# Patient Record
Sex: FEMALE | Race: WHITE | NOT HISPANIC OR LATINO | Employment: OTHER | URBAN - METROPOLITAN AREA
[De-identification: names, ages, dates, MRNs, and addresses within clinical notes are randomized per-mention and may not be internally consistent; named-entity substitution may affect disease eponyms.]

---

## 2017-01-04 ENCOUNTER — DOCUMENTATION ONLY (OUTPATIENT)
Dept: ENDOCRINOLOGY | Facility: CLINIC | Age: 82
End: 2017-01-04

## 2017-01-04 ASSESSMENT — ENCOUNTER SYMPTOMS
TASTE DISTURBANCE: 1
LOSS OF CONSCIOUSNESS: 0
JOINT SWELLING: 0
BOWEL INCONTINENCE: 1
NAIL CHANGES: 0
DIARRHEA: 0
DIFFICULTY URINATING: 0
NECK MASS: 0
MUSCLE WEAKNESS: 1
TROUBLE SWALLOWING: 0
SKIN CHANGES: 0
HALLUCINATIONS: 0
FEVER: 0
CHILLS: 0
POLYDIPSIA: 0
TREMORS: 0
SMELL DISTURBANCE: 1
NIGHT SWEATS: 0
TACHYCARDIA: 0
CONSTIPATION: 0
SLEEP DISTURBANCES DUE TO BREATHING: 0
ORTHOPNEA: 0
CLAUDICATION: 0
LEG PAIN: 0
NUMBNESS: 1
HYPOTENSION: 0
SPEECH CHANGE: 0
MUSCLE CRAMPS: 0
ALTERED TEMPERATURE REGULATION: 0
SEIZURES: 0
PALPITATIONS: 0
HEARTBURN: 0
HOARSE VOICE: 1
RECTAL PAIN: 0
ARTHRALGIAS: 1
MYALGIAS: 1
FLANK PAIN: 0
NECK PAIN: 1
PARALYSIS: 0
POOR WOUND HEALING: 0
POLYPHAGIA: 0
SYNCOPE: 0
STIFFNESS: 0
WEAKNESS: 1
LIGHT-HEADEDNESS: 0
SORE THROAT: 0
BACK PAIN: 1
DISTURBANCES IN COORDINATION: 1
INCREASED ENERGY: 0
BLOATING: 0
NAUSEA: 0
JAUNDICE: 0
MEMORY LOSS: 0
HEADACHES: 1
WEIGHT LOSS: 0
LEG SWELLING: 1
SINUS PAIN: 0
DECREASED APPETITE: 0
SINUS CONGESTION: 1
DYSURIA: 0
RECTAL BLEEDING: 0
FATIGUE: 1
HYPERTENSION: 0
EXERCISE INTOLERANCE: 0
ABDOMINAL PAIN: 0
DIZZINESS: 1
WEIGHT GAIN: 0
TINGLING: 1
HEMATURIA: 0
BLOOD IN STOOL: 0
VOMITING: 0

## 2017-01-04 NOTE — PROGRESS NOTES
Patient scheduled for osteoporosis in the morning. This is summary of chart review:     86 yo female with past medical history of osteoporosis first diagnosed at the age of @@@.    She is currently being treated with alendronate 70 mg weekly and this treatment was started @@@.  She takes it first thing in the morning with empty stomach; overall compliance per report @@@.  Number of times treatment missed @@@    +History of vertebral fracture   + 5 inch height loss/kyphosis   Previous fracture after the age of 50 -   Advanced age  Weight - 91 pounds (high risk)  Previous fragility fracture, morphometric vertebral fracture: imaging done in 7/2015 has showed multiple compression fractures.   7/2015 spine film reported as <Multiple old healed left rib fractures. Diffuse osteopenia. New moderate compression fracture of T10 since the prior examination. New moderate compression fractures in T4 and T5 are new since the prior exam. Multilevel degenerative changes in the thoracic spine are otherwise stable. Marked scoliosis of the thoracolumbar spine is redemonstrated.>    Current glucocorticoid treatment: >5 mg prednisone for 3 mo or longer; current or previously  Excessive Alcohol intake  Falls/risk factors: Multiple falls. 4 falls ine one year and half. Sustained rib and vertebral fractures secondary to the falls. Currently participating in home physical therapy for osteoporosis.   Walks three times per day in hallways of her apartment. Uses can for walking short distances and 4 barros for long distances.     Secondary causes of osteoporosis: negative for   RA, Prolonged immobility,  Organ transplantation, Type I diabetes, Hyperthyroidism, GI disease, Chronic liver disease or COPD.     Hypogonadism iatrogenic aromatase inhibitor therapy use.   Family history of hip fracture @@@:     DEXA scan: BONE MINERAL DENSITY: 9/2015  <Lumbar Spine   Vertebrae Included: L1, L2, L3, L4  Bone Mineral Density (gm/cm2): 0.653  T-Score:  -3.6  Z-Score: -0.7  Total Hip  Bone Mineral Density (gm/cm2): 0.622  T-Score: -2.6  Z-Score: -0.3  Femoral Neck  Bone Mineral Density (gm/cm2): 0.566  T-Score: -2.6  Z-Score: 0Compared to the prior study of 12- there has been a nearly 9% decline in BMD at the total hip and no change at the lumbar spine.>    Consider the following testing:   DEXA scan repeat.   Routine: CBC done. TSH done WNL (8/2016) and PTH done WNL (7/2015), Protein electrophoresis and urinary bence-childress WNL (7/2015), ESR 14 (7/2015), TgA negative in 2010.    Check:  Calcium, albumin, creatinine, phosphate, alkaline phosphatase, PTH, 25)HD, S-CTX.

## 2017-01-05 ENCOUNTER — OFFICE VISIT (OUTPATIENT)
Dept: ENDOCRINOLOGY | Facility: CLINIC | Age: 82
End: 2017-01-05

## 2017-01-05 VITALS
DIASTOLIC BLOOD PRESSURE: 95 MMHG | HEART RATE: 68 BPM | BODY MASS INDEX: 19.29 KG/M2 | HEIGHT: 58 IN | WEIGHT: 91.9 LBS | SYSTOLIC BLOOD PRESSURE: 165 MMHG

## 2017-01-05 DIAGNOSIS — R29.6 RECURRENT FALLS: ICD-10-CM

## 2017-01-05 DIAGNOSIS — M81.0 OSTEOPOROSIS: ICD-10-CM

## 2017-01-05 DIAGNOSIS — M81.0 OSTEOPOROSIS: Primary | ICD-10-CM

## 2017-01-05 LAB
ALBUMIN SERPL-MCNC: 3.5 G/DL (ref 3.4–5)
ALP SERPL-CCNC: 57 U/L (ref 40–150)
CALCIUM SERPL-MCNC: 9.2 MG/DL (ref 8.5–10.1)
CREAT SERPL-MCNC: 0.64 MG/DL (ref 0.52–1.04)
DEPRECATED CALCIDIOL+CALCIFEROL SERPL-MC: 39 UG/L (ref 20–75)
GFR SERPL CREATININE-BSD FRML MDRD: 88 ML/MIN/1.7M2
PHOSPHATE SERPL-MCNC: 3.3 MG/DL (ref 2.5–4.5)
PTH-INTACT SERPL-MCNC: 38 PG/ML (ref 12–72)

## 2017-01-05 ASSESSMENT — PAIN SCALES - GENERAL: PAINLEVEL: NO PAIN (0)

## 2017-01-05 NOTE — PATIENT INSTRUCTIONS
Continue the exercise program you are doing right now.   Use walker all the time.   Continue calcium and vitamin D supplement as you are doing right now.     We will do blood work today and we will contact you once we have the results.     We have ordered DEXA scan; please schedule this at your convenience.

## 2017-01-05 NOTE — PROGRESS NOTES
Endocrinology Clinic Visit    Chief Complaint: Consult     Information obtained from:Patient and and her two children who is here with her today.     Subjective:         HPI: Belia Sheets is a 86 year old year old female with history of osteoporosis who is seen in consultation at [facility]'s request for Osteoporosis management.     86 yo female with past medical history of osteoporosis first diagnosed in 1999.     She was treated with alendronate 70 mg weekly from 1999 to October 2015.  She has never stopped this medication all these years; she took it first thing in the morning with empty stomach.    In October of 2015 she was prescribed FORTEO; mainly because she wanted to change from bisphosphonate per report. However, she didn't take these medication because cost was 600 for two years and she didn't know wether it will help her or not.     +History of vertebral fracture; she was folding laundry when she fell from standing up position and sustained injury.  Broken hip and noted to have thoracic compression fractures.     Previous fragility fracture, morphometric vertebral fracture: imaging done in 7/2015 has showed multiple compression fractures.   7/2015 spine film reported as <Multiple old healed left rib fractures. Diffuse osteopenia. New moderate compression fracture of T10 since the prior examination. New moderate compression fractures in T4 and T5 are new since the prior exam. Multilevel degenerative changes in the thoracic spine are otherwise stable. Marked scoliosis of the thoracolumbar spine is redemonstrated.>    Lost 6 inch height loss/kyphosis    Previous fracture after the age of 50 -    Advanced age  Weight - 91 pounds (high risk)    Falls/risk factors: Multiple falls. 4 falls ine one year and half. Sustained rib and vertebral fractures secondary to the falls. Currently participating in home physical therapy for osteoporosis.   Walks three times per day in hallways of her apartment. Uses can  for walking short distances and 4 barros for long distances. She is currently doing a home physical exercise about twice a week with a physical therapist. She has been doing this since August 2016. Fall: 7/2015, 12/2014, 4/2016, 6/2016.     She lives in a senior apartment.  She gets some services. Mostly manages by her own.     She takes calcium and vitamin d supplement 600-400; 1 BID. She takes yogurt, jose daily.      Secondary causes of osteoporosis: negative for    RA, Prolonged immobility,  Organ transplantation, Type I diabetes, Hyperthyroidism, GI disease, Chronic liver disease or COPD.     Family history of hip fracture: None.      DEXA scan: BONE MINERAL DENSITY: 9/2015  <Lumbar Spine   Vertebrae Included: L1, L2, L3, L4  Bone Mineral Density (gm/cm2): 0.653  T-Score: -3.6  Z-Score: -0.7  Total Hip  Bone Mineral Density (gm/cm2): 0.622  T-Score: -2.6  Z-Score: -0.3  Femoral Neck  Bone Mineral Density (gm/cm2): 0.566  T-Score: -2.6  Z-Score: 0Compared to the prior study of 12- there has been a nearly 9% decline in BMD at the total hip and no change at the lumbar spine.>     No Known Allergies    Current Outpatient Prescriptions   Medication Sig Dispense Refill     loperamide (IMODIUM A-D) 2 MG tablet Take 2 mg by mouth 4 times daily as needed       UNKNOWN TO PATIENT Vision formula Vit A, C & E / Lutein/Minerals       alendronate (FOSAMAX) 70 MG tablet Take 70 mg by mouth every 7 days       aspirin 81 MG tablet Take by mouth daily       calcium-vitamin D (CALTRATE) 600-400 MG-UNIT per tablet Take 1 tablet by mouth 2 times daily       ciprofloxacin (CIPRO) 250 MG tablet Take 250 mg by mouth 2 times daily       alpha-d-galactosidase (BEANO) tablet Take by mouth as needed for intestinal gas       Omega-3 Fatty Acids (OMEGA-3 FISH OIL PO) Take by mouth daily       sodium fluoride dental gel (PREVIDENT) 1.1 % GEL Apply to affected area At Bedtime       Cyanocobalamin (VITAMIN B 12 PO) Take by mouth  "daily         Review of Systems     11 point review system (Constitutional, HENT, Eyes, Respiratory, Cardiovascular, Gastrointestinal, Genitourinary, Musculoskeletal,Neurological, Psychiatric/Behavioural, Endocrine) is negative or is as per HPI above: fecal incontinence.       Past Medical History   Diagnosis Date     Osteoporosis      Osteoarthritis      hands     Kyphoscoliosis      Carpal tunnel syndrome (aka CTS)      H/O calcium pyrophosphate deposition disease (CPPD)        Past Surgical History   Procedure Laterality Date     Hysterectomy       for uterine prolapse       Family History   Problem Relation Age of Onset     Depression/Anxiety Son      Depression/Anxiety Son      alcohol abuse  age 24     Hypertension Mother      Hypertension Brother      DIABETES No family hx of      Breast Cancer No family hx of      Colon Cancer No family hx of      Prostate Cancer No family hx of      Other Cancer No family hx of        Social History     Social History     Marital Status:      Spouse Name: N/A     Number of Children: N/A     Years of Education: N/A     Social History Main Topics     Smoking status: Never Smoker      Smokeless tobacco: Never Used     Alcohol Use: Not on file     Drug Use: No     Sexual Activity: Not on file     Other Topics Concern     Not on file     Social History Narrative       Objective:   /95 mmHg  Pulse 68  Ht 1.473 m (4' 10\")  Wt 41.686 kg (91 lb 14.4 oz)  BMI 19.21 kg/m2  Constitutional: Pleasant no acute cardiopulmonary distress. Walked to the exam bed with minimal assistance.   EYES: normal extra-ocular movements, no lid lag or retraction.   HEENT: Mouth/Throat: Mucous membrane is moist. No adenopathy.   Cardiovascular: RRR, S1, S2 normal.   Pulmonary/Chest: CTAB. No wheezing or rales.   Abdominal: +BS. Non tender to palpation.   Neurological: Alert and oriented. Muscle strength 5/5.   Extremities: trace pedal edema on the right side.   Back: scoliosis. No " lumbar/thoracic spine tenderness.   Lymphatic: no cervical lymphadenopathy.  Psychological: appropriate mood and affect   Stand and go test more than 10 sec.     In House Labs:   A1C      5.9   8/31/2016    Routine: CBC done. TSH done WNL (8/2016) and PTH done WNL (7/2015), Protein electrophoresis and urinary bence-childress WNL (7/2015), ESR 14 (7/2015), TgA negative in 2010.    DEXA scan: BONE MINERAL DENSITY: 9/2015  <Lumbar Spine   Vertebrae Included: L1, L2, L3, L4  Bone Mineral Density (gm/cm2): 0.653  T-Score: -3.6  Z-Score: -0.7  Total Hip  Bone Mineral Density (gm/cm2): 0.622  T-Score: -2.6  Z-Score: -0.3  Femoral Neck  Bone Mineral Density (gm/cm2): 0.566  T-Score: -2.6  Z-Score: 0Compared to the prior study of 12- there has been a nearly 9% decline in BMD at the total hip and no change at the lumbar spine.>    Assessment/Treatment Plan:      Belia Sheets is a 86 year old year old female with Osteoporosis and fragility fracture (7/2015); diagnosed in 1999.  Treated with alendronate for 16 years and four months.  Off alendronate since October 2015 (over one year).  Now presenting for discussion of osteoporosis treatment.     CBC done. TSH done WNL (8/2016) and PTH done WNL (7/2015), Protein electrophoresis and urinary bence-childress WNL (7/2015), ESR 14 (7/2015), TgA negative in 2010. Her risk factor for osteoporosis are age and low body weight.      Given severity of her osteoporosis and fragility fracture; needs aggressive treatment (Zolendronic acid, FORTEO or Denosumab are possible options). However, given she has had treatment with alendronate for 16 plus years; would like to see more data regarding her current status.  Will check DEXA scan and also serum CTX will be helpful to see if turnover high . However, due to the possibility of insurance not covering CTX; ordered bone sp alkaline phosphatase. Further plan based on the above findings. Check:  Calcium, albumin, creatinine, phosphate, alkaline  phosphatase, PTH, 25)HD, S-CTX.      In the meantime, we advised: BALANCED DIET to produce more mass with a focus on increased protein calories, using walker all the time, home exercise program, encouraged walking, calcium and vitamin d supplement.      Patient Instructions   Continue the exercise program you are doing right now.   Use walker all the time.   Continue calcium and vitamin D supplement as you are doing right now.     We will do blood work today and we will contact you once we have the results.     We have ordered DEXA scan; please schedule this at your convenience.     I will contact the patient with the test results.  Return to clinic in 2 months.    Test and/or medications prescribed today:  Orders Placed This Encounter   Procedures     Dexa hip/pelvis/spine     Dexa vertebral fracture analysis     Vitamin D Deficiency (D3 Only)     Albumin level     Alkaline phosphatase     Calcium     Parathyroid Hormone Intact     Phosphorus     Creatinine     Bone specific alk phosphatase       Patient seen and discussed with endocrinology attending Lauren.     Saurabh Pittman MD  Endocrinology fellow   [phone]  Division of Endocrinology and Diabetes    Attending addendum:  Pt was seen & evaluated with endocrinology fellow & plan is as outlined in this note.  RYAN MOLINA MD, MPH

## 2017-01-05 NOTE — MR AVS SNAPSHOT
After Visit Summary   1/5/2017    Belia Sheets    MRN: [id number]           Patient Information     Date Of Birth          5/16/1930        Visit Information        Provider Department      1/5/2017 8:15 AM Saurabh Pittman MD [facility] Endocrinology        Today's Diagnoses     Osteoporosis    -  1     Recurrent falls         Compression fracture           Care Instructions    Continue the exercise program you are doing right now.   Use walker all the time.   Continue calcium and vitamin D supplement as you are doing right now.     We will do blood work today and we will contact you once we have the results.     We have ordered DEXA scan; please schedule this at your convenience.         Follow-ups after your visit        Follow-up notes from your care team     Return in about 2 months (around 3/5/2017).      Your next 10 appointments already scheduled     Jan 05, 2017 10:00 AM   LAB with  LAB   [facility] ([facility])    [address] [place] [id number] [phone]           Patient must bring picture ID.  Patient should be prepared to give a urine specimen  Please do not eat 10-12 hours before your appointment if you are coming in fasting for labs on lipids, cholesterol, or glucose (sugar).  Pregnant women should follow their Care Team instructions. Water with medications is okay. Do not drink coffee or other fluids.   If you have concerns about taking  your medications, please ask at office or if scheduling via [employer], send a message by clicking on Secure Messaging, Message Your Care Team.            Jan 05, 2017 10:30 AM   DX VERTEBRAL FRACTURE ANALYSIS LAT ONLY with DX1   [facility] ([facility])    [address] [place] [id number] [phone]           Please do not take any of the following 48 hours prior to your exam: vitamins, calcium tablets, antacids.             Jan 05, 2017 11:00 AM   DX HIP/PELVIS/SPINE with UCDX1   [facility] ([facility])    [address]  1st [address]  [facility] [id number] [phone]           Please do not take any of the following 48 hours prior to your exam: vitamins, calcium tablets, antacids.            Jan 16, 2017 11:00 AM   Return Visit with Alejandro Sandhu MD   [facility] ([facility])    [address],  [address]  [facility] [address]   [phone]            Mar 09, 2017 10:30 AM   (Arrive by 10:00 AM)   RETURN ENDOCRINE with Saurabh Pittman MD   [facility] ([facility])    [address]  [address]rd [facility] [address] [phone]              Future tests that were ordered for you today     Open Future Orders        Priority Expected Expires Ordered    Bone specific alk phosphatase Routine  1/5/2018 1/5/2017    Alkaline phosphatase Routine  1/5/2018 1/5/2017    Calcium Routine  1/5/2018 1/5/2017    Parathyroid Hormone Intact Routine  1/5/2018 1/5/2017    Phosphorus Routine  1/5/2018 1/5/2017    Creatinine Routine  1/5/2018 1/5/2017    Dexa hip/pelvis/spine Routine  8/3/2017 1/5/2017    Dexa vertebral fracture analysis Routine  8/3/2017 1/5/2017    Vitamin D Deficiency (D3 Only) Routine  1/5/2018 1/5/2017    Albumin level Routine  1/5/2018 1/5/2017            Who to contact     Please call your clinic at [phone] to:    Ask questions about your health    Make or cancel appointments    Discuss your medicines    Learn about your test results    Speak to your doctor   If you have compliments or concerns about an experience at your clinic, or if you wish to file a complaint, please contact [facility] Patient Relations at [phone] or email us at [email or web address]         Additional Information About Your Visit        MyChart Information      "[employer] gives you secure access to your electronic health record. If you see a primary care provider, you can also send messages to your care team and make appointments. If you have questions, please call your primary care clinic.  If you do not have a primary care provider, please call [phone] and they will assist you.      [employer] is an electronic gateway that provides easy, online access to your medical records. With [employer], you can request a clinic appointment, read your test results, renew a prescription or communicate with your care team.     To access your existing account, please contact your [facility] or call [phone] for assistance.        Care EveryWhere ID     This is your Care EveryWhere ID. This could be used by other organizations to access your [place] medical records  [id number]        Your Vitals Were     Pulse Height BMI (Body Mass Index)             68 1.473 m (4' 10\") 19.21 kg/m2          Blood Pressure from Last 3 Encounters:   01/05/17 165/95   11/21/16 176/103   10/26/16 133/88    Weight from Last 3 Encounters:   01/05/17 41.686 kg (91 lb 14.4 oz)   11/21/16 41.187 kg (90 lb 12.8 oz)   10/26/16 39.463 kg (87 lb)               Primary Care Provider Office Phone # Fax #    Alejandro Sandhu [phone]       [facility] [address] [address]        Thank you!     Thank you for choosing [facility]  for your care. Our goal is always to provide you with excellent care. Hearing back from our patients is one way we can continue to improve our services. Please take a few minutes to complete the written survey that you may receive in the mail after your visit with us. Thank you!             Your Updated Medication List - Protect others around you: Learn how to safely use, store and throw away your medicines at www.disposemymeds.org.          This list is accurate as of: 1/5/17  9:47 AM.  Always use your " most recent med list.                   Brand Name Dispense Instructions for use    alpha-d-galactosidase tablet      Take by mouth as needed for intestinal gas       aspirin 81 MG tablet      Take by mouth daily       calcium-vitamin D 600-400 MG-UNIT per tablet    CALTRATE     Take 1 tablet by mouth 2 times daily       CIPRO 250 MG tablet   Generic drug:  ciprofloxacin      Take 250 mg by mouth 2 times daily       FOSAMAX 70 MG tablet   Generic drug:  alendronate      Take 70 mg by mouth every 7 days       loperamide 2 MG tablet    IMODIUM A-D     Take 2 mg by mouth 4 times daily as needed       OMEGA-3 FISH OIL PO      Take by mouth daily       sodium fluoride dental gel 1.1 % Gel topical gel    PREVIDENT     Apply to affected area At Bedtime       UNKNOWN TO PATIENT      Vision formula Vit A, C & E / Lutein/Minerals       VITAMIN B 12 PO      Take by mouth daily

## 2017-01-05 NOTE — Clinical Note
1/5/2017       RE: Belia Sheets  [address]  [address]  [facility] [address]     Dear Colleague,    Thank you for referring your patient, Belia Sheets, to the [facility] ENDOCRINOLOGY at [facility]. Please see a copy of my visit note below.    Endocrinology Clinic Visit    Chief Complaint: Consult     Information obtained from:Patient and and her two children who is here with her today.     Subjective:         HPI: Belia Sheets is a 86 year old year old female with history of osteoporosis who is seen in consultation at Alejandro Sandhu's request for Osteoporosis management.     84 yo female with past medical history of osteoporosis first diagnosed in 1999.     She was treated with alendronate 70 mg weekly from 1999 to October 2015.  She has never stopped this medication all these years; she took it first thing in the morning with empty stomach.    In October of 2015 she was prescribed FORTEO; mainly because she wanted to change from bisphosphonate per report. However, she didn't take these medication because cost was 600 for two years and she didn't know wether it will help her or not.     +History of vertebral fracture; she was folding laundry when she fell from standing up position and sustained injury.  Broken hip and noted to have thoracic compression fractures.     Previous fragility fracture, morphometric vertebral fracture: imaging done in 7/2015 has showed multiple compression fractures.   7/2015 spine film reported as <Multiple old healed left rib fractures. Diffuse osteopenia. New moderate compression fracture of T10 since the prior examination. New moderate compression fractures in T4 and T5 are new since the prior exam. Multilevel degenerative changes in the thoracic spine are otherwise stable. Marked scoliosis of the thoracolumbar spine is redemonstrated.>    Lost 6 inch height loss/kyphosis    Previous fracture after the age of 50 -    Advanced  age  Weight - 91 pounds (high risk)    Falls/risk factors: Multiple falls. 4 falls ine one year and half. Sustained rib and vertebral fractures secondary to the falls. Currently participating in home physical therapy for osteoporosis.   Walks three times per day in hallways of her apartment. Uses can for walking short distances and 4 barros for long distances. She is currently doing a home physical exercise about twice a week with a physical therapist. She has been doing this since August 2016. Fall: 7/2015, 12/2014, 4/2016, 6/2016.     She lives in a senior apartment.  She gets some services. Mostly manages by her own.     She takes calcium and vitamin d supplement 600-400; 1 BID. She takes yogurt, jose daily.      Secondary causes of osteoporosis: negative for    RA, Prolonged immobility,  Organ transplantation, Type I diabetes, Hyperthyroidism, GI disease, Chronic liver disease or COPD.     Family history of hip fracture: None.      DEXA scan: BONE MINERAL DENSITY: 9/2015  <Lumbar Spine   Vertebrae Included: L1, L2, L3, L4  Bone Mineral Density (gm/cm2): 0.653  T-Score: -3.6  Z-Score: -0.7  Total Hip  Bone Mineral Density (gm/cm2): 0.622  T-Score: -2.6  Z-Score: -0.3  Femoral Neck  Bone Mineral Density (gm/cm2): 0.566  T-Score: -2.6  Z-Score: 0Compared to the prior study of 12- there has been a nearly 9% decline in BMD at the total hip and no change at the lumbar spine.>     No Known Allergies    Current Outpatient Prescriptions   Medication Sig Dispense Refill     loperamide (IMODIUM A-D) 2 MG tablet Take 2 mg by mouth 4 times daily as needed       UNKNOWN TO PATIENT Vision formula Vit A, C & E / Lutein/Minerals       alendronate (FOSAMAX) 70 MG tablet Take 70 mg by mouth every 7 days       aspirin 81 MG tablet Take by mouth daily       calcium-vitamin D (CALTRATE) 600-400 MG-UNIT per tablet Take 1 tablet by mouth 2 times daily       ciprofloxacin (CIPRO) 250 MG tablet Take 250 mg by mouth 2 times  "daily       alpha-d-galactosidase (BEANO) tablet Take by mouth as needed for intestinal gas       Omega-3 Fatty Acids (OMEGA-3 FISH OIL PO) Take by mouth daily       sodium fluoride dental gel (PREVIDENT) 1.1 % GEL Apply to affected area At Bedtime       Cyanocobalamin (VITAMIN B 12 PO) Take by mouth daily         Review of Systems     11 point review system (Constitutional, HENT, Eyes, Respiratory, Cardiovascular, Gastrointestinal, Genitourinary, Musculoskeletal,Neurological, Psychiatric/Behavioural, Endocrine) is negative or is as per HPI above: fecal incontinence.       Past Medical History   Diagnosis Date     Osteoporosis      Osteoarthritis      hands     Kyphoscoliosis      Carpal tunnel syndrome (aka CTS)      H/O calcium pyrophosphate deposition disease (CPPD)        Past Surgical History   Procedure Laterality Date     Hysterectomy       for uterine prolapse       Family History   Problem Relation Age of Onset     Depression/Anxiety Son      Depression/Anxiety Son      alcohol abuse  age 24     Hypertension Mother      Hypertension Brother      DIABETES No family hx of      Breast Cancer No family hx of      Colon Cancer No family hx of      Prostate Cancer No family hx of      Other Cancer No family hx of        Social History     Social History     Marital Status:      Spouse Name: N/A     Number of Children: N/A     Years of Education: N/A     Social History Main Topics     Smoking status: Never Smoker      Smokeless tobacco: Never Used     Alcohol Use: Not on file     Drug Use: No     Sexual Activity: Not on file     Other Topics Concern     Not on file     Social History Narrative       Objective:   /95 mmHg  Pulse 68  Ht 1.473 m (4' 10\")  Wt 41.686 kg (91 lb 14.4 oz)  BMI 19.21 kg/m2  Constitutional: Pleasant no acute cardiopulmonary distress. Walked to the exam bed with minimal assistance.   EYES: normal extra-ocular movements, no lid lag or retraction.   HEENT: Mouth/Throat: " Mucous membrane is moist. No adenopathy.   Cardiovascular: RRR, S1, S2 normal.   Pulmonary/Chest: CTAB. No wheezing or rales.   Abdominal: +BS. Non tender to palpation.   Neurological: Alert and oriented. Muscle strength 5/5.   Extremities: trace pedal edema on the right side.   Back: scoliosis. No lumbar/thoracic spine tenderness.   Lymphatic: no cervical lymphadenopathy.  Psychological: appropriate mood and affect   Stand and go test more than 10 sec.     In House Labs:   A1C      5.9   8/31/2016    Routine: CBC done. TSH done WNL (8/2016) and PTH done WNL (7/2015), Protein electrophoresis and urinary bence-childress WNL (7/2015), ESR 14 (7/2015), TgA negative in 2010.    DEXA scan: BONE MINERAL DENSITY: 9/2015  <Lumbar Spine   Vertebrae Included: L1, L2, L3, L4  Bone Mineral Density (gm/cm2): 0.653  T-Score: -3.6  Z-Score: -0.7  Total Hip  Bone Mineral Density (gm/cm2): 0.622  T-Score: -2.6  Z-Score: -0.3  Femoral Neck  Bone Mineral Density (gm/cm2): 0.566  T-Score: -2.6  Z-Score: 0Compared to the prior study of 12- there has been a nearly 9% decline in BMD at the total hip and no change at the lumbar spine.>    Assessment/Treatment Plan:      Belia Sheets is a 86 year old year old female with Osteoporosis and fragility fracture (7/2015); diagnosed in 1999.  Treated with alendronate for 16 years and four months.  Off alendronate since October 2015 (over one year).  Now presenting for discussion of osteoporosis treatment.     CBC done. TSH done WNL (8/2016) and PTH done WNL (7/2015), Protein electrophoresis and urinary bence-childress WNL (7/2015), ESR 14 (7/2015), TgA negative in 2010. Her risk factor for osteoporosis are age and low body weight.      Given severity of her osteoporosis and fragility fracture; needs aggressive treatment (Zolendronic acid, FORTEO or Denosumab are possible options). However, given she has had treatment with alendronate for 16 plus years; would like to see more data regarding her  current status.  Will check DEXA scan and also serum CTX will be helpful to see if turnover high . However, due to the possibility of insurance not covering CTX; ordered bone sp alkaline phosphatase. Further plan based on the above findings. Check:  Calcium, albumin, creatinine, phosphate, alkaline phosphatase, PTH, 25)HD, S-CTX.      In the meantime, we advised: BALANCED DIET to produce more mass with a focus on increased protein calories, using walker all the time, home exercise program, encouraged walking, calcium and vitamin d supplement.      Patient Instructions   Continue the exercise program you are doing right now.   Use walker all the time.   Continue calcium and vitamin D supplement as you are doing right now.     We will do blood work today and we will contact you once we have the results.     We have ordered DEXA scan; please schedule this at your convenience.     I will contact the patient with the test results.  Return to clinic in 2 months.    Test and/or medications prescribed today:  Orders Placed This Encounter   Procedures     Dexa hip/pelvis/spine     Dexa vertebral fracture analysis     Vitamin D Deficiency (D3 Only)     Albumin level     Alkaline phosphatase     Calcium     Parathyroid Hormone Intact     Phosphorus     Creatinine     Bone specific alk phosphatase       Patient seen and discussed with endocrinology attending Lauren.     Saurabh Pittman MD  Endocrinology fellow   [phone]  Division of Endocrinology and Diabetes    Attending addendum:  Pt was seen & evaluated with endocrinology fellow & plan is as outlined in this note.  RYAN MOLINA MD, MPH        Sincerely,    Saurabh Pittman MD

## 2017-01-07 LAB — ALP BONE SERPL-MCNC: 11.4 UG/L

## 2017-01-09 ENCOUNTER — TELEPHONE (OUTPATIENT)
Dept: ENDOCRINOLOGY | Facility: CLINIC | Age: 82
End: 2017-01-09

## 2017-01-09 ENCOUNTER — TELEPHONE (OUTPATIENT)
Dept: FAMILY MEDICINE | Facility: CLINIC | Age: 82
End: 2017-01-09

## 2017-01-09 DIAGNOSIS — M80.00XA AGE-RELATED OSTEOPOROSIS WITH CURRENT PATHOLOGICAL FRACTURE, INITIAL ENCOUNTER: Primary | ICD-10-CM

## 2017-01-09 NOTE — TELEPHONE ENCOUNTER
[facility] phone call message- general phone call:    Reason for call: Patient states that she received a call from the clinic, but was not able to get to the phone in time to answer. Author was unable to find documentation of an outgoing call. Patient is wondering if the communication could take place via e-mail instead of phone as it is hard for her to move fast enough to answer without risking a fall. Informed patient that communication through [employer] is possible and confirmed that patient knows her log in credentials. Please either call patient, or if she doesn't answer, message through [employer].    Return call needed: Yes    OK to leave a message on voice mail? Yes    Primary language: English      needed? No    Call taken on January 9, 2017 at 12:56 PM by Jm Colby

## 2017-01-09 NOTE — TELEPHONE ENCOUNTER
Message routed PCP to advise if outgoing call was made. Patient would prefer to communicate via mychart instead of telephone.    Sariah Mendes RN

## 2017-01-09 NOTE — TELEPHONE ENCOUNTER
Corinna Oneal,     The DEXA scan and other blood work results indicate that you need treatment for the osteoporosis. At this time the best choice of treatment is teriparatide or FORTEO injection. We recommend treatment with Forteo at this time.  As we have discussed with you last time; the exercise your are doing right now is very important and please keep it up.  We can start this treatment at your next appointment.  We would like to bring your appointment to 2/9/2017 at 10:30 if it works for you instead of in March. In the meantime, we will start the process of working with insurance to get the approval for the Forteo.  Please let me know if any questions,     Sincerely,   Saurabh Pittman MD   Endocrine Fellow             This is a patient with severe osteoporosis previously treated with oral alendronate for 16 years.  Worsening BMD and also fragility fracture; she needs to be treated with Forteo.  Can we please start PA for Forteo?

## 2017-01-10 ENCOUNTER — TELEPHONE (OUTPATIENT)
Dept: ENDOCRINOLOGY | Facility: CLINIC | Age: 82
End: 2017-01-10

## 2017-01-10 NOTE — TELEPHONE ENCOUNTER
Free Drug     Medication - Forteo  Sponsor - [employer]  Phone # - [phone]  Fax # - [phone]  Effective Dates - TBD  Additional Information - TBD    Mailed patient's daughter her portion of the application with as much filled out as I could.  Highlighted all her potions and included instructions on what to send in (proof of income) and where to send it.  Will give Kayla GRANADOS portion of application tomorrow.

## 2017-01-11 ENCOUNTER — TELEPHONE (OUTPATIENT)
Dept: FAMILY MEDICINE | Facility: CLINIC | Age: 82
End: 2017-01-11

## 2017-01-11 NOTE — TELEPHONE ENCOUNTER
[facility] Family Medicine phone call message- general phone call:    Reason for call: Patient is calling to find out if PCP thinks she should keep her appointment with him on the 16th. She states she saw endocrinology recently and is scheduled for a follow up on Feb 9th. She wants to know if she should wait until after the follow up to see PCP.     Return call needed: Yes    OK to leave a message on voice mail? Yes    Primary language: English      needed? No    Call taken on January 11, 2017 at 3:16 PM by Michelle Castro

## 2017-01-11 NOTE — TELEPHONE ENCOUNTER
Received the MD portion back for free drug, signed and filled out.  Faxed it off to the patient foundation at fax: [phone].  Asked the foundation to open a new file and keep the MD portion on file as patient still has to fill out her portion and send it off.  Will await communication from patient's daughter with progress.

## 2017-01-12 NOTE — TELEPHONE ENCOUNTER
Message routed to FD, please call patient to assist with schedule adjustments. Per PCP message below, patient should follow up after seeing endocrinology on 2/9/17.    Sariah Mendes RN

## 2017-01-13 ENCOUNTER — TELEPHONE (OUTPATIENT)
Dept: ENDOCRINOLOGY | Facility: CLINIC | Age: 82
End: 2017-01-13

## 2017-01-13 PROBLEM — M84.48XA: Status: ACTIVE | Noted: 2017-01-13

## 2017-01-13 PROBLEM — M81.0 SENILE OSTEOPOROSIS: Status: ACTIVE | Noted: 2017-01-13

## 2017-01-13 NOTE — TELEPHONE ENCOUNTER
----- Message from Bela Shipley sent at 1/13/2017 12:03 PM CST -----  Regarding: RE: LORETA Lueo  Patient's insurance does cover the drug but at a very high copay.  She is filling out free drug through [employer].  Will update when application has been faxed and determination has been made on approval.  Patient and patient's daughter are aware of said situation.  Thanks!    Bela  ----- Message -----     From: Lauryn Ferguson RN     Sent: 1/13/2017  11:13 AM       To: Bela Shipley  Subject: LORETA Paez                                        Forteo 20 mcg  Subcutaneous  Once daily X 2 years.  Has used alendronate    Given severity of her osteoporosis and fragility fracture; needs aggressive treatment (Zolendronic acid, FORTEO or Denosumab are possible options). However, given she has had treatment with alendronate for 16 plus years; would like to see more data regarding her current status  DX Senile  Osteoporosis  733.01  HX of  Vertebral Fracture  733.13

## 2017-01-16 NOTE — TELEPHONE ENCOUNTER
Forms from [employer] rec'd, pt needs to submit her portion. Will reach out to clinic staff to fax in a prescription for Forteo. Daughter said to be helping her and liaison mailed application to them / spoke with them / gave instructions.

## 2017-01-19 ENCOUNTER — TELEPHONE (OUTPATIENT)
Dept: ENDOCRINOLOGY | Facility: CLINIC | Age: 82
End: 2017-01-19

## 2017-01-19 NOTE — TELEPHONE ENCOUNTER
"Pt's daughter France has \"multiple' follow up questions after pt's last appointment on 1/05/17. She is requesting a call at [phone].  "

## 2017-01-20 ENCOUNTER — TELEPHONE (OUTPATIENT)
Dept: ENDOCRINOLOGY | Facility: CLINIC | Age: 82
End: 2017-01-20

## 2017-01-20 NOTE — TELEPHONE ENCOUNTER
Received a telephone call message from patient's daughter France regarding the decision of FORTEO. Called back on 1/20/17 at 10:45 and discussed the overall progress so far.     Again, this is an 86 year old year old female with Osteoporosis and fragility fracture (7/2015); diagnosed in 1999 initially.  Treated with alendronate for 16 years and four months.  Off alendronate since October 2015 (over one year).  we saw her in the clinic for the question of what next?    CBC done. TSH done WNL (8/2016) and PTH done WNL (7/2015), Protein electrophoresis and urinary bence-childress WNL (7/2015), ESR 14 (7/2015), TgA negative in 2010. Her risk factor for osteoporosis are age and low body weight.    Results for CONSTANCE CONNELLY (MRN [id number]) as of 1/20/2017 10:50   Ref. Range 1/5/2017 10:28   Creatinine Latest Ref Range: 0.52-1.04 mg/dL 0.64   GFR Estimate Latest Ref Range: >60 mL/min/1.7m2 88   GFR Estimate If Black Latest Ref Range: >60 mL/min/1.7m2 >90...   Calcium Latest Ref Range: 8.5-10.1 mg/dL 9.2   Phosphorus Latest Ref Range: 2.5-4.5 mg/dL 3.3   Albumin Latest Ref Range: 3.4-5.0 g/dL 3.5   Alkaline Phosphatase Latest Ref Range:  U/L 57   Bone Spec Alk Phosphatase Unknown 11.4   Vitamin D Deficiency screening Latest Ref Range: 20-75 ug/L 39       DEXA scan:   VFA scan was interpretable from T4 - L4.  Using the semi-quantitative analysis of Genant (see reference #1), there were evidence for a grade 3 (severe) compression, fracture at T10, L3-L4, .  Six point morphometry confirmed the presence and severity of these deformities.      Region  BMD  T - score  Z - score    L1-L2  0.634 g/cm   -4.4  -1.7              Neck Left  0.644 g/cm   -2.8  0.1    Total Left  0.627 g/cm   -3.0  -0.1              Neck Right  0.603 g/cm   -3.1  -0.2    Total Right  0.588 g/cm   -3.3  -0.4    Radius 33%  0.457 g/cm   -3.5  -0.2           Given severity of her osteoporosis and fragility fracture (severe as documented above); needs  aggressive treatment (Zolendronic acid, FORTEO or Denosumab are possible options). Checked  Bone specific alkaline phosphatase to assess ongoing bone loss (resorption) but the result was normal indicating or favoring treatment with FORTEO.  The others are anti-bone resorption treatment options but again the bone specific alkaline phosphatase doesn't indicate ongoing bone resorption. This might be secondary to the treatment with alendronate for 16 years. FORTEO has the added benefit of preventing vertebral fractures.     Medication ordered; FORTEO.  We are in the process of working with the company who provides the medication so that her co-pay is covered. Patient needs to complete a form and submit to the company.     Again, BALANCED DIET, using walker all the time, home exercise program, walking and continuing calcium and vitamin D supplement are import component of the treatment program.         Once medication approved and received she can start the injection or wait until she see us in the clinic on Feb 9.     Please let me know if any questions,

## 2017-01-23 NOTE — TELEPHONE ENCOUNTER
Patient's daughter phoned with questions regarding free drug application process. Instructed patient to mail and fax directly to [employer]. Daughter and mom are working on gathering financials to send in. Instructed daughterLubna to call Justyna about 5 days after faxing to check status.

## 2017-02-06 ENCOUNTER — TELEPHONE (OUTPATIENT)
Dept: FAMILY MEDICINE | Facility: CLINIC | Age: 82
End: 2017-02-06

## 2017-02-06 ENCOUNTER — OFFICE VISIT (OUTPATIENT)
Dept: FAMILY MEDICINE | Facility: CLINIC | Age: 82
End: 2017-02-06

## 2017-02-06 VITALS
WEIGHT: 92.4 LBS | OXYGEN SATURATION: 99 % | HEART RATE: 68 BPM | BODY MASS INDEX: 19.32 KG/M2 | TEMPERATURE: 97.8 F | RESPIRATION RATE: 20 BRPM
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
## 2017-02-06 DIAGNOSIS — R07.81 RIB PAIN ON RIGHT SIDE: ICD-10-CM

## 2017-02-06 RX ORDER — ACETAMINOPHEN 500 MG
1000 TABLET ORAL
COMMUNITY
Start: 2015-08-12 | End: 2017-10-23

## 2017-02-06 NOTE — TELEPHONE ENCOUNTER
[facility] phone call message-patient reporting a symptom:     Symptom: fell and ribs pain    Same Day Visit Offered: Yes, declined    Additional comments: patient states she had fall on  01/31/2017 and fell on her back. Patient states she injured her ribs and is in severe pain. Patient is requesting advice from pcp on whether she should get x- ray done or not.    OK to leave message on voice mail? Yes    Primary language: English      needed? No    Call taken on February 6, 2017 at 1:46 PM by Mignon Trinh

## 2017-02-06 NOTE — MR AVS SNAPSHOT
After Visit Summary   2/6/2017    Belia Sheets    MRN: [id number]           Patient Information     Date Of Birth          5/16/1930        Visit Information        Provider Department      2/6/2017 4:20 PM Mitchel Green MD [facility]        Today's Diagnoses     Fall, initial encounter    -  1    Rib pain on right side           Follow-ups after your visit        Follow-up notes from your care team     Return in about 1 week (around 2/13/2017).      Your next 10 appointments already scheduled     Feb 15, 2017  1:00 PM CST   Return Visit with MD [facility] ([facility])    [address],  [address]  [place] [address]   [phone]            Mar 09, 2017 10:30 AM CST   (Arrive by 10:00 AM)   RETURN ENDOCRINE with Saurabh Pittman MD   [facility] ([facility])    [address]  3rd Floor  [place] [address] [phone]              Who to contact     Please call your clinic at [phone] to:    Ask questions about your health    Make or cancel appointments    Discuss your medicines    Learn about your test results    Speak to your doctor   If you have compliments or concerns about an experience at your clinic, or if you wish to file a complaint, please contact [facility] Patient Relations at [phone] or email us at [email or web address]         Additional Information About Your Visit        MyChart Information     [employer] gives you secure access to your electronic health record. If you see a primary care provider, you can also send messages to your care team and make appointments. If you have questions, please call your primary care clinic.  If you do not have a primary care provider, please call [phone] and they will assist you.      [employer] is an electronic gateway that provides easy, online access to your  medical records. With [employer], you can request a clinic appointment, read your test results, renew a prescription or communicate with your care team.     To access your existing account, please contact your [facility] or call [phone] for assistance.        Care EveryWhere ID     This is your Care EveryWhere ID. This could be used by other organizations to access your [place] medical records  [id number]        Your Vitals Were     Pulse Temperature Respirations Pulse Oximetry BMI (Body Mass Index)       68 97.8  F (36.6  C) (Oral) 20 99% 19.31 kg/m2        Blood Pressure from Last 3 Encounters:   02/08/17 (!) 163/104   01/05/17 (!) 165/95   11/21/16 (!) 176/103    Weight from Last 3 Encounters:   02/08/17 93 lb 6.4 oz (42.4 kg)   02/06/17 92 lb 6.4 oz (41.9 kg)   01/05/17 91 lb 14.4 oz (41.7 kg)               Primary Care Provider Office Phone # Fax #    Alejandro Sandhu [phone]       [facility] [address] [address]        Thank you!     Thank you for choosing [facility]  for your care. Our goal is always to provide you with excellent care. Hearing back from our patients is one way we can continue to improve our services. Please take a few minutes to complete the written survey that you may receive in the mail after your visit with us. Thank you!             Your Updated Medication List - Protect others around you: Learn how to safely use, store and throw away your medicines at www.disposemymeds.org.          This list is accurate as of: 2/6/17 11:59 PM.  Always use your most recent med list.                   Brand Name Dispense Instructions for use    acetaminophen 500 MG tablet    TYLENOL     Take 1,000 mg by mouth       alpha-d-galactosidase tablet      Take by mouth as needed for intestinal gas       aspirin 81 MG tablet      Take by mouth daily       calcium-vitamin D 600-400 MG-UNIT per tablet    CALTRATE      Take 1 tablet by mouth 2 times daily       CIPRO 250 MG tablet   Generic drug:  ciprofloxacin      Take 250 mg by mouth 2 times daily       loperamide 2 MG tablet    IMODIUM A-D     Take 2 mg by mouth 4 times daily as needed       OMEGA-3 FISH OIL PO      Take by mouth daily       sodium fluoride dental gel 1.1 % Gel topical gel    PREVIDENT     Apply to affected area At Bedtime       teriparatide (recombinant) 600 MCG/2.4ML Soln injection    FORTEO    6 mL    Inject 0.08 mLs (20 mcg) Subcutaneous daily       UNKNOWN TO PATIENT      Vision formula Vit A, C & E / Lutein/Minerals       VITAMIN B 12 PO      Take by mouth daily

## 2017-02-06 NOTE — TELEPHONE ENCOUNTER
Patient calling back to discuss symptoms.  Nurse unavailable when attempted at extension.  Please call when available.  Thank you!

## 2017-02-06 NOTE — TELEPHONE ENCOUNTER
Called patient, unable to reach. Left VM advising patient to be evaluated by a doctor if she was still experiencing severe pain 6 days after falling. Advised that patient could schedule next day appointment in clinic or go to urgent care or ER today. Left callback number for questions or to discuss further.    Sariah Mendes RN

## 2017-02-06 NOTE — PROGRESS NOTES
Preceptor Attestation:   Patient seen and discussed with the resident. Assessment and plan reviewed with resident and agreed upon.   Supervising Physician:  Violet Zapata MD  [facility]

## 2017-02-06 NOTE — PROGRESS NOTES
HPI:       Belia Sheets is a 86 year old who presents for the following  Patient presents with:  Fall: 1 week ago,backwards onto back, hit head, no improvement  Pain: ribs, back pain      This is a 86 year old female with a past medical history significant for osteoporosis, vertebral compression fracture, severe scoliosis, and frequent falls in the past who presented to the clinic with right sided rib pain after a repeated fall one week ago. Patient states that she fell backwards after bending down to grab something from a bottom shelf of a book case. She has right sided rib pain after incident. She denies any LOC or head trauma after fall. Pain is described as sharp in nature, comes and goes, alleviated by sitting still and exacerbated by movement and laying on the affected side. She has been controlling pain with ibuprofen, tylenol and heat packs. She did mention that she gets severe occasional sharp pain at the affected area that lasts for a few minutes. She denies pain with breathing and shortness of breath. She has no other concerns.     Problem, Medication and Allergy Lists were   reviewed and are current.     Patient Active Problem List    Diagnosis Date Noted     Senile osteoporosis 01/13/2017     Priority: Medium     Lumbar verterbral fracture, non-traumatic 01/13/2017     Priority: Medium     Recurrent falls 08/31/2016     Priority: Medium     Chronic fatigue 08/31/2016     Priority: Medium     Osteoporosis      Osteoarthritis      hands           Current Outpatient Prescriptions   Medication Sig Dispense Refill     acetaminophen (TYLENOL) 500 MG tablet Take 1,000 mg by mouth       teriparatide, recombinant, (FORTEO) 600 MCG/2.4ML SOLN injection Inject 0.08 mLs (20 mcg) Subcutaneous daily 6 mL 1     loperamide (IMODIUM A-D) 2 MG tablet Take 2 mg by mouth 4 times daily as needed       UNKNOWN TO PATIENT Vision formula Vit A, C & E / Lutein/Minerals       aspirin 81 MG tablet Take by mouth daily        calcium-vitamin D (CALTRATE) 600-400 MG-UNIT per tablet Take 1 tablet by mouth 2 times daily       ciprofloxacin (CIPRO) 250 MG tablet Take 250 mg by mouth 2 times daily       alpha-d-galactosidase (BEANO) tablet Take by mouth as needed for intestinal gas       Omega-3 Fatty Acids (OMEGA-3 FISH OIL PO) Take by mouth daily       sodium fluoride dental gel (PREVIDENT) 1.1 % GEL Apply to affected area At Bedtime       Cyanocobalamin (VITAMIN B 12 PO) Take by mouth daily         No Known Allergies  Patient is an established patient of this clinic.         Review of Systems:   Review of Systems Review of system negative except as mentioned in HPI.           Physical Exam:   Patient Vitals for the past 24 hrs:   Temp Temp src Pulse Resp SpO2 Weight   02/06/17 1642 97.8  F (36.6  C) Oral 68 20 99 % 92 lb 6.4 oz (41.912 kg)     Body mass index is 19.32 kg/(m^2).  Vitals were reviewed and were normal     Physical Exam   Constitutional: She appears well-developed and well-nourished.   Patient ambulates with a walker. Has a waist belt tied around chest.    HENT:   Head: Normocephalic and atraumatic.   Eyes: Conjunctivae are normal.   Cardiovascular: Normal rate and regular rhythm.    Pulmonary/Chest: Effort normal and breath sounds normal. She has no wheezes. She has no rales.   Musculoskeletal:        Arms:  Severe scoliosis and kyphosis noted. She has severe heberden notes of  Distal interphalangeal joints on both fingers.  No midline vertebral tenderness noted.          Results:      Results from the last 24 hoursNo results found for this or any previous visit (from the past 24 hour(s)).  Assessment and Plan     Belia was seen today for fall and pain.    Diagnoses and all orders for this visit:    Rib pain on right side:  Fall, initial encounter:  This is a 86 year old female that presented with right sided rib pain after a recent fall. Physical exam was significant for posterior right sided tenderness around the  seventh rib. Chest x-ray revealed findings suggestive of healing old fractures(Calcifications), however still waiting on official readings. No findings suggestive of atelectasis or blunting of the costal angles suggestive of hemothorax noted on imaging. Discussed pain control options but patient feels more comfortable alternating ibuprofen and tylenol. Recommended close follow up in one week. Patient agreed to medical management.   -     XR Ribs & Chest Lt 3v; Future      Addendum: February 7, 2017   Official radiology X-ray readings revealed significant posterior rib fractures involving the 8th through the 11th ribs. Subacute left sided fractures of the seventh and 10th rib and acute ninth rib left sided fractures noted.   - Patient was called and will schedule an appointment tomorrow for dedicated chest x-ray (Two view) to evaluate for pneumothorax. She agreed to medical plan.       There are no discontinued medications.  Options for treatment and follow-up care were reviewed with the patient. Belia Sheets  engaged in the decision making process and verbalized understanding of the options discussed and agreed with the final plan.    Mitchel Garcia MD  PGY2 [facility] Resident   Pager: [phone]

## 2017-02-06 NOTE — TELEPHONE ENCOUNTER
"Returned call to patient, states this is not \"as hard a fall as some others\" but her pain is not improving and she did not report fall to anyone. States she is able to have xray done at her facility if desired. RN advised that it would be best for patient to see a provider in case any follow up treatment or care was needed. Patient agreed to come to clinic. Call transferred to FD to schedule appointment.    Message routed to Dr. Green as KITTY Mendes RN    "

## 2017-02-07 ENCOUNTER — TELEPHONE (OUTPATIENT)
Dept: FAMILY MEDICINE | Facility: CLINIC | Age: 82
End: 2017-02-07

## 2017-02-07 NOTE — TELEPHONE ENCOUNTER
Per Dr. Green, xray results from 2/6 reveal several rib fractures. Provider states attempted to reach patient, but was unable to . States he would like patient to follow up in clinic on Friday for additional xrays and to discuss treatment plan.     Called patient, unable to reach. Left VM advising that xrays show right sided posterior rib fractures and Dr. Green would like patient to follow up in clinic with any provider on Friday for additional xrays and to discuss recovery plan. Instructed patient to call clinic to schedule appointment for Friday.    Sariah Mendes RN

## 2017-02-07 NOTE — TELEPHONE ENCOUNTER
Spoke with patient and relayed the message below. She chose to make an appointment tomorrow at 1pm

## 2017-02-08 ENCOUNTER — OFFICE VISIT (OUTPATIENT)
Dept: FAMILY MEDICINE | Facility: CLINIC | Age: 82
End: 2017-02-08

## 2017-02-08 VITALS
WEIGHT: 93.4 LBS | SYSTOLIC BLOOD PRESSURE: 163 MMHG | BODY MASS INDEX: 19.53 KG/M2 | TEMPERATURE: 98.4 F | OXYGEN SATURATION: 96 % | DIASTOLIC BLOOD PRESSURE: 104 MMHG | RESPIRATION RATE: 16 BRPM | HEART RATE: 97 BPM

## 2017-02-08 DIAGNOSIS — S22.49XD CLOSED FRACTURE OF MULTIPLE RIBS WITH ROUTINE HEALING, UNSPECIFIED LATERALITY, SUBSEQUENT ENCOUNTER: Primary | ICD-10-CM

## 2017-02-08 ASSESSMENT — ENCOUNTER SYMPTOMS
ABDOMINAL PAIN: 0
VOMITING: 0
SHORTNESS OF BREATH: 0
DYSURIA: 0
COUGH: 1
CHILLS: 0
NAUSEA: 0
DIAPHORESIS: 0
HEADACHES: 0
FEVER: 0

## 2017-02-08 NOTE — PATIENT INSTRUCTIONS
Here is the plan from today's visit    1. Closed fracture of multiple ribs with routine healing, unspecified laterality, subsequent encounter    Please use tylenol for pain as needed    Please use incentive spirometry    If you develop worsening shortness of breath or pain, please seek immediate medical attention.    - XR CHEST 2 VW; Future  - order for DME; Equipment being ordered: Incentive spirometry  Dispense: 1 each; Refill: 0          Please call or return to clinic if your symptoms don't go away.    Follow up plan  Please make a clinic appointment for follow up with your primary physician Alejandro Sandhu in 2 weeks for follow up on fracture.    Thank you for coming to [facility] today.  Lab Testing:  **If you had lab testing today and your results are reassuring or normal they will be mailed to you or sent through [employer] within 7 days.   **If the lab tests need quick action we will call you with the results.  The phone number we will call with results is # [phone] (home) . If this is not the best number please call our clinic and change the number.  Medication Refills:  If you need any refills please call your pharmacy and they will contact us.   If you need to  your refill at a new pharmacy, please contact the new pharmacy directly. The new pharmacy will help you get your medications transferred faster.   Scheduling:  If you have any concerns about today's visit or wish to schedule another appointment please call our office during normal business hours [phone] (8-5:00 M-F)  If a referral was made to a [facility] and you don't get a call from central scheduling please call [phone].  If a Mammogram was ordered for you at [facility] call [phone] to schedule or change your appointment.  If you had an XRay/CT/Ultrasound/MRI ordered the number is [phone] to schedule or change your radiology appointment.   Medical Concerns:  If you have urgent  medical concerns please call [phone] at any time of the day.  If you have a medical emergency please call [phone].

## 2017-02-08 NOTE — PROGRESS NOTES
Preceptor Attestation:   Patient seen and discussed with the resident. Assessment and plan reviewed with resident and agreed upon.   Supervising Physician:  Jay Jay Swann MD  [facility]

## 2017-02-08 NOTE — MR AVS SNAPSHOT
After Visit Summary   2/8/2017    Belia Sheets    MRN: [id number]           Patient Information     Date Of Birth          5/16/1930        Visit Information        Provider Department      2/8/2017 1:00 PM Rossana Romero MD [facility]        Today's Diagnoses     Closed fracture of multiple ribs with routine healing, unspecified laterality, subsequent encounter    -  1       Care Instructions    Here is the plan from today's visit    1. Closed fracture of multiple ribs with routine healing, unspecified laterality, subsequent encounter    Please use tylenol for pain as needed    Please use incentive spirometry    If you develop worsening shortness of breath or pain, please seek immediate medical attention.    - XR CHEST 2 VW; Future  - order for DME; Equipment being ordered: Incentive spirometry  Dispense: 1 each; Refill: 0          Please call or return to clinic if your symptoms don't go away.    Follow up plan  Please make a clinic appointment for follow up with your primary physician Alejandro Sandhu in 2 weeks for follow up on fracture.    Thank you for coming to [facility] today.  Lab Testing:  **If you had lab testing today and your results are reassuring or normal they will be mailed to you or sent through [employer] within 7 days.   **If the lab tests need quick action we will call you with the results.  The phone number we will call with results is # [phone] (home) . If this is not the best number please call our clinic and change the number.  Medication Refills:  If you need any refills please call your pharmacy and they will contact us.   If you need to  your refill at a new pharmacy, please contact the new pharmacy directly. The new pharmacy will help you get your medications transferred faster.   Scheduling:  If you have any concerns about today's visit or wish to schedule another appointment please call our office during normal business hours  [phone] (8-5:00 M-F)  If a referral was made to a [facility] and you don't get a call from central scheduling please call [phone].  If a Mammogram was ordered for you at [facility] call [phone] to schedule or change your appointment.  If you had an XRay/CT/Ultrasound/MRI ordered the number is [phone] to schedule or change your radiology appointment.   Medical Concerns:  If you have urgent medical concerns please call [phone] at any time of the day.  If you have a medical emergency please call [phone].          Follow-ups after your visit        Your next 10 appointments already scheduled     Feb 15, 2017  1:00 PM   Return Visit with Alejandro Sandhu MD   [facility] ([facility])    [address] [place] [address],  [address]  [facility] [address]   [phone]            Mar 09, 2017 10:30 AM   (Arrive by 10:00 AM)   RETURN ENDOCRINE with Saurabh Pittman MD   [facility] ([email or web address])    [address] [address] [phone]              Who to contact     Please call your clinic at [phone] to:    Ask questions about your health    Make or cancel appointments    Discuss your medicines    Learn about your test results    Speak to your doctor   If you have compliments or concerns about an experience at your clinic, or if you wish to file a complaint, please contact [facility] Patient Relations at [phone] or email us at [email or web address]         Additional Information About Your Visit        MyChart Information     [employer] gives you secure access to your electronic health record. If you see a primary care provider, you can also send messages to your care team and make appointments. If you have questions, please call your primary care clinic.  If you do not have a primary care provider, please call [phone] and they will  assist you.      [employer] is an electronic gateway that provides easy, online access to your medical records. With [employer], you can request a clinic appointment, read your test results, renew a prescription or communicate with your care team.     To access your existing account, please contact your [facility] or call [phone] for assistance.        Care EveryWhere ID     This is your Care EveryWhere ID. This could be used by other organizations to access your [place] medical records  [id number]        Your Vitals Were     Pulse Temperature Respirations Pulse Oximetry          97 98.4  F (36.9  C) (Oral) 16 96%         Blood Pressure from Last 3 Encounters:   02/08/17 163/104   01/05/17 165/95   11/21/16 176/103    Weight from Last 3 Encounters:   02/08/17 93 lb 6.4 oz (42.366 kg)   02/06/17 92 lb 6.4 oz (41.912 kg)   01/05/17 91 lb 14.4 oz (41.686 kg)                 Today's Medication Changes          These changes are accurate as of: 2/8/17  2:05 PM.  If you have any questions, ask your nurse or doctor.               Start taking these medicines.        Dose/Directions    order for DME   Used for:  Closed fracture of multiple ribs with routine healing, unspecified laterality, subsequent encounter   Started by:  Rossana Romero MD        Equipment being ordered: Incentive spirometry   Quantity:  1 each   Refills:  0            Where to get your medicines      Some of these will need a paper prescription and others can be bought over the counter.  Ask your nurse if you have questions.     Bring a paper prescription for each of these medications    - order for DME             Primary Care Provider Office Phone # Fax #    Alejandro Sandhu [phone]       [facility] [address]   [facility] [address]        Thank you!     Thank you for choosing [facility]  for your care. Our goal is always to provide you with excellent care.  Hearing back from our patients is one way we can continue to improve our services. Please take a few minutes to complete the written survey that you may receive in the mail after your visit with us. Thank you!             Your Updated Medication List - Protect others around you: Learn how to safely use, store and throw away your medicines at www.disposemymeds.org.          This list is accurate as of: 2/8/17  2:05 PM.  Always use your most recent med list.                   Brand Name Dispense Instructions for use    acetaminophen 500 MG tablet    TYLENOL     Take 1,000 mg by mouth       alpha-d-galactosidase tablet      Take by mouth as needed for intestinal gas       aspirin 81 MG tablet      Take by mouth daily       calcium-vitamin D 600-400 MG-UNIT per tablet    CALTRATE     Take 1 tablet by mouth 2 times daily       CIPRO 250 MG tablet   Generic drug:  ciprofloxacin      Take 250 mg by mouth 2 times daily       loperamide 2 MG tablet    IMODIUM A-D     Take 2 mg by mouth 4 times daily as needed       OMEGA-3 FISH OIL PO      Take by mouth daily       order for DME     1 each    Equipment being ordered: Incentive spirometry       sodium fluoride dental gel 1.1 % Gel topical gel    PREVIDENT     Apply to affected area At Bedtime       teriparatide (recombinant) 600 MCG/2.4ML Soln injection    FORTEO    6 mL    Inject 0.08 mLs (20 mcg) Subcutaneous daily       UNKNOWN TO PATIENT      Vision formula Vit A, C & E / Lutein/Minerals       VITAMIN B 12 PO      Take by mouth daily

## 2017-02-08 NOTE — PROGRESS NOTES
HPI:       Belia Sheets is a 86 year old who presents for the following  Patient presents with:  Fracture: rib fracture    Fell on 31st Jan. Came in for evaluation for the first time on 02/06/2017. Denies any trouble breathing. Pain varies from low to severe. No relation with pain medications either. Tylenol and ibuprofen been taken right now. Not sure if it is helping or not. Living in a senior living facility. Living independently.     Problem, Medication and Allergy Lists were reviewed and are current.  Patient is an established patient of this clinic.         Review of Systems:   Review of Systems   Constitutional: Negative for fever, chills and diaphoresis.   Respiratory: Positive for cough (chronic). Negative for shortness of breath.    Cardiovascular: Positive for chest pain.   Gastrointestinal: Negative for nausea, vomiting and abdominal pain.   Genitourinary: Negative for dysuria.   Neurological: Negative for syncope and headaches.             Physical Exam:     Patient Vitals for the past 24 hrs:   BP Temp Temp src Pulse Resp SpO2 Weight   02/08/17 1313 (!) 163/104 mmHg 98.4  F (36.9  C) Oral 97 16 96 % 93 lb 6.4 oz (42.366 kg)     Body mass index is 19.53 kg/(m^2).  Vitals were reviewed and were normal      Physical Exam   Constitutional: She appears well-developed.   HENT:   Head: Normocephalic and atraumatic.   Eyes: Conjunctivae are normal. No scleral icterus.   Neck: Normal range of motion.   Cardiovascular: Normal rate and normal heart sounds.    Pulmonary/Chest: Effort normal and breath sounds normal. No respiratory distress. She has no wheezes. She has no rales.   Abdominal: Soft.   Skin: Skin is warm.   Psychiatric: She has a normal mood and affect.         Results:   CXR on 02/08/2017    Impression:     1. No acute cardiopulmonary abnormality.  2. 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 Closed fracture of multiple  ribs with routine healing, Right side, subsequent encounter  Patient here for a follow up on her traumatic right sided 8th through 11th rib fracture s/p a fall at home on the 31st Jan 2017. Patient reports overall feeling better as compared to when she fell. We discussed close follow up for development of complications (pneumothorax) and pain control inpatient vs outpatient. She is hesitant towards either. Says that she would rather wait and see how she feels. Assures me that she would be able to get immediate medical help (son is in [place]) if needed. Discussed seeking help if breathing worsens or pain worsens. Reports intermittent episodes of severe pain, discussed using opioid pain medications but patient declined (does not want anything that might not help with fracture healing). Follow up by phone call in a week. Patient hesitant to follow up closely outpatient so will attempt to follow up closely via phone calls. Advised using incentive spirometry -DME order placed.     - XR CHEST 2 VW; Future  - order for DME; Equipment being ordered: Incentive spirometry  Dispense: 1 each; Refill: 0  There are no discontinued medications.  Options for treatment and follow-up care were reviewed with the patient. Belia Sheets  engaged in the decision making process and verbalized understanding of the options discussed and agreed with the final plan.    Rossana Romero MD

## 2017-02-10 NOTE — TELEPHONE ENCOUNTER
Phoned daughter Lubna of approval, she was already aware and states her mother received shipment already.

## 2017-02-15 ENCOUNTER — OFFICE VISIT (OUTPATIENT)
Dept: FAMILY MEDICINE | Facility: CLINIC | Age: 82
End: 2017-02-15

## 2017-02-15 ENCOUNTER — TELEPHONE (OUTPATIENT)
Dept: FAMILY MEDICINE | Facility: CLINIC | Age: 82
End: 2017-02-15

## 2017-02-15 VITALS
SYSTOLIC BLOOD PRESSURE: 135 MMHG | DIASTOLIC BLOOD PRESSURE: 93 MMHG | TEMPERATURE: 97.4 F | BODY MASS INDEX: 19.31 KG/M2 | WEIGHT: 92.4 LBS | HEART RATE: 94 BPM | RESPIRATION RATE: 20 BRPM | OXYGEN SATURATION: 97 %

## 2017-02-15 DIAGNOSIS — S22.49XD CLOSED FRACTURE OF MULTIPLE RIBS WITH ROUTINE HEALING, UNSPECIFIED LATERALITY, SUBSEQUENT ENCOUNTER: Primary | ICD-10-CM

## 2017-02-15 DIAGNOSIS — M81.0 OSTEOPOROSIS: ICD-10-CM

## 2017-02-15 ASSESSMENT — ENCOUNTER SYMPTOMS
FEVER: 0
BACK PAIN: 1
APPETITE CHANGE: 0
RESPIRATORY NEGATIVE: 1
ACTIVITY CHANGE: 1
MYALGIAS: 1
CARDIOVASCULAR NEGATIVE: 1
FATIGUE: 1
AGITATION: 0
UNEXPECTED WEIGHT CHANGE: 0
ARTHRALGIAS: 1
DECREASED CONCENTRATION: 0

## 2017-02-15 NOTE — PROGRESS NOTES
HPI:       Belia Sheets is a 86 year old who presents for the following  Patient presents with:  RECHECK: Follow up, requesting referral PT  Forms    F/U fall and rib injury.  Sustained 2 wks ago.  First radiologist read films as rib fractures; the second radiologist felt the rib abnormalities were not acute.  Regardless the pt continues to have chest wall pain, mostly posterior, with movement, but the pain has improved considerably.  Taking alternating tylenol and ibuprofen.  Not sure if the meds help.  No further falls.    She has learned how to give herself teriparatide injections and has begun them.  Reports no problem with this.           Problem, Medication and Allergy Lists were reviewed and are current.  Patient is an established patient of this clinic.         Review of Systems:   Review of Systems   Constitutional: Positive for activity change and fatigue. Negative for appetite change, fever and unexpected weight change.   Respiratory: Negative.    Cardiovascular: Negative.    Musculoskeletal: Positive for arthralgias, back pain, gait problem and myalgias.        + posterior ribcage pain   Psychiatric/Behavioral: Negative for agitation and decreased concentration.             Physical Exam:   Patient Vitals for the past 24 hrs:   BP Temp Temp src Pulse Resp SpO2 Weight   02/15/17 1315 (!) 135/93 - - - - - -   02/15/17 1312 (!) 149/100 97.4  F (36.3  C) Oral 94 20 97 % 92 lb 6.4 oz (41.9 kg)     Body mass index is 19.31 kg/(m^2).  Vital signs normal except BP     Physical Exam   Constitutional:   Alert, pleasant.  Uses a 4-wheeled walker with seat.   Cardiovascular: Normal rate, regular rhythm and normal heart sounds.    Pulmonary/Chest: No respiratory distress. She has no wheezes. She has no rales.   Musculoskeletal:   + marked scoliosis, kyphosis.  + muscle wasting throughout  Wearing a chest binder  + moderate tenderness to palpation posteroinferior ribs   Neurological: She is alert.    Psychiatric: She has a normal mood and affect. Her behavior is normal. Thought content normal.         Results:     Results from last visit:  [facility] Appointment on 02/06/2017   Component Date Value Ref Range Status     Radiologist flags 02/06/2017 Multiple right-sided and left-sided rib fractures   Final     Assessment and Plan     Belia was seen today for recheck and forms.    Diagnoses and all orders for this visit:    Closed fracture of multiple ribs with routine healing, unspecified laterality, subsequent encounter - reviewed films.  I am not convinced she has acute fractures but she certainly has a significant chest wall injury on exam.  It appears to be healing satisfactorily.  Will taper and hopefully stop ibuprofen.  Continue PT as this has been beneficial for her.  -     PHYSICAL THERAPY REFERRAL - EXTERNAL    Osteoporosis - doing well on self-administered teriparatide.  Continue PT for mobility and muscle strengthening.      There are no discontinued medications.  Options for treatment and follow-up care were reviewed with the patient. Belia Sheets  engaged in the decision making process and verbalized understanding of the options discussed and agreed with the final plan.    Alejandro Sandhu MD

## 2017-02-15 NOTE — TELEPHONE ENCOUNTER
"[facility] phone call message- general phone call:    Reason for call: Home Care nurse is calling to make sure Dr. AILYN Sandhu is aware that she is requesting the following information after today's scheduled visit:  Order for Physical Therapy to read \"Skilled Nursing to open for Physical Therapy Evaluation and Treat\"  Current Medication List  Progress Notes from scheduled visit on 2/15/17    Please fax all requested information to [phone]. Thank you!    Return call needed: No    OK to leave a message on voice mail? Yes    Primary language: English      needed? No    Call taken on February 15, 2017 at 8:19 AM by Jm Colby    "

## 2017-02-15 NOTE — MR AVS SNAPSHOT
After Visit Summary   2/15/2017    Belia Sheets    MRN: [id number]           Patient Information     Date Of Birth          5/16/1930        Visit Information        Provider Department      2/15/2017 1:00 PM Alejandro Sandhu MD [facility]        Today's Diagnoses     Closed fracture of multiple ribs with routine healing, unspecified laterality, subsequent encounter    -  1    Osteoporosis          Care Instructions    1.  Try to taper ibuprofen.    2.  Physical therapy referral written.  Continue.    3.  Continue Forteo injections.    4.  Return at your convenience for a Medicare Annual Wellness Exam.        Follow-ups after your visit        Additional Services     PHYSICAL THERAPY REFERRAL - EXTERNAL       Skilled Nursing to open for Physical Therapy Evaluation and Treat     Please fax all requested information to [phone]    [facility] physical therapy                  Follow-up notes from your care team     Return in about 4 weeks (around 3/15/2017).      Your next 10 appointments already scheduled     Mar 09, 2017 10:30 AM CST   (Arrive by 10:00 AM)   RETURN ENDOCRINE with Saurabh Pittman MD   [facility] ([facility])    [address] [place] [id number] [phone]              Who to contact     Please call your clinic at [phone] to:    Ask questions about your health    Make or cancel appointments    Discuss your medicines    Learn about your test results    Speak to your doctor   If you have compliments or concerns about an experience at your clinic, or if you wish to file a complaint, please contact [facility] Patient Relations at [phone] or email us at [email or web address]         Additional Information About Your Visit        MyChart Information     [facility] gives you secure access to your electronic health record. If you see a primary care provider, you  can also send messages to your care team and make appointments. If you have questions, please call your primary care clinic.  If you do not have a primary care provider, please call [phone] and they will assist you.      [employer] is an electronic gateway that provides easy, online access to your medical records. With [employer], you can request a clinic appointment, read your test results, renew a prescription or communicate with your care team.     To access your existing account, please contact your [facility] or call [phone] for assistance.        Care EveryWhere ID     This is your Care EveryWhere ID. This could be used by other organizations to access your [facility] medical records  [id number]        Your Vitals Were     Pulse Temperature Respirations Pulse Oximetry BMI (Body Mass Index)       94 97.4  F (36.3  C) (Oral) 20 97% 19.31 kg/m2        Blood Pressure from Last 3 Encounters:   02/15/17 (!) 135/93   02/08/17 (!) 163/104   01/05/17 (!) 165/95    Weight from Last 3 Encounters:   02/15/17 92 lb 6.4 oz (41.9 kg)   02/08/17 93 lb 6.4 oz (42.4 kg)   02/06/17 92 lb 6.4 oz (41.9 kg)              We Performed the Following     PHYSICAL THERAPY REFERRAL - EXTERNAL        Primary Care Provider Office Phone # Fax #    Alejandro Sandhu [phone]       [facility] [address] [address]        Thank you!     Thank you for choosing [facility]  for your care. Our goal is always to provide you with excellent care. Hearing back from our patients is one way we can continue to improve our services. Please take a few minutes to complete the written survey that you may receive in the mail after your visit with us. Thank you!             Your Updated Medication List - Protect others around you: Learn how to safely use, store and throw away your medicines at www.disposemymeds.org.          This list is accurate as of: 2/15/17   1:52 PM.  Always use your most recent med list.                   Brand Name Dispense Instructions for use    acetaminophen 500 MG tablet    TYLENOL     Take 1,000 mg by mouth       alpha-d-galactosidase tablet      Take by mouth as needed for intestinal gas       aspirin 81 MG tablet      Take by mouth daily       calcium-vitamin D 600-400 MG-UNIT per tablet    CALTRATE     Take 1 tablet by mouth 2 times daily       CIPRO 250 MG tablet   Generic drug:  ciprofloxacin      Take 250 mg by mouth 2 times daily       loperamide 2 MG tablet    IMODIUM A-D     Take 2 mg by mouth 4 times daily as needed       OMEGA-3 FISH OIL PO      Take by mouth daily       order for DME     1 each    Equipment being ordered: Incentive spirometry       sodium fluoride dental gel 1.1 % Gel topical gel    PREVIDENT     Apply to affected area At Bedtime       teriparatide (recombinant) 600 MCG/2.4ML Soln injection    FORTEO    6 mL    Inject 0.08 mLs (20 mcg) Subcutaneous daily       UNKNOWN TO PATIENT      Vision formula Vit A, C & E / Lutein/Minerals       VITAMIN B 12 PO      Take by mouth daily

## 2017-02-15 NOTE — PATIENT INSTRUCTIONS
1.  Try to taper ibuprofen.    2.  Physical therapy referral written.  Continue.    3.  Continue Forteo injections.    4.  Return at your convenience for a Medicare Annual Wellness Exam.

## 2017-02-16 ENCOUNTER — TELEPHONE (OUTPATIENT)
Dept: FAMILY MEDICINE | Facility: CLINIC | Age: 82
End: 2017-02-16

## 2017-02-16 NOTE — TELEPHONE ENCOUNTER
[facility] phone call message - order or referral request for patient:     Order or referral being requested: Skilled nursing to open for PT eval and treat (orders need to reflect this exact wording, per Steph).    Additional Comments: Please fax orders to [phone]    OK to leave a message on voice mail? Yes    Primary language: English      needed? No    Call taken on February 16, 2017 at 12:33 PM by Fay Gould

## 2017-02-16 NOTE — TELEPHONE ENCOUNTER
"Message routed to PCP to enter note with order for \"Skilled nursing to open for PT eval and treat\" with that wording per Steph. Once note from PCP is entered in documentation, RN will fax note to number provided.    Sariah Mendes RN    "

## 2017-02-16 NOTE — TELEPHONE ENCOUNTER
"Call from [facility], transferred from . States she received faxed paperwork, however states they still need an order faxed to them (could not take verbal order as patient has not been admitted to their services yet) since faxed paperwork indicated referral.    States order needs to say \"Skilled nursing to open for PT eval and treat.\" Stated this note on a piece of paper with PCP signature would suffice. States they would like to start seeing patient as soon as possible.    Message sent to PCP to enter note with requested language. RN will fax note once entered.    Sariah Mendes RN        "

## 2017-02-17 NOTE — TELEPHONE ENCOUNTER
"Steph from [facility]. Checking the status of the request for specific orders to be faxed them so that they can start the therapy for the patient.  As it states below, it needs to say \"skilled nursing to open for [facility]T. eval and treat.\"  This stated on a piece of paper with PCP signature will suffice.  Please call Steph to give the status of the requested order.  It can be faxed to [phone] when complete.  Thank you!   "

## 2017-02-21 ENCOUNTER — MEDICAL CORRESPONDENCE (OUTPATIENT)
Dept: HEALTH INFORMATION MANAGEMENT | Facility: CLINIC | Age: 82
End: 2017-02-21

## 2017-02-23 ASSESSMENT — ENCOUNTER SYMPTOMS
DIFFICULTY URINATING: 0
POLYDIPSIA: 0
MYALGIAS: 1
HEADACHES: 0
NAUSEA: 0
FLANK PAIN: 0
CONSTIPATION: 0
FATIGUE: 1
MUSCLE CRAMPS: 0
NIGHT SWEATS: 0
TINGLING: 1
PARALYSIS: 0
DIZZINESS: 1
BLOATING: 1
MUSCLE WEAKNESS: 1
VOMITING: 0
WEAKNESS: 1
BLOOD IN STOOL: 0
DISTURBANCES IN COORDINATION: 1
ABDOMINAL PAIN: 0
JOINT SWELLING: 0
CHILLS: 0
ARTHRALGIAS: 1
RECTAL BLEEDING: 0
SEIZURES: 0
JAUNDICE: 0
TREMORS: 0
STIFFNESS: 1
DIARRHEA: 0
RECTAL PAIN: 0
WEIGHT GAIN: 0
ALTERED TEMPERATURE REGULATION: 0
NECK PAIN: 1
MEMORY LOSS: 0
DECREASED APPETITE: 0
POLYPHAGIA: 0
HALLUCINATIONS: 0
BOWEL INCONTINENCE: 1
BACK PAIN: 1
SPEECH CHANGE: 0
HEARTBURN: 0
FEVER: 0
DYSURIA: 0
WEIGHT LOSS: 0
INCREASED ENERGY: 1
LOSS OF CONSCIOUSNESS: 0
NUMBNESS: 1
HEMATURIA: 0

## 2017-02-28 ENCOUNTER — DOCUMENTATION ONLY (OUTPATIENT)
Dept: FAMILY MEDICINE | Facility: CLINIC | Age: 82
End: 2017-02-28

## 2017-02-28 NOTE — PROGRESS NOTES
"When opening a documentation only encounter, be sure to enter in \"Chief Complaint\" Forms and in \" Comments\" Title of form, description if needed.    Form received via: Fax  Form now resides in: Provider Ready    Form sent out via: Fax  Patient informed: No  Output date: February 28, 2017      Form received by recipient via fax confirmation  Please close the encounter.    "

## 2017-03-09 ENCOUNTER — DOCUMENTATION ONLY (OUTPATIENT)
Dept: FAMILY MEDICINE | Facility: CLINIC | Age: 82
End: 2017-03-09

## 2017-03-09 ENCOUNTER — OFFICE VISIT (OUTPATIENT)
Dept: ENDOCRINOLOGY | Facility: CLINIC | Age: 82
End: 2017-03-09

## 2017-03-09 VITALS
BODY MASS INDEX: 18.53 KG/M2 | HEIGHT: 59 IN | DIASTOLIC BLOOD PRESSURE: 83 MMHG | SYSTOLIC BLOOD PRESSURE: 129 MMHG | HEART RATE: 99 BPM | WEIGHT: 91.9 LBS

## 2017-03-09 DIAGNOSIS — Z51.81 MEDICATION MONITORING ENCOUNTER: ICD-10-CM

## 2017-03-09 DIAGNOSIS — M81.0 OSTEOPOROSIS: Primary | ICD-10-CM

## 2017-03-09 DIAGNOSIS — R29.6 RECURRENT FALLS: ICD-10-CM

## 2017-03-09 LAB
ANION GAP SERPL CALCULATED.3IONS-SCNC: 9 MMOL/L (ref 3–14)
BUN SERPL-MCNC: 13 MG/DL (ref 7–30)
CALCIUM SERPL-MCNC: 8.9 MG/DL (ref 8.5–10.1)
CHLORIDE SERPL-SCNC: 107 MMOL/L (ref 94–109)
CO2 SERPL-SCNC: 26 MMOL/L (ref 20–32)
CREAT SERPL-MCNC: 0.49 MG/DL (ref 0.52–1.04)
GFR SERPL CREATININE-BSD FRML MDRD: ABNORMAL ML/MIN/1.7M2
GLUCOSE SERPL-MCNC: 97 MG/DL (ref 70–99)
POTASSIUM SERPL-SCNC: 4.2 MMOL/L (ref 3.4–5.3)
SODIUM SERPL-SCNC: 142 MMOL/L (ref 133–144)

## 2017-03-09 ASSESSMENT — PAIN SCALES - GENERAL: PAINLEVEL: NO PAIN (0)

## 2017-03-09 NOTE — PATIENT INSTRUCTIONS
Blood work today to monitor kidney function and calcium levels.     Please resume taking calcium and vitamin D supplement; CALTRATE 600-400 1 TABLET TWICE DAILY.      CONTINUE THE FORTEO INJECTION.

## 2017-03-09 NOTE — LETTER
3/9/2017       RE: Belia Sheets  [address]  [address]  [facility] [id number]     Dear Colleague,    Thank you for referring your patient, Belia Sheets, to the [facility] ENDO[facility]OLOGY at [facility]. Please see a copy of my visit note below.    Endocrinology Clinic Visit    Chief Complaint: RECHECK (Osteoporosis )     Information obtained from:Patient and and her two children who is here with her today.     Subjective:         HPI: Belia Sheets is a 86 year old year old female with history of osteoporosis who is here for a follow up.  She is here with her daughter and her son.     Again, this is 85 yo female with past medical history of osteoporosis first diagnosed in 1999.     She was treated with alendronate 70 mg weekly from 1999 to October 2015.  She has never stopped this medication all these years; she took it first thing in the morning with empty stomach.    In October of 2015 she was prescribed FORTEO; mainly because she wanted to change from bisphosphonate per report. However, she didn't take these medication because of cost which was 600 for two years and she didn't know wether it will help her or not.     +History of vertebral fracture; she was folding laundry when she fell from standing up position and sustained injury.  Broken hip and noted to have thoracic compression fractures.  She tells me today that she also fell in the last one month; since her last follow up here. She was picking up a folder and after standing up; she fell backwards.  She went to the ER where xrays were taken.  They were not sure whether she broke a bone or not. She was hurting around her ribs but not anymore.  Denies any pain during this visit. She didn't have her walker.  Walks normally using her walker.     Since her fall she has been working with physical therapy at home twice a week which she thinks is very helpful for her balance and avoiding further falls.  She would  like to be able to continue to PT at home as long as she can.  Worried about coverage down the line.      Previous fragility fracture, morphometric vertebral fracture: imaging done in 7/2015 has showed multiple compression fractures.   7/2015 spine film reported as <Multiple old healed left rib fractures. Diffuse osteopenia. New moderate compression fracture of T10 since the prior examination. New moderate compression fractures in T4 and T5 are new since the prior exam. Multilevel degenerative changes in the thoracic spine are otherwise stable. Marked scoliosis of the thoracolumbar spine is redemonstrated.>    Lost 6 inch height loss/kyphosis    Previous fracture after the age of 50 -    Advanced age  Weight - 91 pounds (high risk)    Falls/risk factors: Multiple falls. 5 falls ine one year and half. Sustained rib and vertebral fractures secondary to the falls. Currently participating in home physical therapy for osteoporosis.   Walks three times per day in hallways of her apartment. Uses cane for walking short distances and 4 barros for long distances. She is currently doing a home physical exercise about twice a week with a physical therapist. She has been doing this since August 2016; {T was off briefly due to coverage; resumed again after her recent fall (now covered after the fall).  Fall: 7/2015, 12/2014, 4/2016, 6/2016. 01/2017.    She lives in a senior apartment.  She gets some services. Mostly manages by her own.     Secondary causes of osteoporosis: negative for    RA, Prolonged immobility,  Organ transplantation, Type I diabetes, Hyperthyroidism, GI disease, Chronic liver disease or COPD.     Family history of hip fracture: None.      Due to severe osteoporosis, long standing alendronate therapy and alkaline phosphatase which was within the normal limits; we started On FORTEO about a month ago.  She has been doing the injections herself at home and she has been managing well with the injections despite  her hand arthritis. She demonstrated how she does injections. Denies any side effects from the medication.  She was talking calcium and vitamin d supplement 600-400; 1 BID until she stopped taking it about one month ago.  She stated that the person who told her how to inject the FORTEO told her not to take any calcium supplement. . She takes yogurt, jose daily.       No Known Allergies    Current Outpatient Prescriptions   Medication Sig Dispense Refill     order for DME Equipment being ordered: Incentive spirometry 1 each 0     acetaminophen (TYLENOL) 500 MG tablet Take 1,000 mg by mouth       teriparatide, recombinant, (FORTEO) 600 MCG/2.4ML SOLN injection Inject 0.08 mLs (20 mcg) Subcutaneous daily 6 mL 1     loperamide (IMODIUM A-D) 2 MG tablet Take 2 mg by mouth 4 times daily as needed       UNKNOWN TO PATIENT Vision formula Vit A, C & E / Lutein/Minerals       aspirin 81 MG tablet Take by mouth daily       calcium-vitamin D (CALTRATE) 600-400 MG-UNIT per tablet Take 1 tablet by mouth 2 times daily       ciprofloxacin (CIPRO) 250 MG tablet Take 250 mg by mouth 2 times daily       alpha-d-galactosidase (BEANO) tablet Take by mouth as needed for intestinal gas       Omega-3 Fatty Acids (OMEGA-3 FISH OIL PO) Take by mouth daily       sodium fluoride dental gel (PREVIDENT) 1.1 % GEL Apply to affected area At Bedtime       Cyanocobalamin (VITAMIN B 12 PO) Take by mouth daily         Review of Systems     11 point review system (Constitutional, HENT, Eyes, Respiratory, Cardiovascular, Gastrointestinal, Genitourinary, Musculoskeletal,Neurological, Psychiatric/Behavioural, Endocrine) is negative or is as per HPI above: fecal incontinence.       Past Medical History   Diagnosis Date     Carpal tunnel syndrome (aka CTS)      H/O calcium pyrophosphate deposition disease (CPPD)      Kyphoscoliosis      Osteoarthritis      hands     Osteoporosis        Past Surgical History   Procedure Laterality Date     Hysterectomy    "    for uterine prolapse       Family History   Problem Relation Age of Onset     Depression/Anxiety Son      Depression/Anxiety Son      alcohol abuse  age 24     Hypertension Mother      Hypertension Brother      DIABETES No family hx of      Breast Cancer No family hx of      Colon Cancer No family hx of      Prostate Cancer No family hx of      Other Cancer No family hx of        Social History     Social History     Marital Status:      Spouse Name: N/A     Number of Children: N/A     Years of Education: N/A     Social History Main Topics     Smoking status: Never Smoker      Smokeless tobacco: Never Used     Alcohol Use: Not on file     Drug Use: No     Sexual Activity: Not on file     Other Topics Concern     Not on file     Social History Narrative       Objective:   /83  Pulse 99  Ht 1.499 m (4' 11\")  Wt 41.7 kg (91 lb 14.4 oz)  BMI 18.56 kg/m2  Constitutional: Pleasant no acute cardiopulmonary distress.   EYES: normal extra-ocular movements, no lid lag or retraction.   HEENT: Mouth/Throat: Mucous membrane is moist.    Cardiovascular: RRR, S1, S2 normal.   Pulmonary/Chest: CTAB. No wheezing or rales.   Neurological: Alert and oriented. Muscle strength 5/5.   Extremities: trace pedal edema on the right side. No lumbar spine tenderness.   Back: scoliosis. No lumbar/thoracic spine tenderness.   Psychological: appropriate mood and affect   Stand and go test more than 10 sec.     In House Labs:   A1C      5.9   2016    Routine: CBC done. TSH done WNL (2016) and PTH done WNL (2015), Protein electrophoresis and urinary bence-childress WNL (2015), ESR 14 (2015), TgA negative in .  Dual energy x-ray absorptiometry results:      Region BMD T - score Z - score   L1-L2 0.634 g/cm  -4.4 -1.7             Neck Left 0.644 g/cm  -2.8 0.1   Total Left 0.627 g/cm  -3.0 -0.1             Neck Right 0.603 g/cm  -3.1 -0.2   Total Right 0.588 g/cm  -3.3 -0.4   Radius 33% 0.457 g/cm  -3.5 -0.2 "       Conclusions:     VFA scan was interpretable from T4 - L4. Using the semi-quantitative analysis of Genant (see reference #1), there were evidence for a grade 3 (severe) compression, fracture at T10, L3-L4, . Six point morphometry confirmed the presence and severity of these deformities.     If alternative etiologies for the presence of vertebral fractures are excluded, the diagnosis is consistent with osteoporosis.    Assessment/Treatment Plan:      Belia Sheets is a 86 year old year old female with Osteoporosis and fragility fracture (7/2015); diagnosed in 1999.  Treated with alendronate for 16 years and four months.  Off alendronate since October 2015 (over one year).      CBC done. TSH done WNL (8/2016) and PTH done WNL (7/2015), Protein electrophoresis and urinary bence-childress WNL (7/2015), ESR 14 (7/2015), TgA negative in 2010. Her risk factor for osteoporosis are age and low body weight.  Repeat DEXA showed worsening findings with -4.4 T-score at lumbar area. VFA showing: < there were evidence for a grade 3 (severe) compression, fracture at T10, L3-L4, >    Given severity of her osteoporosis and fragility fracture (severe as documented above); needs aggressive treatment (Zolendronic acid, FORTEO or Denosumab are possible options). Checked Bone specific alkaline phosphatase to assess ongoing bone loss (resorption) but the result was normal indicating or favoring treatment with FORTEO. The others are anti-bone resorption treatment options but again the bone specific alkaline phosphatase doesn't indicate ongoing bone resorption. This might be secondary to the treatment with alendronate for 16 years. FORTEO has the added benefit of preventing vertebral fractures.    We started her on FORTEO about a month ago and she is tolerating her treatment well and managing her daily injections well. She has been off the calcium and vitamin D supplement and we will resume that at the same dose of calcium/vitamin d  600-400; 1 tab PO BID. Discussed: BALANCED DIET, using walker all the time, home exercise program with PT (she will call me back if she needs a referral) and encouraged walking and avoid heavy lifting (any lifting in her case). .    We will check calcium and creatinine today. Questions answered.   Last Basic Metabolic Panel:  Lab Results   Component Value Date     03/09/2017      Lab Results   Component Value Date    POTASSIUM 4.2 03/09/2017     Lab Results   Component Value Date    CHLORIDE 107 03/09/2017     Lab Results   Component Value Date    BOY 8.9 03/09/2017     Lab Results   Component Value Date    CO2 26 03/09/2017     Lab Results   Component Value Date    BUN 13 03/09/2017     Lab Results   Component Value Date    CR 0.49 03/09/2017     Lab Results   Component Value Date    GLC 97 03/09/2017     Calcium and creatinine stable.  Will continue same plan and will have her back in three months for a follow up. Called patient and daughter and discussed the above results over the phone.      Patient Instructions   Blood work today to monitor kidney function and calcium levels.     Please resume taking calcium and vitamin D supplement; CALTRATE 600-400 1 TABLET TWICE DAILY.      CONTINUE THE FORTEO INJECTION.         I will contact the patient with the test results.  Return to clinic in 3 months.    Test and/or medications prescribed today:  Orders Placed This Encounter   Procedures     Basic metabolic panel       Patient seen and discussed with endocrinology attending Dr. Taveras.    Saurabh Pittman MD  Endocrinology fellow   [phone]  Division of Endocrinology and Diabetes            Attending tie-in statement: Patient seen and examined by me, discussed with Dr Pittman whose note I have reviewed.  She presents with daughter and son.  Key elements and diagnostic points include the following:  Osteoporosis  multiple fractures,  Including vertebral compression fractures  Loss of height (6  inches)    Bisphosphonate x 10 + years - discontinued due to progression .  On Forteo x 28 days now.      Exam  Petite, mentally sharp, elderly woman in NAD  Severely kyphotic  Hands significantly deformed by arthritis    Diagnosis:  Osteoporosis with vertebral compression fracture  On Forteo  Post menopausal  Falls     Resume calcium/ vitamin D     Use walker all the time  PT 2 times/week     Faiza Taveras MD      Again, thank you for allowing me to participate in the care of your patient.      Sincerely,    Saurabh Pittman MD

## 2017-03-09 NOTE — MR AVS SNAPSHOT
After Visit Summary   3/9/2017    Belia Sheets    MRN: [id number]           Patient Information     Date Of Birth          5/16/1930        Visit Information        Provider Department      3/9/2017 10:30 AM Saurabh Pittman MD M [facility] Endocrinology        Today's Diagnoses     Osteoporosis    -  1    Medication monitoring encounter          Care Instructions    Blood work today to monitor kidney function and calcium levels.     Please resume taking calcium and vitamin D supplement; CALTRATE 600-400 1 TABLET TWICE DAILY.      CONTINUE THE FORTEO INJECTION.             Follow-ups after your visit        Follow-up notes from your care team     Return in about 3 months (around 6/9/2017).      Your next 10 appointments already scheduled     Mar 15, 2017  1:00 PM CDT   PHYSICAL with Alejandro Sandhu MD   [facility] ([facility])    [facility] [address] [place] [address],  [address]  [facility] [address]   [phone]            Brenton 15, 2017 10:30 AM CDT   (Arrive by 10:15 AM)   RETURN ENDOCRINE with MD LORENA Martins [facility] Endocrinology ([facility])    [address] [place] [id number] [phone]              Who to contact     Please call your clinic at [phone] to:    Ask questions about your health    Make or cancel appointments    Discuss your medicines    Learn about your test results    Speak to your doctor   If you have compliments or concerns about an experience at your clinic, or if you wish to file a complaint, please contact [facility] Patient Relations at [phone] or email us at [email or web address]         Additional Information About Your Visit        MyChart Information     P[employer]apt gives you secure access to your electronic health record. If you see a primary care provider, you can also send messages to your care team and make appointments. If you have questions,  "please call your primary care clinic.  If you do not have a primary care provider, please call [phone] and they will assist you.      [employer] is an electronic gateway that provides easy, online access to your medical records. With [employer], you can request a clinic appointment, read your test results, renew a prescription or communicate with your care team.     To access your existing account, please contact your [facility] or call [phone] for assistance.        Care EveryWhere ID     This is your Care EveryWhere ID. This could be used by other organizations to access your [place] medical records  [id number]        Your Vitals Were     Pulse Height BMI (Body Mass Index)             99 1.499 m (4' 11\") 18.56 kg/m2          Blood Pressure from Last 3 Encounters:   03/09/17 129/83   02/15/17 (!) 135/93   02/08/17 (!) 163/104    Weight from Last 3 Encounters:   03/09/17 41.7 kg (91 lb 14.4 oz)   02/15/17 41.9 kg (92 lb 6.4 oz)   02/08/17 42.4 kg (93 lb 6.4 oz)              We Performed the Following     Basic metabolic panel        Primary Care Provider Office Phone # Fax #    Alejandro Sandhu [phone]       [facility] [address] [address]        Thank you!     Thank you for choosing [facility] ENDOCRINOLOGY  for your care. Our goal is always to provide you with excellent care. Hearing back from our patients is one way we can continue to improve our services. Please take a few minutes to complete the written survey that you may receive in the mail after your visit with us. Thank you!             Your Updated Medication List - Protect others around you: Learn how to safely use, store and throw away your medicines at www.disposemymeds.org.          This list is accurate as of: 3/9/17 11:32 AM.  Always use your most recent med list.                   Brand Name Dispense Instructions for use    acetaminophen 500 MG tablet    TYLENOL     " Take 1,000 mg by mouth       alpha-d-galactosidase tablet      Take by mouth as needed for intestinal gas       aspirin 81 MG tablet      Take by mouth daily       calcium-vitamin D 600-400 MG-UNIT per tablet    CALTRATE     Take 1 tablet by mouth 2 times daily       CIPRO 250 MG tablet   Generic drug:  ciprofloxacin      Take 250 mg by mouth 2 times daily       loperamide 2 MG tablet    IMODIUM A-D     Take 2 mg by mouth 4 times daily as needed       OMEGA-3 FISH OIL PO      Take by mouth daily       order for DME     1 each    Equipment being ordered: Incentive spirometry       sodium fluoride dental gel 1.1 % Gel topical gel    PREVIDENT     Apply to affected area At Bedtime       teriparatide (recombinant) 600 MCG/2.4ML Soln injection    FORTEO    6 mL    Inject 0.08 mLs (20 mcg) Subcutaneous daily       UNKNOWN TO PATIENT      Vision formula Vit A, C & E / Lutein/Minerals       VITAMIN B 12 PO      Take by mouth daily

## 2017-03-09 NOTE — PROGRESS NOTES
Endocrinology Clinic Visit    Chief Complaint: RECHECK (Osteoporosis )     Information obtained from:Patient and and her two children who is here with her today.     Subjective:         HPI: Belia Sheets is a 86 year old year old female with history of osteoporosis who is here for a follow up.  She is here with her daughter and her son.     Again, this is 87 yo female with past medical history of osteoporosis first diagnosed in 1999.     She was treated with alendronate 70 mg weekly from 1999 to October 2015.  She has never stopped this medication all these years; she took it first thing in the morning with empty stomach.    In October of 2015 she was prescribed FORTEO; mainly because she wanted to change from bisphosphonate per report. However, she didn't take these medication because of cost which was 600 for two years and she didn't know wether it will help her or not.     +History of vertebral fracture; she was folding laundry when she fell from standing up position and sustained injury.  Broken hip and noted to have thoracic compression fractures.  She tells me today that she also fell in the last one month; since her last follow up here. She was picking up a folder and after standing up; she fell backwards.  She went to the ER where xrays were taken.  They were not sure whether she broke a bone or not. She was hurting around her ribs but not anymore.  Denies any pain during this visit. She didn't have her walker.  Walks normally using her walker.     Since her fall she has been working with physical therapy at home twice a week which she thinks is very helpful for her balance and avoiding further falls.  She would like to be able to continue to PT at home as long as she can.  Worried about coverage down the line.      Previous fragility fracture, morphometric vertebral fracture: imaging done in 7/2015 has showed multiple compression fractures.   7/2015 spine film reported as <Multiple old healed left rib  fractures. Diffuse osteopenia. New moderate compression fracture of T10 since the prior examination. New moderate compression fractures in T4 and T5 are new since the prior exam. Multilevel degenerative changes in the thoracic spine are otherwise stable. Marked scoliosis of the thoracolumbar spine is redemonstrated.>    Lost 6 inch height loss/kyphosis    Previous fracture after the age of 50 -    Advanced age  Weight - 91 pounds (high risk)    Falls/risk factors: Multiple falls. 5 falls ine one year and half. Sustained rib and vertebral fractures secondary to the falls. Currently participating in home physical therapy for osteoporosis.   Walks three times per day in hallways of her apartment. Uses cane for walking short distances and 4 barros for long distances. She is currently doing a home physical exercise about twice a week with a physical therapist. She has been doing this since August 2016; {T was off briefly due to coverage; resumed again after her recent fall (now covered after the fall).  Fall: 7/2015, 12/2014, 4/2016, 6/2016. 01/2017.    She lives in a senior apartment.  She gets some services. Mostly manages by her own.     Secondary causes of osteoporosis: negative for    RA, Prolonged immobility,  Organ transplantation, Type I diabetes, Hyperthyroidism, GI disease, Chronic liver disease or COPD.     Family history of hip fracture: None.      Due to severe osteoporosis, long standing alendronate therapy and alkaline phosphatase which was within the normal limits; we started On FORTEO about a month ago.  She has been doing the injections herself at home and she has been managing well with the injections despite her hand arthritis. She demonstrated how she does injections. Denies any side effects from the medication.  She was talking calcium and vitamin d supplement 600-400; 1 BID until she stopped taking it about one month ago.  She stated that the person who told her how to inject the FORTEO told her  not to take any calcium supplement. . She takes yogurt, jose daily.       No Known Allergies    Current Outpatient Prescriptions   Medication Sig Dispense Refill     order for DME Equipment being ordered: Incentive spirometry 1 each 0     acetaminophen (TYLENOL) 500 MG tablet Take 1,000 mg by mouth       teriparatide, recombinant, (FORTEO) 600 MCG/2.4ML SOLN injection Inject 0.08 mLs (20 mcg) Subcutaneous daily 6 mL 1     loperamide (IMODIUM A-D) 2 MG tablet Take 2 mg by mouth 4 times daily as needed       UNKNOWN TO PATIENT Vision formula Vit A, C & E / Lutein/Minerals       aspirin 81 MG tablet Take by mouth daily       calcium-vitamin D (CALTRATE) 600-400 MG-UNIT per tablet Take 1 tablet by mouth 2 times daily       ciprofloxacin (CIPRO) 250 MG tablet Take 250 mg by mouth 2 times daily       alpha-d-galactosidase (BEANO) tablet Take by mouth as needed for intestinal gas       Omega-3 Fatty Acids (OMEGA-3 FISH OIL PO) Take by mouth daily       sodium fluoride dental gel (PREVIDENT) 1.1 % GEL Apply to affected area At Bedtime       Cyanocobalamin (VITAMIN B 12 PO) Take by mouth daily         Review of Systems     11 point review system (Constitutional, HENT, Eyes, Respiratory, Cardiovascular, Gastrointestinal, Genitourinary, Musculoskeletal,Neurological, Psychiatric/Behavioural, Endocrine) is negative or is as per HPI above: fecal incontinence.       Past Medical History   Diagnosis Date     Carpal tunnel syndrome (aka CTS)      H/O calcium pyrophosphate deposition disease (CPPD)      Kyphoscoliosis      Osteoarthritis      hands     Osteoporosis        Past Surgical History   Procedure Laterality Date     Hysterectomy       for uterine prolapse       Family History   Problem Relation Age of Onset     Depression/Anxiety Son      Depression/Anxiety Son      alcohol abuse  age 24     Hypertension Mother      Hypertension Brother      DIABETES No family hx of      Breast Cancer No family hx of      Colon Cancer  "No family hx of      Prostate Cancer No family hx of      Other Cancer No family hx of        Social History     Social History     Marital Status:      Spouse Name: N/A     Number of Children: N/A     Years of Education: N/A     Social History Main Topics     Smoking status: Never Smoker      Smokeless tobacco: Never Used     Alcohol Use: Not on file     Drug Use: No     Sexual Activity: Not on file     Other Topics Concern     Not on file     Social History Narrative       Objective:   /83  Pulse 99  Ht 1.499 m (4' 11\")  Wt 41.7 kg (91 lb 14.4 oz)  BMI 18.56 kg/m2  Constitutional: Pleasant no acute cardiopulmonary distress.   EYES: normal extra-ocular movements, no lid lag or retraction.   HEENT: Mouth/Throat: Mucous membrane is moist.    Cardiovascular: RRR, S1, S2 normal.   Pulmonary/Chest: CTAB. No wheezing or rales.   Neurological: Alert and oriented. Muscle strength 5/5.   Extremities: trace pedal edema on the right side. No lumbar spine tenderness.   Back: scoliosis. No lumbar/thoracic spine tenderness.   Psychological: appropriate mood and affect   Stand and go test more than 10 sec.     In House Labs:   A1C      5.9   8/31/2016    Routine: CBC done. TSH done WNL (8/2016) and PTH done WNL (7/2015), Protein electrophoresis and urinary bence-childress WNL (7/2015), ESR 14 (7/2015), TgA negative in 2010.  Dual energy x-ray absorptiometry results:      Region BMD T - score Z - score   L1-L2 0.634 g/cm  -4.4 -1.7             Neck Left 0.644 g/cm  -2.8 0.1   Total Left 0.627 g/cm  -3.0 -0.1             Neck Right 0.603 g/cm  -3.1 -0.2   Total Right 0.588 g/cm  -3.3 -0.4   Radius 33% 0.457 g/cm  -3.5 -0.2       Conclusions:     VFA scan was interpretable from T4 - L4. Using the semi-quantitative analysis of Genant (see reference #1), there were evidence for a grade 3 (severe) compression, fracture at T10, L3-L4, . Six point morphometry confirmed the presence and severity of these deformities.     If " alternative etiologies for the presence of vertebral fractures are excluded, the diagnosis is consistent with osteoporosis.    Assessment/Treatment Plan:      Belia Sheets is a 86 year old year old female with Osteoporosis and fragility fracture (7/2015); diagnosed in 1999.  Treated with alendronate for 16 years and four months.  Off alendronate since October 2015 (over one year).      CBC done. TSH done WNL (8/2016) and PTH done WNL (7/2015), Protein electrophoresis and urinary bence-childress WNL (7/2015), ESR 14 (7/2015), TgA negative in 2010. Her risk factor for osteoporosis are age and low body weight.  Repeat DEXA showed worsening findings with -4.4 T-score at lumbar area. VFA showing: < there were evidence for a grade 3 (severe) compression, fracture at T10, L3-L4, >    Given severity of her osteoporosis and fragility fracture (severe as documented above); needs aggressive treatment (Zolendronic acid, FORTEO or Denosumab are possible options). Checked Bone specific alkaline phosphatase to assess ongoing bone loss (resorption) but the result was normal indicating or favoring treatment with FORTEO. The others are anti-bone resorption treatment options but again the bone specific alkaline phosphatase doesn't indicate ongoing bone resorption. This might be secondary to the treatment with alendronate for 16 years. FORTEO has the added benefit of preventing vertebral fractures.    We started her on FORTEO about a month ago and she is tolerating her treatment well and managing her daily injections well. She has been off the calcium and vitamin D supplement and we will resume that at the same dose of calcium/vitamin d 600-400; 1 tab PO BID. Discussed: BALANCED DIET, using walker all the time, home exercise program with PT (she will call me back if she needs a referral) and encouraged walking and avoid heavy lifting (any lifting in her case). .    We will check calcium and creatinine today. Questions answered.   Last  Basic Metabolic Panel:  Lab Results   Component Value Date     03/09/2017      Lab Results   Component Value Date    POTASSIUM 4.2 03/09/2017     Lab Results   Component Value Date    CHLORIDE 107 03/09/2017     Lab Results   Component Value Date    BOY 8.9 03/09/2017     Lab Results   Component Value Date    CO2 26 03/09/2017     Lab Results   Component Value Date    BUN 13 03/09/2017     Lab Results   Component Value Date    CR 0.49 03/09/2017     Lab Results   Component Value Date    GLC 97 03/09/2017     Calcium and creatinine stable.  Will continue same plan and will have her back in three months for a follow up. Called patient and daughter and discussed the above results over the phone.      Patient Instructions   Blood work today to monitor kidney function and calcium levels.     Please resume taking calcium and vitamin D supplement; CALTRATE 600-400 1 TABLET TWICE DAILY.      CONTINUE THE FORTEO INJECTION.         I will contact the patient with the test results.  Return to clinic in 3 months.    Test and/or medications prescribed today:  Orders Placed This Encounter   Procedures     Basic metabolic panel       Patient seen and discussed with endocrinology attending Dr. Taveras.    Saurabh Pittman MD  Endocrinology fellow   [phone]  Division of Endocrinology and Diabetes

## 2017-03-09 NOTE — PROGRESS NOTES
Attending tie-in statement: Patient seen and examined by me, discussed with Dr Pittman whose note I have reviewed.  She presents with daughter and son.  Key elements and diagnostic points include the following:  Osteoporosis  multiple fractures,  Including vertebral compression fractures  Loss of height (6 inches)    Bisphosphonate x 10 + years - discontinued due to progression .  On Forteo x 28 days now.      Exam  Petite, mentally sharp, elderly woman in NAD  Severely kyphotic  Hands significantly deformed by arthritis    Diagnosis:  Osteoporosis with vertebral compression fracture  On Forteo  Post menopausal  Falls     Resume calcium/ vitamin D     Use walker all the time  PT 2 times/week     Faiza Taveras MD

## 2017-03-15 ENCOUNTER — OFFICE VISIT (OUTPATIENT)
Dept: FAMILY MEDICINE | Facility: CLINIC | Age: 82
End: 2017-03-15

## 2017-03-15 VITALS
WEIGHT: 92.4 LBS | HEART RATE: 87 BPM | TEMPERATURE: 97.4 F | BODY MASS INDEX: 18.66 KG/M2 | DIASTOLIC BLOOD PRESSURE: 86 MMHG | OXYGEN SATURATION: 94 % | RESPIRATION RATE: 20 BRPM | SYSTOLIC BLOOD PRESSURE: 146 MMHG

## 2017-03-15 DIAGNOSIS — Z00.00 MEDICARE ANNUAL WELLNESS VISIT, SUBSEQUENT: Primary | ICD-10-CM

## 2017-03-15 LAB — HBA1C MFR BLD: 5.6 % (ref 4.1–5.7)

## 2017-03-15 NOTE — MR AVS SNAPSHOT
After Visit Summary   3/15/2017    Belia Sheets    MRN: [id number]           Patient Information     Date Of Birth          5/16/1930        Visit Information        Provider Department      3/15/2017 1:00 PM Alejandro Sandhu MD [facility]        Today's Diagnoses     Medicare annual wellness visit, subsequent    -  1      Care Instructions    [facility] MEDIC[employer] PERSONAL PREVENTIVE SERVICES PLAN - IMMUNIZATIONS     Here are your recommended immunizations.  Take this home for your reference.                                                    IMMUNIZATIONS Description Recommend today?     Influenza (Flu shot) Prevents flu; should get every year No; is up to date.   PCV 13 Pneumonia vaccination; you get it once No; is up to date.   PPSV 23 Second pneumonia vaccination; usually get it 1 year after PCV 13 No; is up to date.   Zoster (Shingles) Prevents shingles; you get it once No; is up to date.   Tetanus Prevents tetanus; once every 10 years No; is up to date.   [facility] MEDICARE PERSONAL PREVENTIVE SERVICES PLAN - SERVICES     Review these tests with your doctor then decide which ones you want and take this page home for your reference                                                    IMMUNIZATIONS Description Recommend today?     Hepatitis B  If you have any of the following risk factors you should be immunized for hepatitis B: severe kidney disease, people who live in the same house as a carrier of Hepatitis B virus, people who live in  institutions (e.g. nursing homes or group homes), homosexual men, patients with hemophilia who received Factor VIII or IX concentrates, abusers of illicit injectable drugs No: is not indicated today.     SCREENING TESTS     Description   Year of Last Screening   Recommended Today?   Heart disease screening blood tests    Cholesterol level Reducing cholesterol can reduce your risk of heart attacks by 25%.  Screening is recommended yearly if you  are at risk of heart disease otherwise every 4-5 years - No: is not indicated today.   Diabetes screening tests    Hemoglobin A1c blood test   Finding and treating diabetes early can reduce complications.  Screening recommended/covered yearly if you have high blood pressure, high cholesterol, obesity (BMI >30), or a history of high blood glucose tests; or 2 of the following: family history of diabetes, overweight (BMI >25 but <30), age 65 years or older, and a history of diabetes of pregnancy or gave birth to baby weighing more than 9 lbs. - Yes; Recommended and ordered.   Hepatitis B screening Finding hepatitis B early can reduce complications.  Screening is recommended for persons with selected risk factors. - No: is not indicated today.   Hepatitis C screening Finding hepatitis C early can reduce complications.  Screening is recommended for all persons born from 1945 through 1965 and for those with selected other risk factors.  - No: is not indicated today.   HIV screening Finding HIV early can reduce complications.  Screening is recommended for persons with risk factors for HIV infection. - No: is not indicated today.   Glaucoma screening Early detection of glaucoma can prevent blindness.   Please talk to your eye doctor about this.       SCREENING TESTS     Description   Year of Last Screening   Recommended Today?   Colorectal cancer screening    Fecal occult blood test     Screening colonoscopy Screening for colon cancer has been shown to reduce death from colon cancer by 25-30%. Screening recommended to start at 50 years and continuing until age 75 years.   - No: is not indicated today.   Breast Cancer Screening (women)    Mammogram Mammogram screening for breast cancer has been shown to reduce the risk of dying from breast cancer and prolong life. Screening is recommended every 1-2 years for women aged 50 to 74 years.  - No: is not indicated today.   Cervical Cancer screening (women)    Pap Cervical pap  smears can reduce cervical cancer. Screening is recommended annually if high risk (history of abnormal pap smears) otherwise every 2-3 years, stop screening at 65 years of age if history of normal paps. - No: is not indicated today.   Screening for Osteoporosis:  Bone mass measurements (women)    Dexa Scan Screening and treating Osteoporosis can reduce the risk of hip and spine fractures. Screening is recommended in women 65 years or older and in women and men at risk of osteoporosis. 2017 No: is not indicated today.   Screening for Lung Cancer     Low-dose CT scanning Screening can reduce mortality in persons aged 55-80 who have smoked at least 30 pack-years and who are either still smoking or have quit in the past 15 years. - No: is not indicated today.   Abdominal Aortic Aneurysm (AAA) screening    Ultrasound (US)   An aneurysm treated before rupture is very safe -a ruptured aneurysm can be fatal.  Screening  by US for AAA is limited to patients who meet one of the following criteria:    Men who are 65-75 years old and have smoked more than 100 cigarettes in their lifetime    Anyone with a family history of abdominal aortic aneurysm - No: is not indicated today.             MEDICARE WELLNESS EXAM PATIENT INSTRUCTIONS    Yearly exam:     See your health care provider every year in order to review changes in your health, review medicines that you take, and discuss preventive care needs such as immunizations and cancer screening.    Get a flu shot each year.     Advance Directives:    If you have not done so, you are encouraged to complete advance directives and/or a living will.   More information about advance directives can be found at: http://www.mnmed.org/advocacy/Key-Issues/Advance-Directives    Nutrition:     Eat at least 5 servings of fruits and vegetables each day.     Eat whole-grain bread, whole-wheat pasta and brown rice instead of white grains and rice.     Talk to your doctor about Calcium and Vitamin  D.     Lifestyle:    Exercise for at least 150 minutes a week (30 minutes a day, 5 days a week). This will help you control your weight and prevent disease.     Limit alcohol to one drink per day.     If you smoke, try to quit - your doctor will be happy to help.     Wear sunscreen to prevent skin cancer.     See your dentist every six months for an exam and cleaning.     See your eye doctor every 1 to 2 years to screen for conditions such as glaucoma, macular degeneration and cataracts.          Preventive Health Recommendations    Female Ages 65 +    Yearly exam:     See your health care provider every year in order to  o Review health changes.   o Discuss preventive care.    o Review your medicines if your doctor has prescribed any.      You no longer need a yearly Pap test unless you've had an abnormal Pap test in the past 10 years. If you have vaginal symptoms, such as bleeding or discharge, be sure to talk with your provider about a Pap test.      Every 1 to 2 years, have a mammogram.  If you are over 69, talk with your health care provider about whether or not you want to continue having screening mammograms.      Every 10 years, have a colonoscopy. Or, have a yearly FIT test (stool test). These exams will check for colon cancer.       Have a cholesterol test every 5 years, or more often if your doctor advises it.       Have a diabetes test (fasting glucose) every three years. If you are at risk for diabetes, you should have this test more often.       At age 65, have a bone density scan (DEXA) to check for osteoporosis (brittle bone disease).    Shots:    Get a flu shot each year.    Get a tetanus shot every 10 years.    Talk to your doctor about your pneumonia vaccines. There are now two you should receive - Pneumovax (PPSV 23) and Prevnar (PCV 13).    Talk to your doctor about the shingles vaccine.    Talk to your doctor about the hepatitis B vaccine.    Nutrition:     Eat at least 5 servings of fruits and  vegetables each day.      Eat whole-grain bread, whole-wheat pasta and brown rice instead of white grains and rice.      Talk to your provider about Calcium and Vitamin D.     Lifestyle    Exercise at least 150 minutes a week (30 minutes a day, 5 days a week). This will help you control your weight and prevent disease.      Limit alcohol to one drink per day.      No smoking.       Wear sunscreen to prevent skin cancer.       See your dentist twice a year for an exam and cleaning.      See your eye doctor every 1 to 2 years to screen for conditions such as glaucoma, macular degeneration and cataracts.        Follow-ups after your visit        Your next 10 appointments already scheduled     Brenton 15, 2017 10:30 AM CDT   (Arrive by 10:15 AM)   RETURN ENDOCRINE with Saurabh Pittman MD   [facility] ([facility])    [address] [place] [id number] [phone]              Who to contact     Please call your clinic at [phone] to:    Ask questions about your health    Make or cancel appointments    Discuss your medicines    Learn about your test results    Speak to your doctor   If you have compliments or concerns about an experience at your clinic, or if you wish to file a complaint, please contact [facility] Patient Relations at [phone] or email us at [email or web address]         Additional Information About Your Visit        Ek[employer] Information     MIOX gives you secure access to your electronic health record. If you see a primary care provider, you can also send messages to your care team and make appointments. If you have questions, please call your primary care clinic.  If you do not have a primary care provider, please call [phone] and they will assist you.      MIOX is an electronic gateway that provides easy, online access to your medical records. With M[employer], you can request a clinic  appointment, read your test results, renew a prescription or communicate with your care team.     To access your existing account, please contact your [facility] or call [phone] for assistance.        Care EveryWhere ID     This is your Care EveryWhere ID. This could be used by other organizations to access your [place] medical records  [id number]        Your Vitals Were     Pulse Temperature Respirations Pulse Oximetry Breastfeeding? BMI (Body Mass Index)    87 97.4  F (36.3  C) (Oral) 20 94% No 18.66 kg/m2       Blood Pressure from Last 3 Encounters:   03/15/17 146/86   03/09/17 129/83   02/15/17 (!) 135/93    Weight from Last 3 Encounters:   03/15/17 92 lb 6.4 oz (41.9 kg)   03/09/17 91 lb 14.4 oz (41.7 kg)   02/15/17 92 lb 6.4 oz (41.9 kg)              We Performed the Following     DNR/DNI        Primary Care Provider Office Phone # Fax #    Alejandro Sandhu [phone]       [facility] [address] [address]        Thank you!     Thank you for choosing [facility]  for your care. Our goal is always to provide you with excellent care. Hearing back from our patients is one way we can continue to improve our services. Please take a few minutes to complete the written survey that you may receive in the mail after your visit with us. Thank you!             Your Updated Medication List - Protect others around you: Learn how to safely use, store and throw away your medicines at www.disposemymeds.org.          This list is accurate as of: 3/15/17  1:48 PM.  Always use your most recent med list.                   Brand Name Dispense Instructions for use    acetaminophen 500 MG tablet    TYLENOL     Take 1,000 mg by mouth       alpha-d-galactosidase tablet      Take by mouth as needed for intestinal gas       aspirin 81 MG tablet      Take by mouth daily       calcium-vitamin D 600-400 MG-UNIT per tablet    CALTRATE      Take 1 tablet by mouth 2 times daily       CIPRO 250 MG tablet   Generic drug:  ciprofloxacin      Take 250 mg by mouth 2 times daily       loperamide 2 MG tablet    IMODIUM A-D     Take 2 mg by mouth 4 times daily as needed       OMEGA-3 FISH OIL PO      Take by mouth daily       order for DME     1 each    Equipment being ordered: Incentive spirometry       sodium fluoride dental gel 1.1 % Gel topical gel    PREVIDENT     Apply to affected area At Bedtime       teriparatide (recombinant) 600 MCG/2.4ML Soln injection    FORTEO    6 mL    Inject 0.08 mLs (20 mcg) Subcutaneous daily       UNKNOWN TO PATIENT      Vision formula Vit A, C & E / Lutein/Minerals       VITAMIN B 12 PO      Take by mouth daily

## 2017-03-15 NOTE — PATIENT INSTRUCTIONS
[facility] MEDICARE PERSONAL PREVENTIVE SERVICES PLAN - IMMUNIZATIONS     Here are your recommended immunizations.  Take this home for your reference.                                                    IMMUNIZATIONS Description Recommend today?     Influenza (Flu shot) Prevents flu; should get every year No; is up to date.   PCV 13 Pneumonia vaccination; you get it once No; is up to date.   PPSV 23 Second pneumonia vaccination; usually get it 1 year after PCV 13 No; is up to date.   Zoster (Shingles) Prevents shingles; you get it once No; is up to date.   Tetanus Prevents tetanus; once every 10 years No; is up to date.   [facility] MEDICARE PERSONAL PREVENTIVE SERVICES PLAN - SERVICES     Review these tests with your doctor then decide which ones you want and take this page home for your reference                                                    IMMUNIZATIONS Description Recommend today?     Hepatitis B  If you have any of the following risk factors you should be immunized for hepatitis B: severe kidney disease, people who live in the same house as a carrier of Hepatitis B virus, people who live in  institutions (e.g. nursing homes or group homes), homosexual men, patients with hemophilia who received Factor VIII or IX concentrates, abusers of illicit injectable drugs No: is not indicated today.     SCREENING TESTS     Description   Year of Last Screening   Recommended Today?   Heart disease screening blood tests    Cholesterol level Reducing cholesterol can reduce your risk of heart attacks by 25%.  Screening is recommended yearly if you are at risk of heart disease otherwise every 4-5 years - No: is not indicated today.   Diabetes screening tests    Hemoglobin A1c blood test   Finding and treating diabetes early can reduce complications.  Screening recommended/covered yearly if you have high blood pressure, high cholesterol, obesity (BMI >30), or a history of high blood glucose tests; or 2 of the following: family  history of diabetes, overweight (BMI >25 but <30), age 65 years or older, and a history of diabetes of pregnancy or gave birth to baby weighing more than 9 lbs. - Yes; Recommended and ordered.   Hepatitis B screening Finding hepatitis B early can reduce complications.  Screening is recommended for persons with selected risk factors. - No: is not indicated today.   Hepatitis C screening Finding hepatitis C early can reduce complications.  Screening is recommended for all persons born from 1945 through 1965 and for those with selected other risk factors.  - No: is not indicated today.   HIV screening Finding HIV early can reduce complications.  Screening is recommended for persons with risk factors for HIV infection. - No: is not indicated today.   Glaucoma screening Early detection of glaucoma can prevent blindness.   Please talk to your eye doctor about this.       SCREENING TESTS     Description   Year of Last Screening   Recommended Today?   Colorectal cancer screening    Fecal occult blood test     Screening colonoscopy Screening for colon cancer has been shown to reduce death from colon cancer by 25-30%. Screening recommended to start at 50 years and continuing until age 75 years.   - No: is not indicated today.   Breast Cancer Screening (women)    Mammogram Mammogram screening for breast cancer has been shown to reduce the risk of dying from breast cancer and prolong life. Screening is recommended every 1-2 years for women aged 50 to 74 years.  - No: is not indicated today.   Cervical Cancer screening (women)    Pap Cervical pap smears can reduce cervical cancer. Screening is recommended annually if high risk (history of abnormal pap smears) otherwise every 2-3 years, stop screening at 65 years of age if history of normal paps. - No: is not indicated today.   Screening for Osteoporosis:  Bone mass measurements (women)    Dexa Scan Screening and treating Osteoporosis can reduce the risk of hip and spine  fractures. Screening is recommended in women 65 years or older and in women and men at risk of osteoporosis. 2017 No: is not indicated today.   Screening for Lung Cancer     Low-dose CT scanning Screening can reduce mortality in persons aged 55-80 who have smoked at least 30 pack-years and who are either still smoking or have quit in the past 15 years. - No: is not indicated today.   Abdominal Aortic Aneurysm (AAA) screening    Ultrasound (US)   An aneurysm treated before rupture is very safe -a ruptured aneurysm can be fatal.  Screening  by US for AAA is limited to patients who meet one of the following criteria:    Men who are 65-75 years old and have smoked more than 100 cigarettes in their lifetime    Anyone with a family history of abdominal aortic aneurysm - No: is not indicated today.             MEDICARE WELLNESS EXAM PATIENT INSTRUCTIONS    Yearly exam:     See your health care provider every year in order to review changes in your health, review medicines that you take, and discuss preventive care needs such as immunizations and cancer screening.    Get a flu shot each year.     Advance Directives:    If you have not done so, you are encouraged to complete advance directives and/or a living will.   More information about advance directives can be found at: http://www.mnmed.org/advocacy/Key-Issues/Advance-Directives    Nutrition:     Eat at least 5 servings of fruits and vegetables each day.     Eat whole-grain bread, whole-wheat pasta and brown rice instead of white grains and rice.     Talk to your doctor about Calcium and Vitamin D.     Lifestyle:    Exercise for at least 150 minutes a week (30 minutes a day, 5 days a week). This will help you control your weight and prevent disease.     Limit alcohol to one drink per day.     If you smoke, try to quit - your doctor will be happy to help.     Wear sunscreen to prevent skin cancer.     See your dentist every six months for an exam and cleaning.     See  your eye doctor every 1 to 2 years to screen for conditions such as glaucoma, macular degeneration and cataracts.          Preventive Health Recommendations    Female Ages 65 +    Yearly exam:     See your health care provider every year in order to  o Review health changes.   o Discuss preventive care.    o Review your medicines if your doctor has prescribed any.      You no longer need a yearly Pap test unless you've had an abnormal Pap test in the past 10 years. If you have vaginal symptoms, such as bleeding or discharge, be sure to talk with your provider about a Pap test.      Every 1 to 2 years, have a mammogram.  If you are over 69, talk with your health care provider about whether or not you want to continue having screening mammograms.      Every 10 years, have a colonoscopy. Or, have a yearly FIT test (stool test). These exams will check for colon cancer.       Have a cholesterol test every 5 years, or more often if your doctor advises it.       Have a diabetes test (fasting glucose) every three years. If you are at risk for diabetes, you should have this test more often.       At age 65, have a bone density scan (DEXA) to check for osteoporosis (brittle bone disease).    Shots:    Get a flu shot each year.    Get a tetanus shot every 10 years.    Talk to your doctor about your pneumonia vaccines. There are now two you should receive - Pneumovax (PPSV 23) and Prevnar (PCV 13).    Talk to your doctor about the shingles vaccine.    Talk to your doctor about the hepatitis B vaccine.    Nutrition:     Eat at least 5 servings of fruits and vegetables each day.      Eat whole-grain bread, whole-wheat pasta and brown rice instead of white grains and rice.      Talk to your provider about Calcium and Vitamin D.     Lifestyle    Exercise at least 150 minutes a week (30 minutes a day, 5 days a week). This will help you control your weight and prevent disease.      Limit alcohol to one drink per day.      No smoking.        Wear sunscreen to prevent skin cancer.       See your dentist twice a year for an exam and cleaning.      See your eye doctor every 1 to 2 years to screen for conditions such as glaucoma, macular degeneration and cataracts.

## 2017-03-15 NOTE — PROGRESS NOTES
Medicare Annual Wellness Visit         HPI     This 86 year old female presents as an established patient  Alejandro Sandhu who presents for an subsequent Medicare Wellness Exam.  Patient wants to discuss f/u on a fall         Patient Active Problem List   Diagnosis     Osteoporosis     Osteoarthritis     Recurrent falls     Chronic fatigue     Senile osteoporosis     Lumbar verterbral fracture, non-traumatic       Past Medical History   Diagnosis Date     Carpal tunnel syndrome (aka CTS)      H/O calcium pyrophosphate deposition disease (CPPD)      Kyphoscoliosis      Osteoarthritis      hands     Osteoporosis         Family History   Problem Relation Age of Onset     Depression/Anxiety Son      Depression/Anxiety Son      alcohol abuse  age 24     Hypertension Mother      Hypertension Brother      DIABETES No family hx of      Breast Cancer No family hx of      Colon Cancer No family hx of      Prostate Cancer No family hx of      Other Cancer No family hx of          Past Surgical History   Procedure Laterality Date     Hysterectomy       for uterine prolapse       Reviewed no other significant FH    Family History and past Medical History reviewed and it is unchanged/updated.       Review of Systems       Constitutional:   fevers, night sweats or unintentional weight change ?  NO      Eyes:   vision change, diplopia or red eyes?  NO      Ears, Nose, Mouth, Throat:   tinnitus or hearing change,  epistaxis or nasal discharge,  oral lesions, throat pain ?  NO      Neck:   stiffness?  NO           Cardiovascular:   chest pain, palpitations, or pain with walking, orthopnea or PND?  NO   Breasts:  Any bumps or unusual discharge?     NO         Respiratory:   dyspnea, cough, shortness of breath or wheezing?  NO         GI:   nausea, vomiting, diarrhea or constipation,  abdominal pain ?  NO         :   change in urine,  dysuria or hematuria,  sexual dysfunction ?  NO        Musculoskeletal:   joint or muscle pain  or swelling?  NO            Skin:   concerning lesions or moles?  NO           Nervous System:   loss of strength or sensation,  numbness or tingling,  tremor,  dizziness,  headache?  NO   Endocrine/Homone:   polyuria or polydipsia,  temperature intolerance?  NO            Blood and Lymphnodes:   concerning bumps,  bleeding problems?  NO            Allergy:   environmental allergies?  NO            Mental Health:   depression or anxiety,  sleep problems?  NO               Medical Care     Have you been to an ER or a hospital in the last year? No  What other specialists or organizations are involved in your medical care?  PT  Current providers sharing in care for this patient include:  Patient Care Team:  Alejandro Sandhu MD as PCP - General (Family Practice)   Rian Pittman and [place] - [facility] endocrinology         Social History     Social History   Substance Use Topics     Smoking status: Never Smoker     Smokeless tobacco: Never Used     Alcohol use Not on file     Marital Status:  Who lives in your household? Alone at the [facility]  Does your home have any of the following safety concerns? Loose rugs in the hallway, no grab bars in the bathroom, no handrails on the stairs or have poorly lit areas?  No  Do you feel threatened or controlled by a partner, ex-partner or anyone in your life? No  Has anyone hurt you physically, for example by pushing, hitting, slapping or kicking you   or forcing you to have sex? No  Do you need help with the phone, transportation, shopping, preparing meals, housework, laundry, medications or managing money? Yes, dependent in driving, meals, housework, laundry   Have you noticed any hearing difficulties? No      Risk Behaviors and Healthy Habits     How many servings of fruits and vegetables do you eat a day? 5-6  How often do you exercise and what do you do? 30-60 minutes 3 times  a week  Do you frequently ride without a seatbelt? No  Do you use tobacco?  No  Do  you use any other drugs? No         Do you use alcohol?No    Today's PHQ-2 Score:      Sexual Health     Are you sexually active?  No   If yes, with men, women, or both? n/a  If yes, do you more than one current partner?No  If yes, are you using condoms?  No  Have you had any sexually transmitted infections? No   Any sexual concerns? No     FOR WOMEN  What year did you stop having periods?   Any vaginal bleeding in the last year? No  Have you ever had an abnormal Pap smear? No    FUNCTIONAL ABILITY/SAFETY SCREENING     Was the patient's timed Up & Go test (Get up from chair walk, 10 feet turn, return to chair and sit down) unsteady or longer than 30 seconds? No - 28 seconds with walker    Hearing evaluation if done: Yes nl finger rub test.    EVALUATION OF COGNITIVE FUNCTION     Mood/affect:Normal  Appearance:Normal  Family member/caregiver input: Normal    Mini Cog Scoring   3 points   Clock Draw Test result:  Normal      SCREENING FOR PREVENTION and EARLY DETECTION         CV Risk based on Pooled Cohort Risk:  The ASCVD Risk score ([place] DC Jr., et al., 2013) failed to calculate for the following reasons:    The 2013 ASCVD risk score is only valid for ages 40 to 79      Advanced Directives: Discussed and patient desires to be DNR/DNI. Order written.  POLST form completed with patient.     Immunization History   Administered Date(s) Administered     Hepatitis A Vac Ped/Adol-2 Dose 02/03/1999, 11/02/1999     Hepatitis B 04/27/1999, 06/01/1999, 11/02/1999     IPV 01/23/2003     Influenza (H1N1) 01/22/2010     Influenza (High Dose) 3 valent vaccine 10/26/2016     Influenza (IIV3) 10/08/2012, 11/21/2013     Meningococcal (Menomune ) 01/23/2003     Pneumococcal (PCV 13) 08/17/2016     Pneumococcal 23 valent 03/22/1996, 11/03/2000     TD (ADULT, 7+) 07/01/1997, 06/16/2004     TDAP (BOOSTRIX AGES 10-64) 08/17/2016     Typhoid IM 02/03/1999, 06/16/2004, 01/26/2011     Yellow Fever 02/03/1999       Reviewed Immunization  Record Today           Physical Exam     Vitals: /86  Pulse 87  Temp 97.4  F (36.3  C) (Oral)  Resp 20  Wt 92 lb 6.4 oz (41.9 kg)  SpO2 94%  Breastfeeding? No  BMI 18.66 kg/m2  BMI= Body mass index is 18.66 kg/(m^2).  GENERAL APPEARANCE: alert and no distress  EYES: Eyes grossly normal to inspection, PERRL and conjunctivae and sclerae normal  HENT: ear canals and TM's normal, nose and mouth without ulcers or lesions, oropharynx clear and oral mucous membranes moist  RESP: lungs clear to auscultation - no rales, rhonchi or wheezes  CV: regular rate and rhythm, normal S1 S2, no S3 or S4, no murmur, click or rub, no peripheral edema and peripheral pulses strong  MS: + marked kyphoscoliosis and numerous deformities from RA.  Uses walker.  SKIN: no suspicious lesions or rashes  NEURO: Normal strength and tone, sensory exam grossly normal, mentation intact and speech normal  PSYCH: mentation appears normal and affect normal/bright        Assessment and Plan   Belia was seen today for medicare visit.    Diagnoses and all orders for this visit:    Medicare annual wellness visit, subsequent - prevention plan forms completed and given to pt as documented in instructions.  -     Hemoglobin A1c ([place])    Other orders  -     DNR/DNI          There are no diagnoses linked to this encounter.      Options for treatment and follow-up care were reviewed with the Belia Pugh Sudeep and/or guardian engaged in the decision making process and verbalized understanding of the options discussed and agreed with the final plan.    SIDDHARTHA MCCLAIN

## 2017-04-21 ENCOUNTER — MEDICAL CORRESPONDENCE (OUTPATIENT)
Dept: HEALTH INFORMATION MANAGEMENT | Facility: CLINIC | Age: 82
End: 2017-04-21

## 2017-04-22 ENCOUNTER — MEDICAL CORRESPONDENCE (OUTPATIENT)
Dept: HEALTH INFORMATION MANAGEMENT | Facility: CLINIC | Age: 82
End: 2017-04-22

## 2017-05-02 ENCOUNTER — DOCUMENTATION ONLY (OUTPATIENT)
Dept: FAMILY MEDICINE | Facility: CLINIC | Age: 82
End: 2017-05-02

## 2017-05-04 ENCOUNTER — TELEPHONE (OUTPATIENT)
Dept: FAMILY MEDICINE | Facility: CLINIC | Age: 82
End: 2017-05-04

## 2017-05-04 NOTE — TELEPHONE ENCOUNTER
Returned call to home care PT to give verbal orders per protocol as requested. PT verbalized understanding.    Augustina Gan RN

## 2017-05-04 NOTE — TELEPHONE ENCOUNTER
[facility] phone call message - order or referral request for patient:     Order or referral being requested: skilled nurse visit      Additional Comments: home care nurse is requesting verbal ok for skilled nurse visit 1 time a week for 3 weeks.    OK to leave a message on voice mail? Yes    Primary language: English      needed? No    Call taken on May 4, 2017 at 8:09 AM by Mignon Trinh

## 2017-05-12 ENCOUNTER — DOCUMENTATION ONLY (OUTPATIENT)
Dept: FAMILY MEDICINE | Facility: CLINIC | Age: 82
End: 2017-05-12

## 2017-05-17 ENCOUNTER — MEDICAL CORRESPONDENCE (OUTPATIENT)
Dept: HEALTH INFORMATION MANAGEMENT | Facility: CLINIC | Age: 82
End: 2017-05-17

## 2017-05-22 ENCOUNTER — DOCUMENTATION ONLY (OUTPATIENT)
Dept: FAMILY MEDICINE | Facility: CLINIC | Age: 82
End: 2017-05-22

## 2017-05-22 NOTE — PROGRESS NOTES
"When opening a documentation only encounter, be sure to enter in \"Chief Complaint\" Forms and in \" Comments\" Title of form, description if needed.    Form received via: Fax  Form now resides in: Provider Ready    Form sent out via: Fax  Patient informed: No  Output date: 5/18/17    Form received by recipient via fax confirmation  Please close the encounter.    "

## 2017-05-26 ENCOUNTER — MEDICAL CORRESPONDENCE (OUTPATIENT)
Dept: HEALTH INFORMATION MANAGEMENT | Facility: CLINIC | Age: 82
End: 2017-05-26

## 2017-06-01 ENCOUNTER — DOCUMENTATION ONLY (OUTPATIENT)
Dept: FAMILY MEDICINE | Facility: CLINIC | Age: 82
End: 2017-06-01

## 2017-06-01 NOTE — PROGRESS NOTES
"When opening a documentation only encounter, be sure to enter in \"Chief Complaint\" Forms and in \" Comments\" Title of form, description if needed.    Sudeep is a 87 year old  female  Form received via: Fax  Form now resides in: PCS pending    Veronika Glynn MA     Form has been completed by provider.     Form sent out via: Fax to [facility] at Fax Number: [phone]  Patient informed: No, Reason:via fax  Output date: June 1, 2017    Veronika Glynn MA      **Please close the encounter**                      "

## 2017-06-09 ENCOUNTER — DOCUMENTATION ONLY (OUTPATIENT)
Dept: FAMILY MEDICINE | Facility: CLINIC | Age: 82
End: 2017-06-09

## 2017-06-09 NOTE — PROGRESS NOTES
"When opening a documentation only encounter, be sure to enter in \"Chief Complaint\" Forms and in \" Comments\" Title of form, description if needed.    Form received via: Fax  Form now resides in: Provider Ready    Form sent out via: Fax  Patient informed: No  Output date: 6/8/17    Form received by recipient via fax confirmation  Please close the encounter.    "

## 2017-06-27 ENCOUNTER — DOCUMENTATION ONLY (OUTPATIENT)
Dept: OTHER | Facility: CLINIC | Age: 82
End: 2017-06-27

## 2017-06-27 PROBLEM — Z71.89 ADVANCED DIRECTIVES, COUNSELING/DISCUSSION: Chronic | Status: ACTIVE | Noted: 2017-06-27

## 2017-08-02 ENCOUNTER — OFFICE VISIT (OUTPATIENT)
Dept: ENDOCRINOLOGY | Facility: CLINIC | Age: 82
End: 2017-08-02

## 2017-08-02 VITALS
HEART RATE: 83 BPM | BODY MASS INDEX: 18.47 KG/M2 | SYSTOLIC BLOOD PRESSURE: 133 MMHG | HEIGHT: 59 IN | DIASTOLIC BLOOD PRESSURE: 97 MMHG | WEIGHT: 91.6 LBS

## 2017-08-02 DIAGNOSIS — M80.00XD AGE-RELATED OSTEOPOROSIS WITH CURRENT PATHOLOGICAL FRACTURE WITH ROUTINE HEALING, SUBSEQUENT ENCOUNTER: ICD-10-CM

## 2017-08-02 DIAGNOSIS — M80.00XD AGE-RELATED OSTEOPOROSIS WITH CURRENT PATHOLOGICAL FRACTURE WITH ROUTINE HEALING, SUBSEQUENT ENCOUNTER: Primary | ICD-10-CM

## 2017-08-02 LAB
ALBUMIN SERPL-MCNC: 3.3 G/DL (ref 3.4–5)
BUN SERPL-MCNC: 14 MG/DL (ref 7–30)
CALCIUM SERPL-MCNC: 9.1 MG/DL (ref 8.5–10.1)
CREAT SERPL-MCNC: 0.67 MG/DL (ref 0.52–1.04)
GFR SERPL CREATININE-BSD FRML MDRD: 84 ML/MIN/1.7M2
PHOSPHATE SERPL-MCNC: 3.5 MG/DL (ref 2.5–4.5)

## 2017-08-02 ASSESSMENT — ENCOUNTER SYMPTOMS
NAUSEA: 0
CONSTIPATION: 0
MEMORY LOSS: 0
DIARRHEA: 0
RECTAL BLEEDING: 0
BACK PAIN: 1
JOINT SWELLING: 0
ABDOMINAL PAIN: 0
NECK PAIN: 1
DISTURBANCES IN COORDINATION: 1
HEMATURIA: 0
SPEECH CHANGE: 0
VOMITING: 0
BLOATING: 0
ARTHRALGIAS: 1
NUMBNESS: 0
FLANK PAIN: 0
HEARTBURN: 0
HEADACHES: 0
DIFFICULTY URINATING: 0
MUSCLE CRAMPS: 0
DYSURIA: 0
MYALGIAS: 1
BOWEL INCONTINENCE: 1
DIZZINESS: 1
MUSCLE WEAKNESS: 1
JAUNDICE: 0
NAIL CHANGES: 0
BLOOD IN STOOL: 0
RECTAL PAIN: 0
WEAKNESS: 1
PARALYSIS: 0
LOSS OF CONSCIOUSNESS: 0
STIFFNESS: 1
SKIN CHANGES: 0
TINGLING: 0
POOR WOUND HEALING: 0
SEIZURES: 0
TREMORS: 0

## 2017-08-02 ASSESSMENT — PAIN SCALES - GENERAL: PAINLEVEL: NO PAIN (0)

## 2017-08-02 NOTE — LETTER
8/2/2017       RE: Belia Sheets  [address]  [address]  [facility] [address]     Dear Colleague,    Thank you for referring your patient, Belia Sheets, to the [facility] ENDOCRINOLOGY at [facility]. Please see a copy of my visit note below.    Endocrinology Clinic Visit    Chief Complaint: RECHECK (osteoporosis f/u )     Information obtained from:Patient is here with her son today.     Subjective:         HPI: Belia Sheets is a 87 year old year old female with history of osteoporosis who is here for a follow up.  She is here with her son.     Again, this is 86 yo female with past medical history of osteoporosis first diagnosed in 1999.     She was treated with alendronate 70 mg weekly from 1999 to October 2015.  She has never stopped this medication all these years; she took it first thing in the morning with empty stomach.    In October of 2015 she was prescribed FORTEO; mainly because she wanted to change from bisphosphonate per report. However, she didn't take these medication because of cost which was 600 for two years and she didn't know whether it will help her or not.     +History of vertebral fracture; she was folding laundry when she fell from standing up position and sustained injury.  Broken hip and noted to have thoracic compression fractures.  Previous fragility fracture, morphometric vertebral fracture: imaging done in 7/2015 has showed multiple compression fractures.   7/2015 spine film reported as <Multiple old healed left rib fractures. Diffuse osteopenia. New moderate compression fracture of T10 since the prior examination. New moderate compression fractures in T4 and T5 are new since the prior exam. Multilevel degenerative changes in the thoracic spine are otherwise stable. Marked scoliosis of the thoracolumbar spine is redemonstrated.>    Due to severe osteoporosis, long standing previous alendronate therapy and alkaline phosphatase which  was within the normal limits; we started On FORTEO about six months ago.  She has been doing the injections herself at home and she has been managing well with the injections despite her hand arthritis. She demonstrated how she does injections. Denies any side effects from the medication including: dizziness, new back pain (infact her chronic back pain is getting better, kidney stones and previously checked routine calcium labs were within the normal limits.      She is taking calcium and vitamin d supplement 600-400; 1 tab BID; she doesn't take additional vitamin D supplements. No falls since her last visit with me six months ago. Since her fall in January 2017 she has been working with physical therapy at home which she thinks is very helpful for her balance and avoiding further falls.      She uses a walker all the time.     Previous history:   Lost 6 inch height loss/kyphosis    Previous fracture after the age of 50 -    Advanced age  Weight - 91 pounds (high risk)    Falls/risk factors: Multiple falls. 5 falls ine one year and half but again no falls over the last six months.  Sustained rib and vertebral fractures secondary to the previous falls. Currently participating in home physical therapy for osteoporosis.   Walks three times per day in hallways of her apartment. Uses walker/cane for walking short distances and 4 barros for long distances. Fall: 7/2015, 12/2014, 4/2016, 6/2016. 01/2017.    She lives in a senior apartment.  She gets some services. Mostly manages by her own.     Secondary causes of osteoporosis: negative for    RA, Prolonged immobility,  Organ transplantation, Type I diabetes, Hyperthyroidism, GI disease, Chronic liver disease or COPD.     Family history of hip fracture: None.         No Known Allergies    Current Outpatient Prescriptions   Medication Sig Dispense Refill     order for DME Equipment being ordered: Incentive spirometry 1 each 0     acetaminophen (TYLENOL) 500 MG tablet Take  1,000 mg by mouth       teriparatide, recombinant, (FORTEO) 600 MCG/2.4ML SOLN injection Inject 0.08 mLs (20 mcg) Subcutaneous daily 6 mL 1     loperamide (IMODIUM A-D) 2 MG tablet Take 2 mg by mouth 4 times daily as needed       UNKNOWN TO PATIENT Vision formula Vit A, C & E / Lutein/Minerals       aspirin 81 MG tablet Take by mouth daily       calcium-vitamin D (CALTRATE) 600-400 MG-UNIT per tablet Take 1 tablet by mouth 2 times daily       ciprofloxacin (CIPRO) 250 MG tablet Take 250 mg by mouth 2 times daily       alpha-d-galactosidase (BEANO) tablet Take by mouth as needed for intestinal gas       Omega-3 Fatty Acids (OMEGA-3 FISH OIL PO) Take by mouth daily       sodium fluoride dental gel (PREVIDENT) 1.1 % GEL Apply to affected area At Bedtime       Cyanocobalamin (VITAMIN B 12 PO) Take by mouth daily         Review of Systems     11 point review system (Constitutional, HENT, Eyes, Respiratory, Cardiovascular, Gastrointestinal, Genitourinary, Musculoskeletal,Neurological, Psychiatric/Behavioural, Endocrine) is negative or is as per HPI above: fecal incontinence.       Past Medical History:   Diagnosis Date     Carpal tunnel syndrome (aka CTS)      H/O calcium pyrophosphate deposition disease (CPPD)      Kyphoscoliosis      Osteoarthritis     hands     Osteoporosis        Past Surgical History:   Procedure Laterality Date     HYSTERECTOMY      for uterine prolapse       Family History   Problem Relation Age of Onset     Depression/Anxiety Son      Depression/Anxiety Son      alcohol abuse  age 24     Hypertension Mother      Hypertension Brother      DIABETES No family hx of      Breast Cancer No family hx of      Colon Cancer No family hx of      Prostate Cancer No family hx of      Other Cancer No family hx of        Social History     Social History     Marital Status:      Spouse Name: N/A     Number of Children: N/A     Years of Education: N/A     Social History Main Topics     Smoking status:  "Never Smoker      Smokeless tobacco: Never Used     Alcohol Use: Not on file     Drug Use: No     Sexual Activity: Not on file     Other Topics Concern     Not on file     Social History Narrative       Objective:   BP (!) 133/97  Pulse 83  Ht 1.499 m (4' 11\")  Wt 41.5 kg (91 lb 9.6 oz)  BMI 18.5 kg/m2  Constitutional: Pleasant no acute cardiopulmonary distress.   HEENT: Mouth/Throat: Mucous membrane is moist.    Cardiovascular: RRR, S1, S2 normal.   Pulmonary/Chest: CTAB. No wheezing or rales.   Neurological: Alert and oriented.   Extremities: trace pedal edema on the right side. No lumbar spine tenderness.   Back: scoliosis/stoop posture. No lumbar/thoracic spine tenderness.   Psychological: appropriate mood and affect   Stand and go test more than 10 sec.     In House Labs:   A1C      5.9   8/31/2016    Routine: CBC done. TSH done WNL (8/2016) and PTH done WNL (7/2015), Protein electrophoresis and urinary bence-childress WNL (7/2015), ESR 14 (7/2015), TgA negative in 2010.  Dual energy x-ray absorptiometry results:      Region BMD T - score Z - score   L1-L2 0.634 g/cm  -4.4 -1.7             Neck Left 0.644 g/cm  -2.8 0.1   Total Left 0.627 g/cm  -3.0 -0.1             Neck Right 0.603 g/cm  -3.1 -0.2   Total Right 0.588 g/cm  -3.3 -0.4   Radius 33% 0.457 g/cm  -3.5 -0.2       Conclusions:     VFA scan was interpretable from T4 - L4. Using the semi-quantitative analysis of Genant (see reference #1), there were evidence for a grade 3 (severe) compression, fracture at T10, L3-L4, . Six point morphometry confirmed the presence and severity of these deformities.     If alternative etiologies for the presence of vertebral fractures are excluded, the diagnosis is consistent with osteoporosis.    I reviewed the images of the DEXA scan from Jan. As well as the VFA     Assessment/Treatment Plan:      Belia Sheets is a 87 year old year old female with Osteoporosis and fragility fracture (7/2015); diagnosed in 1999.  " Treated with alendronate for 16 years and four months.  Off alendronate since October 2015 (over one year).      CBC done. TSH done WNL (8/2016) and PTH done WNL (7/2015), Protein electrophoresis and urinary bence-childress WNL (7/2015), ESR 14 (7/2015), TgA negative in 2010. Her risk factor for osteoporosis are age and low body weight.  Repeat DEXA showed worsening findings with -4.4 T-score at lumbar area. VFA showing: < there were evidence for a grade 3 (severe) compression, fracture at T10, L3-L4, >    Given severity of her osteoporosis and fragility fracture (severe as documented above); needs aggressive treatment (Zolendronic acid, FORTEO or Denosumab are possible options). After looking at the evidence we started her on FORTEO about six months ago. She is tolerated that well and is managing her injections independently. FORTEO preferred because of an added benefit of preventing vertebral fractures.    She takes calcium/vitamin d 600-400; 1 tab PO BID. Discussed: BALANCED DIET, using walker all the time, home exercise program with PT and avoid heavy lifting.    Calcium and creatinine stable.  Will continue same plan and will have her back in three months for a follow up. Called patient and daughter and discussed the above results over the phone.      ENDO CALCIUM LABS-[facility] Latest Ref Rng & Units 8/2/2017   CALCIUM 8.5 - 10.1 mg/dL 9.1   PHOSPHOROUS 2.5 - 4.5 mg/dL 3.5   ALBUMIN 3.4 - 5.0 g/dL 3.3 (L)   BUN 7 - 30 mg/dL 14   CREATININE 0.52 - 1.04 mg/dL 0.67   Vitamin d was 39 at last visit, results from today pending.     Plan:   Obtain labs today. Continue Vit D and Ca supplementation.   Continue Forteo   continue current management without any change.     Patient Instructions   Blood work today and we will contact you after the results.       I will contact the patient with the test results.  Return to clinic in 6 months.    Test and/or medications prescribed today:  Orders Placed This Encounter   Procedures      Vitamin D Deficiency (D3 Only)     Albumin level     Bone specific alk phosphatase     Calcium     Phosphorus     Creatinine     Urea nitrogen   Vitamin D level came back borderline low at 29.  In addition to the calcium and vitamin d combination she is taking; we will add vitamin D 1000 IU daily. Sent a letter containing the following message(CC below).  Sent my chart message and left a message on her cell phone as well.   <The blood work results overall looks good except the vitamin D level which was borderline low.  In addition to the calcium and vitamin D combination tablet you are taking; please take additional vitamin D supplement. I recommend Vitamin D 1000 IU daily in addition to the calcium and vitamin D tablet you are currently taking.      I have sent a prescription to your pharmacy: Vitamin D 1000 IU daily for your convenience>    Patient seen and discussed with endocrinology attending Dr. Croft.     Saurabh Pittman MD  Endocrinology fellow   [phone]  Division of Endocrinology and Diabetes      Attending Note:     Patient was seen and examined with fellow Dr. Pittman    The note reflects our mutual findings and plan.     Eliazar Croft MD  [id number]  Endocrinology Service

## 2017-08-02 NOTE — MR AVS SNAPSHOT
After Visit Summary   8/2/2017    Belia Sheets    MRN: [id number]           Patient Information     Date Of Birth          5/16/1930        Visit Information        Provider Department      8/2/2017 3:15 PM Saurabh Pittman MD [facility]        Today's Diagnoses     Age-related osteoporosis with current pathological fracture with routine healing, subsequent encounter    -  1      Care Instructions    Blood work today and we will contact you after the results.             Follow-ups after your visit        Follow-up notes from your care team     Return in about 6 months (around 2/2/2018), or if symptoms worsen or fail to improve.      Your next 10 appointments already scheduled     Aug 02, 2017  4:00 PM CDT   LAB with  LAB   [facility] ([facility])    [address] [place] [id number] [phone]           Patient must bring picture ID. Patient should be prepared to give a urine specimen  Please do not eat 10-12 hours before your appointment if you are coming in fasting for labs on lipids, cholesterol, or glucose (sugar). Pregnant women should follow their Care Team instructions. Water with medications is okay. Do not drink coffee or other fluids. If you have concerns about taking  your medications, please ask at office or if scheduling via [place], send a message by clicking on Secure Messaging, Message Your Care Team.            Feb 07, 2018  3:15 PM CST   (Arrive by 3:00 PM)   RETURN ENDOCRINE with Saurabh Pittman MD   [facility] ([facility])    [address] [place] [id number] [phone]              Future tests that were ordered for you today     Open Future Orders        Priority Expected Expires Ordered    Bone specific alk phosphatase Routine  8/2/2018 8/2/2017    Calcium Routine  8/2/2018 8/2/2017    Phosphorus Routine  8/2/2018 8/2/2017     "Creatinine Routine  8/2/2018 8/2/2017    Urea nitrogen Routine  8/2/2018 8/2/2017    Vitamin D Deficiency (D3 Only) Routine  8/2/2018 8/2/2017    Albumin level Routine  8/2/2018 8/2/2017            Who to contact     Please call your clinic at [phone] to:    Ask questions about your health    Make or cancel appointments    Discuss your medicines    Learn about your test results    Speak to your doctor   If you have compliments or concerns about an experience at your clinic, or if you wish to file a complaint, please contact [facility] Patient Relations at [phone] or email us at [email or web address]         Additional Information About Your Visit        [employer] Information     [employer] gives you secure access to your electronic health record. If you see a primary care provider, you can also send messages to your care team and make appointments. If you have questions, please call your primary care clinic.  If you do not have a primary care provider, please call [phone] and they will assist you.      [employer] is an electronic gateway that provides easy, online access to your medical records. With [employer], you can request a clinic appointment, read your test results, renew a prescription or communicate with your care team.     To access your existing account, please contact your [facility] or call [phone] for assistance.        Care EveryWhere ID     This is your Care EveryWhere ID. This could be used by other organizations to access your [place] medical records  [id number]        Your Vitals Were     Pulse Height BMI (Body Mass Index)             83 1.499 m (4' 11\") 18.5 kg/m2          Blood Pressure from Last 3 Encounters:   08/02/17 (!) 133/97   03/15/17 146/86   03/09/17 129/83    Weight from Last 3 Encounters:   08/02/17 41.5 kg (91 lb 9.6 oz)   03/15/17 41.9 kg (92 lb 6.4 oz)   03/09/17 41.7 kg (91 lb 14.4 oz)               " Primary Care Provider Office Phone # Fax #    Alejandro Sandhu [phone]       [facility] [address] [place] [id number]        Equal Access to Services     ANUEL MARSHALL : Hadii aad ku hadbrianfransico Shreyas, wadesda lujacinto, qaybta kamanoloda jenelle, ivana kimble briantonny herron nayeli cha. So [facility] [phone].    ATENCIÓN: Si habla español, tiene a benavides disposición servicios gratuitos de asistencia lingüística. Llame al [phone].    We comply with applicable federal civil rights laws and [place] laws. We do not discriminate on the basis of race, color, national origin, age, disability sex, sexual orientation or gender identity.            Thank you!     Thank you for choosing [facility]  for your care. Our goal is always to provide you with excellent care. Hearing back from our patients is one way we can continue to improve our services. Please take a few minutes to complete the written survey that you may receive in the mail after your visit with us. Thank you!             Your Updated Medication List - Protect others around you: Learn how to safely use, store and throw away your medicines at www.disposemymeds.org.          This list is accurate as of: 8/2/17  3:50 PM.  Always use your most recent med list.                   Brand Name Dispense Instructions for use Diagnosis    acetaminophen 500 MG tablet    TYLENOL     Take 1,000 mg by mouth        alpha-d-galactosidase tablet      Take by mouth as needed for intestinal gas        aspirin 81 MG tablet      Take by mouth daily        calcium-vitamin D 600-400 MG-UNIT per tablet    CALTRATE     Take 1 tablet by mouth 2 times daily        CIPRO 250 MG tablet   Generic drug:  ciprofloxacin      Take 250 mg by mouth 2 times daily        loperamide 2 MG tablet    IMODIUM A-D     Take 2 mg by mouth 4 times daily as needed        OMEGA-3 FISH OIL PO      Take by mouth daily        order for DME     1 each    Equipment  being ordered: Incentive spirometry    Closed fracture of multiple ribs with routine healing, unspecified laterality, subsequent encounter       sodium fluoride dental gel 1.1 % Gel topical gel    PREVIDENT     Apply to affected area At Bedtime        teriparatide (recombinant) 600 MCG/2.4ML Soln injection    FORTEO    6 mL    Inject 0.08 mLs (20 mcg) Subcutaneous daily    Age-related osteoporosis with current pathological fracture, initial encounter       UNKNOWN TO PATIENT      Vision formula Vit A, C & E / Lutein/Minerals        VITAMIN B 12 PO      Take by mouth daily

## 2017-08-03 LAB — DEPRECATED CALCIDIOL+CALCIFEROL SERPL-MC: 29 UG/L (ref 20–75)

## 2017-08-03 NOTE — PROGRESS NOTES
Endocrinology Clinic Visit    Chief Complaint: RECHECK (osteoporosis f/u )     Information obtained from:Patient is here with her son today.     Subjective:         HPI: Belia Sheets is a 87 year old year old female with history of osteoporosis who is here for a follow up.  She is here with her son.     Again, this is 88 yo female with past medical history of osteoporosis first diagnosed in 1999.     She was treated with alendronate 70 mg weekly from 1999 to October 2015.  She has never stopped this medication all these years; she took it first thing in the morning with empty stomach.    In October of 2015 she was prescribed FORTEO; mainly because she wanted to change from bisphosphonate per report. However, she didn't take these medication because of cost which was 600 for two years and she didn't know whether it will help her or not.     +History of vertebral fracture; she was folding laundry when she fell from standing up position and sustained injury.  Broken hip and noted to have thoracic compression fractures.  Previous fragility fracture, morphometric vertebral fracture: imaging done in 7/2015 has showed multiple compression fractures.   7/2015 spine film reported as <Multiple old healed left rib fractures. Diffuse osteopenia. New moderate compression fracture of T10 since the prior examination. New moderate compression fractures in T4 and T5 are new since the prior exam. Multilevel degenerative changes in the thoracic spine are otherwise stable. Marked scoliosis of the thoracolumbar spine is redemonstrated.>    Due to severe osteoporosis, long standing previous alendronate therapy and alkaline phosphatase which was within the normal limits; we started On FORTEO about six months ago.  She has been doing the injections herself at home and she has been managing well with the injections despite her hand arthritis. She demonstrated how she does injections. Denies any side effects from the medication  including: dizziness, new back pain (infact her chronic back pain is getting better, kidney stones and previously checked routine calcium labs were within the normal limits.      She is taking calcium and vitamin d supplement 600-400; 1 tab BID; she doesn't take additional vitamin D supplements. No falls since her last visit with me six months ago. Since her fall in January 2017 she has been working with physical therapy at home which she thinks is very helpful for her balance and avoiding further falls.      She uses a walker all the time.     Previous history:   Lost 6 inch height loss/kyphosis    Previous fracture after the age of 50 -    Advanced age  Weight - 91 pounds (high risk)    Falls/risk factors: Multiple falls. 5 falls ine one year and half but again no falls over the last six months.  Sustained rib and vertebral fractures secondary to the previous falls. Currently participating in home physical therapy for osteoporosis.   Walks three times per day in hallways of her apartment. Uses walker/cane for walking short distances and 4 barros for long distances. Fall: 7/2015, 12/2014, 4/2016, 6/2016. 01/2017.    She lives in a senior apartment.  She gets some services. Mostly manages by her own.     Secondary causes of osteoporosis: negative for    RA, Prolonged immobility,  Organ transplantation, Type I diabetes, Hyperthyroidism, GI disease, Chronic liver disease or COPD.     Family history of hip fracture: None.         No Known Allergies    Current Outpatient Prescriptions   Medication Sig Dispense Refill     order for DME Equipment being ordered: Incentive spirometry 1 each 0     acetaminophen (TYLENOL) 500 MG tablet Take 1,000 mg by mouth       teriparatide, recombinant, (FORTEO) 600 MCG/2.4ML SOLN injection Inject 0.08 mLs (20 mcg) Subcutaneous daily 6 mL 1     loperamide (IMODIUM A-D) 2 MG tablet Take 2 mg by mouth 4 times daily as needed       UNKNOWN TO PATIENT Vision formula Vit A, C & E /  "Lutein/Minerals       aspirin 81 MG tablet Take by mouth daily       calcium-vitamin D (CALTRATE) 600-400 MG-UNIT per tablet Take 1 tablet by mouth 2 times daily       ciprofloxacin (CIPRO) 250 MG tablet Take 250 mg by mouth 2 times daily       alpha-d-galactosidase (BEANO) tablet Take by mouth as needed for intestinal gas       Omega-3 Fatty Acids (OMEGA-3 FISH OIL PO) Take by mouth daily       sodium fluoride dental gel (PREVIDENT) 1.1 % GEL Apply to affected area At Bedtime       Cyanocobalamin (VITAMIN B 12 PO) Take by mouth daily         Review of Systems     11 point review system (Constitutional, HENT, Eyes, Respiratory, Cardiovascular, Gastrointestinal, Genitourinary, Musculoskeletal,Neurological, Psychiatric/Behavioural, Endocrine) is negative or is as per HPI above: fecal incontinence.       Past Medical History:   Diagnosis Date     Carpal tunnel syndrome (aka CTS)      H/O calcium pyrophosphate deposition disease (CPPD)      Kyphoscoliosis      Osteoarthritis     hands     Osteoporosis        Past Surgical History:   Procedure Laterality Date     HYSTERECTOMY      for uterine prolapse       Family History   Problem Relation Age of Onset     Depression/Anxiety Son      Depression/Anxiety Son      alcohol abuse  age 24     Hypertension Mother      Hypertension Brother      DIABETES No family hx of      Breast Cancer No family hx of      Colon Cancer No family hx of      Prostate Cancer No family hx of      Other Cancer No family hx of        Social History     Social History     Marital Status:      Spouse Name: N/A     Number of Children: N/A     Years of Education: N/A     Social History Main Topics     Smoking status: Never Smoker      Smokeless tobacco: Never Used     Alcohol Use: Not on file     Drug Use: No     Sexual Activity: Not on file     Other Topics Concern     Not on file     Social History Narrative       Objective:   BP (!) 133/97  Pulse 83  Ht 1.499 m (4' 11\")  Wt 41.5 kg (91 " lb 9.6 oz)  BMI 18.5 kg/m2  Constitutional: Pleasant no acute cardiopulmonary distress.   HEENT: Mouth/Throat: Mucous membrane is moist.    Cardiovascular: RRR, S1, S2 normal.   Pulmonary/Chest: CTAB. No wheezing or rales.   Neurological: Alert and oriented.   Extremities: trace pedal edema on the right side. No lumbar spine tenderness.   Back: scoliosis/stoop posture. No lumbar/thoracic spine tenderness.   Psychological: appropriate mood and affect   Stand and go test more than 10 sec.     In House Labs:   A1C      5.9   8/31/2016    Routine: CBC done. TSH done WNL (8/2016) and PTH done WNL (7/2015), Protein electrophoresis and urinary bence-childress WNL (7/2015), ESR 14 (7/2015), TgA negative in 2010.  Dual energy x-ray absorptiometry results:      Region BMD T - score Z - score   L1-L2 0.634 g/cm  -4.4 -1.7             Neck Left 0.644 g/cm  -2.8 0.1   Total Left 0.627 g/cm  -3.0 -0.1             Neck Right 0.603 g/cm  -3.1 -0.2   Total Right 0.588 g/cm  -3.3 -0.4   Radius 33% 0.457 g/cm  -3.5 -0.2       Conclusions:     VFA scan was interpretable from T4 - L4. Using the semi-quantitative analysis of Genant (see reference #1), there were evidence for a grade 3 (severe) compression, fracture at T10, L3-L4, . Six point morphometry confirmed the presence and severity of these deformities.     If alternative etiologies for the presence of vertebral fractures are excluded, the diagnosis is consistent with osteoporosis.    I reviewed the images of the DEXA scan from Jan. As well as the VFA     Assessment/Treatment Plan:      Belia Sheets is a 87 year old year old female with Osteoporosis and fragility fracture (7/2015); diagnosed in 1999.  Treated with alendronate for 16 years and four months.  Off alendronate since October 2015 (over one year).      CBC done. TSH done WNL (8/2016) and PTH done WNL (7/2015), Protein electrophoresis and urinary bence-childress WNL (7/2015), ESR 14 (7/2015), TgA negative in 2010. Her risk  factor for osteoporosis are age and low body weight.  Repeat DEXA showed worsening findings with -4.4 T-score at lumbar area. VFA showing: < there were evidence for a grade 3 (severe) compression, fracture at T10, L3-L4, >    Given severity of her osteoporosis and fragility fracture (severe as documented above); needs aggressive treatment (Zolendronic acid, FORTEO or Denosumab are possible options). After looking at the evidence we started her on FORTEO about six months ago. She is tolerated that well and is managing her injections independently. FORTEO preferred because of an added benefit of preventing vertebral fractures.    She takes calcium/vitamin d 600-400; 1 tab PO BID. Discussed: BALANCED DIET, using walker all the time, home exercise program with PT and avoid heavy lifting.    Calcium and creatinine stable.  Will continue same plan and will have her back in three months for a follow up. Called patient and daughter and discussed the above results over the phone.      ENDO CALCIUM LABS-UMP Latest Ref Rng & Units 8/2/2017   CALCIUM 8.5 - 10.1 mg/dL 9.1   PHOSPHOROUS 2.5 - 4.5 mg/dL 3.5   ALBUMIN 3.4 - 5.0 g/dL 3.3 (L)   BUN 7 - 30 mg/dL 14   CREATININE 0.52 - 1.04 mg/dL 0.67   Vitamin d was 39 at last visit, results from today pending.     Plan:   Obtain labs today. Continue Vit D and Ca supplementation.   Continue Forteo   continue current management without any change.     Patient Instructions   Blood work today and we will contact you after the results.       I will contact the patient with the test results.  Return to clinic in 6 months.    Test and/or medications prescribed today:  Orders Placed This Encounter   Procedures     Vitamin D Deficiency (D3 Only)     Albumin level     Bone specific alk phosphatase     Calcium     Phosphorus     Creatinine     Urea nitrogen   Vitamin D level came back borderline low at 29.  In addition to the calcium and vitamin d combination she is taking; we will add vitamin  D 1000 IU daily. Sent a letter containing the following message(CC below).  Sent my chart message and left a message on her cell phone as well.   <The blood work results overall looks good except the vitamin D level which was borderline low.  In addition to the calcium and vitamin D combination tablet you are taking; please take additional vitamin D supplement. I recommend Vitamin D 1000 IU daily in addition to the calcium and vitamin D tablet you are currently taking.      I have sent a prescription to your pharmacy: Vitamin D 1000 IU daily for your convenience>    Patient seen and discussed with endocrinology attending Dr. Croft.     Saurabh Pittman MD  Endocrinology fellow   [phone]  Division of Endocrinology and Diabetes      Attending Note:     Patient was seen and examined with fellow Dr. Pittman    The note reflects our mutual findings and plan.     Eliazar Croft MD  [id number]  Endocrinology Service

## 2017-08-04 LAB — ALP BONE SERPL-MCNC: 14 UG/L

## 2017-08-08 ENCOUNTER — TELEPHONE (OUTPATIENT)
Dept: ENDOCRINOLOGY | Facility: CLINIC | Age: 82
End: 2017-08-08

## 2017-08-08 NOTE — TELEPHONE ENCOUNTER
Daughter left  saying have questions about 8/3/17 letter received discussing 8/2/17 labs, additional Vit D supplements. Asking Dr. Pittman call [facility] at [phone]. Sent to [employer]       Received the above message.  Called [facility] at the number provided and explained the rationale for the additional vitamin D supplement.    [facility] CALCIUM LABS-UMP Latest Ref Rng & Units 8/2/2017 3/9/2017   VITAMIN D DEFICIENCY SCREENING 20 - 75 ug/L 29      ENDO CALCIUM LABS-UMP Latest Ref Rng & Units 1/5/2017   VITAMIN D DEFICIENCY SCREENING 20 - 75 ug/L 39   Verbalized understanding. Will be picking up the vitamin D supplement OTC>   previously sent letter copied below.   <The blood work results overall looks good except the vitamin D level which was borderline low.  In addition to the calcium and vitamin D combination tablet you are taking; please take additional vitamin D supplement. I recommend Vitamin D 1000 IU daily in addition to the calcium and vitamin D tablet you are currently taking. >

## 2017-09-13 ENCOUNTER — OFFICE VISIT (OUTPATIENT)
Dept: FAMILY MEDICINE | Facility: CLINIC | Age: 82
End: 2017-09-13

## 2017-09-13 VITALS
TEMPERATURE: 98 F | OXYGEN SATURATION: 94 % | SYSTOLIC BLOOD PRESSURE: 105 MMHG | HEART RATE: 84 BPM | BODY MASS INDEX: 18.43 KG/M2 | HEIGHT: 59 IN | RESPIRATION RATE: 16 BRPM | DIASTOLIC BLOOD PRESSURE: 70 MMHG | WEIGHT: 91.4 LBS

## 2017-09-13 DIAGNOSIS — R29.6 RECURRENT FALLS: ICD-10-CM

## 2017-09-13 DIAGNOSIS — M80.00XD AGE-RELATED OSTEOPOROSIS WITH CURRENT PATHOLOGICAL FRACTURE WITH ROUTINE HEALING, SUBSEQUENT ENCOUNTER: Primary | ICD-10-CM

## 2017-09-13 DIAGNOSIS — R26.89 DECREASED MOBILITY: ICD-10-CM

## 2017-09-13 ASSESSMENT — ENCOUNTER SYMPTOMS
FATIGUE: 1
RESPIRATORY NEGATIVE: 1
ARTHRALGIAS: 1
BACK PAIN: 1
MYALGIAS: 1
APPETITE CHANGE: 0
FEVER: 0
ACTIVITY CHANGE: 1
WEAKNESS: 1
AGITATION: 0
DECREASED CONCENTRATION: 0
CARDIOVASCULAR NEGATIVE: 1
UNEXPECTED WEIGHT CHANGE: 0

## 2017-09-13 NOTE — PROGRESS NOTES
HPI:       Belia Sheets is a 87 year old who presents for the following  Patient presents with:  RECHECK: Follow up,Lab Results        Concern: Follow Up Osteoporosis, fractures, falls.    Pt continues to live in her apartment with minimal assistance.  No longer doing PT and feels as if she has become more unsteady on her feet.  Also thinks she is getting weaker.  No serious falls.  She is giving herself daily teriparatide injections without incident.    Reports no other new symptoms besides increased weakness and unsteadiness.  Maintaining appetite and weight.  Lab- results        Adherence and Exercise  Medication side effects: no  How often is a medication missed? Never  Exercise:PT 2-3 days/week for an average of 15-30 minutes          Problem, Medication and Allergy Lists were reviewed and are current.  Patient is an established patient of this clinic.         Review of Systems:   Review of Systems   Constitutional: Positive for activity change and fatigue. Negative for appetite change, fever and unexpected weight change.   Respiratory: Negative.    Cardiovascular: Negative.    Musculoskeletal: Positive for arthralgias, back pain, gait problem and myalgias.   Neurological: Positive for weakness.   Psychiatric/Behavioral: Negative for agitation and decreased concentration.             Physical Exam:   No data found.    There is no height or weight on file to calculate BMI.  Vitals were reviewed and were normal     Physical Exam   Constitutional:   Alert, pleasant.  Uses a 4-wheeled walker with seat.   Cardiovascular: Normal rate, regular rhythm and normal heart sounds.    Pulmonary/Chest: No respiratory distress. She has no wheezes. She has no rales.   Musculoskeletal:   + marked scoliosis, kyphosis.  + muscle wasting throughout  Wearing a chest binder  + moderate tenderness to palpation posteroinferior ribs   Neurological: She is alert.   Psychiatric: She has a normal mood and affect. Her behavior is  normal. Thought content normal.         Results:     Results from last visit:  Orders Only on 08/02/2017   Component Date Value Ref Range Status     Vitamin D Deficiency screening 08/02/2017 29  20 - 75 ug/L Final    Comment: Season, race, dietary intake, and treatment affect the concentration of   25-hydroxy-Vitamin D. Values may decrease during winter months and increase   during summer months. Values 20-29 ug/L may indicate Vitamin D insufficiency   and values <20 ug/L may indicate Vitamin D deficiency.   Vitamin D determination is routinely performed by an immunoassay specific for   25 hydroxyvitamin D3.  If an individual is on vitamin D2 (ergocalciferol)   supplementation, please specify 25 OH vitamin D2 and D3 level determination   by   LCMSMS test VITD23.       Albumin 08/02/2017 3.3* 3.4 - 5.0 g/dL Final     Bone Spec Alk Phosphatase 08/02/2017 14.0   Final    Comment: Unit: ug/L  (Note)  INTERPRETIVE INFORMATION: Bone Specific Alkaline Phosphatase   Premenopausal Female:    4.5 - 16.9 ug/L   Postmenopausal Female:   7.0 - 22.4 ug/L  INTERPRETIVE INFORMATION: Bone Specific Alkaline Phosphatase  Liver alkaline phosphatase can affect the measurement of  bone specific alkaline phosphatase in this assay. Each 100  U/L of liver alkaline phosphatase contributes an additional  2.5 to 5.8 ug/L to the bone specific alkaline phosphatase  result.  Performed by [facility],  [address] [place] [id number] [phone]  [email or web address], Satinder Arroyo MD, Lab. Director       Calcium 08/02/2017 9.1  8.5 - 10.1 mg/dL Final     Phosphorus 08/02/2017 3.5  2.5 - 4.5 mg/dL Final     Creatinine 08/02/2017 0.67  0.52 - 1.04 mg/dL Final     GFR Estimate 08/02/2017 84  >60 mL/min/1.7m2 Final    Non  GFR Calc     GFR Estimate If Black 08/02/2017   >60 mL/min/1.7m2 Final                    Value:>90   GFR Calc       Urea Nitrogen 08/02/2017 14  7 - 30 mg/dL Final     Assessment and Plan     Veryl  was seen today for recheck.    Diagnoses and all orders for this visit:    Age-related osteoporosis with current pathological fracture with routine healing, subsequent encounter - she was taking wrong dose of Vitamin D.  Clarified.  Now on Ca++/D 600mg/400 IU BID plus Vit D 1,000 IU daily for total of 1800 IU/d of Vit D.  Continue teriparatide.    Recurrent falls - doing well but I am concerned about reported decrease in strength and balance.    Decreased mobility - she is at increased risk of injurious falls and I will order another round of PT  -     PHYSICAL THERAPY REFERRAL - EXTERNAL      There are no discontinued medications.  Options for treatment and follow-up care were reviewed with the patient. Belia Sheets  engaged in the decision making process and verbalized understanding of the options discussed and agreed with the final plan.    Alejandro Sandhu MD

## 2017-09-13 NOTE — PATIENT INSTRUCTIONS
1.  Continue calcium-Vitamin D tablets 1 twice a day.    2.  In addition take one Vitamin D (1000 IU) tablet per day.    3.  Physical therapy course - repeat.

## 2017-09-13 NOTE — MR AVS SNAPSHOT
After Visit Summary   9/13/2017    Belia Sheets    MRN: [id number]           Patient Information     Date Of Birth          5/16/1930        Visit Information        Provider Department      9/13/2017 1:40 PM Alejandro Sandhu MD [facility]        Today's Diagnoses     Age-related osteoporosis with current pathological fracture with routine healing, subsequent encounter    -  1    Recurrent falls        Decreased mobility          Care Instructions    1.  Continue calcium-Vitamin D tablets 1 twice a day.    2.  In addition take one Vitamin D (1000 IU) tablet per day.    3.  Physical therapy course - repeat.              Follow-ups after your visit        Additional Services     PHYSICAL THERAPY REFERRAL - EXTERNAL       [facility]                  Your next 10 appointments already scheduled     Feb 07, 2018  3:15 PM CST   (Arrive by 3:00 PM)   RETURN ENDOCRINE with Saurabh Pittman MD   [facility] ([facility])    [address] [place] [address] [phone]              Who to contact     Please call your clinic at [phone] to:    Ask questions about your health    Make or cancel appointments    Discuss your medicines    Learn about your test results    Speak to your doctor   If you have compliments or concerns about an experience at your clinic, or if you wish to file a complaint, please contact [facility] Patient Relations at [phone] or email us at [email or web address]         Additional Information About Your Visit        MyChart Information     Hobobehart gives you secure access to your electronic health record. If you see a primary care provider, you can also send messages to your care team and make appointments. If you have questions, please call your primary care clinic.  If you do not have a primary care provider, please call [phone] and they  "will assist you.      Newtopia is an electronic gateway that provides easy, online access to your medical records. With Newtopia, you can request a clinic appointment, read your test results, renew a prescription or communicate with your care team.     To access your existing account, please contact your [facility] or call [phone] for assistance.        Care EveryWhere ID     This is your Care EveryWhere ID. This could be used by other organizations to access your [facility] medical records  [id number]        Your Vitals Were     Pulse Temperature Respirations Height Pulse Oximetry BMI (Body Mass Index)    84 98  F (36.7  C) (Oral) 16 4' 11\" (149.9 cm) 94% 18.46 kg/m2       Blood Pressure from Last 3 Encounters:   09/13/17 105/70   08/02/17 (!) 133/97   03/15/17 146/86    Weight from Last 3 Encounters:   09/13/17 91 lb 6.4 oz (41.5 kg)   08/02/17 91 lb 9.6 oz (41.5 kg)   03/15/17 92 lb 6.4 oz (41.9 kg)              We Performed the Following     PHYSICAL THERAPY REFERRAL - EXTERNAL        Primary Care Provider Office Phone # Fax #    Alejandro Sandhu [phone]       [address] [place] [address]        Equal Access to Services     ANUEL MARSHALL AH: Hadii shivam dominguez hadasho Soomaali, waaxda luqadaha, qaybta kaalmada adeegyada, ivana tyler . So [facility] [phone].    ATENCIÓN: Si habla español, tiene a benavides disposición servicios gratuitos de asistencia lingüística. Llame al [phone].    We comply with applicable federal civil rights laws and [place] laws. We do not discriminate on the basis of race, color, national origin, age, disability sex, sexual orientation or gender identity.            Thank you!     Thank you for choosing [facility]  for your care. Our goal is always to provide you with excellent care. Hearing back from our patients is one way we can continue to improve our services. Please take a few " minutes to complete the written survey that you may receive in the mail after your visit with us. Thank you!             Your Updated Medication List - Protect others around you: Learn how to safely use, store and throw away your medicines at www.disposemymeds.org.          This list is accurate as of: 9/13/17  2:03 PM.  Always use your most recent med list.                   Brand Name Dispense Instructions for use Diagnosis    acetaminophen 500 MG tablet    TYLENOL     Take 1,000 mg by mouth        alpha-d-galactosidase tablet      Take by mouth as needed for intestinal gas        aspirin 81 MG tablet      Take by mouth daily        calcium-vitamin D 600-400 MG-UNIT per tablet    CALTRATE     Take 1 tablet by mouth 2 times daily        cholecalciferol 1000 UNITS capsule    vitamin  -D    100 capsule    Take 1 capsule (1,000 Units) by mouth daily    Age-related osteoporosis with current pathological fracture with routine healing, subsequent encounter       CIPRO 250 MG tablet   Generic drug:  ciprofloxacin      Take 250 mg by mouth 2 times daily        loperamide 2 MG tablet    IMODIUM A-D     Take 2 mg by mouth 4 times daily as needed        OMEGA-3 FISH OIL PO      Take by mouth daily        order for DME     1 each    Equipment being ordered: Incentive spirometry    Closed fracture of multiple ribs with routine healing, unspecified laterality, subsequent encounter       sodium fluoride dental gel 1.1 % Gel topical gel    PREVIDENT     Apply to affected area At Bedtime        teriparatide (recombinant) 600 MCG/2.4ML Soln injection    FORTEO    6 mL    Inject 0.08 mLs (20 mcg) Subcutaneous daily    Age-related osteoporosis with current pathological fracture, initial encounter       UNKNOWN TO PATIENT      Vision formula Vit A, C & E / Lutein/Minerals        VITAMIN B 12 PO      Take by mouth daily

## 2017-09-20 DIAGNOSIS — R29.6 RECURRENT FALLS: Primary | ICD-10-CM

## 2017-09-26 ENCOUNTER — TELEPHONE (OUTPATIENT)
Dept: FAMILY MEDICINE | Facility: CLINIC | Age: 82
End: 2017-09-26

## 2017-09-26 NOTE — TELEPHONE ENCOUNTER
Number provided not valid. Unable to reach PT.    If PT calls back, please obtain callback number or transfer to an RN.    Sariah Vazquez RN

## 2017-09-26 NOTE — TELEPHONE ENCOUNTER
[facility] phone call message - order or referral request for patient:     Order or referral being requested: PT      Additional Comments: Altaf from [facility]  Called to request verbal order for PT and eval 2 times a week for 6 weeks and 1 time a week for 2 weeks     OK to leave a message on voice mail? Yes    Primary language: English      needed? No    Call taken on September 26, 2017 at 11:49 AM by Kadi Campos

## 2017-10-23 ENCOUNTER — APPOINTMENT (OUTPATIENT)
Dept: GENERAL RADIOLOGY | Facility: CLINIC | Age: 82
DRG: 070 | End: 2017-10-23
Attending: EMERGENCY MEDICINE
Payer: COMMERCIAL

## 2017-10-23 ENCOUNTER — ALLIED HEALTH/NURSE VISIT (OUTPATIENT)
Dept: NEUROLOGY | Facility: CLINIC | Age: 82
DRG: 070 | End: 2017-10-23
Attending: PSYCHIATRY & NEUROLOGY
Payer: COMMERCIAL

## 2017-10-23 ENCOUNTER — NURSE TRIAGE (OUTPATIENT)
Dept: NURSING | Facility: CLINIC | Age: 82
End: 2017-10-23

## 2017-10-23 ENCOUNTER — APPOINTMENT (OUTPATIENT)
Dept: CT IMAGING | Facility: CLINIC | Age: 82
DRG: 070 | End: 2017-10-23
Attending: EMERGENCY MEDICINE
Payer: COMMERCIAL

## 2017-10-23 ENCOUNTER — APPOINTMENT (OUTPATIENT)
Dept: MRI IMAGING | Facility: CLINIC | Age: 82
DRG: 070 | End: 2017-10-23
Attending: EMERGENCY MEDICINE
Payer: COMMERCIAL

## 2017-10-23 ENCOUNTER — HOSPITAL ENCOUNTER (INPATIENT)
Facility: CLINIC | Age: 82
LOS: 3 days | Discharge: HOME HEALTH | DRG: 070 | End: 2017-10-26
Attending: EMERGENCY MEDICINE | Admitting: PSYCHIATRY & NEUROLOGY
Payer: COMMERCIAL

## 2017-10-23 DIAGNOSIS — R47.89 WORD FINDING DIFFICULTY: ICD-10-CM

## 2017-10-23 DIAGNOSIS — R41.0 CONFUSION: ICD-10-CM

## 2017-10-23 DIAGNOSIS — I10 BENIGN ESSENTIAL HYPERTENSION: Primary | ICD-10-CM

## 2017-10-23 DIAGNOSIS — G93.40 ENCEPHALOPATHY: Primary | ICD-10-CM

## 2017-10-23 LAB
ALBUMIN SERPL-MCNC: 3.2 G/DL (ref 3.4–5)
ALBUMIN UR-MCNC: NEGATIVE MG/DL
ALP SERPL-CCNC: 54 U/L (ref 40–150)
ALT SERPL W P-5'-P-CCNC: 15 U/L (ref 0–50)
AMMONIA PLAS-SCNC: <10 UMOL/L (ref 10–50)
ANION GAP SERPL CALCULATED.3IONS-SCNC: 5 MMOL/L (ref 3–14)
APPEARANCE UR: CLEAR
AST SERPL W P-5'-P-CCNC: 17 U/L (ref 0–45)
BASOPHILS # BLD AUTO: 0 10E9/L (ref 0–0.2)
BASOPHILS NFR BLD AUTO: 0.7 %
BILIRUB SERPL-MCNC: 0.4 MG/DL (ref 0.2–1.3)
BILIRUB UR QL STRIP: NEGATIVE
BUN SERPL-MCNC: 22 MG/DL (ref 7–30)
CA-I BLD-MCNC: 5.2 MG/DL (ref 4.4–5.2)
CALCIUM SERPL-MCNC: 9.8 MG/DL (ref 8.5–10.1)
CHLORIDE SERPL-SCNC: 102 MMOL/L (ref 94–109)
CO2 SERPL-SCNC: 32 MMOL/L (ref 20–32)
COLOR UR AUTO: ABNORMAL
CREAT SERPL-MCNC: 0.71 MG/DL (ref 0.52–1.04)
CREAT SERPL-MCNC: 0.75 MG/DL (ref 0.52–1.04)
DIFFERENTIAL METHOD BLD: NORMAL
EOSINOPHIL # BLD AUTO: 0.3 10E9/L (ref 0–0.7)
EOSINOPHIL NFR BLD AUTO: 5.4 %
ERYTHROCYTE [DISTWIDTH] IN BLOOD BY AUTOMATED COUNT: 13.2 % (ref 10–15)
FOLATE SERPL-MCNC: 16.7 NG/ML
GFR SERPL CREATININE-BSD FRML MDRD: 73 ML/MIN/1.7M2
GFR SERPL CREATININE-BSD FRML MDRD: 78 ML/MIN/1.7M2
GLUCOSE SERPL-MCNC: 85 MG/DL (ref 70–99)
GLUCOSE UR STRIP-MCNC: NEGATIVE MG/DL
HCT VFR BLD AUTO: 38.2 % (ref 35–47)
HGB BLD-MCNC: 12.5 G/DL (ref 11.7–15.7)
HGB UR QL STRIP: NEGATIVE
IMM GRANULOCYTES # BLD: 0 10E9/L (ref 0–0.4)
IMM GRANULOCYTES NFR BLD: 0.2 %
KETONES UR STRIP-MCNC: NEGATIVE MG/DL
LACTATE BLD-SCNC: 1.1 MMOL/L (ref 0.7–2)
LEUKOCYTE ESTERASE UR QL STRIP: NEGATIVE
LIPASE SERPL-CCNC: 195 U/L (ref 73–393)
LYMPHOCYTES # BLD AUTO: 1.5 10E9/L (ref 0.8–5.3)
LYMPHOCYTES NFR BLD AUTO: 27.4 %
MCH RBC QN AUTO: 29.6 PG (ref 26.5–33)
MCHC RBC AUTO-ENTMCNC: 32.7 G/DL (ref 31.5–36.5)
MCV RBC AUTO: 91 FL (ref 78–100)
MONOCYTES # BLD AUTO: 0.4 10E9/L (ref 0–1.3)
MONOCYTES NFR BLD AUTO: 7.4 %
NEUTROPHILS # BLD AUTO: 3.3 10E9/L (ref 1.6–8.3)
NEUTROPHILS NFR BLD AUTO: 58.9 %
NITRATE UR QL: NEGATIVE
NRBC # BLD AUTO: 0 10*3/UL
NRBC BLD AUTO-RTO: 0 /100
PH UR STRIP: 7.5 PH (ref 5–7)
PLATELET # BLD AUTO: 200 10E9/L (ref 150–450)
PLATELET # BLD AUTO: 215 10E9/L (ref 150–450)
POTASSIUM SERPL-SCNC: 4 MMOL/L (ref 3.4–5.3)
PROT SERPL-MCNC: 6.6 G/DL (ref 6.8–8.8)
RBC # BLD AUTO: 4.22 10E12/L (ref 3.8–5.2)
SODIUM SERPL-SCNC: 139 MMOL/L (ref 133–144)
SOURCE: ABNORMAL
SP GR UR STRIP: 1 (ref 1–1.03)
T3FREE SERPL-MCNC: 3.1 PG/ML (ref 2.3–4.2)
T4 FREE SERPL-MCNC: 1.27 NG/DL (ref 0.76–1.46)
TROPONIN I SERPL-MCNC: <0.015 UG/L (ref 0–0.04)
UROBILINOGEN UR STRIP-MCNC: NORMAL MG/DL (ref 0–2)
VIT B12 SERPL-MCNC: 952 PG/ML (ref 193–986)
WBC # BLD AUTO: 5.5 10E9/L (ref 4–11)

## 2017-10-23 PROCEDURE — 70549 MR ANGIOGRAPH NECK W/O&W/DYE: CPT

## 2017-10-23 PROCEDURE — 83605 ASSAY OF LACTIC ACID: CPT | Performed by: EMERGENCY MEDICINE

## 2017-10-23 PROCEDURE — 82330 ASSAY OF CALCIUM: CPT | Performed by: EMERGENCY MEDICINE

## 2017-10-23 PROCEDURE — 12000008 ZZH R&B INTERMEDIATE UMMC

## 2017-10-23 PROCEDURE — 80053 COMPREHEN METABOLIC PANEL: CPT | Performed by: EMERGENCY MEDICINE

## 2017-10-23 PROCEDURE — 70450 CT HEAD/BRAIN W/O DYE: CPT

## 2017-10-23 PROCEDURE — 84425 ASSAY OF VITAMIN B-1: CPT | Performed by: PSYCHIATRY & NEUROLOGY

## 2017-10-23 PROCEDURE — 85025 COMPLETE CBC W/AUTO DIFF WBC: CPT | Performed by: EMERGENCY MEDICINE

## 2017-10-23 PROCEDURE — 36415 COLL VENOUS BLD VENIPUNCTURE: CPT | Performed by: PSYCHIATRY & NEUROLOGY

## 2017-10-23 PROCEDURE — 84481 FREE ASSAY (FT-3): CPT | Performed by: PSYCHIATRY & NEUROLOGY

## 2017-10-23 PROCEDURE — 70553 MRI BRAIN STEM W/O & W/DYE: CPT

## 2017-10-23 PROCEDURE — A9585 GADOBUTROL INJECTION: HCPCS | Performed by: EMERGENCY MEDICINE

## 2017-10-23 PROCEDURE — 84484 ASSAY OF TROPONIN QUANT: CPT | Performed by: EMERGENCY MEDICINE

## 2017-10-23 PROCEDURE — 81003 URINALYSIS AUTO W/O SCOPE: CPT | Performed by: EMERGENCY MEDICINE

## 2017-10-23 PROCEDURE — 82565 ASSAY OF CREATININE: CPT | Performed by: PSYCHIATRY & NEUROLOGY

## 2017-10-23 PROCEDURE — 83921 ORGANIC ACID SINGLE QUANT: CPT | Performed by: PSYCHIATRY & NEUROLOGY

## 2017-10-23 PROCEDURE — 84439 ASSAY OF FREE THYROXINE: CPT | Performed by: PSYCHIATRY & NEUROLOGY

## 2017-10-23 PROCEDURE — 82140 ASSAY OF AMMONIA: CPT | Performed by: PSYCHIATRY & NEUROLOGY

## 2017-10-23 PROCEDURE — 82746 ASSAY OF FOLIC ACID SERUM: CPT | Performed by: PSYCHIATRY & NEUROLOGY

## 2017-10-23 PROCEDURE — 99285 EMERGENCY DEPT VISIT HI MDM: CPT | Mod: Z6 | Performed by: EMERGENCY MEDICINE

## 2017-10-23 PROCEDURE — 85049 AUTOMATED PLATELET COUNT: CPT | Performed by: PSYCHIATRY & NEUROLOGY

## 2017-10-23 PROCEDURE — 25000128 H RX IP 250 OP 636: Performed by: EMERGENCY MEDICINE

## 2017-10-23 PROCEDURE — 82607 VITAMIN B-12: CPT | Performed by: PSYCHIATRY & NEUROLOGY

## 2017-10-23 PROCEDURE — 93005 ELECTROCARDIOGRAM TRACING: CPT

## 2017-10-23 PROCEDURE — 83690 ASSAY OF LIPASE: CPT | Performed by: EMERGENCY MEDICINE

## 2017-10-23 PROCEDURE — 99285 EMERGENCY DEPT VISIT HI MDM: CPT | Mod: 25

## 2017-10-23 PROCEDURE — 95951 ZZHC EEG VIDEO < 12 HR: CPT | Mod: 52,ZF

## 2017-10-23 PROCEDURE — 71020 XR CHEST 2 VW: CPT

## 2017-10-23 RX ORDER — PROCHLORPERAZINE MALEATE 5 MG
5 TABLET ORAL EVERY 6 HOURS PRN
Status: DISCONTINUED | OUTPATIENT
Start: 2017-10-23 | End: 2017-10-26 | Stop reason: HOSPADM

## 2017-10-23 RX ORDER — ASPIRIN 81 MG/1
81 TABLET, CHEWABLE ORAL DAILY
Status: DISCONTINUED | OUTPATIENT
Start: 2017-10-24 | End: 2017-10-26 | Stop reason: HOSPADM

## 2017-10-23 RX ORDER — BISACODYL 10 MG
10 SUPPOSITORY, RECTAL RECTAL DAILY PRN
Status: DISCONTINUED | OUTPATIENT
Start: 2017-10-23 | End: 2017-10-26 | Stop reason: HOSPADM

## 2017-10-23 RX ORDER — ONDANSETRON 4 MG/1
4 TABLET, ORALLY DISINTEGRATING ORAL EVERY 6 HOURS PRN
Status: DISCONTINUED | OUTPATIENT
Start: 2017-10-23 | End: 2017-10-26 | Stop reason: HOSPADM

## 2017-10-23 RX ORDER — SODIUM FLUORIDE 5 MG/G
GEL, DENTIFRICE DENTAL AT BEDTIME
Status: DISCONTINUED | OUTPATIENT
Start: 2017-10-23 | End: 2017-10-23 | Stop reason: CLARIF

## 2017-10-23 RX ORDER — ONDANSETRON 2 MG/ML
4 INJECTION INTRAMUSCULAR; INTRAVENOUS EVERY 6 HOURS PRN
Status: DISCONTINUED | OUTPATIENT
Start: 2017-10-23 | End: 2017-10-26 | Stop reason: HOSPADM

## 2017-10-23 RX ORDER — AMOXICILLIN 250 MG
1-2 CAPSULE ORAL 2 TIMES DAILY PRN
Status: DISCONTINUED | OUTPATIENT
Start: 2017-10-23 | End: 2017-10-26 | Stop reason: HOSPADM

## 2017-10-23 RX ORDER — THIAMINE HYDROCHLORIDE 100 MG/ML
100 INJECTION, SOLUTION INTRAMUSCULAR; INTRAVENOUS DAILY
Status: DISCONTINUED | OUTPATIENT
Start: 2017-10-24 | End: 2017-10-23

## 2017-10-23 RX ORDER — PROCHLORPERAZINE 25 MG
12.5 SUPPOSITORY, RECTAL RECTAL EVERY 12 HOURS PRN
Status: DISCONTINUED | OUTPATIENT
Start: 2017-10-23 | End: 2017-10-26 | Stop reason: HOSPADM

## 2017-10-23 RX ORDER — AMOXICILLIN 250 MG
1-2 CAPSULE ORAL 2 TIMES DAILY
Status: DISCONTINUED | OUTPATIENT
Start: 2017-10-23 | End: 2017-10-26 | Stop reason: HOSPADM

## 2017-10-23 RX ORDER — CHLORAL HYDRATE 500 MG
1000 CAPSULE ORAL DAILY
Status: DISCONTINUED | OUTPATIENT
Start: 2017-10-24 | End: 2017-10-26 | Stop reason: HOSPADM

## 2017-10-23 RX ORDER — UBIDECARENONE 75 MG
100 CAPSULE ORAL DAILY
Status: DISCONTINUED | OUTPATIENT
Start: 2017-10-24 | End: 2017-10-26 | Stop reason: HOSPADM

## 2017-10-23 RX ORDER — THIAMINE HYDROCHLORIDE 100 MG/ML
100 INJECTION, SOLUTION INTRAMUSCULAR; INTRAVENOUS DAILY
Status: DISCONTINUED | OUTPATIENT
Start: 2017-10-23 | End: 2017-10-26 | Stop reason: HOSPADM

## 2017-10-23 RX ORDER — GADOBUTROL 604.72 MG/ML
7.5 INJECTION INTRAVENOUS ONCE
Status: COMPLETED | OUTPATIENT
Start: 2017-10-23 | End: 2017-10-23

## 2017-10-23 RX ORDER — NALOXONE HYDROCHLORIDE 0.4 MG/ML
.1-.4 INJECTION, SOLUTION INTRAMUSCULAR; INTRAVENOUS; SUBCUTANEOUS
Status: DISCONTINUED | OUTPATIENT
Start: 2017-10-23 | End: 2017-10-26 | Stop reason: HOSPADM

## 2017-10-23 RX ORDER — HYDRALAZINE HYDROCHLORIDE 20 MG/ML
10 INJECTION INTRAMUSCULAR; INTRAVENOUS EVERY 4 HOURS PRN
Status: DISCONTINUED | OUTPATIENT
Start: 2017-10-23 | End: 2017-10-26 | Stop reason: HOSPADM

## 2017-10-23 RX ADMIN — GADOBUTROL 5 ML: 604.72 INJECTION INTRAVENOUS at 17:19

## 2017-10-23 ASSESSMENT — ACTIVITIES OF DAILY LIVING (ADL)
COGNITION: 0 - NO COGNITION ISSUES REPORTED
DRESS: 0-->INDEPENDENT
WHICH_OF_THE_ABOVE_FUNCTIONAL_RISKS_HAD_A_RECENT_ONSET_OR_CHANGE?: COGNITION
AMBULATION: 1-->ASSISTIVE EQUIPMENT
SWALLOWING: 0-->SWALLOWS FOODS/LIQUIDS WITHOUT DIFFICULTY
TOILETING: 1-->ASSISTIVE EQUIPMENT
TRANSFERRING: 1-->ASSISTIVE EQUIPMENT
RETIRED_COMMUNICATION: 0-->UNDERSTANDS/COMMUNICATES WITHOUT DIFFICULTY
RETIRED_EATING: 0-->INDEPENDENT
BATHING: 0-->INDEPENDENT
FALL_HISTORY_WITHIN_LAST_SIX_MONTHS: NO

## 2017-10-23 ASSESSMENT — VISUAL ACUITY: OU: NORMAL ACUITY

## 2017-10-23 ASSESSMENT — ENCOUNTER SYMPTOMS
CONFUSION: 1
SPEECH DIFFICULTY: 1

## 2017-10-23 NOTE — MR AVS SNAPSHOT
After Visit Summary   10/23/2017    Belia Sheets    MRN: [id number]           Patient Information     Date Of Birth          5/16/1930        Visit Information        Provider Department      10/23/2017 10:00 PM [facility] EEG TECH 4 [facility]        Today's Diagnoses     Encephalopathy    -  1       Follow-ups after your visit        Your next 10 appointments already scheduled     Oct 24, 2017  7:00 AM CDT   24 Hour Video Visit with [facility] EEG TECH 4   [facility] ([facility])    [facility]  [address] [facility] [address]   [phone]           Kennard: Your appointment is scheduled at [facility]. [address] [place] [address]            Oct 25, 2017  4:20 PM CDT   Return Visit with Alejandro Sandhu MD   [facility] ([facility])    [address] [place] [address],  [address]  [facility] [address]   [phone]            Feb 07, 2018  3:15 PM CST   (Arrive by 3:00 PM)   RETURN ENDOCRINE with Saurabh Pittman MD   [facility] ([facility])    [address] [facility]  [address] [facility] [id number] [phone]              Future tests that were ordered for you today     Open Standing Orders        Priority Remaining Interval Expires Ordered    Platelet count STAT 12/12 EVERY THREE DAYS  10/23/2017    Creatinine STAT 12/12 EVERY THREE DAYS  10/23/2017    Basic metabolic panel Routine 1/1 AM DRAW  10/23/2017    CBC with platelets Routine 1/1 AM DRAW  10/23/2017    Notify provider STAT [id number] PRN  10/23/2017    Activity: Up with assist STAT [id number] PRN  10/23/2017            Who to contact     Please call your clinic at [phone] to:    Ask questions about your health    Make or cancel appointments    Discuss your medicines    Learn about your test results    Speak to your doctor   If you have compliments or concerns about an experience at your  clinic, or if you wish to file a complaint, please contact [facility] Patient Relations at [phone] or email us at [email or web address]         Additional Information About Your Visit        MyChart Information     UpCloohart gives you secure access to your electronic health record. If you see a primary care provider, you can also send messages to your care team and make appointments. If you have questions, please call your primary care clinic.  If you do not have a primary care provider, please call [phone] and they will assist you.      Viki is an electronic gateway that provides easy, online access to your medical records. With Viki, you can request a clinic appointment, read your test results, renew a prescription or communicate with your care team.     To access your existing account, please contact your [facility] or call [phone] for assistance.        Care EveryWhere ID     This is your Care EveryWhere ID. This could be used by other organizations to access your [place] medical records  [id number]         Blood Pressure from Last 3 Encounters:   10/23/17 (!) 171/106   09/13/17 105/70   08/02/17 (!) 133/97    Weight from Last 3 Encounters:   10/23/17 41.5 kg (91 lb 9.6 oz)   09/13/17 41.5 kg (91 lb 6.4 oz)   08/02/17 41.5 kg (91 lb 9.6 oz)              Today, you had the following     No orders found for display         Today's Medication Changes      Notice     This visit is during an admission. Changes to the med list made in this visit will be reflected in the After Visit Summary of the admission.             Primary Care Provider Office Phone # Fax #    Alejandro Sandhu [phone]       [address] [address]        Equal Access to Services     ANUEL MARSHALL : alejandra Albarado qaybta kaalmada adeegyada, waxay idiin hayaan adeeg kharash la'aan ah. So [facility]  [phone].    ATENCIÓN: Si habla pat, tiene a benavides disposición servicios gratuitos de asistencia lingüística. Seven al [phone].    We comply with applicable federal civil rights laws and [place] laws. We do not discriminate on the basis of race, color, national origin, age, disability, sex, sexual orientation, or gender identity.            Thank you!     Thank you for choosing [facility]  for your care. Our goal is always to provide you with excellent care. Hearing back from our patients is one way we can continue to improve our services. Please take a few minutes to complete the written survey that you may receive in the mail after your visit with us. Thank you!             Your Updated Medication List - Protect others around you: Learn how to safely use, store and throw away your medicines at www.disposemymeds.org.      Notice     This visit is during an admission. Changes to the med list made in this visit will be reflected in the After Visit Summary of the admission.

## 2017-10-23 NOTE — IP AVS SNAPSHOT
[address] [address]    Phone:  [phone]                                       After Visit Summary   10/23/2017    Belia Sheets    MRN: [id number]           After Visit Summary Signature Page     I have received my discharge instructions, and my questions have been answered. I have discussed any challenges I see with this plan with the nurse or doctor.    ..........................................................................................................................................  Patient/Patient Representative Signature      ..........................................................................................................................................  Patient Representative Print Name and Relationship to Patient    ..................................................               ................................................  Date                                            Time    ..........................................................................................................................................  Reviewed by Signature/Title    ...................................................              ..............................................  Date                                                            Time

## 2017-10-23 NOTE — ED PROVIDER NOTES
"    [place] EMERGENCY DEPARTMENT ([facility])  10/23/17 ED 22 11:26 AM   History     Chief Complaint   Patient presents with     Altered Mental Status     The history is provided by the patient and medical records.     Belia Sheets is a 87 year old female who presents with confusion and speech difficulties that has worsened over the past few days. 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 She was brought in by son for acute change in mental status with confusion and difficulty with word finding. She lives by herself in a senior facility with ambulates with walker. She s able to cook and take care of herself at baseline. Son notes that over the past couple of days she has had marked increase in confusion, hallucinating seeing water or dirt on her hands and difficulty with finding words. She seems to have difficulty expressing herself verbally, seems to forget what she is going to say. She states \"I just can't talk. I think I can say something else but I don't know exactly what it is going wrong with me.\" Son states she had mild confusion in general but is now more pronounced. He states she hasn t been sleeping or eating lately.  No fevers or cough. She denies pain, headaches, chest pain, abdominal pain.  Son states a week ago she would mostly be talking normally. She has had prior falls and rib fractures. He has not observed any changes in ambulation. Patient states  I don t know what's wrong with me.      I have reviewed the Medications, Allergies, Past Medical and Surgical History, and Social History in the Epic system.    Review of Systems   Unable to perform ROS: Mental status change   Neurological: Positive for speech difficulty.   Psychiatric/Behavioral: Positive for confusion.       Physical Exam   BP: (!) 185/101  Pulse: 65  Temp: 97.9  F (36.6  C)  Resp: 16  Weight: 41.5 kg (91 lb 9.6 oz)  SpO2: 97 %      Physical " Exam  Exam:  Constitutional: healthy, alert and no distress  Head: Normocephalic. No masses, lesions, tenderness or abnormalities  Neck: Neck supple. No adenopathy. Thyroid symmetric, normal size,, Carotids without bruits.  ENT: ENT exam normal, no neck nodes or sinus tenderness  Cardiovascular: negative, PMI normal. No lifts, heaves, or thrills. RRR. No murmurs, clicks gallops or rub  Respiratory: negative, Percussion normal. Good diaphragmatic excursion. Lungs clear  Gastrointestinal: Abdomen soft, non-tender. BS normal. No masses, organomegaly  : Deferred  Musculoskeletal: extremities normal- no gross deformities noted, and normal muscle tone  Skin: no suspicious lesions or rashes  Neurologic: Some word finding difficulties however able to speak in full sentences and Reflexes normal and symmetric. Sensation grossly WNL.  Psychiatric: mentation appears normal and affect normal/bright  Hematologic/Lymphatic/Immunologic: Normal cervical lymph nodes       ED Course     ED Course     Procedures               Labs Ordered and Resulted from Time of ED Arrival Up to the Time of Departure from the ED   COMPREHENSIVE METABOLIC PANEL - Abnormal; Notable for the following:        Result Value    Albumin 3.2 (*)     Protein Total 6.6 (*)     All other components within normal limits   UA MACROSCOPIC WITH REFLEX TO MICRO AND CULTURE - Abnormal; Notable for the following:     pH Urine 7.5 (*)     All other components within normal limits   CBC WITH PLATELETS DIFFERENTIAL   LIPASE   LACTIC ACID WHOLE BLOOD   TROPONIN I   CALCIUM IONIZED WHOLE BLOOD           CT Head w/o Contrast (Final result) Result time: 10/23/17 12:21:00     Final result by Rika Burrows MD (10/23/17 12:21:00)     Impression:     Impression:   1. No acute intracranial pathology.  2. Moderate generalized parenchymal volume loss and moderate to  advanced chronic small vessel ischemic disease.    RIKA BURROWS MD     Narrative:     CT HEAD W/O  CONTRAST 10/23/2017 12:12 PM    Provided History: change iN MS    Comparison: None available.    Technique: Using multidetector thin collimation helical acquisition  technique, axial, coronal and sagittal CT images from the skull base  to the vertex were obtained without intravenous contrast.     Findings:    No intracranial hemorrhage, mass effect, midline shift or abnormal  extra axial fluid flexion.    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 Chronic lacunar infarct within the right caudate head.  No acute loss of gray-white matter differentiation. Ventricles are not  enlarged out of proportion to the cerebral sulci.    Trace polypoid paranasal sinus mucosal thickening. Mastoid air cells  are clear.                 XR Chest 2 Views (Final result) Result time: 10/23/17 13:34:10     Final result by Willow Sneed MD (10/23/17 13:34:10)     Impression:     Impression: No evidence for focal airway disease/pneumonia.    I have personally reviewed the examination and initial interpretation  and I agree with the findings.    WILLOW SNEED MD     Narrative:     Exam: XR CHEST 2 VW, 10/23/2017 11:56 AM    Indication: change in MS    Comparison: Chest x-ray on February 8, 2017    Findings:   PA and lateral 2 view of the chest. Stable healed ribs fracture of the  right lateral chest. No focal opacity to suggest consolidation. No  pneumothorax or pleural effusion. Pulmonary vasculature is  non-prominent. Cardiac silhouette within normal limits. No evidence  for pneumothorax.       Consults  Neurology: Called (10/23/17 0619)    Assessments & Plan (with Medical Decision Making)       This is a 87-year-old female with history of calcium deposition disease and now she is having this episode of 3-4 days of confusion, hallucinations, and change in mental status and some word finding difficulties per her son and getting worse.  Patient herself is able to talk except she does  have some word finding difficulties.  She is able to speak in full sentences however has some challenges finding the right words.  Patient is alert and oriented ×3.  However, she has some difficulty stating that this [facility].  Patient denies any complaint at this time.  No headache no numbness tingling or weakness.  Patient denies any chest pain or shortness of breath or cough.  Unclear etiology of her symptoms and her physical exam.  Possibilities include occult infection such as urinary tract infection or pneumonia versus N imbalance such as hyponatremia or hypoglycemia liver dysfunction leading to hyperammonemia or stroke versus TIA.  Lab work, urinalysis, head CT are pending at this time.    Will admit for further work up for TIA vs CVA; no acute findings for intervene at this time.    I have reviewed the nursing notes.    I have reviewed the findings, diagnosis, plan and need for follow up with the patient.    Discharge Medication List as of 10/26/2017 12:43 PM      START taking these medications    Details   lisinopril (PRINIVIL/ZESTRIL) 10 MG tablet Take 1 tablet (10 mg) by mouth daily, Disp-30 tablet, R-3, E-Prescribe      Thiamine HCl 50 MG CAPS Take 1 capsule by mouth daily, Disp-60 capsule, R-3, E-Prescribe             Final diagnoses:   Confusion   Word finding difficulty   Mendy TONY, am serving as a trained medical scribe to document services personally performed by Thomas Storey MD, based on the provider's statements to me on October 23, 2017.  This document has been checked and approved by the attending provider.    Pool TONY Chung MD, was physically present and have reviewed and verified the accuracy of this note documented by Mendy Morrissey medical scribe.       10/23/2017   [facility], EMERGENCY DEPARTMENT     Pool Guzman MD  11/08/17 0825

## 2017-10-23 NOTE — PROGRESS NOTES
Social Work: Assessment with Discharge Plan    Patient Name:  Belia Sheets  :  1930  Age:  87 year old  MRN:  [id number]  Risk/Complexity Score:     Completed assessment with:  Pt, ED MD and chart review    Presenting Information   Reason for Referral:  Discharge plan  Date of Intake:  2017  Referral Source:  Nurse  Decision Maker:  Pt  Alternate Decision Maker:  Daughter Lubna [phone] and son Delmar [phone] per NOK. Pt is DNI/DNR.  Health Care Directive:  Copy in Chart- POLST  Living Situation:  Senior Living; able to add a la carte services as needed; has been living independently  Previous Functional Status:  Independent  Patient and family understanding of hospitalization:  Pt states she is seeing things others aren't and that she can't remember words  Cultural/Language/Spiritual Considerations:  English speaking  Adjustment to Illness:  Pt is pleasantly and calmly hallucinating, she looks around that the stimuli she is seeing    Physical Health  Reason for Admission:  No diagnosis found.  Services Needed/Recommended:  Other:  Increasing services at senior living from independent to assisted    Mental Health/Chemical Dependency  Diagnosis:  Currently confused and hallucinating  Support/Services in Place:  None identified.  Services Needed/Recommended:  None identified.    Support System  Significant relationship at present time:  Pt's son Delmar is with her in the ED   Family of origin is available for support:  Daughter main family contact; Delmar able to assist until he leaves the country in two weeks. He will be gone until April.  Other support available: Able to increase services at senior living complex  Gaps in support system:  Not currently receiving assistive services  Patient is caregiver to:  None     Provider Information   Primary Care Physician:  Alejandro Sandhu   [phone]   Clinic:  [facility]  [place] [address] / [facility] [address]      :  None    Financial   Income  Source:  Retired  Financial Concerns:  None. Son reports financially affording the increase in services will not be a problem.  Insurance:    Payor/Plan Subscriber Name Rel Member # Group #   [employer] - [employer]  [id number] [facility]      [address]       Discharge Plan   Patient and family discharge goal:  To return to pt's apt to work on more supportive services outpatient  Provided education on discharge plan:  YES  Patient agreeable to discharge plan:  YES  A list of Medicare Certified Facilities was provided to the patient and/or family to encourage patient choice. Patient's choices for facility are:  N/A  Will NH provide Skilled rehabilitation or complex medical:  YES  General information regarding anticipated insurance coverage and possible out of pocket cost was discussed. Patient and patient's family are aware patient may incur the cost of transportation to the facility, pending insurance payment: YES  Barriers to discharge:  Pending medical clearance    Discharge Recommendations   Anticipated Disposition:  Increasing services at [facility] from independent living to assisted living  Transportation Needs:  Family:  Son able to drive home at discharge  Name of Transportation Company and Phone:  [place] [phone]    Additional comments   Glenn Celaya is a 87 year old woman who presents to the ED endorsing difficulty with word finding and visual hallucinations including dirt on her hands. Constance lives at [facility] and is able to increase assistive living services. Son Delmar feels comfortable setting this up with his sister Lubna and [facility] staff. Delmar will be leaving the country in 2 weeks and wont return until April. During his time abroad, he will have access to the same phone number. Daughter Lubna should be main contact, however. [place] lives in [place]. Delmar reports to need no assistance from  to up the services with [facility].  There are no financial concerns increasing services. In current state, [facility] is open to increased services. Son Delmar can stay with mother for the next day or so until services in place. SW will remain available if further needs arise.    Patrica COFFEY, [facility]  ED   ED Pager: [phone]  On-call/After hours Pager: [phone]

## 2017-10-23 NOTE — ED NOTES
Pt presents to ed with her son. Reports worsening confusion, word finding trouble and hallucinations.

## 2017-10-23 NOTE — IP AVS SNAPSHOT
MRN:[id number]                      After Visit Summary   10/23/2017    Belia Sheets    MRN: [id number]           Thank you!     Thank you for choosing [facility] for your care. Our goal is always to provide you with excellent care. Hearing back from our patients is one way we can continue to improve our services. Please take a few minutes to complete the written survey that you may receive in the mail after you visit with us. Thank you!        Patient Information     Date Of Birth          5/16/1930        Designated Caregiver       Most Recent Value    Caregiver    Will someone help with your care after discharge? yes    Name of designated caregiver staff at independent/assisted living    Phone number of caregiver [phone]    Caregiver address [address]      About your hospital stay     You were admitted on:  October 23, 2017 You last received care in the:  [address] [facility]    You were discharged on:  October 26, 2017        Reason for your hospital stay       You were hospitalized for word finding difficulty and hallucinations.                  Who to Call     For medical emergencies, please call 911.  For non-urgent questions about your medical care, please call your primary care provider or clinic, [phone]          Attending Provider     Provider Specialty    Pool Guzman MD Emergency Medicine    [facility], Homer Mcqueen MD Neurology       Primary Care Provider Office Phone # Fax #    Alejandro Sandhu [phone]      After Care Instructions     Activity       Your activity upon discharge: activity as tolerated            Diet       Follow this diet upon discharge: Orders Placed This Encounter      Snacks/Supplements Adult: Ensure Plus (Adult); Between Meals      Advance Diet as Tolerated: Regular Diet Adult; Vegetarian Diet            Discharge Instructions       Please seek immediate medical attention for more hallucinations or  confusion.  Please talk to your facility about having daily safety checks performed.  Please try to avoid exotic foods, and try to maintain a healthy diet.                  Follow-up Appointments     Adult [facility] Follow-up and recommended labs and tests       Follow up with primary care provider, Alejandro Sandhu, within 7 days to evaluate medication change and for hospital follow- up.  The following labs/tests are recommended: CBC, BMP.      Please schedule your appointment with Dr. Pulido in Neurology    Appointments on [place] and/or [facility] (with [facility] or [facility] provider or service). Call [phone] if you haven't heard regarding these appointments within 7 days of discharge.                  Your next 10 appointments already scheduled     Nov 01, 2017  1:00 PM CDT   Return Visit with Alejandro Sandhu MD   [facility] ([facility])    [address] [place] [address],  [address]  [facility] [address]   [phone]            Feb 07, 2018  3:15 PM CST   (Arrive by 3:00 PM)   RETURN ENDOCRINE with Saurabh Pittman MD   [facility]ology ([facility])    [address] [place] [address]  3rd Floor  [facility] [address] [phone]              Additional Services     Neurology Adult Referral       Your provider has referred you for the following:   Consult at [facility]: Neurology Clinic - [place] [phone]   [email or web address]  General Neurology with Dr. Pulido    Please be aware that coverage of these services is subject to the terms and limitations of your health insurance plan.  Call member services at your health plan with any benefit or coverage questions.      Please bring the following with you to your appointment:    (1) Any X-Rays, CTs or MRIs which have been performed.  Contact the facility where they were done to arrange for  prior to your scheduled appointment.    (2) List of current medications  (3) This  referral request   (4) Any documents/labs given to you for this referral                  Pending Results     Date and Time Order Name Status Description    10/25/2017 1000 Send outs misc test In process     10/25/2017 1000 [place] Miscellaneous Test In process     10/25/2017 0001 Angiotensin converting enzyme In process     10/24/2017 1428 Angiotensin Converting Enzyme CSF: Tube 2 In process     10/24/2017 1422 Oligoclonal banding CSF Tube 3 and Blood In process     10/24/2017 1422 1433 Protein CSF: Tube 3 In process     10/24/2017 1422 JON Virus by PCR In process     10/24/2017 1422 CSF Culture Aerobic Bacterial Tube 2 Preliminary             Statement of Approval     Ordered          10/26/17 1240  I have reviewed and agree with all the recommendations and orders detailed in this document.  EFFECTIVE NOW     Approved and electronically signed by:  Arun Ortiz MD             Admission Information     Date & Time Provider Department Dept. Phone    10/23/2017 Homer Pulido MD [address] [facility] [place] [phone]      Your Vitals Were     Blood Pressure Pulse Temperature Respirations Weight Pulse Oximetry    165/98 56 96  F (35.6  C) (Oral) 16 41.5 kg (91 lb 9.6 oz) 94%    BMI (Body Mass Index)                   18.5 kg/m2           MyChart Information     [employer] gives you secure access to your electronic health record. If you see a primary care provider, you can also send messages to your care team and make appointments. If you have questions, please call your primary care clinic.  If you do not have a primary care provider, please call [phone] and they will assist you.        Care EveryWhere ID     This is your Care EveryWhere ID. This could be used by other organizations to access your [facility] medical records  [id number]        Equal Access to Services     ANUEL MARSHALL : alejandra Albarado, ivana oliva. So [facility]  [phone].    ATENCIÓN: Si sofia stewart, tiene a benavides disposición servicios gratuitos de asistencia lingüística. [place] [phone].    We comply with applicable federal civil rights laws and [place] laws. We do not discriminate on the basis of race, color, national origin, age, disability, sex, sexual orientation, or gender identity.               Review of your medicines      START taking        Dose / Directions    lisinopril 10 MG tablet   Commonly known as:  PRINIVIL/ZESTRIL   Used for:  Benign essential hypertension        Dose:  10 mg   Take 1 tablet (10 mg) by mouth daily   Quantity:  30 tablet   Refills:  3       Thiamine HCl 50 MG Caps   Used for:  Confusion        Dose:  1 capsule   Take 1 capsule by mouth daily   Quantity:  60 capsule   Refills:  3         CONTINUE these medicines which have NOT CHANGED        Dose / Directions    alpha-d-galactosidase tablet        Take by mouth as needed for intestinal gas   Refills:  0       calcium-vitamin D 600-400 MG-UNIT per tablet   Commonly known as:  CALTRATE        Dose:  1 tablet   Take 1 tablet by mouth 2 times daily   Refills:  0       cholecalciferol 1000 UNITS capsule   Commonly known as:  vitamin  -D   Used for:  Age-related osteoporosis with current pathological fracture with routine healing, subsequent encounter        Dose:  1 capsule   Take 1 capsule (1,000 Units) by mouth daily   Quantity:  100 capsule   Refills:  3       loperamide 2 MG tablet   Commonly known as:  IMODIUM A-D        Dose:  2 mg   Take 2 mg by mouth 4 times daily as needed   Refills:  0       OMEGA-3 FISH OIL PO        Dose:  2 capsule   Take 2 capsules by mouth daily   Refills:  0       order for DME   Used for:  Closed fracture of multiple ribs with routine healing, unspecified laterality, subsequent encounter        Equipment being ordered: Incentive spirometry   Quantity:  1 each   Refills:  0       sodium fluoride dental gel 1.1 % Gel topical gel   Commonly known as:   PREVIDENT        Apply to affected area At Bedtime   Refills:  0       teriparatide (recombinant) 600 MCG/2.4ML Soln injection   Commonly known as:  FORTEO   Used for:  Age-related osteoporosis with current pathological fracture, initial encounter        Dose:  20 mcg   Inject 0.08 mLs (20 mcg) Subcutaneous daily   Quantity:  6 mL   Refills:  1       UNKNOWN TO PATIENT        Vision formula Vit A, C & E / Lutein/Minerals   Refills:  0       VITAMIN B 12 PO        Dose:  1 tablet   Take 1 tablet by mouth daily   Refills:  0            Where to get your medicines      These medications were sent to [facility] [place], [place] - [address]  [address] [address]     Phone:  [phone]     lisinopril 10 MG tablet    Thiamine HCl 50 MG Caps                Protect others around you: Learn how to safely use, store and throw away your medicines at www.disposemymeds.org.             Medication List: This is a list of all your medications and when to take them. Check marks below indicate your daily home schedule. Keep this list as a reference.      Medications           Morning Afternoon Evening Bedtime As Needed    alpha-d-galactosidase tablet   Take by mouth as needed for intestinal gas                                calcium-vitamin D 600-400 MG-UNIT per tablet   Commonly known as:  CALTRATE   Take 1 tablet by mouth 2 times daily   Last time this was given:  1 tablet on 10/26/2017  8:24 AM                                cholecalciferol 1000 UNITS capsule   Commonly known as:  vitamin  -D   Take 1 capsule (1,000 Units) by mouth daily                                lisinopril 10 MG tablet   Commonly known as:  PRINIVIL/ZESTRIL   Take 1 tablet (10 mg) by mouth daily   Last time this was given:  10 mg on 10/26/2017  8:24 AM                                loperamide 2 MG tablet   Commonly known as:  IMODIUM A-D   Take 2 mg by mouth 4 times daily as needed                                 OMEGA-3 FISH OIL PO   Take 2 capsules by mouth daily   Last time this was given:  1,000 mg on 10/26/2017  8:24 AM                                order for DME   Equipment being ordered: Incentive spirometry                                sodium fluoride dental gel 1.1 % Gel topical gel   Commonly known as:  PREVIDENT   Apply to affected area At Bedtime                                teriparatide (recombinant) 600 MCG/2.4ML Soln injection   Commonly known as:  FORTEO   Inject 0.08 mLs (20 mcg) Subcutaneous daily                                Thiamine HCl 50 MG Caps   Take 1 capsule by mouth daily                                UNKNOWN TO PATIENT   Vision formula Vit A, C & E / Lutein/Minerals                                VITAMIN B 12 PO   Take 1 tablet by mouth daily

## 2017-10-23 NOTE — TELEPHONE ENCOUNTER
Veryl is having confusion and difficulty speaking her thoughts.  Daughter is calling from out of state and states that Veryl is hallucinating.  No triage as Veryl is not present.  FNA advised to have Veryl go to ED.

## 2017-10-24 ENCOUNTER — ALLIED HEALTH/NURSE VISIT (OUTPATIENT)
Dept: NEUROLOGY | Facility: CLINIC | Age: 82
DRG: 070 | End: 2017-10-24
Attending: PSYCHIATRY & NEUROLOGY
Payer: COMMERCIAL

## 2017-10-24 ENCOUNTER — TELEPHONE (OUTPATIENT)
Dept: ENDOCRINOLOGY | Facility: CLINIC | Age: 82
End: 2017-10-24

## 2017-10-24 DIAGNOSIS — G93.40 ENCEPHALOPATHY: Primary | ICD-10-CM

## 2017-10-24 LAB
ANION GAP SERPL CALCULATED.3IONS-SCNC: 7 MMOL/L (ref 3–14)
BUN SERPL-MCNC: 19 MG/DL (ref 7–30)
CALCIUM SERPL-MCNC: 8.8 MG/DL (ref 8.5–10.1)
CHLORIDE SERPL-SCNC: 104 MMOL/L (ref 94–109)
CO2 SERPL-SCNC: 28 MMOL/L (ref 20–32)
CREAT SERPL-MCNC: 0.66 MG/DL (ref 0.52–1.04)
ERYTHROCYTE [DISTWIDTH] IN BLOOD BY AUTOMATED COUNT: 13.3 % (ref 10–15)
GFR SERPL CREATININE-BSD FRML MDRD: 85 ML/MIN/1.7M2
GLUCOSE SERPL-MCNC: 87 MG/DL (ref 70–99)
HCT VFR BLD AUTO: 36.3 % (ref 35–47)
HGB BLD-MCNC: 12 G/DL (ref 11.7–15.7)
MCH RBC QN AUTO: 29.7 PG (ref 26.5–33)
MCHC RBC AUTO-ENTMCNC: 33.1 G/DL (ref 31.5–36.5)
MCV RBC AUTO: 90 FL (ref 78–100)
PLATELET # BLD AUTO: 194 10E9/L (ref 150–450)
POTASSIUM SERPL-SCNC: 3.7 MMOL/L (ref 3.4–5.3)
RBC # BLD AUTO: 4.04 10E12/L (ref 3.8–5.2)
SODIUM SERPL-SCNC: 139 MMOL/L (ref 133–144)
TSH SERPL DL<=0.005 MIU/L-ACNC: 1.12 MU/L (ref 0.4–4)
WBC # BLD AUTO: 6 10E9/L (ref 4–11)

## 2017-10-24 PROCEDURE — 25000132 ZZH RX MED GY IP 250 OP 250 PS 637: Performed by: STUDENT IN AN ORGANIZED HEALTH CARE EDUCATION/TRAINING PROGRAM

## 2017-10-24 PROCEDURE — 00JU3ZZ INSPECTION OF SPINAL CANAL, PERCUTANEOUS APPROACH: ICD-10-PCS | Performed by: PSYCHIATRY & NEUROLOGY

## 2017-10-24 PROCEDURE — 12000008 ZZH R&B INTERMEDIATE UMMC

## 2017-10-24 PROCEDURE — 80048 BASIC METABOLIC PNL TOTAL CA: CPT | Performed by: PSYCHIATRY & NEUROLOGY

## 2017-10-24 PROCEDURE — 85027 COMPLETE CBC AUTOMATED: CPT | Performed by: PSYCHIATRY & NEUROLOGY

## 2017-10-24 PROCEDURE — 25000128 H RX IP 250 OP 636: Performed by: STUDENT IN AN ORGANIZED HEALTH CARE EDUCATION/TRAINING PROGRAM

## 2017-10-24 PROCEDURE — 95951 ZZHC EEG VIDEO EACH 24 HR: CPT | Mod: ZF

## 2017-10-24 PROCEDURE — 36415 COLL VENOUS BLD VENIPUNCTURE: CPT | Performed by: PSYCHIATRY & NEUROLOGY

## 2017-10-24 PROCEDURE — 25000125 ZZHC RX 250

## 2017-10-24 PROCEDURE — 84443 ASSAY THYROID STIM HORMONE: CPT | Performed by: PSYCHIATRY & NEUROLOGY

## 2017-10-24 RX ORDER — LIDOCAINE HYDROCHLORIDE 10 MG/ML
INJECTION, SOLUTION EPIDURAL; INFILTRATION; INTRACAUDAL; PERINEURAL
Status: COMPLETED
Start: 2017-10-24 | End: 2017-10-24

## 2017-10-24 RX ADMIN — Medication 1 TABLET: at 20:18

## 2017-10-24 RX ADMIN — Medication 1 TABLET: at 08:31

## 2017-10-24 RX ADMIN — ENOXAPARIN SODIUM 40 MG: 40 INJECTION SUBCUTANEOUS at 10:06

## 2017-10-24 RX ADMIN — VITAMIN B12 0.1 MG ORAL TABLET 100 MCG: 0.1 TABLET ORAL at 08:31

## 2017-10-24 RX ADMIN — Medication 1000 MG: at 08:31

## 2017-10-24 RX ADMIN — LIDOCAINE HYDROCHLORIDE: 10 INJECTION, SOLUTION EPIDURAL; INFILTRATION; INTRACAUDAL; PERINEURAL at 16:45

## 2017-10-24 RX ADMIN — THIAMINE HYDROCHLORIDE 100 MG: 100 INJECTION, SOLUTION INTRAMUSCULAR; INTRAVENOUS at 08:32

## 2017-10-24 RX ADMIN — ASPIRIN 81 MG CHEWABLE TABLET 81 MG: 81 TABLET CHEWABLE at 08:31

## 2017-10-24 RX ADMIN — VITAMIN D, TAB 1000IU (100/BT) 1000 UNITS: 25 TAB at 08:31

## 2017-10-24 ASSESSMENT — VISUAL ACUITY
OU: NORMAL ACUITY

## 2017-10-24 NOTE — PROGRESS NOTES
Care Coordinator Progress Note     Admission Date/Time:  10/23/2017  Attending MD:  Dr Homer Pulido     Data  Chart reviewed, discussed with interdisciplinary team. Per 6A Discharge Rounds report pt is from an assisted living; up with [facility].   On continuous video EEG monitoring.   Pt was seen in the ER by BONIFACIO Abdullahi; please see her note.      Patient was admitted for:  Pt presented to the ER with worsening confusion, word finding problems and hallucinations. Admitted to general Neurology service.      Past history includes: osteoporosis; osteoarthritis; kyphoscoliosis; CPPD(calcium pyrophosphate deposition disease; carpal tunnel syndrome.     Concerns with insurance coverage for discharge needs: None. Pt's insurance is [employer].   Current Living Situation: Patient lives alone at [facility] in [place].   Support System: Supportive, son & daughter.   Services Involved: None  Transportation: TBD  Barriers to Discharge: lives alone    Coordination of Care and Referrals  Introduced myself to pt. Pt awake and alert, pleasant; on video EEG monitor. During our conversation pt had difficulty completing a sentence, trying to find words or complete a thought. She was aware this was happening. She was oriented to place. Pt said she lives at the [facility] in [place]. She has her own apartment. Breakfast is provided by the [facility]. Otherwise she manages meals on her own.  Pt said she only goes to the dining room when she has a guest. Pt said she bathes on her own; was not able to describe the shower/tub set-up. Manages her own medications and gives her own injections (medication for her bones). She said the [facility] has a van if needed for rides.   Dr Pulido and the Neurology team arrived. Dr Pulido discussed an MRI and LP with pt. She was agreeable to Dr Pulido speaking with her son about plan.   DaughterLubna lives in [place]. Pt said sonDelmar will be leaving the  country in a couple weeks.    Primary is Dr Alejandro Tyler at [facility].      Assessment  Pt with new changes, hallucinations and speech. Diagnostic testing in progress. May need skilled home care services at discharge. Would benefit from PT/OT evaluations while hospitalized.      Plan  Anticipated Discharge Date:  TBD, after testing is complete.   Anticipated Discharge Plan:  Will request a PT/OT evaluation. (noted in EPIC pt has seen PT at home before).   Care Coordinator will continue to follow for discharge planning and plan of care.         Tamiko Stevens RN Care Coordinator  [address], [facility]

## 2017-10-24 NOTE — H&P
[facility] History and Physical    Verydenita Sheets MRN# [id number]   Age: 87 year old YOB: 1930     Date of Admission:  10/23/2017    Primary care provider: Alejandro Sandhu            Chief Complaint:   Aphasia, hallucinations     History obtained from patient and her son          History of Present Illness:   This patient is a 87 year old  female  With phx of CPPD and osteoporosis who presents for evaluation of word finding difficulty and hallucinations.  She has difficulty providing a history given her word finding difficulty.  Her son reports that she was at her baseline when he saw her a little over a week ago.  Yesterday when he visited her she seemed confused and had difficulty expressing herself.  She continued to have trouble today which led to him bringing her in.  He thinks that her symptoms began in the past three days, though it is unclear why he doesn't think it could have happened a week ago.  She denies fever, headache, or h/o seizures.  She has not started any new meds recently.  She is a vegetarian, but eats a lot of eggs.  She denies difficulty sleeping.  Her son reports that she had a TIA many years ago which also consisted of word finding difficulty.                 Past Medical History:     Past Medical History:   Diagnosis Date     Carpal tunnel syndrome (aka CTS)      H/O calcium pyrophosphate deposition disease (CPPD)      Kyphoscoliosis      Osteoarthritis     hands     Osteoporosis              Past Surgical History:     Past Surgical History:   Procedure Laterality Date     HYSTERECTOMY      for uterine prolapse             Social History:     Social History   Substance Use Topics     Smoking status: Never Smoker     Smokeless tobacco: Never Used     Alcohol use No             Family History:     Family History   Problem Relation Age of Onset     Depression/Anxiety Son      Depression/Anxiety Son      alcohol abuse  age 24     Hypertension Mother      Hypertension  Brother      DIABETES No family hx of      Breast Cancer No family hx of      Colon Cancer No family hx of      Prostate Cancer No family hx of      Other Cancer No family hx of                 Allergies:   No Known Allergies          Medications:     Current Facility-Administered Medications   Medication     [START ON 10/24/2017] teriparatide (recombinant) (FORTEO) injection 20 mcg     sodium fluoride dental gel (PREVIDENT) 1.1 % topical gel GEL     [START ON 10/24/2017] OMEGA-3 FISH OIL PO     [START ON 10/24/2017] VITAMIN B 12 PO     [START ON 10/24/2017] cholecalciferol (vitamin  -D) capsule 1,000 Units     calcium-vitamin D (CALTRATE) 600-400 MG-UNIT per tablet 1 tablet     [START ON 10/24/2017] aspirin tablet 81 mg     naloxone (NARCAN) injection 0.1-0.4 mg     enoxaparin (LOVENOX) injection 40 mg     senna-docusate (SENOKOT-S;PERICOLACE) 8.6-50 MG per tablet 1-2 tablet     senna-docusate (SENOKOT-S;PERICOLACE) 8.6-50 MG per tablet 1-2 tablet     magnesium hydroxide (MILK OF MAGNESIA) suspension 30 mL     bisacodyl (DULCOLAX) Suppository 10 mg     ondansetron (ZOFRAN-ODT) ODT tab 4 mg    Or     ondansetron (ZOFRAN) injection 4 mg     prochlorperazine (COMPAZINE) injection 5 mg    Or     prochlorperazine (COMPAZINE) tablet 5 mg    Or     prochlorperazine (COMPAZINE) Suppository 12.5 mg             Review of Systems:   CONSTITUTIONAL:No fever  INTEGUMENTARY/SKIN: No rash  EYES: No vision change  RESP:No shortness of breath  CV: No chest pain  MUSCULOSKELETAL: Positive for joint pain  NEURO: No headache  ENDOCRINE: No cold intolerance          Physical Exam:   All vitals have been reviewed  Patient Vitals for the past 8 hrs:   BP SpO2   10/23/17 2015 - 93 %   10/23/17 2000 (!) 162/102 94 %   10/23/17 1900 (!) 177/111 -   10/23/17 1814 - 96 %   10/23/17 1813 (!) 177/114 -   10/23/17 1600 (!) 157/95 94 %   10/23/17 1430 118/85 93 %   10/23/17 1400 (!) 164/102 96 %   10/23/17 1345 - 95 %   10/23/17 1330 - 96 %         Constitutional : well-built  Head: atraumatic, anicteric. See neuroexam  Eyes: see neuroexam  CVC: S1/S2 sinusal no murmurs  LUng: Clear. On room air. No respiratory distress.   Adomen: Nondistended  Extremities: Well perfused  Psychiatry: in good mood. Collaborative  Neuroexam  Alert and oriented to place, date, self.  Remembers one word after distraction, two with prompts, and three with choices.  Significant difficulty spelling world backwards and saying months in reverse.  PERRL, EOMI, visual fields intact, facial sensation and movement symmetric, palatal elevation symmetric, shoulder shrug 5/5 bilaterally, tongue protrusion midline  No tremors  No asterixis  No dysmetria  Strength 5/5 upper and lower extremities bilaterally  Reflexes preserved.  Babinski equivocal on left  Sensation: Light touch preserved, pinprick preserve.  Position sense compromised in distal extremities.              Data:     Results for orders placed or performed during the hospital encounter of 10/23/17 (from the past 24 hour(s))   CBC with platelets differential   Result Value Ref Range    WBC 5.5 4.0 - 11.0 10e9/L    RBC Count 4.22 3.8 - 5.2 10e12/L    Hemoglobin 12.5 11.7 - 15.7 g/dL    Hematocrit 38.2 35.0 - 47.0 %    MCV 91 78 - 100 fl    MCH 29.6 26.5 - 33.0 pg    MCHC 32.7 31.5 - 36.5 g/dL    RDW 13.2 10.0 - 15.0 %    Platelet Count 200 150 - 450 10e9/L    Diff Method Automated Method     % Neutrophils 58.9 %    % Lymphocytes 27.4 %    % Monocytes 7.4 %    % Eosinophils 5.4 %    % Basophils 0.7 %    % Immature Granulocytes 0.2 %    Nucleated RBCs 0 0 /100    Absolute Neutrophil 3.3 1.6 - 8.3 10e9/L    Absolute Lymphocytes 1.5 0.8 - 5.3 10e9/L    Absolute Monocytes 0.4 0.0 - 1.3 10e9/L    Absolute Eosinophils 0.3 0.0 - 0.7 10e9/L    Absolute Basophils 0.0 0.0 - 0.2 10e9/L    Abs Immature Granulocytes 0.0 0 - 0.4 10e9/L    Absolute Nucleated RBC 0.0    Comprehensive metabolic panel   Result Value Ref Range    Sodium 139 133 - 144  mmol/L    Potassium 4.0 3.4 - 5.3 mmol/L    Chloride 102 94 - 109 mmol/L    Carbon Dioxide 32 20 - 32 mmol/L    Anion Gap 5 3 - 14 mmol/L    Glucose 85 70 - 99 mg/dL    Urea Nitrogen 22 7 - 30 mg/dL    Creatinine 0.71 0.52 - 1.04 mg/dL    GFR Estimate 78 >60 mL/min/1.7m2    GFR Estimate If Black >90 >60 mL/min/1.7m2    Calcium 9.8 8.5 - 10.1 mg/dL    Bilirubin Total 0.4 0.2 - 1.3 mg/dL    Albumin 3.2 (L) 3.4 - 5.0 g/dL    Protein Total 6.6 (L) 6.8 - 8.8 g/dL    Alkaline Phosphatase 54 40 - 150 U/L    ALT 15 0 - 50 U/L    AST 17 0 - 45 U/L   Lipase   Result Value Ref Range    Lipase 195 73 - 393 U/L   Lactic acid whole blood   Result Value Ref Range    Lactic Acid 1.1 0.7 - 2.0 mmol/L   Troponin I   Result Value Ref Range    Troponin I ES <0.015 0.000 - 0.045 ug/L   Calcium ionized   Result Value Ref Range    Calcium Ionized Whole Blood 5.2 4.4 - 5.2 mg/dL   XR Chest 2 Views    Narrative    Exam: XR CHEST 2 VW, 10/23/2017 11:56 AM    Indication: change in MS    Comparison: Chest x-ray on February 8, 2017    Findings:   PA and lateral 2 view of the chest. Stable healed ribs fracture of the  right lateral chest. No focal opacity to suggest consolidation. No  pneumothorax or pleural effusion. Pulmonary vasculature is  non-prominent. Cardiac silhouette within normal limits. No evidence  for pneumothorax.      Impression    Impression: No evidence for focal airway disease/pneumonia.    I have personally reviewed the examination and initial interpretation  and I agree with the findings.    WILLOW SNEED MD   CT Head w/o Contrast    Narrative    CT HEAD W/O CONTRAST 10/23/2017 12:12 PM    Provided History: change iN MS    Comparison: None available.    Technique: Using multidetector thin collimation helical acquisition  technique, axial, coronal and sagittal CT images from the skull base  to the vertex were obtained without intravenous contrast.     Findings:    No intracranial hemorrhage, mass effect, midline shift or  abnormal  extra axial fluid flexion.    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 Chronic lacunar infarct within the right caudate head.  No acute loss of gray-white matter differentiation. Ventricles are not  enlarged out of proportion to the cerebral sulci.    Trace polypoid paranasal sinus mucosal thickening. Mastoid air cells  are clear.      Impression    Impression:   1. No acute intracranial pathology.  2. Moderate generalized parenchymal volume loss and moderate to  advanced chronic small vessel ischemic disease.    RIKA JETT MD   UA reflex to Microscopic and Culture   Result Value Ref Range    Color Urine Straw     Appearance Urine Clear     Glucose Urine Negative NEG^Negative mg/dL    Bilirubin Urine Negative NEG^Negative    Ketones Urine Negative NEG^Negative mg/dL    Specific Gravity Urine 1.003 1.003 - 1.035    Blood Urine Negative NEG^Negative    pH Urine 7.5 (H) 5.0 - 7.0 pH    Protein Albumin Urine Negative NEG^Negative mg/dL    Urobilinogen mg/dL Normal 0.0 - 2.0 mg/dL    Nitrite Urine Negative NEG^Negative    Leukocyte Esterase Urine Negative NEG^Negative    Source Midstream Urine    MR Brain for Stroke Complete (UU,UA,SH)    Narrative    MRI brain without and with contrast  MRA of the head without contrast  Neck MRA without and with contrast    Provided History:  word finding difficulties and visual  hallucinations.    Comparison:  CT dated 10/23/2017      Technique:   Brain MRI:  Axial diffusion, FLAIR, T2-weighted, susceptibility, and  coronal T1-weighted images were obtained without intravenous contrast.  Following intravenous gadolinium-based contrast administration, axial  and coronal T1-weighted images were obtained.    Head MRA: 3D time-of-flight MRA of the [place] was performed  without intravenous contrast.  Neck MRA:  Limited non contrast 2DTOF images were obtained of the  mid-cervical region. Following intravenous  gadolinium-based contrast  administration, a contrast enhanced MRA of the neck/cervical vessels  was performed.  Three-dimensional reconstructions of the neck and head MRA were  created, which were reviewed by the radiologist.    Dose: 5mL Gadavist    Findings:   Brain MRI: Axial diffusion weighted images demonstrate no definite  acute infarct. On the FLAIR images, there is nonspecific mild  periventricular and deep white matter T2-hyperintensity, which are  most likely related to chronic small vessel ischemic disease. There is  no definite intracranial hemorrhage on susceptibility images.  Ventricles are proportionate to the cerebral sulci. Contrast-enhanced  images of the brain demonstrate no abnormal intra- or extra-axial  enhancement.    Head MRA demonstrates no definite aneurysm or stenosis of the major  intracranial arteries. Fusiform dilation of the right internal carotid  artery terminus, proximal right middle cerebral artery, and fetal  right posterior cerebral artery. The anterior communicating artery is  patent. Regarding the posterior communicating arteries, left not  well-visualized.    Neck MRA demonstrates patent major cervical arteries. The normal  distal right internal carotid artery measures 5 mm. The normal distal  left internal carotid artery measures 5 mm. Antegrade flow in the  major cervical vasculature. The left common carotid artery has a  tortuous course at its origin.      Impression    Impression:  1. No evidence of acute infarction or intracranial hemorrhage.  2. Moderate chronic small vessel ischemic disease.  3. Head MRA demonstrates no definite aneurysm or stenosis of the major  intracranial arteries. However, there is fusiform dilation of the  right internal carotid artery terminus that also involves the proximal  right middle cerebral artery and the fetal right posterior cerebral  artery.  4. Neck MRA demonstrates patent major cervical arteries.    I have personally reviewed the  examination and initial interpretation  and I agree with the findings.    TIERRA WATTS MD            Assessment and Plan:   This patient is a 87 year old  female  With phx of CPPD and osteoporosis who presents for evaluation of word finding difficulty and hallucinations    #Aphasia  #Hallucinations  #Encephalopathy  Her aphasia is most readily apparent but she also has significant difficulties with attention and perseverates on questions.  She has no evidence of acute stroke on MRI, and a lesion would be expected given the duration of her symtoms.  She appears malnourished and is at risk for nutritional deficiencies.  She reports that she ran out of her B-12 recently, though this would be unlikely to cause B-12 deficiency by itself.  Also, we will check her thyroid function.  We will obtain EEG to ensure that she is not having seizures.  She will require inpatient work up as she is likely not safe to be at home right now.  We will consider LP and imaging tomorrow.  -Thiamine 100 mg IV daily  -Thiamine level  -B12/MMA  -Folate  -TSH  -Free T3/T4  -Ammonia level  -EEG  -Continue home B-12 supplements    #Hypertension  Per her son, she is hypertensive outpatient.  Has been in the 180s.  -Hydralazine PRN    #Osteoporosis  #CPPD  -Continue home caltrate  -Continue home vitamin D  -continue home forteo       # Ppx: Lovenox, Senna  # FEN: Advance as tolerated  # Code: Full    Patient seen and discussed  With Dr. Ashok Ortiz  PGY4-Neurology  [id number]    ATTENDING 10/23/17: Patient seen and examined. Son present who provided history. Likely some mild cognitive impairment at baseline with recent development of more significant encephalopathy. No clear etiology at this time. No acute changes on head MRI but chronic small vessel changes. No causative meds. Labs OK. Admitted for further evaluation which will include EEG monitoring. Diet probably poor and starting thiamin. Agree with Dr Ortiz. Homer Pulido MD

## 2017-10-24 NOTE — TELEPHONE ENCOUNTER
"Lubna, pt's daughter, called to concerns about Forteo. Lubna states her \"mother was admitted to [facility] 10/23 and missed her dose of Forteo\". Lubna would like to know if there is any concerns with missing 1 or 2 doses. Please callback Lubna at [phone].  "

## 2017-10-24 NOTE — MR AVS SNAPSHOT
After Visit Summary   10/24/2017    Belia Sheets    MRN: [id number]           Patient Information     Date Of Birth          5/16/1930        Visit Information        Provider Department      10/24/2017 7:00 AM [facility] EEG TECH 4 [facility] EEG        Today's Diagnoses     Encephalopathy    -  1       Follow-ups after your visit        Your next 10 appointments already scheduled     Oct 25, 2017  4:20 PM CDT   Return Visit with Alejandro Sandhu MD   [facility] ([facility])    [address],  [address]  [facility] [address]   [phone]            Feb 07, 2018  3:15 PM CST   (Arrive by 3:00 PM)   RETURN ENDOCRINE with Saurabh Pittman MD   [facility] ([facility])    [address]  3rd Floor  [facility] [address] [phone]              Future tests that were ordered for you today     Open Standing Orders        Priority Remaining Interval Expires Ordered    Platelet count STAT 12/12 EVERY THREE DAYS  10/23/2017    Creatinine STAT 12/12 EVERY THREE DAYS  10/23/2017    Notify provider STAT [id number] PRN  10/23/2017    Activity: Up with assist STAT [id number] PRN  10/23/2017            Who to contact     Please call your clinic at [phone] to:    Ask questions about your health    Make or cancel appointments    Discuss your medicines    Learn about your test results    Speak to your doctor   If you have compliments or concerns about an experience at your clinic, or if you wish to file a complaint, please contact [facility] Patient Relations at [phone] or email us at [email or web address]         Additional Information About Your Visit        MyChart Information     [employer] gives you secure access to your electronic health record. If you see a primary care provider, you can also send messages to your care team and make appointments. If you have questions, please call your  primary care clinic.  If you do not have a primary care provider, please call [phone] and they will assist you.      [employer] is an electronic gateway that provides easy, online access to your medical records. With [employer], you can request a clinic appointment, read your test results, renew a prescription or communicate with your care team.     To access your existing account, please contact your [facility] or call [phone] for assistance.        Care EveryWhere ID     This is your Care EveryWhere ID. This could be used by other organizations to access your [facility] medical records  [id number]         Blood Pressure from Last 3 Encounters:   10/23/17 (!) 171/106   09/13/17 105/70   08/02/17 (!) 133/97    Weight from Last 3 Encounters:   10/23/17 41.5 kg (91 lb 9.6 oz)   09/13/17 41.5 kg (91 lb 6.4 oz)   08/02/17 41.5 kg (91 lb 9.6 oz)              Today, you had the following     No orders found for display         Today's Medication Changes      Notice     This visit is during an admission. Changes to the med list made in this visit will be reflected in the After Visit Summary of the admission.             Primary Care Provider Office Phone # Fax #    Alejandro Sandhu [phone]       [address] [address]        Equal Access to Services     ANUEL MARSHALL : Hadmitzi carcamo Sokeenan, waaxda luqadaha, qaybta kaalmada jenelle, ivana cha. So [facility] [phone].    ATENCIÓN: Si habla español, tiene a benavides disposición servicios gratuitos de asistencia lingüística. Llame al [phone].    We comply with applicable federal civil rights laws and [place] laws. We do not discriminate on the basis of race, color, national origin, age, disability, sex, sexual orientation, or gender identity.            Thank you!     Thank you for choosing [facility]  for your care. Our goal is always to provide you with excellent  care. Hearing back from our patients is one way we can continue to improve our services. Please take a few minutes to complete the written survey that you may receive in the mail after your visit with us. Thank you!             Your Updated Medication List - Protect others around you: Learn how to safely use, store and throw away your medicines at www.disposemymeds.org.      Notice     This visit is during an admission. Changes to the med list made in this visit will be reflected in the After Visit Summary of the admission.

## 2017-10-24 NOTE — PROGRESS NOTES
"CLINICAL NUTRITION SERVICES - ASSESSMENT NOTE     Nutrition Prescription    RECOMMENDATIONS FOR MDs/PROVIDERS TO ORDER:  None at this time.    Malnutrition Status:    Severe malnutrition in the context of acute illness.    Recommendations already ordered by Registered Dietitian (RD):  --Supplements BID (Ensure Plus)    Future/Additional Recommendations:  --If suspect pt with decreased intake, order calorie counts to quantify  --If suspect pt meeting <75% of needs (945 kcals/day and 32 g PRO/day) recommend nutrition support within next 3-5 days. Would recommend the following:  TwoCal HN @ 30 mL/hr (720 mL/day) to provide 1440 kcals (34 kcal/kg/day), 60 g PRO (1.4 g/kg/day), 504 mL H2O, 158 g CHO and 4 g Fiber daily.     REASON FOR ASSESSMENT  Belia Sheets is a/an 87 year old female assessed by the dietitian for Admission Nutrition Risk Screen for reduced oral intake over the last month.    PMH CPPD and osteoporosis.     NUTRITION HISTORY  Pt presents with difficulty word finding and hallucinations. Per H&P, pt ran out of B12 supplements recently. Pt follows vegetarian diet and lives independently in senior living facility and breakfast is provided, but she manages other meals on her own (per care coordinator note). Per RN notes, pt with poor PO since admission.     Spoke with pt who has eggs almost every morning for breakfast and then usually takes leftover food from breakfast back to her place to eat later. Pt follows vegetarian diet and eats beans and grains for complete protein. She mentions she makes her own Kefir (started in Feb) and makes a  size batch with bananas and pineapple and keeps the  in her  because she \"does not use it much\". Pt also states she got the kefir granules from a friend who got them from a woman in [place]. Per pt, she drinks a whole  in a couple days. Pt also snacks the rest of the day but very small amounts (1/4 - 1/2 piece of bread with cheese " "toasted). During visit, pt had eaten 1/2 omelet, an ice cream cup, and 1/3 brownie, then finished the omelet during 2nd quick visit.     CURRENT NUTRITION ORDERS  Diet: Regular (vegetarian)  Intake/Tolerance: No intake recorded per flowsheet.    LABS  Labs reviewed    MEDICATIONS  B12 supplements  Thiamine  Folate  Vit D    ANTHROPOMETRICS  Height: 4' 11\"  Most Recent Weight: 41.5 kg (91 lb 9.6 oz)    IBW: 43 kg  BMI: Normal BMI (BMI = 18.5)  Weight History: Wt on lower end of normal for BMI but stable since 2016  Wt Readings from Last 15 Encounters:   10/23/17 41.5 kg (91 lb 9.6 oz)   09/13/17 41.5 kg (91 lb 6.4 oz)   08/02/17 41.5 kg (91 lb 9.6 oz)   03/15/17 41.9 kg (92 lb 6.4 oz)   03/09/17 41.7 kg (91 lb 14.4 oz)   02/15/17 41.9 kg (92 lb 6.4 oz)   02/08/17 42.4 kg (93 lb 6.4 oz)   02/06/17 41.9 kg (92 lb 6.4 oz)   01/05/17 41.7 kg (91 lb 14.4 oz)   11/21/16 41.2 kg (90 lb 12.8 oz)   10/26/16 39.5 kg (87 lb)   08/31/16 37.9 kg (83 lb 9.6 oz)   08/17/16 39.5 kg (87 lb)   05/07/15 45.3 kg (99 lb 12.8 oz)     Dosing Weight: 42 kg (actual) - based on lowest documented wt so far this adm (41.5 kg on 10/23) and IBW of 43 kg.    ASSESSED NUTRITION NEEDS  Estimated Energy Needs: 9937-0621 kcals/day (30 - 35 kcals/kg)  Justification: Maintenance  Estimated Protein Needs: 42-50 grams protein/day (1 - 1.2+ grams of pro/kg)  Justification: Maintenance and increased needs  Estimated Fluid Needs: (1 mL/kcal)   Justification: Per provider pending fluid status    PHYSICAL FINDINGS  See malnutrition section below.    MALNUTRITION  % Intake: < 75% for >/= 1 month (non-severe)  % Weight Loss: None noted  Subcutaneous Fat Loss: Upper arm:  Severe  Muscle Loss: Thoracic region (clavicle, acromium bone, deltoid, trapezius, pectoral), Lower arm  (forearm), Dorsal hand and Posterior calf:  Severe - muscle losses likely partially related to sarcopenia and hx of osteoporosis, however, do suspect some nutrition related.   Fluid " Accumulation/Edema: None noted  Malnutrition Diagnosis: Severe malnutrition in the context of acute illness.    NUTRITION DIAGNOSIS  Inadequate protein-energy intake related to altered mental status upon admission as evidenced by no intake per flowsheet and pt report of decreased intake due to not much appetite at baseline.    INTERVENTIONS  Implementation  Nutrition Education: Role of RD in nutrition POC.  Collaboration with other nutrition professionals - collaboration with RD regarding nutrition POC    Goals  Patient to consume % of nutritionally adequate meal trays TID, or the equivalent with supplements/snacks.     Monitoring/Evaluation  Progress toward goals will be monitored and evaluated per protocol.    Sarah Gross RD, LD  SICU Dietitian Pgr [phone]

## 2017-10-24 NOTE — PHARMACY-ADMISSION MEDICATION HISTORY
Admission medication history interview status for the 10/23/2017 admission is complete. See Epic admission navigator for allergy information, pharmacy, prior to admission medications and immunization status.     Medication history interview sources:  Patient, Patient's son    Changes made to PTA medication list (reason)  Added: None  Deleted:   -acetaminophen - no longer taking per patient's son.  -aspirin - no longer taking per patient's son (reports primary care discontinued it).  -ciprofloxacin - patient is not on any antibiotics per patient's son.  Changed:   - Vitamin B12 : take by mouth daily --> take one tablet by mouth daily  - Omega 3 : take by mouth daily --> take two capsules by mouth daily    Additional medication history information (including reliability of information, actions taken by pharmacist):  -Patient was having difficulty finding words and completing medication history. Patient reports she isn't able to get out shopping much so she hasn't been able to get the vitamins in a while. Patient could not quantify how long it has been since she last took the vitamins (except Vitamin B12 was > 1 month ago).  -Patient and patient's son were not sure what over the counter products the patient was taking. Patient recognized vitamin B12, omega 3, beano, and loperamide but could not always describe how she was taking them.   -Patient reports she does not  any prescription medications. She buys all her products over the counter.   -Patient reports she does use Prevident gel nightly.   -Patient reports she receives Forteo via mail from the  for free. She stores it in the refrigerator right after her nightly injection. Patient's son reports leaving Foreto at home. Patient and patient's son were informed that the product is not on formulary and if patient would like to receive, they need to bring home supply. Patient's son and patient decided they will consult with physician for how long  patient will be admitted and discuss whether patient needs the Forteo in that time.     Prior to Admission medications    Medication Sig Last Dose Taking? Auth Provider   teriparatide, recombinant, (FORTEO) 600 MCG/2.4ML SOLN injection Inject 0.08 mLs (20 mcg) Subcutaneous daily 10/22/2017 at Unknown time Yes Saurabh Pittman MD   sodium fluoride dental gel (PREVIDENT) 1.1 % GEL Apply to affected area At Bedtime 10/22/2017 at Unknown time Yes Reported, Patient   cholecalciferol (VITAMIN  -D) 1000 UNITS capsule Take 1 capsule (1,000 Units) by mouth daily Unknown at Unknown time  Saurabh Pittman MD   order for DME Equipment being ordered: Jay Jay Reilly MD   loperamide (IMODIUM A-D) 2 MG tablet Take 2 mg by mouth 4 times daily as needed Unknown at Unknown time  Reported, Patient   UNKNOWN TO PATIENT Vision formula Vit A, C & E / Lutein/Minerals Unknown at Unknown time  Reported, Patient   calcium-vitamin D (CALTRATE) 600-400 MG-UNIT per tablet Take 1 tablet by mouth 2 times daily Unknown at Unknown time  Reported, Patient   alpha-d-galactosidase (BEANO) tablet Take by mouth as needed for intestinal gas Unknown at Unknown time  Reported, Patient   Omega-3 Fatty Acids (OMEGA-3 FISH OIL PO) Take 2 capsules by mouth daily  Unknown at Unknown time  Reported, Patient   Cyanocobalamin (VITAMIN B 12 PO) Take 1 tablet by mouth daily  More than a month at Unknown time  Reported, Patient           Medication history completed by: Oumou Rodriguez, PD3 Pharmacy Intern

## 2017-10-24 NOTE — PROCEDURES
EEG#:  [id number]      DATE OF RECORDING:  10/23/2017.       DURATION OF RECORDING:  One hour and 5 minutes.       DAY #1 OF VIDEO EEG MONITORING      CLINICAL HISTORY:  Constance Connelly is an 87-year-old female with a history of multiple medical problems, presented with altered mental status, word-finding difficulties and hallucinations.  EEG was requested for evaluation for seizures.      CURRENT MEDICATIONS:  No anti-seizure medications.      TECHNICAL SUMMARY: This continuous video- EEG monitoring procedure was performed with 23 scalp electrodes in 10-20 electrode system placements, and additional scalp, precordial and other surface electrodes used for electrical referencing and artifact detection.  Video monitoring was utilized and periodically reviewed by EEG technologists and the physician for electroclinical correlations.     BACKGROUND ACTIVITIES:  The background activities of this EEG consisted of mild to moderate generalized slowing of the background activities.  No well defined posterior dominant rhythm was observed during this recording.  There were intermittent bilateral temporal theta delta slowing, more over the left temporal region, both during wakefulness and sleep.     Hyperventilation and photic stimulation were not performed.  Sleep stages were recorded with poorly formed sleep architectures.      No interictal epileptiform activities were observed.      ICTAL ACTIVITIES:  None.      IMPRESSION OF DAY #1 OF VIDEO EEG MONITORING:  This is an abnormal EEG due to the presence of bilateral temporal slowing, more over the left temporal region.  No epileptiform or seizures were observed.         TANGELA CALDWELL MD             D: 10/24/2017 17:22   T: 10/24/2017 17:43   MT: DA      Name:     CONSTANCE CONNELLY   MRN:      [id number]        Account:        [id number]   :      1930           Procedure Date: 10/23/2017      Document: [id number]

## 2017-10-24 NOTE — PLAN OF CARE
Problem: Patient Care Overview  Goal: Plan of Care/Patient Progress Review  Outcome: No Change  Pt new admit last evening from ED. Here with confusion and speech difficulties that has worsened over the past few days. VSS except HTN; PRN meds for SBP >180. Pt is A&O x4; not to exact date, nor did pt know her birth year. Pt has word finding difficulties/expressive aphasia. Difficulties with attention and perseverates on questions. Strengths 4/5. Pt also stated she was having hallucinations (also in ED notes); seeing water, mud/dirt and cords. Video EEG leads intact; no events witnessed overnight. PIV SL. Voiding spontaneously. Vegetarian diet. Up with SBA and walker. Lives independently in a senior living facility. BA on. Continue to monitor and follow POC.

## 2017-10-24 NOTE — PLAN OF CARE
Problem: Patient Care Overview  Goal: Plan of Care/Patient Progress Review  Outcome: Improving  Pt on 6A with word finding difficulties and hallucinations. Monitoring with VEEG; no events noted or reported. VSS on RA. A&Ox4 when given options or yes/no questions. Neuros with word finding difficulties and generalized weakness (uses wheels walker at baseline); denies hallucinations today. No c/o pain. PIV SL. Regular diet with poor PO; continue to encourage pt to eat. Voiding spont. BS+, no BM. Up with SBA+Walker, GB in halls. LP attempted today but unable to complete yet. Continue to monitor and follow POC.

## 2017-10-24 NOTE — PROGRESS NOTES
VEEG monitoring preliminary results:    VEEG has been running for over 40 min.  EEG showed no significant abnormalities.  No clinical or electrographic seizures were observed.    Annelise Mcnamara MD  Neurology

## 2017-10-25 ENCOUNTER — APPOINTMENT (OUTPATIENT)
Dept: GENERAL RADIOLOGY | Facility: CLINIC | Age: 82
DRG: 070 | End: 2017-10-25
Attending: STUDENT IN AN ORGANIZED HEALTH CARE EDUCATION/TRAINING PROGRAM
Payer: COMMERCIAL

## 2017-10-25 ENCOUNTER — ALLIED HEALTH/NURSE VISIT (OUTPATIENT)
Dept: NEUROLOGY | Facility: CLINIC | Age: 82
DRG: 070 | End: 2017-10-25
Attending: PSYCHIATRY & NEUROLOGY
Payer: COMMERCIAL

## 2017-10-25 ENCOUNTER — APPOINTMENT (OUTPATIENT)
Dept: MRI IMAGING | Facility: CLINIC | Age: 82
DRG: 070 | End: 2017-10-25
Attending: STUDENT IN AN ORGANIZED HEALTH CARE EDUCATION/TRAINING PROGRAM
Payer: COMMERCIAL

## 2017-10-25 DIAGNOSIS — R47.81 SLURRED SPEECH: ICD-10-CM

## 2017-10-25 DIAGNOSIS — G93.40 ENCEPHALOPATHY: Primary | ICD-10-CM

## 2017-10-25 LAB
APPEARANCE CSF: CLEAR
APTT PPP: 31 SEC (ref 22–37)
COLOR CSF: COLORLESS
CREAT SERPL-MCNC: 0.72 MG/DL (ref 0.52–1.04)
ERYTHROCYTE [DISTWIDTH] IN BLOOD BY AUTOMATED COUNT: 13.3 % (ref 10–15)
EV RNA SPEC QL NAA+PROBE: NEGATIVE
GFR SERPL CREATININE-BSD FRML MDRD: 77 ML/MIN/1.7M2
GLUCOSE CSF-MCNC: 57 MG/DL (ref 40–70)
GRAM STN SPEC: NORMAL
GRAM STN SPEC: NORMAL
HCT VFR BLD AUTO: 39.2 % (ref 35–47)
HGB BLD-MCNC: 12.6 G/DL (ref 11.7–15.7)
INR PPP: 1.03 (ref 0.86–1.14)
Lab: NORMAL
MCH RBC QN AUTO: 29.5 PG (ref 26.5–33)
MCHC RBC AUTO-ENTMCNC: 32.1 G/DL (ref 31.5–36.5)
MCV RBC AUTO: 92 FL (ref 78–100)
MISCELLANEOUS TEST: NORMAL
PLATELET # BLD AUTO: 187 10E9/L (ref 150–450)
PROT CSF-MCNC: 58 MG/DL (ref 15–60)
RBC # BLD AUTO: 4.27 10E12/L (ref 3.8–5.2)
RBC # CSF MANUAL: 812 /UL (ref 0–2)
RESULT: NORMAL
SEND OUTS MISC TEST CODE: NORMAL
SEND OUTS MISC TEST SPECIMEN: NORMAL
SPECIMEN SOURCE: NORMAL
SPECIMEN SOURCE: NORMAL
TEST NAME: NORMAL
TUBE # CSF: 3 #
WBC # BLD AUTO: 6 10E9/L (ref 4–11)
WBC # CSF MANUAL: 0 /UL (ref 0–5)

## 2017-10-25 PROCEDURE — 82784 ASSAY IGA/IGD/IGG/IGM EACH: CPT | Performed by: STUDENT IN AN ORGANIZED HEALTH CARE EDUCATION/TRAINING PROGRAM

## 2017-10-25 PROCEDURE — 84999 UNLISTED CHEMISTRY PROCEDURE: CPT | Performed by: STUDENT IN AN ORGANIZED HEALTH CARE EDUCATION/TRAINING PROGRAM

## 2017-10-25 PROCEDURE — 95951 ZZHC EEG VIDEO < 12 HR: CPT | Mod: 52,ZF

## 2017-10-25 PROCEDURE — 25000132 ZZH RX MED GY IP 250 OP 250 PS 637: Performed by: STUDENT IN AN ORGANIZED HEALTH CARE EDUCATION/TRAINING PROGRAM

## 2017-10-25 PROCEDURE — 36415 COLL VENOUS BLD VENIPUNCTURE: CPT | Performed by: STUDENT IN AN ORGANIZED HEALTH CARE EDUCATION/TRAINING PROGRAM

## 2017-10-25 PROCEDURE — 86255 FLUORESCENT ANTIBODY SCREEN: CPT | Performed by: STUDENT IN AN ORGANIZED HEALTH CARE EDUCATION/TRAINING PROGRAM

## 2017-10-25 PROCEDURE — 87070 CULTURE OTHR SPECIMN AEROBIC: CPT | Performed by: STUDENT IN AN ORGANIZED HEALTH CARE EDUCATION/TRAINING PROGRAM

## 2017-10-25 PROCEDURE — 85730 THROMBOPLASTIN TIME PARTIAL: CPT | Performed by: STUDENT IN AN ORGANIZED HEALTH CARE EDUCATION/TRAINING PROGRAM

## 2017-10-25 PROCEDURE — 77003 FLUOROGUIDE FOR SPINE INJECT: CPT

## 2017-10-25 PROCEDURE — 82945 GLUCOSE OTHER FLUID: CPT | Performed by: STUDENT IN AN ORGANIZED HEALTH CARE EDUCATION/TRAINING PROGRAM

## 2017-10-25 PROCEDURE — 83916 OLIGOCLONAL BANDS: CPT | Performed by: STUDENT IN AN ORGANIZED HEALTH CARE EDUCATION/TRAINING PROGRAM

## 2017-10-25 PROCEDURE — 36415 COLL VENOUS BLD VENIPUNCTURE: CPT | Performed by: PSYCHIATRY & NEUROLOGY

## 2017-10-25 PROCEDURE — 25000125 ZZHC RX 250: Performed by: RADIOLOGY

## 2017-10-25 PROCEDURE — 84157 ASSAY OF PROTEIN OTHER: CPT | Performed by: STUDENT IN AN ORGANIZED HEALTH CARE EDUCATION/TRAINING PROGRAM

## 2017-10-25 PROCEDURE — 83519 RIA NONANTIBODY: CPT | Performed by: STUDENT IN AN ORGANIZED HEALTH CARE EDUCATION/TRAINING PROGRAM

## 2017-10-25 PROCEDURE — 85027 COMPLETE CBC AUTOMATED: CPT | Performed by: STUDENT IN AN ORGANIZED HEALTH CARE EDUCATION/TRAINING PROGRAM

## 2017-10-25 PROCEDURE — 82042 OTHER SOURCE ALBUMIN QUAN EA: CPT | Performed by: STUDENT IN AN ORGANIZED HEALTH CARE EDUCATION/TRAINING PROGRAM

## 2017-10-25 PROCEDURE — 82565 ASSAY OF CREATININE: CPT | Performed by: PSYCHIATRY & NEUROLOGY

## 2017-10-25 PROCEDURE — 86341 ISLET CELL ANTIBODY: CPT | Performed by: STUDENT IN AN ORGANIZED HEALTH CARE EDUCATION/TRAINING PROGRAM

## 2017-10-25 PROCEDURE — 82164 ANGIOTENSIN I ENZYME TEST: CPT | Performed by: STUDENT IN AN ORGANIZED HEALTH CARE EDUCATION/TRAINING PROGRAM

## 2017-10-25 PROCEDURE — 87498 ENTEROVIRUS PROBE&REVRS TRNS: CPT | Performed by: STUDENT IN AN ORGANIZED HEALTH CARE EDUCATION/TRAINING PROGRAM

## 2017-10-25 PROCEDURE — 85610 PROTHROMBIN TIME: CPT | Performed by: STUDENT IN AN ORGANIZED HEALTH CARE EDUCATION/TRAINING PROGRAM

## 2017-10-25 PROCEDURE — 82040 ASSAY OF SERUM ALBUMIN: CPT | Performed by: STUDENT IN AN ORGANIZED HEALTH CARE EDUCATION/TRAINING PROGRAM

## 2017-10-25 PROCEDURE — 25000128 H RX IP 250 OP 636: Performed by: STUDENT IN AN ORGANIZED HEALTH CARE EDUCATION/TRAINING PROGRAM

## 2017-10-25 PROCEDURE — 87529 HSV DNA AMP PROBE: CPT | Performed by: STUDENT IN AN ORGANIZED HEALTH CARE EDUCATION/TRAINING PROGRAM

## 2017-10-25 PROCEDURE — 87798 DETECT AGENT NOS DNA AMP: CPT | Performed by: STUDENT IN AN ORGANIZED HEALTH CARE EDUCATION/TRAINING PROGRAM

## 2017-10-25 PROCEDURE — 83520 IMMUNOASSAY QUANT NOS NONAB: CPT | Performed by: STUDENT IN AN ORGANIZED HEALTH CARE EDUCATION/TRAINING PROGRAM

## 2017-10-25 PROCEDURE — 009U3ZX DRAINAGE OF SPINAL CANAL, PERCUTANEOUS APPROACH, DIAGNOSTIC: ICD-10-PCS | Performed by: RADIOLOGY

## 2017-10-25 PROCEDURE — 12000008 ZZH R&B INTERMEDIATE UMMC

## 2017-10-25 PROCEDURE — 87205 SMEAR GRAM STAIN: CPT | Performed by: STUDENT IN AN ORGANIZED HEALTH CARE EDUCATION/TRAINING PROGRAM

## 2017-10-25 PROCEDURE — 89050 BODY FLUID CELL COUNT: CPT | Performed by: STUDENT IN AN ORGANIZED HEALTH CARE EDUCATION/TRAINING PROGRAM

## 2017-10-25 PROCEDURE — 70551 MRI BRAIN STEM W/O DYE: CPT

## 2017-10-25 RX ORDER — LISINOPRIL 10 MG/1
10 TABLET ORAL DAILY
Status: DISCONTINUED | OUTPATIENT
Start: 2017-10-25 | End: 2017-10-26 | Stop reason: HOSPADM

## 2017-10-25 RX ADMIN — ASPIRIN 81 MG CHEWABLE TABLET 81 MG: 81 TABLET CHEWABLE at 11:47

## 2017-10-25 RX ADMIN — Medication 1 TABLET: at 21:36

## 2017-10-25 RX ADMIN — Medication 1000 MG: at 11:47

## 2017-10-25 RX ADMIN — LIDOCAINE HYDROCHLORIDE 10 ML: 10 INJECTION, SOLUTION EPIDURAL; INFILTRATION; INTRACAUDAL; PERINEURAL at 09:45

## 2017-10-25 RX ADMIN — VITAMIN D, TAB 1000IU (100/BT) 1000 UNITS: 25 TAB at 11:48

## 2017-10-25 RX ADMIN — LISINOPRIL 10 MG: 10 TABLET ORAL at 12:01

## 2017-10-25 RX ADMIN — VITAMIN B12 0.1 MG ORAL TABLET 100 MCG: 0.1 TABLET ORAL at 11:47

## 2017-10-25 RX ADMIN — THIAMINE HYDROCHLORIDE 100 MG: 100 INJECTION, SOLUTION INTRAMUSCULAR; INTRAVENOUS at 11:46

## 2017-10-25 RX ADMIN — Medication 1 TABLET: at 11:48

## 2017-10-25 ASSESSMENT — VISUAL ACUITY
OU: NORMAL ACUITY

## 2017-10-25 NOTE — PROCEDURES
EEG #: -2      DATE OF RECORDING: 10/24/2017.      DURATION OF RECORDIN hours and 42 minutes      DAY #2 OF VIDEO-EEG MONITORING      CLINICAL HISTORY:  Constance Connelly is an 87-year-old female with a history of multiple medical problems, presented with altered mental status, word-finding difficulties and hallucinations.  EEG was requested for evaluation for seizures.      CURRENT MEDICATIONS:  No anti-seizure medications.      TECHNICAL SUMMARY: This continuous video- EEG monitoring procedure was performed with 23 scalp electrodes in 10-20 electrode system placements, and additional scalp, precordial and other surface electrodes used for electrical referencing and artifact detection.  Video monitoring was utilized and periodically reviewed by EEG technologists and the physician for electroclinical correlations.     BACKGROUND ACTIVITIES:  The background activities of this EEG consisted of mild to moderate generalized slowing of the background activities.  No well defined posterior dominant rhythm was observed during this recording.  There were intermittent bilateral temporal theta delta slowing, more over the left temporal region, both during wakefulness and sleep.     Hyperventilation and photic stimulation were not performed.  Sleep stages were recorded with poorly formed sleep architectures.      No interictal epileptiform activities were observed.      ICTAL ACTIVITIES:  None.      IMPRESSION OF DAY #2 OF VIDEO EEG MONITORING:  This is an abnormal EEG due to the presence of bilateral temporal slowing, more over the left temporal region.  No epileptiform or seizures were observed.         TANGELA CALDWELL MD             D: 10/25/2017 16:14   T: 10/25/2017 17:17   MT: OVIDIO      Name:     CONSTANCE CONNELLY   MRN:      [id number]        Account:        [id number]   :      1930           Procedure Date: 10/24/2017      Document: [id number]

## 2017-10-25 NOTE — PLAN OF CARE
Problem: Patient Care Overview  Goal: Plan of Care/Patient Progress Review  Outcome: Improving  Pt on 6A with confusion, speech difficulties and hallucinations. VSS except HTN; PRN meds for SBP >180. Pt is A&Ox4. Pt has word finding difficulties, difficulties with attention and perseverates on questions (these have improved since yesterday).  Strengths 4/5. Pt stated she was having hallucinations during the evening; per pt the hallucinations happen frequently in the evening. Hallucinations include seeing water, mud/dirt and cords. Video EEG leads intact; no events witnessed overnight. PIV SL. Voiding spontaneously; did have episode overnight of urgency incontinence. Vegetarian diet; poor PO. Up with SBA and walker. BA on. LP attempted yesterday, unsuccessful. Continue to monitor and follow POC.

## 2017-10-25 NOTE — MR AVS SNAPSHOT
After Visit Summary   10/25/2017    Belia Sheets    MRN: [id number]           Patient Information     Date Of Birth          5/16/1930        Visit Information        Provider Department      10/25/2017 7:00 AM [facility] EEG TECH 4 [facility] EEG        Today's Diagnoses     Encephalopathy    -  1    Slurred speech           Follow-ups after your visit        Your next 10 appointments already scheduled     Oct 25, 2017  4:20 PM CDT   Return Visit with Alejandro Sandhu MD   [facility] ([facility])    [address],  [address]  [facility] [address]   [phone]            Feb 07, 2018  3:15 PM CST   (Arrive by 3:00 PM)   RETURN ENDOCRINE with Saurabh Pittman MD   [facility] ([email or web address] and [facility])    [address]  3rd Floor  [facility] [address] [phone]              Who to contact     Please call your clinic at [phone] to:    Ask questions about your health    Make or cancel appointments    Discuss your medicines    Learn about your test results    Speak to your doctor   If you have compliments or concerns about an experience at your clinic, or if you wish to file a complaint, please contact [facility] Patient Relations at [phone] or email us at [email or web address]         Additional Information About Your Visit        MyChart Information     [employer] gives you secure access to your electronic health record. If you see a primary care provider, you can also send messages to your care team and make appointments. If you have questions, please call your primary care clinic.  If you do not have a primary care provider, please call [phone] and they will assist you.      [employer] is an electronic gateway that provides easy, online access to your medical records. With [employer], you can request a clinic appointment, read your test results, renew a prescription or communicate with your  care team.     To access your existing account, please contact your [facility] or call [phone] for assistance.        Care EveryWhere ID     This is your Care EveryWhere ID. This could be used by other organizations to access your [facility] medical records  [id number]         Blood Pressure from Last 3 Encounters:   10/25/17 (!) 178/106   09/13/17 105/70   08/02/17 (!) 133/97    Weight from Last 3 Encounters:   10/23/17 41.5 kg (91 lb 9.6 oz)   09/13/17 41.5 kg (91 lb 6.4 oz)   08/02/17 41.5 kg (91 lb 9.6 oz)              Today, you had the following     No orders found for display         Today's Medication Changes      Notice     This visit is during an admission. Changes to the med list made in this visit will be reflected in the After Visit Summary of the admission.             Primary Care Provider Office Phone # Fax #    Alejandro Sandhu [phone]       [address] [place] [address]        Equal Access to Services     [facility] : Hadii shivam dominguez hadasho Soomaali, waaxda luqadaha, qaybta kaalmada adeegyachris, ivana tyler . So [facility] [phone].    ATENCIÓN: Si habla español, tiene a benavides disposición servicios gratuitos de asistencia lingüística. Llame al [phone].    We comply with applicable federal civil rights laws and [place] laws. We do not discriminate on the basis of race, color, national origin, age, disability, sex, sexual orientation, or gender identity.            Thank you!     Thank you for choosing [facility]  for your care. Our goal is always to provide you with excellent care. Hearing back from our patients is one way we can continue to improve our services. Please take a few minutes to complete the written survey that you may receive in the mail after your visit with us. Thank you!             Your Updated Medication List - Protect others around you: Learn how to safely use, store and throw away your  medicines at www.disposemymeds.org.      Notice     This visit is during an admission. Changes to the med list made in this visit will be reflected in the After Visit Summary of the admission.

## 2017-10-25 NOTE — PROCEDURES
EEG #: -3        DATE OF RECORDING: 10/25/2017      DURATION OF RECORDIN hours and 1 minute      DAY #3 OF VIDEO-EEG MONITORING on Belia Sheets.      CLINICAL HISTORY:  Belia Sheets is an 87-year-old female with a history of multiple medical problems, presented with altered mental status, word-finding difficulties and hallucinations.  EEG was requested for evaluation for seizures.      CURRENT MEDICATIONS:  No anti-seizure medications.      TECHNICAL SUMMARY: This continuous video- EEG monitoring procedure was performed with 23 scalp electrodes in 10-20 electrode system placements, and additional scalp, precordial and other surface electrodes used for electrical referencing and artifact detection.  Video monitoring was utilized and periodically reviewed by EEG technologists and the physician for electroclinical correlations.     BACKGROUND ACTIVITIES:  The background activities of this EEG consisted of mild to moderate generalized slowing of the background activities.  No well defined posterior dominant rhythm was observed during this recording.  There were intermittent bilateral temporal theta delta slowing, more over the left temporal region, both during wakefulness and sleep.     Hyperventilation and photic stimulation were not performed.  Sleep stages were recorded with poorly formed sleep architectures.      No interictal epileptiform activities were observed.      ICTAL ACTIVITIES:  None.      IMPRESSION OF DAY #3 OF VIDEO EEG MONITORING:  This is an abnormal EEG due to the presence of bilateral temporal slowing, more over the left temporal region.  No epileptiform or seizures were observed.     SUMMARY OF 3 DAYS OF VIDEO EEG MONITORING:  This is an abnormal EEG due to the presence of bilateral temporal slowing, more over the left temporal region.  No epileptiform or seizures were observed.           TANGELA CALDWELL MD             D: 10/25/2017 16:16   T: 10/25/2017 17:26   MT: OVIDIO      Name:     MARICEL  CONSTANCE   MRN:      [id number]        Account:        [id number]   :      1930           Procedure Date: 10/25/2017      Document: [id number]

## 2017-10-25 NOTE — TELEPHONE ENCOUNTER
Forteo discontinued during hospitalization.   Reassured daughter Lubna that FORTEO can be resumed after patient is discharged home and stable. She verbalized understanding.

## 2017-10-25 NOTE — PROGRESS NOTES
[facility], [place]   Neurology Daily Note        Summary:   This patient is a 87 year old  female  With phx of CPPD and osteoporosis who presents for evaluation of word finding difficulty and hallucinations.        Overnight:   No acute events overnight. She continues to have hallucinations. No pain. Continues to have trouble articulating her concerns.  We described the need for spinal tap and its risks with Ms. Sheets who was agreeable, but we were concerned about capability to give consent.  We spoke with the daughter this afternoon who consented to the tap.                 Medications:     Current Facility-Administered Medications   Medication     teriparatide (recombinant) (FORTEO) injection 20 mcg     fish oil-omega-3 fatty acids capsule 1,000 mg     cyanocolbalamin (vitamin  B-12) tablet 100 mcg     cholecalciferol (vitamin D3) tablet 1,000 Units     calcium-vitamin D (CALTRATE) 600-400 MG-UNIT per tablet 1 tablet     aspirin chewable tablet 81 mg     naloxone (NARCAN) injection 0.1-0.4 mg     enoxaparin (LOVENOX) injection 40 mg     senna-docusate (SENOKOT-S;PERICOLACE) 8.6-50 MG per tablet 1-2 tablet     senna-docusate (SENOKOT-S;PERICOLACE) 8.6-50 MG per tablet 1-2 tablet     magnesium hydroxide (MILK OF MAGNESIA) suspension 30 mL     bisacodyl (DULCOLAX) Suppository 10 mg     ondansetron (ZOFRAN-ODT) ODT tab 4 mg    Or     ondansetron (ZOFRAN) injection 4 mg     prochlorperazine (COMPAZINE) injection 5 mg    Or     prochlorperazine (COMPAZINE) tablet 5 mg    Or     prochlorperazine (COMPAZINE) Suppository 12.5 mg     thiamine (B-1) injection 100 mg     hydrALAZINE (APRESOLINE) injection 10 mg            Exam     BP (!) 174/102  Pulse 79  Temp 96.5  F (35.8  C) (Axillary)  Resp 16  Wt 41.5 kg (91 lb 9.6 oz)  SpO2 94%  BMI 18.5 kg/m2BMI    Constitutional : well-built  Head: atraumatic, anicteric. See neuroexam  Eyes: see neuroexam  CVC: RRR, no extra heart sounds or  murmurs  LUng: Clear. On room air. No respiratory distress.   Adomen: soft, non tender, bowel movements heard  Extremities: Pulses preserved in four extremities. No edema. Well perfused  Psychiatry: in good mood. Collaborative  Neuroexam  Alert and oriented to place, date, self.  Follows most simple commands.  She still has significant word finding difficulty and perseverates frequently.    Eyes: motility preserved on horizontal and vertical axis, no nystagmus  PERRL.  Face movement and sensation symmetric.  Palatal elevation symmetric.  Shoulder shrug 5/5 bilaterally.  Tongue protrusion midline  No dysmetria (arms and legs tested)  Strength 5/5 throughout.  Reflexes 2+ symmetric throughout.  Sensory exam to light touch: preserved               Data:     Results for orders placed or performed during the hospital encounter of 10/23/17 (from the past 24 hour(s))   Ammonia   Result Value Ref Range    Ammonia <10 (L) 10 - 50 umol/L   Creatinine   Result Value Ref Range    Creatinine 0.75 0.52 - 1.04 mg/dL    GFR Estimate 73 >60 mL/min/1.7m2    GFR Estimate If Black 88 >60 mL/min/1.7m2   Folate   Result Value Ref Range    Folate 16.7 >5.4 ng/mL   Platelet count   Result Value Ref Range    Platelet Count 215 150 - 450 10e9/L   T3 Free   Result Value Ref Range    Free T3 3.1 2.3 - 4.2 pg/mL   T4 free   Result Value Ref Range    T4 Free 1.27 0.76 - 1.46 ng/dL   Vitamin B12   Result Value Ref Range    Vitamin B12 952 193 - 986 pg/mL   Basic metabolic panel   Result Value Ref Range    Sodium 139 133 - 144 mmol/L    Potassium 3.7 3.4 - 5.3 mmol/L    Chloride 104 94 - 109 mmol/L    Carbon Dioxide 28 20 - 32 mmol/L    Anion Gap 7 3 - 14 mmol/L    Glucose 87 70 - 99 mg/dL    Urea Nitrogen 19 7 - 30 mg/dL    Creatinine 0.66 0.52 - 1.04 mg/dL    GFR Estimate 85 >60 mL/min/1.7m2    GFR Estimate If Black >90 >60 mL/min/1.7m2    Calcium 8.8 8.5 - 10.1 mg/dL   CBC with platelets   Result Value Ref Range    WBC 6.0 4.0 - 11.0 10e9/L     RBC Count 4.04 3.8 - 5.2 10e12/L    Hemoglobin 12.0 11.7 - 15.7 g/dL    Hematocrit 36.3 35.0 - 47.0 %    MCV 90 78 - 100 fl    MCH 29.7 26.5 - 33.0 pg    MCHC 33.1 31.5 - 36.5 g/dL    RDW 13.3 10.0 - 15.0 %    Platelet Count 194 150 - 450 10e9/L   TSH   Result Value Ref Range    TSH 1.12 0.40 - 4.00 mU/L              Assessment and Plan:   #Aphasia  #Hallucinations  #Encephalopathy  Her aphasia is most readily apparent but she also has significant difficulties with attention and perseverates on questions.  She has no evidence of acute stroke on MRI, and a lesion would be expected given the duration of her symtoms.  She appears malnourished and is at risk for nutritional deficiencies, which were initially high on differential.  B12, folate, thyroid studies, ammonia normal.  Questionable MRI with possible cortical ribboning, though radiology thinks it is most likely artifact.  We will repeat the MRI with 3 duy tomorrow.  -Thiamine 100 mg IV daily  -Thiamine level pending  -Continue EEG  -Continue home B-12 supplements  -LP today, will add oligoclonal bands, JON virus, RT quic, and 1433  -Serum and CSF ACE  -Repeat MRI tomorrow     #Hypertension  Per her son, she is hypertensive outpatient.  Has been in the 180s.  -Hydralazine PRN     #Osteoporosis  #CPPD  -Continue home caltrate  -Continue home vitamin D  -continue home forteo         # Ppx: Lovenox, Senna  # FEN: Advance as tolerated  # Code: Full                 Arun Ortiz    PGY2 NEurology  [id number]    ATTENDING 10/24/17: Patient seen. Findings unchanged. Agree with plan as outlined by Dr Ortiz. Homer Pulido MD

## 2017-10-25 NOTE — PROCEDURES
[facility], [place]     Neuroradiology Procedure Note     Lumbar Puncture Under Fluoroscopic Guidance:      10/25/2017 10:17 AM    Performed by: Jay Jay Schumacher MD  Authorized by: John Schumacher MD    Indications: confusion    Consent given by: Patient who states understanding of the procedure being performed after discussing the risks, benefits and alternatives.    Prior to the start of the procedure and with procedural staff participation, I verbally confirmed the patient s identity using two indicators, relevant allergies, that the procedure was appropriate and matched the consent or emergent situation, and that the correct equipment/implants were available. Immediately prior to starting the procedure I conducted the Time Out with the procedural staff and re-confirmed the patient s name, procedure, and site/side. (The Joint Commission universal protocol was followed.) Yes    Procedure:    Under sterile conditions the patient was positioned lying prone. Using fluoroscopy, needle entrance side was defined.  Betadine solution and sterile drapes were utilized.  Local anesthetic at the site: 3 ml of lidocaine 1% without epinephrine.    A 22 gauge 3.5 inch spinal needle was inserted at the L3-L4 interspace.  A total of 13 mL of clear and colorless spinal fluid was obtained and sent to the laboratory.   After the needle was removed, a bandaid and pressure were applied and the patient was instructed to stay horizontal until the results were back.    Complications:  None  Patient tolerance: Patient tolerated the procedure well with no immediate complications.    Condition: Stable Patient is transferred to Inpatient unit for post procedure observation.    Jay Jay Schumacher 10/25/2017 10:17 AM

## 2017-10-25 NOTE — PROGRESS NOTES
[facility], [place]   Neurology Daily Note    Belia Sheets  [id number]  10/25/2017    Subjective Data:  NAEO. Unable to obtain CSF during LP yesterday, plan for IR to perform with guidance today. Continues to endorse visual hallucinations of water, rain, dirt, mud around the room which last for a few seconds at a time and seem to occur randomly. Remains with difficulty with finding words. Denies other confusion, numbness, tingling, weakness.      Objective Data:   BP (!) 153/106 (BP Location: Left arm)  Pulse 62  Temp 96.7  F (35.9  C) (Oral)  Resp 16  Wt 41.5 kg (91 lb 9.6 oz)  SpO2 98%  BMI 18.5 kg/m2  SpO2 data collected on RA    Constitutional : well-built  Head: atraumatic, anicteric. See neuroexam  Eyes: see neuroexam  CVC: RRR, no extra heart sounds or murmurs  LUng: Clear. On room air. No respiratory distress.   Adomen: soft, non tender, bowel movements heard  Extremities: Pulses preserved in four extremities. No edema. Well perfused  Psychiatry: in good mood. Collaborative    Neurologic:  Alert and oriented to place, date, self.  Follows most simple commands.  She still has significant word finding difficulty and perseverates frequently.    Eyes: motility preserved on horizontal and vertical axis, no nystagmus  PERRL.  Face movement and sensation symmetric.  Palatal elevation symmetric.  Shoulder shrug 5/5 bilaterally.  Tongue protrusion midline  No dysmetria (arms and legs tested)  Strength 5/5 throughout.  Reflexes 2+ symmetric throughout.  Sensory exam to light touch: preserved     Labs:    Results for orders placed or performed during the hospital encounter of 10/23/17 (from the past 24 hour(s))   INR   Result Value Ref Range    INR 1.03 0.86 - 1.14   Partial thromboplastin time   Result Value Ref Range    PTT 31 22 - 37 sec   CBC with platelets   Result Value Ref Range    WBC 6.0 4.0 - 11.0 10e9/L    RBC Count 4.27 3.8 - 5.2 10e12/L    Hemoglobin 12.6 11.7 - 15.7  g/dL    Hematocrit 39.2 35.0 - 47.0 %    MCV 92 78 - 100 fl    MCH 29.5 26.5 - 33.0 pg    MCHC 32.1 31.5 - 36.5 g/dL    RDW 13.3 10.0 - 15.0 %    Platelet Count 187 150 - 450 10e9/L   Glucose CSF: Tube 1   Result Value Ref Range    Glucose CSF 57 40 - 70 mg/dL   Protein total CSF: Tube 1   Result Value Ref Range    Protein Total CSF 58 15 - 60 mg/dL   Cell count with differential CSF: Tube 4   Result Value Ref Range    WBC CSF 0 0 - 5 /uL    RBC  (H) 0 - 2 /uL    Tube Number 3 #    Color CSF Colorless CLRL^Colorless    Appearance CSF Clear CLER^Clear   Gram stain CSF Tube 2   Result Value Ref Range    Specimen Description Cerebrospinal fluid     Special Requests Received in anaerobic tubes.     Gram Stain PENDING    CSF Culture Aerobic Bacterial Tube 2   Result Value Ref Range    Specimen Description Cerebrospinal fluid     Special Requests Received in anaerobic tubes.     Culture Micro PENDING        Imaging: EEG: Bilateral temporal slowing most pronounced over left temporal region. No epileptiform activity.     Assessment and Plan:  In summary this is an 86yo f with PMHx including CPPD, osteoporosis, HTN who presented with sudden onset word finding difficulties and hallucinations. MRI negative for stroke, no nutritional deficiencies.     #Encephalopathy  #Aphasia  #Visual hallucinations    Thus far workup has been negative with normal MRI and normal labs. EEG findings nonspecific for age. B12, folate, thyroid studies, ammonia all normal. Will investigate CSF to further characterize etiology.   Given her continue encephalopathy, we will have both PT and OT see her to determine if she will need a higher level of care on discharge.  - Repeat MRI with 3 duy today   - Repeat LP with IR guidance: send for oligoclonal bands, JON virus, RT quic, and 1433  - Serum and CSF ACE  - OK to discontinue EEG.  - PT/OT    #Hypertension. SBP's have been trending 170s. Has not been requiring PRN hydralazine.   - Hydralazine  PRN for SBP >180  - Start Lisinopril 10mg/day  - CBC and BMP tomorrow    #Osteoporosis  #CPPD  - Continue PTA caltrate  - Continue PTA vitamin D  - Continue PTA forteo      FEN: Tolerating regular diet.   Prophylaxis: Lovenox  Code Status: Full    Dispo: Anticipate discharge in 1-2 more days pending medical stability.    Patient was seen and discussed with attending neurologist, Dr. Pulido.    Note created with the help of Olu Deshpande, MS4    Arun Ortiz MD  Neurology PGY2    ATTENDING 10/25/17: Patient seen. Met with son Delmar who feels some improvement since admit. Discussed plan and ultimate disposition as some of CSF testing may take a couple weeks to come back. Agree with Dr Ortiz' note. Homer Pulido MD.

## 2017-10-26 VITALS
RESPIRATION RATE: 16 BRPM | SYSTOLIC BLOOD PRESSURE: 165 MMHG | TEMPERATURE: 96 F | OXYGEN SATURATION: 94 % | DIASTOLIC BLOOD PRESSURE: 98 MMHG | WEIGHT: 91.6 LBS | BODY MASS INDEX: 18.5 KG/M2 | HEART RATE: 56 BPM

## 2017-10-26 LAB
ACE SERPL-CCNC: 32 U/L (ref 9–67)
ANION GAP SERPL CALCULATED.3IONS-SCNC: 6 MMOL/L (ref 3–14)
BUN SERPL-MCNC: 24 MG/DL (ref 7–30)
CALCIUM SERPL-MCNC: 9.1 MG/DL (ref 8.5–10.1)
CHLORIDE SERPL-SCNC: 106 MMOL/L (ref 94–109)
CO2 SERPL-SCNC: 29 MMOL/L (ref 20–32)
CREAT SERPL-MCNC: 0.64 MG/DL (ref 0.52–1.04)
ERYTHROCYTE [DISTWIDTH] IN BLOOD BY AUTOMATED COUNT: 13.3 % (ref 10–15)
GFR SERPL CREATININE-BSD FRML MDRD: 88 ML/MIN/1.7M2
GLUCOSE SERPL-MCNC: 93 MG/DL (ref 70–99)
HCT VFR BLD AUTO: 38.5 % (ref 35–47)
HGB BLD-MCNC: 12.5 G/DL (ref 11.7–15.7)
HSV1 DNA CSF QL NAA+PROBE: NOT DETECTED
HSV2 DNA CSF QL NAA+PROBE: NOT DETECTED
MCH RBC QN AUTO: 29.8 PG (ref 26.5–33)
MCHC RBC AUTO-ENTMCNC: 32.5 G/DL (ref 31.5–36.5)
MCV RBC AUTO: 92 FL (ref 78–100)
METHYLMALONATE SERPL-SCNC: 0.16 UMOL/L (ref 0–0.4)
MICROBIOLOGIST REVIEW: NORMAL
PLATELET # BLD AUTO: 182 10E9/L (ref 150–450)
POTASSIUM SERPL-SCNC: 3.6 MMOL/L (ref 3.4–5.3)
RBC # BLD AUTO: 4.19 10E12/L (ref 3.8–5.2)
SODIUM SERPL-SCNC: 141 MMOL/L (ref 133–144)
WBC # BLD AUTO: 6.3 10E9/L (ref 4–11)

## 2017-10-26 PROCEDURE — 25000128 H RX IP 250 OP 636: Performed by: STUDENT IN AN ORGANIZED HEALTH CARE EDUCATION/TRAINING PROGRAM

## 2017-10-26 PROCEDURE — 25000132 ZZH RX MED GY IP 250 OP 250 PS 637: Performed by: STUDENT IN AN ORGANIZED HEALTH CARE EDUCATION/TRAINING PROGRAM

## 2017-10-26 PROCEDURE — 85027 COMPLETE CBC AUTOMATED: CPT | Performed by: STUDENT IN AN ORGANIZED HEALTH CARE EDUCATION/TRAINING PROGRAM

## 2017-10-26 PROCEDURE — 80048 BASIC METABOLIC PNL TOTAL CA: CPT | Performed by: STUDENT IN AN ORGANIZED HEALTH CARE EDUCATION/TRAINING PROGRAM

## 2017-10-26 PROCEDURE — 36415 COLL VENOUS BLD VENIPUNCTURE: CPT | Performed by: STUDENT IN AN ORGANIZED HEALTH CARE EDUCATION/TRAINING PROGRAM

## 2017-10-26 RX ORDER — LISINOPRIL 10 MG/1
10 TABLET ORAL DAILY
Qty: 30 TABLET | Refills: 3 | Status: ON HOLD | OUTPATIENT
Start: 2017-10-26 | End: 2018-01-09

## 2017-10-26 RX ADMIN — ASPIRIN 81 MG CHEWABLE TABLET 81 MG: 81 TABLET CHEWABLE at 08:24

## 2017-10-26 RX ADMIN — Medication 1 TABLET: at 08:24

## 2017-10-26 RX ADMIN — THIAMINE HYDROCHLORIDE 100 MG: 100 INJECTION, SOLUTION INTRAMUSCULAR; INTRAVENOUS at 08:25

## 2017-10-26 RX ADMIN — LISINOPRIL 10 MG: 10 TABLET ORAL at 08:24

## 2017-10-26 RX ADMIN — VITAMIN D, TAB 1000IU (100/BT) 1000 UNITS: 25 TAB at 08:24

## 2017-10-26 RX ADMIN — SENNOSIDES AND DOCUSATE SODIUM 1 TABLET: 8.6; 5 TABLET ORAL at 08:25

## 2017-10-26 RX ADMIN — VITAMIN B12 0.1 MG ORAL TABLET 100 MCG: 0.1 TABLET ORAL at 08:24

## 2017-10-26 RX ADMIN — Medication 1000 MG: at 08:24

## 2017-10-26 ASSESSMENT — VISUAL ACUITY
OU: NORMAL ACUITY
OU: NORMAL ACUITY

## 2017-10-26 NOTE — PLAN OF CARE
Problem: Confusion, Acute (Adult)  Goal: Identify Related Risk Factors and Signs and Symptoms  Related risk factors and signs and symptoms are identified upon initiation of Human Response Clinical Practice Guideline (CPG).   Outcome: Adequate for Discharge Date Met:  10/26/17  VSS except HTN, pt given PO lisinopril w/AM meds. Neuro's intact except forgetfulness. Denies hallucinations and no word finding difficulty noted or reported. Baseline n/t to hands and feet. Tolerating a reg/vegetarian diet w/good intake. No BM today, bowel meds given this morning. Voiding spont.  LP site from 10/25, CDI. No c/o pain. Up with SBA, GB, walker. Pt transported back to assisted living with son. Discharge instructions given and questions answered.

## 2017-10-26 NOTE — PLAN OF CARE
Problem: Patient Care Overview  Goal: Plan of Care/Patient Progress Review  /82 (BP Location: Left arm)  Pulse 62  Temp 96.7  F (35.9  C) (Oral)  Resp 16  Wt 41.5 kg (91 lb 9.6 oz)  SpO2 94%  BMI 18.5 kg/m2     VSS on RA. Neuro - forgetful, subtle difficulty finding words @ times. Numbness in hands and feet @ baseline. Tolerating a regular/vegetarian diet. No BM overnight. Voiding adequately, continent overnight. LP site from yesterday c/d/I. No c/o pain. Up with SBA, GB, walker. Plan to go back to assisted living today pending d/c approval from neurology. Will cont to monitor, follow POC, and update team with any changes.

## 2017-10-26 NOTE — PLAN OF CARE
Problem: Patient Care Overview  Goal: Plan of Care/Patient Progress Review  OT 6A: OT eval initiated however orders discontinued during session. OT eval ceased and resident aware.

## 2017-10-26 NOTE — PROGRESS NOTES
Care Coordinator Progress Note     Admission Date/Time:  10/23/2017  Attending MD:  Dr Homer Pulido     Data  Chart reviewed, discussed with interdisciplinary team. Dr Pulido and Dr Ortiz reported pt can discharge home today. I spoke with son, Delmar yesterday and he was going to speak with the [facility] staff about checking on pt more frequently.   Delmar said he is leaving for [place] in a couple weeks and won't be back for months (unless absolutely necessary). Pt's daughter, Lubna lives in [place], [place].     Coordination of Care and Referrals   Met with pt and son, Delmar this afternoon. Delmar said he hasn't spoken with the [facility] staff yet, but the doctor did.   I suggested skilled home care nurse visit to assess pt in her home and medication management. Pt and Delmar were in agreement. They did not have a preference for a home care agency. Delmar thought a referral to [facility] was appropriate.  Pt's primary is Dr Sandhu at [facility].   Confirmed pt's home address and cell phone number.   Home care orders entered on the AVS.  Referral for skilled home care RN sent to [facility].       Assessment  Ready for discharge today.      Plan  Anticipated Discharge Date:  Today  Anticipated Discharge Plan:   Discharge home.  --Family will speak with [facility] staff about increasing checks on pt.  --[facility] for skilled care RN.       Tamiko Stevens, RN Care Coordinator  [address], [facility]

## 2017-10-27 ENCOUNTER — CARE COORDINATION (OUTPATIENT)
Dept: CARE COORDINATION | Facility: CLINIC | Age: 82
End: 2017-10-27

## 2017-10-27 LAB
1433 PROTEIN CSF: <2 NG/ML
ALB CSF/SERPL: 10.4 RATIO (ref 0–9)
ALBUMIN CSF-MCNC: 35 MG/DL (ref 0–35)
ALBUMIN SERPL-MCNC: 3370 MG/DL (ref 3500–5200)
IGG CSF-MCNC: 6 MG/DL (ref 0–6)
IGG SERPL-MCNC: 936 MG/DL (ref 768–1632)
IGG SYNTH RATE SER+CSF CALC-MRATE: 5.3 MG/D
IGG/ALB CLEAR SER+CSF-RTO: 0.62 RATIO (ref 0.28–0.66)
IGG/ALB CSF: 0.17 RATIO (ref 0.09–0.25)
OLIGOCLONAL BANDS CSF ELPH-IMP: ABNORMAL
OLIGOCLONAL BANDS CSF ELPH-IMP: NEGATIVE
OLIGOCLONAL BANDS CSF IEF: 0 BANDS (ref 0–1)

## 2017-10-27 NOTE — PROCEDURES
[facility] Procedure Note          Lumbar Puncture:      Time: 2:00 PM  Performed by: Arun Ortiz  Authorized by: Arun Ortiz    Indications: confusion    Consent given by: Patient and Family who states understanding of the procedure being performed after discussing the risks, benefits and alternatives.    Under sterile conditions the patient was positioned L Lateral decubitus with knees drawn up. Betadine solution and sterile drapes were utilized.  Local anesthetic at the site: 2 ml of lidocaine 1% without epinephrine from the LP tray  A 21 G  spinal needle was inserted at the L 3-4 interspace.    The spinal tap was unsuccessful, and no CSF was obtained.  After the needle was removed, a bandaid and pressure were applied and the patient was instructed to stay horizontal until the results were back.    Complications:  None    Patient tolerance: Patient tolerated the procedure well with no immediate complications.

## 2017-10-27 NOTE — DISCHARGE SUMMARY
[facility], [place]    Neurology Discharge Summary    Date of Admission: 10/23/2017  Date of Discharge: October 26, 2017    Disposition: Discharged to home  Primary Care Physician: Alejandro Sandhu     Admission Diagnosis:   Aphasia, Encephalopathy, Hallucinations    Discharge Diagnosis:   Aphasia, Encephalopathy, Hallucinations  Severe Malnutrion in context of acute illness    Problem Leading to Hospitalization (from HPI):   Ms. Sheets is an 87 year old female with h/o CPPD and osteoporosis who presented 10/23 with about 3-7 days of word finding difficulty and hallucinations.    Please see H&P dated for further details about presentation.    Brief Hospital Course:   Ms. Sheets was admitted 10/23 with plan for metabolic work up.  Her vitamin and electrolyte levels returned quickly and were normal.  She was placed on EEG.  On day 2 of hospitalization she was not improved.  EEG did not show seizures.  Initial MRI(1.5 T) showed possibly cortical ribboning, though radiology said it was artifact.  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  We attempted to perform LP but were unsuccessful.  She went to IR for the lumbar puncture and also had a 3T MRI done.  MRI was unrevealing as has the CSF thus far.  She was markedly improved on 10/26, and the team felt that it was safe to discharge home.  She was sent back to her facility with the request that she have daily safety checks(we verified that the facility can do this).  Follow up appointment was ordered with Dr. Pulido.    Patient seen by dietary/nutrition on 10/24/17 who concluded severe malnutrition in context of acute illness. This was discussed with patient. Suggested taking more than 1 meal at her care center.      PERTINENT INVESTIGATIONS    Labs  Lab Results   Component Value Date    WBC 6.3 10/26/2017     Lab Results   Component Value Date    RBC 4.19 10/26/2017     Lab Results   Component Value Date    HGB 12.5 10/26/2017     Lab Results   Component  Value Date    HCT 38.5 10/26/2017     No components found for: MCT  Lab Results   Component Value Date    MCV 92 10/26/2017     Lab Results   Component Value Date    MCH 29.8 10/26/2017     Lab Results   Component Value Date    MCHC 32.5 10/26/2017     Lab Results   Component Value Date    RDW 13.3 10/26/2017     Lab Results   Component Value Date     10/26/2017     Last Basic Metabolic Panel:  Lab Results   Component Value Date     10/26/2017      Lab Results   Component Value Date    POTASSIUM 3.6 10/26/2017     Lab Results   Component Value Date    CHLORIDE 106 10/26/2017     Lab Results   Component Value Date    BOY 9.1 10/26/2017     Lab Results   Component Value Date    CO2 29 10/26/2017     Lab Results   Component Value Date    BUN 24 10/26/2017     Lab Results   Component Value Date    CR 0.64 10/26/2017     Lab Results   Component Value Date    GLC 93 10/26/2017         Imaging:   MRI:   No evidence of acute infarction or intracranial hemorrhage.      PHYSICAL EXAMINATION  Constitutional : well-built  Head: atraumatic, anicteric. See neuroexam  Eyes: see neuroexam  CVC: S1/S2 sinusal no murmurs  LUng: Clear. On room air. No respiratory distress.   Adomen: soft, non tender, bowel movements heard  Extremities: Pulses preserved in four extremities. No edema. Well perfused  Psychiatry: in good mood. Collaborative  Neuroexam  Alert and oriented to place, date, self and reasons of hospitalization.  Follow commands  Language markedly improved since night of admission.  PERRL, EOMI, facial sensation and movement intact. Palatal elevation symmetric.   Tongue centered  No dysmetria (arms and legs tested)  Strength: Appropriate for age in upper and lower extremities bilaterally  Reflexes preserved  Sensory exam to light touch: preserved   No aphasia  No dysarthria    Additional recommendations and follow up:       Review of your medicines      START taking       Dose / Directions    lisinopril 10 MG  tablet   Commonly known as:  PRINIVIL/ZESTRIL   Used for:  Benign essential hypertension        Dose:  10 mg   Take 1 tablet (10 mg) by mouth daily   Quantity:  30 tablet   Refills:  3       Thiamine HCl 50 MG Caps   Used for:  Confusion        Dose:  1 capsule   Take 1 capsule by mouth daily   Quantity:  60 capsule   Refills:  3         CONTINUE these medicines which have NOT CHANGED       Dose / Directions    alpha-d-galactosidase tablet        Take by mouth as needed for intestinal gas   Refills:  0       calcium-vitamin D 600-400 MG-UNIT per tablet   Commonly known as:  CALTRATE        Dose:  1 tablet   Take 1 tablet by mouth 2 times daily   Refills:  0       cholecalciferol 1000 UNITS capsule   Commonly known as:  vitamin  -D   Used for:  Age-related osteoporosis with current pathological fracture with routine healing, subsequent encounter        Dose:  1 capsule   Take 1 capsule (1,000 Units) by mouth daily   Quantity:  100 capsule   Refills:  3       loperamide 2 MG tablet   Commonly known as:  IMODIUM A-D        Dose:  2 mg   Take 2 mg by mouth 4 times daily as needed   Refills:  0       OMEGA-3 FISH OIL PO        Dose:  2 capsule   Take 2 capsules by mouth daily   Refills:  0       order for DME   Used for:  Closed fracture of multiple ribs with routine healing, unspecified laterality, subsequent encounter        Equipment being ordered: Incentive spirometry   Quantity:  1 each   Refills:  0       sodium fluoride dental gel 1.1 % Gel topical gel   Commonly known as:  PREVIDENT        Apply to affected area At Bedtime   Refills:  0       teriparatide (recombinant) 600 MCG/2.4ML Soln injection   Commonly known as:  FORTEO   Used for:  Age-related osteoporosis with current pathological fracture, initial encounter        Dose:  20 mcg   Inject 0.08 mLs (20 mcg) Subcutaneous daily   Quantity:  6 mL   Refills:  1       UNKNOWN TO PATIENT        Vision formula Vit A, C & E / Lutein/Minerals   Refills:  0        VITAMIN B 12 PO        Dose:  1 tablet   Take 1 tablet by mouth daily   Refills:  0            Where to get your medicines      These medications were sent to [facility] - [place], [place] - [address]  [address] [place] [address]     Phone:  [phone]      lisinopril 10 MG tablet     Thiamine HCl 50 MG Caps             Neurology Adult Referral     Home care nursing referral     Reason for your hospital stay   You were hospitalized for word finding difficulty and hallucinations.     Adult [facility] Follow-up and recommended labs and tests   Follow up with primary care provider, Alejandro Sandhu, within 7 days to evaluate medication change and for hospital follow- up.  The following labs/tests are recommended: CBC, BMP.      Please schedule your appointment with Dr. Pulido in Neurology    Appointments on [place] and/or [facility] (with [facility] or [facility] provider or service). Call [phone] if you haven't heard regarding these appointments within 7 days of discharge.     Activity   Your activity upon discharge: activity as tolerated     Discharge Instructions   Please seek immediate medical attention for more hallucinations or confusion.  Please talk to your facility about having daily safety checks performed.  Please try to avoid exotic foods, and try to maintain a healthy diet.     MD face to face encounter   Documentation of Face to Face and Certification for Home Health Services    I certify that patient: Belia Sheets is under my care and that I, or a nurse practitioner or physician's assistant working with me, had a face-to-face encounter that meets the physician face-to-face encounter requirements with this patient on: October 26, 2017.    This encounter with the patient was in whole, or in part, for the following medical condition, which is the primary reason for home health care:  Word finding difficulty, hallucinations.     I certify that, based on my findings,  the following services are medically necessary home health services: Nursing.    My clinical findings support the need for the above services because: Nurse is needed: To assess medication compliance, assess for side effects, after changes in medications or other medical regimen. To provide assessment and oversight required in the home to assure adherence to the medical plan due to: recent hallucinations, word finding difficulties.     Further, I certify that my clinical findings support that this patient is homebound (i.e. absences from home require considerable and taxing effort and are for medical reasons or Confucianism services or infrequently or of short duration when for other reasons) because: Requires assistance of another person or specialized equipment to access medical services because patient: is unable to exit home safely on own and requires supervision of another for safe transfer due to forgetfulness, uses a walker.     Based on the above findings. I certify that this patient is confined to the home and needs intermittent skilled nursing care, physical therapy and/or speech therapy.  The patient is under my care, and I have initiated the establishment of the plan of care.  This patient will be followed by a physician who will periodically review the plan of care.  Physician/Provider to provide follow up care: Alejandro Sandhu    Attending [facility] physician (the Medicare certified [place] provider): Homer Pulido MD  Physician Signature: See electronic signature associated with these discharge orders.  Date: 10/26/2017     Diet   Follow this diet upon discharge: Orders Placed This Encounter     Snacks/Supplements Adult: Ensure Plus (Adult); Between Meals     Advance Diet as Tolerated: Regular Diet Adult; Vegetarian Diet         PAtient was seen and discussed with Dr. Ashok Ortiz  PGY2 Neurology  PAger        ATTENDING 10/26/17: Patient seen. She is improving. Discussed discharge  plans. Discussed wiyh son Delmar yesterday. I will be seeing her back to review pending test results and reevaluation. Agree with Dr Ortiz' summary. Homer Pulido MD

## 2017-10-27 NOTE — PROGRESS NOTES
[facility]: Post-Discharge Note  SITUATION                                                      Admission:    Admission Date: 10/23/17   Reason for Admission: Aphasia, Encephalopathy, Hallucinations  Discharge:    Discharge Date: 10/26/17   Discharge Diagnosis: Aphasia, Encephalopathy, Hallucinations   Discharge Service: Neurology           ASSESSMENT          Patient was called three times and no answer so post 24 hr DC follow up calls will be closed out                 PLAN                                                      Future Appointments  Date Time Provider Department Center   11/1/2017 1:00 PM Alejandro Sandhu MD [facility]   2/7/2018 3:15 PM Saurabh Pittman MD [facility]           Malia Shelton RN

## 2017-10-28 ENCOUNTER — DOCUMENTATION ONLY (OUTPATIENT)
Dept: CARE COORDINATION | Facility: CLINIC | Age: 82
End: 2017-10-28

## 2017-10-28 ENCOUNTER — MEDICAL CORRESPONDENCE (OUTPATIENT)
Dept: HEALTH INFORMATION MANAGEMENT | Facility: CLINIC | Age: 82
End: 2017-10-28

## 2017-10-28 LAB
ACE CSF-CCNC: 1.7 U/L (ref 0–2.5)
JCPYV DNA SERPL QL NAA+PROBE: NOT DETECTED
SPECIMEN SOURCE: NORMAL

## 2017-10-29 NOTE — PROGRESS NOTES
[facility] now requests orders and shares plan of care/discharge summaries for some patients through [employer].  Please REPLY TO THIS MESSAGE in order to give authorization for orders when needed.  This is considered a verbal order, you will still receive a faxed copy of orders for signature.  Thank you for your assistance in improving collaboration for our patients.    ORDER PT eval 1xdx1, OT eval 1xdx1, SN 1x week x1 , 3xweek x1, 2xweek x2, 1xweek x5, 3 prn .     MD SUMMARY/PLAN OF CARE  SUMMARY TO MD  SITUATION Elderly  patient recently hospitalized for mental status changes that included hallucinations, Aphasia, and Encephalopathy. Patient states she has difficulty finding words or drifts off topic during a converation .  [facility] her apartment is clean and organized. She self manages her own medications  and declined for nurse to set up. medications were reconciled in home. She has 2 new Rx that are Thiamine and Lisinopril .  She states she has a history of falling. Her last fall was approximately three months ago and she states she sustained multiple rib fractures.   WOUND DESCRIPTION AND MEASUREMENTS No open wounds, skin is clean and intact.  PHYSICAL PSYCHOSOCIAL IMPAIRMENT OR LIMITATIONS  Patient has a slow shuffling gait, her posture is hunched over from arthritis . She is pleasant, cooperative, and a great conversationalist but does drift off topic but with gentle reminders can return to topic. She keeps lists and notes to help herself track daily tasks.   CAREGIVER SUPPORT Her daughter Lubna is involved with her medical cares and requests to be updated with homecare progress.   RECOMMENDATION Recommended action is PT and OT for evaluation for safe mobility , increasing endurance and  strength, cognitive abilities to provide self cares and adaptive equipment needs. Skilled nursing for assessment of medication management, teaching on new medication regime, assess  safety  and neuro status .  Patient declined HHA services as she states she gives herself a spnoge bath. Patient is well kempt .  Thank you.

## 2017-10-30 LAB
BACTERIA SPEC CULT: NO GROWTH
Lab: NORMAL
SPECIMEN SOURCE: NORMAL

## 2017-11-01 ENCOUNTER — OFFICE VISIT (OUTPATIENT)
Dept: FAMILY MEDICINE | Facility: CLINIC | Age: 82
End: 2017-11-01

## 2017-11-01 ENCOUNTER — TELEPHONE (OUTPATIENT)
Dept: FAMILY MEDICINE | Facility: CLINIC | Age: 82
End: 2017-11-01

## 2017-11-01 VITALS
SYSTOLIC BLOOD PRESSURE: 144 MMHG | BODY MASS INDEX: 19.27 KG/M2 | TEMPERATURE: 95.7 F | RESPIRATION RATE: 16 BRPM | OXYGEN SATURATION: 94 % | HEART RATE: 72 BPM | DIASTOLIC BLOOD PRESSURE: 86 MMHG | WEIGHT: 95.4 LBS

## 2017-11-01 DIAGNOSIS — G93.40 ENCEPHALOPATHY: Primary | ICD-10-CM

## 2017-11-01 LAB
ALBUMIN SERPL-MCNC: 3.8 MG/DL (ref 3.5–4.7)
ALP SERPL-CCNC: 49.4 U/L (ref 31.7–110.5)
ALT SERPL-CCNC: 7.9 U/L (ref 0–45)
AST SERPL-CCNC: 11.9 U/L (ref 0–45)
BILIRUB SERPL-MCNC: 0.6 MG/DL (ref 0.2–1.3)
BUN SERPL-MCNC: 18 MG/DL (ref 7–19)
CALCIUM SERPL-MCNC: 10.3 MG/DL (ref 8.5–10.1)
CHLORIDE SERPLBLD-SCNC: 101.6 MMOL/L (ref 98–110)
CO2 SERPL-SCNC: 27.7 MMOL/L (ref 20–32)
CREAT SERPL-MCNC: 0.6 MG/DL (ref 0.5–1)
GFR SERPL CREATININE-BSD FRML MDRD: >90 ML/MIN/1.7 M2
GLUCOSE SERPL-MCNC: 82 MG'DL (ref 70–99)
POTASSIUM SERPL-SCNC: 4 MMOL/DL (ref 3.3–4.5)
PROT SERPL-MCNC: 6.6 G/DL (ref 6.8–8.8)
SODIUM SERPL-SCNC: 137.8 MMOL/L (ref 132.6–141.4)

## 2017-11-01 NOTE — TELEPHONE ENCOUNTER
[facility] phone call message - order or referral request for patient:     Order or referral being requested: PT      Additional Comments: Desiree ARIAS home care nurse is requesting PT 2 times a week for 3 weeks for strengthening, balance, exercise and safety for independence.       OK to leave a message on voice mail? Yes    Primary language: English      needed? No    Call taken on November 1, 2017 at 4:15 PM by Kadi Campos

## 2017-11-01 NOTE — PROGRESS NOTES
PCP requested appt with Dr. Pulido to follow-up from recent inpatient stay.  First available with Dr Pulido is 12/6, pt thinks she was told to see him in about four weeks following hospitalization.   in Neurology said she will check in with Dr. Pulido and contact pt if he wants to see her sooner.  Pt also scheduled for return with PCP on 11/22/17 at [facility].    Teresa Hodge  /Care Coordinator

## 2017-11-01 NOTE — TELEPHONE ENCOUNTER
Returned call to home care PT to give verbal orders per protocol as requested. PT verbalized understanding.    Luisana Bell RN

## 2017-11-01 NOTE — MR AVS SNAPSHOT
After Visit Summary   11/1/2017    Belia Sheets    MRN: [id number]           Patient Information     Date Of Birth          5/16/1930        Visit Information        Provider Department      11/1/2017 1:00 PM Alejandro Sandhu MD [facility]        Today's Diagnoses     Encephalopathy    -  1      Care Instructions    Continue same medications    Continue daily check-ins          Follow-ups after your visit        Additional Services     NEUROLOGY ADULT REFERRAL - INTERNAL       Your provider has referred you for the following:   Consult at [facility]: Neurology Clinic - [place] [phone]   [email or web address]  Dr. Pulido    Please be aware that coverage of these services is subject to the terms and limitations of your health insurance plan.  Call member services at your health plan with any benefit or coverage questions.      Please bring the following with you to your appointment:    (1) Any X-Rays, CTs or MRIs which have been performed.  Contact the facility where they were done to arrange for  prior to your scheduled appointment.    (2) List of current medications  (3) This referral request   (4) Any documents/labs given to you for this referral                  Follow-up notes from your care team     Return in about 3 weeks (around 11/22/2017).      Your next 10 appointments already scheduled     Dec 06, 2017  1:30 PM CST   Return Visit with Homer Pulido MD   [facility]    [address] [place] [id number] [phone]            Feb 07, 2018  3:15 PM CST   (Arrive by 3:00 PM)   RETURN ENDOCRINE with Saurabh Pittman MD   [facility] ([facility])    [address] [place] [address] [phone]              Who to contact     Please call your clinic at [phone] to:    Ask questions about your  health    Make or cancel appointments    Discuss your medicines    Learn about your test results    Speak to your doctor   If you have compliments or concerns about an experience at your clinic, or if you wish to file a complaint, please contact [facility] Patient Relations at [phone] or email us at [email or web address]         Additional Information About Your Visit        MyChart Information     [employer] gives you secure access to your electronic health record. If you see a primary care provider, you can also send messages to your care team and make appointments. If you have questions, please call your primary care clinic.  If you do not have a primary care provider, please call [phone] and they will assist you.      Asuum is an electronic gateway that provides easy, online access to your medical records. With Asuum, you can request a clinic appointment, read your test results, renew a prescription or communicate with your care team.     To access your existing account, please contact your [facility] or call [phone] for assistance.        Care EveryWhere ID     This is your Care EveryWhere ID. This could be used by other organizations to access your [facility] medical records  [id number]        Your Vitals Were     Pulse Temperature Respirations Pulse Oximetry BMI (Body Mass Index)       72 95.7  F (35.4  C) (Oral) 16 94% 19.27 kg/m2        Blood Pressure from Last 3 Encounters:   11/01/17 144/86   10/26/17 (!) 165/98   09/13/17 105/70    Weight from Last 3 Encounters:   11/01/17 95 lb 6.4 oz (43.3 kg)   10/23/17 91 lb 9.6 oz (41.5 kg)   09/13/17 91 lb 6.4 oz (41.5 kg)              We Performed the Following     Comprehensive Metabolic Panel ([facility])     NEUROLOGY ADULT REFERRAL - INTERNAL        Primary Care Provider Office Phone # Fax #    Alejandro Sandhu [phone]       [address]  [place] [address]        Equal Access to Services     [facility] JOANNA : Hadii shivam dominguez mynorfransico Eloyali, wadesda luqdustyha, qaybta kamanolochris almanzar, ivana cha. So [facility] [phone].    ATENCIÓN: Si habla español, tiene a benavides disposición servicios gratuitos de asistencia lingüística. Llame al [phone].    We comply with applicable federal civil rights laws and [place] laws. We do not discriminate on the basis of race, color, national origin, age, disability, sex, sexual orientation, or gender identity.            Thank you!     Thank you for choosing [facility]  for your care. Our goal is always to provide you with excellent care. Hearing back from our patients is one way we can continue to improve our services. Please take a few minutes to complete the written survey that you may receive in the mail after your visit with us. Thank you!             Your Updated Medication List - Protect others around you: Learn how to safely use, store and throw away your medicines at www.disposemymeds.org.          This list is accurate as of: 11/1/17  1:53 PM.  Always use your most recent med list.                   Brand Name Dispense Instructions for use Diagnosis    alpha-d-galactosidase tablet      Take by mouth as needed for intestinal gas        calcium-vitamin D 600-400 MG-UNIT per tablet    CALTRATE     Take 1 tablet by mouth 2 times daily        cholecalciferol 1000 UNITS capsule    vitamin  -D    100 capsule    Take 1 capsule (1,000 Units) by mouth daily    Age-related osteoporosis with current pathological fracture with routine healing, subsequent encounter       lisinopril 10 MG tablet    PRINIVIL/ZESTRIL    30 tablet    Take 1 tablet (10 mg) by mouth daily    Benign essential hypertension       loperamide 2 MG tablet    IMODIUM A-D     Take 2 mg by mouth 4 times daily as needed        OMEGA-3 FISH OIL PO      Take 2 capsules by mouth daily        order for DME     1 each     Equipment being ordered: Incentive spirometry    Closed fracture of multiple ribs with routine healing, unspecified laterality, subsequent encounter       sodium fluoride dental gel 1.1 % Gel topical gel    PREVIDENT     Apply to affected area At Bedtime        teriparatide (recombinant) 600 MCG/2.4ML Soln injection    FORTEO    6 mL    Inject 0.08 mLs (20 mcg) Subcutaneous daily    Age-related osteoporosis with current pathological fracture, initial encounter       Thiamine HCl 50 MG Caps     60 capsule    Take 1 capsule by mouth daily    Confusion       UNKNOWN TO PATIENT      Vision formula Vit A, C & E / Lutein/Minerals        VITAMIN B 12 PO      Take 1 tablet by mouth daily

## 2017-11-01 NOTE — PROGRESS NOTES
"  Hospitalization Follow-up Visit         hospitals       Hospital Follow-up Visit:    Hospital:  [facility]   Date of Admission: 10/23/17  Date of Discharge:10/26/17  Reason(s) for Admission: encephalopathy            Problems taking medications regularly:  None       Post Discharge Medication Reconciliation: discharge medications reconciled, continue medications without change.       Problems adhering to non-medication therapy:  None       Medications reviewed by: by myself. Findings include taking them as prescribed.    Summary of hospitalization:  [facility] discharge summary reviewed  Diagnostic Tests/Treatments reviewed.  Follow up needed: Neurology with Dr. Pulido  Other Healthcare Providers Involved in Patient s Care:         Dr. Pulido Neurology  Update since discharge: stable. Continues to have fixed hallucinations about \"chords\" appearing in front of her.  Images are not disturbing to her.  She continues to have problems with word finding and other cognitive deficits but is managing fine.  Daughter checks in with her daily.  No new symptoms  Plan of care communicated with patient and family                        Review of Systems:   CONSTITUTIONAL: no fatigue, no unexpected change in weight  SKIN: no worrisome rashes, no worrisome moles, no worrisome lesions  EYES: no acute vision problems or changes  RESP: no significant cough, no shortness of breath  CV: no chest pain, no palpitations, no new or worsening peripheral edema  GI: no nausea, no vomiting, no constipation, no diarrhea  NEURO: no weakness, no dizziness, no syncope, no headaches            Physical Exam:     Vitals:    11/01/17 1310 11/01/17 1314   BP: (!) 150/93 144/86   Pulse: 72    Resp: 16    Temp: 95.7  F (35.4  C)    TempSrc: Oral    SpO2: 94%    Weight: 95 lb 6.4 oz (43.3 kg)      Body mass index is 19.27 kg/(m^2).    Physical Exam   Constitutional:   Alert, pleasant.  Uses a 4-wheeled walker with seat. "   Cardiovascular: Normal rate, regular rhythm and normal heart sounds.    Pulmonary/Chest: No respiratory distress. She has no wheezes. She has no rales.   Musculoskeletal:   + marked scoliosis, kyphosis.  + muscle wasting throughout  Wearing a chest binder  + moderate tenderness to palpation posteroinferior ribs   Neurological: She is alert.  Occasional word finding difficulties  Psychiatric: She has a normal mood and affect. Her behavior is normal. Thought content normal.             Results:     Results from last visit   Results for orders placed or performed in visit on 11/01/17   Comprehensive Metabolic Panel ([facility])   Result Value Ref Range    Albumin 3.8 3.5 - 4.7 mg/dL    Alkaline Phosphatase 49.4 31.7 - 110.5 U/L    ALT 7.9 0.0 - 45.0 U/L    AST 11.9 0.0 - 45.0 U/L    Bilirubin Total 0.6 0.2 - 1.3 mg/dL    Urea Nitrogen 18.0 7.0 - 19.0 mg/dL    Calcium 10.3 (H) 8.5 - 10.1 mg/dL    Chloride 101.6 98.0 - 110.0 mmol/L    Carbon Dioxide 27.7 20.0 - 32.0 mmol/L    Creatinine 0.6 0.5 - 1.0 mg/dL    Glucose 82.0 70.0 - 99.0 mg'dL    Potassium 4.0 3.3 - 4.5 mmol/dL    Sodium 137.8 132.6 - 141.4 mmol/L    Protein Total 6.6 (L) 6.8 - 8.8 g/dL    GFR Estimate >90 >60.0 mL/min/1.7 m2    GFR Estimate If Black >90 >60.0 mL/min/1.7 m2       Assessment and Plan      Belia was seen today for recheck.    Diagnoses and all orders for this visit:    Encephalopathy - complex picture with fixed hallucinations and uncertain etiology.  Labwork thoroughly reviewed and does not reveal cause.  She is alert and has no fluctuating levelof consciousness.  CSF results are not complete yet but have not shed any light on her condition.  Her condition itself is stable; certainly there has been no progression.  At this point I think it probably best to make no changes.  We will set up her f/u appt with Dr. Pulido in Neurology in a month.    E&M code to be billed if TCM cannot be: 86689    Type of decision making: High complexity  (85034)      Options for treatment and follow-up care were reviewed with the patient  Belia Keatonguerline Sheets   engaged in the decision making process and verbalized understanding of the options discussed and agreed with the final plan.      Alejandro Sandhu MD

## 2017-11-02 ENCOUNTER — DOCUMENTATION ONLY (OUTPATIENT)
Dept: FAMILY MEDICINE | Facility: CLINIC | Age: 82
End: 2017-11-02

## 2017-11-02 ENCOUNTER — TELEPHONE (OUTPATIENT)
Dept: FAMILY MEDICINE | Facility: CLINIC | Age: 82
End: 2017-11-02

## 2017-11-02 NOTE — PROGRESS NOTES
"When opening a documentation only encounter, be sure to enter in \"Chief Complaint\" Forms and in \" Comments\" Title of form, description if needed.    Form received via: Fax  Form now resides in: Provider Ready    Form sent out via: Fax  Patient informed: No  Output date: November 2, 2017      Form received by recipient via fax confirmation  Please close the encounter.    "

## 2017-11-03 LAB — LAB SCANNED RESULT: NORMAL

## 2017-11-03 NOTE — TELEPHONE ENCOUNTER
Returned call to home care OT [place], unable to reach. Left VM with verbal orders per protocol as requested. Left callback number for questions.    Sariah Vazquez RN

## 2017-11-03 NOTE — TELEPHONE ENCOUNTER
[facility] phone call message - order or referral request for patient:     Order or referral being requested: Continue OT twice a week for 3 weeks for home safety, cognition . Also requesting an order for speech  Therapy to evaluate and treat.     Additional Comments:     OK to leave a message on voice mail? Yes    Primary language: English      needed? No    Call taken on November 3, 2017 at 9:16 AM by Tiffanie Mendes

## 2017-11-06 LAB
RESULT: NORMAL
SEND OUTS MISC TEST CODE: NORMAL
SEND OUTS MISC TEST SPECIMEN: NORMAL
TEST NAME: NORMAL
VIT B1 SERPL-MCNC: 5 NMOL/L (ref 4–15)

## 2017-11-10 ENCOUNTER — DOCUMENTATION ONLY (OUTPATIENT)
Dept: CARE COORDINATION | Facility: CLINIC | Age: 82
End: 2017-11-10

## 2017-11-11 NOTE — PROGRESS NOTES
[facility] now requests orders via. Please REPLY TO THIS MESSAGE with authorization for orders.  This is considered a verbal order, you will still receive a faxed copy of orders for signature.    ORDER REQUESTED: Speech Therapy 2m1 3m1    ST SUMMARY/PLAN OF CARE: In home speech therapy eval completed today per your orders. Pt presents with cognitive linguistic deficits which impact her ability to express her thoughts, ideas, wants, and needs. Initial assessment of word finding suggests pt verbal expression is intact, appears as though issue with conversational speech is lack of concentration. Pt is observed to loose her place in statements and forget what she was going to say, suggesting more of a memory impairment than a language impairment. Pt would benefit from further cognitive linguistic testing to more clearly determine deficits as well as treatment to improve her functional communication. Pt describes /dancing strings/ around the room, suggesting ongoing hallucinations.    Altagracia Bell MS (Kate), CCC-SLP  Speech Language Pathologist, [facility]  Phone: [phone]   Email: [email or web address]  Days Worked: Tuesday, Wednesday, Friday

## 2017-11-16 ENCOUNTER — DOCUMENTATION ONLY (OUTPATIENT)
Dept: FAMILY MEDICINE | Facility: CLINIC | Age: 82
End: 2017-11-16

## 2017-11-16 NOTE — PROGRESS NOTES
"When opening a documentation only encounter, be sure to enter in \"Chief Complaint\" Forms and in \" Comments\" Title of form, description if needed.    Form received via: Fax  Form now resides in: Provider Ready    Form sent out via: Fax  Patient informed: No  Output date: November 16, 2017      Form received by recipient via fax confirmation  Please close the encounter.    "

## 2017-11-21 ENCOUNTER — MEDICAL CORRESPONDENCE (OUTPATIENT)
Dept: HEALTH INFORMATION MANAGEMENT | Facility: CLINIC | Age: 82
End: 2017-11-21

## 2017-11-21 ENCOUNTER — HOSPITAL ENCOUNTER (EMERGENCY)
Facility: CLINIC | Age: 82
Discharge: HOME | End: 2017-11-21
Attending: EMERGENCY MEDICINE | Admitting: EMERGENCY MEDICINE
Payer: COMMERCIAL

## 2017-11-21 VITALS
BODY MASS INDEX: 19.28 KG/M2 | DIASTOLIC BLOOD PRESSURE: 73 MMHG | SYSTOLIC BLOOD PRESSURE: 116 MMHG | RESPIRATION RATE: 18 BRPM | TEMPERATURE: 97.5 F | OXYGEN SATURATION: 97 % | WEIGHT: 95.46 LBS

## 2017-11-21 DIAGNOSIS — I10 ESSENTIAL HYPERTENSION: ICD-10-CM

## 2017-11-21 LAB
ANION GAP SERPL CALCULATED.3IONS-SCNC: 4 MMOL/L (ref 3–14)
BASOPHILS # BLD AUTO: 0 10E9/L (ref 0–0.2)
BASOPHILS NFR BLD AUTO: 0.3 %
BUN SERPL-MCNC: 20 MG/DL (ref 7–30)
CALCIUM SERPL-MCNC: 9.2 MG/DL (ref 8.5–10.1)
CHLORIDE SERPL-SCNC: 104 MMOL/L (ref 94–109)
CO2 SERPL-SCNC: 32 MMOL/L (ref 20–32)
CREAT SERPL-MCNC: 0.6 MG/DL (ref 0.52–1.04)
DIFFERENTIAL METHOD BLD: NORMAL
EOSINOPHIL # BLD AUTO: 0.1 10E9/L (ref 0–0.7)
EOSINOPHIL NFR BLD AUTO: 1.2 %
ERYTHROCYTE [DISTWIDTH] IN BLOOD BY AUTOMATED COUNT: 13.4 % (ref 10–15)
GFR SERPL CREATININE-BSD FRML MDRD: >90 ML/MIN/1.7M2
GLUCOSE SERPL-MCNC: 101 MG/DL (ref 70–99)
HCT VFR BLD AUTO: 40.6 % (ref 35–47)
HGB BLD-MCNC: 13.1 G/DL (ref 11.7–15.7)
IMM GRANULOCYTES # BLD: 0 10E9/L (ref 0–0.4)
IMM GRANULOCYTES NFR BLD: 0.1 %
LYMPHOCYTES # BLD AUTO: 0.9 10E9/L (ref 0.8–5.3)
LYMPHOCYTES NFR BLD AUTO: 11.3 %
MCH RBC QN AUTO: 29.9 PG (ref 26.5–33)
MCHC RBC AUTO-ENTMCNC: 32.3 G/DL (ref 31.5–36.5)
MCV RBC AUTO: 93 FL (ref 78–100)
MONOCYTES # BLD AUTO: 0.3 10E9/L (ref 0–1.3)
MONOCYTES NFR BLD AUTO: 4.1 %
NEUTROPHILS # BLD AUTO: 6.5 10E9/L (ref 1.6–8.3)
NEUTROPHILS NFR BLD AUTO: 83 %
NRBC # BLD AUTO: 0 10*3/UL
NRBC BLD AUTO-RTO: 0 /100
PLATELET # BLD AUTO: 203 10E9/L (ref 150–450)
POTASSIUM SERPL-SCNC: 3.9 MMOL/L (ref 3.4–5.3)
RBC # BLD AUTO: 4.38 10E12/L (ref 3.8–5.2)
SODIUM SERPL-SCNC: 140 MMOL/L (ref 133–144)
WBC # BLD AUTO: 7.8 10E9/L (ref 4–11)

## 2017-11-21 PROCEDURE — 96360 HYDRATION IV INFUSION INIT: CPT | Performed by: EMERGENCY MEDICINE

## 2017-11-21 PROCEDURE — 25000132 ZZH RX MED GY IP 250 OP 250 PS 637: Performed by: EMERGENCY MEDICINE

## 2017-11-21 PROCEDURE — 85025 COMPLETE CBC W/AUTO DIFF WBC: CPT | Performed by: EMERGENCY MEDICINE

## 2017-11-21 PROCEDURE — 80048 BASIC METABOLIC PNL TOTAL CA: CPT | Performed by: EMERGENCY MEDICINE

## 2017-11-21 PROCEDURE — 99283 EMERGENCY DEPT VISIT LOW MDM: CPT | Mod: Z6 | Performed by: EMERGENCY MEDICINE

## 2017-11-21 PROCEDURE — 25000128 H RX IP 250 OP 636: Performed by: EMERGENCY MEDICINE

## 2017-11-21 PROCEDURE — 99283 EMERGENCY DEPT VISIT LOW MDM: CPT | Mod: 25 | Performed by: EMERGENCY MEDICINE

## 2017-11-21 RX ORDER — LISINOPRIL 10 MG/1
10 TABLET ORAL DAILY
Status: DISCONTINUED | OUTPATIENT
Start: 2017-11-21 | End: 2017-11-21 | Stop reason: HOSPADM

## 2017-11-21 RX ORDER — SODIUM CHLORIDE 9 MG/ML
INJECTION, SOLUTION INTRAVENOUS CONTINUOUS
Status: DISCONTINUED | OUTPATIENT
Start: 2017-11-21 | End: 2017-11-21 | Stop reason: HOSPADM

## 2017-11-21 RX ADMIN — SODIUM CHLORIDE 1000 ML: 9 INJECTION, SOLUTION INTRAVENOUS at 11:57

## 2017-11-21 RX ADMIN — LISINOPRIL 10 MG: 10 TABLET ORAL at 11:05

## 2017-11-21 ASSESSMENT — ENCOUNTER SYMPTOMS
NAUSEA: 1
WEAKNESS: 1

## 2017-11-21 NOTE — ED AVS SNAPSHOT
[facility], Emergency Department    [address] [address]    Phone:  [phone]                                       Belia Sheets   MRN: [id number]    Department:  [facility], Emergency Department   Date of Visit:  11/21/2017           Patient Information     Date Of Birth          5/16/1930        Your diagnoses for this visit were:     Essential hypertension        You were seen by Aldair Conner MD.        Discharge Instructions       All of your tests are normal and your blood pressure is now good  Continue your current medications and follow up with your MD/Clinic    Future Appointments        Provider Department Dept Phone Center    11/22/2017 1:20 PM Alejandro Sandhu MD [facility] [phone] [facility]    12/6/2017 1:30 PM Homer Pulido MD [facility] [phone] [place]    2/7/2018 3:15 PM Saurabh Pittman MD [facility] [phone] [facility]      24 Hour Appointment Hotline       To make an appointment at any [facility], call [phone] ([phone]). If you don't have a family doctor or clinic, we will help you find one. [facility] clinics are conveniently located to serve the needs of you and your family.             Review of your medicines      Our records show that you are taking the medicines listed below. If these are incorrect, please call your family doctor or clinic.        Dose / Directions Last dose taken    alpha-d-galactosidase tablet        Take by mouth as needed for intestinal gas   Refills:  0        calcium-vitamin D 600-400 MG-UNIT per tablet   Commonly known as:  CALTRATE   Dose:  1 tablet        Take 1 tablet by mouth 2 times daily   Refills:  0        cholecalciferol 1000 UNITS capsule   Commonly known as:  vitamin  -D   Dose:  1 capsule   Quantity:  100 capsule        Take 1 capsule (1,000 Units) by mouth daily   Refills:  3        lisinopril 10 MG tablet   Commonly known as:   PRINIVIL/ZESTRIL   Dose:  10 mg   Quantity:  30 tablet        Take 1 tablet (10 mg) by mouth daily   Refills:  3        loperamide 2 MG tablet   Commonly known as:  IMODIUM A-D   Dose:  2 mg        Take 2 mg by mouth 4 times daily as needed   Refills:  0        OMEGA-3 FISH OIL PO   Dose:  2 capsule        Take 2 capsules by mouth daily   Refills:  0        order for DME   Quantity:  1 each        Equipment being ordered: Incentive spirometry   Refills:  0        sodium fluoride dental gel 1.1 % Gel topical gel   Commonly known as:  PREVIDENT        Apply to affected area At Bedtime   Refills:  0        teriparatide (recombinant) 600 MCG/2.4ML Soln injection   Commonly known as:  FORTEO   Dose:  20 mcg   Quantity:  6 mL        Inject 0.08 mLs (20 mcg) Subcutaneous daily   Refills:  1        Thiamine HCl 50 MG Caps   Dose:  1 capsule   Quantity:  60 capsule        Take 1 capsule by mouth daily   Refills:  3        UNKNOWN TO PATIENT        Vision formula Vit A, C & E / Lutein/Minerals   Refills:  0        VITAMIN B 12 PO   Dose:  1 tablet        Take 1 tablet by mouth daily   Refills:  0                Procedures and tests performed during your visit     Basic metabolic panel    CBC with platelets differential      Orders Needing Specimen Collection     Ordered          11/21/17 1146  UA with Microscopic - STAT, [facility]: STAT, Needs to be Collected     Scheduled Task Status   11/21/17 1147 Collect UA with Microscopic Open   Order Class:  PCU Collect                  Pending Results     No orders found from 11/19/2017 to 11/22/2017.            Pending Culture Results     No orders found from 11/19/2017 to 11/22/2017.            Pending Results Instructions     If you had any lab results that were not finalized at the time of your Discharge, you can call the ED Lab Result RN at [phone]. You will be contacted by this team for any positive Lab results or changes in treatment. The nurses are available 7 days a week from  10A to 6:30P.  You can leave a message 24 hours per day and they will return your call.        Thank you for choosing [facility]       Thank you for choosing [facility] for your care. Our goal is always to provide you with excellent care. Hearing back from our patients is one way we can continue to improve our services. Please take a few minutes to complete the written survey that you may receive in the mail after you visit with us. Thank you!        [facility] Information     [employer] gives you secure access to your electronic health record. If you see a primary care provider, you can also send messages to your care team and make appointments. If you have questions, please call your primary care clinic.  If you do not have a primary care provider, please call [phone] and they will assist you.        Care EveryWhere ID     This is your Care EveryWhere ID. This could be used by other organizations to access your [facility] medical records  [id number]        Equal Access to Services     ANUEL MARSHALL : Angelica Pritchett, alejandra wilson, cuate almanzar, ivana tyler . So [facility] [phone].    ATENCIÓN: Si habla español, tiene a benavides disposición servicios gratuitos de asistencia lingüística. Llame al [phone].    We comply with applicable federal civil rights laws and [place] laws. We do not discriminate on the basis of race, color, national origin, age, disability, sex, sexual orientation, or gender identity.            After Visit Summary       This is your record. Keep this with you and show to your community pharmacist(s) and doctor(s) at your next visit.

## 2017-11-21 NOTE — ED NOTES
Bed: ED22  Expected date: 11/21/17  Expected time: 10:12 AM  Means of arrival:   Comments:  [facility]

## 2017-11-21 NOTE — ED PROVIDER NOTES
History     Chief Complaint   Patient presents with     Hypertension     HPI  Belia Sheets is a 87 year old female with a history of CTS and osteoarthritis who presents to the ED for hypertension. Patient lives in an independent senior living apartment. She states she was going to do laundry but felt nauseous and went to lie down for a bit. Physical therapy staff came to check on the patient and noted she had systolic blood pressure in 240's and called EMS. Patient states since this all happened so early she didn't have a chance to eat breakfast yet or take her Lisinopril. She als has complaints of generalized weakness. Per EMS, they gave her 4 mg of ODT Zofran.     PAST MEDICAL HISTORY  Past Medical History:   Diagnosis Date     Carpal tunnel syndrome (aka CTS)      H/O calcium pyrophosphate deposition disease (CPPD)      Kyphoscoliosis      Osteoarthritis     hands     Osteoporosis      PAST SURGICAL HISTORY  Past Surgical History:   Procedure Laterality Date     HYSTERECTOMY      for uterine prolapse     FAMILY HISTORY  Family History   Problem Relation Age of Onset     Depression/Anxiety Son      Depression/Anxiety Son      alcohol abuse  age 24     Hypertension Mother      Hypertension Brother      DIABETES No family hx of      Breast Cancer No family hx of      Colon Cancer No family hx of      Prostate Cancer No family hx of      Other Cancer No family hx of      SOCIAL HISTORY  Social History   Substance Use Topics     Smoking status: Never Smoker     Smokeless tobacco: Never Used     Alcohol use No     MEDICATIONS  Current Facility-Administered Medications   Medication     lisinopril (PRINIVIL/ZESTRIL) tablet 10 mg     0.9% sodium chloride infusion     Current Outpatient Prescriptions   Medication     lisinopril (PRINIVIL/ZESTRIL) 10 MG tablet     Thiamine HCl 50 MG CAPS     cholecalciferol (VITAMIN  -D) 1000 UNITS capsule     order for DME     teriparatide, recombinant, (FORTEO) 600 MCG/2.4ML  SOLN injection     loperamide (IMODIUM A-D) 2 MG tablet     UNKNOWN TO PATIENT     calcium-vitamin D (CALTRATE) 600-400 MG-UNIT per tablet     alpha-d-galactosidase (BEANO) tablet     Omega-3 Fatty Acids (OMEGA-3 FISH OIL PO)     sodium fluoride dental gel (PREVIDENT) 1.1 % GEL     Cyanocobalamin (VITAMIN B 12 PO)     ALLERGIES  No Known Allergies    I have reviewed the Medications, Allergies, Past Medical and Surgical History, and Social History in the Epic system.    Review of Systems   Gastrointestinal: Positive for nausea.   Neurological: Positive for weakness (generalized).   All other systems reviewed and are negative.      Physical Exam   BP: 159/87  Heart Rate: 66  Temp: 97.5  F (36.4  C)  Resp: 18  Weight: 43.3 kg (95 lb 7.4 oz)  SpO2: 94 %      Physical Exam   Constitutional: No distress.   HENT:   Head: Atraumatic.   Mouth/Throat: Mucous membranes are dry.   Eyes: Pupils are equal, round, and reactive to light.   Neck: Neck supple.   Cardiovascular: Normal rate, regular rhythm and normal heart sounds.    Pulmonary/Chest: Effort normal and breath sounds normal.   Abdominal: Soft. There is no tenderness.   Musculoskeletal: Normal range of motion.   Neurological: She is alert.   Skin: Skin is warm. She is not diaphoretic.   Psychiatric: She has a normal mood and affect.   Nursing note and vitals reviewed.      ED Course     ED Course     Procedures   10:31 AM  The patient was seen and examined by Dr. Conner in Room 22.           Medications   lisinopril (PRINIVIL/ZESTRIL) tablet 10 mg (10 mg Oral Given 11/21/17 1105)   0.9% sodium chloride infusion (0 mLs Intravenous Stopped 11/21/17 1308)            Labs Ordered and Resulted from Time of ED Arrival Up to the Time of Departure from the ED   BASIC METABOLIC PANEL - Abnormal; Notable for the following:        Result Value    Glucose 101 (*)     All other components within normal limits   CBC WITH PLATELETS DIFFERENTIAL   ROUTINE UA WITH MICROSCOPIC             Assessments & Plan (with Medical Decision Making)   87 year old female in a care facility. Today she was thought to be severely hypertensive with a blood pressure of 240 systolic. On arrival here, her blood pressure was normal. I don t know if that was artifactual reading. Nevertheless, I believe she is at her baseline. We did give her morning lisinopril which she missed this morning. We did check some routine labs on her. Electrolytes and renal function are normal. Blood sugar is normal and CBC is normal.  I believe she has some mild baseline cognitive impairment which at her age is not surprising. There s no additional Emergency Department evaluation indicated, and she will be discharged back to her care facility.    This part of the medical record was transcribed by Vicente Morrissey Medical Scribe, from a dictation done by Aldair Conner MD.       I have reviewed the nursing notes.    I have reviewed the findings, diagnosis, plan and need for follow up with the patient.    New Prescriptions    No medications on file       Final diagnoses:   Essential hypertension     I, Vicente Morrissey, am serving as a trained medical scribe to document services personally performed by Aldair Conner MD, based on the provider's statements to me.      IAldair MD, was physically present and have reviewed and verified the accuracy of this note documented by Vicente Morrissey.     11/21/2017   [facility], [place], EMERGENCY DEPARTMENT     Aldair Conner MD  11/21/17 7173

## 2017-11-21 NOTE — ED NOTES
Patient presents via EMS from independent senior living apartments with complaints of hypertension and nausea. Patient reports she was up doing laundry this morning and started to be nauseous so she went and laid down to rest. Physical therapy came to check on patient and found patient to be hypertensive in the 240's systolic and called EMS. Patient reports was unable to take BP medications this morning due to the nausea. EMS administered 4 mg Zofran ODT.

## 2017-11-21 NOTE — DISCHARGE INSTRUCTIONS
All of your tests are normal and your blood pressure is now good  Continue your current medications and follow up with your MD/Clinic

## 2017-11-21 NOTE — PROGRESS NOTES
[facility]  Patient is currently open to home care services with [facility].  The patient is currently receiving RN/PT/OT/ST   services.  [facility]  and team have been notified of patient admission.  [facility] liaison will continue to follow patient during stay.  If appropriate provide orders to resume home care at time of discharge.    Thank you  Coreen Velazco RN, BSN  [facility] Liaison  [phone]

## 2017-11-21 NOTE — ED AVS SNAPSHOT
[facility], [place], Emergency Department    [address] [address]    Phone:  [phone]                                       Belia Sheets   MRN: [id number]    Department:  [facility], Emergency Department   Date of Visit:  11/21/2017           After Visit Summary Signature Page     I have received my discharge instructions, and my questions have been answered. I have discussed any challenges I see with this plan with the nurse or doctor.    ..........................................................................................................................................  Patient/Patient Representative Signature      ..........................................................................................................................................  Patient Representative Print Name and Relationship to Patient    ..................................................               ................................................  Date                                            Time    ..........................................................................................................................................  Reviewed by Signature/Title    ...................................................              ..............................................  Date                                                            Time

## 2017-11-22 ENCOUNTER — OFFICE VISIT (OUTPATIENT)
Dept: FAMILY MEDICINE | Facility: CLINIC | Age: 82
End: 2017-11-22

## 2017-11-22 VITALS
DIASTOLIC BLOOD PRESSURE: 80 MMHG | TEMPERATURE: 97.5 F | BODY MASS INDEX: 19.39 KG/M2 | OXYGEN SATURATION: 95 % | SYSTOLIC BLOOD PRESSURE: 118 MMHG | RESPIRATION RATE: 20 BRPM | WEIGHT: 96 LBS | HEART RATE: 78 BPM

## 2017-11-22 DIAGNOSIS — M80.00XD AGE-RELATED OSTEOPOROSIS WITH CURRENT PATHOLOGICAL FRACTURE WITH ROUTINE HEALING, SUBSEQUENT ENCOUNTER: ICD-10-CM

## 2017-11-22 DIAGNOSIS — G93.40 ENCEPHALOPATHY: Primary | ICD-10-CM

## 2017-11-22 NOTE — MR AVS SNAPSHOT
After Visit Summary   11/22/2017    Belia Sheets    MRN: [id number]           Patient Information     Date Of Birth          5/16/1930        Visit Information        Provider Department      11/22/2017 1:20 PM Alejandro Sandhu MD [facility]        Today's Diagnoses     Encephalopathy    -  1    Age-related osteoporosis with current pathological fracture with routine healing, subsequent encounter          Care Instructions    Continue same medicines.    A daily check-in is a good idea.    Appointment with Dr. Pulido on 12/6.          Follow-ups after your visit        Follow-up notes from your care team     Return in about 4 weeks (around 12/20/2017).      Your next 10 appointments already scheduled     Dec 06, 2017  1:30 PM CST   [facility] Visit [facility]ido MD   [facility]    [address] [place] [id number]   [phone]            Feb 07, 2018  3:15 PM CST   (Arrive by 3:00 PM)   RETURN ENDOCRINE with Saurabh Pittman MD   [facility] ([facility])    [address] [place] [id number] [phone]              Who to contact     Please call your clinic at [phone] to:    Ask questions about your health    Make or cancel appointments    Discuss your medicines    Learn about your test results    Speak to your doctor   If you have compliments or concerns about an experience at your clinic, or if you wish to file a complaint, please contact [facility] Patient Relations at [phone] or email us at [email or web address]         Additional Information About Your Visit        MyChart Information     [employer] gives you secure access to your electronic health record. If you see a primary care provider, you can also send messages to your care team and make appointments. If you have questions, please call your  primary care clinic.  If you do not have a primary care provider, please call [phone] and they will assist you.      [employer] is an electronic gateway that provides easy, online access to your medical records. With [employer], you can request a clinic appointment, read your test results, renew a prescription or communicate with your care team.     To access your existing account, please contact your [facility] or call [phone] for assistance.        Care EveryWhere ID     This is your Care EveryWhere ID. This could be used by other organizations to access your [facility] medical records  [id number]        Your Vitals Were     Pulse Temperature Respirations Pulse Oximetry BMI (Body Mass Index)       78 97.5  F (36.4  C) (Oral) 20 95% 19.39 kg/m2        Blood Pressure from Last 3 Encounters:   11/22/17 118/80   11/21/17 116/73   11/01/17 144/86    Weight from Last 3 Encounters:   11/22/17 96 lb (43.5 kg)   11/21/17 95 lb 7.4 oz (43.3 kg)   11/01/17 95 lb 6.4 oz (43.3 kg)              Today, you had the following     No orders found for display       Primary Care Provider Office Phone # Fax #    Alejandro Sandhu [phone]       [address] [address]        Equal Access to Services     ANUEL MARSHALL : Hadii shivam dominguez hadasho Soomaali, waaxda luqadaha, qaybta kaalmada adeegdamonda, ivana cha. So [facility] [phone].    ATENCIÓN: Si habla español, tiene a benavides disposición servicios gratuitos de asistencia lingüística. Llame al [phone].    We comply with applicable federal civil rights laws and [place] laws. We do not discriminate on the basis of race, color, national origin, age, disability, sex, sexual orientation, or gender identity.            Thank you!     Thank you for choosing \[facility]\"" [facility]  for your care. Our goal is always to provide you with excellent care. Hearing back from our patients is  one way we can continue to improve our services. Please take a few minutes to complete the written survey that you may receive in the mail after your visit with us. Thank you!             Your Updated Medication List - Protect others around you: Learn how to safely use, store and throw away your medicines at www.disposemymeds.org.          This list is accurate as of: 11/22/17  2:04 PM.  Always use your most recent med list.                   Brand Name Dispense Instructions for use Diagnosis    alpha-d-galactosidase tablet      Take by mouth as needed for intestinal gas        calcium-vitamin D 600-400 MG-UNIT per tablet    CALTRATE     Take 1 tablet by mouth 2 times daily        cholecalciferol 1000 UNITS capsule    vitamin  -D    100 capsule    Take 1 capsule (1,000 Units) by mouth daily    Age-related osteoporosis with current pathological fracture with routine healing, subsequent encounter       lisinopril 10 MG tablet    PRINIVIL/ZESTRIL    30 tablet    Take 1 tablet (10 mg) by mouth daily    Benign essential hypertension       loperamide 2 MG tablet    IMODIUM A-D     Take 2 mg by mouth 4 times daily as needed        OMEGA-3 FISH OIL PO      Take 2 capsules by mouth daily        order for DME     1 each    Equipment being ordered: Incentive spirometry    Closed fracture of multiple ribs with routine healing, unspecified laterality, subsequent encounter       sodium fluoride dental gel 1.1 % Gel topical gel    PREVIDENT     Apply to affected area At Bedtime        teriparatide (recombinant) 600 MCG/2.4ML Soln injection    FORTEO    6 mL    Inject 0.08 mLs (20 mcg) Subcutaneous daily    Age-related osteoporosis with current pathological fracture, initial encounter       Thiamine HCl 50 MG Caps     60 capsule    Take 1 capsule by mouth daily    Confusion       UNKNOWN TO PATIENT      Vision formula Vit A, C & E / Lutein/Minerals        VITAMIN B 12 PO      Take 1 tablet by mouth daily

## 2017-11-22 NOTE — PROGRESS NOTES
"      HPI:       Veryl Keaton Sheets is a 87 year old who presents for the following  Patient presents with:  Follow Up    Episode of HTN over the weekend accompanied by nausea and ER visit.  IN ER she felt better and BP was normal.  Has been fine since then.    Overall she still is not back at baseline.  In fact has had no real change in mental status since last visit.  Still having hallucinations of \"strings\" appearing before her eyes.  These occur almost every day and are somewhat fleeting; they are not disturbing to her in any way.  No other hallucinations.         Problem, Medication and Allergy Lists were reviewed and are current.  Patient is an established patient of this clinic.         Review of Systems:   Review of Systems   Constitutional: Positive for activity change and fatigue. Negative for appetite change, fever and unexpected weight change.   Respiratory: Negative.    Cardiovascular: Negative.    Musculoskeletal: Positive for arthralgias, back pain, gait problem and myalgias.   Neurological: Positive for weakness.   Psychiatric/Behavioral: Positive for confusion, decreased concentration and hallucinations. Negative for agitation and dysphoric mood. The patient is nervous/anxious.              Physical Exam:   Patient Vitals for the past 24 hrs:   BP Temp Temp src Pulse Resp SpO2 Weight   11/22/17 1331 118/80 97.5  F (36.4  C) Oral 78 20 95 % 96 lb (43.5 kg)     Body mass index is 19.39 kg/(m^2).  Vitals were reviewed and were normal     Physical Exam   Constitutional: She is oriented to person, place, and time.   Alert, pleasant.  Uses a 4-wheeled walker with seat.   Cardiovascular: Normal rate, regular rhythm and normal heart sounds.    Pulmonary/Chest: No respiratory distress. She has no wheezes. She has no rales.   Musculoskeletal:   + marked scoliosis, kyphosis.  + muscle wasting throughout   Neurological: She is alert and oriented to person, place, and time.   Psychiatric: She has a normal mood " and affect. Her behavior is normal.         Results:     Results from last visit:  Admission on 11/21/2017, Discharged on 11/21/2017   Component Date Value Ref Range Status     WBC 11/21/2017 7.8  4.0 - 11.0 10e9/L Final     RBC Count 11/21/2017 4.38  3.8 - 5.2 10e12/L Final     Hemoglobin 11/21/2017 13.1  11.7 - 15.7 g/dL Final     Hematocrit 11/21/2017 40.6  35.0 - 47.0 % Final     MCV 11/21/2017 93  78 - 100 fl Final     MCH 11/21/2017 29.9  26.5 - 33.0 pg Final     MCHC 11/21/2017 32.3  31.5 - 36.5 g/dL Final     RDW 11/21/2017 13.4  10.0 - 15.0 % Final     Platelet Count 11/21/2017 203  150 - 450 10e9/L Final     Diff Method 11/21/2017 Automated Method   Final     % Neutrophils 11/21/2017 83.0  % Final     % Lymphocytes 11/21/2017 11.3  % Final     % Monocytes 11/21/2017 4.1  % Final     % Eosinophils 11/21/2017 1.2  % Final     % Basophils 11/21/2017 0.3  % Final     % Immature Granulocytes 11/21/2017 0.1  % Final     Nucleated RBCs 11/21/2017 0  0 /100 Final     Absolute Neutrophil 11/21/2017 6.5  1.6 - 8.3 10e9/L Final     Absolute Lymphocytes 11/21/2017 0.9  0.8 - 5.3 10e9/L Final     Absolute Monocytes 11/21/2017 0.3  0.0 - 1.3 10e9/L Final     Absolute Eosinophils 11/21/2017 0.1  0.0 - 0.7 10e9/L Final     Absolute Basophils 11/21/2017 0.0  0.0 - 0.2 10e9/L Final     Abs Immature Granulocytes 11/21/2017 0.0  0 - 0.4 10e9/L Final     Absolute Nucleated RBC 11/21/2017 0.0   Final     Sodium 11/21/2017 140  133 - 144 mmol/L Final     Potassium 11/21/2017 3.9  3.4 - 5.3 mmol/L Final     Chloride 11/21/2017 104  94 - 109 mmol/L Final     Carbon Dioxide 11/21/2017 32  20 - 32 mmol/L Final     Anion Gap 11/21/2017 4  3 - 14 mmol/L Final     Glucose 11/21/2017 101* 70 - 99 mg/dL Final     Urea Nitrogen 11/21/2017 20  7 - 30 mg/dL Final     Creatinine 11/21/2017 0.60  0.52 - 1.04 mg/dL Final     GFR Estimate 11/21/2017 >90  >60 mL/min/1.7m2 Final    Non  GFR Calc     GFR Estimate If Black  11/21/2017 >90  >60 mL/min/1.7m2 Final    African American GFR Calc     Calcium 11/21/2017 9.2  8.5 - 10.1 mg/dL Final     Assessment and Plan     Belia was seen today for follow up.    Diagnoses and all orders for this visit:    Encephalopathy - she is not delirious but has persistent hallucinations, difficulty concentrating, and problems with language.  Appears stable.  No etiology discovered.  Metabolic parameters appear stable.  Will continue on current course and she will f/u with neurology within the next month.    Age-related osteoporosis with current pathological fracture with routine healing, subsequent encounter - stable, continue teriparatide therapy.      There are no discontinued medications.  Options for treatment and follow-up care were reviewed with the patient. Belia Sheets  engaged in the decision making process and verbalized understanding of the options discussed and agreed with the final plan.    Alejandro Sandhu MD

## 2017-11-22 NOTE — PATIENT INSTRUCTIONS
Continue same medicines.    A daily check-in is a good idea.    Appointment with Dr. Pulido on 12/6.

## 2017-11-24 ENCOUNTER — TELEPHONE (OUTPATIENT)
Dept: FAMILY MEDICINE | Facility: CLINIC | Age: 82
End: 2017-11-24

## 2017-11-24 NOTE — TELEPHONE ENCOUNTER
[facility] phone call message - order or referral request for patient:     Order or referral being requested: PT Assessment       Additional Comments: Would verbal orders to add a PT assessment for the week on  11/26-12/2.    OK to leave a message on voice mail? Yes    Primary language: English      needed? No    Call taken on November 24, 2017 at 10:35 AM by Michelle Castro

## 2017-11-24 NOTE — TELEPHONE ENCOUNTER
Returned call to home care PT, unable to reach. Left VM with verbal orders per protocol as requested. Left callback number for questions.    Sariah Vazquez RN

## 2017-11-27 ENCOUNTER — TELEPHONE (OUTPATIENT)
Dept: FAMILY MEDICINE | Facility: CLINIC | Age: 82
End: 2017-11-27

## 2017-11-27 ASSESSMENT — ENCOUNTER SYMPTOMS
HALLUCINATIONS: 1
CARDIOVASCULAR NEGATIVE: 1
DYSPHORIC MOOD: 0
ACTIVITY CHANGE: 1
MYALGIAS: 1
ARTHRALGIAS: 1
RESPIRATORY NEGATIVE: 1
BACK PAIN: 1
AGITATION: 0
DECREASED CONCENTRATION: 1
CONFUSION: 1
UNEXPECTED WEIGHT CHANGE: 0
APPETITE CHANGE: 0
FEVER: 0
WEAKNESS: 1
NERVOUS/ANXIOUS: 1
FATIGUE: 1

## 2017-11-27 NOTE — TELEPHONE ENCOUNTER
[facility] phone call message - order or referral request for patient:     Order or referral being requested: OT      Additional Comments: Latonia is requesting a verbal order for OT 2 times a week for 2 weeks for daily living and home safety.     OK to leave a message on voice mail? Yes    Primary language: English      needed? No    Call taken on November 27, 2017 at 12:08 PM by Kadi Campos

## 2017-11-27 NOTE — TELEPHONE ENCOUNTER
Returned call to home care OT to give verbal orders per protocol as requested. OT verbalized understanding.    Sariah Vazquez RN

## 2017-11-30 ENCOUNTER — DOCUMENTATION ONLY (OUTPATIENT)
Dept: FAMILY MEDICINE | Facility: CLINIC | Age: 82
End: 2017-11-30

## 2017-11-30 NOTE — PROGRESS NOTES
"When opening a documentation only encounter, be sure to enter in \"Chief Complaint\" Forms and in \" Comments\" Title of form, description if needed.    Form received via: Fax  Form now resides in: Provider Ready    Form sent out via: Fax    Output date: November 30, 2017      Form received by recipient via fax confirmation  Please close the encounter.    "

## 2017-12-01 ENCOUNTER — OFFICE VISIT (OUTPATIENT)
Dept: FAMILY MEDICINE | Facility: CLINIC | Age: 82
End: 2017-12-01

## 2017-12-01 ENCOUNTER — TELEPHONE (OUTPATIENT)
Dept: FAMILY MEDICINE | Facility: CLINIC | Age: 82
End: 2017-12-01

## 2017-12-01 VITALS
HEART RATE: 71 BPM | TEMPERATURE: 97.5 F | DIASTOLIC BLOOD PRESSURE: 112 MMHG | WEIGHT: 96 LBS | OXYGEN SATURATION: 95 % | RESPIRATION RATE: 18 BRPM | HEIGHT: 58 IN | BODY MASS INDEX: 20.15 KG/M2 | SYSTOLIC BLOOD PRESSURE: 176 MMHG

## 2017-12-01 DIAGNOSIS — R44.3 HALLUCINATIONS: ICD-10-CM

## 2017-12-01 DIAGNOSIS — I10 SEVERE HYPERTENSION: Primary | ICD-10-CM

## 2017-12-01 LAB
BUN SERPL-MCNC: 24.4 MG/DL (ref 7–19)
CALCIUM SERPL-MCNC: 10.3 MG/DL (ref 8.5–10.1)
CHLORIDE SERPLBLD-SCNC: 98.4 MMOL/L (ref 98–110)
CO2 SERPL-SCNC: 29 MMOL/L (ref 20–32)
CREAT SERPL-MCNC: 0.6 MG/DL (ref 0.5–1)
GFR SERPL CREATININE-BSD FRML MDRD: >90 ML/MIN/1.7 M2
GLUCOSE SERPL-MCNC: 105.6 MG'DL (ref 70–99)
NT-PROBNP SERPL-MCNC: 780 PG/ML (ref 0–450)
POTASSIUM SERPL-SCNC: 3.7 MMOL/DL (ref 3.3–4.5)
SODIUM SERPL-SCNC: 134.7 MMOL/L (ref 132.6–141.4)
TSH SERPL DL<=0.005 MIU/L-ACNC: 0.82 MU/L (ref 0.4–4)

## 2017-12-01 RX ORDER — AMLODIPINE BESYLATE 5 MG/1
5 TABLET ORAL DAILY
Qty: 30 TABLET | Refills: 1 | Status: SHIPPED | OUTPATIENT
Start: 2017-12-01 | End: 2018-01-04

## 2017-12-01 NOTE — MR AVS SNAPSHOT
After Visit Summary   12/1/2017    Belia Sheets    MRN: [id number]           Patient Information     Date Of Birth          5/16/1930        Visit Information        Provider Department      12/1/2017 1:00 PM Adarsh Georges MD [facility]        Today's Diagnoses     Severe hypertension    -  1      Care Instructions    Take the new medication Amlodipine by mouth every day preferably in the evening.  If you are having chest pain, shortness of breath, dizziness, confusion then go to the ER.   Have nurse check blood pressure each day.  Follow up with neurology next week.          Follow-ups after your visit        Your next 10 appointments already scheduled     Dec 06, 2017  1:30 PM CST   Return Visit with Homer Pulido MD   [facility]    [address] [place] [id number] [phone]            Feb 07, 2018  3:15 PM CST   (Arrive by 3:00 PM)   RETURN ENDOCRINE with Saurabh Pittman MD   [facility] ([facility])    [address] [place] [id number] [phone]              Who to contact     Please call your clinic at [phone] to:    Ask questions about your health    Make or cancel appointments    Discuss your medicines    Learn about your test results    Speak to your doctor   If you have compliments or concerns about an experience at your clinic, or if you wish to file a complaint, please contact [facility] Patient Relations at [phone] or email us at [email or web address]         Additional Information About Your Visit        MyChart Information     [facility] gives you secure access to your electronic health record. If you see a primary care provider, you can also send messages to your care team and make appointments. If you have questions, please call your primary care clinic.  If you do not have a  "primary care provider, please call [phone] and they will assist you.      [employer] is an electronic gateway that provides easy, online access to your medical records. With [employer], you can request a clinic appointment, read your test results, renew a prescription or communicate with your care team.     To access your existing account, please contact your [facility] or call [phone] for assistance.        Care EveryWhere ID     This is your Care EveryWhere ID. This could be used by other organizations to access your [facility] medical records  [id number]        Your Vitals Were     Pulse Temperature Respirations Height Pulse Oximetry BMI (Body Mass Index)    71 97.5  F (36.4  C) (Oral) 18 4' 9.87\" (147 cm) 95% 20.15 kg/m2       Blood Pressure from Last 3 Encounters:   12/01/17 (!) 176/112   11/22/17 118/80   11/21/17 116/73    Weight from Last 3 Encounters:   12/01/17 96 lb (43.5 kg)   11/22/17 96 lb (43.5 kg)   11/21/17 95 lb 7.4 oz (43.3 kg)              We Performed the Following     Basic Metabolic Panel ([facility])     N terminal pro BNP outpatient     TSH with free T4 reflex          Today's Medication Changes          These changes are accurate as of: 12/1/17  2:32 PM.  If you have any questions, ask your nurse or doctor.               Start taking these medicines.        Dose/Directions    amLODIPine 5 MG tablet   Commonly known as:  NORVASC   Used for:  Severe hypertension   Started by:  Adarsh Georges MD        Dose:  5 mg   Take 1 tablet (5 mg) by mouth daily   Quantity:  30 tablet   Refills:  1            Where to get your medicines      These medications were sent to [facility] [place], [place] - [address] [facility] [address]     Phone:  [phone]     amLODIPine 5 MG tablet                Primary Care Provider Office Phone # Fax #    Alejandro Sandhu [phone]       [address] " [address]        Equal Access to Services     [facility] : Hadii aad ku hadbrianfransico Eloyali, wadesda lunatachadustyha, qacyril chanmanoloivana boo. So [facility] [phone].    ATENCIÓN: Si habla español, tiene a benavides disposición servicios gratuitos de asistencia lingüística. Seven al [phone].    We comply with applicable federal civil rights laws and [place] laws. We do not discriminate on the basis of race, color, national origin, age, disability, sex, sexual orientation, or gender identity.            Thank you!     [facility] you for [facility]  [facility] your care. Our goal is always to provide you with excellent care. Hearing back from our patients is one way we can continue to improve our services. Please take a few minutes to complete the written survey that you may receive in the mail after your visit with us. Thank you!             Your Updated Medication List - Protect others around you: Learn how to safely use, store and throw away your medicines at www.disposemymeds.org.          This list is accurate as of: 12/1/17  2:32 PM.  Always use your most recent med list.                   Brand Name Dispense Instructions for use Diagnosis    alpha-d-galactosidase tablet      Take by mouth as needed for intestinal gas        amLODIPine 5 MG tablet    NORVASC    30 tablet    Take 1 tablet (5 mg) by mouth daily    Severe hypertension       calcium-vitamin D 600-400 MG-UNIT per tablet    CALTRATE     Take 1 tablet by mouth 2 times daily        cholecalciferol 1000 UNITS capsule    vitamin  -D    100 capsule    Take 1 capsule (1,000 Units) by mouth daily    Age-related osteoporosis with current pathological fracture with routine healing, subsequent encounter       lisinopril 10 MG tablet    PRINIVIL/ZESTRIL    30 tablet    Take 1 tablet (10 mg) by mouth daily    Benign essential hypertension       loperamide 2 MG tablet    IMODIUM A-D     Take 2 mg by mouth 4 times  daily as needed        OMEGA-3 FISH OIL PO      Take 2 capsules by mouth daily        order for DME     1 each    Equipment being ordered: Incentive spirometry    Closed fracture of multiple ribs with routine healing, unspecified laterality, subsequent encounter       sodium fluoride dental gel 1.1 % Gel topical gel    PREVIDENT     Apply to affected area At Bedtime        teriparatide (recombinant) 600 MCG/2.4ML Soln injection    FORTEO    6 mL    Inject 0.08 mLs (20 mcg) Subcutaneous daily    Age-related osteoporosis with current pathological fracture, initial encounter       Thiamine HCl 50 MG Caps     60 capsule    Take 1 capsule by mouth daily    Confusion       UNKNOWN TO PATIENT      Vision formula Vit A, C & E / Lutein/Minerals        VITAMIN B 12 PO      Take 1 tablet by mouth daily

## 2017-12-01 NOTE — TELEPHONE ENCOUNTER
[facility] phone call message - order or referral request for patient:     Order or referral being requested: verbal order for  appointment for community resources.      Additional Comments: Latonia LESTER also stated that she is out at the patient home and just took patient blood pressure and it is 167/105. Forwarded message to [employer] [facility]. She took call.    OK to leave a message on voice mail? Yes    Primary language: English      needed? No    Call taken on December 1, 2017 at 11:17 AM by Kristine Mueller

## 2017-12-01 NOTE — PATIENT INSTRUCTIONS
Take the new medication Amlodipine by mouth every day preferably in the evening.  If you are having chest pain, shortness of breath, dizziness, confusion then go to the ER.   Have nurse check blood pressure each day.  Follow up with neurology next week.

## 2017-12-01 NOTE — TELEPHONE ENCOUNTER
"Call transferred to RN, spoke to VIC Lincoln with [facility]. States patient's BP at her arrival was 150/101. Patient reported not yet taking her medication as she had fallen asleep prior to OT's arrival. Patient took medications (lisinopril prescribed for BP). BP recheck after 45 minutes was 167/105. Patient denies any headache, recent fall, vision changes, or recent activity. OT states she requested SW visit as patient decided to take 3 separate busses yesterday to go to a  and today is showing increased confusion and \"struggled with session.\" OT also advised patient had to go to ED on  for severe HTN (see encounter in Epic) States patient does not have a monitor at home and there was not a nurse in her facility to check on her later in the afternoon.    Spoke to preceptor, Dr Garcia, who advised patient should be seen in clinic today if possible. Patient stated she would be able to come to clinic. Patient scheduled for 1:00 with Dr. Georges.    RN gave verbal order for SW visit per protocol.    Message routed to Dr. Georges to update for afternoon appointment. If appropriate at time of visit, please place DME order for home BP machine. Patient would like it sent to [facility].    Sariah Vazquez RN    "

## 2017-12-01 NOTE — PROGRESS NOTES
SUBJECTIVE:   Malial Keaton Sheets is a 87 year old female who presents to clinic today for the following health issues:  1. Severe hypertension  2. Hallucinations    1. Pt was not feeling herself this morning.  Seemed to not remember the day and was fatigued.  Home care nurse found BP to be very elevated.   Considered return to ER but decided to come to clinic.  Says she feels fine now.  Does get some neck stiffness and headache but this is unchanged from previous.  No chest pain or SOB.  2. Having ongoing intermittent hallucinations for several weeks.  These only last for a few seconds.  Relate to seeing threads or auditory messages about threads.  Pt states that she has really only had one of these today which is less than usual.    Has noticed a little ankle swelling but no SOB, and lays flat at night.    Problem list and histories reviewed & adjusted, as indicated.  Additional history: as documented    Patient Active Problem List   Diagnosis     Osteoporosis     Osteoarthritis     Recurrent falls     Chronic fatigue     Senile osteoporosis     Lumbar verterbral fracture, non-traumatic     Advance Care Planning     Encephalopathy     Past Surgical History:   Procedure Laterality Date     HYSTERECTOMY      for uterine prolapse       Social History   Substance Use Topics     Smoking status: Never Smoker     Smokeless tobacco: Never Used     Alcohol use No     Family History   Problem Relation Age of Onset     Depression/Anxiety Son      Depression/Anxiety Son      alcohol abuse  age 24     Hypertension Mother      Hypertension Brother      DIABETES No family hx of      Breast Cancer No family hx of      Colon Cancer No family hx of      Prostate Cancer No family hx of      Other Cancer No family hx of          Current Outpatient Prescriptions   Medication Sig Dispense Refill     amLODIPine (NORVASC) 5 MG tablet Take 1 tablet (5 mg) by mouth daily 30 tablet 1     lisinopril (PRINIVIL/ZESTRIL) 10 MG tablet Take 1  "tablet (10 mg) by mouth daily 30 tablet 3     Thiamine HCl 50 MG CAPS Take 1 capsule by mouth daily 60 capsule 3     cholecalciferol (VITAMIN  -D) 1000 UNITS capsule Take 1 capsule (1,000 Units) by mouth daily 100 capsule 3     teriparatide, recombinant, (FORTEO) 600 MCG/2.4ML SOLN injection Inject 0.08 mLs (20 mcg) Subcutaneous daily 6 mL 1     loperamide (IMODIUM A-D) 2 MG tablet Take 2 mg by mouth 4 times daily as needed       calcium-vitamin D (CALTRATE) 600-400 MG-UNIT per tablet Take 1 tablet by mouth 2 times daily       alpha-d-galactosidase (BEANO) tablet Take by mouth as needed for intestinal gas       Omega-3 Fatty Acids (OMEGA-3 FISH OIL PO) Take 2 capsules by mouth daily        sodium fluoride dental gel (PREVIDENT) 1.1 % GEL Apply to affected area At Bedtime       Cyanocobalamin (VITAMIN B 12 PO) Take 1 tablet by mouth daily        order for DME Equipment being ordered: Incentive spirometry 1 each 0     UNKNOWN TO PATIENT Vision formula Vit A, C & E / Lutein/Minerals       No Known Allergies      Reviewed and updated as needed this visit by clinical staffTobacco  Allergies  Meds  Med Hx  Surg Hx  Fam Hx  Soc Hx      Reviewed and updated as needed this visit by Provider         ROS:  Constitutional, HEENT, cardiovascular, pulmonary, gi and gu systems are negative, except as otherwise noted.      OBJECTIVE:   BP (!) 176/112  Pulse 71  Temp 97.5  F (36.4  C) (Oral)  Resp 18  Ht 4' 9.87\" (147 cm)  Wt 96 lb (43.5 kg)  SpO2 95%  BMI 20.15 kg/m2  Body mass index is 20.15 kg/(m^2).  GENERAL: healthy, alert and no distress  NECK: no adenopathy, no asymmetry, masses, or scars and thyroid normal to palpation  RESP: lungs clear to auscultation - no rales, rhonchi or wheezes  CV: regular rate and rhythm, normal S1 S2, no S3 or S4, no murmur, click or rub, slight peripheral edama, 1+to ankles  ABDOMEN: soft, nontender, no hepatosplenomegaly, no masses and bowel sounds normal  MS: no gross musculoskeletal " defects noted, 1+ edema  Neuro: Alert, coherent and interactive, no evidence of confusion.  Diagnostic Test Results:  Results for orders placed or performed in visit on 12/01/17 (from the past 24 hour(s))   Basic Metabolic Panel ([facility])   Result Value Ref Range    Urea Nitrogen 24.4 (H) 7.0 - 19.0 mg/dL    Calcium 10.3 (H) 8.5 - 10.1 mg/dL    Chloride 98.4 98.0 - 110.0 mmol/L    Carbon Dioxide 29.0 20.0 - 32.0 mmol/L    Creatinine 0.6 0.5 - 1.0 mg/dL    Glucose 105.6 (H) 70.0 - 99.0 mg'dL    Potassium 3.7 3.3 - 4.5 mmol/dL    Sodium 134.7 132.6 - 141.4 mmol/L    GFR Estimate >90 >60.0 mL/min/1.7 m2    GFR Estimate If Black >90 >60.0 mL/min/1.7 m2       ASSESSMENT/PLAN:       ICD-10-CM    1. Severe hypertension I10 Basic Metabolic Panel ([facility])     amLODIPine (NORVASC) 5 MG tablet     N terminal pro BNP outpatient     TSH with free T4 reflex     XR CHEST 2 VW   2. Hallucinations R44.3      Doing okay.  Not interested in ER visit.  I think that she does not have malignant hypertension.  Unclear why she is getting such severe BP now.  No clear evidence of CHF.  Hallucinations stable and has neuro f/u in 5 days.  Called daughter and discussed as well.  >40 minutes spent with pt with >50% in counseling and coordination of care.      MD [facility]IG[facility]

## 2017-12-05 ENCOUNTER — TELEPHONE (OUTPATIENT)
Dept: FAMILY MEDICINE | Facility: CLINIC | Age: 82
End: 2017-12-05

## 2017-12-05 NOTE — TELEPHONE ENCOUNTER
Returned call to home care PT to give verbal orders per protocol as requested. PT verbalized understanding.    Sariah Vazquez RN

## 2017-12-05 NOTE — TELEPHONE ENCOUNTER
[facility] phone call message - order or referral request for patient:     Order or referral being requested: order for one additional visit for patient care giver education and home safety      Additional Comments: please call Latonia HAMPTON to leave a message on voice mail? Yes    Primary language: English      needed? No    Call taken on December 5, 2017 at 4:20 PM by Tess Holly

## 2017-12-06 ENCOUNTER — OFFICE VISIT (OUTPATIENT)
Dept: NEUROLOGY | Facility: CLINIC | Age: 82
End: 2017-12-06
Payer: COMMERCIAL

## 2017-12-06 VITALS
SYSTOLIC BLOOD PRESSURE: 147 MMHG | WEIGHT: 94.9 LBS | DIASTOLIC BLOOD PRESSURE: 90 MMHG | OXYGEN SATURATION: 97 % | HEART RATE: 73 BPM | BODY MASS INDEX: 19.92 KG/M2

## 2017-12-06 DIAGNOSIS — G93.40 ENCEPHALOPATHY: Primary | ICD-10-CM

## 2017-12-06 PROCEDURE — 99213 OFFICE O/P EST LOW 20 MIN: CPT | Performed by: PSYCHIATRY & NEUROLOGY

## 2017-12-06 ASSESSMENT — PAIN SCALES - GENERAL: PAINLEVEL: NO PAIN (0)

## 2017-12-06 NOTE — LETTER
"    2017        RE: Belia Sheets  [address]  [address]  [facility] [address]        2017            Alejandro Sandhu MD    Physicians   [address],    [place]  [address]      RE:  Belia Sheets   MRN:  [id number]   :  1930      Dear Dr. Sandhu:      I saw Belia Sheets back.  She is an 87-year-old female I cared for when she was hospitalized on the Neurology Service in 10/2017 for acute encephalopathy.      She was admitted to the Neurology Service on 10/23/2017 with visual hallucinations, word finding difficulties and mild confusion that had apparently developed over a week or so.  She did undergo an extensive and essentially unrevealing evaluation for this.      She had a brain MRI scan that revealed some chronic small vessel changes, but no evidence of an acute infarct.  MRA angiographic studies were negative.  On the initial brain MRI scan, there was some concern about possible \"cortical ribboning,\" but a followup scan done did not reveal any changes in that regard.      She had EEG monitoring done that revealed some bitemporal slowing, but no epileptiform discharges or seizures.      Laboratory work including a comprehensive metabolic panel, CBC, ammonia, thyroid functions, B1 and B12 levels was essentially normal aside from a low albumin of 3.2.      She had a spinal tap done.  The spinal fluid was essentially normal aside from a protein of 58.  It was negative for HSV, JON.  The 14-3-3 protein level was normal.  She had an autoimmune encephalopathy battery sent off that came back essentially negative.  She had a low titer ROMA antibody of 1.22, which at this level has a low specificity for autoimmune encephalopathy.  A RT-QuIC study was also sent off and this came back negative.      She actually improved during her hospitalization.  There was some concern about nutrition, although she tries to eat as much as she can.  She gets 1 meal (breakfast) at her living " "facility currently.      She was seen in the emergency room on 11/21.  She was weak and confused and her physical therapist apparently documented a systolic blood pressure in the 240s.  By the time she got to the emergency room, her blood pressure was normal.      She is accompanied by her daughter, Lubna, today.  Lubna does not feel she is back to her baseline, but she is definitely improved compared to when she was hospitalized.      Current medications are amlodipine, lisinopril, thiamine, vitamin D, Forteo, Imodium, vitamin D, omega-3 fish oil and vitamin B12 orally.      The patient herself indicates that she still is having some issues with word finding, but this is improved.  She still is having visual hallucinations that she describes as \"threads.\"  These look like strands of hair, some of which are colored.  She indicates she will have a tendency to lose her train of thought.      PHYSICAL EXAMINATION:   VITAL SIGNS:  Reveals her heart rate is 73.  Blood pressure 147/90.   NEUROLOGIC:  Her visual acuity with her glasses is 20/25 in both eyes.  Visual fields are intact.  Otherwise, cranial nerves II-XII are intact.  Motor examination reveals no focal weakness.  No tremor, asterixis or other abnormal movements are appreciated.  She can get up and ambulate independently, but does use a rolling walker for added stability.  She has a cautious gait.  She is oriented to place and time.  She knows she is on the second floor of our building and she remembered my last name.  She could spell the word \"world\" forward and backward accurately.  She recalled 3/3 objects.  Her spontaneous speech is fluent.  She will occasionally have some word finding difficulties or lose her train of thought.      IMPRESSION:  Encephalopathy -- improved.      PLAN:  I did review with the patient and her daughter, Lubna, the extensive and essentially unremarkable workup she had in the hospital.  At her age, there probably is an element of brain " degeneration, but I would not characterize what she is experiencing now as a dementia.  This does not appear to be a rapidly progressive dementing illness.      I encouraged her to get a second meal at her apartment facility to make certain she is getting adequate nutrition.      I suggested getting a formal eye examination to make certain that the visual disturbance she has experienced does not relate to the eye itself.      She will be following up with you concerning her hypertension.      Neurologic followup in the future will be as needed.         Sincerely,      ENID PULIDO MD             D: 2017 14:21   T: 2017 09:53   MT: TS      Name:     CONSTANCE CONNELLY   MRN:      [id number]        Account:      [id number]   :      1930      Document: [id number]       cc: Alejandro Sandhu MD         Sincerely,        Enid Pulido MD

## 2017-12-06 NOTE — NURSING NOTE
"Malial Keaton Sudeep's goals for this visit include: return  She requests these members of her care team be copied on today's visit information:     PCP: Alejandro Sandhu    Referring Provider:  Homer Pulido MD  [address] [place] [address]    Chief Complaint   Patient presents with     Consult       Initial /90  Pulse 73  Wt 43 kg (94 lb 14.4 oz)  SpO2 97%  BMI 19.92 kg/m2 Estimated body mass index is 19.92 kg/(m^2) as calculated from the following:    Height as of 12/1/17: 1.47 m (4' 9.87\").    Weight as of this encounter: 43 kg (94 lb 14.4 oz).  Medication Reconciliation: complete    Do you need any medication refills at today's visit? n  "

## 2017-12-06 NOTE — MR AVS SNAPSHOT
After Visit Summary   12/6/2017    Belia Sheets    MRN: [id number]           Patient Information     Date Of Birth          5/16/1930        Visit Information        Provider Department      12/6/2017 1:30 PM Homer Pulido MD [facility]        Today's Diagnoses     Encephalopathy    -  1       Follow-ups after your visit        Follow-up notes from your care team     Discussed this visit Return if symptoms worsen or fail to improve.      Your next 10 appointments already scheduled     Feb 07, 2018  3:15 PM CST   (Arrive by 3:00 PM)   RETURN ENDOCRINE with Saurabh Pittman MD   [facility] ([facility])    [address]  [address] [facility] [address] [phone]              Who to contact     If you have questions or need follow up information about today's clinic visit or your schedule please contact [facility] directly at [phone].  Normal or non-critical lab and imaging results will be communicated to you by imojihart, letter or phone within 4 business days after the clinic has received the results. If you do not hear from us within 7 days, please contact the clinic through Fididelt or phone. If you have a critical or abnormal lab result, we will notify you by phone as soon as possible.  Submit refill requests through ValuNet or call your pharmacy and they will forward the refill request to us. Please allow 3 business days for your refill to be completed.          Additional Information About Your Visit        imojihart Information     ValuNet gives you secure access to your electronic health record. If you see a primary care provider, you can also send messages to your care team and make appointments. If you have questions, please call your primary care clinic.  If you do not have a primary care provider, please call [phone] and they will assist you.      ValuNet is an electronic gateway that  provides easy, online access to your medical records. With Express Fit, you can request a clinic appointment, read your test results, renew a prescription or communicate with your care team.     To access your existing account, please contact your [facility] or call [phone] for assistance.        Care EveryWhere ID     This is your Care EveryWhere ID. This could be used by other organizations to access your [place] medical records  [id number]        Your Vitals Were     Pulse Pulse Oximetry BMI (Body Mass Index)             73 97% 19.92 kg/m2          Blood Pressure from Last 3 Encounters:   12/06/17 147/90   12/01/17 (!) 176/112   11/22/17 118/80    Weight from Last 3 Encounters:   12/06/17 43 kg (94 lb 14.4 oz)   12/01/17 43.5 kg (96 lb)   11/22/17 43.5 kg (96 lb)              Today, you had the following     No orders found for display       Primary Care Provider Office Phone # Fax #    Alejandro Sandhu [phone]       [address] [address]        Equal Access to Services     MAGALY MARSHALL : Hadii shivam carcamo Sokeenan, waaxda luqadaha, qaybta kaalmachris almanzar, ivana ytler . So [facility] [place] [phone].    ATENCIÓN: Si habla español, tiene a benavides disposición servicios gratuitos de asistencia lingüística. Seven al [phone].    We comply with applicable federal civil rights laws and [place] laws. We do not discriminate on the basis of race, color, national origin, age, disability, sex, sexual orientation, or gender identity.            Thank you!     Thank you for choosing [facility]  for your care. Our goal is always to provide you with excellent care. Hearing back from our patients is one way we can continue to improve our services. Please take a few minutes to complete the written survey that you may receive in the mail after your visit with us. Thank you!             Your Updated Medication  List - Protect others around you: Learn how to safely use, store and throw away your medicines at www.disposemymeds.org.          This list is accurate as of: 12/6/17  2:08 PM.  Always use your most recent med list.                   Brand Name Dispense Instructions for use Diagnosis    alpha-d-galactosidase tablet      Take by mouth as needed for intestinal gas        amLODIPine 5 MG tablet    NORVASC    30 tablet    Take 1 tablet (5 mg) by mouth daily    Severe hypertension       calcium-vitamin D 600-400 MG-UNIT per tablet    CALTRATE     Take 1 tablet by mouth daily        cholecalciferol 1000 UNITS capsule    vitamin  -D    100 capsule    Take 1 capsule (1,000 Units) by mouth daily    Age-related osteoporosis with current pathological fracture with routine healing, subsequent encounter       lisinopril 10 MG tablet    PRINIVIL/ZESTRIL    30 tablet    Take 1 tablet (10 mg) by mouth daily    Benign essential hypertension       loperamide 2 MG tablet    IMODIUM A-D     Take 2 mg by mouth 4 times daily as needed        OMEGA-3 FISH OIL PO      Take 2 capsules by mouth daily        order for DME     1 each    Equipment being ordered: Home BP monitor and cuff    Severe hypertension       sodium fluoride dental gel 1.1 % Gel topical gel    PREVIDENT     Apply to affected area At Bedtime        teriparatide (recombinant) 600 MCG/2.4ML Soln injection    FORTEO    6 mL    Inject 0.08 mLs (20 mcg) Subcutaneous daily    Age-related osteoporosis with current pathological fracture, initial encounter       Thiamine HCl 50 MG Caps     60 capsule    Take 1 capsule by mouth daily    Confusion       UNKNOWN TO PATIENT      Vision formula Vit A, C & E / Lutein/Minerals        VITAMIN B 12 PO      Take 1 tablet by mouth daily

## 2017-12-07 NOTE — PROGRESS NOTES
"2017            Alejandro Sandhu MD    Physicians   [address], [facility] [address]   [place]  [address]      RE:  Belia Sheets   MRN:  [id number]   :  1930      Dear Dr. Sandhu:      I saw Belia Sheets back.  She is an 87-year-old female I cared for when she was hospitalized on the Neurology Service in 10/2017 for acute encephalopathy.      She was admitted to the Neurology Service on 10/23/2017 with visual hallucinations, word finding difficulties and mild confusion that had apparently developed over a week or so.  She did undergo an extensive and essentially unrevealing evaluation for this.      She had a brain MRI scan that revealed some chronic small vessel changes, but no evidence of an acute infarct.  MRA angiographic studies were negative.  On the initial brain MRI scan, there was some concern about possible \"cortical ribboning,\" but a followup scan done did not reveal any changes in that regard.      She had EEG monitoring done that revealed some bitemporal slowing, but no epileptiform discharges or seizures.      Laboratory work including a comprehensive metabolic panel, CBC, ammonia, thyroid functions, B1 and B12 levels was essentially normal aside from a low albumin of 3.2.      She had a spinal tap done.  The spinal fluid was essentially normal aside from a protein of 58.  It was negative for HSV, JON.  The 14-3-3 protein level was normal.  She had an autoimmune encephalopathy battery sent off that came back essentially negative.  She had a low titer ROMA antibody of 1.22, which at this level has a low specificity for autoimmune encephalopathy.  A RT-QuIC study was also sent off and this came back negative.      She actually improved during her hospitalization.  There was some concern about nutrition, although she tries to eat as much as she can.  She gets 1 meal (breakfast) at her living facility currently.      She was seen in the emergency room on .  She was weak and confused and her " "physical therapist apparently documented a systolic blood pressure in the 240s.  By the time she got to the emergency room, her blood pressure was normal.      She is accompanied by her daughter, Lubna, today.  Lubna does not feel she is back to her baseline, but she is definitely improved compared to when she was hospitalized.      Current medications are amlodipine, lisinopril, thiamine, vitamin D, Forteo, Imodium, vitamin D, omega-3 fish oil and vitamin B12 orally.      The patient herself indicates that she still is having some issues with word finding, but this is improved.  She still is having visual hallucinations that she describes as \"threads.\"  These look like strands of hair, some of which are colored.  She indicates she will have a tendency to lose her train of thought.      PHYSICAL EXAMINATION:   VITAL SIGNS:  Reveals her heart rate is 73.  Blood pressure 147/90.   NEUROLOGIC:  Her visual acuity with her glasses is 20/25 in both eyes.  Visual fields are intact.  Otherwise, cranial nerves II-XII are intact.  Motor examination reveals no focal weakness.  No tremor, asterixis or other abnormal movements are appreciated.  She can get up and ambulate independently, but does use a rolling walker for added stability.  She has a cautious gait.  She is oriented to place and time.  She knows she is on the second floor of our building and she remembered my last name.  She could spell the word \"world\" forward and backward accurately.  She recalled 3/3 objects.  Her spontaneous speech is fluent.  She will occasionally have some word finding difficulties or lose her train of thought.      IMPRESSION:  Encephalopathy -- improved.      PLAN:  I did review with the patient and her daughter, Lubna, the extensive and essentially unremarkable workup she had in the hospital.  At her age, there probably is an element of brain degeneration, but I would not characterize what she is experiencing now as a dementia.  This does not " appear to be a rapidly progressive dementing illness.      I encouraged her to get a second meal at her apartment facility to make certain she is getting adequate nutrition.      I suggested getting a formal eye examination to make certain that the visual disturbance she has experienced does not relate to the eye itself.      She will be following up with you concerning her hypertension.      Neurologic followup in the future will be as needed.         Sincerely,      ENID LO MD             D: 2017 14:21   T: 2017 09:53   MT:       Name:     CONSTANCE CONNELLY   MRN:      [id number]        Account:      [id number]   :      1930      Document: [id number]       cc: Alejandro Sandhu MD

## 2017-12-08 ENCOUNTER — DOCUMENTATION ONLY (OUTPATIENT)
Dept: FAMILY MEDICINE | Facility: CLINIC | Age: 82
End: 2017-12-08

## 2017-12-08 NOTE — PROGRESS NOTES
"When opening a documentation only encounter, be sure to enter in \"Chief Complaint\" Forms and in \" Comments\" Title of form, description if needed.    Form received via: Fax  Form now resides in: Provider Ready    Form sent out via: Fax  Patient informed: No  Output date: December 8, 2017      Form received by recipient via fax confirmation  Please close the encounter.    "

## 2017-12-20 ENCOUNTER — OFFICE VISIT (OUTPATIENT)
Dept: FAMILY MEDICINE | Facility: CLINIC | Age: 82
End: 2017-12-20
Payer: COMMERCIAL

## 2017-12-20 VITALS
RESPIRATION RATE: 16 BRPM | DIASTOLIC BLOOD PRESSURE: 82 MMHG | SYSTOLIC BLOOD PRESSURE: 128 MMHG | BODY MASS INDEX: 20.66 KG/M2 | HEART RATE: 64 BPM | WEIGHT: 98.4 LBS | OXYGEN SATURATION: 97 % | TEMPERATURE: 97.5 F

## 2017-12-20 DIAGNOSIS — K62.3 RECTAL PROLAPSE: ICD-10-CM

## 2017-12-20 DIAGNOSIS — G93.40 ENCEPHALOPATHY: Primary | ICD-10-CM

## 2017-12-20 DIAGNOSIS — M80.00XD AGE-RELATED OSTEOPOROSIS WITH CURRENT PATHOLOGICAL FRACTURE WITH ROUTINE HEALING, SUBSEQUENT ENCOUNTER: ICD-10-CM

## 2017-12-20 ASSESSMENT — ENCOUNTER SYMPTOMS
CARDIOVASCULAR NEGATIVE: 1
AGITATION: 0
CONFUSION: 1
RESPIRATORY NEGATIVE: 1
ARTHRALGIAS: 1
HALLUCINATIONS: 1
UNEXPECTED WEIGHT CHANGE: 0
WEAKNESS: 1
BACK PAIN: 1
MYALGIAS: 1
DECREASED CONCENTRATION: 1
FATIGUE: 1
FEVER: 0
DYSPHORIC MOOD: 0
ACTIVITY CHANGE: 1
APPETITE CHANGE: 0
NERVOUS/ANXIOUS: 0

## 2017-12-20 NOTE — PATIENT INSTRUCTIONS
Continue on same medicines.  Follow for lightheadedness.   Make sure you are drinking enough fluids.    Keep healthy!  Good nutrition and activity, including social activity.

## 2017-12-20 NOTE — MR AVS SNAPSHOT
After Visit Summary   12/20/2017    Belia Sheets    MRN: [id number]           Patient Information     Date Of Birth          5/16/1930        Visit Information        Provider Department      12/20/2017 4:20 PM Alejandro Sandhu MD [facility]        Today's Diagnoses     Encephalopathy    -  1    Age-related osteoporosis with current pathological fracture with routine healing, subsequent encounter        Rectal prolapse          Care Instructions    Continue on same medicines.  Follow for lightheadedness.   Make sure you are drinking enough fluids.    Keep healthy!  Good nutrition and activity, including social activity.          Follow-ups after your visit        Additional Services     COLORECTAL SURGERY REFERRAL - INTERNAL       Your provider has referred you to: [facility]: Colon and Rectal Surgery Clinic [facility] [phone]   [email or web address]    Referral Reason(s): rectal prolapse  Special Concerns:   This referral is: Elective (week +)  It is OK to leave a message on patient's voicemail.    Please be aware that coverage of these services is subject to the terms and limitations of your health insurance plan.  Call member services at your health plan with any benefit or coverage questions.      Please bring the following with you to your appointment:    (1) Any X-Rays, CTs or MRIs which have been performed.  Contact the facility where they were done to arrange for  prior to your scheduled appointment.    (2) List of current medications  (3) This referral request   (4) Any documents/labs given to you for this referral                  Follow-up notes from your care team     Return in about 4 weeks (around 1/17/2018).      Your next 10 appointments already scheduled     Feb 07, 2018  3:15 PM CST   (Arrive by 3:00 PM)   RETURN ENDOCRINE with Saurabh Pittman MD   [facility] ([facility]  [place])    [address] [address] [phone]              Who to contact     Please call your clinic at [phone] to:    Ask questions about your health    Make or cancel appointments    Discuss your medicines    Learn about your test results    Speak to your doctor   If you have compliments or concerns about an experience at your clinic, or if you wish to file a complaint, please contact [facility] Patient Relations at [phone] or email us at [email or web address]         Additional Information About Your Visit        Local Corporationhart Information     Brazen Careerist gives you secure access to your electronic health record. If you see a primary care provider, you can also send messages to your care team and make appointments. If you have questions, please call your primary care clinic.  If you do not have a primary care provider, please call [phone] and they will assist you.      Brazen Careerist is an electronic gateway that provides easy, online access to your medical records. With Brazen Careerist, you can request a clinic appointment, read your test results, renew a prescription or communicate with your care team.     To access your existing account, please contact your [facility] or call [phone] for assistance.        Care EveryWhere ID     This is your Care EveryWhere ID. This could be used by other organizations to access your [place] medical records  [id number]        Your Vitals Were     Pulse Temperature Respirations Pulse Oximetry BMI (Body Mass Index)       64 97.5  F (36.4  C) (Oral) 16 97% 20.66 kg/m2        Blood Pressure from Last 3 Encounters:   12/20/17 128/82   12/06/17 147/90   12/01/17 (!) 176/112    Weight from Last 3 Encounters:   12/20/17 98 lb 6.4 oz (44.6 kg)   12/06/17 94 lb 14.4 oz (43 kg)   12/01/17 96 lb (43.5 kg)              We Performed the Following     COLORECTAL SURGERY REFERRAL - INTERNAL         Primary Care Provider Office Phone # Fax #    Alejandro Sandhu [phone]       [address] [address]        Equal Access to Services     ANUEL MARSHALL : Hadii aad ku hadbrianfransico Pritchett, elderchris wintersdustyha, tiffanyta kafaina almanzar, ivana kimble briantonny herron nayeli cha. So [facility] [phone].    ATENCIÓN: Si habla español, tiene a benavides disposición servicios gratuitos de asistencia lingüística. Llame al [phone].    We comply with applicable federal civil rights laws and [place] laws. We do not discriminate on the basis of race, color, national origin, age, disability, sex, sexual orientation, or gender identity.            Thank you!     Thank you for choosing [facility]  for your care. Our goal is always to provide you with excellent care. Hearing back from our patients is one way we can continue to improve our services. Please take a few minutes to complete the written survey that you may receive in the mail after your visit with us. Thank you!             Your Updated Medication List - Protect others around you: Learn how to safely use, store and throw away your medicines at www.disposemymeds.org.          This list is accurate as of: 12/20/17  5:14 PM.  Always use your most recent med list.                   Brand Name Dispense Instructions for use Diagnosis    alpha-d-galactosidase tablet      Take by mouth as needed for intestinal gas        amLODIPine 5 MG tablet    NORVASC    30 tablet    Take 1 tablet (5 mg) by mouth daily    Severe hypertension       calcium-vitamin D 600-400 MG-UNIT per tablet    CALTRATE     Take 1 tablet by mouth daily        cholecalciferol 1000 UNITS capsule    vitamin  -D    100 capsule    Take 1 capsule (1,000 Units) by mouth daily    Age-related osteoporosis with current pathological fracture with routine healing, subsequent encounter       lisinopril 10 MG tablet    PRINIVIL/ZESTRIL    30 tablet    Take 1 tablet  (10 mg) by mouth daily    Benign essential hypertension       loperamide 2 MG tablet    IMODIUM A-D     Take 2 mg by mouth 4 times daily as needed        OMEGA-3 FISH OIL PO      Take 2 capsules by mouth daily        order for DME     1 each    Equipment being ordered: Home BP monitor and cuff    Severe hypertension       sodium fluoride dental gel 1.1 % Gel topical gel    PREVIDENT     Apply to affected area At Bedtime        teriparatide (recombinant) 600 MCG/2.4ML Soln injection    FORTEO    6 mL    Inject 0.08 mLs (20 mcg) Subcutaneous daily    Age-related osteoporosis with current pathological fracture, initial encounter       Thiamine HCl 50 MG Caps     60 capsule    Take 1 capsule by mouth daily    Confusion       UNKNOWN TO PATIENT      Vision formula Vit A, C & E / Lutein/Minerals        VITAMIN B 12 PO      Take 1 tablet by mouth daily

## 2017-12-20 NOTE — PROGRESS NOTES
"      HPI:       Belia Sheets is a 87 year old who presents for the following  Patient presents with:  Hemorrhoids: Golf ball size,protruding out, bleeding,intermit, 12/6/17  RECHECK: Follow up, Blood pressure         Concern: Follow up encephalopathy:  Pt has had improvement in language skills, especially word finding and fluency.  Still struggling with executive skills and with having periodic hallucinations of \"threads\" of hair appearing in front of her and drifting off out of her field of view.  The threads are not upsetting to her.      Rectal mass - seems to protrude from anus.  Been happening episodically over past 2 wks.  Painless but there has been some blood on toilet tissue.  Pt has tried to push tissue back into rectum which is sometimes successful.  Stools soft and there is no straining.  She has a h/o hemorrhoids.    HTN - taking amlodipine and lisinopril without difficulty                 Problem, Medication and Allergy Lists were reviewed and are current.  Patient is an established patient of this clinic.         Review of Systems:   Review of Systems   Constitutional: Positive for activity change and fatigue. Negative for appetite change, fever and unexpected weight change.   Respiratory: Negative.    Cardiovascular: Negative.    Musculoskeletal: Positive for arthralgias, back pain, gait problem and myalgias.   Neurological: Positive for weakness.   Psychiatric/Behavioral: Positive for confusion, decreased concentration and hallucinations. Negative for agitation and dysphoric mood. The patient is not nervous/anxious.              Physical Exam:   Patient Vitals for the past 24 hrs:   BP Temp Temp src Pulse Resp SpO2 Weight   12/20/17 1625 128/82 97.5  F (36.4  C) Oral 64 16 97 % 98 lb 6.4 oz (44.6 kg)     Body mass index is 20.66 kg/(m^2).  Vitals were reviewed and were normal     Physical Exam   Constitutional: She is oriented to person, place, and time.   Alert, pleasant.  Uses a 4-wheeled " walker with seat.   Cardiovascular: Normal rate, regular rhythm and normal heart sounds.    Pulmonary/Chest: No respiratory distress. She has no wheezes. She has no rales.   Abdominal: Soft. Bowel sounds are normal. She exhibits no mass. There is no tenderness.   Anus:  + prolapse of rectal tissue with straining.  Nontender, nonbleeding.  No masses palpated on digital rectal exam.   Musculoskeletal:   + marked scoliosis, kyphosis.  + muscle wasting throughout   Neurological: She is alert and oriented to person, place, and time.   Psychiatric: She has a normal mood and affect. Her behavior is normal.         Results:     Results from last visit:  Office Visit on 12/01/2017   Component Date Value Ref Range Status     Urea Nitrogen 12/01/2017 24.4* 7.0 - 19.0 mg/dL Final     Calcium 12/01/2017 10.3* 8.5 - 10.1 mg/dL Final     Chloride 12/01/2017 98.4  98.0 - 110.0 mmol/L Final     Carbon Dioxide 12/01/2017 29.0  20.0 - 32.0 mmol/L Final     Creatinine 12/01/2017 0.6  0.5 - 1.0 mg/dL Final     Glucose 12/01/2017 105.6* 70.0 - 99.0 mg'dL Final     Potassium 12/01/2017 3.7  3.3 - 4.5 mmol/dL Final     Sodium 12/01/2017 134.7  132.6 - 141.4 mmol/L Final     GFR Estimate 12/01/2017 >90  >60.0 mL/min/1.7 m2 Final     GFR Estimate If Black 12/01/2017 >90  >60.0 mL/min/1.7 m2 Final     N-Terminal Pro Bnp 12/01/2017 780* 0 - 450 pg/mL Final    Comment:    Reference range shown and results flagged as abnormal are for the outpatient,   non acute settings. Establishing a baseline value for each individual patient   is useful for follow-up.  Suggested inpatient cut points for confirming diagnosis of CHF in an acute   setting are:   >450 pg/mL (age 18 to less than 50)   >900 pg/mL (age 50 to less than 75)   >1800 pg/mL (75 yrs and older)  An inpatient or emergency department NT-proPBNP <300 pg/mL effectively rules   out acute CHF, with 99% negative predictive value.        TSH 12/01/2017 0.82  0.40 - 4.00 mU/L Final     Assessment  and Plan     Belia was seen today for hemorrhoids and recheck.    Diagnoses and all orders for this visit:    Encephalopathy - definite improvement in language skills; still with deficits in visual function and executive skills.  Will continue to promote healthy nutrition and exercise in hopes of further improvement.  I agree with Dr. Pulido that this does not fit the picture of a dementia.    Age-related osteoporosis with current pathological fracture with routine healing, subsequent encounter - continues on forteo injections.  Mobility continues impaired and she will continue her 4-wheeled walker.    Rectal prolapse - will consult colorectal surgery.  -     COLORECTAL SURGERY REFERRAL - INTERNAL      There are no discontinued medications.  Options for treatment and follow-up care were reviewed with the patient. Maliadenita Keaton Sheets  engaged in the decision making process and verbalized understanding of the options discussed and agreed with the final plan.    Alejandro Sandhu MD

## 2017-12-26 ENCOUNTER — TELEPHONE (OUTPATIENT)
Dept: FAMILY MEDICINE | Facility: CLINIC | Age: 82
End: 2017-12-26

## 2017-12-26 NOTE — TELEPHONE ENCOUNTER
APPT INFO    Date /Time: 1/4/18   Reason for Appt: Rectal Prolapse   Ref Provider/Clinic: Lindsey Burdick   Are there internal records? Yes/No?  IF YES, list clinic names: Yes  See Above  [facility] ED   Are there outside records? Yes/No? Yes     Patient Contact (Y/N) & Call Details: No referred   Action: Reviewed records; Records are in EPIC     OUTSIDE RECORDS CHECKLIST     CLINIC NAME COMMENTS REC (x) IMG (x)

## 2017-12-26 NOTE — TELEPHONE ENCOUNTER
[facility] phone call message - order or referral request for patient:     Order or referral being requested: Delay resert home care visit till tomorrow due to patient being unavailable today.     Additional Comments: Patient's home care nurse states that she will need the new verbal orders by today for her to be able to see the patient tomorrow.     OK to leave a message on voice mail? Yes    Primary language: English      needed? No    Call taken on December 26, 2017 at 9:23 AM by Bel Campbell

## 2017-12-27 ENCOUNTER — TELEPHONE (OUTPATIENT)
Dept: SURGERY | Facility: CLINIC | Age: 82
End: 2017-12-27

## 2017-12-27 ENCOUNTER — MEDICAL CORRESPONDENCE (OUTPATIENT)
Dept: HEALTH INFORMATION MANAGEMENT | Facility: CLINIC | Age: 82
End: 2017-12-27

## 2017-12-27 NOTE — TELEPHONE ENCOUNTER
"Called pt regarding recommendations while waiting for appt. Pt rates pain standing or walking 9 out of 10, sitting 2 out of 10.  Golf ball size protrusion with BM and with standing. Pt states BM is \"reasonably soft\", takes fiber supplement daily. Explained to pt that she should continue fiber supplement and drink plenty of fluids to keep BM soft. She can take Tylenol and Ibuprofen for pain, and try warm tub baths or sugar to help reduce swelling. If pt is having protrusion upon standing and pain is continuing we would recommend she go to ED to have the area evaluated more urgently. Pt states she feels this is best and plans to go to ED today. Contact information provided should she have any additional questions or concerns. Jossie WONG LPN    "

## 2017-12-27 NOTE — TELEPHONE ENCOUNTER
----- Message from Edward Soares sent at 12/26/2017 11:33 AM CST -----  Regarding: New patient for Joslyn - slight pain when standing up  Contact: [phone]  Good morning team,    Patient's daughter Lubna called in today in regards to her mother's symptoms, she stated that her mother can only sit or lay down because when she stands it causes pain. Called Nurse Triage and was informed to let them know patient should go to ER if patient is in pain. Lubna expressed her understanding and will decided depending on her mothers condition. I was able to schedule her 2 days earlier than her original appointment, and she did request if there was anything she can do or anything she should take mean while? Please follow up with patient at [phone] to discuss concerns, if not able to reach patient, please follow up with daughter at [phone]    Thank you,  Edward Soares

## 2017-12-28 ENCOUNTER — TELEPHONE (OUTPATIENT)
Dept: FAMILY MEDICINE | Facility: CLINIC | Age: 82
End: 2017-12-28

## 2017-12-28 NOTE — TELEPHONE ENCOUNTER
[facility] Family Medicine phone call message- general phone call:    Reason for call: Patient's daughter called to speak to a nurse regarding her mom's care on her referral on colorectal. Please give her a call to discuss about how she can get a faster and better care.     Return call needed: Yes    OK to leave a message on voice mail? Yes    Primary language: English      needed? No    Call taken on December 28, 2017 at 1:32 PM by Kadi Campos

## 2017-12-28 NOTE — TELEPHONE ENCOUNTER
RN returned call to daughter. Unable to reach, left VM with name and call back number. Per chart review pt has appt with colorectal on 1/2 and pt's daughter called them yesterday    Luisana Bell RN

## 2017-12-28 NOTE — TELEPHONE ENCOUNTER
[facility] Family Medicine phone call message- general phone call:    Reason for call: returning call to Maira.  (Tried to reach [facility], no answer at 1:50 on 12/27/17)    Return call needed: Yes    OK to leave a message on voice mail? Yes    Primary language: English      needed? No    Call taken on December 28, 2017 at 1:51 PM by Fartun Gu

## 2017-12-28 NOTE — TELEPHONE ENCOUNTER
RN returned call to patient's daughter who states pt has appt Tuesday but wondering if should go to ED Per chart review colo-rectal stated to go to ED if pain is unmanagable. Home recommendations are warm baths, sugar, ibuprofen/tylenol or OTC pain relieving cream.     Pt's daughter states she spoke wit colo-rectal and they asked if pt had tests done at [facility] with Dr. Sandhu. Per chart review do not see any tests done. Pt's daughter wondering if there are tests that pt could at \[facility]\"" tomorrow to speed up process of fixing rectal prolapse as it is uncomfortable to pt.     RN called colo-rectal and spoke wit a nurse who states there normally isn't any testing but will leave message for liam who spoke with daughter.     RN called daughter back to relay that normally testing isn't done and pt would see NP on Tuesday and go from there to see surgeon depending on urgency. RN instructed to go to ED if pain gets worse or unmanageable. pts daughter verbalized understanding    Luisana Bell RN

## 2017-12-29 ENCOUNTER — HOSPITAL ENCOUNTER (EMERGENCY)
Facility: CLINIC | Age: 82
Discharge: HOME | End: 2017-12-29
Attending: EMERGENCY MEDICINE | Admitting: EMERGENCY MEDICINE
Payer: COMMERCIAL

## 2017-12-29 VITALS
DIASTOLIC BLOOD PRESSURE: 84 MMHG | TEMPERATURE: 97.7 F | SYSTOLIC BLOOD PRESSURE: 116 MMHG | RESPIRATION RATE: 16 BRPM | OXYGEN SATURATION: 95 %

## 2017-12-29 DIAGNOSIS — K62.3 RECTAL PROLAPSE: ICD-10-CM

## 2017-12-29 LAB
ANION GAP SERPL CALCULATED.3IONS-SCNC: 6 MMOL/L (ref 3–14)
BASOPHILS # BLD AUTO: 0.1 10E9/L (ref 0–0.2)
BASOPHILS NFR BLD AUTO: 1.1 %
BUN SERPL-MCNC: 22 MG/DL (ref 7–30)
CALCIUM SERPL-MCNC: 9.4 MG/DL (ref 8.5–10.1)
CHLORIDE SERPL-SCNC: 103 MMOL/L (ref 94–109)
CO2 SERPL-SCNC: 29 MMOL/L (ref 20–32)
CREAT SERPL-MCNC: 0.62 MG/DL (ref 0.52–1.04)
DIFFERENTIAL METHOD BLD: NORMAL
EOSINOPHIL # BLD AUTO: 0.3 10E9/L (ref 0–0.7)
EOSINOPHIL NFR BLD AUTO: 5.4 %
ERYTHROCYTE [DISTWIDTH] IN BLOOD BY AUTOMATED COUNT: 13.3 % (ref 10–15)
GFR SERPL CREATININE-BSD FRML MDRD: >90 ML/MIN/1.7M2
GLUCOSE SERPL-MCNC: 79 MG/DL (ref 70–99)
HCT VFR BLD AUTO: 38.4 % (ref 35–47)
HGB BLD-MCNC: 12.6 G/DL (ref 11.7–15.7)
IMM GRANULOCYTES # BLD: 0 10E9/L (ref 0–0.4)
IMM GRANULOCYTES NFR BLD: 0.2 %
LACTATE BLD-SCNC: 1.1 MMOL/L (ref 0.7–2)
LYMPHOCYTES # BLD AUTO: 1.2 10E9/L (ref 0.8–5.3)
LYMPHOCYTES NFR BLD AUTO: 19.7 %
MCH RBC QN AUTO: 29.9 PG (ref 26.5–33)
MCHC RBC AUTO-ENTMCNC: 32.8 G/DL (ref 31.5–36.5)
MCV RBC AUTO: 91 FL (ref 78–100)
MONOCYTES # BLD AUTO: 0.5 10E9/L (ref 0–1.3)
MONOCYTES NFR BLD AUTO: 8 %
NEUTROPHILS # BLD AUTO: 4.1 10E9/L (ref 1.6–8.3)
NEUTROPHILS NFR BLD AUTO: 65.6 %
NRBC # BLD AUTO: 0 10*3/UL
NRBC BLD AUTO-RTO: 0 /100
PLATELET # BLD AUTO: 187 10E9/L (ref 150–450)
POTASSIUM SERPL-SCNC: 3.9 MMOL/L (ref 3.4–5.3)
RBC # BLD AUTO: 4.21 10E12/L (ref 3.8–5.2)
SODIUM SERPL-SCNC: 138 MMOL/L (ref 133–144)
WBC # BLD AUTO: 6.3 10E9/L (ref 4–11)

## 2017-12-29 PROCEDURE — 99283 EMERGENCY DEPT VISIT LOW MDM: CPT | Performed by: EMERGENCY MEDICINE

## 2017-12-29 PROCEDURE — 99283 EMERGENCY DEPT VISIT LOW MDM: CPT | Mod: Z6 | Performed by: EMERGENCY MEDICINE

## 2017-12-29 PROCEDURE — 83605 ASSAY OF LACTIC ACID: CPT | Performed by: EMERGENCY MEDICINE

## 2017-12-29 PROCEDURE — 80048 BASIC METABOLIC PNL TOTAL CA: CPT | Performed by: EMERGENCY MEDICINE

## 2017-12-29 PROCEDURE — 85025 COMPLETE CBC W/AUTO DIFF WBC: CPT | Performed by: EMERGENCY MEDICINE

## 2017-12-29 NOTE — TELEPHONE ENCOUNTER
Noted. Per previous call documentation, daughter has already been advise of this information and clear plan has been relayed.    Sariah Vazquez RN

## 2017-12-29 NOTE — DISCHARGE INSTRUCTIONS
Please follow up with Colorectal Surgery and Dr. Garcia.  Return if any worsening symptoms.       Rectal Prolapse  Rectal prolapse means the rectum has fallen out of position. In many cases, rectal tissue sags out of the anus. This happens when the rectum becomes stretched out, pushes down, and sags out through the anus. A reddish mass of tissue may be seen or felt at the opening of the anus. The tissue may stick out beyond the anus. This may happen following a bowel movement, and then go away for a time. Or it may be present all the time. Stool or mucus may also leak out of the anus.  Rectal prolapse happens when the structures and muscles in the pelvis and anus are weakened. Although it is not common, it is more so in elderly women, but it can happen in both men and women of all ages. Pregnancy and childbirth can make it more likely. So can repeated straining to have a bowel movement.  Often people have a hemorrhoid, which they think is a prolapse, as they see or feel something abnormal, and many of the symptoms are the same.  The following are signs and symptoms of a rectal prolapse:    Diarrhea or constipation    Stool leaking out    Mucus or blood in the stool    Incomplete bowel movements    Abdominal discomfort  To return the rectum back into place, apply gentle pressure. But it will likely prolapse again. For long-term treatment, you may need surgery. You will likely be referred to a specialist who can examine you and tell you about your options.    Home care  Straining during bowel movements is often a cause of prolapse. To help ease and prevent constipation, take these steps:    Laxatives, stool softeners, or bulking agents may be prescribed. Take these as directed. You may need them daily.    Add more fruits and vegetables and whole grains to your diet. Eat less high-fat foods and processed foods (like packaged snack foods).    Do not ignore the urge to have a bowel movement. Once a day, set aside time  after a meal for an undisturbed visit to the toilet.    Do not strain or push hard to pass stool.    Do not spend more than a few minutes on the toilet to have a bowel movement.    If you smoke, quit.  If you have a prolapse:  To return a prolapsed rectum back into place, first wash your hands well. Wet a soft cloth with warm water. Then, lie on your side with your knees to your chest. Hold the cloth to the anus and use gentle pressure to push the rectum back into place. When you re done, call the doctor. If you are unable to push the prolapse back or are uncomfortable doing so, call your healthcare provider or go to the emergency department.  Follow-up care  Follow up with your healthcare provider, or as advised. If you have been referred to a specialist, do not put off making the appointment.  Call 911  Call 911 if any of the following occur:    Heavy bleeding    Severe abdominal pain or swelling    Severe rectal pain    Fainting or loss of consciousness    Rapid heart rate  When to seek medical advice  Call your healthcare provider right away if any of these occur:    Return of the prolapse    Fever over 100.4 F (38 C)    Blood in stool    Abdominal pain or swelling    Repeated vomiting    You cannot have a bowel movement or cannot control bowel movements  Date Last Reviewed: 6/1/2016 2000-2017 [employer]. [address] [place] [address], [place] [address]. All rights reserved. This information is not intended as a substitute for professional medical care. Always follow your healthcare professional's instructions.

## 2017-12-29 NOTE — ED PROVIDER NOTES
History     Chief Complaint   Patient presents with     Rectal Problem     HPI  Belia Sheets is a 87 year old female with a history of hysterectomy (for prolapsed uterus), hemorrhoids, and encephalopathy presents to the ED today with rectal pain.  Per review of her chart, she was seen at [facility] on 17 for follow-up and was noted to have protruding rectal tissue she is with intermittent bleeding.  She does have a colorectal rectal surgery appointment scheduled for 18.  Patient's daughter states that patient has been complaining of rectal pain recently.  She first started having prolapsed rectal tissue a couple of weeks ago and states that if she stands up for 20 minutes that the tissue will bulge out.  She states that the pain is improved while she is lying down and also states that the protruding tissue does not bulge while she is lying down.  She also has complaints of nausea and vomiting for the past day.  Her last bowel movement was today and she noted some light pink blood in her stool.  She has not been taking any medications for her pain.  She otherwise denies any constipation.    PAST MEDICAL HISTORY  Past Medical History:   Diagnosis Date     Carpal tunnel syndrome (aka CTS)      H/O calcium pyrophosphate deposition disease (CPPD)      Kyphoscoliosis      Osteoarthritis     hands     Osteoporosis      PAST SURGICAL HISTORY  Past Surgical History:   Procedure Laterality Date     HYSTERECTOMY      for uterine prolapse     FAMILY HISTORY  Family History   Problem Relation Age of Onset     Depression/Anxiety Son      Depression/Anxiety Son      alcohol abuse  age 24     Hypertension Mother      Hypertension Brother      DIABETES No family hx of      Breast Cancer No family hx of      Colon Cancer No family hx of      Prostate Cancer No family hx of      Other Cancer No family hx of      SOCIAL HISTORY  Social History   Substance Use Topics     Smoking status: Never Smoker     Smokeless  tobacco: Never Used     Alcohol use No     MEDICATIONS  No current facility-administered medications for this encounter.      Current Outpatient Prescriptions   Medication     order for DME     amLODIPine (NORVASC) 5 MG tablet     lisinopril (PRINIVIL/ZESTRIL) 10 MG tablet     Thiamine HCl 50 MG CAPS     cholecalciferol (VITAMIN  -D) 1000 UNITS capsule     teriparatide, recombinant, (FORTEO) 600 MCG/2.4ML SOLN injection     loperamide (IMODIUM A-D) 2 MG tablet     UNKNOWN TO PATIENT     calcium-vitamin D (CALTRATE) 600-400 MG-UNIT per tablet     alpha-d-galactosidase (BEANO) tablet     Omega-3 Fatty Acids (OMEGA-3 FISH OIL PO)     sodium fluoride dental gel (PREVIDENT) 1.1 % GEL     Cyanocobalamin (VITAMIN B 12 PO)     ALLERGIES  No Known Allergies    I have reviewed the Medications, Allergies, Past Medical and Surgical History, and Social History in the Epic system.    Review of Systems   All other systems reviewed and are negative.      Physical Exam   BP: 124/85  Heart Rate: 78  Temp: 97.5  F (36.4  C)  SpO2: 95 %      Physical Exam   Constitutional: She is oriented to person, place, and time. No distress.   HENT:   Head: Normocephalic and atraumatic.   Eyes: Conjunctivae are normal. Pupils are equal, round, and reactive to light.   Neck: Normal range of motion. Neck supple.   Cardiovascular: Normal rate and intact distal pulses.    Pulmonary/Chest: Effort normal. No respiratory distress.   Abdominal: Soft. There is no tenderness. There is no rebound and no guarding.   Genitourinary: Vagina normal.   Genitourinary Comments: Rectal exam normal with no prolapse or mass noted   Musculoskeletal: Normal range of motion.   Neurological: She is alert and oriented to person, place, and time.   Skin: Skin is warm and dry. She is not diaphoretic.   Nursing note and vitals reviewed.      ED Course     ED Course     Procedures   3:24 PM  The patient was seen and examined by Dr. Valladares in Room 22.                Critical Care  time:  none             Labs Ordered and Resulted from Time of ED Arrival Up to the Time of Departure from the ED   CBC WITH PLATELETS DIFFERENTIAL   BASIC METABOLIC PANEL   LACTIC ACID WHOLE BLOOD            Assessments & Plan (with Medical Decision Making)   1.  Rectal prolapse    87-year-old female with a clinical history suggestive of rectal prolapse.  Her current exam is normal as is her lab evaluation.  I discussed the case with colorectal surgery who recommended a high-fiber diet, and if her prolapse returns to gently push the prolapse back in place.  She was told that she is unable to do this or has any significant pain or other concerning symptoms she should return to the ER.  Otherwise she will follow-up with colorectal surgery next week.    I have reviewed the nursing notes.    I have reviewed the findings, diagnosis, plan and need for follow up with the patient.    New Prescriptions    No medications on file       Final diagnoses:   None     Vicente TONY, am serving as a trained medical scribe to document services personally performed by Vini Valladares MD, based on the provider's statements to me.      Vini TONY MD, was physically present and have reviewed and verified the accuracy of this note documented by Vicente Morrissey.     12/29/2017   [facility], EMERGENCY DEPARTMENT     Vini Valladares MD  12/30/17 1133

## 2017-12-29 NOTE — ED AVS SNAPSHOT
[facility], [place], Emergency Department    [address] [address]    Phone:  [phone]                                       Belia Sheets   MRN: [id number]    Department:  [facility], Emergency Department   Date of Visit:  12/29/2017           After Visit Summary Signature Page     I have received my discharge instructions, and my questions have been answered. I have discussed any challenges I see with this plan with the nurse or doctor.    ..........................................................................................................................................  Patient/Patient Representative Signature      ..........................................................................................................................................  Patient Representative Print Name and Relationship to Patient    ..................................................               ................................................  Date                                            Time    ..........................................................................................................................................  Reviewed by Signature/Title    ...................................................              ..............................................  Date                                                            Time

## 2017-12-29 NOTE — ED AVS SNAPSHOT
[facility], Emergency Department    [address] [address]    Phone:  [phone]                                       Belia Sheets   MRN: [id number]    Department:  [facility], Emergency Department   Date of Visit:  12/29/2017           Patient Information     Date Of Birth          5/16/1930        Your diagnoses for this visit were:     Rectal prolapse        You were seen by Vini Valladares MD.        Discharge Instructions       Please follow up with Colorectal Surgery and Dr. Garcia.  Return if any worsening symptoms.       Rectal Prolapse  Rectal prolapse means the rectum has fallen out of position. In many cases, rectal tissue sags out of the anus. This happens when the rectum becomes stretched out, pushes down, and sags out through the anus. A reddish mass of tissue may be seen or felt at the opening of the anus. The tissue may stick out beyond the anus. This may happen following a bowel movement, and then go away for a time. Or it may be present all the time. Stool or mucus may also leak out of the anus.  Rectal prolapse happens when the structures and muscles in the pelvis and anus are weakened. Although it is not common, it is more so in elderly women, but it can happen in both men and women of all ages. Pregnancy and childbirth can make it more likely. So can repeated straining to have a bowel movement.  Often people have a hemorrhoid, which they think is a prolapse, as they see or feel something abnormal, and many of the symptoms are the same.  The following are signs and symptoms of a rectal prolapse:    Diarrhea or constipation    Stool leaking out    Mucus or blood in the stool    Incomplete bowel movements    Abdominal discomfort  To return the rectum back into place, apply gentle pressure. But it will likely prolapse again. For long-term treatment, you may need surgery. You will likely be referred to a specialist who can examine you and tell you about your  options.    Home care  Straining during bowel movements is often a cause of prolapse. To help ease and prevent constipation, take these steps:    Laxatives, stool softeners, or bulking agents may be prescribed. Take these as directed. You may need them daily.    Add more fruits and vegetables and whole grains to your diet. Eat less high-fat foods and processed foods (like packaged snack foods).    Do not ignore the urge to have a bowel movement. Once a day, set aside time after a meal for an undisturbed visit to the toilet.    Do not strain or push hard to pass stool.    Do not spend more than a few minutes on the toilet to have a bowel movement.    If you smoke, quit.  If you have a prolapse:  To return a prolapsed rectum back into place, first wash your hands well. Wet a soft cloth with warm water. Then, lie on your side with your knees to your chest. Hold the cloth to the anus and use gentle pressure to push the rectum back into place. When you re done, call the doctor. If you are unable to push the prolapse back or are uncomfortable doing so, call your healthcare provider or go to the emergency department.  Follow-up care  Follow up with your healthcare provider, or as advised. If you have been referred to a specialist, do not put off making the appointment.  Call 911  Call 911 if any of the following occur:    Heavy bleeding    Severe abdominal pain or swelling    Severe rectal pain    Fainting or loss of consciousness    Rapid heart rate  When to seek medical advice  Call your healthcare provider right away if any of these occur:    Return of the prolapse    Fever over 100.4 F (38 C)    Blood in stool    Abdominal pain or swelling    Repeated vomiting    You cannot have a bowel movement or cannot control bowel movements  Date Last Reviewed: 6/1/2016 2000-2017 [employer]. [address] [place] [address], [place] [address]. All rights reserved. This information is not intended as a substitute for  professional medical care. Always follow your healthcare professional's instructions.          Future Appointments        Provider Department Dept Phone Center    1/2/2018 1:00 PM SUSIE Orta CNP [facility] Colon and Rectal Surgery [phone] [facility]    2/7/2018 3:15 PM MD LORENA Martins [facility] [phone] [facility]      24 Hour Appointment Hotline       To make an appointment at any [facility], call [phone] ([phone]). If you don't have a family doctor or clinic, we will help you find one. [facility] clinics are conveniently located to serve the needs of you and your family.             Review of your medicines      Our records show that you are taking the medicines listed below. If these are incorrect, please call your family doctor or clinic.        Dose / Directions Last dose taken    alpha-d-galactosidase tablet        Take by mouth as needed for intestinal gas   Refills:  0        amLODIPine 5 MG tablet   Commonly known as:  NORVASC   Dose:  5 mg   Quantity:  30 tablet        Take 1 tablet (5 mg) by mouth daily   Refills:  1        calcium-vitamin D 600-400 MG-UNIT per tablet   Commonly known as:  CALTRATE   Dose:  1 tablet        Take 1 tablet by mouth daily   Refills:  0        cholecalciferol 1000 UNITS capsule   Commonly known as:  vitamin  -D   Dose:  1 capsule   Quantity:  100 capsule        Take 1 capsule (1,000 Units) by mouth daily   Refills:  3        lisinopril 10 MG tablet   Commonly known as:  PRINIVIL/ZESTRIL   Dose:  10 mg   Quantity:  30 tablet        Take 1 tablet (10 mg) by mouth daily   Refills:  3        loperamide 2 MG tablet   Commonly known as:  IMODIUM A-D   Dose:  2 mg        Take 2 mg by mouth 4 times daily as needed   Refills:  0        OMEGA-3 FISH OIL PO   Dose:  2 capsule        Take 2 capsules by mouth daily   Refills:  0        order for DME   Quantity:  1 each        Equipment being ordered: Home BP monitor and cuff    Refills:  0        sodium fluoride dental gel 1.1 % Gel topical gel   Commonly known as:  PREVIDENT        Apply to affected area At Bedtime   Refills:  0        teriparatide (recombinant) 600 MCG/2.4ML Soln injection   Commonly known as:  FORTEO   Dose:  20 mcg   Quantity:  6 mL        Inject 0.08 mLs (20 mcg) Subcutaneous daily   Refills:  1        Thiamine HCl 50 MG Caps   Dose:  1 capsule   Quantity:  60 capsule        Take 1 capsule by mouth daily   Refills:  3        UNKNOWN TO PATIENT        Vision formula Vit A, C & E / Lutein/Minerals   Refills:  0        VITAMIN B 12 PO   Dose:  1 tablet        Take 1 tablet by mouth daily   Refills:  0                Procedures and tests performed during your visit     Basic metabolic panel    CBC with platelets differential    Lactic acid whole blood      Orders Needing Specimen Collection     None      Pending Results     No orders found from 12/27/2017 to 12/30/2017.            Pending Culture Results     No orders found from 12/27/2017 to 12/30/2017.            Pending Results Instructions     If you had any lab results that were not finalized at the time of your Discharge, you can call the ED Lab Result RN at [phone]. You will be contacted by this team for any positive Lab results or changes in treatment. The nurses are available 7 days a week from 10A to 6:30P.  You can leave a message 24 hours per day and they will return your call.        Thank you for choosing [facility]       Thank you for choosing [facility] for your care. Our goal is always to provide you with excellent care. Hearing back from our patients is one way we can continue to improve our services. Please take a few minutes to complete the written survey that you may receive in the mail after you visit with us. Thank you!        [facility] Information     [employer] gives you secure access to your electronic health record. If you see a primary care provider, you can also send messages to your care team  and make appointments. If you have questions, please call your primary care clinic.  If you do not have a primary care provider, please call [phone] and they will assist you.        Care EveryWhere ID     This is your Care EveryWhere ID. This could be used by other organizations to access your [facility] medical records  [id number]        Equal Access to Services     ANUEL MARSHALL : Angelica Pritchett, alejandra wilson, ivana oliva. So [facility] [phone].    ATENCIÓN: Si habla español, tiene a benavides disposición servicios gratuitos de asistencia lingüística. Llame al [phone].    We comply with applicable federal civil rights laws and [place] laws. We do not discriminate on the basis of race, color, national origin, age, disability, sex, sexual orientation, or gender identity.            After Visit Summary       This is your record. Keep this with you and show to your community pharmacist(s) and doctor(s) at your next visit.

## 2017-12-29 NOTE — ED NOTES
"Pt presents to Ed with C/O of \"rectum falling out\", along with light red rectal bleeding when standing. Pt states this started 12/6/17, saw primary care provider and was referred to colo-rectal. Pt states her appt is on 1/2/18. Pt also reports felling nauseated and light headed at times. Vitally stable, alert and oriented in triage.   "

## 2017-12-29 NOTE — TELEPHONE ENCOUNTER
Jossie with [facility] called to inform team that patient will not need any additional testing prior to the appointment on Tuesday. Please call her back with questions. Okay to leave message.

## 2018-01-02 ENCOUNTER — OFFICE VISIT (OUTPATIENT)
Dept: SURGERY | Facility: CLINIC | Age: 83
End: 2018-01-02
Payer: COMMERCIAL

## 2018-01-02 VITALS
HEART RATE: 92 BPM | DIASTOLIC BLOOD PRESSURE: 105 MMHG | OXYGEN SATURATION: 96 % | WEIGHT: 91.2 LBS | BODY MASS INDEX: 18.39 KG/M2 | HEIGHT: 59 IN | SYSTOLIC BLOOD PRESSURE: 165 MMHG | TEMPERATURE: 97.7 F

## 2018-01-02 DIAGNOSIS — K62.3 RECTAL PROLAPSE: Primary | ICD-10-CM

## 2018-01-02 ASSESSMENT — ENCOUNTER SYMPTOMS
CONSTIPATION: 0
BLOATING: 0
HEARTBURN: 0
BOWEL INCONTINENCE: 1
ABDOMINAL PAIN: 1
VOMITING: 0
NAUSEA: 1
DIARRHEA: 0
BLOOD IN STOOL: 0
RECTAL PAIN: 1
JAUNDICE: 0

## 2018-01-02 ASSESSMENT — PAIN SCALES - GENERAL: PAINLEVEL: SEVERE PAIN (7)

## 2018-01-02 NOTE — MR AVS SNAPSHOT
After Visit Summary   1/2/2018    Belia Sheets    MRN: [id number]           Patient Information     Date Of Birth          5/16/1930        Visit Information        Provider Department      1/2/2018 1:00 PM Joslyn Daugherty APRN [facility]         Follow-ups after your visit        Your next 10 appointments already scheduled     Jan 17, 2018 11:00 AM CST   (Arrive by 10:45 AM)   New Patient Visit with Della Lambert MD   [facility] Rectal [facility] ([facility])    [address] [place] [id number] [phone]            Feb 07, 2018  3:15 PM CST   (Arrive by 3:00 PM)   RETURN ENDOCRINE with Saurabh Pittman MD   [facility] Endocrinology ([facility])    [address] [place] [address] [phone]              Who to contact     Please call your clinic at [phone] to:    Ask questions about your health    Make or cancel appointments    Discuss your medicines    Learn about your test results    Speak to your doctor   If you have compliments or concerns about an experience at your clinic, or if you wish to file a complaint, please contact [facility] Patient Relations at [phone] or email us at [email or web address]         Additional Information About Your Visit        MyChart Information     [employer] gives you secure access to your electronic health record. If you see a primary care provider, you can also send messages to your care team and make appointments. If you have questions, please call your primary care clinic.  If you do not have a primary care provider, please call [phone] and they will assist you.      [employer] is an electronic gateway that provides easy, online access to your medical records. With [employer], you can request a clinic appointment, read your test results, renew a  "prescription or communicate with your care team.     To access your existing account, please contact your [facility] or call [phone] for assistance.        Care EveryWhere ID     This is your Care EveryWhere ID. This could be used by other organizations to access your [place] medical records  [id number]        Your Vitals Were     Pulse Temperature Height Pulse Oximetry BMI (Body Mass Index)       92 97.7  F (36.5  C) (Oral) 4' 10.5\" 96% 18.74 kg/m2        Blood Pressure from Last 3 Encounters:   01/02/18 (!) 165/105   12/29/17 116/84   12/20/17 128/82    Weight from Last 3 Encounters:   01/02/18 91 lb 3.2 oz   12/20/17 98 lb 6.4 oz   12/06/17 94 lb 14.4 oz              Today, you had the following     No orders found for display       Primary Care Provider Office Phone # Fax #    Alejandro Sandhu [phone]       [address] [place] [address]        Equal Access to Services     ANUEL MARSHALL : Hadii shiavm ku hadasho Soomaali, waaxda luqadaha, qaybta kaalmada adetonnyyachris, ivana tyler . So [facility] [phone].    ATENCIÓN: Si habla español, tiene a benavides disposición servicios gratuitos de asistencia lingüística. Llame al [phone].    We comply with applicable federal civil rights laws and [place] laws. We do not discriminate on the basis of race, color, national origin, age, disability, sex, sexual orientation, or gender identity.            Thank you!     Thank you for choosing [facility] COLON AND RECTAL SURGERY  for your care. Our goal is always to provide you with excellent care. Hearing back from our patients is one way we can continue to improve our services. Please take a few minutes to complete the written survey that you may receive in the mail after your visit with us. Thank you!             Your Updated Medication List - Protect others around you: Learn how to safely use, store and throw away your medicines at " www.disposemymeds.org.          This list is accurate as of: 1/2/18  2:04 PM.  Always use your most recent med list.                   Brand Name Dispense Instructions for use Diagnosis    alpha-d-galactosidase tablet      Take by mouth as needed for intestinal gas        amLODIPine 5 MG tablet    NORVASC    30 tablet    Take 1 tablet (5 mg) by mouth daily    Severe hypertension       calcium-vitamin D 600-400 MG-UNIT per tablet    CALTRATE     Take 1 tablet by mouth daily        cholecalciferol 1000 UNITS capsule    vitamin  -D    100 capsule    Take 1 capsule (1,000 Units) by mouth daily    Age-related osteoporosis with current pathological fracture with routine healing, subsequent encounter       lisinopril 10 MG tablet    PRINIVIL/ZESTRIL    30 tablet    Take 1 tablet (10 mg) by mouth daily    Benign essential hypertension       loperamide 2 MG tablet    IMODIUM A-D     Take 2 mg by mouth 4 times daily as needed        OMEGA-3 FISH OIL PO      Take 2 capsules by mouth daily        order for DME     1 each    Equipment being ordered: Home BP monitor and cuff    Severe hypertension       sodium fluoride dental gel 1.1 % Gel topical gel    PREVIDENT     Apply to affected area At Bedtime        teriparatide (recombinant) 600 MCG/2.4ML Soln injection    FORTEO    6 mL    Inject 0.08 mLs (20 mcg) Subcutaneous daily    Age-related osteoporosis with current pathological fracture, initial encounter       Thiamine HCl 50 MG Caps     60 capsule    Take 1 capsule by mouth daily    Confusion       UNKNOWN TO PATIENT      Vision formula Vit A, C & E / Lutein/Minerals        VITAMIN B 12 PO      Take 1 tablet by mouth daily

## 2018-01-02 NOTE — LETTER
2018      RE: Belia Sheets  [address]  [facility] [address]       Colon and Rectal Surgery Consult Clinic Note    Date: 2018     Referring provider:  Alejandro Sandhu MD  [address]  [place] [address]     RE: Belia Sheets  : 1930  SUHAIL: 2018    Belia Sheets is a very pleasant 87 year old female with a history of encephalopathywith a recent diagnosis of rectal prolapse.  Given these findings they were subsequently sent to the [facility] for an opinion on this and a new patient consultation.     Sudeep presents today with her daughter, CONSUELO.  She developed rectal prolapse in early December.  She has tissue that falls out while she is standing or walking that is larger than golf ball size.  She gets fairly severe pain with this.  It does go back in after she sits for a period of time.  Due to the discomfort with the prolapsing tissue, she has mostly remained in bed over the past several weeks and this is significantly interfering with her life and activity.  If it is prolapsed out, she has difficulty passing stool.  She notices only a very small amount of bleeding.  She has had some fecal incontinence for many years and she thinks this may have gotten a little worse since the prolapse started occurring.  She additionally has some intermittent urinary incontinence.  Her urinary incontinence started after her hysterectomy in , which she had for uterine prolapse.  She has had 4 vaginal births with the episiotomy's.  Her largest baby was 7 lbs. 1 oz.  She has never had any prior anorectal surgeries.  She denies any abdominal pain, nausea, vomiting, or unexplained weight loss.  She denies any family history of any colon cancer.  She is unsure when her last colonoscopy was but thinks it was a long time ago.  She was recently seen in the emergency room due to pain related to the Rectal prolapse and was referred her here for further  management.    Assessment/Plan: 87 year old female with full-thickness rectal prolapse.  Patient was examined on the commode after straining and approximately 2 inches of full-thickness rectal prolapse was noted.  She is quite symptomatic with this and over the past few weeks has spent most of her time in bed as the prolapse occurs every time she stands or walks and not just with bowel movements.  She is able to get it to spontaneously reduce with sitting.  Given the sudden nature of this prolapse, I would recommend a flexible sigmoidoscopy to rule out any malignant sources.  She is unsure when her last colonoscopy was.  She would like to consider surgical intervention for the prolapse so I will have her return to clinic for both a flexible sigmoidoscopy and a consult with 1 of the surgeons to discuss possible surgical intervention.  I did discuss with her that repair of her prolapse will likely not improve her fecal incontinence as this was present prior to her prolapse.  We may need to consider further intervention for her fecal incontinence in the future.  In the meantime, I advised her to continue to reduce the prolapse when it occurs but to return to the clinic if she has any severe pain or difficulty having bowel movements.  Patient's questions were answered to her stated satisfaction and she is in agreement with this plan.    Medical history:  Past Medical History:   Diagnosis Date     Carpal tunnel syndrome (aka CTS)      H/O calcium pyrophosphate deposition disease (CPPD)      Kyphoscoliosis      Osteoarthritis     hands     Osteoporosis        Surgical history:  Past Surgical History:   Procedure Laterality Date     HYSTERECTOMY      for uterine prolapse       Problem list:    Patient Active Problem List    Diagnosis Date Noted     Encephalopathy 10/23/2017     Priority: Medium     Advance Care Planning 06/27/2017     Priority: Medium     Senile osteoporosis 01/13/2017     Priority: Medium     Lumbar  verterbral fracture, non-traumatic 2017     Priority: Medium     Recurrent falls 2016     Priority: Medium     Chronic fatigue 2016     Priority: Medium     Osteoporosis      Priority: Medium     Osteoarthritis      Priority: Medium     hands         Medications:  Current Outpatient Prescriptions   Medication Sig Dispense Refill     order for DME Equipment being ordered: Home BP monitor and cuff 1 each 0     lisinopril (PRINIVIL/ZESTRIL) 10 MG tablet Take 1 tablet (10 mg) by mouth daily 30 tablet 3     Thiamine HCl 50 MG CAPS Take 1 capsule by mouth daily 60 capsule 3     cholecalciferol (VITAMIN  -D) 1000 UNITS capsule Take 1 capsule (1,000 Units) by mouth daily 100 capsule 3     teriparatide, recombinant, (FORTEO) 600 MCG/2.4ML SOLN injection Inject 0.08 mLs (20 mcg) Subcutaneous daily 6 mL 1     loperamide (IMODIUM A-D) 2 MG tablet Take 2 mg by mouth 4 times daily as needed       UNKNOWN TO PATIENT Vision formula Vit A, C & E / Lutein/Minerals       calcium-vitamin D (CALTRATE) 600-400 MG-UNIT per tablet Take 1 tablet by mouth daily        alpha-d-galactosidase (BEANO) tablet Take by mouth as needed for intestinal gas       Omega-3 Fatty Acids (OMEGA-3 FISH OIL PO) Take 2 capsules by mouth daily        sodium fluoride dental gel (PREVIDENT) 1.1 % GEL Apply to affected area At Bedtime       Cyanocobalamin (VITAMIN B 12 PO) Take 1 tablet by mouth daily        amLODIPine (NORVASC) 5 MG tablet Take 1 tablet (5 mg) by mouth daily (Patient not taking: Reported on 2018) 30 tablet 1       Allergies:  No Known Allergies    Family history:  Family History   Problem Relation Age of Onset     Depression/Anxiety Son      Depression/Anxiety Son      alcohol abuse  age 24     Hypertension Mother      Hypertension Brother      DIABETES No family hx of      Breast Cancer No family hx of      Colon Cancer No family hx of      Prostate Cancer No family hx of      Other Cancer No family hx of        Social  "history:  Social History   Substance Use Topics     Smoking status: Never Smoker     Smokeless tobacco: Never Used     Alcohol use No    Marital status: .      Nursing Notes:   Jossie Urban LPN  1/2/2018  1:19 PM  Signed  Chief Complaint   Patient presents with     Clinic Care Coordination - Initial     new       Vitals:    01/02/18 1316   BP: (!) 165/105   Pulse: 92   Temp: 97.7  F (36.5  C)   TempSrc: Oral   SpO2: 96%   Weight: 91 lb 3.2 oz   Height: 4' 10.5\"       Body mass index is 18.74 kg/(m^2).      Jossie MARVINAMELIA                             Physical Examination:  BP (!) 165/105  Pulse 92  Temp 97.7  F (36.5  C) (Oral)  Ht 4' 10.5\"  Wt 91 lb 3.2 oz  SpO2 96%  BMI 18.74 kg/m2  General: Alert, oriented, in no acute distress, sitting comfortably  HEENT: Mucous membranes moist  Perianal external examination:  Perianal skin: Intact with no excoriation or lichenification.    Patient was examined on the commode with evidence of full-thickness rectal prolapse protruding approximately 2 inches outside the anal opening.  No bleeding.  She was able to easily manually reduce this.    Total face to face time was 30 minutes, >50% counseling.    SUSIE Schaefer, NP-C  Colon and Rectal Surgery   [facility]    This note was created using speech recognition software and may contain unintended word substitutions.      SUSIE Schaefer CNP      "

## 2018-01-02 NOTE — PROGRESS NOTES
Colon and Rectal Surgery Consult Clinic Note    Date: 2018     Referring provider:  Alejandro Sandhu MD  [address] [address]     RE: Belia Sheets  : 1930  SUHAIL: 2018    Belia Sheets is a very pleasant 87 year old female with a history of encephalopathywith a recent diagnosis of rectal prolapse.  Given these findings they were subsequently sent to the [facility] for an opinion on this and a new patient consultation.     Sdueep presents today with her daughter, Lubna.  She developed rectal prolapse in early December.  She has tissue that falls out while she is standing or walking that is larger than golf ball size.  She gets fairly severe pain with this.  It does go back in after she sits for a period of time.  Due to the discomfort with the prolapsing tissue, she has mostly remained in bed over the past several weeks and this is significantly interfering with her life and activity.  If it is prolapsed out, she has difficulty passing stool.  She notices only a very small amount of bleeding.  She has had some fecal incontinence for many years and she thinks this may have gotten a little worse since the prolapse started occurring.  She additionally has some intermittent urinary incontinence.  Her urinary incontinence started after her hysterectomy in , which she had for uterine prolapse.  She has had 4 vaginal births with the episiotomy's.  Her largest baby was 7 lbs. 1 oz.  She has never had any prior anorectal surgeries.  She denies any abdominal pain, nausea, vomiting, or unexplained weight loss.  She denies any family history of any colon cancer.  She is unsure when her last colonoscopy was but thinks it was a long time ago.  She was recently seen in the emergency room due to pain related to the Rectal prolapse and was referred her here for further management.    Assessment/Plan: 87 year old female with full-thickness rectal prolapse.  Patient was  examined on the commode after straining and approximately 2 inches of full-thickness rectal prolapse was noted.  She is quite symptomatic with this and over the past few weeks has spent most of her time in bed as the prolapse occurs every time she stands or walks and not just with bowel movements.  She is able to get it to spontaneously reduce with sitting.  Given the sudden nature of this prolapse, I would recommend a flexible sigmoidoscopy to rule out any malignant sources.  She is unsure when her last colonoscopy was.  She would like to consider surgical intervention for the prolapse so I will have her return to clinic for both a flexible sigmoidoscopy and a consult with 1 of the surgeons to discuss possible surgical intervention.  I did discuss with her that repair of her prolapse will likely not improve her fecal incontinence as this was present prior to her prolapse.  We may need to consider further intervention for her fecal incontinence in the future.  In the meantime, I advised her to continue to reduce the prolapse when it occurs but to return to the clinic if she has any severe pain or difficulty having bowel movements.  Patient's questions were answered to her stated satisfaction and she is in agreement with this plan.    Medical history:  Past Medical History:   Diagnosis Date     Carpal tunnel syndrome (aka CTS)      H/O calcium pyrophosphate deposition disease (CPPD)      Kyphoscoliosis      Osteoarthritis     hands     Osteoporosis        Surgical history:  Past Surgical History:   Procedure Laterality Date     HYSTERECTOMY      for uterine prolapse       Problem list:    Patient Active Problem List    Diagnosis Date Noted     Encephalopathy 10/23/2017     Priority: Medium     Advance Care Planning 06/27/2017     Priority: Medium     Senile osteoporosis 01/13/2017     Priority: Medium     Lumbar verterbral fracture, non-traumatic 01/13/2017     Priority: Medium     Recurrent falls 08/31/2016      Priority: Medium     Chronic fatigue 2016     Priority: Medium     Osteoporosis      Priority: Medium     Osteoarthritis      Priority: Medium     hands         Medications:  Current Outpatient Prescriptions   Medication Sig Dispense Refill     order for DME Equipment being ordered: Home BP monitor and cuff 1 each 0     lisinopril (PRINIVIL/ZESTRIL) 10 MG tablet Take 1 tablet (10 mg) by mouth daily 30 tablet 3     Thiamine HCl 50 MG CAPS Take 1 capsule by mouth daily 60 capsule 3     cholecalciferol (VITAMIN  -D) 1000 UNITS capsule Take 1 capsule (1,000 Units) by mouth daily 100 capsule 3     teriparatide, recombinant, (FORTEO) 600 MCG/2.4ML SOLN injection Inject 0.08 mLs (20 mcg) Subcutaneous daily 6 mL 1     loperamide (IMODIUM A-D) 2 MG tablet Take 2 mg by mouth 4 times daily as needed       UNKNOWN TO PATIENT Vision formula Vit A, C & E / Lutein/Minerals       calcium-vitamin D (CALTRATE) 600-400 MG-UNIT per tablet Take 1 tablet by mouth daily        alpha-d-galactosidase (BEANO) tablet Take by mouth as needed for intestinal gas       Omega-3 Fatty Acids (OMEGA-3 FISH OIL PO) Take 2 capsules by mouth daily        sodium fluoride dental gel (PREVIDENT) 1.1 % GEL Apply to affected area At Bedtime       Cyanocobalamin (VITAMIN B 12 PO) Take 1 tablet by mouth daily        amLODIPine (NORVASC) 5 MG tablet Take 1 tablet (5 mg) by mouth daily (Patient not taking: Reported on 2018) 30 tablet 1       Allergies:  No Known Allergies    Family history:  Family History   Problem Relation Age of Onset     Depression/Anxiety Son      Depression/Anxiety Son      alcohol abuse  age 24     Hypertension Mother      Hypertension Brother      DIABETES No family hx of      Breast Cancer No family hx of      Colon Cancer No family hx of      Prostate Cancer No family hx of      Other Cancer No family hx of        Social history:  Social History   Substance Use Topics     Smoking status: Never Smoker     Smokeless  "tobacco: Never Used     Alcohol use No    Marital status: .      Nursing Notes:   Jossie Urban, LPN  1/2/2018  1:19 PM  Signed  Chief Complaint   Patient presents with     Clinic Care Coordination - Initial     new       Vitals:    01/02/18 1316   BP: (!) 165/105   Pulse: 92   Temp: 97.7  F (36.5  C)   TempSrc: Oral   SpO2: 96%   Weight: 91 lb 3.2 oz   Height: 4' 10.5\"       Body mass index is 18.74 kg/(m^2).      Jossie WONGAMELIA                             Physical Examination:  BP (!) 165/105  Pulse 92  Temp 97.7  F (36.5  C) (Oral)  Ht 4' 10.5\"  Wt 91 lb 3.2 oz  SpO2 96%  BMI 18.74 kg/m2  General: Alert, oriented, in no acute distress, sitting comfortably  HEENT: Mucous membranes moist  Perianal external examination:  Perianal skin: Intact with no excoriation or lichenification.    Patient was examined on the commode with evidence of full-thickness rectal prolapse protruding approximately 2 inches outside the anal opening.  No bleeding.  She was able to easily manually reduce this.    Total face to face time was 30 minutes, >50% counseling.    SUSEI Schaefer, NP-C  Colon and Rectal Surgery   [facility]    This note was created using speech recognition software and may contain unintended word substitutions.    "

## 2018-01-02 NOTE — NURSING NOTE
"Chief Complaint   Patient presents with     Clinic Care Coordination - Initial     new       Vitals:    01/02/18 1316   BP: (!) 165/105   Pulse: 92   Temp: 97.7  F (36.5  C)   TempSrc: Oral   SpO2: 96%   Weight: 91 lb 3.2 oz   Height: 4' 10.5\"       Body mass index is 18.74 kg/(m^2).      Jossie WONG LPN                          "

## 2018-01-04 ENCOUNTER — ALLIED HEALTH/NURSE VISIT (OUTPATIENT)
Dept: SURGERY | Facility: CLINIC | Age: 83
End: 2018-01-04
Payer: COMMERCIAL

## 2018-01-04 ENCOUNTER — OFFICE VISIT (OUTPATIENT)
Dept: SURGERY | Facility: CLINIC | Age: 83
End: 2018-01-04
Payer: COMMERCIAL

## 2018-01-04 ENCOUNTER — ANESTHESIA EVENT (OUTPATIENT)
Dept: SURGERY | Facility: CLINIC | Age: 83
DRG: 334 | End: 2018-01-04
Payer: COMMERCIAL

## 2018-01-04 ENCOUNTER — PRE VISIT (OUTPATIENT)
Dept: SURGERY | Facility: CLINIC | Age: 83
End: 2018-01-04

## 2018-01-04 ENCOUNTER — APPOINTMENT (OUTPATIENT)
Dept: SURGERY | Facility: CLINIC | Age: 83
End: 2018-01-04
Payer: COMMERCIAL

## 2018-01-04 VITALS
HEIGHT: 59 IN | WEIGHT: 91 LBS | OXYGEN SATURATION: 98 % | TEMPERATURE: 98.3 F | RESPIRATION RATE: 16 BRPM | DIASTOLIC BLOOD PRESSURE: 84 MMHG | SYSTOLIC BLOOD PRESSURE: 145 MMHG | BODY MASS INDEX: 18.35 KG/M2 | HEART RATE: 88 BPM

## 2018-01-04 VITALS
OXYGEN SATURATION: 98 % | HEIGHT: 59 IN | TEMPERATURE: 98.3 F | DIASTOLIC BLOOD PRESSURE: 108 MMHG | HEART RATE: 102 BPM | BODY MASS INDEX: 18.35 KG/M2 | SYSTOLIC BLOOD PRESSURE: 158 MMHG | WEIGHT: 91 LBS

## 2018-01-04 DIAGNOSIS — K62.3 RECTAL PROLAPSE: Primary | ICD-10-CM

## 2018-01-04 DIAGNOSIS — Z01.818 PREOP EXAMINATION: Primary | ICD-10-CM

## 2018-01-04 DIAGNOSIS — I10 HYPERTENSION, UNSPECIFIED TYPE: ICD-10-CM

## 2018-01-04 DIAGNOSIS — K62.3 RECTAL PROLAPSE: ICD-10-CM

## 2018-01-04 LAB
ALBUMIN SERPL-MCNC: 3.3 G/DL (ref 3.4–5)
ALP SERPL-CCNC: 58 U/L (ref 40–150)
ALT SERPL W P-5'-P-CCNC: 14 U/L (ref 0–50)
ANION GAP SERPL CALCULATED.3IONS-SCNC: 5 MMOL/L (ref 3–14)
AST SERPL W P-5'-P-CCNC: 15 U/L (ref 0–45)
BILIRUB SERPL-MCNC: 0.7 MG/DL (ref 0.2–1.3)
BUN SERPL-MCNC: 13 MG/DL (ref 7–30)
CALCIUM SERPL-MCNC: 9.5 MG/DL (ref 8.5–10.1)
CHLORIDE SERPL-SCNC: 106 MMOL/L (ref 94–109)
CO2 SERPL-SCNC: 30 MMOL/L (ref 20–32)
CREAT SERPL-MCNC: 0.65 MG/DL (ref 0.52–1.04)
ERYTHROCYTE [DISTWIDTH] IN BLOOD BY AUTOMATED COUNT: 12.7 % (ref 10–15)
GFR SERPL CREATININE-BSD FRML MDRD: 86 ML/MIN/1.7M2
GLUCOSE SERPL-MCNC: 90 MG/DL (ref 70–99)
HCT VFR BLD AUTO: 40.4 % (ref 35–47)
HGB BLD-MCNC: 13.6 G/DL (ref 11.7–15.7)
MCH RBC QN AUTO: 30.2 PG (ref 26.5–33)
MCHC RBC AUTO-ENTMCNC: 33.7 G/DL (ref 31.5–36.5)
MCV RBC AUTO: 90 FL (ref 78–100)
PLATELET # BLD AUTO: 234 10E9/L (ref 150–450)
POTASSIUM SERPL-SCNC: 4.2 MMOL/L (ref 3.4–5.3)
PREALB SERPL IA-MCNC: 21 MG/DL (ref 15–45)
PROT SERPL-MCNC: 6.9 G/DL (ref 6.8–8.8)
RBC # BLD AUTO: 4.51 10E12/L (ref 3.8–5.2)
SODIUM SERPL-SCNC: 141 MMOL/L (ref 133–144)
WBC # BLD AUTO: 8.3 10E9/L (ref 4–11)

## 2018-01-04 ASSESSMENT — PAIN SCALES - GENERAL: PAINLEVEL: MODERATE PAIN (4)

## 2018-01-04 NOTE — PATIENT INSTRUCTIONS
Preparing for Your Surgery      Name:  Belia Sheets   MRN:  [id number]   :  1930   Today's Date:  2018     Arriving for surgery:We will call you with the date, time, location of your surgery  Surgery date:    Arrival time:    Please come to:       What can I eat or drink?  -  You may have solid food or milk products until 8 hours prior to your surgery--check with surgeon's office for bowel prep   -  You may have water, apple juice or 7up/Sprite until 2 hours prior to your surgery.    Which medicines can I take?  -  Do NOT take these medications in the morning, the day of surgery:    Lisinopril  Please stop vitamins/minerals/supplements one week prior to surgery       -  Please take these medications the day of surgery:     Check with primary care about the amlodipine           How do I prepare myself?  -  Take two showers: one the night before surgery; and one the morning of surgery.         Use Scrubcare or Hibiclens to wash from neck down.  You may use your own shampoo and conditioner. No other hair products.   -  Do NOT use lotion, powder, deodorant, or antiperspirant the day of your surgery.  -  Do NOT wear any makeup, fingernail polish or jewelry.  -Do not bring your own medications to the hospital, except for inhalers and eye drops.  -  Bring your ID and insurance card.    Questions or Concerns:  If you have questions or concerns, please call the  [facility], Monday-Friday 7AM-7PM:  [phone]            AFTER YOUR SURGERY  Breathing exercises   Breathing exercises help you recover faster. Take deep breaths and let the air out slowly. This will:     Help you wake up after surgery.    Help prevent complications like pneumonia.  Preventing complications will help you go home sooner.   We may give you a breathing device (incentive spirometer) to encourage you to breathe deeply.   Nausea and vomiting   You may feel sick to your stomach after surgery; if so, let your nurse  know.    Pain control:  After surgery, you may have pain. Our goal is to help you manage your pain. Pain medicine will help you feel comfortable enough to do activities that will help you heal.  These activities may include breathing exercises, walking and physical therapy.   To help your health care team treat your pain we will ask: 1) If you have pain  2) where it is located 3) describe your pain in your words  Methods of pain control include medications given by mouth, vein or by nerve block for some surgeries.  We may give you a pain control pump that will:  1) Deliver the medicine through a tube placed in your vein  2) Control the amount of medicine you receive  3) Allow you to push a button to deliver a dose of pain medicine  Sequential Compression Device (SCD) or Pneumo Boots:  You may need to wear SCD S on your legs or feet. These are wraps connected to a machine that pumps in air and releases it. The repeated pumping helps prevent blood clots from forming.   Using an Incentive Spirometer    An incentive spirometer is a device that helps you do deep breathing exercises. These exercises expand your lungs, aid in circulation, and help prevent pneumonia. Deep breathing exercises also help you breathe better and improve the function of your lungs by:    Keeping your lungs clear    Strengthening your breathing muscles    Helping prevent respiratory complications or problems  The incentive spirometer gives you a way to take an active part in recover. A nurse or therapist will teach you breathing exercises. To do these exercises, you will breathe in through your mouth and not your nose. The incentive spirometer only works correctly if you breathe in through your mouth.  Steps to clear lungs  Step 1. Exhale normally. Then, inhale normally.    Relax and breathe out.  Step 2. Place your lips tightly around the mouthpiece.    Make sure the device is upright and not tilted.  Step 3. Inhale as much air as you can through  the mouthpiece (don't breath through your nose).    Inhale slowly and deeply.    Hold your breath long enough to keep the balls or disk raised for at least 3 to 5 seconds, or as instructed by your healthcare provider.    Some spirometers have an indicator to let you know that you are breathing in too fast. If the indicator goes off, breathe in more slowly.  Step 4. Repeat the exercise regularly.    Do this exercise every hour while you're awake, or as instructed by your healthcare provider.    If you were taught deep breathing and coughing exercises, do them regularly as instructed by your healthcare provider.   Follow-up care  Make a follow up appointment, or as directed. Also, follow up with your healthcare provider as advised or if your symptoms do not improve or continue to get worse.  When to call your healthcare provider  Call your healthcare provider right away if you have any of the following:    Fever 100.4  (38 C) or higher, or as directed by your healthcare provider    Brownish, bloody, or smelly sputum  Call 911  Call 911 if any of these occur:     Shortness of breath that doesn't get better after taking your medicine    Cool, moist, pale or blue skin    Trouble breathing or swallowing, wheezing    Fainting or loss of consciousness    Feeling of fizziness or weakness or a sudden drop in blood pressure    Feeling of doom or lightheaded    Chest pain or rapid heart rate  Date Last Reviewed: 1/1/2017 2000-2017 [employer]. [address] [place] [address]. All rights reserved. This information is not intended as a substitute for professional medical care. Always follow your healthcare professional's instructions.

## 2018-01-04 NOTE — LETTER
2018       RE: Belia Sheets  [address]  [address]  [facility] [address]     Dear Colleague,    Thank you for referring your patient, Belia Sheets, to the [facility] COLON AND RECTAL SURGERY at [facility]. Please see a copy of my visit note below.    Colon and Rectal Surgery Clinic Note    RE: Belia Sheets.  : 1930.  SUHAIL: 2018.    Reason for visit: Full-thickness rectal prolapse.    HPI: 88 y/o F who presents with a 1-month h/o anal protrusion associated with perianal pain and irritation. She denies episodes where she cannot reduce her prolapse but this has significantly affected her quality of life as she cannot go anywhere and has to lay in bed all day. Her daughter Lubna, seconds this enthusiastically. She has had some fecal incontinence for many years and she thinks this may have gotten a little worse since the prolapse started occurring. She additionally has some intermittent urinary incontinence. Her urinary incontinence started after her hysterectomy in , which she had for uterine prolapse. She has had 4 vaginal births with episiotomy's. Her largest baby was 7 lbs. She has never had any prior anorectal surgeries. She denies any abdominal pain, nausea, vomiting, or unexplained weight loss. She denies any family history of any colon cancer. She is unsure when her last colonoscopy was but thinks it was a long time ago.      Medical history:  Past Medical History:   Diagnosis Date     Carpal tunnel syndrome (aka CTS)      H/O calcium pyrophosphate deposition disease (CPPD)      Kyphoscoliosis      Osteoarthritis     hands     Osteoporosis        Surgical history:  Past Surgical History:   Procedure Laterality Date     HYSTERECTOMY      for uterine prolapse       Family history:  Family History   Problem Relation Age of Onset     Depression/Anxiety Son      Depression/Anxiety Son      alcohol abuse  age 24     Hypertension Mother       "Hypertension Brother      DIABETES No family hx of      Breast Cancer No family hx of      Colon Cancer No family hx of      Prostate Cancer No family hx of      Other Cancer No family hx of        Medications:  Current Outpatient Prescriptions   Medication Sig Dispense Refill     order for DME Equipment being ordered: Home BP monitor and cuff 1 each 0     amLODIPine (NORVASC) 5 MG tablet Take 1 tablet (5 mg) by mouth daily 30 tablet 1     lisinopril (PRINIVIL/ZESTRIL) 10 MG tablet Take 1 tablet (10 mg) by mouth daily 30 tablet 3     Thiamine HCl 50 MG CAPS Take 1 capsule by mouth daily 60 capsule 3     cholecalciferol (VITAMIN  -D) 1000 UNITS capsule Take 1 capsule (1,000 Units) by mouth daily 100 capsule 3     teriparatide, recombinant, (FORTEO) 600 MCG/2.4ML SOLN injection Inject 0.08 mLs (20 mcg) Subcutaneous daily 6 mL 1     loperamide (IMODIUM A-D) 2 MG tablet Take 2 mg by mouth 4 times daily as needed       UNKNOWN TO PATIENT Vision formula Vit A, C & E / Lutein/Minerals       calcium-vitamin D (CALTRATE) 600-400 MG-UNIT per tablet Take 1 tablet by mouth daily        alpha-d-galactosidase (BEANO) tablet Take by mouth as needed for intestinal gas       Omega-3 Fatty Acids (OMEGA-3 FISH OIL PO) Take 2 capsules by mouth daily        sodium fluoride dental gel (PREVIDENT) 1.1 % GEL Apply to affected area At Bedtime       Cyanocobalamin (VITAMIN B 12 PO) Take 1 tablet by mouth daily          Allergies:  No Known Allergies    Social history:   Social History   Substance Use Topics     Smoking status: Never Smoker     Smokeless tobacco: Never Used     Alcohol use No     Marital status: .    Physical Examination:  BP (!) 158/108  Pulse 102  Temp 98.3  F (36.8  C) (Oral)  Ht 4' 10.5\"  Wt 91 lb  SpO2 98%  BMI 18.7 kg/m2  General: Well hydrated. No acute distress.  Abdomen: Soft, NT. No inguinal adenopathy palpated.  Perianal external examination:  Perianal skin: intact.  Lesions: No.  Eversion of buttocks: " There was not evidence of an anal fissure. Details: N/A.  Skin tags or external hemorrhoids: Yes: small.  Digital rectal examination: Was performed.   Sphincter tone: Poor.  Palpable lesions: No.  Other: A large rectocele was appreciated on bearing down. On the toilet, a circumferential full-thickness rectal prolapse was seen. This measures approx 6-cm.  Bimanual examination: was performed.    Investigations:None.    Procedures:  Flexible sigmoidoscopy: after obtaining informed consent, an adult flexible sigmoidoscope was introduced through the anus and passed up to the mid sigmoid colon. The quality of the prep was good. Findings: distal rectal erythema and sigmoid diverticulosis. There was no active ulceration or bleeding. No additional abnormalities were seen. Total scope time: 10 minutes. The patient tolerated the procedure well.    ASSESSMENT  88 y/o F with full-thickness rectal prolapse. Functional is quite good despite having a recent hospital admission for encephalopathy of unclear etiology. We discussed the pro's and con's of both abdominal and perineal operative approaches for rectal prolapse. Given her PMH and age, I would recommend perineal rectosigmoidectomy. We discussed the higher recurrence rate with this approach as well as the possibility of chronic anorectal pain. I further emphasized the likelihood of her incontinence symptoms not improving. Risks, benefits, and alternatives of operative treatment were thoroughly discussed with the patient, he/she understands these well and agrees to proceed.    PLAN  1. Schedule for perineal rectosigmoidectomy. Will need PAC, Labs, and 1 bottle of Mg citrate preop.    Time spent: 60 minutes.  >50% spent in discussing, counseling and coordinating care.    Amrik Mariano M.D., M.Sc.     Division of Colon and Rectal Surgery  [facility]    Referring Provider:  Joslyn Medina NP.    Primary Care  Provider:  Alejandro Sandhu

## 2018-01-04 NOTE — ANESTHESIA PREPROCEDURE EVALUATION
Anesthesia Evaluation     . Pt has had prior anesthetic. Type: General and MAC    No history of anesthetic complications          ROS/MED HX    ENT/Pulmonary:  - neg pulmonary ROS     Neurologic:     (+)other neuro admission for encephalopathy in 10/2017 with no evaluation findings    Cardiovascular:     (+) hypertension----. : . . . :. . Previous cardiac testing date:results:date: results:ECG reviewed date:1/4/18 results:NSR date: results:         (-) taking anticoagulants/antiplatelets   METS/Exercise Tolerance:  1 - Eating, dressing   Hematologic:  - neg hematologic  ROS       Musculoskeletal:   (+) arthritis, , , other musculoskeletal- kyphosis      GI/Hepatic:  - neg GI/hepatic ROS       Renal/Genitourinary:  - ROS Renal section negative       Endo:  - neg endo ROS       Psychiatric:  - neg psychiatric ROS       Infectious Disease:  - neg infectious disease ROS       Malignancy:      - no malignancy   Other:    (+) No chance of pregnancy C-spine cleared: N/A, no H/O Chronic Pain,no other significant disability                    Physical Exam      Airway   Mallampati: II  TM distance: >3 FB  Neck ROM: limited    Dental   (+) partials and missing    Cardiovascular   Rhythm and rate: regular and normal      Pulmonary    breath sounds clear to auscultation    Other findings: For further details of assessment, testing, and physical exam please see H and P completed on same date.           PAC Discussion and Assessment    ASA Classification: 3  Case is suitable for: [place]  Anesthetic techniques and relevant risks discussed: GA and GA with regional block for post-op pain control  Invasive monitoring and risk discussed: No  Types:   Possibility and Risk of blood transfusion discussed: Yes  NPO instructions given:   Additional anesthetic preparation and risks discussed:   Needs early admission to pre-op area:   Other:     PAC Resident/NP Anesthesia Assessment:  Belia Pugh Sudeep is a 87 year old female scheduled  to undergo perineal rectosigmoidectomy with Dr. Mariano on 1/12/18. She has the following specific operative considerations:   - RCRI : No serious cardiac risks.   - VTE risk: 0.5%  - CEASAR # of risks 2/8 = Low risk  - Risk of PONV score = 3.  If > 2, anti-emetic intervention recommended. If 3 or > anti emetic intervention recommended with two or more meds    Last GA for hysterectomy. No history of problems with anesthesia.       --Full thickness rectal prolapse. Above procedure now planned.   --HTN. Will hold Lisinopril on DOS. EKG above. Activity limited. Walks with walker.   --Nonsmoker. No pulmonary symptoms.   --Recent admission for encephalopathy but no findings on evaluation. Mild word finding difficulty.   --Osteoarthritis. Kyphosis. Will require careful positioning. Fall RISK.    --Pain management. Discussed possibility of nerve block if appropriate for patient's procedure. Final counseling and decisions by regional team on DOS.  Type and screen drawn by service       Patient was discussed with Dr Fournier.      Reviewed and Signed by PAC Mid-Level Provider/Resident  Mid-Level Provider/Resident: SUSIE Hurley, CNS  Date: 1/4/18  Time: 1:18pm    Attending Anesthesiologist Anesthesia Assessment:  87 year old delightful patient for repair of rectosigmoidectomy for rectal prolapse. Chart reviewed, patient seen and evaluated; agree with above assessment. Has no significant cardiac or pulmonary disease; did have an unproved episode of mental confusion recently, not associated with medication change or surgery. Resolved quickly, no cognitive issues since then. She is quite frail and poorly nourished, we discussed adding supplements to her diet prior to surgery, she will work on this.     We did not discuss possibility of SAB, but patient may be amenable to this, given her concern with prior confusion and not wanting a repitition.    Patient is appropriate for the planned procedure without further workup or  medical management change. The final anesthesia plan will be determined by the physician anesthesiologist caring for the patient on the day of surgery.    Reviewed and Signed by PAC Anesthesiologist  Anesthesiologist: curry  Date: 1/4/2018  Time:   Pass/Fail: Pass  Disposition:     PAC Pharmacist Assessment:        Pharmacist:   Date:   Time:      Anesthesia Plan      History & Physical Review  History and physical reviewed and following examination; no interval change.    ASA Status:  3 .    NPO Status:  > 8 hours    Plan for General and ETT with Intravenous induction. Maintenance will be Balanced.    PONV prophylaxis:  Ondansetron (or other 5HT-3) and Dexamethasone or Solumedrol  Additional equipment: 2nd IV All risks and options explained. questions addressed.  Agree to proceed with general anesthesia.      Postoperative Care  Postoperative pain management:  Multi-modal analgesia.      Consents  Anesthetic plan, risks, benefits and alternatives discussed with:  Patient and Daughter/Son..                          .

## 2018-01-04 NOTE — MR AVS SNAPSHOT
After Visit Summary   1/4/2018    Belia Sheets    MRN: [id number]           Patient Information     Date Of Birth          5/16/1930        Visit Information        Provider Department      1/4/2018 10:30 AM Amrik Mariano MD [facility] Colon and Rectal Surgery        Today's Diagnoses     Rectal prolapse    -  1       Follow-ups after your visit        Your next 10 appointments already scheduled     Jan 04, 2018  1:00 PM CST   (Arrive by 12:45 PM)   PAC EVALUATION with Uc Pac Gavin 1   [facility] Preoperative Assessment [place] ([facility])    [address] [place] [id number]   [phone]            Jan 04, 2018  2:00 PM CST   (Arrive by 1:45 PM)   PAC RN ASSESSMENT with Uc Pac Rn   [facility] Assessment [place] ([facility])    [address] [place] [id number]   [phone]            Jan 04, 2018  2:40 PM CST   (Arrive by 2:25 PM)   PAC Anesthesia Consult with Uc Pac Anesthesiologist   [facility] Assessment [place] ([facility])    [address] [place] [id number] [phone]            Feb 07, 2018  3:15 PM CST   (Arrive by 3:00 PM)   RETURN ENDOCRINE with Saurabh Pittman MD   [facility] Endocrinology ([facility])    [address] [place] [id number] [phone]              Future tests that were ordered for you today     Open Future Orders        Priority Expected Expires Ordered    CBC with platelets Routine  4/4/2018 1/4/2018    Comprehensive metabolic panel Routine  4/4/2018 1/4/2018    Prealbumin Routine  4/4/2018 1/4/2018            Who to contact     Please call your clinic at [phone] to:    Ask questions about your health    Make or cancel appointments    Discuss your medicines    Learn about your test results    Speak to your doctor   If you have  "compliments or concerns about an experience at your clinic, or if you wish to file a complaint, please contact [facility] Patient Relations at [phone] or email us at [email or web address]         Additional Information About Your Visit        MyChart Information     [employer] gives you secure access to your electronic health record. If you see a primary care provider, you can also send messages to your care team and make appointments. If you have questions, please call your primary care clinic.  If you do not have a primary care provider, please call [phone] and they will assist you.      [employer] is an electronic gateway that provides easy, online access to your medical records. With [employer], you can request a clinic appointment, read your test results, renew a prescription or communicate with your care team.     To access your existing account, please contact your [facility] or call [phone] for assistance.        Care EveryWhere ID     This is your Care EveryWhere ID. This could be used by other organizations to access your [facility] medical records  [id number]        Your Vitals Were     Pulse Temperature Height Pulse Oximetry BMI (Body Mass Index)       102 98.3  F (36.8  C) (Oral) 4' 10.5\" 98% 18.7 kg/m2        Blood Pressure from Last 3 Encounters:   01/04/18 (!) 158/108   01/02/18 (!) 165/105   12/29/17 116/84    Weight from Last 3 Encounters:   01/04/18 91 lb   01/02/18 91 lb 3.2 oz   12/20/17 98 lb 6.4 oz              We Performed the Following     ABO/Rh type and screen     FLEX SIGMOIDOSCOPY W BIOPSY        Primary Care Provider Office Phone # Fax #    Alejandro Sandhu [phone]       [address] [address]        Equal Access to Services     ANUEL MARSHALL : alejandra Albarado qaybta kaalmada adeegyada, waxay idiin hayaan adeeg kharash la'aan ah. So wa " [phone].    ATENCIÓN: Si sofia stewart, tiene a benavides disposición servicios gratuitos de asistencia lingüística. [place] [phone].    We comply with applicable federal civil rights laws and [place] laws. We do not discriminate on the basis of race, color, national origin, age, disability, sex, sexual orientation, or gender identity.            Thank you!     Thank you for choosing [facility] COLON AND RECTAL SURGERY  for your care. Our goal is always to provide you with excellent care. Hearing back from our patients is one way we can continue to improve our services. Please take a few minutes to complete the written survey that you may receive in the mail after your visit with us. Thank you!             Your Updated Medication List - Protect others around you: Learn how to safely use, store and throw away your medicines at www.disposemymeds.org.          This list is accurate as of: 1/4/18 11:46 AM.  Always use your most recent med list.                   Brand Name Dispense Instructions for use Diagnosis    alpha-d-galactosidase tablet      Take by mouth as needed for intestinal gas        amLODIPine 5 MG tablet    NORVASC    30 tablet    Take 1 tablet (5 mg) by mouth daily    Severe hypertension       calcium-vitamin D 600-400 MG-UNIT per tablet    CALTRATE     Take 1 tablet by mouth daily        cholecalciferol 1000 UNITS capsule    vitamin  -D    100 capsule    Take 1 capsule (1,000 Units) by mouth daily    Age-related osteoporosis with current pathological fracture with routine healing, subsequent encounter       lisinopril 10 MG tablet    PRINIVIL/ZESTRIL    30 tablet    Take 1 tablet (10 mg) by mouth daily    Benign essential hypertension       loperamide 2 MG tablet    IMODIUM A-D     Take 2 mg by mouth 4 times daily as needed        OMEGA-3 FISH OIL PO      Take 2 capsules by mouth daily        order for DME     1 each    Equipment being ordered: Home BP monitor and cuff    Severe hypertension        sodium fluoride dental gel 1.1 % Gel topical gel    PREVIDENT     Apply to affected area At Bedtime        teriparatide (recombinant) 600 MCG/2.4ML Soln injection    FORTEO    6 mL    Inject 0.08 mLs (20 mcg) Subcutaneous daily    Age-related osteoporosis with current pathological fracture, initial encounter       Thiamine HCl 50 MG Caps     60 capsule    Take 1 capsule by mouth daily    Confusion       UNKNOWN TO PATIENT      Vision formula Vit A, C & E / Lutein/Minerals        VITAMIN B 12 PO      Take 1 tablet by mouth daily

## 2018-01-04 NOTE — H&P
Pre-Operative H & P     CC:  Preoperative exam to assess for increased cardiopulmonary risk while undergoing surgery and anesthesia.    Date of Encounter: 1/4/2018  Primary Care Physician:  Alejandro Sandhu  Belia Sheets is a 87 year old female who presents for pre-operative H & P in preparation for perineal rectosigmoidectomy with Dr. Mariano on 1/12/18 at [facility]. History is obtained from the patient.   Patient who consulted today with Dr. Mariano with concerns for full thickness rectal prolapse with symptoms of protrusion, and perianal pain and irritation. This is affecting her ability to do her daily activities. She was counseled for above procedure.   Patient's history is otherwise significant for an episode of encephalopathy in 10/2017, managed by Neurology but with no specific findings. She is well followed by her PCP Dr. Sandhu.    Past Medical History  Past Medical History:   Diagnosis Date     Carpal tunnel syndrome (aka CTS)      H/O calcium pyrophosphate deposition disease (CPPD)      History of encephalopathy 10/2017     Hypertension      Kyphoscoliosis      Osteoarthritis     hands     Osteoporosis      Rectal prolapse        Past Surgical History  Past Surgical History:   Procedure Laterality Date     COLONOSCOPY  2010     HYSTERECTOMY      for uterine prolapse       Hx of Blood transfusions/reactions: Denies.      Hx of abnormal bleeding or anti-platelet use: Denies.     Menstrual history: No LMP recorded. Patient is postmenopausal.    Steroid use in the last year: Denies.     Personal or FH with difficulty with Anesthesia:  Denies.    Prior to Admission Medications  Current Outpatient Prescriptions   Medication Sig Dispense Refill     lisinopril (PRINIVIL/ZESTRIL) 10 MG tablet Take 1 tablet (10 mg) by mouth daily (Patient taking differently: Take 10 mg by mouth every morning ) 30 tablet 3     Thiamine HCl 50 MG CAPS Take 1 capsule by mouth  daily (Patient taking differently: Take 1 capsule by mouth every morning ) 60 capsule 3     cholecalciferol (VITAMIN  -D) 1000 UNITS capsule Take 1 capsule (1,000 Units) by mouth daily (Patient taking differently: Take 1 capsule by mouth every morning ) 100 capsule 3     teriparatide, recombinant, (FORTEO) 600 MCG/2.4ML SOLN injection Inject 0.08 mLs (20 mcg) Subcutaneous daily 6 mL 1     calcium-vitamin D (CALTRATE) 600-400 MG-UNIT per tablet Take 1 tablet by mouth every morning        Omega-3 Fatty Acids (OMEGA-3 FISH OIL PO) Take 2 capsules by mouth 2 times daily        sodium fluoride dental gel (PREVIDENT) 1.1 % GEL Apply to affected area At Bedtime       Cyanocobalamin (VITAMIN B 12 PO) Take 1 tablet by mouth every morning        magnesium citrate solution Take 296 mLs by mouth See Admin Instructions Refer to surgery handout. 296 mL 0     order for DME Equipment being ordered: Home BP monitor and cuff 1 each 0       Allergies  No Known Allergies    Social History  Social History     Social History     Marital status:      Spouse name: N/A     Number of children: N/A     Years of education: N/A     Occupational History     Not on file.     Social History Main Topics     Smoking status: Never Smoker     Smokeless tobacco: Never Used     Alcohol use No     Drug use: No     Sexual activity: Not on file     Other Topics Concern     Not on file     Social History Narrative       Family History  Family History   Problem Relation Age of Onset     Depression/Anxiety Son      Depression/Anxiety Son      alcohol abuse  age 24     Hypertension Mother      Hypertension Brother      DIABETES No family hx of      Breast Cancer No family hx of      Colon Cancer No family hx of      Prostate Cancer No family hx of      Other Cancer No family hx of        Review of Systems    The complete review of systems is negative other than noted in the HPI or here.   Constitutional: Denies fever, chills, weight loss.  Skin:  "Perianal irritation and occasional blood.  HEENT: Wears glasses for vision. Occasional swallowing difficulty.  Respiratory: Denies cough or shortness of breath. No concern for CEASAR.  CV: Denies chest pain or irregular HR. Activity is limited. Walks with walker. BP irregularity with some medication changes. The addition of Amlodipine made her feel nauseated and dizzy and ED MD asked her to stop taking it. Continues on Lisinopril.  GI: Denies abdominal pain or bowel issues.  : Incontinence.   M/S: Multiple joint aches and kyphosis.  Neuro: Has returned to near baseline after hospitalization in 10/2017 for encephalopathy. Occasional word finding difficulty and will occasionally have hallucinations.    Temp: 98.3  F (36.8  C) Temp src: Oral BP: 145/84 Pulse: 88   Resp: 16 SpO2: 98 %         91 lbs 0 oz  4' 10.5\"   Body mass index is 18.7 kg/(m^2).       Physical Exam  Constitutional: Awake, alert, cooperative, no apparent distress, and appears stated age. Accompanied by daughter.   Eyes: Pupils equal, round and reactive to light, extra ocular muscles intact, sclera clear, conjunctiva normal. Glasses on.  HENT: Normocephalic, oral pharynx with moist mucus membranes, several teeth missing on bottom. No goiter appreciated.   Respiratory: Clear to auscultation bilaterally, no crackles or wheezing. No cough or obvious dyspnea.  Cardiovascular: Regular rate and rhythm, normal S1 and S2, and no murmur noted. Carotids, no bruits. No edema. Palpable pulses to radial  DP and PT arteries.   GI: Normal bowel sounds, soft, non-distended, non-tender, no masses palpated, no hepatosplenomegaly.    Lymph/Hematologic: No cervical lymphadenopathy and no supraclavicular lymphadenopathy.  Genitourinary: Deferred.  Skin: Warm and dry.    Musculoskeletal: Very limited neck extension Irregular posture, changes of arthritis in hands. Gross motor strength is weakened.    Neurologic: Awake, alert, oriented to name, place and time. Very " articulate. One episode of word finding difficulty.  Neuropsychiatric: Calm, cooperative. Normal affect.     Labs: (personally reviewed)  Lab Results   Component Value Date    WBC 8.3 2018     Lab Results   Component Value Date    RBC 4.51 2018     Lab Results   Component Value Date    HGB 13.6 2018     Lab Results   Component Value Date    HCT 40.4 2018     Lab Results   Component Value Date    MCV 90 2018     Lab Results   Component Value Date    MCH 30.2 2018     Lab Results   Component Value Date    MCHC 33.7 2018     Lab Results   Component Value Date    RDW 12.7 2018     Lab Results   Component Value Date     2018     Last Basic Metabolic Panel:  Lab Results   Component Value Date     2018      Lab Results   Component Value Date    POTASSIUM 4.2 2018     Lab Results   Component Value Date    CHLORIDE 106 2018     Lab Results   Component Value Date    BOY 9.5 2018     Lab Results   Component Value Date    CO2 30 2018     Lab Results   Component Value Date    BUN 13 2018     Lab Results   Component Value Date    CR 0.65 2018     Lab Results   Component Value Date    GLC 90 2018     Lab Results   Component Value Date    AST 15 2018     Lab Results   Component Value Date    ALT 14 2018     No results found for: BILICONJ   Lab Results   Component Value Date    BILITOTAL 0.7 2018     Lab Results   Component Value Date    ALBUMIN 3.3 2018     Lab Results   Component Value Date    PROTTOTAL 6.9 2018      Lab Results   Component Value Date    ALKPHOS 58 2018   Prealbumin 21  EKG: Personally reviewed but formal cardiology read pendin18 Normal sinus rhythm    CHEST X-RAY 17  IMPRESSION:  1. Streaky bibasilar opacities, likely representing atelectasis.  2. Anterior compression wedge deformities of the thoracic spine, with  loss of up to 90% vertebral body  height.  MRI brain 10/25/17  Diffusion weighted images demonstrate no definite acute infarct. On  the FLAIR images there is nonspecific mild periventricular and deep  white matter T2 hyperintensity which are most likely related to  chronic small vessel ischemic disease.       Outside records reviewed from: Care Everywhere    ASSESSMENT and PLAN  Belia Sheets is a 87 year old female scheduled to undergo perineal rectosigmoidectomy with Dr. Mariano on 1/12/18. She has the following specific operative considerations:   - RCRI : No serious cardiac risks.   - Anesthesia considerations:  Refer to PAC assessment in anesthesia records  - VTE risk: 0.5%  - CEASAR # of risks 2/8 = Low risk  - Risk of PONV score = 3.  If > 2, anti-emetic intervention recommended. If 3 or > anti emetic intervention recommended with two or more meds     --Full thickness rectal prolapse. Above procedure now planned.   --HTN. Will hold Lisinopril on DOS. EKG above. Activity limited. Walks with walker.   --Nonsmoker. No pulmonary symptoms.   --Recent admission for encephalopathy but no findings on evaluation. Mild word finding difficulty.   --Osteoarthritis. Kyphosis. Will require careful positioning. Fall RISK.    --Pain management. Discussed possibility of nerve block if appropriate for patient's procedure. Final counseling and decisions by regional team on DOS.  Type and screen drawn by service   Arrival time, NPO, shower and medication instructions provided by nursing staff today. Preparing For Your Surgery handout given.      Patient was discussed with Dr Fournier.    SUSIE Medina CNS  Preoperative Assessment Center  [facility]  Clinic and Surgery Center  Phone: [phone]  Fax: [phone]

## 2018-01-04 NOTE — MR AVS SNAPSHOT
After Visit Summary   2018    Belia Sheets    MRN: [id number]           Patient Information     Date Of Birth          1930        Visit Information        Provider Department      2018 2:00 PM Rn, [facility] Preoperative Assessment Center        Care Instructions    Preparing for Your Surgery      Name:  Belia Sheets   MRN:  [id number]   :  1930   Today's Date:  2018     Arriving for surgery:We will call you with the date, time, location of your surgery  Surgery date:    Arrival time:    Please come to:       What can I eat or drink?  -  You may have solid food or milk products until 8 hours prior to your surgery--check with surgeon's office for bowel prep   -  You may have water, apple juice or 7up/Sprite until 2 hours prior to your surgery.    Which medicines can I take?  -  Do NOT take these medications in the morning, the day of surgery:    Lisinopril  Please stop vitamins/minerals/supplements one week prior to surgery       -  Please take these medications the day of surgery:     Check with primary care about the amlodipine           How do I prepare myself?  -  Take two showers: one the night before surgery; and one the morning of surgery.         Use Scrubcare or Hibiclens to wash from neck down.  You may use your own shampoo and conditioner. No other hair products.   -  Do NOT use lotion, powder, deodorant, or antiperspirant the day of your surgery.  -  Do NOT wear any makeup, fingernail polish or jewelry.  -Do not bring your own medications to the hospital, except for inhalers and eye drops.  -  Bring your ID and insurance card.    Questions or Concerns:  If you have questions or concerns, please call the  Preoperative Assessment Center, Monday-Friday 7AM-7PM:  [phone]            AFTER YOUR SURGERY  Breathing exercises   Breathing exercises help you recover faster. Take deep breaths and let the air out slowly. This will:     Help you wake up after  surgery.    Help prevent complications like pneumonia.  Preventing complications will help you go home sooner.   We may give you a breathing device (incentive spirometer) to encourage you to breathe deeply.   Nausea and vomiting   You may feel sick to your stomach after surgery; if so, let your nurse know.    Pain control:  After surgery, you may have pain. Our goal is to help you manage your pain. Pain medicine will help you feel comfortable enough to do activities that will help you heal.  These activities may include breathing exercises, walking and physical therapy.   To help your health care team treat your pain we will ask: 1) If you have pain  2) where it is located 3) describe your pain in your words  Methods of pain control include medications given by mouth, vein or by nerve block for some surgeries.  We may give you a pain control pump that will:  1) Deliver the medicine through a tube placed in your vein  2) Control the amount of medicine you receive  3) Allow you to push a button to deliver a dose of pain medicine  Sequential Compression Device (SCD) or Pneumo Boots:  You may need to wear SCD S on your legs or feet. These are wraps connected to a machine that pumps in air and releases it. The repeated pumping helps prevent blood clots from forming.   Using an Incentive Spirometer    An incentive spirometer is a device that helps you do deep breathing exercises. These exercises expand your lungs, aid in circulation, and help prevent pneumonia. Deep breathing exercises also help you breathe better and improve the function of your lungs by:    Keeping your lungs clear    Strengthening your breathing muscles    Helping prevent respiratory complications or problems  The incentive spirometer gives you a way to take an active part in recover. A nurse or therapist will teach you breathing exercises. To do these exercises, you will breathe in through your mouth and not your nose. The incentive spirometer only  works correctly if you breathe in through your mouth.  Steps to clear lungs  Step 1. Exhale normally. Then, inhale normally.    Relax and breathe out.  Step 2. Place your lips tightly around the mouthpiece.    Make sure the device is upright and not tilted.  Step 3. Inhale as much air as you can through the mouthpiece (don't breath through your nose).    Inhale slowly and deeply.    Hold your breath long enough to keep the balls or disk raised for at least 3 to 5 seconds, or as instructed by your healthcare provider.    Some spirometers have an indicator to let you know that you are breathing in too fast. If the indicator goes off, breathe in more slowly.  Step 4. Repeat the exercise regularly.    Do this exercise every hour while you're awake, or as instructed by your healthcare provider.    If you were taught deep breathing and coughing exercises, do them regularly as instructed by your healthcare provider.   Follow-up care  Make a follow up appointment, or as directed. Also, follow up with your healthcare provider as advised or if your symptoms do not improve or continue to get worse.  When to call your healthcare provider  Call your healthcare provider right away if you have any of the following:    Fever 100.4  (38 C) or higher, or as directed by your healthcare provider    Brownish, bloody, or smelly sputum  Call 911  Call 911 if any of these occur:     Shortness of breath that doesn't get better after taking your medicine    Cool, moist, pale or blue skin    Trouble breathing or swallowing, wheezing    Fainting or loss of consciousness    Feeling of fizziness or weakness or a sudden drop in blood pressure    Feeling of doom or lightheaded    Chest pain or rapid heart rate  Date Last Reviewed: 1/1/2017 2000-2017 [employer]. [address] [place] [address], [place] [address]. All rights reserved. This information is not intended as a substitute for professional medical care. Always follow your  healthcare professional's instructions.                Follow-ups after your visit        Your next 10 appointments already scheduled     Jan 04, 2018  2:40 PM CST   (Arrive by 2:25 PM)   PAC Anesthesia Consult with  Pac Anesthesiologist   [facility] Preoperative Assessment Center ([facility])    [address] [place] [address]  4th [facility] [id number] [phone]            Feb 07, 2018  3:15 PM CST   (Arrive by 3:00 PM)   RETURN ENDOCRINE with Saurabh Pittman MD   [facility] Endocrinology ([facility])    [address] [place] [address]  3rd [facility] [id number] [phone]              Who to contact     Please call your clinic at [phone] to:    Ask questions about your health    Make or cancel appointments    Discuss your medicines    Learn about your test results    Speak to your doctor   If you have compliments or concerns about an experience at your clinic, or if you wish to file a complaint, please contact [facility] Patient Relations at [phone] or email us at [email or web address]         Additional Information About Your Visit        [employer] Information     [employer] gives you secure access to your electronic health record. If you see a primary care provider, you can also send messages to your care team and make appointments. If you have questions, please call your primary care clinic.  If you do not have a primary care provider, please call [phone] and they will assist you.      [employer] is an electronic gateway that provides easy, online access to your medical records. With [employer], you can request a clinic appointment, read your test results, renew a prescription or communicate with your care team.     To access your existing account, please contact your [facility] or call [phone] for assistance.        Care EveryWhere ID     This is your Care EveryWhere ID.  This could be used by other organizations to access your [facility] medical records  [id number]         Blood Pressure from Last 3 Encounters:   01/04/18 145/84   01/04/18 (!) 158/108   01/02/18 (!) 165/105    Weight from Last 3 Encounters:   01/04/18 41.3 kg (91 lb)   01/04/18 41.3 kg (91 lb)   01/02/18 41.4 kg (91 lb 3.2 oz)              Today, you had the following     No orders found for display         Today's Medication Changes          These changes are accurate as of: 1/4/18  2:31 PM.  If you have any questions, ask your nurse or doctor.               Start taking these medicines.        Dose/Directions    magnesium citrate solution   Used for:  Rectal prolapse   Started by:  Amrik Mariano MD        Dose:  296 mL   Take 296 mLs by mouth See Admin Instructions Refer to surgery handout.   Quantity:  296 mL   Refills:  0         These medicines have changed or have updated prescriptions.        Dose/Directions    cholecalciferol 1000 UNITS capsule   Commonly known as:  vitamin  -D   This may have changed:  when to take this   Used for:  Age-related osteoporosis with current pathological fracture with routine healing, subsequent encounter        Dose:  1 capsule   Take 1 capsule (1,000 Units) by mouth daily   Quantity:  100 capsule   Refills:  3       lisinopril 10 MG tablet   Commonly known as:  PRINIVIL/ZESTRIL   This may have changed:  when to take this   Used for:  Benign essential hypertension        Dose:  10 mg   Take 1 tablet (10 mg) by mouth daily   Quantity:  30 tablet   Refills:  3       Thiamine HCl 50 MG Caps   This may have changed:  when to take this   Used for:  Confusion        Dose:  1 capsule   Take 1 capsule by mouth daily   Quantity:  60 capsule   Refills:  3            Where to get your medicines      These medications were sent to [facility] [place], [place] - [address]  [address], [facility] [address]    Hours:   TRANSPLANT PHONE NUMBER [phone] Phone:  [phone]     magnesium citrate solution                Primary Care Provider Office Phone # Fax #    Alejandro Sandhu [phone]       [address] [address]        Equal Access to Services     ANUEL MARSHALL : Angelica dominguez mynoro Soomaali, waaxda luqadaha, qaybta kaalmada adeegyada, ivana montague rellmarylou leyvaparmjitsilke cha. So [facility] [phone].    ATENCIÓN: Si habla español, tiene a benavides disposición servicios gratuitos de asistencia lingüística. Llame al [phone].    We comply with applicable federal civil rights laws and [place] laws. We do not discriminate on the basis of race, color, national origin, age, disability, sex, sexual orientation, or gender identity.            Thank you!     Thank you for choosing [facility] PREOPERATIVE ASSESSMENT CENTER  for your care. Our goal is always to provide you with excellent care. Hearing back from our patients is one way we can continue to improve our services. Please take a few minutes to complete the written survey that you may receive in the mail after your visit with us. Thank you!             Your Updated Medication List - Protect others around you: Learn how to safely use, store and throw away your medicines at www.disposemymeds.org.          This list is accurate as of: 1/4/18  2:31 PM.  Always use your most recent med list.                   Brand Name Dispense Instructions for use Diagnosis    calcium-vitamin D 600-400 MG-UNIT per tablet    CALTRATE     Take 1 tablet by mouth every morning        cholecalciferol 1000 UNITS capsule    vitamin  -D    100 capsule    Take 1 capsule (1,000 Units) by mouth daily    Age-related osteoporosis with current pathological fracture with routine healing, subsequent encounter       lisinopril 10 MG tablet    PRINIVIL/ZESTRIL    30 tablet    Take 1 tablet (10 mg) by mouth daily    Benign essential hypertension       magnesium citrate solution      296 mL    Take 296 mLs by mouth See Admin Instructions Refer to surgery handout.    Rectal prolapse       OMEGA-3 FISH OIL PO      Take 2 capsules by mouth 2 times daily        order for DME     1 each    Equipment being ordered: Home BP monitor and cuff    Severe hypertension       sodium fluoride dental gel 1.1 % Gel topical gel    PREVIDENT     Apply to affected area At Bedtime        teriparatide (recombinant) 600 MCG/2.4ML Soln injection    FORTEO    6 mL    Inject 0.08 mLs (20 mcg) Subcutaneous daily    Age-related osteoporosis with current pathological fracture, initial encounter       Thiamine HCl 50 MG Caps     60 capsule    Take 1 capsule by mouth daily    Confusion       VITAMIN B 12 PO      Take 1 tablet by mouth every morning

## 2018-01-04 NOTE — NURSING NOTE
"Chief Complaint   Patient presents with     Flexible Sigmoidoscopy     flex sig       Vitals:    01/04/18 1000   BP: (!) 158/108   Pulse: 102   Temp: 98.3  F (36.8  C)   TempSrc: Oral   SpO2: 98%   Weight: 91 lb   Height: 4' 10.5\"       Body mass index is 18.7 kg/(m^2).      Jossie WONG LPN                       "

## 2018-01-04 NOTE — PROGRESS NOTES
Colon and Rectal Surgery Clinic Note    RE: Belia Sheets.  : 1930.  SUHAIL: 2018.    Reason for visit: Full-thickness rectal prolapse.    HPI: 86 y/o F who presents with a 1-month h/o anal protrusion associated with perianal pain and irritation. She denies episodes where she cannot reduce her prolapse but this has significantly affected her quality of life as she cannot go anywhere and has to lay in bed all day. Her daughter Lubna, seconds this enthusiastically. She has had some fecal incontinence for many years and she thinks this may have gotten a little worse since the prolapse started occurring. She additionally has some intermittent urinary incontinence. Her urinary incontinence started after her hysterectomy in , which she had for uterine prolapse. She has had 4 vaginal births with episiotomy's. Her largest baby was 7 lbs. She has never had any prior anorectal surgeries. She denies any abdominal pain, nausea, vomiting, or unexplained weight loss. She denies any family history of any colon cancer. She is unsure when her last colonoscopy was but thinks it was a long time ago.      Medical history:  Past Medical History:   Diagnosis Date     Carpal tunnel syndrome (aka CTS)      H/O calcium pyrophosphate deposition disease (CPPD)      Kyphoscoliosis      Osteoarthritis     hands     Osteoporosis        Surgical history:  Past Surgical History:   Procedure Laterality Date     HYSTERECTOMY      for uterine prolapse       Family history:  Family History   Problem Relation Age of Onset     Depression/Anxiety Son      Depression/Anxiety Son      alcohol abuse  age 24     Hypertension Mother      Hypertension Brother      DIABETES No family hx of      Breast Cancer No family hx of      Colon Cancer No family hx of      Prostate Cancer No family hx of      Other Cancer No family hx of        Medications:  Current Outpatient Prescriptions   Medication Sig Dispense Refill     order for DME Equipment  "being ordered: Home BP monitor and cuff 1 each 0     amLODIPine (NORVASC) 5 MG tablet Take 1 tablet (5 mg) by mouth daily 30 tablet 1     lisinopril (PRINIVIL/ZESTRIL) 10 MG tablet Take 1 tablet (10 mg) by mouth daily 30 tablet 3     Thiamine HCl 50 MG CAPS Take 1 capsule by mouth daily 60 capsule 3     cholecalciferol (VITAMIN  -D) 1000 UNITS capsule Take 1 capsule (1,000 Units) by mouth daily 100 capsule 3     teriparatide, recombinant, (FORTEO) 600 MCG/2.4ML SOLN injection Inject 0.08 mLs (20 mcg) Subcutaneous daily 6 mL 1     loperamide (IMODIUM A-D) 2 MG tablet Take 2 mg by mouth 4 times daily as needed       UNKNOWN TO PATIENT Vision formula Vit A, C & E / Lutein/Minerals       calcium-vitamin D (CALTRATE) 600-400 MG-UNIT per tablet Take 1 tablet by mouth daily        alpha-d-galactosidase (BEANO) tablet Take by mouth as needed for intestinal gas       Omega-3 Fatty Acids (OMEGA-3 FISH OIL PO) Take 2 capsules by mouth daily        sodium fluoride dental gel (PREVIDENT) 1.1 % GEL Apply to affected area At Bedtime       Cyanocobalamin (VITAMIN B 12 PO) Take 1 tablet by mouth daily          Allergies:  No Known Allergies    Social history:   Social History   Substance Use Topics     Smoking status: Never Smoker     Smokeless tobacco: Never Used     Alcohol use No     Marital status: .    Physical Examination:  BP (!) 158/108  Pulse 102  Temp 98.3  F (36.8  C) (Oral)  Ht 4' 10.5\"  Wt 91 lb  SpO2 98%  BMI 18.7 kg/m2  General: Well hydrated. No acute distress.  Abdomen: Soft, NT. No inguinal adenopathy palpated.  Perianal external examination:  Perianal skin: intact.  Lesions: No.  Eversion of buttocks: There was not evidence of an anal fissure. Details: N/A.  Skin tags or external hemorrhoids: Yes: small.  Digital rectal examination: Was performed.   Sphincter tone: Poor.  Palpable lesions: No.  Other: A large rectocele was appreciated on bearing down. On the toilet, a circumferential full-thickness " rectal prolapse was seen. This measures approx 6-cm.  Bimanual examination: was performed.    Investigations:None.    Procedures:  Flexible sigmoidoscopy: after obtaining informed consent, an adult flexible sigmoidoscope was introduced through the anus and passed up to the mid sigmoid colon. The quality of the prep was good. Findings: distal rectal erythema and sigmoid diverticulosis. There was no active ulceration or bleeding. No additional abnormalities were seen. Total scope time: 10 minutes. The patient tolerated the procedure well.    ASSESSMENT  88 y/o F with full-thickness rectal prolapse. Functional is quite good despite having a recent hospital admission for encephalopathy of unclear etiology. We discussed the pro's and con's of both abdominal and perineal operative approaches for rectal prolapse. Given her PMH and age, I would recommend perineal rectosigmoidectomy. We discussed the higher recurrence rate with this approach as well as the possibility of chronic anorectal pain. I further emphasized the likelihood of her incontinence symptoms not improving. Risks, benefits, and alternatives of operative treatment were thoroughly discussed with the patient, he/she understands these well and agrees to proceed.    PLAN  1. Schedule for perineal rectosigmoidectomy. Will need PAC, Labs, and 1 bottle of Mg citrate preop.    Time spent: 60 minutes.  >50% spent in discussing, counseling and coordinating care.    Amrik Mariano M.D., M.Sc.     Division of Colon and Rectal Surgery  [facility]    Referring Provider:  Joslyn Medina NP.    Primary Care Provider:  Alejandro Sandhu

## 2018-01-05 ENCOUNTER — TELEPHONE (OUTPATIENT)
Dept: SURGERY | Facility: CLINIC | Age: 83
End: 2018-01-05

## 2018-01-05 LAB — INTERPRETATION ECG - MUSE: NORMAL

## 2018-01-05 NOTE — TELEPHONE ENCOUNTER
Patient is finalized for surgery 1/12/18 at 7:30 am.  Patient met with PAC yesterday, and surgery packet was provided at that time.  Confirmed today  with patient's daughter the surgery and arrival time.

## 2018-01-07 ENCOUNTER — APPOINTMENT (OUTPATIENT)
Dept: GENERAL RADIOLOGY | Facility: CLINIC | Age: 83
DRG: 394 | End: 2018-01-07
Attending: EMERGENCY MEDICINE
Payer: COMMERCIAL

## 2018-01-07 ENCOUNTER — HOSPITAL ENCOUNTER (INPATIENT)
Facility: CLINIC | Age: 83
LOS: 1 days | Discharge: HOME HEALTH | DRG: 394 | End: 2018-01-09
Attending: EMERGENCY MEDICINE | Admitting: FAMILY MEDICINE
Payer: COMMERCIAL

## 2018-01-07 DIAGNOSIS — K62.3 RECTAL PROLAPSE: ICD-10-CM

## 2018-01-07 DIAGNOSIS — R53.1 GENERALIZED WEAKNESS: Primary | ICD-10-CM

## 2018-01-07 DIAGNOSIS — E87.6 HYPOKALEMIA: ICD-10-CM

## 2018-01-07 DIAGNOSIS — N39.0 URINARY TRACT INFECTION IN FEMALE: ICD-10-CM

## 2018-01-07 DIAGNOSIS — I10 BENIGN ESSENTIAL HYPERTENSION: ICD-10-CM

## 2018-01-07 LAB
ALBUMIN SERPL-MCNC: 2.4 G/DL (ref 3.4–5)
ALBUMIN UR-MCNC: 10 MG/DL
ALP SERPL-CCNC: 46 U/L (ref 40–150)
ALT SERPL W P-5'-P-CCNC: 13 U/L (ref 0–50)
ANION GAP SERPL CALCULATED.3IONS-SCNC: 9 MMOL/L (ref 3–14)
APPEARANCE UR: CLEAR
AST SERPL W P-5'-P-CCNC: 12 U/L (ref 0–45)
BACTERIA #/AREA URNS HPF: ABNORMAL /HPF
BASOPHILS # BLD AUTO: 0 10E9/L (ref 0–0.2)
BASOPHILS NFR BLD AUTO: 0.5 %
BILIRUB SERPL-MCNC: 0.4 MG/DL (ref 0.2–1.3)
BILIRUB UR QL STRIP: NEGATIVE
BUN SERPL-MCNC: 15 MG/DL (ref 7–30)
CALCIUM SERPL-MCNC: 7.3 MG/DL (ref 8.5–10.1)
CHLORIDE SERPL-SCNC: 110 MMOL/L (ref 94–109)
CO2 SERPL-SCNC: 23 MMOL/L (ref 20–32)
COLOR UR AUTO: YELLOW
CREAT SERPL-MCNC: 0.45 MG/DL (ref 0.52–1.04)
DIFFERENTIAL METHOD BLD: NORMAL
EOSINOPHIL # BLD AUTO: 0.2 10E9/L (ref 0–0.7)
EOSINOPHIL NFR BLD AUTO: 2.8 %
ERYTHROCYTE [DISTWIDTH] IN BLOOD BY AUTOMATED COUNT: 13.2 % (ref 10–15)
GFR SERPL CREATININE-BSD FRML MDRD: >90 ML/MIN/1.7M2
GLUCOSE SERPL-MCNC: 98 MG/DL (ref 70–99)
GLUCOSE UR STRIP-MCNC: NEGATIVE MG/DL
HCT VFR BLD AUTO: 37.8 % (ref 35–47)
HGB BLD-MCNC: 12.3 G/DL (ref 11.7–15.7)
HGB UR QL STRIP: NEGATIVE
IMM GRANULOCYTES # BLD: 0 10E9/L (ref 0–0.4)
IMM GRANULOCYTES NFR BLD: 0.2 %
KETONES UR STRIP-MCNC: NEGATIVE MG/DL
LACTATE BLD-SCNC: 1.5 MMOL/L (ref 0.7–2)
LEUKOCYTE ESTERASE UR QL STRIP: ABNORMAL
LYMPHOCYTES # BLD AUTO: 1.2 10E9/L (ref 0.8–5.3)
LYMPHOCYTES NFR BLD AUTO: 17.9 %
MAGNESIUM SERPL-MCNC: 1.4 MG/DL (ref 1.6–2.3)
MCH RBC QN AUTO: 29.7 PG (ref 26.5–33)
MCHC RBC AUTO-ENTMCNC: 32.5 G/DL (ref 31.5–36.5)
MCV RBC AUTO: 91 FL (ref 78–100)
MONOCYTES # BLD AUTO: 0.5 10E9/L (ref 0–1.3)
MONOCYTES NFR BLD AUTO: 8 %
MUCOUS THREADS #/AREA URNS LPF: PRESENT /LPF
NEUTROPHILS # BLD AUTO: 4.6 10E9/L (ref 1.6–8.3)
NEUTROPHILS NFR BLD AUTO: 70.6 %
NITRATE UR QL: NEGATIVE
NRBC # BLD AUTO: 0 10*3/UL
NRBC BLD AUTO-RTO: 0 /100
PH UR STRIP: 7 PH (ref 5–7)
PLATELET # BLD AUTO: 192 10E9/L (ref 150–450)
POTASSIUM SERPL-SCNC: 2.6 MMOL/L (ref 3.4–5.3)
PROT SERPL-MCNC: 5.3 G/DL (ref 6.8–8.8)
RBC # BLD AUTO: 4.14 10E12/L (ref 3.8–5.2)
RBC #/AREA URNS AUTO: 4 /HPF (ref 0–2)
SODIUM SERPL-SCNC: 143 MMOL/L (ref 133–144)
SOURCE: ABNORMAL
SP GR UR STRIP: 1.01 (ref 1–1.03)
SQUAMOUS #/AREA URNS AUTO: <1 /HPF (ref 0–1)
TRANS CELLS #/AREA URNS HPF: <1 /HPF (ref 0–1)
TROPONIN I SERPL-MCNC: <0.015 UG/L (ref 0–0.04)
UROBILINOGEN UR STRIP-MCNC: NORMAL MG/DL (ref 0–2)
WBC # BLD AUTO: 6.5 10E9/L (ref 4–11)
WBC #/AREA URNS AUTO: 14 /HPF (ref 0–2)

## 2018-01-07 PROCEDURE — 27210995 ZZH RX 272: Performed by: EMERGENCY MEDICINE

## 2018-01-07 PROCEDURE — 96365 THER/PROPH/DIAG IV INF INIT: CPT | Performed by: EMERGENCY MEDICINE

## 2018-01-07 PROCEDURE — 87086 URINE CULTURE/COLONY COUNT: CPT | Performed by: EMERGENCY MEDICINE

## 2018-01-07 PROCEDURE — 93010 ELECTROCARDIOGRAM REPORT: CPT | Mod: Z6 | Performed by: EMERGENCY MEDICINE

## 2018-01-07 PROCEDURE — 96360 HYDRATION IV INFUSION INIT: CPT | Performed by: EMERGENCY MEDICINE

## 2018-01-07 PROCEDURE — 81001 URINALYSIS AUTO W/SCOPE: CPT | Performed by: EMERGENCY MEDICINE

## 2018-01-07 PROCEDURE — 85025 COMPLETE CBC W/AUTO DIFF WBC: CPT | Performed by: EMERGENCY MEDICINE

## 2018-01-07 PROCEDURE — 80053 COMPREHEN METABOLIC PANEL: CPT | Performed by: EMERGENCY MEDICINE

## 2018-01-07 PROCEDURE — 71046 X-RAY EXAM CHEST 2 VIEWS: CPT

## 2018-01-07 PROCEDURE — 99285 EMERGENCY DEPT VISIT HI MDM: CPT | Mod: 25 | Performed by: EMERGENCY MEDICINE

## 2018-01-07 PROCEDURE — 25000132 ZZH RX MED GY IP 250 OP 250 PS 637: Performed by: EMERGENCY MEDICINE

## 2018-01-07 PROCEDURE — 96366 THER/PROPH/DIAG IV INF ADDON: CPT | Performed by: EMERGENCY MEDICINE

## 2018-01-07 PROCEDURE — 96374 THER/PROPH/DIAG INJ IV PUSH: CPT | Performed by: EMERGENCY MEDICINE

## 2018-01-07 PROCEDURE — 93005 ELECTROCARDIOGRAM TRACING: CPT | Performed by: EMERGENCY MEDICINE

## 2018-01-07 PROCEDURE — 83605 ASSAY OF LACTIC ACID: CPT | Performed by: EMERGENCY MEDICINE

## 2018-01-07 PROCEDURE — 25000128 H RX IP 250 OP 636: Performed by: EMERGENCY MEDICINE

## 2018-01-07 PROCEDURE — 84484 ASSAY OF TROPONIN QUANT: CPT | Performed by: EMERGENCY MEDICINE

## 2018-01-07 PROCEDURE — 83735 ASSAY OF MAGNESIUM: CPT | Performed by: EMERGENCY MEDICINE

## 2018-01-07 RX ORDER — POTASSIUM CHLORIDE 1.5 G/1.58G
40 POWDER, FOR SOLUTION ORAL ONCE
Status: COMPLETED | OUTPATIENT
Start: 2018-01-07 | End: 2018-01-07

## 2018-01-07 RX ORDER — HYDRALAZINE HYDROCHLORIDE 10 MG/1
10 TABLET, FILM COATED ORAL ONCE
Status: COMPLETED | OUTPATIENT
Start: 2018-01-07 | End: 2018-01-08

## 2018-01-07 RX ORDER — POTASSIUM CL/LIDO/0.9 % NACL 10MEQ/0.1L
10 INTRAVENOUS SOLUTION, PIGGYBACK (ML) INTRAVENOUS ONCE
Status: COMPLETED | OUTPATIENT
Start: 2018-01-07 | End: 2018-01-08

## 2018-01-07 RX ADMIN — Medication 10 MEQ: at 23:35

## 2018-01-07 RX ADMIN — SODIUM CHLORIDE 1000 ML: 9 INJECTION, SOLUTION INTRAVENOUS at 22:24

## 2018-01-07 RX ADMIN — CEFTRIAXONE SODIUM 1 G: 10 INJECTION, POWDER, FOR SOLUTION INTRAVENOUS at 23:31

## 2018-01-07 RX ADMIN — POTASSIUM CHLORIDE 40 MEQ: 1.5 POWDER, FOR SOLUTION ORAL at 23:30

## 2018-01-07 ASSESSMENT — ENCOUNTER SYMPTOMS
SHORTNESS OF BREATH: 0
DYSURIA: 0
PALPITATIONS: 0
VOMITING: 1
HEMATURIA: 0
CONSTIPATION: 0
ACTIVITY CHANGE: 1
FATIGUE: 1
BLOOD IN STOOL: 0
WEAKNESS: 1
NAUSEA: 1

## 2018-01-07 NOTE — LETTER
Transition Communication Hand-off for Care Transitions to Next Level of Care Provider    Name: Belia Sheets  MRN #: [id number]  Primary Care Provider: Alejandro Sandhu     Primary Clinic: [address]  [facility] [address]     Reason for Hospitalization:    Rectal prolapse [K62.3]  Benign essential hypertension [I10]  Hypokalemia [E87.6]  Generalized weakness [R53.1]  Urinary tract infection in female [N39.0]    Admit Date/Time: 1/7/2018  8:44 PM  Discharge Date: 1/9/2018    Payor Source: Payor: [employer] / Plan: [employer] S[employer]IORS NON FPA / Product Type: HMO /     Discharge Needs Assessment:  Needs       Most Recent Value    Home Care [facility] [phone], Fax: [phone]   [RN, PT, OT]          Follow-up plan:  Future Appointments  Date Time Provider Department Center   2/7/2018 3:15 PM Saurabh Pittman MD [facility]       Any outstanding tests or procedures:        Referrals     Future Labs/Procedures    Home care nursing referral     Comments:    [facility]  Phone  [phone]  Fax  [phone]    Resumption of Home Care, please note new orders    RN skilled nursing visit, weekly (same day and time of the week is requested by patient and POA).   RN to assess weight and vital signs - specifically blood pressure; document all!  RN to assess respiratory and cardiac status, pain level and activity tolerance, hydration, nutrition and bowel status.  RN to assess home safety and make recommendations to POA.  RN to complete weekly medication set-up and management.  RN to call to POA after completion of every visit and provide details; Lubna Ph: [phone]    Your provider has ordered home care nursing services.   If you have not been contacted within 2 days of your discharge please call            Key Recommendations:  Please review IGGY LEVYN RN PHN  Patient Care Management Coordinator  [facility]  Phone: [phone] / Pager:  [phone]      AVS/Discharge Summary is the source of truth; this is a helpful guide for improved communication of patient story

## 2018-01-07 NOTE — IP AVS SNAPSHOT
[address] [address]    Phone:  [phone]                                       After Visit Summary   1/7/2018    Belia Sheets    MRN: [id number]           After Visit Summary Signature Page     I have received my discharge instructions, and my questions have been answered. I have discussed any challenges I see with this plan with the nurse or doctor.    ..........................................................................................................................................  Patient/Patient Representative Signature      ..........................................................................................................................................  Patient Representative Print Name and Relationship to Patient    ..................................................               ................................................  Date                                            Time    ..........................................................................................................................................  Reviewed by Signature/Title    ...................................................              ..............................................  Date                                                            Time

## 2018-01-07 NOTE — IP AVS SNAPSHOT
MRN:[id number]                      After Visit Summary   1/7/2018    Belia Sheets    MRN: [id number]           Thank you!     Thank you for choosing [facility] for your care. Our goal is always to provide you with excellent care. Hearing back from our patients is one way we can continue to improve our services. Please take a few minutes to complete the written survey that you may receive in the mail after you visit with us. Thank you!        Patient Information     Date Of Birth          5/16/1930        Designated Caregiver       Most Recent Value    Caregiver    Will someone help with your care after discharge? no      About your hospital stay     You were admitted on:  January 8, 2018 You last received care in the:  [address] [facility]    You were discharged on:  January 9, 2018        Reason for your hospital stay       Patient was admitted for generalized weakness with concern for urinary tract infection and elevated blood pressure                  Who to Call     For medical emergencies, please call 911.  For non-urgent questions about your medical care, please call your primary care provider or clinic, [phone]          Attending Provider     Provider Specialty    Bela Dennis MD Emergency Medicine    [facility], Violet Reddy MD Family Practice    [facility]       Primary Care Provider Office Phone # Fax #    Alejandro Sandhu [phone]       When to contact your care team       Call your primary doctor if you have any of the following: temperature greater than 100.3 or increased shortness of breath.                  After Care Instructions     Activity       Your activity upon discharge: activity as tolerated            Diet       Follow this diet upon discharge: Regular Diet Adult                  Follow-up Appointments     Follow Up and recommended labs and tests       Follow up with primary care provider, Alejandro Sandhu, within 7 days  for follow up from hospital admission.                  Your next 10 appointments already scheduled     Jan 12, 2018   Procedure with Amrik Mariano MD   [facility], [place], Same Day Surgery (--)    [address]  [place] [id number] [phone]            Feb 07, 2018  3:15 PM CST   (Arrive by 3:00 PM)   RETURN ENDOCRINE with Saurabh Pittman MD   [facility] End[facility]ology ([facility])    [address]  3rd Floor  [facility] [address] [phone]              Additional Services     Home care nursing referral       [facility]  Phone  [phone]  Fax  [phone]    Resumption of Home Care, please note new orders    RN skilled nursing visit, weekly (same day and time of the week is requested by patient and POA).   RN to assess weight and vital signs - specifically blood pressure; document all!  RN to assess respiratory and cardiac status, pain level and activity tolerance, hydration, nutrition and bowel status.  RN to assess home safety and make recommendations to POA.  RN to complete weekly medication set-up and management.  RN to call to POA after completion of every visit and provide details; [place] Ph: [phone]    Your provider has ordered home care nursing services.   If you have not been contacted within 2 days of your discharge please call                  Pending Results     No orders found from 1/5/2018 to 1/8/2018.            Statement of Approval     Ordered          01/09/18 1320  I have reviewed and agree with all the recommendations and orders detailed in this document.  EFFECTIVE NOW     Approved and electronically signed by:  Lindsey Rosas MD             Admission Information     Date & Time Provider Department Dept. Phone    1/7/2018 Adarsh Georges MD [address] [facility] [place] [phone]      Your Vitals Were     Blood Pressure Pulse Temperature Respirations Height Weight    132/79 (BP Location: Left arm) 75 98.2  F (36.8  C) (Oral) 16  "1.473 m (4' 10\") 38.5 kg (84 lb 14 oz)    Pulse Oximetry BMI (Body Mass Index)                91% 17.74 kg/m2          [employer] Information     [employer] gives you secure access to your electronic health record. If you see a primary care provider, you can also send messages to your care team and make appointments. If you have questions, please call your primary care clinic.  If you do not have a primary care provider, please call [phone] and they will assist you.        Care EveryWhere ID     This is your Care EveryWhere ID. This could be used by other organizations to access your [facility] medical records  [id number]        Equal Access to Services     ANUEL MARSHALL : Angelica Pritchett, alejandra wilson, cuate almanzar, ivana cha. So [facility] [phone].    ATENCIÓN: Si habla español, tiene a benavides disposición servicios gratuitos de asistencia lingüística. Llame al [phone].    We comply with applicable federal civil rights laws and [place] laws. We do not discriminate on the basis of race, color, national origin, age, disability, sex, sexual orientation, or gender identity.               Review of your medicines      START taking        Dose / Directions    acetaminophen 325 MG tablet   Commonly known as:  TYLENOL   Used for:  Rectal prolapse        Dose:  650 mg   Take 2 tablets (650 mg) by mouth every 4 hours as needed for mild pain   Quantity:  60 tablet   Refills:  0       amLODIPine 5 MG tablet   Commonly known as:  NORVASC   Used for:  Benign essential hypertension        Dose:  5 mg   Take 1 tablet (5 mg) by mouth daily   Quantity:  30 tablet   Refills:  0       metroNIDAZOLE 500 MG tablet   Commonly known as:  FLAGYL   Used for:  Rectal prolapse        Dose:  500 mg   Take 1 tablet (500 mg) by mouth every 8 hours prior to surgery.  Take in conjunction with Neomycin Sulfate.   Quantity:  3 tablet   Refills:  0       neomycin 500 MG tablet   Commonly " known as:  MYCIFRADIN   Used for:  Rectal prolapse        Dose:  1000 mg   Take 2 tablets (1,000 mg) by mouth every 8 hours prior to surgery.  Take in conjunction with Flagyl.   Quantity:  6 tablet   Refills:  0       ondansetron 4 MG tablet   Commonly known as:  ZOFRAN   Used for:  Rectal prolapse        Dose:  4 mg   Take 1 tablet (4 mg) by mouth every 6 hours as needed for nausea While taking neomycin and flagyl.   Quantity:  3 tablet   Refills:  0       polyethylene glycol Packet   Commonly known as:  MIRALAX   Used for:  Rectal prolapse        Dose:  238 g   Take 238 g by mouth See Admin Instructions One 8.3-ounce bottle (238 g).  Refer to surgical packet for medication instructions.   Quantity:  238 g   Refills:  0         CONTINUE these medicines which may have CHANGED, or have new prescriptions. If we are uncertain of the size of tablets/capsules you have at home, strength may be listed as something that might have changed.        Dose / Directions    cholecalciferol 1000 UNITS capsule   Commonly known as:  vitamin  -D   This may have changed:  when to take this   Used for:  Age-related osteoporosis with current pathological fracture with routine healing, subsequent encounter        Dose:  1 capsule   Take 1 capsule (1,000 Units) by mouth daily   Quantity:  100 capsule   Refills:  3       Thiamine HCl 50 MG Caps   This may have changed:  when to take this   Used for:  Confusion        Dose:  1 capsule   Take 1 capsule by mouth daily   Quantity:  60 capsule   Refills:  3         CONTINUE these medicines which have NOT CHANGED        Dose / Directions    calcium-vitamin D 600-400 MG-UNIT per tablet   Commonly known as:  CALTRATE        Dose:  1 tablet   Take 1 tablet by mouth every morning   Refills:  0       magnesium citrate solution   Used for:  Rectal prolapse        Dose:  296 mL   Take 296 mLs by mouth See Admin Instructions Refer to surgery handout.   Quantity:  296 mL   Refills:  0       order for DME    Used for:  Severe hypertension        Equipment being ordered: Home BP monitor and cuff   Quantity:  1 each   Refills:  0       sodium fluoride dental gel 1.1 % Gel topical gel   Commonly known as:  PREVIDENT        Apply to affected area At Bedtime   Refills:  0       teriparatide (recombinant) 600 MCG/2.4ML Soln injection   Commonly known as:  FORTEO   Used for:  Age-related osteoporosis with current pathological fracture, initial encounter        Dose:  20 mcg   Inject 0.08 mLs (20 mcg) Subcutaneous daily   Quantity:  6 mL   Refills:  1       VITAMIN B 12 PO        Dose:  1 tablet   Take 1 tablet by mouth every morning   Refills:  0         STOP taking     lisinopril 10 MG tablet   Commonly known as:  PRINIVIL/ZESTRIL           OMEGA-3 FISH OIL PO                Where to get your medicines      These medications were sent to [facility] - [place], [place] - [address] [address]     Phone:  [phone]     acetaminophen 325 MG tablet    amLODIPine 5 MG tablet    metroNIDAZOLE 500 MG tablet    neomycin 500 MG tablet    ondansetron 4 MG tablet    polyethylene glycol Packet               ANTIBIOTIC INSTRUCTION     You've Been Prescribed an Antibiotic - Now What?  Your healthcare team thinks that you or your loved one might have an infection. Some infections can be treated with antibiotics, which are powerful, life-saving drugs. Like all medications, antibiotics have side effects and should only be used when necessary. There are some important things you should know about your antibiotic treatment.      Your healthcare team may run tests before you start taking an antibiotic.    Your team may take samples (e.g., from your blood, urine or other areas) to run tests to look for bacteria. These test can be important to determine if you need an antibiotic at all and, if you do, which antibiotic will work best.      Within a few days, your healthcare team might  change or even stop your antibiotic.    Your team may start you on an antibiotic while they are working to find out what is making you sick.    Your team might change your antibiotic because test results show that a different antibiotic would be better to treat your infection.    In some cases, once your team has more information, they learn that you do not need an antibiotic at all. They may find out that you don't have an infection, or that the antibiotic you're taking won't work against your infection. For example, an infection caused by a virus can't be treated with antibiotics. Staying on an antibiotic when you don't need it is more likely to be harmful than helpful.      You may experience side effects from your antibiotic.    Like all medications, antibiotics have side effects. Some of these can be serious.    Let you healthcare team know if you have any known allergies when you are admitted to the hospital.    One significant side effect of nearly all antibiotics is the risk of severe and sometimes deadly diarrhea caused by Clostridium difficile (C. Difficile). This occurs when a person takes antibiotics because some good germs are destroyed. Antibiotic use allows C. diificile to take over, putting patients at high risk for this serious infection.    As a patient or caregiver, it is important to understand your or your loved one's antibiotic treatment. It is especially important for caregivers to speak up when patients can't speak for themselves. Here are some important questions to ask your healthcare team.    What infection is this antibiotic treating and how do you know I have that infection?    What side effects might occur from this antibiotic?    How long will I need to take this antibiotic?    Is it safe to take this antibiotic with other medications or supplements (e.g., vitamins) that I am taking?     Are there any special directions I need to know about taking this antibiotic? For example, should I  take it with food?    How will I be monitored to know whether my infection is responding to the antibiotic?    What tests may help to make sure the right antibiotic is prescribed for me?      Information provided by:  www.cdc.gov/getsmart  U.S. Department of Health and Human Services  Centers for disease Control and Prevention  National Center for Emerging and Zoonotic Infectious Diseases  Division of Healthcare Quality Promotion         Protect others around you: Learn how to safely use, store and throw away your medicines at www.disposemymeds.org.             Medication List: This is a list of all your medications and when to take them. Check marks below indicate your daily home schedule. Keep this list as a reference.      Medications           Morning Afternoon Evening Bedtime As Needed    acetaminophen 325 MG tablet   Commonly known as:  TYLENOL   Take 2 tablets (650 mg) by mouth every 4 hours as needed for mild pain                                amLODIPine 5 MG tablet   Commonly known as:  NORVASC   Take 1 tablet (5 mg) by mouth daily   Last time this was given:  5 mg on 1/9/2018  9:55 AM                                calcium-vitamin D 600-400 MG-UNIT per tablet   Commonly known as:  CALTRATE   Take 1 tablet by mouth every morning   Last time this was given:  1 tablet on 1/9/2018  9:55 AM                                cholecalciferol 1000 UNITS capsule   Commonly known as:  vitamin  -D   Take 1 capsule (1,000 Units) by mouth daily                                magnesium citrate solution   Take 296 mLs by mouth See Admin Instructions Refer to surgery handout.                                metroNIDAZOLE 500 MG tablet   Commonly known as:  FLAGYL   Take 1 tablet (500 mg) by mouth every 8 hours prior to surgery.  Take in conjunction with Neomycin Sulfate.                                neomycin 500 MG tablet   Commonly known as:  MYCIFRADIN   Take 2 tablets (1,000 mg) by mouth every 8 hours prior to surgery.   Take in conjunction with Flagyl.                                ondansetron 4 MG tablet   Commonly known as:  ZOFRAN   Take 1 tablet (4 mg) by mouth every 6 hours as needed for nausea While taking neomycin and flagyl.                                order for DME   Equipment being ordered: Home BP monitor and cuff                                polyethylene glycol Packet   Commonly known as:  MIRALAX   Take 238 g by mouth See Admin Instructions One 8.3-ounce bottle (238 g).  Refer to surgical packet for medication instructions.                                sodium fluoride dental gel 1.1 % Gel topical gel   Commonly known as:  PREVIDENT   Apply to affected area At Bedtime                                teriparatide (recombinant) 600 MCG/2.4ML Soln injection   Commonly known as:  FORTEO   Inject 0.08 mLs (20 mcg) Subcutaneous daily                                Thiamine HCl 50 MG Caps   Take 1 capsule by mouth daily                                VITAMIN B 12 PO   Take 1 tablet by mouth every morning

## 2018-01-08 PROBLEM — R53.1 GENERALIZED WEAKNESS: Status: ACTIVE | Noted: 2018-01-08

## 2018-01-08 LAB
ANION GAP SERPL CALCULATED.3IONS-SCNC: 10 MMOL/L (ref 3–14)
ANION GAP SERPL CALCULATED.3IONS-SCNC: 9 MMOL/L (ref 3–14)
BACTERIA SPEC CULT: NORMAL
BACTERIA SPEC CULT: NORMAL
BASOPHILS # BLD AUTO: 0.1 10E9/L (ref 0–0.2)
BASOPHILS NFR BLD AUTO: 0.8 %
BUN SERPL-MCNC: 12 MG/DL (ref 7–30)
BUN SERPL-MCNC: 13 MG/DL (ref 7–30)
CALCIUM SERPL-MCNC: 8.4 MG/DL (ref 8.5–10.1)
CALCIUM SERPL-MCNC: 8.6 MG/DL (ref 8.5–10.1)
CHLORIDE SERPL-SCNC: 105 MMOL/L (ref 94–109)
CHLORIDE SERPL-SCNC: 106 MMOL/L (ref 94–109)
CO2 SERPL-SCNC: 23 MMOL/L (ref 20–32)
CO2 SERPL-SCNC: 26 MMOL/L (ref 20–32)
CREAT SERPL-MCNC: 0.5 MG/DL (ref 0.52–1.04)
CREAT SERPL-MCNC: 0.54 MG/DL (ref 0.52–1.04)
DIFFERENTIAL METHOD BLD: NORMAL
EOSINOPHIL # BLD AUTO: 0.7 10E9/L (ref 0–0.7)
EOSINOPHIL NFR BLD AUTO: 9.6 %
ERYTHROCYTE [DISTWIDTH] IN BLOOD BY AUTOMATED COUNT: 13.2 % (ref 10–15)
FLUAV+FLUBV RNA SPEC QL NAA+PROBE: NEGATIVE
FLUAV+FLUBV RNA SPEC QL NAA+PROBE: NEGATIVE
GFR SERPL CREATININE-BSD FRML MDRD: >90 ML/MIN/1.7M2
GFR SERPL CREATININE-BSD FRML MDRD: >90 ML/MIN/1.7M2
GLUCOSE SERPL-MCNC: 88 MG/DL (ref 70–99)
GLUCOSE SERPL-MCNC: 92 MG/DL (ref 70–99)
HCT VFR BLD AUTO: 38.3 % (ref 35–47)
HGB BLD-MCNC: 12.3 G/DL (ref 11.7–15.7)
IMM GRANULOCYTES # BLD: 0 10E9/L (ref 0–0.4)
IMM GRANULOCYTES NFR BLD: 0.1 %
INTERPRETATION ECG - MUSE: NORMAL
LYMPHOCYTES # BLD AUTO: 1.6 10E9/L (ref 0.8–5.3)
LYMPHOCYTES NFR BLD AUTO: 22.9 %
Lab: NORMAL
MAGNESIUM SERPL-MCNC: 2.2 MG/DL (ref 1.6–2.3)
MCH RBC QN AUTO: 29.3 PG (ref 26.5–33)
MCHC RBC AUTO-ENTMCNC: 32.1 G/DL (ref 31.5–36.5)
MCV RBC AUTO: 91 FL (ref 78–100)
MONOCYTES # BLD AUTO: 0.6 10E9/L (ref 0–1.3)
MONOCYTES NFR BLD AUTO: 8.1 %
NEUTROPHILS # BLD AUTO: 4.1 10E9/L (ref 1.6–8.3)
NEUTROPHILS NFR BLD AUTO: 58.5 %
NRBC # BLD AUTO: 0 10*3/UL
NRBC BLD AUTO-RTO: 0 /100
PHOSPHATE SERPL-MCNC: 2.6 MG/DL (ref 2.5–4.5)
PLATELET # BLD AUTO: 184 10E9/L (ref 150–450)
POTASSIUM SERPL-SCNC: 3.4 MMOL/L (ref 3.4–5.3)
POTASSIUM SERPL-SCNC: 3.6 MMOL/L (ref 3.4–5.3)
RBC # BLD AUTO: 4.2 10E12/L (ref 3.8–5.2)
RSV RNA SPEC NAA+PROBE: NEGATIVE
SODIUM SERPL-SCNC: 139 MMOL/L (ref 133–144)
SODIUM SERPL-SCNC: 140 MMOL/L (ref 133–144)
SPECIMEN SOURCE: NORMAL
SPECIMEN SOURCE: NORMAL
WBC # BLD AUTO: 7.1 10E9/L (ref 4–11)

## 2018-01-08 PROCEDURE — 36415 COLL VENOUS BLD VENIPUNCTURE: CPT | Performed by: FAMILY MEDICINE

## 2018-01-08 PROCEDURE — 85025 COMPLETE CBC W/AUTO DIFF WBC: CPT | Performed by: FAMILY MEDICINE

## 2018-01-08 PROCEDURE — 80048 BASIC METABOLIC PNL TOTAL CA: CPT | Performed by: FAMILY MEDICINE

## 2018-01-08 PROCEDURE — 84100 ASSAY OF PHOSPHORUS: CPT | Performed by: STUDENT IN AN ORGANIZED HEALTH CARE EDUCATION/TRAINING PROGRAM

## 2018-01-08 PROCEDURE — 12000001 ZZH R&B MED SURG/OB UMMC

## 2018-01-08 PROCEDURE — 25000128 H RX IP 250 OP 636: Performed by: FAMILY MEDICINE

## 2018-01-08 PROCEDURE — 25000132 ZZH RX MED GY IP 250 OP 250 PS 637: Performed by: STUDENT IN AN ORGANIZED HEALTH CARE EDUCATION/TRAINING PROGRAM

## 2018-01-08 PROCEDURE — 25000132 ZZH RX MED GY IP 250 OP 250 PS 637: Performed by: EMERGENCY MEDICINE

## 2018-01-08 PROCEDURE — 25000132 ZZH RX MED GY IP 250 OP 250 PS 637: Performed by: FAMILY MEDICINE

## 2018-01-08 PROCEDURE — 36415 COLL VENOUS BLD VENIPUNCTURE: CPT | Performed by: STUDENT IN AN ORGANIZED HEALTH CARE EDUCATION/TRAINING PROGRAM

## 2018-01-08 PROCEDURE — 80048 BASIC METABOLIC PNL TOTAL CA: CPT | Performed by: STUDENT IN AN ORGANIZED HEALTH CARE EDUCATION/TRAINING PROGRAM

## 2018-01-08 PROCEDURE — 87631 RESP VIRUS 3-5 TARGETS: CPT | Performed by: STUDENT IN AN ORGANIZED HEALTH CARE EDUCATION/TRAINING PROGRAM

## 2018-01-08 PROCEDURE — 25000125 ZZHC RX 250: Performed by: FAMILY MEDICINE

## 2018-01-08 PROCEDURE — 25000125 ZZHC RX 250: Performed by: EMERGENCY MEDICINE

## 2018-01-08 PROCEDURE — 96375 TX/PRO/DX INJ NEW DRUG ADDON: CPT | Performed by: EMERGENCY MEDICINE

## 2018-01-08 PROCEDURE — 27210995 ZZH RX 272: Performed by: FAMILY MEDICINE

## 2018-01-08 PROCEDURE — 83735 ASSAY OF MAGNESIUM: CPT | Performed by: FAMILY MEDICINE

## 2018-01-08 PROCEDURE — 84100 ASSAY OF PHOSPHORUS: CPT | Performed by: FAMILY MEDICINE

## 2018-01-08 RX ORDER — AMLODIPINE BESYLATE 5 MG/1
5 TABLET ORAL DAILY
Status: DISCONTINUED | OUTPATIENT
Start: 2018-01-09 | End: 2018-01-09 | Stop reason: HOSPADM

## 2018-01-08 RX ORDER — HYDRALAZINE HYDROCHLORIDE 20 MG/ML
5 INJECTION INTRAMUSCULAR; INTRAVENOUS EVERY 6 HOURS PRN
Status: DISCONTINUED | OUTPATIENT
Start: 2018-01-08 | End: 2018-01-09 | Stop reason: HOSPADM

## 2018-01-08 RX ORDER — MAGNESIUM SULFATE HEPTAHYDRATE 40 MG/ML
4 INJECTION, SOLUTION INTRAVENOUS EVERY 4 HOURS PRN
Status: DISCONTINUED | OUTPATIENT
Start: 2018-01-08 | End: 2018-01-09 | Stop reason: HOSPADM

## 2018-01-08 RX ORDER — CHLORAL HYDRATE 500 MG
1 CAPSULE ORAL 2 TIMES DAILY
Status: DISCONTINUED | OUTPATIENT
Start: 2018-01-08 | End: 2018-01-08

## 2018-01-08 RX ORDER — ONDANSETRON 4 MG/1
4 TABLET, ORALLY DISINTEGRATING ORAL EVERY 6 HOURS PRN
Status: DISCONTINUED | OUTPATIENT
Start: 2018-01-08 | End: 2018-01-09 | Stop reason: HOSPADM

## 2018-01-08 RX ORDER — POTASSIUM CHLORIDE 29.8 MG/ML
20 INJECTION INTRAVENOUS
Status: DISCONTINUED | OUTPATIENT
Start: 2018-01-08 | End: 2018-01-09 | Stop reason: HOSPADM

## 2018-01-08 RX ORDER — POTASSIUM CHLORIDE 7.45 MG/ML
10 INJECTION INTRAVENOUS
Status: DISCONTINUED | OUTPATIENT
Start: 2018-01-08 | End: 2018-01-08 | Stop reason: RX

## 2018-01-08 RX ORDER — UBIDECARENONE 75 MG
100 CAPSULE ORAL EVERY MORNING
Status: DISCONTINUED | OUTPATIENT
Start: 2018-01-08 | End: 2018-01-09 | Stop reason: HOSPADM

## 2018-01-08 RX ORDER — AMLODIPINE BESYLATE 2.5 MG/1
2.5 TABLET ORAL DAILY
Status: DISCONTINUED | OUTPATIENT
Start: 2018-01-08 | End: 2018-01-08

## 2018-01-08 RX ORDER — ONDANSETRON 2 MG/ML
4 INJECTION INTRAMUSCULAR; INTRAVENOUS EVERY 6 HOURS PRN
Status: DISCONTINUED | OUTPATIENT
Start: 2018-01-08 | End: 2018-01-09 | Stop reason: HOSPADM

## 2018-01-08 RX ORDER — POTASSIUM CHLORIDE 1.5 G/1.58G
20-40 POWDER, FOR SOLUTION ORAL
Status: DISCONTINUED | OUTPATIENT
Start: 2018-01-08 | End: 2018-01-09 | Stop reason: HOSPADM

## 2018-01-08 RX ORDER — POTASSIUM CL/LIDO/0.9 % NACL 10MEQ/0.1L
10 INTRAVENOUS SOLUTION, PIGGYBACK (ML) INTRAVENOUS
Status: DISCONTINUED | OUTPATIENT
Start: 2018-01-08 | End: 2018-01-09 | Stop reason: HOSPADM

## 2018-01-08 RX ORDER — SODIUM CHLORIDE 9 MG/ML
INJECTION, SOLUTION INTRAVENOUS CONTINUOUS
Status: DISCONTINUED | OUTPATIENT
Start: 2018-01-08 | End: 2018-01-09

## 2018-01-08 RX ORDER — ACETAMINOPHEN 325 MG/1
650 TABLET ORAL EVERY 4 HOURS PRN
Status: DISCONTINUED | OUTPATIENT
Start: 2018-01-08 | End: 2018-01-09 | Stop reason: HOSPADM

## 2018-01-08 RX ORDER — AMOXICILLIN 250 MG
2 CAPSULE ORAL 2 TIMES DAILY PRN
Status: DISCONTINUED | OUTPATIENT
Start: 2018-01-08 | End: 2018-01-09 | Stop reason: HOSPADM

## 2018-01-08 RX ORDER — LISINOPRIL 10 MG/1
10 TABLET ORAL EVERY MORNING
Status: DISCONTINUED | OUTPATIENT
Start: 2018-01-08 | End: 2018-01-08

## 2018-01-08 RX ORDER — AMOXICILLIN 250 MG
1 CAPSULE ORAL 2 TIMES DAILY PRN
Status: DISCONTINUED | OUTPATIENT
Start: 2018-01-08 | End: 2018-01-09 | Stop reason: HOSPADM

## 2018-01-08 RX ORDER — POTASSIUM CHLORIDE 750 MG/1
20-40 TABLET, EXTENDED RELEASE ORAL
Status: DISCONTINUED | OUTPATIENT
Start: 2018-01-08 | End: 2018-01-09 | Stop reason: HOSPADM

## 2018-01-08 RX ADMIN — Medication 1 CAPSULE: at 09:55

## 2018-01-08 RX ADMIN — HYDRALAZINE HYDROCHLORIDE 10 MG: 10 TABLET, FILM COATED ORAL at 00:17

## 2018-01-08 RX ADMIN — Medication 1 TABLET: at 09:55

## 2018-01-08 RX ADMIN — LISINOPRIL 10 MG: 10 TABLET ORAL at 09:55

## 2018-01-08 RX ADMIN — THIAMINE HCL (VITAMIN B1) 50 MG TABLET 50 MG: at 09:55

## 2018-01-08 RX ADMIN — ONDANSETRON 4 MG: 4 TABLET, ORALLY DISINTEGRATING ORAL at 15:04

## 2018-01-08 RX ADMIN — SODIUM CHLORIDE: 9 INJECTION, SOLUTION INTRAVENOUS at 03:28

## 2018-01-08 RX ADMIN — AMLODIPINE BESYLATE 2.5 MG: 2.5 TABLET ORAL at 10:44

## 2018-01-08 RX ADMIN — Medication 2 G: at 02:25

## 2018-01-08 RX ADMIN — CEFTRIAXONE SODIUM 1 G: 10 INJECTION, POWDER, FOR SOLUTION INTRAVENOUS at 23:05

## 2018-01-08 RX ADMIN — VITAMIN D, TAB 1000IU (100/BT) 1000 UNITS: 25 TAB at 09:55

## 2018-01-08 RX ADMIN — VITAMIN B12 0.1 MG ORAL TABLET 100 MCG: 0.1 TABLET ORAL at 09:56

## 2018-01-08 ASSESSMENT — ACTIVITIES OF DAILY LIVING (ADL)
TOILETING: 1-->ASSISTIVE EQUIPMENT
AMBULATION: 1-->ASSISTIVE EQUIPMENT
DRESS: 0-->INDEPENDENT
SWALLOWING: 0-->SWALLOWS FOODS/LIQUIDS WITHOUT DIFFICULTY
COGNITION: 0 - NO COGNITION ISSUES REPORTED
BATHING: 0-->INDEPENDENT
RETIRED_EATING: 0-->INDEPENDENT
WHICH_OF_THE_ABOVE_FUNCTIONAL_RISKS_HAD_A_RECENT_ONSET_OR_CHANGE?: AMBULATION;TRANSFERRING;TOILETING
RETIRED_COMMUNICATION: 0-->UNDERSTANDS/COMMUNICATES WITHOUT DIFFICULTY
TRANSFERRING: 1-->ASSISTIVE EQUIPMENT
FALL_HISTORY_WITHIN_LAST_SIX_MONTHS: NO

## 2018-01-08 ASSESSMENT — ENCOUNTER SYMPTOMS
FLANK PAIN: 0
MYALGIAS: 0
CHILLS: 0
COUGH: 0
NAUSEA: 1
APPETITE CHANGE: 1
BLOOD IN STOOL: 1
ABDOMINAL PAIN: 1
SORE THROAT: 0
FREQUENCY: 0
ACTIVITY CHANGE: 1
CONSTIPATION: 0
DIFFICULTY URINATING: 0
SHORTNESS OF BREATH: 0
HEADACHES: 0
FATIGUE: 1
DIZZINESS: 1
VOMITING: 1
CONFUSION: 0
DIAPHORESIS: 0
DIARRHEA: 0
RHINORRHEA: 0
LIGHT-HEADEDNESS: 0
ARTHRALGIAS: 1
AGITATION: 0
FEVER: 0
DYSURIA: 0

## 2018-01-08 NOTE — H&P
"                                                                                                [facility]  Inpatient History and Physical    Belia Sheets MRN# [id number]   Age: 87 year old YOB: 1930 1/8/2018 12:59 AM    UPDATE FOR 1/8/18 AT END OF NOTE    Home clinic: [facility]  Primary care provider: Alejandro Sandhu          Chief Concern:   \"not feeling usual energy levels\"       History is obtained from the patient          History of Present Illness (Resident / Clinician):   Belia Sheets is a 87 year old with history of rectal prolapse, HTN, osteoporosis, vertebral fractures, chronic fatigue who presents to the ED from her care facility due to concerns for elevated BP and generalized weakness.     Patient reports that she has been feeling fatigued since her diagnosis of rectal prolapse in Dec 2017. Since Wednesday (4 days ago) she noticed that she did not feel her usual self as far as energy levels go. She attributes it to the discomfort that she feels with the prolapse and that she had started to feel more nauseous. Today was the first time she vomiting and had about 4 episodes of non bloody vomit. She denies any fever, chills, abdominal pain or diarrhea. She does report that her stools are soft but that is intentional to avoid constipation secondary to her prolapse. In addition to the nausea, her energy levels were low today to the point that she did not feel like getting out of bed. She denies any weakness but endorses a more subjective feeling of lack of energy. She uses a walker at baseline and said that she could \"probably use it right now, I just don't want to\". She denies any cough, SOB or any urinary concerns.     With regards to her BP, she reports not taking her medication in the morning because she was feeling nauseous. During her routine BP check at the care facility they noted that her BP was elevated but it came down with repeated checks. In any " case, she did end up taking both lisinopril and amlodipine at about 7:30 pm. She denies using amlodipine regularly as she stopped using it because it made her feel dizzy.     Old records were reviewed, and any additions to pertinent history are noted above.           Past Medical History:     Past Medical History:   Diagnosis Date     Carpal tunnel syndrome (aka CTS)      H/O calcium pyrophosphate deposition disease (CPPD)      History of encephalopathy 10/2017     Hypertension      Kyphoscoliosis      Osteoarthritis     hands     Osteoporosis      Rectal prolapse              Past Surgical History:     Past Surgical History:   Procedure Laterality Date     COLONOSCOPY       HYSTERECTOMY      for uterine prolapse             Social History:     Social History   Substance Use Topics     Smoking status: Never Smoker     Smokeless tobacco: Never Used     Alcohol use No             Family History:     Family History   Problem Relation Age of Onset     Depression/Anxiety Son      Depression/Anxiety Son      alcohol abuse  age 24     Hypertension Mother      Hypertension Brother      DIABETES No family hx of      Breast Cancer No family hx of      Colon Cancer No family hx of      Prostate Cancer No family hx of      Other Cancer No family hx of      Family history reviewed            Immunizations:     Immunization History   Administered Date(s) Administered     HEPA 1999, 1999     HepB 1999, 1999, 1999     Influenza (H1N1) 2010     Influenza (High Dose) 3 valent vaccine 10/26/2016     Influenza (IIV3) PF 10/08/2012, 2013     Meningococcal (Menomune ) 2003     Pneumo Conj 13-V (2010&after) 2016     Pneumococcal 23 valent 1996, 2000     Poliovirus, inactivated (IPV) 2003     TD (ADULT, 7+) 1997, 2004     TDAP Vaccine (Boostrix) 2016     Typhoid IM 1999, 2004, 2011     Yellow Fever 1999              Allergies:   Review of patient's allergies indicates no known allergies.          Medications:     No current facility-administered medications on file prior to encounter.   Current Outpatient Prescriptions on File Prior to Encounter:  lisinopril (PRINIVIL/ZESTRIL) 10 MG tablet Take 1 tablet (10 mg) by mouth daily (Patient taking differently: Take 10 mg by mouth every morning )   magnesium citrate solution Take 296 mLs by mouth See Admin Instructions Refer to surgery handout.   order for DME Equipment being ordered: Home BP monitor and cuff   Thiamine HCl 50 MG CAPS Take 1 capsule by mouth daily (Patient taking differently: Take 1 capsule by mouth every morning )   cholecalciferol (VITAMIN  -D) 1000 UNITS capsule Take 1 capsule (1,000 Units) by mouth daily (Patient taking differently: Take 1 capsule by mouth every morning )   teriparatide, recombinant, (FORTEO) 600 MCG/2.4ML SOLN injection Inject 0.08 mLs (20 mcg) Subcutaneous daily   calcium-vitamin D (CALTRATE) 600-400 MG-UNIT per tablet Take 1 tablet by mouth every morning    Omega-3 Fatty Acids (OMEGA-3 FISH OIL PO) Take 2 capsules by mouth 2 times daily    sodium fluoride dental gel (PREVIDENT) 1.1 % GEL Apply to affected area At Bedtime   Cyanocobalamin (VITAMIN B 12 PO) Take 1 tablet by mouth every morning             Review of Systems:   Review of Systems   Constitutional: Positive for activity change, appetite change and fatigue. Negative for chills, diaphoresis and fever.   HENT: Negative for congestion, rhinorrhea and sore throat.    Eyes: Negative for visual disturbance.   Respiratory: Negative for cough and shortness of breath.    Cardiovascular: Negative for chest pain.   Gastrointestinal: Positive for abdominal pain (discomfort due to the prolapse), blood in stool (chronic intermittent, secondary to her prolapse, ), nausea and vomiting. Negative for constipation and diarrhea.   Genitourinary: Positive for urgency. Negative for difficulty urinating,  dysuria and frequency.   Musculoskeletal: Positive for arthralgias (chronic). Negative for myalgias.   Neurological: Positive for dizziness. Negative for light-headedness and headaches.   Psychiatric/Behavioral: Negative for agitation, behavioral problems and confusion.               Physical Exam:   Vitals were reviewed  Temp: 97.7  F (36.5  C) Temp src: Oral BP: (!) 176/123 Pulse: 81 Heart Rate: 81 Resp: 22 SpO2: 95 %      Physical Exam   Constitutional: She is oriented to person, place, and time. She appears cachectic. She is active and cooperative. No distress.   HENT:   Head: Normocephalic and atraumatic.   Right Ear: External ear normal.   Left Ear: External ear normal.   Mouth/Throat: Oropharynx is clear and moist. No oropharyngeal exudate.   Eyes: Conjunctivae and EOM are normal. Pupils are equal, round, and reactive to light. No scleral icterus.   Neck: Normal range of motion. Neck supple.   Cardiovascular: Normal rate, regular rhythm and normal heart sounds.    Pulmonary/Chest: Effort normal and breath sounds normal. No respiratory distress. She has no wheezes. She has no rales.   Abdominal: Soft. Bowel sounds are normal. She exhibits no distension. There is no tenderness. There is no rebound and no guarding.   Musculoskeletal: Normal range of motion. She exhibits no edema.   Lymphadenopathy:     She has no cervical adenopathy.   Neurological: She is alert and oriented to person, place, and time. No cranial nerve deficit.   Skin: She is not diaphoretic.   Psychiatric: She has a normal mood and affect. Her behavior is normal.            Data:     Results for orders placed or performed during the hospital encounter of 01/07/18 (from the past 24 hour(s))   CBC with platelets differential   Result Value Ref Range    WBC 6.5 4.0 - 11.0 10e9/L    RBC Count 4.14 3.8 - 5.2 10e12/L    Hemoglobin 12.3 11.7 - 15.7 g/dL    Hematocrit 37.8 35.0 - 47.0 %    MCV 91 78 - 100 fl    MCH 29.7 26.5 - 33.0 pg    MCHC 32.5 31.5  - 36.5 g/dL    RDW 13.2 10.0 - 15.0 %    Platelet Count 192 150 - 450 10e9/L    Diff Method Automated Method     % Neutrophils 70.6 %    % Lymphocytes 17.9 %    % Monocytes 8.0 %    % Eosinophils 2.8 %    % Basophils 0.5 %    % Immature Granulocytes 0.2 %    Nucleated RBCs 0 0 /100    Absolute Neutrophil 4.6 1.6 - 8.3 10e9/L    Absolute Lymphocytes 1.2 0.8 - 5.3 10e9/L    Absolute Monocytes 0.5 0.0 - 1.3 10e9/L    Absolute Eosinophils 0.2 0.0 - 0.7 10e9/L    Absolute Basophils 0.0 0.0 - 0.2 10e9/L    Abs Immature Granulocytes 0.0 0 - 0.4 10e9/L    Absolute Nucleated RBC 0.0    Comprehensive metabolic panel   Result Value Ref Range    Sodium 143 133 - 144 mmol/L    Potassium 2.6 (LL) 3.4 - 5.3 mmol/L    Chloride 110 (H) 94 - 109 mmol/L    Carbon Dioxide 23 20 - 32 mmol/L    Anion Gap 9 3 - 14 mmol/L    Glucose 98 70 - 99 mg/dL    Urea Nitrogen 15 7 - 30 mg/dL    Creatinine 0.45 (L) 0.52 - 1.04 mg/dL    GFR Estimate >90 >60 mL/min/1.7m2    GFR Estimate If Black >90 >60 mL/min/1.7m2    Calcium 7.3 (L) 8.5 - 10.1 mg/dL    Bilirubin Total 0.4 0.2 - 1.3 mg/dL    Albumin 2.4 (L) 3.4 - 5.0 g/dL    Protein Total 5.3 (L) 6.8 - 8.8 g/dL    Alkaline Phosphatase 46 40 - 150 U/L    ALT 13 0 - 50 U/L    AST 12 0 - 45 U/L   Troponin I   Result Value Ref Range    Troponin I ES <0.015 0.000 - 0.045 ug/L   Magnesium   Result Value Ref Range    Magnesium 1.4 (L) 1.6 - 2.3 mg/dL   EKG 12-lead, tracing only   Result Value Ref Range    Interpretation ECG Click View Image link to view waveform and result    Lactic acid whole blood   Result Value Ref Range    Lactic Acid 1.5 0.7 - 2.0 mmol/L   UA with Microscopic   Result Value Ref Range    Color Urine Yellow     Appearance Urine Clear     Glucose Urine Negative NEG^Negative mg/dL    Bilirubin Urine Negative NEG^Negative    Ketones Urine Negative NEG^Negative mg/dL    Specific Gravity Urine 1.010 1.003 - 1.035    Blood Urine Negative NEG^Negative    pH Urine 7.0 5.0 - 7.0 pH    Protein  Albumin Urine 10 (A) NEG^Negative mg/dL    Urobilinogen mg/dL Normal 0.0 - 2.0 mg/dL    Nitrite Urine Negative NEG^Negative    Leukocyte Esterase Urine Large (A) NEG^Negative    Source Midstream Urine     WBC Urine 14 (H) 0 - 2 /HPF    RBC Urine 4 (H) 0 - 2 /HPF    Bacteria Urine Few (A) NEG^Negative /HPF    Squamous Epithelial /HPF Urine <1 0 - 1 /HPF    Transitional Epi <1 0 - 1 /HPF    Mucous Urine Present (A) NEG^Negative /LPF   Urine Culture   Result Value Ref Range    Specimen Description Midstream Urine     Special Requests Specimen received in preservative     Culture Micro PENDING    XR Chest 2 Views    Narrative    Preliminary Results. Full dictation to follow.      1. No acute cardiopulmonary abnormality.  2. Stable anterior compression wedge deformities.      All cardiac studies reviewed by me.   All imaging studies reviewed by me.        Assessment and Plan:   Assessment:   Belia Sheets is a 87 year old with history of rectal prolapse, HTN, osteoporosis, vertebral fractures, chronic fatigue who presents to the ED from her care facility due to concerns for elevated BP and generalized weakness. She was admitted for treatment of UTI, hypokalemia and IV hydration.   Patient Active Problem List   Diagnosis     Osteoporosis     Osteoarthritis     Recurrent falls     Chronic fatigue     Senile osteoporosis     Lumbar verterbral fracture, non-traumatic     Advance Care Planning     Encephalopathy         Plan:   ##Generalized weakness likely secondary to UTI  Patient presenting with acute worsening of her chronic fatigue. Possible differentials including infectious vs neurologic vs metabolic vs cardiac. Infectious etiology seems most likely as her UA is suggestive of a UTI with positive LE and blood. She otherwise has normal kidney, liver function and a normal CBC.Her exam is benign and reassuring without any focal neurologic deficit so a suspicion for an intracranial process is very low. Her troponin is  negative and EKG not suggestive of any acute ST-T wave changes so no suspicion for any acute coronary event. At this point, we will go ahead and treat her for UTI.     -Single dose of ceftriaxone given today in the ED. Consider transitioning to a PO antibiotic tomorrow.  -MIVF NS @75 cc/hr  -Tylenol for pain relief.  -Recheck CBC, BMP in the AM  -Follow up urine culture results    ##Nausea, vomiting  Possible ddx include acute infection (UTI) vs gastroenteritis vs gastritis. Her abdominal exam is benign and no signs of acute intra-abdominal process. Plan to hydrate patient and treat symptomatically and for the UTI.   -Zofran for nausea.  -If symptoms not improving, will consider GI cocktail or ranitidine.     ##Hypokalemia, hypomagnesemia   Likely due to decreased PO intake and minimal GI blood loss (patient endorsing intermittent bleeding since being diagnosed with prolapse) and ongoing vomiting.   -Replace K and Mg per protocol.  -Recheck lytes in the AM    ##Hypertension  -Continue PTA lisinopril  -PRN hydralazine available    ##Osteoporosis  -Continue PTA calcium and vitamin D supplements.   -Will need to confirm with pharmacy whether PTA forteo can be made available during hospital stay or not.     ##Rectal prolapse  Patient scheduled for surgery on 0112/2018. She has requested to contact the colorectal surgery team to see whether they will be able to reschedule the surgery to an earlier date.   -Plan on contacting colorectal surgery team in the AM    ##Code status  Discussed code status with patient. She says that she understands that her current illness is not life threatening but expressed desire to be DNR/DNI. She tells me that she has this written down at home and is in the final stages of completing her advance directives. She says that she is finalizing it especially since she is going into surgery.      Daily cares -   F: NS 75 cc/hr  E:hypokalemia, hypomagnesemia   N: Regular  "diet  Lines:PIV  Activity:-Up as tolerated  CODE:DNR / DNI  Prophylaxis: PCDs  PCP communication:  - Alejandro Sandhu  - Contacted at admission: Yes    Dispo: Expected discharge date 1-2 days pending clinical improvement.      Discussed with faculty but not examined by faculty.  MD Rossana Macias MD MPH  PGY3- [facility], Family Medicine  Pager- [phone]    UPDATE: January 8, 2018 @ 10:09 AM     S: Veryl Keaton Sheets, goes by Sudeep, reports feeling generally well today, describes her recent weakness as not desiring to walk 2/2 rectal prolapse and has also had decreased appetite.   Reports had dizziness with amlodipine which is why she stopped it, and then had high blood pressures and sent to hospital.  She reports some urinary retention but no dysuria or abdominal pain reported today.     O:  BP (!) 162/103  Pulse 75  Temp 97.6  F (36.4  C) (Oral)  Resp 18  Ht 1.473 m (4' 10\")  Wt 41.4 kg (91 lb 4.8 oz)  SpO2 92%  BMI 19.08 kg/m2    General: Awake, alert, in no acute distress, resting comfortably in bed  Resp: Lungs clear to auscultation bilaterally, no crackles, no wheezing, no rales.   Cardio: Regular rate and rhythm. No murmur appreciated   Abdomen: Soft, non-tender. Not distended, no guarding.  Extremities: No peripheral edema  MSK: Compression fractures of spine, significant kyphosis     Lab Results   Component Value Date    WBC 7.1 01/08/2018    WBC 6.5 01/07/2018    WBC 8.3 01/04/2018    HGB 12.3 01/08/2018    HGB 12.3 01/07/2018    HGB 13.6 01/04/2018    HCT 38.3 01/08/2018    HCT 37.8 01/07/2018    HCT 40.4 01/04/2018     01/08/2018     01/07/2018     01/04/2018     01/08/2018     01/07/2018     01/04/2018    POTASSIUM 3.6 01/08/2018    POTASSIUM 2.6 (LL) 01/07/2018    POTASSIUM 4.2 01/04/2018    CHLORIDE 106 01/08/2018    CHLORIDE 110 (H) 01/07/2018    CHLORIDE 106 01/04/2018    CO2 23 01/08/2018    CO2 23 01/07/2018    CO2 30 01/04/2018    BUN " 12 01/08/2018    BUN 15 01/07/2018    BUN 13 01/04/2018    CR 0.50 (L) 01/08/2018    CR 0.45 (L) 01/07/2018    CR 0.65 01/04/2018    GLC 92 01/08/2018    GLC 98 01/07/2018    GLC 90 01/04/2018    NTBNP 780 (H) 12/01/2017    TROPI <0.015 01/07/2018    TROPI <0.015 10/23/2017    AST 12 01/07/2018    AST 15 01/04/2018    AST 11.9 11/01/2017    ALT 13 01/07/2018    ALT 14 01/04/2018    ALT 7.9 11/01/2017    ALKPHOS 46 01/07/2018    ALKPHOS 58 01/04/2018    ALKPHOS 49.4 11/01/2017    BILITOTAL 0.4 01/07/2018    BILITOTAL 0.7 01/04/2018    BILITOTAL 0.6 11/01/2017    BOZENA <10 (L) 10/23/2017    INR 1.03 10/25/2017       A/P:     ##Generalized weakness  Patient presenting with acute worsening of her chronic fatigue. Possible differentials including infectious vs neurologic vs metabolic vs cardiac. Infectious etiology seems most likely as her UA is suggestive of a UTI with positive LE and blood. She otherwise has normal kidney, liver function and a normal CBC.Her exam is benign and reassuring without any focal neurologic deficit so a suspicion for an intracranial process is very low. Her troponin is negative and EKG not suggestive of any acute ST-T wave changes so no suspicion for any acute coronary event.   - At this point, concern over urinary retention, UA with large LE and some WBCs, however asymptomatic and with no leukocytosis or fever, will hold off treatment until urine culture results  - Influenza and RSV PCR given season and generalized fatigue/weakness  -Tylenol for pain relief.  -Recheck CBC, BMP in the AM  -Follow up urine culture results    ## Hypokalemia  ## Hypomagnesemia  ## Hypocalcemia  Unclear etiology, could be due to poor oral intake, nausea, vomiting, and losses from rectal prolapse.  Cause of nausea and poor intake could be infection, discomfort 2/2 rectal prolapse, vs effect of electrolyte disturbance.   - Replete electrolytes  - Repeat BMP, If decreased in interval may need workup for  hyperaldosteronism as not on any diuretic, lisinopril should cause hyperkalemia as well.    ##Hypertension  She stopped PTA amlodipine 5 mg due to dizziness and had not taken her lisinopril either, after taking both her blood pressure normalized.  -Continue PTA lisinopril  -Reduce amlodipine to 2.5 mg to see if tolerates  - If still hypertensive will consider stopping lisinopril (no DM or HF/MI history) and then increasing amlodipine to 5 mg, can be done as outpatient.    ##Osteoporosis  -Continue PTA calcium and vitamin D supplements.   -Will need to confirm with pharmacy whether PTA forteo can be made available during hospital stay or not.     ##Rectal prolapse  Patient scheduled for surgery on 01/12/2018.     ##Code status  Discussed code status with patient. She says that she understands that her current illness is not life threatening but expressed desire to be DNR/DNI. She tells me that she has this written down at home and is in the final stages of completing her advance directives. She says that she is finalizing it especially since she is going into surgery.      Daily cares -   F: po  E:hypokalemia, hypomagnesemia, replacement protocol  N: Regular diet  Lines:PIV  Activity:-Up as tolerated  CODE:DNR / DNI  Prophylaxis: PCDs  PCP communication:  - Alejandro Sandhu  - Contacted at admission: Yes    Dispo: Expected discharge date tonight or tomorrow pending stable electrolytes and blood pressure.     Darnell Woods MD, MPH  PGY-2 [facility]  Pager: [phone]

## 2018-01-08 NOTE — PROGRESS NOTES
BP remains elevated: 168/99. Other vitals stable. K+ recheck this AM 3.6. Denies pain. Eating well. A&O x4. Repositioning self in bed. Electrolytes to be rechecked again this afternoon. Scheduled for procedure Friday with colorectal for rectal prolapse. Anticipate discharge back to home either today or tomorrow.

## 2018-01-08 NOTE — PLAN OF CARE
Problem: Patient Care Overview  Goal: Plan of Care/Patient Progress Review  Patient A/O, VSS but BP hypertensive. Recent diastolic 105. PRN hydralazine available for DBP >110. Up with 1 assist to bedside commode, output appropriate. Pt feels urgency d/t UTI, is able to call for assistance. No c/o pain, nausea. Will continue to monitor and alert MDs to any changes.

## 2018-01-08 NOTE — ED NOTES
[facility], [place]   ED Nurse to Floor Handoff     Belia Sheets is a 87 year old female who speaks English and lives with others,  in an assisted living  They arrived in the ED by ambulance from home    ED Chief Complaint: Generalized Weakness    ED Dx;   Final diagnoses:   Hypokalemia   Urinary tract infection in female   Rectal prolapse   Generalized weakness   Benign essential hypertension         Needed?: No    Allergies: No Known Allergies.  Past Medical Hx:   Past Medical History:   Diagnosis Date     Carpal tunnel syndrome (aka CTS)      H/O calcium pyrophosphate deposition disease (CPPD)      History of encephalopathy 10/2017     Hypertension      Kyphoscoliosis      Osteoarthritis     hands     Osteoporosis      Rectal prolapse       Baseline Mental status: WDL  Current Mental Status changes: at basesline    Infection: No  Sepsis suspected: No  Isolation type: No active isolations     Activity level - Baseline/Home:  Independent  Activity Level - Current:   Stand with Assist    Bariatric equipment needed?: No    In the ED these meds were given:   Medications   0.9% sodium chloride BOLUS (0 mLs Intravenous Stopped 1/8/18 0017)   potassium chloride 10 mEq in 100 mL intermittent infusion with 10 mg lidocaine (10 mEq Intravenous New Bag 1/7/18 2335)   potassium chloride (KLOR-CON) Packet 40 mEq (40 mEq Oral Given 1/7/18 2330)   cefTRIAXone (ROCEPHIN) 1 g in 10 mL SWFI Premix Syringe (1 g Intravenous Given 1/7/18 2331)   hydrALAZINE (APRESOLINE) tablet 10 mg (10 mg Oral Given 1/8/18 0017)       Drips running?  No    Home pump or pre-existing LDA's present? No    Labs results:   Labs Ordered and Resulted from Time of ED Arrival Up to the Time of Departure from the ED   ROUTINE UA WITH MICROSCOPIC - Abnormal; Notable for the following:        Result Value    Protein Albumin Urine 10 (*)     Leukocyte Esterase Urine Large (*)     WBC Urine 14 (*)     RBC Urine 4 (*)      Bacteria Urine Few (*)     Mucous Urine Present (*)     All other components within normal limits   COMPREHENSIVE METABOLIC PANEL - Abnormal; Notable for the following:     Potassium 2.6 (*)     Chloride 110 (*)     Creatinine 0.45 (*)     Calcium 7.3 (*)     Albumin 2.4 (*)     Protein Total 5.3 (*)     All other components within normal limits   MAGNESIUM - Abnormal; Notable for the following:     Magnesium 1.4 (*)     All other components within normal limits   LACTIC ACID WHOLE BLOOD   CBC WITH PLATELETS DIFFERENTIAL   TROPONIN I   URINE CULTURE AEROBIC BACTERIAL       Imaging Studies:   Recent Results (from the past 24 hour(s))   XR Chest 2 Views    Narrative    Preliminary Results. Full dictation to follow.      1. No acute cardiopulmonary abnormality.  2. Stable anterior compression wedge deformities.       Recent vital signs:   BP (!) 176/123  Pulse 81  Temp 97.7  F (36.5  C) (Oral)  Resp 22  Wt 43.1 kg (95 lb)  SpO2 95%  BMI 19.52 kg/m2    Cardiac Rhythm: Normal Sinus  Pt needs tele? No  Skin/wound Issues: Prolapsed rectum    Code Status: Unsure, has been DNR/DNI in the past, but last status entered Full Code    Pain control: pt had none    Nausea control: pt had none    Abnormal labs/tests/findings requiring intervention: Potassium 2.6, replaced with 40 meq PO and 10 meq IV. Have not re-checked. Hypertensive, given 10 mg hydralazine PO    Family present during ED course? No   Family Comments/Social Situation comments: Daughter Lubna lives in [place], would like updates when possible. Her number is on her facesheet    Tasks needing completion: None    Jossie Taylor RN  ascom-- [phone] [place] [facility]  [phone] [facility] ED

## 2018-01-08 NOTE — ED NOTES
Patient in from her assisted living with hypertension of 190's systolic as taken by an aide at the facility. On arrival /99. Patient also reports feeling weaker over the last few days, poor appetite as well. She has rectal prolapse that is due for surgical intervention scheduled Friday.

## 2018-01-08 NOTE — PROGRESS NOTES
[facility]  Patient is currently open to home care services with [facility].  The patient is currently receiving RN services.  [facility]  and team have been notified of patient admission.  [facility] liaison will continue to follow patient during stay.  If appropriate provide orders to resume home care at time of discharge.    Phuong Rivera RN Liaison  [facility]

## 2018-01-08 NOTE — SUMMARY OF CARE
Patient arrived with purse containing reading glasses, toiletry bag, iPhone 6, 4-wheel walker, black winter coat, socks, underwear, blue sweatshirt, black wind pants, white leather new balance shoes.

## 2018-01-08 NOTE — ED PROVIDER NOTES
[place] EMERGENCY DEPARTMENT ([facility])  1/07/18 ED 21    History     Chief Complaint   Patient presents with     Generalized Weakness     The history is provided by the patient and medical records.     Belia Sheets is a 87 year old female with a history of osteoporosis, hypertension, chronic fatigue and rectal prolapse who presents with generalized weakness and an episode of hypertension.  She reports that for the last year she has had hypertension and was started on lisinopril.  In December her primary care provider added amlodipine.  This caused her to feel dizzy and she discontinued use.  This evening one of the PCAs in her care facility came to check daily blood pressure and noted that was quite elevated with a systolic blood pressure of 197.  She had not taken her lisinopril today and does typically take it in the morning. At that point she took her lisinopril as well as her amlodipine. On recheck of her blood pressure, it was 160s and then 140s systolic.  She reports that the nurse at her care facility referred her to the Emergency Department due to the episode of hypertension.  In addition she reports that since her diagnosis of rectal prolapse on December 29 she has been feeling quite fatigued and has had difficulty getting around at home.  She reports that standing for any prolonged period of time causes her severe pain.  She has had significantly decreased movement since her diagnosis due to discomfort.  In the last 2 days she has had increasing nausea and did have 5 episodes of vomiting yesterday.  She denies any associated abdominal pain.  She reports that she is able to pass small bowel movements and denies any blood in her stools.  Today she was the most fatigued she has been and stayed in bed throughout the entire day as she felt she did not have energy to get up and do usual activities. She did not have anything to eat or drink today at home due to fatigue.  She reports that she  does have a pad that she has placed next to her bed as well as a yogurt container that she has been using to urinate in as she has felt too tired to walk to the bathroom.  She denies any dysuria, hematuria or increased urinary frequency.  She has not had any associated chest pain, shortness of breath, palpitations or cough.  She denies any recent fevers.  She has not had any recent falls or head trauma.  Aside from discontinuing the short course of amlodipine that she had taken she denies any medication changes.  She is scheduled for surgery for her rectal prolapse on Friday.     This part of the document was transcribed by Mendy Morrissey, Medical Scribe.      I have reviewed the Medications, Allergies, Past Medical and Surgical History, and Social History in the Epic system.    Review of Systems   Constitutional: Positive for activity change (unable to get out of bed to toilet) and fatigue.   Respiratory: Negative for shortness of breath.    Cardiovascular: Negative for chest pain and palpitations.   Gastrointestinal: Positive for nausea and vomiting. Negative for blood in stool and constipation.   Genitourinary: Negative for dysuria, flank pain and hematuria.   Neurological: Positive for weakness (generalized).   All other systems reviewed and are negative.      Physical Exam   BP: (!) 144/99  Pulse: 81  Temp: 97.7  F (36.5  C)  Resp: 16  Weight: 43.1 kg (95 lb)  SpO2: 98 %      Physical Exam  General: patient is alert and oriented and in no acute distress   Head: atraumatic and normocephalic   EENT: dry mucus membranes without tonsillar erythema or exudates, pupils 2 mm round and reactive, sclera anicteric  Neck: supple   Cardiovascular: regular rate and rhythm, extremities warm and well perfused, no lower extremity edema  Pulmonary: lungs clear to auscultation bilaterally   Abdomen: soft, no tenderness to palpation of abdomen, no CVA tenderness, moderate rectal prolapse present with pink bowel, no areas of  necrotic tissue  Musculoskeletal: normal range of motion   Neurological: alert and oriented, moving all extremities symmetrically, no facial droop, strength 5/5 and symmetric in , knee flexion/extension and ankle plantar/dorsiflexion, sensation to light touch in distal upper and lower extremities intact  Skin: warm, dry     ED Course     ED Course     Procedures             EKG Interpretation:      Interpreted by Bela Dennis  Time reviewed: 2220  Symptoms at time of EKG: weakness   Rhythm: normal sinus   Rate: normal  Axis: normal  Ectopy: none  Conduction: normal  ST Segments/ T Waves: No ST-T wave changes  Q Waves: none  Comparison to prior: Unchanged    Clinical Impression: normal EKG            Critical Care time:  none           Labs Ordered and Resulted from Time of ED Arrival Up to the Time of Departure from the ED - No data to display         Assessments & Plan (with Medical Decision Making)     This is a very pleasant 87-year-old female with history of cirrhosis, hypertension and recent diagnosis of rectal prolapse who presents with an episode of hypertension in addition to worsening generalized fatigue, nausea and vomiting.  In regards to her hypertension I suspect that this is secondary to missing her morning antihypertensives versus pain.  Differential diagnosis includes but is not limited to urinary tract infection, dehydration, electrolyte derangements, gastroenteritis, generalized fatigue secondary to rectal prolapse.  The prolapse itself is still present.  The bowel appears pink and healthy. Given the duration time that this has been out I suspect that even if reduced in the ED it is unlikely to stay reduced.  Labs were obtained including CBC, CMP, UA which are significant for hypokalemia to 2.6 and hypocalcemia to 7.3.  She was given 10 mEq IV potassium and 40 mEq p.o. Potassium.  She is not anemic and has no leukocytosis.  Alternatively considered given her age, fatigue, and  hypertension is cardiac etiology.  ECG does not show any acute ischemic changes and troponin is <0.015.  Her UA is suggestive of a urinary tract infection and was sent for culture.  She was given a dose of ceftriaxone.  Chest x-ray was also obtained to rule out any evidence of aspiration and is negative.  She is no longer significantly hypertensive in the Emergency Department and denies any symptoms of hypertensive emergency including encephalopathy, chest pain or abdominal pain.  She was given IV fluids in the Emergency Department and will plan to admit to medicine for continued IV fluids, electrolyte correction, treatment of UTI and consultation with colorectal surgery.    Reevaluation: She was noted to be increasingly hypertensive again and was given oral hydralazine.    This part of the document was transcribed by Mendy Morrissey Medical Scribe.      I have reviewed the nursing notes.    I have reviewed the findings, diagnosis, plan and need for follow up with the patient.    New Prescriptions    No medications on file       Final diagnoses:   Hypokalemia   Urinary tract infection in female   Rectal prolapse   Generalized weakness   Benign essential hypertension       1/7/2018   [facility] [place], EMERGENCY DEPARTMENT     Bela Dennis MD  01/08/18 0024       Bela Dennis MD  01/08/18 0027

## 2018-01-08 NOTE — PLAN OF CARE
Problem: Patient Care Overview  Goal: Plan of Care/Patient Progress Review  Patient admitted to unit from ED via litter. Admit req doc completed, pt oriented to room and call light, belongings logged. Wallet and meds sent home with her daughter.

## 2018-01-09 VITALS
BODY MASS INDEX: 17.82 KG/M2 | HEART RATE: 75 BPM | RESPIRATION RATE: 16 BRPM | SYSTOLIC BLOOD PRESSURE: 132 MMHG | OXYGEN SATURATION: 91 % | WEIGHT: 84.88 LBS | HEIGHT: 58 IN | DIASTOLIC BLOOD PRESSURE: 79 MMHG | TEMPERATURE: 98.2 F

## 2018-01-09 DIAGNOSIS — K62.3 RECTAL PROLAPSE: Primary | ICD-10-CM

## 2018-01-09 LAB
ANION GAP SERPL CALCULATED.3IONS-SCNC: 9 MMOL/L (ref 3–14)
BASOPHILS # BLD AUTO: 0 10E9/L (ref 0–0.2)
BASOPHILS NFR BLD AUTO: 0.4 %
BUN SERPL-MCNC: 19 MG/DL (ref 7–30)
CALCIUM SERPL-MCNC: 8.5 MG/DL (ref 8.5–10.1)
CHLORIDE SERPL-SCNC: 108 MMOL/L (ref 94–109)
CO2 SERPL-SCNC: 23 MMOL/L (ref 20–32)
CREAT SERPL-MCNC: 0.51 MG/DL (ref 0.52–1.04)
DIFFERENTIAL METHOD BLD: NORMAL
EOSINOPHIL # BLD AUTO: 0.6 10E9/L (ref 0–0.7)
EOSINOPHIL NFR BLD AUTO: 7.7 %
ERYTHROCYTE [DISTWIDTH] IN BLOOD BY AUTOMATED COUNT: 13.3 % (ref 10–15)
GFR SERPL CREATININE-BSD FRML MDRD: >90 ML/MIN/1.7M2
GLUCOSE SERPL-MCNC: 89 MG/DL (ref 70–99)
HCT VFR BLD AUTO: 37 % (ref 35–47)
HGB BLD-MCNC: 11.8 G/DL (ref 11.7–15.7)
IMM GRANULOCYTES # BLD: 0 10E9/L (ref 0–0.4)
IMM GRANULOCYTES NFR BLD: 0.1 %
LYMPHOCYTES # BLD AUTO: 1.5 10E9/L (ref 0.8–5.3)
LYMPHOCYTES NFR BLD AUTO: 21.5 %
MAGNESIUM SERPL-MCNC: 1.8 MG/DL (ref 1.6–2.3)
MCH RBC QN AUTO: 29.2 PG (ref 26.5–33)
MCHC RBC AUTO-ENTMCNC: 31.9 G/DL (ref 31.5–36.5)
MCV RBC AUTO: 92 FL (ref 78–100)
MONOCYTES # BLD AUTO: 0.6 10E9/L (ref 0–1.3)
MONOCYTES NFR BLD AUTO: 7.8 %
NEUTROPHILS # BLD AUTO: 4.5 10E9/L (ref 1.6–8.3)
NEUTROPHILS NFR BLD AUTO: 62.5 %
NRBC # BLD AUTO: 0 10*3/UL
NRBC BLD AUTO-RTO: 0 /100
PHOSPHATE SERPL-MCNC: 3.9 MG/DL (ref 2.5–4.5)
PLATELET # BLD AUTO: 184 10E9/L (ref 150–450)
POTASSIUM SERPL-SCNC: 3.6 MMOL/L (ref 3.4–5.3)
RBC # BLD AUTO: 4.04 10E12/L (ref 3.8–5.2)
SODIUM SERPL-SCNC: 140 MMOL/L (ref 133–144)
WBC # BLD AUTO: 7.2 10E9/L (ref 4–11)

## 2018-01-09 PROCEDURE — 80048 BASIC METABOLIC PNL TOTAL CA: CPT | Performed by: FAMILY MEDICINE

## 2018-01-09 PROCEDURE — 25000132 ZZH RX MED GY IP 250 OP 250 PS 637: Performed by: FAMILY MEDICINE

## 2018-01-09 PROCEDURE — 83735 ASSAY OF MAGNESIUM: CPT | Performed by: FAMILY MEDICINE

## 2018-01-09 PROCEDURE — 36415 COLL VENOUS BLD VENIPUNCTURE: CPT | Performed by: FAMILY MEDICINE

## 2018-01-09 PROCEDURE — 85025 COMPLETE CBC W/AUTO DIFF WBC: CPT | Performed by: FAMILY MEDICINE

## 2018-01-09 PROCEDURE — 84100 ASSAY OF PHOSPHORUS: CPT | Performed by: FAMILY MEDICINE

## 2018-01-09 PROCEDURE — 25000132 ZZH RX MED GY IP 250 OP 250 PS 637: Performed by: STUDENT IN AN ORGANIZED HEALTH CARE EDUCATION/TRAINING PROGRAM

## 2018-01-09 RX ORDER — METRONIDAZOLE 500 MG/1
500 TABLET ORAL EVERY 8 HOURS
Qty: 3 TABLET | Refills: 0 | Status: SHIPPED | OUTPATIENT
Start: 2018-01-09 | End: 2018-01-09

## 2018-01-09 RX ORDER — NEOMYCIN SULFATE 500 MG/1
1000 TABLET ORAL EVERY 8 HOURS
Qty: 6 TABLET | Refills: 0 | Status: ON HOLD | OUTPATIENT
Start: 2018-01-09 | End: 2018-01-15

## 2018-01-09 RX ORDER — NEOMYCIN SULFATE 500 MG/1
1000 TABLET ORAL EVERY 8 HOURS
Qty: 6 TABLET | Refills: 0 | Status: SHIPPED | OUTPATIENT
Start: 2018-01-09 | End: 2018-01-09

## 2018-01-09 RX ORDER — ONDANSETRON 4 MG/1
4 TABLET, FILM COATED ORAL EVERY 6 HOURS PRN
Qty: 3 TABLET | Refills: 0 | Status: SHIPPED | OUTPATIENT
Start: 2018-01-09 | End: 2018-02-07

## 2018-01-09 RX ORDER — ONDANSETRON 4 MG/1
4 TABLET, FILM COATED ORAL EVERY 6 HOURS PRN
Qty: 3 TABLET | Refills: 0 | Status: SHIPPED | OUTPATIENT
Start: 2018-01-09 | End: 2018-01-09

## 2018-01-09 RX ORDER — POLYETHYLENE GLYCOL 3350 17 G/17G
238 POWDER, FOR SOLUTION ORAL SEE ADMIN INSTRUCTIONS
Qty: 238 G | Refills: 0 | Status: ON HOLD | OUTPATIENT
Start: 2018-01-09 | End: 2018-01-15

## 2018-01-09 RX ORDER — AMLODIPINE BESYLATE 5 MG/1
5 TABLET ORAL DAILY
Qty: 30 TABLET | Refills: 0 | Status: SHIPPED | OUTPATIENT
Start: 2018-01-09 | End: 2018-02-07

## 2018-01-09 RX ORDER — METRONIDAZOLE 500 MG/1
500 TABLET ORAL EVERY 8 HOURS
Qty: 3 TABLET | Refills: 0 | Status: ON HOLD | OUTPATIENT
Start: 2018-01-09 | End: 2018-01-15

## 2018-01-09 RX ORDER — POLYETHYLENE GLYCOL 3350 17 G/17G
238 POWDER, FOR SOLUTION ORAL SEE ADMIN INSTRUCTIONS
Qty: 238 G | Refills: 0 | Status: SHIPPED | OUTPATIENT
Start: 2018-01-09 | End: 2018-01-09

## 2018-01-09 RX ORDER — ACETAMINOPHEN 325 MG/1
650 TABLET ORAL EVERY 4 HOURS PRN
Qty: 60 TABLET | Refills: 0 | Status: SHIPPED | OUTPATIENT
Start: 2018-01-09 | End: 2021-09-11

## 2018-01-09 RX ADMIN — VITAMIN B12 0.1 MG ORAL TABLET 100 MCG: 0.1 TABLET ORAL at 09:55

## 2018-01-09 RX ADMIN — Medication 1 TABLET: at 09:55

## 2018-01-09 RX ADMIN — AMLODIPINE BESYLATE 5 MG: 5 TABLET ORAL at 09:55

## 2018-01-09 RX ADMIN — VITAMIN D, TAB 1000IU (100/BT) 1000 UNITS: 25 TAB at 09:55

## 2018-01-09 RX ADMIN — THIAMINE HCL (VITAMIN B1) 50 MG TABLET 50 MG: at 09:54

## 2018-01-09 NOTE — PLAN OF CARE
"Problem: Patient Care Overview  Goal: Plan of Care/Patient Progress Review  Outcome: No Change  VSS on RA. A & O. Denies pain, but reports \"pressure\" from rectal prolapse. Up w/ 1A to BSC. No BM, but voiding adequately. PIV intact and infusing MIVF. Plan for possible d/c today. Will continue to monitor.         "

## 2018-01-09 NOTE — PROGRESS NOTES
No changes in status. Denies pain. Eating well. VSS. States that rectal prolapse is out/in depending on position she is sitting in. Electrolytes and BP are stable. Discharge paperwork is ready and she will leave hospital when daughter is here. Scheduled for surgical correction of rectal prolapse on Friday.

## 2018-01-09 NOTE — PLAN OF CARE
Problem: Patient Care Overview  Goal: Plan of Care/Patient Progress Review  Outcome: No Change  Pt A&O x4, VS stable ex mild HTN at beginning of shift, has since gone down to be WDL. No pain or nausea reported. Pt will possibly be discharged tomorrow. Patient has surgery scheduled as outpatient on Friday. Influenza A/B swab came back negative. Able to eat 100% of meal, encouraged to eat well to promote healing per surgery team. Pt receptive and wanting to do anything she can to have a successful surgery. Repositioning self in bed. Continue to monitor BP and update MD with concerns.

## 2018-01-09 NOTE — DISCHARGE SUMMARY
[facility]  Discharge Summary    Belia Sheets MRN# [id number]   Age: 87 year old YOB: 1930     Date of Admission:  1/7/2018  Date of Discharge:  1/9/2018  Admitting Physician:  Violet Zapata MD  Discharge Physician:  Adarsh Georges MD  Contact: [phone]  Discharging Service:  [facility]     Primary Provider:   Alejandro Sandhu  [address] [address]  [phone]          Discharge Disposition:   Discharged to care facility    Upon discharge patient is stable after the recent hospitalization, however she is still requiring ongoing care for strengthening and/or medication assistance. Patient is to be discharged to care facility with continuation of resumption of services        Condition on Discharge:   Discharge condition: Stable   Code status on discharge: DNR / DNI          Admission Diagnoses:   Rectal prolapse [K62.3]  Benign essential hypertension [I10]  Hypokalemia [E87.6]  Generalized weakness [R53.1]  Urinary tract infection in female [N39.0]          Principle Discharge Problem:   Generalized weakness         Additional Discharge Problems:     Patient Active Problem List    Diagnosis Date Noted     Generalized weakness 01/08/2018     Priority: Medium     Encephalopathy 10/23/2017     Priority: Medium     Advance Care Planning 06/27/2017     Priority: Medium     Senile osteoporosis 01/13/2017     Priority: Medium     Lumbar verterbral fracture, non-traumatic 01/13/2017     Priority: Medium     Recurrent falls 08/31/2016     Priority: Medium     Chronic fatigue 08/31/2016     Priority: Medium     Osteoporosis      Priority: Medium     Osteoarthritis      Priority: Medium     hands              Medications Prior to Admission:       No current facility-administered medications on file prior to encounter.   Current Outpatient Prescriptions on File Prior to Encounter:  cholecalciferol  (VITAMIN  -D) 1000 UNITS capsule Take 1 capsule (1,000 Units) by mouth daily (Patient taking differently: Take 1 capsule by mouth every morning )   Cyanocobalamin (VITAMIN B 12 PO) Take 1 tablet by mouth every morning    magnesium citrate solution Take 296 mLs by mouth See Admin Instructions Refer to surgery handout.   order for DME Equipment being ordered: Home BP monitor and cuff   Thiamine HCl 50 MG CAPS Take 1 capsule by mouth daily (Patient taking differently: Take 1 capsule by mouth every morning )   teriparatide, recombinant, (FORTEO) 600 MCG/2.4ML SOLN injection Inject 0.08 mLs (20 mcg) Subcutaneous daily   calcium-vitamin D (CALTRATE) 600-400 MG-UNIT per tablet Take 1 tablet by mouth every morning    sodium fluoride dental gel (PREVIDENT) 1.1 % GEL Apply to affected area At Bedtime            Discharge Medications:        Review of your medicines      START taking       Dose / Directions    acetaminophen 325 MG tablet   Commonly known as:  TYLENOL   Used for:  Rectal prolapse        Dose:  650 mg   Take 2 tablets (650 mg) by mouth every 4 hours as needed for mild pain   Quantity:  60 tablet   Refills:  0       amLODIPine 5 MG tablet   Commonly known as:  NORVASC   Used for:  Benign essential hypertension        Dose:  5 mg   Take 1 tablet (5 mg) by mouth daily   Quantity:  30 tablet   Refills:  0       metroNIDAZOLE 500 MG tablet   Commonly known as:  FLAGYL   Used for:  Rectal prolapse        Dose:  500 mg   Take 1 tablet (500 mg) by mouth every 8 hours prior to surgery.  Take in conjunction with Neomycin Sulfate.   Quantity:  3 tablet   Refills:  0       neomycin 500 MG tablet   Commonly known as:  MYCIFRADIN   Used for:  Rectal prolapse        Dose:  1000 mg   Take 2 tablets (1,000 mg) by mouth every 8 hours prior to surgery.  Take in conjunction with Flagyl.   Quantity:  6 tablet   Refills:  0       ondansetron 4 MG tablet   Commonly known as:  ZOFRAN   Used for:  Rectal prolapse        Dose:  4 mg    Take 1 tablet (4 mg) by mouth every 6 hours as needed for nausea While taking neomycin and flagyl.   Quantity:  3 tablet   Refills:  0       polyethylene glycol Packet   Commonly known as:  MIRALAX   Used for:  Rectal prolapse        Dose:  238 g   Take 238 g by mouth See Admin Instructions One 8.3-ounce bottle (238 g).  Refer to surgical packet for medication instructions.   Quantity:  238 g   Refills:  0         CONTINUE these medicines which may have CHANGED, or have new prescriptions. If we are uncertain of the size of tablets/capsules you have at home, strength may be listed as something that might have changed.       Dose / Directions    cholecalciferol 1000 UNITS capsule   Commonly known as:  vitamin  -D   This may have changed:  when to take this   Used for:  Age-related osteoporosis with current pathological fracture with routine healing, subsequent encounter        Dose:  1 capsule   Take 1 capsule (1,000 Units) by mouth daily   Quantity:  100 capsule   Refills:  3       Thiamine HCl 50 MG Caps   This may have changed:  when to take this   Used for:  Confusion        Dose:  1 capsule   Take 1 capsule by mouth daily   Quantity:  60 capsule   Refills:  3         CONTINUE these medicines which have NOT CHANGED       Dose / Directions    calcium-vitamin D 600-400 MG-UNIT per tablet   Commonly known as:  CALTRATE        Dose:  1 tablet   Take 1 tablet by mouth every morning   Refills:  0       magnesium citrate solution   Used for:  Rectal prolapse        Dose:  296 mL   Take 296 mLs by mouth See Admin Instructions Refer to surgery handout.   Quantity:  296 mL   Refills:  0       order for DME   Used for:  Severe hypertension        Equipment being ordered: Home BP monitor and cuff   Quantity:  1 each   Refills:  0       sodium fluoride dental gel 1.1 % Gel topical gel   Commonly known as:  PREVIDENT        Apply to affected area At Bedtime   Refills:  0       teriparatide (recombinant) 600 MCG/2.4ML Soln  injection   Commonly known as:  FORTEO   Used for:  Age-related osteoporosis with current pathological fracture, initial encounter        Dose:  20 mcg   Inject 0.08 mLs (20 mcg) Subcutaneous daily   Quantity:  6 mL   Refills:  1       VITAMIN B 12 PO        Dose:  1 tablet   Take 1 tablet by mouth every morning   Refills:  0         STOP taking          lisinopril 10 MG tablet   Commonly known as:  PRINIVIL/ZESTRIL           OMEGA-3 FISH OIL PO                Where to get your medicines      These medications were sent to [facility] - [place], [place] - [address]  [address] [address]     Phone:  [phone]      acetaminophen 325 MG tablet     amLODIPine 5 MG tablet     metroNIDAZOLE 500 MG tablet     neomycin 500 MG tablet     ondansetron 4 MG tablet     polyethylene glycol Packet                  Discharge Instructions and Follow-Up:   Discharge diet: Regular   Discharge activity: Activity as tolerated   Discharge follow-up: Follow up with primary care provider in 7 days   Lines and drains: None    Wound care: None          Brief Admission History and Evaluation:   Belia Sheets is a 87 year old female who was admitted for concerns with elevated blood pressure and generalized weakness. Patient has past medical history of rectal prolapse, HTN, osteoporosis, vertebral fractures and chronic fatigue.      Patient reports that she has been feeling fatigued since her diagnosis of rectal prolapse in December 2017. Since Wednesday (4 days ago) she noticed that she did not feel her usual self as far as energy levels go. She attributes it to the discomfort that she feels with the prolapse and that she had started to feel more nauseous. Today was the first time she vomiting and had about 4 episodes of non bloody vomit. She denies any fever, chills, abdominal pain or diarrhea. She does report that her stools are soft but that is intentional to avoid constipation secondary  "to her prolapse. In addition to the nausea, her energy levels were low today to the point that she did not feel like getting out of bed. She denies any weakness but endorses a more subjective feeling of lack of energy. She uses a walker at baseline and said that she could \"probably use it right now, I just don't want to\". She denies any cough, SOB or any urinary concerns.      With regards to her BP, she reports not taking her medication in the morning because she was feeling nauseous. During her routine BP check at the care facility they noted that her BP was elevated but it came down with repeated checks. In any case, she did end up taking both lisinopril and amlodipine at about 7:30 pm. She denies using amlodipine regularly as she stopped using it because it made her feel dizzy.     Urinary analysis on admission had large leukocyte esterase and 14 WBC with concern for urinary tract infection. Additionally, patient was hypokalemic. Patient had elevated BP on admission up to 168/109. She was given single dose of antibiotics.            Hospital Course/Discharge Plan by Problem:      ##Generalized weakness, improving  Patient presenting with acute worsening of her chronic fatigue. Possible differentials including infectious vs neurologic vs metabolic vs cardiac vs electrolyte imbalance. She otherwise has normal kidney, liver function and a normal CBC.Her exam is benign and reassuring without any focal neurologic deficit so a suspicion for an intracranial process is very low. Her troponin is negative and EKG not suggestive of any acute ST-T wave changes so no suspicion for any acute coronary event. There was a concern for urinary tract infection due to UA and was given antibiotics, but found to have negative cultures with antibiotics discontinued.      -Single dose of ceftriaxone given in the ED and received rocephin IV. Discontinued antibiotics today due to negative urine culture (<10,000 colonies/mL mixed " urogenital elijah)  -MIVF NS @75 cc/hr, discontinued   -Checked CBC, BMP: unremarkable    ##Nausea, vomiting, resolved  Possible ddx include acute infection (UTI) vs gastroenteritis vs gastritis. Her abdominal exam is benign and no signs of acute intra-abdominal process. Plan to hydrate patient and treat symptomatically and for the UTI.   -Zofran for nausea.  -If symptoms not improving, will consider GI cocktail or ranitidine.      ##Hypokalemia, hypomagnesemia, resolved  Likely due to decreased PO intake and minimal GI blood loss (patient endorsing intermittent bleeding since being diagnosed with prolapse) and ongoing vomiting.   -Replace K and Mg per protocol.  -Rechecked lytes in the AM and found to be within normal limits     ##Hypertension, improving  -Discontinued lisinopril and transitioning to only amlodipine 5 mg  -PRN hydralazine available     ##Osteoporosis  -Continue PTA calcium and vitamin D supplements.      ##Rectal prolapse  Patient scheduled for surgery on 01/12/2018. She has requested to contact the colorectal surgery team to see whether they will be able to reschedule the surgery to an earlier date. Surgical team would like to proceed with scheduled surgery and not move the date up.  - Prescribed tylenol for as needed discomfort   - Take neomycin, zofran, flagyl and miralax as prescribed by colorectal     ##Code status  Discussed code status with patient. She says that she understands that her current illness is not life threatening, but expressed desire to be DNR/DNI. She tells me that she has this written down at home and is in the final stages of completing her advance directives. She says that she is finalizing it especially since she is going into surgery.            Final Day of Progress before Discharge:         Interval History:  General:  appears to be improving, more alert and more confortable   Vitals: blood pressure improved   Intake/Output: stable intake and output   Nutrition: regular  diet   Mental status: mental status unchanged   Respiratory: stable   Cardiovascular blood pressure improved   Renal: no change in renal function or urinary output   Neurologic: no focal deficits reported and no changes in the cranial nerves   Medications: D/C lisinopril, starting amlodipine 5 mg   Interventions: No interventions performed since the last visit   Consults: No formal consultations were requested             Physical Exam:  Vitals were reviewed  Temp: 98.2  F (36.8  C) Temp src: Oral BP: 132/79 Pulse: 75 Heart Rate: 90 Resp: 16 SpO2: 91 % O2 Device: None (Room air)      Physical Exam   Constitutional: She is well-developed, well-nourished, and in no distress. No distress.   HENT:   Head: Normocephalic and atraumatic.   Eyes: Conjunctivae are normal. No scleral icterus.   Cardiovascular: Normal rate, regular rhythm and intact distal pulses.  Exam reveals no gallop and no friction rub.    No murmur heard.  Pulmonary/Chest: Effort normal and breath sounds normal. No respiratory distress. She has no wheezes. She has no rales.   Abdominal: Soft. Bowel sounds are normal.   Neurological: She is alert.   Skin: Skin is warm and dry.   Psychiatric: Mood and affect normal.   Vitals reviewed.    Data:  All laboratory data reviewed  All cardiac studies reviewed by me.  All imaging studies reviewed by me.         Pending Results:   None      It was a pleasure to participate in the care of Belia Sheets.  If you have questions about her care, please do not hesitate to contact the [facility] team via the hospital le[facility] on which we are stationed.      [facility]  [facility], [address] - [phone]

## 2018-01-09 NOTE — PROGRESS NOTES
Brief Note    Patient admitted for fatigue possibly secondary to UTI.  She is scheduled for Altemeier procedure this coming Friday.  She had asked if this could be moved up since she was admitted.  I saw the patient and her rectum was prolapsed but mucosa was healthy and it was easily reducible.  I explained the difficulty of moving surgeries and as there is no acute change in her rectal prolapse, there is no reason to move it up.  Patient and daughter were understanding and will proceed with surgery on Friday.  They had some questions about pre-operative preparation which were answered.  If further questions arise, they can call the clinic nurses listed below.     Hardik Waller LPN or Angela Capps RN or Alicia Marshall RN at [phone]     Dr. Mariano was informed of her admission.     John Vanessa  General Surgery PGY-3  Pager [phone]

## 2018-01-09 NOTE — PLAN OF CARE
Patient discharged to home at 1545 today with belongings. Patient wheeled down in wheelchair by staff, patient's daughter went to discharge pharmacy to  prescriptions, and then met staff in lobby for transport home. Previous nurse went through paperwork with patient, all questions answered. Patient stable on discharge.

## 2018-01-10 ENCOUNTER — CARE COORDINATION (OUTPATIENT)
Dept: CARE COORDINATION | Facility: CLINIC | Age: 83
End: 2018-01-10

## 2018-01-10 NOTE — PROGRESS NOTES
Patient was discharged from [facility] on 1/9      [facility]  Discharge Summary     Beila Sheets MRN# [id number]   Age: 87 year old YOB: 1930      Date of Admission:                                      1/7/2018  Date of Discharge:                                      1/9/2018  Admitting Physician:                                   Violet Zapata MD  Discharge Physician:                                  Adarsh Georges MD  Contact: [phone]  Discharging Service:                                   [facility]

## 2018-01-11 ENCOUNTER — TELEPHONE (OUTPATIENT)
Dept: SURGERY | Facility: CLINIC | Age: 83
End: 2018-01-11

## 2018-01-11 RX ORDER — NALOXONE HYDROCHLORIDE 0.4 MG/ML
.1-.4 INJECTION, SOLUTION INTRAMUSCULAR; INTRAVENOUS; SUBCUTANEOUS
Status: DISCONTINUED | OUTPATIENT
Start: 2018-01-11 | End: 2018-01-11

## 2018-01-11 RX ORDER — FLUMAZENIL 0.1 MG/ML
0.2 INJECTION, SOLUTION INTRAVENOUS
Status: DISCONTINUED | OUTPATIENT
Start: 2018-01-11 | End: 2018-01-11

## 2018-01-11 RX ORDER — FENTANYL CITRATE 50 UG/ML
25-50 INJECTION, SOLUTION INTRAMUSCULAR; INTRAVENOUS
Status: DISCONTINUED | OUTPATIENT
Start: 2018-01-11 | End: 2018-01-11

## 2018-01-11 NOTE — TELEPHONE ENCOUNTER
This RN spoke to Lubna patient's daughter. This RN answered Lubna's questions in regard to prep for surgery tomorrow 1/12/18. This RN informed the daughter of the correct way to take the prep as well as the correct way to take her antibiotics. This RN was able to answer all of Lubna's questions to her satisfaction. Lubna stated understanding of the plan of care. Lubna will call back with any further questions or concerns.

## 2018-01-12 ENCOUNTER — ANESTHESIA (OUTPATIENT)
Dept: SURGERY | Facility: CLINIC | Age: 83
DRG: 334 | End: 2018-01-12
Payer: COMMERCIAL

## 2018-01-12 ENCOUNTER — HOSPITAL ENCOUNTER (INPATIENT)
Facility: CLINIC | Age: 83
LOS: 3 days | Discharge: NURSING, REHAB OR LONG-TERM CARE | DRG: 334 | End: 2018-01-15
Attending: COLON & RECTAL SURGERY | Admitting: COLON & RECTAL SURGERY
Payer: COMMERCIAL

## 2018-01-12 ENCOUNTER — SURGERY (OUTPATIENT)
Age: 83
End: 2018-01-12

## 2018-01-12 DIAGNOSIS — Z23 NEED FOR PROPHYLACTIC VACCINATION AND INOCULATION AGAINST INFLUENZA: Primary | ICD-10-CM

## 2018-01-12 DIAGNOSIS — M80.00XA AGE-RELATED OSTEOPOROSIS WITH CURRENT PATHOLOGICAL FRACTURE, INITIAL ENCOUNTER: ICD-10-CM

## 2018-01-12 DIAGNOSIS — K62.3 RECTAL PROLAPSE: ICD-10-CM

## 2018-01-12 LAB
ABO + RH BLD: NORMAL
ABO + RH BLD: NORMAL
BLD GP AB SCN SERPL QL: NORMAL
BLOOD BANK CMNT PATIENT-IMP: NORMAL
GLUCOSE BLDC GLUCOMTR-MCNC: 97 MG/DL (ref 70–99)
SPECIMEN EXP DATE BLD: NORMAL

## 2018-01-12 PROCEDURE — 36000070 ZZH SURGERY LEVEL 5 EA 15 ADDTL MIN - UMMC: Performed by: COLON & RECTAL SURGERY

## 2018-01-12 PROCEDURE — 00000146 ZZHCL STATISTIC GLUCOSE BY METER IP

## 2018-01-12 PROCEDURE — 25000125 ZZHC RX 250: Performed by: COLON & RECTAL SURGERY

## 2018-01-12 PROCEDURE — 0UQF0ZZ REPAIR CUL-DE-SAC, OPEN APPROACH: ICD-10-PCS | Performed by: COLON & RECTAL SURGERY

## 2018-01-12 PROCEDURE — 25000132 ZZH RX MED GY IP 250 OP 250 PS 637: Performed by: COLON & RECTAL SURGERY

## 2018-01-12 PROCEDURE — 0DTP7ZZ RESECTION OF RECTUM, VIA NATURAL OR ARTIFICIAL OPENING: ICD-10-PCS | Performed by: COLON & RECTAL SURGERY

## 2018-01-12 PROCEDURE — P9041 ALBUMIN (HUMAN),5%, 50ML: HCPCS | Performed by: NURSE ANESTHETIST, CERTIFIED REGISTERED

## 2018-01-12 PROCEDURE — 25000128 H RX IP 250 OP 636: Performed by: COLON & RECTAL SURGERY

## 2018-01-12 PROCEDURE — 25000566 ZZH SEVOFLURANE, EA 15 MIN: Performed by: COLON & RECTAL SURGERY

## 2018-01-12 PROCEDURE — 25000128 H RX IP 250 OP 636: Performed by: NURSE ANESTHETIST, CERTIFIED REGISTERED

## 2018-01-12 PROCEDURE — 88307 TISSUE EXAM BY PATHOLOGIST: CPT | Performed by: COLON & RECTAL SURGERY

## 2018-01-12 PROCEDURE — 12000008 ZZH R&B INTERMEDIATE UMMC

## 2018-01-12 PROCEDURE — 37000008 ZZH ANESTHESIA TECHNICAL FEE, 1ST 30 MIN: Performed by: COLON & RECTAL SURGERY

## 2018-01-12 PROCEDURE — 37000009 ZZH ANESTHESIA TECHNICAL FEE, EACH ADDTL 15 MIN: Performed by: COLON & RECTAL SURGERY

## 2018-01-12 PROCEDURE — 36000068 ZZH SURGERY LEVEL 5 1ST 30 MIN - UMMC: Performed by: COLON & RECTAL SURGERY

## 2018-01-12 PROCEDURE — 25000125 ZZHC RX 250: Performed by: NURSE ANESTHETIST, CERTIFIED REGISTERED

## 2018-01-12 PROCEDURE — 25000128 H RX IP 250 OP 636: Performed by: RESIDENTIAL TREATMENT FACILITY, PHYSICAL DISABILITIES

## 2018-01-12 PROCEDURE — 71000014 ZZH RECOVERY PHASE 1 LEVEL 2 FIRST HR: Performed by: COLON & RECTAL SURGERY

## 2018-01-12 PROCEDURE — 40000170 ZZH STATISTIC PRE-PROCEDURE ASSESSMENT II: Performed by: COLON & RECTAL SURGERY

## 2018-01-12 PROCEDURE — C9399 UNCLASSIFIED DRUGS OR BIOLOG: HCPCS | Performed by: NURSE ANESTHETIST, CERTIFIED REGISTERED

## 2018-01-12 PROCEDURE — 27210794 ZZH OR GENERAL SUPPLY STERILE: Performed by: COLON & RECTAL SURGERY

## 2018-01-12 RX ORDER — POLYETHYLENE GLYCOL 3350 17 G/17G
17 POWDER, FOR SOLUTION ORAL DAILY
Status: DISCONTINUED | OUTPATIENT
Start: 2018-01-13 | End: 2018-01-15

## 2018-01-12 RX ORDER — CIPROFLOXACIN 2 MG/ML
400 INJECTION, SOLUTION INTRAVENOUS EVERY 12 HOURS
Status: COMPLETED | OUTPATIENT
Start: 2018-01-12 | End: 2018-01-13

## 2018-01-12 RX ORDER — ACETAMINOPHEN 325 MG/1
975 TABLET ORAL ONCE
Status: COMPLETED | OUTPATIENT
Start: 2018-01-12 | End: 2018-01-12

## 2018-01-12 RX ORDER — ONDANSETRON 4 MG/1
4 TABLET, ORALLY DISINTEGRATING ORAL EVERY 30 MIN PRN
Status: DISCONTINUED | OUTPATIENT
Start: 2018-01-12 | End: 2018-01-12 | Stop reason: HOSPADM

## 2018-01-12 RX ORDER — HYDRALAZINE HYDROCHLORIDE 20 MG/ML
10 INJECTION INTRAMUSCULAR; INTRAVENOUS EVERY 4 HOURS PRN
Status: DISCONTINUED | OUTPATIENT
Start: 2018-01-12 | End: 2018-01-15 | Stop reason: HOSPADM

## 2018-01-12 RX ORDER — LIDOCAINE HYDROCHLORIDE AND EPINEPHRINE 10; 10 MG/ML; UG/ML
INJECTION, SOLUTION INFILTRATION; PERINEURAL PRN
Status: DISCONTINUED | OUTPATIENT
Start: 2018-01-12 | End: 2018-01-12 | Stop reason: HOSPADM

## 2018-01-12 RX ORDER — ONDANSETRON 2 MG/ML
4 INJECTION INTRAMUSCULAR; INTRAVENOUS EVERY 30 MIN PRN
Status: DISCONTINUED | OUTPATIENT
Start: 2018-01-12 | End: 2018-01-12 | Stop reason: HOSPADM

## 2018-01-12 RX ORDER — FENTANYL CITRATE 50 UG/ML
25-50 INJECTION, SOLUTION INTRAMUSCULAR; INTRAVENOUS EVERY 5 MIN PRN
Status: DISCONTINUED | OUTPATIENT
Start: 2018-01-12 | End: 2018-01-12 | Stop reason: HOSPADM

## 2018-01-12 RX ORDER — MAGNESIUM SULFATE HEPTAHYDRATE 40 MG/ML
4 INJECTION, SOLUTION INTRAVENOUS EVERY 4 HOURS PRN
Status: DISCONTINUED | OUTPATIENT
Start: 2018-01-12 | End: 2018-01-15 | Stop reason: HOSPADM

## 2018-01-12 RX ORDER — POTASSIUM CHLORIDE 7.45 MG/ML
10 INJECTION INTRAVENOUS
Status: DISCONTINUED | OUTPATIENT
Start: 2018-01-12 | End: 2018-01-15 | Stop reason: HOSPADM

## 2018-01-12 RX ORDER — CIPROFLOXACIN 2 MG/ML
400 INJECTION, SOLUTION INTRAVENOUS
Status: COMPLETED | OUTPATIENT
Start: 2018-01-12 | End: 2018-01-12

## 2018-01-12 RX ORDER — HYDROMORPHONE HCL/0.9% NACL/PF 0.2MG/0.2
.1-.2 SYRINGE (ML) INTRAVENOUS
Status: DISCONTINUED | OUTPATIENT
Start: 2018-01-12 | End: 2018-01-13

## 2018-01-12 RX ORDER — ONDANSETRON 2 MG/ML
INJECTION INTRAMUSCULAR; INTRAVENOUS PRN
Status: DISCONTINUED | OUTPATIENT
Start: 2018-01-12 | End: 2018-01-12

## 2018-01-12 RX ORDER — LIDOCAINE HYDROCHLORIDE 20 MG/ML
INJECTION, SOLUTION INFILTRATION; PERINEURAL PRN
Status: DISCONTINUED | OUTPATIENT
Start: 2018-01-12 | End: 2018-01-12

## 2018-01-12 RX ORDER — NALOXONE HYDROCHLORIDE 0.4 MG/ML
.1-.4 INJECTION, SOLUTION INTRAMUSCULAR; INTRAVENOUS; SUBCUTANEOUS
Status: DISCONTINUED | OUTPATIENT
Start: 2018-01-12 | End: 2018-01-13

## 2018-01-12 RX ORDER — SODIUM CHLORIDE, SODIUM LACTATE, POTASSIUM CHLORIDE, CALCIUM CHLORIDE 600; 310; 30; 20 MG/100ML; MG/100ML; MG/100ML; MG/100ML
INJECTION, SOLUTION INTRAVENOUS CONTINUOUS PRN
Status: DISCONTINUED | OUTPATIENT
Start: 2018-01-12 | End: 2018-01-12

## 2018-01-12 RX ORDER — NALOXONE HYDROCHLORIDE 0.4 MG/ML
.1-.4 INJECTION, SOLUTION INTRAMUSCULAR; INTRAVENOUS; SUBCUTANEOUS
Status: ACTIVE | OUTPATIENT
Start: 2018-01-12 | End: 2018-01-13

## 2018-01-12 RX ORDER — SODIUM CHLORIDE, SODIUM LACTATE, POTASSIUM CHLORIDE, CALCIUM CHLORIDE 600; 310; 30; 20 MG/100ML; MG/100ML; MG/100ML; MG/100ML
INJECTION, SOLUTION INTRAVENOUS CONTINUOUS
Status: DISCONTINUED | OUTPATIENT
Start: 2018-01-12 | End: 2018-01-12 | Stop reason: HOSPADM

## 2018-01-12 RX ORDER — ACETAMINOPHEN 500 MG
1000 TABLET ORAL 4 TIMES DAILY
Status: DISCONTINUED | OUTPATIENT
Start: 2018-01-12 | End: 2018-01-15 | Stop reason: HOSPADM

## 2018-01-12 RX ORDER — BUPIVACAINE HYDROCHLORIDE 2.5 MG/ML
INJECTION, SOLUTION EPIDURAL; INFILTRATION; INTRACAUDAL PRN
Status: DISCONTINUED | OUTPATIENT
Start: 2018-01-12 | End: 2018-01-12 | Stop reason: HOSPADM

## 2018-01-12 RX ORDER — TRAMADOL HYDROCHLORIDE 50 MG/1
50 TABLET ORAL EVERY 6 HOURS PRN
Status: DISCONTINUED | OUTPATIENT
Start: 2018-01-12 | End: 2018-01-15 | Stop reason: HOSPADM

## 2018-01-12 RX ORDER — EPHEDRINE SULFATE 50 MG/ML
INJECTION, SOLUTION INTRAMUSCULAR; INTRAVENOUS; SUBCUTANEOUS PRN
Status: DISCONTINUED | OUTPATIENT
Start: 2018-01-12 | End: 2018-01-12

## 2018-01-12 RX ORDER — SODIUM CHLORIDE, SODIUM LACTATE, POTASSIUM CHLORIDE, CALCIUM CHLORIDE 600; 310; 30; 20 MG/100ML; MG/100ML; MG/100ML; MG/100ML
INJECTION, SOLUTION INTRAVENOUS CONTINUOUS
Status: DISCONTINUED | OUTPATIENT
Start: 2018-01-12 | End: 2018-01-13

## 2018-01-12 RX ORDER — POTASSIUM CHLORIDE 29.8 MG/ML
20 INJECTION INTRAVENOUS
Status: DISCONTINUED | OUTPATIENT
Start: 2018-01-12 | End: 2018-01-15 | Stop reason: HOSPADM

## 2018-01-12 RX ORDER — GINSENG 100 MG
CAPSULE ORAL PRN
Status: DISCONTINUED | OUTPATIENT
Start: 2018-01-12 | End: 2018-01-12 | Stop reason: HOSPADM

## 2018-01-12 RX ORDER — AMLODIPINE BESYLATE 5 MG/1
5 TABLET ORAL DAILY
Status: DISCONTINUED | OUTPATIENT
Start: 2018-01-13 | End: 2018-01-15 | Stop reason: HOSPADM

## 2018-01-12 RX ORDER — PROPOFOL 10 MG/ML
INJECTION, EMULSION INTRAVENOUS PRN
Status: DISCONTINUED | OUTPATIENT
Start: 2018-01-12 | End: 2018-01-12

## 2018-01-12 RX ORDER — POTASSIUM CL/LIDO/0.9 % NACL 10MEQ/0.1L
10 INTRAVENOUS SOLUTION, PIGGYBACK (ML) INTRAVENOUS
Status: DISCONTINUED | OUTPATIENT
Start: 2018-01-12 | End: 2018-01-15 | Stop reason: HOSPADM

## 2018-01-12 RX ORDER — HYDRALAZINE HYDROCHLORIDE 20 MG/ML
10 INJECTION INTRAMUSCULAR; INTRAVENOUS EVERY 4 HOURS PRN
Status: DISCONTINUED | OUTPATIENT
Start: 2018-01-12 | End: 2018-01-12

## 2018-01-12 RX ORDER — LIDOCAINE 40 MG/G
CREAM TOPICAL
Status: DISCONTINUED | OUTPATIENT
Start: 2018-01-12 | End: 2018-01-15 | Stop reason: HOSPADM

## 2018-01-12 RX ORDER — FENTANYL CITRATE 50 UG/ML
INJECTION, SOLUTION INTRAMUSCULAR; INTRAVENOUS PRN
Status: DISCONTINUED | OUTPATIENT
Start: 2018-01-12 | End: 2018-01-12

## 2018-01-12 RX ORDER — GLYCOPYRROLATE 0.2 MG/ML
INJECTION, SOLUTION INTRAMUSCULAR; INTRAVENOUS PRN
Status: DISCONTINUED | OUTPATIENT
Start: 2018-01-12 | End: 2018-01-12

## 2018-01-12 RX ORDER — ALBUMIN, HUMAN INJ 5% 5 %
SOLUTION INTRAVENOUS CONTINUOUS PRN
Status: DISCONTINUED | OUTPATIENT
Start: 2018-01-12 | End: 2018-01-12

## 2018-01-12 RX ORDER — CIPROFLOXACIN 2 MG/ML
400 INJECTION, SOLUTION INTRAVENOUS SEE ADMIN INSTRUCTIONS
Status: DISCONTINUED | OUTPATIENT
Start: 2018-01-12 | End: 2018-01-12 | Stop reason: HOSPADM

## 2018-01-12 RX ORDER — IBUPROFEN 600 MG/1
600 TABLET, FILM COATED ORAL 3 TIMES DAILY
Status: DISCONTINUED | OUTPATIENT
Start: 2018-01-12 | End: 2018-01-15

## 2018-01-12 RX ADMIN — BACITRACIN 1 TUBE: 500 OINTMENT TOPICAL at 08:17

## 2018-01-12 RX ADMIN — SODIUM CHLORIDE, POTASSIUM CHLORIDE, SODIUM LACTATE AND CALCIUM CHLORIDE: 600; 310; 30; 20 INJECTION, SOLUTION INTRAVENOUS at 07:27

## 2018-01-12 RX ADMIN — METRONIDAZOLE 500 MG: 500 INJECTION, SOLUTION INTRAVENOUS at 15:50

## 2018-01-12 RX ADMIN — FENTANYL CITRATE 25 MCG: 50 INJECTION INTRAMUSCULAR; INTRAVENOUS at 14:11

## 2018-01-12 RX ADMIN — Medication 5 MG: at 07:44

## 2018-01-12 RX ADMIN — PHENYLEPHRINE HYDROCHLORIDE 100 MCG: 10 INJECTION, SOLUTION INTRAMUSCULAR; INTRAVENOUS; SUBCUTANEOUS at 07:51

## 2018-01-12 RX ADMIN — Medication 5 MG: at 07:49

## 2018-01-12 RX ADMIN — PROCHLORPERAZINE EDISYLATE 5 MG: 5 INJECTION INTRAMUSCULAR; INTRAVENOUS at 18:10

## 2018-01-12 RX ADMIN — FENTANYL CITRATE 25 MCG: 50 INJECTION, SOLUTION INTRAMUSCULAR; INTRAVENOUS at 07:57

## 2018-01-12 RX ADMIN — PHENYLEPHRINE HYDROCHLORIDE 50 MCG: 10 INJECTION, SOLUTION INTRAMUSCULAR; INTRAVENOUS; SUBCUTANEOUS at 08:15

## 2018-01-12 RX ADMIN — PROPOFOL 110 MG: 10 INJECTION, EMULSION INTRAVENOUS at 07:39

## 2018-01-12 RX ADMIN — LIDOCAINE HYDROCHLORIDE 60 MG: 20 INJECTION, SOLUTION INFILTRATION; PERINEURAL at 07:39

## 2018-01-12 RX ADMIN — ACETAMINOPHEN 1000 MG: 500 TABLET, FILM COATED ORAL at 14:11

## 2018-01-12 RX ADMIN — PHENYLEPHRINE HYDROCHLORIDE 50 MCG: 10 INJECTION, SOLUTION INTRAMUSCULAR; INTRAVENOUS; SUBCUTANEOUS at 08:30

## 2018-01-12 RX ADMIN — ACETAMINOPHEN 975 MG: 325 TABLET, FILM COATED ORAL at 06:35

## 2018-01-12 RX ADMIN — GLYCOPYRROLATE 0.1 MG: 0.2 INJECTION, SOLUTION INTRAMUSCULAR; INTRAVENOUS at 07:49

## 2018-01-12 RX ADMIN — SODIUM CHLORIDE, POTASSIUM CHLORIDE, SODIUM LACTATE AND CALCIUM CHLORIDE: 600; 310; 30; 20 INJECTION, SOLUTION INTRAVENOUS at 10:50

## 2018-01-12 RX ADMIN — PHENYLEPHRINE HYDROCHLORIDE 50 MCG: 10 INJECTION, SOLUTION INTRAMUSCULAR; INTRAVENOUS; SUBCUTANEOUS at 08:59

## 2018-01-12 RX ADMIN — ONDANSETRON 4 MG: 2 INJECTION INTRAMUSCULAR; INTRAVENOUS at 09:16

## 2018-01-12 RX ADMIN — CIPROFLOXACIN 400 MG: 2 INJECTION, SOLUTION INTRAVENOUS at 07:05

## 2018-01-12 RX ADMIN — IBUPROFEN 600 MG: 600 TABLET ORAL at 20:17

## 2018-01-12 RX ADMIN — BUPIVACAINE HYDROCHLORIDE 30 ML: 2.5 INJECTION, SOLUTION EPIDURAL; INFILTRATION; INTRACAUDAL at 08:14

## 2018-01-12 RX ADMIN — Medication 5 MG: at 07:52

## 2018-01-12 RX ADMIN — Medication 0.2 MG: at 18:10

## 2018-01-12 RX ADMIN — ALBUMIN HUMAN: 0.05 INJECTION, SOLUTION INTRAVENOUS at 07:46

## 2018-01-12 RX ADMIN — PHENYLEPHRINE HYDROCHLORIDE 100 MCG: 10 INJECTION, SOLUTION INTRAMUSCULAR; INTRAVENOUS; SUBCUTANEOUS at 07:48

## 2018-01-12 RX ADMIN — SUGAMMADEX 80 MG: 100 INJECTION, SOLUTION INTRAVENOUS at 09:19

## 2018-01-12 RX ADMIN — Medication 5 MG: at 07:45

## 2018-01-12 RX ADMIN — METRONIDAZOLE 500 MG: 500 INJECTION, SOLUTION INTRAVENOUS at 07:53

## 2018-01-12 RX ADMIN — PHENYLEPHRINE HYDROCHLORIDE 50 MCG: 10 INJECTION, SOLUTION INTRAMUSCULAR; INTRAVENOUS; SUBCUTANEOUS at 09:13

## 2018-01-12 RX ADMIN — ACETAMINOPHEN 1000 MG: 500 TABLET, FILM COATED ORAL at 20:17

## 2018-01-12 RX ADMIN — ROCURONIUM BROMIDE 40 MG: 10 INJECTION INTRAVENOUS at 07:41

## 2018-01-12 RX ADMIN — CIPROFLOXACIN 400 MG: 2 INJECTION, SOLUTION INTRAVENOUS at 14:51

## 2018-01-12 RX ADMIN — FENTANYL CITRATE 50 MCG: 50 INJECTION, SOLUTION INTRAMUSCULAR; INTRAVENOUS at 07:38

## 2018-01-12 RX ADMIN — LIDOCAINE HYDROCHLORIDE AND EPINEPHRINE 10 ML: 10; 10 INJECTION, SOLUTION INFILTRATION; PERINEURAL at 08:30

## 2018-01-12 RX ADMIN — METRONIDAZOLE 500 MG: 500 INJECTION, SOLUTION INTRAVENOUS at 23:17

## 2018-01-12 RX ADMIN — FENTANYL CITRATE 25 MCG: 50 INJECTION, SOLUTION INTRAMUSCULAR; INTRAVENOUS at 08:02

## 2018-01-12 ASSESSMENT — ACTIVITIES OF DAILY LIVING (ADL)
RETIRED_COMMUNICATION: 0-->UNDERSTANDS/COMMUNICATES WITHOUT DIFFICULTY
SWALLOWING: 0-->SWALLOWS FOODS/LIQUIDS WITHOUT DIFFICULTY
TOILETING: 1-->ASSISTIVE EQUIPMENT
RETIRED_EATING: 0-->INDEPENDENT
COGNITION: 0 - NO COGNITION ISSUES REPORTED
BATHING: 0-->INDEPENDENT
TRANSFERRING: 1-->ASSISTIVE EQUIPMENT
WHICH_OF_THE_ABOVE_FUNCTIONAL_RISKS_HAD_A_RECENT_ONSET_OR_CHANGE?: AMBULATION
FALL_HISTORY_WITHIN_LAST_SIX_MONTHS: NO
AMBULATION: 1-->ASSISTIVE EQUIPMENT
DRESS: 0-->INDEPENDENT

## 2018-01-12 ASSESSMENT — PAIN DESCRIPTION - DESCRIPTORS
DESCRIPTORS: SORE
DESCRIPTORS: SORE

## 2018-01-12 NOTE — ANESTHESIA POSTPROCEDURE EVALUATION
Patient: Belia Sheets    Procedure(s):  Perineal Rectosigmoidectomy   - Wound Class: III-Contaminated    Diagnosis:Rectal Prolapse   Diagnosis Additional Information: No value filed.    Anesthesia Type:  General, ETT    Note:  Anesthesia Post Evaluation    Patient location during evaluation: PACU  Patient participation: Able to fully participate in evaluation  Level of consciousness: awake and alert  Pain management: adequate  Airway patency: patent  Cardiovascular status: hemodynamically stable  Respiratory status: acceptable  Hydration status: acceptable  PONV: none             Last vitals:  Vitals:    01/12/18 1015 01/12/18 1030 01/12/18 1045   BP: (!) 122/94 112/75 118/72   Resp: 18 16 16   Temp:      SpO2: 98% 94% 96%         Electronically Signed By: Norberto White MD  January 12, 2018  10:58 AM

## 2018-01-12 NOTE — OP NOTE
DATE OF SERVICE:  01/12/2018      PREOPERATIVE DIAGNOSES:   1.  Full thickness rectal prolapse.   2.  Hypertension.   3.  Encephalopathy.   4.  ECOG score 1.     POSTOPERATIVE DIAGNOSES:   1.  Full thickness rectal prolapse.   2.  Hypertension.   3.  Encephalopathy.   4.  ECOG score 1.     ANESTHESIA:  General endotracheal anesthesia plus local anesthetic.      PROCEDURES PERFORMED:   1.  Perineal rectosigmoidectomy.   2.  Levatorplasty.   3.  Repair of pelvic hernia (enterocele).      SURGEON:  Amrik Mariano MD      ASSISTANTS:  Brooke Rosado MD (Colon and Rectal Surgery fellow) and Viola Chandra (medical student).      BRIEF HISTORY:  Belia Sheets is an 87-year-old pleasant lady who presented with a 1-month history of anal protrusion associated with perianal pain and irritation.  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  She has had some fecal incontinence for many years and she thinks that this may have gotten a little worse since her prolapse started.  She also has some intermittent urinary incontinence.  She has had 4 vaginal births with episiotomies in all of them.  Her largest baby was 7 pounds.  She has never had any prior anorectal surgeries.  She denied any abdominal pain, nausea, vomiting or weight loss.  No family history of colon cancer. Her abdomen was soft and nontender.  There was an evident full thickness rectal prolapse upon Valsalva.  She also had a large rectocele and severe perineal descent.  Flexible sigmoidoscopy was performed and there was no evidence of active ulceration or bleeding in the rectum.  There was mild irritation from the lead point of the prolapse.  There was no evidence of neoplasia.  I recommended operative treatment.  I thoroughly discussed the risks, benefits and alternatives of operative treatment with Ms. Sheets and her daughter and she agreed to proceed.     "  DESCRIPTION OF PROCEDURE:  After obtaining informed consent, the patient was brought to the operating room and placed in the supine position.  General endotracheal anesthesia was gently induced without difficulty.  The patient received appropriate preoperative antibiotic prophylaxis as well as mechanical DVT prophylaxis.  The patient was then turned to the prone jackknife position.  Bilateral lower extremity pneumatic compression devices were applied and all pressure points were cushioned.  The perineal and vaginal areas were prepped and draped in the standard sterile fashion.  A \"timeout\" was performed.  A total of 30 mL of bupivacaine 0.25% without epinephrine was injected as a pudendal block.  A [place] retractor was used to efface the anus.  The rectum was prolapsed out for approximately 6-8 cm.  After this, we marked with monopolar electrocautery approximately 1 cm proximal onto the distal rectum in a circumferential fashion.  The rectum was transected circumferentially using monopolar electrocautery.  After this, the entire mesentery of the divided segment was transected using a LigaSure device.  The anterior hernia was dissected off of the rectum and the peritoneal cavity was entered.  There was no evidence of any sigmoidocele.  The hernia sac was further dissected off of the anterior rectum and then closed with a running locking 2-0 Vicryl suture.  After this, additional mesentery and perirectal attachments were transected using the LigaSure device.  The levator muscles were identified and a levatorplasty was performed with 3 individual 2-0 PDS sutures.  Care was taken to not stricture down the outlet of the distal rectum.  A vaginal exam was performed at this time and there was no evidence of any incorporation of the posterior vagina or suture material in the vagina.  After this, the rectum was completely transected at the level of the internal anal sphincter.  Multiple 2-0 Vicryl cardinal sutures were " used to perform a hand sewn colorectal anastomosis.  These sutures were full thickness.  The specimen was completely divided and sent for permanent pathology.  The 2-0 Vicryls were tied and then multiple 3-0 Vicryl sutures were placed in between the 2-0 Vicryls.  Hemostasis was corroborated.  Digital rectal exam was performed and there was no evidence of stenosis.  Instrument, sponge and needle counts were all correct as reported to me.  Bacitracin was applied as well as a sterile dressing.      COMPLICATIONS:  None immediately.      ESTIMATED BLOOD LOSS:  5 mL.      REPLACEMENT:  750 mL of crystalloid and 250 mL of colloid.      DRAINS AND TUBES:  Perez catheter.      SPECIMENS:  Rectum.      FINDINGS:  Circumferential full thickness rectal prolapse. Large anterior hernia.      DISPOSITION:  PACU.         LEXA CISNEROS MD             D: 2018 10:22   T: 2018 10:40   MT: CG      Name:     CONSTANCE CONNELLY   MRN:      [id number]        Account:        [id number]   :      1930           Procedure Date: 2018      Document: [id number]       cc: Joslyn Fry MSN, NP-C       Alejnadro Sandhu MD       [facility] Billing

## 2018-01-12 NOTE — ANESTHESIA CARE TRANSFER NOTE
Patient: Belia Sheets    Procedure(s):  Perineal Rectosigmoidectomy   - Wound Class: III-Contaminated    Diagnosis: Rectal Prolapse   Diagnosis Additional Information: No value filed.    Anesthesia Type:   General, ETT     Note:  Airway :Face Mask  Patient transferred to:PACU  Comments: Pt transferred to PACU.  Maintaining airway and oxygenation via FM.  VSS.  Report to RN.  All questions answered.Handoff Report: Identifed the Patient, Identified the Reponsible Provider, Reviewed the pertinent medical history, Discussed the surgical course, Reviewed Intra-OP anesthesia mangement and issues during anesthesia, Set expectations for post-procedure period and Allowed opportunity for questions and acknowledgement of understanding      Vitals: (Last set prior to Anesthesia Care Transfer)    CRNA VITALS  1/12/2018 0859 - 1/12/2018 0936      1/12/2018             Pulse: 76    SpO2: 100 %    Resp Rate (observed): (!)  6                Electronically Signed By: SUSIE Hussein CRNA  January 12, 2018  9:36 AM

## 2018-01-12 NOTE — BRIEF OP NOTE
[facility], [place]    Brief Operative Note    Pre-operative diagnosis: Rectal Prolapse   Post-operative diagnosis Same  Procedure: Procedure(s):  Perineal Rectosigmoidectomy   - Wound Class: III-Contaminated  Surgeon: Surgeon(s) and Role:     * Amrik Mariano MD - Primary     * Brooke Rosado MD - Assisting  Anesthesia: Combined General with Block   Estimated blood loss: Minimal  Drains: None  Specimens:   ID Type Source Tests Collected by Time Destination   A : Rectum Tissue Large Intestine, Rectum SURGICAL PATHOLOGY EXAM Amrik Mariano MD 1/12/2018  9:05 AM      Findings:   prolapsed rectum resected trans-perineally .  Complications: None.  Implants: None.

## 2018-01-12 NOTE — IP AVS SNAPSHOT
MRN:[id number]                      After Visit Summary   1/12/2018    Belia Sheets    MRN: [id number]           Thank you!     Thank you for choosing [facility] for your care. Our goal is always to provide you with excellent care. Hearing back from our patients is one way we can continue to improve our services. Please take a few minutes to complete the written survey that you may receive in the mail after you visit with us. Thank you!        Patient Information     Date Of Birth          5/16/1930        About your hospital stay     You were admitted on:  January 12, 2018 You last received care in the:  [facility]    You were discharged on:  January 15, 2018        Reason for your hospital stay       Rectal prolapse                  Who to Call     For medical emergencies, please call 911.  For non-urgent questions about your medical care, please call your primary care provider or clinic, [phone]  For questions related to your surgery, please call your surgery clinic        Attending Provider     Provider Specialty    Amrik Mariano MD Colon and Rectal Surgery       Primary Care Provider Office Phone # Fax #    Alejandro Sandhu [phone]      After Care Instructions     Activity - Up ad terri           Additional Discharge Instructions       DIET  -Low Residue Diet for at least 4-6 weeks unless cleared by Colorectal surgery.  No raw vegetables, fruit skins, fibrous foods that require a lot of chewing, nuts, seeds, corn, popcorn.   -We recommend eating slowly, chewing thoroughly, eating small frequent meals throughout the day  -Stay well hydrated.      ACTIVITY  -No lifting, pushing, pulling greater than 10 lbs and no strenuous exercise for 6 weeks   -No driving while on narcotic analgesics (i.e. Percocet, oxycodone, Vicodin)  -No driving until you are able to fully twist to both sides or slam on brakes quickly and without any pain    WOUND  CLINIC  -Inspect your wounds daily for signs of infection (increased redness, drainage, pain)  -Keep your wound clean and dry  -You may shower, but do not soak in tub or pool    NOTIFY  Please contact Hardik Waller LPN, Angela Capps RN at [phone] for problems after discharge such as:  -Temperature > 101F, chills, rigors, dizziness  -Redness around or purulent drainage from wound  -Inability to tolerate diet, nausea or vomiting  -You stop passing gas, develop significant bloating, abdominal pain  -Have blood in stools/vomit  -Have severe diarrhea/constipation  -Any other questions or concerns.  - At nights (after 4:30pm), on weekends, or if urgent, call [phone] and ask the  to speak with the on-call Colorectal Surgery resident or fellow      Medication Instructions  Some of your medications may have changed. Please take only prescribed and resumed medications     FOLLOW-UP  1.  You will need to follow-up with Joslyn Medina NP in the Colon and Rectal Surgery clinic in 1 week(s) and then with CRS Staff: Dr. Amrik Mariano in 2-3 weeks after.  Please contact our clinic scheduler Arlyn Shea (phone # [phone]) if you have not heard from our clinic in 3 business days afer discharge to schedule a follow-up appointment.     2.  Follow up with your primary care provider in 1-2 weeks after discharge from the hospital to review this hospitalization.            Advance Diet as Tolerated       Follow this diet upon discharge: low residue diet            General info for SNF       Length of Stay Estimate: Short Term Care: Estimated # of Days <30  Condition at Discharge: Improving  Level of care:skilled   Rehabilitation Potential: Excellent  Admission H&P remains valid and up-to-date: Yes  Recent Chemotherapy: N/A  Use Nursing Home Standing Orders: Yes                  Your next 10 appointments already scheduled     Feb 07, 2018  3:15 PM CST   (Arrive by 3:00 PM)   RETURN ENDOCRINE with  "Saurabh Pittman MD   [facility] ([facility])    [address]  3rd Floor  [facility] [address] [phone]              Additional Services     Occupational Therapy Adult Consult       Evaluate and treat as clinically indicated.    Reason:  weakness            Physical Therapy Adult Consult       Evaluate and treat as clinically indicated.    Reason:  weakness                  Pending Results     Date and Time Order Name Status Description    1/12/2018 0905 Surgical pathology exam In process             Statement of Approval     Ordered          01/15/18 5858  I have reviewed and agree with all the recommendations and orders detailed in this document.  EFFECTIVE NOW     Approved and electronically signed by:  Gerald Galarza MD             Admission Information     Date & Time Provider Department Dept. Phone    1/12/2018 Amrik Mariano MD [facility] [phone]      Your Vitals Were     Blood Pressure Pulse Temperature Respirations Height Weight    147/92 90 96.7  F (35.9  C) (Axillary) 16 1.47 m (4' 9.87\") 41.8 kg (92 lb 3.2 oz)    Pulse Oximetry BMI (Body Mass Index)                96% 19.35 kg/m2          [facility] Information     [facility] gives you secure access to your electronic health record. If you see a primary care provider, you can also send messages to your care team and make appointments. If you have questions, please call your primary care clinic.  If you do not have a primary care provider, please call [phone] and they will assist you.        Care EveryWhere ID     This is your Care EveryWhere ID. This could be used by other organizations to access your [facility] medical records  [id number]        Equal Access to Services     [facility] JOANNA : Angelica Pritchett, waaubrie wilson, qaybta kaalivana blanco. So [facility] [phone].    ATENCIÓN: Si carila español, tiene a benavides " disposición servicios gratuitos de asistencia lingüística. [facility] [place] [phone].    We comply with applicable federal civil rights laws and [place] laws. We do not discriminate on the basis of race, color, national origin, age, disability, sex, sexual orientation, or gender identity.               Review of your medicines      START taking        Dose / Directions    ibuprofen 600 MG tablet   Commonly known as:  ADVIL/MOTRIN        Dose:  600 mg   Take 1 tablet (600 mg) by mouth 3 times daily as needed for moderate pain   Quantity:  120 tablet   Refills:  0       oseltamivir 30 MG capsule   Commonly known as:  TAMIFLU   Used for:  Need for prophylactic vaccination and inoculation against influenza        Dose:  30 mg   Take 1 capsule (30 mg) by mouth daily for 7 days   Quantity:  7 capsule   Refills:  0       psyllium Packet   Commonly known as:  METAMUCIL/KONSYL        Dose:  1 packet   Take 1 packet by mouth 2 times daily   Refills:  0       saccharomyces boulardii 250 MG capsule   Commonly known as:  FLORASTOR        Dose:  250 mg   Take 1 capsule (250 mg) by mouth 2 times daily   Quantity:  14 capsule   Refills:  0         CONTINUE these medicines which may have CHANGED, or have new prescriptions. If we are uncertain of the size of tablets/capsules you have at home, strength may be listed as something that might have changed.        Dose / Directions    cholecalciferol 1000 UNITS capsule   Commonly known as:  vitamin  -D   This may have changed:  when to take this   Used for:  Age-related osteoporosis with current pathological fracture with routine healing, subsequent encounter        Dose:  1 capsule   Take 1 capsule (1,000 Units) by mouth daily   Quantity:  100 capsule   Refills:  3       polyethylene glycol Packet   Commonly known as:  MIRALAX/GLYCOLAX   This may have changed:    - how much to take  - when to take this  - reasons to take this  - additional instructions        Dose:  17 g   Take 17 g by  mouth daily as needed for constipation   Quantity:  7 packet   Refills:  0       Thiamine HCl 50 MG Caps   This may have changed:  when to take this   Used for:  Confusion        Dose:  1 capsule   Take 1 capsule by mouth daily   Quantity:  60 capsule   Refills:  3         CONTINUE these medicines which have NOT CHANGED        Dose / Directions    acetaminophen 325 MG tablet   Commonly known as:  TYLENOL        Dose:  650 mg   Take 2 tablets (650 mg) by mouth every 4 hours as needed for mild pain   Quantity:  60 tablet   Refills:  0       amLODIPine 5 MG tablet   Commonly known as:  NORVASC   Used for:  Benign essential hypertension        Dose:  5 mg   Take 1 tablet (5 mg) by mouth daily   Quantity:  30 tablet   Refills:  0       calcium-vitamin D 600-400 MG-UNIT per tablet   Commonly known as:  CALTRATE        Dose:  1 tablet   Take 1 tablet by mouth every morning   Refills:  0       ondansetron 4 MG tablet   Commonly known as:  ZOFRAN        Dose:  4 mg   Take 1 tablet (4 mg) by mouth every 6 hours as needed for nausea While taking neomycin and flagyl.   Quantity:  3 tablet   Refills:  0       order for DME   Used for:  Severe hypertension        Equipment being ordered: Home BP monitor and cuff   Quantity:  1 each   Refills:  0       sodium fluoride dental gel 1.1 % Gel topical gel   Commonly known as:  PREVIDENT        Apply to affected area At Bedtime   Refills:  0       teriparatide (recombinant) 600 MCG/2.4ML Soln injection   Commonly known as:  FORTEO   Used for:  Age-related osteoporosis with current pathological fracture, initial encounter        Dose:  20 mcg   Inject 0.08 mLs (20 mcg) Subcutaneous daily   Quantity:  6 mL   Refills:  1       VITAMIN B 12 PO        Dose:  1 tablet   Take 1 tablet by mouth every morning   Refills:  0         STOP taking     magnesium citrate solution           metroNIDAZOLE 500 MG tablet   Commonly known as:  FLAGYL           neomycin 500 MG tablet   Commonly known as:   JUAN CARLOS                Where to get your medicines      Some of these will need a paper prescription and others can be bought over the counter. Ask your nurse if you have questions.     You don't need a prescription for these medications     ibuprofen 600 MG tablet    oseltamivir 30 MG capsule    polyethylene glycol Packet    psyllium Packet    saccharomyces boulardii 250 MG capsule    teriparatide (recombinant) 600 MCG/2.4ML Soln injection               ANTIBIOTIC INSTRUCTION     You've Been Prescribed an Antibiotic - Now What?  Your healthcare team thinks that you or your loved one might have an infection. Some infections can be treated with antibiotics, which are powerful, life-saving drugs. Like all medications, antibiotics have side effects and should only be used when necessary. There are some important things you should know about your antibiotic treatment.      Your healthcare team may run tests before you start taking an antibiotic.    Your team may take samples (e.g., from your blood, urine or other areas) to run tests to look for bacteria. These test can be important to determine if you need an antibiotic at all and, if you do, which antibiotic will work best.      Within a few days, your healthcare team might change or even stop your antibiotic.    Your team may start you on an antibiotic while they are working to find out what is making you sick.    Your team might change your antibiotic because test results show that a different antibiotic would be better to treat your infection.    In some cases, once your team has more information, they learn that you do not need an antibiotic at all. They may find out that you don't have an infection, or that the antibiotic you're taking won't work against your infection. For example, an infection caused by a virus can't be treated with antibiotics. Staying on an antibiotic when you don't need it is more likely to be harmful than helpful.      You may experience  side effects from your antibiotic.    Like all medications, antibiotics have side effects. Some of these can be serious.    Let you healthcare team know if you have any known allergies when you are admitted to the hospital.    One significant side effect of nearly all antibiotics is the risk of severe and sometimes deadly diarrhea caused by Clostridium difficile (C. Difficile). This occurs when a person takes antibiotics because some good germs are destroyed. Antibiotic use allows C. diificile to take over, putting patients at high risk for this serious infection.    As a patient or caregiver, it is important to understand your or your loved one's antibiotic treatment. It is especially important for caregivers to speak up when patients can't speak for themselves. Here are some important questions to ask your healthcare team.    What infection is this antibiotic treating and how do you know I have that infection?    What side effects might occur from this antibiotic?    How long will I need to take this antibiotic?    Is it safe to take this antibiotic with other medications or supplements (e.g., vitamins) that I am taking?     Are there any special directions I need to know about taking this antibiotic? For example, should I take it with food?    How will I be monitored to know whether my infection is responding to the antibiotic?    What tests may help to make sure the right antibiotic is prescribed for me?      Information provided by:  www.cdc.gov/getsmart  U.S. Department of Health and Human Services  Centers for disease Control and Prevention  National Center for Emerging and Zoonotic Infectious Diseases  Division of Healthcare Quality Promotion         Protect others around you: Learn how to safely use, store and throw away your medicines at www.disposemymeds.org.             Medication List: This is a list of all your medications and when to take them. Check marks below indicate your daily home schedule. Keep  this list as a reference.      Medications           Morning Afternoon Evening Bedtime As Needed    acetaminophen 325 MG tablet   Commonly known as:  TYLENOL   Take 2 tablets (650 mg) by mouth every 4 hours as needed for mild pain   Last time this was given:  1,000 mg on 1/15/2018  1:05 PM                                amLODIPine 5 MG tablet   Commonly known as:  NORVASC   Take 1 tablet (5 mg) by mouth daily   Last time this was given:  5 mg on 1/15/2018  9:58 AM                                calcium-vitamin D 600-400 MG-UNIT per tablet   Commonly known as:  CALTRATE   Take 1 tablet by mouth every morning                                cholecalciferol 1000 UNITS capsule   Commonly known as:  vitamin  -D   Take 1 capsule (1,000 Units) by mouth daily                                ibuprofen 600 MG tablet   Commonly known as:  ADVIL/MOTRIN   Take 1 tablet (600 mg) by mouth 3 times daily as needed for moderate pain   Last time this was given:  600 mg on 1/15/2018  9:58 AM                                ondansetron 4 MG tablet   Commonly known as:  ZOFRAN   Take 1 tablet (4 mg) by mouth every 6 hours as needed for nausea While taking neomycin and flagyl.                                order for DME   Equipment being ordered: Home BP monitor and cuff                                oseltamivir 30 MG capsule   Commonly known as:  TAMIFLU   Take 1 capsule (30 mg) by mouth daily for 7 days                                polyethylene glycol Packet   Commonly known as:  MIRALAX/GLYCOLAX   Take 17 g by mouth daily as needed for constipation   Last time this was given:  17 g on 1/13/2018  7:40 AM                                psyllium Packet   Commonly known as:  METAMUCIL/KONSYL   Take 1 packet by mouth 2 times daily   Last time this was given:  1 packet on 1/15/2018  1:05 PM                                saccharomyces boulardii 250 MG capsule   Commonly known as:  FLORASTOR   Take 1 capsule (250 mg) by mouth 2 times daily    Last time this was given:  250 mg on 1/15/2018  1:05 PM                                sodium fluoride dental gel 1.1 % Gel topical gel   Commonly known as:  PREVIDENT   Apply to affected area At Bedtime                                teriparatide (recombinant) 600 MCG/2.4ML Soln injection   Commonly known as:  FORTEO   Inject 0.08 mLs (20 mcg) Subcutaneous daily                                Thiamine HCl 50 MG Caps   Take 1 capsule by mouth daily                                VITAMIN B 12 PO   Take 1 tablet by mouth every morning

## 2018-01-12 NOTE — LETTER
Transition Communication Hand-off for Care Transitions to Next Level of Care Provider    Name: Belia Sheets  MRN #: [id number]  Primary Care Provider: Alejandro Sandhu     Primary Clinic: [address]   [facility] [place] [address]     Reason for Hospitalization:  Rectal Prolapse   Rectal prolapse  Admit Date/Time: 1/12/2018  5:45 AM  Discharge Date: 1/15/18  Payor Source: Payor: UCARE / Plan: UC[employer] SENIORS NON FPA / Product Type: HMO /     Readmission Assessment Measure (MYESHA) Risk Score/category: Elevated         Reason for Communication Hand-off Referral: Other Admitting to TCU    Discharge Plan:    Social Work Services Discharge Note      Patient Name:  Belia Sheets     Anticipated Discharge Date:  1/15/18    Discharge Disposition:   TCU:  [address] [address]  Main: [phone], Fax: [phone]  Nurse to Nurse Call: [phone]    Following MD:  Per facilities designation     Pre-Admission Screening (PAS) online form has been completed.  The Level of Care (LOC) is:  Determined  Confirmation Code is:  [id number]  Patient/caregiver informed of referral to [facility] Line for Pre-Admission Screening for skilled nursing facility (SNF) placement and to expect a phone call post discharge from SNF.     Additional Services/Equipment Arranged:  None. Pt's dtr [place] will provide d/c transportation this afternoon when d/c orders are complete.     Patient / Family response to discharge plan:  In agreement     Concern for non-adherence with plan of care:   Y/N N  Discharge Needs Assessment:  Needs       Most Recent Value    Equipment Currently Used at Home grab bar, shower chair [4 wheeled walker]    Transportation Available family or friend will provide          Already enrolled in Tele-monitoring program and name of program:  N/A  Follow-up specialty is recommended: No    Follow-up plan:  Future Appointments  Date Time Provider Department Center   2/7/2018  3:15 PM Saurabh Pittman MD [facility]       Any outstanding tests or procedures:              Key Recommendations:      ALEXX Sequeira, LGSW   Surgical Oncology Unit   [phone]  Pager: [phone]

## 2018-01-12 NOTE — IP AVS SNAPSHOT
` `     [address] [facility]: [phone]                 INTERAGENCY TRANSFER FORM - NOTES (H&P, Discharge Summary, Consults, Procedures, Therapies)   2018                    Hospital Admission Date: 2018  CONSTANCE CONNELLY   : 1930  Sex: Female        Patient PCP Information     Provider PCP Type    Alejandro Sandhu MD General         History & Physicals      H&P signed by Jamaal Jenkins at 1/15/2018  1:05 PM      Author:  Jamaal Jenkins Service:  (none) Author Type:  Physician    Filed:  1/15/2018  1:05 PM Date of Service:  1/15/2018 11:47 AM Creation Time:  1/15/2018  1:05 PM    Status:  Signed :  Jamaal Jenkins (Physician)     Scan on 1/15/2018  1:05 PM by Gregory, Provider : [facility] PRE-OP H/P/2018 1          Revision History        User Key Date/Time User Provider Type Action    > [N/A] 1/15/2018  1:05 PM Gregory, Provider Physician Sign            H&P by Nathalie Philip APRN CNS at 2018  1:00 PM     Author:  Nathalie Philip APRN CNS Service:  (none) Author Type:  Clinical Nurse Specialist    Filed:  2018  3:15 PM Encounter Date:  2018 Status:  Signed    :  Nathalie Philip APRN CNS (Clinical Nurse Specialist)             Pre-Operative H & P     CC:  Preoperative exam to assess for increased cardiopulmonary risk while undergoing surgery and anesthesia.    Date of Encounter: 2018  Primary Care Physician:  Alejandro Sandhu  Constance Connelly is a 87 year old female who presents for pre-operative H & P in preparation for perineal rectosigmoidectomy with Dr. Mariano on[KS1.1] [KS1.2]2[KS1.1][KS1.2] at [facility]. History is obtained from the patient.   Patient who consulted today with Dr. Mariano with concerns for full thickness rectal prolapse with symptoms of protrusion, and perianal pain and irritation. This is affecting her ability to do her daily activities. She was counseled for above procedure.    Patient's history is otherwise significant for an episode of encephalopathy in 10/2017, managed by Neurology but with no specific findings.[KS1.1] She is well followed by her PCP Dr. Sandhu.[KS1.3]    Past Medical History[KS1.1]  Past Medical History:   Diagnosis Date     Carpal tunnel syndrome (aka CTS)      H/O calcium pyrophosphate deposition disease (CPPD)      History of encephalopathy 10/2017     Hypertension      Kyphoscoliosis      Osteoarthritis     hands     Osteoporosis      Rectal prolapse[KS1.4]        Past Surgical History[KS1.1]  Past Surgical History:   Procedure Laterality Date     COLONOSCOPY  2010     HYSTERECTOMY      for uterine prolapse[KS1.4]       Hx of Blood transfusions/reactions:[KS1.1] Denies.[KS1.5]      Hx of abnormal bleeding or anti-platelet use:[KS1.1] Denies.[KS1.5]     Menstrual history:[KS1.1] No LMP recorded. Patient is postmenopausal.[KS1.4]    Steroid use in the last year:[KS1.1] Denies.[KS1.5]     Personal or FH with difficulty with Anesthesia:[KS1.1]  Denies.[KS1.5]    Prior to Admission Medications[KS1.1]  Current Outpatient Prescriptions   Medication Sig Dispense Refill     lisinopril (PRINIVIL/ZESTRIL) 10 MG tablet Take 1 tablet (10 mg) by mouth daily (Patient taking differently: Take 10 mg by mouth every morning ) 30 tablet 3     Thiamine HCl 50 MG CAPS Take 1 capsule by mouth daily (Patient taking differently: Take 1 capsule by mouth every morning ) 60 capsule 3     cholecalciferol (VITAMIN  -D) 1000 UNITS capsule Take 1 capsule (1,000 Units) by mouth daily (Patient taking differently: Take 1 capsule by mouth every morning ) 100 capsule 3     teriparatide, recombinant, (FORTEO) 600 MCG/2.4ML SOLN injection Inject 0.08 mLs (20 mcg) Subcutaneous daily 6 mL 1     calcium-vitamin D (CALTRATE) 600-400 MG-UNIT per tablet Take 1 tablet by mouth every morning        Omega-3 Fatty Acids (OMEGA-3 FISH OIL PO) Take 2 capsules by mouth 2 times daily        sodium fluoride dental  gel (PREVIDENT) 1.1 % GEL Apply to affected area At Bedtime       Cyanocobalamin (VITAMIN B 12 PO) Take 1 tablet by mouth every morning        magnesium citrate solution Take 296 mLs by mouth See Admin Instructions Refer to surgery handout. 296 mL 0     order for DME Equipment being ordered: Home BP monitor and cuff 1 each 0[KS1.4]       Allergies[KS1.1]  No Known Allergies[KS1.4]    Social History[KS1.1]  Social History     Social History     Marital status:      Spouse name: N/A     Number of children: N/A     Years of education: N/A     Occupational History     Not on file.     Social History Main Topics     Smoking status: Never Smoker     Smokeless tobacco: Never Used     Alcohol use No     Drug use: No     Sexual activity: Not on file     Other Topics Concern     Not on file     Social History Narrative[KS1.4]       Family History[KS1.1]  Family History   Problem Relation Age of Onset     Depression/Anxiety Son      Depression/Anxiety Son      alcohol abuse  age 24     Hypertension Mother      Hypertension Brother      DIABETES No family hx of      Breast Cancer No family hx of      Colon Cancer No family hx of      Prostate Cancer No family hx of      Other Cancer No family hx of[KS1.4]        Review of Systems    The complete review of systems is negative other than noted in the HPI or here.[KS1.1]   Constitutional: Denies fever, chills, weight loss.  Skin: Perianal irritation and occasional blood.  HEENT: Wears glasses for vision. Occasional swallowing difficulty.  Respiratory: Denies cough or shortness of breath. No concern for CEASAR.  CV: Denies chest pain or irregular HR. Activity is limited. Walks with walker. BP irregularity with some medication changes. The addition of Amlodipine made her feel nauseated and dizzy and ED MD asked her to stop taking it. Continues on Lisinopril.  GI: Denies abdominal pain or bowel issues.  : Incontinence.   M/S: Multiple joint aches and kyphosis.  Neuro: Has  "returned to near baseline after hospitalization in 10/2017 for encephalopathy. Occasional word finding difficulty and will occasionally have hallucinations.[KS1.6]    Temp: 98.3  F (36.8  C) Temp src: Oral BP: 145/84 Pulse: 88   Resp: 16 SpO2: 98 %         91 lbs 0 oz  4' 10.5\"   Body mass index is 18.7 kg/(m^2).[KS1.4]       Physical Exam  Constitutional: Awake, alert, cooperative, no apparent distress, and appears stated age.[KS1.1] Accompanied by daughter.[KS1.6]   Eyes: Pupils equal, round and reactive to light, extra ocular muscles intact, sclera clear, conjunctiva normal.[KS1.1] Glasses on.[KS1.6]  HENT: Normocephalic, oral pharynx with moist mucus membranes,[KS1.1] several teeth missing on bottom[KS1.6]. No goiter appreciated.   Respiratory: Clear to auscultation bilaterally, no crackles or wheezing.[KS1.1] No cough or obvious dyspnea.[KS1.6]  Cardiovascular: Regular rate and rhythm, normal S1 and S2, and no murmur noted. Carotids, no bruits. No edema. Palpable pulses to radial  DP and PT arteries.   GI: Normal bowel sounds, soft, non-distended, non-tender, no masses palpated, no hepatosplenomegaly.    Lymph/Hematologic: No cervical lymphadenopathy and no supraclavicular lymphadenopathy.  Genitourinary:[KS1.1] Deferred.[KS1.6]  Skin: Warm and dry.    Musculoskeletal:[KS1.1] Very limited[KS1.6] neck[KS1.1] extension Irregular posture, changes of arthritis in hands.[KS1.6] Gross motor strength is[KS1.1] weakened[KS1.6].    Neurologic: Awake, alert, oriented to name, place and time.[KS1.1] Very articulate. One episode of word finding difficulty.[KS1.6]  Neuropsychiatric: Calm, cooperative. Normal affect.     Labs: (personally reviewed)[KS1.1]  Lab Results   Component Value Date    WBC 8.3 01/04/2018     Lab Results   Component Value Date    RBC 4.51 01/04/2018     Lab Results   Component Value Date    HGB 13.6 01/04/2018     Lab Results   Component Value Date    HCT 40.4 01/04/2018     Lab Results   Component " Value Date    MCV 90 2018     Lab Results   Component Value Date    MCH 30.2 2018     Lab Results   Component Value Date    MCHC 33.7 2018     Lab Results   Component Value Date    RDW 12.7 2018     Lab Results   Component Value Date     2018     Last Basic Metabolic Panel:  Lab Results   Component Value Date     2018      Lab Results   Component Value Date    POTASSIUM 4.2 2018     Lab Results   Component Value Date    CHLORIDE 106 2018     Lab Results   Component Value Date    BOY 9.5 2018     Lab Results   Component Value Date    CO2 30 2018     Lab Results   Component Value Date    BUN 13 2018     Lab Results   Component Value Date    CR 0.65 2018     Lab Results   Component Value Date    GLC 90 2018     Lab Results   Component Value Date    AST 15 2018     Lab Results   Component Value Date    ALT 14 2018     No results found for: BILICONJ   Lab Results   Component Value Date    BILITOTAL 0.7 2018     Lab Results   Component Value Date    ALBUMIN 3.3 2018     Lab Results   Component Value Date    PROTTOTAL 6.9 2018      Lab Results   Component Value Date    ALKPHOS 58 2018   Prealbumin 21[KS1.7]  EKG: Personally reviewed but formal cardiology read pendin18[KS1.1] Normal sinus rhythm[KS1.3]    CHEST X-RAY 17  IMPRESSION:  1. Streaky bibasilar opacities, likely representing atelectasis.  2. Anterior compression wedge deformities of the thoracic spine, with  loss of up to 90% vertebral body height.  MRI brain 10/25/17  Diffusion weighted images demonstrate no definite acute infarct. On  the FLAIR images there is nonspecific mild periventricular and deep  white matter T2 hyperintensity which are most likely related to  chronic small vessel ischemic disease.       Outside records reviewed from: Care Everywhere    ASSESSMENT and PLAN  Belia Keaton Sheets is a 87 year old female  scheduled to undergo perineal rectosigmoidectomy with Dr. Mariano on[KS1.1] 1/1[KS1.2]2[KS1.1]/18[KS1.2]. She has the following specific operative considerations:   - RCRI : No serious cardiac risks.   - Anesthesia considerations:  Refer to PAC assessment in anesthesia records  - VTE risk: 0.5%  - CEASAR # of risks 2/8 = Low risk  - Risk of PONV score = 3.  If > 2, anti-emetic intervention recommended. If 3 or > anti emetic intervention recommended with two or more meds     --Full thickness rectal prolapse. Above procedure now planned.   --HTN. Will hold Lisinopril on DOS. EKG above. Activity limited.[KS1.1] Walks with walker.[KS1.5]   --Nonsmoker. No pulmonary symptoms.   --Recent admission for encephalopathy but no findings on evaluation.[KS1.1] Mild word finding difficulty[KS1.5].[KS1.3]   --Osteoarthritis.[KS1.1] Kyphosis. Will require careful positioning. Fall RISK.[KS1.3]    --Pain management. Discussed possibility of nerve block if appropriate for patient's procedure. Final counseling and decisions by regional team on DOS.  Type and screen drawn by service   Arrival time, NPO, shower and medication instructions provided by nursing staff today. Preparing For Your Surgery handout given.      Patient was discussed with Dr Fournier.    SUSIE Medina  Preoperative Assessment Center  [facility]  Clinic and [facility] Center  Phone: [phone]  Fax: [phone][KS1.1]     Revision History        User Key Date/Time User Provider Type Action    > KS1.4 1/4/2018  3:15 PM Nathalie Philip APRN CNS Clinical Nurse Specialist Sign     KS1.3 1/4/2018  3:11 PM Nathalie Philip APRN CNS Clinical Nurse Specialist      KS1.5 1/4/2018  2:42 PM Nathalie Philip APRN CNS Clinical Nurse Specialist      KS1.6 1/4/2018  2:02 PM Nathalie Philip APRN CNS Clinical Nurse Specialist      KS1.7 1/4/2018  1:25 PM Philip, Nathalie Katharine, APRN CNS Clinical Nurse Specialist      KS1.2 1/4/2018  1:10 PM Cedrick,  SUSIE Brown CNS Clinical Nurse Specialist      KS1.1 1/4/2018  1:09 PM Nathalie Philip APRN CNS Clinical Nurse Specialist                      Discharge Summaries      Discharge Summaries by Gerald Galarza MD at 1/15/2018  1:39 PM     Author:  Gerald Galarza MD Service:  Colorectal Surgery Author Type:  Resident    Filed:  1/15/2018  2:12 PM Date of Service:  1/15/2018  1:39 PM Creation Time:  1/15/2018  1:39 PM    Status:  Attested :  Gerald Galarza MD (Resident)    Cosigner:  Amrik Mariano MD at 1/15/2018  2:30 PM        Attestation signed by Amrik Mariano MD at 1/15/2018  2:30 PM        Attestation:  Physician Attestation   IAmrik, saw and evaluated this patient prior to discharge.  I discussed the patient with the resident and agree with plan of care as documented in the resident note.      I personally reviewed vital signs, medications, labs and imaging.    I personally spent 20 minutes on discharge activities.    Amrik Mariano  Date of Service (when I saw the patient): 01/15/18                               [facility]  Discharge Summary  Colon and Rectal Surgery     Belia Sheets MRN# [id number]   YOB: 1930 Age: 87 year old     Date of Admission:  1/12/2018  Date of Discharge::[JJ1.1]  1/15/2018[JJ1.2]  Admitting Physician:  Amrik Mariano MD  Discharge Physician:  Gerald Galarza MD  Primary Care Physician:        Alejandro Sandhu          Admission Diagnoses:   Rectal Prolapse           Discharge Diagnosis:   Same         Procedures:   Perineal Rectosigmoidectomy              Consultations:[JJ1.1]   NUTRITION SERVICES ADULT IP CONSULT  OCCUPATIONAL THERAPY ADULT IP CONSULT  PHYSICAL THERAPY ADULT IP CONSULT  SOCIAL WORK IP CONSULT  VASCULAR ACCESS CARE ADULT IP CONSULT  PHYSICAL THERAPY ADULT IP CONSULT[JJ1.3]         Imaging Studies:     Results for orders placed or performed during the  hospital encounter of 01/07/18   XR Chest 2 Views    Narrative    Exam:  XR CHEST 2 VW, 1/7/2018 10:41 PM    History: cough, vomiting, rule out aspiration;     Comparison:  12/1/2017    Findings: PA and lateral view of the chest. The cardiomediastinal  silhouette is stable. No pleural effusion or pneumothorax. No acute  airspace opacity. Stable multiple anterior compression wedge  deformities of the thoracic spine with loss of about 90% of the  vertebral body height, this results in exaggerated kyphosis. Bilateral  chronic rib deformities.      Impression    Impression:    1. No acute cardiopulmonary abnormality.  2. Stable thoracic compression wedge deformities.    I have personally reviewed the examination and initial interpretation  and I agree with the findings.    MONA MCNAMARA MD              Medications Prior to Admission:[JJ1.1]     Prescriptions Prior to Admission   Medication Sig Dispense Refill Last Dose     amLODIPine (NORVASC) 5 MG tablet Take 1 tablet (5 mg) by mouth daily 30 tablet 0 1/11/2018 at 1400     ondansetron (ZOFRAN) 4 MG tablet Take 1 tablet (4 mg) by mouth every 6 hours as needed for nausea While taking neomycin and flagyl. 3 tablet 0 1/11/2018 at 2100     Thiamine HCl 50 MG CAPS Take 1 capsule by mouth daily (Patient taking differently: Take 1 capsule by mouth every morning ) 60 capsule 3 Past Week at Unknown time     cholecalciferol (VITAMIN  -D) 1000 UNITS capsule Take 1 capsule (1,000 Units) by mouth daily (Patient taking differently: Take 1 capsule by mouth every morning ) 100 capsule 3 Past Week at Unknown time     calcium-vitamin D (CALTRATE) 600-400 MG-UNIT per tablet Take 1 tablet by mouth every morning    Past Week at Unknown time     sodium fluoride dental gel (PREVIDENT) 1.1 % GEL Apply to affected area At Bedtime   1/12/2018 at 0400     Cyanocobalamin (VITAMIN B 12 PO) Take 1 tablet by mouth every morning    Past Week at Unknown time     acetaminophen (TYLENOL) 325 MG tablet  Take 2 tablets (650 mg) by mouth every 4 hours as needed for mild pain 60 tablet 0 Unknown at Unknown time     [DISCONTINUED] neomycin (MYCIFRADIN) 500 MG tablet Take 2 tablets (1,000 mg) by mouth every 8 hours prior to surgery.  Take in conjunction with Flagyl. 6 tablet 0 1/11/2018 at 2100     [DISCONTINUED] polyethylene glycol (MIRALAX) Packet Take 238 g by mouth See Admin Instructions One 8.3-ounce bottle (238 g).  Refer to surgical packet for medication instructions. 238 g 0 1/11/2018 at 1600     [DISCONTINUED] metroNIDAZOLE (FLAGYL) 500 MG tablet Take 1 tablet (500 mg) by mouth every 8 hours prior to surgery.  Take in conjunction with Neomycin Sulfate. 3 tablet 0 1/11/2018 at 2100     [DISCONTINUED] magnesium citrate solution Take 296 mLs by mouth See Admin Instructions Refer to surgery handout. 296 mL 0 1/11/2018 at 1800     order for DME Equipment being ordered: Home BP monitor and cuff 1 each 0 Unknown at Unknown time[JJ1.4]              Discharge Medications:[JJ1.1]     Current Discharge Medication List      START taking these medications    Details   oseltamivir (TAMIFLU) 30 MG capsule Take 1 capsule (30 mg) by mouth daily for 7 days  Qty: 7 capsule, Refills: 0    Comments: Prescribing at request of receiving TCU. They had a patient with influenza and want this for prophylaxis.  Associated Diagnoses: Need for prophylactic vaccination and inoculation against influenza      psyllium (METAMUCIL/KONSYL) Packet Take 1 packet by mouth 2 times daily  Refills: 0    Associated Diagnoses: Rectal prolapse      saccharomyces boulardii (FLORASTOR) 250 MG capsule Take 1 capsule (250 mg) by mouth 2 times daily  Qty: 14 capsule    Associated Diagnoses: Rectal prolapse      ibuprofen (ADVIL/MOTRIN) 600 MG tablet Take 1 tablet (600 mg) by mouth 3 times daily as needed for moderate pain  Qty: 120 tablet    Associated Diagnoses: Rectal prolapse         CONTINUE these medications which have CHANGED    Details   teriparatide,  recombinant, (FORTEO) 600 MCG/2.4ML SOLN injection Inject 0.08 mLs (20 mcg) Subcutaneous daily  Qty: 6 mL, Refills: 1    Comments: Please hold this medication while the patient is at TCU  Associated Diagnoses: Age-related osteoporosis with current pathological fracture, initial encounter      polyethylene glycol (MIRALAX/GLYCOLAX) Packet Take 17 g by mouth daily as needed for constipation  Qty: 7 packet    Comments: Please hold for diarrhea  Associated Diagnoses: Rectal prolapse         CONTINUE these medications which have NOT CHANGED    Details   amLODIPine (NORVASC) 5 MG tablet Take 1 tablet (5 mg) by mouth daily  Qty: 30 tablet, Refills: 0    Associated Diagnoses: Benign essential hypertension      ondansetron (ZOFRAN) 4 MG tablet Take 1 tablet (4 mg) by mouth every 6 hours as needed for nausea While taking neomycin and flagyl.  Qty: 3 tablet, Refills: 0    Associated Diagnoses: Rectal prolapse      Thiamine HCl 50 MG CAPS Take 1 capsule by mouth daily  Qty: 60 capsule, Refills: 3    Associated Diagnoses: Confusion      cholecalciferol (VITAMIN  -D) 1000 UNITS capsule Take 1 capsule (1,000 Units) by mouth daily  Qty: 100 capsule, Refills: 3    Associated Diagnoses: Age-related osteoporosis with current pathological fracture with routine healing, subsequent encounter      calcium-vitamin D (CALTRATE) 600-400 MG-UNIT per tablet Take 1 tablet by mouth every morning       sodium fluoride dental gel (PREVIDENT) 1.1 % GEL Apply to affected area At Bedtime      Cyanocobalamin (VITAMIN B 12 PO) Take 1 tablet by mouth every morning       acetaminophen (TYLENOL) 325 MG tablet Take 2 tablets (650 mg) by mouth every 4 hours as needed for mild pain  Qty: 60 tablet, Refills: 0    Associated Diagnoses: Rectal prolapse      order for DME Equipment being ordered: Home BP monitor and cuff  Qty: 1 each, Refills: 0    Associated Diagnoses: Severe hypertension         STOP taking these medications       neomycin (MYCIFRADIN) 500 MG  "tablet Comments:   Reason for Stopping:         metroNIDAZOLE (FLAGYL) 500 MG tablet Comments:   Reason for Stopping:         magnesium citrate solution Comments:   Reason for Stopping:[JJ1.5]                        Brief History of Illness:   Per note by Dr. Amrik Mariano on 1-4-18 \"88 y/o F who presents with a 1-month h/o anal protrusion associated with perianal pain and irritation. She denies episodes where she cannot reduce her prolapse but this has significantly affected her quality of life as she cannot go anywhere and has to lay in bed all day. Her daughter Lubna, seconds this enthusiastically. She has had some fecal incontinence for many years and she thinks this may have gotten a little worse since the prolapse started occurring. She additionally has some intermittent urinary incontinence. Her urinary incontinence started after her hysterectomy in 2012, which she had for uterine prolapse. She has had 4 vaginal births with episiotomy's. Her largest baby was 7 lbs. She has never had any prior anorectal surgeries. She denies any abdominal pain, nausea, vomiting, or unexplained weight loss. She denies any family history of any colon cancer.\"           Hospital Course:   Belia Sheets was admitted to the hospital and underwent the procedure described above without complication. She was given a low fiber diet and her khan was removed on POD 1. Her IV fluids were weaned and she was transitioned to PO pain medicine. Occupational therapy recommended home with assist or TCU, but because the patient lives alone a TCU was deemed appropriate. This was also in concordance with the patient's daughter's wishes. On the day of discharge the patient was voiding spontaneously, ambulating, pain was controlled with PO pain medicine, and she was having bowel movements. Her bowel movements were loose so she was started on metamucil bid and a probiotic. She was also discharged with a prescription for tamiflu at the request of the " "TCU as prophylaxis for influenza at the TCU. Patient is to follow up in the Colon and Rectal Surgery Clinic in 1 week with Joslyn Medina NP and then with Dr. Mariano in 2-3 weeks after.          Day of Discharge Physical Exam:[JJ1.1]   Blood pressure (!) 147/92, pulse 90, temperature 96.7  F (35.9  C), temperature source Axillary, resp. rate 16, height 1.47 m (4' 9.87\"), weight 41.8 kg (92 lb 3.2 oz), SpO2 96 %, not currently breastfeeding.[JJ1.6]    Gen: No apparent distress  Eyes: No scleral icterus  Pulm: Nlb  Abd: Soft, non-tender, non-distended. No r/g  Psych: Cooperative  Neuro: Ambulating w/ walker  Skin: No rashes appreciated  Ext: Wwp         Final Pathology Result:   Pending at time of discharge           Discharge Instructions and Follow-Up:[JJ1.1]       Discharge Procedure Orders  General info for SNF   Order Comments: Length of Stay Estimate: Short Term Care: Estimated # of Days <30  Condition at Discharge: Improving  Level of care:skilled   Rehabilitation Potential: Excellent  Admission H&P remains valid and up-to-date: Yes  Recent Chemotherapy: N/A  Use Nursing Home Standing Orders: Yes     Reason for your hospital stay   Order Comments: Rectal prolapse     Additional Discharge Instructions   Order Comments: DIET  -Low Residue Diet for at least 4-6 weeks unless cleared by Colorectal surgery.  No raw vegetables, fruit skins, fibrous foods that require a lot of chewing, nuts, seeds, corn, popcorn.   -We recommend eating slowly, chewing thoroughly, eating small frequent meals throughout the day  -Stay well hydrated.      ACTIVITY  -No lifting, pushing, pulling greater than 10 lbs and no strenuous exercise for 6 weeks   -No driving while on narcotic analgesics (i.e. Percocet, oxycodone, Vicodin)  -No driving until you are able to fully twist to both sides or slam on brakes quickly and without any pain    WOUND CLINIC  -Inspect your wounds daily for signs of infection (increased redness, " drainage, pain)  -Keep your wound clean and dry  -You may shower, but do not soak in tub or pool    NOTIFY  Please contact Hardik Waller LPN, Angela Capps RN at [phone] for problems after discharge such as:  -Temperature > 101F, chills, rigors, dizziness  -Redness around or purulent drainage from wound  -Inability to tolerate diet, nausea or vomiting  -You stop passing gas, develop significant bloating, abdominal pain  -Have blood in stools/vomit  -Have severe diarrhea/constipation  -Any other questions or concerns.  - At nights (after 4:30pm), on weekends, or if urgent, call [phone] and ask the  to speak with the on-call Colorectal Surgery resident or fellow      Medication Instructions  Some of your medications may have changed. Please take only prescribed and resumed medications     FOLLOW-UP  1.  You will need to follow-up with Joslyn Medina NP in the Colon and Rectal Surgery clinic in 1 week(s) and then with CRS Staff: Dr. Amrik Mariano in 2-3 weeks after.  Please contact our clinic scheduler Arlyn Shea (phone # [phone]) if you have not heard from our clinic in 3 business days afer discharge to schedule a follow-up appointment.     2.  Follow up with your primary care provider in 1-2 weeks after discharge from the hospital to review this hospitalization.     Activity - Up ad terri   Order Specific Question Answer Comments   Is discharge order? Yes      Full Code     Advance Diet as Tolerated   Order Comments: Follow this diet upon discharge: low residue diet   Order Specific Question Answer Comments   Is discharge order? Yes[JJ1.7]               Home Health Care:   N/A- discharged to rehab facility           Discharge Disposition:   Discharged to TCU      Condition at discharge: Stable    Pt was discussed with Dr. Mariano on 1-15-18    --  Bubba Galarza MD  Gen Surg PGY1[JJ1.1]       Revision History        User Key Date/Time User Provider Type Action    > JJ1.5 1/15/2018   2:12 PM Gerald Galarza MD Resident Sign     JJ1.6 1/15/2018  2:01 PM Gerald Galarza MD Resident      JJ1.2 1/15/2018  1:52 PM Gerald Galarza MD Resident      JJ1.4 1/15/2018  1:50 PM Gerald Galarza MD Resident      JJ1.3 1/15/2018  1:48 PM Gerald Galarza MD Resident      JJ1.7 1/15/2018  1:41 PM Gerald Galarza MD Resident      JJ1.1 1/15/2018  1:39 PM Gerald Galarza MD Resident                      Consult Notes      Consults by Karen Gordillo LICSW at 1/13/2018  4:46 PM     Author:  Karen Gordillo LICSW Service:  Social Work Author Type:      Filed:  1/13/2018  4:46 PM Date of Service:  1/13/2018  4:46 PM Creation Time:  1/13/2018  4:44 PM    Status:  Signed :  Karen Gordillo LICSW ()     Consult Orders:    1. Social Work IP Consult [[id number]] ordered by Amrik Mariano MD at 01/12/18 0930                Social Work Services Progress Note    Hospital Day: 2  Date of Initial Social Work Evaluation:  Not completed yet  Consulted with:  Chart and attempted to see patient but she was sleeping    Data:  Patient admitted to the hospital for rectal prolapse repair - per MD charting mention is made that patient lives in a nursing home.     Intervention:  Attempted to see patient for assessment but she was sleeping.     Assessment:  Patient requiring assessment    Plan:    Anticipated Disposition:  Will depend on assessment - PT indicated no IP PT needs and OT recomending home with assist    Barriers to d/c plan:  N/a    Follow Up:  Will see patient on Sunday 1.14.18.    Karen Gordillo MSANNA MATTHEWS  1/13/2018    ON CALL PAGER   0800 - 1600   [phone]    ON CALL COVERAGE AFTER 1600  [phone]][EL1.1]       Revision History        User Key Date/Time User Provider Type Action    > EL1.1 1/13/2018  4:46 PM Karen Gordillo LICSW  Sign                     Progress Notes - Physician (Notes from 01/12/18 through 01/15/18)      Progress Notes by Willian  ALEXX Martin at 1/15/2018  9:45 AM     Author:  Veronica Dorsey MSW Service:  Social Work Author Type:      Filed:  1/15/2018 12:51 PM Date of Service:  1/15/2018  9:45 AM Creation Time:  1/15/2018  9:45 AM    Status:  Signed :  Veronica Dorsey MSW ()         Social Work Services Progress Note    Hospital Day: 4  Date of Initial Social Work Evaluation:  1/14/18  Collaborated with:[SS1.1]  Pt, Pt's dtr (),[SS1.2] TCU's (see below)    Data:  Pt is 88 y/o female admitted to [facility] on 1/12/18 s/p Altemeier for rectal prolapse.   SW involved for TCU placement.  Per Colorectal Team, Pt is medically stable for d/c today pending placement.    Intervention:  BONIFACIO coord. With OT today and confirmed that TCU would be recommended at d/c d/t little support available at home. BONIFACIO faxed referrals to the following per preference:    1) [facility] (Main: [phone],[SS1.1] Admissions: [phone],[SS1.2] Fax: [phone]): BONIFACIO spoke with Emily in Admissions today; she said that they do have beds available and would review Pt's referral for potential admission today. BONIFACIO faxed referral via Epic today.[SS1.1]   BONIFACIO received return call from Emily at 11AM requested H&P be faxed over; BONIFACIO faxed right away. Emily said that she is still reviewing medications, but likely will be able to accept Pt for admission today and said that she would let BONIFACIO know by 11:45AM.   BONIFACIO received return call from Emily at 11:45AM indicating that d/t Pt is on medication Forteo, which costs >$3000, they cannot accept while on this medication. BONIFACIO paged Colorectal Team to determine if this is a medication that could be put on hold and/or changed while in TCU; awaiting return call.   BONIFACIO spoke with Colorectal Resident (Bubba Galarza) and confirmed that Pt will not need to be on Forteo at d/c.   BONIFACIO updated Emily in Admissions that per team Pt will not need to be on Forteo at d/c. Emily said that then they would be able to  accept Pt for admission to TCU today.  SW updated Pt & dtr (via phone) today of Pt's acceptance and they are both in agreement with this d/c. Pt's dtr will provide d/c transportation after orders have been completed.[SS1.2]  2) [facility] (Main: [phone], Fax: [phone]): SW spoke with Admissions today; they do not anticipate any openings until Wed at the earliest but agreed to review referral in case there were unexpected openings; SW faxed referral via Epic today.  3) [facility] (Main: [phone], Admissions: [phone], Fax: [phone]): SW spoke with Lauryn in Admissions; she said that she likely has all her available beds spoken for today but would review Pt's referral in case there was a cancellation. BONIFACIO faxed referral today via [employer].  4) [facility] (Main/Admissions: [phone], Fax: [phone]): SW left VM for Admissions today re: bed availability and requested call back; BONIFACIO faxed referral via Epic today.    Assessment:[SS1.1]  Pt will d/c to [facility] TCU today[SS1.2]    Plan:    Anticipated Disposition:[SS1.1]  Facility:  [facility][SS1.2]    Barriers to d/c plan:[SS1.1]  n/a[SS1.2]    Follow Up:[SS1.1]  SW to continue to follow and assist with d/c plan.[SS1.2]    ALEXX Sequeira, LGSW   Surgical Oncology Unit   [phone]  Pager: [phone][SS1.1]       Revision History        User Key Date/Time User Provider Type Action    > SS1.2 1/15/2018 12:51 PM Veronica Dorsey MSW  Sign     SS1.1 1/15/2018  9:45 AM Veronica Dorsey MSW              Progress Notes by Coreen Velazco RN at 1/15/2018 11:34 AM     Author:  Coreen Velazco RN Service:  (none) Author Type:  Registered Nurse    Filed:  1/15/2018 11:35 AM Date of Service:  1/15/2018 11:34 AM Creation Time:  1/15/2018 11:34 AM    Status:  Signed :  Coreen Velazco RN (Registered Nurse)         [facility]  Patient is  currently open to home care services with [facility].  The patient is currently receiving RN services.  Critical access hospital  and team have been notified of patient admission.  Critical access hospital liaison will continue to follow patient during stay.  If appropriate provide orders to resume home care at time of discharge.    Thank you  Coreen Velazco RN, BSN  [facility] Liaison  [phone][SH1.1]         Revision History        User Key Date/Time User Provider Type Action    > SH1.1 1/15/2018 11:35 AM Coreen Velazco RN Registered Nurse Sign            Progress Notes by Gerald Galarza MD at 1/15/2018  7:03 AM     Author:  Gerald Galarza MD Service:  Colorectal Surgery Author Type:  Resident    Filed:  1/15/2018  8:54 AM Date of Service:  1/15/2018  7:03 AM Creation Time:  1/15/2018  7:03 AM    Status:  Attested :  Gerald Galarza MD (Resident)    Cosigner:  Amrik Mariano MD at 1/15/2018  8:58 AM        Attestation signed by Amrik Mariano MD at 1/15/2018  8:58 AM        Attestation:  Physician Attestation   I, Amrik Mariano, saw this patient with the resident and agree with the resident s findings and plan of care as documented in the resident s note.      I personally reviewed vital signs and medications.    Key findings: Appetite improved. Pain tolerable. +BMs (diarrhea with minimal awareness and control). Good UOP. Abd exam benign. Perianal skin with excoriation. No infection.  - LR diet + Supplements, PO pain control, start Metamucil BID + Probiotic, Miralax QD PRN (hold for diarrhea), check C diff, up ad terri, nothing per rectum, likely TCU today or tomorrow depending on bed availability.     Amrik Mariano  Date of Service (when I saw the patient): 01/15/18                               Colorectal Surgery Progress Note    Subjective:  No acute overnight events. Pain well controlled. Tolerating low fiber diet but low appetite. Some nausea yesterday but none currently.  Passing flatus and small bowel movements. Voiding independently without concern. Ambulating frequently. Denies chest pain, SOB, nausea, vomiting, and fever. She states that she lives in a home with nursing care but that she has maintained lots of independence. Her daughter lives in [place] and has been around the last couple of months to assist with her care. She is worried about taking care of herself when she is discharged.    Objective:[KT1.1]  Temp:  [95.7  F (35.4  C)-97.4  F (36.3  C)] 97.1  F (36.2  C)  Pulse:  [102] 102  Heart Rate:  [71-90] 80  Resp:  [16] 16  BP: (117-160)/() 141/88  SpO2:  [92 %-96 %] 96 %    I/O last 3 completed shifts:  In: 460 [P.O.:460]  Out: 650 [Urine:650][KT1.2]    General: NAD, alert, resting comfortably in bed  Resp: non-labored breathing  Abd: soft, nontender    Labs: Reviewed.  Heme:[KT1.1]    Recent Labs  Lab 01/09/18  0616 01/08/18  0721   WBC 7.2 7.1   HGB 11.8 12.3    184[KT1.2]     Chem:[KT1.1]    Recent Labs  Lab 01/09/18  0616 01/08/18  1548 01/08/18  0721   POTASSIUM 3.6 3.4 3.6   CR 0.51* 0.54 0.50*[KT1.2]       Assessment/Plan:  Belia Sheets is an 87 year old woman for is POD#3 from Altemeier procedure for rectal prolapse.    Neuro: continue PO tylenol and oxycodone for pain control  CV: required 10mg IV hydralazine yesterday for elevated BP, responded appropriately and currently normotensive  Resp: encourage IS use  FEN/GI: low fiber diet, MIVF discontinued and fluid boluses not needed over last day to maintain UOP, encourage increased PO intake  : adequate UOP at this time  ID: afebrile  Endo: no intervention  PPx: encourage ambulation, SCDs, lovenox today  Dispo: pending ambulation, pain control, and toleration of diet, needs TCU and formal PT eval, likely today    Discussed with CRS fellow Dr. Rosado and will be discussed with staff.    Viola Chandra, MS4  Colorectal Surgery[KT1.1]    Resident addendum. In brief, POD 3 altemeier for rectal prolapse  now with ROBF and voiding spontaneously. Pain adequately controlled on PO pain meds. Tolerating low fiber diet. Ready for d/c today to TCU (home w/ assist not available). PT today as well.    --  Bubba Galarza MD  Gen Surg PGY1[JJ1.1]         Revision History        User Key Date/Time User Provider Type Action    > JJ1.1 1/15/2018  8:54 AM Gerald Galarza MD Resident Sign     KT1.2 1/15/2018  7:06 AM Viola Chandra Medical Student Share     KT1.1 1/15/2018  7:03 AM Viola Chandra Medical Student             Progress Notes by Karen Gordillo LICSW at 2018  3:43 PM     Author:  Karen Gordillo LICSW Service:  Social Work Author Type:      Filed:  2018  3:55 PM Date of Service:  2018  3:43 PM Creation Time:  2018  3:43 PM    Status:  Signed :  Karen Gordillo LICSW ()         Social Work: Assessment with Discharge Plan    Patient Name:  Belia Sheets  :  1930  Age:  87 year old  MRN:  [id number]  Risk/Complexity Score:  Filed Complexity Screen Score: 7  Completed assessment with:  Daughter Lubna - patient slept through entire meeting    Presenting Information   Reason for Referral:  Discharge plan  Date of Intake:  2018  Referral Source:  Physician  Decision Maker:  patient  Alternate Decision Maker:  Daughter and son  Health Care Directive:  Copy in Chart  Living Situation:  Apartment - Independent apartment at [employer]. Patient has 1 meal (breakfast) per day and minimal housekeeping 1x/week. She can purchase additional services.   Previous Functional Status: Daughter believes that patient would state she is more independent that she is. Daughter lives in [place], [place] and has spent the last 7 out of 8 weeks in [place] with her mother. She helps with groceries, taking her to appointments, and other tasks.   Patient and family understanding of hospitalization:  Surgery and will need TCU - per MD conversation with  daughter  Cultural/Language/Spiritual Considerations:  N/a  Adjustment to Illness:  Daughter noted that needing additional assistance has been difficult for her mother    Physical Health  Reason for Admission:  Rectal prolapse repair  Services Needed/Recommended: Daughter feels TCU is needed but OT is recommending home with assist vs TCU pending cog eval on 1.15.18    Mental Health/Chemical Dependency  Diagnosis:  none  Support/Services in Place:  none  Services Needed/Recommended:  none    Support System  Significant relationship at present time:  Her partner  in late summer 2016 and she needed to move in a more supervised living situation after that. Per daughter the partner was the healthier of the two.   Family of origin is available for support:  Yes - daughter in [facility] and her son Delmar who divides his time between [facility] and [facility].  Other support available:  Purchased support from [employer] in support system:  Medication reminders and help with more meals  Patient is caregiver to:  None     Provider Information   Primary Care Physician:  Alejandro Sandhu   [phone]   Clinic:  [address] [place] [id number]      :  None listed in EPIC    Financial   Income Source:  [facility] and  [facility]  Financial Concerns:  Daughter stated that her mother is very aware of her limited funds and this makes her less willing to purchase additional services.   Insurance:    Payor/Plan Subscriber Name Rel Member # Group #   UCARE - UC[employer] SENIORS* MARICELCONSTANCE  [id number] [facility] [id number]       Discharge Plan   Patient and family discharge goal:  Daughter would like her mother to go to a TCU  Provided education on discharge plan:  YES  Patient agreeable to discharge plan: She has never been awake when writer has visited so unknown  A list of Medicare Certified Facilities was provided to the patient and/or family to encourage patient choice. Patient's  choices for facility are:  Daughter listed [facility], [facility], [facility] and [facility] - provided Care Options descriptions and the Medicare.gov survey results to daughter.   Will NH provide Skilled rehabilitation or complex medical:  YES  General information regarding anticipated insurance coverage and possible out of pocket cost was discussed. Patient and patient's family are aware patient may incur the cost of transportation to the facility, pending insurance payment: YES - she has [employer] and may require a prior auth  Barriers to discharge:  Skilled need for TCU and bed availability    Discharge Recommendations   Anticipated Disposition: Home with assist vs. TCU  Transportation Needs: unknown  Name of Transportation Company and Phone:  N/a    Additional comments   Patient has had [facility] in the past (RN, OT, SLP, and PT)- a recent OT cog eval at home showed concerns for deficits in executive decision-making for patient. Daughter has had a BSC delivered to her home in [place], [place] and will bring for patient - she is concerned that her mother will not use the BSC and instead continue using yogurt containers she keeps by her bedside. Daughter had lengthy conversation with OT today about her concerns. They will complete a cog eval tomorrow 1.15.18 and discharge planning will be based on this. Unable to send referrals today as we have no PT note showing skilled need.     Karen MATTHEWS  1/14/2018    ON CALL PAGER   0800 - 1600   [phone]    ON CALL COVERAGE AFTER 1600  [phone][EL1.1]         Revision History        User Key Date/Time User Provider Type Action    > EL1.1 1/14/2018  3:55 PM Karen Gordillo LICSW  Sign            Progress Notes by Aldair Barrow MD at 1/14/2018  8:24 AM     Author:  Aldair Barrow MD Service:  Colorectal Surgery Author Type:  Physician    Filed:  1/14/2018  1:28 PM Date of Service:  1/14/2018  8:24 AM Creation Time:  1/14/2018   8:24 AM    Status:  Addendum :  Aldair Barrow MD (Physician)         Colorectal Surgery Progress Note    Subjective:  No acute overnight events. Pain well controlled. Tolerating low fiber diet but low appetite. Passing flatus and small bowel movements. Voiding independently without concern. Ambulating frequently. Denies chest pain, SOB, nausea, vomiting, and fever. She states that she lives in a home with nursing care but that she has maintained lots of independence. She is worried about taking care of herself when she is discharged.    Objective:[KT1.1]  Temp:  [95.7  F (35.4  C)-97  F (36.1  C)] 96.2  F (35.7  C)  Heart Rate:  [61-87] 71  Resp:  [16] 16  BP: (124-160)/() 160/101  SpO2:  [92 %-98 %] 92 %    I/O last 3 completed shifts:  In: 2000 [P.O.:1000; IV Piggyback:1000]  Out: 850 [Urine:850][KT1.2]    General: NAD, alert, resting comfortably in bed  Resp: non-labored breathing  Abd: soft, nontender    Labs: Reviewed.  Heme:[KT1.1]    Recent Labs  Lab 01/09/18  0616 01/08/18  0721 01/07/18 2057   WBC 7.2 7.1 6.5   HGB 11.8 12.3 12.3    184 192[KT1.2]     Chem:[KT1.1]    Recent Labs  Lab 01/09/18  0616 01/08/18  1548 01/08/18  0721 01/07/18 2057   POTASSIUM 3.6 3.4 3.6 2.6*   CR 0.51* 0.54 0.50* 0.45*[KT1.2]       Assessment/Plan:  Belia Sheets is an 87 year old woman for is POD#2 from Altemeier procedure for rectal prolapse.    Neuro: continue PO tylenol and oxycodone for pain control  Resp: encourage IS use  FEN/GI: low fiber diet, MIVF discontinued, 2 500mL LR boluses yesterday for low UOP, encourage increased PO intake  : adequate UOP at this time  ID: afebrile  Endo: no intervention  PPx: encourage ambulation, SCDs, lovenox today  Dispo: pending ambulation, pain control, and toleration of diet, needs TCU, likely tomorrow    Discussed with CRS fellow Dr. Worrell and will be discussed with staff.    Viola Chandra, MS4  Colorectal Surgery[KT1.1]    RESIDENT ADDENDUM  Agree with note  above    Doing okay this morning.  No complaints  Fluid bolus x2 yesterday for poor PO intake and poor UOP.  Doing better now. OT and PT say home with assist but patient lives alone and daughter who has been travelling from [place] frequently to help, is not always available. Some nausea when  went in to see patient.    Comfortable in bed  Abdomen soft    88 y/o s/p Altemeier.     - Social work to assist with TCU placement  - PT consult to evaluate her mobility as she and her daughter feel she is below baseline since the rectal prolapse began  - low fiber diet  - IV fluids as needed if poor PO intake    John Vanessa  General Surgery PGY-3  Pager [phone][MR1.1]    Attestation:  This patient has been seen and evaluated by me.  Discussed with the house staff team or resident(s) and agree with the findings and plan in this note.  She vomited x1 and has no appetite now.  Abdomen is soft and distended.  She is still stooling. She will back off her po's now.  If unable to take adequate liquids she may need to have her IV restarted.      Aldair Barrow MD  Professor of Surgery  Chief, Division of Colon and Rectal Surgery  [phone][RM1.1]             Revision History        User Key Date/Time User Provider Type Action    > RM1.1 1/14/2018  1:28 PM Aldair Barrow MD Physician Addend     MR1.1 1/14/2018 12:47 PM Merrick Vanessa MD Resident Sign     KT1.2 1/14/2018  8:31 AM Viola Chandra Medical Student Share     KT1.1 1/14/2018  8:24 AM Viola Chandra Medical Student             Progress Notes by Nina Larsen, RN at 1/14/2018 10:18 AM     Author:  Nina Larsen, RN Service:  (none) Author Type:  Care Coordinator    Filed:  1/14/2018 10:47 AM Date of Service:  1/14/2018 10:18 AM Creation Time:  1/14/2018 10:18 AM    Status:  Addendum :  Nina Larsen RN (Care Coordinator)         Transition Planning Update:  TCU verses Home with Skilled Home Care Services    D:  Was  "asked by the MD team this morning after rounds to visit with patient at bedside about transition planning.  Per MD.  MD stated that the documentation about patients activity tolerance does not seem to be capturing the full picture about activity tolerance from the rehabilitation therapy notes.  This writer went to the bedside and found patient laying in bed with her daughter Lubna sitting at bedside.  Patient's daughter was pleasantly upset by the fact that her mother did \" not get a thorough evaluation.  She is not event at her baseline and she lives alone.  For a therapist to say she can go home with assistance...assistance from who, She lives alone.  Patient agreed with her daughters dismay and stated she is feeling nauseated and has vomited this morning.  This writer updated the MD about GI upset.  Patient upon assessment has not practice getting out of bed without the assistance of her bed side rails and be flat per patient when asked.  Daughter also mentioned,  \"My mother has had home therapy in the recent past. Can the therapy staff check with the home care agency since you are one system to see that my mom was not doing all that great when she became ill.\"  This writer updated Lubna that I would update MD team about the above and would ask for a formal Physical Therapy Evaluation to determine if patient meets criteria to transition to a TCU setting prior to discharging home where she lives alone[BB1.1] in an independent [facility] apparent[BB1.2].    A/P: In light of the above, MD placed orders for a PT evaluation and this writer has paged weekend on chris  to re evaluate for possible rehab placement in patient's home area of [facility].  MD team will continue to follow for post op interventions including GE upset.  Inpatient Care Management Team will continue to follow and if patient is eligible for TCU, this writer recommends that upon her return home, patient is evaluated in " her own home setting this post operative.      YULIET Palumbo., P.H.N.,R.N.         Pager [BB1.1]         Revision History        User Key Date/Time User Provider Type Action    > BB1.2 1/14/2018 10:47 AM Nina Larsen, RN Care Coordinator Addend     BB1.1 1/14/2018 10:43 AM Nina Larsen, RN Care Coordinator Sign            Progress Notes by Joslyn Neumann OT at 1/13/2018  3:11 PM     Author:  Joslyn Neumann OT Service:  (none) Author Type:  Occupational Therapist    Filed:  1/13/2018  3:11 PM Date of Service:  1/13/2018  3:11 PM Creation Time:  1/13/2018  3:11 PM    Status:  Signed :  Joslyn Neumann OT (Occupational Therapist)            01/13/18 1459   Quick Adds   Type of Visit Initial Occupational Therapy Evaluation   Living Environment   Lives With alone   Living Arrangements apartment   Home Accessibility tub/shower is not walk in;grab bars present (bathtub)   Number of Stairs to Enter Home 0  (elevator)   Number of Stairs Within Home 0   Transportation Available family or friend will provide   Living Environment Comment Pt lives in an apartment building which provides breakfast and additional services/assist can be purchased as needed.   Self-Care   Dominant Hand right   Usual Activity Tolerance moderate   Current Activity Tolerance fair   Regular Exercise no   Equipment Currently Used at Home grab bar;shower chair  (4 wheeled walker)   Activity/Exercise/Self-Care Comment Pt states that a few months ago she participated in weekend exercise classes in her apartment building.   Functional Level Prior   Ambulation 1-->assistive equipment   Transferring 1-->assistive equipment   Toileting 0-->independent   Bathing 1-->assistive equipment   Dressing 0-->independent   Eating 0-->independent   Communication 0-->understands/communicates without difficulty   Swallowing 0-->swallows foods/liquids without difficulty   Cognition 0 - no cognition issues reported  "  Prior Functional Level Comment Pt has been spending most of her waking time supine in bed x1 month due to the rectal prolapse.   General Information   Onset of Illness/Injury or Date of Surgery - Date 01/12/18   Referring Physician Amrik Mariano MD   Patient/Family Goals Statement \"I am open to it\"   Additional Occupational Profile Info/Pertinent History of Current Problem 87 year old woman POD 1 from [facility] for rectal prolapse   Precautions/Limitations no known precautions/limitations   Cognitive Status Examination   Orientation orientation to person, place and time   Level of Consciousness alert   Able to Follow Commands WNL/WFL   Personal Safety (Cognitive) WNL/WFL   Pain Assessment   Patient Currently in Pain No   Range of Motion (ROM)   ROM Comment WFL for patient   Bed Mobility Skill: Sit to Supine   Level of [place]: Sit/Supine stand-by assist   Physical Assist/Nonphysical Assist: Sit/Supine supervision   Instrumental Activities of Daily Living (IADL)   Previous Responsibilities meal prep;housekeeping;laundry;medication management;finances   Clinical Impression   Criteria for Skilled Therapeutic Interventions Met yes, treatment indicated   OT Diagnosis weakness affecting safe ADL independence   Influenced by the following impairments decreased activity tolerance, decreased strength   Assessment of Occupational Performance 1-3 Performance Deficits   Identified Performance Deficits bathing, dressing   Clinical Decision Making (Complexity) Low complexity   Therapy Frequency 5 times/wk   Predicted Duration of Therapy Intervention (days/wks) 1 week   Anticipated Discharge Disposition Home with Assist   Risks and Benefits of Treatment have been explained. Yes   Patient, Family & other staff in agreement with plan of care Yes   [facility] AM-PAC  \"6 Clicks\" Daily Activity Inpatient Short Form   1. Putting on and taking off regular lower body clothing? 4 - None   2. Bathing (including " washing, rinsing, drying)? 3 - A Little   3. Toileting, which includes using toilet, bedpan or urinal? 4 - None   4. Putting on and taking off regular upper body clothing? 4 - None   5. Taking care of personal grooming such as brushing teeth? 4 - None   6. Eating meals? 4 - None   Daily Activity Raw Score (Score out of 24.Lower scores equate to lower levels of function) 23   Total Evaluation Time   Total Evaluation Time (Minutes) 8[EP1.1]        Revision History        User Key Date/Time User Provider Type Action    > EP1.1 1/13/2018  3:11 PM Joslyn Neumann OT Occupational Therapist Sign            Progress Notes by Merrick Vanessa MD at 1/13/2018  1:14 PM     Author:  Merrick Vanessa MD Service:  Colorectal Surgery Author Type:  Resident    Filed:  1/13/2018  1:20 PM Date of Service:  1/13/2018  1:14 PM Creation Time:  1/13/2018  1:14 PM    Status:  Signed :  Merrick Vanessa MD (Resident)         Colorectal Surgery Progress Note    S: No acute events overnight.  No specific complaints this morning.  Has not really eaten anything since surgery.  Denies nausea.  No BM.      O:[MR1.1]  Temp:  [96.1  F (35.6  C)-96.7  F (35.9  C)] 96.4  F (35.8  C)  Pulse:  [61-68] 68  Heart Rate:  [56-77] 61  Resp:  [10-17] 16  BP: (110-131)/(67-82) 124/72  SpO2:  [93 %-98 %] 98 %    I/O last 3 completed shifts:  In: 1720 [P.O.:120; I.V.:1350]  Out: 1470 [Urine:1465; Blood:5][MR1.2]    Laying in bed in NAD  RRR  Non-labored breathing on RA  Abdomen soft and non-tender  No bleeding from anus    A/P:  87 year old woman POD 1 from [facility] for rectal prolapse.  Doing well overall but not eating or moving much.    - Tylenol, ibuprofen, tramadol for pain  - Low fiber diet.  Encourage her to eat  - PT/OT for likely TCU placement  - Will give last dose of flagyl as oral dose because she has very tenuous IV  - Remove khan  - Discharge likely not until Monday as she will probably need TCU.     John  Rasheeda  General Surgery PGY-3  Pager [phone][MR1.1]       Revision History        User Key Date/Time User Provider Type Action    > MR1.2 1/13/2018  1:20 PM Merrick Vanessa MD Resident Sign     MR1.1 1/13/2018  1:14 PM Merrcik Vanessa MD Resident             Progress Notes by Arlyn Razo RD at 1/13/2018 10:09 AM     Author:  Arlyn Razo RD Service:  Nutrition Author Type:  Registered Dietitian    Filed:  1/13/2018 10:09 AM Date of Service:  1/13/2018 10:09 AM Creation Time:  1/13/2018 10:09 AM    Status:  Signed :  Arlyn Razo RD (Registered Dietitian)         CLINICAL NUTRITION SERVICES  Reason for Assessment:  Nutrition education regarding low fiber diet education  Diet History:  Patient has no history of low fiber diet education.  Nutrition Diagnosis:  Food- and nutrition-related knowledge deficit r/t no previous knowledge of low fiber diet as evidenced by patient report  Interventions:  Provided instruction on low fiber diet. Discussed each food group and foods to eat and avoid. Answered patient's questions regarding diet.   Provided handouts : Low Fiber Nutrition Therapy and Low Fiber Diet Hospital Menu  Goals:   Patient will verbalize at least 5 low fiber foods acceptable on diet and 5 high fiber foods to avoid.  Follow-up:    Patient to ask any further nutrition-related questions before discharge.  In addition, pt may request outpatient RD appointment.      Tracy Razo RD, LD   7C Pager: [phone][RS1.1]       Revision History        User Key Date/Time User Provider Type Action    > RS1.1 1/13/2018 10:09 AM Arlyn Razo RD Registered Dietitian Sign                  Procedure Notes     No notes of this type exist for this encounter.         Progress Notes - Therapies (Notes from 01/12/18 through 01/15/18)      Progress Notes by Joslyn Neumann OT at 1/13/2018  3:11 PM     Author:  Joslyn Neumann OT Service:  (none) Author Type:  Occupational Therapist     "Filed:  1/13/2018  3:11 PM Date of Service:  1/13/2018  3:11 PM Creation Time:  1/13/2018  3:11 PM    Status:  Signed :  Joslyn Neumann OT (Occupational Therapist)            01/13/18 4627   Quick Adds   Type of Visit Initial Occupational Therapy Evaluation   Living Environment   Lives With alone   Living Arrangements apartment   Home Accessibility tub/shower is not walk in;grab bars present (bathtub)   Number of Stairs to Enter Home 0  (elevator)   Number of Stairs Within Home 0   Transportation Available family or friend will provide   Living Environment Comment Pt lives in an apartment building which provides breakfast and additional services/assist can be purchased as needed.   Self-Care   Dominant Hand right   Usual Activity Tolerance moderate   Current Activity Tolerance fair   Regular Exercise no   Equipment Currently Used at Home grab bar;shower chair  (4 wheeled walker)   Activity/Exercise/Self-Care Comment Pt states that a few months ago she participated in weekend exercise classes in her apartment building.   Functional Level Prior   Ambulation 1-->assistive equipment   Transferring 1-->assistive equipment   Toileting 0-->independent   Bathing 1-->assistive equipment   Dressing 0-->independent   Eating 0-->independent   Communication 0-->understands/communicates without difficulty   Swallowing 0-->swallows foods/liquids without difficulty   Cognition 0 - no cognition issues reported   Prior Functional Level Comment Pt has been spending most of her waking time supine in bed x1 month due to the rectal prolapse.   General Information   Onset of Illness/Injury or Date of Surgery - Date 01/12/18   Referring Physician Amrik Mariano MD   Patient/Family Goals Statement \"I am open to it\"   Additional Occupational Profile Info/Pertinent History of Current Problem 87 year old woman POD 1 from [facility] for rectal prolapse   Precautions/Limitations no known precautions/limitations   Cognitive " "Status Examination   Orientation orientation to person, place and time   Level of Consciousness alert   Able to Follow Commands WNL/WFL   Personal Safety (Cognitive) WNL/WFL   Pain Assessment   Patient Currently in Pain No   Range of Motion (ROM)   ROM Comment WFL for patient   Bed Mobility Skill: Sit to Supine   Level of [place]: Sit/Supine stand-by assist   Physical Assist/Nonphysical Assist: Sit/Supine supervision   Instrumental Activities of Daily Living (IADL)   Previous Responsibilities meal prep;housekeeping;laundry;medication management;finances   Clinical Impression   Criteria for Skilled Therapeutic Interventions Met yes, treatment indicated   OT Diagnosis weakness affecting safe ADL independence   Influenced by the following impairments decreased activity tolerance, decreased strength   Assessment of Occupational Performance 1-3 Performance Deficits   Identified Performance Deficits bathing, dressing   Clinical Decision Making (Complexity) Low complexity   Therapy Frequency 5 times/wk   Predicted Duration of Therapy Intervention (days/wks) 1 week   Anticipated Discharge Disposition Home with Assist   Risks and Benefits of Treatment have been explained. Yes   Patient, Family & other staff in agreement with plan of care Yes   [facility] AM-PAC  \"6 Clicks\" Daily Activity Inpatient Short Form   1. Putting on and taking off regular lower body clothing? 4 - None   2. Bathing (including washing, rinsing, drying)? 3 - A Little   3. Toileting, which includes using toilet, bedpan or urinal? 4 - None   4. Putting on and taking off regular upper body clothing? 4 - None   5. Taking care of personal grooming such as brushing teeth? 4 - None   6. Eating meals? 4 - None   Daily Activity Raw Score (Score out of 24.Lower scores equate to lower levels of function) 23   Total Evaluation Time   Total Evaluation Time (Minutes) 8[EP1.1]        Revision History        User Key Date/Time User Provider Type Action    > " EP1.1 1/13/2018  3:11 PM Joslyn Neumann, OT Occupational Therapist Sign

## 2018-01-12 NOTE — IP AVS SNAPSHOT
MRN:[id number]                      After Visit Summary   1/12/2018    Belia Sheets    MRN: [id number]           Thank you!     Thank you for choosing [facility] for your care. Our goal is always to provide you with excellent care. Hearing back from our patients is one way we can continue to improve our services. Please take a few minutes to complete the written survey that you may receive in the mail after you visit with us. Thank you!        Patient Information     Date Of Birth          5/16/1930        About your hospital stay     You were admitted on:  January 12, 2018 You last received care in the:  [address] [facility]    You were discharged on:  January 15, 2018        Reason for your hospital stay       Rectal prolapse                  Who to Call     For medical emergencies, please call 911.  For non-urgent questions about your medical care, please call your primary care provider or clinic, [phone]  For questions related to your surgery, please call your surgery clinic        Attending Provider     Provider Specialty    Amrik Mariano MD Colon and Rectal Surgery       Primary Care Provider Office Phone # Fax #    Alejandro Sandhu [phone]      After Care Instructions     Activity - Up ad terri           Additional Discharge Instructions       DIET  -Low Residue Diet for at least 4-6 weeks unless cleared by Colorectal surgery.  No raw vegetables, fruit skins, fibrous foods that require a lot of chewing, nuts, seeds, corn, popcorn.   -We recommend eating slowly, chewing thoroughly, eating small frequent meals throughout the day  -Stay well hydrated.      ACTIVITY  -No lifting, pushing, pulling greater than 10 lbs and no strenuous exercise for 6 weeks   -No driving while on narcotic analgesics (i.e. Percocet, oxycodone, Vicodin)  -No driving until you are able to fully twist to both sides or slam on brakes quickly and without any pain    WOUND  CLINIC  -Inspect your wounds daily for signs of infection (increased redness, drainage, pain)  -Keep your wound clean and dry  -You may shower, but do not soak in tub or pool    NOTIFY  Please contact Hardik Waller LPN, Angela Capps RN at [phone] for problems after discharge such as:  -Temperature > 101F, chills, rigors, dizziness  -Redness around or purulent drainage from wound  -Inability to tolerate diet, nausea or vomiting  -You stop passing gas, develop significant bloating, abdominal pain  -Have blood in stools/vomit  -Have severe diarrhea/constipation  -Any other questions or concerns.  - At nights (after 4:30pm), on weekends, or if urgent, call [phone] and ask the  to speak with the on-call Colorectal Surgery resident or fellow      Medication Instructions  Some of your medications may have changed. Please take only prescribed and resumed medications     FOLLOW-UP  1.  You will need to follow-up with Joslyn Medina NP in the Colon and Rectal Surgery clinic in 1 week(s) and then with CRS Staff: Dr. Amrik Mariano in 2-3 weeks after.  Please contact our clinic scheduler Arlyn Shea (phone # [phone]) if you have not heard from our clinic in 3 business days afer discharge to schedule a follow-up appointment.     2.  Follow up with your primary care provider in 1-2 weeks after discharge from the hospital to review this hospitalization.            Advance Diet as Tolerated       Follow this diet upon discharge: low residue diet            General info for SNF       Length of Stay Estimate: Short Term Care: Estimated # of Days <30  Condition at Discharge: Improving  Level of care:skilled   Rehabilitation Potential: Excellent  Admission H&P remains valid and up-to-date: Yes  Recent Chemotherapy: N/A  Use Nursing Home Standing Orders: Yes                  Your next 10 appointments already scheduled     Feb 07, 2018  3:15 PM CST   (Arrive by 3:00 PM)   RETURN ENDOCRINE with  "Saurabh Pittman MD   [facility] End[facility]ology ([facility])    [address]  3rd Floor  [facility] [address] [phone]              Pending Results     Date and Time Order Name Status Description    1/12/2018 0905 Surgical pathology exam In process             Statement of Approval     Ordered          01/15/18 8638  I have reviewed and agree with all the recommendations and orders detailed in this document.  EFFECTIVE NOW     Approved and electronically signed by:  Gerald Galarza MD             Admission Information     Date & Time Provider Department Dept. Phone    1/12/2018 Amrik Mariano MD [address] [facility] [place] [phone]      Your Vitals Were     Blood Pressure Pulse Temperature Respirations Height Weight    147/92 90 96.7  F (35.9  C) (Axillary) 16 1.47 m (4' 9.87\") 41.8 kg (92 lb 3.2 oz)    Pulse Oximetry BMI (Body Mass Index)                96% 19.35 kg/m2          [employer] Information     [employer] gives you secure access to your electronic health record. If you see a primary care provider, you can also send messages to your care team and make appointments. If you have questions, please call your primary care clinic.  If you do not have a primary care provider, please call [phone] and they will assist you.        Care EveryWhere ID     This is your Care EveryWhere ID. This could be used by other organizations to access your [place] medical records  [id number]        Equal Access to Services     MAGALY MARSHALL : Hadii shivam Pritchett, waaxda lunatachaadaha, qaybta chanalmaivana boo. So [facility] [phone].    ATENCIÓN: Si habla español, tiene a benavides disposición servicios gratuitos de asistencia lingüística. Llame al [phone].    We comply with applicable federal civil rights and [place] laws. We do not discriminate on the basis of race, color, national origin, age, disability, sex, sexual " orientation or gender identity.                             Review of your medicines      START taking        Dose / Directions    ibuprofen 600 MG tablet   Commonly known as:  ADVIL/MOTRIN        Dose:  600 mg   Take 1 tablet (600 mg) by mouth 3 times daily as needed for moderate pain   Quantity:  120 tablet   Refills:  0       oseltamivir 30 MG capsule   Commonly known as:  TAMIFLU   Used for:  Need for prophylactic vaccination and inoculation against influenza        Dose:  30 mg   Take 1 capsule (30 mg) by mouth daily for 7 days   Quantity:  7 capsule   Refills:  0       psyllium Packet   Commonly known as:  METAMUCIL/KONSYL        Dose:  1 packet   Take 1 packet by mouth 2 times daily   Refills:  0       saccharomyces boulardii 250 MG capsule   Commonly known as:  FLORASTOR        Dose:  250 mg   Take 1 capsule (250 mg) by mouth 2 times daily   Quantity:  14 capsule   Refills:  0         CONTINUE these medicines which may have CHANGED, or have new prescriptions. If we are uncertain of the size of tablets/capsules you have at home, strength may be listed as something that might have changed.        Dose / Directions    cholecalciferol 1000 UNITS capsule   Commonly known as:  vitamin  -D   This may have changed:  when to take this   Used for:  Age-related osteoporosis with current pathological fracture with routine healing, subsequent encounter        Dose:  1 capsule   Take 1 capsule (1,000 Units) by mouth daily   Quantity:  100 capsule   Refills:  3       polyethylene glycol Packet   Commonly known as:  MIRALAX/GLYCOLAX   This may have changed:    - how much to take  - when to take this  - reasons to take this  - additional instructions        Dose:  17 g   Take 17 g by mouth daily as needed for constipation   Quantity:  7 packet   Refills:  0       Thiamine HCl 50 MG Caps   This may have changed:  when to take this   Used for:  Confusion        Dose:  1 capsule   Take 1 capsule by mouth daily   Quantity:  60  capsule   Refills:  3         CONTINUE these medicines which have NOT CHANGED        Dose / Directions    acetaminophen 325 MG tablet   Commonly known as:  TYLENOL        Dose:  650 mg   Take 2 tablets (650 mg) by mouth every 4 hours as needed for mild pain   Quantity:  60 tablet   Refills:  0       amLODIPine 5 MG tablet   Commonly known as:  NORVASC   Used for:  Benign essential hypertension        Dose:  5 mg   Take 1 tablet (5 mg) by mouth daily   Quantity:  30 tablet   Refills:  0       calcium-vitamin D 600-400 MG-UNIT per tablet   Commonly known as:  CALTRATE        Dose:  1 tablet   Take 1 tablet by mouth every morning   Refills:  0       ondansetron 4 MG tablet   Commonly known as:  ZOFRAN        Dose:  4 mg   Take 1 tablet (4 mg) by mouth every 6 hours as needed for nausea While taking neomycin and flagyl.   Quantity:  3 tablet   Refills:  0       order for DME   Used for:  Severe hypertension        Equipment being ordered: Home BP monitor and cuff   Quantity:  1 each   Refills:  0       sodium fluoride dental gel 1.1 % Gel topical gel   Commonly known as:  PREVIDENT        Apply to affected area At Bedtime   Refills:  0       teriparatide (recombinant) 600 MCG/2.4ML Soln injection   Commonly known as:  FORTEO   Used for:  Age-related osteoporosis with current pathological fracture, initial encounter        Dose:  20 mcg   Inject 0.08 mLs (20 mcg) Subcutaneous daily   Quantity:  6 mL   Refills:  1       VITAMIN B 12 PO        Dose:  1 tablet   Take 1 tablet by mouth every morning   Refills:  0         STOP taking     magnesium citrate solution           metroNIDAZOLE 500 MG tablet   Commonly known as:  FLAGYL           neomycin 500 MG tablet   Commonly known as:  MYCIFRADIN                Where to get your medicines      Some of these will need a paper prescription and others can be bought over the counter. Ask your nurse if you have questions.     You don't need a prescription for these medications      ibuprofen 600 MG tablet    oseltamivir 30 MG capsule    polyethylene glycol Packet    psyllium Packet    saccharomyces boulardii 250 MG capsule    teriparatide (recombinant) 600 MCG/2.4ML Soln injection               ANTIBIOTIC INSTRUCTION     You've Been Prescribed an Antibiotic - Now What?  Your healthcare team thinks that you or your loved one might have an infection. Some infections can be treated with antibiotics, which are powerful, life-saving drugs. Like all medications, antibiotics have side effects and should only be used when necessary. There are some important things you should know about your antibiotic treatment.      Your healthcare team may run tests before you start taking an antibiotic.    Your team may take samples (e.g., from your blood, urine or other areas) to run tests to look for bacteria. These test can be important to determine if you need an antibiotic at all and, if you do, which antibiotic will work best.      Within a few days, your healthcare team might change or even stop your antibiotic.    Your team may start you on an antibiotic while they are working to find out what is making you sick.    Your team might change your antibiotic because test results show that a different antibiotic would be better to treat your infection.    In some cases, once your team has more information, they learn that you do not need an antibiotic at all. They may find out that you don't have an infection, or that the antibiotic you're taking won't work against your infection. For example, an infection caused by a virus can't be treated with antibiotics. Staying on an antibiotic when you don't need it is more likely to be harmful than helpful.      You may experience side effects from your antibiotic.    Like all medications, antibiotics have side effects. Some of these can be serious.    Let you healthcare team know if you have any known allergies when you are admitted to the hospital.    One significant  side effect of nearly all antibiotics is the risk of severe and sometimes deadly diarrhea caused by Clostridium difficile (C. Difficile). This occurs when a person takes antibiotics because some good germs are destroyed. Antibiotic use allows C. diificile to take over, putting patients at high risk for this serious infection.    As a patient or caregiver, it is important to understand your or your loved one's antibiotic treatment. It is especially important for caregivers to speak up when patients can't speak for themselves. Here are some important questions to ask your healthcare team.    What infection is this antibiotic treating and how do you know I have that infection?    What side effects might occur from this antibiotic?    How long will I need to take this antibiotic?    Is it safe to take this antibiotic with other medications or supplements (e.g., vitamins) that I am taking?     Are there any special directions I need to know about taking this antibiotic? For example, should I take it with food?    How will I be monitored to know whether my infection is responding to the antibiotic?    What tests may help to make sure the right antibiotic is prescribed for me?      Information provided by:  www.cdc.gov/getsmart  U.S. Department of Health and Human Services  Centers for disease Control and Prevention  National Center for Emerging and Zoonotic Infectious Diseases  Division of Healthcare Quality Promotion         Protect others around you: Learn how to safely use, store and throw away your medicines at www.disposemymeds.org.             Medication List: This is a list of all your medications and when to take them. Check marks below indicate your daily home schedule. Keep this list as a reference.      Medications           Morning Afternoon Evening Bedtime As Needed    acetaminophen 325 MG tablet   Commonly known as:  TYLENOL   Take 2 tablets (650 mg) by mouth every 4 hours as needed for mild pain   Last time  this was given:  1,000 mg on 1/15/2018  1:05 PM                                amLODIPine 5 MG tablet   Commonly known as:  NORVASC   Take 1 tablet (5 mg) by mouth daily   Last time this was given:  5 mg on 1/15/2018  9:58 AM                                calcium-vitamin D 600-400 MG-UNIT per tablet   Commonly known as:  CALTRATE   Take 1 tablet by mouth every morning                                cholecalciferol 1000 UNITS capsule   Commonly known as:  vitamin  -D   Take 1 capsule (1,000 Units) by mouth daily                                ibuprofen 600 MG tablet   Commonly known as:  ADVIL/MOTRIN   Take 1 tablet (600 mg) by mouth 3 times daily as needed for moderate pain   Last time this was given:  600 mg on 1/15/2018  9:58 AM                                ondansetron 4 MG tablet   Commonly known as:  ZOFRAN   Take 1 tablet (4 mg) by mouth every 6 hours as needed for nausea While taking neomycin and flagyl.                                order for DME   Equipment being ordered: Home BP monitor and cuff                                oseltamivir 30 MG capsule   Commonly known as:  TAMIFLU   Take 1 capsule (30 mg) by mouth daily for 7 days                                polyethylene glycol Packet   Commonly known as:  MIRALAX/GLYCOLAX   Take 17 g by mouth daily as needed for constipation   Last time this was given:  17 g on 1/13/2018  7:40 AM                                psyllium Packet   Commonly known as:  METAMUCIL/KONSYL   Take 1 packet by mouth 2 times daily   Last time this was given:  1 packet on 1/15/2018  1:05 PM                                saccharomyces boulardii 250 MG capsule   Commonly known as:  FLORASTOR   Take 1 capsule (250 mg) by mouth 2 times daily   Last time this was given:  250 mg on 1/15/2018  1:05 PM                                sodium fluoride dental gel 1.1 % Gel topical gel   Commonly known as:  PREVIDENT   Apply to affected area At Bedtime                                 teriparatide (recombinant) 600 MCG/2.4ML Soln injection   Commonly known as:  FORTEO   Inject 0.08 mLs (20 mcg) Subcutaneous daily                                Thiamine HCl 50 MG Caps   Take 1 capsule by mouth daily                                VITAMIN B 12 PO   Take 1 tablet by mouth every morning

## 2018-01-12 NOTE — IP AVS SNAPSHOT
` `     [address] [facility] BANK: [phone]            Medication Administration Report for Belia Sheets as of 01/15/18 1440   Legend:    Given Hold Not Given Due Canceled Entry Other Actions    Time Time (Time) Time  Time-Action       Inactive    Active    Linked        Medications 01/09/18 01/10/18 01/11/18 01/12/18 01/13/18 01/14/18 01/15/18    acetaminophen (TYLENOL) tablet 1,000 mg  Dose: 1,000 mg Freq: 4 TIMES DAILY Route: PO  Start: 01/12/18 1600   Admin Instructions: Maximum acetaminophen dose from all sources = 75 mg/kg/day not to exceed 4 gram        1411 (1,000 mg)-Given              2017 (1,000 mg)-Given        0740 (1,000 mg)-Given       1135 (1,000 mg)-Given       1612 (1,000 mg)-Given       2124 (1,000 mg)-Given        0802 (1,000 mg)-Given       1157 (1,000 mg)-Given       1618 (1,000 mg)-Given       2034 (1,000 mg)-Given        0958 (1,000 mg)-Given       1305 (1,000 mg)-Given       [ ] 1600       [ ] 2000           amLODIPine (NORVASC) tablet 5 mg  Dose: 5 mg Freq: DAILY Route: PO  Start: 01/13/18 0800   Admin Instructions: Hold for SBP <110.         0740 (5 mg)-Given        0802 (5 mg)-Given        0958 (5 mg)-Given           benzocaine-menthol (CEPACOL) 15-3.6 MG lozenge 1 lozenge  Dose: 1 lozenge Freq: EVERY 1 HOUR PRN Route: BU  PRN Reason: sore throat  Start: 01/12/18 1757   Admin Instructions: For sore throat without fever.               hydrALAZINE (APRESOLINE) injection 10 mg  Dose: 10 mg Freq: EVERY 4 HOURS PRN Route: IV  PRN Reason: high blood pressure  PRN Comment: SBP>150 or DBP>90.  Start: 01/12/18 1757   Admin Instructions: For ordered doses up to 40 mg, give IV Push undiluted over 1 minute.          0802 (10 mg)-Given            ibuprofen (ADVIL/MOTRIN) tablet 600 mg  Dose: 600 mg Freq: 3 TIMES DAILY PRN Route: PO  PRN Reason: moderate pain  Start: 01/15/18 0900          0958 (600 mg)-Given           lidocaine (LMX4) kit  Freq: EVERY 1 HOUR PRN Route: Top  PRN Reason:  "pain  PRN Comment: with VAD insertion or accessing implanted port.  Start: 01/12/18 1757   Admin Instructions: Do NOT give if patient has a history of allergy to any local anesthetic or any \"travis\" product.   Apply 30 minutes prior to VAD insertion or port access.  MAX Dose:  2.5 g (  of 5 g tube)               magnesium sulfate 4 g in 100 mL sterile water (premade)  Dose: 4 g Freq: EVERY 4 HOURS PRN Route: IV  PRN Reason: magnesium supplementation  Start: 01/12/18 1757   Admin Instructions: For serum Mg++ less than 1.6 mg/dL  Give 4 g and recheck magnesium level 2 hours after dose, and next AM.               menthol-zinc oxide (CALMOSEPTINE) 0.44-20.625 % ointment  Freq: 4 TIMES DAILY Route: Top  Start: 01/15/18 0930   Admin Instructions: Apply thick layer on perianal skin. Replace after BMs as well. Thx!           (0930)-Not Given [C]       1308 ( )-Given       [ ] 1600       [ ] 2000           polyethylene glycol (MIRALAX/GLYCOLAX) Packet 17 g  Dose: 17 g Freq: DAILY PRN Route: PO  PRN Reason: constipation  Start: 01/15/18 0900   Admin Instructions: Hold for diarrhea.  1 Packet = 17 grams. Mixed prescribed dose in 8 ounces of water. Follow with 8 oz. of water.               potassium chloride 10 mEq in 100 mL intermittent infusion with 10 mg lidocaine  Dose: 10 mEq Freq: EVERY 1 HOUR PRN Route: IV  PRN Reason: potassium supplementation  Start: 01/12/18 1757   Admin Instructions: Infuse via PERIPHERAL LINE. Use potassium with lidocaine for pain with peripheral administration.  If Serum K+ 3.0-3.3, dose = 10 mEq/hr x4 doses (40 mEq IV total dose). Recheck K+ level 2 hours after dose and the next AM.  If Serum K+ less than 3.0, dose = 10 mEq/hr x6 doses (60 mEq IV total dose). Recheck K+ level 2 hours after dose and the next AM.               potassium chloride 10 mEq in 100 mL sterile water intermittent infusion (premix)  Dose: 10 mEq Freq: EVERY 1 HOUR PRN Route: IV  PRN Reason: potassium supplementation  Start: " 01/12/18 1757   Admin Instructions: Infuse via PERIPHERAL LINE or CENTRAL LINE. Use for central line replacement if patient weight less than 65 kg, if patient is on TPN with high potassium content or if unit does not stock 20 mEq bags.   If Serum K+ 3.0-3.3, dose = 10 mEq/hr x4 doses (40 mEq IV total dose). Recheck K+ level 2 hours after dose and the next AM.   If Serum K+ less than 3.0, dose = 10 mEq/hr x6 doses (60 mEq IV total dose). Recheck K+ level 2 hours after dose and the next AM.               potassium chloride 20 mEq in 50 mL intermittent infusion  Dose: 20 mEq Freq: EVERY 1 HOUR PRN Route: IV  PRN Reason: potassium supplementation  Start: 01/12/18 1757   Admin Instructions: Infuse via CENTRAL LINE Only. May need EKG if less than 65 kg or on TPN - Max rate is 0.3 mEq/kg/hr for patients not on EKG monitoring.   If Serum K+ 3.0-3.3, dose = 20 mEq/hr x2 doses (40 mEq IV total dose). Recheck K+ level 2 hours after dose and the next AM.  If Serum K+ less than 3.0, dose = 20 mEq/hr x3 doses (60 mEq IV total dose). Recheck K+ level 2 hours after dose and the next AM.               potassium phosphate 15 mmol in D5W 250 mL intermittent infusion  Dose: 15 mmol Freq: DAILY PRN Route: IV  PRN Reason: phosphorous supplementation  Start: 01/12/18 1757   Admin Instructions: For serum phosphorus level 2-2.4  Do not infuse Phosphorus in the same line as TPN.   Give 15 mmol and recheck phosphorus level next AM.               potassium phosphate 20 mmol in D5W 250 mL intermittent infusion  Dose: 20 mmol Freq: EVERY 6 HOURS PRN Route: IV  PRN Reason: phosphorous supplementation  Start: 01/12/18 1757   Admin Instructions: For serum phosphorus level 1.1-1.9  For CENTRAL Line ONLY  Do not infuse Phosphorus in the same line as TPN.   Give 20 mmol and recheck phosphorus level 2 hours after last dose and next AM.               potassium phosphate 20 mmol in D5W 500 mL intermittent infusion  Dose: 20 mmol Freq: EVERY 6 HOURS PRN  Route: IV  PRN Reason: phosphorous supplementation  Start: 01/12/18 1757   Admin Instructions: For serum phosphorus level 1.1-1.9  For Peripheral Line  Do not infuse Phosphorus in the same line as TPN.   Give 20 mmol and recheck phosphorus level 2 hours after last dose and next AM.               potassium phosphate 25 mmol in D5W 500 mL intermittent infusion  Dose: 25 mmol Freq: EVERY 8 HOURS PRN Route: IV  PRN Reason: phosphorous supplementation  Start: 01/12/18 1757   Admin Instructions: For serum phosphorus level less than 1.1  Do not infuse Phosphorus in the same line as TPN.   Give 25 mmol and recheck phosphorus level 2 hours after last dose and next AM.               prochlorperazine (COMPAZINE) injection 5 mg  Dose: 5 mg Freq: EVERY 6 HOURS PRN Route: IV  PRN Reasons: nausea,vomiting  Start: 01/12/18 1757   Admin Instructions: This is Step 2 of nausea and vomiting management. If nausea not resolved in 15 minutes, notify provider.  For ordered doses up to 10 mg, give IV Push undiluted. Each 5mg over 1 minute.        1810 (5 mg)-Given         0855 (5 mg)-Given            psyllium (METAMUCIL/KONSYL) Packet 1 packet  Dose: 1 packet Freq: 2 TIMES DAILY Route: PO  Start: 01/15/18 0900          1305 (1 packet)-Given       [ ] 2000           saccharomyces boulardii (FLORASTOR) capsule 250 mg  Dose: 250 mg Freq: 2 TIMES DAILY Route: PO  Start: 01/15/18 0900   Admin Instructions: Capsules may be opened and sprinkled on semisolid food or added to beverage.           1305 (250 mg)-Given       [ ] 2000           sodium chloride (PF) 0.9% PF flush 3 mL  Dose: 3 mL Freq: EVERY 8 HOURS Route: IK  Start: 01/12/18 1800   Admin Instructions: And Q1H PRN, to lock peripheral IV dormant line.        (2016)-Not Given        (0227)-Not Given       0923 (3 mL)-Given       2124 (3 mL)-Given        0315 (3 mL)-Given       1157 (3 mL)-Given       1702 (3 mL)-Given        0000 (3 mL)-Given       0959 (3 mL)-Given       [ ] 1600            "sodium chloride (PF) 0.9% PF flush 3 mL  Dose: 3 mL Freq: EVERY 1 HOUR PRN Route: IK  PRN Reason: line flush  PRN Comment: for peripheral IV flush post IV meds  Start: 01/12/18 1757              traMADol (ULTRAM) tablet 50 mg  Dose: 50 mg Freq: EVERY 6 HOURS PRN Route: PO  PRN Reason: moderate to severe pain  Start: 01/12/18 1757   Admin Instructions: Use second for moderate pain after ibuprofen              Future Medications  Medications 01/09/18 01/10/18 01/11/18 01/12/18 01/13/18 01/14/18 01/15/18       lidocaine 1 % 1 mL  Dose: 1 mL Freq: EVERY 1 HOUR PRN Route: OTHER  PRN Comment: mild pain with VAD insertion or accessing implanted port  Start: 01/16/18 0000   Admin Instructions: Do NOT give if patient has a history of allergy to any local anesthetic or any \"travis\" product. MAX dose 1 mL subcutaneous OR intradermal in divided doses.              Completed Medications  Medications 01/09/18 01/10/18 01/11/18 01/12/18 01/13/18 01/14/18 01/15/18         Dose: 400 mg Freq: EVERY 12 HOURS Route: IV  Indications Comment: Post Op Prophylaxis  Last Dose: 400 mg (01/13/18 0226)  Start: 01/12/18 1445   End: 01/13/18 0326   Admin Instructions: Irritant.        1451 (400 mg)-New Bag        0226 (400 mg)-New Bag               Dose: 500 mL Freq: EVERY 4 HOURS PRN Route: IV  PRN Reason: other  PRN Comment: Give for urine output less than 80 mL/4 hours.  Last Dose: 500 mL (01/13/18 1545)  Start: 01/12/18 1757   End: 01/13/18 1545        1156 (500 mL)-New Bag [C]       1545 (500 mL)-New Bag               Dose: 500 mg Freq: ONCE Route: PO  Indications of Use: PERIOPERATIVE PHARMACOPROPHYLAXIS  Start: 01/13/18 0845   End: 01/13/18 0923        0923 (500 mg)-Given            Discontinued Medications  Medications 01/09/18 01/10/18 01/11/18 01/12/18 01/13/18 01/14/18 01/15/18         Freq: PRN  Start: 01/12/18 0817   End: 01/12/18 1748       0817 (1 Tube)-Given       1748-Med Discontinued            Freq: PRN  Start: 01/12/18 0814   " End: 01/12/18 1748       0814 (30 mL)-Given       1748-Med Discontinued            Dose: 25-50 mcg Freq: EVERY 5 MIN PRN Route: IV  PRN Reason: other  PRN Comment: acute pain  Start: 01/12/18 0955   End: 01/12/18 1748   Admin Instructions: MAX cumulative dose = 250 mcg.  Use fentaNYL (SUBLIMAZE) initially, as a short acting agent for acute pain control. If insufficient, or a longer acting agent is needed, begin morphine or HYDROmorphone (DILAUDID) if order.  For ordered doses up to 100 mcg give IV Push undiluted over a minimum of 3-5 minutes.        1411 (25 mcg)-Given       1748-Med Discontinued            Dose: 10 mg Freq: EVERY 4 HOURS PRN Route: IV  PRN Reason: high blood pressure  PRN Comment: Systolic Blood Pressure greater than 140 mmHg or Diastolic Blood Pressure greater than 90 mmHg.  Start: 01/12/18 1757   End: 01/12/18 1803   Admin Instructions: For ordered doses up to 40 mg, give IV Push undiluted over 1 minute.        1803-Med Discontinued            Dose: 0.1-0.2 mg Freq: EVERY 4 HOURS PRN Route: IV  PRN Reason: severe pain  Start: 01/13/18 0545   End: 01/15/18 0855   Admin Instructions: Use Tramadol first.           0855-Med Discontinued         Dose: 0.1-0.2 mg Freq: EVERY 2 HOURS PRN Route: IV  PRN Reason: severe pain  Start: 01/12/18 1757   End: 01/13/18 0539       1810 (0.2 mg)-Given        0539-Med Discontinued           Dose: 0.3-0.5 mg Freq: EVERY 5 MIN PRN Route: IV  PRN Reason: other  PRN Comment: acute pain.  May administer if Respiratory Rate is greater than 10  Start: 01/12/18 0955   End: 01/12/18 1748   Admin Instructions: Max cumulative dose = 2 mg  If fentaNYL (SUBLIMAZE) is also ordered, use HYDROmorphone (DILAUDID) if pain control insufficient with fentaNYL (SUBLIMAZE) or a longer acting agent is needed.        1748-Med Discontinued            Dose: 600 mg Freq: 3 TIMES DAILY Route: PO  Start: 01/12/18 2000   End: 01/15/18 0855       2017 (600 mg)-Given        0740 (600 mg)-Given        1333 (600 mg)-Given       2124 (600 mg)-Given        0802 (600 mg)-Given       (1617)-Not Given       2034 (600 mg)-Given        0855-Med Discontinued               Rate: 50 mL/hr Freq: CONTINUOUS Route: IV  Last Dose: Stopped (01/13/18 0643)  Start: 01/12/18 1100   End: 01/13/18 0539   Admin Instructions: Change to saline lock when well tolerated.        1050 ( )-New Bag       2259 ( )-Rate/Dose Verify        0539-Med Discontinued  0643-Stopped               Rate: 100 mL/hr Freq: CONTINUOUS Route: IV  Start: 01/12/18 1000   End: 01/12/18 1748   Admin Instructions: Continue until IV catheter is weaned               1748-Med Discontinued            Freq: PRN  Start: 01/12/18 0830   End: 01/12/18 1748       0830 (10 mL)-Given       1748-Med Discontinued            Dose: 500 mg Freq: EVERY 8 HOURS Route: IV  Indications Comment: Post Op Prophylaxis  Last Dose: 500 mg (01/12/18 2317)  Start: 01/12/18 1445   End: 01/13/18 0839       1550 (500 mg)-New Bag       2317 (500 mg)-New Bag        (0643)-Not Given [C]       0839-Med Discontinued           Dose: 0.1-0.4 mg Freq: EVERY 2 MIN PRN Route: IV  PRN Reason: opioid reversal  Start: 01/12/18 1757   End: 01/13/18 0539   Admin Instructions: For respiratory rate LESS than or EQUAL to 8.  Partial reversal dose:  0.1 mg titrated q 2 minutes for Analgesia Side Effects Monitoring Sedation Level of 3 (frequently drowsy, arousable, drifts to sleep during conversation).Full reversal dose:  0.4 mg bolus for Analgesia Side Effects Monitoring Sedation Level of 4 (somnolent, minimal or no response to stimulation).  For ordered doses up to 2mg give IVP. Give each 0.4mg over 15 seconds in emergency situations. For non-emergent situations further dilute in 9mL of NS to facilitate titration of response.         0539-Med Discontinued           Dose: 0.1-0.4 mg Freq: EVERY 2 MIN PRN Route: IV  PRN Reason: opioid reversal  Start: 01/12/18 1757   End: 01/13/18 1756   Admin Instructions: For  apnea or imminent respiratory arrest: give 0.4 mg IV undiluted Q 2 minutes PRN until desired degree of reversal is obtained, stop opioid and notify provider. Continue monitoring until discharge criteria are met for a minimum of 2 hours.  For severe sedation, decrease in respiratory depth, quality or respiratory rate less than 8: give 0.1 mg IV Q 2 minutes x 3 doses, stop opioid and notify provider.  Try to minimize reversal of analgesia especially in end-of-life patients  For ordered doses up to 2mg give IVP. Give each 0.4mg over 15 seconds in emergency situations. For non-emergent situations further dilute in 9mL of NS to facilitate titration of response.         1756-Med Discontinued           Dose: 4 mg Freq: EVERY 30 MIN PRN Route: PO  PRN Reason: nausea  Start: 01/12/18 0955   End: 01/12/18 1748   Admin Instructions: MAX total dose = 8 mg, including OR dosing. If not resolved in 15 minutes, then go to step 2 [prochlorperazine (COMPAZINE), if ordered].  With dry hands, peel back foil backing and gently remove tablet; do not push oral disintegrating tablet through foil backing; administer immediately on tongue and oral disintegrating tablet dissolves in seconds; then swallow with saliva; liquid not required.        1748-Med Discontinued         Or    Dose: 4 mg Freq: EVERY 30 MIN PRN Route: IV  PRN Reason: nausea  Start: 01/12/18 0955   End: 01/12/18 1748   Admin Instructions: MAX total dose = 8 mg, including OR dosing. If not resolved in 15 minutes, then go to step 2 [prochlorperazine (COMPAZINE), if ordered].  Irritant. For ordered doses up to 4 mg, give IV Push undiluted over 2-5 minutes.        1748-Med Discontinued            Dose: 17 g Freq: DAILY Route: PO  Start: 01/13/18 0800   End: 01/15/18 0855   Admin Instructions: Hold for diarrhea.  1 Packet = 17 grams. Mixed prescribed dose in 8 ounces of water. Follow with 8 oz. of water.         0740 (17 g)-Given        (0756)-Not Given [C]        0823-Hold  [C]       0855-Med Discontinued         Dose: 5 mg Freq: EVERY 6 HOURS PRN Route: IV  PRN Reasons: nausea,vomiting  Start: 01/12/18 0955   End: 01/12/18 1748   Admin Instructions: This is Step 2 of the nausea and vomiting protocol.   If nausea not resolved in 15 minutes, give metoclopramide (REGLAN) if ordered (step 3 of nausea and vomiting protocol)  For ordered doses up to 10 mg, give IV Push undiluted. Each 5mg over 1 minute.        1748-Med Discontinued       Medications 01/09/18 01/10/18 01/11/18 01/12/18 01/13/18 01/14/18 01/15/18

## 2018-01-12 NOTE — IP AVS SNAPSHOT
"    [address] [facility]: [phone]                                              INTERAGENCY TRANSFER FORM - PHYSICIAN ORDERS   2018                    Hospital Admission Date: 2018  CONSTANCE CONNELLY   : 1930  Sex: Female        Attending Provider: Amrik Mariano MD     Allergies:  No Known Allergies    Infection:  None   Service:  SURGERY    Ht:  1.47 m (4' 9.87\")   Wt:  41.8 kg (92 lb 3.2 oz)   Admission Wt:  39.9 kg (87 lb 15.4 oz)    BMI:  19.35 kg/m 2   BSA:  1.31 m 2            Patient PCP Information     Provider PCP Type    Alejandro Sandhu MD General      ED Clinical Impression     Diagnosis Description Comment Added By Time Added    Age-related osteoporosis with current pathological fracture, initial encounter [M80.00XA] Age-related osteoporosis with current pathological fracture, initial encounter [M80.00XA]  Gerald Galarza MD 1/15/2018  1:25 PM    Need for prophylactic vaccination and inoculation against influenza [Z23] Need for prophylactic vaccination and inoculation against influenza [Z23]  Gerald Galarza MD 1/15/2018  1:36 PM    Rectal prolapse [K62.3] Rectal prolapse [K62.3]  Gerald Galarza MD 1/15/2018  2:04 PM      Hospital Problems as of 1/15/2018              Priority Class Noted POA    Rectal prolapse Medium  2018 Yes      Non-Hospital Problems as of 1/15/2018              Priority Class Noted    Osteoporosis Medium  Unknown    Osteoarthritis Medium  Unknown    Recurrent falls Medium  2016    Chronic fatigue Medium  2016    Senile osteoporosis Medium  2017    Lumbar verterbral fracture, non-traumatic Medium  2017    Advance Care Planning Medium  2017    Encephalopathy Medium  10/23/2017    Generalized weakness Medium  2018      Code Status History     Date Active Date Inactive Code Status Order ID Comments User Context    1/15/2018  1:38 PM  Full Code [id number]  Gerald Galarza MD Outpatient    2018  5:57 PM 1/15/2018 "  1:38 PM Full Code [id number]  Amrik Mariano MD Inpatient    1/9/2018  8:46 AM 1/12/2018  5:57 PM DNR/DNI [id number]  Lindsey Rosas MD Outpatient    1/8/2018  2:45 AM 1/9/2018  8:46 AM DNR/DNI [id number]  Rossana Romero MD Inpatient    10/23/2017  8:38 PM 10/26/2017  4:11 PM Full Code [id number]  Arun Ortiz MD ED    3/15/2017  1:25 PM 10/23/2017  8:38 PM DNR/DNI [id number]  Alejandro Sandhu MD Outpatient         Medication Review      START taking        Dose / Directions Comments    ibuprofen 600 MG tablet   Commonly known as:  ADVIL/MOTRIN        Dose:  600 mg   Take 1 tablet (600 mg) by mouth 3 times daily as needed for moderate pain   Quantity:  120 tablet   Refills:  0        oseltamivir 30 MG capsule   Commonly known as:  TAMIFLU   Used for:  Need for prophylactic vaccination and inoculation against influenza        Dose:  30 mg   Take 1 capsule (30 mg) by mouth daily for 7 days   Quantity:  7 capsule   Refills:  0    Prescribing at request of receiving TCU. They had a patient with influenza and want this for prophylaxis.       psyllium Packet   Commonly known as:  METAMUCIL/KONSYL        Dose:  1 packet   Take 1 packet by mouth 2 times daily   Refills:  0        saccharomyces boulardii 250 MG capsule   Commonly known as:  FLORASTOR        Dose:  250 mg   Take 1 capsule (250 mg) by mouth 2 times daily   Quantity:  14 capsule   Refills:  0          CONTINUE these medications which may have CHANGED, or have new prescriptions. If we are uncertain of the size of tablets/capsules you have at home, strength may be listed as something that might have changed.        Dose / Directions Comments    cholecalciferol 1000 UNITS capsule   Commonly known as:  vitamin  -D   This may have changed:  when to take this   Used for:  Age-related osteoporosis with current pathological fracture with routine healing, subsequent encounter        Dose:  1 capsule   Take 1 capsule (1,000 Units) by mouth daily    Quantity:  100 capsule   Refills:  3        polyethylene glycol Packet   Commonly known as:  MIRALAX/GLYCOLAX   This may have changed:    - how much to take  - when to take this  - reasons to take this  - additional instructions        Dose:  17 g   Take 17 g by mouth daily as needed for constipation   Quantity:  7 packet   Refills:  0    Please hold for diarrhea       Thiamine HCl 50 MG Caps   This may have changed:  when to take this   Used for:  Confusion        Dose:  1 capsule   Take 1 capsule by mouth daily   Quantity:  60 capsule   Refills:  3          CONTINUE these medications which have NOT CHANGED        Dose / Directions Comments    acetaminophen 325 MG tablet   Commonly known as:  TYLENOL        Dose:  650 mg   Take 2 tablets (650 mg) by mouth every 4 hours as needed for mild pain   Quantity:  60 tablet   Refills:  0        amLODIPine 5 MG tablet   Commonly known as:  NORVASC   Used for:  Benign essential hypertension        Dose:  5 mg   Take 1 tablet (5 mg) by mouth daily   Quantity:  30 tablet   Refills:  0        calcium-vitamin D 600-400 MG-UNIT per tablet   Commonly known as:  CALTRATE        Dose:  1 tablet   Take 1 tablet by mouth every morning   Refills:  0        ondansetron 4 MG tablet   Commonly known as:  ZOFRAN        Dose:  4 mg   Take 1 tablet (4 mg) by mouth every 6 hours as needed for nausea While taking neomycin and flagyl.   Quantity:  3 tablet   Refills:  0        order for DME   Used for:  Severe hypertension        Equipment being ordered: Home BP monitor and cuff   Quantity:  1 each   Refills:  0        sodium fluoride dental gel 1.1 % Gel topical gel   Commonly known as:  PREVIDENT        Apply to affected area At Bedtime   Refills:  0        teriparatide (recombinant) 600 MCG/2.4ML Soln injection   Commonly known as:  FORTEO   Used for:  Age-related osteoporosis with current pathological fracture, initial encounter        Dose:  20 mcg   Inject 0.08 mLs (20 mcg) Subcutaneous  daily   Quantity:  6 mL   Refills:  1    Please hold this medication while the patient is at [facility]       VITAMIN B 12 PO        Dose:  1 tablet   Take 1 tablet by mouth every morning   Refills:  0          STOP taking     magnesium citrate solution           metroNIDAZOLE 500 MG tablet   Commonly known as:  FLAGYL           neomycin 500 MG tablet   Commonly known as:  MYCIFRADIN                   Summary of Visit     Reason for your hospital stay       Rectal prolapse             After Care     Activity - Up ad terri           Additional Discharge Instructions       DIET  -Low Residue Diet for at least 4-6 weeks unless cleared by Colorectal surgery.  No raw vegetables, fruit skins, fibrous foods that require a lot of chewing, nuts, seeds, corn, popcorn.   -We recommend eating slowly, chewing thoroughly, eating small frequent meals throughout the day  -Stay well hydrated.      ACTIVITY  -No lifting, pushing, pulling greater than 10 lbs and no strenuous exercise for 6 weeks   -No driving while on narcotic analgesics (i.e. Percocet, oxycodone, Vicodin)  -No driving until you are able to fully twist to both sides or slam on brakes quickly and without any pain    WOUND CLINIC  -Inspect your wounds daily for signs of infection (increased redness, drainage, pain)  -Keep your wound clean and dry  -You may shower, but do not soak in tub or pool    NOTIFY  Please contact Hardik Waller LPN, Angela Capps RN at [phone] for problems after discharge such as:  -Temperature > 101F, chills, rigors, dizziness  -Redness around or purulent drainage from wound  -Inability to tolerate diet, nausea or vomiting  -You stop passing gas, develop significant bloating, abdominal pain  -Have blood in stools/vomit  -Have severe diarrhea/constipation  -Any other questions or concerns.  - At nights (after 4:30pm), on weekends, or if urgent, call [phone] and ask the  to speak with the on-call Colorectal Surgery resident or  fellow      Medication Instructions  Some of your medications may have changed. Please take only prescribed and resumed medications     FOLLOW-UP  1.  You will need to follow-up with Joslyn Medina NP in the Colon and Rectal Surgery clinic in 1 week(s) and then with CRS Staff: Dr. Amrik Mariano in 2-3 weeks after.  Please contact our clinic scheduler Arlyn Shea (phone # [phone]) if you have not heard from our clinic in 3 business days afer discharge to schedule a follow-up appointment.     2.  Follow up with your primary care provider in 1-2 weeks after discharge from the hospital to review this hospitalization.       Advance Diet as Tolerated       Follow this diet upon discharge: low residue diet       General info for SNF       Length of Stay Estimate: Short Term Care: Estimated # of Days <30  Condition at Discharge: Improving  Level of care:skilled   Rehabilitation Potential: Excellent  Admission H&P remains valid and up-to-date: Yes  Recent Chemotherapy: N/A  Use Nursing Home Standing Orders: Yes             Your next 10 appointments already scheduled     Feb 07, 2018  3:15 PM CST   (Arrive by 3:00 PM)   RETURN ENDOCRINE with Saurabh Pittman MD   [facility] ([facility])    [address] [place] [id number] [phone]              Statement of Approval     Ordered          01/15/18 5748  I have reviewed and agree with all the recommendations and orders detailed in this document.  EFFECTIVE NOW     Approved and electronically signed by:  Gerald Galarza MD

## 2018-01-12 NOTE — IP AVS SNAPSHOT
[address] [address]    Phone:  [phone]                                       After Visit Summary   1/12/2018    Belia Sheets    MRN: [id number]           After Visit Summary Signature Page     I have received my discharge instructions, and my questions have been answered. I have discussed any challenges I see with this plan with the nurse or doctor.    ..........................................................................................................................................  Patient/Patient Representative Signature      ..........................................................................................................................................  Patient Representative Print Name and Relationship to Patient    ..................................................               ................................................  Date                                            Time    ..........................................................................................................................................  Reviewed by Signature/Title    ...................................................              ..............................................  Date                                                            Time

## 2018-01-12 NOTE — IP AVS SNAPSHOT
"          [address] [facility]: [phone]                                              INTERAGENCY TRANSFER FORM - LAB / IMAGING / EKG / EMG RESULTS   2018                    Hospital Admission Date: 2018  CONSTANCE CONNELLY   : 1930  Sex: Female        Attending Provider: Amrik Mariano MD     Allergies:  No Known Allergies    Infection:  None   Service:  SURGERY    Ht:  1.47 m (4' 9.87\")   Wt:  41.8 kg (92 lb 3.2 oz)   Admission Wt:  39.9 kg (87 lb 15.4 oz)    BMI:  19.35 kg/m 2   BSA:  1.31 m 2            Patient PCP Information     Provider PCP Type    Alejandro Sandhu MD General         Lab Results - 3 Days      Surgical pathology exam [[id number]]  Resulted: 01/15/18 0806, Result status: In process    Ordering provider: Amrik Mariano MD  18 09 Resulting lab: COPATH    Specimen Information    Type Source Collected On   Tissue Large Intestine, Rectum 18 0905            Glucose by meter [[id number]]  Resulted: 18 06, Result status: Final result    Ordering provider: Amrik Mraiano MD  18 Resulting lab: POINT OF CARE TEST, GLUCOSE    Specimen Information    Type Source Collected On     18 06          Components       Value Reference Range Flag Lab   Glucose 97 70 - 99 mg/dL  170            Testing Performed By     Lab - Abbreviation Name Director Address Valid Date Range    88 - Unknown COPATH Unknown Unknown 10/30/02 0000 - Present    170 - Unknown POINT OF CARE TEST, GLUCOSE Unknown Unknown 10/31/11 1114 - Present            Unresulted Labs (24h ago through future)    Start       Ordered    18 0700  Creatinine  AM DRAW,   Routine     Comments:  Last Lab Result: Creatinine (mg/dL)       Date                     Value                 2018               0.51 (L)         ----------    01/15/18 0937    01/15/18 0900  Clostridium difficile toxin B PCR  (LAB Clostridium difficile toxin B PCR PANEL)  ROUTINE,   " "Routine     Comments:  IF patient goes 24 hours WITHOUT diarrhea and NO sample has been obtained AFTER a C. difficile stool sample order is placed, nurse should CANCEL the C. difficile stool sample test.    01/15/18 0821    Unscheduled  Potassium  (Potassium Replacement - \"Standard\" - For K levels less than 3.4 mmol/L - UU,UR,UA,RH,SH,PH,WY )  CONDITIONAL (SPECIFY),   Routine     Comments:  Obtain Potassium Level for these conditions:  *IF no potassium result within 24 hours before initiation of order set, draw potassium level with next lab collect.    *2 HOURS AFTER last IV potassium replacement dose and 4 hours after an oral replacement dose.  *Next morning after potassium dose.     Repeat Potassium Replacement if necessary.    01/12/18 1757    Unscheduled  Magnesium  (Magnesium Replacement -  Adult - \"Standard\" - Replacement for all levels less than 1.6 mg/dL )  CONDITIONAL (SPECIFY),   Routine     Comments:  Obtain Magnesium Level for these conditions:  *IF no magnesium result within 24 hrs before initiation of order set, draw magnesium level with next lab collect.    *2 HOURS AFTER last magnesium replacement dose when magnesium replacement given for level less than 1.6   *Next morning after magnesium dose.     Repeat Magnesium Replacement if necessary.    01/12/18 1757    Unscheduled  Phosphorus  (POTASSIUM Phosphate - \"Standard\" - Replacement for levels less than or equal to 2.4 mg/dL )  CONDITIONAL (SPECIFY),   Routine     Comments:  Obtain Phosphorus Level for these conditions:  *IF no phosphorus result within 24 hrs before initiation of order set, draw phosphorus level with next lab collect.    *2 HOURS AFTER last phosphorus replacement dose for levels less than 2.0.  *Next morning after phosphorus dose.     Repeat Phosphorus Replacement if necessary.    01/12/18 1757      Encounter-Level Documents:     There are no encounter-level documents.      Order-Level Documents:     There are no order-level " documents.

## 2018-01-12 NOTE — IP AVS SNAPSHOT
"` `     [address] [facility]: [phone]                                              INTERAGENCY TRANSFER FORM - NURSING   2018                    Hospital Admission Date: 2018  CONSTANCE CONNELLY   : 1930  Sex: Female        Attending Provider: Amrik Mariano MD     Allergies:  No Known Allergies    Infection:  None   Service:  SURGERY    Ht:  1.47 m (4' 9.87\")   Wt:  41.8 kg (92 lb 3.2 oz)   Admission Wt:  39.9 kg (87 lb 15.4 oz)    BMI:  19.35 kg/m 2   BSA:  1.31 m 2            Patient PCP Information     Provider PCP Type    Alejandro Sandhu MD General      Current Code Status     Date Active Code Status Order ID Comments User Context       Prior      Code Status History     Date Active Date Inactive Code Status Order ID Comments User Context    1/15/2018  1:38 PM  Full Code [id number]  Gerald Galarza MD Outpatient    2018  5:57 PM 1/15/2018  1:38 PM Full Code [id number]  Amrik Mariano MD Inpatient    2018  8:46 AM 2018  5:57 PM DNR/DNI [id number]  Lindsey Rosas MD Outpatient    2018  2:45 AM 2018  8:46 AM DNR/DNI [id number]  Rossana Romero MD Inpatient    10/23/2017  8:38 PM 10/26/2017  4:11 PM Full Code [id number]  Arun Ortiz MD ED    3/15/2017  1:25 PM 10/23/2017  8:38 PM DNR/DNI [id number]  Alejandro Sandhu MD Outpatient      Advance Directives        Does patient have a scanned Advance Directive/ACP document in EPIC?           No        Hospital Problems as of 1/15/2018              Priority Class Noted POA    Rectal prolapse Medium  2018 Yes      Non-Hospital Problems as of 1/15/2018              Priority Class Noted    Osteoporosis Medium  Unknown    Osteoarthritis Medium  Unknown    Recurrent falls Medium  2016    Chronic fatigue Medium  2016    Senile osteoporosis Medium  2017    Lumbar verterbral fracture, non-traumatic Medium  2017    Advance Care Planning Medium  2017    Encephalopathy Medium  " 10/23/2017    Generalized weakness Medium  1/8/2018      Immunizations     Name Date      HEPA 11/02/99     HEPA 02/03/99     HepB 11/02/99     HepB 06/01/99     HepB 04/27/99     Influenza (H1N1) 01/22/10     Influenza (High Dose) 3 valent vaccine 10/26/16     Influenza (IIV3) PF 11/21/13     Influenza (IIV3) PF 10/08/12     Meningococcal (Menomune ) 01/23/03     Pneumo Conj 13-V (2010&after) 08/17/16     Pneumococcal 23 valent 11/03/00     Pneumococcal 23 valent 03/22/96     Poliovirus, inactivated (IPV) 01/23/03     TD (ADULT, 7+) 06/16/04     TD (ADULT, 7+) 07/01/97     TDAP Vaccine (Boostrix) 08/17/16     Typhoid IM 01/26/11     Typhoid IM 06/16/04     Typhoid IM 02/03/99     Yellow Fever 02/03/99          END      ASSESSMENT     Discharge Profile Flowsheet     DISCHARGE NEEDS ASSESSMENT     Resources List  Home Care 01/09/18 1410    Equipment Currently Used at Home  grab bar;shower chair (4 wheeled walker) 01/13/18 1503   SKIN      Transportation Available  family or friend will provide 01/13/18 1503   Inspection of bony prominences  Full 01/14/18 2230    GASTROINTESTINAL (ADULT,PEDIATRIC,OB)     Inspection under devices  Full 01/14/18 2230    GI WDL  ex 01/14/18 2230   Skin WDL  ex 01/14/18 2230    Abdominal Appearance  contour irregular 01/14/18 2230   Skin Color/Characteristics  without discoloration 01/12/18 1110    All Quadrants Bowel Sounds  audible and normoactive 01/14/18 2230   Skin Temperature  warm 01/12/18 1110    Last Bowel Movement  01/14/18 01/14/18 2230   Skin Moisture  dry 01/14/18 2230    GI Signs/Symptoms  fecal incontinence 01/14/18 2230   Skin Integrity  incision(s);bruise(s) 01/14/18 2230    Passing flatus  yes 01/14/18 2230   SAFETY      COMMUNICATION ASSESSMENT     Safety WDL  WDL 01/14/18 2230    Patient's communication style  spoken language (English or Bilingual) 01/04/18 1426   Safety Factors  bed in low position;wheels locked;call light in reach;upper side rails raised x 2;ID band  "on 01/14/18 1239    FINAL RESOURCES     All Alarms  none present 01/14/18 2230                 Assessment WDL (Within Defined Limits) Definitions           Safety WDL     Effective: 09/28/15    Row Information: <b>WDL Definition:</b> Bed in low position, wheels locked; call light in reach; upper side rails up x 2; ID band on<br> <font color=\"gray\"><i>Item=AS safety wdl>>List=AS safety wdl>>Version=F14</i></font>      Skin WDL     Effective: 09/28/15    Row Information: <b>WDL Definition:</b> Warm; dry; intact; elastic; without discoloration; pressure points without redness<br> <font color=\"gray\"><i>Item=AS skin wdl>>List=AS skin wdl>>Version=F14</i></font>      Vitals     Vital Signs Flowsheet     VITAL SIGNS     Change in Pain  getting better 01/14/18 2221    Temp  96.7  F (35.9  C) 01/15/18 0745   Pain Control  fully effective 01/14/18 2221    Temp src  Axillary 01/15/18 0745   Functioning  can do everything I need to 01/14/18 2221    Resp  16 01/15/18 0745   Sleep  normal sleep 01/14/18 2221    Pulse  90 01/15/18 0932   ANALGESIA SIDE EFFECTS MONITORING      Heart Rate  80 01/14/18 2302   Side Effects Monitoring: Respiratory Quality  R 01/15/18 0416    Pulse/Heart Rate Source  Monitor 01/15/18 0932   Side Effects Monitoring: Respiratory Depth  N 01/15/18 0416    BP  (!)  147/92 01/15/18 0932   Side Effects Monitoring: Sedation Level  S 01/15/18 0416    BP Location  Left arm 01/15/18 0745   HEIGHT AND WEIGHT      Patient Position  Lying 01/12/18 0948   Height  1.47 m (4' 9.87\") 01/12/18 0621    OXYGEN THERAPY     Weight  41.8 kg (92 lb 3.2 oz) 01/14/18 1239    SpO2  96 % 01/15/18 0745   Weight Method  Standing scale 01/14/18 1239    O2 Device  None (Room air) 01/15/18 0745   BSA (Calculated - sq m)  1.28 01/12/18 0621    Oxygen Delivery  2 LPM 01/12/18 1013   BMI (Calculated)  18.5 01/12/18 0621    RESPIRATORY MONITORING     POSITIONING      Respiratory Monitoring (EtCO2)  41 mmHg 01/13/18 0334   Body Position  " independently positioning 01/14/18 2230    Integrated Pulmonary Index (IPI)  10 01/13/18 0334   Head of Bed (HOB)  HOB at 20-30 degrees 01/14/18 1239    PAIN/COMFORT     Chair  Upright in chair 01/13/18 0857    Patient Currently in Pain  sleeping: patient not able to self report 01/15/18 0416   Positioning/Transfer Devices  pillows;in use 01/13/18 0707    Preferred Pain Scale  CAPA (Clinically Aligned Pain Assessment) ([facility] Adults Only) 01/14/18 2221   DAILY CARE      Pain Location  Rectum 01/14/18 1820   Activity Management  ambulated in room;ambulated to bathroom 01/14/18 2310    Pain Descriptors  Discomfort 01/14/18 1820   Activity Assistance Provided  assistance, stand-by 01/14/18 2310    Pain Management Interventions  around-the-clock dosing utilized 01/14/18 1820   Assistive Device Utilized  walker 01/14/18 2310    Pain Intervention(s)  Medication (See eMAR) (sched ) 01/14/18 2221   ECG      Response to Interventions  Absence of nonverbal indicators of pain 01/15/18 0416   ECG Rhythm  Sinus rhythm 01/12/18 1218    CLINICALLY ALIGNED PAIN ASSESSMENT (CAPA) ([facility] ADULTS ONLY)     Ectopy  None 01/12/18 0948    Comfort  negligible pain 01/14/18 2221   Lead Monitored  Lead II;V 5 01/12/18 0948            Patient Lines/Drains/Airways Status    Active LINES/DRAINS/AIRWAYS     Name: Placement date: Placement time: Site: Days: Last dressing change:    Peripheral IV 01/13/18 Right;Anterior Upper forearm 01/13/18   1212   Upper forearm   2     Incision/Surgical Site 01/12/18 Rectum 01/12/18   0915    3             Patient Lines/Drains/Airways Status    Active PICC/CVC     None            Intake/Output Detail Report     Date Intake       Output     Net    Shift P.O. I.V. IV Piggyback Colloid Total Urine Other Blood Total       Day 01/14/18 0000 - 01/14/18 0659 100 -- -- -- 100 350 -- -- 350 -250    Dennise 01/14/18 0700 - 01/14/18 1459 460 -- -- -- 460 200 -- -- 200 260    Noc  01/14/18 1500 - 01/14/18 2359 -- -- -- -- -- 450 -- -- 450 -450    Day 01/15/18 0000 - 01/15/18 0659 -- -- -- -- -- -- -- -- -- 0    Dennise 01/15/18 0700 - 01/15/18 1459 -- -- -- -- -- -- 100 -- 100 -100      Last Void/BM       Most Recent Value    Urine Occurrence     Stool Occurrence 1 at 01/14/2018 2338      Case Management/Discharge Planning     Case Management/Discharge Planning Flowsheet     LIVING ENVIRONMENT     Resources List  Home Care 01/09/18 1410    Lives With  alone 01/13/18 1503   / CAREGIVER      Living Arrangements  apartment 01/13/18 1503   Filed Complexity Screen Score  7 01/14/18 1203    COPING/STRESS     ABUSE RISK SCREEN      Major Change/Loss/Stressor  none;denies 01/12/18 1835   QUESTION TO PATIENT:  Has a member of your family or a partner(now or in the past) intimidated, hurt, manipulated, or controlled you in any way?  no 01/12/18 0642    DISCHARGE PLANNING     QUESTION TO PATIENT: Do you feel safe going back to the place where you are living?  yes 01/12/18 0642    Transportation Available  family or friend will provide 01/13/18 1503   OBSERVATION: Is there reason to believe there has been maltreatment of a vulnerable adult (ie. Physical/Sexual/Emotional abuse, self neglect, lack of adequate food, shelter, medical care, or financial exploitation)?  no 01/12/18 0642    FINAL RESOURCES     (R) MENTAL HEALTH SUICIDE RISK      Equipment Currently Used at Home  grab bar;shower chair (4 wheeled walker) 01/13/18 1503   Are you depressed or being treated for depression?  No 01/12/18 1836

## 2018-01-12 NOTE — IP AVS SNAPSHOT
Belia Sheets #[id number] (CSN: [id number])  (87 year old F)  (Adm: 18)     [id number]               [address] [facility]: [phone]            Patient Demographics     Patient Name Sex          Age SSN Address Phone    Belia Sheets Female 1930 (87 year old) [id number]  [address]  [facility] [address] [phone] (Home)  [phone] (Mobile) *Preferred*      Emergency Contact(s)     Name Relation Home Work Mobile    Lubna Proctor Daughter [phone]      Admission Information     Attending Provider Admitting Provider Admission Type Admission Date/Time    Amrik Mariano MD Gaertner, Wolfgang Bernd, MD Elective 18  0545    Discharge Date Hospital Service Auth/Cert Status Service Area     Surgery [facility]    Unit Room/Bed Admission Status       [facility] [id number]/[id number] Admission (Confirmed)       Admission     Complaint    Rectal Prolapse , Rectal prolapse      Hospital Account     Name Acct ID Class Status Primary Coverage    Belia Sheets [id number] Inpatient Open UCARE - UCARE SENIORS NON FPA            Guarantor Account (for Hospital Account #[id number])     Name Relation to Pt Service Area Active? Acct Type    Belia Sheets Self FCS Yes Personal/Family    Address Phone          [address]  [address]  [place] [address] [phone](H)              Coverage Information (for Hospital Account #[id number])     F/O Payor/Plan Precert #    UCARE/UCARE SENIORS NON FPA     Subscriber Subscriber #    Belia Sheets [id number]    Address Phone    [address]  [place] [address] [phone]                                                INTERAGENCY TRANSFER FORM - PHYSICIAN ORDERS   2018                       [address] [place] [address]: [phone]            Attending Provider: Amrik Mariano MD     Allergies:  No Known Allergies    Infection:  None   Service:   "SURGERY    Ht:  1.47 m (4' 9.87\")   Wt:  41.8 kg (92 lb 3.2 oz)   Admission Wt:  39.9 kg (87 lb 15.4 oz)    BMI:  19.35 kg/m 2   BSA:  1.31 m 2            ED Clinical Impression     Diagnosis Description Comment Added By Time Added    Age-related osteoporosis with current pathological fracture, initial encounter [M80.00XA] Age-related osteoporosis with current pathological fracture, initial encounter [M80.00XA]  Gerald Galarza MD 1/15/2018  1:25 PM    Need for prophylactic vaccination and inoculation against influenza [Z23] Need for prophylactic vaccination and inoculation against influenza [Z23]  Gerald Galarza MD 1/15/2018  1:36 PM    Rectal prolapse [K62.3] Rectal prolapse [K62.3]  Gerald Galarza MD 1/15/2018  2:04 PM      Hospital Problems as of 1/15/2018              Priority Class Noted POA    Rectal prolapse Medium  1/12/2018 Yes      Non-Hospital Problems as of 1/15/2018              Priority Class Noted    Osteoporosis Medium  Unknown    Osteoarthritis Medium  Unknown    Recurrent falls Medium  8/31/2016    Chronic fatigue Medium  8/31/2016    Senile osteoporosis Medium  1/13/2017    Lumbar verterbral fracture, non-traumatic Medium  1/13/2017    Advance Care Planning Medium  6/27/2017    Encephalopathy Medium  10/23/2017    Generalized weakness Medium  1/8/2018      Code Status History     Date Active Date Inactive Code Status Order ID Comments User Context    1/15/2018  1:38 PM  Full Code [id number]  Gerald Galarza MD Outpatient    1/12/2018  5:57 PM 1/15/2018  1:38 PM Full Code [id number]  Amrik Mariano MD Inpatient    1/9/2018  8:46 AM 1/12/2018  5:57 PM DNR/DNI [id number]  Lindsey Rosas MD Outpatient    1/8/2018  2:45 AM 1/9/2018  8:46 AM DNR/DNI [id number]  Rossana Romero MD Inpatient    10/23/2017  8:38 PM 10/26/2017  4:11 PM Full Code [id number]  Arun Ortiz MD ED    3/15/2017  1:25 PM 10/23/2017  8:38 PM DNR/DNI [id number]  Alejandro Sandhu MD Outpatient      Current " Code Status     Date Active Code Status Order ID Comments User Context       Prior      Summary of Visit     Reason for your hospital stay       Rectal prolapse                Medication Review      START taking        Dose / Directions Comments    ibuprofen 600 MG tablet   Commonly known as:  ADVIL/MOTRIN        Dose:  600 mg   Take 1 tablet (600 mg) by mouth 3 times daily as needed for moderate pain   Quantity:  120 tablet   Refills:  0        oseltamivir 30 MG capsule   Commonly known as:  TAMIFLU   Used for:  Need for prophylactic vaccination and inoculation against influenza        Dose:  30 mg   Take 1 capsule (30 mg) by mouth daily for 7 days   Quantity:  7 capsule   Refills:  0    Prescribing at request of receiving TCU. They had a patient with influenza and want this for prophylaxis.       psyllium Packet   Commonly known as:  METAMUCIL/KONSYL        Dose:  1 packet   Take 1 packet by mouth 2 times daily   Refills:  0        saccharomyces boulardii 250 MG capsule   Commonly known as:  FLORASTOR        Dose:  250 mg   Take 1 capsule (250 mg) by mouth 2 times daily   Quantity:  14 capsule   Refills:  0          CONTINUE these medications which may have CHANGED, or have new prescriptions. If we are uncertain of the size of tablets/capsules you have at home, strength may be listed as something that might have changed.        Dose / Directions Comments    cholecalciferol 1000 UNITS capsule   Commonly known as:  vitamin  -D   This may have changed:  when to take this   Used for:  Age-related osteoporosis with current pathological fracture with routine healing, subsequent encounter        Dose:  1 capsule   Take 1 capsule (1,000 Units) by mouth daily   Quantity:  100 capsule   Refills:  3        polyethylene glycol Packet   Commonly known as:  MIRALAX/GLYCOLAX   This may have changed:    - how much to take  - when to take this  - reasons to take this  - additional instructions        Dose:  17 g   Take 17 g by  mouth daily as needed for constipation   Quantity:  7 packet   Refills:  0    Please hold for diarrhea       Thiamine HCl 50 MG Caps   This may have changed:  when to take this   Used for:  Confusion        Dose:  1 capsule   Take 1 capsule by mouth daily   Quantity:  60 capsule   Refills:  3          CONTINUE these medications which have NOT CHANGED        Dose / Directions Comments    acetaminophen 325 MG tablet   Commonly known as:  TYLENOL        Dose:  650 mg   Take 2 tablets (650 mg) by mouth every 4 hours as needed for mild pain   Quantity:  60 tablet   Refills:  0        amLODIPine 5 MG tablet   Commonly known as:  NORVASC   Used for:  Benign essential hypertension        Dose:  5 mg   Take 1 tablet (5 mg) by mouth daily   Quantity:  30 tablet   Refills:  0        calcium-vitamin D 600-400 MG-UNIT per tablet   Commonly known as:  CALTRATE        Dose:  1 tablet   Take 1 tablet by mouth every morning   Refills:  0        ondansetron 4 MG tablet   Commonly known as:  ZOFRAN        Dose:  4 mg   Take 1 tablet (4 mg) by mouth every 6 hours as needed for nausea While taking neomycin and flagyl.   Quantity:  3 tablet   Refills:  0        order for DME   Used for:  Severe hypertension        Equipment being ordered: Home BP monitor and cuff   Quantity:  1 each   Refills:  0        sodium fluoride dental gel 1.1 % Gel topical gel   Commonly known as:  PREVIDENT        Apply to affected area At Bedtime   Refills:  0        teriparatide (recombinant) 600 MCG/2.4ML Soln injection   Commonly known as:  FORTEO   Used for:  Age-related osteoporosis with current pathological fracture, initial encounter        Dose:  20 mcg   Inject 0.08 mLs (20 mcg) Subcutaneous daily   Quantity:  6 mL   Refills:  1    Please hold this medication while the patient is at [facility]       VITAMIN B 12 PO        Dose:  1 tablet   Take 1 tablet by mouth every morning   Refills:  0          STOP taking     magnesium citrate solution            metroNIDAZOLE 500 MG tablet   Commonly known as:  FLAGYL           neomycin 500 MG tablet   Commonly known as:  MYCIFRADIN                   After Care     Activity - Up ad terri           Additional Discharge Instructions       DIET  -Low Residue Diet for at least 4-6 weeks unless cleared by Colorectal surgery.  No raw vegetables, fruit skins, fibrous foods that require a lot of chewing, nuts, seeds, corn, popcorn.   -We recommend eating slowly, chewing thoroughly, eating small frequent meals throughout the day  -Stay well hydrated.      ACTIVITY  -No lifting, pushing, pulling greater than 10 lbs and no strenuous exercise for 6 weeks   -No driving while on narcotic analgesics (i.e. Percocet, oxycodone, Vicodin)  -No driving until you are able to fully twist to both sides or slam on brakes quickly and without any pain    WOUND CLINIC  -Inspect your wounds daily for signs of infection (increased redness, drainage, pain)  -Keep your wound clean and dry  -You may shower, but do not soak in tub or pool    NOTIFY  Please contact Hardik Waller LPN, Angela Capps RN at [phone] for problems after discharge such as:  -Temperature > 101F, chills, rigors, dizziness  -Redness around or purulent drainage from wound  -Inability to tolerate diet, nausea or vomiting  -You stop passing gas, develop significant bloating, abdominal pain  -Have blood in stools/vomit  -Have severe diarrhea/constipation  -Any other questions or concerns.  - At nights (after 4:30pm), on weekends, or if urgent, call [phone] and ask the  to speak with the on-call Colorectal Surgery resident or fellow      Medication Instructions  Some of your medications may have changed. Please take only prescribed and resumed medications     FOLLOW-UP  1.  You will need to follow-up with Joslyn Medina NP in the Colon and Rectal Surgery clinic in 1 week(s) and then with CRS Staff: Dr. Amrik Mariano in 2-3 weeks after.  Please contact our  "clinic scheduler Arlyn Shea (phone # [phone]) if you have not heard from our clinic in 3 business days afer discharge to schedule a follow-up appointment.     2.  Follow up with your primary care provider in 1-2 weeks after discharge from the hospital to review this hospitalization.       Advance Diet as Tolerated       Follow this diet upon discharge: low residue diet       General info for SNF       Length of Stay Estimate: Short Term Care: Estimated # of Days <30  Condition at Discharge: Improving  Level of care:skilled   Rehabilitation Potential: Excellent  Admission H&P remains valid and up-to-date: Yes  Recent Chemotherapy: N/A  Use Nursing Home Standing Orders: Yes             Your next 10 appointments already scheduled     Feb 07, 2018  3:15 PM CST   (Arrive by 3:00 PM)   RETURN ENDOCRINE with Saurabh Pittman MD   [facility] ([facility])    [address] [place] [id number]   [phone]              Statement of Approval     Ordered          01/15/18 5618  I have reviewed and agree with all the recommendations and orders detailed in this document.  EFFECTIVE NOW     Approved and electronically signed by:  Gerald Galarza MD                                                 INTERAGENCY TRANSFER FORM - NURSING   1/12/2018                       [address] [facility]: [phone]            Attending Provider: Amrik Mariano MD     Allergies:  No Known Allergies    Infection:  None   Service:  SURGERY    Ht:  1.47 m (4' 9.87\")   Wt:  41.8 kg (92 lb 3.2 oz)   Admission Wt:  39.9 kg (87 lb 15.4 oz)    BMI:  19.35 kg/m 2   BSA:  1.31 m 2            Advance Directives        Does patient have a scanned Advance Directive/ACP document in EPIC?           No        Immunizations     Name Date      HEPA 11/02/99     HEPA 02/03/99     HepB 11/02/99     HepB 06/01/99     HepB 04/27/99     Influenza (H1N1) 01/22/10     Influenza (High " Dose) 3 valent vaccine 10/26/16     Influenza (IIV3) PF 11/21/13     Influenza (IIV3) PF 10/08/12     Meningococcal (Menomune ) 01/23/03     Pneumo Conj 13-V (2010&after) 08/17/16     Pneumococcal 23 valent 11/03/00     Pneumococcal 23 valent 03/22/96     Poliovirus, inactivated (IPV) 01/23/03     TD (ADULT, 7+) 06/16/04     TD (ADULT, 7+) 07/01/97     TDAP Vaccine (Boostrix) 08/17/16     Typhoid IM 01/26/11     Typhoid IM 06/16/04     Typhoid IM 02/03/99     Yellow Fever 02/03/99       ASSESSMENT     Discharge Profile Flowsheet     DISCHARGE NEEDS ASSESSMENT     Resources List  Home Care 01/09/18 1410    Equipment Currently Used at Home  grab bar;shower chair (4 wheeled walker) 01/13/18 1503   SKIN      Transportation Available  family or friend will provide 01/13/18 1503   Inspection of bony prominences  Full 01/14/18 2230    GASTROINTESTINAL (ADULT,PEDIATRIC,OB)     Inspection under devices  Full 01/14/18 2230    GI WDL  ex 01/14/18 2230   Skin WDL  ex 01/14/18 2230    Abdominal Appearance  contour irregular 01/14/18 2230   Skin Color/Characteristics  without discoloration 01/12/18 1110    All Quadrants Bowel Sounds  audible and normoactive 01/14/18 2230   Skin Temperature  warm 01/12/18 1110    Last Bowel Movement  01/14/18 01/14/18 2230   Skin Moisture  dry 01/14/18 2230    GI Signs/Symptoms  fecal incontinence 01/14/18 2230   Skin Integrity  incision(s);bruise(s) 01/14/18 2230    Passing flatus  yes 01/14/18 2230   SAFETY      COMMUNICATION ASSESSMENT     Safety WDL  WDL 01/14/18 2230    Patient's communication style  spoken language (English or Bilingual) 01/04/18 1426   Safety Factors  bed in low position;wheels locked;call light in reach;upper side rails raised x 2;ID band on 01/14/18 1239    FINAL RESOURCES     All Alarms  none present 01/14/18 2230                 Assessment WDL (Within Defined Limits) Definitions           Safety WDL     Effective: 09/28/15    Row Information: <b>WDL Definition:</b> Bed  "in low position, wheels locked; call light in reach; upper side rails up x 2; ID band on<br> <font color=\"gray\"><i>Item=AS safety wdl>>List=AS safety wdl>>Version=F14</i></font>      Skin WDL     Effective: 09/28/15    Row Information: <b>WDL Definition:</b> Warm; dry; intact; elastic; without discoloration; pressure points without redness<br> <font color=\"gray\"><i>Item=AS skin wdl>>List=AS skin wdl>>Version=F14</i></font>      Vitals     Vital Signs Flowsheet     VITAL SIGNS     Change in Pain  getting better 01/14/18 2221    Temp  96.7  F (35.9  C) 01/15/18 0745   Pain Control  fully effective 01/14/18 2221    Temp src  Axillary 01/15/18 0745   Functioning  can do everything I need to 01/14/18 2221    Resp  16 01/15/18 0745   Sleep  normal sleep 01/14/18 2221    Pulse  90 01/15/18 0932   ANALGESIA SIDE EFFECTS MONITORING      Heart Rate  80 01/14/18 2302   Side Effects Monitoring: Respiratory Quality  R 01/15/18 0416    Pulse/Heart Rate Source  Monitor 01/15/18 0932   Side Effects Monitoring: Respiratory Depth  N 01/15/18 0416    BP  (!)  147/92 01/15/18 0932   Side Effects Monitoring: Sedation Level  S 01/15/18 0416    BP Location  Left arm 01/15/18 0745   HEIGHT AND WEIGHT      Patient Position  Lying 01/12/18 0948   Height  1.47 m (4' 9.87\") 01/12/18 0621    OXYGEN THERAPY     Weight  41.8 kg (92 lb 3.2 oz) 01/14/18 1239    SpO2  96 % 01/15/18 0745   Weight Method  Standing scale 01/14/18 1239    O2 Device  None (Room air) 01/15/18 0745   BSA (Calculated - sq m)  1.28 01/12/18 0621    Oxygen Delivery  2 LPM 01/12/18 1013   BMI (Calculated)  18.5 01/12/18 0621    RESPIRATORY MONITORING     POSITIONING      Respiratory Monitoring (EtCO2)  41 mmHg 01/13/18 0334   Body Position  independently positioning 01/14/18 2230    Integrated Pulmonary Index (IPI)  10 01/13/18 0334   Head of Bed (HOB)  HOB at 20-30 degrees 01/14/18 1239    PAIN/COMFORT     Chair  Upright in chair 01/13/18 0857    Patient Currently in Pain  " sleeping: patient not able to self report 01/15/18 0416   Positioning/Transfer Devices  pillows;in use 01/13/18 0707    Preferred Pain Scale  CAPA (Clinically Aligned Pain Assessment) ([facility] Adults Only) 01/14/18 2221   DAILY CARE      Pain Location  Rectum 01/14/18 1820   Activity Management  ambulated in room;ambulated to bathroom 01/14/18 2310    Pain Descriptors  Discomfort 01/14/18 1820   Activity Assistance Provided  assistance, stand-by 01/14/18 2310    Pain Management Interventions  around-the-clock dosing utilized 01/14/18 1820   Assistive Device Utilized  walker 01/14/18 2310    Pain Intervention(s)  Medication (See eMAR) (sched ) 01/14/18 2221   ECG      Response to Interventions  Absence of nonverbal indicators of pain 01/15/18 0416   ECG Rhythm  Sinus rhythm 01/12/18 1218    CLINICALLY ALIGNED PAIN ASSESSMENT (CAPA) ([facility] ADULTS ONLY)     Ectopy  None 01/12/18 0948    Comfort  negligible pain 01/14/18 2221   Lead Monitored  Lead II;V 5 01/12/18 0948            Patient Lines/Drains/Airways Status    Active LINES/DRAINS/AIRWAYS     Name: Placement date: Placement time: Site: Days: Last dressing change:    Peripheral IV 01/13/18 Right;Anterior Upper forearm 01/13/18   1212   Upper forearm   2     Incision/Surgical Site 01/12/18 Rectum 01/12/18   0915    3             Patient Lines/Drains/Airways Status    Active PICC/CVC     None            Intake/Output Detail Report     Date Intake       Output     Net    Shift P.O. I.V. IV Piggyback Colloid Total Urine Other Blood Total       Day 01/14/18 0000 - 01/14/18 0659 100 -- -- -- 100 350 -- -- 350 -250    Dennise 01/14/18 0700 - 01/14/18 1459 460 -- -- -- 460 200 -- -- 200 260    Noc 01/14/18 1500 - 01/14/18 2359 -- -- -- -- -- 450 -- -- 450 -450    Day 01/15/18 0000 - 01/15/18 0659 -- -- -- -- -- -- -- -- -- 0    Dennise 01/15/18 0700 - 01/15/18 1459 -- -- -- -- -- -- 100 -- 100 -100      Last Void/BM       Most Recent Value     Urine Occurrence     Stool Occurrence 1 at 01/14/2018 2338      Case Management/Discharge Planning     Case Management/Discharge Planning Flowsheet     LIVING ENVIRONMENT     Resources List  Home Care 01/09/18 1410    Lives With  alone 01/13/18 1503   MH/BH CAREGIVER      Living Arrangements  apartment 01/13/18 1503   Filed Complexity Screen Score  7 01/14/18 1203    COPING/STRESS     ABUSE RISK SCREEN      Major Change/Loss/Stressor  none;denies 01/12/18 1835   QUESTION TO PATIENT:  Has a member of your family or a partner(now or in the past) intimidated, hurt, manipulated, or controlled you in any way?  no 01/12/18 0642    DISCHARGE PLANNING     QUESTION TO PATIENT: Do you feel safe going back to the place where you are living?  yes 01/12/18 0642    Transportation Available  family or friend will provide 01/13/18 1503   OBSERVATION: Is there reason to believe there has been maltreatment of a vulnerable adult (ie. Physical/Sexual/Emotional abuse, self neglect, lack of adequate food, shelter, medical care, or financial exploitation)?  no 01/12/18 0642    FINAL RESOURCES     (R) MENTAL HEALTH SUICIDE RISK      Equipment Currently Used at Home  grab bar;shower chair (4 wheeled walker) 01/13/18 1503   Are you depressed or being treated for depression?  No 01/12/18 1835                  [address] [facility]: [phone]            Medication Administration Report for Bleia Sheets as of 01/15/18 1440   Legend:    Given Hold Not Given Due Canceled Entry Other Actions    Time Time (Time) Time  Time-Action       Inactive    Active    Linked        Medications 01/09/18 01/10/18 01/11/18 01/12/18 01/13/18 01/14/18 01/15/18    acetaminophen (TYLENOL) tablet 1,000 mg  Dose: 1,000 mg Freq: 4 TIMES DAILY Route: PO  Start: 01/12/18 1600   Admin Instructions: Maximum acetaminophen dose from all sources = 75 mg/kg/day not to exceed 4 gram        1411 (1,000 mg)-Given              2017 (1,000 mg)-Given        0740 (1,000  "mg)-Given       1135 (1,000 mg)-Given       1612 (1,000 mg)-Given       2124 (1,000 mg)-Given        0802 (1,000 mg)-Given       1157 (1,000 mg)-Given       1618 (1,000 mg)-Given       2034 (1,000 mg)-Given        0958 (1,000 mg)-Given       1305 (1,000 mg)-Given       [ ] 1600       [ ] 2000           amLODIPine (NORVASC) tablet 5 mg  Dose: 5 mg Freq: DAILY Route: PO  Start: 01/13/18 0800   Admin Instructions: Hold for SBP <110.         0740 (5 mg)-Given        0802 (5 mg)-Given        0958 (5 mg)-Given           benzocaine-menthol (CEPACOL) 15-3.6 MG lozenge 1 lozenge  Dose: 1 lozenge Freq: EVERY 1 HOUR PRN Route: BU  PRN Reason: sore throat  Start: 01/12/18 1757   Admin Instructions: For sore throat without fever.               hydrALAZINE (APRESOLINE) injection 10 mg  Dose: 10 mg Freq: EVERY 4 HOURS PRN Route: IV  PRN Reason: high blood pressure  PRN Comment: SBP>150 or DBP>90.  Start: 01/12/18 1757   Admin Instructions: For ordered doses up to 40 mg, give IV Push undiluted over 1 minute.          0802 (10 mg)-Given            ibuprofen (ADVIL/MOTRIN) tablet 600 mg  Dose: 600 mg Freq: 3 TIMES DAILY PRN Route: PO  PRN Reason: moderate pain  Start: 01/15/18 0900          0958 (600 mg)-Given           lidocaine (LMX4) kit  Freq: EVERY 1 HOUR PRN Route: Top  PRN Reason: pain  PRN Comment: with VAD insertion or accessing implanted port.  Start: 01/12/18 1757   Admin Instructions: Do NOT give if patient has a history of allergy to any local anesthetic or any \"travis\" product.   Apply 30 minutes prior to VAD insertion or port access.  MAX Dose:  2.5 g (  of 5 g tube)               magnesium sulfate 4 g in 100 mL sterile water (premade)  Dose: 4 g Freq: EVERY 4 HOURS PRN Route: IV  PRN Reason: magnesium supplementation  Start: 01/12/18 1757   Admin Instructions: For serum Mg++ less than 1.6 mg/dL  Give 4 g and recheck magnesium level 2 hours after dose, and next AM.               menthol-zinc oxide (CALMOSEPTINE) " 0.44-20.625 % ointment  Freq: 4 TIMES DAILY Route: Top  Start: 01/15/18 0930   Admin Instructions: Apply thick layer on perianal skin. Replace after BMs as well. Thx!           (0930)-Not Given [C]       1308 ( )-Given       [ ] 1600       [ ] 2000           polyethylene glycol (MIRALAX/GLYCOLAX) Packet 17 g  Dose: 17 g Freq: DAILY PRN Route: PO  PRN Reason: constipation  Start: 01/15/18 0900   Admin Instructions: Hold for diarrhea.  1 Packet = 17 grams. Mixed prescribed dose in 8 ounces of water. Follow with 8 oz. of water.               potassium chloride 10 mEq in 100 mL intermittent infusion with 10 mg lidocaine  Dose: 10 mEq Freq: EVERY 1 HOUR PRN Route: IV  PRN Reason: potassium supplementation  Start: 01/12/18 1757   Admin Instructions: Infuse via PERIPHERAL LINE. Use potassium with lidocaine for pain with peripheral administration.  If Serum K+ 3.0-3.3, dose = 10 mEq/hr x4 doses (40 mEq IV total dose). Recheck K+ level 2 hours after dose and the next AM.  If Serum K+ less than 3.0, dose = 10 mEq/hr x6 doses (60 mEq IV total dose). Recheck K+ level 2 hours after dose and the next AM.               potassium chloride 10 mEq in 100 mL sterile water intermittent infusion (premix)  Dose: 10 mEq Freq: EVERY 1 HOUR PRN Route: IV  PRN Reason: potassium supplementation  Start: 01/12/18 1757   Admin Instructions: Infuse via PERIPHERAL LINE or CENTRAL LINE. Use for central line replacement if patient weight less than 65 kg, if patient is on TPN with high potassium content or if unit does not stock 20 mEq bags.   If Serum K+ 3.0-3.3, dose = 10 mEq/hr x4 doses (40 mEq IV total dose). Recheck K+ level 2 hours after dose and the next AM.   If Serum K+ less than 3.0, dose = 10 mEq/hr x6 doses (60 mEq IV total dose). Recheck K+ level 2 hours after dose and the next AM.               potassium chloride 20 mEq in 50 mL intermittent infusion  Dose: 20 mEq Freq: EVERY 1 HOUR PRN Route: IV  PRN Reason: potassium  supplementation  Start: 01/12/18 1757   Admin Instructions: Infuse via CENTRAL LINE Only. May need EKG if less than 65 kg or on TPN - Max rate is 0.3 mEq/kg/hr for patients not on EKG monitoring.   If Serum K+ 3.0-3.3, dose = 20 mEq/hr x2 doses (40 mEq IV total dose). Recheck K+ level 2 hours after dose and the next AM.  If Serum K+ less than 3.0, dose = 20 mEq/hr x3 doses (60 mEq IV total dose). Recheck K+ level 2 hours after dose and the next AM.               potassium phosphate 15 mmol in D5W 250 mL intermittent infusion  Dose: 15 mmol Freq: DAILY PRN Route: IV  PRN Reason: phosphorous supplementation  Start: 01/12/18 1757   Admin Instructions: For serum phosphorus level 2-2.4  Do not infuse Phosphorus in the same line as TPN.   Give 15 mmol and recheck phosphorus level next AM.               potassium phosphate 20 mmol in D5W 250 mL intermittent infusion  Dose: 20 mmol Freq: EVERY 6 HOURS PRN Route: IV  PRN Reason: phosphorous supplementation  Start: 01/12/18 1757   Admin Instructions: For serum phosphorus level 1.1-1.9  For CENTRAL Line ONLY  Do not infuse Phosphorus in the same line as TPN.   Give 20 mmol and recheck phosphorus level 2 hours after last dose and next AM.               potassium phosphate 20 mmol in D5W 500 mL intermittent infusion  Dose: 20 mmol Freq: EVERY 6 HOURS PRN Route: IV  PRN Reason: phosphorous supplementation  Start: 01/12/18 1757   Admin Instructions: For serum phosphorus level 1.1-1.9  For Peripheral Line  Do not infuse Phosphorus in the same line as TPN.   Give 20 mmol and recheck phosphorus level 2 hours after last dose and next AM.               potassium phosphate 25 mmol in D5W 500 mL intermittent infusion  Dose: 25 mmol Freq: EVERY 8 HOURS PRN Route: IV  PRN Reason: phosphorous supplementation  Start: 01/12/18 1757   Admin Instructions: For serum phosphorus level less than 1.1  Do not infuse Phosphorus in the same line as TPN.   Give 25 mmol and recheck phosphorus level 2  hours after last dose and next AM.               prochlorperazine (COMPAZINE) injection 5 mg  Dose: 5 mg Freq: EVERY 6 HOURS PRN Route: IV  PRN Reasons: nausea,vomiting  Start: 01/12/18 1757   Admin Instructions: This is Step 2 of nausea and vomiting management. If nausea not resolved in 15 minutes, notify provider.  For ordered doses up to 10 mg, give IV Push undiluted. Each 5mg over 1 minute.        1810 (5 mg)-Given         0855 (5 mg)-Given            psyllium (METAMUCIL/KONSYL) Packet 1 packet  Dose: 1 packet Freq: 2 TIMES DAILY Route: PO  Start: 01/15/18 0900          1305 (1 packet)-Given       [ ] 2000           saccharomyces boulardii (FLORASTOR) capsule 250 mg  Dose: 250 mg Freq: 2 TIMES DAILY Route: PO  Start: 01/15/18 0900   Admin Instructions: Capsules may be opened and sprinkled on semisolid food or added to beverage.           1305 (250 mg)-Given       [ ] 2000           sodium chloride (PF) 0.9% PF flush 3 mL  Dose: 3 mL Freq: EVERY 8 HOURS Route: IK  Start: 01/12/18 1800   Admin Instructions: And Q1H PRN, to lock peripheral IV dormant line.        (2016)-Not Given        (0227)-Not Given       0923 (3 mL)-Given       2124 (3 mL)-Given        0315 (3 mL)-Given       1157 (3 mL)-Given       1702 (3 mL)-Given        0000 (3 mL)-Given       0959 (3 mL)-Given       [ ] 1600           sodium chloride (PF) 0.9% PF flush 3 mL  Dose: 3 mL Freq: EVERY 1 HOUR PRN Route: IK  PRN Reason: line flush  PRN Comment: for peripheral IV flush post IV meds  Start: 01/12/18 1757              traMADol (ULTRAM) tablet 50 mg  Dose: 50 mg Freq: EVERY 6 HOURS PRN Route: PO  PRN Reason: moderate to severe pain  Start: 01/12/18 1757   Admin Instructions: Use second for moderate pain after ibuprofen              Future Medications  Medications 01/09/18 01/10/18 01/11/18 01/12/18 01/13/18 01/14/18 01/15/18       lidocaine 1 % 1 mL  Dose: 1 mL Freq: EVERY 1 HOUR PRN Route: OTHER  PRN Comment: mild pain with VAD insertion or  "accessing implanted port  Start: 01/16/18 0000   Admin Instructions: Do NOT give if patient has a history of allergy to any local anesthetic or any \"travis\" product. MAX dose 1 mL subcutaneous OR intradermal in divided doses.              Completed Medications  Medications 01/09/18 01/10/18 01/11/18 01/12/18 01/13/18 01/14/18 01/15/18         Dose: 400 mg Freq: EVERY 12 HOURS Route: IV  Indications Comment: Post Op Prophylaxis  Last Dose: 400 mg (01/13/18 0226)  Start: 01/12/18 1445   End: 01/13/18 0326   Admin Instructions: Irritant.        1451 (400 mg)-New Bag        0226 (400 mg)-New Bag               Dose: 500 mL Freq: EVERY 4 HOURS PRN Route: IV  PRN Reason: other  PRN Comment: Give for urine output less than 80 mL/4 hours.  Last Dose: 500 mL (01/13/18 1545)  Start: 01/12/18 1757   End: 01/13/18 1545        1156 (500 mL)-New Bag [C]       1545 (500 mL)-New Bag               Dose: 500 mg Freq: ONCE Route: PO  Indications of Use: PERIOPERATIVE PHARMACOPROPHYLAXIS  Start: 01/13/18 0845   End: 01/13/18 0923        0923 (500 mg)-Given            Discontinued Medications  Medications 01/09/18 01/10/18 01/11/18 01/12/18 01/13/18 01/14/18 01/15/18         Freq: PRN  Start: 01/12/18 0817   End: 01/12/18 1748       0817 (1 Tube)-Given       1748-Med Discontinued            Freq: PRN  Start: 01/12/18 0814   End: 01/12/18 1748       0814 (30 mL)-Given       1748-Med Discontinued            Dose: 25-50 mcg Freq: EVERY 5 MIN PRN Route: IV  PRN Reason: other  PRN Comment: acute pain  Start: 01/12/18 0955   End: 01/12/18 1748   Admin Instructions: MAX cumulative dose = 250 mcg.  Use fentaNYL (SUBLIMAZE) initially, as a short acting agent for acute pain control. If insufficient, or a longer acting agent is needed, begin morphine or HYDROmorphone (DILAUDID) if order.  For ordered doses up to 100 mcg give IV Push undiluted over a minimum of 3-5 minutes.        1411 (25 mcg)-Given       1748-Med Discontinued            Dose: 10 mg " Freq: EVERY 4 HOURS PRN Route: IV  PRN Reason: high blood pressure  PRN Comment: Systolic Blood Pressure greater than 140 mmHg or Diastolic Blood Pressure greater than 90 mmHg.  Start: 01/12/18 1757   End: 01/12/18 1803   Admin Instructions: For ordered doses up to 40 mg, give IV Push undiluted over 1 minute.        1803-Med Discontinued            Dose: 0.1-0.2 mg Freq: EVERY 4 HOURS PRN Route: IV  PRN Reason: severe pain  Start: 01/13/18 0545   End: 01/15/18 0855   Admin Instructions: Use Tramadol first.           0855-Med Discontinued         Dose: 0.1-0.2 mg Freq: EVERY 2 HOURS PRN Route: IV  PRN Reason: severe pain  Start: 01/12/18 1757   End: 01/13/18 0539       1810 (0.2 mg)-Given        0539-Med Discontinued           Dose: 0.3-0.5 mg Freq: EVERY 5 MIN PRN Route: IV  PRN Reason: other  PRN Comment: acute pain.  May administer if Respiratory Rate is greater than 10  Start: 01/12/18 0955   End: 01/12/18 1748   Admin Instructions: Max cumulative dose = 2 mg  If fentaNYL (SUBLIMAZE) is also ordered, use HYDROmorphone (DILAUDID) if pain control insufficient with fentaNYL (SUBLIMAZE) or a longer acting agent is needed.        1748-Med Discontinued            Dose: 600 mg Freq: 3 TIMES DAILY Route: PO  Start: 01/12/18 2000   End: 01/15/18 0855       2017 (600 mg)-Given        0740 (600 mg)-Given       1333 (600 mg)-Given       2124 (600 mg)-Given        0802 (600 mg)-Given       (1617)-Not Given       2034 (600 mg)-Given        0855-Med Discontinued               Rate: 50 mL/hr Freq: CONTINUOUS Route: IV  Last Dose: Stopped (01/13/18 0643)  Start: 01/12/18 1100   End: 01/13/18 0539   Admin Instructions: Change to saline lock when well tolerated.        1050 ( )-New Bag       2259 ( )-Rate/Dose Verify        0539-Med Discontinued  0643-Stopped               Rate: 100 mL/hr Freq: CONTINUOUS Route: IV  Start: 01/12/18 1000   End: 01/12/18 1748   Admin Instructions: Continue until IV catheter is weaned                1748-Med Discontinued            Freq: PRN  Start: 01/12/18 0830   End: 01/12/18 1748       0830 (10 mL)-Given       1748-Med Discontinued            Dose: 500 mg Freq: EVERY 8 HOURS Route: IV  Indications Comment: Post Op Prophylaxis  Last Dose: 500 mg (01/12/18 2317)  Start: 01/12/18 1445   End: 01/13/18 0839       1550 (500 mg)-New Bag       2317 (500 mg)-New Bag        (0643)-Not Given [C]       0839-Med Discontinued           Dose: 0.1-0.4 mg Freq: EVERY 2 MIN PRN Route: IV  PRN Reason: opioid reversal  Start: 01/12/18 1757   End: 01/13/18 0539   Admin Instructions: For respiratory rate LESS than or EQUAL to 8.  Partial reversal dose:  0.1 mg titrated q 2 minutes for Analgesia Side Effects Monitoring Sedation Level of 3 (frequently drowsy, arousable, drifts to sleep during conversation).Full reversal dose:  0.4 mg bolus for Analgesia Side Effects Monitoring Sedation Level of 4 (somnolent, minimal or no response to stimulation).  For ordered doses up to 2mg give IVP. Give each 0.4mg over 15 seconds in emergency situations. For non-emergent situations further dilute in 9mL of NS to facilitate titration of response.         0539-Med Discontinued           Dose: 0.1-0.4 mg Freq: EVERY 2 MIN PRN Route: IV  PRN Reason: opioid reversal  Start: 01/12/18 1757   End: 01/13/18 1756   Admin Instructions: For apnea or imminent respiratory arrest: give 0.4 mg IV undiluted Q 2 minutes PRN until desired degree of reversal is obtained, stop opioid and notify provider. Continue monitoring until discharge criteria are met for a minimum of 2 hours.  For severe sedation, decrease in respiratory depth, quality or respiratory rate less than 8: give 0.1 mg IV Q 2 minutes x 3 doses, stop opioid and notify provider.  Try to minimize reversal of analgesia especially in end-of-life patients  For ordered doses up to 2mg give IVP. Give each 0.4mg over 15 seconds in emergency situations. For non-emergent situations further dilute in 9mL of  NS to facilitate titration of response.         1756-Med Discontinued           Dose: 4 mg Freq: EVERY 30 MIN PRN Route: PO  PRN Reason: nausea  Start: 01/12/18 0955   End: 01/12/18 1748   Admin Instructions: MAX total dose = 8 mg, including OR dosing. If not resolved in 15 minutes, then go to step 2 [prochlorperazine (COMPAZINE), if ordered].  With dry hands, peel back foil backing and gently remove tablet; do not push oral disintegrating tablet through foil backing; administer immediately on tongue and oral disintegrating tablet dissolves in seconds; then swallow with saliva; liquid not required.        1748-Med Discontinued         Or    Dose: 4 mg Freq: EVERY 30 MIN PRN Route: IV  PRN Reason: nausea  Start: 01/12/18 0955   End: 01/12/18 1748   Admin Instructions: MAX total dose = 8 mg, including OR dosing. If not resolved in 15 minutes, then go to step 2 [prochlorperazine (COMPAZINE), if ordered].  Irritant. For ordered doses up to 4 mg, give IV Push undiluted over 2-5 minutes.        1748-Med Discontinued            Dose: 17 g Freq: DAILY Route: PO  Start: 01/13/18 0800   End: 01/15/18 0855   Admin Instructions: Hold for diarrhea.  1 Packet = 17 grams. Mixed prescribed dose in 8 ounces of water. Follow with 8 oz. of water.         0740 (17 g)-Given        (0756)-Not Given [C]        0823-Hold [C]       0855-Med Discontinued         Dose: 5 mg Freq: EVERY 6 HOURS PRN Route: IV  PRN Reasons: nausea,vomiting  Start: 01/12/18 0955   End: 01/12/18 1748   Admin Instructions: This is Step 2 of the nausea and vomiting protocol.   If nausea not resolved in 15 minutes, give metoclopramide (REGLAN) if ordered (step 3 of nausea and vomiting protocol)  For ordered doses up to 10 mg, give IV Push undiluted. Each 5mg over 1 minute.        1748-Med Discontinued       Medications 01/09/18 01/10/18 01/11/18 01/12/18 01/13/18 01/14/18 01/15/18               INTERAGENCY TRANSFER FORM - NOTES (H&P, Discharge Summary, Consults,  Procedures, Therapies)   1/12/2018                       [address] [facility] BANK: [phone]               History & Physicals      H&P signed by Jamaal Jenkins at 1/15/2018  1:05 PM      Author:  Jamaal Jenkins Service:  (none) Author Type:  Physician    Filed:  1/15/2018  1:05 PM Date of Service:  1/15/2018 11:47 AM Creation Time:  1/15/2018  1:05 PM    Status:  Signed :  Jamaal Jenkins (Physician)     Scan on 1/15/2018  1:05 PM by Gregory, Provider : [facility] PRE-OP H/P/01/04/2018 1          Revision History        User Key Date/Time User Provider Type Action    > [N/A] 1/15/2018  1:05 PM Gregory Provider Physician Sign            H&P by Nathalie Philip APRN CNS at 1/4/2018  1:00 PM     Author:  Nathalie Philip APRN CNS Service:  (none) Author Type:  Clinical Nurse Specialist    Filed:  1/4/2018  3:15 PM Encounter Date:  1/4/2018 Status:  Signed    :  Nathalie Philip APRN CNS (Clinical Nurse Specialist)             Pre-Operative H & P     CC:  Preoperative exam to assess for increased cardiopulmonary risk while undergoing surgery and anesthesia.    Date of Encounter: 1/4/2018  Primary Care Physician:  Alejandro Sandhu  Maliadenita Keaton Sheets is a 87 year old female who presents for pre-operative H & P in preparation for perineal rectosigmoidectomy with Dr. Mariano on[KS1.1] 1/1[KS1.2]2[KS1.1]/18[KS1.2] at [facility]. History is obtained from the patient.   Patient who consulted today with Dr. Mariano with concerns for full thickness rectal prolapse with symptoms of protrusion, and perianal pain and irritation. This is affecting her ability to do her daily activities. She was counseled for above procedure.   Patient's history is otherwise significant for an episode of encephalopathy in 10/2017, managed by Neurology but with no specific findings.[KS1.1] She is well followed by her PCP Dr. Sandhu.[KS1.3]    Past Medical History[KS1.1]  Past Medical  History:   Diagnosis Date     Carpal tunnel syndrome (aka CTS)      H/O calcium pyrophosphate deposition disease (CPPD)      History of encephalopathy 10/2017     Hypertension      Kyphoscoliosis      Osteoarthritis     hands     Osteoporosis      Rectal prolapse[KS1.4]        Past Surgical History[KS1.1]  Past Surgical History:   Procedure Laterality Date     COLONOSCOPY  2010     HYSTERECTOMY      for uterine prolapse[KS1.4]       Hx of Blood transfusions/reactions:[KS1.1] Denies.[KS1.5]      Hx of abnormal bleeding or anti-platelet use:[KS1.1] Denies.[KS1.5]     Menstrual history:[KS1.1] No LMP recorded. Patient is postmenopausal.[KS1.4]    Steroid use in the last year:[KS1.1] Denies.[KS1.5]     Personal or FH with difficulty with Anesthesia:[KS1.1]  Denies.[KS1.5]    Prior to Admission Medications[KS1.1]  Current Outpatient Prescriptions   Medication Sig Dispense Refill     lisinopril (PRINIVIL/ZESTRIL) 10 MG tablet Take 1 tablet (10 mg) by mouth daily (Patient taking differently: Take 10 mg by mouth every morning ) 30 tablet 3     Thiamine HCl 50 MG CAPS Take 1 capsule by mouth daily (Patient taking differently: Take 1 capsule by mouth every morning ) 60 capsule 3     cholecalciferol (VITAMIN  -D) 1000 UNITS capsule Take 1 capsule (1,000 Units) by mouth daily (Patient taking differently: Take 1 capsule by mouth every morning ) 100 capsule 3     teriparatide, recombinant, (FORTEO) 600 MCG/2.4ML SOLN injection Inject 0.08 mLs (20 mcg) Subcutaneous daily 6 mL 1     calcium-vitamin D (CALTRATE) 600-400 MG-UNIT per tablet Take 1 tablet by mouth every morning        Omega-3 Fatty Acids (OMEGA-3 FISH OIL PO) Take 2 capsules by mouth 2 times daily        sodium fluoride dental gel (PREVIDENT) 1.1 % GEL Apply to affected area At Bedtime       Cyanocobalamin (VITAMIN B 12 PO) Take 1 tablet by mouth every morning        magnesium citrate solution Take 296 mLs by mouth See Admin Instructions Refer to surgery handout. 296  mL 0     order for DME Equipment being ordered: Home BP monitor and cuff 1 each 0[KS1.4]       Allergies[KS1.1]  No Known Allergies[KS1.4]    Social History[KS1.1]  Social History     Social History     Marital status:      Spouse name: N/A     Number of children: N/A     Years of education: N/A     Occupational History     Not on file.     Social History Main Topics     Smoking status: Never Smoker     Smokeless tobacco: Never Used     Alcohol use No     Drug use: No     Sexual activity: Not on file     Other Topics Concern     Not on file     Social History Narrative[KS1.4]       Family History[KS1.1]  Family History   Problem Relation Age of Onset     Depression/Anxiety Son      Depression/Anxiety Son      alcohol abuse  age 24     Hypertension Mother      Hypertension Brother      DIABETES No family hx of      Breast Cancer No family hx of      Colon Cancer No family hx of      Prostate Cancer No family hx of      Other Cancer No family hx of[KS1.4]        Review of Systems    The complete review of systems is negative other than noted in the HPI or here.[KS1.1]   Constitutional: Denies fever, chills, weight loss.  Skin: Perianal irritation and occasional blood.  HEENT: Wears glasses for vision. Occasional swallowing difficulty.  Respiratory: Denies cough or shortness of breath. No concern for CEASAR.  CV: Denies chest pain or irregular HR. Activity is limited. Walks with walker. BP irregularity with some medication changes. The addition of Amlodipine made her feel nauseated and dizzy and ED MD asked her to stop taking it. Continues on Lisinopril.  GI: Denies abdominal pain or bowel issues.  : Incontinence.   M/S: Multiple joint aches and kyphosis.  Neuro: Has returned to near baseline after hospitalization in 10/2017 for encephalopathy. Occasional word finding difficulty and will occasionally have hallucinations.[KS1.6]    Temp: 98.3  F (36.8  C) Temp src: Oral BP: 145/84 Pulse: 88   Resp: 16 SpO2: 98  "%         91 lbs 0 oz  4' 10.5\"   Body mass index is 18.7 kg/(m^2).[KS1.4]       Physical Exam  Constitutional: Awake, alert, cooperative, no apparent distress, and appears stated age.[KS1.1] Accompanied by daughter.[KS1.6]   Eyes: Pupils equal, round and reactive to light, extra ocular muscles intact, sclera clear, conjunctiva normal.[KS1.1] Glasses on.[KS1.6]  HENT: Normocephalic, oral pharynx with moist mucus membranes,[KS1.1] several teeth missing on bottom[KS1.6]. No goiter appreciated.   Respiratory: Clear to auscultation bilaterally, no crackles or wheezing.[KS1.1] No cough or obvious dyspnea.[KS1.6]  Cardiovascular: Regular rate and rhythm, normal S1 and S2, and no murmur noted. Carotids, no bruits. No edema. Palpable pulses to radial  DP and PT arteries.   GI: Normal bowel sounds, soft, non-distended, non-tender, no masses palpated, no hepatosplenomegaly.    Lymph/Hematologic: No cervical lymphadenopathy and no supraclavicular lymphadenopathy.  Genitourinary:[KS1.1] Deferred.[KS1.6]  Skin: Warm and dry.    Musculoskeletal:[KS1.1] Very limited[KS1.6] neck[KS1.1] extension Irregular posture, changes of arthritis in hands.[KS1.6] Gross motor strength is[KS1.1] weakened[KS1.6].    Neurologic: Awake, alert, oriented to name, place and time.[KS1.1] Very articulate. One episode of word finding difficulty.[KS1.6]  Neuropsychiatric: Calm, cooperative. Normal affect.     Labs: (personally reviewed)[KS1.1]  Lab Results   Component Value Date    WBC 8.3 01/04/2018     Lab Results   Component Value Date    RBC 4.51 01/04/2018     Lab Results   Component Value Date    HGB 13.6 01/04/2018     Lab Results   Component Value Date    HCT 40.4 01/04/2018     Lab Results   Component Value Date    MCV 90 01/04/2018     Lab Results   Component Value Date    MCH 30.2 01/04/2018     Lab Results   Component Value Date    MCHC 33.7 01/04/2018     Lab Results   Component Value Date    RDW 12.7 01/04/2018     Lab Results   Component " Value Date     2018     Last Basic Metabolic Panel:  Lab Results   Component Value Date     2018      Lab Results   Component Value Date    POTASSIUM 4.2 2018     Lab Results   Component Value Date    CHLORIDE 106 2018     Lab Results   Component Value Date    BOY 9.5 2018     Lab Results   Component Value Date    CO2 30 2018     Lab Results   Component Value Date    BUN 13 2018     Lab Results   Component Value Date    CR 0.65 2018     Lab Results   Component Value Date    GLC 90 2018     Lab Results   Component Value Date    AST 15 2018     Lab Results   Component Value Date    ALT 14 2018     No results found for: BILICONJ   Lab Results   Component Value Date    BILITOTAL 0.7 2018     Lab Results   Component Value Date    ALBUMIN 3.3 2018     Lab Results   Component Value Date    PROTTOTAL 6.9 2018      Lab Results   Component Value Date    ALKPHOS 58 2018   Prealbumin 21[KS1.7]  EKG: Personally reviewed but formal cardiology read pendin18[KS1.1] Normal sinus rhythm[KS1.3]    CHEST X-RAY 17  IMPRESSION:  1. Streaky bibasilar opacities, likely representing atelectasis.  2. Anterior compression wedge deformities of the thoracic spine, with  loss of up to 90% vertebral body height.  MRI brain 10/25/17  Diffusion weighted images demonstrate no definite acute infarct. On  the FLAIR images there is nonspecific mild periventricular and deep  white matter T2 hyperintensity which are most likely related to  chronic small vessel ischemic disease.       Outside records reviewed from: Care Everywhere    ASSESSMENT and PLAN  Belia Sheets is a 87 year old female scheduled to undergo perineal rectosigmoidectomy with Dr. Mariano on[KS1.1] [KS1.2]2[KS1.1][KS1.2]. She has the following specific operative considerations:   - RCRI : No serious cardiac risks.   - Anesthesia considerations:  Refer to PAC  assessment in anesthesia records  - VTE risk: 0.5%  - CEASAR # of risks 2/8 = Low risk  - Risk of PONV score = 3.  If > 2, anti-emetic intervention recommended. If 3 or > anti emetic intervention recommended with two or more meds     --Full thickness rectal prolapse. Above procedure now planned.   --HTN. Will hold Lisinopril on DOS. EKG above. Activity limited.[KS1.1] Walks with walker.[KS1.5]   --Nonsmoker. No pulmonary symptoms.   --Recent admission for encephalopathy but no findings on evaluation.[KS1.1] Mild word finding difficulty[KS1.5].[KS1.3]   --Osteoarthritis.[KS1.1] Kyphosis. Will require careful positioning. Fall RISK.[KS1.3]    --Pain management. Discussed possibility of nerve block if appropriate for patient's procedure. Final counseling and decisions by regional team on DOS.  Type and screen drawn by service   Arrival time, NPO, shower and medication instructions provided by nursing staff today. Preparing For Your Surgery handout given.      Patient was discussed with Dr Fournier.    SUSIE Medina  Preoperative Assessment Center  [facility]  Clinic and Surgery Center  Phone: [phone]  Fax: [phone][KS1.1]     Revision History        User Key Date/Time User Provider Type Action    > KS1.4 1/4/2018  3:15 PM Nathalie Philip APRN CNS Clinical Nurse Specialist Sign     KS1.3 1/4/2018  3:11 PM Nathalie Philip APRN CNS Clinical Nurse Specialist      KS1.5 1/4/2018  2:42 PM Nathalie Philip APRN CNS Clinical Nurse Specialist      KS1.6 1/4/2018  2:02 PM Nathalie Philip APRN CNS Clinical Nurse Specialist      KS1.7 1/4/2018  1:25 PM Nathalie Philip APRN CNS Clinical Nurse Specialist      KS1.2 1/4/2018  1:10 PM Nathalie Philip APRN CNS Clinical Nurse Specialist      KS1.1 1/4/2018  1:09 PM Nathalie Philip APRN CNS Clinical Nurse Specialist                      Discharge Summaries      Discharge Summaries by Gerald Galarza MD at 1/15/2018  1:39 PM      Author:  Gerald Galarza MD Service:  Colorectal Surgery Author Type:  Resident    Filed:  1/15/2018  2:12 PM Date of Service:  1/15/2018  1:39 PM Creation Time:  1/15/2018  1:39 PM    Status:  Attested :  Gerald Galarza MD (Resident)    Cosigner:  Amrik Mariano MD at 1/15/2018  2:30 PM        Attestation signed by Amrik Mariano MD at 1/15/2018  2:30 PM        Attestation:  Physician Attestation   I, Amrik Mariano, saw and evaluated this patient prior to discharge.  I discussed the patient with the resident and agree with plan of care as documented in the resident note.      I personally reviewed vital signs, medications, labs and imaging.    I personally spent 20 minutes on discharge activities.    Amrik Mariano  Date of Service (when I saw the patient): 01/15/18                               [facility]  Discharge Summary  Colon and Rectal Surgery     Belia Sheets MRN# [id number]   YOB: 1930 Age: 87 year old     Date of Admission:  1/12/2018  Date of Discharge::[JJ1.1]  1/15/2018[JJ1.2]  Admitting Physician:  Amrik Mariano MD  Discharge Physician:  Gerald Galarza MD  Primary Care Physician:        Alejandro Sandhu          Admission Diagnoses:   Rectal Prolapse           Discharge Diagnosis:   Same         Procedures:   Perineal Rectosigmoidectomy              Consultations:[JJ1.1]   NUTRITION SERVICES ADULT IP CONSULT  OCCUPATIONAL THERAPY ADULT IP CONSULT  PHYSICAL THERAPY ADULT IP CONSULT  SOCIAL WORK IP CONSULT  VASCULAR ACCESS CARE ADULT IP CONSULT  PHYSICAL THERAPY ADULT IP CONSULT[JJ1.3]         Imaging Studies:     Results for orders placed or performed during the hospital encounter of 01/07/18   XR Chest 2 Views    Narrative    Exam:  XR CHEST 2 VW, 1/7/2018 10:41 PM    History: cough, vomiting, rule out aspiration;     Comparison:  12/1/2017    Findings: PA and lateral view of the chest. The  cardiomediastinal  silhouette is stable. No pleural effusion or pneumothorax. No acute  airspace opacity. Stable multiple anterior compression wedge  deformities of the thoracic spine with loss of about 90% of the  vertebral body height, this results in exaggerated kyphosis. Bilateral  chronic rib deformities.      Impression    Impression:    1. No acute cardiopulmonary abnormality.  2. Stable thoracic compression wedge deformities.    I have personally reviewed the examination and initial interpretation  and I agree with the findings.    MONA MCNAMARA MD              Medications Prior to Admission:[JJ1.1]     Prescriptions Prior to Admission   Medication Sig Dispense Refill Last Dose     amLODIPine (NORVASC) 5 MG tablet Take 1 tablet (5 mg) by mouth daily 30 tablet 0 1/11/2018 at 1400     ondansetron (ZOFRAN) 4 MG tablet Take 1 tablet (4 mg) by mouth every 6 hours as needed for nausea While taking neomycin and flagyl. 3 tablet 0 1/11/2018 at 2100     Thiamine HCl 50 MG CAPS Take 1 capsule by mouth daily (Patient taking differently: Take 1 capsule by mouth every morning ) 60 capsule 3 Past Week at Unknown time     cholecalciferol (VITAMIN  -D) 1000 UNITS capsule Take 1 capsule (1,000 Units) by mouth daily (Patient taking differently: Take 1 capsule by mouth every morning ) 100 capsule 3 Past Week at Unknown time     calcium-vitamin D (CALTRATE) 600-400 MG-UNIT per tablet Take 1 tablet by mouth every morning    Past Week at Unknown time     sodium fluoride dental gel (PREVIDENT) 1.1 % GEL Apply to affected area At Bedtime   1/12/2018 at 0400     Cyanocobalamin (VITAMIN B 12 PO) Take 1 tablet by mouth every morning    Past Week at Unknown time     acetaminophen (TYLENOL) 325 MG tablet Take 2 tablets (650 mg) by mouth every 4 hours as needed for mild pain 60 tablet 0 Unknown at Unknown time     [DISCONTINUED] neomycin (MYCIFRADIN) 500 MG tablet Take 2 tablets (1,000 mg) by mouth every 8 hours prior to surgery.  Take  in conjunction with Flagyl. 6 tablet 0 1/11/2018 at 2100     [DISCONTINUED] polyethylene glycol (MIRALAX) Packet Take 238 g by mouth See Admin Instructions One 8.3-ounce bottle (238 g).  Refer to surgical packet for medication instructions. 238 g 0 1/11/2018 at 1600     [DISCONTINUED] metroNIDAZOLE (FLAGYL) 500 MG tablet Take 1 tablet (500 mg) by mouth every 8 hours prior to surgery.  Take in conjunction with Neomycin Sulfate. 3 tablet 0 1/11/2018 at 2100     [DISCONTINUED] magnesium citrate solution Take 296 mLs by mouth See Admin Instructions Refer to surgery handout. 296 mL 0 1/11/2018 at 1800     order for DME Equipment being ordered: Home BP monitor and cuff 1 each 0 Unknown at Unknown time[JJ1.4]              Discharge Medications:[JJ1.1]     Current Discharge Medication List      START taking these medications    Details   oseltamivir (TAMIFLU) 30 MG capsule Take 1 capsule (30 mg) by mouth daily for 7 days  Qty: 7 capsule, Refills: 0    Comments: Prescribing at request of receiving TCU. They had a patient with influenza and want this for prophylaxis.  Associated Diagnoses: Need for prophylactic vaccination and inoculation against influenza      psyllium (METAMUCIL/KONSYL) Packet Take 1 packet by mouth 2 times daily  Refills: 0    Associated Diagnoses: Rectal prolapse      saccharomyces boulardii (FLORASTOR) 250 MG capsule Take 1 capsule (250 mg) by mouth 2 times daily  Qty: 14 capsule    Associated Diagnoses: Rectal prolapse      ibuprofen (ADVIL/MOTRIN) 600 MG tablet Take 1 tablet (600 mg) by mouth 3 times daily as needed for moderate pain  Qty: 120 tablet    Associated Diagnoses: Rectal prolapse         CONTINUE these medications which have CHANGED    Details   teriparatide, recombinant, (FORTEO) 600 MCG/2.4ML SOLN injection Inject 0.08 mLs (20 mcg) Subcutaneous daily  Qty: 6 mL, Refills: 1    Comments: Please hold this medication while the patient is at TCU  Associated Diagnoses: Age-related osteoporosis  with current pathological fracture, initial encounter      polyethylene glycol (MIRALAX/GLYCOLAX) Packet Take 17 g by mouth daily as needed for constipation  Qty: 7 packet    Comments: Please hold for diarrhea  Associated Diagnoses: Rectal prolapse         CONTINUE these medications which have NOT CHANGED    Details   amLODIPine (NORVASC) 5 MG tablet Take 1 tablet (5 mg) by mouth daily  Qty: 30 tablet, Refills: 0    Associated Diagnoses: Benign essential hypertension      ondansetron (ZOFRAN) 4 MG tablet Take 1 tablet (4 mg) by mouth every 6 hours as needed for nausea While taking neomycin and flagyl.  Qty: 3 tablet, Refills: 0    Associated Diagnoses: Rectal prolapse      Thiamine HCl 50 MG CAPS Take 1 capsule by mouth daily  Qty: 60 capsule, Refills: 3    Associated Diagnoses: Confusion      cholecalciferol (VITAMIN  -D) 1000 UNITS capsule Take 1 capsule (1,000 Units) by mouth daily  Qty: 100 capsule, Refills: 3    Associated Diagnoses: Age-related osteoporosis with current pathological fracture with routine healing, subsequent encounter      calcium-vitamin D (CALTRATE) 600-400 MG-UNIT per tablet Take 1 tablet by mouth every morning       sodium fluoride dental gel (PREVIDENT) 1.1 % GEL Apply to affected area At Bedtime      Cyanocobalamin (VITAMIN B 12 PO) Take 1 tablet by mouth every morning       acetaminophen (TYLENOL) 325 MG tablet Take 2 tablets (650 mg) by mouth every 4 hours as needed for mild pain  Qty: 60 tablet, Refills: 0    Associated Diagnoses: Rectal prolapse      order for DME Equipment being ordered: Home BP monitor and cuff  Qty: 1 each, Refills: 0    Associated Diagnoses: Severe hypertension         STOP taking these medications       neomycin (MYCIFRADIN) 500 MG tablet Comments:   Reason for Stopping:         metroNIDAZOLE (FLAGYL) 500 MG tablet Comments:   Reason for Stopping:         magnesium citrate solution Comments:   Reason for Stopping:[JJ1.5]                        Brief History of  "Illness:   Per note by Dr. Amrik Mariano on 1-4-18 \"88 y/o F who presents with a 1-month h/o anal protrusion associated with perianal pain and irritation. She denies episodes where she cannot reduce her prolapse but this has significantly affected her quality of life as she cannot go anywhere and has to lay in bed all day. Her daughter Lubna, seconds this enthusiastically. She has had some fecal incontinence for many years and she thinks this may have gotten a little worse since the prolapse started occurring. She additionally has some intermittent urinary incontinence. Her urinary incontinence started after her hysterectomy in 2012, which she had for uterine prolapse. She has had 4 vaginal births with episiotomy's. Her largest baby was 7 lbs. She has never had any prior anorectal surgeries. She denies any abdominal pain, nausea, vomiting, or unexplained weight loss. She denies any family history of any colon cancer.\"           Hospital Course:   Belia Sheets was admitted to the hospital and underwent the procedure described above without complication. She was given a low fiber diet and her khan was removed on POD 1. Her IV fluids were weaned and she was transitioned to PO pain medicine. Occupational therapy recommended home with assist or TCU, but because the patient lives alone a TCU was deemed appropriate. This was also in concordance with the patient's daughter's wishes. On the day of discharge the patient was voiding spontaneously, ambulating, pain was controlled with PO pain medicine, and she was having bowel movements. Her bowel movements were loose so she was started on metamucil bid and a probiotic. She was also discharged with a prescription for tamiflu at the request of the TCU as prophylaxis for influenza at the TCU. Patient is to follow up in the Colon and Rectal Surgery Clinic in 1 week with Joslyn Medina NP and then with Dr. Mariano in 2-3 weeks after.          Day of Discharge " "Physical Exam:[JJ1.1]   Blood pressure (!) 147/92, pulse 90, temperature 96.7  F (35.9  C), temperature source Axillary, resp. rate 16, height 1.47 m (4' 9.87\"), weight 41.8 kg (92 lb 3.2 oz), SpO2 96 %, not currently breastfeeding.[JJ1.6]    Gen: No apparent distress  Eyes: No scleral icterus  Pulm: Nlb  Abd: Soft, non-tender, non-distended. No r/g  Psych: Cooperative  Neuro: Ambulating w/ walker  Skin: No rashes appreciated  Ext: Wwp         Final Pathology Result:   Pending at time of discharge           Discharge Instructions and Follow-Up:[JJ1.1]       Discharge Procedure Orders  General info for SNF   Order Comments: Length of Stay Estimate: Short Term Care: Estimated # of Days <30  Condition at Discharge: Improving  Level of care:skilled   Rehabilitation Potential: Excellent  Admission H&P remains valid and up-to-date: Yes  Recent Chemotherapy: N/A  Use Nursing Home Standing Orders: Yes     Reason for your hospital stay   Order Comments: Rectal prolapse     Additional Discharge Instructions   Order Comments: DIET  -Low Residue Diet for at least 4-6 weeks unless cleared by Colorectal surgery.  No raw vegetables, fruit skins, fibrous foods that require a lot of chewing, nuts, seeds, corn, popcorn.   -We recommend eating slowly, chewing thoroughly, eating small frequent meals throughout the day  -Stay well hydrated.      ACTIVITY  -No lifting, pushing, pulling greater than 10 lbs and no strenuous exercise for 6 weeks   -No driving while on narcotic analgesics (i.e. Percocet, oxycodone, Vicodin)  -No driving until you are able to fully twist to both sides or slam on brakes quickly and without any pain    WOUND CLINIC  -Inspect your wounds daily for signs of infection (increased redness, drainage, pain)  -Keep your wound clean and dry  -You may shower, but do not soak in tub or pool    NOTIFY  Please contact Hardik Waller LPN, Angela Capps RN at [phone] for problems after discharge such as:  -Temperature > " 101F, chills, rigors, dizziness  -Redness around or purulent drainage from wound  -Inability to tolerate diet, nausea or vomiting  -You stop passing gas, develop significant bloating, abdominal pain  -Have blood in stools/vomit  -Have severe diarrhea/constipation  -Any other questions or concerns.  - At nights (after 4:30pm), on weekends, or if urgent, call [phone] and ask the  to speak with the on-call Colorectal Surgery resident or fellow      Medication Instructions  Some of your medications may have changed. Please take only prescribed and resumed medications     FOLLOW-UP  1.  You will need to follow-up with Joslyn Medina NP in the Colon and Rectal Surgery clinic in 1 week(s) and then with CRS Staff: Dr. Amrik Mariano in 2-3 weeks after.  Please contact our clinic scheduler Arlyn Shea (phone # [phone]) if you have not heard from our clinic in 3 business days afer discharge to schedule a follow-up appointment.     2.  Follow up with your primary care provider in 1-2 weeks after discharge from the hospital to review this hospitalization.     Activity - Up ad terri   Order Specific Question Answer Comments   Is discharge order? Yes      Full Code     Advance Diet as Tolerated   Order Comments: Follow this diet upon discharge: low residue diet   Order Specific Question Answer Comments   Is discharge order? Yes[JJ1.7]               Home Health Care:   N/A- discharged to rehab facility           Discharge Disposition:   Discharged to TCU      Condition at discharge: Stable    Pt was discussed with Dr. Mariano on 1-15-18    --  Bubba Galarza MD  Gen Surg PGY1[JJ1.1]       Revision History        User Key Date/Time User Provider Type Action    > JJ1.5 1/15/2018  2:12 PM Gerald Galarza MD Resident Sign     JJ1.6 1/15/2018  2:01 PM Gerald Galarza MD Resident      JJ1.2 1/15/2018  1:52 PM Gerald Galarza MD Resident      JJ1.4 1/15/2018  1:50 PM Gerald Galarza MD Resident       JJ1.3 1/15/2018  1:48 PM Gerald Galarza MD Resident      JJ1.7 1/15/2018  1:41 PM Gerald Galarza MD Resident      JJ1.1 1/15/2018  1:39 PM Gerald Galarza MD Resident                      Consult Notes      Consults by Karen Gordillo LICSW at 1/13/2018  4:46 PM     Author:  Karen Gordillo LICSW Service:  Social Work Author Type:      Filed:  1/13/2018  4:46 PM Date of Service:  1/13/2018  4:46 PM Creation Time:  1/13/2018  4:44 PM    Status:  Signed :  Karen Gordillo LICSW ()     Consult Orders:    1. Social Work IP Consult [[id number]] ordered by Amrik Mariano MD at 01/12/18 0930                Social Work Services Progress Note    Hospital Day: 2  Date of Initial Social Work Evaluation:  Not completed yet  Consulted with:  Chart and attempted to see patient but she was sleeping    Data:  Patient admitted to the hospital for rectal prolapse repair - per MD charting mention is made that patient lives in a nursing home.     Intervention:  Attempted to see patient for assessment but she was sleeping.     Assessment:  Patient requiring assessment    Plan:    Anticipated Disposition:  Will depend on assessment - PT indicated no IP PT needs and OT recomending home with assist    Barriers to d/c plan:  N/a    Follow Up:  Will see patient on Sunday 1.14.18.    Karen Gordillo MSW LICSW  1/13/2018    ON CALL PAGER   0800 - 1600   [phone]    ON CALL COVERAGE AFTER 1600  [phone]][EL1.1]       Revision History        User Key Date/Time User Provider Type Action    > EL1.1 1/13/2018  4:46 PM Karen Gordillo LICSW  Sign                     Progress Notes - Physician (Notes for yesterday and today)      Progress Notes by Gerald Galarza MD at 1/15/2018  7:03 AM     Author:  Gerald Galarza MD Service:  Colorectal Surgery Author Type:  Resident    Filed:  1/15/2018  8:54 AM Date of Service:  1/15/2018  7:03 AM Creation Time:  1/15/2018  7:03 AM    Status:   Attested :  Gerald Galarza MD (Resident)    Cosigner:  Amrik Mariano MD at 1/15/2018  8:58 AM        Attestation signed by Amrik Mariano MD at 1/15/2018  8:58 AM        Attestation:  Physician Attestation   I, Amrik Mariano, saw this patient with the resident and agree with the resident s findings and plan of care as documented in the resident s note.      I personally reviewed vital signs and medications.    Key findings: Appetite improved. Pain tolerable. +BMs (diarrhea with minimal awareness and control). Good UOP. Abd exam benign. Perianal skin with excoriation. No infection.  - LR diet + Supplements, PO pain control, start Metamucil BID + Probiotic, Miralax QD PRN (hold for diarrhea), check C diff, up ad terri, nothing per rectum, likely TCU today or tomorrow depending on bed availability.     Amrik Mariano  Date of Service (when I saw the patient): 01/15/18                               Colorectal Surgery Progress Note    Subjective:  No acute overnight events. Pain well controlled. Tolerating low fiber diet but low appetite. Some nausea yesterday but none currently. Passing flatus and small bowel movements. Voiding independently without concern. Ambulating frequently. Denies chest pain, SOB, nausea, vomiting, and fever. She states that she lives in a home with nursing care but that she has maintained lots of independence. Her daughter lives in [place] and has been around the last couple of months to assist with her care. She is worried about taking care of herself when she is discharged.    Objective:[KT1.1]  Temp:  [95.7  F (35.4  C)-97.4  F (36.3  C)] 97.1  F (36.2  C)  Pulse:  [102] 102  Heart Rate:  [71-90] 80  Resp:  [16] 16  BP: (117-160)/() 141/88  SpO2:  [92 %-96 %] 96 %    I/O last 3 completed shifts:  In: 460 [P.O.:460]  Out: 650 [Urine:650][KT1.2]    General: NAD, alert, resting comfortably in bed  Resp: non-labored breathing  Abd: soft,  nontender    Labs: Reviewed.  Heme:[KT1.1]    Recent Labs  Lab 01/09/18  0616 01/08/18  0721   WBC 7.2 7.1   HGB 11.8 12.3    184[KT1.2]     Chem:[KT1.1]    Recent Labs  Lab 01/09/18  0616 01/08/18  1548 01/08/18  0721   POTASSIUM 3.6 3.4 3.6   CR 0.51* 0.54 0.50*[KT1.2]       Assessment/Plan:  Belia Sheets is an 87 year old woman for is POD#3 from Altemeier procedure for rectal prolapse.    Neuro: continue PO tylenol and oxycodone for pain control  CV: required 10mg IV hydralazine yesterday for elevated BP, responded appropriately and currently normotensive  Resp: encourage IS use  FEN/GI: low fiber diet, MIVF discontinued and fluid boluses not needed over last day to maintain UOP, encourage increased PO intake  : adequate UOP at this time  ID: afebrile  Endo: no intervention  PPx: encourage ambulation, SCDs, lovenox today  Dispo: pending ambulation, pain control, and toleration of diet, needs TCU and formal PT eval, likely today    Discussed with CRS fellow Dr. Rosado and will be discussed with staff.    Viola Chandra, MS4  Colorectal Surgery[KT1.1]    Resident addendum. In brief, POD 3 altemeier for rectal prolapse now with ROBF and voiding spontaneously. Pain adequately controlled on PO pain meds. Tolerating low fiber diet. Ready for d/c today to TCU (home w/ assist not available). PT today as well.    --  Bubba Galarza MD  Gen Surg PGY1[JJ1.1]         Revision History        User Key Date/Time User Provider Type Action    > JJ1.1 1/15/2018  8:54 AM Gerald Galarza MD Resident Sign     KT1.2 1/15/2018  7:06 AM Viola Chandra Medical Student Share     KT1.1 1/15/2018  7:03 AM Viola Chandra Medical Student             Progress Notes by Aldair Barrow MD at 1/14/2018  8:24 AM     Author:  Aldair Barrow MD Service:  Colorectal Surgery Author Type:  Physician    Filed:  1/14/2018  1:28 PM Date of Service:  1/14/2018  8:24 AM Creation Time:  1/14/2018  8:24 AM    Status:  Addendum :  Pushpa,  MD Aldair (Physician)         Colorectal Surgery Progress Note    Subjective:  No acute overnight events. Pain well controlled. Tolerating low fiber diet but low appetite. Passing flatus and small bowel movements. Voiding independently without concern. Ambulating frequently. Denies chest pain, SOB, nausea, vomiting, and fever. She states that she lives in a home with nursing care but that she has maintained lots of independence. She is worried about taking care of herself when she is discharged.    Objective:[KT1.1]  Temp:  [95.7  F (35.4  C)-97  F (36.1  C)] 96.2  F (35.7  C)  Heart Rate:  [61-87] 71  Resp:  [16] 16  BP: (124-160)/() 160/101  SpO2:  [92 %-98 %] 92 %    I/O last 3 completed shifts:  In: 2000 [P.O.:1000; IV Piggyback:1000]  Out: 850 [Urine:850][KT1.2]    General: NAD, alert, resting comfortably in bed  Resp: non-labored breathing  Abd: soft, nontender    Labs: Reviewed.  Heme:[KT1.1]    Recent Labs  Lab 01/09/18  0616 01/08/18  0721 01/07/18 2057   WBC 7.2 7.1 6.5   HGB 11.8 12.3 12.3    184 192[KT1.2]     Chem:[KT1.1]    Recent Labs  Lab 01/09/18  0616 01/08/18  1548 01/08/18  0721 01/07/18 2057   POTASSIUM 3.6 3.4 3.6 2.6*   CR 0.51* 0.54 0.50* 0.45*[KT1.2]       Assessment/Plan:  Belia Sheets is an 87 year old woman for is POD#2 from Altemeier procedure for rectal prolapse.    Neuro: continue PO tylenol and oxycodone for pain control  Resp: encourage IS use  FEN/GI: low fiber diet, MIVF discontinued, 2 500mL LR boluses yesterday for low UOP, encourage increased PO intake  : adequate UOP at this time  ID: afebrile  Endo: no intervention  PPx: encourage ambulation, SCDs, lovenox today  Dispo: pending ambulation, pain control, and toleration of diet, needs TCU, likely tomorrow    Discussed with CRS fellow Dr. Worrell and will be discussed with staff.    Viola Chandra, MS4  Colorectal Surgery[KT1.1]    RESIDENT ADDENDUM  Agree with note above    Doing okay this morning.  No  complaints  Fluid bolus x2 yesterday for poor PO intake and poor UOP.  Doing better now. OT and PT say home with assist but patient lives alone and daughter who has been travelling from [place] frequently to help, is not always available. Some nausea when  went in to see patient.    Comfortable in bed  Abdomen soft    86 y/o s/p Altemeier.     - Social work to assist with TCU placement  - PT consult to evaluate her mobility as she and her daughter feel she is below baseline since the rectal prolapse began  - low fiber diet  - IV fluids as needed if poor PO intake    John Vanessa  General Surgery PGY-3  Pager [phone][MR1.1]    Attestation:  This patient has been seen and evaluated by me.  Discussed with the house staff team or resident(s) and agree with the findings and plan in this note.  She vomited x1 and has no appetite now.  Abdomen is soft and distended.  She is still stooling. She will back off her po's now.  If unable to take adequate liquids she may need to have her IV restarted.      Aldair Barrow MD  Professor of Surgery  Chief, Division of Colon and Rectal Surgery  [phone][RM1.1]             Revision History        User Key Date/Time User Provider Type Action    > RM1.1 1/14/2018  1:28 PM Aldair Barrow MD Physician Addend     MR1.1 1/14/2018 12:47 PM Merrick Vanessa MD Resident Sign     KT1.2 1/14/2018  8:31 AM Viola Chandra Medical Student Share     KT1.1 1/14/2018  8:24 AM Viola Chandra Medical Student                   Procedure Notes     No notes of this type exist for this encounter.         Progress Notes - Therapies (Notes from 01/12/18 through 01/15/18)      Progress Notes by Joslyn Neumann OT at 1/13/2018  3:11 PM     Author:  Joslyn Neumann OT Service:  (none) Author Type:  Occupational Therapist    Filed:  1/13/2018  3:11 PM Date of Service:  1/13/2018  3:11 PM Creation Time:  1/13/2018  3:11 PM    Status:  Signed :  Joslyn Neumann  "OT (Occupational Therapist)            01/13/18 1459   Quick Adds   Type of Visit Initial Occupational Therapy Evaluation   Living Environment   Lives With alone   Living Arrangements apartment   Home Accessibility tub/shower is not walk in;grab bars present (bathtub)   Number of Stairs to Enter Home 0  (elevator)   Number of Stairs Within Home 0   Transportation Available family or friend will provide   Living Environment Comment Pt lives in an apartment building which provides breakfast and additional services/assist can be purchased as needed.   Self-Care   Dominant Hand right   Usual Activity Tolerance moderate   Current Activity Tolerance fair   Regular Exercise no   Equipment Currently Used at Home grab bar;shower chair  (4 wheeled walker)   Activity/Exercise/Self-Care Comment Pt states that a few months ago she participated in weekend exercise classes in her apartment building.   Functional Level Prior   Ambulation 1-->assistive equipment   Transferring 1-->assistive equipment   Toileting 0-->independent   Bathing 1-->assistive equipment   Dressing 0-->independent   Eating 0-->independent   Communication 0-->understands/communicates without difficulty   Swallowing 0-->swallows foods/liquids without difficulty   Cognition 0 - no cognition issues reported   Prior Functional Level Comment Pt has been spending most of her waking time supine in bed x1 month due to the rectal prolapse.   General Information   Onset of Illness/Injury or Date of Surgery - Date 01/12/18   Referring Physician Amrik Mariano MD   Patient/Family Goals Statement \"I am open to it\"   Additional Occupational Profile Info/Pertinent History of Current Problem 87 year old woman POD 1 from [facility] for rectal prolapse   Precautions/Limitations no known precautions/limitations   Cognitive Status Examination   Orientation orientation to person, place and time   Level of Consciousness alert   Able to Follow Commands WNL/WFL   Personal " "Safety (Cognitive) WNL/WFL   Pain Assessment   Patient Currently in Pain No   Range of Motion (ROM)   ROM Comment WFL for patient   Bed Mobility Skill: Sit to Supine   Level of [place]: Sit/Supine stand-by assist   Physical Assist/Nonphysical Assist: Sit/Supine supervision   Instrumental Activities of Daily Living (IADL)   Previous Responsibilities meal prep;housekeeping;laundry;medication management;finances   Clinical Impression   Criteria for Skilled Therapeutic Interventions Met yes, treatment indicated   OT Diagnosis weakness affecting safe ADL independence   Influenced by the following impairments decreased activity tolerance, decreased strength   Assessment of Occupational Performance 1-3 Performance Deficits   Identified Performance Deficits bathing, dressing   Clinical Decision Making (Complexity) Low complexity   Therapy Frequency 5 times/wk   Predicted Duration of Therapy Intervention (days/wks) 1 week   Anticipated Discharge Disposition Home with Assist   Risks and Benefits of Treatment have been explained. Yes   Patient, Family & other staff in agreement with plan of care Yes   [facility] AM-PAC  \"6 Clicks\" Daily Activity Inpatient Short Form   1. Putting on and taking off regular lower body clothing? 4 - None   2. Bathing (including washing, rinsing, drying)? 3 - A Little   3. Toileting, which includes using toilet, bedpan or urinal? 4 - None   4. Putting on and taking off regular upper body clothing? 4 - None   5. Taking care of personal grooming such as brushing teeth? 4 - None   6. Eating meals? 4 - None   Daily Activity Raw Score (Score out of 24.Lower scores equate to lower levels of function) 23   Total Evaluation Time   Total Evaluation Time (Minutes) 8[EP1.1]        Revision History        User Key Date/Time User Provider Type Action    > EP1.1 1/13/2018  3:11 PM Joslyn Neumann OT Occupational Therapist Sign                                                      INTERAGENCY " "TRANSFER FORM - LAB / IMAGING / EKG / EMG RESULTS   1/12/2018                       UNIT [address] [facility] BANK: [phone]            Unresulted Labs (24h ago through future)    Start       Ordered    01/16/18 0700  Creatinine  AM DRAW,   Routine     Comments:  Last Lab Result: Creatinine (mg/dL)       Date                     Value                 01/09/2018               0.51 (L)         ----------    01/15/18 0937    01/15/18 0900  Clostridium difficile toxin B PCR  (LAB Clostridium difficile toxin B PCR PANEL)  ROUTINE,   Routine     Comments:  IF patient goes 24 hours WITHOUT diarrhea and NO sample has been obtained AFTER a C. difficile stool sample order is placed, nurse should CANCEL the C. difficile stool sample test.    01/15/18 0855    Unscheduled  Potassium  (Potassium Replacement - \"Standard\" - For K levels less than 3.4 mmol/L - UU,UR,UA,RH,SH,PH,WY )  CONDITIONAL (SPECIFY),   Routine     Comments:  Obtain Potassium Level for these conditions:  *IF no potassium result within 24 hours before initiation of order set, draw potassium level with next lab collect.    *2 HOURS AFTER last IV potassium replacement dose and 4 hours after an oral replacement dose.  *Next morning after potassium dose.     Repeat Potassium Replacement if necessary.    01/12/18 1757    Unscheduled  Magnesium  (Magnesium Replacement -  Adult - \"Standard\" - Replacement for all levels less than 1.6 mg/dL )  CONDITIONAL (SPECIFY),   Routine     Comments:  Obtain Magnesium Level for these conditions:  *IF no magnesium result within 24 hrs before initiation of order set, draw magnesium level with next lab collect.    *2 HOURS AFTER last magnesium replacement dose when magnesium replacement given for level less than 1.6   *Next morning after magnesium dose.     Repeat Magnesium Replacement if necessary.    01/12/18 1757    Unscheduled  Phosphorus  (POTASSIUM Phosphate - \"Standard\" - Replacement for levels less than or equal to 2.4 mg/dL )  " CONDITIONAL (SPECIFY),   Routine     Comments:  Obtain Phosphorus Level for these conditions:  *IF no phosphorus result within 24 hrs before initiation of order set, draw phosphorus level with next lab collect.    *2 HOURS AFTER last phosphorus replacement dose for levels less than 2.0.  *Next morning after phosphorus dose.     Repeat Phosphorus Replacement if necessary.    01/12/18 1757         Lab Results - 3 Days      Surgical pathology exam [[id number]]  Resulted: 01/15/18 0806, Result status: In process    Ordering provider: Amrik Mariano MD  01/12/18 0905 Resulting lab: COPATH    Specimen Information    Type Source Collected On   Tissue Large Intestine, Rectum 01/12/18 0905            Glucose by meter [[id number]]  Resulted: 01/12/18 0625, Result status: Final result    Ordering provider: Amrik Mariano MD  01/12/18 0618 Resulting lab: POINT OF CARE TEST, GLUCOSE    Specimen Information    Type Source Collected On     01/12/18 0618          Components       Value Reference Range Flag Lab   Glucose 97 70 - 99 mg/dL  170            Testing Performed By     Lab - Abbreviation Name Director Address Valid Date Range    88 - Unknown COPATH Unknown Unknown 10/30/02 0000 - Present    170 - Unknown POINT OF CARE TEST, GLUCOSE Unknown Unknown 10/31/11 1114 - Present            Encounter-Level Documents:     There are no encounter-level documents.      Order-Level Documents:     There are no order-level documents.

## 2018-01-12 NOTE — OR NURSING
Dr. Mariano at PACU bedside and observed small amount of blood on gauze. No further concerns at this time.

## 2018-01-13 ENCOUNTER — APPOINTMENT (OUTPATIENT)
Dept: OCCUPATIONAL THERAPY | Facility: CLINIC | Age: 83
DRG: 334 | End: 2018-01-13
Attending: COLON & RECTAL SURGERY
Payer: COMMERCIAL

## 2018-01-13 PROCEDURE — 97165 OT EVAL LOW COMPLEX 30 MIN: CPT | Mod: GO | Performed by: OCCUPATIONAL THERAPIST

## 2018-01-13 PROCEDURE — 25000128 H RX IP 250 OP 636: Performed by: COLON & RECTAL SURGERY

## 2018-01-13 PROCEDURE — 25000132 ZZH RX MED GY IP 250 OP 250 PS 637: Performed by: SURGERY

## 2018-01-13 PROCEDURE — 25000128 H RX IP 250 OP 636: Performed by: STUDENT IN AN ORGANIZED HEALTH CARE EDUCATION/TRAINING PROGRAM

## 2018-01-13 PROCEDURE — 12000001 ZZH R&B MED SURG/OB UMMC

## 2018-01-13 PROCEDURE — 40000141 ZZH STATISTIC PERIPHERAL IV START W/O US GUIDANCE

## 2018-01-13 PROCEDURE — 25000132 ZZH RX MED GY IP 250 OP 250 PS 637: Performed by: COLON & RECTAL SURGERY

## 2018-01-13 PROCEDURE — 97535 SELF CARE MNGMENT TRAINING: CPT | Mod: GO | Performed by: OCCUPATIONAL THERAPIST

## 2018-01-13 PROCEDURE — 40000133 ZZH STATISTIC OT WARD VISIT: Performed by: OCCUPATIONAL THERAPIST

## 2018-01-13 PROCEDURE — 25000125 ZZHC RX 250: Performed by: COLON & RECTAL SURGERY

## 2018-01-13 PROCEDURE — 97530 THERAPEUTIC ACTIVITIES: CPT | Mod: GO | Performed by: OCCUPATIONAL THERAPIST

## 2018-01-13 RX ORDER — METRONIDAZOLE 500 MG/1
500 TABLET ORAL ONCE
Status: COMPLETED | OUTPATIENT
Start: 2018-01-13 | End: 2018-01-13

## 2018-01-13 RX ORDER — HYDROMORPHONE HCL/0.9% NACL/PF 0.2MG/0.2
.1-.2 SYRINGE (ML) INTRAVENOUS EVERY 4 HOURS PRN
Status: DISCONTINUED | OUTPATIENT
Start: 2018-01-13 | End: 2018-01-15

## 2018-01-13 RX ADMIN — SODIUM CHLORIDE, POTASSIUM CHLORIDE, SODIUM LACTATE AND CALCIUM CHLORIDE 500 ML: 600; 310; 30; 20 INJECTION, SOLUTION INTRAVENOUS at 11:56

## 2018-01-13 RX ADMIN — SODIUM CHLORIDE, POTASSIUM CHLORIDE, SODIUM LACTATE AND CALCIUM CHLORIDE 500 ML: 600; 310; 30; 20 INJECTION, SOLUTION INTRAVENOUS at 15:45

## 2018-01-13 RX ADMIN — POLYETHYLENE GLYCOL 3350 17 G: 17 POWDER, FOR SOLUTION ORAL at 07:40

## 2018-01-13 RX ADMIN — AMLODIPINE BESYLATE 5 MG: 5 TABLET ORAL at 07:40

## 2018-01-13 RX ADMIN — ACETAMINOPHEN 1000 MG: 500 TABLET, FILM COATED ORAL at 07:40

## 2018-01-13 RX ADMIN — ACETAMINOPHEN 1000 MG: 500 TABLET, FILM COATED ORAL at 21:24

## 2018-01-13 RX ADMIN — ACETAMINOPHEN 1000 MG: 500 TABLET, FILM COATED ORAL at 16:12

## 2018-01-13 RX ADMIN — IBUPROFEN 600 MG: 600 TABLET ORAL at 07:40

## 2018-01-13 RX ADMIN — ACETAMINOPHEN 1000 MG: 500 TABLET, FILM COATED ORAL at 11:35

## 2018-01-13 RX ADMIN — METRONIDAZOLE 500 MG: 500 TABLET ORAL at 09:23

## 2018-01-13 RX ADMIN — CIPROFLOXACIN 400 MG: 2 INJECTION, SOLUTION INTRAVENOUS at 02:26

## 2018-01-13 RX ADMIN — IBUPROFEN 600 MG: 600 TABLET ORAL at 13:33

## 2018-01-13 RX ADMIN — IBUPROFEN 600 MG: 600 TABLET ORAL at 21:24

## 2018-01-13 ASSESSMENT — PAIN DESCRIPTION - DESCRIPTORS
DESCRIPTORS: SORE
DESCRIPTORS: SORE

## 2018-01-13 ASSESSMENT — ACTIVITIES OF DAILY LIVING (ADL): PREVIOUS_RESPONSIBILITIES: MEAL PREP;HOUSEKEEPING;LAUNDRY;MEDICATION MANAGEMENT;FINANCES

## 2018-01-13 NOTE — PROGRESS NOTES
CLINICAL NUTRITION SERVICES  Reason for Assessment:  Nutrition education regarding low fiber diet education  Diet History:  Patient has no history of low fiber diet education.  Nutrition Diagnosis:  Food- and nutrition-related knowledge deficit r/t no previous knowledge of low fiber diet as evidenced by patient report  Interventions:  Provided instruction on low fiber diet. Discussed each food group and foods to eat and avoid. Answered patient's questions regarding diet.   Provided handouts : Low Fiber Nutrition Therapy and Low Fiber Diet Hospital Menu  Goals:   Patient will verbalize at least 5 low fiber foods acceptable on diet and 5 high fiber foods to avoid.  Follow-up:    Patient to ask any further nutrition-related questions before discharge.  In addition, pt may request outpatient RD appointment.      Tracy Razo RD, LD   7C Pager: [phone]

## 2018-01-13 NOTE — CONSULTS
Social Work Services Progress Note    Hospital Day: 2  Date of Initial Social Work Evaluation:  Not completed yet  Consulted with:  Chart and attempted to see patient but she was sleeping    Data:  Patient admitted to the hospital for rectal prolapse repair - per MD charting mention is made that patient lives in a nursing home.     Intervention:  Attempted to see patient for assessment but she was sleeping.     Assessment:  Patient requiring assessment    Plan:    Anticipated Disposition:  Will depend on assessment - PT indicated no IP PT needs and OT recomending home with assist    Barriers to d/c plan:  N/a    Follow Up:  Will see patient on Sunday 1.14.18.    Karen COFFEY LICSW  1/13/2018    ON CALL PAGER   0800 - 1600   [phone]    ON CALL COVERAGE AFTER 1600  [phone]]

## 2018-01-13 NOTE — PLAN OF CARE
Problem: Patient Care Overview  Goal: Plan of Care/Patient Progress Review  Discharge Planner OT   Patient plan for discharge: home  Current status: OT evaluation completed and POC initiated. Pt demonstrated supine>sit>stand transfer w/SBA, pt completed toilet transfer and hygiene and clothing management with supervision. Ambulated ~275 ft w/4wheeled walker and SBA/CGA.  Barriers to return to prior living situation: Pt is below baseline functioning in activity tolerance and strength.  Recommendations for discharge: Home with assist as needed and potential home OT depending on pt's rate of progress.  Rationale for recommendations: Pt is expected to return to baseline functioning with increased strengthening/functional activity.       Entered by: Joslyn Neumann 01/13/2018 3:22 PM     7C

## 2018-01-13 NOTE — PLAN OF CARE
Problem: Patient Care Overview  Goal: Plan of Care/Patient Progress Review  Outcome: No Change  Pt A/O x4, VSS. Sating 96% on rm air.  Pt came up from PACU at 1800. Capno on until 1800 tomorrow. 0.2mg IV Dilaudid given for rectal pain and 5mg compazine for nausea with adequate relief. Rectal incision has gauze with small amt seroussang drainage. No change since arrival. PIV infusing LR at 50/hr. Other PIV SL. Perez patent, adequate UO. Cath cares complete. Pt has not dangled yet. Low fiber diet. Just sips of water. Last BM 1/11, no gas yet. Cont with post op cares. Cont POC.     Pt would like bedside report.

## 2018-01-13 NOTE — PLAN OF CARE
Problem: Patient Care Overview  Goal: Plan of Care/Patient Progress Review  Outcome: Therapy, progress toward functional goals as expected    AVSS, capno d/c at 0645 per MD.   Discomfort at incision area managed with tylenol, ibuprofen. Small amt serosang drainage on gauze in rectal area, changed at 0645.  Alert/oriented. Slept much of night. Dangled, walked a few steps, tolerated fairly, uses her own walker with SBA.   Perez d/c at 0645, good UOP overnight.  Taking water PO, able to swallow pills.   Both PIVs red/painful, asked MD this am to order PO flagyl, IVF d/c also. Needs 0645 Flagyl dose.  Daughter here in evening.    Pt ok with bedside report.

## 2018-01-13 NOTE — PROGRESS NOTES
"   01/13/18 1452   Quick Adds   Type of Visit Initial Occupational Therapy Evaluation   Living Environment   Lives With alone   Living Arrangements apartment   Home Accessibility tub/shower is not walk in;grab bars present (bathtub)   Number of Stairs to Enter Home 0  (elevator)   Number of Stairs Within Home 0   Transportation Available family or friend will provide   Living Environment Comment Pt lives in an apartment building which provides breakfast and additional services/assist can be purchased as needed.   Self-Care   Dominant Hand right   Usual Activity Tolerance moderate   Current Activity Tolerance fair   Regular Exercise no   Equipment Currently Used at Home grab bar;shower chair  (4 wheeled walker)   Activity/Exercise/Self-Care Comment Pt states that a few months ago she participated in weekend exercise classes in her apartment building.   Functional Level Prior   Ambulation 1-->assistive equipment   Transferring 1-->assistive equipment   Toileting 0-->independent   Bathing 1-->assistive equipment   Dressing 0-->independent   Eating 0-->independent   Communication 0-->understands/communicates without difficulty   Swallowing 0-->swallows foods/liquids without difficulty   Cognition 0 - no cognition issues reported   Prior Functional Level Comment Pt has been spending most of her waking time supine in bed x1 month due to the rectal prolapse.   General Information   Onset of Illness/Injury or Date of Surgery - Date 01/12/18   Referring Physician Amrik Mariano MD   Patient/Family Goals Statement \"I am open to it\"   Additional Occupational Profile Info/Pertinent History of Current Problem 87 year old woman POD 1 from [facility] for rectal prolapse   Precautions/Limitations no known precautions/limitations   Cognitive Status Examination   Orientation orientation to person, place and time   Level of Consciousness alert   Able to Follow Commands WNL/WFL   Personal Safety (Cognitive) WNL/WFL   Pain " "Assessment   Patient Currently in Pain No   Range of Motion (ROM)   ROM Comment WFL for patient   Bed Mobility Skill: Sit to Supine   Level of [place]: Sit/Supine stand-by assist   Physical Assist/Nonphysical Assist: Sit/Supine supervision   Instrumental Activities of Daily Living (IADL)   Previous Responsibilities meal prep;housekeeping;laundry;medication management;finances   Clinical Impression   Criteria for Skilled Therapeutic Interventions Met yes, treatment indicated   OT Diagnosis weakness affecting safe ADL independence   Influenced by the following impairments decreased activity tolerance, decreased strength   Assessment of Occupational Performance 1-3 Performance Deficits   Identified Performance Deficits bathing, dressing   Clinical Decision Making (Complexity) Low complexity   Therapy Frequency 5 times/wk   Predicted Duration of Therapy Intervention (days/wks) 1 week   Anticipated Discharge Disposition Home with Assist   Risks and Benefits of Treatment have been explained. Yes   Patient, Family & other staff in agreement with plan of care Yes   [facility] AM-PAC  \"6 Clicks\" Daily Activity Inpatient Short Form   1. Putting on and taking off regular lower body clothing? 4 - None   2. Bathing (including washing, rinsing, drying)? 3 - A Little   3. Toileting, which includes using toilet, bedpan or urinal? 4 - None   4. Putting on and taking off regular upper body clothing? 4 - None   5. Taking care of personal grooming such as brushing teeth? 4 - None   6. Eating meals? 4 - None   Daily Activity Raw Score (Score out of 24.Lower scores equate to lower levels of function) 23   Total Evaluation Time   Total Evaluation Time (Minutes) 8     "

## 2018-01-13 NOTE — PLAN OF CARE
Problem: Patient Care Overview  Goal: Plan of Care/Patient Progress Review  PT / 7C - Defer/Cancel. PT orders received and acknowledged. Per chart review and discussion with OT, pt has no IP PT needs.  Pt reporting near baseline functioning. OT to continue addressing IP needs.  PT orders will be completed, thank you for the consult.

## 2018-01-13 NOTE — PROGRESS NOTES
Colorectal Surgery Progress Note    S: No acute events overnight.  No specific complaints this morning.  Has not really eaten anything since surgery.  Denies nausea.  No BM.      O:  Temp:  [96.1  F (35.6  C)-96.7  F (35.9  C)] 96.4  F (35.8  C)  Pulse:  [61-68] 68  Heart Rate:  [56-77] 61  Resp:  [10-17] 16  BP: (110-131)/(67-82) 124/72  SpO2:  [93 %-98 %] 98 %    I/O last 3 completed shifts:  In: 1720 [P.O.:120; I.V.:1350]  Out: 1470 [Urine:1465; Blood:5]    Laying in bed in NAD  RRR  Non-labored breathing on RA  Abdomen soft and non-tender  No bleeding from anus    A/P:  87 year old woman POD 1 from [facility] for rectal prolapse.  Doing well overall but not eating or moving much.    - Tylenol, ibuprofen, tramadol for pain  - Low fiber diet.  Encourage her to eat  - PT/OT for likely TCU placement  - Will give last dose of flagyl as oral dose because she has very tenuous IV  - Remove khan  - Discharge likely not until Monday as she will probably need TCU.     John Vanessa  General Surgery PGY-3  Pager [phone]

## 2018-01-14 ENCOUNTER — APPOINTMENT (OUTPATIENT)
Dept: OCCUPATIONAL THERAPY | Facility: CLINIC | Age: 83
DRG: 334 | End: 2018-01-14
Attending: COLON & RECTAL SURGERY
Payer: COMMERCIAL

## 2018-01-14 PROCEDURE — 12000001 ZZH R&B MED SURG/OB UMMC

## 2018-01-14 PROCEDURE — 97530 THERAPEUTIC ACTIVITIES: CPT | Mod: GO | Performed by: OCCUPATIONAL THERAPIST

## 2018-01-14 PROCEDURE — 40000133 ZZH STATISTIC OT WARD VISIT: Performed by: OCCUPATIONAL THERAPIST

## 2018-01-14 PROCEDURE — 25000128 H RX IP 250 OP 636: Performed by: COLON & RECTAL SURGERY

## 2018-01-14 PROCEDURE — 25000132 ZZH RX MED GY IP 250 OP 250 PS 637: Performed by: COLON & RECTAL SURGERY

## 2018-01-14 RX ADMIN — ACETAMINOPHEN 1000 MG: 500 TABLET, FILM COATED ORAL at 11:57

## 2018-01-14 RX ADMIN — PROCHLORPERAZINE EDISYLATE 5 MG: 5 INJECTION INTRAMUSCULAR; INTRAVENOUS at 08:55

## 2018-01-14 RX ADMIN — ACETAMINOPHEN 1000 MG: 500 TABLET, FILM COATED ORAL at 20:34

## 2018-01-14 RX ADMIN — HYDRALAZINE HYDROCHLORIDE 10 MG: 20 INJECTION INTRAMUSCULAR; INTRAVENOUS at 08:02

## 2018-01-14 RX ADMIN — ACETAMINOPHEN 1000 MG: 500 TABLET, FILM COATED ORAL at 16:18

## 2018-01-14 RX ADMIN — IBUPROFEN 600 MG: 600 TABLET ORAL at 20:34

## 2018-01-14 RX ADMIN — ACETAMINOPHEN 1000 MG: 500 TABLET, FILM COATED ORAL at 08:02

## 2018-01-14 RX ADMIN — AMLODIPINE BESYLATE 5 MG: 5 TABLET ORAL at 08:02

## 2018-01-14 RX ADMIN — IBUPROFEN 600 MG: 600 TABLET ORAL at 08:02

## 2018-01-14 ASSESSMENT — PAIN DESCRIPTION - DESCRIPTORS
DESCRIPTORS: DISCOMFORT
DESCRIPTORS: SORE
DESCRIPTORS: SORE

## 2018-01-14 NOTE — PROGRESS NOTES
Social Work: Assessment with Discharge Plan    Patient Name:  Belia Sheets  :  1930  Age:  87 year old  MRN:  [id number]  Risk/Complexity Score:  Filed Complexity Screen Score: 7  Completed assessment with:  Daughter Lubna - patient slept through entire meeting    Presenting Information   Reason for Referral:  Discharge plan  Date of Intake:  2018  Referral Source:  Physician  Decision Maker:  patient  Alternate Decision Maker:  Daughter and son  Health Care Directive:  Copy in Chart  Living Situation:  Apartment - Independent apartment at [facility] [place]. Patient has 1 meal (breakfast) per day and minimal housekeeping 1x/week. She can purchase additional services.   Previous Functional Status: Daughter believes that patient would state she is more independent that she is. Daughter lives in [place], [place] and has spent the last 7 out of 8 weeks in [place] with her mother. She helps with groceries, taking her to appointments, and other tasks.   Patient and family understanding of hospitalization:  Surgery and will need TCU - per MD conversation with daughter  Cultural/Language/Spiritual Considerations:  N/a  Adjustment to Illness:  Daughter noted that needing additional assistance has been difficult for her mother    Physical Health  Reason for Admission:  Rectal prolapse repair  Services Needed/Recommended: Daughter feels TCU is needed but OT is recommending home with assist vs TCU pending cog eval on 1.15.18    Mental Health/Chemical Dependency  Diagnosis:  none  Support/Services in Place:  none  Services Needed/Recommended:  none    Support System  Significant relationship at present time:  Her partner  in late summer 2016 and she needed to move in a more supervised living situation after that. Per daughter the partner was the healthier of the two.   Family of origin is available for support:  Yes - daughter in [facility] and her son Delmar who divides his time between [employer] [place] and   [place].  Other support available:  Purchased support from [employer] [place]  [employer] in support system:  Medication reminders and help with more meals  Patient is caregiver to:  None     Provider Information   Primary Care Physician:  Alejandro Sandhu   [phone]   Clinic:  2020 [address] [address]      :  None listed in EPIC    Financial   Income Source:  SS MCFP and  's MCFP  Financial Concerns:  Daughter stated that her mother is very aware of her limited funds and this makes her less willing to purchase additional services.   Insurance:    Payor/Plan Subscriber Name Rel Member # Group #   UCARE - UCARE SENIORS* CONSTANCE [employer]LLY  [id number] [facility]      [address]       Discharge Plan   Patient and family discharge goal:  Daughter would like her mother to go to a TCU  Provided education on discharge plan:  YES  Patient agreeable to discharge plan: She has never been awake when writer has visited so unknown  A list of Medicare Certified Facilities was provided to the patient and/or family to encourage patient choice. Patient's choices for facility are:  Daughter listed [facility], [facility], [facility] and [facility] - provided Care Options descriptions and the Medicare.gov survey results to daughter.   Will NH provide Skilled rehabilitation or complex medical:  YES  General information regarding anticipated insurance coverage and possible out of pocket cost was discussed. Patient and patient's family are aware patient may incur the cost of transportation to the facility, pending insurance payment: YES - she has are for Seniors and may require a prior auth  Barriers to discharge:  Skilled need for TCU and bed availability    Discharge Recommendations   Anticipated Disposition: Home with assist vs. TCU  Transportation Needs: unknown  Name of Transportation Company and Phone:  N/a    Additional comments   Patient has had [facility] in the past (RN, OT,  SLP, and PT)- a recent OT cog eval at home showed concerns for deficits in executive decision-making for patient. Daughter has had a BSC delivered to her home in [place], [place] and will bring for patient - she is concerned that her mother will not use the BSC and instead continue using yogurt containers she keeps by her bedside. Daughter had lengthy conversation with OT today about her concerns. They will complete a cog eval tomorrow 1.15.18 and discharge planning will be based on this. Unable to send referrals today as we have no PT note showing skilled need.     Karen COFFEY LICSW  1/14/2018    ON CALL PAGER   0800 - 1600   [phone]    ON CALL COVERAGE AFTER 1600  [phone]

## 2018-01-14 NOTE — PROGRESS NOTES
"Transition Planning Update:  TCU verses Home with Skilled Home Care Services    D:  Was asked by the MD team this morning after rounds to visit with patient at bedside about transition planning.  Per MD.  MD stated that the documentation about patients activity tolerance does not seem to be capturing the full picture about activity tolerance from the rehabilitation therapy notes.  This writer went to the bedside and found patient laying in bed with her daughter Lubna sitting at bedside.  Patient's daughter was pleasantly upset by the fact that her mother did \" not get a thorough evaluation.  She is not event at her baseline and she lives alone.  For a therapist to say she can go home with assistance...assistance from who, She lives alone.  Patient agreed with her daughters dismay and stated she is feeling nauseated and has vomited this morning.  This writer updated the MD about GI upset.  Patient upon assessment has not practice getting out of bed without the assistance of her bed side rails and be flat per patient when asked.  Daughter also mentioned,  \"My mother has had home therapy in the recent past. Can the therapy staff check with the home care agency since you are one system to see that my mom was not doing all that great when she became ill.\"  This writer updated Lubna that I would update MD team about the above and would ask for a formal Physical Therapy Evaluation to determine if patient meets criteria to transition to a TCU setting prior to discharging [facility]    A/P: In light of the above, MD placed orders for a PT evaluation and this writer has paged weekend on chris  to re evaluate for possible rehab placement in patient's home area of [facility].  MD team will continue to follow for post op interventions including GE upset.  Inpatient Care Management Team will continue to follow and if patient is eligible for TCU, " this writer recommends that upon her return home, patient is evaluated in her own home setting this post operative.      PAUL Palumbo.S.PAYTON., P.H.N.,R.N.         Pager

## 2018-01-14 NOTE — PLAN OF CARE
Problem: Patient Care Overview  Goal: Plan of Care/Patient Progress Review  Outcome: Improving  Pt A/O x4, VSS. Pain well managed with scheduled Tylenol and ibuprofen. Rectum incision c/d/i, mild erythema below rectum. No drainage, gauze removed with void. UO marginal. MDs notified, 500ml LR bolus twice. PVR of 75 and 135. Residents ok with UO at 33ml/hr. Do not want more fluids given. Will continue to monitor. Good oral fluid intake. Tolerating low fib diet. Good appetite. Gas + 3 small BMs. Ambulated in halls 3x w/ walker. Assist x1. R PIV SL. Cont to monitor UO. Pt hopeful to DC tomorrow. Cont POC.     Pt would like bedside report.

## 2018-01-14 NOTE — DOWNTIME EVENT NOTE
The EMR was down for 2 hours on 1/14/2018.    Deb Thomas was responsible for completing the paper charting during this time period.     The following information was re-entered into the system by Deb Thomas: Flowsheet data, Intake and output and MAR    The following information will remain in the paper chart: TY Thomas  1/14/2018

## 2018-01-14 NOTE — PLAN OF CARE
Problem: Patient Care Overview  Goal: Plan of Care/Patient Progress Review  Discharge Planner OT   Patient plan for discharge: pt's daughter's plan is TCU  Current status: see note from 1/13/18  Barriers to return to prior living situation: deconditioning  Recommendations for discharge: home w/HH OT vs. TCU pending cognitive evaluation on 1/15/18  Rationale for recommendations: pt demonstrated SBA/CGA for functional transfers and ambulation, however pt's daughter states that she has noticed some cognitive deficits in pt       Entered by: Joslyn Neumann 01/14/2018 12:55 PM     7C

## 2018-01-14 NOTE — PROGRESS NOTES
Colorectal Surgery Progress Note    Subjective:  No acute overnight events. Pain well controlled. Tolerating low fiber diet but low appetite. Passing flatus and small bowel movements. Voiding independently without concern. Ambulating frequently. Denies chest pain, SOB, nausea, vomiting, and fever. She states that she lives in a home with nursing care but that she has maintained lots of independence. She is worried about taking care of herself when she is discharged.    Objective:  Temp:  [95.7  F (35.4  C)-97  F (36.1  C)] 96.2  F (35.7  C)  Heart Rate:  [61-87] 71  Resp:  [16] 16  BP: (124-160)/() 160/101  SpO2:  [92 %-98 %] 92 %    I/O last 3 completed shifts:  In: 2000 [P.O.:1000; IV Piggyback:1000]  Out: 850 [Urine:850]    General: NAD, alert, resting comfortably in bed  Resp: non-labored breathing  Abd: soft, nontender    Labs: Reviewed.  Heme:    Recent Labs  Lab 01/09/18  0616 01/08/18  0721 01/07/18 2057   WBC 7.2 7.1 6.5   HGB 11.8 12.3 12.3    184 192     Chem:    Recent Labs  Lab 01/09/18  0616 01/08/18  1548 01/08/18  0721 01/07/18 2057   POTASSIUM 3.6 3.4 3.6 2.6*   CR 0.51* 0.54 0.50* 0.45*       Assessment/Plan:  Belia Sheets is an 87 year old woman for is POD#2 from Altemeier procedure for rectal prolapse.    Neuro: continue PO tylenol and oxycodone for pain control  Resp: encourage IS use  FEN/GI: low fiber diet, MIVF discontinued, 2 500mL LR boluses yesterday for low UOP, encourage increased PO intake  : adequate UOP at this time  ID: afebrile  Endo: no intervention  PPx: encourage ambulation, SCDs, lovenox today  Dispo: pending ambulation, pain control, and toleration of diet, needs TCU, likely tomorrow    Discussed with CRS fellow Dr. Worrell and will be discussed with staff.    Viola Chandra, MS4  Colorectal Surgery    RESIDENT ADDENDUM  Agree with note above    Doing okay this morning.  No complaints  Fluid bolus x2 yesterday for poor PO intake and poor UOP.  Doing better now.  OT and PT say home with assist but patient lives alone and daughter who has been travelling from [place] frequently to help, is not always available. Some nausea when  went in to see patient.    Comfortable in bed  Abdomen soft    88 y/o s/p Altemeier.     - Social work to assist with TCU placement  - PT consult to evaluate her mobility as she and her daughter feel she is below baseline since the rectal prolapse began  - low fiber diet  - IV fluids as needed if poor PO intake    John Vanessa  General Surgery PGY-3  Pager [phone]    Attestation:  This patient has been seen and evaluated by me.  Discussed with the house staff team or resident(s) and agree with the findings and plan in this note.  She vomited x1 and has no appetite now.  Abdomen is soft and distended.  She is still stooling. She will back off her po's now.  If unable to take adequate liquids she may need to have her IV restarted.      Aldair Barrow MD  Professor of Surgery  Chief, Division of Colon and Rectal Surgery  [phone]

## 2018-01-14 NOTE — PLAN OF CARE
Problem: Patient Care Overview  Goal: Plan of Care/Patient Progress Review  Outcome: Improving  Assumed care of patient 1930. AVSS on RA. A+OX4. Denies pain, scheduled ibuprofen and tylenol. Denies nausea, low fiber diet with good appetite. Rectal incision with sutures, SARTHAK but wearing brief, scant SS drainage noted. Up with assist of 1 and walker. Voiding good amounts. Having both continent and incontinent soft BMs, passing gas.     Plan: possible DC. Daughter concerned about patient being able to take care of herself at home. Patient OK with bedside report.

## 2018-01-15 ENCOUNTER — APPOINTMENT (OUTPATIENT)
Dept: OCCUPATIONAL THERAPY | Facility: CLINIC | Age: 83
DRG: 334 | End: 2018-01-15
Attending: COLON & RECTAL SURGERY
Payer: COMMERCIAL

## 2018-01-15 VITALS
HEIGHT: 58 IN | RESPIRATION RATE: 16 BRPM | DIASTOLIC BLOOD PRESSURE: 92 MMHG | WEIGHT: 92.2 LBS | HEART RATE: 90 BPM | BODY MASS INDEX: 19.35 KG/M2 | SYSTOLIC BLOOD PRESSURE: 147 MMHG | TEMPERATURE: 96.7 F | OXYGEN SATURATION: 96 %

## 2018-01-15 PROCEDURE — 40000893 ZZH STATISTIC PT IP EVAL DEFER

## 2018-01-15 PROCEDURE — 97535 SELF CARE MNGMENT TRAINING: CPT | Mod: GO

## 2018-01-15 PROCEDURE — 40000133 ZZH STATISTIC OT WARD VISIT

## 2018-01-15 PROCEDURE — 25000132 ZZH RX MED GY IP 250 OP 250 PS 637: Performed by: COLON & RECTAL SURGERY

## 2018-01-15 PROCEDURE — 97110 THERAPEUTIC EXERCISES: CPT | Mod: GO

## 2018-01-15 RX ORDER — POLYETHYLENE GLYCOL 3350 17 G/17G
17 POWDER, FOR SOLUTION ORAL DAILY PRN
Status: DISCONTINUED | OUTPATIENT
Start: 2018-01-15 | End: 2018-01-15 | Stop reason: HOSPADM

## 2018-01-15 RX ORDER — SACCHAROMYCES BOULARDII 250 MG
250 CAPSULE ORAL 2 TIMES DAILY
Status: DISCONTINUED | OUTPATIENT
Start: 2018-01-15 | End: 2018-01-15 | Stop reason: HOSPADM

## 2018-01-15 RX ORDER — SACCHAROMYCES BOULARDII 250 MG
250 CAPSULE ORAL 2 TIMES DAILY
Qty: 14 CAPSULE
Start: 2018-01-15 | End: 2020-12-23

## 2018-01-15 RX ORDER — IBUPROFEN 600 MG/1
600 TABLET, FILM COATED ORAL 3 TIMES DAILY PRN
Status: DISCONTINUED | OUTPATIENT
Start: 2018-01-15 | End: 2018-01-15 | Stop reason: HOSPADM

## 2018-01-15 RX ORDER — MENTHOL AND ZINC OXIDE .44; 20.625 G/100G; G/100G
OINTMENT TOPICAL 4 TIMES DAILY
Status: DISCONTINUED | OUTPATIENT
Start: 2018-01-15 | End: 2018-01-15 | Stop reason: HOSPADM

## 2018-01-15 RX ORDER — IBUPROFEN 600 MG/1
600 TABLET, FILM COATED ORAL 3 TIMES DAILY PRN
Qty: 120 TABLET
Start: 2018-01-15 | End: 2018-02-07

## 2018-01-15 RX ORDER — POLYETHYLENE GLYCOL 3350 17 G/17G
17 POWDER, FOR SOLUTION ORAL DAILY PRN
Qty: 7 PACKET
Start: 2018-01-15 | End: 2018-02-07

## 2018-01-15 RX ORDER — OSELTAMIVIR PHOSPHATE 30 MG/1
30 CAPSULE ORAL DAILY
Qty: 7 CAPSULE | Refills: 0
Start: 2018-01-15 | End: 2018-01-22

## 2018-01-15 RX ADMIN — IBUPROFEN 600 MG: 600 TABLET ORAL at 09:58

## 2018-01-15 RX ADMIN — AMLODIPINE BESYLATE 5 MG: 5 TABLET ORAL at 09:58

## 2018-01-15 RX ADMIN — ACETAMINOPHEN 1000 MG: 500 TABLET, FILM COATED ORAL at 09:58

## 2018-01-15 RX ADMIN — Medication 250 MG: at 13:05

## 2018-01-15 RX ADMIN — ACETAMINOPHEN 1000 MG: 500 TABLET, FILM COATED ORAL at 13:05

## 2018-01-15 RX ADMIN — Medication: at 13:08

## 2018-01-15 RX ADMIN — PSYLLIUM HUSK 1 PACKET: 3.4 POWDER ORAL at 13:05

## 2018-01-15 ASSESSMENT — PAIN DESCRIPTION - DESCRIPTORS
DESCRIPTORS: DISCOMFORT

## 2018-01-15 NOTE — DISCHARGE SUMMARY
[facility]  Discharge Summary  Colon and Rectal Surgery     Belia Sheets MRN# [id number]   YOB: 1930 Age: 87 year old     Date of Admission:  1/12/2018  Date of Discharge::  1/15/2018  Admitting Physician:  Amrik Mariano MD  Discharge Physician:  Gerald Galarza MD  Primary Care Physician:        Alejandro Sandhu          Admission Diagnoses:   Rectal Prolapse           Discharge Diagnosis:   Same         Procedures:   Perineal Rectosigmoidectomy              Consultations:   NUTRITION SERVICES ADULT IP CONSULT  OCCUPATIONAL THERAPY ADULT IP CONSULT  PHYSICAL THERAPY ADULT IP CONSULT  SOCIAL WORK IP CONSULT  VASCULAR ACCESS CARE ADULT IP CONSULT  PHYSICAL THERAPY ADULT IP CONSULT         Imaging Studies:     Results for orders placed or performed during the hospital encounter of 01/07/18   XR Chest 2 Views    Narrative    Exam:  XR CHEST 2 VW, 1/7/2018 10:41 PM    History: cough, vomiting, rule out aspiration;     Comparison:  12/1/2017    Findings: PA and lateral view of the chest. The cardiomediastinal  silhouette is stable. No pleural effusion or pneumothorax. No acute  airspace opacity. Stable multiple anterior compression wedge  deformities of the thoracic spine with loss of about 90% of the  vertebral body height, this results in exaggerated kyphosis. Bilateral  chronic rib deformities.      Impression    Impression:    1. No acute cardiopulmonary abnormality.  2. Stable thoracic compression wedge deformities.    I have personally reviewed the examination and initial interpretation  and I agree with the findings.    MONA MCNAMARA MD              Medications Prior to Admission:     Prescriptions Prior to Admission   Medication Sig Dispense Refill Last Dose     amLODIPine (NORVASC) 5 MG tablet Take 1 tablet (5 mg) by mouth daily 30 tablet 0 1/11/2018 at 1400     ondansetron (ZOFRAN) 4 MG tablet Take 1 tablet (4 mg) by mouth every 6 hours as needed for nausea  While taking neomycin and flagyl. 3 tablet 0 1/11/2018 at 2100     Thiamine HCl 50 MG CAPS Take 1 capsule by mouth daily (Patient taking differently: Take 1 capsule by mouth every morning ) 60 capsule 3 Past Week at Unknown time     cholecalciferol (VITAMIN  -D) 1000 UNITS capsule Take 1 capsule (1,000 Units) by mouth daily (Patient taking differently: Take 1 capsule by mouth every morning ) 100 capsule 3 Past Week at Unknown time     calcium-vitamin D (CALTRATE) 600-400 MG-UNIT per tablet Take 1 tablet by mouth every morning    Past Week at Unknown time     sodium fluoride dental gel (PREVIDENT) 1.1 % GEL Apply to affected area At Bedtime   1/12/2018 at 0400     Cyanocobalamin (VITAMIN B 12 PO) Take 1 tablet by mouth every morning    Past Week at Unknown time     acetaminophen (TYLENOL) 325 MG tablet Take 2 tablets (650 mg) by mouth every 4 hours as needed for mild pain 60 tablet 0 Unknown at Unknown time     [DISCONTINUED] neomycin (MYCIFRADIN) 500 MG tablet Take 2 tablets (1,000 mg) by mouth every 8 hours prior to surgery.  Take in conjunction with Flagyl. 6 tablet 0 1/11/2018 at 2100     [DISCONTINUED] polyethylene glycol (MIRALAX) Packet Take 238 g by mouth See Admin Instructions One 8.3-ounce bottle (238 g).  Refer to surgical packet for medication instructions. 238 g 0 1/11/2018 at 1600     [DISCONTINUED] metroNIDAZOLE (FLAGYL) 500 MG tablet Take 1 tablet (500 mg) by mouth every 8 hours prior to surgery.  Take in conjunction with Neomycin Sulfate. 3 tablet 0 1/11/2018 at 2100     [DISCONTINUED] magnesium citrate solution Take 296 mLs by mouth See Admin Instructions Refer to surgery handout. 296 mL 0 1/11/2018 at 1800     order for DME Equipment being ordered: Home BP monitor and cuff 1 each 0 Unknown at Unknown time              Discharge Medications:     Current Discharge Medication List      START taking these medications    Details   oseltamivir (TAMIFLU) 30 MG capsule Take 1 capsule (30 mg) by mouth daily  for 7 days  Qty: 7 capsule, Refills: 0    Comments: Prescribing at request of receiving TCU. They had a patient with influenza and want this for prophylaxis.  Associated Diagnoses: Need for prophylactic vaccination and inoculation against influenza      psyllium (METAMUCIL/KONSYL) Packet Take 1 packet by mouth 2 times daily  Refills: 0    Associated Diagnoses: Rectal prolapse      saccharomyces boulardii (FLORASTOR) 250 MG capsule Take 1 capsule (250 mg) by mouth 2 times daily  Qty: 14 capsule    Associated Diagnoses: Rectal prolapse      ibuprofen (ADVIL/MOTRIN) 600 MG tablet Take 1 tablet (600 mg) by mouth 3 times daily as needed for moderate pain  Qty: 120 tablet    Associated Diagnoses: Rectal prolapse         CONTINUE these medications which have CHANGED    Details   teriparatide, recombinant, (FORTEO) 600 MCG/2.4ML SOLN injection Inject 0.08 mLs (20 mcg) Subcutaneous daily  Qty: 6 mL, Refills: 1    Comments: Please hold this medication while the patient is at TCU  Associated Diagnoses: Age-related osteoporosis with current pathological fracture, initial encounter      polyethylene glycol (MIRALAX/GLYCOLAX) Packet Take 17 g by mouth daily as needed for constipation  Qty: 7 packet    Comments: Please hold for diarrhea  Associated Diagnoses: Rectal prolapse         CONTINUE these medications which have NOT CHANGED    Details   amLODIPine (NORVASC) 5 MG tablet Take 1 tablet (5 mg) by mouth daily  Qty: 30 tablet, Refills: 0    Associated Diagnoses: Benign essential hypertension      ondansetron (ZOFRAN) 4 MG tablet Take 1 tablet (4 mg) by mouth every 6 hours as needed for nausea While taking neomycin and flagyl.  Qty: 3 tablet, Refills: 0    Associated Diagnoses: Rectal prolapse      Thiamine HCl 50 MG CAPS Take 1 capsule by mouth daily  Qty: 60 capsule, Refills: 3    Associated Diagnoses: Confusion      cholecalciferol (VITAMIN  -D) 1000 UNITS capsule Take 1 capsule (1,000 Units) by mouth daily  Qty: 100 capsule,  "Refills: 3    Associated Diagnoses: Age-related osteoporosis with current pathological fracture with routine healing, subsequent encounter      calcium-vitamin D (CALTRATE) 600-400 MG-UNIT per tablet Take 1 tablet by mouth every morning       sodium fluoride dental gel (PREVIDENT) 1.1 % GEL Apply to affected area At Bedtime      Cyanocobalamin (VITAMIN B 12 PO) Take 1 tablet by mouth every morning       acetaminophen (TYLENOL) 325 MG tablet Take 2 tablets (650 mg) by mouth every 4 hours as needed for mild pain  Qty: 60 tablet, Refills: 0    Associated Diagnoses: Rectal prolapse      order for DME Equipment being ordered: Home BP monitor and cuff  Qty: 1 each, Refills: 0    Associated Diagnoses: Severe hypertension         STOP taking these medications       neomycin (MYCIFRADIN) 500 MG tablet Comments:   Reason for Stopping:         metroNIDAZOLE (FLAGYL) 500 MG tablet Comments:   Reason for Stopping:         magnesium citrate solution Comments:   Reason for Stopping:                        Brief History of Illness:   Per note by Dr. Amrik Mariano on 1-4-18 \"88 y/o F who presents with a 1-month h/o anal protrusion associated with perianal pain and irritation. She denies episodes where she cannot reduce her prolapse but this has significantly affected her quality of life as she cannot go anywhere and has to lay in bed all day. Her daughter Lubna, seconds this enthusiastically. She has had some fecal incontinence for many years and she thinks this may have gotten a little worse since the prolapse started occurring. She additionally has some intermittent urinary incontinence. Her urinary incontinence started after her hysterectomy in 2012, which she had for uterine prolapse. She has had 4 vaginal births with episiotomy's. Her largest baby was 7 lbs. She has never had any prior anorectal surgeries. She denies any abdominal pain, nausea, vomiting, or unexplained weight loss. She denies any family history of any colon " "cancer.\"           Hospital Course:   Belia Sheets was admitted to the hospital and underwent the procedure described above without complication. She was given a low fiber diet and her khan was removed on POD 1. Her IV fluids were weaned and she was transitioned to PO pain medicine. Occupational therapy recommended home with assist or TCU, but because the patient lives alone a TCU was deemed appropriate. This was also in concordance with the patient's daughter's wishes. On the day of discharge the patient was voiding spontaneously, ambulating, pain was controlled with PO pain medicine, and she was having bowel movements. Her bowel movements were loose so she was started on metamucil bid and a probiotic. She was also discharged with a prescription for tamiflu at the request of the TCU as prophylaxis for influenza at the TCU. Patient is to follow up in the Colon and Rectal Surgery Clinic in 1 week with Joslyn Medina NP and then with Dr. Mariano in 2-3 weeks after.          Day of Discharge Physical Exam:   Blood pressure (!) 147/92, pulse 90, temperature 96.7  F (35.9  C), temperature source Axillary, resp. rate 16, height 1.47 m (4' 9.87\"), weight 41.8 kg (92 lb 3.2 oz), SpO2 96 %, not currently breastfeeding.    Gen: No apparent distress  Eyes: No scleral icterus  Pulm: Nlb  Abd: Soft, non-tender, non-distended. No r/g  Psych: Cooperative  Neuro: Ambulating w/ walker  Skin: No rashes appreciated  Ext: Wwp         Final Pathology Result:   Pending at time of discharge           Discharge Instructions and Follow-Up:       Discharge Procedure Orders  General info for SNF   Order Comments: Length of Stay Estimate: Short Term Care: Estimated # of Days <30  Condition at Discharge: Improving  Level of care:skilled   Rehabilitation Potential: Excellent  Admission H&P remains valid and up-to-date: Yes  Recent Chemotherapy: N/A  Use Nursing Home Standing Orders: Yes     Reason for your hospital stay   Order " Comments: Rectal prolapse     Additional Discharge Instructions   Order Comments: DIET  -Low Residue Diet for at least 4-6 weeks unless cleared by Colorectal surgery.  No raw vegetables, fruit skins, fibrous foods that require a lot of chewing, nuts, seeds, corn, popcorn.   -We recommend eating slowly, chewing thoroughly, eating small frequent meals throughout the day  -Stay well hydrated.      ACTIVITY  -No lifting, pushing, pulling greater than 10 lbs and no strenuous exercise for 6 weeks   -No driving while on narcotic analgesics (i.e. Percocet, oxycodone, Vicodin)  -No driving until you are able to fully twist to both sides or slam on brakes quickly and without any pain    WOUND CLINIC  -Inspect your wounds daily for signs of infection (increased redness, drainage, pain)  -Keep your wound clean and dry  -You may shower, but do not soak in tub or pool    NOTIFY  Please contact Hardik Waller LPN, Angela Capps RN at [phone] for problems after discharge such as:  -Temperature > 101F, chills, rigors, dizziness  -Redness around or purulent drainage from wound  -Inability to tolerate diet, nausea or vomiting  -You stop passing gas, develop significant bloating, abdominal pain  -Have blood in stools/vomit  -Have severe diarrhea/constipation  -Any other questions or concerns.  - At nights (after 4:30pm), on weekends, or if urgent, call [phone] and ask the  to speak with the on-call Colorectal Surgery resident or fellow      Medication Instructions  Some of your medications may have changed. Please take only prescribed and resumed medications     FOLLOW-UP  1.  You will need to follow-up with Joslyn Medina NP in the Colon and Rectal Surgery clinic in 1 week(s) and then with CRS Staff: Dr. Amrik Mariano in 2-3 weeks after.  Please contact our clinic scheduler Arlyn Shea (phone # [phone]) if you have not heard from our clinic in 3 business days afer discharge to schedule a follow-up  appointment.     2.  Follow up with your primary care provider in 1-2 weeks after discharge from the hospital to review this hospitalization.     Activity - Up ad terri   Order Specific Question Answer Comments   Is discharge order? Yes      Full Code     Advance Diet as Tolerated   Order Comments: Follow this diet upon discharge: low residue diet   Order Specific Question Answer Comments   Is discharge order? Yes               Home Health Care:   N/A- discharged to rehab facility           Discharge Disposition:   Discharged to TCU      Condition at discharge: Stable    Pt was discussed with Dr. Mariano on 1-15-18    --  Bubba Galarza MD  Gen Surg PGY1

## 2018-01-15 NOTE — PROGRESS NOTES
Social Work Services Progress Note    Hospital Day: 4  Date of Initial Social Work Evaluation:  1/14/18  Collaborated with:  Pt, Pt's dtr (), TCU's (see below)    Data:  Pt is 88 y/o female admitted to [facility] on 1/12/18 s/p Altemeier for rectal prolapse.   SW involved for TCU placement.  Per Colorectal Team, Pt is medically stable for d/c today pending placement.    Intervention:  BONIFACIO coord. With OT today and confirmed that TCU would be recommended at d/c d/t little support available at home. BONIFACIO faxed referrals to the following per preference:    1) [facility] (Main: [phone], Admissions: [phone], Fax: [phone]): BONIFACIO spoke with Emily in Admissions today; she said that they do have beds available and would review Pt's referral for potential admission today. BONIFACIO faxed referral via Epic today.   BONIFACIO received return call from Emily at 11AM requested H&P be faxed over; BONIFACIO faxed right away. Emily said that she is still reviewing medications, but likely will be able to accept Pt for admission today and said that she would let BONIFACIO know by 11:45AM.   BONIFACIO received return call from Emily at 11:45AM indicating that d/t Pt is on medication Forteo, which costs >$3000, they cannot accept while on this medication. BONIFACIO paged Colorectal Team to determine if this is a medication that could be put on hold and/or changed while in TCU; awaiting return call.   BONIFACIO spoke with Colorectal Resident (Bubba Galarza) and confirmed that Pt will not need to be on Forteo at d/c.   BONIFACIO updated Emily in Admissions that per team Pt will not need to be on Forteo at d/c. Emily said that then they would be able to accept Pt for admission to TCU today.  BONIFACIO updated Pt & dtr (via phone) today of Pt's acceptance and they are both in agreement with this d/c. Pt's dtr will provide d/c transportation after orders have been completed.  2) [facility] (Main: [phone], Fax: [phone]): BONIFACIO spoke with Admissions today; they do not anticipate any  openings until Wed at the earliest but agreed to review referral in case there were unexpected openings; SW faxed referral via Epic today.  3) [facility] (Main: [phone], Admissions: [phone], Fax: [phone]): SW spoke with Lauryn in Admissions; she said that she likely has all her available beds spoken for today but would review Pt's referral in case there was a cancellation. SW faxed referral today via [employer].  4) [facility] (Main/Admissions: [phone], Fax: [phone]): SW left VM for Admissions today re: bed availability and requested call back; SW faxed referral via Epic today.    Assessment:  Pt will d/c to [facility] TCU today    Plan:    Anticipated Disposition:  Facility:  [facility]    Barriers to d/c plan:  n/a    Follow Up:  SW to continue to follow and assist with d/c plan.    ALEXX Sequeira, LGSW   Surgical Oncology Unit   [phone]  Pager: [phone]

## 2018-01-15 NOTE — PLAN OF CARE
Problem: Patient Care Overview  Goal: Plan of Care/Patient Progress Review  PT: PT orders received.  Per discussion with pt she feels that her legs have gotten weaker and that she does not feel safe returning to her longterm yet.  Per OT she needs increased assist for dressing, toileting and showering compared to baseline.  At this time pt would benefit from TCU.  PT to defer at this time due to anticipated short LOS, OT will continue to follow for all in-patient therapy needs.

## 2018-01-15 NOTE — PLAN OF CARE
Problem: Patient Care Overview  Goal: Plan of Care/Patient Progress Review  Outcome: Adequate for Discharge Date Met: 01/15/18  VSS. Rectal pain is being managed with scheduled Tylenol and Ibuprofen. MD ordered cream to be applied to rectum and perineum d/t to skin irritation from frequent diarrhea BMs. C diff ordered. No clean specimen has been able to be collected yet. MD aware. Pt expressing difficulty swallowing. MD aware. Pt is able to swallow pills and food safely at this time. Pt aware she would follow up with primary. Low fiber diet eating well. Urine output is adequate. Up with one assist. From assisted living (minimal assistance purchased). Needs TCU per PT/OT. Daughter Lubna is looking at TCUs today. Pt will discharge to TCU this evening. She must be to the facility before 5pm.     Pt would LIKE bedside shift report.

## 2018-01-15 NOTE — PLAN OF CARE
Problem: Patient Care Overview  Goal: Plan of Care/Patient Progress Review  Discharge Planner OT   Patient plan for discharge: Would like to go to TCU  Current status: Pt scored 29/30 on MoCA, where > or = 26 is considered normal. Pt requiring cues for safety with 4WW as demonstrating decreased use of brakes and poor positioning with transfers. Pt incontinent of stool x 3, requiring max A for thorough cleaning x 1 and able to clean self on another occasion when sitting on the toilet required min A and set up. Pt able to don brief with set up and close SBA/CGA from EOB and toilet. SBA bed mobility. Ambulated with FWW and CGA x 125' x 2 with seated rest, 1 LOB, pt able to self correct.   Barriers to return to prior living situation: Frequent incontinence and difficulty with clean up. Deconditioning. Below functional baseline.   Recommendations for discharge: TCU  Rationale for recommendations: To address above deficits and promote strengthening and functional endurance/I.       Entered by: Gypsy Boyer 01/15/2018 11:21 AM     OT

## 2018-01-15 NOTE — PLAN OF CARE
Problem: Patient Care Overview  Goal: Plan of Care/Patient Progress Review  Outcome: No Change  Assumed care of patient 1930. AVSS on RA. A+OX4. Denies pain, scheduled ibuprofen and tylenol. Denies nausea, low fiber diet with good appetite. Rectal incision with sutures, SARTHAK but wearing brief, scant SS drainage noted. Up with assist of 1 and walker. Voiding good amounts. Having both continent and incontinent soft BMs, passing gas.      Plan: DC to TCU once placement determined. Patient OK with bedside report.

## 2018-01-15 NOTE — PROGRESS NOTES
Social Work Services Discharge Note      Patient Name:  Belia Sheets     Anticipated Discharge Date:  1/15/18    Discharge Disposition:   U:  [address] [place] [address]  Main: [phone], Fax: [phone]  Nurse to Nurse Call: [phone]    Following MD:  Per facilities designation     Pre-Admission Screening (PAS) online form has been completed.  The Level of Care (LOC) is:  Determined  Confirmation Code is:  [id number]  Patient/caregiver informed of referral to [facility] Line for Pre-Admission Screening for skilled nursing facility (SNF) placement and to expect a phone call post discharge from SNF.     Additional Services/Equipment Arranged:  None. Pt's dtr Lubna will provide d/c transportation this afternoon when d/c orders are complete.     Patient / Family response to discharge plan:  In agreement     Persons notified of above discharge plan:  Pt, Pt's dtr (Lubna), Bedside RN (Elsa), Charge RN (Tatiana), NST, Colorectal Team (Bubba), [facility] (Emily)    Staff Discharge Instructions:   faxed discharge orders.  Please print a packet and send with patient.     CTS Handoff completed:  YES    Medicare Notice of Rights provided to the patient/family:  NO    ALEXX Sequeira, MEGAN   Surgical Oncology Unit   [phone]  Pager: [phone]

## 2018-01-15 NOTE — PROGRESS NOTES
Colorectal Surgery Progress Note    Subjective:  No acute overnight events. Pain well controlled. Tolerating low fiber diet but low appetite. Some nausea yesterday but none currently. Passing flatus and small bowel movements. Voiding independently without concern. Ambulating frequently. Denies chest pain, SOB, nausea, vomiting, and fever. She states that she lives in a home with nursing care but that she has maintained lots of independence. Her daughter lives in [place] and has been around the last couple of months to assist with her care. She is worried about taking care of herself when she is discharged.    Objective:  Temp:  [95.7  F (35.4  C)-97.4  F (36.3  C)] 97.1  F (36.2  C)  Pulse:  [102] 102  Heart Rate:  [71-90] 80  Resp:  [16] 16  BP: (117-160)/() 141/88  SpO2:  [92 %-96 %] 96 %    I/O last 3 completed shifts:  In: 460 [P.O.:460]  Out: 650 [Urine:650]    General: NAD, alert, resting comfortably in bed  Resp: non-labored breathing  Abd: soft, nontender    Labs: Reviewed.  Heme:    Recent Labs  Lab 01/09/18  0616 01/08/18  0721   WBC 7.2 7.1   HGB 11.8 12.3    184     Chem:    Recent Labs  Lab 01/09/18  0616 01/08/18  1548 01/08/18  0721   POTASSIUM 3.6 3.4 3.6   CR 0.51* 0.54 0.50*       Assessment/Plan:  Belia Sheets is an 87 year old woman for is POD#3 from Altemeier procedure for rectal prolapse.    Neuro: continue PO tylenol and oxycodone for pain control  CV: required 10mg IV hydralazine yesterday for elevated BP, responded appropriately and currently normotensive  Resp: encourage IS use  FEN/GI: low fiber diet, MIVF discontinued and fluid boluses not needed over last day to maintain UOP, encourage increased PO intake  : adequate UOP at this time  ID: afebrile  Endo: no intervention  PPx: encourage ambulation, SCDs, lovenox today  Dispo: pending ambulation, pain control, and toleration of diet, needs TCU and formal PT eval, likely today    Discussed with CRS fellow Dr. Rosado and will  be discussed with staff.    Viola Chandra, MS4  Colorectal Surgery    Resident addendum. In brief, POD 3 altemeier for rectal prolapse now with ROBF and voiding spontaneously. Pain adequately controlled on PO pain meds. Tolerating low fiber diet. Ready for d/c today to TCU (home w/ assist not available). PT today as well.    --  Bubba Galarza MD  Gen Surg PGY1

## 2018-01-15 NOTE — PROGRESS NOTES
[facility]  Patient is currently open to home care services with [facility].  The patient is currently receiving RN services.  [facility]  and team have been notified of patient admission.  [facility] liaison will continue to follow patient during stay.  If appropriate provide orders to resume home care at time of discharge.    Thank you  Coreen Velazco RN, BSN  [facility] Liaison  [phone]

## 2018-01-15 NOTE — PLAN OF CARE
Problem: Patient Care Overview  Goal: Individualization & Mutuality  Pt d/c to [facility]. PIV removed. Discharge instructions discussed with pt and daughter. Report given to staff at [facility]. Discharge packet given to daughter. All pt belongings with pt.

## 2018-01-15 NOTE — PLAN OF CARE
Problem: Patient Care Overview  Goal: Plan of Care/Patient Progress Review  Outcome: Improving  Pt A/O x4, VSS. BP this AM was 160/101, hydralazine given x1. BP now 117/73. OVSS. Negligible rectal pain. No drainage from rectum. Nausea this AM. Compazine given w/ adequate relief. Poor appetite d/t nausea. 2 soft BMs. Voiding in adequate amounts. PIV SL. Up w/ assist x1 and walker. Ambulated in halls 3x. Daughter at bedside this AM. Plan is for TCU. Cont POC.     Pt would like bedside report.

## 2018-01-16 ENCOUNTER — CARE COORDINATION (OUTPATIENT)
Dept: SURGERY | Facility: CLINIC | Age: 83
End: 2018-01-16

## 2018-01-16 NOTE — PROGRESS NOTES
RN Post Op Care Coordination Note     POST-OP CALL      Patient is s/p Perineal Rectosigmoidectomy.     Reports doing well.       Fevers/chills: Patient denies fever/chills.  Eating/drinking: Patient is able to eat and drink without any complaints. Drinking 8-10 glasses of fluids per day. Tolerating low fiber diet.   Bowel habits: Patient reports having no issue with bowel movements and diarrhea has stopped and BM are soft but slightly formed.  Urine output: Voiding without difficulty, light yellow urine.  Pain: Patient reports pain is controlled with tylenol and ibuprofen.   Follow up appointment is not yet scheduled.     Patient will call with any questions or concerns, all current questions and concerns were addressed to patient's satisfaction, patient aware and in agreement with current plan of care.

## 2018-01-16 NOTE — PLAN OF CARE
Problem: Patient Care Overview  Goal: Plan of Care/Patient Progress Review  Occupational Therapy Discharge Summary    Reason for therapy discharge:    Discharged to transitional care facility.    Progress towards therapy goal(s). See goals on Care Plan in [facility] electronic health record for goal details.  Goals not met.  Barriers to achieving goals:   discharge from facility.    Therapy recommendation(s):    Continued therapy is recommended.  Rationale/Recommendations:  facilitate safety and independence in ADLs given decreased endurance and frequent bowel incontinence..

## 2018-01-17 ENCOUNTER — RECORDS - HEALTHEAST (OUTPATIENT)
Dept: LAB | Facility: CLINIC | Age: 83
End: 2018-01-17

## 2018-01-17 ENCOUNTER — DOCUMENTATION ONLY (OUTPATIENT)
Dept: FAMILY MEDICINE | Facility: CLINIC | Age: 83
End: 2018-01-17

## 2018-01-17 LAB — COPATH REPORT: NORMAL

## 2018-01-17 NOTE — PROGRESS NOTES
"When opening a documentation only encounter, be sure to enter in \"Chief Complaint\" Forms and in \" Comments\" Title of form, description if needed.    Sudeep is a 87 year old  female  Form received via: Fax  Form now resides in: Provider Ready    Francisco J Vela CMA                  "

## 2018-01-17 NOTE — PROGRESS NOTES
Form has been completed by provider.     Form sent out via: Fax to [facility] at Fax Number: [phone]  Patient informed: No, Reason: Confirmed by fax confirmation  Output date: January 17, 2018    Francisco J Vela CMA      **Please close the encounter**

## 2018-01-17 NOTE — PROGRESS NOTES
Form has been completed by provider.     Form sent out via: Fax to [facility] at Fax Number: [phone]  Patient informed: No, Reason: Confirmed by fax confirmation  Output date: January 17, 2018    Francisco J Vela CMA      **Please close the encounter**

## 2018-01-18 LAB
ANION GAP SERPL CALCULATED.3IONS-SCNC: 6 MMOL/L (ref 5–18)
BUN SERPL-MCNC: 14 MG/DL (ref 8–28)
CALCIUM SERPL-MCNC: 8.9 MG/DL (ref 8.5–10.5)
CHLORIDE BLD-SCNC: 104 MMOL/L (ref 98–107)
CO2 SERPL-SCNC: 28 MMOL/L (ref 22–31)
CREAT SERPL-MCNC: 0.54 MG/DL (ref 0.6–1.1)
GFR SERPL CREATININE-BSD FRML MDRD: >60 ML/MIN/1.73M2
GLUCOSE BLD-MCNC: 90 MG/DL (ref 70–125)
HGB BLD-MCNC: 12 G/DL (ref 12–16)
POTASSIUM BLD-SCNC: 2.9 MMOL/L (ref 3.5–5)
SODIUM SERPL-SCNC: 138 MMOL/L (ref 136–145)

## 2018-01-22 ENCOUNTER — RECORDS - HEALTHEAST (OUTPATIENT)
Dept: LAB | Facility: CLINIC | Age: 83
End: 2018-01-22

## 2018-01-22 LAB
ANION GAP SERPL CALCULATED.3IONS-SCNC: 6 MMOL/L (ref 5–18)
BUN SERPL-MCNC: 18 MG/DL (ref 8–28)
CALCIUM SERPL-MCNC: 9.6 MG/DL (ref 8.5–10.5)
CHLORIDE BLD-SCNC: 99 MMOL/L (ref 98–107)
CO2 SERPL-SCNC: 35 MMOL/L (ref 22–31)
CREAT SERPL-MCNC: 0.63 MG/DL (ref 0.6–1.1)
GFR SERPL CREATININE-BSD FRML MDRD: >60 ML/MIN/1.73M2
GLUCOSE BLD-MCNC: 79 MG/DL (ref 70–125)
POTASSIUM BLD-SCNC: 2.9 MMOL/L (ref 3.5–5)
SODIUM SERPL-SCNC: 140 MMOL/L (ref 136–145)

## 2018-01-23 ENCOUNTER — RECORDS - HEALTHEAST (OUTPATIENT)
Dept: LAB | Facility: CLINIC | Age: 83
End: 2018-01-23

## 2018-01-23 LAB
ANION GAP SERPL CALCULATED.3IONS-SCNC: 7 MMOL/L (ref 5–18)
BUN SERPL-MCNC: 16 MG/DL (ref 8–28)
CALCIUM SERPL-MCNC: 9.6 MG/DL (ref 8.5–10.5)
CHLORIDE BLD-SCNC: 98 MMOL/L (ref 98–107)
CO2 SERPL-SCNC: 33 MMOL/L (ref 22–31)
CREAT SERPL-MCNC: 0.6 MG/DL (ref 0.6–1.1)
GFR SERPL CREATININE-BSD FRML MDRD: >60 ML/MIN/1.73M2
GLUCOSE BLD-MCNC: 100 MG/DL (ref 70–125)
MAGNESIUM SERPL-MCNC: 1.4 MG/DL (ref 1.8–2.6)
POTASSIUM BLD-SCNC: 3 MMOL/L (ref 3.5–5)
SODIUM SERPL-SCNC: 138 MMOL/L (ref 136–145)

## 2018-01-24 ENCOUNTER — OFFICE VISIT (OUTPATIENT)
Dept: SURGERY | Facility: CLINIC | Age: 83
End: 2018-01-24
Payer: COMMERCIAL

## 2018-01-24 ENCOUNTER — RECORDS - HEALTHEAST (OUTPATIENT)
Dept: LAB | Facility: CLINIC | Age: 83
End: 2018-01-24

## 2018-01-24 VITALS
SYSTOLIC BLOOD PRESSURE: 137 MMHG | OXYGEN SATURATION: 92 % | WEIGHT: 96.3 LBS | HEIGHT: 58 IN | TEMPERATURE: 97.4 F | HEART RATE: 89 BPM | DIASTOLIC BLOOD PRESSURE: 90 MMHG | BODY MASS INDEX: 20.21 KG/M2

## 2018-01-24 DIAGNOSIS — K62.3 RECTAL PROLAPSE: Primary | ICD-10-CM

## 2018-01-24 DIAGNOSIS — N39.42 URINARY INCONTINENCE WITHOUT SENSORY AWARENESS: ICD-10-CM

## 2018-01-24 DIAGNOSIS — Z09 FOLLOW-UP EXAMINATION FOLLOWING SURGERY: ICD-10-CM

## 2018-01-24 LAB
ANION GAP SERPL CALCULATED.3IONS-SCNC: 9 MMOL/L (ref 5–18)
BUN SERPL-MCNC: 13 MG/DL (ref 8–28)
CALCIUM SERPL-MCNC: 9.7 MG/DL (ref 8.5–10.5)
CHLORIDE BLD-SCNC: 100 MMOL/L (ref 98–107)
CO2 SERPL-SCNC: 34 MMOL/L (ref 22–31)
CREAT SERPL-MCNC: 0.58 MG/DL (ref 0.6–1.1)
GFR SERPL CREATININE-BSD FRML MDRD: >60 ML/MIN/1.73M2
GLUCOSE BLD-MCNC: 81 MG/DL (ref 70–125)
MAGNESIUM SERPL-MCNC: 1.6 MG/DL (ref 1.8–2.6)
POTASSIUM BLD-SCNC: 3.2 MMOL/L (ref 3.5–5)
SODIUM SERPL-SCNC: 143 MMOL/L (ref 136–145)

## 2018-01-24 ASSESSMENT — PAIN SCALES - GENERAL: PAINLEVEL: MILD PAIN (2)

## 2018-01-24 NOTE — NURSING NOTE
"Chief Complaint   Patient presents with     Surgical Followup     post op       Vitals:    01/24/18 1308   BP: 137/90   Pulse: 89   Temp: 97.4  F (36.3  C)   TempSrc: Oral   SpO2: 92%   Weight: 96 lb 4.8 oz   Height: 4' 10\"       Body mass index is 20.13 kg/(m^2).      Jossie WONG LPN                          "

## 2018-01-24 NOTE — PROGRESS NOTES
Colon and Rectal Surgery Postoperative Clinic Note    RE: Belia Sheets  : 1930  SUHAIL: 2018    Belia Sheets is a very pleasant 87 year old female with full-thickness rectal prolapse now status post perineal rectosigmoidectomy, levatoroplasty, repair of pelvic hernia on 2018 with Dr. Mariano.  Her postoperative course was uneventful and she was discharged to a TCU on 1/15/2018.  She presents today with her daughter for follow-up.    Interval history: Sudeep has been doing well since she left the hospital.  She is hoping to return home next Monday.  She has a decreased urge to have bowel movements but passes a small amount of stool every time she voids.  She has only had minimal leakage of stool and this is greatly improved from prior to surgery where she had quite a bit of fecal incontinence.  She does continue to have urinary incontinence and is soaking pads daily from this.  She does not believe she has seen a urologist for this.  Her stools have been very soft and she has avoided any straining.  She is on twice daily Metamucil.  She is taking Tylenol in the morning but is not having any further pain.    Assessment/Plan:  87 year old female status post perineal rectosigmoidectomy, levatoroplasty, repair of pelvic hernia on 2018 with Dr. Mariano.  She is recovering quite well from surgery.  Encouraged her to continue on a daily fiber supplement and her incontinence has significantly improved since before surgery.  She does continue to leak a small amount of stool.  Will monitor this over the next few weeks as she recovers.  She was previously on Imodium prior to surgery.  I would like her to remain off of this for now but we could consider adding it back in if she develops loose stools or has further difficulty with fecal incontinence.  She continues to have some urinary incontinence and I have referred her to a urologist for this.  Encouraged her to continue to avoid any straining  with bowel movements.  Would like to see her back in clinic in the next 3-4 weeks for a recheck.  Encouraged her to contact the clinic in the meantime with any questions or concerns.  Patient's questions were answered to her stated satisfaction and she is in agreement with this plan.    Medical history:  Past Medical History:   Diagnosis Date     Carpal tunnel syndrome (aka CTS)      H/O calcium pyrophosphate deposition disease (CPPD)      History of encephalopathy 10/2017     Hypertension      Kyphoscoliosis      Osteoarthritis     hands     Osteoporosis      Rectal prolapse        Surgical history:  Past Surgical History:   Procedure Laterality Date     COLONOSCOPY  2010     HYSTERECTOMY      for uterine prolapse     RECTOSIGMOIDECTOMY PERINEAL N/A 1/12/2018    Procedure: RECTOSIGMOIDECTOMY PERINEAL;  Perineal Rectosigmoidectomy  ;  Surgeon: Amrik Mariano MD;  Location: [facility] OR       Problem list:  Patient Active Problem List    Diagnosis Date Noted     Rectal prolapse 01/12/2018     Priority: Medium     Generalized weakness 01/08/2018     Priority: Medium     Encephalopathy 10/23/2017     Priority: Medium     Advance Care Planning 06/27/2017     Priority: Medium     Senile osteoporosis 01/13/2017     Priority: Medium     Lumbar verterbral fracture, non-traumatic 01/13/2017     Priority: Medium     Recurrent falls 08/31/2016     Priority: Medium     Chronic fatigue 08/31/2016     Priority: Medium     Osteoporosis      Priority: Medium     Osteoarthritis      Priority: Medium     hands         Medications:  Current Outpatient Prescriptions   Medication Sig Dispense Refill     teriparatide, recombinant, (FORTEO) 600 MCG/2.4ML SOLN injection Inject 0.08 mLs (20 mcg) Subcutaneous daily 6 mL 1     psyllium (METAMUCIL/KONSYL) Packet Take 1 packet by mouth 2 times daily  0     saccharomyces boulardii (FLORASTOR) 250 MG capsule Take 1 capsule (250 mg) by mouth 2 times daily 14 capsule      ibuprofen  (ADVIL/MOTRIN) 600 MG tablet Take 1 tablet (600 mg) by mouth 3 times daily as needed for moderate pain 120 tablet      polyethylene glycol (MIRALAX/GLYCOLAX) Packet Take 17 g by mouth daily as needed for constipation 7 packet      acetaminophen (TYLENOL) 325 MG tablet Take 2 tablets (650 mg) by mouth every 4 hours as needed for mild pain 60 tablet 0     amLODIPine (NORVASC) 5 MG tablet Take 1 tablet (5 mg) by mouth daily 30 tablet 0     ondansetron (ZOFRAN) 4 MG tablet Take 1 tablet (4 mg) by mouth every 6 hours as needed for nausea While taking neomycin and flagyl. 3 tablet 0     order for DME Equipment being ordered: Home BP monitor and cuff 1 each 0     Thiamine HCl 50 MG CAPS Take 1 capsule by mouth daily (Patient taking differently: Take 1 capsule by mouth every morning ) 60 capsule 3     cholecalciferol (VITAMIN  -D) 1000 UNITS capsule Take 1 capsule (1,000 Units) by mouth daily (Patient taking differently: Take 1 capsule by mouth every morning ) 100 capsule 3     calcium-vitamin D (CALTRATE) 600-400 MG-UNIT per tablet Take 1 tablet by mouth every morning        sodium fluoride dental gel (PREVIDENT) 1.1 % GEL Apply to affected area At Bedtime       Cyanocobalamin (VITAMIN B 12 PO) Take 1 tablet by mouth every morning          Allergies:  No Known Allergies    Family history:  Family History   Problem Relation Age of Onset     Depression/Anxiety Son      Depression/Anxiety Son      alcohol abuse  age 24     Hypertension Mother      Hypertension Brother      DIABETES No family hx of      Breast Cancer No family hx of      Colon Cancer No family hx of      Prostate Cancer No family hx of      Other Cancer No family hx of        Social history:  Social History   Substance Use Topics     Smoking status: Never Smoker     Smokeless tobacco: Never Used     Alcohol use No     Marital status: .    Nursing Notes:   Jossie Urban LPN  2018  1:11 PM  Signed  Chief Complaint   Patient presents with      "Surgical Followup     post op       Vitals:    01/24/18 1308   BP: 137/90   Pulse: 89   Temp: 97.4  F (36.3  C)   TempSrc: Oral   SpO2: 92%   Weight: 96 lb 4.8 oz   Height: 4' 10\"       Body mass index is 20.13 kg/(m^2).      Jossie WONG LPN                             Physical Examination:   /90  Pulse 89  Temp 97.4  F (36.3  C) (Oral)  Ht 4' 10\"  Wt 96 lb 4.8 oz  SpO2 92%  BMI 20.13 kg/m2  General: Alert, oriented, in no acute distress, sitting comfortably  HEENT: Mucous membranes moist    Total face to face time was 20 minutes, >50% counseling.  This is a postop visit.    SUSIE Schaefer, NP-C  Colon and Rectal Surgery  [facility]    This note was created using speech recognition software and may contain unintended word substitutions.    "

## 2018-01-24 NOTE — LETTER
2018      RE: Belia Sheets  [address]  [address]  [facility] [address]       Colon and Rectal Surgery Postoperative Clinic Note    RE: Belia Sheets  : 1930  SUHAIL: 2018    Belia Sheets is a very pleasant 87 year old female with full-thickness rectal prolapse now status post perineal rectosigmoidectomy, levatoroplasty, repair of pelvic hernia on 2018 with Dr. Mariano.  Her postoperative course was uneventful and she was discharged to a TCU on 1/15/2018.  She presents today with her daughter for follow-up.    Interval history: Sudeep has been doing well since she left the hospital.  She is hoping to return home next Monday.  She has a decreased urge to have bowel movements but passes a small amount of stool every time she voids.  She has only had minimal leakage of stool and this is greatly improved from prior to surgery where she had quite a bit of fecal incontinence.  She does continue to have urinary incontinence and is soaking pads daily from this.  She does not believe she has seen a urologist for this.  Her stools have been very soft and she has avoided any straining.  She is on twice daily Metamucil.  She is taking Tylenol in the morning but is not having any further pain.    Assessment/Plan:  87 year old female status post perineal rectosigmoidectomy, levatoroplasty, repair of pelvic hernia on 2018 with Dr. Mariano.  She is recovering quite well from surgery.  Encouraged her to continue on a daily fiber supplement and her incontinence has significantly improved since before surgery.  She does continue to leak a small amount of stool.  Will monitor this over the next few weeks as she recovers.  She was previously on Imodium prior to surgery.  I would like her to remain off of this for now but we could consider adding it back in if she develops loose stools or has further difficulty with fecal incontinence.  She continues to have some urinary incontinence and I  have referred her to a urologist for this.  Encouraged her to continue to avoid any straining with bowel movements.  Would like to see her back in clinic in the next 3-4 weeks for a recheck.  Encouraged her to contact the clinic in the meantime with any questions or concerns.  Patient's questions were answered to her stated satisfaction and she is in agreement with this plan.    Medical history:  Past Medical History:   Diagnosis Date     Carpal tunnel syndrome (aka CTS)      H/O calcium pyrophosphate deposition disease (CPPD)      History of encephalopathy 10/2017     Hypertension      Kyphoscoliosis      Osteoarthritis     hands     Osteoporosis      Rectal prolapse        Surgical history:  Past Surgical History:   Procedure Laterality Date     COLONOSCOPY  2010     HYSTERECTOMY      for uterine prolapse     RECTOSIGMOIDECTOMY PERINEAL N/A 1/12/2018    Procedure: RECTOSIGMOIDECTOMY PERINEAL;  Perineal Rectosigmoidectomy  ;  Surgeon: Amrik Mariano MD;  Location: [facility] OR       Problem list:  Patient Active Problem List    Diagnosis Date Noted     Rectal prolapse 01/12/2018     Priority: Medium     Generalized weakness 01/08/2018     Priority: Medium     Encephalopathy 10/23/2017     Priority: Medium     Advance Care Planning 06/27/2017     Priority: Medium     Senile osteoporosis 01/13/2017     Priority: Medium     Lumbar verterbral fracture, non-traumatic 01/13/2017     Priority: Medium     Recurrent falls 08/31/2016     Priority: Medium     Chronic fatigue 08/31/2016     Priority: Medium     Osteoporosis      Priority: Medium     Osteoarthritis      Priority: Medium     hands         Medications:  Current Outpatient Prescriptions   Medication Sig Dispense Refill     teriparatide, recombinant, (FORTEO) 600 MCG/2.4ML SOLN injection Inject 0.08 mLs (20 mcg) Subcutaneous daily 6 mL 1     psyllium (METAMUCIL/KONSYL) Packet Take 1 packet by mouth 2 times daily  0     saccharomyces boulardii (FLORASTOR) 250 MG  capsule Take 1 capsule (250 mg) by mouth 2 times daily 14 capsule      ibuprofen (ADVIL/MOTRIN) 600 MG tablet Take 1 tablet (600 mg) by mouth 3 times daily as needed for moderate pain 120 tablet      polyethylene glycol (MIRALAX/GLYCOLAX) Packet Take 17 g by mouth daily as needed for constipation 7 packet      acetaminophen (TYLENOL) 325 MG tablet Take 2 tablets (650 mg) by mouth every 4 hours as needed for mild pain 60 tablet 0     amLODIPine (NORVASC) 5 MG tablet Take 1 tablet (5 mg) by mouth daily 30 tablet 0     ondansetron (ZOFRAN) 4 MG tablet Take 1 tablet (4 mg) by mouth every 6 hours as needed for nausea While taking neomycin and flagyl. 3 tablet 0     order for DME Equipment being ordered: Home BP monitor and cuff 1 each 0     Thiamine HCl 50 MG CAPS Take 1 capsule by mouth daily (Patient taking differently: Take 1 capsule by mouth every morning ) 60 capsule 3     cholecalciferol (VITAMIN  -D) 1000 UNITS capsule Take 1 capsule (1,000 Units) by mouth daily (Patient taking differently: Take 1 capsule by mouth every morning ) 100 capsule 3     calcium-vitamin D (CALTRATE) 600-400 MG-UNIT per tablet Take 1 tablet by mouth every morning        sodium fluoride dental gel (PREVIDENT) 1.1 % GEL Apply to affected area At Bedtime       Cyanocobalamin (VITAMIN B 12 PO) Take 1 tablet by mouth every morning          Allergies:  No Known Allergies    Family history:  Family History   Problem Relation Age of Onset     Depression/Anxiety Son      Depression/Anxiety Son      alcohol abuse  age 24     Hypertension Mother      Hypertension Brother      DIABETES No family hx of      Breast Cancer No family hx of      Colon Cancer No family hx of      Prostate Cancer No family hx of      Other Cancer No family hx of        Social history:  Social History   Substance Use Topics     Smoking status: Never Smoker     Smokeless tobacco: Never Used     Alcohol use No     Marital status: .    Nursing Notes:   Jossie Urban,  "LPN  1/24/2018  1:11 PM  Signed  Chief Complaint   Patient presents with     Surgical Followup     post op       Vitals:    01/24/18 1308   BP: 137/90   Pulse: 89   Temp: 97.4  F (36.3  C)   TempSrc: Oral   SpO2: 92%   Weight: 96 lb 4.8 oz   Height: 4' 10\"       Body mass index is 20.13 kg/(m^2).      Jossie WONGAMELIA                             Physical Examination:   /90  Pulse 89  Temp 97.4  F (36.3  C) (Oral)  Ht 4' 10\"  Wt 96 lb 4.8 oz  SpO2 92%  BMI 20.13 kg/m2  General: Alert, oriented, in no acute distress, sitting comfortably  HEENT: Mucous membranes moist    Total face to face time was 20 minutes, >50% counseling.  This is a postop visit.    SUSIE Schaefer, NP-C  Colon and Rectal Surgery  [facility]    This note was created using speech recognition software and may contain unintended word substitutions.      SUSIE Schaefer CNP      "

## 2018-01-24 NOTE — MR AVS SNAPSHOT
After Visit Summary   1/24/2018    Belia Sheets    MRN: [id number]           Patient Information     Date Of Birth          5/16/1930        Visit Information        Provider Department      1/24/2018 1:00 PM Joslyn Daugherty APRN CNP [facility] Colon and Rectal Surgery        Today's Diagnoses     Rectal prolapse    -  1    Urinary incontinence without sensory awareness        Follow-up examination following surgery           Follow-ups after your visit        Additional Services     UROLOGY ADULT REFERRAL       Your provider has referred you to: [facility] Pro[facility]rologic C[facility] - [place] [phone]   [email or web address]    Please be aware that coverage of these services is subject to the terms and limitations of your health insurance plan.  Call member services at your health plan with any benefit or coverage questions.      Please bring the following with you to your appointment:    (1) Any X-Rays, CTs or MRIs which have been performed.  Contact the facility where they were done to arrange for  prior to your scheduled appointment.    (2) List of current medications  (3) This referral request   (4) Any documents/labs given to you for this referral                  Your next 10 appointments already scheduled     Feb 07, 2018  3:15 PM CST   (Arrive by 3:00 PM)   RETURN ENDOCRINE with Saurabh Pittman MD   [facility] Endocrinology ([facility])    [address] [place] [id number] [phone]            Feb 21, 2018  4:00 PM CST   (Arrive by 3:45 PM)   Post-Op with SUSIE Hooper CNP   [facility] Colon and Rectal Surgery ([facility] [place])    [address] [place] [id number]   [phone]            Feb 22, 2018  2:15 PM CST   (Arrive by 2:00 PM)   New Patient Visit with Nida Hui MD   [facility] Urology and [facility] for  "Prostate and Urologic Cancers ([facility] [place])    [address] [facility]  [address] [facility] [address] [phone]              Who to contact     Please call your clinic at [phone] to:    Ask questions about your health    Make or cancel appointments    Discuss your medicines    Learn about your test results    Speak to your doctor   If you have compliments or concerns about an experience at your clinic, or if you wish to file a complaint, please contact [facility] Patient Relations at [phone] or email us at [email or web address]         Additional Information About Your Visit        BusapharWetpaint Information     Spreadshirt gives you secure access to your electronic health record. If you see a primary care provider, you can also send messages to your care team and make appointments. If you have questions, please call your primary care clinic.  If you do not have a primary care provider, please call [phone] and they will assist you.      Spreadshirt is an electronic gateway that provides easy, online access to your medical records. With Spreadshirt, you can request a clinic appointment, read your test results, renew a prescription or communicate with your care team.     To access your existing account, please contact your [facility] or call [phone] for assistance.        Care EveryWhere ID     This is your Care EveryWhere ID. This could be used by other organizations to access your [place] medical records  [id number]        Your Vitals Were     Pulse Temperature Height Pulse Oximetry BMI (Body Mass Index)       89 97.4  F (36.3  C) (Oral) 4' 10\" 92% 20.13 kg/m2        Blood Pressure from Last 3 Encounters:   01/24/18 137/90   01/15/18 (!) 147/92   01/09/18 132/79    Weight from Last 3 Encounters:   01/24/18 96 lb 4.8 oz   01/14/18 92 lb 3.2 oz   01/08/18 84 lb 14 oz              We Performed the Following     " UROLOGY ADULT REFERRAL          Today's Medication Changes          These changes are accurate as of 1/24/18  2:25 PM.  If you have any questions, ask your nurse or doctor.               These medicines have changed or have updated prescriptions.        Dose/Directions    cholecalciferol 1000 UNITS capsule   Commonly known as:  vitamin  -D   This may have changed:  when to take this   Used for:  Age-related osteoporosis with current pathological fracture with routine healing, subsequent encounter        Dose:  1 capsule   Take 1 capsule (1,000 Units) by mouth daily   Quantity:  100 capsule   Refills:  3       Thiamine HCl 50 MG Caps   This may have changed:  when to take this   Used for:  Confusion        Dose:  1 capsule   Take 1 capsule by mouth daily   Quantity:  60 capsule   Refills:  3                Primary Care Provider Office Phone # Fax #    Alejandro Sandhu [phone]       [address] [address]        Equal Access to Services     ANUEL MARSHALL : Angelica Pritchett, waaubrie wilson, cuate almanzar, ivana tyler . So [facility] [phone].    ATENCIÓN: Si habla español, tiene a benavides disposición servicios gratuitos de asistencia lingüística. Seven al [phone].    We comply with applicable federal civil rights laws and [place] laws. We do not discriminate on the basis of race, color, national origin, age, disability, sex, sexual orientation, or gender identity.            Thank you!     Thank you for choosing [facility] COLON AND RECTAL SURGERY  for your care. Our goal is always to provide you with excellent care. Hearing back from our patients is one way we can continue to improve our services. Please take a few minutes to complete the written survey that you may receive in the mail after your visit with us. Thank you!             Your Updated Medication List - Protect others around you: Learn how to safely use, store and  throw away your medicines at www.disposemymeds.org.          This list is accurate as of 1/24/18  2:25 PM.  Always use your most recent med list.                   Brand Name Dispense Instructions for use Diagnosis    acetaminophen 325 MG tablet    TYLENOL    60 tablet    Take 2 tablets (650 mg) by mouth every 4 hours as needed for mild pain    Rectal prolapse       amLODIPine 5 MG tablet    NORVASC    30 tablet    Take 1 tablet (5 mg) by mouth daily    Benign essential hypertension       calcium-vitamin D 600-400 MG-UNIT per tablet    CALTRATE     Take 1 tablet by mouth every morning        cholecalciferol 1000 UNITS capsule    vitamin  -D    100 capsule    Take 1 capsule (1,000 Units) by mouth daily    Age-related osteoporosis with current pathological fracture with routine healing, subsequent encounter       ibuprofen 600 MG tablet    ADVIL/MOTRIN    120 tablet    Take 1 tablet (600 mg) by mouth 3 times daily as needed for moderate pain    Rectal prolapse       ondansetron 4 MG tablet    ZOFRAN    3 tablet    Take 1 tablet (4 mg) by mouth every 6 hours as needed for nausea While taking neomycin and flagyl.    Rectal prolapse       order for DME     1 each    Equipment being ordered: Home BP monitor and cuff    Severe hypertension       polyethylene glycol Packet    MIRALAX/GLYCOLAX    7 packet    Take 17 g by mouth daily as needed for constipation    Rectal prolapse       psyllium Packet    METAMUCIL/KONSYL     Take 1 packet by mouth 2 times daily    Rectal prolapse       saccharomyces boulardii 250 MG capsule    FLORASTOR    14 capsule    Take 1 capsule (250 mg) by mouth 2 times daily    Rectal prolapse       sodium fluoride dental gel 1.1 % Gel topical gel    PREVIDENT     Apply to affected area At Bedtime        teriparatide (recombinant) 600 MCG/2.4ML Soln injection    FORTEO    6 mL    Inject 0.08 mLs (20 mcg) Subcutaneous daily    Age-related osteoporosis with current pathological fracture, initial  encounter       Thiamine HCl 50 MG Caps     60 capsule    Take 1 capsule by mouth daily    Confusion       VITAMIN B 12 PO      Take 1 tablet by mouth every morning

## 2018-01-25 ENCOUNTER — PRE VISIT (OUTPATIENT)
Dept: UROLOGY | Facility: CLINIC | Age: 83
End: 2018-01-25

## 2018-01-25 ENCOUNTER — RECORDS - HEALTHEAST (OUTPATIENT)
Dept: LAB | Facility: CLINIC | Age: 83
End: 2018-01-25

## 2018-01-26 LAB — POTASSIUM BLD-SCNC: 4.3 MMOL/L (ref 3.5–5)

## 2018-01-29 ENCOUNTER — DOCUMENTATION ONLY (OUTPATIENT)
Dept: FAMILY MEDICINE | Facility: CLINIC | Age: 83
End: 2018-01-29

## 2018-01-29 ENCOUNTER — RECORDS - HEALTHEAST (OUTPATIENT)
Dept: LAB | Facility: CLINIC | Age: 83
End: 2018-01-29

## 2018-01-29 LAB
ANION GAP SERPL CALCULATED.3IONS-SCNC: 6 MMOL/L (ref 5–18)
BUN SERPL-MCNC: 16 MG/DL (ref 8–28)
CALCIUM SERPL-MCNC: 9 MG/DL (ref 8.5–10.5)
CHLORIDE BLD-SCNC: 103 MMOL/L (ref 98–107)
CO2 SERPL-SCNC: 30 MMOL/L (ref 22–31)
CREAT SERPL-MCNC: 0.59 MG/DL (ref 0.6–1.1)
GFR SERPL CREATININE-BSD FRML MDRD: >60 ML/MIN/1.73M2
GLUCOSE BLD-MCNC: 73 MG/DL (ref 70–125)
MAGNESIUM SERPL-MCNC: 1.4 MG/DL (ref 1.8–2.6)
POTASSIUM BLD-SCNC: 3.7 MMOL/L (ref 3.5–5)
SODIUM SERPL-SCNC: 139 MMOL/L (ref 136–145)

## 2018-01-29 NOTE — PROGRESS NOTES
"When opening a documentation only encounter, be sure to enter in \"Chief Complaint\" Forms and in \" Comments\" Title of form, description if needed.    Form received via: Fax  Form now resides in: Provider Ready    Form sent out via: Fax  Output date: 1/12/18    Form received by recipient via fax confirmation  Please close the encounter.    "

## 2018-02-01 ENCOUNTER — TELEPHONE (OUTPATIENT)
Dept: FAMILY MEDICINE | Facility: CLINIC | Age: 83
End: 2018-02-01

## 2018-02-01 ENCOUNTER — DOCUMENTATION ONLY (OUTPATIENT)
Dept: FAMILY MEDICINE | Facility: CLINIC | Age: 83
End: 2018-02-01

## 2018-02-01 NOTE — TELEPHONE ENCOUNTER
[facility] phone call message - order or referral request for patient:     Order or referral being requested: Skilled nursing 2 times a week for 2 weeks   1 time a week for 2 weeks   3 PRN  Home health aid 2 times a week for 3 weeks (starting next week)  PT/OT/ to evaluate and treat    Additional Comments: Richar states that she would need the verbal orders by today so she can see patient's tomorrow.    OK to leave a message on voice mail? Yes    Primary language: English      needed? No    Call taken on February 1, 2018 at 10:13 AM by Bel Campbell

## 2018-02-01 NOTE — PROGRESS NOTES
Form has been completed by provider.     Form sent out via: Fax to [facility] at Fax Number: [phone]  Patient informed: No, Reason: N/A  Output date: February 1, 2018    SMA Dorian      **Please close the encounter**

## 2018-02-01 NOTE — TELEPHONE ENCOUNTER
Orders approved per protocol. Message left with this information on voice for Richar.  Karrie Boone RN

## 2018-02-07 ENCOUNTER — OFFICE VISIT (OUTPATIENT)
Dept: PHARMACY | Facility: CLINIC | Age: 83
End: 2018-02-07
Payer: COMMERCIAL

## 2018-02-07 ENCOUNTER — OFFICE VISIT (OUTPATIENT)
Dept: FAMILY MEDICINE | Facility: CLINIC | Age: 83
End: 2018-02-07
Payer: COMMERCIAL

## 2018-02-07 VITALS
SYSTOLIC BLOOD PRESSURE: 137 MMHG | OXYGEN SATURATION: 96 % | DIASTOLIC BLOOD PRESSURE: 89 MMHG | RESPIRATION RATE: 16 BRPM | WEIGHT: 95.8 LBS | HEART RATE: 86 BPM | TEMPERATURE: 97.5 F | HEIGHT: 58 IN | BODY MASS INDEX: 20.11 KG/M2

## 2018-02-07 DIAGNOSIS — G93.40 ENCEPHALOPATHY: Primary | ICD-10-CM

## 2018-02-07 DIAGNOSIS — I10 HYPERTENSION, UNSPECIFIED TYPE: Primary | ICD-10-CM

## 2018-02-07 DIAGNOSIS — M80.00XD AGE-RELATED OSTEOPOROSIS WITH CURRENT PATHOLOGICAL FRACTURE WITH ROUTINE HEALING, SUBSEQUENT ENCOUNTER: ICD-10-CM

## 2018-02-07 DIAGNOSIS — I10 BENIGN ESSENTIAL HYPERTENSION: ICD-10-CM

## 2018-02-07 DIAGNOSIS — K62.3 RECTAL PROLAPSE: ICD-10-CM

## 2018-02-07 RX ORDER — MAGNESIUM OXIDE 400 MG/1
600 TABLET ORAL
COMMUNITY
End: 2020-10-07

## 2018-02-07 RX ORDER — MENTHOL AND ZINC OXIDE .44; 20.625 G/100G; G/100G
OINTMENT TOPICAL 4 TIMES DAILY PRN
Qty: 113 G | Refills: 1 | Status: SHIPPED | OUTPATIENT
Start: 2018-02-07 | End: 2020-12-23

## 2018-02-07 RX ORDER — AMLODIPINE BESYLATE 5 MG/1
5 TABLET ORAL DAILY
Qty: 90 TABLET | Refills: 1 | Status: SHIPPED | OUTPATIENT
Start: 2018-02-07 | End: 2018-06-20

## 2018-02-07 ASSESSMENT — ENCOUNTER SYMPTOMS
APPETITE CHANGE: 0
FEVER: 0
CARDIOVASCULAR NEGATIVE: 1
FATIGUE: 1
NERVOUS/ANXIOUS: 0
AGITATION: 0
CONFUSION: 0
ARTHRALGIAS: 1
DECREASED CONCENTRATION: 0
RESPIRATORY NEGATIVE: 1
HALLUCINATIONS: 1
ACTIVITY CHANGE: 0
BACK PAIN: 1
DYSPHORIC MOOD: 0
WEAKNESS: 1
MYALGIAS: 1
UNEXPECTED WEIGHT CHANGE: 0

## 2018-02-07 NOTE — PROGRESS NOTES
"      HPI:       87 year old patient who presents with:    Return visit for encephalopathy, rectal prolapse, hypertension, and osteoporosis.  She had surgery to correct her rectal prolapse and came through this without much complication.  Notably her mentation has continued to gradually improve and she very rarely sees any hallucinatory strings in front of her face anymore.  She feels she is essentially back to her cognitive baseline, as does her daughter CONSUELO.  The patient has no new complaints except for minor anal irritation.  She is due to follow-up with colorectal surgery in approximately 2 weeks.  She continues on Forteo injections.           Problem, Medication and Allergy Lists were reviewed and are current.  Patient is an established patient of this clinic.         Review of Systems:     Review of Systems   Constitutional: Positive for fatigue. Negative for activity change, appetite change, fever and unexpected weight change.   Respiratory: Negative.    Cardiovascular: Negative.    Musculoskeletal: Positive for arthralgias, back pain, gait problem and myalgias.   Neurological: Positive for weakness.   Psychiatric/Behavioral: Positive for hallucinations (minmal). Negative for agitation, confusion, decreased concentration and dysphoric mood. The patient is not nervous/anxious.           Physical Exam:     /89  Pulse 86  Temp 97.5  F (36.4  C) (Oral)  Resp 16  Ht 1.473 m (4' 10\")  Wt 43.5 kg (95 lb 12.8 oz)  SpO2 96%  Breastfeeding? No  BMI 20.02 kg/m2    Body mass index is 20.02 kg/(m^2).  Vitals reviewed.    Physical Exam   Constitutional: She is oriented to person, place, and time.   Alert, pleasant.  Uses a 4-wheeled walker with seat.   Cardiovascular: Normal rate, regular rhythm and normal heart sounds.    Pulmonary/Chest: No respiratory distress. She has no wheezes. She has no rales.   Abdominal: Soft. Bowel sounds are normal. She exhibits no mass. There is no tenderness.   Musculoskeletal: "   + marked scoliosis, kyphosis.  + muscle wasting throughout   Neurological: She is alert and oriented to person, place, and time.   Psychiatric: She has a normal mood and affect. Her behavior is normal.           Results:       Assessment and Plan     Belia was seen today for recheck.    Diagnoses and all orders for this visit:    Encephalopathy.  This has improved gradually over the past month and a half and she appears back to her cognitive baseline.  Observe.    Rectal prolapse.  The surgery was successful in adequately reducing her symptoms.  She will follow up with colorectal surgery.  I will refill her topical zinc oxide for symptomatic relief.  -     menthol-zinc oxide (CALMOSEPTINE) 0.44-20.625 % OINT ointment; Apply topically 4 times daily as needed for skin protection    Benign essential hypertension.  Blood pressure is at target and we will continue amlodipine therapy.  -     amLODIPine (NORVASC) 5 MG tablet; Take 1 tablet (5 mg) by mouth daily    Age-related osteoporosis with current pathological fracture with routine healing, subsequent encounter.  She will continue with Forteo injections.        Options for treatment and follow-up care were reviewed with the patient. Belia Sheets engaged in the decision making process and verbalized understanding of the options discussed and agreed with the final plan.    Alejandro Sandhu MD

## 2018-02-07 NOTE — MR AVS SNAPSHOT
After Visit Summary   2/7/2018    Belia Sheets    MRN: [id number]           Patient Information     Date Of Birth          5/16/1930        Visit Information        Provider Department      2/7/2018 2:00 PM [facility]        Today's Diagnoses     Hypertension, unspecified type    -  1       Follow-ups after your visit        Your next 10 appointments already scheduled     Aug 02, 2018 10:30 AM CDT   (Arrive by 10:15 AM)   RETURN ENDOCRINE with Saurabh Pittman MD   [facility] Endocrinology ([facility] [place])    [address] [place] [address] [phone]              Who to contact     Please call your clinic at [phone] to:    Ask questions about your health    Make or cancel appointments    Discuss your medicines    Learn about your test results    Speak to your doctor            Additional Information About Your Visit        MyChart Information     AzureBookert gives you secure access to your electronic health record. If you see a primary care provider, you can also send messages to your care team and make appointments. If you have questions, please call your primary care clinic.  If you do not have a primary care provider, please call [phone] and they will assist you.      Online Agility is an electronic gateway that provides easy, online access to your medical records. With Online Agility, you can request a clinic appointment, read your test results, renew a prescription or communicate with your care team.     To access your existing account, please contact your [facility] or call [phone] for assistance.        Care EveryWhere ID     This is your Care EveryWhere ID. This could be used by other organizations to access your [place] medical records  [id number]         Blood Pressure from Last 3 Encounters:   02/22/18 152/85   02/21/18 136/88   02/07/18 137/89    Weight from  Last 3 Encounters:   02/22/18 97 lb 6.4 oz (44.2 kg)   02/21/18 96 lb 14.4 oz (44 kg)   02/07/18 95 lb 12.8 oz (43.5 kg)              Today, you had the following     No orders found for display         Today's Medication Changes          These changes are accurate as of 2/7/18 11:59 PM.  If you have any questions, ask your nurse or doctor.               Start taking these medicines.        Dose/Directions    menthol-zinc oxide 0.44-20.625 % Oint ointment   Commonly known as:  CALMOSEPTINE   Used for:  Rectal prolapse   Started by:  Alejandro Sandhu MD        Apply topically 4 times daily as needed for skin protection   Quantity:  113 g   Refills:  1         Stop taking these medicines if you haven't already. Please contact your care team if you have questions.     ibuprofen 600 MG tablet   Commonly known as:  ADVIL/MOTRIN   Stopped by:  Alejandro Sandhu MD           ondansetron 4 MG tablet   Commonly known as:  ZOFRAN   Stopped by:  Alejandro Sandhu MD           polyethylene glycol Packet   Commonly known as:  MIRALAX/GLYCOLAX   Stopped by:  Alejandro Sandhu MD           psyllium Packet   Commonly known as:  METAMUCIL/KONSYL   Stopped by:  Alejandro Sandhu MD                Where to get your medicines      These medications were sent to [facility] [address] [facility] [address]     Phone:  [phone]     menthol-zinc oxide 0.44-20.625 % Oint ointment         These medications were sent to [place] [id number] [facility] [address] [place] [address] [address] [place] [id number]    Hours:  TRANSPLANT PHONE NUMBER [phone] Phone:  [phone]     amLODIPine 5 MG tablet                Primary Care Provider Office Phone # Fax #    Alejandro Sandhu [phone]       [address]  [address] [place] [id number]        Equal Access to Services     ANUEL MARSHALL AH: alejandra Albarado,  ivana oliva ah. So [facility] [phone].    ATENCIÓN: Si sofia stewart, tiene a benavides disposición servicios gratuitos de asistencia lingüística. Seven al [phone].    We comply with applicable federal civil rights laws and [place] laws. We do not discriminate on the basis of race, color, national origin, age, disability, sex, sexual orientation, or gender identity.            Thank you!     Thank you for choosing [facility]  for your care. Our goal is always to provide you with excellent care. Hearing back from our patients is one way we can continue to improve our services. Please take a few minutes to complete the written survey that you may receive in the mail after your visit with us. Thank you!             Your Updated Medication List - Protect others around you: Learn how to safely use, store and throw away your medicines at www.disposemymeds.org.          This list is accurate as of 2/7/18 11:59 PM.  Always use your most recent med list.                   Brand Name Dispense Instructions for use Diagnosis    acetaminophen 325 MG tablet    TYLENOL    60 tablet    Take 2 tablets (650 mg) by mouth every 4 hours as needed for mild pain    Rectal prolapse       amLODIPine 5 MG tablet    NORVASC    90 tablet    Take 1 tablet (5 mg) by mouth daily    Benign essential hypertension       magnesium oxide 400 MG tablet    MAG-OX     Take 600 mg by mouth        menthol-zinc oxide 0.44-20.625 % Oint ointment    CALMOSEPTINE    113 g    Apply topically 4 times daily as needed for skin protection    Rectal prolapse       order for DME     1 each    Equipment being ordered: Home BP monitor and cuff    Severe hypertension       saccharomyces boulardii 250 MG capsule    FLORASTOR    14 capsule    Take 1 capsule (250 mg) by mouth 2 times daily    Rectal prolapse       sodium fluoride dental gel 1.1 % Gel topical gel    PREVIDENT     Apply to affected  area At Bedtime        teriparatide (recombinant) 600 MCG/2.4ML Soln injection    FORTEO    6 mL    Inject 0.08 mLs (20 mcg) Subcutaneous daily    Age-related osteoporosis with current pathological fracture, initial encounter       VITAMIN B 12 PO      Take 1 tablet by mouth every morning

## 2018-02-07 NOTE — MR AVS SNAPSHOT
After Visit Summary   2/7/2018    Belia Sheets    MRN: [id number]           Patient Information     Date Of Birth          5/16/1930        Visit Information        Provider Department      2/7/2018 2:20 PM Alejandro Sandhu MD [facility]        Today's Diagnoses     Encephalopathy    -  1    Rectal prolapse        Benign essential hypertension        Age-related osteoporosis with current pathological fracture with routine healing, subsequent encounter          Care Instructions    1.  Continue ointment for irritation in anus.    2.  Ask colorectal surgeons about (1) continuing magnesium (last blood level normal) -- is it needed? And (2) do you need to use fiber if your stool is soft?    3.  Continue amlodipine.  Contact us if if dizziness is worse.    4.  Stop thiamine (Vitamin B1).              Follow-ups after your visit        Follow-up notes from your care team     Return in about 6 weeks (around 3/21/2018).      Your next 10 appointments already scheduled     Feb 21, 2018  4:00 PM CST   (Arrive by 3:45 PM)   Post-Op with SUSIE Hooper CNP   [facility] Colon and Rectal Surgery ([facility])    [address] [place] [address] [id number] [phone]            Feb 22, 2018 10:30 AM CST   (Arrive by 10:15 AM)   RETURN ENDOCRINE with Saurabh Pittman MD   [facility] Endocrinology ([facility])    [address] [place] [address] [id number] [phone]            Feb 22, 2018  2:15 PM CST   (Arrive by 2:00 PM)   New Patient Visit with Nida Hui MD   [facility] Urology and Inst for Prostate and Urologic Cancers ([facility])    [address] [place] [address] [id number] [phone]              Who to contact     Please call your clinic at [phone] to:    Ask questions about your health    Make or cancel  "appointments    Discuss your medicines    Learn about your test results    Speak to your doctor   If you have compliments or concerns about an experience at your clinic, or if you wish to file a complaint, please contact [facility] Patient Relations at [phone] or email us at [email or web address]         Additional Information About Your Visit        [employer] Information     [employer] gives you secure access to your electronic health record. If you see a primary care provider, you can also send messages to your care team and make appointments. If you have questions, please call your primary care clinic.  If you do not have a primary care provider, please call [phone] and they will assist you.      [employer] is an electronic gateway that provides easy, online access to your medical records. With [employer], you can request a clinic appointment, read your test results, renew a prescription or communicate with your care team.     To access your existing account, please contact your [facility] or call [phone] for assistance.        Care EveryWhere ID     This is your Care EveryWhere ID. This could be used by other organizations to access your [place] medical records  [id number]        Your Vitals Were     Pulse Temperature Respirations Height Pulse Oximetry Breastfeeding?    86 97.5  F (36.4  C) (Oral) 16 4' 10\" (147.3 cm) 96% No    BMI (Body Mass Index)                   20.02 kg/m2            Blood Pressure from Last 3 Encounters:   02/07/18 137/89   01/24/18 137/90   01/15/18 (!) 147/92    Weight from Last 3 Encounters:   02/07/18 95 lb 12.8 oz (43.5 kg)   01/24/18 96 lb 4.8 oz (43.7 kg)   01/14/18 92 lb 3.2 oz (41.8 kg)              Today, you had the following     No orders found for display         Today's Medication Changes          These changes are accurate as of 2/7/18  3:07 PM.  If you have any questions, ask your nurse or doctor. "               Start taking these medicines.        Dose/Directions    menthol-zinc oxide 0.44-20.625 % Oint ointment   Commonly known as:  CALMOSEPTINE   Used for:  Rectal prolapse   Started by:  Alejandro Sandhu MD        Apply topically 4 times daily as needed for skin protection   Quantity:  113 g   Refills:  1         These medicines have changed or have updated prescriptions.        Dose/Directions    cholecalciferol 1000 UNITS capsule   Commonly known as:  vitamin  -D   This may have changed:  when to take this   Used for:  Age-related osteoporosis with current pathological fracture with routine healing, subsequent encounter        Dose:  1 capsule   Take 1 capsule (1,000 Units) by mouth daily   Quantity:  100 capsule   Refills:  3         Stop taking these medicines if you haven't already. Please contact your care team if you have questions.     ibuprofen 600 MG tablet   Commonly known as:  ADVIL/MOTRIN   Stopped by:  Alejandro Sandhu MD           ondansetron 4 MG tablet   Commonly known as:  ZOFRAN   Stopped by:  Alejandro Sandhu MD           polyethylene glycol Packet   Commonly known as:  MIRALAX/GLYCOLAX   Stopped by:  Alejandro Sandhu MD           psyllium Packet   Commonly known as:  METAMUCIL/KONSYL   Stopped by:  Alejandro Sandhu MD                Where to get your medicines      These medications were sent to [facility] [place], [place] - [address] [facility] [address]     Phone:  [phone]     menthol-zinc oxide 0.44-20.625 % Oint ointment         These medications were sent to [facility] [address] [facility]  [address] [facility] [address] [place] [address]    Hours:  TRANSPLANT PHONE NUMBER [phone] Phone:  [phone]     amLODIPine 5 MG tablet                Primary Care Provider Office Phone # Fax #    Alejandro Sandhu [phone]       [address] [place] [id number]        Equal  Access to Services     [facility] Hospital[facility]TE[facility] : Hadii aad ku hadbrianfransico Eloyali, wadesda luqsurinder, qacyril kamanoloivana boo. So [facility] [phone].    ATENCIÓN: Si habla pat, tiene a benavides disposición servicios gratuitos de asistencia lingüística. Llame al [phone].    We comply with applicable federal civil rights laws and [place] laws. We do not discriminate on the basis of race, color, national origin, age, disability, sex, sexual orientation, or gender identity.            Thank you!     Thank you for choosing [facility]  for your care. Our goal is always to provide you with excellent care. Hearing back from our patients is one way we can continue to improve our services. Please take a few minutes to complete the written survey that you may receive in the mail after your visit with us. Thank you!             Your Updated Medication List - Protect others around you: Learn how to safely use, store and throw away your medicines at www.disposemymeds.org.          This list is accurate as of 2/7/18  3:07 PM.  Always use your most recent med list.                   Brand Name Dispense Instructions for use Diagnosis    acetaminophen 325 MG tablet    TYLENOL    60 tablet    Take 2 tablets (650 mg) by mouth every 4 hours as needed for mild pain    Rectal prolapse       amLODIPine 5 MG tablet    NORVASC    90 tablet    Take 1 tablet (5 mg) by mouth daily    Benign essential hypertension       calcium-vitamin D 600-400 MG-UNIT per tablet    CALTRATE     Take 1 tablet by mouth every morning        cholecalciferol 1000 UNITS capsule    vitamin  -D    100 capsule    Take 1 capsule (1,000 Units) by mouth daily    Age-related osteoporosis with current pathological fracture with routine healing, subsequent encounter       magnesium oxide 400 MG tablet    MAG-OX     Take 600 mg by mouth        menthol-zinc oxide 0.44-20.625 % Oint ointment    CALMOSEPTINE    113 g    Apply  topically 4 times daily as needed for skin protection    Rectal prolapse       order for DME     1 each    Equipment being ordered: Home BP monitor and cuff    Severe hypertension       saccharomyces boulardii 250 MG capsule    FLORASTOR    14 capsule    Take 1 capsule (250 mg) by mouth 2 times daily    Rectal prolapse       sodium fluoride dental gel 1.1 % Gel topical gel    PREVIDENT     Apply to affected area At Bedtime        teriparatide (recombinant) 600 MCG/2.4ML Soln injection    FORTEO    6 mL    Inject 0.08 mLs (20 mcg) Subcutaneous daily    Age-related osteoporosis with current pathological fracture, initial encounter       VITAMIN B 12 PO      Take 1 tablet by mouth every morning

## 2018-02-07 NOTE — PATIENT INSTRUCTIONS
1.  Continue ointment for irritation in anus.    2.  Ask colorectal surgeons about (1) continuing magnesium (last blood level normal) -- is it needed? And (2) do you need to use fiber if your stool is soft?    3.  Continue amlodipine.  Contact us if if dizziness is worse.    4.  Stop thiamine (Vitamin B1).

## 2018-02-08 ENCOUNTER — TELEPHONE (OUTPATIENT)
Dept: FAMILY MEDICINE | Facility: CLINIC | Age: 83
End: 2018-02-08

## 2018-02-08 NOTE — TELEPHONE ENCOUNTER
Returned call to home care PT., unable to reach. Left VM with verbal orders per protocol as requested. Left callback number for questions.    Luisana Bell RN

## 2018-02-08 NOTE — TELEPHONE ENCOUNTER
"[facility] phone call message - order or referral request for patient:     Order or referral being requested: extension of OT eval orders      Additional Comments: \" has written orders for OT eval, however they  before we could get appointment\". Bela is \"going out to patient home today and would like RN to call back with verbal order giving ok to extend OT eval\".    OK to leave a message on voice mail? Yes    Primary language: English      needed? No    Call taken on 2018 at 8:46 AM by Kristine Mueller    "

## 2018-02-08 NOTE — TELEPHONE ENCOUNTER
Returned call to home care OT to give verbal orders per protocol as requested. OT verbalized understanding.    Luisana Bell RN

## 2018-02-08 NOTE — TELEPHONE ENCOUNTER
[facility] phone call message - order or referral request for patient:     Order or referral being requested: Physical Therapy      Additional Comments: Verbal orders OK. For Gait training and therapeutic exercise.     OK to leave a message on voice mail? Yes    Primary language: English      needed? No    Call taken on February 8, 2018 at 4:27 PM by Michelle Castro

## 2018-02-09 ENCOUNTER — DOCUMENTATION ONLY (OUTPATIENT)
Dept: FAMILY MEDICINE | Facility: CLINIC | Age: 83
End: 2018-02-09

## 2018-02-09 NOTE — PROGRESS NOTES
"When opening a documentation only encounter, be sure to enter in \"Chief Complaint\" Forms and in \" Comments\" Title of form, description if needed.    Sudeep is a 87 year old  female  Form received via: Fax  Form now resides in: Provider Ready    Francisco J Vela CMA                Form has been completed by provider.     Form sent out via: Fax to [phone]   Patient informed: No, Reason:Confirmed fax confirmation  Output date: February 9, 2018    Francisco J Vela CMA      **Please close the encounter**      "

## 2018-02-14 ENCOUNTER — DOCUMENTATION ONLY (OUTPATIENT)
Dept: FAMILY MEDICINE | Facility: CLINIC | Age: 83
End: 2018-02-14

## 2018-02-14 NOTE — PROGRESS NOTES
"When opening a documentation only encounter, be sure to enter in \"Chief Complaint\" Forms and in \" Comments\" Title of form, description if needed.    Sudeep is a 87 year old  female  Form received via: Faxed  Form now resides in: Provider Ready    Francisco J Vela CMA                  "

## 2018-02-21 ENCOUNTER — OFFICE VISIT (OUTPATIENT)
Dept: SURGERY | Facility: CLINIC | Age: 83
End: 2018-02-21
Payer: COMMERCIAL

## 2018-02-21 VITALS
HEART RATE: 84 BPM | OXYGEN SATURATION: 95 % | HEIGHT: 58 IN | WEIGHT: 96.9 LBS | DIASTOLIC BLOOD PRESSURE: 88 MMHG | BODY MASS INDEX: 20.34 KG/M2 | SYSTOLIC BLOOD PRESSURE: 136 MMHG | TEMPERATURE: 98.4 F

## 2018-02-21 DIAGNOSIS — Z09 FOLLOW-UP EXAMINATION FOLLOWING SURGERY: Primary | ICD-10-CM

## 2018-02-21 DIAGNOSIS — R15.1 FECAL SMEARING: ICD-10-CM

## 2018-02-21 DIAGNOSIS — K62.3 RECTAL PROLAPSE: ICD-10-CM

## 2018-02-21 ASSESSMENT — ENCOUNTER SYMPTOMS
STIFFNESS: 1
SKIN CHANGES: 0
WEAKNESS: 1
DIZZINESS: 1
TINGLING: 1
WEIGHT LOSS: 0
NECK PAIN: 1
POOR WOUND HEALING: 0
DYSURIA: 0
BLOATING: 0
INCREASED ENERGY: 1
WEIGHT GAIN: 0
NUMBNESS: 1
BACK PAIN: 0
FLANK PAIN: 0
FATIGUE: 1
MUSCLE CRAMPS: 1
MEMORY LOSS: 0
PARALYSIS: 0
NAIL CHANGES: 0
NIGHT SWEATS: 0
MYALGIAS: 0
HEMATURIA: 0
BLOOD IN STOOL: 1
LOSS OF CONSCIOUSNESS: 0
POLYPHAGIA: 0
HEADACHES: 0
DISTURBANCES IN COORDINATION: 0
HEARTBURN: 0
FEVER: 0
MUSCLE WEAKNESS: 1
ABDOMINAL PAIN: 0
CHILLS: 0
TREMORS: 0
HALLUCINATIONS: 0
VOMITING: 0
RECTAL PAIN: 1
POLYDIPSIA: 0
ALTERED TEMPERATURE REGULATION: 0
DECREASED APPETITE: 0
SPEECH CHANGE: 1
DIARRHEA: 0
BOWEL INCONTINENCE: 1
JOINT SWELLING: 1
DIFFICULTY URINATING: 1
JAUNDICE: 0
CONSTIPATION: 0
ARTHRALGIAS: 1
NAUSEA: 1
SEIZURES: 0

## 2018-02-21 ASSESSMENT — PAIN SCALES - GENERAL: PAINLEVEL: NO PAIN (0)

## 2018-02-21 NOTE — LETTER
2018      RE: Belia Sheets  [address]  [facility] [id number]       Colon and Rectal Surgery Postoperative Clinic Note    RE: Belia Sheets  : 1930  SUHAIL: 2018    Belia Sheets is a very pleasant 87 year old female with full-thickness rectal prolapse now status post perineal rectosigmoidectomy, levatoroplasty, repair of pelvic hernia on 2018 with Dr. Mariano.  She was seen in clinic on 18 and presents today for follow up.    Interval history: Sudeep reports that she has been doing much better.  She continues to have some smearing of stool and lost 1 bowel movement with urgency.  She feels as though the symptoms are improving, however.  She feels more of an awareness that she has to have a bowel movement.  She stopped taking the Metamucil and she was worried that this was making her stools more loose.  She is additionally on a magnesium supplement and her primary care had recommended that she be off of this since her last magnesium level was normal, but wanted to check with colorectal surgery before making that decision.  She has some perianal skin irritation but finds that calmoseptine very helpful for this.  She reports that she notices a small amount of blood occasionally when she has wipes multiple times with toilet paper but otherwise denies any rectal bleeding.    Assessment/Plan:  87 year old female status post perineal rectosigmoidectomy, levatoroplasty, repair of pelvic hernia on 2018 with Dr. Mariano.  She continues to have some seepage of stool.  I recommended that she restart the Metamucil and explained to her that this should help bulk up her stools some.  Would like her to do this twice a day again.  We will have her stop the magnesium supplement and have her magnesium rechecked with her primary care provider as this may also be contributing to her loose stools.  Advised tugging a piece of gauze between her buttocks to wick away moisture  and using baby wipes instead of toilet paper.  Continue to use calmoseptine cream as needed.  Would like her to try this for 1 month and return to clinic if she continues to have leakage of stool.  She can otherwise follow-up as needed.  Patient's questions were answered to her stated satisfaction and she is in agreement with this plan.    Medical history:  Past Medical History:   Diagnosis Date     Carpal tunnel syndrome (aka CTS)      H/O calcium pyrophosphate deposition disease (CPPD)      History of encephalopathy 10/2017     Hypertension      Kyphoscoliosis      Osteoarthritis     hands     Osteoporosis      Rectal prolapse        Surgical history:  Past Surgical History:   Procedure Laterality Date     COLONOSCOPY  2010     HYSTERECTOMY      for uterine prolapse     RECTOSIGMOIDECTOMY PERINEAL N/A 1/12/2018    Procedure: RECTOSIGMOIDECTOMY PERINEAL;  Perineal Rectosigmoidectomy  ;  Surgeon: Amrik Mariano MD;  Location: [facility] OR       Problem list:    Patient Active Problem List    Diagnosis Date Noted     Rectal prolapse 01/12/2018     Priority: Medium     Generalized weakness 01/08/2018     Priority: Medium     Encephalopathy 10/23/2017     Priority: Medium     Advance Care Planning 06/27/2017     Priority: Medium     Senile osteoporosis 01/13/2017     Priority: Medium     Lumbar verterbral fracture, non-traumatic 01/13/2017     Priority: Medium     Recurrent falls 08/31/2016     Priority: Medium     Chronic fatigue 08/31/2016     Priority: Medium     Osteoporosis      Priority: Medium     Osteoarthritis      Priority: Medium     hands         Medications:  Current Outpatient Prescriptions   Medication Sig Dispense Refill     magnesium oxide (MAG-OX) 400 MG tablet Take 600 mg by mouth        amLODIPine (NORVASC) 5 MG tablet Take 1 tablet (5 mg) by mouth daily 90 tablet 1     menthol-zinc oxide (CALMOSEPTINE) 0.44-20.625 % OINT ointment Apply topically 4 times daily as needed for skin protection 113 g  1     teriparatide, recombinant, (FORTEO) 600 MCG/2.4ML SOLN injection Inject 0.08 mLs (20 mcg) Subcutaneous daily 6 mL 1     saccharomyces boulardii (FLORASTOR) 250 MG capsule Take 1 capsule (250 mg) by mouth 2 times daily 14 capsule      acetaminophen (TYLENOL) 325 MG tablet Take 2 tablets (650 mg) by mouth every 4 hours as needed for mild pain 60 tablet 0     order for DME Equipment being ordered: Home BP monitor and cuff 1 each 0     cholecalciferol (VITAMIN  -D) 1000 UNITS capsule Take 1 capsule (1,000 Units) by mouth daily (Patient taking differently: Take 1 capsule by mouth every morning ) 100 capsule 3     calcium-vitamin D (CALTRATE) 600-400 MG-UNIT per tablet Take 1 tablet by mouth every morning        sodium fluoride dental gel (PREVIDENT) 1.1 % GEL Apply to affected area At Bedtime       Cyanocobalamin (VITAMIN B 12 PO) Take 1 tablet by mouth every morning          Allergies:  No Known Allergies    Family history:  Family History   Problem Relation Age of Onset     Depression/Anxiety Son      Depression/Anxiety Son      alcohol abuse  age 24     Hypertension Mother      Hypertension Brother      DIABETES No family hx of      Breast Cancer No family hx of      Colon Cancer No family hx of      Prostate Cancer No family hx of      Other Cancer No family hx of        Social history:  Social History   Substance Use Topics     Smoking status: Never Smoker     Smokeless tobacco: Never Used     Alcohol use No     Marital status: .    There are no exam notes on file for this visit.     Physical Examination:   There were no vitals taken for this visit.  General: Alert, oriented, in no acute distress, sitting comfortably  HEENT: Mucous membranes moist    Total face to face time was 20 minutes, >50% counseling.  This is a postop visit.    SUSIE Schaefer, NP-C  Colon and Rectal Surgery  [facility]    This note was created using speech recognition software and  may contain unintended word substitutions.      SUSIE Orta CNP

## 2018-02-21 NOTE — PROGRESS NOTES
Colon and Rectal Surgery Postoperative Clinic Note    RE: Belia Sheets  : 1930  SUHAIL: 2018    Belia Sheets is a very pleasant 87 year old female with full-thickness rectal prolapse now status post perineal rectosigmoidectomy, levatoroplasty, repair of pelvic hernia on 2018 with Dr. Mariano.  She was seen in clinic on 18 and presents today for follow up.    Interval history: Sudeep reports that she has been doing much better.  She continues to have some smearing of stool and lost 1 bowel movement with urgency.  She feels as though the symptoms are improving, however.  She feels more of an awareness that she has to have a bowel movement.  She stopped taking the Metamucil and she was worried that this was making her stools more loose.  She is additionally on a magnesium supplement and her primary care had recommended that she be off of this since her last magnesium level was normal, but wanted to check with colorectal surgery before making that decision.  She has some perianal skin irritation but finds that calmoseptine very helpful for this.  She reports that she notices a small amount of blood occasionally when she has wipes multiple times with toilet paper but otherwise denies any rectal bleeding.    Assessment/Plan:  87 year old female status post perineal rectosigmoidectomy, levatoroplasty, repair of pelvic hernia on 2018 with Dr. Mariano.  She continues to have some seepage of stool.  I recommended that she restart the Metamucil and explained to her that this should help bulk up her stools some.  Would like her to do this twice a day again.  We will have her stop the magnesium supplement and have her magnesium rechecked with her primary care provider as this may also be contributing to her loose stools.  Advised tugging a piece of gauze between her buttocks to wick away moisture and using baby wipes instead of toilet paper.  Continue to use calmoseptine cream as needed.   Would like her to try this for 1 month and return to clinic if she continues to have leakage of stool.  She can otherwise follow-up as needed.  Patient's questions were answered to her stated satisfaction and she is in agreement with this plan.    Medical history:  Past Medical History:   Diagnosis Date     Carpal tunnel syndrome (aka CTS)      H/O calcium pyrophosphate deposition disease (CPPD)      History of encephalopathy 10/2017     Hypertension      Kyphoscoliosis      Osteoarthritis     hands     Osteoporosis      Rectal prolapse        Surgical history:  Past Surgical History:   Procedure Laterality Date     COLONOSCOPY  2010     HYSTERECTOMY      for uterine prolapse     RECTOSIGMOIDECTOMY PERINEAL N/A 1/12/2018    Procedure: RECTOSIGMOIDECTOMY PERINEAL;  Perineal Rectosigmoidectomy  ;  Surgeon: Amrik Mariano MD;  Location: [facility] OR       Problem list:    Patient Active Problem List    Diagnosis Date Noted     Rectal prolapse 01/12/2018     Priority: Medium     Generalized weakness 01/08/2018     Priority: Medium     Encephalopathy 10/23/2017     Priority: Medium     Advance Care Planning 06/27/2017     Priority: Medium     Senile osteoporosis 01/13/2017     Priority: Medium     Lumbar verterbral fracture, non-traumatic 01/13/2017     Priority: Medium     Recurrent falls 08/31/2016     Priority: Medium     Chronic fatigue 08/31/2016     Priority: Medium     Osteoporosis      Priority: Medium     Osteoarthritis      Priority: Medium     hands         Medications:  Current Outpatient Prescriptions   Medication Sig Dispense Refill     magnesium oxide (MAG-OX) 400 MG tablet Take 600 mg by mouth        amLODIPine (NORVASC) 5 MG tablet Take 1 tablet (5 mg) by mouth daily 90 tablet 1     menthol-zinc oxide (CALMOSEPTINE) 0.44-20.625 % OINT ointment Apply topically 4 times daily as needed for skin protection 113 g 1     teriparatide, recombinant, (FORTEO) 600 MCG/2.4ML SOLN injection Inject 0.08 mLs (20  mcg) Subcutaneous daily 6 mL 1     saccharomyces boulardii (FLORASTOR) 250 MG capsule Take 1 capsule (250 mg) by mouth 2 times daily 14 capsule      acetaminophen (TYLENOL) 325 MG tablet Take 2 tablets (650 mg) by mouth every 4 hours as needed for mild pain 60 tablet 0     order for DME Equipment being ordered: Home BP monitor and cuff 1 each 0     cholecalciferol (VITAMIN  -D) 1000 UNITS capsule Take 1 capsule (1,000 Units) by mouth daily (Patient taking differently: Take 1 capsule by mouth every morning ) 100 capsule 3     calcium-vitamin D (CALTRATE) 600-400 MG-UNIT per tablet Take 1 tablet by mouth every morning        sodium fluoride dental gel (PREVIDENT) 1.1 % GEL Apply to affected area At Bedtime       Cyanocobalamin (VITAMIN B 12 PO) Take 1 tablet by mouth every morning          Allergies:  No Known Allergies    Family history:  Family History   Problem Relation Age of Onset     Depression/Anxiety Son      Depression/Anxiety Son      alcohol abuse  age 24     Hypertension Mother      Hypertension Brother      DIABETES No family hx of      Breast Cancer No family hx of      Colon Cancer No family hx of      Prostate Cancer No family hx of      Other Cancer No family hx of        Social history:  Social History   Substance Use Topics     Smoking status: Never Smoker     Smokeless tobacco: Never Used     Alcohol use No     Marital status: .    There are no exam notes on file for this visit.     Physical Examination:   There were no vitals taken for this visit.  General: Alert, oriented, in no acute distress, sitting comfortably  HEENT: Mucous membranes moist    Total face to face time was 20 minutes, >50% counseling.  This is a postop visit.    SUSIE Schaefer, NP-C  Colon and Rectal Surgery  [facility]    This note was created using speech recognition software and may contain unintended word substitutions.

## 2018-02-21 NOTE — MR AVS SNAPSHOT
After Visit Summary   2/21/2018    Belia Sheets    MRN: [id number]           Patient Information     Date Of Birth          5/16/1930        Visit Information        Provider Department      2/21/2018 4:00 PM Joslyn Daugherty APRN CNP [facility]        Today's Diagnoses     Follow-up examination following surgery    -  1    Rectal prolapse        Fecal smearing           Follow-ups after your visit        Your next 10 appointments already scheduled     Feb 22, 2018 10:30 AM CST   (Arrive by 10:15 AM)   RETURN ENDOCRINE with MD LORENA Martins [facility] ([facility])    [address] [address] [phone]              Who to contact     Please call your clinic at [phone] to:    Ask questions about your health    Make or cancel appointments    Discuss your medicines    Learn about your test results    Speak to your doctor            Additional Information About Your Visit        MyChart Information     Kismet gives you secure access to your electronic health record. If you see a primary care provider, you can also send messages to your care team and make appointments. If you have questions, please call your primary care clinic.  If you do not have a primary care provider, please call [phone] and they will assist you.      Kismet is an electronic gateway that provides easy, online access to your medical records. With Kismet, you can request a clinic appointment, read your test results, renew a prescription or communicate with your care team.     To access your existing account, please contact your [facility] or call [phone] for assistance.        Care EveryWhere ID     This is your Care EveryWhere ID. This could be used by other organizations to access your [place] medical records  [id number]        Your Vitals Were     Pulse  "Temperature Height Pulse Oximetry BMI (Body Mass Index)       84 98.4  F (36.9  C) (Oral) 4' 10\" 95% 20.25 kg/m2        Blood Pressure from Last 3 Encounters:   02/21/18 136/88   02/07/18 137/89   01/24/18 137/90    Weight from Last 3 Encounters:   02/21/18 96 lb 14.4 oz   02/07/18 95 lb 12.8 oz   01/24/18 96 lb 4.8 oz              Today, you had the following     No orders found for display         Today's Medication Changes          These changes are accurate as of 2/21/18  5:34 PM.  If you have any questions, ask your nurse or doctor.               These medicines have changed or have updated prescriptions.        Dose/Directions    cholecalciferol 1000 UNITS capsule   Commonly known as:  vitamin  -D   This may have changed:  when to take this   Used for:  Age-related osteoporosis with current pathological fracture with routine healing, subsequent encounter        Dose:  1 capsule   Take 1 capsule (1,000 Units) by mouth daily   Quantity:  100 capsule   Refills:  3                Primary Care Provider Office Phone # Fax #    Alejandro Sandhu [phone]       [address] [address]        Equal Access to Services     MAGALY MARSHALL AH: Hadmitzi Pritchett, waaubrie wilson, cuate gilesalsuzanne almanzar, ivana cha. So [facility] [phone].    ATENCIÓN: Si habla español, tiene a benavides disposición servicios gratuitos de asistencia lingüística. Llame al [phone].    We comply with applicable federal civil rights laws and [place] laws. We do not discriminate on the basis of race, color, national origin, age, disability, sex, sexual orientation, or gender identity.            Thank you!     Thank you for choosing [facility] RE[facility]  for your care. Our goal is always to provide you with excellent care. Hearing back from our patients is one way we can continue to improve our services. Please take a few minutes to complete the written " survey that you may receive in the mail after your visit with us. Thank you!             Your Updated Medication List - Protect others around you: Learn how to safely use, store and throw away your medicines at www.disposemymeds.org.          This list is accurate as of 2/21/18  5:34 PM.  Always use your most recent med list.                   Brand Name Dispense Instructions for use Diagnosis    acetaminophen 325 MG tablet    TYLENOL    60 tablet    Take 2 tablets (650 mg) by mouth every 4 hours as needed for mild pain    Rectal prolapse       amLODIPine 5 MG tablet    NORVASC    90 tablet    Take 1 tablet (5 mg) by mouth daily    Benign essential hypertension       calcium-vitamin D 600-400 MG-UNIT per tablet    CALTRATE     Take 1 tablet by mouth every morning        cholecalciferol 1000 UNITS capsule    vitamin  -D    100 capsule    Take 1 capsule (1,000 Units) by mouth daily    Age-related osteoporosis with current pathological fracture with routine healing, subsequent encounter       magnesium oxide 400 MG tablet    MAG-OX     Take 600 mg by mouth        menthol-zinc oxide 0.44-20.625 % Oint ointment    CALMOSEPTINE    113 g    Apply topically 4 times daily as needed for skin protection    Rectal prolapse       order for DME     1 each    Equipment being ordered: Home BP monitor and cuff    Severe hypertension       saccharomyces boulardii 250 MG capsule    FLORASTOR    14 capsule    Take 1 capsule (250 mg) by mouth 2 times daily    Rectal prolapse       sodium fluoride dental gel 1.1 % Gel topical gel    PREVIDENT     Apply to affected area At Bedtime        teriparatide (recombinant) 600 MCG/2.4ML Soln injection    FORTEO    6 mL    Inject 0.08 mLs (20 mcg) Subcutaneous daily    Age-related osteoporosis with current pathological fracture, initial encounter       VITAMIN B 12 PO      Take 1 tablet by mouth every morning

## 2018-02-22 ENCOUNTER — OFFICE VISIT (OUTPATIENT)
Dept: ENDOCRINOLOGY | Facility: CLINIC | Age: 83
End: 2018-02-22
Payer: COMMERCIAL

## 2018-02-22 VITALS
DIASTOLIC BLOOD PRESSURE: 85 MMHG | SYSTOLIC BLOOD PRESSURE: 152 MMHG | BODY MASS INDEX: 20.45 KG/M2 | HEART RATE: 82 BPM | WEIGHT: 97.4 LBS | HEIGHT: 58 IN

## 2018-02-22 DIAGNOSIS — M80.00XD AGE-RELATED OSTEOPOROSIS WITH CURRENT PATHOLOGICAL FRACTURE WITH ROUTINE HEALING, SUBSEQUENT ENCOUNTER: Primary | ICD-10-CM

## 2018-02-22 DIAGNOSIS — M80.00XD AGE-RELATED OSTEOPOROSIS WITH CURRENT PATHOLOGICAL FRACTURE WITH ROUTINE HEALING, SUBSEQUENT ENCOUNTER: ICD-10-CM

## 2018-02-22 LAB
ALBUMIN SERPL-MCNC: 3.3 G/DL (ref 3.4–5)
ALP SERPL-CCNC: 63 U/L (ref 40–150)
BUN SERPL-MCNC: 19 MG/DL (ref 7–30)
CALCIUM SERPL-MCNC: 9.2 MG/DL (ref 8.5–10.1)
CREAT SERPL-MCNC: 0.66 MG/DL (ref 0.52–1.04)
GFR SERPL CREATININE-BSD FRML MDRD: 85 ML/MIN/1.7M2
PHOSPHATE SERPL-MCNC: 3.3 MG/DL (ref 2.5–4.5)
PTH-INTACT SERPL-MCNC: 23 PG/ML (ref 18–80)

## 2018-02-22 ASSESSMENT — PAIN SCALES - GENERAL: PAINLEVEL: NO PAIN (0)

## 2018-02-22 NOTE — MR AVS SNAPSHOT
After Visit Summary   2/22/2018    Belia Sheets    MRN: [id number]           Patient Information     Date Of Birth          5/16/1930        Visit Information        Provider Department      2/22/2018 10:30 AM Saurabh Pittman MD [facility]        Today's Diagnoses     Age-related osteoporosis with current pathological fracture with routine healing, subsequent encounter    -  1      Care Instructions    We will do blood work today and we will contact you with the results.     Continue calcium supplement: calcium-vitamin D (CALTRATE) 600-400 MG-UNIT per tablet - once daily.     Vitamin D 1000 IU daily           Follow-ups after your visit        Follow-up notes from your care team     Return in about 6 months (around 8/22/2018).      Your next 10 appointments already scheduled     Feb 22, 2018 11:30 AM CST   LAB with  LAB   [facility] [place]    [address] [place] [id number] [phone]           Please do not eat 10-12 hours before your appointment if you are coming in fasting for labs on lipids, cholesterol, or glucose (sugar). This does not apply to pregnant women. Water, hot tea and black coffee (with nothing added) are okay. Do not drink other fluids, diet soda or chew gum.            Aug 02, 2018 10:30 AM CDT   (Arrive by 10:15 AM)   RETURN ENDOCRINE with Saurabh Pittman MD   [facility] [place]    [address] [place] [address] [id number] [phone]              Future tests that were ordered for you today     Open Future Orders        Priority Expected Expires Ordered    25 Hydroxyvitamin D2 and D3 Routine  2/22/2019 2/22/2018    Albumin level Routine  2/22/2019 2/22/2018    Alkaline phosphatase Routine  2/22/2019 2/22/2018    Calcium Routine  2/22/2019 2/22/2018    Parathyroid Hormone Intact Routine  2/22/2019 2/22/2018    Phosphorus Routine   "2/22/2019 2/22/2018    Urea nitrogen Routine  2/22/2019 2/22/2018    Creatinine Routine  2/22/2019 2/22/2018            Who to contact     Please call your clinic at [phone] to:    Ask questions about your health    Make or cancel appointments    Discuss your medicines    Learn about your test results    Speak to your doctor            Additional Information About Your Visit        InfluxharLapSpace Information     Last.fm gives you secure access to your electronic health record. If you see a primary care provider, you can also send messages to your care team and make appointments. If you have questions, please call your primary care clinic.  If you do not have a primary care provider, please call [phone] and they will assist you.      Last.fm is an electronic gateway that provides easy, online access to your medical records. With Last.fm, you can request a clinic appointment, read your test results, renew a prescription or communicate with your care team.     To access your existing account, please contact your [facility] or call [phone] for assistance.        Care EveryWhere ID     This is your Care EveryWhere ID. This could be used by other organizations to access your [place] medical records  [id number]        Your Vitals Were     Pulse Height BMI (Body Mass Index)             82 1.473 m (4' 10\") 20.36 kg/m2          Blood Pressure from Last 3 Encounters:   02/22/18 152/85   02/21/18 136/88   02/07/18 137/89    Weight from Last 3 Encounters:   02/22/18 44.2 kg (97 lb 6.4 oz)   02/21/18 44 kg (96 lb 14.4 oz)   02/07/18 43.5 kg (95 lb 12.8 oz)                 Today's Medication Changes          These changes are accurate as of 2/22/18 11:23 AM.  If you have any questions, ask your nurse or doctor.               Start taking these medicines.        Dose/Directions    calcium-vitamin D 600-400 MG-UNIT per tablet   Commonly known as:  CALTRATE   Used for:  Age-related " osteoporosis with current pathological fracture with routine healing, subsequent encounter   Started by:  Saurabh Pittman MD        Dose:  1 tablet   Take 1 tablet by mouth daily   Quantity:  90 tablet   Refills:  3         These medicines have changed or have updated prescriptions.        Dose/Directions    cholecalciferol 1000 UNITS capsule   Commonly known as:  vitamin  -D   This may have changed:  when to take this   Used for:  Age-related osteoporosis with current pathological fracture with routine healing, subsequent encounter        Dose:  1 capsule   Take 1 capsule (1,000 Units) by mouth daily   Quantity:  100 capsule   Refills:  3            Where to get your medicines      These medications were sent to [place], [place] - [address]  [address], [facility] [address]    Hours:  TRANSPLANT PHONE NUMBER [phone] Phone:  [phone]     calcium-vitamin D 600-400 MG-UNIT per tablet    cholecalciferol 1000 UNITS capsule                Primary Care Provider Office Phone # Fax #    Alejandro Sandhu [phone]       [address] [address]        Equal Access to Services     [facility] : Hadii shivam carcamo Sokeenan, waaxda luqadaha, qaybta kaalmachris almanzar, ivana tyler . So [facility] [phone].    ATENCIÓN: Si habla español, tiene a benavides disposición servicios gratuitos de asistencia lingüística. Llame al [phone].    We comply with applicable federal civil rights laws and [place] laws. We do not discriminate on the basis of race, color, national origin, age, disability, sex, sexual orientation, or gender identity.            Thank you!     Thank you for choosing [facility] ENDOCRINOLOGY  for your care. Our goal is always to provide you with excellent care. Hearing back from our patients is one way we can continue to improve our services. Please take a few minutes to  complete the written survey that you may receive in the mail after your visit with us. Thank you!             Your Updated Medication List - Protect others around you: Learn how to safely use, store and throw away your medicines at www.disposemymeds.org.          This list is accurate as of 2/22/18 11:23 AM.  Always use your most recent med list.                   Brand Name Dispense Instructions for use Diagnosis    acetaminophen 325 MG tablet    TYLENOL    60 tablet    Take 2 tablets (650 mg) by mouth every 4 hours as needed for mild pain    Rectal prolapse       amLODIPine 5 MG tablet    NORVASC    90 tablet    Take 1 tablet (5 mg) by mouth daily    Benign essential hypertension       calcium-vitamin D 600-400 MG-UNIT per tablet    CALTRATE    90 tablet    Take 1 tablet by mouth daily    Age-related osteoporosis with current pathological fracture with routine healing, subsequent encounter       cholecalciferol 1000 UNITS capsule    vitamin  -D    100 capsule    Take 1 capsule (1,000 Units) by mouth daily    Age-related osteoporosis with current pathological fracture with routine healing, subsequent encounter       magnesium oxide 400 MG tablet    MAG-OX     Take 600 mg by mouth        menthol-zinc oxide 0.44-20.625 % Oint ointment    CALMOSEPTINE    113 g    Apply topically 4 times daily as needed for skin protection    Rectal prolapse       order for DME     1 each    Equipment being ordered: Home BP monitor and cuff    Severe hypertension       saccharomyces boulardii 250 MG capsule    FLORASTOR    14 capsule    Take 1 capsule (250 mg) by mouth 2 times daily    Rectal prolapse       sodium fluoride dental gel 1.1 % Gel topical gel    PREVIDENT     Apply to affected area At Bedtime        teriparatide (recombinant) 600 MCG/2.4ML Soln injection    FORTEO    6 mL    Inject 0.08 mLs (20 mcg) Subcutaneous daily    Age-related osteoporosis with current pathological fracture, initial encounter       VITAMIN B 12 PO       Take 1 tablet by mouth every morning

## 2018-02-22 NOTE — PATIENT INSTRUCTIONS
We will do blood work today and we will contact you with the results.     Continue calcium supplement: calcium-vitamin D (CALTRATE) 600-400 MG-UNIT per tablet - once daily.     Vitamin D 1000 IU daily

## 2018-02-22 NOTE — LETTER
2/22/2018       RE: Belia Sheets  [address]  [address]  [facility] [id number]     Dear Colleague,    Thank you for referring your patient, Belia Sheets, to the [facility] ENDOCRINOLOGY at [facility]. Please see a copy of my visit note below.    Endocrinology Clinic Visit    Chief Complaint: RECHECK (OSTEOPOROSIS )     Information obtained from: Patient is here with her daughter today.     Subjective:         HPI: Belia Sheets is a 87 year old year old female with history of osteoporosis who is here for a follow up.      Again, this is 88 yo female with past medical history of osteoporosis first diagnosed in 1999.     She was treated with alendronate 70 mg weekly from 1999 to October 2015.  She has never stopped this medication all these years; she took it first thing in the morning with empty stomach.    In October of 2015 she was prescribed FORTEO; mainly because she wanted to change from bisphosphonate per report. However, she didn't take these medication because of cost which was 600 for two years and she didn't know whether it will help her or not.     +History of vertebral fracture; she was folding laundry when she fell from standing up position and sustained injury.  Broken hip and noted to have thoracic compression fractures.  Previous fragility fracture, morphometric vertebral fracture: imaging done in 7/2015 has showed multiple compression fractures.   7/2015 spine film reported as <Multiple old healed left rib fractures. Diffuse osteopenia. New moderate compression fracture of T10 since the prior examination. New moderate compression fractures in T4 and T5 are new since the prior exam. Multilevel degenerative changes in the thoracic spine are otherwise stable. Marked scoliosis of the thoracolumbar spine is redemonstrated.>    Due to severe osteoporosis, long standing previous alendronate therapy and alkaline phosphatase which was within the normal  limits; we started On FORTEO about six months ago.  She has been doing the injections herself at home and she has been managing well with the injections despite her hand arthritis. She demonstrated how she does injections. Denies any side effects from the medication including: dizziness, new back pain (infact her chronic back pain is getting better, kidney stones and previously checked routine calcium labs were within the normal limits.      She is taking calcium and vitamin d supplement 600-400; 1 tab BID (now once daily); she takes vitamin D 1000 supplements. No falls since her last visit with me six months ago.   She has been in and out of the hospital twice since she has seen me last. One was secondary to AMS which has resolved since; October 2017 and more recently for surgery of rectal prolapse. She was in Rehab until few weeks ago following her surgery.      She uses a walker all the time. Walks in a hallways.     Previous history:   Lost 6 inch height loss/kyphosis    Previous fracture after the age of 50 -    Advanced age  Weight - 91 pounds (high risk); gained 5 pounds since her last visit.     Falls/risk factors: Multiple falls. 5 falls ine one year and half but again no falls over the last one year.  Sustained rib and vertebral fractures secondary to the previous falls. Currently participating in home physical therapy for osteoporosis.   Walks three times per day in hallways of her apartment. Uses walker/cane for walking short distances and 4 barros for long distances. Fall: 7/2015, 12/2014, 4/2016, 6/2016. 01/2017.    She lives in a senior apartment.  She gets some services. Mostly manages by her own.     Secondary causes of osteoporosis: negative for    RA, Prolonged immobility,  Organ transplantation, Type I diabetes, Hyperthyroidism, GI disease, Chronic liver disease or COPD.     Family history of hip fracture: None.      She doesn't have any additional concerns today.      No Known  Allergies    Current Outpatient Prescriptions   Medication Sig Dispense Refill     cholecalciferol (VITAMIN  -D) 1000 UNITS capsule Take 1 capsule (1,000 Units) by mouth daily 100 capsule 3     calcium-vitamin D (CALTRATE) 600-400 MG-UNIT per tablet Take 1 tablet by mouth daily 90 tablet 3     amLODIPine (NORVASC) 5 MG tablet Take 1 tablet (5 mg) by mouth daily 90 tablet 1     menthol-zinc oxide (CALMOSEPTINE) 0.44-20.625 % OINT ointment Apply topically 4 times daily as needed for skin protection 113 g 1     teriparatide, recombinant, (FORTEO) 600 MCG/2.4ML SOLN injection Inject 0.08 mLs (20 mcg) Subcutaneous daily 6 mL 1     saccharomyces boulardii (FLORASTOR) 250 MG capsule Take 1 capsule (250 mg) by mouth 2 times daily 14 capsule      acetaminophen (TYLENOL) 325 MG tablet Take 2 tablets (650 mg) by mouth every 4 hours as needed for mild pain 60 tablet 0     order for DME Equipment being ordered: Home BP monitor and cuff 1 each 0     sodium fluoride dental gel (PREVIDENT) 1.1 % GEL Apply to affected area At Bedtime       Cyanocobalamin (VITAMIN B 12 PO) Take 1 tablet by mouth every morning        magnesium oxide (MAG-OX) 400 MG tablet Take 600 mg by mouth          Review of Systems     11 point review system (Constitutional, HENT, Eyes, Respiratory, Cardiovascular, Gastrointestinal, Genitourinary, Musculoskeletal,Neurological, Psychiatric/Behavioural, Endocrine) is negative or is as per HPI above: fecal incontinence.       Past Medical History:   Diagnosis Date     Carpal tunnel syndrome (aka CTS)      H/O calcium pyrophosphate deposition disease (CPPD)      History of encephalopathy 10/2017     Hypertension      Kyphoscoliosis      Osteoarthritis     hands     Osteoporosis      Rectal prolapse        Past Surgical History:   Procedure Laterality Date     COLONOSCOPY  2010     HYSTERECTOMY      for uterine prolapse     RECTOSIGMOIDECTOMY PERINEAL N/A 1/12/2018    Procedure: RECTOSIGMOIDECTOMY PERINEAL;  Perineal  "Rectosigmoidectomy  ;  Surgeon: Amrik Mariano MD;  Location: [facility] OR       Family History   Problem Relation Age of Onset     Depression/Anxiety Son      Depression/Anxiety Son      alcohol abuse  age 24     Hypertension Mother      Hypertension Brother      DIABETES No family hx of      Breast Cancer No family hx of      Colon Cancer No family hx of      Prostate Cancer No family hx of      Other Cancer No family hx of        Social History     Social History     Marital Status:      Spouse Name: N/A     Number of Children: N/A     Years of Education: N/A     Social History Main Topics     Smoking status: Never Smoker      Smokeless tobacco: Never Used     Alcohol Use: Not on file     Drug Use: No     Sexual Activity: Not on file     Other Topics Concern     Not on file     Social History Narrative       Objective:   /85  Pulse 82  Ht 1.473 m (4' 10\")  Wt 44.2 kg (97 lb 6.4 oz)  BMI 20.36 kg/m2  Constitutional: Pleasant no acute cardiopulmonary distress.   HEENT: Mouth/Throat: Mucous membrane is moist.    Cardiovascular: RRR, S1, S2 normal.   Pulmonary/Chest: CTAB. No wheezing or rales.   Neurological: Alert and oriented.   Extremities: trace pedal edema. No lumbar spine tenderness.   Back: scoliosis/stoop posture. No lumbar/thoracic spine tenderness.   Psychological: appropriate mood and affect   Stand and go test more than 10 sec.     In House Labs:   A1C      5.9   2016    Routine: CBC done. TSH done WNL (2016) and PTH done WNL (2015), Protein electrophoresis and urinary bence-childress WNL (2015), ESR 14 (2015), TgA negative in .  Dual energy x-ray absorptiometry results:      Region BMD T - score Z - score   L1-L2 0.634 g/cm  -4.4 -1.7             Neck Left 0.644 g/cm  -2.8 0.1   Total Left 0.627 g/cm  -3.0 -0.1             Neck Right 0.603 g/cm  -3.1 -0.2   Total Right 0.588 g/cm  -3.3 -0.4   Radius 33% 0.457 g/cm  -3.5 -0.2       Conclusions:     VFA scan was " interpretable from T4 - L4. Using the semi-quantitative analysis of Genant (see reference #1), there were evidence for a grade 3 (severe) compression, fracture at T10, L3-L4, . Six point morphometry confirmed the presence and severity of these deformities.     If alternative etiologies for the presence of vertebral fractures are excluded, the diagnosis is consistent with osteoporosis.    I reviewed the images of the DEXA scan from Jan. As well as the VFA     Assessment/Treatment Plan:      # Osteoporosis with fragility fracture:  Belia Sheets is a 87 year old year old female with Osteoporosis and fragility fracture (7/2015); diagnosed in 1999.  Treated with alendronate for 16 years and four months.  Off alendronate since October 2015 (over one year).      CBC done. TSH done WNL (8/2016) and PTH done WNL (7/2015), Protein electrophoresis and urinary bence-childress WNL (7/2015), ESR 14 (7/2015), TgA negative in 2010. Her risk factor for osteoporosis are age and low body weight.  Repeat DEXA showed worsening findings with -4.4 T-score at lumbar area. VFA showing: < there were evidence for a grade 3 (severe) compression, fracture at T10, L3-L4, >    Given severity of her osteoporosis and fragility fracture (severe as documented above); needed aggressive treatment (Zolendronic acid, FORTEO or Denosumab are possible options). After looking at the evidence we started her on FORTEO about about a year ago. She seems to be doing well on this medication. She is tolerated it well and is managing her injections independently. FORTEO preferred because of an added benefit of preventing vertebral fractures.    Continue calcium/vitamin d 600-400; 1 tab PO daily (has at least 600 mg daily of calcium through her diet) and Vitamin D 1000 IU daily. Discussed: BALANCED DIET, using walker all the time, home exercise program with PT and avoid heavy lifting. Will plan on obtaining DEXA scan in 18 months - 24 months time from last DEXA.      Labs noted below. Stable. No chance in plan at this time. Discussed lab results and plan with patient's daughter.      Ref. Range 2/22/2018 11:42   Urea Nitrogen Latest Ref Range: 7 - 30 mg/dL 19   Creatinine Latest Ref Range: 0.52 - 1.04 mg/dL 0.66   GFR Estimate Latest Ref Range: >60 mL/min/1.7m2 85   GFR Estimate If Black Latest Ref Range: >60 mL/min/1.7m2 >90   Calcium Latest Ref Range: 8.5 - 10.1 mg/dL 9.2   Phosphorus Latest Ref Range: 2.5 - 4.5 mg/dL 3.3   Albumin Latest Ref Range: 3.4 - 5.0 g/dL 3.3 (L)   Alkaline Phosphatase Latest Ref Range: 40 - 150 U/L 63   Parathyroid Hormone Intact Latest Ref Range: 18 - 80 pg/mL 23     Patient Instructions   We will do blood work today and we will contact you with the results.     Continue calcium supplement: calcium-vitamin D (CALTRATE) 600-400 MG-UNIT per tablet - once daily.     Vitamin D 1000 IU daily     I will contact the patient with the test results.  Return to clinic in 6 months.    Test and/or medications prescribed today:  Orders Placed This Encounter   Procedures     25 Hydroxyvitamin D2 and D3     Albumin level     Alkaline phosphatase     Calcium     Parathyroid Hormone Intact     Phosphorus     Urea nitrogen     Creatinine     Patient was seen and discussed with endocrinology attending Dr. Gonzalez.     Saurabh Pittman MD  Endocrinology fellow   [phone]  Division of Endocrinology and Diabetes    ATTENDING NOTE    I have seen and examined the patient, reviewed and edited the fellow's note, and agree with the plan of care.    Carolyne Gonzalez MD PhD    Division of Endocrinology and Diabetes

## 2018-02-23 NOTE — PROGRESS NOTES
Endocrinology Clinic Visit    Chief Complaint: RECHECK (OSTEOPOROSIS )     Information obtained from: Patient is here with her daughter today.     Subjective:         HPI: Belia Sheets is a 87 year old year old female with history of osteoporosis who is here for a follow up.      Again, this is 88 yo female with past medical history of osteoporosis first diagnosed in 1999.     She was treated with alendronate 70 mg weekly from 1999 to October 2015.  She has never stopped this medication all these years; she took it first thing in the morning with empty stomach.    In October of 2015 she was prescribed FORTEO; mainly because she wanted to change from bisphosphonate per report. However, she didn't take these medication because of cost which was 600 for two years and she didn't know whether it will help her or not.     +History of vertebral fracture; she was folding laundry when she fell from standing up position and sustained injury.  Broken hip and noted to have thoracic compression fractures.  Previous fragility fracture, morphometric vertebral fracture: imaging done in 7/2015 has showed multiple compression fractures.   7/2015 spine film reported as <Multiple old healed left rib fractures. Diffuse osteopenia. New moderate compression fracture of T10 since the prior examination. New moderate compression fractures in T4 and T5 are new since the prior exam. Multilevel degenerative changes in the thoracic spine are otherwise stable. Marked scoliosis of the thoracolumbar spine is redemonstrated.>    Due to severe osteoporosis, long standing previous alendronate therapy and alkaline phosphatase which was within the normal limits; we started On FORTEO about six months ago.  She has been doing the injections herself at home and she has been managing well with the injections despite her hand arthritis. She demonstrated how she does injections. Denies any side effects from the medication including: dizziness, new back  pain (infact her chronic back pain is getting better, kidney stones and previously checked routine calcium labs were within the normal limits.      She is taking calcium and vitamin d supplement 600-400; 1 tab BID (now once daily); she takes vitamin D 1000 supplements. No falls since her last visit with me six months ago.   She has been in and out of the hospital twice since she has seen me last. One was secondary to AMS which has resolved since; October 2017 and more recently for surgery of rectal prolapse. She was in Rehab until few weeks ago following her surgery.      She uses a walker all the time. Walks in a hallways.     Previous history:   Lost 6 inch height loss/kyphosis    Previous fracture after the age of 50 -    Advanced age  Weight - 91 pounds (high risk); gained 5 pounds since her last visit.     Falls/risk factors: Multiple falls. 5 falls ine one year and half but again no falls over the last one year.  Sustained rib and vertebral fractures secondary to the previous falls. Currently participating in home physical therapy for osteoporosis.   Walks three times per day in hallways of her apartment. Uses walker/cane for walking short distances and 4 barros for long distances. Fall: 7/2015, 12/2014, 4/2016, 6/2016. 01/2017.    She lives in a senior apartment.  She gets some services. Mostly manages by her own.     Secondary causes of osteoporosis: negative for    RA, Prolonged immobility,  Organ transplantation, Type I diabetes, Hyperthyroidism, GI disease, Chronic liver disease or COPD.     Family history of hip fracture: None.      She doesn't have any additional concerns today.      No Known Allergies    Current Outpatient Prescriptions   Medication Sig Dispense Refill     cholecalciferol (VITAMIN  -D) 1000 UNITS capsule Take 1 capsule (1,000 Units) by mouth daily 100 capsule 3     calcium-vitamin D (CALTRATE) 600-400 MG-UNIT per tablet Take 1 tablet by mouth daily 90 tablet 3     amLODIPine (NORVASC)  5 MG tablet Take 1 tablet (5 mg) by mouth daily 90 tablet 1     menthol-zinc oxide (CALMOSEPTINE) 0.44-20.625 % OINT ointment Apply topically 4 times daily as needed for skin protection 113 g 1     teriparatide, recombinant, (FORTEO) 600 MCG/2.4ML SOLN injection Inject 0.08 mLs (20 mcg) Subcutaneous daily 6 mL 1     saccharomyces boulardii (FLORASTOR) 250 MG capsule Take 1 capsule (250 mg) by mouth 2 times daily 14 capsule      acetaminophen (TYLENOL) 325 MG tablet Take 2 tablets (650 mg) by mouth every 4 hours as needed for mild pain 60 tablet 0     order for DME Equipment being ordered: Home BP monitor and cuff 1 each 0     sodium fluoride dental gel (PREVIDENT) 1.1 % GEL Apply to affected area At Bedtime       Cyanocobalamin (VITAMIN B 12 PO) Take 1 tablet by mouth every morning        magnesium oxide (MAG-OX) 400 MG tablet Take 600 mg by mouth          Review of Systems     11 point review system (Constitutional, HENT, Eyes, Respiratory, Cardiovascular, Gastrointestinal, Genitourinary, Musculoskeletal,Neurological, Psychiatric/Behavioural, Endocrine) is negative or is as per HPI above: fecal incontinence.       Past Medical History:   Diagnosis Date     Carpal tunnel syndrome (aka CTS)      H/O calcium pyrophosphate deposition disease (CPPD)      History of encephalopathy 10/2017     Hypertension      Kyphoscoliosis      Osteoarthritis     hands     Osteoporosis      Rectal prolapse        Past Surgical History:   Procedure Laterality Date     COLONOSCOPY       HYSTERECTOMY      for uterine prolapse     RECTOSIGMOIDECTOMY PERINEAL N/A 2018    Procedure: RECTOSIGMOIDECTOMY PERINEAL;  Perineal Rectosigmoidectomy  ;  Surgeon: Amrik Mariano MD;  Location:  OR       Family History   Problem Relation Age of Onset     Depression/Anxiety Son      Depression/Anxiety Son      alcohol abuse  age 24     Hypertension Mother      Hypertension Brother      DIABETES No family hx of      Breast Cancer  "No family hx of      Colon Cancer No family hx of      Prostate Cancer No family hx of      Other Cancer No family hx of        Social History     Social History     Marital Status:      Spouse Name: N/A     Number of Children: N/A     Years of Education: N/A     Social History Main Topics     Smoking status: Never Smoker      Smokeless tobacco: Never Used     Alcohol Use: Not on file     Drug Use: No     Sexual Activity: Not on file     Other Topics Concern     Not on file     Social History Narrative       Objective:   /85  Pulse 82  Ht 1.473 m (4' 10\")  Wt 44.2 kg (97 lb 6.4 oz)  BMI 20.36 kg/m2  Constitutional: Pleasant no acute cardiopulmonary distress.   HEENT: Mouth/Throat: Mucous membrane is moist.    Cardiovascular: RRR, S1, S2 normal.   Pulmonary/Chest: CTAB. No wheezing or rales.   Neurological: Alert and oriented.   Extremities: trace pedal edema. No lumbar spine tenderness.   Back: scoliosis/stoop posture. No lumbar/thoracic spine tenderness.   Psychological: appropriate mood and affect   Stand and go test more than 10 sec.     In House Labs:   A1C      5.9   8/31/2016    Routine: CBC done. TSH done WNL (8/2016) and PTH done WNL (7/2015), Protein electrophoresis and urinary bence-childress WNL (7/2015), ESR 14 (7/2015), TgA negative in 2010.  Dual energy x-ray absorptiometry results:      Region BMD T - score Z - score   L1-L2 0.634 g/cm  -4.4 -1.7             Neck Left 0.644 g/cm  -2.8 0.1   Total Left 0.627 g/cm  -3.0 -0.1             Neck Right 0.603 g/cm  -3.1 -0.2   Total Right 0.588 g/cm  -3.3 -0.4   Radius 33% 0.457 g/cm  -3.5 -0.2       Conclusions:     VFA scan was interpretable from T4 - L4. Using the semi-quantitative analysis of Genant (see reference #1), there were evidence for a grade 3 (severe) compression, fracture at T10, L3-L4, . Six point morphometry confirmed the presence and severity of these deformities.     If alternative etiologies for the presence of vertebral " fractures are excluded, the diagnosis is consistent with osteoporosis.    I reviewed the images of the DEXA scan from Jan. As well as the VFA     Assessment/Treatment Plan:      # Osteoporosis with fragility fracture:  Belia Sheets is a 87 year old year old female with Osteoporosis and fragility fracture (7/2015); diagnosed in 1999.  Treated with alendronate for 16 years and four months.  Off alendronate since October 2015 (over one year).      CBC done. TSH done WNL (8/2016) and PTH done WNL (7/2015), Protein electrophoresis and urinary bence-childress WNL (7/2015), ESR 14 (7/2015), TgA negative in 2010. Her risk factor for osteoporosis are age and low body weight.  Repeat DEXA showed worsening findings with -4.4 T-score at lumbar area. VFA showing: < there were evidence for a grade 3 (severe) compression, fracture at T10, L3-L4, >    Given severity of her osteoporosis and fragility fracture (severe as documented above); needed aggressive treatment (Zolendronic acid, FORTEO or Denosumab are possible options). After looking at the evidence we started her on FORTEO about about a year ago. She seems to be doing well on this medication. She is tolerated it well and is managing her injections independently. FORTEO preferred because of an added benefit of preventing vertebral fractures.    Continue calcium/vitamin d 600-400; 1 tab PO daily (has at least 600 mg daily of calcium through her diet) and Vitamin D 1000 IU daily. Discussed: BALANCED DIET, using walker all the time, home exercise program with PT and avoid heavy lifting. Will plan on obtaining DEXA scan in 18 months - 24 months time from last DEXA.     Labs noted below. Stable. No chance in plan at this time. Discussed lab results and plan with patient's daughter.      Ref. Range 2/22/2018 11:42   Urea Nitrogen Latest Ref Range: 7 - 30 mg/dL 19   Creatinine Latest Ref Range: 0.52 - 1.04 mg/dL 0.66   GFR Estimate Latest Ref Range: >60 mL/min/1.7m2 85   GFR  Estimate If Black Latest Ref Range: >60 mL/min/1.7m2 >90   Calcium Latest Ref Range: 8.5 - 10.1 mg/dL 9.2   Phosphorus Latest Ref Range: 2.5 - 4.5 mg/dL 3.3   Albumin Latest Ref Range: 3.4 - 5.0 g/dL 3.3 (L)   Alkaline Phosphatase Latest Ref Range: 40 - 150 U/L 63   Parathyroid Hormone Intact Latest Ref Range: 18 - 80 pg/mL 23     Patient Instructions   We will do blood work today and we will contact you with the results.     Continue calcium supplement: calcium-vitamin D (CALTRATE) 600-400 MG-UNIT per tablet - once daily.     Vitamin D 1000 IU daily     I will contact the patient with the test results.  Return to clinic in 6 months.    Test and/or medications prescribed today:  Orders Placed This Encounter   Procedures     25 Hydroxyvitamin D2 and D3     Albumin level     Alkaline phosphatase     Calcium     Parathyroid Hormone Intact     Phosphorus     Urea nitrogen     Creatinine     Patient was seen and discussed with endocrinology attending Dr. Gonzalez.     Saurabh Pittman MD  Endocrinology fellow   [phone]  Division of Endocrinology and Diabetes    ATTENDING NOTE    I have seen and examined the patient, reviewed and edited the fellow's note, and agree with the plan of care.    Carolyne Gonzalez MD PhD    Division of Endocrinology and Diabetes

## 2018-02-25 ENCOUNTER — MEDICAL CORRESPONDENCE (OUTPATIENT)
Dept: HEALTH INFORMATION MANAGEMENT | Facility: CLINIC | Age: 83
End: 2018-02-25

## 2018-02-26 ENCOUNTER — TELEPHONE (OUTPATIENT)
Dept: FAMILY MEDICINE | Facility: CLINIC | Age: 83
End: 2018-02-26

## 2018-02-26 LAB
DEPRECATED CALCIDIOL+CALCIFEROL SERPL-MC: <40 UG/L (ref 20–75)
VITAMIN D2 SERPL-MCNC: <5 UG/L
VITAMIN D3 SERPL-MCNC: 35 UG/L

## 2018-02-26 NOTE — TELEPHONE ENCOUNTER
Returned call to home care nurse, unable to reach. Left VM with verbal orders per protocol as requested. Left callback number for questions.    Luisana Bell RN

## 2018-02-26 NOTE — PROGRESS NOTES
Pharmacy Progress Note: Transitions of Care     History of Hospitalization     Belia Sheets was referred by   for transitional care and medication management following their recent hospitalization.       HISTORY OF Hospitalization    Reason for hospitalization:    Date of ADMIT: 1/12/18   Date of DISCHARGE 1/15/18   Other hospitalizations in past year:      Hospital problem list/ specific issues to address:  1.     Colon surgery        Medication CHANGES made during hospitalization (from Discharge Summary)     Discontinued: Added (initiated): Changed (dose/frequency):   Medication(s)                      SUBJECTIVE     Patient is in clinic today for hospital follow up with Dr. Sandhu. .      No changes with  BP meds   Was taken of the lisionpril and then on amlodipine 5 mg.  Continues off the lisinopril.  There was a concern of dizziness from the [facility] (dec 17).  Now does not complain of any serious dizziness.      Was in [facility] transitional care for 3 weeks.     Pain:  No longer taking IBU:    GI:  Has stopped taking metemucil after the procedure.  No problems with constipation.    Has been on a low fiber diet and she is unclear if this is something she can continue or should stop.         OBJECTIVE  Creatinine   Date Value Ref Range Status   01/09/2018 0.51 (L) 0.52 - 1.04 mg/dL Final   ]    Liver Function Studies -   Recent Labs   Lab Test  01/07/18 2057   PROTTOTAL  5.3*   ALBUMIN  2.4*   BILITOTAL  0.4   ALKPHOS  46   AST  12   ALT  13     Immunization History   Administered Date(s) Administered     HEPA 02/03/1999, 11/02/1999     HepB 04/27/1999, 06/01/1999, 11/02/1999     Influenza (H1N1) 01/22/2010     Influenza (High Dose) 3 valent vaccine 10/26/2016     Influenza (IIV3) PF 10/08/2012, 11/21/2013     Meningococcal (Menomune ) 01/23/2003     Pneumo Conj 13-V (2010&after) 08/17/2016     Pneumococcal 23 valent 03/22/1996, 11/03/2000     Poliovirus, inactivated (IPV) 01/23/2003     TD (ADULT, 7+)  07/01/1997, 06/16/2004     TDAP Vaccine (Boostrix) 08/17/2016     Typhoid IM 02/03/1999, 06/16/2004, 01/26/2011     Yellow Fever 02/03/1999   There were no vitals taken for this visit.    ASSESSMENT     Drug Therapy Problems    Belia is a 88 yo woman that has been dischagred from a transitional care facility and is living at home.   Medication list has been updated today.    Blood pressure is elevated today.   Agree to start low dose of amlodipine 5 mg daily.      PLAN     Outpatient medication list has been updated to reflects patient s current medication use.       Follow-up:    With PCP       MTM and medication reconciliation have been completed by pharmacy. Medication assessment and plan address patient s specific concerns and goals.     Dr. Sandhu was provided our recommendations in clinic today  before/after they saw patient and was available for supervision during the visit and is the authorizing prescriber for this visit through the pharmacist collaborative practice agreement.     Thank you for the opportunity to participate in the care of this patient.  Junito Weeks, Pharm.D.  Appointment length: 20 minutes

## 2018-02-26 NOTE — TELEPHONE ENCOUNTER
[facility] Family Medicine phone call message - order or referral request for patient:     Order or referral being requested: Skilled Nursing visits 1 time a week for 9 weeks with 3 visits as needed. Home Health Aid 2 times a week for 9 weeks to assist with personal care and bathing.    Additional Comments: Verbal is okay.    OK to leave a message on voice mail? Yes    Primary language: English      needed? No    Call taken on February 26, 2018 at 8:32 AM by Jm Colyb

## 2018-02-27 ENCOUNTER — DOCUMENTATION ONLY (OUTPATIENT)
Dept: FAMILY MEDICINE | Facility: CLINIC | Age: 83
End: 2018-02-27

## 2018-02-27 NOTE — PROGRESS NOTES
"When opening a documentation only encounter, be sure to enter in \"Chief Complaint\" Forms and in \" Comments\" Title of form, description if needed.    Sudeep is a 87 year old  female  Form received via: Fax  Form now resides in: Provider Ready    Francisco J Vela CMA                Form has been completed by provider.     Form sent out via: Fax to [phone]  Patient informed: No, Reason:confirmed by fax confirmation  Output date: February 27, 2018    Francisco J Vela CMA      **Please close the encounter**      "

## 2018-02-27 NOTE — PROGRESS NOTES
"When opening a documentation only encounter, be sure to enter in \"Chief Complaint\" Forms and in \" Comments\" Title of form, description if needed.    Sudeep is a 87 year old  female  Form received via: Fax  Form now resides in: Provider Ready    Francisco J Vela CMA                Form has been completed by provider.     Form sent out via: Fax to [phone]  Patient informed: No, Reason:confirmed by fax confirmation  Output date: February 27, 2018    Francisco J Vela CMA      **Please close the encounter**      "

## 2018-02-27 NOTE — PROGRESS NOTES
"When opening a documentation only encounter, be sure to enter in \"Chief Complaint\" Forms and in \" Comments\" Title of form, description if needed.    Sudeep is a 87 year old  female  Form received via: Fax  Form now resides in: Provider Ready    Francisco J Vela CMA                Form has been completed by provider.     Form sent out via: Fax to [phone]  Patient informed: No, Reason:confirmed by fax confirmation  Output date: February 27, 2018    Francisco J Vela CMA      **Please close the encounter**      "

## 2018-02-27 NOTE — PROGRESS NOTES
"When opening a documentation only encounter, be sure to enter in \"Chief Complaint\" Forms and in \" Comments\" Title of form, description if needed.    Sudeep is a 87 year old  female  Form received via: Fax  Form now resides in: Provider Ready    Francisco J Vela CMA                Form has been completed by provider.     Form sent out via: Fax to [phone]  Patient informed: No, Reason: Confirmed by fax confirmation  Output date: February 27, 2018    Francisco J Vela CMA      **Please close the encounter**      "

## 2018-02-27 NOTE — PROGRESS NOTES
"When opening a documentation only encounter, be sure to enter in \"Chief Complaint\" Forms and in \" Comments\" Title of form, description if needed.    Sudeep is a 87 year old  female  Form received via: Fax  Form now resides in: Provider Ready    Francisco J Vela CMA                Form has been completed by provider.     Form sent out via: Fax to [phone]  Patient informed: No, Reason:confirmed by fax confirmation  Output date: February 27, 2018    Francisco J Vela CMA      **Please close the encounter**      "

## 2018-02-27 NOTE — PROGRESS NOTES
"When opening a documentation only encounter, be sure to enter in \"Chief Complaint\" Forms and in \" Comments\" Title of form, description if needed.    Sudeep is a 87 year old  female  Form received via: Fax  Form now resides in: Provider Ready    Francisco J Vela CMA                Form has been completed by provider.     Form sent out via: Fax to [phone]  Patient informed: No, Reason: confirmed by fax confirmation  Output date: February 27, 2018    Francisco J Vela CMA      **Please close the encounter**      "

## 2018-03-06 ENCOUNTER — TELEPHONE (OUTPATIENT)
Dept: FAMILY MEDICINE | Facility: CLINIC | Age: 83
End: 2018-03-06

## 2018-03-06 NOTE — TELEPHONE ENCOUNTER
RN called pt's daughter to check in. Lubna stated she called [facility] 2-3 weeks ago. Lubna stated pt isn't acutely ill and doing well post surgery. She is going to check on pt tomorrow as she lives out of town. Lubna states she goes every 1-2 weeks to fill meds and stock fridge. Pt is eating and still has HHA post surgery coming in. Pt and daughter will be following up with PCP later this month    Luisana Bell RN

## 2018-03-06 NOTE — TELEPHONE ENCOUNTER
"[facility] Family Medicine phone call message- patient requesting to speak with PCP or RN    PCP: Alejandro Sandhu    Additional Comments: Janette from [facility] called to inform PCP that \"patient's daughter called last week to let Janette know that she is concerned about her mother\". \"Patient is not taking medications like she should, eating three meals a day, or drinking enough\". Janette stated to contact patient daughter Lubna Proctor at [phone]. Message routed to triage.    Is a call back needed? Yes, to daughter    Patient informed that it may take up to 2 business days to hear back from PCP:No    OK to leave a message on voice mail? Yes    Primary language: English      needed? No    Call taken on March 6, 2018 at 10:10 AM by Kristine Mueller    "

## 2018-03-14 ENCOUNTER — DOCUMENTATION ONLY (OUTPATIENT)
Dept: FAMILY MEDICINE | Facility: CLINIC | Age: 83
End: 2018-03-14

## 2018-03-14 NOTE — PROGRESS NOTES
"When opening a documentation only encounter, be sure to enter in \"Chief Complaint\" Forms and in \" Comments\" Title of form, description if needed.    Sudeep is a 87 year old  female  Form received via: Fax  Form now resides in: Provider Ready    Francisco J Vela CMA                Form has been completed by provider.     Form sent out via: Fax to [phone]  Patient informed: No, Reason:confirmed by fax confirmation  Output date: March 14, 2018    Francisco J Vela CMA      **Please close the encounter**      "

## 2018-03-21 ENCOUNTER — OFFICE VISIT (OUTPATIENT)
Dept: FAMILY MEDICINE | Facility: CLINIC | Age: 83
End: 2018-03-21
Payer: COMMERCIAL

## 2018-03-21 VITALS
RESPIRATION RATE: 16 BRPM | SYSTOLIC BLOOD PRESSURE: 125 MMHG | BODY MASS INDEX: 21.03 KG/M2 | TEMPERATURE: 97.6 F | HEART RATE: 78 BPM | OXYGEN SATURATION: 97 % | DIASTOLIC BLOOD PRESSURE: 81 MMHG | WEIGHT: 100.6 LBS

## 2018-03-21 DIAGNOSIS — M81.0 SENILE OSTEOPOROSIS: ICD-10-CM

## 2018-03-21 DIAGNOSIS — K62.3 RECTAL PROLAPSE: ICD-10-CM

## 2018-03-21 DIAGNOSIS — G93.40 ENCEPHALOPATHY: Primary | ICD-10-CM

## 2018-03-21 ASSESSMENT — ENCOUNTER SYMPTOMS
MYALGIAS: 0
DECREASED CONCENTRATION: 0
FEVER: 0
ARTHRALGIAS: 1
CONFUSION: 0
HALLUCINATIONS: 0
BACK PAIN: 1
WEAKNESS: 1
CARDIOVASCULAR NEGATIVE: 1
DYSPHORIC MOOD: 0
UNEXPECTED WEIGHT CHANGE: 0
AGITATION: 0
RESPIRATORY NEGATIVE: 1
FATIGUE: 0
APPETITE CHANGE: 0
ACTIVITY CHANGE: 0
NERVOUS/ANXIOUS: 0

## 2018-03-21 NOTE — MR AVS SNAPSHOT
After Visit Summary   3/21/2018    Belia Sheets    MRN: [id number]           Patient Information     Date Of Birth          5/16/1930        Visit Information        Provider Department      3/21/2018 2:20 PM Alejandro Sandhu MD [facility]        Today's Diagnoses     Encephalopathy    -  1    Senile osteoporosis        Rectal prolapse          Care Instructions    Continue same medications.          Follow-ups after your visit        Follow-up notes from your care team     Return in about 3 months (around 6/21/2018).      Your next 10 appointments already scheduled     Aug 02, 2018 10:30 AM CDT   (Arrive by 10:15 AM)   RETURN ENDOCRINE with Saurabh Pittman MD   [facility] ([facility] [place])    [address] [address] [phone]              Who to contact     Please call your clinic at [phone] to:    Ask questions about your health    Make or cancel appointments    Discuss your medicines    Learn about your test results    Speak to your doctor            Additional Information About Your Visit        [employer] Information     [employer] gives you secure access to your electronic health record. If you see a primary care provider, you can also send messages to your care team and make appointments. If you have questions, please call your primary care clinic.  If you do not have a primary care provider, please call [phone] and they will assist you.      [employer]ient is an electronic gateway that provides easy, online access to your medical records. With [employer], you can request a clinic appointment, read your test results, renew a prescription or communicate with your care team.     To access your existing account, please contact your [facility] or call [phone] for assistance.        Care EveryWhere ID     This is your Care EveryWhere ID. This could be used by other  organizations to access your [place] medical records  [id number]        Your Vitals Were     Pulse Temperature Respirations Pulse Oximetry BMI (Body Mass Index)       78 97.6  F (36.4  C) (Oral) 16 97% 21.03 kg/m2        Blood Pressure from Last 3 Encounters:   03/21/18 125/81   02/22/18 152/85   02/21/18 136/88    Weight from Last 3 Encounters:   03/21/18 100 lb 9.6 oz (45.6 kg)   02/22/18 97 lb 6.4 oz (44.2 kg)   02/21/18 96 lb 14.4 oz (44 kg)              Today, you had the following     No orders found for display       Primary Care Provider Office Phone # Fax #    Alejandro Sandhu [phone]       [address] [address]        Equal Access to Services     ANUEL MARSHALL : Angelica Pritchett, waaxchris wilson, qaybta kaalmada jenelle, ivana tyler . So [facility] [phone].    ATENCIÓN: Si habla español, tiene a benavides disposición servicios gratuitos de asistencia lingüística. Seven al [phone].    We comply with applicable federal civil rights laws and [place] laws. We do not discriminate on the basis of race, color, national origin, age, disability, sex, sexual orientation, or gender identity.            Thank you!     Thank you for choosing [facility]  for your care. Our goal is always to provide you with excellent care. Hearing back from our patients is one way we can continue to improve our services. Please take a few minutes to complete the written survey that you may receive in the mail after your visit with us. Thank you!             Your Updated Medication List - Protect others around you: Learn how to safely use, store and throw away your medicines at www.disposemymeds.org.          This list is accurate as of 3/21/18  3:57 PM.  Always use your most recent med list.                   Brand Name Dispense Instructions for use Diagnosis    acetaminophen 325 MG tablet    TYLENOL    60 tablet    Take 2 tablets  (650 mg) by mouth every 4 hours as needed for mild pain    Rectal prolapse       amLODIPine 5 MG tablet    NORVASC    90 tablet    Take 1 tablet (5 mg) by mouth daily    Benign essential hypertension       calcium-vitamin D 600-400 MG-UNIT per tablet    CALTRATE    90 tablet    Take 1 tablet by mouth daily    Age-related osteoporosis with current pathological fracture with routine healing, subsequent encounter       cholecalciferol 1000 UNITS capsule    vitamin  -D    100 capsule    Take 1 capsule (1,000 Units) by mouth daily    Age-related osteoporosis with current pathological fracture with routine healing, subsequent encounter       magnesium oxide 400 MG tablet    MAG-OX     Take 600 mg by mouth        menthol-zinc oxide 0.44-20.625 % Oint ointment    CALMOSEPTINE    113 g    Apply topically 4 times daily as needed for skin protection    Rectal prolapse       order for DME     1 each    Equipment being ordered: Home BP monitor and cuff    Severe hypertension       saccharomyces boulardii 250 MG capsule    FLORASTOR    14 capsule    Take 1 capsule (250 mg) by mouth 2 times daily    Rectal prolapse       sodium fluoride dental gel 1.1 % Gel topical gel    PREVIDENT     Apply to affected area At Bedtime        teriparatide (recombinant) 600 MCG/2.4ML Soln injection    FORTEO    6 mL    Inject 0.08 mLs (20 mcg) Subcutaneous daily    Age-related osteoporosis with current pathological fracture, initial encounter       VITAMIN B 12 PO      Take 1 tablet by mouth every morning

## 2018-03-21 NOTE — PROGRESS NOTES
HPI:       Belia Sheets is a 87 year old who presents for the following  Patient presents with:  RECHECK: Follow up, Blood Pressure, No change     F/U encephalopathy, osteoporosis, HTN, rectal prolapse.  She is feeling better.  Back to baseline.  No falls.  No problems with forteo injections.  Daughter Lubna is here without new concerns.    No more visual sensations of strings suspended in front of her.  She moved into and new apt today.      Adherence and Exercise  Medication side effects: no  How often is a medication missed? Never  Exercise:walking  4-5 days/week for an average of 15-30 minutes        Problem, Medication and Allergy Lists were reviewed and are current.  Patient is an established patient of this clinic.         Review of Systems:   Review of Systems   Constitutional: Negative for activity change, appetite change, fatigue, fever and unexpected weight change.   Respiratory: Negative.    Cardiovascular: Negative.    Musculoskeletal: Positive for arthralgias, back pain and gait problem. Negative for myalgias.   Neurological: Positive for weakness.   Psychiatric/Behavioral: Negative for agitation, confusion, decreased concentration, dysphoric mood and hallucinations. The patient is not nervous/anxious.              Physical Exam:   Patient Vitals for the past 24 hrs:   BP Temp Temp src Pulse Resp SpO2 Weight   03/21/18 1438 125/81 97.6  F (36.4  C) Oral 78 16 97 % 100 lb 9.6 oz (45.6 kg)     Body mass index is 21.03 kg/(m^2).  Vitals were reviewed and were normal     Physical Exam   Constitutional: She is oriented to person, place, and time.   Alert, pleasant.  Uses a 4-wheeled walker with seat.   Cardiovascular: Normal rate, regular rhythm and normal heart sounds.    Pulmonary/Chest: No respiratory distress. She has no wheezes. She has no rales.   Abdominal: Soft. Bowel sounds are normal. She exhibits no mass. There is no tenderness.   Musculoskeletal:   + marked scoliosis, kyphosis.  +  muscle wasting throughout   Neurological: She is alert and oriented to person, place, and time.   Psychiatric: She has a normal mood and affect. Her behavior is normal.         Results:     Results from last visit:  Orders Only on 02/22/2018   Component Date Value Ref Range Status     25 OH Vit D2 02/22/2018 <5  ug/L Final     25 OH Vit D3 02/22/2018 35  ug/L Final     25 OH Vit D total 02/22/2018 <40  20 - 75 ug/L Final    Comment: Season, race, dietary intake, and treatment affect the concentration of   25-hydroxy-Vitamin D. Values may decrease during winter months and increase   during summer months. Values 20-29 ug/L may indicate Vitamin D insufficiency   and values <20 ug/L may indicate Vitamin D deficiency.  This test was developed and its performance characteristics determined by the   [facility],  Special Chemistry Laboratory. It has   not been cleared or approved by the FDA. The laboratory is regulated under   CLIA as qualified to perform high-complexity testing. This test is used for   clinical purposes. It should not be regarded as investigational or for   research.       Albumin 02/22/2018 3.3* 3.4 - 5.0 g/dL Final     Alkaline Phosphatase 02/22/2018 63  40 - 150 U/L Final     Calcium 02/22/2018 9.2  8.5 - 10.1 mg/dL Final     Parathyroid Hormone Intact 02/22/2018 23  18 - 80 pg/mL Final     Phosphorus 02/22/2018 3.3  2.5 - 4.5 mg/dL Final     Urea Nitrogen 02/22/2018 19  7 - 30 mg/dL Final     Creatinine 02/22/2018 0.66  0.52 - 1.04 mg/dL Final     GFR Estimate 02/22/2018 85  >60 mL/min/1.7m2 Final    Non  GFR Calc     GFR Estimate If Black 02/22/2018 >90  >60 mL/min/1.7m2 Final    African American GFR Calc     Assessment and Plan     Belia was seen today for recheck.    Diagnoses and all orders for this visit:    Encephalopathy - resolved.  Etiology still a mystery.  Observe.    Senile osteoporosis - continue teriparatide injections.      Rectal prolapse -  acceptable result from surgery.      HTN - at goal, continue amlodipine.      There are no discontinued medications.  Options for treatment and follow-up care were reviewed with the patient. Belia Sheets  engaged in the decision making process and verbalized understanding of the options discussed and agreed with the final plan.    Alejandro Sandhu MD

## 2018-03-27 ENCOUNTER — DOCUMENTATION ONLY (OUTPATIENT)
Dept: FAMILY MEDICINE | Facility: CLINIC | Age: 83
End: 2018-03-27

## 2018-03-27 NOTE — PROGRESS NOTES
"When opening a documentation only encounter, be sure to enter in \"Chief Complaint\" Forms and in \" Comments\" Title of form, description if needed.    Sudeep is a 87 year old  female  Form received via: Fax  Form now resides in: Provider Ready    Francisco J Vela CMA              Form has been completed by provider.     Form sent out via: Fax to [phone]  Patient informed: No, Reason:confirmed by fax confirmation  Output date: March 27, 2018    Francisco J Vela CMA      **Please close the encounter**      "

## 2018-03-28 ENCOUNTER — TELEPHONE (OUTPATIENT)
Dept: PSYCHOLOGY | Facility: CLINIC | Age: 83
End: 2018-03-28

## 2018-03-28 ENCOUNTER — TELEPHONE (OUTPATIENT)
Dept: FAMILY MEDICINE | Facility: CLINIC | Age: 83
End: 2018-03-28

## 2018-03-28 DIAGNOSIS — L30.4 INTERTRIGO: Primary | ICD-10-CM

## 2018-03-28 RX ORDER — NYSTATIN 100000 [USP'U]/G
POWDER TOPICAL 3 TIMES DAILY PRN
Qty: 30 G | Refills: 1 | Status: SHIPPED | OUTPATIENT
Start: 2018-03-28 | End: 2020-12-23

## 2018-03-28 NOTE — TELEPHONE ENCOUNTER
[facility] Family Medicine phone call message- patient requesting to speak with PCP or provider:    PCP: Alejandro Sandhu    Additional Comments: Home Care nurse took patient's BP. It was 169/100. Nurse says all other vitals were good. Heart Rate was 75, Respirations were 16, Oxygen Level was 98, Temp is 95.4 (confrimed 95.4)    Is a call back needed? No    Patient informed that it may take up to 2 business days to hear back from PCP:No    OK to leave a message on voice mail? Yes    Primary language: English      needed? No    Call taken on March 28, 2018 at 11:41 AM by Michelle Castro

## 2018-03-28 NOTE — TELEPHONE ENCOUNTER
[facility] Family Medicine phone call message - order or referral request for patient:     Order or referral being requested:  Richar from [facility] calling to see if provider can write a prescription for some Nystatin for patient. Patient  Has a rash on mid chest in between a fold. Please advise.       Additional Comments:     OK to leave a message on voice mail? Yes    Primary language: English      needed? No    Call taken on March 28, 2018 at 11:16 AM by Tiffanie Mendes

## 2018-04-25 ENCOUNTER — TELEPHONE (OUTPATIENT)
Dept: PHARMACY | Facility: CLINIC | Age: 83
End: 2018-04-25

## 2018-04-25 NOTE — TELEPHONE ENCOUNTER
[facility] Family Medicine phone call message - order or referral request for patient:     Order or referral being requested: Skilled nursing 1 time a week for 9 weeks  3 PRN  Home Health Aid 1 time a week for 1 week  2 times a week for 8 weeks    Additional Comments: Richar stated that she would like to get these orders today if possible so the patient can be seen by Friday. Otherwise verbal orders tomorrow will work.    OK to leave a message on voice mail? Yes    Primary language: English      needed? No    Call taken on April 25, 2018 at 12:03 PM by Bel Campbell

## 2018-04-26 ENCOUNTER — MEDICAL CORRESPONDENCE (OUTPATIENT)
Dept: HEALTH INFORMATION MANAGEMENT | Facility: CLINIC | Age: 83
End: 2018-04-26

## 2018-05-07 ENCOUNTER — TELEPHONE (OUTPATIENT)
Dept: FAMILY MEDICINE | Facility: CLINIC | Age: 83
End: 2018-05-07

## 2018-05-07 NOTE — TELEPHONE ENCOUNTER
"[facility] Family Medicine phone call message - order or referral request for patient:     Order or referral being requested: Verbal Orders      Additional Comments: \"PT to eval and treat\"    OK to leave a message on voice mail? Yes    Primary language: English      needed? No    Call taken on May 7, 2018 at 11:49 AM by Fartun Gu      "

## 2018-05-11 ENCOUNTER — DOCUMENTATION ONLY (OUTPATIENT)
Dept: FAMILY MEDICINE | Facility: CLINIC | Age: 83
End: 2018-05-11

## 2018-05-11 NOTE — PROGRESS NOTES
"When opening a documentation only encounter, be sure to enter in \"Chief Complaint\" Forms and in \" Comments\" Title of form, description if needed.    Sudeep is a 87 year old  female  Form received via: Fax  Form now resides in: Provider Ready    Diana Ortiz, [facility]                  "

## 2018-05-15 ENCOUNTER — DOCUMENTATION ONLY (OUTPATIENT)
Dept: FAMILY MEDICINE | Facility: CLINIC | Age: 83
End: 2018-05-15

## 2018-05-15 NOTE — PROGRESS NOTES
"When opening a documentation only encounter, be sure to enter in \"Chief Complaint\" Forms and in \" Comments\" Title of form, description if needed.    Sudeep is a 87 year old  female  Form received via: Fax  Form now resides in: Provider Ready    Diana Ortiz CMA                Form has been completed by provider.     Form sent out via: Fax to [phone]  Patient informed: No, Reason:Fax confirmation  Output date: May 25, 2018    Diana Ortiz CMA      **Please close the encounter**      "

## 2018-05-17 ENCOUNTER — TELEPHONE (OUTPATIENT)
Dept: FAMILY MEDICINE | Facility: CLINIC | Age: 83
End: 2018-05-17

## 2018-05-17 ENCOUNTER — DOCUMENTATION ONLY (OUTPATIENT)
Dept: FAMILY MEDICINE | Facility: CLINIC | Age: 83
End: 2018-05-17

## 2018-05-17 NOTE — TELEPHONE ENCOUNTER
Returned call to home care nurse, unable to reach. Left VM with verbal orders for PT eval  per protocol as requested. Left callback number for questions.    Gissell Castillo RN

## 2018-05-17 NOTE — PROGRESS NOTES
"When opening a documentation only encounter, be sure to enter in \"Chief Complaint\" Forms and in \" Comments\" Title of form, description if needed.    Sudeep is a 88 year old  female  Form received via: Fax  Form now resides in: Provider Ready    Francisco J Vela CMA                Form has been completed by provider.     Form sent out via: Fax to [facility] at Fax Number: [phone]  Patient informed: No, Reason:Confirmed by fax confirmation  Output date: May 17, 2018    Francisco J Vela CMA      **Please close the encounter**      "

## 2018-05-17 NOTE — TELEPHONE ENCOUNTER
[facility] Family Medicine phone call message - order or referral request for patient:     Order or referral being requested: PT       Additional Comments: Richar from  home care is requesting a PT order for evaluation and treat for pt 1 time a day within 10 days.     OK to leave a message on voice mail? Yes    Primary language: English      needed? No    Call taken on May 17, 2018 at 8:43 AM by Kadi Campos

## 2018-05-25 ENCOUNTER — DOCUMENTATION ONLY (OUTPATIENT)
Dept: FAMILY MEDICINE | Facility: CLINIC | Age: 83
End: 2018-05-25

## 2018-05-25 NOTE — PROGRESS NOTES
"When opening a documentation only encounter, be sure to enter in \"Chief Complaint\" Forms and in \" Comments\" Title of form, description if needed.    Sudeep is a 88 year old  female  Form received via: Fax  Form now resides in: Provider Ready    Diana Ortiz CMA                Form has been completed by provider.     Form sent out via: Fax to [phone]  Patient informed: No, Reason:fax confirmation confirmed  Output date: June 7, 2018    Diana Ortiz CMA      **Please close the encounter**      "

## 2018-06-01 ENCOUNTER — TELEPHONE (OUTPATIENT)
Dept: FAMILY MEDICINE | Facility: CLINIC | Age: 83
End: 2018-06-01

## 2018-06-01 NOTE — TELEPHONE ENCOUNTER
Returned call to home care OT, unable to reach. Left VM with verbal orders per protocol as requested. Left callback number for questions.    Gissell Castillo RN

## 2018-06-01 NOTE — TELEPHONE ENCOUNTER
[facility] phone call message - order or referral request for patient:     Order or referral being requested: orders for OT eval and treat for 1 visit for 10 days for fall prevention      Additional Comments: verbal orders needed for home care    OK to leave a message on voice mail? Yes    Primary language: English      needed? No    Call taken on June 1, 2018 at 8:04 AM by Tess Holly

## 2018-06-11 ENCOUNTER — DOCUMENTATION ONLY (OUTPATIENT)
Dept: FAMILY MEDICINE | Facility: CLINIC | Age: 83
End: 2018-06-11

## 2018-06-11 NOTE — PROGRESS NOTES
"When opening a documentation only encounter, be sure to enter in \"Chief Complaint\" Forms and in \" Comments\" Title of form, description if needed.    Sudeep is a 88 year old  female  Form received via: Fax  Form now resides in: Provider Ready    Diana Ortiz CMA                Form has been completed by provider.     Form sent out via: Fax to [phone]  Patient informed: No, Reason:fax confirmation confirmed  Output date: June 20, 2018    Diana Ortiz CMA      **Please close the encounter**      "

## 2018-06-19 ENCOUNTER — TELEPHONE (OUTPATIENT)
Dept: FAMILY MEDICINE | Facility: CLINIC | Age: 83
End: 2018-06-19

## 2018-06-19 NOTE — TELEPHONE ENCOUNTER
[facility] phone call message- patient requesting a refill:    Full Medication Name: amLODIPine (NORVASC) 5 MG tablet    Dose: Take 1 tablet (5 mg) by mouth daily - Oral    Pharmacy confirmed as   [facility] [place], [place] - [address]  [address]  [place] [address]  Phone: [phone] Fax: [phone] Alternate Fax: [phone], [phone]  : Yes    Additional Comments: patient has appointment tomorrow (6/20/18) at 4:20p.m. Daughter Lubna would like a refill on above medication, is calling early as there has been issues getting it before after visit.     OK to leave a message on voice mail? Yes    Primary language: English      needed? No    Call taken on June 19, 2018 at 3:48 PM by Kristine Mueller

## 2018-06-20 ENCOUNTER — OFFICE VISIT (OUTPATIENT)
Dept: FAMILY MEDICINE | Facility: CLINIC | Age: 83
End: 2018-06-20
Payer: COMMERCIAL

## 2018-06-20 VITALS
BODY MASS INDEX: 21.07 KG/M2 | TEMPERATURE: 98.2 F | DIASTOLIC BLOOD PRESSURE: 84 MMHG | OXYGEN SATURATION: 95 % | HEART RATE: 82 BPM | SYSTOLIC BLOOD PRESSURE: 124 MMHG | WEIGHT: 100.8 LBS

## 2018-06-20 DIAGNOSIS — M80.00XD AGE-RELATED OSTEOPOROSIS WITH CURRENT PATHOLOGICAL FRACTURE WITH ROUTINE HEALING, SUBSEQUENT ENCOUNTER: ICD-10-CM

## 2018-06-20 DIAGNOSIS — K62.3 RECTAL PROLAPSE: ICD-10-CM

## 2018-06-20 DIAGNOSIS — R29.6 RECURRENT FALLS: ICD-10-CM

## 2018-06-20 DIAGNOSIS — G93.40 ENCEPHALOPATHY: ICD-10-CM

## 2018-06-20 DIAGNOSIS — I10 BENIGN ESSENTIAL HYPERTENSION: ICD-10-CM

## 2018-06-20 DIAGNOSIS — R53.83 FATIGUE, UNSPECIFIED TYPE: Primary | ICD-10-CM

## 2018-06-20 LAB
% GRANULOCYTES: 73.4 %G (ref 40–75)
GRANULOCYTES #: 5.7 K/UL (ref 1.6–8.3)
HCT VFR BLD AUTO: 43.1 % (ref 35–47)
HEMOGLOBIN: 13 G/DL (ref 11.7–15.7)
LYMPHOCYTES # BLD AUTO: 1.5 K/UL (ref 0.8–5.3)
LYMPHOCYTES NFR BLD AUTO: 19.7 %L (ref 20–48)
MCH RBC QN AUTO: 29 PG (ref 26.5–35)
MCHC RBC AUTO-ENTMCNC: 30.2 G/DL (ref 32–36)
MCV RBC AUTO: 96 FL (ref 78–100)
MID #: 0.5 K/UL (ref 0–2.2)
MID %: 6.9 %M (ref 0–20)
PLATELET # BLD AUTO: 204 K/UL (ref 150–450)
RBC # BLD AUTO: 4.49 M/UL (ref 3.8–5.2)
WBC # BLD AUTO: 7.8 K/UL (ref 4–11)

## 2018-06-20 RX ORDER — AMLODIPINE BESYLATE 5 MG/1
5 TABLET ORAL DAILY
Qty: 90 TABLET | Refills: 1 | Status: SHIPPED | OUTPATIENT
Start: 2018-06-20 | End: 2018-06-20

## 2018-06-20 RX ORDER — AMLODIPINE BESYLATE 5 MG/1
5 TABLET ORAL DAILY
Qty: 90 TABLET | Refills: 1 | Status: SHIPPED | OUTPATIENT
Start: 2018-06-20 | End: 2019-03-15

## 2018-06-20 NOTE — PATIENT INSTRUCTIONS
Continue amlodipine -- try to take it at the same time every day.    Stay as active as you can.

## 2018-06-20 NOTE — MR AVS SNAPSHOT
After Visit Summary   6/20/2018    Belia Sheets    MRN: [id number]           Patient Information     Date Of Birth          5/16/1930        Visit Information        Provider Department      6/20/2018 4:20 PM Alejandro Sandhu MD [facility]        Today's Diagnoses     Fatigue, unspecified type    -  1    Recurrent falls        Age-related osteoporosis with current pathological fracture with routine healing, subsequent encounter        Encephalopathy        Rectal prolapse        Benign essential hypertension          Care Instructions    Continue amlodipine -- try to take it at the same time every day.    Stay as active as you can.              Follow-ups after your visit        Your next 10 appointments already scheduled     Aug 02, 2018 10:30 AM CDT   (Arrive by 10:15 AM)   RETURN ENDOCRINE with Saurabh Pittman MD   [facility] End[facility]ology ([facility])    [address] [place] [id number] [phone]              Who to contact     Please call your clinic at [phone] to:    Ask questions about your health    Make or cancel appointments    Discuss your medicines    Learn about your test results    Speak to your doctor            Additional Information About Your Visit        [employer] Information     [employer] gives you secure access to your electronic health record. If you see a primary care provider, you can also send messages to your care team and make appointments. If you have questions, please call your primary care clinic.  If you do not have a primary care provider, please call [phone] and they will assist you.      [employer] is an electronic gateway that provides easy, online access to your medical records. With [employer], you can request a clinic appointment, read your test results, renew a prescription or communicate with your care team.     To access your existing account, please contact your [facility] [facility] or call [phone] for assistance.        Care EveryWhere ID     This is your Care EveryWhere ID. This could be used by other organizations to access your [facility] medical records  [id number]        Your Vitals Were     Pulse Temperature Pulse Oximetry Breastfeeding? BMI (Body Mass Index)       82 98.2  F (36.8  C) (Oral) 95% No 21.07 kg/m2        Blood Pressure from Last 3 Encounters:   06/20/18 124/84   03/21/18 125/81   02/22/18 152/85    Weight from Last 3 Encounters:   06/20/18 100 lb 12.8 oz (45.7 kg)   03/21/18 100 lb 9.6 oz (45.6 kg)   02/22/18 97 lb 6.4 oz (44.2 kg)              We Performed the Following     CBC with Diff Plt ([facility])     Comprehensive Metabolic Panel ([facility])     TSH with free T4 reflex          Today's Medication Changes          These changes are accurate as of 6/20/18  4:53 PM.  If you have any questions, ask your nurse or doctor.               Start taking these medicines.        Dose/Directions    amLODIPine 5 MG tablet   Commonly known as:  NORVASC   Used for:  Benign essential hypertension   Started by:  Alejandro Sandhu MD        Dose:  5 mg   Take 1 tablet (5 mg) by mouth daily   Quantity:  90 tablet   Refills:  1            Where to get your medicines      These medications were sent to [facility] [place], [place] - [address] [place] [address]     Phone:  [phone]     amLODIPine 5 MG tablet                Primary Care Provider Office Phone # Fax #    Alejandro Sandhu [phone]       [address] [facility] [place] [address] [place] [id number]        Equal Access to Services     ANUEL MARSHALL AH: Angelica Pritchett, waaubrie lujacinto, qaybta kaalivana blanco. So [facility] [phone].    ATENCIÓN: Si habla español, tiene a benavides disposición servicios gratuitos de asistencia lingüística. Llame al [phone].    We comply with applicable federal civil rights  laws and [place] laws. We do not discriminate on the basis of race, color, national origin, age, disability, sex, sexual orientation, or gender identity.            Thank you!     Thank you for choosing [facility]  for your care. Our goal is always to provide you with excellent care. Hearing back from our patients is one way we can continue to improve our services. Please take a few minutes to complete the written survey that you may receive in the mail after your visit with us. Thank you!             Your Updated Medication List - Protect others around you: Learn how to safely use, store and throw away your medicines at www.disposemymeds.org.          This list is accurate as of 6/20/18  4:53 PM.  Always use your most recent med list.                   Brand Name Dispense Instructions for use Diagnosis    acetaminophen 325 MG tablet    TYLENOL    60 tablet    Take 2 tablets (650 mg) by mouth every 4 hours as needed for mild pain    Rectal prolapse       amLODIPine 5 MG tablet    NORVASC    90 tablet    Take 1 tablet (5 mg) by mouth daily    Benign essential hypertension       calcium-vitamin D 600-400 MG-UNIT per tablet    CALTRATE    90 tablet    Take 1 tablet by mouth daily    Age-related osteoporosis with current pathological fracture with routine healing, subsequent encounter       cholecalciferol 1000 units capsule    vitamin  -D    100 capsule    Take 1 capsule (1,000 Units) by mouth daily    Age-related osteoporosis with current pathological fracture with routine healing, subsequent encounter       magnesium oxide 400 MG tablet    MAG-OX     Take 600 mg by mouth        menthol-zinc oxide 0.44-20.625 % Oint ointment    CALMOSEPTINE    113 g    Apply topically 4 times daily as needed for skin protection    Rectal prolapse       nystatin 831509 UNIT/GM Powd    MYCOSTATIN    30 g    Apply topically 3 times daily as needed    Intertrigo       order for DME     1 each    Equipment being  ordered: Home BP monitor and cuff    Severe hypertension       saccharomyces boulardii 250 MG capsule    FLORASTOR    14 capsule    Take 1 capsule (250 mg) by mouth 2 times daily    Rectal prolapse       sodium fluoride dental gel 1.1 % Gel topical gel    PREVIDENT     Apply to affected area At Bedtime        teriparatide (recombinant) 600 MCG/2.4ML Soln injection    FORTEO    6 mL    Inject 0.08 mLs (20 mcg) Subcutaneous daily    Age-related osteoporosis with current pathological fracture, initial encounter       VITAMIN B 12 PO      Take 1 tablet by mouth every morning

## 2018-06-20 NOTE — TELEPHONE ENCOUNTER

## 2018-06-21 ENCOUNTER — TELEPHONE (OUTPATIENT)
Dept: FAMILY MEDICINE | Facility: CLINIC | Age: 83
End: 2018-06-21

## 2018-06-21 LAB
ALBUMIN SERPL-MCNC: 4.3 MG/DL (ref 3.5–4.7)
ALP SERPL-CCNC: 56.9 U/L (ref 31.7–110.5)
ALT SERPL-CCNC: 9.8 U/L (ref 0–45)
AST SERPL-CCNC: 15.3 U/L (ref 0–45)
BILIRUB SERPL-MCNC: 0.6 MG/DL (ref 0.2–1.3)
BUN SERPL-MCNC: 16.4 MG/DL (ref 7–19)
CALCIUM SERPL-MCNC: 10.3 MG/DL (ref 8.5–10.1)
CHLORIDE SERPLBLD-SCNC: 99.3 MMOL/L (ref 98–110)
CO2 SERPL-SCNC: 27.3 MMOL/L (ref 20–32)
CREAT SERPL-MCNC: 0.7 MG/DL (ref 0.5–1)
GFR SERPL CREATININE-BSD FRML MDRD: 83.9 ML/MIN/1.7 M2
GLUCOSE SERPL-MCNC: 102.2 MG'DL (ref 70–99)
POTASSIUM SERPL-SCNC: 3.4 MMOL/DL (ref 3.3–4.5)
PROT SERPL-MCNC: 6.9 G/DL (ref 6.8–8.8)
SODIUM SERPL-SCNC: 130.8 MMOL/L (ref 132.6–141.4)
TSH SERPL DL<=0.005 MIU/L-ACNC: 0.83 MU/L (ref 0.4–4)

## 2018-06-21 ASSESSMENT — ENCOUNTER SYMPTOMS
RESPIRATORY NEGATIVE: 1
BACK PAIN: 1
FATIGUE: 0
HALLUCINATIONS: 0
ARTHRALGIAS: 1
APPETITE CHANGE: 0
MYALGIAS: 0
FEVER: 0
WEAKNESS: 1
AGITATION: 0
CARDIOVASCULAR NEGATIVE: 1
UNEXPECTED WEIGHT CHANGE: 0
ACTIVITY CHANGE: 0
DYSPHORIC MOOD: 0

## 2018-06-21 NOTE — TELEPHONE ENCOUNTER
Returned call to home care nurse, unable to reach. Left VM with verbal orders per protocol as requested. Left callback number for questions.    Gissell Castillo RN

## 2018-06-21 NOTE — PROGRESS NOTES
HPI:       88 year old patient who presents with:    F/u multiple chronic conditions.  She has been in new apartment for 3 months.  Adjusting well.  Overall stable and doing fine.  No falls.  Feels she is getting stronger and doing well in PT.  Occasionally has moments when she feels her brain doesn't process things normally but this has been the case for the past 6 months or so.  She has had none of the hallucinations (seeing strings in front of her) she was experiencing during the episode of encephalopathy last year.    Despite overall improvement, she still has a cc of fatigue.  Just doesn't have the energy she used to have.    Anorectal function is acceptable to her.  Occasionally has some hemorrhoidal pain.    She has occasional episodes of dizziness.  These occur lying, sitting, and standing.  Feels as if her balance is off and resolves spontaneously after a few minutes.  No lightheadedness.  No vertigo.    Still on Forteo injections.    Daughter Lubna and son Delmar have nothing additional to add.        Problem, Medication and Allergy Lists were reviewed and are current.  Patient is an established patient of this clinic.         Review of Systems:     Review of Systems   Constitutional: Negative for activity change, appetite change, fatigue, fever and unexpected weight change.   Respiratory: Negative.    Cardiovascular: Negative.    Musculoskeletal: Positive for arthralgias, back pain and gait problem. Negative for myalgias.   Neurological: Positive for weakness.   Psychiatric/Behavioral: Negative for agitation, dysphoric mood and hallucinations.          Physical Exam:     /84  Pulse 82  Temp 98.2  F (36.8  C) (Oral)  Wt 100 lb 12.8 oz (45.7 kg)  SpO2 95%  Breastfeeding? No  BMI 21.07 kg/m2    Body mass index is 21.07 kg/(m^2).  Vitals reviewed.    Physical Exam   Constitutional: She is oriented to person, place, and time.   Alert, pleasant.  Uses a 4-wheeled walker with seat.   Cardiovascular:  Normal rate, regular rhythm and normal heart sounds.    Pulmonary/Chest: No respiratory distress. She has no wheezes. She has no rales.   Abdominal: Soft. Bowel sounds are normal. She exhibits no mass. There is no tenderness.   Musculoskeletal:   + marked scoliosis, kyphosis.  + muscle wasting throughout   Neurological: She is alert and oriented to person, place, and time.   Psychiatric: She has a normal mood and affect. Her behavior is normal.           Results:     Results from last visit:  Orders Only on 02/22/2018   Component Date Value Ref Range Status     25 OH Vit D2 02/22/2018 <5  ug/L Final     25 OH Vit D3 02/22/2018 35  ug/L Final     25 OH Vit D total 02/22/2018 <40  20 - 75 ug/L Final    Comment: Season, race, dietary intake, and treatment affect the concentration of   25-hydroxy-Vitamin D. Values may decrease during winter months and increase   during summer months. Values 20-29 ug/L may indicate Vitamin D insufficiency   and values <20 ug/L may indicate Vitamin D deficiency.  This test was developed and its performance characteristics determined by the   [facility],  Special Chemistry Laboratory. It has   not been cleared or approved by the FDA. The laboratory is regulated under   CLIA as qualified to perform high-complexity testing. This test is used for   clinical purposes. It should not be regarded as investigational or for   research.       Albumin 02/22/2018 3.3* 3.4 - 5.0 g/dL Final     Alkaline Phosphatase 02/22/2018 63  40 - 150 U/L Final     Calcium 02/22/2018 9.2  8.5 - 10.1 mg/dL Final     Parathyroid Hormone Intact 02/22/2018 23  18 - 80 pg/mL Final     Phosphorus 02/22/2018 3.3  2.5 - 4.5 mg/dL Final     Urea Nitrogen 02/22/2018 19  7 - 30 mg/dL Final     Creatinine 02/22/2018 0.66  0.52 - 1.04 mg/dL Final     GFR Estimate 02/22/2018 85  >60 mL/min/1.7m2 Final    Non  GFR Calc     GFR Estimate If Black 02/22/2018 >90  >60 mL/min/1.7m2 Final      GFR Calc     Assessment and Plan     Belia was seen today for recheck, musculoskeletal problem and refill request.    Diagnoses and all orders for this visit:    Fatigue, unspecified type.  Difficult to discern if her low energy level is a new baseline of functioning or something more acute.  There is nothing on PE that suggests acute cause but will obtain a lab survey to r/o common causes.  -     Comprehensive Metabolic Panel ([facility])  -     TSH with free T4 reflex  -     CBC with Diff Plt ([facility])    Recurrent falls.  Doing well, continue PT.    Age-related osteoporosis with current pathological fracture with routine healing, subsequent encounter - continue teriparatide injections.    Encephalopathy - resolved.    Rectal prolapse - stable.    Benign essential hypertension - excellent control and I doubt dizzy symptoms are related to hypotension as they are nonpositional.  -     amLODIPine (NORVASC) 5 MG tablet; Take 1 tablet (5 mg) by mouth daily        Options for treatment and follow-up care were reviewed with the patient. Belia Sheets engaged in the decision making process and verbalized understanding of the options discussed and agreed with the final plan.    Alejandro Sandhu MD

## 2018-06-25 ENCOUNTER — MEDICAL CORRESPONDENCE (OUTPATIENT)
Dept: HEALTH INFORMATION MANAGEMENT | Facility: CLINIC | Age: 83
End: 2018-06-25

## 2018-07-03 ENCOUNTER — TELEPHONE (OUTPATIENT)
Dept: FAMILY MEDICINE | Facility: CLINIC | Age: 83
End: 2018-07-03

## 2018-07-03 NOTE — TELEPHONE ENCOUNTER
"[facility] phone call message- patient requesting to speak with PCP or provider:    PCP: Alejandro Sandhu    Additional Comments: Home care nurse listened to patients lungs and he is hearing a \"Pleural Rub\".  He is concerned and thinks she should be seen in clinic.  He is going to have patient call to schedule for Thursday with another provider.    Is a call back needed? No    Patient informed that it may take up to 2 business days to hear back from PCP:No    OK to leave a message on voice mail? Yes    Primary language: English      needed? No    Call taken on July 3, 2018 at 3:40 PM by Fartun Gu      "

## 2018-07-05 ENCOUNTER — OFFICE VISIT (OUTPATIENT)
Dept: FAMILY MEDICINE | Facility: CLINIC | Age: 83
End: 2018-07-05
Payer: COMMERCIAL

## 2018-07-05 ENCOUNTER — RADIANT APPOINTMENT (OUTPATIENT)
Dept: GENERAL RADIOLOGY | Facility: CLINIC | Age: 83
End: 2018-07-05
Attending: FAMILY MEDICINE
Payer: COMMERCIAL

## 2018-07-05 VITALS
BODY MASS INDEX: 20.65 KG/M2 | HEART RATE: 90 BPM | DIASTOLIC BLOOD PRESSURE: 85 MMHG | TEMPERATURE: 97.4 F | OXYGEN SATURATION: 97 % | SYSTOLIC BLOOD PRESSURE: 119 MMHG | WEIGHT: 98.8 LBS

## 2018-07-05 DIAGNOSIS — M40.00 KYPHOSIS (ACQUIRED) (POSTURAL): ICD-10-CM

## 2018-07-05 DIAGNOSIS — R09.89 ABNORMAL LUNG SOUNDS: Primary | ICD-10-CM

## 2018-07-05 DIAGNOSIS — R06.02 SHORTNESS OF BREATH: ICD-10-CM

## 2018-07-05 DIAGNOSIS — R05.3 CHRONIC COUGH: ICD-10-CM

## 2018-07-05 ASSESSMENT — ENCOUNTER SYMPTOMS
DYSURIA: 0
ABDOMINAL PAIN: 0
RHINORRHEA: 1
APPETITE CHANGE: 0
SLEEP DISTURBANCE: 0
JOINT SWELLING: 1
EYES NEGATIVE: 1
LIGHT-HEADEDNESS: 0
FATIGUE: 1
ACTIVITY CHANGE: 0
PALPITATIONS: 0
DECREASED CONCENTRATION: 0
BACK PAIN: 1
WEAKNESS: 0
UNEXPECTED WEIGHT CHANGE: 0
CONFUSION: 0
CHILLS: 0
CHEST TIGHTNESS: 0
DIZZINESS: 1
ROS GI COMMENTS: POSITIVE FOR HEMORRHOIDS
ABDOMINAL DISTENTION: 0
WHEEZING: 0
SHORTNESS OF BREATH: 0
COUGH: 1
HEADACHES: 0
FEVER: 0
HALLUCINATIONS: 0
NUMBNESS: 0
DIARRHEA: 0
CONSTIPATION: 0
DIFFICULTY URINATING: 0

## 2018-07-05 NOTE — PROGRESS NOTES
HPI       Belia Sheets is a 88 year old with a past medical history of osteoporosis, Hypertension and h/o encephalopathy who presents for abnormal lung sounds.    Acute Illness   Concerns: coughing and abnormal breath sounds, Belia feels well and at her baseline. She is here today at the recommendation of her home nurse due to abnormal lung sounds. Belia notes that this nurse is new to her. Belia states that for many years she has produced a lot of mucus and has to cough up clear sputum, she has noticed no changes in these symptoms. She has never before been told her lungs sound bad. Denies any shortness of breath, fever, recent sick contacts, orthopnea, chest pain, decrease exercise tolerance or lower extremity swelling.     When did it start? She has been coughing for as long as she can remember  Is it getting better, worse or staying the same? unchanged    Fatigue/Achiness?: YES has felt fatigued since surgery on 1/12/2018    Fever?: No     Chills/Sweats?: No     Headache (location?)No     Sinus Pressure?:No     Eye redness/Discharge?: No     Ear Pain?: No     Runny nose?:  YES clear and chronic    Congestion?: No     Sore Throat?: No   Respiratory    Cough?:  YES-productive of clear sputum    Wheeze?: No   GI/    Decreased Appetite?: No     Nausea?:No     Vomiting?: No     Diarrhea?:  No     Dysuria/Frequency?.:No       Any Illness Exposure?: No     Any foreign travel or contact with anyone ill who travelled abroad? No     Therapies Tried and outcome: Nothing        Problem, Medication and Allergy Lists were reviewed and updated if needed..    Patient is an established patient of this clinic..         Review of Systems:   Review of Systems   Constitutional: Positive for fatigue. Negative for activity change, appetite change, chills, fever and unexpected weight change.   HENT: Positive for rhinorrhea (clear). Negative for congestion.    Eyes: Negative.    Respiratory: Positive for cough (clear  sputum). Negative for chest tightness, shortness of breath and wheezing.    Cardiovascular: Negative for chest pain, palpitations and leg swelling.   Gastrointestinal: Negative for abdominal distention, abdominal pain, constipation and diarrhea.        Positive for hemorrhoids   Genitourinary: Negative for difficulty urinating and dysuria.   Musculoskeletal: Positive for back pain and joint swelling.   Skin: Negative for rash.   Neurological: Positive for dizziness (intermittent). Negative for weakness, light-headedness, numbness and headaches.   Psychiatric/Behavioral: Negative for confusion, decreased concentration, hallucinations and sleep disturbance.            Physical Exam:   There were no vitals filed for this visit.  There is no height or weight on file to calculate BMI.  Vitals were reviewed and were normal     Physical Exam   Constitutional: She is oriented to person, place, and time. She appears well-developed and well-nourished. No distress.   HENT:   Head: Normocephalic and atraumatic.   Eyes: EOM are normal. Pupils are equal, round, and reactive to light.   Cardiovascular: Normal rate and regular rhythm.    No murmur heard.  Pulmonary/Chest: Effort normal. No respiratory distress.   rubbing in the bilateral lower lung fields and in the right upper field.   Abdominal: Soft. Bowel sounds are normal.   Musculoskeletal: She exhibits deformity (mcp deformities bilaterally).   Marked scoliosis and kyphosis     Neurological: She is alert and oriented to person, place, and time.   Nursing note and vitals reviewed.      Results:     Chest XRay 7/5/2018  Preliminary read by resident:   No acute cardiopulmonary changes.    Assessment and Plan      1. Abnormal lung sounds  2. Chronic cough  Sounds like pleural rub that may well be chronic,  due to old lung scarring, she has not had any change in her symptoms and is saturating well at 97% on room air. Unlikely related to heart failure or COPD.   - follow up if  symptoms worsen or new symptoms develop       There are no discontinued medications.    Options for treatment and follow-up care were reviewed with the patient. Belia Sheets  engaged in the decision making process and verbalized understanding of the options discussed and agreed with the final plan.    Aubree Delgado MD  [facility] PGY1  Pager: [phone]

## 2018-07-05 NOTE — MR AVS SNAPSHOT
After Visit Summary   7/5/2018    Belia Sheets    MRN: [id number]           Patient Information     Date Of Birth          5/16/1930        Visit Information        Provider Department      7/5/2018 1:00 PM Viola Delgado MD [facility]        Today's Diagnoses     Abnormal lung sounds    -  1    Chronic cough          Care Instructions    Here is the plan from today's visit    1. Abnormal lung sounds  Rubbing lung sounds in bilateral lower lobes, likely representing olf lung scarring.     2. Chronic cough  No change in symptoms. Feeling well and saturating at 97% on room air. Xray without any concerning features.      Please call or return to clinic if your symptoms don't go away.    Follow up plan  Follow-up with your PCP if you develop any worsening symptoms.    Thank you for coming to [facility].  Lab Testing:  **If you had lab testing today and your results are reassuring or normal they will be mailed to you or sent through [email or web address] within 7 days.   **If the lab tests need quick action we will call you with the results.  The phone number we will call with results is # [phone] (home) . If this is not the best number please call our clinic and change the number.  Medication Refills:  If you need any refills please call your pharmacy and they will contact us.   If you need to  your refill at a new pharmacy, please contact the new pharmacy directly. The new pharmacy will help you get your medications transferred faster.   Scheduling:  If you have any concerns about today's visit or wish to schedule another appointment please call our office during normal business hours [phone] (8-5:00 M-F)  If a referral was made to a [facility] and you don't get a call from central scheduling please call [phone].  If a Mammogram was ordered for you at [facility] call [phone] to schedule or change your appointment.  If you  had an XRay/CT/Ultrasound/MRI ordered the number is [phone] to schedule or change your radiology appointment.   Medical Concerns:  If you have urgent medical concerns please call [phone] at any time of the day.    Viola Delgado MD           Follow-ups after your visit        Your next 10 appointments already scheduled     Aug 02, 2018 10:30 AM CDT   (Arrive by 10:15 AM)   RETURN ENDOCRINE with Saurabh Pittman MD   [facility] ([facility])    [address] [place] [id number] [phone]              Who to contact     Please call your clinic at [phone] to:    Ask questions about your health    Make or cancel appointments    Discuss your medicines    Learn about your test results    Speak to your doctor            Additional Information About Your Visit        [employer] Information     [employer] gives you secure access to your electronic health record. If you see a primary care provider, you can also send messages to your care team and make appointments. If you have questions, please call your primary care clinic.  If you do not have a primary care provider, please call [phone] and they will assist you.      [employer] is an electronic gateway that provides easy, online access to your medical records. With [employer], you can request a clinic appointment, read your test results, renew a prescription or communicate with your care team.     To access your existing account, please contact your [facility] or call [phone] for assistance.        Care EveryWhere ID     This is your Care EveryWhere ID. This could be used by other organizations to access your [place] medical records  [id number]        Your Vitals Were     Pulse Temperature Pulse Oximetry BMI (Body Mass Index)          90 97.4  F (36.3  C) (Oral) 97% 20.65 kg/m2         Blood Pressure from Last 3 Encounters:   07/05/18 119/85   06/20/18 124/84    03/21/18 125/81    Weight from Last 3 Encounters:   07/05/18 98 lb 12.8 oz (44.8 kg)   06/20/18 100 lb 12.8 oz (45.7 kg)   03/21/18 100 lb 9.6 oz (45.6 kg)               Primary Care Provider Office Phone # Fax #    Alejandro Sandhu [phone]       [address] [address]        Equal Access to Services     ANUEL MARSHALL : Hadii aad ku hadasho Soomaali, waaxda luqadaha, qaybta kaalmada adeegyada, waxay idiin hayaan adeeg gopal tyler . So [facility] [phone].    ATENCIÓN: Si habla español, tiene a benavides disposición servicios gratuitos de asistencia lingüística. [facility] [phone].    We comply with applicable federal civil rights laws and [place] laws. We do not discriminate on the basis of race, color, national origin, age, disability, sex, sexual orientation, or gender identity.            Thank you!     Thank you for choosing [facility]  for your care. Our goal is always to provide you with excellent care. Hearing back from our patients is one way we can continue to improve our services. Please take a few minutes to complete the written survey that you may receive in the mail after your visit with us. Thank you!             Your Updated Medication List - Protect others around you: Learn how to safely use, store and throw away your medicines at www.disposemymeds.org.          This list is accurate as of 7/5/18  2:21 PM.  Always use your most recent med list.                   Brand Name Dispense Instructions for use Diagnosis    acetaminophen 325 MG tablet    TYLENOL    60 tablet    Take 2 tablets (650 mg) by mouth every 4 hours as needed for mild pain    Rectal prolapse       amLODIPine 5 MG tablet    NORVASC    90 tablet    Take 1 tablet (5 mg) by mouth daily    Benign essential hypertension       calcium-vitamin D 600-400 MG-UNIT per tablet    CALTRATE    90 tablet    Take 1 tablet by mouth daily    Age-related osteoporosis with current pathological  fracture with routine healing, subsequent encounter       cholecalciferol 1000 units capsule    vitamin  -D    100 capsule    Take 1 capsule (1,000 Units) by mouth daily    Age-related osteoporosis with current pathological fracture with routine healing, subsequent encounter       magnesium oxide 400 MG tablet    MAG-OX     Take 600 mg by mouth        menthol-zinc oxide 0.44-20.625 % Oint ointment    CALMOSEPTINE    113 g    Apply topically 4 times daily as needed for skin protection    Rectal prolapse       nystatin 143432 UNIT/GM Powd    MYCOSTATIN    30 g    Apply topically 3 times daily as needed    Intertrigo       order for DME     1 each    Equipment being ordered: Home BP monitor and cuff    Severe hypertension       saccharomyces boulardii 250 MG capsule    FLORASTOR    14 capsule    Take 1 capsule (250 mg) by mouth 2 times daily    Rectal prolapse       sodium fluoride dental gel 1.1 % Gel topical gel    PREVIDENT     Apply to affected area At Bedtime        teriparatide (recombinant) 600 MCG/2.4ML Soln injection    FORTEO    6 mL    Inject 0.08 mLs (20 mcg) Subcutaneous daily    Age-related osteoporosis with current pathological fracture, initial encounter       VITAMIN B 12 PO      Take 1 tablet by mouth every morning

## 2018-07-05 NOTE — PROGRESS NOTES
Preceptor Attestation:   Patient seen, evaluated and discussed with the resident. I have verified the content of the note, which accurately reflects my assessment of the patient and the plan of care.   Supervising Physician:  Clary Kim MD

## 2018-07-23 ENCOUNTER — DOCUMENTATION ONLY (OUTPATIENT)
Dept: FAMILY MEDICINE | Facility: CLINIC | Age: 83
End: 2018-07-23

## 2018-07-23 NOTE — PROGRESS NOTES
Form has been completed by provider.     Form sent out via: Fax to [phone]  Patient informed: No, Reason:fax confirmed  Output date: July 23, 2018    Diana Ortiz CMA      **Please close the encounter**

## 2018-07-27 ENCOUNTER — TELEPHONE (OUTPATIENT)
Dept: FAMILY MEDICINE | Facility: CLINIC | Age: 83
End: 2018-07-27

## 2018-07-27 NOTE — TELEPHONE ENCOUNTER
RN returned call to patients daughter to discuss. Daughter patricia instructed RN to call patient to get more detailed information. RN called patient to discuss    Pt states the past 3 days she has had 3 dizzy spells that last about 15 minutes. Pt thinks they could be vision related. On Wednesday she was walking in the facility dining room getting breakfast when she moved her head rapidly and then had the spell. Staff checked her BP which was 125/70. Pt couldn't remember what she was doing on Thursday before the spell or time of day. Today patient states she was putting suff away in the kitchen when she felt spell come on    Pt states it feels like the room is spinning opposed to feeling like she is spinning. Denies headache, vision change, pain, SOB, fainting or feeling faint, diarrhea, rapid heartbeat, facial droop or difficulty speaking.     Pt feels hot and sweaty during spells and after feels tired and wants to lay down. Pt states she holds on to her 4 wheeled walker during the spells or sits down, does not try to walk    Pt wanted to see what PCP thought before making an appointment    RN recommended to drink lots of water, slow movements w/ head turning, getting up out of chair/bed, getting dressed etc and keep using walker while walking/transferring. Pt verbalized understanding. Pt has an aide coming to help bath    Luisana Bell RN

## 2018-07-27 NOTE — TELEPHONE ENCOUNTER
[facility] phone call message-patient reporting a symptom:     Symptom: Dizziness     Same Day Visit Offered: Patient would like to notify the nurse about her dizziness     Additional comments: Lubna patient's daughter called to notify PCP and nurse that her mom has dizziness which has gotten worse the past 3 days, feeling overwhelmingly hot . She states she doesn't have high BP, pain or SOB. Lubna thinks her dizziness has been there but getting worse. Please call patient at [phone] to discuss.     OK to leave message on voice mail? Yes    Primary language: English      needed? No    Call taken on July 27, 2018 at 1:33 PM by Kadi Campos

## 2018-07-27 NOTE — TELEPHONE ENCOUNTER
Called and spoke to Veryl.     Is worried that she is having her usual dizzy spells much more frequently - usually has them every 3 - 6 months and now had 1 daily.  One was pretty difficult - had to hold on to the furniture so she did not fall. Has not fallen.      These episodes last about 15 minutes each. Usually last 5 minutes.   Are vertiginous   During the times they happen she gets very hot and sweaty.     EKG 1/2018 showed sinus    Is taking Forteo (will be 2 years 2/2019) and Amlodipine.     Plan:   Recommended that she come in on Monday if these continue as is. If she gets worse, to the ED and if she gets better, then does not need to come in.

## 2018-08-22 ENCOUNTER — MEDICAL CORRESPONDENCE (OUTPATIENT)
Dept: HEALTH INFORMATION MANAGEMENT | Facility: CLINIC | Age: 83
End: 2018-08-22

## 2018-08-22 ENCOUNTER — TELEPHONE (OUTPATIENT)
Dept: FAMILY MEDICINE | Facility: CLINIC | Age: 83
End: 2018-08-22

## 2018-08-22 NOTE — TELEPHONE ENCOUNTER
[facility] phone call message - order or referral request from patient:     Order or referral being requested: Requested order: Private pay nursing and home health aide for 60 days.    Additional Details: none    OK to leave a message on voice mail? Yes    Primary language: English      needed? No    Call taken on August 22, 2018 at 2:47 PM by Fay Gould    Order request route to [facility] TRIAGE   Referrals Route to P [facility] (Green/[place]/Purple) CARE COORDINATOR

## 2018-09-27 ENCOUNTER — DOCUMENTATION ONLY (OUTPATIENT)
Dept: FAMILY MEDICINE | Facility: CLINIC | Age: 83
End: 2018-09-27

## 2018-09-27 NOTE — PROGRESS NOTES
"When opening a documentation only encounter, be sure to enter in \"Chief Complaint\" Forms and in \" Comments\" Title of form, description if needed.    Sudeep is a 88 year old  female  Form received via: Fax  Form now resides in: Provider Ready    Francisco J Vela CMA                Form has been completed by provider.     Form sent out via: Fax to [facility] at Fax Number: [phone]  Patient informed: No, Reason:fax confirmation  Output date: September 27, 2018    Francisco J Vela CMA      **Please close the encounter**      "

## 2018-10-03 ENCOUNTER — OFFICE VISIT (OUTPATIENT)
Dept: FAMILY MEDICINE | Facility: CLINIC | Age: 83
End: 2018-10-03
Payer: COMMERCIAL

## 2018-10-03 VITALS
RESPIRATION RATE: 16 BRPM | TEMPERATURE: 97.2 F | SYSTOLIC BLOOD PRESSURE: 106 MMHG | OXYGEN SATURATION: 95 % | WEIGHT: 97.2 LBS | BODY MASS INDEX: 20.31 KG/M2 | HEART RATE: 88 BPM | DIASTOLIC BLOOD PRESSURE: 74 MMHG

## 2018-10-03 DIAGNOSIS — M80.00XD AGE-RELATED OSTEOPOROSIS WITH CURRENT PATHOLOGICAL FRACTURE WITH ROUTINE HEALING, SUBSEQUENT ENCOUNTER: ICD-10-CM

## 2018-10-03 DIAGNOSIS — R29.6 RECURRENT FALLS: Primary | ICD-10-CM

## 2018-10-03 DIAGNOSIS — M19.91 PRIMARY OSTEOARTHRITIS, UNSPECIFIED SITE: ICD-10-CM

## 2018-10-03 DIAGNOSIS — H81.10 BENIGN PAROXYSMAL POSITIONAL VERTIGO, UNSPECIFIED LATERALITY: ICD-10-CM

## 2018-10-03 ASSESSMENT — ENCOUNTER SYMPTOMS
CARDIOVASCULAR NEGATIVE: 1
AGITATION: 0
ACTIVITY CHANGE: 0
RESPIRATORY NEGATIVE: 1
ARTHRALGIAS: 1
FATIGUE: 0
BACK PAIN: 1
HALLUCINATIONS: 0
MYALGIAS: 0
DIZZINESS: 1
APPETITE CHANGE: 0
DYSPHORIC MOOD: 0
FEVER: 0
WEAKNESS: 1
UNEXPECTED WEIGHT CHANGE: 0

## 2018-10-03 NOTE — MR AVS SNAPSHOT
After Visit Summary   10/3/2018    Belia Sheets    MRN: [id number]           Patient Information     Date Of Birth          5/16/1930        Visit Information        Provider Department      10/3/2018 1:00 PM Alejandro Sandhu MD [facility]        Today's Diagnoses     Recurrent falls    -  1    Benign paroxysmal positional vertigo, unspecified laterality        Age-related osteoporosis with current pathological fracture with routine healing, subsequent encounter        Primary osteoarthritis, unspecified site          Care Instructions    1.  Try turning slowly.    2.  When you get dizzy, wait for symptoms to resolve before walking.    3.  Continue same medicines.    4.  Get a flu shot this fall.          Follow-ups after your visit        Follow-up notes from your care team     Return in about 3 months (around 1/3/2019).      Who to contact     Please call your clinic at [phone] to:    Ask questions about your health    Make or cancel appointments    Discuss your medicines    Learn about your test results    Speak to your doctor            Additional Information About Your Visit        [employer] Information     [employer] gives you secure access to your electronic health record. If you see a primary care provider, you can also send messages to your care team and make appointments. If you have questions, please call your primary care clinic.  If you do not have a primary care provider, please call [phone] and they will assist you.      [employer] is an electronic gateway that provides easy, online access to your medical records. With [employer], you can request a clinic appointment, read your test results, renew a prescription or communicate with your care team.     To access your existing account, please contact your [facility] or call [phone] for assistance.        Care EveryWhere ID     This is your Care EveryWhere ID. This could be used by  other organizations to access your [place] medical records  [id number]        Your Vitals Were     Pulse Temperature Respirations Pulse Oximetry Breastfeeding? BMI (Body Mass Index)    88 97.2  F (36.2  C) (Oral) 16 95% No 20.31 kg/m2       Blood Pressure from Last 3 Encounters:   10/03/18 106/74   07/05/18 119/85   06/20/18 124/84    Weight from Last 3 Encounters:   10/03/18 97 lb 3.2 oz (44.1 kg)   07/05/18 98 lb 12.8 oz (44.8 kg)   06/20/18 100 lb 12.8 oz (45.7 kg)              Today, you had the following     No orders found for display       Primary Care Provider Office Phone # Fax #    Alejandro Sandhu [phone]       [address] [address]        Equal Access to Services     ANUEL MARSHALL : Hadmitzi Pritchett, waaxchris wilson, qaybyani gilesalmachris almanzar, ivana tyler . So [facility] [phone].    ATENCIÓN: Si habla español, tiene a benavides disposición servicios gratuitos de asistencia lingüística. [employer] [phone].    We comply with applicable federal civil rights laws and [place] laws. We do not discriminate on the basis of race, color, national origin, age, disability, sex, sexual orientation, or gender identity.            Thank you!     Thank you for choosing [facility]  for your care. Our goal is always to provide you with excellent care. Hearing back from our patients is one way we can continue to improve our services. Please take a few minutes to complete the written survey that you may receive in the mail after your visit with us. Thank you!             Your Updated Medication List - Protect others around you: Learn how to safely use, store and throw away your medicines at www.disposemymeds.org.          This list is accurate as of 10/3/18  1:49 PM.  Always use your most recent med list.                   Brand Name Dispense Instructions for use Diagnosis    acetaminophen 325 MG tablet    TYLENOL    60  tablet    Take 2 tablets (650 mg) by mouth every 4 hours as needed for mild pain    Rectal prolapse       amLODIPine 5 MG tablet    NORVASC    90 tablet    Take 1 tablet (5 mg) by mouth daily    Benign essential hypertension       calcium carbonate 600 mg-vitamin D 400 units 600-400 MG-UNIT per tablet    CALTRATE    90 tablet    Take 1 tablet by mouth daily    Age-related osteoporosis with current pathological fracture with routine healing, subsequent encounter       cholecalciferol 1000 units capsule    vitamin  -D    100 capsule    Take 1 capsule (1,000 Units) by mouth daily    Age-related osteoporosis with current pathological fracture with routine healing, subsequent encounter       magnesium oxide 400 MG tablet    MAG-OX     Take 600 mg by mouth        menthol-zinc oxide 0.44-20.625 % Oint ointment    CALMOSEPTINE    113 g    Apply topically 4 times daily as needed for skin protection    Rectal prolapse       nystatin 251310 UNIT/GM Powd    MYCOSTATIN    30 g    Apply topically 3 times daily as needed    Intertrigo       order for DME     1 each    Equipment being ordered: Home BP monitor and cuff    Severe hypertension       saccharomyces boulardii 250 MG capsule    FLORASTOR    14 capsule    Take 1 capsule (250 mg) by mouth 2 times daily    Rectal prolapse       sodium fluoride dental gel 1.1 % Gel topical gel    PREVIDENT     Apply to affected area At Bedtime        teriparatide (recombinant) 600 MCG/2.4ML Soln injection    FORTEO    6 mL    Inject 0.08 mLs (20 mcg) Subcutaneous daily    Age-related osteoporosis with current pathological fracture, initial encounter       VITAMIN B 12 PO      Take 1 tablet by mouth every morning

## 2018-10-03 NOTE — PATIENT INSTRUCTIONS
1.  Try turning slowly.    2.  When you get dizzy, wait for symptoms to resolve before walking.    3.  Continue same medicines.    4.  Get a flu shot this fall.

## 2018-10-03 NOTE — PROGRESS NOTES
"      HPI:       88 year old patient who presents with:    F/u multiple chronic conditions.  She has been stable over past 3 mos.  Participating in PT and while still bothered by chronic neck pain, her ROM has improved.  Mentation is still fuzzy at times but stable.  No falls.    Still having some dizziness.  Describes it as a \"swirling\" and it is when she shifts her gaze quickly from one side to another.  When demonstrating to me, with limited ROM in neck it is apparent that this gaze shift involves a significant turning of the entire head.  The swirling lasts seconds to minutes and then resolves spontaneously.  No lightheaded ness or tinnitus.  No hearing loss.         Problem, Medication and Allergy Lists were reviewed and are current.  Patient is an established patient of this clinic.         Review of Systems:     Review of Systems   Constitutional: Negative for activity change, appetite change, fatigue, fever and unexpected weight change.   Respiratory: Negative.    Cardiovascular: Negative.    Musculoskeletal: Positive for arthralgias, back pain and gait problem. Negative for myalgias.   Neurological: Positive for dizziness and weakness.   Psychiatric/Behavioral: Negative for agitation, dysphoric mood and hallucinations.          Physical Exam:     /74  Pulse 88  Temp 97.2  F (36.2  C) (Oral)  Resp 16  Wt 97 lb 3.2 oz (44.1 kg)  SpO2 95%  Breastfeeding? No  BMI 20.31 kg/m2    Body mass index is 20.31 kg/(m^2).  Vitals reviewed.    Physical Exam   Constitutional: She is oriented to person, place, and time.   Alert, pleasant.  Uses a 4-wheeled walker with seat.   Cardiovascular: Normal rate, regular rhythm and normal heart sounds.    Pulmonary/Chest: No respiratory distress. She has no wheezes. She has no rales.   Abdominal: Soft. Bowel sounds are normal. She exhibits no mass. There is no tenderness.   Musculoskeletal:   + marked scoliosis, kyphosis.  + muscle wasting throughout   Neurological: She " is alert and oriented to person, place, and time.   Sridhar-Hallpike test is limited by her limited neck ROM.  When turning her head to the R, there is a couple of beats of nystagmus but no significant dizziness reported by pt.  EOMs normal.   Psychiatric: She has a normal mood and affect. Her behavior is normal.           Results:     Results from last visit:  Office Visit on 06/20/2018   Component Date Value Ref Range Status     Albumin 06/20/2018 4.3  3.5 - 4.7 mg/dL Final     Alkaline Phosphatase 06/20/2018 56.9  31.7 - 110.5 U/L Final     ALT 06/20/2018 9.8  0.0 - 45.0 U/L Final     AST 06/20/2018 15.3  0.0 - 45.0 U/L Final     Bilirubin Total 06/20/2018 0.6  0.2 - 1.3 mg/dL Final     Urea Nitrogen 06/20/2018 16.4  7.0 - 19.0 mg/dL Final     Calcium 06/20/2018 10.3* 8.5 - 10.1 mg/dL Final     Chloride 06/20/2018 99.3  98.0 - 110.0 mmol/L Final     Carbon Dioxide 06/20/2018 27.3  20.0 - 32.0 mmol/L Final     Creatinine 06/20/2018 0.7  0.5 - 1.0 mg/dL Final     Glucose 06/20/2018 102.2* 70.0 - 99.0 mg'dL Final     Potassium 06/20/2018 3.4  3.3 - 4.5 mmol/dL Final     Sodium 06/20/2018 130.8* 132.6 - 141.4 mmol/L Final     Protein Total 06/20/2018 6.9  6.8 - 8.8 g/dL Final     GFR Estimate 06/20/2018 83.9  >60.0 mL/min/1.7 m2 Final     GFR Estimate If Black 06/20/2018 >90  >60.0 mL/min/1.7 m2 Final     TSH 06/20/2018 0.83  0.40 - 4.00 mU/L Final     WBC 06/20/2018 7.8  4.0 - 11.0 K/uL Final     Lymphocytes # 06/20/2018 1.5  0.8 - 5.3 K/uL Final     % Lymphocytes 06/20/2018 19.7* 20.0 - 48.0 %L Final     Mid # 06/20/2018 0.5  0.0 - 2.2 K/uL Final     Mid % 06/20/2018 6.9  0.0 - 20.0 %M Final     GRANULOCYTES # 06/20/2018 5.7  1.6 - 8.3 K/uL Final     % Granulocytes 06/20/2018 73.4  40.0 - 75.0 %G Final     RBC 06/20/2018 4.49  3.80 - 5.20 M/uL Final     Hemoglobin 06/20/2018 13.0  11.7 - 15.7 g/dL Final     Hematocrit 06/20/2018 43.1  35.0 - 47.0 % Final     MCV 06/20/2018 96.0  78.0 - 100.0 fL Final     MCH 06/20/2018  29.0  26.5 - 35.0 pg Final     MCHC 06/20/2018 30.2* 32.0 - 36.0 g/dL Final     Platelets 06/20/2018 204.0  150.0 - 450.0 K/uL Final     Assessment and Plan     Belia was seen today for recheck and dizziness.    Diagnoses and all orders for this visit:    Recurrent falls.  Doing well.  No further falls.  Continue walking aids and PT.    Benign paroxysmal positional vertigo, unspecified laterality.  Symptoms most c/w with dx.  No sign of intracranial mass.  Unfortunately she will be unable to accomplish Epley's maneuvers given the lack of neck ROM.  She was instructed to be mindful of turning slowly to minimize occurrences.    Age-related osteoporosis with current pathological fracture with routine healing, subsequent encounter.  Stable, continue Forteo injections.    Primary osteoarthritis, unspecified site.  Stable.        Options for treatment and follow-up care were reviewed with the patient. Belia Sheets engaged in the decision making process and verbalized understanding of the options discussed and agreed with the final plan.    Alejandro Sandhu MD

## 2018-10-19 ENCOUNTER — TELEPHONE (OUTPATIENT)
Dept: FAMILY MEDICINE | Facility: CLINIC | Age: 83
End: 2018-10-19

## 2018-10-19 NOTE — TELEPHONE ENCOUNTER
Returned call to home care nurse to give verbal orders per protocol as requested. Nurse verbalized understanding.    Luisana Bell RN

## 2018-10-19 NOTE — TELEPHONE ENCOUNTER
[facility] phone call message - order or referral request from patient:     Order or referral being requested: Requested order Skilled Nursing, Home Health Aid  Additional Details: 0-24 hours as requested, private pay, 60 days        OK to leave a message on voice mail? Yes    Primary language: English      needed? No    Call taken on October 19, 2018 at 4:16 PM by Michelle Castro    Order request route to P [facility] TRIAGE   Referrals Route to P [facility] (Green/[place]/Purple) CARE COORDINATOR

## 2018-10-21 ENCOUNTER — MEDICAL CORRESPONDENCE (OUTPATIENT)
Dept: HEALTH INFORMATION MANAGEMENT | Facility: CLINIC | Age: 83
End: 2018-10-21

## 2018-11-15 ENCOUNTER — DOCUMENTATION ONLY (OUTPATIENT)
Dept: FAMILY MEDICINE | Facility: CLINIC | Age: 83
End: 2018-11-15

## 2018-11-15 NOTE — PROGRESS NOTES
Form has been completed by provider.     Form sent out via: Fax to [phone]  Patient informed: No, Reason:fax confirmed  Output date: November 15, 2018    Diana Serrato CMA      **Please close the encounter**       24 169.9 173.2

## 2018-11-20 ENCOUNTER — TELEPHONE (OUTPATIENT)
Dept: ENDOCRINOLOGY | Facility: CLINIC | Age: 83
End: 2018-11-20

## 2018-11-20 DIAGNOSIS — M80.00XD AGE-RELATED OSTEOPOROSIS WITH CURRENT PATHOLOGICAL FRACTURE WITH ROUTINE HEALING, SUBSEQUENT ENCOUNTER: Primary | ICD-10-CM

## 2018-11-20 NOTE — TELEPHONE ENCOUNTER
----- Message from Bela Shipley sent at 11/20/2018 10:58 AM CST -----  Regarding: RE: Forteo renewal  Great, thank you!  Lauryn, would you be connecting with the patient regarding this or would you like me too?  I do anticipate more questions to arise but I can always send you a message.  Please let me know.  Thanks!    Bela   ----- Message -----     From: Saurabh Pittman MD     Sent: 11/20/2018   8:22 AM       To: Lauryn Zavala, RN  Subject: RE: Forteo renewal                               That is great.  Thank you. I have ordered labs and DEXA scan to be done prior to her upcoming appointment. Treatment until Feb 2019 should suffice and would plan a RECLAST infusion shortly after she is done with forteo.  Thanks, Kidmi       ----- Message -----     From: Lauryn Ferguson RN     Sent: 11/19/2018   3:33 PM       To: Saurabh Pittman MD  Subject: FW: Forteo renewal                               I don't see any  Orders  For labs needed . I asked the scedulers to contact her to  Come in for a f/u in January to discuss  The  February Forteo plan.   ----- Message -----     From: Bela Shipley     Sent: 11/19/2018  10:33 AM       To: Lauryn Ferguson RN, #  Subject: Forteo renewal                                   Hi -    I spoke with patient's daughter Lubna (ph # [phone]) to discuss the renewal for her Forteo.  Lubna informed me that Belia is coming up on the 2 year (calendar year, not 2 full years of medicine as she was off the Forteo for about 1 month total due to surgery) in 02/2019.  The patient may have enough pens to get her until 02/2019 but her daughter would like confirmation from Dr. Pittman on the 2 year calendar joe or the 2 year medicinal joe (hope that makes sense!).  If Belia needs to be on the medication longer than 02/2019, we mostly likely would have to go thru the foundation assistance again.      One other request Lubna had was to have someone call her about  rescheduling a calcium blood draw appointment that was canceled in August due to clinic issues and never rescheduled?  If someone could reach out to her in regards to that, that would be awesome!    Please let me know if you need anything clarified or any questions.  THANK YOU!!!      Bela Shipley  Pharmacy Liaison   [address] [address]   [email or web address]  [email or web address]   Connect with [facility] on [employer] media   [phone]

## 2018-12-13 ENCOUNTER — TELEPHONE (OUTPATIENT)
Dept: FAMILY MEDICINE | Facility: CLINIC | Age: 83
End: 2018-12-13

## 2018-12-13 NOTE — TELEPHONE ENCOUNTER
[facility] phone call message- medication clarification/question:    Full Medication Name: teriparatide, recombinant, (FORTEO) 600 MCG/2.4ML SOLN injection   Dose: Inject 0.08 mLs (20 mcg) Subcutaneous daily - Subcutaneous    Question/Clarification needed: Patient had questions regarding this medication and how to go about getting a refill. Patient stated that they were short on the medication by 21 days, would like to discuss with care team to make sure they don't have to order more medication.     Pharmacy confirmed as   [facility] [place], [place] - [address] [facility] [address] [id number] [facility] [address] [address]  Phone: [phone] Fax: [phone] Alternate Fax: [phone], [phone]  : Yes    Please leave ONLY preferred pharmacy    OK to leave a message on voice mail? Yes    Advised patient that RN would call back within 3 hours, unless emergent.    Primary language: English      needed? No    Call taken on December 13, 2018 at 12:42 PM by Bel Heredia to [facility] [place] TRIAGE

## 2018-12-13 NOTE — TELEPHONE ENCOUNTER
Patient is calling because she was told to take Forteo for 2 years, which would be up on 2/19/19. She currently has enough of a supply to last her through 1/29/19.     Patient inquiring if you want to prescribe more for that last month, or will the end of this prescription be close enough to the 2 year joe.    Please advise.  Veronica Peguero RN

## 2018-12-14 NOTE — TELEPHONE ENCOUNTER
"Per PCP, \"She can finish up what she has.\"    RN notified patient that she does not need to be concerned about refilling it and she can just use up her current supply. Patient verbalized understanding.  Veronica Peguero RN   "

## 2018-12-17 ENCOUNTER — TELEPHONE (OUTPATIENT)
Dept: FAMILY MEDICINE | Facility: CLINIC | Age: 83
End: 2018-12-17

## 2018-12-17 NOTE — TELEPHONE ENCOUNTER
[facility] phone call message - order or referral request from patient:     Order or referral being requested: Requested order Verbal order to private pay home care for 60 days 0-24 hrs per day as requested.   Additional Details: Please give her a call for the verbal order     Referral only -Specialty Home care Location Home    OK to leave a message on voice mail? Yes    Primary language: English      needed? No    Call taken on December 17, 2018 at 1:57 PM by Kadi Campos    Order request route to P [facility] TRIAGE   Referrals Route to P [facility] (Green/[place]/Purple) CARE COORDINATOR

## 2018-12-17 NOTE — TELEPHONE ENCOUNTER
Returned call to home care nurse, unable to reach. Left VM with verbal orders per protocol as requested. Left callback number for questions.    Luisana Blel RN

## 2018-12-20 ENCOUNTER — MEDICAL CORRESPONDENCE (OUTPATIENT)
Dept: HEALTH INFORMATION MANAGEMENT | Facility: CLINIC | Age: 83
End: 2018-12-20

## 2018-12-26 ENCOUNTER — DOCUMENTATION ONLY (OUTPATIENT)
Dept: FAMILY MEDICINE | Facility: CLINIC | Age: 83
End: 2018-12-26

## 2018-12-26 NOTE — PROGRESS NOTES
"When opening a documentation only encounter, be sure to enter in \"Chief Complaint\" Forms and in \" Comments\" Title of form, description if needed.    Sudeep is a 88 year old  female  Form received via: Fax  Form now resides in: Provider Ready    Diana Serrato CMA              Form has been completed by provider.     Form sent out via: Fax to [phone]  Patient informed: No, Reason:fax confirmed  Output date: December 26, 2018    Diana Serrato CMA      **Please close the encounter**        "

## 2019-01-02 ENCOUNTER — MEDICAL CORRESPONDENCE (OUTPATIENT)
Dept: HEALTH INFORMATION MANAGEMENT | Facility: CLINIC | Age: 84
End: 2019-01-02

## 2019-01-03 ENCOUNTER — DOCUMENTATION ONLY (OUTPATIENT)
Dept: FAMILY MEDICINE | Facility: CLINIC | Age: 84
End: 2019-01-03

## 2019-01-03 NOTE — PROGRESS NOTES
"When opening a documentation only encounter, be sure to enter in \"Chief Complaint\" Forms and in \" Comments\" Title of form, description if needed.    Sudeep is a 88 year old  female  Form received via: Fax  Form now resides in: Provider Ready    Diana Serrato CMA                Form has been completed by provider.     Form sent out via: Fax to [phone]  Patient informed: No, Reason:fax confirmed  Output date: January 3, 2019    Diana Serrato CMA      **Please close the encounter**      "

## 2019-01-03 NOTE — PROGRESS NOTES
"When opening a documentation only encounter, be sure to enter in \"Chief Complaint\" Forms and in \" Comments\" Title of form, description if needed.    Sudeep is a 88 year old  female  Form received via: Fax  Form now resides in: Provider Ready    Diana Serrato CMA                Form has been completed by provider.     Form sent out via: Fax to [phone]  Patient informed: No, Reason:fax confirmed  Output date: January 3, 2019    Diana Serrato CMA      **Please close the encounter**      "

## 2019-01-08 ENCOUNTER — MYC MEDICAL ADVICE (OUTPATIENT)
Dept: ENDOCRINOLOGY | Facility: CLINIC | Age: 84
End: 2019-01-08

## 2019-01-08 DIAGNOSIS — M81.0 SENILE OSTEOPOROSIS: Primary | ICD-10-CM

## 2019-01-10 NOTE — TELEPHONE ENCOUNTER
Almost two years on FORTEO.   SIX MONTH LAB AND DEXA SCAN ORDERED.     In response to pt's daughter email; called and spoke with patricia to have the above tests done on 1/30/19.     Based on the results and dexa scan; will coordinate her next visit with RECLAST INFUSION.

## 2019-01-14 ENCOUNTER — TELEPHONE (OUTPATIENT)
Dept: ENDOCRINOLOGY | Facility: CLINIC | Age: 84
End: 2019-01-14

## 2019-01-14 NOTE — TELEPHONE ENCOUNTER
Free Drug Application Initiated  Medication - Forteo  Sponsor - [employer]  Phone # - [phone]  Fax # - [phone]  Additional Information  -  Faxed all necessary paperwork, will await determination

## 2019-01-14 NOTE — TELEPHONE ENCOUNTER
[facility] Call Center    Phone Message    May a detailed message be left on voicemail: yes    Reason for Call: Other: the pt's daughter Lubna is calling about the [employer] paperwork that needs to be done asap in order for the pt to get the full doses of Forteo. Lubna is out of town on a business trip and has no cell phone service, can [employer] texts. She is in a spot right now where she could talk to  you so if you could call right away, that would be great. Otherwise please respond via My Chart. Vahid has her part of the paperwork filled out and wants to know if she should fax it you right now. Please call of My Chart Lubna. Thanks.    Action Taken: Message routed to:  [facility] angela med diab

## 2019-01-18 NOTE — TELEPHONE ENCOUNTER
Free Drug Application Approved  Effective Dates - until 12/2019  Patient notified? - yes via company   Additional Information - approval letter below

## 2019-01-30 ENCOUNTER — ANCILLARY PROCEDURE (OUTPATIENT)
Dept: BONE DENSITY | Facility: CLINIC | Age: 84
End: 2019-01-30
Payer: COMMERCIAL

## 2019-01-30 ENCOUNTER — OFFICE VISIT (OUTPATIENT)
Dept: FAMILY MEDICINE | Facility: CLINIC | Age: 84
End: 2019-01-30
Payer: COMMERCIAL

## 2019-01-30 VITALS
SYSTOLIC BLOOD PRESSURE: 171 MMHG | RESPIRATION RATE: 16 BRPM | WEIGHT: 99.2 LBS | TEMPERATURE: 97.6 F | HEART RATE: 70 BPM | OXYGEN SATURATION: 97 % | DIASTOLIC BLOOD PRESSURE: 93 MMHG | BODY MASS INDEX: 20.73 KG/M2

## 2019-01-30 DIAGNOSIS — M80.00XD AGE-RELATED OSTEOPOROSIS WITH CURRENT PATHOLOGICAL FRACTURE WITH ROUTINE HEALING, SUBSEQUENT ENCOUNTER: ICD-10-CM

## 2019-01-30 DIAGNOSIS — M80.00XD AGE-RELATED OSTEOPOROSIS WITH CURRENT PATHOLOGICAL FRACTURE WITH ROUTINE HEALING, SUBSEQUENT ENCOUNTER: Primary | ICD-10-CM

## 2019-01-30 DIAGNOSIS — R29.6 RECURRENT FALLS: ICD-10-CM

## 2019-01-30 DIAGNOSIS — M19.91 PRIMARY OSTEOARTHRITIS, UNSPECIFIED SITE: ICD-10-CM

## 2019-01-30 DIAGNOSIS — R53.82 CHRONIC FATIGUE: ICD-10-CM

## 2019-01-30 DIAGNOSIS — R42 VERTIGO: ICD-10-CM

## 2019-01-30 DIAGNOSIS — M81.0 SENILE OSTEOPOROSIS: ICD-10-CM

## 2019-01-30 LAB
ALBUMIN SERPL-MCNC: 3.5 G/DL (ref 3.4–5)
ALP SERPL-CCNC: 61 U/L (ref 40–150)
ANION GAP SERPL CALCULATED.3IONS-SCNC: 6 MMOL/L (ref 3–14)
BUN SERPL-MCNC: 17 MG/DL (ref 7–30)
CALCIUM SERPL-MCNC: 9.3 MG/DL (ref 8.5–10.1)
CHLORIDE SERPL-SCNC: 104 MMOL/L (ref 94–109)
CO2 SERPL-SCNC: 30 MMOL/L (ref 20–32)
CREAT SERPL-MCNC: 0.73 MG/DL (ref 0.52–1.04)
DEPRECATED CALCIDIOL+CALCIFEROL SERPL-MC: 31 UG/L (ref 20–75)
GFR SERPL CREATININE-BSD FRML MDRD: 73 ML/MIN/{1.73_M2}
GLUCOSE SERPL-MCNC: 129 MG/DL (ref 70–99)
PHOSPHATE SERPL-MCNC: 3.2 MG/DL (ref 2.5–4.5)
POTASSIUM SERPL-SCNC: 4 MMOL/L (ref 3.4–5.3)
PTH-INTACT SERPL-MCNC: 23 PG/ML (ref 18–80)
SODIUM SERPL-SCNC: 140 MMOL/L (ref 133–144)

## 2019-01-30 ASSESSMENT — ENCOUNTER SYMPTOMS
HALLUCINATIONS: 0
FEVER: 0
UNEXPECTED WEIGHT CHANGE: 0
DYSPHORIC MOOD: 0
MYALGIAS: 0
BACK PAIN: 1
SLEEP DISTURBANCE: 1
ACTIVITY CHANGE: 0
WEAKNESS: 0
AGITATION: 0
CARDIOVASCULAR NEGATIVE: 1
DIZZINESS: 1
FATIGUE: 1
APPETITE CHANGE: 0
ARTHRALGIAS: 1
RESPIRATORY NEGATIVE: 1

## 2019-01-30 NOTE — PROGRESS NOTES
HPI:       88 year old patient who presents with:    F/U multiple chronic conditions.  She is doing well.  No major events in the past 3 months other than attending her granddaughter's wedding in [place], which the patient enjoyed immensely.  She has minimal episodes of vertigo, brought on by abrupt position change, that last a few seconds and do not disrupt her routine.  No falls.      Continues to complain of chronic fatigue -- lack of energy.  She sleeps a lot.  Naps every day for as much as 5 hours.  Has no difficulty falling asleep at night and sleeps through to morning.  When she is awake, she has no difficulty with nodding off or being drowsy when interacting with others.  Neither she nor her daughter knows if she snores.       Problem, Medication and Allergy Lists were reviewed and are current.  Patient is an established patient of this clinic.         Review of Systems:     Review of Systems   Constitutional: Positive for fatigue. Negative for activity change, appetite change, fever and unexpected weight change.   Respiratory: Negative.    Cardiovascular: Negative.    Musculoskeletal: Positive for arthralgias, back pain and gait problem. Negative for myalgias.   Neurological: Positive for dizziness (mild and fleeting). Negative for weakness.   Psychiatric/Behavioral: Positive for sleep disturbance (excessive). Negative for agitation, dysphoric mood and hallucinations.          Physical Exam:     BP (!) 171/93   Pulse 70   Temp 97.6  F (36.4  C)   Resp 16   Wt 45 kg (99 lb 3.2 oz)   SpO2 97%   BMI 20.73 kg/m      Body mass index is 20.73 kg/m .  Vitals reviewed.    Physical Exam   Constitutional: She is oriented to person, place, and time.   Alert, pleasant.  Uses a 4-wheeled walker with seat.   Cardiovascular: Normal rate, regular rhythm and normal heart sounds.   Pulmonary/Chest: No respiratory distress. She has no wheezes. She has no rales.   Abdominal: Soft. Bowel sounds are normal.  She exhibits no mass. There is no tenderness.   Musculoskeletal:   + marked scoliosis, kyphosis.  + muscle wasting throughout   Neurological: She is alert and oriented to person, place, and time.   Psychiatric: She has a normal mood and affect. Her behavior is normal.           Results:     Results from last visit:  Orders Only on 01/30/2019   Component Date Value Ref Range Status     Phosphorus 01/30/2019 3.2  2.5 - 4.5 mg/dL Final     Alkaline Phosphatase 01/30/2019 61  40 - 150 U/L Final     Albumin 01/30/2019 3.5  3.4 - 5.0 g/dL Final     Sodium 01/30/2019 140  133 - 144 mmol/L Final     Potassium 01/30/2019 4.0  3.4 - 5.3 mmol/L Final     Chloride 01/30/2019 104  94 - 109 mmol/L Final     Carbon Dioxide 01/30/2019 30  20 - 32 mmol/L Final     Anion Gap 01/30/2019 6  3 - 14 mmol/L Final     Glucose 01/30/2019 129* 70 - 99 mg/dL Final     Urea Nitrogen 01/30/2019 17  7 - 30 mg/dL Final     Creatinine 01/30/2019 0.73  0.52 - 1.04 mg/dL Final     GFR Estimate 01/30/2019 73  >60 mL/min/[1.73_m2] Final    Comment: Non  GFR Calc  Starting 12/18/2018, serum creatinine based estimated GFR (eGFR) will be   calculated using the Chronic Kidney Disease Epidemiology Collaboration   (CKD-EPI) equation.       GFR Estimate If Black 01/30/2019 84  >60 mL/min/[1.73_m2] Final    Comment:  GFR Calc  Starting 12/18/2018, serum creatinine based estimated GFR (eGFR) will be   calculated using the Chronic Kidney Disease Epidemiology Collaboration   (CKD-EPI) equation.       Calcium 01/30/2019 9.3  8.5 - 10.1 mg/dL Final     Parathyroid Hormone Intact 01/30/2019 23  18 - 80 pg/mL Final     Assessment and Plan     Belia was seen today for recheck and dizziness.    Diagnoses and all orders for this visit:    Age-related osteoporosis with current pathological fracture with routine healing, subsequent encounter.  Stabe, continue teriparatide.      Primary osteoarthritis, unspecified site.  Doing well,  continue home exercises.    Vertigo.  Mild BPPV, no therapy needed at this point.    Recurrent falls.  Fall free for the past 1+ year.    Chronic fatigue.  She sleeps a lot more than most people and complains of excessive chronic lack of energy.  Naps daily.  However, she is awake and alert for social events and encounters, and snoring status is unknown.  After discussion with her and her daughter, referral to sleep specialist was elected.  -     SLEEP EVALUATION & MANAGEMENT REFERRAL - [facility] / [facility]  [phone] (Age 2 and up); Future        Options for treatment and follow-up care were reviewed with the patient. Belia Sheets engaged in the decision making process and verbalized understanding of the options discussed and agreed with the final plan.    Alejandro Sandhu MD

## 2019-01-31 LAB — ALP BONE SERPL-MCNC: 12.7 UG/L

## 2019-02-04 ENCOUNTER — TELEPHONE (OUTPATIENT)
Dept: ENDOCRINOLOGY | Facility: CLINIC | Age: 84
End: 2019-02-04

## 2019-02-04 RX ORDER — ZOLEDRONIC ACID 5 MG/100ML
5 INJECTION, SOLUTION INTRAVENOUS ONCE
Status: CANCELLED
Start: 2019-03-04 | End: 2019-03-04

## 2019-02-04 NOTE — TELEPHONE ENCOUNTER
Admit/therapy plan ordered for Reclast infusion.    Please get the process of the Reclast injection approval.  Please cancel appointment scheduled for this patient with me in February 2019.   once Reclast is approved by insurance; please to schedule Reclast infusion and follow-up with me on the same day during the month of March or April.  Follow-up with me first and then Reclast infusion on the same day.      Please notify patient once we have the dates for the Reclast infusion and appointment with me.

## 2019-02-04 NOTE — PROGRESS NOTES
Patient is currently completing Forteo.  Per my chart message last day of Forteo injection will be the week of February 24.  Forteo injection needs to be followed up by other treatment plans.  In this patient Reclast will be the best option to proceed with as a follow-up to the Forteo treatment.  Plan has been discussed via my chart with the patient and patient's daughter    The following was a summary of the discussion.    1) The blood work results overall looks good.  The vitamin D level is within the normal limits; however on the low side.  Please increase your vitamin D supplement from your current dose of 1000 international units daily to 2000 international units daily.  Continue same dose of calcium with vitamin D; 1 tablet/day.     2) Please refer to the full DEXA scan report shared on my chart.  Overall, when you look at the report it states that there is a loss of bone density at the back, at the wrist and left hip.  However, when we look at the details of the lumbar spine #1 and #2; there is an overall gain of 3.9% at these sites.  Due to discrepancy in the bone density; all lumbar spines #1 -  #4 are not interpretable(this is due to degenerative changes involving lumbar spine #3 and #4) and the reported loss of -7% in these areas is not valid.  There is a loss in bone density at the left hip.  Gain in bone density at the right hip.  We can further discuss about these findings at your future follow-up visit.     Plan:   1) please complete the Forteo injections as planned.   2) We will move your February appointment to March; so that we can proceed with the Reclast injection and office visits on the same day.  Office visit first and then the Reclast infusion to be arranged on the same day.   3) the Reclast infusion needs prior approval by your insurance company and we will start the process of approval once you are done with the Forteo injection.     Please get the process of the Reclast injection approval.   Please cancel appointment scheduled for this patient with me in February 2019.   once Reclast is approved by insurance; please to schedule Reclast infusion and follow-up with me on the same day during the month of March or April.  Follow-up with me first and then Reclast infusion on the same day.      Please notify patient once we have the dates for the Reclast infusion and appointment with me.

## 2019-02-04 NOTE — RESULT ENCOUNTER NOTE
Good Morning Sudeep,     Here are the lab results of the blood work.     1) The blood work results overall looks good.  The vitamin D level is within the normal limits; however on the low side.  Please increase your vitamin D supplement from your current dose of 1000 international units daily to 2000 international units daily.  Continue same dose of calcium with vitamin D; 1 tablet/day.     2) Please refer to the full DEXA scan report shared on my chart.  Overall, when you look at the report it states that there is a loss of bone density at the back, at the wrist and left hip.  However, when we look at the details of the lumbar spine #1 and #2; there is an overall gain of 3.9% at these sites.  Due to discrepancy in the bone density; all lumbar spines #1 -  #4 are not interpretable(this is due to degenerative changes involving lumbar spine #3 and #4) and the reported loss of -7% in these areas is not valid.  There is a loss in bone density at the left hip.  Gain in bone density at the right hip.  We can further discuss about these findings at your future follow-up visit.     Plan:   1) please complete the Forteo injections as planned.   2) We will move your February appointment to March; so that we can proceed with the Reclast injection and office visits on the same day.  Office visit first and then the Reclast infusion to be arranged on the same day.   3) the Reclast infusion needs prior approval by your insurance company and we will start the process of approval once you are done with the Forteo injection.     I will work with our team to get started on the process of the Reclast injection and will inform you once we have the dates set up.     Thank you and please let me know if you have any additional questions.      Saurabh Pittman

## 2019-02-15 ENCOUNTER — TELEPHONE (OUTPATIENT)
Dept: FAMILY MEDICINE | Facility: CLINIC | Age: 84
End: 2019-02-15

## 2019-02-15 NOTE — TELEPHONE ENCOUNTER
[facility] phone call message - order or referral request for patient:     Order or referral being requested: Requested verbal order to private pay home care for 60 days, 0-24 hrs per day ap requested.    Additional Comments: Please call her for the verbal order.    OK to leave a message on voice mail? Yes    Primary language: English      needed? No    Call taken on February 15, 2019 at 12:01 PM by Lauryn Lopez

## 2019-02-15 NOTE — TELEPHONE ENCOUNTER
Returned call to home care nurse, unable to reach. Left VM with verbal orders per protocol as requested. Left callback number for questions.    Veronica Peguero RN

## 2019-02-18 ENCOUNTER — MEDICAL CORRESPONDENCE (OUTPATIENT)
Dept: HEALTH INFORMATION MANAGEMENT | Facility: CLINIC | Age: 84
End: 2019-02-18

## 2019-02-20 ENCOUNTER — PRE VISIT (OUTPATIENT)
Dept: SLEEP MEDICINE | Facility: CLINIC | Age: 84
End: 2019-02-20

## 2019-02-20 ENCOUNTER — DOCUMENTATION ONLY (OUTPATIENT)
Dept: FAMILY MEDICINE | Facility: CLINIC | Age: 84
End: 2019-02-20

## 2019-02-20 NOTE — PROGRESS NOTES
Form has been completed by provider.     Form sent out via: Fax to [facility] at Fax Number: [phone]  Patient informed: No, Reason:fax confirmed  Output date: February 20, 2019    Augustine Perez CMA      **Please close the encounter**

## 2019-02-20 NOTE — PROGRESS NOTES
"When opening a documentation only encounter, be sure to enter in \"Chief Complaint\" Forms and in \" Comments\" Title of form, description if needed.    Sudeep is a 88 year old  female  Form received via: Fax  Form now resides in: Provider Ready    Diana Serrato CMA                Form has been completed by provider.     Form sent out via: Fax to [phone]  Patient informed: No, Reason:fax confirmed  Output date: February 21, 2019    Diana Serrato CMA      **Please close the encounter**      "

## 2019-02-20 NOTE — TELEPHONE ENCOUNTER
"  1.  Reason for the visit:  Continues to complain of chronic fatigue -- lack of energy.  She sleeps a lot.  Naps every day for as much as 5 hours.  Has no difficulty falling asleep at night and sleeps through to morning.  When she is awake, she has no difficulty with nodding off or being drowsy when interacting with others.  Neither she nor her daughter knows if she snores.  2.  Referring provider and clinic name:  [facility]  3.  Previous Sleep Doctor or Pulmonlogist (clinic name)?  None  4.  Records, Procedures, Imaging, and Labs (see below)  No records to obtain        All NOTES from previous office visits that pertain to why they are being seen in the Sleep Center    Previous Sleep Studies, Chest CT, Echos and reports that pertain to why they are seeing Sleep Center    All Sleep records that have been done in the last 2 years that pertain to why they are seeing Sleep Center            Are they being seen for continuation of care for Cpap/Bipap/Avap/Trilogy/Dental Device? none    If yes to above Who and Where was Device issued/currently getting supplies from? na    Are you currently on \"Supplemental Oxygen\" during the day or night?   na                                                                                                                                                      Please remind pt to bring Cpap machine and ask to arrive 15 minutes early to appointment due traffic and congestion                                                 5. Pt Sleep Center Packet received Pt has completed and will bring to appt    Yes: \"please make sure that you bring this to your appointment completed, either the doctor will not see you until this completed or you may be asked to reschedule your appointment.\"     No: mail or email to the pt and explain, \"please make sure that you bring this to your appointment completed, either the doctor will not see you until this completed or you may be " "asked to reschedule your appointment.\"     ~If pt coming early to fill packet out, ask that they come 30 minutes prior to their appointment~     6. Has the pt's medication list been updated and preferred pharmacy added?     7. Has the allergy list been reviewed?    \"Thank you for choosing [facility] and we look forward to seeing you at your upcoming appointment\"     "

## 2019-02-21 ENCOUNTER — OFFICE VISIT (OUTPATIENT)
Dept: SLEEP MEDICINE | Facility: CLINIC | Age: 84
End: 2019-02-21
Payer: COMMERCIAL

## 2019-02-21 VITALS
HEIGHT: 58 IN | BODY MASS INDEX: 20.57 KG/M2 | DIASTOLIC BLOOD PRESSURE: 93 MMHG | SYSTOLIC BLOOD PRESSURE: 140 MMHG | RESPIRATION RATE: 16 BRPM | WEIGHT: 98 LBS | HEART RATE: 76 BPM

## 2019-02-21 DIAGNOSIS — M25.50 ARTHRALGIA, UNSPECIFIED JOINT: ICD-10-CM

## 2019-02-21 DIAGNOSIS — G47.23 CIRCADIAN RHYTHM SLEEP DISORDER, IRREGULAR SLEEP WAKE TYPE: ICD-10-CM

## 2019-02-21 DIAGNOSIS — R06.02 SHORTNESS OF BREATH: Primary | ICD-10-CM

## 2019-02-21 PROCEDURE — 99215 OFFICE O/P EST HI 40 MIN: CPT | Performed by: NURSE PRACTITIONER

## 2019-02-21 ASSESSMENT — MIFFLIN-ST. JEOR: SCORE: 764.28

## 2019-02-21 NOTE — PATIENT INSTRUCTIONS
"MY TREATMENT INFORMATION FOR SLEEP APNEA-  Belia Sheets    NP : Kenya Millan [place]  SLEEP CENTER :  [place]    MY CONTACT NUMBER: [phone]  Overnight oximetry  Follow up appt with me  Am I having a sleep study at a sleep center?  Make sure you have an appointment for the study before you leave!    Am I having a home sleep study?  Watch this video:  [email or web address]  Please verify your insurance coverage with your insurance carrier    Frequently asked questions:  1. What is Obstructive Sleep Apnea (CEASAR)? CEASAR is the most common type of sleep apnea. Apnea means, \"without breath.\"  Apnea is most often caused by narrowing or collapse of the upper airway as muscles relax during sleep.   Almost everyone has occasional apneas. Most people with sleep apnea have had brief interruptions at night frequently for many years.  The severity of sleep apnea is related to how frequent and severe the events are.   2. What are the consequences of CEASAR? Symptoms include: feeling sleepy during the day, snoring loudly, gasping or stopping of breathing, trouble sleeping, and occasionally morning headaches or heartburn at night.  Sleepiness can be serious and even increase the risk of falling asleep while driving. Other health consequences may include development of high blood pressure and other cardiovascular disease in persons who are susceptible. Untreated CEASAR  can contribute to heart disease, stroke and diabetes.   3. What are the treatment options? In most situations, sleep apnea is a lifelong disease that must be managed with daily therapy. Medications are not effective for sleep apnea and surgery is generally not considered until other therapies have been tried. Your treatment is your choice . Continuous Positive Airway (CPAP) works right away and is the therapy that is effective in nearly everyone. An oral device to hold your jaw forward is usually the " next most reliable option. Other options include postioning devices (to keep you off your back), weight loss, and surgery including a tongue pacing device. There is more detail about some of these options below.    Important tips for using CPAP and similar devices   Know your equipment:  CPAP is continuous positive airway pressure that prevents obstructive sleep apnea by keeping the throat from collapsing while you are sleeping. In most cases, the device is  smart  and can slowly self-adjusts if your throat collapses and keeps a record every day of how well you are treated-this information is available to you and your care team.  BPAP is bilevel positive airway pressure that keeps your throat open and also assists each breath with a pressure boost to maintain adequate breathing.  Special kinds of BPAP are used in patients who have inadequate breathing from lung or heart disease. In most cases, the device is  smart  and can slowly self-adjusts to assist breathing. Like CPAP, the device keeps a record of how well you are treated.  Your mask is your connection to the device. You get to choose what feels most comfortable and the staff will help to make sure if fits. Here: are some examples of the different masks that are available:       Key points to remember on your journey with sleep apnea:  1. Sleep study.  PAP devices often need to be adjusted during a sleep study to show that they are effective and adjusted right.  2. Good tips to remember: Try wearing just the mask during a quiet time during the day so your body adapts to wearing it. A humidifier is recommended for comfort in most cases to prevent drying of your nose and throat. Allergy medication from your provider may help you if you are having nasal congestion.  3. Getting settled-in. It takes more than one night for most of us to get used to wearing a mask. Try wearing just the mask during a quiet time during the day so your body adapts to wearing it. A  humidifier is recommended for comfort in most cases. Our team will work with you carefully on the first day and will be in contact within 4 days and again at 2 and 4 weeks for advice and remote device adjustments. Your therapy is evaluated by the device each day.   4. Use it every night. The more you are able to sleep naturally for 7-8 hours, the more likely you will have good sleep and to prevent health risks or symptoms from sleep apnea. Even if you use it 4 hours it helps. Occasionally all of us are unable to use a medical therapy, in sleep apnea, it is not dangerous to miss one night.   5. Communicate. Call our skilled team on the number provided on the first day if your visit for problems that make it difficult to wear the device. Over 2 out of 3 patients can learn to wear the device long-term with help from our team. Remember to call our team or your sleep providers if you are unable to wear the device as we may have other solutions for those who cannot adapt to mask CPAP therapy. It is recommended that you sleep your sleep provider within the first 3 months and yearly after that if you are not having problems.   Take care of your equipment. Make sure you clean your mask and tubing using directions every day and that your filter and mask are replaced as recommended or if they are not working.     BESIDES CPAP, WHAT OTHER THERAPIES ARE THERE?    Positioning Device  Positioning devices are generally used when sleep apnea is mild and only occurs on your back.This example shows a pillow that straps around the waist. It may be appropriate for those whose sleep study shows milder sleep apnea that occurs primarily when lying flat on one's back. Preliminary studies have shown benefit but effectiveness at home may need to be verified by a home sleep test. These devices are generally not covered by medical insurance.  Examples of devices that maintain sleeping on the back to prevent snoring and mild sleep apnea.    Belt  type body positioner  Http://[employer].Saberr/    Electronic reminder  Http://nightshifttherapy.com/  Http://www.[employer].com.au/    Oral Appliance  What is oral appliance therapy?  An oral appliance device fits on your teeth at night like a retainer used after having braces. The device is made by a specialized dentist and requires several visits over 1-2 months before a manufactured device is made to fit your teeth and is adjusted to prevent your sleep apnea. Once an oral device is working properly, snoring should be improved. A home sleep test may be recommended at that time if to determine whether the sleep apnea is adequately treated.       Some things to remember:  -Oral devices are often, but not always, covered by your medical insurance. Be sure to check with your insurance provider.   -If you are referred for oral therapy, you will be given a list of specialized dentists to consider or you may choose to visit the Web site of the American Academy of Dental Sleep Medicine  -Oral devices are less likely to work if you have severe sleep apnea or are extremely overweight.     More detailed information  An oral appliance is a small acrylic device that fits over the upper and lower teeth  (similar to a retainer or a mouth guard). This device slightly moves jaw forward, which moves the base of the tongue forward, opens the airway, improves breathing for effective treat snoring and obstructive sleep apnea in perhaps 7 out of 10 people .  The best working devices are custom-made by a dental device  after a mold is made of the teeth 1, 2, 3.  When is an oral appliance indicated?  Oral appliance therapy is recommended as a first-line treatment for patients with primary snoring, mild sleep apnea, and for patients with moderate sleep apnea who prefer appliance therapy to use of CPAP4, 5. Severity of sleep apnea is determined by sleep testing and is based on the number of respiratory events per hour of sleep.   How  successful is oral appliance therapy?  The success rate of oral appliance therapy in patients with mild sleep apnea is 75-80% while in patients with moderate sleep apnea it is 50-70%. The chance of success in patients with severe sleep apnea is 40-50%. The research also shows that oral appliances have a beneficial effect on the cardiovascular health of CEASAR patients at the same magnitude as CPAP therapy7.  Oral appliances should be a second-line treatment in cases of severe sleep apnea, but if not completely successful then a combination therapy utilizing CPAP plus oral appliance therapy may be effective. Oral appliances tend to be effective in a broad range of patients although studies show that the patients who have the highest success are females, younger patients, those with milder disease, and less severe obesity. 3, 6.   Finding a dentist that practices dental sleep medicine  Specific training is available through the American Academy of Dental Sleep Medicine for dentists interested in working in the field of sleep. To find a dentist who is educated in the field of sleep and the use of oral appliances, near you, visit the Web site of the American Academy of Dental Sleep Medicine.    References  1. Bella et al. Objectively measured vs self-reported compliance during oral appliance therapy for sleep-disordered breathing. Chest 2013; 144(5): 2127-5221.  2. Will et al. Objective measurement of compliance during oral appliance therapy for sleep-disordered breathing. Thorax 2013; 68(1): 91-96.  3. Sydnee, et al. Mandibular advancement devices in 620 men and women with CEASAR and snoring: tolerability and predictors of treatment success. Chest 2004; 125: 7326-0021.  4. Angela, et al. Oral appliances for snoring and CEASAR: a review. Sleep 2006; 29: 244-262.  5. Sakshi, et al. Oral appliance treatment for CEASAR: an update. J Clin Sleep Med 2014; 10(2): 215-227.  6. Jignesh et al. Predictors of OSAH  treatment outcome. J Dent Res 2007; 86: 4368-2090.      Weight Loss:    Weight loss is a long-term strategy that may improve sleep apnea in some patients.    Weight management is a personal decision and the decision should be based on your interest and the potential benefits.  If you are interested in exploring weight loss strategies, the following discussion covers the impact on weight loss on sleep apnea and the approaches that may be successful.    Being overweight does not necessarily mean you will have health consequences.  Those who have BMI over 35 or over 27 with existing medical conditions carries greater risk.   Weight loss decreases severity of sleep apnea in most people with obesity. For those with mild obesity who have developed snoring with weight gain, even 15-30 pound weight loss can improve and occasionally eliminate sleep apnea.  Structured and life-long dietary and health habits are necessary to lose weight and keep healthier weight levels.     Though there may be significant health benefits from weight loss, long-term weight loss is very difficult to achieve- studies show success with dietary management in less than 10% of people. In addition, substantial weight loss may require years of dietary control and may be difficult if patients have severe obesity. In these cases, surgical management may be considered.  Finally, older individuals who have tolerated obesity without health complications may be less likely to benefit from weight loss strategies.      [unfilled]    Surgery:    Surgery for obstructive sleep apnea is considered generally only when other therapies fail to work. Surgery may be discussed with you if you are having a difficult time tolerating CPAP and or when there is an abnormal structure that requires surgical correction.  Nose and throat surgeries often enlarge the airway to prevent collapse.  Most of these surgeries create pain for 1-2 weeks and up to half of the most common  surgeries are not effective throughout life.  You should carefully discuss the benefits and drawbacks to surgery with your sleep provider and surgeon to determine if it is the best solution for you.   More information  Surgery for CEASAR is directed at areas that are responsible for narrowing or complete obstruction of the airway during sleep.  There are a wide range of procedures available to enlarge and/or stabilize the airway to prevent blockage of breathing in the three major areas where it can occur: the palate, tongue, and nasal regions.  Successful surgical treatment depends on the accurate identification of the factors responsible for obstructive sleep apnea in each person.  A personalized approach is required because there is no single treatment that works well for everyone.  Because of anatomic variation, consultation with an examination by a sleep surgeon is a critical first step in determining what surgical options are best for each patient.  In some cases, examination during sedation may be recommended in order to guide the selection of procedures.  Patients will be counseled about risks and benefits as well as the typical recovery course after surgery. Surgery is typically not a cure for a person s CEASAR.  However, surgery will often significantly improve one s CEASAR severity (termed  success rate ).  Even in the absence of a cure, surgery will decrease the cardiovascular risk associated with OSA7; improve overall quality of life8 (sleepiness, functionality, sleep quality, etc).      Palate Procedures:  Patients with CEASAR often have narrowing of their airway in the region of their tonsils and uvula.  The goals of palate procedures are to widen the airway in this region as well as to help the tissues resist collapse.  Modern palate procedure techniques focus on tissue conservation and soft tissue rearrangement, rather than tissue removal.  Often the uvula is preserved in this procedure. Residual sleep apnea is  common in patient after pharyngoplasty with an average reduction in sleep apnea events of 33%2.      Tongue Procedures:  ExamWhile patients are awake, the muscles that surround the throat are active and keep this region open for breathing. These muscles relax during sleep, allowing the tongue and other structures to collapse and block breathing.  There are several different tongue procedures available.  Selection of a tongue base procedure depends on characteristics seen on physical exam.  Generally, procedures are aimed at removing bulky tissues in this area or preventing the back of the tongue from falling back during sleep.  Success rates for tongue surgery range from 50-62%3.    Hypoglossal Nerve Stimulation:  Hypoglossal nerve stimulation has recently received approval from the [place] Food and Drug Administration for the treatment of obstructive sleep apnea.  This is based on research showing that the system was safe and effective in treating sleep apnea6.  Results showed that the median AHI score decreased 68%, from 29.3 to 9.0. This therapy uses an implant system that senses breathing patterns and delivers mild stimulation to airway muscles, which keeps the airway open during sleep.  The system consists of three fully implanted components: a small generator (similar in size to a pacemaker), a breathing sensor, and a stimulation lead.  Using a small handheld remote, a patient turns the therapy on before bed and off upon awakening.    Candidates for this device must be greater than 22 years of age, have moderate to severe CEASAR (AHI between 20-65), BMI less than 32, have tried CPAP/oral appliance without success, and have appropriate upper airway anatomy (determined by a sleep endoscopy performed by Dr. Pacheco).    Hypoglossal Nerve Stimulation Pathway:    The sleep surgeon s office will work with the patient through the insurance prior-authorization process (including communications and appeals).    Nasal  Procedures:  Nasal obstruction can interfere with nasal breathing during the day and night.  Studies have shown that relief of nasal obstruction can improve the ability of some patients to tolerate positive airway pressure therapy for obstructive sleep apnea1.  Treatment options include medications such as nasal saline, topical corticosteroid and antihistamine sprays, and oral medications such as antihistamines or decongestants. Non-surgical treatments can include external nasal dilators for selected patients. If these are not successful by themselves, surgery can improve the nasal airway either alone or in combination with these other options.      Combination Procedures:  Combination of surgical procedures and other treatments may be recommended, particularly if patients have more than one area of narrowing or persistent positional disease.  The success rate of combination surgery ranges from 66-80%2,3.    References  1. Ramila KING. The Role of the Nose in Snoring and Obstructive Sleep Apnoea: An Update.  Eur Arch Otorhinolaryngol. 2011; 268: 1365-73.  2.  Selvin SM; Alesia JA; Reid JR; Pallanch JF; Jacinto MB; Cynthia SG; Vandana GOODE. Surgical modifications of the upper airway for obstructive sleep apnea in adults: a systematic review and meta-analysis. SLEEP 2010;33(10):2819-5253. Bulmarorihenry BENAVIDEZ. Hypopharyngeal surgery in obstructive sleep apnea: an evidence-based medicine review.  Arch Otolaryngol Head Neck Surg. 2006 Feb;132(2):206-13.  3. Raghav YH1, Kade Y, Richar JAMAL. The efficacy of anatomically based multilevel surgery for obstructive sleep apnea. Otolaryngol Head Neck Surg. 2003 Oct;129(4):327-35.  4. Leigh BENAVIDEZ, Goldberg A. Hypopharyngeal Surgery in Obstructive Sleep Apnea: An Evidence-Based Medicine Review. Arch Otolaryngol Head Neck Surg. 2006 Feb;132(2):206-13.  5. Lawanda CHOW et al. Upper-Airway Stimulation for Obstructive Sleep Apnea.  N Engl J Med. 2014 Jan 9;370(2):139-49.  6. Dory MEDINA et al. Increased  Incidence of Cardiovascular Disease in Middle-aged Men with Obstructive Sleep Apnea. Am J Respir Crit Care Med; 2002 166: 159-165  7. Ana Paula TREVINO et al. Studying Life Effects and Effectiveness of Palatopharyngoplasty (SLEEP) study: Subjective Outcomes of Isolated Uvulopalatopharyngoplasty. Otolaryngol Head Neck Surg. 2011; 144: 623-631.

## 2019-02-21 NOTE — PROGRESS NOTES
CC: patient is referred for sleep consultation by PCP Dr. Sandhu for excessive sleeping and chronic fatigue.  Had chronic fatigue before an episode of encephalopathy and also before and after rectal prolapse surgery.    HPI:Symptoms: Pt attends this visit with her daughter Lubna.  Friends and family report that there is snoring, pt and daughter are without knowledge of volume, Sudeep is aware of occasionally having a snort arousal or awareness of snoring.  Sleeps primarily on sides no signs of orexin deficiency, or abnormal behaviors or leg movements that disrupted her sleep.    Sleep Schedule is veried as day takes on a different flavor if she has visitors that may join her for a breakfast or a lunch.  Somewhere between 1130 and 1 AM she does get ready for bed and when ready for bed will enter it with sleep latency of about 10 minutes. She is up 3 times a night with nocturia it may take her about 10 minutes to re-fall back asleep. She may sleep quite soundly until around 9:20A AM when her alarm goes off at that point in time she has a decision to make as to whether she is going to go down and take some of the food that has been made for breakfast and bring her back to her apartment or if she has guests, she may plan to get up and plan on having breakfast with them.  If she does remove food from the dining facility she may refrigerate that sleep a little bit longer could be 30 mins to 5 hrs.   She may be up then until 1130 or 1 in the morning.  When she enters bed she will watch TV and in particular before the end of the night she will be watching pride and prejudice she uses this to distract herself from mental stress  It is usual for the TV to go black when she is done with the episode but on occasion it may stay somewhat back lit she states that she has two night lights on and her bedroom is not dark because she needs it to be safe and she does not want to fall she states that when she is up and has guests her  apartment is brightly lit and she usually does not have problems with dozing off when she is entertaining     Social personal family history and sleep scales: She lives in an apartment she is a retired educator her sleep is disrupted by frequent nocturia she just recently had rectal prolapse surgery and that has not made a dent on number of times that she urinates at night. her son has obstructive sleep apnea she does not drink alcohol or use other recreational drugs She does have green tea with breakfast and she does do a fair amount of exercise in her facility-loves to dance.  Her [place] Sleepiness Scale today is 16/24 chance of dozing is highest with reading and being a passenger in a car for an hour or lying down in the afternoon or sitting after lunch (without alcohol) for a period of time     Studies: No previous sleep studies    Phosphorus 01/30/2019 3.2  2.5 - 4.5 mg/dL Final      Alkaline Phosphatase 01/30/2019 61  40 - 150 U/L Final     Albumin 01/30/2019 3.5  3.4 - 5.0 g/dL Final     Sodium 01/30/2019 140  133 - 144 mmol/L Final     Potassium 01/30/2019 4.0  3.4 - 5.3 mmol/L Final     Chloride 01/30/2019 104  94 - 109 mmol/L Final     Carbon Dioxide 01/30/2019 30  20 - 32 mmol/L Final     Anion Gap 01/30/2019 6  3 - 14 mmol/L Final     Glucose 01/30/2019 129* 70 - 99 mg/dL Final     Urea Nitrogen 01/30/2019 17  7 - 30 mg/dL Final     Creatinine 01/30/2019 0.73  0.52 - 1.04 mg/dL Final     GFR Estimate 01/30/2019 73  >60 mL        Shortness of breath, feels she's somewhat SOB due to constriction with neuromuscular/skeletal limitations.     COMPARISON: 1/7/2018     FINDINGS: PA and lateral views of the chest. No acute airspace  opacities. No pneumothorax or pleural effusion. Heart size is normal.  Mitral annular calcifications. Numerous bilateral rib fractures as  well as vertebral compression fractures are not substantially changed  from prior examination.                                                                       IMPRESSION:   1. No acute cardiopulmonary findings.  2. 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    Hospitalized 12/18 surgery for rectal prolapse    ROS 14 point comprehensive review of systems complete and negative with exception of the following ear nose and throat: postnasal drip and runny nose skin: some dry skin pulmonary:: shortness of breath with activity dry cough coughing up mucus;  muscles and bones: some muscle pain medical history is in chart surgical history tonsil and adenoids when she was 6 colorectal surgery just recently 7 inches removed from a rectal prolapse that was in January 2018        Allergies:    No Known Allergies    Medications:    Current Outpatient Medications   Medication Sig Dispense Refill     acetaminophen (TYLENOL) 325 MG tablet Take 2 tablets (650 mg) by mouth every 4 hours as needed for mild pain (Patient not taking: Reported on 10/3/2018) 60 tablet 0     amLODIPine (NORVASC) 5 MG tablet Take 1 tablet (5 mg) by mouth daily 90 tablet 1     calcium-vitamin D (CALTRATE) 600-400 MG-UNIT per tablet Take 1 tablet by mouth daily 90 tablet 3     cholecalciferol (VITAMIN  -D) 1000 UNITS capsule Take 1 capsule (1,000 Units) by mouth daily 100 capsule 3     Cyanocobalamin (VITAMIN B 12 PO) Take 1 tablet by mouth every morning        magnesium oxide (MAG-OX) 400 MG tablet Take 600 mg by mouth        menthol-zinc oxide (CALMOSEPTINE) 0.44-20.625 % OINT ointment Apply topically 4 times daily as needed for skin protection 113 g 1     nystatin (MYCOSTATIN) 912854 UNIT/GM POWD Apply topically 3 times daily as needed (Patient not taking: Reported on 10/3/2018) 30 g 1     order for DME Equipment being ordered: Home BP monitor and cuff 1 each 0     saccharomyces boulardii (FLORASTOR) 250 MG capsule Take 1 capsule (250 mg) by mouth 2 times daily (Patient not taking: Reported on 10/3/2018) 14 capsule      sodium fluoride  dental gel (PREVIDENT) 1.1 % GEL Apply to affected area At Bedtime       teriparatide, recombinant, (FORTEO) 600 MCG/2.4ML SOLN injection Inject 0.08 mLs (20 mcg) Subcutaneous daily 6 mL 1       Problem List:  Patient Active Problem List    Diagnosis Date Noted     Kyphosis 2018     Priority: Medium     Rectal prolapse 2018     Priority: Medium     Generalized weakness 2018     Priority: Medium     Encephalopathy 10/23/2017     Priority: Medium     Advance Care Planning 2017     Priority: Medium     Senile osteoporosis 2017     Priority: Medium     Lumbar verterbral fracture, non-traumatic 2017     Priority: Medium     Recurrent falls 2016     Priority: Medium     Chronic fatigue 2016     Priority: Medium     Osteoporosis      Priority: Medium     Osteoarthritis      Priority: Medium     hands          Past Medical/Surgical History:  Past Medical History:   Diagnosis Date     Carpal tunnel syndrome (aka CTS)      H/O calcium pyrophosphate deposition disease (CPPD)      History of encephalopathy 10/2017     Hypertension      Kyphoscoliosis      Osteoarthritis     hands     Osteoporosis      Rectal prolapse      Past Surgical History:   Procedure Laterality Date     COLONOSCOPY       HYSTERECTOMY      for uterine prolapse     RECTOSIGMOIDECTOMY PERINEAL N/A 2018    Procedure: RECTOSIGMOIDECTOMY PERINEAL;  Perineal Rectosigmoidectomy  ;  Surgeon: Amrik Mariano MD;  Location: [facility] OR       Family History   Problem Relation Age of Onset     Depression/Anxiety Son      Depression/Anxiety Son         alcohol abuse  age 24     Hypertension Mother      Hypertension Brother      Diabetes No family hx of      Breast Cancer No family hx of      Colon Cancer No family hx of      Prostate Cancer No family hx of      Other Cancer No family hx of      Social History     Socioeconomic History     Marital status:      Spouse name: Not on file     Number of  children: Not on file     Years of education: Not on file     Highest education level: Not on file   Occupational History     Not on file   Social Needs     Financial resource strain: Not on file     Food insecurity:     Worry: Not on file     Inability: Not on file     Transportation needs:     Medical: Not on file     Non-medical: Not on file   Tobacco Use     Smoking status: Never Smoker     Smokeless tobacco: Never Used   Substance and Sexual Activity     Alcohol use: No     Drug use: No     Sexual activity: No   Lifestyle     Physical activity:     Days per week: Not on file     Minutes per session: Not on file     Stress: Not on file   Relationships     Social connections:     Talks on phone: Not on file     Gets together: Not on file     Attends Sabianism service: Not on file     Active member of club or organization: Not on file     Attends meetings of clubs or organizations: Not on file     Relationship status: Not on file     Intimate partner violence:     Fear of current or ex partner: Not on file     Emotionally abused: Not on file     Physically abused: Not on file     Forced sexual activity: Not on file   Other Topics Concern     Not on file   Social History Narrative     Not on file       Physical Examination:  Vitals: There were no vitals taken for this visit.  BMI= There is no height or weight on file to calculate BMI.    140/93, 76, 16 weight 98 pounds    No flowsheet data found.    GENERAL APPEARANCE: alert, active and no distress    EDUCATION:  Significant time was spent discussing sleep schedule regularity, environmental control so that sleep environment is cool quiet and dark (difficulty with safety) and daytime is light, pathophysiology of obstructive sleep apnea various gradations of severity of sleep apnea and treatment options.    Assessment/Plan: It is my impression that Sudeep has multiple factors that are impacting her feelings of fatigue and on occasion sleepiness.  She has quite an  irregular schedule with a delayed sleep phase pattern and actual time devoted to sleep is fragmented by nocturia.  She has some restriction of her shoulder girdle and likely her of thorax possibly resulting in some dyspnea which she does admit to.  Her pretest probability for obstructive sleep apnea, despite being exceptionally thin and not having a bed partner is estimated with a stop bangscore at 4 out of 8-mild/moderate pretest probability..  She is interested in knowing whether her sleep could be improved and the following plan of care has been developed    1.  Dyspnea: I have ordered a overnight oximetry and have recommended a follow-up appointment the next day with her and her daughter.  Will discuss pros and cons of taking the next step as well as do a quick physical exam should we be doing a sleep study.  2.  Irregular sleep-wake schedule: With a delayed sleep phase pattern by her verbal report at times when she is much more active she does not feel fatigued, and napping is minimal.  It is possible that the fatigue may be related to some mild depression, or on going chronic illness such as her arthritis or CPPD.  and perhaps with some scheduling of periods of activity and brief nap periods  her quality of sleep and life maybe improved.  Will consider doing a Actigraph watch once I do see her back in clinic with her overnight oximetry.  3. Pain: arthritic and also fragment sleep.    Time spent with patient is 60 minutes, of which >50% was spent in counseling, education and coordination of care.    CC: Alejandro Sandhu

## 2019-03-11 ENCOUNTER — DOCUMENTATION ONLY (OUTPATIENT)
Dept: FAMILY MEDICINE | Facility: CLINIC | Age: 84
End: 2019-03-11

## 2019-03-11 NOTE — PROGRESS NOTES
Form has been completed by provider.     Form sent out via: Fax to [phone]  Patient informed: No, Reason:fax confirmed  Output date: March 11, 2019    Diana Serrato CMA      **Please close the encounter**

## 2019-03-12 ENCOUNTER — OFFICE VISIT (OUTPATIENT)
Dept: SLEEP MEDICINE | Facility: CLINIC | Age: 84
End: 2019-03-12
Payer: COMMERCIAL

## 2019-03-12 DIAGNOSIS — R06.02 SHORTNESS OF BREATH: ICD-10-CM

## 2019-03-12 RX ORDER — ZOLEDRONIC ACID 5 MG/100ML
5 INJECTION, SOLUTION INTRAVENOUS ONCE
Status: CANCELLED
Start: 2019-03-12 | End: 2019-03-12

## 2019-03-12 NOTE — PROGRESS NOTES
Patient's daughter Lubna instructed on CATHY use. Patient's daughter demonstrated and verbalized knowledge of use. Insurance was verified by financial securing. Device programmed. Device will be returned tomorrow before noon.    Jm Lopez  Sleep Clinic Specialist - [place]

## 2019-03-13 ENCOUNTER — OFFICE VISIT (OUTPATIENT)
Dept: ENDOCRINOLOGY | Facility: CLINIC | Age: 84
End: 2019-03-13
Payer: COMMERCIAL

## 2019-03-13 ENCOUNTER — APPOINTMENT (OUTPATIENT)
Dept: LAB | Facility: CLINIC | Age: 84
End: 2019-03-13
Attending: INTERNAL MEDICINE
Payer: COMMERCIAL

## 2019-03-13 ENCOUNTER — DOCUMENTATION ONLY (OUTPATIENT)
Dept: SLEEP MEDICINE | Facility: CLINIC | Age: 84
End: 2019-03-13
Payer: COMMERCIAL

## 2019-03-13 ENCOUNTER — INFUSION THERAPY VISIT (OUTPATIENT)
Dept: INFUSION THERAPY | Facility: CLINIC | Age: 84
End: 2019-03-13
Attending: INTERNAL MEDICINE
Payer: COMMERCIAL

## 2019-03-13 VITALS
DIASTOLIC BLOOD PRESSURE: 86 MMHG | HEIGHT: 58 IN | BODY MASS INDEX: 20.87 KG/M2 | HEART RATE: 67 BPM | OXYGEN SATURATION: 95 % | WEIGHT: 99.4 LBS | SYSTOLIC BLOOD PRESSURE: 147 MMHG | TEMPERATURE: 97.7 F

## 2019-03-13 VITALS
HEART RATE: 69 BPM | DIASTOLIC BLOOD PRESSURE: 91 MMHG | RESPIRATION RATE: 16 BRPM | WEIGHT: 100 LBS | OXYGEN SATURATION: 96 % | TEMPERATURE: 96.2 F | BODY MASS INDEX: 20.99 KG/M2 | SYSTOLIC BLOOD PRESSURE: 137 MMHG | HEIGHT: 58 IN

## 2019-03-13 DIAGNOSIS — M80.00XD AGE-RELATED OSTEOPOROSIS WITH CURRENT PATHOLOGICAL FRACTURE WITH ROUTINE HEALING, SUBSEQUENT ENCOUNTER: Primary | ICD-10-CM

## 2019-03-13 DIAGNOSIS — R09.82 POSTNASAL DRIP: ICD-10-CM

## 2019-03-13 LAB
ALBUMIN SERPL-MCNC: 3.2 G/DL (ref 3.4–5)
CALCIUM SERPL-MCNC: 8.6 MG/DL (ref 8.5–10.1)
CREAT SERPL-MCNC: 0.58 MG/DL (ref 0.52–1.04)
GFR SERPL CREATININE-BSD FRML MDRD: 82 ML/MIN/{1.73_M2}
TSH SERPL DL<=0.005 MIU/L-ACNC: 0.88 MU/L (ref 0.4–4)

## 2019-03-13 PROCEDURE — 82565 ASSAY OF CREATININE: CPT | Performed by: INTERNAL MEDICINE

## 2019-03-13 PROCEDURE — 82040 ASSAY OF SERUM ALBUMIN: CPT | Performed by: INTERNAL MEDICINE

## 2019-03-13 PROCEDURE — 96374 THER/PROPH/DIAG INJ IV PUSH: CPT

## 2019-03-13 PROCEDURE — 84443 ASSAY THYROID STIM HORMONE: CPT | Performed by: INTERNAL MEDICINE

## 2019-03-13 PROCEDURE — 25000128 H RX IP 250 OP 636: Mod: ZF | Performed by: INTERNAL MEDICINE

## 2019-03-13 PROCEDURE — 82310 ASSAY OF CALCIUM: CPT | Performed by: INTERNAL MEDICINE

## 2019-03-13 RX ORDER — ZOLEDRONIC ACID 5 MG/100ML
5 INJECTION, SOLUTION INTRAVENOUS ONCE
Status: COMPLETED | OUTPATIENT
Start: 2019-03-13 | End: 2019-03-13

## 2019-03-13 RX ORDER — FLUTICASONE PROPIONATE 50 MCG
1 SPRAY, SUSPENSION (ML) NASAL DAILY
Qty: 15.8 ML | Refills: 1 | Status: SHIPPED | OUTPATIENT
Start: 2019-03-13 | End: 2020-12-23

## 2019-03-13 RX ADMIN — ZOLEDRONIC ACID 5 MG: 0.05 INJECTION, SOLUTION INTRAVENOUS at 14:33

## 2019-03-13 ASSESSMENT — MIFFLIN-ST. JEOR
SCORE: 773.35
SCORE: 770.63

## 2019-03-13 ASSESSMENT — PAIN SCALES - GENERAL
PAINLEVEL: NO PAIN (0)
PAINLEVEL: NO PAIN (0)

## 2019-03-13 NOTE — LETTER
RE: Belia Sheets  [address]  [address]  [facility] [address]     Dear Colleague,    Thank you for referring your patient, Belia Sheets, to the [facility] ENDOCRINOLOGY at [facility]. Please see a copy of my visit note below.    Endocrinology Clinic Visit    Chief Complaint: RECHECK (osteoporosis)     Information obtained from: Patient is here with her daughter today.     Subjective:         HPI: Belia Sheets is a 88 year old year old female with history of osteoporosis who is here for a follow up.      Again, this is 87 yo female with past medical history of osteoporosis first diagnosed in 1999.     She was treated with alendronate 70 mg weekly from 1999 to October 2015.  She has never stopped this medication all these years; she took it first thing in the morning with empty stomach.    In October of 2015 she was prescribed FORTEO; mainly because she wanted to change from bisphosphonate per report. However, she didn't take these medication because of cost which was 600 for two years and she didn't know whether it will help her or not.     +History of vertebral fracture; she was folding laundry when she fell from standing up position and sustained injury.  Broken hip and noted to have thoracic compression fractures.  Previous fragility fracture, morphometric vertebral fracture: imaging done in 7/2015 has showed multiple compression fractures.   7/2015 spine film reported as <Multiple old healed left rib fractures. Diffuse osteopenia. New moderate compression fracture of T10 since the prior examination. New moderate compression fractures in T4 and T5 are new since the prior exam. Multilevel degenerative changes in the thoracic spine are otherwise stable. Marked scoliosis of the thoracolumbar spine is redemonstrated.>    Due to severe osteoporosis, long standing previous alendronate therapy and alkaline phosphatase which was within the normal limits; we  started On FORTEO about two years ago and she completed therapy on 2/24/2019.  Didn't have any side effects from these medication.  No fracture over the last 2 years while she was on Forteo.  She uses a walker for walking.  Follow-up DEXA which was done towards the end of therapy with Forteo is copied below.      She is taking calcium and vitamin d supplement 600-400; 1 tab daily; she takes vitamin D 2000 supplements.  She has at least 2 servings of dairy products a day.  Calcium labs which were done at the end of January 2019 are within the normal limits.  Vitamin D was slightly on the low side at 31 and at that time we increased her vitamin D supplement from 1000 international units daily to 2000 international units daily.    Risk factors for osteoporosis and complications:  Lost 6 inch height loss/kyphosis    Weight - 91 pounds (high risk); gained 5 pounds since her last visit.   Falls/risk factors: Multiple falls. 5 falls ine one year and half but again no falls over the last one year.  Sustained rib and vertebral fractures secondary to the previous falls. Currently participating in home physical therapy for osteoporosis.   Walks three times per day in hallways of her apartment. Uses walker/cane for walking short distances and 4 barros for long distances. Fall: 7/2015, 12/2014, 4/2016, 6/2016. 01/2017.    Again and no fractures over the last 2 years since she was started on Forteo.    She lives in a senior apartment.  She gets some services. Mostly manages by her own.     Secondary causes of osteoporosis: negative for    RA, Prolonged immobility,  Organ transplantation, Type I diabetes, Hyperthyroidism, GI disease, Chronic liver disease or COPD.     Review of system negative except for postnasal drip and coughing episode following postnasal drip.     No Known Allergies    Current Outpatient Medications   Medication Sig Dispense Refill     cholecalciferol (VITAMIN D3) 1000 units (25 mcg) capsule Take 2 capsules  (2,000 Units) by mouth daily 100 capsule 3     fluticasone (FLONASE) 50 MCG/ACT nasal spray Spray 1 spray into both nostrils daily 15.8 mL 1     acetaminophen (TYLENOL) 325 MG tablet Take 2 tablets (650 mg) by mouth every 4 hours as needed for mild pain (Patient not taking: Reported on 10/3/2018) 60 tablet 0     amLODIPine (NORVASC) 5 MG tablet Take 1 tablet (5 mg) by mouth daily 90 tablet 1     calcium-vitamin D (CALTRATE) 600-400 MG-UNIT per tablet Take 1 tablet by mouth daily 90 tablet 3     Cyanocobalamin (VITAMIN B 12 PO) Take 1 tablet by mouth every morning        magnesium oxide (MAG-OX) 400 MG tablet Take 600 mg by mouth        menthol-zinc oxide (CALMOSEPTINE) 0.44-20.625 % OINT ointment Apply topically 4 times daily as needed for skin protection 113 g 1     nystatin (MYCOSTATIN) 027618 UNIT/GM POWD Apply topically 3 times daily as needed (Patient not taking: Reported on 10/3/2018) 30 g 1     order for DME Equipment being ordered: Home BP monitor and cuff 1 each 0     saccharomyces boulardii (FLORASTOR) 250 MG capsule Take 1 capsule (250 mg) by mouth 2 times daily (Patient not taking: Reported on 10/3/2018) 14 capsule      sodium fluoride dental gel (PREVIDENT) 1.1 % GEL Apply to affected area At Bedtime       teriparatide, recombinant, (FORTEO) 600 MCG/2.4ML SOLN injection Inject 0.08 mLs (20 mcg) Subcutaneous daily 6 mL 1       Review of Systems     11 point review system (Constitutional, HENT, Eyes, Respiratory, Cardiovascular, Gastrointestinal, Genitourinary, Musculoskeletal,Neurological, Psychiatric/Behavioural, Endocrine) is negative or is as per HPI above: fecal incontinence.       Past Medical History:   Diagnosis Date     Carpal tunnel syndrome (aka CTS)      H/O calcium pyrophosphate deposition disease (CPPD)      History of encephalopathy 10/2017     Hypertension      Kyphoscoliosis      Osteoarthritis     hands     Osteoporosis      Rectal prolapse        Past Surgical History:   Procedure  "Laterality Date     COLONOSCOPY       HYSTERECTOMY      for uterine prolapse     RECTOSIGMOIDECTOMY PERINEAL N/A 2018    Procedure: RECTOSIGMOIDECTOMY PERINEAL;  Perineal Rectosigmoidectomy  ;  Surgeon: Amrik Mariano MD;  Location: [facility] OR       Family History   Problem Relation Age of Onset     Depression/Anxiety Son      Depression/Anxiety Son         alcohol abuse  age 24     Hypertension Mother      Hypertension Brother      Diabetes No family hx of      Breast Cancer No family hx of      Colon Cancer No family hx of      Prostate Cancer No family hx of      Other Cancer No family hx of        Social History     Social History     Marital Status:      Spouse Name: N/A     Number of Children: N/A     Years of Education: N/A     Social History Main Topics     Smoking status: Never Smoker      Smokeless tobacco: Never Used     Alcohol Use: Not on file     Drug Use: No     Sexual Activity: Not on file     Other Topics Concern     Not on file     Social History Narrative       Objective:   /86   Pulse 67   Temp 97.7  F (36.5  C) (Oral)   Ht 1.473 m (4' 10\")   Wt 45.1 kg (99 lb 6.4 oz)   SpO2 95%   BMI 20.77 kg/m     Constitutional: Pleasant no acute cardiopulmonary distress.   HEENT: Mouth/Throat: Mucous membrane is moist.    Cardiovascular: RRR, S1, S2 normal.   Pulmonary/Chest: CTAB. No wheezing or rales.   Neurological: Alert and oriented.   Extremities: trace pedal edema.  Back: scoliosis/stoop posture. No lumbar/thoracic spine tenderness.   Psychological: appropriate mood and affect   In House Labs:   ENDO CALCIUM LABS-[facility] Latest Ref Rng & Units 2019   CALCIUM 8.5 - 10.1 mg/dL 9.3   CALCIUM IONIZED WHOLE BLOOD 4.4 - 5.2 mg/dL    PHOSPHOROUS 2.5 - 4.5 mg/dL 3.2   MAGNESIUM 1.6 - 2.3 mg/dL    ALBUMIN 3.4 - 5.0 g/dL 3.5   BUN 7 - 30 mg/dL 17   CREATININE 0.52 - 1.04 mg/dL 0.73   PARATHYROID HORMONE INTACT 18 - 80 pg/mL 23   ALKPHOS 40 - 150 U/L 61   25 OH VIT D2 ug/L  "   25 OH VIT D3 ug/L    25 OH VIT D TOTAL 20 - 75 ug/L    VITAMIN D DEFICIENCY SCREENING 20 - 75 ug/L 31     Region Date BMD T - score Z - score BMD change from baseline BMD % change from baseline   L1-L4 01/30/2019 0.758 g/cm  -3.5 -0.8 -0.057 g/cm  -7.0%   L1-L4 01/05/2017 0.815 g/cm                            L1-L2 01/30/2019 0.659 g/cm  -4.2 -1.5 0.025 g/cm  3.9%   L1-L2 01/05/2017 0.634 g/cm               Neck Left 01/30/2019 0.662 g/cm  -2.7 0.3       Neck Left 01/05/2017 0.644 g/cm            Total Left 01/30/2019 0.580 g/cm  -3.4 -0.4 -0.047 g/cm  -7.5%   Total Left 01/05/2017 0.627 g/cm            Neck Right 01/30/2019 0.600 g/cm  -3.2 -0.2       Neck Right 01/05/2017 0.603 g/cm            Total Right 01/30/2019 0.608 g/cm  -3.2 -0.2 0.020 g/cm  3.4%   Total Right 01/05/2017 0.588 g/cm            Radius 33% 01/30/2019 0.394 g/cm  -4.4 -0.9 -0.064 g/cm  -13.9%   Radius 33% 01/05/2017 0.457 g/cm                Assessment/Treatment Plan:      # Osteoporosis with fragility fracture:  Belia Sheets is a 88 year old year old female with Osteoporosis and fragility fracture (7/2015); diagnosed in 1999.  Treated with alendronate for 16 years and four months.  Off alendronate since October 2015 (over one year).      Given severity of her osteoporosis and fragility fracture (severe as documented above); needed aggressive treatment (Zolendronic acid, FORTEO or Denosumab are possible options). After looking at the evidence we started her on FORTEO 2 years ago and she completed therapy; on 2/24/2019.     I have personally reviewed the DEXA scan which was done on 1/30/2019.  DEXA scan obtained towards the end of therapy has shown significant loss at the level of the wrist and at the level of the left hip.  At the pocket was reported as overall loss of 7% from L1-L4 however, when we look at the details of the lumbar spine #1 and #2; there is an overall gain of 3.9% at these sites.  Due to discrepancy in the bone  density; all lumbar spines #1 -  #4 are not interpretable(this is due to degenerative changes involving lumbar spine #3 and #4).    Most importantly patient did not have any fractures over the last 2 years.    Plan at this time would be to proceed with Reclast infusion to stabilize the gain we had with the Forteo.  Discussed risk- benefit of Reclast with the patient and her daughter in detail.  Her calcium labs were checked today which is a stable.  Creatinine was also checked today which is stable.  Her vitamin D was borderline in January 2019 and we have increased her daily vitamin D intake to 2000 international units daily.  Questions answered.    Reclast was ordered and this was administered at our infusion center.    Postnasal drip: Flonase nasal prescribed to be used for 2 months.    Patient Instructions   Tylenol 500 mg (or 2 tablets of 325 mg) every eight hours for three days after RECLAST infusion.     I will contact the patient with the test results.  Return to clinic in 12 months.    Test and/or medications prescribed today:  Orders Placed This Encounter   Procedures     TSH with free T4 reflex       Saurabh Pittman MD  Staff Endocrinologist   [phone]  Division of Endocrinology and Diabetes

## 2019-03-13 NOTE — PATIENT INSTRUCTIONS
Dear Belia Sheets    Thank you for choosing [facility] ([facility]) for your infusion.  The following information is a summary of our appointment as well as important reminders.      Dr. Pittman recommends taking Tylenol 500 mg every eight hours for three days after Reclast infusion.     What is this medicine?  ZOLEDRONIC ACID (THEA le dron ik AS id) lowers the amount of calcium loss from bone. It is used to treat Paget's disease and osteoporosis in women.  This medicine may be used for other purposes; ask your health care provider or pharmacist if you have questions.  What should I tell my health care provider before I take this medicine?  They need to know if you have any of these conditions:    aspirin-sensitive asthma    cancer, especially if you are receiving medicines used to treat cancer    dental disease or wear dentures    infection    kidney disease    low levels of calcium in the blood    past surgery on the parathyroid gland or intestines    receiving corticosteroids like dexamethasone or prednisone    an unusual or allergic reaction to zoledronic acid, other medicines, foods, dyes, or preservatives    pregnant or trying to get pregnant    breast-feeding  How should I use this medicine?  This medicine is for infusion into a vein. It is given by a health care professional in a hospital or clinic setting.  Talk to your pediatrician regarding the use of this medicine in children. This medicine is not approved for use in children.  Overdosage: If you think you have taken too much of this medicine contact a poison control center or emergency room at once.  NOTE: This medicine is only for you. Do not share this medicine with others.  What if I miss a dose?  It is important not to miss your dose. Call your doctor or health care professional if you are unable to keep an appointment.  What may interact with this medicine?    certain antibiotics given  by injection    NSAIDs, medicines for pain and inflammation, like ibuprofen or naproxen    some diuretics like bumetanide, furosemide    teriparatide  This list may not describe all possible interactions. Give your health care provider a list of all the medicines, herbs, non-prescription drugs, or dietary supplements you use. Also tell them if you smoke, drink alcohol, or use illegal drugs. Some items may interact with your medicine.  What should I watch for while using this medicine?  Visit your doctor or health care professional for regular checkups. It may be some time before you see the benefit from this medicine. Do not stop taking your medicine unless your doctor tells you to. Your doctor may order blood tests or other tests to see how you are doing.  Women should inform their doctor if they wish to become pregnant or think they might be pregnant. There is a potential for serious side effects to an unborn child. Talk to your health care professional or pharmacist for more information.  You should make sure that you get enough calcium and vitamin D while you are taking this medicine. Discuss the foods you eat and the vitamins you take with your health care professional.  Some people who take this medicine have severe bone, joint, and/or muscle pain. This medicine may also increase your risk for jaw problems or a broken thigh bone. Tell your doctor right away if you have severe pain in your jaw, bones, joints, or muscles. Tell your doctor if you have any pain that does not go away or that gets worse.  Tell your dentist and dental surgeon that you are taking this medicine. You should not have major dental surgery while on this medicine. See your dentist to have a dental exam and fix any dental problems before starting this medicine. Take good care of your teeth while on this medicine. Make sure you see your dentist for regular follow-up appointments.  What side effects may I notice from receiving this  medicine?  Side effects that you should report to your doctor or health care professional as soon as possible:    allergic reactions like skin rash, itching or hives, swelling of the face, lips, or tongue    anxiety, confusion, or depression    breathing problems    changes in vision    eye pain    feeling faint or lightheaded, falls    jaw pain, especially after dental work    mouth sores    muscle cramps, stiffness, or weakness    redness, blistering, peeling or loosening of the skin, including inside the mouth    trouble passing urine or change in the amount of urine  Side effects that usually do not require medical attention (report to your doctor or health care professional if they continue or are bothersome):    bone, joint, or muscle pain    constipation    diarrhea    fever    hair loss    irritation at site where injected    loss of appetite    nausea, vomiting    stomach upset    trouble sleeping    trouble swallowing    weak or tired  This list may not describe all possible side effects. Call your doctor for medical advice about side effects. You may report side effects to FDA at 7-619-PRH-5533.  Where should I keep my medicine?  This drug is given in a hospital or clinic and will not be stored at home.  NOTE:This sheet is a summary. It may not cover all possible information. If you have questions about this medicine, talk to your doctor, pharmacist, or health care provider. Copyright  2016 Gold Standard      We look forward in seeing you on your next appointment here at [facility].  Please don t hesitate to call us at [phone] to reschedule any of your appointments or to speak with one of the [facility] registered nurses.  It was a pleasure taking care of you today.    Sincerely,    [facility]  [address] [place] [address]  [address]  Phone:  [phone]

## 2019-03-13 NOTE — Clinical Note
CATHY has been returned, downloaded and ready for review. Report has been put in providers folder.Jm Lopez[facility] - [place]

## 2019-03-13 NOTE — PROGRESS NOTES
Chief Complaint   Patient presents with     Blood Draw     vitals done by FRANSISCO, PIV/labs done by TREVON Mazariegos CMA on 3/13/2019 at 1:47 PM

## 2019-03-13 NOTE — PROGRESS NOTES
Nursing Note  Belia Sheets [facility] for:   Chief Complaint   Patient presents with     Blood Draw     vitals done by CMA, PIV/labs done by RN     During today's [facility] appointment, orders from Dr. Pittman were completed.  Frequency: once, annual First dose    Progress note:  Patient identification verified by name and date of birth.  Assessment completed.  Vitals recorded in Doc Flowsheets.  Patient was provided with education regarding infusion and possible side effects.  Patient verbalized understanding.      needed: No  Premedications: were not ordered. and administered per order.  Infusion Rates: 400 ml/hr.  Approximate Infusion length: 15 minutes + 15 minute observation.  Labs: were drawn prior to infusion in lab. Copy of results given to patient.   Vascular access: peripheral IV placed today in lab.   Treatment Conditions: patient denies fever, chills, signs of infection, recent illness, on antibiotics, productive cough or elevated temperature.  Patient tolerated infusion: well.    Discharge Plan:   AVS given to patient.   Follow up plan of care with: Dr. Pittman  Discharge instructions were reviewed with patient.  Patient/representative verbalized understanding of discharge instructions and all questions answered.  Patient discharged from [facility] in stable condition.    Swapna Shea RN     Administrations This Visit     zoledronic Acid (RECLAST) infusion 5 mg     Admin Date  03/13/2019 Action  New Bag Dose  5 mg Rate  400 mL/hr Route  Intravenous Administered By  Swapna Shea RN              /81 (BP Location: Right arm, Patient Position: Sitting, Cuff Size: Adult Regular)   Pulse 74   Temp 96.2  F (35.7  C) (Oral)   Resp 16   Wt 45.4 kg (100 lb)   SpO2 94%   BMI 20.90 kg/m

## 2019-03-13 NOTE — PROGRESS NOTES
Overnight oximetry returned to clinic today 3/13/2019 . The encounter was routed to the provider for review. A copy of these results have also been scanned.    Recording date: March 12 2019    Duration: 11:18:52    Jm Lopez  [facility] Specialist - [place]

## 2019-03-14 NOTE — PROGRESS NOTES
Endocrinology Clinic Visit    Chief Complaint: RECHECK (osteoporosis)     Information obtained from: Patient is here with her daughter today.     Subjective:         HPI: Belia Sheets is a 88 year old year old female with history of osteoporosis who is here for a follow up.      Again, this is 89 yo female with past medical history of osteoporosis first diagnosed in 1999.     She was treated with alendronate 70 mg weekly from 1999 to October 2015.  She has never stopped this medication all these years; she took it first thing in the morning with empty stomach.    In October of 2015 she was prescribed FORTEO; mainly because she wanted to change from bisphosphonate per report. However, she didn't take these medication because of cost which was 600 for two years and she didn't know whether it will help her or not.     +History of vertebral fracture; she was folding laundry when she fell from standing up position and sustained injury.  Broken hip and noted to have thoracic compression fractures.  Previous fragility fracture, morphometric vertebral fracture: imaging done in 7/2015 has showed multiple compression fractures.   7/2015 spine film reported as <Multiple old healed left rib fractures. Diffuse osteopenia. New moderate compression fracture of T10 since the prior examination. New moderate compression fractures in T4 and T5 are new since the prior exam. Multilevel degenerative changes in the thoracic spine are otherwise stable. Marked scoliosis of the thoracolumbar spine is redemonstrated.>    Due to severe osteoporosis, long standing previous alendronate therapy and alkaline phosphatase which was within the normal limits; we started On FORTEO about two years ago and she completed therapy on 2/24/2019.  Didn't have any side effects from these medication.  No fracture over the last 2 years while she was on Forteo.  She uses a walker for walking.  Follow-up DEXA which was done towards the end of therapy with  Forteo is copied below.      She is taking calcium and vitamin d supplement 600-400; 1 tab daily; she takes vitamin D 2000 supplements.  She has at least 2 servings of dairy products a day.  Calcium labs which were done at the end of January 2019 are within the normal limits.  Vitamin D was slightly on the low side at 31 and at that time we increased her vitamin D supplement from 1000 international units daily to 2000 international units daily.    Risk factors for osteoporosis and complications:  Lost 6 inch height loss/kyphosis    Weight - 91 pounds (high risk); gained 5 pounds since her last visit.   Falls/risk factors: Multiple falls. 5 falls ine one year and half but again no falls over the last one year.  Sustained rib and vertebral fractures secondary to the previous falls. Currently participating in home physical therapy for osteoporosis.   Walks three times per day in hallways of her apartment. Uses walker/cane for walking short distances and 4 barros for long distances. Fall: 7/2015, 12/2014, 4/2016, 6/2016. 01/2017.    Again and no fractures over the last 2 years since she was started on Forteo.    She lives in a senior apartment.  She gets some services. Mostly manages by her own.     Secondary causes of osteoporosis: negative for    RA, Prolonged immobility,  Organ transplantation, Type I diabetes, Hyperthyroidism, GI disease, Chronic liver disease or COPD.     Review of system negative except for postnasal drip and coughing episode following postnasal drip.     No Known Allergies    Current Outpatient Medications   Medication Sig Dispense Refill     cholecalciferol (VITAMIN D3) 1000 units (25 mcg) capsule Take 2 capsules (2,000 Units) by mouth daily 100 capsule 3     fluticasone (FLONASE) 50 MCG/ACT nasal spray Spray 1 spray into both nostrils daily 15.8 mL 1     acetaminophen (TYLENOL) 325 MG tablet Take 2 tablets (650 mg) by mouth every 4 hours as needed for mild pain (Patient not taking: Reported on  10/3/2018) 60 tablet 0     amLODIPine (NORVASC) 5 MG tablet Take 1 tablet (5 mg) by mouth daily 90 tablet 1     calcium-vitamin D (CALTRATE) 600-400 MG-UNIT per tablet Take 1 tablet by mouth daily 90 tablet 3     Cyanocobalamin (VITAMIN B 12 PO) Take 1 tablet by mouth every morning        magnesium oxide (MAG-OX) 400 MG tablet Take 600 mg by mouth        menthol-zinc oxide (CALMOSEPTINE) 0.44-20.625 % OINT ointment Apply topically 4 times daily as needed for skin protection 113 g 1     nystatin (MYCOSTATIN) 774868 UNIT/GM POWD Apply topically 3 times daily as needed (Patient not taking: Reported on 10/3/2018) 30 g 1     order for DME Equipment being ordered: Home BP monitor and cuff 1 each 0     saccharomyces boulardii (FLORASTOR) 250 MG capsule Take 1 capsule (250 mg) by mouth 2 times daily (Patient not taking: Reported on 10/3/2018) 14 capsule      sodium fluoride dental gel (PREVIDENT) 1.1 % GEL Apply to affected area At Bedtime       teriparatide, recombinant, (FORTEO) 600 MCG/2.4ML SOLN injection Inject 0.08 mLs (20 mcg) Subcutaneous daily 6 mL 1       Review of Systems     11 point review system (Constitutional, HENT, Eyes, Respiratory, Cardiovascular, Gastrointestinal, Genitourinary, Musculoskeletal,Neurological, Psychiatric/Behavioural, Endocrine) is negative or is as per HPI above: fecal incontinence.       Past Medical History:   Diagnosis Date     Carpal tunnel syndrome (aka CTS)      H/O calcium pyrophosphate deposition disease (CPPD)      History of encephalopathy 10/2017     Hypertension      Kyphoscoliosis      Osteoarthritis     hands     Osteoporosis      Rectal prolapse        Past Surgical History:   Procedure Laterality Date     COLONOSCOPY  2010     HYSTERECTOMY      for uterine prolapse     RECTOSIGMOIDECTOMY PERINEAL N/A 1/12/2018    Procedure: RECTOSIGMOIDECTOMY PERINEAL;  Perineal Rectosigmoidectomy  ;  Surgeon: Amrik Mariano MD;  Location: [facility] OR       Family History   Problem  "Relation Age of Onset     Depression/Anxiety Son      Depression/Anxiety Son         alcohol abuse  age 24     Hypertension Mother      Hypertension Brother      Diabetes No family hx of      Breast Cancer No family hx of      Colon Cancer No family hx of      Prostate Cancer No family hx of      Other Cancer No family hx of        Social History     Social History     Marital Status:      Spouse Name: N/A     Number of Children: N/A     Years of Education: N/A     Social History Main Topics     Smoking status: Never Smoker      Smokeless tobacco: Never Used     Alcohol Use: Not on file     Drug Use: No     Sexual Activity: Not on file     Other Topics Concern     Not on file     Social History Narrative       Objective:   /86   Pulse 67   Temp 97.7  F (36.5  C) (Oral)   Ht 1.473 m (4' 10\")   Wt 45.1 kg (99 lb 6.4 oz)   SpO2 95%   BMI 20.77 kg/m    Constitutional: Pleasant no acute cardiopulmonary distress.   HEENT: Mouth/Throat: Mucous membrane is moist.    Cardiovascular: RRR, S1, S2 normal.   Pulmonary/Chest: CTAB. No wheezing or rales.   Neurological: Alert and oriented.   Extremities: trace pedal edema.  Back: scoliosis/stoop posture. No lumbar/thoracic spine tenderness.   Psychological: appropriate mood and affect   In House Labs:   ENDO CALCIUM LABS-[facility] Latest Ref Rng & Units 2019   CALCIUM 8.5 - 10.1 mg/dL 9.3   CALCIUM IONIZED WHOLE BLOOD 4.4 - 5.2 mg/dL    PHOSPHOROUS 2.5 - 4.5 mg/dL 3.2   MAGNESIUM 1.6 - 2.3 mg/dL    ALBUMIN 3.4 - 5.0 g/dL 3.5   BUN 7 - 30 mg/dL 17   CREATININE 0.52 - 1.04 mg/dL 0.73   PARATHYROID HORMONE INTACT 18 - 80 pg/mL 23   ALKPHOS 40 - 150 U/L 61   25 OH VIT D2 ug/L    25 OH VIT D3 ug/L    25 OH VIT D TOTAL 20 - 75 ug/L    VITAMIN D DEFICIENCY SCREENING 20 - 75 ug/L 31     Region Date BMD T - score Z - score BMD change from baseline BMD % change from baseline   L1-L4 2019 0.758 g/cm  -3.5 -0.8 -0.057 g/cm  -7.0%   L1-L4 2017 0.815 g/cm      "                       L1-L2 01/30/2019 0.659 g/cm  -4.2 -1.5 0.025 g/cm  3.9%   L1-L2 01/05/2017 0.634 g/cm               Neck Left 01/30/2019 0.662 g/cm  -2.7 0.3       Neck Left 01/05/2017 0.644 g/cm            Total Left 01/30/2019 0.580 g/cm  -3.4 -0.4 -0.047 g/cm  -7.5%   Total Left 01/05/2017 0.627 g/cm            Neck Right 01/30/2019 0.600 g/cm  -3.2 -0.2       Neck Right 01/05/2017 0.603 g/cm            Total Right 01/30/2019 0.608 g/cm  -3.2 -0.2 0.020 g/cm  3.4%   Total Right 01/05/2017 0.588 g/cm            Radius 33% 01/30/2019 0.394 g/cm  -4.4 -0.9 -0.064 g/cm  -13.9%   Radius 33% 01/05/2017 0.457 g/cm                Assessment/Treatment Plan:      # Osteoporosis with fragility fracture:  Belia Sheets is a 88 year old year old female with Osteoporosis and fragility fracture (7/2015); diagnosed in 1999.  Treated with alendronate for 16 years and four months.  Off alendronate since October 2015 (over one year).      Given severity of her osteoporosis and fragility fracture (severe as documented above); needed aggressive treatment (Zolendronic acid, FORTEO or Denosumab are possible options). After looking at the evidence we started her on FORTEO 2 years ago and she completed therapy; on 2/24/2019.     I have personally reviewed the DEXA scan which was done on 1/30/2019.  DEXA scan obtained towards the end of therapy has shown significant loss at the level of the wrist and at the level of the left hip.  At the pocket was reported as overall loss of 7% from L1-L4 however, when we look at the details of the lumbar spine #1 and #2; there is an overall gain of 3.9% at these sites.  Due to discrepancy in the bone density; all lumbar spines #1 -  #4 are not interpretable(this is due to degenerative changes involving lumbar spine #3 and #4).    Most importantly patient did not have any fractures over the last 2 years.    Plan at this time would be to proceed with Reclast infusion to stabilize the gain we had  with the Forteo.  Discussed risk- benefit of Reclast with the patient and her daughter in detail.  Her calcium labs were checked today which is a stable.  Creatinine was also checked today which is stable.  Her vitamin D was borderline in January 2019 and we have increased her daily vitamin D intake to 2000 international units daily.  Questions answered.    Reclast was ordered and this was administered at our infusion center.    Postnasal drip: Flonase nasal prescribed to be used for 2 months.    Patient Instructions   Tylenol 500 mg (or 2 tablets of 325 mg) every eight hours for three days after RECLAST infusion.     I will contact the patient with the test results.  Return to clinic in 12 months.    Test and/or medications prescribed today:  Orders Placed This Encounter   Procedures     TSH with free T4 reflex       Saurabh Pittman MD  Staff Endocrinologist   [phone]  Division of Endocrinology and Diabetes

## 2019-03-14 NOTE — RESULT ENCOUNTER NOTE
Sudeep,    It was a pleasure seeing you in clinic yesterday.    Your blood work results which were done to assess your kidney function, thyroid and calcium levels are within the normal limits.    Please let me know if you have any questions.    Saurabh Pittman

## 2019-03-15 DIAGNOSIS — I10 BENIGN ESSENTIAL HYPERTENSION: ICD-10-CM

## 2019-03-15 RX ORDER — AMLODIPINE BESYLATE 5 MG/1
5 TABLET ORAL DAILY
Qty: 90 TABLET | Refills: 1 | Status: SHIPPED | OUTPATIENT
Start: 2019-03-15 | End: 2019-12-03

## 2019-03-15 NOTE — TELEPHONE ENCOUNTER

## 2019-03-25 ENCOUNTER — VIRTUAL VISIT (OUTPATIENT)
Dept: SLEEP MEDICINE | Facility: CLINIC | Age: 84
End: 2019-03-25
Payer: COMMERCIAL

## 2019-03-25 DIAGNOSIS — G47.34 NOCTURNAL HYPOXEMIA: Primary | ICD-10-CM

## 2019-03-25 PROCEDURE — 98967 PH1 ASSMT&MGMT NQHP 11-20: CPT | Performed by: NURSE PRACTITIONER

## 2019-03-25 NOTE — PROGRESS NOTES
.    CC: Sudeep, who was referred by Dr. Alejandro Sandhu for feelings of fatigue and occasional sleepiness is being seen with a virtual visit today: Attended with her daughter CONSULEO.    History of present illness: Sudeep has multiple factors that are impacting her feelings of fatigue and on occasion sleepiness.  She has quite an irregular schedule with a delayed sleep phase pattern and actual time devoted to sleep is fragmented by nocturia.  She has some restriction of her shoulder girdle and likely her of thorax possibly resulting in some dyspnea which she does admit to.  Her pretest probability for obstructive sleep apnea, despite being exceptionally thin and not having a bed partner is estimated with a stop bangscore at 4 out of 8-mild/moderate pretest probability..  She is interested in knowing whether her sleep could be improved and the following plan of care has been developed    Results of overnight oximetry: Time with O2 sats band less than or equal to 88% is 30.8 minutes.  Adjusted index for the study is 12.2, percent artifact is 2.2.  I did discuss the fact that there are some obvious evidence of artifact on this study.  I promised to make a copy of this and send it back to Sudeep and her daughter CONSUELO so that they can review this and determine whether we should move forward with perhaps a another overnight oximetry.  Due to the thin nature of Sudeep it is very difficult to keep the probe in a stable position.    Sudeep does state that she has been more cognizant of her sleep and improving its quality.  She may consider doing a Actigraph and/or a sleep study after CONSUELO and she have reviewed the overnight oximetry.    Assessment plan: After further review of the overnight oximetry I do feel that there is a suspicion for possible obstructive sleep apnea which did seem to be more prevalent in the later part of the night.  She does not meet the threshold for at least moderate disease however she may benefit from a repeat of the  overnight oximetry and giving me her honest opinion.  It is important that she does stabilize this device adequately prior to its use so that we can get a good estimate of possible severity of obstructive sleep apnea and possible hypoxemia.    1.  Nocturnal hypoxemia: Fraught with some problems with poorly fitting overnight oximetry resulting in some artifact that may still be over estimating the degree of hypoxemia.  Repeat study would not be unreasonable.    Time spent with patient 15 minutes of which greater than 50% was spent in counseling education and coordination of care this was a scheduled telephone visit.

## 2019-03-26 NOTE — PATIENT INSTRUCTIONS
Sudeep please review my (marked up) copy of your overnight oximetry highlighted in green R areas that may have overestimated the amount of low oxygen levels.  Highlighted and yellow are the areas that seem reliable and are likely depicting low O2 sats at night.    My chart me and let me know if you wish to repeat this.    Thank you,    Kenya

## 2019-03-28 ENCOUNTER — TELEPHONE (OUTPATIENT)
Dept: SLEEP MEDICINE | Facility: CLINIC | Age: 84
End: 2019-03-28

## 2019-06-01 NOTE — PROCEDURES
PHYSICIAN INTERPRETATION   HOME OXIMETRY   Patient: Belia Sheets  MRN: [id number]  YOB: 1930  Study Date: 3/12/19   Referring Physician: Alejandro Sandhu  Ordering Physician: Kenya Smith MD     Conditions:  Room air,Room air  Quality: artifact 2.2%     Measure [threshold]                 Time less than or equal to SpO2 88% [5 min]:  30.9 min  Duration monitoring [> 2 hours artifact free]:  11:18 hours                    4% O2 desat index [ > 15/ hour]:    12.2/ hour                    Pattern:       oscillatory                                  Assessment:     Hypoxemia present: artifact is included in the overall result of 30.9 mins <=88%  Episodic pattern suggestive of sleep apnea      Recommendations:  The following changes in O2/PAP settings were ordered:  Letter to pt suggesting study    Diagnosis Code(s):  Hypoxemia G47.36, Sleep apnea G47.33   SUSIE Wiley CNP, June 1, 2019

## 2019-06-11 ENCOUNTER — ANCILLARY PROCEDURE (OUTPATIENT)
Dept: GENERAL RADIOLOGY | Facility: CLINIC | Age: 84
End: 2019-06-11
Attending: FAMILY MEDICINE
Payer: COMMERCIAL

## 2019-06-11 ENCOUNTER — OFFICE VISIT (OUTPATIENT)
Dept: FAMILY MEDICINE | Facility: CLINIC | Age: 84
End: 2019-06-11
Payer: COMMERCIAL

## 2019-06-11 DIAGNOSIS — J98.11 ATELECTASIS: Primary | ICD-10-CM

## 2019-06-11 DIAGNOSIS — R09.89 RALES: ICD-10-CM

## 2019-06-11 NOTE — PROGRESS NOTES
HPI  89 year old female here for evaluation of abnormal lung exam  Here with daughter.    Normal rooming not done.  Roomed at 450. Xray closing at 5. Did quick lung exam. Sent for CXR.    Nurse at [facility] [place] listened sto her lungs today. [place] possible pneumonia  Sent to the clinic for evaluation.  Asymptomatic   No cough, shortness of breath, CP.  Only symptom is ongoing fatigue.             ROS  6 point ROS neg other than the symptoms noted above in the HPI.    MEDS  Current Outpatient Medications   Medication     amLODIPine (NORVASC) 5 MG tablet     calcium-vitamin D (CALTRATE) 600-400 MG-UNIT per tablet     cholecalciferol (VITAMIN D3) 1000 units (25 mcg) capsule     menthol-zinc oxide (CALMOSEPTINE) 0.44-20.625 % OINT ointment     sodium fluoride dental gel (PREVIDENT) 1.1 % GEL     acetaminophen (TYLENOL) 325 MG tablet     Cyanocobalamin (VITAMIN B 12 PO)     fluticasone (FLONASE) 50 MCG/ACT nasal spray     magnesium oxide (MAG-OX) 400 MG tablet     nystatin (MYCOSTATIN) 178771 UNIT/GM POWD     order for DME     saccharomyces boulardii (FLORASTOR) 250 MG capsule     teriparatide, recombinant, (FORTEO) 600 MCG/2.4ML SOLN injection     No current facility-administered medications for this visit.          SOC      FHx  Family History   Problem Relation Age of Onset     Depression/Anxiety Son      Depression/Anxiety Son         alcohol abuse  age 24     Hypertension Mother      Hypertension Brother      Diabetes No family hx of      Breast Cancer No family hx of      Colon Cancer No family hx of      Prostate Cancer No family hx of      Other Cancer No family hx of        PHYSICAL EXAMINATION:  Vital signs: /79   Pulse 75   Temp 97.5  F (36.4  C) (Oral)   Resp 14   Wt 45.2 kg (99 lb 9.6 oz)   SpO2 (!) 64%   BMI 20.82 kg/m      Gen: no distress, frail, thin  HEENT: ears - normal, mouth - normal, nose- normal, eyes - normal, neck - normal  Lungs:   Time 1 (rooming) - loud, wide-spread course bilateral  rhonchi  Time 2 (after CXR) - few quiet inspir sounds  Time 3 (after deep breathing) - clear  Cor: RRR, no S3 S4 murmur or rub  Abd: soft, nontender, no HSM  Ext: no edema        LABS  Exam: XR CHEST 2 VW, 6/11/2019 5:16 PM     Indication: Rales     Comparison: 7/5/2018     Findings:   PA and lateral views of the chest. The cardiac silhouette is within  normal limits. No pneumothorax or pleural effusion. No focal airspace  opacity. The visualized upper abdomen is unremarkable. No significant  change in numerous bilateral rib fracture and vertebral compression  deformities.                                                                      Impression:   1. No acute airspace disease.  2. Stable appearance of chronic bilateral rib fractures in vertebral  compression deformities.     I have personally reviewed the examination and initial interpretation  and I agree with the findings.     WILLOW SNEED MD        ASSESSMENT/PLAN    1. Atelectasis  Clearing of rhonchi with deep breaths in patient with restricted chest wall motion.  No evidence of pneumonia  Will begin incentive spirometry.  Will recheck with patient in AM with low O2 sat. Not repeated before leaving. Clinically well.    - order for DME; Equipment being ordered: Incentive spirometer  Dispense: 1 Units; Refill: 0      DANIEL GRANADOS    6/12/19 - 8am  Call to patient   Message left  Want hi rise nurses to listen to her lungs and O2 sat  PHarper    6/12/19 - 1255pm  Called patient  Called daughter ([place])  Message left  Will have RN call later today  PHarper    6/12/19  CMA states O2 sat was 94% (not 64%)  PHarper

## 2019-06-11 NOTE — PATIENT INSTRUCTIONS
"1. Atelectasis  Atelectasis is when the alveoli collapse down  It needs a big breath to open them up  Atelectasis is a risk for getting pneumonia (infection in the lungs)    When you came in, you had inspiratory and expiratory \"rhonchi\" (course sounds in your lungs)  After the xray your lungs sounded completely normal    Your chest xray did not show any pneumonia (good news)    Your treatment - 10 deep breaths twice a day. The incentive spirometer helps to quantify this.      - order for DME; Equipment being ordered: Incentive spirometer  Dispense: 1 Units; Refill: 0    PHarper      "

## 2019-06-12 ENCOUNTER — TELEPHONE (OUTPATIENT)
Dept: FAMILY MEDICINE | Facility: CLINIC | Age: 84
End: 2019-06-12

## 2019-06-12 VITALS
TEMPERATURE: 97.5 F | SYSTOLIC BLOOD PRESSURE: 134 MMHG | BODY MASS INDEX: 20.82 KG/M2 | DIASTOLIC BLOOD PRESSURE: 79 MMHG | HEART RATE: 75 BPM | RESPIRATION RATE: 14 BRPM | WEIGHT: 99.6 LBS | OXYGEN SATURATION: 64 %

## 2019-06-12 NOTE — TELEPHONE ENCOUNTER
"Called and spoke with the patient who sounded alert/oreinted, denied any shortness or labored breathing, patient reports to have \"imporved health condition\", \"taking deep breaths\" as instructed and went to nursing office to get 'checked out\" today, RN called the facility nursie office, unable to get hold of staff (closess 3:00 pm), will continue to follow up.    Derrick Trinh RN     Message routed to provider as Diane.    Derrick Trinh RN    "

## 2019-06-12 NOTE — TELEPHONE ENCOUNTER
Called patient to follow up, per provider's request, unable to get hold of the patinet left message to call back.    When patient returns phone call, please transfer to RN.    Derrick Trinh RN

## 2019-06-13 ENCOUNTER — TELEPHONE (OUTPATIENT)
Dept: FAMILY MEDICINE | Facility: CLINIC | Age: 84
End: 2019-06-13

## 2019-06-13 DIAGNOSIS — J98.11 ATELECTASIS: ICD-10-CM

## 2019-06-13 NOTE — TELEPHONE ENCOUNTER
Called patient to notify that DME prescription order is faxed to [facility], unable to get hold of the patient, left message to call back the clinic.    [facility] Phone: [phone]. Fax number: [phone].    When patient returns phone call please relay message.      Derrick Trinh RN

## 2019-06-13 NOTE — TELEPHONE ENCOUNTER
Called and spoke with [facility] nursing office staff, per nurse, pt v/s stable: reported: B/P: 125/70, Pulse: 66, O2 95% room air.    Notified RN that prescription for Spirometry is ready for , unable to get hold of family member/patient  to determine which medical supplying pharmacy to send to.  called patient and left message to call back.    Prescription placed in clinic prescription log box, message routed to provider as Fybk.    Derrick Trinh RN

## 2019-06-13 NOTE — TELEPHONE ENCOUNTER
Patient returning clinic call, unable to reach RN.     Per patient, she is unable to get to a pharmacy to  prescription and would like to know if it can be delivered to her home care residence at [place].

## 2019-06-13 NOTE — TELEPHONE ENCOUNTER
DME order for Incentive spirometry, re-ordered, [facility] pharmacy unable to dispense. Prescription printed, attending preceptor approved/signed the script.    Derrick Trinh RN

## 2019-06-13 NOTE — TELEPHONE ENCOUNTER
[facility] was unavailable to take transfer. Son called and was wondering if he can get it today. RN advised that there are several medical equipment companies near the area that I would be happy to fax it to for him, however I cannot promise it would be ready today. RN did advise that some of the companies deliver to patient's homes. Son states he will call his Mom and then [facility] which is closest to him and find out if it would be ready today, and once he knows for sure, he will call us back so that we can fax it right away.    Patient called immediately after this and she too states she wants it at [facility], they do not want [facility]. [facility] will cancel that order. RN will work with [facility] to find out how quickly they can get it. RN advised patient to let her son know that I will contact them both when I have an update.     [facility] has supplies in stock, faxed it over to them. Son is aware that they did not promise me that it would be ready today, and is okay with it if he needed to come back tomorrow.  Veronica Peguero, TREVON

## 2019-06-17 ENCOUNTER — DOCUMENTATION ONLY (OUTPATIENT)
Dept: FAMILY MEDICINE | Facility: CLINIC | Age: 84
End: 2019-06-17

## 2019-06-17 NOTE — PROGRESS NOTES
"When opening a documentation only encounter, be sure to enter in \"Chief Complaint\" Forms and in \" Comments\" Title of form, description if needed.    Sudeep is a 89 year old  female  Form received via: Fax  Form now resides in: Provider Ready    Diana Serrato CMA                Form has been completed by provider.     Form sent out via: Fax to [phone]  Patient informed: No, Reason: fax confirmed  Output date: June 20, 2019    Diana Serrato CMA      **Please close the encounter**      "

## 2019-06-18 ENCOUNTER — TELEPHONE (OUTPATIENT)
Dept: FAMILY MEDICINE | Facility: CLINIC | Age: 84
End: 2019-06-18

## 2019-06-18 NOTE — TELEPHONE ENCOUNTER
[facility] phone call message- general phone call:    Reason for call: Patients mother called and asked if she needs the nurses at her mothers nursing home to recheck her oxygen stats? They also wanted to know if they need to follow up regarding office visit on 06/11/2019 Atelectasis    Return call needed: Yes    OK to leave a message on voice mail? Yes    Primary language: English      needed? No    Call taken on June 18, 2019 at 11:26 AM by Kat Barone

## 2019-06-18 NOTE — TELEPHONE ENCOUNTER
RN will consult with provider. Per note, it appeared that next day O2 monitoring was desired, but there is not a long term plan designated for monitoring oxygen. There is not a specific follow up date listed either. RN is happy to call with follow up information.  Veronica Peguero RN

## 2019-06-19 NOTE — TELEPHONE ENCOUNTER
RN spoke with provider and he does not have a designated follow up date, she should come back if she develops symptoms, but otherwise does not need to come back any specific time. If able, we would like an oxygen reading, but if not we can try and have her swing by the clinic for a nurse only visit for that.     RN attempted to reach daughter, could not reach and left voicemail with that information.  Veronica Peguero RN

## 2019-07-24 ENCOUNTER — OFFICE VISIT (OUTPATIENT)
Dept: FAMILY MEDICINE | Facility: CLINIC | Age: 84
End: 2019-07-24
Payer: COMMERCIAL

## 2019-07-24 VITALS
OXYGEN SATURATION: 98 % | WEIGHT: 100.4 LBS | HEART RATE: 69 BPM | HEIGHT: 58 IN | DIASTOLIC BLOOD PRESSURE: 86 MMHG | SYSTOLIC BLOOD PRESSURE: 132 MMHG | BODY MASS INDEX: 21.07 KG/M2

## 2019-07-24 DIAGNOSIS — Z00.00 MEDICARE ANNUAL WELLNESS VISIT, SUBSEQUENT: Primary | ICD-10-CM

## 2019-07-24 ASSESSMENT — MIFFLIN-ST. JEOR: SCORE: 762.22

## 2019-07-24 NOTE — PROGRESS NOTES
>65 year old Wellness Visit ( Medicare etc)         HPI       This 89 year old female presents as an established patient  Alejandro Sandhu who presents for a  Annual Wellness Exam.    Other issues patient wants to address today:    none        Visual Acuity: :  Testing not done; patient has seen eye doctor in the past 12 months.    Patient Active Problem List   Diagnosis     Osteoporosis     Osteoarthritis     Recurrent falls     Chronic fatigue     Senile osteoporosis     Lumbar verterbral fracture, non-traumatic     Advance Care Planning     Encephalopathy     Generalized weakness     Rectal prolapse     Kyphosis     Postnasal drip       Past Medical History:   Diagnosis Date     Carpal tunnel syndrome (aka CTS)      H/O calcium pyrophosphate deposition disease (CPPD)      History of encephalopathy 10/2017     Hypertension      Kyphoscoliosis      Osteoarthritis     hands     Osteoporosis      Rectal prolapse         Family History   Problem Relation Age of Onset     Depression/Anxiety Son      Depression/Anxiety Son         alcohol abuse  age 24     Hypertension Mother      Hypertension Brother      Diabetes No family hx of      Breast Cancer No family hx of      Colon Cancer No family hx of      Prostate Cancer No family hx of      Other Cancer No family hx of          Problem List, Family History and past Medical History reviewed and unchanged/updated.       Health risk Assessment/ Review of Systems:         Constitutional:   Fevers or night sweats?  NO      Eyes:   Vision problems?  NO            Hearing:  Do you feel you have hearing loss?  NO  Cardiovascular:  Chest pain, palpitations, or pain with walking?  NO        Respiratory:   Breathing problems or cough?  NO    :   Difficulty controlling urination?  NO        Musculoskeletal:   Stiffness or sore joints?  NO            Skin:   concerning lesions or moles?  NO           Nervous System:   loss of strength or sensation,  numbness or tingling,  tremor,   dizziness,  headache?  NO         Mental Health:   depression or anxiety,  sleep problems?  NO   Cognition:  Memory problems?  NO       Weight Loss: Have you lost 10 or more pounds unintentionally in the previous year?  NO  Energy: How much of the time during the past 3 weeks did you feel tired?   (check one of the following):   ?  All of the time  ? Most of the time  ? Some of the time  ? A little of the time  ? None of the Time    PHQ-2 Score:   PHQ-2 ( 1999 Pf[employer]) 6/11/2019 1/30/2019   Q1: Little interest or pleasure in doing things 0 0   Q2: Feeling down, depressed or hopeless 0 0   PHQ-2 Score 0 0       PHQ-9 Score:   No flowsheet data found.  Medical Care:     What other specialists or organizations are involved in your medical care?     Patient Care Team       Relationship Specialty Notifications Start End    Alejandro Sandhu MD PCP - General Family Practice  8/17/16     Phone: [phone] Fax: [phone]         [address] [facility] [address] [address]    Joslyn Daugherty APRN CNP Nurse Practitioner Nurse Practitioner  12/22/17     Phone: [phone] Fax: [phone]         [address] [facility] [address] [address]    Nida Hui MD MD Urology  1/25/18     Phone: [phone] Fax: [phone]         [address] [facility] [place] [address] [address]    Nighat Philip, RN Registered Nurse Urology  1/25/18     Phone: [phone] Pager: [phone]              Do you have an advanced directive? yes      Social History / Home Safety     Social History     Tobacco Use     Smoking status: Never Smoker     Smokeless tobacco: Never Used   Substance Use Topics     Alcohol use: No     Marital Status:Single  Who lives in your household? self  Does your home have any of the following safety concerns? Loose rugs in the hallway, no grab bars in the bathroom, no handrails on the stairs or have poorly lit areas?  No   Do you feel threatened or controlled by a partner,  ex-partner or anyone in your life? {Yes No   Has anyone hurt you physically, for example by pushing, hitting, slapping or kicking you   or forcing you to have sex? No       Functional Status     Do you need help with dressing yourself, bathing, or walking?No   Do you need help with the phone, transportation, shopping, preparing meals, housework, laundry, medications or managing money? YES family  By yourself and not using aids, do you have any difficulty walking up 10 steps  without resting? No   By yourself and not using aids, do you have any difficulty walking several hundred yards? No              Risk Behaviors and Healthy Habits     History   Smoking Status     Never Smoker   Smokeless Tobacco     Never Used     How many servings of fruits and vegetables do you eat a day? 10  Exercise:No exercise None  Do you frequently drive without a seatbelt? No   History   Smoking Status     Never Smoker   Smokeless Tobacco     Never Used     Do you use any other drugs? No       Do you use alcohol?No      Functional Ability   Was the patient's timed Up & Go test (Get up from chair walk, 10 feet turn, return to chair and sit down) unsteady or longer than 10 seconds? No     Fall risk:     1. Have you fallen two or more times in the past year? No   2. Have you fallen and had an injury in the past year?  No         Evaulation of Cognitive Function     Family member/caregiver input: Normal    1) Repeat 3 items (Leader, Season, Table)    2) Clock draw: NORMAL  3) 3 item recall: Recalls 3 objects  Results: 3 items recalled: COGNITIVE IMPAIRMENT LESS LIKELY    Mini-CogTM Copyright REGAN Aguilera. Licensed by the author for use in [facility]; reprinted with permission (radha@.[facility]). All rights reserved.          Other Assessments:     CV Risk based on Pooled Cohort Risk (consider assessing every 4-6 years; consider statin in patients with 10-yr risk > 7.5%):   The ASCVD Risk score (Anisha DC Jr., et al., 2013) failed to  calculate for the following reasons:    The 2013 ASCVD risk score is only valid for ages 40 to 79    Advance Directives: Discussed with patient and family as appropriate.  Has patient completed advance directives? Yes: Advance Directive has been received and scanned.              Immunization History   Administered Date(s) Administered     HEPA 02/03/1999, 11/02/1999     HepB 04/27/1999, 06/01/1999, 11/02/1999     Influenza (H1N1) 01/22/2010     Influenza (High Dose) 3 valent vaccine 10/26/2016     Influenza (IIV3) PF 10/08/2012, 11/21/2013     Meningococcal (Menomune ) 01/23/2003     Pneumo Conj 13-V (2010&after) 08/17/2016     Pneumococcal 23 valent 03/22/1996, 11/03/2000     Poliovirus, inactivated (IPV) 01/23/2003     TD (ADULT, 7+) 07/01/1997, 06/16/2004     TDAP Vaccine (Boostrix) 08/17/2016     Typhoid IM 02/03/1999, 06/16/2004, 01/26/2011     Yellow Fever 02/03/1999     Reviewed Immunization Record Today    Physical Exam     Vitals: There were no vitals taken for this visit.  BMI= There is no height or weight on file to calculate BMI.  GENERAL APPEARANCE: alert and no distress  HENT: ear canals patent and TM's normal; mouth without ulcers or lesions; and oral mucous membranes moist  Hearing loss screen:   Normal   Constitutional: She is oriented to person, place, and time.   Alert, pleasant.  Uses a 4-wheeled walker with seat.   Cardiovascular: Normal rate, regular rhythm and normal heart sounds.    Pulmonary/Chest: No respiratory distress. She has no wheezes. She has no rales.   Musculoskeletal:   + marked scoliosis, kyphosis.  + muscle wasting throughout   Neurological: She is alert and oriented to person, place, and time.   Psychiatric: She has a normal mood and affect. Her behavior is normal.     DENTITION:  Good repair?  yes  RESP: lungs clear to auscultation - no rales, rhonchi or wheezes  CV: regular rates and rhythm, no murmur, click or rub, no peripheral edema and peripheral pulses strong  SKIN: no  suspicious lesions or rashes  NEURO: Normal strength and tone  MENTAL STATUS EXAM  Appearance: appropriate  Attitude: cooperative  Behavior: normal  Eye Contact: normal  Speech: normal  Orientation: oreinted to person , place, time and situation  Mood:  bright  Affect: Mood Congruent  Thought Process: clear        Assessment and Plan         Welcome to Medicare Preventive Visit / Initial Medicare Annual Wellness Exam - reviewed Preventive Services and Plan form with patient as specified in Patient Instructions.    Belia was seen today for physical.    Diagnoses and all orders for this visit:    Medicare annual wellness visit, subsequent          Options for treatment and follow-up care were reviewed with the Belia Pugh Sudeep and/or guardian engaged in the decision making process and verbalized understanding of the options discussed and agreed with the final plan.    Alejandro Sandhu MD

## 2019-07-24 NOTE — PATIENT INSTRUCTIONS
[facility] PERSONAL PREVENTIVE SERVICES PLAN - IMMUNIZATIONS       Here are your recommended immunizations.  Take this home for your reference.                                                                                                 IMMUNIZATIONS Description Recommend today?      Influenza (Flu shot) Prevents flu; should get every year No; is up to date.   PCV 13 Pneumonia vaccination; you get it once No; is up to date.   PPSV 23 Second pneumonia vaccination; usually get it 1 year after PCV 13 No; is up to date.   Zoster (Shingles) Prevents shingles; you get it once Consider Shingrix vaccination; can get at a pharmacy.   Tetanus Prevents tetanus; once every 10 years No; is up to date.   [facility]ERSON[employer] [employer] PLAN - SERVICES          Review these tests with your doctor then decide which ones you want and take this page home for your reference                                                                                               IMMUNIZATIONS Description Recommend today?      Hepatitis B  If you have any of the following risk factors you should be immunized for hepatitis B: severe kidney disease, people who live in the same house as a carrier of Hepatitis B virus, people who live in  institutions (e.g. nursing homes or group homes), homosexual men, patients with hemophilia who received Factor VIII or IX concentrates, abusers of illicit injectable drugs No: is up to date.      SCREENING TESTS       Description    Year of Last Screening    Recommended Today?   Heart disease screening blood tests    Cholesterol level Reducing cholesterol can reduce your risk of heart attacks by 25%.  Screening is recommended yearly if you are at risk of heart disease otherwise every 4-5 years - No: is not indicated today.   Diabetes screening tests    Hemoglobin A1c blood test    Finding and treating diabetes early can reduce complications.  Screening recommended/covered yearly if you have  high blood pressure, high cholesterol, obesity (BMI >30), or a history of high blood glucose tests; or 2 of the following: family history of diabetes, overweight (BMI >25 but <30), age 65 years or older, and a history of diabetes of pregnancy or gave birth to baby weighing more than 9 lbs. - No; not indicated.   Hepatitis B screening Finding hepatitis B early can reduce complications.  Screening is recommended for persons with selected risk factors. - No: is not indicated today.   Hepatitis C screening Finding hepatitis C early can reduce complications.  Screening is recommended for all persons born from 1945 through 1965 and for those with selected other risk factors.  - No: is not indicated today.   HIV screening Finding HIV early can reduce complications.  Screening is recommended for persons with risk factors for HIV infection. - No: is not indicated today.   Glaucoma screening Early detection of glaucoma can prevent blindness.   Please talk to your eye doctor about this.         SCREENING TESTS       Description    Year of Last Screening    Recommended Today?   Colorectal cancer screening    Fecal occult blood test     Screening colonoscopy Screening for colon cancer has been shown to reduce death from colon cancer by 25-30%. Screening recommended to start at 50 years and continuing until age 75 years.   - No: is not indicated today.   Breast Cancer Screening (women)    Mammogram Mammogram screening for breast cancer has been shown to reduce the risk of dying from breast cancer and prolong life. Screening is recommended every 1-2 years for women aged 50 to 74 years.  - No: is not indicated today.   Cervical Cancer screening (women)    Pap Cervical pap smears can reduce cervical cancer. Screening is recommended annually if high risk (history of abnormal pap smears) otherwise every 2-3 years, stop screening at 65 years of age if history of normal paps. - No: is not indicated today.   Screening for  Osteoporosis:  Bone mass measurements (women)    Dexa Scan Screening and treating Osteoporosis can reduce the risk of hip and spine fractures. Screening is recommended in women 65 years or older and in women and men at risk of osteoporosis. 2019 No: is not indicated today.   Screening for Lung Cancer     Low-dose CT scanning Screening can reduce mortality in persons aged 55-80 who have smoked at least 30 pack-years and who are either still smoking or have quit in the past 15 years. - No: is not indicated today.   Abdominal Aortic Aneurysm (AAA) screening    Ultrasound (US)    An aneurysm treated before rupture is very safe -a ruptured aneurysm can be fatal.  Screening  by US for AAA is limited to patients who meet one of the following criteria:    Men who are 65-75 years old and have smoked more than 100 cigarettes in their lifetime    Anyone with a family history of abdominal aortic aneurysm - No: is not indicated today.                  MEDICARE WELLNESS EXAM PATIENT INSTRUCTIONS     Yearly exam:     See your health care provider every year in order to review changes in your health, review medicines that you take, and discuss preventive care needs such as immunizations and cancer screening.    Get a flu shot each year.      Advance Directives:    If you have not done so, you are encouraged to complete advance directives and/or a living will.   More information about advance directives can be found at: http://www.[place]med.org/advocacy/Key-Issues/Advance-Directives     Nutrition:     Eat at least 5 servings of fruits and vegetables each day.     Eat whole-grain bread, whole-wheat pasta and brown rice instead of white grains and rice.     Talk to your doctor about Calcium and Vitamin D.      Lifestyle:    Exercise for at least 150 minutes a week (30 minutes a day, 5 days a week). This will help you control your weight and prevent disease.     Limit alcohol to one drink per day.     If you smoke, try to quit - your  doctor will be happy to help.     Wear sunscreen to prevent skin cancer.     See your dentist every six months for an exam and cleaning.     See your eye doctor every 1 to 2 years to screen for conditions such as glaucoma, macular degeneration and cataracts.

## 2019-08-21 NOTE — TELEPHONE ENCOUNTER
I spoke with her daughter Lubna and scheduled  Veryl for a f/u with Dr Pittman 1/16/19  To discuss the plan going forward once the Forteo  2 years has been completed.   negative...

## 2019-10-04 ENCOUNTER — HEALTH MAINTENANCE LETTER (OUTPATIENT)
Age: 84
End: 2019-10-04

## 2019-12-03 DIAGNOSIS — I10 BENIGN ESSENTIAL HYPERTENSION: ICD-10-CM

## 2019-12-03 RX ORDER — AMLODIPINE BESYLATE 5 MG/1
5 TABLET ORAL DAILY
Qty: 90 TABLET | Refills: 1 | Status: SHIPPED | OUTPATIENT
Start: 2019-12-03 | End: 2020-05-27

## 2019-12-03 NOTE — TELEPHONE ENCOUNTER

## 2020-01-29 ENCOUNTER — DOCUMENTATION ONLY (OUTPATIENT)
Dept: FAMILY MEDICINE | Facility: CLINIC | Age: 85
End: 2020-01-29

## 2020-01-29 NOTE — PROGRESS NOTES
"When opening a documentation only encounter, be sure to enter in \"Chief Complaint\" Forms and in \" Comments\" Title of form, description if needed.    Sudeep is a 89 year old  female  Form received via: Fax  Form now resides in: Provider Ready    Angelina Hill CMA        Form has been completed by provider.     Form sent out via: Fax to [facility] 1/23/2020 at Fax Number: [phone]  Patient informed: NA   Output date: January 29, 2020    Angelina Hill CMA      **Please close the encounter**          "

## 2020-02-04 ENCOUNTER — DOCUMENTATION ONLY (OUTPATIENT)
Dept: FAMILY MEDICINE | Facility: CLINIC | Age: 85
End: 2020-02-04

## 2020-02-04 NOTE — PROGRESS NOTES
"When opening a documentation only encounter, be sure to enter in \"Chief Complaint\" Forms and in \" Comments\" Title of form, description if needed.    Sudeep is a 89 year old  female  Form received via: Fax  Form now resides in: Provider Ready    Angelina Hill CMA        Form has been completed by provider.     Form sent out via: Fax to [facility] 1/28/2020 at Fax Number: [phone]  Patient informed: NA   Output date: February 4, 2020    Angelina Hill CMA      **Please close the encounter**            "

## 2020-02-26 ENCOUNTER — OFFICE VISIT (OUTPATIENT)
Dept: FAMILY MEDICINE | Facility: CLINIC | Age: 85
End: 2020-02-26
Payer: COMMERCIAL

## 2020-02-26 VITALS
BODY MASS INDEX: 20.2 KG/M2 | DIASTOLIC BLOOD PRESSURE: 85 MMHG | OXYGEN SATURATION: 94 % | SYSTOLIC BLOOD PRESSURE: 124 MMHG | HEART RATE: 80 BPM | WEIGHT: 95 LBS

## 2020-02-26 DIAGNOSIS — M19.91 PRIMARY OSTEOARTHRITIS, UNSPECIFIED SITE: ICD-10-CM

## 2020-02-26 DIAGNOSIS — M80.00XD AGE-RELATED OSTEOPOROSIS WITH CURRENT PATHOLOGICAL FRACTURE WITH ROUTINE HEALING, SUBSEQUENT ENCOUNTER: Primary | ICD-10-CM

## 2020-02-26 DIAGNOSIS — R63.4 WEIGHT LOSS: ICD-10-CM

## 2020-02-26 DIAGNOSIS — G93.40 ENCEPHALOPATHY: ICD-10-CM

## 2020-02-26 NOTE — PATIENT INSTRUCTIONS
Call Endocrinology to see if you need another infusion of zoledronic aced.    Every calorie is a good calorie.

## 2020-02-27 ENCOUNTER — TELEPHONE (OUTPATIENT)
Dept: ENDOCRINOLOGY | Facility: CLINIC | Age: 85
End: 2020-02-27

## 2020-02-27 DIAGNOSIS — M80.00XD AGE-RELATED OSTEOPOROSIS WITH CURRENT PATHOLOGICAL FRACTURE WITH ROUTINE HEALING, SUBSEQUENT ENCOUNTER: Primary | ICD-10-CM

## 2020-02-27 ASSESSMENT — ENCOUNTER SYMPTOMS
MYALGIAS: 0
AGITATION: 0
ACTIVITY CHANGE: 0
SLEEP DISTURBANCE: 1
UNEXPECTED WEIGHT CHANGE: 0
DYSPHORIC MOOD: 0
CARDIOVASCULAR NEGATIVE: 1
DIZZINESS: 0
HALLUCINATIONS: 0
BACK PAIN: 0
FEVER: 0
FATIGUE: 0
ARTHRALGIAS: 1
APPETITE CHANGE: 0
WEAKNESS: 0
RESPIRATORY NEGATIVE: 1

## 2020-02-27 NOTE — PROGRESS NOTES
HPI:       89 year old patient who presents with:      Follow-up of multiple chronic conditions.  She reports that she is doing doing quite well.  Mood is good.  She is getting around with a walker and is having no difficulty doing that in her assisted living facility.  Social life is good.  There have been no falls.  Denies pain.  Has had no episodes of hallucinations that have bothered her in the past.         Problem, Medication and Allergy Lists were reviewed and are current.  Patient is an established patient of this clinic.         Review of Systems:     Review of Systems   Constitutional: Negative for activity change, appetite change, fatigue, fever and unexpected weight change.   Respiratory: Negative.    Cardiovascular: Negative.    Musculoskeletal: Positive for arthralgias and gait problem. Negative for back pain and myalgias.   Neurological: Negative for dizziness and weakness.   Psychiatric/Behavioral: Positive for sleep disturbance (excessive). Negative for agitation, dysphoric mood and hallucinations.          Physical Exam:     /85 (BP Location: Left arm, Patient Position: Sitting, Cuff Size: Adult Small)   Pulse 80   Wt 43.1 kg (95 lb)   SpO2 94%   Breastfeeding No   BMI 20.20 kg/m      Body mass index is 20.2 kg/m .  Vitals reviewed.    Physical Exam  Constitutional:       Comments: Alert, pleasant.  Uses a 4-wheeled walker with seat.   Cardiovascular:      Rate and Rhythm: Normal rate and regular rhythm.      Heart sounds: Normal heart sounds.   Pulmonary:      Effort: No respiratory distress.      Breath sounds: No wheezing or rales.   Abdominal:      General: Bowel sounds are normal.      Palpations: Abdomen is soft. There is no mass.      Tenderness: There is no abdominal tenderness.   Musculoskeletal:      Comments: + marked scoliosis, kyphosis.  + muscle wasting throughout   Neurological:      Mental Status: She is alert and oriented to person, place, and time.   Psychiatric:          Behavior: Behavior normal.             Results:     Results from last visit:  Infusion Therapy Visit on 03/13/2019   Component Date Value Ref Range Status     Creatinine 03/13/2019 0.58  0.52 - 1.04 mg/dL Final     GFR Estimate 03/13/2019 82  >60 mL/min/[1.73_m2] Final    Comment: Non  GFR Calc  Starting 12/18/2018, serum creatinine based estimated GFR (eGFR) will be   calculated using the Chronic Kidney Disease Epidemiology Collaboration   (CKD-EPI) equation.       GFR Estimate If Black 03/13/2019 >90  >60 mL/min/[1.73_m2] Final    Comment:  GFR Calc  Starting 12/18/2018, serum creatinine based estimated GFR (eGFR) will be   calculated using the Chronic Kidney Disease Epidemiology Collaboration   (CKD-EPI) equation.       Calcium 03/13/2019 8.6  8.5 - 10.1 mg/dL Final     Albumin 03/13/2019 3.2* 3.4 - 5.0 g/dL Final     TSH 03/13/2019 0.88  0.40 - 4.00 mU/L Final     Assessment and Plan     Belia was seen today for follow up.    Diagnoses and all orders for this visit:    Age-related osteoporosis with current pathological fracture with routine healing, subsequent encounter.  She has done remarkably well.  Still at very high risk of pathologic fracture.  We will continue with annual zoledronic acid infusions.    Primary osteoarthritis, unspecified site.  She continues to do quite well and currently her symptoms are under good control.    Encephalopathy.  This is completely resolved.    Weight loss.  There is a five-pound weight loss which is concerning given a baseline weight of 100 pounds.  She feels fine.  I encouraged her to be attentive to caloric intake and we will recheck her weight in 3 to 6 months.        Options for treatment and follow-up care were reviewed with the patient. Belia Sheets engaged in the decision making process and verbalized understanding of the options discussed and agreed with the final plan.    Alejandro Sandhu MD

## 2020-02-27 NOTE — TELEPHONE ENCOUNTER
"Patient saw primary care and was asked to call and clarify:  \"Call Endocrinology to see if you need another infusion of zoledronic aced\"  "

## 2020-02-27 NOTE — TELEPHONE ENCOUNTER
[facility] Call Center    Phone Message    May a detailed message be left on voicemail: yes     Reason for Call: Please call pt's daughter back Lubna at  regarding follow up care regarding the Osteoporosis. Thank you    Action Taken: Message routed to:  [facility] ENDO    Travel Screening: Not Applicable

## 2020-02-27 NOTE — TELEPHONE ENCOUNTER
Yes, follow up RECLAST INFUSION NEEDED AFTER 3/16/20 (LAST INJECTION IN 2019). Please advise pt to schedule an appointment for an infusion.   Please schedule an appointment with me for annual clinic follow up visit and following her appointment with me; she can have the reclast infusion. Labs needed for the day of her visit ordered.

## 2020-02-28 NOTE — TELEPHONE ENCOUNTER
PLEASE HELP SCHEDULE THE FOLLOWING APPT(S): Return Appointment and RECLAST infusion    TIME FRAME NEEDED BY: First available.       SCHEDULING COMMENTS (optional): per Dr Pittman, see notes.   Keysha Skinner RN on 2/28/2020 at 10:17 AM

## 2020-04-22 ENCOUNTER — TELEPHONE (OUTPATIENT)
Dept: ENDOCRINOLOGY | Facility: CLINIC | Age: 85
End: 2020-04-22

## 2020-04-22 NOTE — TELEPHONE ENCOUNTER
[facility] Call Center    Phone Message    May a detailed message be left on voicemail: no     Reason for Call: Other: Pt wants to know how essential her appts are next week for her annual f/u. Notes in chart state her annual is somewhat tied to her getting reclast infusions but Pt isn't sure how needed those are either. Please call Pt back to discuss.     Action Taken: Message routed to:  [facility]    Travel Screening: Not Applicable

## 2020-04-28 NOTE — TELEPHONE ENCOUNTER
CLINIC COORDINATOR SCHEDULING NOTES    CALL RESULT: LVM    APPT TYPE: JAME + Reclast infusion (see additional notes)    PROVIDER: Toya    DATE APPT NEEDED: July/August per pt preference    ADDITIONAL NOTES: schedule JAME appointment and transfer pt to [facility] to schedule Reclast - [phone], option 3, followed by option 2

## 2020-05-14 NOTE — TELEPHONE ENCOUNTER
CLINIC COORDINATOR SCHEDULING NOTES     CALL RESULT: LVMx2     APPT TYPE: JAME + Reclast infusion (see additional notes)     PROVIDER: Toya     DATE APPT NEEDED: July/August per pt preference     ADDITIONAL NOTES: schedule JAME appointment and transfer pt to [facility] to schedule Reclast - [phone], option 3, followed by option 2

## 2020-05-26 DIAGNOSIS — I10 BENIGN ESSENTIAL HYPERTENSION: ICD-10-CM

## 2020-05-26 NOTE — TELEPHONE ENCOUNTER
"Request for medication refill: amLODIPine (NORVASC) 5 MG tablet     Providers if patient needs an appointment and you are willing to give a one month supply please refill for one month and  send a letter/MyChart using \".SMILLIMITEDREFILL\" .smillimited and route chart to \"[facility] \" (Giving one month refill in non controlled medications is strongly recommended before denial)    If refill has been denied, meaning absolutely no refills without visit, please complete the smart phrase \".smirxrefuse\" and route it to the \"P [facility] MED REFILLS\"  pool to inform the patient and the pharmacy.    Kiara Duggan, CMA        "

## 2020-05-27 RX ORDER — AMLODIPINE BESYLATE 5 MG/1
5 TABLET ORAL DAILY
Qty: 90 TABLET | Refills: 1 | Status: SHIPPED | OUTPATIENT
Start: 2020-05-27 | End: 2020-11-09

## 2020-06-24 ENCOUNTER — MYC MEDICAL ADVICE (OUTPATIENT)
Dept: ENDOCRINOLOGY | Facility: CLINIC | Age: 85
End: 2020-06-24

## 2020-06-30 ENCOUNTER — DOCUMENTATION ONLY (OUTPATIENT)
Dept: FAMILY MEDICINE | Facility: CLINIC | Age: 85
End: 2020-06-30

## 2020-06-30 NOTE — PROGRESS NOTES
"When opening a documentation only encounter, be sure to enter in \"Chief Complaint\" Forms and in \" Comments\" Title of form, description if needed.    Sudeep is a 89 year old  female  Form received via: Fax  Form now resides in: Provider Ready    BRENDAN Guajardo 3:37 PM June 30, 2020                  "

## 2020-07-01 NOTE — PROGRESS NOTES
Form has been completed by provider.     Form sent out via: Fax to Rebekah Donovan LPN at Fax Number: [phone]  Patient informed: No  Output date: July 1, 2020    BRENDAN Guajardo 5:13 PM July 1, 2020      **Please close the encounter**

## 2020-07-13 DIAGNOSIS — M80.00XD AGE-RELATED OSTEOPOROSIS WITH CURRENT PATHOLOGICAL FRACTURE WITH ROUTINE HEALING, SUBSEQUENT ENCOUNTER: ICD-10-CM

## 2020-07-13 LAB
ALBUMIN SERPL-MCNC: 3.4 G/DL (ref 3.4–5)
ALP SERPL-CCNC: 68 U/L (ref 40–150)
BUN SERPL-MCNC: 16 MG/DL (ref 7–30)
CALCIUM SERPL-MCNC: 8.8 MG/DL (ref 8.5–10.1)
CREAT SERPL-MCNC: 0.67 MG/DL (ref 0.52–1.04)
GFR SERPL CREATININE-BSD FRML MDRD: 77 ML/MIN/{1.73_M2}

## 2020-07-14 LAB — DEPRECATED CALCIDIOL+CALCIFEROL SERPL-MC: 66 UG/L (ref 20–75)

## 2020-07-14 NOTE — RESULT ENCOUNTER NOTE
Sudeep,    Attached please find blood work results.  1.  Your vitamin D level is within the normal limits.  It slightly closer to the higher range and I will discuss at the time of your visit next steps regarding this.  2.  Kidney function, calcium and other related labs are within the expected range.    Please let me know if you have any questions regarding this results.    Saurabh Pittman MD

## 2020-07-28 NOTE — PROGRESS NOTES
"Belia Sheets is a 89 year old female who is being evaluated via a billable telephone visit.      The patient has been notified of following:     \"This telephone visit will be conducted via a call between you and your physician/provider. We have found that certain health care needs can be provided without the need for a physical exam.  This service lets us provide the care you need with a short phone conversation.  If a prescription is necessary we can send it directly to your pharmacy.  If lab work is needed we can place an order for that and you can then stop by our lab to have the test done at a later time.    Telephone visits are billed at different rates depending on your insurance coverage. During this emergency period, for some insurers they may be billed the same as an in-person visit.  Please reach out to your insurance provider with any questions.    If during the course of the call the physician/provider feels a telephone visit is not appropriate, you will not be charged for this service.\"    Patient has given verbal consent for Telephone visit?  Yes    What phone number would you like to be contacted at? [phone]    How would you like to obtain your AVS? [facility]  Endocrinology telephone Visit    Chief Complaint: Telephone (endocrine)     Information obtained from: Patient + daughter.    Subjective:         HPI: Belia Sheets is a 89 year old year old female with history of osteoporosis who is here for a follow up.      Again, this is 89 yo female with past medical history of osteoporosis first diagnosed in 1999.     She was treated with alendronate 70 mg weekly from 1999 to October 2015.  She has never stopped this medication all these years; she took it first thing in the morning with empty stomach.    +History of vertebral fracture; she was folding laundry when she fell from standing up position and sustained injury.  Broken hip and noted to have thoracic compression fractures.  Previous " fragility fracture, morphometric vertebral fracture: imaging done in 7/2015 has showed multiple compression fractures.   7/2015 spine film reported as <Multiple old healed left rib fractures. Diffuse osteopenia. New moderate compression fracture of T10 since the prior examination. New moderate compression fractures in T4 and T5 are new since the prior exam. Multilevel degenerative changes in the thoracic spine are otherwise stable. Marked scoliosis of the thoracolumbar spine is redemonstrated.>    Due to severe osteoporosis, long standing previous alendronate therapy and alkaline phosphatase which was within the normal limits; we started On FORTEO about two years ago and she completed therapy on 2/24/2019.  Didn't have any side effects from these medication.  No fracture over the last 2 years while she was on Forteo.  She uses a walker for walking.  Follow-up DEXA which was done towards the end of therapy with Forteo is copied below.      After completion of treatment with Forteo; she received Reclast in March 2019.  Follow-up Reclast which was planned for March 2020 could not be completed due to coverage.    She is taking calcium and vitamin d supplement 600-800; 1 tab daily; she takes vitamin D 2000 supplements.  She has at least 2 servings of dairy products a day.      Risk factors for osteoporosis and complications:  Lost 6 inch height loss/kyphosis    Weight - 91 pounds (high risk); gained 5 pounds since her last visit.   Falls/risk factors: Multiple falls. 5 falls ine one year and half but again no falls over the last one year.  Sustained rib and vertebral fractures secondary to the previous falls. Currently participating in home physical therapy for osteoporosis.   Walks three times per day in hallways of her apartment. Uses walker/cane for walking short distances and 4 barros for long distances. Fall: 7/2015, 12/2014, 4/2016, 6/2016. 01/2017.    Again and no fractures over the last 2 years since she was  started on Forteo.    She lives in a senior apartment.  She gets some services. Mostly manages by her own.     Secondary causes of osteoporosis: negative for    RA, Prolonged immobility,  Organ transplantation, Type I diabetes, Hyperthyroidism, GI disease, Chronic liver disease or COPD.     Review of system negative except for postnasal drip and coughing episode following postnasal drip.     No Known Allergies    Current Outpatient Medications   Medication Sig Dispense Refill     amLODIPine (NORVASC) 5 MG tablet Take 1 tablet (5 mg) by mouth daily 90 tablet 1     calcium carbonate 600 mg-vitamin D 400 units (CALTRATE) 600-400 MG-UNIT per tablet Take 1 tablet by mouth 2 times daily 180 tablet 3     Cyanocobalamin (VITAMIN B 12 PO) Take 1 tablet by mouth every morning        order for DME Equipment being ordered: Incentive spirometer 1 Units 0     order for DME Equipment being ordered: Home BP monitor and cuff 1 each 0     sodium fluoride dental gel (PREVIDENT) 1.1 % GEL Apply to affected area At Bedtime       acetaminophen (TYLENOL) 325 MG tablet Take 2 tablets (650 mg) by mouth every 4 hours as needed for mild pain (Patient not taking: Reported on 10/3/2018) 60 tablet 0     fluticasone (FLONASE) 50 MCG/ACT nasal spray Spray 1 spray into both nostrils daily (Patient not taking: Reported on 6/11/2019) 15.8 mL 1     magnesium oxide (MAG-OX) 400 MG tablet Take 600 mg by mouth        menthol-zinc oxide (CALMOSEPTINE) 0.44-20.625 % OINT ointment Apply topically 4 times daily as needed for skin protection (Patient not taking: Reported on 7/24/2019) 113 g 1     nystatin (MYCOSTATIN) 061644 UNIT/GM POWD Apply topically 3 times daily as needed (Patient not taking: Reported on 10/3/2018) 30 g 1     saccharomyces boulardii (FLORASTOR) 250 MG capsule Take 1 capsule (250 mg) by mouth 2 times daily (Patient not taking: Reported on 10/3/2018) 14 capsule        Review of Systems     8 point review system (Constitutional, HENT, Eyes,  Respiratory, Cardiovascular, Gastrointestinal, Genitourinary, Musculoskeletal,Neurological, Psychiatric/Behavioural, Endocrine) is negative or is as per HPI above: fecal incontinence.       Past Medical History:   Diagnosis Date     Carpal tunnel syndrome (aka CTS)      H/O calcium pyrophosphate deposition disease (CPPD)      History of encephalopathy 10/2017     Hypertension      Kyphoscoliosis      Osteoarthritis     hands     Osteoporosis      Rectal prolapse        Past Surgical History:   Procedure Laterality Date     COLONOSCOPY       HYSTERECTOMY      for uterine prolapse     RECTOSIGMOIDECTOMY PERINEAL N/A 2018    Procedure: RECTOSIGMOIDECTOMY PERINEAL;  Perineal Rectosigmoidectomy  ;  Surgeon: Amrik Mariano MD;  Location: [facility] OR       Family History   Problem Relation Age of Onset     Depression/Anxiety Son      Depression/Anxiety Son         alcohol abuse  age 24     Hypertension Mother      Hypertension Brother      Diabetes No family hx of      Breast Cancer No family hx of      Colon Cancer No family hx of      Prostate Cancer No family hx of      Other Cancer No family hx of        Social History     Social History     Marital Status:      Spouse Name: N/A     Number of Children: N/A     Years of Education: N/A     Social History Main Topics     Smoking status: Never Smoker      Smokeless tobacco: Never Used     Alcohol Use: Not on file     Drug Use: No     Sexual Activity: Not on file     Other Topics Concern     Not on file     Social History Narrative       Objective:   There were no vitals taken for this visit.  Constitutional: Pleasant   Psychological: appropriate mood and affect   In House Labs:   ENDO CALCIUM LABS-[facility] Latest Ref Rng & Units 2020   CALCIUM 8.5 - 10.1 mg/dL 8.8   CALCIUM IONIZED WHOLE BLOOD 4.4 - 5.2 mg/dL    PHOSPHOROUS 2.5 - 4.5 mg/dL    MAGNESIUM 1.6 - 2.3 mg/dL    ALBUMIN 3.4 - 5.0 g/dL 3.4   BUN 7 - 30 mg/dL 16   CREATININE 0.52 - 1.04  mg/dL 0.67   PARATHYROID HORMONE INTACT 18 - 80 pg/mL    ALKPHOS 40 - 150 U/L 68   25 OH VIT D2 ug/L    25 OH VIT D3 ug/L    25 OH VIT D TOTAL 20 - 75 ug/L    VITAMIN D DEFICIENCY SCREENING 20 - 75 ug/L 66       Region Date BMD T - score Z - score BMD change from baseline BMD % change from baseline   L1-L4 01/30/2019 0.758 g/cm  -3.5 -0.8 -0.057 g/cm  -7.0%   L1-L4 01/05/2017 0.815 g/cm                            L1-L2 01/30/2019 0.659 g/cm  -4.2 -1.5 0.025 g/cm  3.9%   L1-L2 01/05/2017 0.634 g/cm               Neck Left 01/30/2019 0.662 g/cm  -2.7 0.3       Neck Left 01/05/2017 0.644 g/cm            Total Left 01/30/2019 0.580 g/cm  -3.4 -0.4 -0.047 g/cm  -7.5%   Total Left 01/05/2017 0.627 g/cm            Neck Right 01/30/2019 0.600 g/cm  -3.2 -0.2       Neck Right 01/05/2017 0.603 g/cm            Total Right 01/30/2019 0.608 g/cm  -3.2 -0.2 0.020 g/cm  3.4%   Total Right 01/05/2017 0.588 g/cm            Radius 33% 01/30/2019 0.394 g/cm  -4.4 -0.9 -0.064 g/cm  -13.9%   Radius 33% 01/05/2017 0.457 g/cm                Assessment/Treatment Plan:      # Osteoporosis with fragility fracture:  Belia Sheets is a 88 year old year old female with Osteoporosis and fragility fracture (7/2015); diagnosed in 1999.  Treated with alendronate for 16 years and four months.  Off alendronate since October 2015 (over one year).      Given severity of her osteoporosis and fragility fracture (severe as documented above); needed aggressive treatment completed/treated with FORTEO for 2 years ago and following that received Reclast therapy in March 2019.  Follow-up Reclast was planned for March 2020 however due to call with this was delayed until now.  She reports today to me that she is doing well overall.  I have advised her to proceed Reclast therapy as the effect of therapy will be over at 18 months joe therefore I have advised him to scheduled follow-up Reclast to be administered in August 2020.    I   Patient Instructions    Sudeep,        It was a pleasure talking to you.     We will schedule the RECLAST infusion     Tylenol 500 mg (extra strength) every eight hours for three days after RECLAST infusion starting from the day of infusion.      I will contact the patient with the test results.  Return to clinic in 12 months.        Saurabh Pittman MD  Staff Endocrinologist   [phone]  Division of Endocrinology and Diabetes      Phone call duration: 28 minutes

## 2020-07-29 ENCOUNTER — VIRTUAL VISIT (OUTPATIENT)
Dept: ENDOCRINOLOGY | Facility: CLINIC | Age: 85
End: 2020-07-29
Payer: COMMERCIAL

## 2020-07-29 DIAGNOSIS — M80.00XD AGE-RELATED OSTEOPOROSIS WITH CURRENT PATHOLOGICAL FRACTURE WITH ROUTINE HEALING, SUBSEQUENT ENCOUNTER: ICD-10-CM

## 2020-07-29 RX ORDER — HEPARIN SODIUM (PORCINE) LOCK FLUSH IV SOLN 100 UNIT/ML 100 UNIT/ML
5 SOLUTION INTRAVENOUS
Status: CANCELLED | OUTPATIENT
Start: 2020-07-29

## 2020-07-29 RX ORDER — HEPARIN SODIUM,PORCINE 10 UNIT/ML
5 VIAL (ML) INTRAVENOUS
Status: CANCELLED | OUTPATIENT
Start: 2020-07-29

## 2020-07-29 RX ORDER — ZOLEDRONIC ACID 5 MG/100ML
5 INJECTION, SOLUTION INTRAVENOUS ONCE
Status: CANCELLED
Start: 2020-07-29

## 2020-07-29 NOTE — LETTER
"7/29/2020       RE: Belia Sheets  [address]  [address]  [facility] [address]     Dear Colleague,    Thank you for referring your patient, Belia Sheets, to the [facility] ENDOCRINOLOGY at [facility]. Please see a copy of my visit note below.    Belia Sheets is a 89 year old female who is being evaluated via a billable telephone visit.      The patient has been notified of following:     \"This telephone visit will be conducted via a call between you and your physician/provider. We have found that certain health care needs can be provided without the need for a physical exam.  This service lets us provide the care you need with a short phone conversation.  If a prescription is necessary we can send it directly to your pharmacy.  If lab work is needed we can place an order for that and you can then stop by our lab to have the test done at a later time.    Telephone visits are billed at different rates depending on your insurance coverage. During this emergency period, for some insurers they may be billed the same as an in-person visit.  Please reach out to your insurance provider with any questions.    If during the course of the call the physician/provider feels a telephone visit is not appropriate, you will not be charged for this service.\"    Patient has given verbal consent for Telephone visit?  Yes    What phone number would you like to be contacted at? [phone]    How would you like to obtain your AVS? [facility]  Endocrinology telephone Visit    Chief Complaint: Telephone (endocrine)     Information obtained from: Patient + daughter.    Subjective:         HPI: Belia Sheets is a 89 year old year old female with history of osteoporosis who is here for a follow up.      Again, this is 89 yo female with past medical history of osteoporosis first diagnosed in 1999.     She was treated with alendronate 70 mg weekly from 1999 to October 2015.  She has " never stopped this medication all these years; she took it first thing in the morning with empty stomach.    +History of vertebral fracture; she was folding laundry when she fell from standing up position and sustained injury.  Broken hip and noted to have thoracic compression fractures.  Previous fragility fracture, morphometric vertebral fracture: imaging done in 7/2015 has showed multiple compression fractures.   7/2015 spine film reported as <Multiple old healed left rib fractures. Diffuse osteopenia. New moderate compression fracture of T10 since the prior examination. New moderate compression fractures in T4 and T5 are new since the prior exam. Multilevel degenerative changes in the thoracic spine are otherwise stable. Marked scoliosis of the thoracolumbar spine is redemonstrated.>    Due to severe osteoporosis, long standing previous alendronate therapy and alkaline phosphatase which was within the normal limits; we started On FORTEO about two years ago and she completed therapy on 2/24/2019.  Didn't have any side effects from these medication.  No fracture over the last 2 years while she was on Forteo.  She uses a walker for walking.  Follow-up DEXA which was done towards the end of therapy with Forteo is copied below.      After completion of treatment with Forteo; she received Reclast in March 2019.  Follow-up Reclast which was planned for March 2020 could not be completed due to coverage.    She is taking calcium and vitamin d supplement 600-800; 1 tab daily; she takes vitamin D 2000 supplements.  She has at least 2 servings of dairy products a day.      Risk factors for osteoporosis and complications:  Lost 6 inch height loss/kyphosis    Weight - 91 pounds (high risk); gained 5 pounds since her last visit.   Falls/risk factors: Multiple falls. 5 falls ine one year and half but again no falls over the last one year.  Sustained rib and vertebral fractures secondary to the previous falls. Currently  participating in home physical therapy for osteoporosis.   Walks three times per day in hallways of her apartment. Uses walker/cane for walking short distances and 4 barros for long distances. Fall: 7/2015, 12/2014, 4/2016, 6/2016. 01/2017.    Again and no fractures over the last 2 years since she was started on Forteo.    She lives in a senior apartment.  She gets some services. Mostly manages by her own.     Secondary causes of osteoporosis: negative for    RA, Prolonged immobility,  Organ transplantation, Type I diabetes, Hyperthyroidism, GI disease, Chronic liver disease or COPD.     Review of system negative except for postnasal drip and coughing episode following postnasal drip.     No Known Allergies    Current Outpatient Medications   Medication Sig Dispense Refill     amLODIPine (NORVASC) 5 MG tablet Take 1 tablet (5 mg) by mouth daily 90 tablet 1     calcium carbonate 600 mg-vitamin D 400 units (CALTRATE) 600-400 MG-UNIT per tablet Take 1 tablet by mouth 2 times daily 180 tablet 3     Cyanocobalamin (VITAMIN B 12 PO) Take 1 tablet by mouth every morning        order for DME Equipment being ordered: Incentive spirometer 1 Units 0     order for DME Equipment being ordered: Home BP monitor and cuff 1 each 0     sodium fluoride dental gel (PREVIDENT) 1.1 % GEL Apply to affected area At Bedtime       acetaminophen (TYLENOL) 325 MG tablet Take 2 tablets (650 mg) by mouth every 4 hours as needed for mild pain (Patient not taking: Reported on 10/3/2018) 60 tablet 0     fluticasone (FLONASE) 50 MCG/ACT nasal spray Spray 1 spray into both nostrils daily (Patient not taking: Reported on 6/11/2019) 15.8 mL 1     magnesium oxide (MAG-OX) 400 MG tablet Take 600 mg by mouth        menthol-zinc oxide (CALMOSEPTINE) 0.44-20.625 % OINT ointment Apply topically 4 times daily as needed for skin protection (Patient not taking: Reported on 7/24/2019) 113 g 1     nystatin (MYCOSTATIN) 606715 UNIT/GM POWD Apply topically 3 times  daily as needed (Patient not taking: Reported on 10/3/2018) 30 g 1     saccharomyces boulardii (FLORASTOR) 250 MG capsule Take 1 capsule (250 mg) by mouth 2 times daily (Patient not taking: Reported on 10/3/2018) 14 capsule        Review of Systems     8 point review system (Constitutional, HENT, Eyes, Respiratory, Cardiovascular, Gastrointestinal, Genitourinary, Musculoskeletal,Neurological, Psychiatric/Behavioural, Endocrine) is negative or is as per HPI above: fecal incontinence.       Past Medical History:   Diagnosis Date     Carpal tunnel syndrome (aka CTS)      H/O calcium pyrophosphate deposition disease (CPPD)      History of encephalopathy 10/2017     Hypertension      Kyphoscoliosis      Osteoarthritis     hands     Osteoporosis      Rectal prolapse        Past Surgical History:   Procedure Laterality Date     COLONOSCOPY       HYSTERECTOMY      for uterine prolapse     RECTOSIGMOIDECTOMY PERINEAL N/A 2018    Procedure: RECTOSIGMOIDECTOMY PERINEAL;  Perineal Rectosigmoidectomy  ;  Surgeon: Amrik Mariano MD;  Location: [facility] OR       Family History   Problem Relation Age of Onset     Depression/Anxiety Son      Depression/Anxiety Son         alcohol abuse  age 24     Hypertension Mother      Hypertension Brother      Diabetes No family hx of      Breast Cancer No family hx of      Colon Cancer No family hx of      Prostate Cancer No family hx of      Other Cancer No family hx of        Social History     Social History     Marital Status:      Spouse Name: N/A     Number of Children: N/A     Years of Education: N/A     Social History Main Topics     Smoking status: Never Smoker      Smokeless tobacco: Never Used     Alcohol Use: Not on file     Drug Use: No     Sexual Activity: Not on file     Other Topics Concern     Not on file     Social History Narrative       Objective:   There were no vitals taken for this visit.  Constitutional: Pleasant   Psychological: appropriate mood  and affect   In House Labs:   ENDO CALCIUM LABS-[facility] Latest Ref Rng & Units 7/13/2020   CALCIUM 8.5 - 10.1 mg/dL 8.8   CALCIUM IONIZED WHOLE BLOOD 4.4 - 5.2 mg/dL    PHOSPHOROUS 2.5 - 4.5 mg/dL    MAGNESIUM 1.6 - 2.3 mg/dL    ALBUMIN 3.4 - 5.0 g/dL 3.4   BUN 7 - 30 mg/dL 16   CREATININE 0.52 - 1.04 mg/dL 0.67   PARATHYROID HORMONE INTACT 18 - 80 pg/mL    ALKPHOS 40 - 150 U/L 68   25 OH VIT D2 ug/L    25 OH VIT D3 ug/L    25 OH VIT D TOTAL 20 - 75 ug/L    VITAMIN D DEFICIENCY SCREENING 20 - 75 ug/L 66       Region Date BMD T - score Z - score BMD change from baseline BMD % change from baseline   L1-L4 01/30/2019 0.758 g/cm  -3.5 -0.8 -0.057 g/cm  -7.0%   L1-L4 01/05/2017 0.815 g/cm                            L1-L2 01/30/2019 0.659 g/cm  -4.2 -1.5 0.025 g/cm  3.9%   L1-L2 01/05/2017 0.634 g/cm               Neck Left 01/30/2019 0.662 g/cm  -2.7 0.3       Neck Left 01/05/2017 0.644 g/cm            Total Left 01/30/2019 0.580 g/cm  -3.4 -0.4 -0.047 g/cm  -7.5%   Total Left 01/05/2017 0.627 g/cm            Neck Right 01/30/2019 0.600 g/cm  -3.2 -0.2       Neck Right 01/05/2017 0.603 g/cm            Total Right 01/30/2019 0.608 g/cm  -3.2 -0.2 0.020 g/cm  3.4%   Total Right 01/05/2017 0.588 g/cm            Radius 33% 01/30/2019 0.394 g/cm  -4.4 -0.9 -0.064 g/cm  -13.9%   Radius 33% 01/05/2017 0.457 g/cm                Assessment/Treatment Plan:      # Osteoporosis with fragility fracture:  Veryl Keaton Sudeep is a 88 year old year old female with Osteoporosis and fragility fracture (7/2015); diagnosed in 1999.  Treated with alendronate for 16 years and four months.  Off alendronate since October 2015 (over one year).      Given severity of her osteoporosis and fragility fracture (severe as documented above); needed aggressive treatment completed/treated with FORTEO for 2 years ago and following that received Reclast therapy in March 2019.  Follow-up Reclast was planned for March 2020 however due to call with this was delayed  until now.  She reports today to me that she is doing well overall.  I have advised her to proceed Reclast therapy as the effect of therapy will be over at 18 months joe therefore I have advised him to scheduled follow-up Reclast to be administered in August 2020.    I   Patient Instructions   Sudeep,        It was a pleasure talking to you.     We will schedule the RECLAST infusion     Tylenol 500 mg (extra strength) every eight hours for three days after RECLAST infusion starting from the day of infusion.      I will contact the patient with the test results.  Return to clinic in 12 months.    Saurabh Pittman MD  Staff Endocrinologist   [phone]  Division of Endocrinology and Diabetes    Phone call duration: 28 minutes   Repair Anesthesia Method: local infiltration

## 2020-07-29 NOTE — PATIENT INSTRUCTIONS
Sudeep,        It was a pleasure talking to you.     We will schedule the RECLAST infusion     Tylenol 500 mg (extra strength) every eight hours for three days after RECLAST infusion starting from the day of infusion.

## 2020-08-03 ENCOUNTER — TELEPHONE (OUTPATIENT)
Dept: ENDOCRINOLOGY | Facility: CLINIC | Age: 85
End: 2020-08-03

## 2020-08-03 NOTE — TELEPHONE ENCOUNTER
[facility] Call Center    Phone Message    May a detailed message be left on voicemail: yes     Reason for Call: Other: Pt's daughter Lubna is requesting a call back to discuss plan for a reclast infusion. No orders placed in chart. She would like to know if the clinic can place the orders so she can call and schedule. Please notify Lubna once orders are in. Thanks!      Action Taken: Message routed to:  [facility] ([facility] Endocrine    Travel Screening: Not Applicable

## 2020-08-18 ENCOUNTER — APPOINTMENT (OUTPATIENT)
Dept: LAB | Facility: CLINIC | Age: 85
End: 2020-08-18
Attending: INTERNAL MEDICINE
Payer: COMMERCIAL

## 2020-08-18 ENCOUNTER — INFUSION THERAPY VISIT (OUTPATIENT)
Dept: INFUSION THERAPY | Facility: CLINIC | Age: 85
End: 2020-08-18
Attending: INTERNAL MEDICINE
Payer: COMMERCIAL

## 2020-08-18 VITALS
TEMPERATURE: 97.9 F | SYSTOLIC BLOOD PRESSURE: 122 MMHG | HEART RATE: 77 BPM | WEIGHT: 100.7 LBS | HEIGHT: 59 IN | DIASTOLIC BLOOD PRESSURE: 78 MMHG | BODY MASS INDEX: 20.3 KG/M2 | OXYGEN SATURATION: 97 %

## 2020-08-18 DIAGNOSIS — M80.00XD AGE-RELATED OSTEOPOROSIS WITH CURRENT PATHOLOGICAL FRACTURE WITH ROUTINE HEALING, SUBSEQUENT ENCOUNTER: Primary | ICD-10-CM

## 2020-08-18 PROCEDURE — 25800030 ZZH RX IP 258 OP 636: Mod: ZF | Performed by: INTERNAL MEDICINE

## 2020-08-18 PROCEDURE — 25000128 H RX IP 250 OP 636: Mod: ZF | Performed by: INTERNAL MEDICINE

## 2020-08-18 PROCEDURE — 96365 THER/PROPH/DIAG IV INF INIT: CPT

## 2020-08-18 RX ORDER — ZOLEDRONIC ACID 5 MG/100ML
5 INJECTION, SOLUTION INTRAVENOUS ONCE
Status: CANCELLED
Start: 2020-08-18

## 2020-08-18 RX ORDER — ZOLEDRONIC ACID 5 MG/100ML
5 INJECTION, SOLUTION INTRAVENOUS ONCE
Status: COMPLETED | OUTPATIENT
Start: 2020-08-18 | End: 2020-08-18

## 2020-08-18 RX ORDER — HEPARIN SODIUM (PORCINE) LOCK FLUSH IV SOLN 100 UNIT/ML 100 UNIT/ML
5 SOLUTION INTRAVENOUS
Status: CANCELLED | OUTPATIENT
Start: 2020-08-18

## 2020-08-18 RX ORDER — HEPARIN SODIUM,PORCINE 10 UNIT/ML
5 VIAL (ML) INTRAVENOUS
Status: CANCELLED | OUTPATIENT
Start: 2020-08-18

## 2020-08-18 RX ADMIN — SODIUM CHLORIDE 250 ML: 9 INJECTION, SOLUTION INTRAVENOUS at 14:18

## 2020-08-18 RX ADMIN — ZOLEDRONIC ACID 5 MG: 0.05 INJECTION, SOLUTION INTRAVENOUS at 14:18

## 2020-08-18 ASSESSMENT — PAIN SCALES - GENERAL: PAINLEVEL: NO PAIN (1)

## 2020-08-18 ASSESSMENT — MIFFLIN-ST. JEOR: SCORE: 782.4

## 2020-08-18 NOTE — PATIENT INSTRUCTIONS
Patient Education     Patient Education    Zoledronic Acid Solution for injection    Zoledronic Acid Solution for injection [Hypercalcemia of Malignancy]    Zoledronic Acid Solution for injection [Pagets Disease]  Zoledronic Acid Solution for injection  What is this medicine?  ZOLEDRONIC ACID (THEA le dron ik AS id) lowers the amount of calcium loss from bone. It is used to treat Paget's disease and osteoporosis in women.  This medicine may be used for other purposes; ask your health care provider or pharmacist if you have questions.  What should I tell my health care provider before I take this medicine?  They need to know if you have any of these conditions:    aspirin-sensitive asthma    cancer, especially if you are receiving medicines used to treat cancer    dental disease or wear dentures    infection    kidney disease    low levels of calcium in the blood    past surgery on the parathyroid gland or intestines    receiving corticosteroids like dexamethasone or prednisone    an unusual or allergic reaction to zoledronic acid, other medicines, foods, dyes, or preservatives    pregnant or trying to get pregnant    breast-feeding  How should I use this medicine?  This medicine is for infusion into a vein. It is given by a health care professional in a hospital or clinic setting.  Talk to your pediatrician regarding the use of this medicine in children. This medicine is not approved for use in children.  Overdosage: If you think you have taken too much of this medicine contact a poison control center or emergency room at once.  NOTE: This medicine is only for you. Do not share this medicine with others.  What if I miss a dose?  It is important not to miss your dose. Call your doctor or health care professional if you are unable to keep an appointment.  What may interact with this medicine?    certain antibiotics given by injection    NSAIDs, medicines for pain and inflammation, like ibuprofen or naproxen    some  diuretics like bumetanide, furosemide    teriparatide  This list may not describe all possible interactions. Give your health care provider a list of all the medicines, herbs, non-prescription drugs, or dietary supplements you use. Also tell them if you smoke, drink alcohol, or use illegal drugs. Some items may interact with your medicine.  What should I watch for while using this medicine?  Visit your doctor or health care professional for regular checkups. It may be some time before you see the benefit from this medicine. Do not stop taking your medicine unless your doctor tells you to. Your doctor may order blood tests or other tests to see how you are doing.  Women should inform their doctor if they wish to become pregnant or think they might be pregnant. There is a potential for serious side effects to an unborn child. Talk to your health care professional or pharmacist for more information.  You should make sure that you get enough calcium and vitamin D while you are taking this medicine. Discuss the foods you eat and the vitamins you take with your health care professional.  Some people who take this medicine have severe bone, joint, and/or muscle pain. This medicine may also increase your risk for jaw problems or a broken thigh bone. Tell your doctor right away if you have severe pain in your jaw, bones, joints, or muscles. Tell your doctor if you have any pain that does not go away or that gets worse.  Tell your dentist and dental surgeon that you are taking this medicine. You should not have major dental surgery while on this medicine. See your dentist to have a dental exam and fix any dental problems before starting this medicine. Take good care of your teeth while on this medicine. Make sure you see your dentist for regular follow-up appointments.  What side effects may I notice from receiving this medicine?  Side effects that you should report to your doctor or health care professional as soon as  possible:    allergic reactions like skin rash, itching or hives, swelling of the face, lips, or tongue    anxiety, confusion, or depression    breathing problems    changes in vision    eye pain    feeling faint or lightheaded, falls    jaw pain, especially after dental work    mouth sores    muscle cramps, stiffness, or weakness    redness, blistering, peeling or loosening of the skin, including inside the mouth    trouble passing urine or change in the amount of urine  Side effects that usually do not require medical attention (report to your doctor or health care professional if they continue or are bothersome):    bone, joint, or muscle pain    constipation    diarrhea    fever    hair loss    irritation at site where injected    loss of appetite    nausea, vomiting    stomach upset    trouble sleeping    trouble swallowing    weak or tired  This list may not describe all possible side effects. Call your doctor for medical advice about side effects. You may report side effects to FDA at 2-309-FDA-7464.  Where should I keep my medicine?  This drug is given in a hospital or clinic and will not be stored at home.  NOTE:This sheet is a summary. It may not cover all possible information. If you have questions about this medicine, talk to your doctor, pharmacist, or health care provider. Copyright  2016 Gold Standard

## 2020-08-18 NOTE — PROGRESS NOTES
Nursing Note  Belia Sheets [facility] for:   Chief Complaint   Patient presents with     Infusion     reclast     During today's [facility] appointment, orders from Dr. Pittman were completed.  Frequency: once    Progress note:  Patient identification verified by name and date of birth.  Assessment completed.  Vitals recorded in Doc Flowsheets.  Patient was provided with education regarding medication/procedure and possible side effects.  Patient verbalized understanding.     present during visit today: Not Applicable.    Treatment Conditions: Patient's Creatinine Clearance is within paramaters to administer medication per orders.  Verified that patient is taking oral Calcium Supplement and Vitamin D per order.  Labs completed 7/13/2020    Premedications: were not ordered.    Drug Waste Record: No    Infusion length and rate:  infusion given over approximately 15 minutes    Labs: were drawn prior to appointment on 7/13/2020.    Vascular access: peripheral IV placed today.    Post Infusion Assessment:  Patient tolerated infusion without incident.  Patient observed for 15 minutes post infusion.  Blood return noted pre and post infusion.  Site patent and intact, free from redness, edema or discomfort.  No evidence of extravasations.  Access discontinued per protocol.     Discharge Plan:   Follow up plan of care with: ordering provider as scheduled., after visit summary declined by patient  Printed info on zoledronic acid reviewed with and given to patient to take home.  No further infusions scheduled at this time.  Discharge instructions were reviewed with patient.  Patient/representative verbalized understanding of discharge instructions and all questions answered.  [facility] discharged [facility] in stable condition.    Unique Livingston    Administrations This Visit     0.9% sodium chloride BOLUS  "    Admin Date  08/18/2020 Action  New Bag Dose  250 mL Route  Intravenous Administered By  Unique Livingston          zoledronic Acid (RECLAST) infusion 5 mg     Admin Date  08/18/2020 Action  New Bag Dose  5 mg Rate  400 mL/hr Route  Intravenous Administered By  Unique Livingston                /65   Pulse 90   Temp 97.9  F (36.6  C)   Ht 1.499 m (4' 11\")   Wt 45.7 kg (100 lb 11.2 oz)   SpO2 97%   BMI 20.34 kg/m        "

## 2020-08-18 NOTE — LETTER
8/18/2020         RE: Belia Sheets  [address]  [address]  [place] [address]        Dear Colleague,    Thank you for referring your patient, Belia Sheets, to the [facility]. Please see a copy of my visit note below.    Nursing Note  Belia Sheets [facility] for:   Chief Complaint   Patient presents with     Infusion     reclast     During [facility] appointment, orders from Dr. Pittman were completed.  Frequency: once    Progress note:  Patient identification verified by name and date of birth.  Assessment completed.  Vitals recorded in Doc Flowsheets.  Patient was provided with education regarding medication/procedure and possible side effects.  Patient verbalized understanding.     present during visit today: Not Applicable.    Treatment Conditions: Patient's Creatinine Clearance is within paramaters to administer medication per orders.  Verified that patient is taking oral Calcium Supplement and Vitamin D per order.  Labs completed 7/13/2020    Premedications: were not ordered.    Drug Waste Record: No    Infusion length and rate:  infusion given over approximately 15 minutes    Labs: were drawn prior to appointment on 7/13/2020.    Vascular access: peripheral IV placed today.    Post Infusion Assessment:  Patient tolerated infusion without incident.  Patient observed for 15 minutes post infusion.  Blood return noted pre and post infusion.  Site patent and intact, free from redness, edema or discomfort.  No evidence of extravasations.  Access discontinued per protocol.     Discharge Plan:   Follow up plan of care with: ordering provider as scheduled., after visit summary declined by patient  Printed info on zoledronic acid reviewed with and given to patient to take home.  No further infusions scheduled at this time.  Discharge instructions  "were reviewed with patient.  Patient/representative verbalized understanding of discharge instructions and all questions answered.  [facility] in stable condition.    Unique Livingston    Administrations This Visit     0.9% sodium chloride BOLUS     Admin Date  08/18/2020 Action  New Bag Dose  250 mL Route  Intravenous Administered By  Unique Livingston          zoledronic Acid (RECLAST) infusion 5 mg     Admin Date  08/18/2020 Action  New Bag Dose  5 mg Rate  400 mL/hr Route  Intravenous Administered By  Unique Livingston                /65   Pulse 90   Temp 97.9  F (36.6  C)   Ht 1.499 m (4' 11\")   Wt 45.7 kg (100 lb 11.2 oz)   SpO2 97%   BMI 20.34 kg/m          Again, thank you for allowing me to participate in the care of your patient.        Sincerely,        [facility]    "

## 2020-09-25 ENCOUNTER — DOCUMENTATION ONLY (OUTPATIENT)
Dept: OTHER | Facility: CLINIC | Age: 85
End: 2020-09-25

## 2020-09-25 ENCOUNTER — AMBULATORY - HEALTHEAST (OUTPATIENT)
Dept: OTHER | Facility: CLINIC | Age: 85
End: 2020-09-25

## 2020-10-07 ENCOUNTER — OFFICE VISIT (OUTPATIENT)
Dept: FAMILY MEDICINE | Facility: CLINIC | Age: 85
End: 2020-10-07
Payer: COMMERCIAL

## 2020-10-07 VITALS
OXYGEN SATURATION: 95 % | BODY MASS INDEX: 20.28 KG/M2 | WEIGHT: 100.6 LBS | DIASTOLIC BLOOD PRESSURE: 84 MMHG | HEART RATE: 100 BPM | SYSTOLIC BLOOD PRESSURE: 127 MMHG | HEIGHT: 59 IN

## 2020-10-07 DIAGNOSIS — R53.1 GENERALIZED WEAKNESS: ICD-10-CM

## 2020-10-07 DIAGNOSIS — R53.81 PHYSICAL DECONDITIONING: ICD-10-CM

## 2020-10-07 DIAGNOSIS — R29.6 RECURRENT FALLS: ICD-10-CM

## 2020-10-07 DIAGNOSIS — I10 BENIGN ESSENTIAL HYPERTENSION: ICD-10-CM

## 2020-10-07 DIAGNOSIS — M80.00XD AGE-RELATED OSTEOPOROSIS WITH CURRENT PATHOLOGICAL FRACTURE WITH ROUTINE HEALING, SUBSEQUENT ENCOUNTER: Primary | ICD-10-CM

## 2020-10-07 PROCEDURE — 99214 OFFICE O/P EST MOD 30 MIN: CPT | Performed by: FAMILY MEDICINE

## 2020-10-07 ASSESSMENT — ENCOUNTER SYMPTOMS
HALLUCINATIONS: 0
FATIGUE: 0
ARTHRALGIAS: 1
RESPIRATORY NEGATIVE: 1
BACK PAIN: 0
DYSPHORIC MOOD: 0
ACTIVITY CHANGE: 0
FEVER: 0
AGITATION: 0
MYALGIAS: 0
APPETITE CHANGE: 0
WEAKNESS: 1
DIZZINESS: 0
UNEXPECTED WEIGHT CHANGE: 0
SLEEP DISTURBANCE: 1
CARDIOVASCULAR NEGATIVE: 1

## 2020-10-07 ASSESSMENT — MIFFLIN-ST. JEOR: SCORE: 781.95

## 2020-10-07 NOTE — PROGRESS NOTES
"      HPI:       89 year old patient who presents with:    Follow-up of multiple chronic conditions including severe osteoporosis, recurrent falls, and likely restrictive lung disease.  She states that she is isolating during the pandemic actually her spirits are quite good.  Admits that she is getting much less physical activity than before the pandemic.  There are exercise programs in her building but she is afraid to partake in them for fear of bertin COVID.    She has had no further falls.  Taking medications without difficulty.  No lightheadedness.  Still sleeping quite a bit           Problem, Medication and Allergy Lists were reviewed and are current.  Patient is an established patient of this clinic.         Review of Systems:     Review of Systems   Constitutional: Negative for activity change, appetite change, fatigue, fever and unexpected weight change.   Respiratory: Negative.    Cardiovascular: Negative.    Musculoskeletal: Positive for arthralgias and gait problem. Negative for back pain and myalgias.   Neurological: Positive for weakness. Negative for dizziness.   Psychiatric/Behavioral: Positive for sleep disturbance (excessive). Negative for agitation, dysphoric mood and hallucinations.          Physical Exam:     /84 (BP Location: Left arm, Patient Position: Sitting, Cuff Size: Adult Small)   Pulse 100   Ht 1.499 m (4' 11\")   Wt 45.6 kg (100 lb 9.6 oz)   SpO2 95%   Breastfeeding No   BMI 20.32 kg/m      Body mass index is 20.32 kg/m .  Vitals reviewed.    Physical Exam  Constitutional:       Comments: Alert, pleasant.  Uses a 4-wheeled walker with seat.   Cardiovascular:      Rate and Rhythm: Normal rate and regular rhythm.      Heart sounds: Normal heart sounds.   Pulmonary:      Effort: No respiratory distress.      Breath sounds: No wheezing or rales.   Abdominal:      General: Bowel sounds are normal.      Palpations: Abdomen is soft. There is no mass.      Tenderness: There is " no abdominal tenderness.   Musculoskeletal:      Comments: + marked scoliosis, kyphosis.  + muscle wasting throughout  She becomes mildly dyspneic after walking 15 feet with her walker.   Neurological:      Mental Status: She is alert and oriented to person, place, and time.   Psychiatric:         Behavior: Behavior normal.             Results:     Results from last visit:  Orders Only on 07/13/2020   Component Date Value Ref Range Status     Urea Nitrogen 07/13/2020 16  7 - 30 mg/dL Final     Creatinine 07/13/2020 0.67  0.52 - 1.04 mg/dL Final     GFR Estimate 07/13/2020 77  >60 mL/min/[1.73_m2] Final    Comment: Non  GFR Calc  Starting 12/18/2018, serum creatinine based estimated GFR (eGFR) will be   calculated using the Chronic Kidney Disease Epidemiology Collaboration   (CKD-EPI) equation.       GFR Estimate If Black 07/13/2020 90  >60 mL/min/[1.73_m2] Final    Comment:  GFR Calc  Starting 12/18/2018, serum creatinine based estimated GFR (eGFR) will be   calculated using the Chronic Kidney Disease Epidemiology Collaboration   (CKD-EPI) equation.       Calcium 07/13/2020 8.8  8.5 - 10.1 mg/dL Final     Alkaline Phosphatase 07/13/2020 68  40 - 150 U/L Final     Albumin 07/13/2020 3.4  3.4 - 5.0 g/dL Final     Vitamin D Deficiency screening 07/13/2020 66  20 - 75 ug/L Final    Comment: Season, race, dietary intake, and treatment affect the concentration of   25-hydroxy-Vitamin D. Values may decrease during winter months and increase   during summer months. Values 20-29 ug/L may indicate Vitamin D insufficiency   and values <20 ug/L may indicate Vitamin D deficiency.  Vitamin D determination is routinely performed by an immunoassay specific for   25 hydroxyvitamin D3.  If an individual is on vitamin D2 (ergocalciferol)   supplementation, please specify 25 OH vitamin D2 and D3 level determination by   LCMSMS test VITD23.         Assessment and Plan     Belia was seen today for follow  up.    Diagnoses and all orders for this visit:    Age-related osteoporosis with current pathological fracture with routine healing, subsequent encounter.  Remarkably it is appears as if she has had no further fractures.  She will continue to be managed by endocrinology.    Physical deconditioning.  I am concerned that her conditioning has worsened during the pandemic.  She was quite short of breath when walking 10 feet with her walker in the office today.  I encouraged her to try to decrease her sedentary time and increase walking time during the day.  Also recommended that she see physical therapy for an assessment of her conditioning and possible help with an exercise program.  She agreed to this.  -     [place], [facility], [facility]OPRACTIC REFERRAL - [place]; Future    Recurrent falls.  No further falls and she is doing well in this regard.    Generalized weakness.  See above will try PT.    Benign essential hypertension.  She is at goal and we will continue low-dose amlodipine.        Options for treatment and follow-up care were reviewed with the patient. Belia Sheets engaged in the decision making process and verbalized understanding of the options discussed and agreed with the final plan.    Alejandro Sandhu MD

## 2020-10-07 NOTE — PATIENT INSTRUCTIONS
1.  Physical therapy    2.  Try to incorporate more walking into your daily routine.    3.  Consider group exercise or group activities.

## 2020-10-13 ENCOUNTER — TELEPHONE (OUTPATIENT)
Dept: FAMILY MEDICINE | Facility: CLINIC | Age: 85
End: 2020-10-13

## 2020-10-13 DIAGNOSIS — M80.00XD AGE-RELATED OSTEOPOROSIS WITH CURRENT PATHOLOGICAL FRACTURE WITH ROUTINE HEALING, SUBSEQUENT ENCOUNTER: Primary | ICD-10-CM

## 2020-10-13 NOTE — TELEPHONE ENCOUNTER
Spoke with Kareen Umaña DPT who stated that this patient was seen on 10/07 and an order for PT was placed. She stated that the patient wants to get PT with her at [facility]. She stated that they only do home visits and so the order would have to be for home care PT eval and treat. She also stated that before PT can be started 1 skilled nurse visit will be needed because that is how they open cases for PT. She also stated that they will need patient's demographics, face-to-face visit from 10/07, medical history, and updated med list for this patient faxed to them at # [phone].    Please place home care order for PT eval and treat and 1 skilled nurse visit, thank you!    Jazz Mccurdy RN

## 2020-10-15 NOTE — TELEPHONE ENCOUNTER
Orders have been placed. Faxed order (including demographics, medical history, and med list) and visit from 10/07, receipt confirmed. No further action at this time.     Jazz Mccurdy RN

## 2020-10-16 ENCOUNTER — MEDICAL CORRESPONDENCE (OUTPATIENT)
Dept: HEALTH INFORMATION MANAGEMENT | Facility: CLINIC | Age: 85
End: 2020-10-16

## 2020-11-03 ENCOUNTER — DOCUMENTATION ONLY (OUTPATIENT)
Dept: FAMILY MEDICINE | Facility: CLINIC | Age: 85
End: 2020-11-03

## 2020-11-03 NOTE — PROGRESS NOTES
"When opening a documentation only encounter, be sure to enter in \"Chief Complaint\" Forms and in \" Comments\" Title of form, description if needed.    Sudeep is a 89 year old  female  Form received via: Fax  Form now resides in: Provider Ready    Catie Mason MA                  "

## 2020-11-08 ENCOUNTER — HEALTH MAINTENANCE LETTER (OUTPATIENT)
Age: 85
End: 2020-11-08

## 2020-11-09 DIAGNOSIS — I10 BENIGN ESSENTIAL HYPERTENSION: ICD-10-CM

## 2020-11-09 RX ORDER — AMLODIPINE BESYLATE 5 MG/1
5 TABLET ORAL DAILY
Qty: 90 TABLET | Refills: 1 | Status: SHIPPED | OUTPATIENT
Start: 2020-11-09 | End: 2021-06-14

## 2020-11-09 NOTE — TELEPHONE ENCOUNTER

## 2020-11-11 ENCOUNTER — TELEPHONE (OUTPATIENT)
Dept: FAMILY MEDICINE | Facility: CLINIC | Age: 85
End: 2020-11-11

## 2020-11-11 PROCEDURE — G0180 MD CERTIFICATION HHA PATIENT: HCPCS | Performed by: FAMILY MEDICINE

## 2020-11-11 NOTE — PROGRESS NOTES
Form has been completed by provider.     Form sent out via: Fax to [facility] at Fax Number: [phone]  Patient informed: No, Reason:  Output date: November 11, 2020    Catie Mason MA      **Please close the encounter**

## 2020-11-11 NOTE — TELEPHONE ENCOUNTER
Alejandra LESTER calling to let the doctor know that the patient has been discharged from [facility] on 11/12/20

## 2020-11-18 ENCOUNTER — DOCUMENTATION ONLY (OUTPATIENT)
Dept: FAMILY MEDICINE | Facility: CLINIC | Age: 85
End: 2020-11-18

## 2020-11-18 NOTE — PROGRESS NOTES
"When opening a documentation only encounter, be sure to enter in \"Chief Complaint\" Forms and in \" Comments\" Title of form, description if needed.    Sudeep is a 89 year old  female  Form received via: Fax  Form now resides in: Provider Ready    Veronica Gaffney MA    Form has been completed by provider.     Form sent out via: Fax to [facility] at Fax Number: [phone]  Patient informed: No, Reason:n/a  Output date: November 18, 2020    Veronica Gaffney MA      **Please close the encounter**                    "

## 2020-12-21 ENCOUNTER — DOCUMENTATION ONLY (OUTPATIENT)
Dept: FAMILY MEDICINE | Facility: CLINIC | Age: 85
End: 2020-12-21

## 2020-12-21 NOTE — PROGRESS NOTES
"When opening a documentation only encounter, be sure to enter in \"Chief Complaint\" Forms and in \" Comments\" Title of form, description if needed.    Suedep is a 89 year old  female  Form received via: Fax  Form now resides in: Provider Ready    Veronica Gaffney MA                  "

## 2020-12-31 NOTE — PROGRESS NOTES
Form has been completed by provider.     Form sent out via: Fax to [facility] at Fax Number: [phone]  Patient informed: No  Output date: December 31, 2020    Catie Mason MA      **Please close the encounter**

## 2021-01-13 ENCOUNTER — OFFICE VISIT (OUTPATIENT)
Dept: FAMILY MEDICINE | Facility: CLINIC | Age: 86
End: 2021-01-13
Payer: COMMERCIAL

## 2021-01-13 VITALS
RESPIRATION RATE: 15 BRPM | TEMPERATURE: 97.8 F | BODY MASS INDEX: 19.92 KG/M2 | HEIGHT: 59 IN | OXYGEN SATURATION: 96 % | SYSTOLIC BLOOD PRESSURE: 130 MMHG | WEIGHT: 98.8 LBS | HEART RATE: 91 BPM | DIASTOLIC BLOOD PRESSURE: 89 MMHG

## 2021-01-13 DIAGNOSIS — B02.8 HERPES ZOSTER WITH OTHER COMPLICATION: Primary | ICD-10-CM

## 2021-01-13 PROCEDURE — 99214 OFFICE O/P EST MOD 30 MIN: CPT | Mod: GE | Performed by: STUDENT IN AN ORGANIZED HEALTH CARE EDUCATION/TRAINING PROGRAM

## 2021-01-13 RX ORDER — VALACYCLOVIR HYDROCHLORIDE 1 G/1
1000 TABLET, FILM COATED ORAL 3 TIMES DAILY
Qty: 21 TABLET | Refills: 0 | Status: ON HOLD | OUTPATIENT
Start: 2021-01-13 | End: 2021-07-14

## 2021-01-13 RX ORDER — POTASSIUM CHLORIDE 1500 MG/1
20 TABLET, EXTENDED RELEASE ORAL DAILY
Status: ON HOLD | COMMUNITY
End: 2021-07-15

## 2021-01-13 RX ORDER — VALACYCLOVIR HYDROCHLORIDE 1 G/1
1000 TABLET, FILM COATED ORAL 3 TIMES DAILY
Qty: 21 TABLET | Refills: 0 | Status: SHIPPED | OUTPATIENT
Start: 2021-01-13 | End: 2021-01-13

## 2021-01-13 ASSESSMENT — MIFFLIN-ST. JEOR: SCORE: 765.84

## 2021-01-13 NOTE — PROGRESS NOTES
"Sudeep is a 89 year old who presents for   Patient presents with:  Swelling: facial swelling, diagnosed by dentist as shingles on tongue, no treatment given bc they said it was on its way out    Assessment and Plan      1. Herpes zoster with other complication  89 year old generally well female presenting with facial and oral vesicular rash consistent with shingles. No eye involvement. No evidence of Paddy Hunt syndrome without any vesicles/rash involving the auditory canal or on the auricle, no facial paralysis. No temporal artery tenderness to percussion on exam to suggest arteritis. Given her age and ongoing vesicular lesions in the mouth (facial rash improving, no active lesions), did recommend treatment with course of valacyclovir. Pain is quite minimal so gabapentin, steroids not recommended. Patient expressed understanding and agreement and return precautions were discussed.  - valACYclovir (VALTREX) 1000 mg tablet; Take 1 tablet (1,000 mg) by mouth 3 times daily  Dispense: 21 tablet; Refill: 0       Return if symptoms worsen or fail to improve.    Medications Discontinued During This Encounter   Medication Reason     valACYclovir (VALTREX) 1000 mg tablet      Jewels Lopez MD         HPI       Sudeep is a 89 year old who presents for   Patient presents with:  Swelling: facial swelling, diagnosed by dentist as shingles on tongue, no treatment given bc they said it was on its way out    Visit [facility]  1. Tongue concern  Patient first noticed sores inside her mouth \"a couple weeks ago\". Thursday 01/07 patient developed facial rash and started experiencing headache described as short, sharp \"jabbing\" pain localized to the left side of her head above her left ear. The pains were frequent all day Saturday and not relieved by 1 200 mg Ibuprofen pill she took. She was able to sleep without pain Thursday night, but the pain returned Friday. She said it diminished Saturday but was still present and " "diminished further Johann 1/10 (today), with pains not being repeated and being hours instead of moments apart.     Dentist this morning, no dental cause for pain was found, concern for Shingles and recommended medical evaluation (patient brought note from dentist, which was reviewed).     Has never had shingles before. Otherwise feels well, no fevers, chills, body aches. She did receive her first COVID19 shot 2 days ago.     Problem, Medication and Allergy Lists were reviewed and updated if needed.  Patient is an established patient of this clinic.  Reviewed and updated as needed this visit by clinical staff  Tobacco  Allergies  Meds  Problems  Med Hx  Surg Hx  Fam Hx  Soc Hx        Reviewed and updated as needed this visit by Provider  Tobacco  Allergies  Meds  Problems  Med Hx  Surg Hx  Fam Hx        Health Maintenance Due   Topic Date Due     LIPID  05/16/1930     MEDICARE ANNUAL WELLNESS VISIT  07/24/2020     FALL RISK ASSESSMENT  07/24/2020     ZOSTER IMMUNIZATION (2 of 2) 09/07/2020          Review of Systems:   10 point review of symptoms was otherwise negative except as noted in HPI         Physical Exam:     Vitals:    01/13/21 1626   BP: 130/89   BP Location: Left arm   Patient Position: Sitting   Cuff Size: Adult Regular   Pulse: 91   Resp: 15   Temp: 97.8  F (36.6  C)   TempSrc: Oral   SpO2: 96%   Weight: 44.8 kg (98 lb 12.8 oz)   Height: 1.486 m (4' 10.5\")     Body mass index is 20.3 kg/m .  Vitals were reviewed and were normal     Physical Exam  Constitutional:       General: She is not in acute distress.     Appearance: She is well-developed. She is not diaphoretic.   HENT:      Mouth/Throat:      Comments: ~10 vesicular lesions involving the palate, appear localized to left side  Cardiovascular:      Rate and Rhythm: Normal rate.   Pulmonary:      Effort: Pulmonary effort is normal. No respiratory distress.   Skin:     Findings: Rash (in the V2 distribution, no crusting or active " drainage) present.   Neurological:      General: No focal deficit present.      Mental Status: She is alert.      Cranial Nerves: No cranial nerve deficit.      Sensory: No sensory deficit.   Psychiatric:         Mood and Affect: Mood normal.                Results:   No testing ordered today    Options for treatment and follow-up care were reviewed with the patient. Belia Sheets engaged in the decision making process and verbalized understanding of the options discussed and agreed with the final plan.  Jewels Lopez MD  [facility]

## 2021-01-13 NOTE — PROGRESS NOTES
Preceptor Attestation:   I discussed the patient with the resident. I have verified the content of the note, which accurately reflects my assessment of the patient and the plan of care.   Supervising Physician:  Clary Kim MD.

## 2021-01-13 NOTE — PATIENT INSTRUCTIONS
Here is the plan from today's visit    1. Herpes zoster with other complication  - valACYclovir (VALTREX) 1000 mg tablet; Take 1 tablet (1,000 mg) by mouth 3 times daily  Dispense: 21 tablet; Refill: 0    Medical Concerns:  If you have urgent medical concerns please call [phone] at any time of the day.    Jewels Lopez MD

## 2021-05-16 NOTE — TELEPHONE ENCOUNTER
Patient scheduled to be seen in clinic today at 1:00pm. Please see note below.    Once note is completed and medications updated, please give to your PCS to fax to home care number below.    Augustina Gan RN           show

## 2021-06-14 DIAGNOSIS — I10 BENIGN ESSENTIAL HYPERTENSION: ICD-10-CM

## 2021-06-14 RX ORDER — AMLODIPINE BESYLATE 5 MG/1
5 TABLET ORAL DAILY
Qty: 90 TABLET | Refills: 1 | Status: SHIPPED | OUTPATIENT
Start: 2021-06-14 | End: 2021-10-20 | Stop reason: DRUGHIGH

## 2021-06-14 NOTE — TELEPHONE ENCOUNTER
"Request for medication refill:  Amlodipine besylate 5MG tablets       Providers if patient needs an appointment and you are willing to give a one month supply please refill for one month and  send a letter/MyChart using \".SMILLIMITEDREFILL\" .smillimited and route chart to \"P SMI \" (Giving one month refill in non controlled medications is strongly recommended before denial)    If refill has been denied, meaning absolutely no refills without visit, please complete the smart phrase \".smirxrefuse\" and route it to the \"P SMI MED REFILLS\"  pool to inform the patient and the pharmacy.    Katy Fung        "

## 2021-06-23 ENCOUNTER — TELEPHONE (OUTPATIENT)
Dept: ENDOCRINOLOGY | Facility: CLINIC | Age: 86
End: 2021-06-23

## 2021-06-23 DIAGNOSIS — M81.0 AGE-RELATED OSTEOPOROSIS WITHOUT CURRENT PATHOLOGICAL FRACTURE: ICD-10-CM

## 2021-06-23 DIAGNOSIS — M84.48XA: ICD-10-CM

## 2021-06-23 DIAGNOSIS — Z79.83 LONG TERM (CURRENT) USE OF BISPHOSPHONATES: ICD-10-CM

## 2021-06-23 DIAGNOSIS — M80.00XD AGE-RELATED OSTEOPOROSIS WITH CURRENT PATHOLOGICAL FRACTURE WITH ROUTINE HEALING, SUBSEQUENT ENCOUNTER: Primary | ICD-10-CM

## 2021-06-23 NOTE — TELEPHONE ENCOUNTER
[facility] Call Center    Phone Message    May a detailed message be left on voicemail: yes     Reason for Call: Other: Patient's daughter would like to speak to a nurse regarding patient's upcoming appointment on 07/28.      1. Does patient need to come in for any test?  2. Patient's daughter won't be there with her to help her with the video appointment and in person appointment is too far out. They were wondering why they can't do a telephone appointment so they can do a 3 way like how they did in the past.     Please call patient's daughter to advise. Thank you.   Action Taken: Message routed to:  [facility] ([facility] Endo    Travel Screening: Not Applicable

## 2021-06-24 NOTE — TELEPHONE ENCOUNTER
Option #1 Recommend to schedule clinic in-person follow up 9/1/2021 (as an add-on),   Schedule RECLAST infusion the same day 9/1/2021 after in-person visit with me.  Blood work on the day of visit; preferably prior to visit with me.    Follow up DEXA scan ordered and this can be also scheduled on the same day prior to visit with me.       Option #2 we can do telephone visit as scheduled. Blood work and DEXA scan orders placed.

## 2021-07-09 NOTE — TELEPHONE ENCOUNTER
Patient scheduled for labs/dexa/appt with Toya on 9/1. Sent message to [facility] to schedule Reclast for same day.

## 2021-07-13 ENCOUNTER — APPOINTMENT (OUTPATIENT)
Dept: CT IMAGING | Facility: CLINIC | Age: 86
DRG: 543 | End: 2021-07-13
Attending: EMERGENCY MEDICINE
Payer: COMMERCIAL

## 2021-07-13 ENCOUNTER — APPOINTMENT (OUTPATIENT)
Dept: GENERAL RADIOLOGY | Facility: CLINIC | Age: 86
DRG: 543 | End: 2021-07-13
Attending: NURSE PRACTITIONER
Payer: COMMERCIAL

## 2021-07-13 ENCOUNTER — APPOINTMENT (OUTPATIENT)
Dept: GENERAL RADIOLOGY | Facility: CLINIC | Age: 86
DRG: 543 | End: 2021-07-13
Attending: EMERGENCY MEDICINE
Payer: COMMERCIAL

## 2021-07-13 ENCOUNTER — HOSPITAL ENCOUNTER (INPATIENT)
Facility: CLINIC | Age: 86
LOS: 3 days | Discharge: NURSING, REHAB OR LONG-TERM CARE | DRG: 543 | End: 2021-07-16
Attending: EMERGENCY MEDICINE | Admitting: SURGERY
Payer: COMMERCIAL

## 2021-07-13 DIAGNOSIS — Z11.52 ENCOUNTER FOR SCREENING LABORATORY TESTING FOR COVID-19 VIRUS: ICD-10-CM

## 2021-07-13 DIAGNOSIS — R41.82 ALTERED MENTAL STATUS, UNSPECIFIED ALTERED MENTAL STATUS TYPE: ICD-10-CM
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
## 2021-07-13 DIAGNOSIS — S22.31XA CLOSED FRACTURE OF ONE RIB OF RIGHT SIDE, INITIAL ENCOUNTER: ICD-10-CM

## 2021-07-13 DIAGNOSIS — M81.0 AGE-RELATED OSTEOPOROSIS WITHOUT CURRENT PATHOLOGICAL FRACTURE: Primary | ICD-10-CM

## 2021-07-13 LAB
ALBUMIN SERPL-MCNC: 3.5 G/DL (ref 3.4–5)
ALBUMIN UR-MCNC: 100 MG/DL
ALP SERPL-CCNC: 67 U/L (ref 40–150)
ALT SERPL W P-5'-P-CCNC: 72 U/L (ref 0–50)
ANION GAP SERPL CALCULATED.3IONS-SCNC: 10 MMOL/L (ref 3–14)
APPEARANCE UR: CLEAR
AST SERPL W P-5'-P-CCNC: 131 U/L (ref 0–45)
AST SERPL W P-5'-P-CCNC: ABNORMAL U/L
ATRIAL RATE - MUSE: 100 BPM
BACTERIA #/AREA URNS HPF: ABNORMAL /HPF
BASOPHILS # BLD MANUAL: 0 10E3/UL (ref 0–0.2)
BASOPHILS NFR BLD MANUAL: 0 %
BILIRUB SERPL-MCNC: 0.8 MG/DL (ref 0.2–1.3)
BILIRUB UR QL STRIP: NEGATIVE
BUN SERPL-MCNC: 58 MG/DL (ref 7–30)
BURR CELLS BLD QL SMEAR: ABNORMAL
CALCIUM SERPL-MCNC: 9.9 MG/DL (ref 8.5–10.1)
CHLORIDE BLD-SCNC: 108 MMOL/L (ref 94–109)
CK SERPL-CCNC: 1422 U/L (ref 30–225)
CK SERPL-CCNC: NORMAL U/L
CO2 SERPL-SCNC: 26 MMOL/L (ref 20–32)
COLOR UR AUTO: ABNORMAL
CREAT SERPL-MCNC: 0.61 MG/DL (ref 0.52–1.04)
DIASTOLIC BLOOD PRESSURE - MUSE: NORMAL MMHG
EOSINOPHIL # BLD MANUAL: 0 10E3/UL (ref 0–0.7)
EOSINOPHIL NFR BLD MANUAL: 0 %
ERYTHROCYTE [DISTWIDTH] IN BLOOD BY AUTOMATED COUNT: 12.4 % (ref 10–15)
GFR SERPL CREATININE-BSD FRML MDRD: 80 ML/MIN/1.73M2
GLUCOSE BLD-MCNC: 83 MG/DL (ref 70–99)
GLUCOSE UR STRIP-MCNC: NEGATIVE MG/DL
HCT VFR BLD AUTO: 49.5 % (ref 35–47)
HGB BLD-MCNC: 16.2 G/DL (ref 11.7–15.7)
HGB UR QL STRIP: ABNORMAL
HOLD SPECIMEN: NORMAL
HYALINE CASTS: 3 /LPF
INTERPRETATION ECG - MUSE: NORMAL
KETONES UR STRIP-MCNC: 60 MG/DL
LACTATE SERPL-SCNC: 0.6 MMOL/L (ref 0.7–2)
LEUKOCYTE ESTERASE UR QL STRIP: ABNORMAL
LYMPHOCYTES # BLD MANUAL: 0.5 10E3/UL (ref 0.8–5.3)
LYMPHOCYTES NFR BLD MANUAL: 3 %
MCH RBC QN AUTO: 30.1 PG (ref 26.5–33)
MCHC RBC AUTO-ENTMCNC: 32.7 G/DL (ref 31.5–36.5)
MCV RBC AUTO: 92 FL (ref 78–100)
MONOCYTES # BLD MANUAL: 1.4 10E3/UL (ref 0–1.3)
MONOCYTES NFR BLD MANUAL: 8 %
MUCOUS THREADS #/AREA URNS LPF: PRESENT /LPF
NEUTROPHILS # BLD MANUAL: 15.7 10E3/UL (ref 1.6–8.3)
NEUTROPHILS NFR BLD MANUAL: 89 %
NITRATE UR QL: NEGATIVE
P AXIS - MUSE: 73 DEGREES
PH UR STRIP: 6 [PH] (ref 5–7)
PLAT MORPH BLD: ABNORMAL
PLATELET # BLD AUTO: 262 10E3/UL (ref 150–450)
POTASSIUM BLD-SCNC: 3.9 MMOL/L (ref 3.4–5.3)
PR INTERVAL - MUSE: 146 MS
PROT SERPL-MCNC: 7.4 G/DL (ref 6.8–8.8)
QRS DURATION - MUSE: 80 MS
QT - MUSE: 378 MS
QTC - MUSE: 487 MS
R AXIS - MUSE: -23 DEGREES
RBC # BLD AUTO: 5.39 10E6/UL (ref 3.8–5.2)
RBC MORPH BLD: ABNORMAL
RBC URINE: 2 /HPF
SARS-COV-2 RNA RESP QL NAA+PROBE: NEGATIVE
SODIUM SERPL-SCNC: 144 MMOL/L (ref 133–144)
SP GR UR STRIP: 1.02 (ref 1–1.03)
SQUAMOUS EPITHELIAL: 1 /HPF
SYSTOLIC BLOOD PRESSURE - MUSE: NORMAL MMHG
T AXIS - MUSE: 86 DEGREES
TRANSITIONAL EPI: 1 /HPF
TROPONIN I SERPL-MCNC: 0.12 UG/L (ref 0–0.04)
TROPONIN I SERPL-MCNC: 0.18 UG/L (ref 0–0.04)
UROBILINOGEN UR STRIP-MCNC: NORMAL MG/DL
VENTRICULAR RATE- MUSE: 100 BPM
WBC # BLD AUTO: 17.6 10E3/UL (ref 4–11)
WBC URINE: 12 /HPF

## 2021-07-13 PROCEDURE — U0003 INFECTIOUS AGENT DETECTION BY NUCLEIC ACID (DNA OR RNA); SEVERE ACUTE RESPIRATORY SYNDROME CORONAVIRUS 2 (SARS-COV-2) (CORONAVIRUS DISEASE [COVID-19]), AMPLIFIED PROBE TECHNIQUE, MAKING USE OF HIGH THROUGHPUT TECHNOLOGIES AS DESCRIBED BY CMS-2020-01-R: HCPCS | Performed by: NURSE PRACTITIONER

## 2021-07-13 PROCEDURE — 99285 EMERGENCY DEPT VISIT HI MDM: CPT | Mod: 25 | Performed by: EMERGENCY MEDICINE

## 2021-07-13 PROCEDURE — 82550 ASSAY OF CK (CPK): CPT | Performed by: EMERGENCY MEDICINE

## 2021-07-13 PROCEDURE — 73030 X-RAY EXAM OF SHOULDER: CPT | Mod: 26 | Performed by: RADIOLOGY

## 2021-07-13 PROCEDURE — 70450 CT HEAD/BRAIN W/O DYE: CPT

## 2021-07-13 PROCEDURE — 93005 ELECTROCARDIOGRAM TRACING: CPT | Performed by: EMERGENCY MEDICINE

## 2021-07-13 PROCEDURE — 682N000003 HC TRAUMA EVALUATION W/O CC LEVEL II: Performed by: EMERGENCY MEDICINE

## 2021-07-13 PROCEDURE — 81001 URINALYSIS AUTO W/SCOPE: CPT | Performed by: EMERGENCY MEDICINE

## 2021-07-13 PROCEDURE — 73030 X-RAY EXAM OF SHOULDER: CPT | Mod: 50

## 2021-07-13 PROCEDURE — 250N000013 HC RX MED GY IP 250 OP 250 PS 637: Performed by: NURSE PRACTITIONER

## 2021-07-13 PROCEDURE — 84484 ASSAY OF TROPONIN QUANT: CPT | Performed by: NURSE PRACTITIONER

## 2021-07-13 PROCEDURE — 84484 ASSAY OF TROPONIN QUANT: CPT | Performed by: EMERGENCY MEDICINE

## 2021-07-13 PROCEDURE — 120N000002 HC R&B MED SURG/OB UMMC

## 2021-07-13 PROCEDURE — 87086 URINE CULTURE/COLONY COUNT: CPT | Performed by: EMERGENCY MEDICINE

## 2021-07-13 PROCEDURE — 96361 HYDRATE IV INFUSION ADD-ON: CPT | Performed by: EMERGENCY MEDICINE

## 2021-07-13 PROCEDURE — 36592 COLLECT BLOOD FROM PICC: CPT | Performed by: NURSE PRACTITIONER

## 2021-07-13 PROCEDURE — 83605 ASSAY OF LACTIC ACID: CPT | Performed by: SURGERY

## 2021-07-13 PROCEDURE — 71046 X-RAY EXAM CHEST 2 VIEWS: CPT

## 2021-07-13 PROCEDURE — 99222 1ST HOSP IP/OBS MODERATE 55: CPT | Performed by: NURSE PRACTITIONER

## 2021-07-13 PROCEDURE — 70450 CT HEAD/BRAIN W/O DYE: CPT | Mod: 26 | Performed by: RADIOLOGY

## 2021-07-13 PROCEDURE — 93010 ELECTROCARDIOGRAM REPORT: CPT | Performed by: EMERGENCY MEDICINE

## 2021-07-13 PROCEDURE — 72170 X-RAY EXAM OF PELVIS: CPT | Mod: 26 | Performed by: RADIOLOGY

## 2021-07-13 PROCEDURE — 85027 COMPLETE CBC AUTOMATED: CPT | Performed by: EMERGENCY MEDICINE

## 2021-07-13 PROCEDURE — 258N000003 HC RX IP 258 OP 636: Performed by: EMERGENCY MEDICINE

## 2021-07-13 PROCEDURE — 36592 COLLECT BLOOD FROM PICC: CPT | Performed by: EMERGENCY MEDICINE

## 2021-07-13 PROCEDURE — 72170 X-RAY EXAM OF PELVIS: CPT

## 2021-07-13 PROCEDURE — C9803 HOPD COVID-19 SPEC COLLECT: HCPCS | Performed by: EMERGENCY MEDICINE

## 2021-07-13 PROCEDURE — 71046 X-RAY EXAM CHEST 2 VIEWS: CPT | Mod: 26 | Performed by: RADIOLOGY

## 2021-07-13 PROCEDURE — 96360 HYDRATION IV INFUSION INIT: CPT | Performed by: EMERGENCY MEDICINE

## 2021-07-13 PROCEDURE — 84155 ASSAY OF PROTEIN SERUM: CPT | Performed by: EMERGENCY MEDICINE

## 2021-07-13 RX ORDER — POLYETHYLENE GLYCOL 3350 17 G/17G
17 POWDER, FOR SOLUTION ORAL DAILY PRN
Status: DISCONTINUED | OUTPATIENT
Start: 2021-07-13 | End: 2021-07-16 | Stop reason: HOSPADM

## 2021-07-13 RX ORDER — DIPHENHYDRAMINE HYDROCHLORIDE 50 MG/ML
50 INJECTION INTRAMUSCULAR; INTRAVENOUS
Status: CANCELLED
Start: 2021-09-01

## 2021-07-13 RX ORDER — LIDOCAINE 4 G/G
1-2 PATCH TOPICAL
Status: DISCONTINUED | OUTPATIENT
Start: 2021-07-14 | End: 2021-07-16 | Stop reason: HOSPADM

## 2021-07-13 RX ORDER — METHYLPREDNISOLONE SODIUM SUCCINATE 125 MG/2ML
125 INJECTION, POWDER, LYOPHILIZED, FOR SOLUTION INTRAMUSCULAR; INTRAVENOUS
Status: CANCELLED
Start: 2021-09-01

## 2021-07-13 RX ORDER — SODIUM CHLORIDE 9 MG/ML
INJECTION, SOLUTION INTRAVENOUS CONTINUOUS
Status: DISCONTINUED | OUTPATIENT
Start: 2021-07-13 | End: 2021-07-14

## 2021-07-13 RX ORDER — ONDANSETRON 2 MG/ML
4 INJECTION INTRAMUSCULAR; INTRAVENOUS EVERY 6 HOURS PRN
Status: DISCONTINUED | OUTPATIENT
Start: 2021-07-13 | End: 2021-07-16 | Stop reason: HOSPADM

## 2021-07-13 RX ORDER — CALCIUM CARBONATE 500(1250)
500 TABLET ORAL 2 TIMES DAILY WITH MEALS
Status: DISCONTINUED | OUTPATIENT
Start: 2021-07-13 | End: 2021-07-16 | Stop reason: HOSPADM

## 2021-07-13 RX ORDER — ZOLEDRONIC ACID 5 MG/100ML
5 INJECTION, SOLUTION INTRAVENOUS ONCE
Status: CANCELLED
Start: 2021-09-01 | End: 2021-09-01

## 2021-07-13 RX ORDER — ONDANSETRON 4 MG/1
4 TABLET, ORALLY DISINTEGRATING ORAL EVERY 6 HOURS PRN
Status: DISCONTINUED | OUTPATIENT
Start: 2021-07-13 | End: 2021-07-16 | Stop reason: HOSPADM

## 2021-07-13 RX ORDER — ALBUTEROL SULFATE 0.83 MG/ML
2.5 SOLUTION RESPIRATORY (INHALATION)
Status: CANCELLED | OUTPATIENT
Start: 2021-09-01

## 2021-07-13 RX ORDER — ACETAMINOPHEN 325 MG/1
650 TABLET ORAL EVERY 6 HOURS
Status: DISCONTINUED | OUTPATIENT
Start: 2021-07-13 | End: 2021-07-16 | Stop reason: HOSPADM

## 2021-07-13 RX ORDER — HEPARIN SODIUM,PORCINE 10 UNIT/ML
5 VIAL (ML) INTRAVENOUS
Status: CANCELLED | OUTPATIENT
Start: 2021-09-01

## 2021-07-13 RX ORDER — HEPARIN SODIUM (PORCINE) LOCK FLUSH IV SOLN 100 UNIT/ML 100 UNIT/ML
5 SOLUTION INTRAVENOUS
Status: CANCELLED | OUTPATIENT
Start: 2021-09-01

## 2021-07-13 RX ORDER — EPINEPHRINE 1 MG/ML
0.3 INJECTION, SOLUTION, CONCENTRATE INTRAVENOUS EVERY 5 MIN PRN
Status: CANCELLED | OUTPATIENT
Start: 2021-09-01

## 2021-07-13 RX ORDER — NALOXONE HYDROCHLORIDE 0.4 MG/ML
0.2 INJECTION, SOLUTION INTRAMUSCULAR; INTRAVENOUS; SUBCUTANEOUS
Status: CANCELLED | OUTPATIENT
Start: 2021-09-01

## 2021-07-13 RX ORDER — VITAMIN B COMPLEX
25 TABLET ORAL DAILY
Status: DISCONTINUED | OUTPATIENT
Start: 2021-07-14 | End: 2021-07-16 | Stop reason: HOSPADM

## 2021-07-13 RX ORDER — MEPERIDINE HYDROCHLORIDE 25 MG/ML
25 INJECTION INTRAMUSCULAR; INTRAVENOUS; SUBCUTANEOUS EVERY 30 MIN PRN
Status: CANCELLED | OUTPATIENT
Start: 2021-09-01

## 2021-07-13 RX ORDER — METHOCARBAMOL 500 MG/1
500 TABLET, FILM COATED ORAL EVERY 6 HOURS PRN
Status: DISCONTINUED | OUTPATIENT
Start: 2021-07-13 | End: 2021-07-16 | Stop reason: HOSPADM

## 2021-07-13 RX ORDER — ALBUTEROL SULFATE 90 UG/1
1-2 AEROSOL, METERED RESPIRATORY (INHALATION)
Status: CANCELLED
Start: 2021-09-01

## 2021-07-13 RX ADMIN — CALCIUM 500 MG: 500 TABLET ORAL at 19:21

## 2021-07-13 RX ADMIN — SODIUM CHLORIDE 1000 ML: 900 INJECTION, SOLUTION INTRAVENOUS at 13:24

## 2021-07-13 RX ADMIN — SODIUM CHLORIDE: 9 INJECTION, SOLUTION INTRAVENOUS at 16:57

## 2021-07-13 ASSESSMENT — MIFFLIN-ST. JEOR: SCORE: 754.75

## 2021-07-13 NOTE — LETTER
Transition Communication Hand-off for Care Transitions to Next Level of Care Provider    Name: Belia Sheets  : 1930  MRN #: [id number]  Primary Care Provider: Alejandro Sandhu     Primary Clinic: [address] [address]     Reason for Hospitalization:  Fall, initial encounter [W19.XXXA]  Closed fracture of one rib of right side, initial encounter [S22.31XA]  Altered mental status, unspecified altered mental status type [R41.82]  Admit Date/Time: 2021 12:00 PM  Discharge Date: 21  Payor Source: Payor: ARE / Plan: UCARE MEDICARE NON [employer] PARTNERS / Product Type: HMO /       Plan of Care: Pt admitted for fall and rib fracture.          Reason for Communication Hand-off Referral: Other Care coordination    Discharge Plan: [facility]        Concern for non-adherence with plan of care: No  Discharge Needs Assessment:  Needs      Most Recent Value   Equipment Currently Used at Home  walker, rolling, shower chair, grab bar, tub/shower, grab bar, toilet   # of Referrals Placed by CTS  Post Acute Facilities   PAS Number  [id number]          Already enrolled in Tele-monitoring program and name of program:  Unknown   Follow-up specialty is recommended: Yes    Follow-up plan:    Future Appointments   Date Time Provider Department Center   2021 10:00 AM [facility]   2021 10:30 AM [facility]   2021 11:00 AM  [facility] DRAW ON[facility]   2021 12:00 PM Saurabh Pittmna MD UCMDE [facility]   2021  1:00 PM  [facility] INFUSION NURSE INPR [facility]   2021  1:30 [facility]       Any outstanding tests or procedures:        Referrals     Future Labs/Procedures    Occupational Therapy Adult Consult     Comments:    Evaluate and treat as clinically indicated.    Reason:  fall, rib fracture    Physical Therapy Adult Consult     Comments:    Evaluate and treat as clinically indicated.    Reason:  Fall, rib  fracture            ALEXX Quintana, [facility]  6D Adult Acute Care   Ph:[phone]

## 2021-07-13 NOTE — ED TRIAGE NOTES
"BIBA from independent senior living facility, per EMS pt has fallen at home and was down for around 24 hours, denies hitting her head. A&Ox4, pt states she has been having new onset of visual hallucinations for the past \"while\" but is aware that these things are unreal. Hypertensive on arrival, otherwise all other VSS on RA. Denies pain, no trauma noted.   "

## 2021-07-13 NOTE — H&P
"[facility]    History and Physical / Consult note: Trauma Service  Date of Admission:  7/13/2021    Time of Admission/Consult Request (page/call): 07/13 @0166    Time of my evaluation: 1700  Consulting services:      Assessment & Plan    Trauma mechanism:GLF  Time/date of injury:07/12 unknown time late am to early afternoon  Known Injuries:  1. Right lateral 9th rib fracture  Plan  1. Admit to trauma rib fracture  2. Obtain pelvic, and bilateral shoulder xrays  3. Pain control: PRN tylenol, Lidoderm patches  4. Pulmonary toilet, incentive spirometer  5. Regular diet  6. Trend troponin, 1L bolus given, MIVF elevated CK  7. PT/OT  8. Obtain UA/UC    Lines/ tubes/ drains:  - PIV  Code status: DNR/DNI per POLST   General Cares:  GI Prophylaxis: n/a  DVT Prophylaxis: mechanical  Date of last stool/Bowel Regimen:PTA  Pulmonary toilet:IS  ETOH: This patient was asked if in the last 3-6 months there has been a time when he had  5 or more drinks in a single day/outing.. Patient answer to the screening question was in the negative. No intervention needed.  Primary Care Physician   Alejandro Sandhu    Chief Complaint   fall    History is obtained from the patient, electronic health record, emergency department physician and patient's son (Delmar)    History of Present Illness   Belia Sheets is a 89 year old female who with a history of Osteoporosis, remote history of visual hallucinations who was brought to the ED for evaluation after being found down in her senior living apartment. The patient reports that she was having \"interesting visual hallucinations\" and all she remembered was being on the floor and could not get up. She is unsure how long she was down.  She was found down by the staff during a routine safety check today. She does not recall eating breakfast or lunch yesterday.  On exam today she reports right posterior rib pain, shoulder pain, hip, and sits bone pain. She " denies chest or abdominal pain. Denies shortness of breath.    Her son was also in the room at the time and reports a remote history of visual hallucinations that resolved on their on. She denies any recent illness.    Past Medical History    I have reviewed this patient's medical history and updated it with pertinent information if needed.   Past Medical History:   Diagnosis Date     Carpal tunnel syndrome (aka CTS)      H/O calcium pyrophosphate deposition disease (CPPD)      History of encephalopathy 10/2017     Hypertension      Kyphoscoliosis      Osteoarthritis     hands     Osteoporosis      Rectal prolapse        Past Surgical History   I have reviewed this patient's surgical history and updated it with pertinent information if needed.  Past Surgical History:   Procedure Laterality Date     COLONOSCOPY  2010     HYSTERECTOMY      for uterine prolapse     RECTOSIGMOIDECTOMY PERINEAL N/A 1/12/2018    Procedure: RECTOSIGMOIDECTOMY PERINEAL;  Perineal Rectosigmoidectomy  ;  Surgeon: Amrik Mariano MD;  Location: [facility] OR     Prior to Admission Medications   Prior to Admission Medications   Prescriptions Last Dose Informant Patient Reported? Taking?   Cyanocobalamin (VITAMIN B 12 PO) More than a month at Unknown time Self Yes No   Sig: Take 1 tablet by mouth every morning    acetaminophen (TYLENOL) 325 MG tablet  at prn Self No Yes   Sig: Take 2 tablets (650 mg) by mouth every 4 hours as needed for mild pain   amLODIPine (NORVASC) 5 MG tablet 7/12/2021 at am Self No Yes   Sig: Take 1 tablet (5 mg) by mouth daily   calcium carbonate 600 mg-vitamin D 400 units (CALTRATE) 600-400 MG-UNIT per tablet 7/12/2021 at pm Self No Yes   Sig: Take 1 tablet by mouth 2 times daily   magnesium oxide (MAG-OX) 400 (241.3 Mg) MG tablet More than a month at Unknown time Self Yes No   Sig: Take 400 mg by mouth daily    order for DME  at Unknown time Self No No   Sig: Equipment being ordered: Home BP monitor and cuff   order  for DME  at Unknown time Self No No   Sig: Equipment being ordered: Incentive spirometer   potassium chloride ER (KLOR-CON M) 20 MEQ CR tablet More than a month at Unknown time Self Yes No   Sig: Take 20 mEq by mouth daily    sodium fluoride dental gel (PREVIDENT) 1.1 % GEL More than a month at Unknown time Self Yes No   Sig: Apply to affected area At Bedtime   valACYclovir (VALTREX) 1000 mg tablet Not Taking at Unknown time Self No No   Sig: Take 1 tablet (1,000 mg) by mouth 3 times daily   Patient not taking: Reported on 7/13/2021      Facility-Administered Medications: None     Allergies   No Known Allergies    Social History   Social History     Socioeconomic History     Marital status:      Spouse name: Not on file     Number of children: Not on file     Years of education: Not on file     Highest education level: Not on file   Occupational History     Not on file   Tobacco Use     Smoking status: Never Smoker     Smokeless tobacco: Never Used   Substance and Sexual Activity     Alcohol use: No     Drug use: No     Sexual activity: Never   Other Topics Concern     Not on file   Social History Narrative     Not on file     Social Determinants of Health     Financial Resource Strain:      Difficulty of Paying Living Expenses:    Food Insecurity:      Worried About Running Out of Food in the Last Year:      Ran Out of Food in the Last Year:    Transportation Needs:      Lack of Transportation (Medical):      Lack of Transportation (Non-Medical):    Physical Activity:      Days of Exercise per Week:      Minutes of Exercise per Session:    Stress:      Feeling of Stress :    Social Connections:      Frequency of Communication with Friends and Family:      Frequency of Social Gatherings with Friends and Family:      Attends Alevism Services:      Active Member of Clubs or Organizations:      Attends Club or Organization Meetings:      Marital Status:    Intimate Partner Violence:      Fear of Current or  Ex-Partner:      Emotionally Abused:      Physically Abused:      Sexually Abused:        Family History   I have reviewed this patient's family history and updated it with pertinent information if needed.   Family History   Problem Relation Age of Onset     Depression/Anxiety Son      Depression/Anxiety Son         alcohol abuse  age 24     Hypertension Mother      Hypertension Brother      Diabetes No family hx of      Breast Cancer No family hx of      Colon Cancer No family hx of      Prostate Cancer No family hx of      Other Cancer No family hx of        Review of Systems   CONSTITUTIONAL: No fever, chills, sweats, fatigue   EYES: no visual blurring, no double vision or visual loss  ENT: no decrease in hearing, no tinnitus, no vertigo, no hoarseness  RESPIRATORY: no shortness of breath, no cough, no sputum   CARDIOVASCULAR: no palpitations, no chest  pain, no exertional chest pain or pressure  GASTROINTESTINAL: no nausea or vomiting, or abd pain  GENITOURINARY: no dysuria, no frequency or hesitancy, no hematuria  MUSCULOSKELETAL: no weakness, no redness, no swelling, no joint pain,   SKIN: no rashes, ecchymoses, abrasions or lacerations  NEUROLOGIC: no numbness or tingling of hands, no numbness or tingling  of feet, no syncope, no tremors or weakness  PSYCHIATRIC: no sleep disturbances, no anxiety or depression    Physical Exam   Temp: 97.6  F (36.4  C) Temp src: Oral BP: (!) 138/113 Pulse: 100   Resp: 16 SpO2: 96 % O2 Device: None (Room air)    Vital Signs with Ranges  Temp:  [97.6  F (36.4  C)] 97.6  F (36.4  C)  Pulse:  [] 100  Resp:  [14-27] 16  BP: (127-143)/() 138/113  SpO2:  [95 %-99 %] 96 % 0 lbs 0 oz    Primary Survey:  Airway: patient talking  Breathing: symmetric respiratory effort bilaterally  Circulation: central pulses present and peripheral pulses present  Disability: Pupils - left 4 mm and brisk, right 4 mm and brisk   Lares Coma Scale - Total 15/15  Eye Response (E): 4  4=  "spontaneous,  3= to verbal/voice, 2=  to pain, 1= No response   Verbal Response (V): 5   5= Orientated, converses,  4= Confused, converses, 3= Inappropriate words,  2= Incomprehensible sounds,  1=No response   Motor Response (M): 6   6= Obeys commands, 5= Localizes to pain, 4= Withdrawal to pain, 3=Fexion to pain, 2= Extension to pain, 1= No response    Secondary Survey:  General: alert, oriented to person, place, time, reports visual hallucinations \" people sitting by her son and making strange faces\"  Neuro: PERRLA. EOMI. CN II-XII grossly intact. No focal deficits. Strength 5/5 x 4 extremities.  Sensation intact.  Head: atraumatic, normocephalic, trachea midline  Eyes:  Pupils 3mm, EOMI, corneas and conjunctivae clear  Nose: nares patent, no drainage, nasal septum non-tender  Mouth/Throat: no exudates or erythema,  no dental tenderness or malocclusions, no tongue lacerations  Neck:  no cervical collar present. No midline posterior tenderness, full AROM without pain.   Chest/Pulmonary: normal respiratory rate and rhythm,  bilateral clear breath sounds, no wheezes, rales or rhonchi, no chest wall tenderness or deformities,   Cardiovascular: S1, S2,  normal and regular rate and rhythm, no murmurs  Abdomen: soft, non-tender, no guarding, no rebound tenderness and no tenderness to palpation  : normal external genitalia, pelvis stable to lateral compression, reports tenderness with palpation. Noted bruising to the coccyx area  Musculoskel/Extremities: normal extremities, full AROM of major joints without tenderness, edema, erythema, ecchymosis, or abrasions. +2 PP. no edema.   Back/Spine: khyphosed posture, ecchymotic areas to the coccyx and right post rib  Hands: no gross deformities of hands or fingers. Full AROM of hand and fingers in flexion and extension.  strength equal and symmetric.   Psychiatric: affect/mood normal, cooperative, perservates at times about her hallucinations  Skin: no rashes, laceration, " ecchymosis, skin warm and dry.     Results for orders placed or performed during the hospital encounter of 07/13/21 (from the past 24 hour(s))   CBC with platelets differential    Narrative    The following orders were created for panel order CBC with platelets differential.  Procedure                               Abnormality         Status                     ---------                               -----------         ------                     CBC with platelets and d...[[id number]]  Abnormal            Final result               Manual Differential[[id number]]          Abnormal            Final result                 Please view results for these tests on the individual orders.   Comprehensive metabolic panel   Result Value Ref Range    Sodium 144 133 - 144 mmol/L    Potassium 3.9 3.4 - 5.3 mmol/L    Chloride 108 94 - 109 mmol/L    Carbon Dioxide (CO2) 26 20 - 32 mmol/L    Anion Gap 10 3 - 14 mmol/L    Urea Nitrogen 58 (H) 7 - 30 mg/dL    Creatinine 0.61 0.52 - 1.04 mg/dL    Calcium 9.9 8.5 - 10.1 mg/dL    Glucose 83 70 - 99 mg/dL    Alkaline Phosphatase 67 40 - 150 U/L    AST      ALT 72 (H) 0 - 50 U/L    Protein Total 7.4 6.8 - 8.8 g/dL    Albumin 3.5 3.4 - 5.0 g/dL    Bilirubin Total 0.8 0.2 - 1.3 mg/dL    GFR Estimate 80 >60 mL/min/1.73m2   Troponin I   Result Value Ref Range    Troponin I 0.184 (HH) 0.000 - 0.045 ug/L   CK total   Result Value Ref Range    CK     CBC with platelets and differential   Result Value Ref Range    WBC Count 17.6 (H) 4.0 - 11.0 10e3/uL    RBC Count 5.39 (H) 3.80 - 5.20 10e6/uL    Hemoglobin 16.2 (H) 11.7 - 15.7 g/dL    Hematocrit 49.5 (H) 35.0 - 47.0 %    MCV 92 78 - 100 fL    MCH 30.1 26.5 - 33.0 pg    MCHC 32.7 31.5 - 36.5 g/dL    RDW 12.4 10.0 - 15.0 %    Platelet Count 262 150 - 450 10e3/uL   Manual Differential   Result Value Ref Range    % Neutrophils 89 %    % Lymphocytes 3 %    % Monocytes 8 %    % Eosinophils 0 %    % Basophils 0 %    Absolute Neutrophils 15.7 (H) 1.6 -  8.3 10e3/uL    Absolute Lymphocytes 0.5 (L) 0.8 - 5.3 10e3/uL    Absolute Monocytes 1.4 (H) 0.0 - 1.3 10e3/uL    Absolute Eosinophils 0.0 0.0 - 0.7 10e3/uL    Absolute Basophils 0.0 0.0 - 0.2 10e3/uL    RBC Morphology Confirmed RBC Indices     Platelet Assessment  Automated Count Confirmed. Platelet morphology is normal.     Automated Count Confirmed. Platelet morphology is normal.    Sina Cells Moderate (A) None Seen   [place] Draw    Narrative    The following orders were created for panel order [place] Draw.  Procedure                               Abnormality         Status                     ---------                               -----------         ------                     Extra Blue Top Tube[[id number]]                              Final result               Extra Red Top Tube[[id number]]                               Final result               Extra Green Top (Lithium...[[id number]]                      Final result                 Please view results for these tests on the individual orders.   Extra Green Top (Lithium Heparin) ON ICE   Result Value Ref Range    Hold Specimen JIC    Extra Tube    Narrative    The following orders were created for panel order Extra Tube.  Procedure                               Abnormality         Status                     ---------                               -----------         ------                     Extra Green Top (Lithium...[[id number]]                      Final result                 Please view results for these tests on the individual orders.   Extra Green Top (Lithium Heparin) Tube   Result Value Ref Range    Hold Specimen JIC    EKG 12-lead, tracing only   Result Value Ref Range    Systolic Blood Pressure  mmHg    Diastolic Blood Pressure  mmHg    Ventricular Rate 100 BPM    Atrial Rate 100 BPM    CO Interval 146 ms    QRS Duration 80 ms     ms    QTc 487 ms    P Axis 73 degrees    R AXIS -23 degrees    T Axis 86 degrees    Interpretation ECG Click  View Image link to view waveform and result    Extra Blue Top Tube   Result Value Ref Range    Hold Specimen JIC    Extra Red Top Tube   Result Value Ref Range    Hold Specimen JIC    CT Head w/o Contrast    Narrative    CT HEAD W/O CONTRAST 7/13/2021 1:51 PM    History: Mental status change, unknown cause; Fall, altered mental  status.   ICD-10:    Comparison: MR brain 10/25/2017, CT head 10/23/2017.    Technique: Using multidetector thin collimation helical acquisition  technique, axial, coronal and sagittal CT images from the skull base  to the vertex were obtained without intravenous contrast.   (topogram) image(s) also obtained and reviewed.    Findings: There is no intracranial hemorrhage, mass effect, or midline  shift. Gray/white matter differentiation in both cerebral hemispheres  is preserved. Ventricles are proportionate to the cerebral sulci.  There is mild generalized cerebral atrophy. Moderate confluent  periventricular and subcortical white matter hypoattenuation is  nonspecific, but likely represents chronic small vessel ischemic  disease in a patient this age. The basal cisterns are clear.    The bony calvaria and the bones of the skull base are normal. The  visualized portions of the paranasal sinuses and mastoid air cells are  clear.      Impression    Impression:    1. No acute intracranial pathology.   2. 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    EDDIE HUERTA MD         SYSTEM ID:  [id number]   CK total   Result Value Ref Range    CK 1,422 (HH) 30 - 225 U/L   AST   Result Value Ref Range     (H) 0 - 45 U/L   XR Chest 2 Views    Narrative    XR CHEST 2 VW  7/13/2021 3:29 PM      HISTORY: Fall, R rib pain    COMPARISON: 6/11/2019    FINDINGS:   AP and lateral views of the chest. Trachea is midline. Cardiac  silhouette is within normal limits. No pneumothorax or pleural  effusion. Mild right perihilar interstitial and airspace opacities.  Streaky bibasilar  atelectasis. Diffuse low bone mineral density.  Redemonstrated chronic bilateral rib fracture deformities with acute  mildly displaced fracture of the lateral right 9th rib. No definite  additional acute osseous abnormalities. Diffuse chronic compression  wedge deformities of the thoracic spine. Similar scoliotic curvature  of the spine.      Impression    IMPRESSION:   1. Redemonstrated chronic bilateral rib fracture deformities with  acute mildly displaced fracture of the lateral right 9th rib. No  definite additional acute osseous abnormalities.   2. Mild right perihilar interstitial and airspace opacities, which may  represent aspiration/infection, pulmonary contusion, or mild pulmonary  edema.  3. Diffuse chronic compression wedge deformities of the thoracic  spine.     I have personally reviewed the examination and initial interpretation  and I agree with the findings.    WILLOW SNEED MD         SYSTEM ID:  [id number]   Asymptomatic COVID-19 Virus (Coronavirus) by PCR Nasopharyngeal    Specimen: Nasopharyngeal; Swab    Narrative    The following orders were created for panel order Asymptomatic COVID-19 Virus (Coronavirus) by PCR Nasopharyngeal.  Procedure                               Abnormality         Status                     ---------                               -----------         ------                     SARS-COV2 (COVID-19) Vir...[[id number]]                                                   Please view results for these tests on the individual orders.       Studies:  XR Chest 2 Views   Final Result   IMPRESSION:    1. Redemonstrated chronic bilateral rib fracture deformities with   acute mildly displaced fracture of the lateral right 9th rib. No   definite additional acute osseous abnormalities.    2. Mild right perihilar interstitial and airspace opacities, which may   represent aspiration/infection, pulmonary contusion, or mild pulmonary   edema.   3. Diffuse chronic compression wedge  deformities of the thoracic   spine.       I have personally reviewed the examination and initial interpretation   and I agree with the findings.      WILLOW SNEED MD            SYSTEM ID:  [id number]      CT Head w/o Contrast   Final Result   Impression:      1. No acute intracranial pathology.    2. 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      EDDIE HUERTA MD            SYSTEM ID:  [id number]          Ricky Stark  Trauma Service   Job code [id number]

## 2021-07-13 NOTE — ED PROVIDER NOTES
rebeka    [place] EMERGENCY DEPARTMENT ([facility])  7/13/21 ED 9 12:15 PM   History     Chief Complaint   Patient presents with     Fall     The history is provided by the patient and medical records. The history is limited by the condition of the patient (possible confusion).     Belia Sheets is a 89 year old female who presents via EMS for evaluation of fall. She is not sure what may have caused the fall. She felt urge to get down to the floor and lowered herself down. She states she was on the floor for hours. She hasn't felt hungry or sleepy and states she is forgetful if she doesn't eat or sleep. She denies any pain or injury. She notes having had a period of confusion. She states that for the past 3 weeks she has had visual hallucinations of seeing a person in the room, and is concerned she may need workup for brain tumor. Patient lives alone in an apartment. She states that she was leaving her apartment to get to a social function and fell. She is unable to give details about the fall. She is somewhat disorganized in her response to questions. No other symptoms noted.      PAST MEDICAL HISTORY:   Past Medical History:   Diagnosis Date     Carpal tunnel syndrome (aka CTS)      H/O calcium pyrophosphate deposition disease (CPPD)      History of encephalopathy 10/2017     Hypertension      Kyphoscoliosis      Osteoarthritis     hands     Osteoporosis      Rectal prolapse        PAST SURGICAL HISTORY:   Past Surgical History:   Procedure Laterality Date     COLONOSCOPY  2010     HYSTERECTOMY      for uterine prolapse     RECTOSIGMOIDECTOMY PERINEAL N/A 1/12/2018    Procedure: RECTOSIGMOIDECTOMY PERINEAL;  Perineal Rectosigmoidectomy  ;  Surgeon: Amrik Mariano MD;  Location: [facility] OR       Past medical history, past surgical history, medications, and allergies were reviewed with the patient. Additional pertinent items: None    FAMILY HISTORY:   Family History   Problem Relation Age of Onset      Depression/Anxiety Son      Depression/Anxiety Son         alcohol abuse  age 24     Hypertension Mother      Hypertension Brother      Diabetes No family hx of      Breast Cancer No family hx of      Colon Cancer No family hx of      Prostate Cancer No family hx of      Other Cancer No family hx of        SOCIAL HISTORY:   Social History     Tobacco Use     Smoking status: Never Smoker     Smokeless tobacco: Never Used   Substance Use Topics     Alcohol use: No     Social history was reviewed with the patient. Additional pertinent items: None    Patient's Medications   New Prescriptions    No medications on file   Previous Medications    ACETAMINOPHEN (TYLENOL) 325 MG TABLET    Take 2 tablets (650 mg) by mouth every 4 hours as needed for mild pain    AMLODIPINE (NORVASC) 5 MG TABLET    Take 1 tablet (5 mg) by mouth daily    CALCIUM CARBONATE 600 MG-VITAMIN D 400 UNITS (CALTRATE) 600-400 MG-UNIT PER TABLET    Take 1 tablet by mouth 2 times daily    CYANOCOBALAMIN (VITAMIN B 12 PO)    Take 1 tablet by mouth every morning     MAGNESIUM OXIDE (MAG-OX) 400 (241.3 MG) MG TABLET    Take 400 mg by mouth    ORDER FOR DME    Equipment being ordered: Home BP monitor and cuff    ORDER FOR DME    Equipment being ordered: Incentive spirometer    POTASSIUM CHLORIDE ER (KLOR-CON M) 20 MEQ CR TABLET    Take 20 mEq by mouth    SODIUM FLUORIDE DENTAL GEL (PREVIDENT) 1.1 % GEL    Apply to affected area At Bedtime    VALACYCLOVIR (VALTREX) 1000 MG TABLET    Take 1 tablet (1,000 mg) by mouth 3 times daily   Modified Medications    No medications on file   Discontinued Medications    No medications on file        No Known Allergies    Review of Systems   Unable to perform ROS: Mental status change       Physical Exam          Physical Exam  Vitals and nursing note reviewed.   Constitutional:       General: She is not in acute distress.     Appearance: She is well-developed. She is not diaphoretic.   HENT:      Head: Normocephalic and  atraumatic.      Mouth/Throat:      Pharynx: No oropharyngeal exudate.   Eyes:      General: No scleral icterus.        Right eye: No discharge.         Left eye: No discharge.      Pupils: Pupils are equal, round, and reactive to light.   Cardiovascular:      Rate and Rhythm: Normal rate and regular rhythm.      Heart sounds: Normal heart sounds. No murmur heard.   No friction rub. No gallop.    Pulmonary:      Effort: Pulmonary effort is normal. No respiratory distress.      Breath sounds: Normal breath sounds. No wheezing.   Chest:      Chest wall: No tenderness.       Abdominal:      General: Bowel sounds are normal. There is no distension.      Palpations: Abdomen is soft.      Tenderness: There is no abdominal tenderness.   Musculoskeletal:         General: No tenderness or deformity. Normal range of motion.        Arms:       Cervical back: Normal range of motion and neck supple.   Skin:     General: Skin is warm and dry.      Coloration: Skin is not pale.      Findings: No erythema or rash.   Neurological:      Mental Status: She is alert and oriented to person, place, and time. She is confused.      Cranial Nerves: No cranial nerve deficit.   Psychiatric:         Attention and Perception: She perceives visual hallucinations.         Cognition and Memory: Cognition is impaired. Memory is impaired.         ED Course        Procedures            EKG Interpretation:      Interpreted by Juan M Blanton DO  Time reviewed:1251   Symptoms at time of EKG: None   Rhythm: Normal sinus   Rate: 100  Axis: Normal  Ectopy: PAC's  Conduction: Normal  ST Segments/ T Waves: No ST-T wave changes and No acute ischemic changes  Q Waves: None  Comparison to prior: Unchanged from 1/7/2018    Clinical Impression: no acute changes         Trauma:  Level of trauma activation: Trauma evaluation (consult)  C-collar and immobilization: not indicated, cleared.  CSpine Clearance: C spine cleared clinically  GCS at arrival: 15  GCS at  disposition: unchanged  Full Primary and Secondary survey with appropriate immobilization of spine completed in exam section.  Consults prior to admission or transfer: None  Procedures done in the ED: none          Labs Ordered and Resulted from Time of ED Arrival Up to the Time of Departure from the ED - No data to display         Assessments & Plan (with Medical Decision Making)   This is a 89-year-old female who presents after a fall.  Patient does not know the cause or details regarding the fall.  It is reported that she was on the ground for possibly 24 hours, patient states several hours.  She initially denied any current pain or injury.  However, later she noted right-sided chest tenderness.  Patient is alert and oriented however is otherwise confused.  History is limited secondary to confusion.  She admits to feeling of confusion recently as well as visual hallucinations.  She states she has had episodes of confusion in the past.  Patient lives alone in his apartment.  She has tenderness to her right chest wall.  Exam demonstrates no other acute abnormalities other than difficulty answering questions.  Head CT shows no acute abnormalities.  ECG shows no acute abnormalities.  Lab work shows CK of 1422.  Troponin is 0.184.  BC count of 17.6, hemoglobin 16.2 and hematocrit 49.5.  It appears the patient is likely dehydrated and hemoconcentrated.  Chest x-ray shows a right ninth rib fracture.  Patient was given IV fluids.  Trauma was consulted and will admit to their service. We will admit for further monitoring work-up and treatment.    I have reviewed the nursing notes.    I have reviewed the findings, diagnosis, plan and need for follow up with the patient.    New Prescriptions    No medications on file       Final diagnoses:   None       7/13/2021   [facility] EMERGENCY DEPARTMENT    I, Mendy Morrissey, am serving as a trained medical scribe to document services personally performed by Juan M  Harvinder BROOKS based on the provider's statements to me on July 13, 2021.  This document has been checked and approved by the attending provider.    I, Juan M Blanton DO, was physically present and have reviewed and verified the accuracy of this note documented by Mendy Morrissey, medical scribe.        Juan M Blanton,   07/13/21 9742

## 2021-07-13 NOTE — ED NOTES
Bed: ED09  Expected date: 7/13/21  Expected time: 11:40 AM  Means of arrival: Ambulance  Comments:  [facility] with a 89 yo F found down for 24 hrs, no trauma. Triaged yellow

## 2021-07-14 ENCOUNTER — APPOINTMENT (OUTPATIENT)
Dept: PHYSICAL THERAPY | Facility: CLINIC | Age: 86
DRG: 543 | End: 2021-07-14
Attending: NURSE PRACTITIONER
Payer: COMMERCIAL

## 2021-07-14 ENCOUNTER — APPOINTMENT (OUTPATIENT)
Dept: OCCUPATIONAL THERAPY | Facility: CLINIC | Age: 86
DRG: 543 | End: 2021-07-14
Attending: NURSE PRACTITIONER
Payer: COMMERCIAL

## 2021-07-14 LAB
ANION GAP SERPL CALCULATED.3IONS-SCNC: 5 MMOL/L (ref 3–14)
BUN SERPL-MCNC: 41 MG/DL (ref 7–30)
CALCIUM SERPL-MCNC: 7.9 MG/DL (ref 8.5–10.1)
CHLORIDE BLD-SCNC: 116 MMOL/L (ref 94–109)
CK SERPL-CCNC: 564 U/L (ref 30–225)
CO2 SERPL-SCNC: 23 MMOL/L (ref 20–32)
CREAT SERPL-MCNC: 0.52 MG/DL (ref 0.52–1.04)
GFR SERPL CREATININE-BSD FRML MDRD: 84 ML/MIN/1.73M2
GLUCOSE BLD-MCNC: 92 MG/DL (ref 70–99)
HGB BLD-MCNC: 14.2 G/DL (ref 11.7–15.7)
LACTATE SERPL-SCNC: 1.8 MMOL/L (ref 0.7–2)
MAGNESIUM SERPL-MCNC: 2 MG/DL (ref 1.6–2.3)
PHOSPHATE SERPL-MCNC: 1.6 MG/DL (ref 2.5–4.5)
POTASSIUM BLD-SCNC: 3.4 MMOL/L (ref 3.4–5.3)
SODIUM SERPL-SCNC: 144 MMOL/L (ref 133–144)
TROPONIN I SERPL-MCNC: 0.1 UG/L (ref 0–0.04)
VIT B12 SERPL-MCNC: 3330 PG/ML (ref 193–986)

## 2021-07-14 PROCEDURE — 97161 PT EVAL LOW COMPLEX 20 MIN: CPT | Mod: GP

## 2021-07-14 PROCEDURE — 250N000013 HC RX MED GY IP 250 OP 250 PS 637: Performed by: NURSE PRACTITIONER

## 2021-07-14 PROCEDURE — 36415 COLL VENOUS BLD VENIPUNCTURE: CPT | Performed by: SURGERY

## 2021-07-14 PROCEDURE — 84484 ASSAY OF TROPONIN QUANT: CPT | Performed by: NURSE PRACTITIONER

## 2021-07-14 PROCEDURE — 82550 ASSAY OF CK (CPK): CPT | Performed by: NURSE PRACTITIONER

## 2021-07-14 PROCEDURE — 999N000157 HC STATISTIC RCP TIME EA 10 MIN

## 2021-07-14 PROCEDURE — 83605 ASSAY OF LACTIC ACID: CPT | Performed by: SURGERY

## 2021-07-14 PROCEDURE — 120N000002 HC R&B MED SURG/OB UMMC

## 2021-07-14 PROCEDURE — 94150 VITAL CAPACITY TEST: CPT

## 2021-07-14 PROCEDURE — 99232 SBSQ HOSP IP/OBS MODERATE 35: CPT | Performed by: NURSE PRACTITIONER

## 2021-07-14 PROCEDURE — 97165 OT EVAL LOW COMPLEX 30 MIN: CPT | Mod: GO

## 2021-07-14 PROCEDURE — 36415 COLL VENOUS BLD VENIPUNCTURE: CPT | Performed by: NURSE PRACTITIONER

## 2021-07-14 PROCEDURE — 84100 ASSAY OF PHOSPHORUS: CPT | Performed by: NURSE PRACTITIONER

## 2021-07-14 PROCEDURE — 97530 THERAPEUTIC ACTIVITIES: CPT | Mod: GP

## 2021-07-14 PROCEDURE — 82607 VITAMIN B-12: CPT | Performed by: NURSE PRACTITIONER

## 2021-07-14 PROCEDURE — 97535 SELF CARE MNGMENT TRAINING: CPT | Mod: GO

## 2021-07-14 PROCEDURE — 85018 HEMOGLOBIN: CPT | Performed by: SURGERY

## 2021-07-14 PROCEDURE — 83735 ASSAY OF MAGNESIUM: CPT | Performed by: NURSE PRACTITIONER

## 2021-07-14 PROCEDURE — 250N000011 HC RX IP 250 OP 636: Performed by: NURSE PRACTITIONER

## 2021-07-14 PROCEDURE — 80048 BASIC METABOLIC PNL TOTAL CA: CPT | Performed by: NURSE PRACTITIONER

## 2021-07-14 RX ORDER — CEFTRIAXONE 1 G/1
1 INJECTION, POWDER, FOR SOLUTION INTRAMUSCULAR; INTRAVENOUS EVERY 24 HOURS
Status: DISCONTINUED | OUTPATIENT
Start: 2021-07-14 | End: 2021-07-16 | Stop reason: HOSPADM

## 2021-07-14 RX ADMIN — POTASSIUM & SODIUM PHOSPHATES POWDER PACK 280-160-250 MG 2 PACKET: 280-160-250 PACK at 21:00

## 2021-07-14 RX ADMIN — POTASSIUM & SODIUM PHOSPHATES POWDER PACK 280-160-250 MG 2 PACKET: 280-160-250 PACK at 11:50

## 2021-07-14 RX ADMIN — CALCIUM 500 MG: 500 TABLET ORAL at 17:37

## 2021-07-14 RX ADMIN — Medication 25 MCG: at 09:45

## 2021-07-14 RX ADMIN — POTASSIUM & SODIUM PHOSPHATES POWDER PACK 280-160-250 MG 2 PACKET: 280-160-250 PACK at 15:22

## 2021-07-14 RX ADMIN — CALCIUM 500 MG: 500 TABLET ORAL at 09:45

## 2021-07-14 RX ADMIN — CEFTRIAXONE 1 G: 1 INJECTION, POWDER, FOR SOLUTION INTRAMUSCULAR; INTRAVENOUS at 09:40

## 2021-07-14 ASSESSMENT — ACTIVITIES OF DAILY LIVING (ADL)
ADLS_ACUITY_SCORE: 18
TOILETING_ISSUES: NO
ADLS_ACUITY_SCORE: 17
NUMBER_OF_TIMES_PATIENT_HAS_FALLEN_WITHIN_LAST_SIX_MONTHS: 1
ADLS_ACUITY_SCORE: 17
DIFFICULTY_EATING/SWALLOWING: NO
DRESSING/BATHING_DIFFICULTY: NO
DIFFICULTY_COMMUNICATING: NO
PREVIOUS_RESPONSIBILITIES: MEAL PREP;HOUSEKEEPING;LAUNDRY;MEDICATION MANAGEMENT
WHICH_OF_THE_ABOVE_FUNCTIONAL_RISKS_HAD_A_RECENT_ONSET_OR_CHANGE?: FALL HISTORY
DEPENDENT_IADLS:: TRANSPORTATION
EQUIPMENT_CURRENTLY_USED_AT_HOME: WALKER, ROLLING

## 2021-07-14 NOTE — CONSULTS
Care Management Initial Consult    General Information  Assessment completed with: Patient and son Luis Carlos at bedside, daughter Lubna via phone.  Type of CM/SW Visit: Initial Assessment  Primary Care Provider verified and updated as needed: Yes   Readmission within the last 30 days: no previous admission in last 30 days   Reason for Consult: discharge planning  Advance Care Planning: Advance Care Planning Reviewed: present on chart, verified with patient          Communication Assessment  Patient's communication style: spoken language (English or Bilingual)    Hearing Difficulty or Deaf: yes   Wear Glasses or Blind: no    Cognitive  Cognitive/Neuro/Behavioral: WDL  Level of Consciousness: alert  Arousal Level: opens eyes spontaneously  Orientation: oriented x 4  Mood/Behavior: calm  Best Language: 0 - No aphasia  Speech: clear, logical, spontaneous    Living Environment:   People in home: alone     Current living Arrangements: apartment      Able to return to prior arrangements:  Post TCU stay.       Family/Social Support:  Care provided by: self  Provides care for: no one  Marital Status:   Children - Lubna ([place], [place]) Luis Carlos ([place]) Ciarra ([place])         Description of Support System: Supportive, Involved         Current Resources:   Patient receiving home care services: No  Community Resources: None  Equipment currently used at home: walker, rolling, grab bar, toilet, grab bar, tub/shower  Supplies currently used at home:  NA    Employment/Financial:  Employment Status: retired        Financial Concerns: No concerns identified      Functional Status:  Prior to admission patient needed assistance:   Dependent ADLs:: Independent  Dependent IADLs:: Transportation       Mental Health Status:  Mental Health Status: No Current Concerns       Chemical Dependency Status:  Chemical Dependency Status: No Current Concerns             Values/Beliefs:  Spiritual, Cultural Beliefs, Temple Practices, Values  that affect care: no               Additional Information:  BONIFACIO met with pt and son Luis Carlos at bedside to introduce self and role on treatment team. Pt was agreeable to discussing with SW.   SW discussed current TCU recommendations. Pt was agreeable. SW provided medicare.gov list of rated facilities to pt. Pt requested SW call her daughter, Lubna to discuss.,  SW called Lubna [phone]. Lubna in agreement with TCU stay. Lubna stated pt has been to [facility] in the past and had a very positive experience. Lubna would like a referral there. BONIFACIO emailed Lubna ([email or web address]) a list of TCU's to review if additional options are needed.    BONIFACIO sent referral to [facility] [phone] f: [phone].    Addend 1500: BONIFACIO received email from Lubna. Lubna would also like referral to [facility] and [facility]. Lubna re-iterated that the number one choice is [facility].   BONIFACIO sent referral to [facility] [phone] f: [phone] and [facility] [phone] f: [phone].    ALEXX Quintana, LGSW  6D Adult Acute Care BONIFACIO  Ph:[phone]  Pager [phone]

## 2021-07-14 NOTE — ED NOTES
"Trauma service paged for BPA/multiple sepsis fires. \"ED09, BUCKY Sheets. BPA fired for BP 93/63. Sepsis keeps firing as well but lactic at 2215 was 1.5. Thanks, Yeimy RN [id number]\"  Return call received, no orders at this time. Will continue to monitor.  "

## 2021-07-14 NOTE — PLAN OF CARE
Pt here from fall @ assisted living sustaining R 9th rib fx, vs ex HTN within parameters, neuros include: a/o x4, soft spoken, forgetful, 4/5 throughout. PIV SL between ABX, regular diet w/assistance ordering and meal set up, voids spont, no bm. Pt declined pain med throughout the entire day, worked with therapies, ARU for discharge. Continue to monitor per orders.

## 2021-07-14 NOTE — PROGRESS NOTES
Arrived from:  ED  Belongings/meds:  Clothing, remain with patient  2 RN Skin Assessment Completed by:  [place]  Non-intact findings documented (yes/no/NA): R arm skin tear, bruise and scratch on bilateral back, some irritation to anal site, barrier cream applied

## 2021-07-14 NOTE — PROGRESS NOTES
07/14/21 1432       Present no   Language english   Living Environment   People in home alone   Current Living Arrangements independent living facility;apartment   Home Accessibility no concerns   Transportation Anticipated health plan transportation;agency   Living Environment Comments Pt lives in an independent living facility apartment. Pt has a tub/shower combo in the bathroom with grab bars present   Self-Care   Usual Activity Tolerance moderate   Current Activity Tolerance poor   Regular Exercise No   Equipment Currently Used at Home walker, rolling;shower chair;grab bar, tub/shower;grab bar, toilet   Activity/Exercise/Self-Care Comment Pt was previously mod I with ADL completion, however son and daughter concerned about how well pt was caring for herself   Instrumental Activities of Daily Living (IADL)   Previous Responsibilities meal prep;housekeeping;laundry;medication management   IADL Comments Pt typically walks to the dinning ware for breakfast only, otherwise is on her own. Pt receives light house keeping, but completes all other IADLs mod I at baseline   Disability/Function   Hearing Difficulty or Deaf no   Wear Glasses or Blind no   Concentrating, Remembering or Making Decisions Difficulty no   Difficulty Communicating no   Difficulty Eating/Swallowing no   Walking or Climbing Stairs Difficulty yes   Walking or Climbing Stairs ambulation difficulty, requires equipment   Dressing/Bathing Difficulty no   Toileting issues no   Doing Errands Independently Difficulty (such as shopping) yes   Errands Management Pt receives assist with breakfast and light housekeeping only.   Fall history within last six months yes   Number of times patient has fallen within last six months 1   Change in Functional Status Since Onset of Current Illness/Injury yes   General Information   Onset of Illness/Injury or Date of Surgery 07/13/21   Referring Physician Hui Lawrence APRN CNP  "  Patient/Family Therapy Goal Statement (OT) To return home   Additional Occupational Profile Info/Pertinent History of Current Problem Belia Sheets is a 89 year old female who with a history of Osteoporosis, remote history of visual hallucinations who was brought to the ED for evaluation after being found down in her senior living apartment. The patient reports that she was having \"interesting visual hallucinations\" and all she remembered was being on the floor and could not get up. She is unsure how long she was down.   Existing Precautions/Restrictions fall   Limitations/Impairments safety/cognitive   Left Upper Extremity (Weight-bearing Status) full weight-bearing (FWB)   Right Upper Extremity (Weight-bearing Status) full weight-bearing (FWB)   Left Lower Extremity (Weight-bearing Status) full weight-bearing (FWB)   Right Lower Extremity (Weight-bearing Status) full weight-bearing (FWB)   Heart Disease Risk Factors Medical history;Age   General Observations and Info Activity: ambulate 3x per day   Cognitive Status Examination   Orientation Status person   Affect/Mental Status (Cognitive) confused;low arousal/lethargic   Follows Commands follows one-step commands   Cognitive Status Comments Pt is lethargic throughout session, oriented to person only. Pt is able to follow simple one step commands. Per conversation with son, pt has been more lethargic and disoriented today than at baseline   Visual Perception   Visual Impairment/Limitations WFL   Sensory   Sensory Quick Adds No deficits were identified   Integumentary/Edema   Integumentary/Edema no deficits were identifed   Range of Motion Comprehensive   General Range of Motion no range of motion deficits identified   Strength Comprehensive (MMT)   Comment, General Manual Muscle Testing (MMT) Assessment Not formally assessed, per observation pt presents with generalized weakness   Bed Mobility   Bed Mobility supine-sit;sit-supine   Supine-Sit [place] " (Bed Mobility) supervision   Sit-Supine [place] (Bed Mobility) moderate assist (50% patient effort)   Transfers   Transfers sit-stand transfer   Sit-Stand Transfer   Sit-Stand [place] (Transfers) contact guard   Assistive Device (Sit-Stand Transfers) walker, 4-wheeled   Activities of Daily Living   BADL Assessment lower body dressing;grooming   Lower Body Dressing Assessment   [place] Level (Lower Body Dressing) moderate assist (50% patient effort)   Position (Lower Body Dressing) edge of bed sitting   Grooming Assessment   [place] Level (Grooming) supervision;set up   Position (Grooming) edge of bed sitting   Clinical Impression   Criteria for Skilled Therapeutic Interventions Met (OT) yes;meets criteria;skilled treatment is necessary   OT Diagnosis decreased activity tolerance and independence with ADL completion   OT Problem List-Impairments impacting ADL problems related to;activity tolerance impaired;cognition;strength;balance   Assessment of Occupational Performance 5 or more Performance Deficits   Identified Performance Deficits g/h, toileting, bathing, dressing, functional mobility and cognition   Planned Therapy Interventions (OT) ADL retraining;IADL retraining;cognition;strengthening;progressive activity/exercise   Clinical Decision Making Complexity (OT) low complexity   Therapy Frequency (OT) 5x/week   Predicted Duration of Therapy 1 week   Anticipated Equipment Needs Upon Discharge (OT) other (see comments)  (TBD)   Risk & Benefits of therapy have been explained evaluation/treatment results reviewed;care plan/treatment goals reviewed;risks/benefits reviewed;current/potential barriers reviewed;participants voiced agreement with care plan;participants included;patient   OT Discharge Planning    OT Discharge Recommendation (DC Rec) Transitional Care Facility   OT Rationale for DC Rec Pt is below baseline and would benefit from continued skilled therapy to increase activity tolerance and  independence with ADL completion   OT Brief overview of current status  Ax1 + 4WW   Total Evaluation Time (Minutes)   Total Evaluation Time (Minutes) 7

## 2021-07-14 NOTE — PROGRESS NOTES
Plan is for patient to be admitted to .  Spoke with ED charge RN, Jim, who confirmed with Trauma MD that telemetry is not needed for this patient and that there is no concern for elevated Trops at this time since Trops are trending down.  Pt will be admitted to [facility].

## 2021-07-14 NOTE — PROGRESS NOTES
"   07/14/21 1100   Quick Adds   Type of Visit Initial PT Evaluation   Living Environment   People in home alone   Current Living Arrangements apartment   Home Accessibility no concerns   Transportation Anticipated family or friend will provide   Living Environment Comments Pt living alone in independent living apartment, accessible, no stairs. Son local, daughter in [facility].   Self-Care   Usual Activity Tolerance moderate   Current Activity Tolerance poor   Regular Exercise No   Equipment Currently Used at Home walker, rolling;grab bar, toilet;grab bar, tub/shower   Activity/Exercise/Self-Care Comment Pt reports being IND at baseline, prepares own food, does cleaning, showers and dresses IND. Son reports some concern with pt managing self cares and food prep/eating without assist. Ambulates Stephy with rollator walker to dining room for breakfast.   Disability/Function   Hearing Difficulty or Deaf yes   Wear Glasses or Blind no   Concentrating, Remembering or Making Decisions Difficulty yes   Difficulty Communicating no   Difficulty Eating/Swallowing no   Walking or Climbing Stairs Difficulty yes   Walking or Climbing Stairs ambulation difficulty, requires equipment   Dressing/Bathing Difficulty no   Toileting issues no   Doing Errands Independently Difficulty (such as shopping) yes   Fall history within last six months yes   Number of times patient has fallen within last six months 1   Change in Functional Status Since Onset of Current Illness/Injury yes   General Information   Onset of Illness/Injury or Date of Surgery 07/13/21   Referring Physician Ricky Lawrence APRN CNP   Patient/Family Therapy Goals Statement (PT) return home   Pertinent History of Current Problem (include personal factors and/or comorbidities that impact the POC) Per chart \"Belia Sheets is a 89 year old female who with a history of Osteoporosis, remote history of visual hallucinations who was brought to the ED for evaluation " "after being found down in her senior living apartment. The patient reports that she was having \"interesting visual hallucinations\" and all she remembered was being on the floor and could not get up. She is unsure how long she was down.\"   Existing Precautions/Restrictions fall  (rib fracture)   General Observations Activity: ambulate 3x daily   Cognition   Orientation Status (Cognition) verbal cues/prompts needed for orientation;disoriented to;time   Pain Assessment   Patient Currently in Pain No   Integumentary/Edema   Integumentary/Edema Comments abrasion on upper back   Posture    Posture Kyphosis  (significant kyphosis)   Range of Motion (ROM)   ROM Comment limited 2/2 arthritis   Strength   Strength Comments generally deconditioned, grossly >3/5 based on mobility   Bed Mobility   Comment (Bed Mobility) decreased speed, used UE assist on rail, min A for sidelying to sit   Transfers   Transfer Safety Comments STS min A-CGA   Gait/Stairs (Locomotion)   Comment (Gait/Stairs) amb ~5 ft with FWW and CGA-Will, demo's significantly decreased gait speed and stride length, kyphotic posture   Balance   Balance Comments demo's no LOB or unsteadiness during eval, although pt reports frequently catching near falls   Sensory Examination   Sensory Perception patient reports no sensory changes   Clinical Impression   Criteria for Skilled Therapeutic Intervention yes, treatment indicated   PT Diagnosis (PT) deconditioning   Influenced by the following impairments weakness, decreased endurance, impaired balance   Functional limitations due to impairments gait tolerance, transfers   Clinical Presentation Stable/Uncomplicated   Clinical Presentation Rationale pt presentation, clinical judgment   Clinical Decision Making (Complexity) low complexity   Therapy Frequency (PT) 5x/week   Predicted Duration of Therapy Intervention (days/wks) 1 week   Planned Therapy Interventions (PT) balance training;bed mobility training;gait " training;home exercise program;neuromuscular re-education;strengthening;transfer training   Risk & Benefits of therapy have been explained evaluation/treatment results reviewed;care plan/treatment goals reviewed;current/potential barriers reviewed;participants voiced agreement with care plan;participants included;patient   Clinical Impression Comments Pt demo's decreased functional mobility and would benefit from physical therapy.   PT Discharge Planning    PT Discharge Recommendation (DC Rec) Transitional Care Facility;home with assist   PT Rationale for DC Rec Pt lives in independent living, is not mobilizing at IND level. Would benefit from TCU to improve strength and reduce fall risk. Would also benefit from transitioning to assisted living at current facility for additional support with ADLs and iADLs. Pt and family open to TCU and assisted living.   PT Brief overview of current status  Ax1 with GB and walker   Total Evaluation Time   Total Evaluation Time (Minutes) 8

## 2021-07-14 NOTE — PLAN OF CARE
Status: Pt admitted for ground level fall at [facility] care facility, R 9th rib fx noted on work-up  Vitals: Hypertensive within parameters  Neuros: A+O x4. 4/5 throughout, generalized weakness. N/T to bilateral feet. Pt said for the last 15 days has been having visual hallucinations, varies from seeing people or animals coming and trying to talk to her. She knows these are not real and has had them previously in the past as well   IV: PIV infusing NS @ 125 ml/hr  Labs/Electrolytes: Last troponin 0.123, trending down, redraw this AM, Mds verified no tele is needed. Last CK 1,422  Resp/trach: WNL  Diet: Regular   Bowel status: Had incontinent BM in ED, says she can be incontinent when sleeping but usually does not have an issue  :   Skin: Skin tear to R arm, cleansed and covered with primapore. Bruise/scrape to back from fall, redness and irritaiton to anal area, barrier cream applied, mepelix applied to sacrum for prevention  Pain: Denied  Activity: A1 GB and walker per report, not OOB overnight  Social: Pt notified family   Plan: PT and OT to see today, continue with POC   Updates this shift: Arrived to unit @ 4565

## 2021-07-14 NOTE — PROGRESS NOTES
[facility]  Trauma Service Progress Note    Date of Service (when I saw the patient): 07/14/2021     Assessment & Plan   Trauma mechanism:GLF  Time/date of injury:07/12 unknown time late am to early afternoon  Known Injuries:  1. Right lateral 9th rib fracture    Neuro/Pain:  # Acute traumatic pain   # Visual and auditory hallucinations with prior hx  Lidoderm patches and Tylenol for pain  Avoid opioids as able  UTI possibly contributing to hallucinations, treating as below  Vit B12 elevated  - Monitor neurological status. Delirium prevention and precautions.     Pulmonary:  # Traumatic rib fracture  Stable on room air  Minimal pain  Mobilize as much as able  Incentive spirometer while awake   NIF/VC -25/1000    Cardiovascular:    Elevated troponin post trauma.  Downtrending, denies chest pain or shortness of breath  EKG without acute changes or ischemia    GI/Nutrition:    Regular diet    Fluids/Electrolytes:  Rhabdomyolosis  Admitted with an elevated CK of 1400, most likely due to trauma and prolonged immobility. Volume resuscited. UOP has been adequate. Encourage oral intake  OK to SL MIVF    Endocrine:  - PTA medications: none     Infectious disease:   # Possible UTI  UA with small leuko est and WBC  Started Ceftriaxone, plan to transition to oral at discharge    - Antibiotics:  Ceftriaxone for UTI, cultures pending    Hematology:    - Hemoglobin 16.2, concentrated . Monitor and trend. Threshold for transfusion if hgb <7.0 or signs/symptoms of hypoperfusion.       Musculoskeletal:  # Fall  # Rib fracture  - Physical and occupational therapy consults.    Lines/ tubes/ drains:  - PIV    General Cares:    PPI/H2 blocker:  n/a   DVT prophylaxis: heparin subcutaneous, mobilize   Bowel Regimen/Date of last stool: pta   Pulmonary toilet: mobilize, cough and deep breath   ETOH screen completed: denies ETOH use              Frailty Score: 2   Lines / drains: PIV    Code  status:  DNR/DNI per POLST     Discharge goals:     Adequate pain management: yes    VSS x24 hours: yes    Hemoglobin stable x 48 hours:n/a    Ambulating safely and/or therapy evals complete: yes    Drains/lines removed or plan in place to manage: yes    Teaching done: ongoing    Other:  Expected D/C date: therapy recommending TCU  Delmar (son) updated at the bedside    Interval History   Was eating breakfast at the time of my visit  ROS x 8 negative with exception of those things listed in interval hx    Physical Exam   Temp: 97.8  F (36.6  C) Temp src: Oral BP: (!) 135/92 (pt moving) Pulse: 79   Resp: 15 SpO2: 94 % O2 Device: None (Room air)    Vitals:    07/13/21 2100   Weight: 45 kg (99 lb 3.3 oz)     Vital Signs with Ranges  Temp:  [97.6  F (36.4  C)-98  F (36.7  C)] 97.8  F (36.6  C)  Pulse:  [] 79  Resp:  [10-28] 15  BP: ()/() 135/92  SpO2:  [93 %-99 %] 94 %  I/O last 3 completed shifts:  In: 810 [P.O.:360; I.V.:450]  Out: -     Jessica Coma Scale - Total 15/15  Eye Response (E): 4   4= spontaneous, 3= to verbal/voice, 2= to pain, 1= No response   Verbal Response (V): 5   5= Orientated, converses, 4= Confused, converses, 3= Inappropriate words, 2= Incomprehensible sounds, 1=No response   Motor Response (M): 6   6= Obeys commands, 5= Localizes to pain, 4= Withdrawal to pain, 3=Fexion to pain, 2= Extension to pain, 1= No response   Constitutional: Awake, alert, cooperative, no apparent distress.  Eyes: Lids and lashes normal, pupils equal, round and reactive to light  ENT: Normocephalic, atraumatic  Respiratory: No increased work of breathing, good air exchange, clear to auscultation bilaterally, no crackles or wheezing.  Cardiovascular:  regular rate and rhythm, normal S1 and S2 murmur.   GI: Normal bowel sounds, abdomen soft, non-distended, non-tender, no guarding  Genitourinary:  Not seen  Skin:  Warm and dry  Musculoskeletal: There is no redness, warmth, or swelling of the joints.  Pedal  pulse palpated. Khyphosis  Neurologic: Awake, alert, oriented. Forgetful at times  Strength and sensory is intact. No focal deficits.  Neuropsychiatric: Calm, normal eye contact, alert    SUSIE Bansal CNP  To contact the trauma service use job code pager [phone],   Numeric texts or alpha text through [facility]

## 2021-07-14 NOTE — UTILIZATION REVIEW
Admission Status; Secondary Review Determination         Under the authority of the Utilization Management Committee, the utilization review process indicated a secondary review on the above patient.  The review outcome is based on review of the medical records, discussions with staff, and applying clinical experience noted on the date of the review.        (x)      Inpatient Status Appropriate - This patient's medical care is consistent with medical management for inpatient care and reasonable inpatient medical practice.     RATIONALE FOR DETERMINATION   The patient is a 89-year-old female admitted on 7/13/2021.  Patient was brought to to the emergency room by EMS due to a fall.  She has not aware of the cause of the fall and was having visual hallucinations when she found herself on the floor.  She describes visual hallucinations over the prior 3 weeks.  CT scan of her head done in the emergency room did not show any acute findings.  There was a rib fracture noted.  She was admitted for evaluation of syncope and recent worsening confusion.  Troponin was elevated at 0.184 and troponin increased to 0.123.  CK was 1422.  Patient was also diagnosed with dehydration on admission.  IV fluids were given for rehydration.  Is not clear in the utilization review notes on reason for review however, at this time it is appropriate that she continue as inpatient status.  A more detailed description of the reason for review was requested from the assigned utilization review nurse via voicemail.  If she is discharged today, recommendation would be to switch her to observation status.  Further evaluation of cardiac status is likely also in progress.      The severity of illness, intensity of service provided, expected LOS and risk for adverse outcome make the care complex, high risk and appropriate for hospital admission.        The information on this document is developed by the utilization review team in order for the  business office to ensure compliance.  This only denotes the appropriateness of proper admission status and does not reflect the quality of care rendered.         The definitions of Inpatient Status and Observation Status used in making the determination above are those provided in the CMS Coverage Manual, Chapter 1 and Chapter 6, section 70.4.      Sincerely,     Oli Danielle MD  Physician Advisor  Utilization Review/ Case Management  [facility].

## 2021-07-15 ENCOUNTER — APPOINTMENT (OUTPATIENT)
Dept: OCCUPATIONAL THERAPY | Facility: CLINIC | Age: 86
DRG: 543 | End: 2021-07-15
Payer: COMMERCIAL

## 2021-07-15 ENCOUNTER — APPOINTMENT (OUTPATIENT)
Dept: PHYSICAL THERAPY | Facility: CLINIC | Age: 86
DRG: 543 | End: 2021-07-15
Payer: COMMERCIAL

## 2021-07-15 LAB
MAGNESIUM SERPL-MCNC: 1.8 MG/DL (ref 1.6–2.3)
PHOSPHATE SERPL-MCNC: 2.2 MG/DL (ref 2.5–4.5)
POTASSIUM BLD-SCNC: 3.5 MMOL/L (ref 3.4–5.3)

## 2021-07-15 PROCEDURE — 250N000013 HC RX MED GY IP 250 OP 250 PS 637: Performed by: NURSE PRACTITIONER

## 2021-07-15 PROCEDURE — 84132 ASSAY OF SERUM POTASSIUM: CPT | Performed by: SURGERY

## 2021-07-15 PROCEDURE — 97530 THERAPEUTIC ACTIVITIES: CPT | Mod: GP

## 2021-07-15 PROCEDURE — 120N000002 HC R&B MED SURG/OB UMMC

## 2021-07-15 PROCEDURE — 999N000128 HC STATISTIC PERIPHERAL IV START W/O US GUIDANCE

## 2021-07-15 PROCEDURE — 97535 SELF CARE MNGMENT TRAINING: CPT | Mod: GO | Performed by: OCCUPATIONAL THERAPIST

## 2021-07-15 PROCEDURE — 84100 ASSAY OF PHOSPHORUS: CPT | Performed by: NURSE PRACTITIONER

## 2021-07-15 PROCEDURE — 999N000157 HC STATISTIC RCP TIME EA 10 MIN

## 2021-07-15 PROCEDURE — 99232 SBSQ HOSP IP/OBS MODERATE 35: CPT | Performed by: NURSE PRACTITIONER

## 2021-07-15 PROCEDURE — 83735 ASSAY OF MAGNESIUM: CPT | Performed by: NURSE PRACTITIONER

## 2021-07-15 PROCEDURE — 97110 THERAPEUTIC EXERCISES: CPT | Mod: GO | Performed by: OCCUPATIONAL THERAPIST

## 2021-07-15 PROCEDURE — 36415 COLL VENOUS BLD VENIPUNCTURE: CPT | Performed by: NURSE PRACTITIONER

## 2021-07-15 PROCEDURE — 250N000011 HC RX IP 250 OP 636: Performed by: NURSE PRACTITIONER

## 2021-07-15 PROCEDURE — 94150 VITAL CAPACITY TEST: CPT

## 2021-07-15 RX ORDER — IBUPROFEN 200 MG
200 TABLET ORAL EVERY 6 HOURS PRN
Status: DISCONTINUED | OUTPATIENT
Start: 2021-07-15 | End: 2021-07-16 | Stop reason: HOSPADM

## 2021-07-15 RX ORDER — AMLODIPINE BESYLATE 5 MG/1
5 TABLET ORAL DAILY
Status: DISCONTINUED | OUTPATIENT
Start: 2021-07-15 | End: 2021-07-16 | Stop reason: HOSPADM

## 2021-07-15 RX ORDER — LIDOCAINE 4 G/G
1 PATCH TOPICAL EVERY 24 HOURS
DISCHARGE
Start: 2021-07-15 | End: 2021-08-11

## 2021-07-15 RX ORDER — IBUPROFEN 200 MG
200 TABLET ORAL EVERY 6 HOURS PRN
DISCHARGE
Start: 2021-07-15 | End: 2021-08-25

## 2021-07-15 RX ORDER — POLYETHYLENE GLYCOL 3350 17 G/17G
17 POWDER, FOR SOLUTION ORAL DAILY
Qty: 510 G | DISCHARGE
Start: 2021-07-15 | End: 2021-09-11

## 2021-07-15 RX ADMIN — AMLODIPINE BESYLATE 5 MG: 5 TABLET ORAL at 12:12

## 2021-07-15 RX ADMIN — Medication 25 MCG: at 09:17

## 2021-07-15 RX ADMIN — CALCIUM 500 MG: 500 TABLET ORAL at 18:05

## 2021-07-15 RX ADMIN — CEFTRIAXONE 1 G: 1 INJECTION, POWDER, FOR SOLUTION INTRAMUSCULAR; INTRAVENOUS at 09:17

## 2021-07-15 RX ADMIN — CALCIUM 500 MG: 500 TABLET ORAL at 09:17

## 2021-07-15 ASSESSMENT — ACTIVITIES OF DAILY LIVING (ADL)
ADLS_ACUITY_SCORE: 21
ADLS_ACUITY_SCORE: 21
ADLS_ACUITY_SCORE: 18
ADLS_ACUITY_SCORE: 21
ADLS_ACUITY_SCORE: 17
ADLS_ACUITY_SCORE: 18

## 2021-07-15 NOTE — PLAN OF CARE
Pt here for fall with R rib fx, vss, neuros include: a/o x4, soft spoken, 4/5 throughout. PIV SL between ABX, regular diet with assitance ordering and meal set up, fair PO intake, voiding spont and x1 BM this shift, up AO1-2 w/GB and walker. Pt continues to refuse pain and need for pain meds. Plan for TCU once bed available, continue to monitor per orders.

## 2021-07-15 NOTE — PROGRESS NOTES
[facility]  Trauma Service Progress Note    Date of Service (when I saw the patient): 07/15/2021     Assessment & Plan   Trauma mechanism:GLF  Time/date of injury:07/12 unknown time late am to early afternoon  Known Injuries:  1. Right lateral 9th rib fracture     Neuro/Pain:  # Acute traumatic pain   # Visual and auditory hallucinations with prior hx  Lidoderm patches and Tylenol for pain  Avoid opioids as able  UTI possibly contributing to hallucinations, treating as below  Vit B12 elevated  - Monitor neurological status. Delirium prevention and precautions.      Pulmonary:  # Traumatic rib fracture  Stable on room air  Minimal pain  Mobilize as much as able  Incentive spirometer while awake      Cardiovascular:    Elevated troponin post trauma.  Downtrending, denies chest pain or shortness of breath  EKG without acute changes or ischemia     GI/Nutrition:    Regular diet     Fluids/Electrolytes:  Rhabdomyolosis  Admitted with an elevated CK of 1400, most likely due to trauma and prolonged immobility. Volume resuscited. UOP has been adequate. Encourage oral intake     Endocrine:  - PTA medications: none      Infectious disease:   # Possible UTI  UA with small leuko est and WBC  Started Ceftriaxone, plan to transition to oral at discharge     - Antibiotics:  Ceftriaxone for UTI, cultures pending     Hematology:    - Hemoglobin 16.2, concentrated . Monitor and trend. Threshold for transfusion if hgb <7.0 or signs/symptoms of hypoperfusion.        Musculoskeletal:  # Fall  # Rib fracture  - Physical and occupational therapy consults.     Lines/ tubes/ drains:  - PIV     General Cares:               PPI/H2 blocker:  n/a              DVT prophylaxis: heparin subcutaneous, mobilize              Bowel Regimen/Date of last stool: pta              Pulmonary toilet: mobilize, cough and deep breath              ETOH screen completed: denies ETOH use              Frailty Score:  2              Lines / drains: PIV     Code status:  DNR/DNI per POLST       Discharge goals:     Adequate pain management: yes    VSS x24 hours: yes    Hemoglobin stable x 48 hours:n/a    Ambulating safely and/or therapy evals complete: yes    Drains/lines removed or plan in place to manage: yes    Teaching done: ongoing    Other:  Expected D/C date: pending TCU availability    Interval History   Denies pain or shortness of breath.  ROS x 8 negative with exception of those things listed in interval hx    Physical Exam   Temp: 98  F (36.7  C) Temp src: Oral BP: 114/77 Pulse: 89   Resp: 16 SpO2: 95 % O2 Device: None (Room air)    Vitals:    07/13/21 2100   Weight: 45 kg (99 lb 3.3 oz)     Vital Signs with Ranges  Temp:  [97.4  F (36.3  C)-98.1  F (36.7  C)] 98  F (36.7  C)  Pulse:  [69-89] 89  Resp:  [16-18] 16  BP: (101-145)/() 114/77  SpO2:  [94 %-97 %] 95 %  I/O last 3 completed shifts:  In: 50 [P.O.:50]  Out: -     Steinauer Coma Scale - Total 15/15  Constitutional: Arouses to voice  Eyes: Lids and lashes normal, pupils equal, round and reactive to light, extra ocular muscles intact, sclera clear, conjunctiva normal.  ENT: Normocephalic, atraumatic  Respiratory: No increased work of breathing, good air exchange, clear to auscultation bilaterally, no crackles or wheezing.   Cardiovascular:  regular rate and rhythm, normal S1 and S2  GI: Normal bowel sounds, abdomen soft, non-distended, non-tender, no guarding  Genitourinary:  Not seen  Skin:  Normal skin color, no redness, warmth, or swelling, no ecchymosis, no abrasions, and no jaundice.  Musculoskeletal: Kyphosis, No warmth or swelling of the joints  Neurologic: Awake, alert, oriented.  Strength and sensory is intact. No focal deficits.  Neuropsychiatric: Calm, normal eye contact, alert, affect appropriate to situation, oriented, thought process normal.    SUSIE Bansal CNP  To contact the trauma service use job code pager [phone],   Numeric texts or  alpha text through [facility]

## 2021-07-15 NOTE — PROGRESS NOTES
Care Management Follow Up    Length of Stay (days): 2    Expected Discharge Date: 07/16/2021     Concerns to be Addressed: discharge planning     Patient plan of care discussed at interdisciplinary rounds: Yes    Anticipated Discharge Disposition: Transitional Care     Anticipated Discharge Services: None  Anticipated Discharge DME: None    Patient/family educated on Medicare website which has current facility and service quality ratings: yes  Education Provided on the Discharge Plan:  yes  Patient/Family in Agreement with the Plan:  yes    Referrals Placed by CM/SW:    - [facility]  [phone] f: [phone]     - [facility]  [phone] f: [phone]    - [facility]  [phone] f: [phone]    Private pay costs discussed: Not applicable    Additional Information:  BONIFACIO spoke with Tg in admissions at [facility]. Per Tg, she has referral and beds available, however there is a current COVID outbreak and they are not allowing any visitors at this time. BONIFACIO emailed pt's daughter, Lubna with the above information to determine if she still wanted to move forward with placement.     Addend 930: Per Lubna, family would like to explore [facility] to determine availability. BONIFACIO called and spoke with Faiza - [facility] referral is still in review and will get back to BONIFACIO.    ALEXX Quintana, LGSW  6D Adult Acute Care   Ph:[phone]  Pager [phone]

## 2021-07-15 NOTE — PLAN OF CARE
VSS on RA.  Denied pain.  A&O x 4.  Denied any hallucinations overnight.  4/5 strength t/o.  L PIV SL.  On a regular diet; needs assistance with ordering and tray setup.  Voiding with intermittent incontinence.  Last BM 7/14.  Up with 1, KANWAL, walker to [facility] per report.  Not OOB this shift but pt repo'ing self in bed.  Using IS when encouraged.  Waiting TCU placement.  Continue with POC.

## 2021-07-15 NOTE — PROGRESS NOTES
Care Management Follow Up     Length of Stay (days): 2     Expected Discharge Date: 07/16/2021 at 12pm via w/c van     Concerns to be Addressed: discharge planning     Patient plan of care discussed at interdisciplinary rounds: Yes     Anticipated Discharge Disposition: Transitional Care     Anticipated Discharge Services: None  Anticipated Discharge DME: None     Patient/family educated on Medicare website which has current facility and service quality ratings: yes  Education Provided on the Discharge Plan:  yes  Patient/Family in Agreement with the Plan:  yes     Referrals Placed by CM/SW:    - [facility]  [phone] f: [phone]- in COVID outbreak     - [facility]  [phone] f: [phone]     - [facility]  [phone] f: [phone]- accepted for 7/16     Private pay costs discussed: transportation costs     Additional Information:  Spoke with Faiza in admissions at [facility] (ph [phone]). They can accept pt tomorrow and request transportation be arranged for noon. Spoke with pt's dtr Lubna (ph [phone]). Lubna agreeable with d/c to [facility]. Discussed transportation at discharge- family transport vs private pay w/c van. Met with pt and pt's son at bedside to discuss further. Pt/family choose w/c van transport- pt's son does not have a vehicle and dtr lives in [place]. [facility] (ph [phone]) arranged for 12pm on Friday, 7/16.     Updated pt, pt's son Luis Carlos, pt's dtr Faiza Calvo at [facility], and primary team on plan for discharge and ride time tomorrow.    PAS completed in anticipation of discharge: [id number].     ALEXX Blankenship, Calais Regional HospitalSW  6C   [facility]  Pager [phone]  Phone [phone]

## 2021-07-15 NOTE — PLAN OF CARE
Status: Pt fell @ home (independent living facility), sustained right 9th rib fracture  Vitals: VSS on RA  Neuros: A&Ox4, denies numbness/tingling, strengths 4/5 throughout  IV: PIV SL  Labs/Electrolytes: Phos replaced this shift, redraw @ 0200  Resp/trach: Encourage pt to use IS, clear lung sounds  Diet: Regular, ate 100% of dinner. Needs help with ordering and set up  Bowel status: Small BM this shift  : Incontinent at times, voided on commode x1  Skin: Abrasion on upper back, generalized bruising  Pain: Denies pain, denied scheduled tylenol   Activity: Up with assist of 1-2, GB and walker  Social: Pt's son here for most of afternoon  Plan: Awaiting TCU placement. PT recommending assisted living as pt requiring more help with ADLs

## 2021-07-15 NOTE — DISCHARGE SUMMARY
"[facility]    Discharge Summary  Trauma Surgery Service    Date of Admission:  7/13/2021  Date of Discharge:  7/16/2021  Attending Physician:   Discharging Provider: Moncho Chin NP   Date of Service (when I saw the patient): 07/16/21    Primary Provider: Alejandro Sandhu  Primary Care clinic: [address] [address]  Phone: [phone]  Fax number: [phone]     Discharge Diagnoses      Fall, initial encounter  Closed fracture of one rib of right side, initial encounter    Hospital Course   Belia Sheets is a 89 year old female who with a history of Osteoporosis, HTN, remote history of visual hallucinations who was brought to the ED for evaluation after being found down at her senior living apartment. The patient reports that she was having \"interesting visual hallucinations\" and all she remembered was being on the floor and could not get up. She was unsure how long she was down.  She was found down by the staff during a routine safety. She does not recall eating breakfast or lunch yesterday.  She reported right posterior rib pain, shoulder pain, hip, and sits bone pain. She denied chest or abdominal pain. Denies shortness of breath.    Imaging in the ED included a CT head and xrays to the chest, bilateral shoulders and pelvic bones notable for an acute right 9th rib fracture. She also had an elevated creatinine kinase and troponin. She was admitted to the trauma service given the injury identified.    Acute right lateral rib fracture  In rib fracture management, pulmonary toilet and good pain control allowing for good pulmonary toilet is paramount.  Prior to discharge, her pain was controlled for Belia Sheets with scheduled acetaminophen and topical pain medications.     In order to prevent common pulmonary complications found with rib fracture patients, Belia Sheets will need to continue with aggressive pulmonary " toileting that includes, incentive spirometry, coughing and deep breathing exercises.  We recommend these continue at a minimum of QID for one month after discharge.  Patient has been provided for handout with instructions regarding this.    Visual hallucinations  Patient and son Delmar report a remote history of visual hallucinations, mostly pleasant like flowers and nice object or people making faces. This was also reported in the ED.  She was worked up for this as well and possibly treated for a UTI with improvement. A urinalysis was sent (see below), Vitamin B12 level which was actually elevated.     Urinary Tract Infection  Belia Sheets had a presumptive UTI on routine UA. Cultures returned without concern for active infection. He did receive 2 doses of IV Rocephin. He will not need ABX on discharge.     Troponinemia  Elevated @ 0.184 on presentation.  Post trauma, fall with prolonged immobility, demand ischemia. A 12 lead EKG was obtained without ischemic changes. The patient denied chest pain, palpitations or shortness of breath. This improved with subsequent trends prior to discharge.  EKG without acute changes or ischemia.  No changes were made to her antihypertensive regimen.   Therapy Recommendations:   Current status of physical therapies on discharge:   TCU  Current status of occupational therapies on discharge:  TCU  Pending Results   Code Status   DNR / DNI    SUBJECTIVE: No acute complaints unless listed above.     Physical Exam   Temp: 97.1  F (36.2  C) Temp src: Oral BP: (!) 142/91 Pulse: 76   Resp: 18 SpO2: 96 % O2 Device: None (Room air)    Vitals:    07/13/21 2100   Weight: 45 kg (99 lb 3.3 oz)     Vital Signs with Ranges  Temp:  [97.1  F (36.2  C)-98.3  F (36.8  C)] 97.1  F (36.2  C)  Pulse:  [69-96] 76  Resp:  [16-18] 18  BP: (109-157)/() 142/91  SpO2:  [94 %-96 %] 96 %  I/O last 3 completed shifts:  In: -   Out: 500 [Urine:500]    ---  Jessica Coma Scale - Total 15/15  Constitutional:  "Arouses to voice  Eyes: Lids and lashes normal, pupils equal, round and reactive to light, extra ocular muscles intact, sclera clear, conjunctiva normal.  ENT: Normocephalic, atraumatic  Respiratory: No increased work of breathing, good air exchange, clear to auscultation bilaterally, no crackles or wheezing.   Cardiovascular:  regular rate and rhythm, normal S1 and S2  GI: Normal bowel sounds, abdomen soft, non-distended, non-tender, no guarding  Genitourinary:  Not seen  Skin:  Normal skin color, no redness, warmth, or swelling, no ecchymosis, no abrasions, and no jaundice.  Musculoskeletal: Kyphosis, No warmth or swelling of the joints  Neurologic: Awake, alert, oriented.  Strength and sensory is intact. No focal deficits.  Neuropsychiatric: Calm, normal eye contact, alert, affect appropriate to situation, oriented, thought process normal.    Discharge Disposition   Discharged to home  Condition at discharge: Good  Discharge VS: Blood pressure (!) 142/91, pulse 76, temperature 97.1  F (36.2  C), temperature source Oral, resp. rate 18, height 1.473 m (4' 10\"), weight 45 kg (99 lb 3.3 oz), SpO2 96 %, not currently breastfeeding.    Consultations This Hospital Stay   PHYSICAL THERAPY ADULT IP CONSULT  OCCUPATIONAL THERAPY ADULT IP CONSULT  OCCUPATIONAL THERAPY ADULT IP CONSULT  PHYSICAL THERAPY ADULT IP CONSULT  CARE MANAGEMENT / SOCIAL WORK IP CONSULT  VASCULAR ACCESS CARE ADULT IP CONSULT  PHYSICAL THERAPY ADULT IP CONSULT  OCCUPATIONAL THERAPY ADULT IP CONSULT    Discharge Orders      General info for SNF    Length of Stay Estimate: Short Term Care: Estimated # of Days <30  Condition at Discharge: Stable  Level of care:skilled   Rehabilitation Potential: Good  Admission H&P remains valid and up-to-date: Yes  Recent Chemotherapy: N/A  Use Nursing Home Standing Orders: Yes     Mantoux instructions    Give two-step Mantoux (PPD) Per Facility Policy Yes     Follow Up and recommended labs and tests    Follow up with " "your primary care provider for continued medical care and hospital follow up in 5-10 days.    You have been involved in a recent trauma incident resulting in an injury.  Studies show us that people affected by trauma have higher levels of post-traumatic stress disorder (PTSD) and/or depressive symptoms during the year following an injury.     Please consider the following.  Have you:  Had migraines about the event(s) or thought about the event(s) when you didn't want to?  Tried hard not to think about the event(s) or went out of your way to avoid situations that reminded you of the event(s)?  Been constantly on guard, watchful, or easily startled?  Felt numb or detached from people, activities, or your surroundings?   Felt guilty or unable to stop blaming yourself or others for the event(s) or any problems the event (s) may have caused?    If you answered \"yes\" to 3 or more of these questions, or if you simply want to discuss any of your feelings further, we recommend that you talk with your Primary Care Provider or a mental health professional.     Reason for your hospital stay    Fall  Right lateral 9th rib fracture     Activity - Up with nursing assistance     No CPR- Do NOT Intubate     Physical Therapy Adult Consult    Evaluate and treat as clinically indicated.    Reason:  Fall, rib fracture     Occupational Therapy Adult Consult    Evaluate and treat as clinically indicated.    Reason:  fall, rib fracture     Fall precautions     Advance Diet as Tolerated    Follow this diet upon discharge: Regular     Discharge Medications   Current Discharge Medication List      START taking these medications    Details   ibuprofen (ADVIL/MOTRIN) 200 MG tablet Take 1 tablet (200 mg) by mouth every 6 hours as needed for moderate pain    Associated Diagnoses: Closed fracture of one rib of right side, initial encounter      Lidocaine (LIDOCARE) 4 % Patch Place 1 patch onto the skin every 24 hours Apply to the right " lateral/post chest wall for pain  To prevent lidocaine toxicity, patient should be patch free for 12 hrs daily.    Associated Diagnoses: Closed fracture of one rib of right side, initial encounter      polyethylene glycol (MIRALAX) 17 GM/Dose powder Take 17 g by mouth daily  Qty: 510 g    Associated Diagnoses: Closed fracture of one rib of right side, initial encounter         CONTINUE these medications which have CHANGED    Details   magnesium oxide (MAG-OX) 400 (241.3 Mg) MG tablet Take 1 tablet (400 mg) by mouth daily    Associated Diagnoses: Age-related osteoporosis without current pathological fracture         CONTINUE these medications which have NOT CHANGED    Details   acetaminophen (TYLENOL) 325 MG tablet Take 2 tablets (650 mg) by mouth every 4 hours as needed for mild pain  Qty: 60 tablet, Refills: 0    Associated Diagnoses: Rectal prolapse      amLODIPine (NORVASC) 5 MG tablet Take 1 tablet (5 mg) by mouth daily  Qty: 90 tablet, Refills: 1    Associated Diagnoses: Benign essential hypertension      calcium carbonate 600 mg-vitamin D 400 units (CALTRATE) 600-400 MG-UNIT per tablet Take 1 tablet by mouth 2 times daily  Qty: 180 tablet, Refills: 3    Associated Diagnoses: Age-related osteoporosis with current pathological fracture with routine healing, subsequent encounter      !! order for DME Equipment being ordered: Incentive spirometer  Qty: 1 Units, Refills: 0    Associated Diagnoses: Atelectasis      !! order for DME Equipment being ordered: Home BP monitor and cuff  Qty: 1 each, Refills: 0    Associated Diagnoses: Severe hypertension       !! - Potential duplicate medications found. Please discuss with provider.      STOP taking these medications       Cyanocobalamin (VITAMIN B 12 PO) Comments:   Reason for Stopping:         potassium chloride ER (KLOR-CON M) 20 MEQ CR tablet Comments:   Reason for Stopping:         sodium fluoride dental gel (PREVIDENT) 1.1 % GEL Comments:   Reason for Stopping:              Allergies   No Known Allergies  Data   Most Recent 3 CBC's:  Recent Labs   Lab Test 07/14/21  1013 07/13/21  1229 06/20/18  1652 01/09/18  0616 01/08/18  0721 01/08/18  0721   WBC  --  17.6*  --  7.2  --  7.1   HGB 14.2 16.2* 13.0 11.8   < > 12.3   MCV  --  92 96.0 92  --  91   PLT  --  262  --  184  --  184    < > = values in this interval not displayed.      Most Recent 3 BMP's:  Recent Labs   Lab Test 07/15/21  0700 07/14/21  0742 07/13/21  1229 07/13/20  1219 01/30/19  1154   NA  --  144 144  --  140   POTASSIUM 3.5 3.4 3.9  --  4.0   CHLORIDE  --  116* 108  --  104   CO2  --  23 26  --  30   BUN  --  41* 58* 16 17   CR  --  0.52 0.61 0.67 0.73   ANIONGAP  --  5 10  --  6   BOY  --  7.9* 9.9 8.8 9.3   GLC  --  92 83  --  129*     Most Recent 2 LFT's:  Recent Labs   Lab Test 07/13/21  1436 07/13/21  1229 07/13/20  1219 06/20/18  1652   *  --   --  15.3   ALT  --  72*  --  9.8   ALKPHOS  --  67 68 56.9   BILITOTAL  --  0.8  --  0.6     Most Recent INR's and Anticoagulation Dosing History:  Anticoagulation Dose History     Recent Dosing and Labs Latest Ref Rng & Units 10/25/2017    INR 0.86 - 1.14 1.03        Most Recent 3 Troponin's:  Recent Labs   Lab Test 07/14/21  0742 07/13/21  2156 07/13/21  1229 01/07/18  2057 10/23/17  1140   TROPI  --   --   --  <0.015 <0.015   TROPONIN 0.104* 0.123* 0.184*  --   --      Most Recent 6 Bacteria Isolates From Any Culture (See EPIC Reports for Culture Details):  Recent Labs   Lab Test 01/07/18  2222 10/25/17  1000   CULT <10,000 colonies/mL  mixed urogenital elijah    Susceptibility testing not routinely done No growth     Most Recent TSH, T4 and A1c Labs:  Recent Labs   Lab Test 03/13/19  1338 10/23/17  2208 03/15/17  1402   TSH 0.88  --   --    T4  --  1.27  --    A1C  --   --  5.6     Results for orders placed or performed during the hospital encounter of 07/13/21   CT Head w/o Contrast    Narrative    CT HEAD W/O CONTRAST 7/13/2021 1:51 PM    History:  Mental status change, unknown cause; Fall, altered mental  status.   ICD-10:    Comparison: MR brain 10/25/2017, CT head 10/23/2017.    Technique: Using multidetector thin collimation helical acquisition  technique, axial, coronal and sagittal CT images from the skull base  to the vertex were obtained without intravenous contrast.   (topogram) image(s) also obtained and reviewed.    Findings: There is no intracranial hemorrhage, mass effect, or midline  shift. Gray/white matter differentiation in both cerebral hemispheres  is preserved. Ventricles are proportionate to the cerebral sulci.  There is mild generalized cerebral atrophy. Moderate confluent  periventricular and subcortical white matter hypoattenuation is  nonspecific, but likely represents chronic small vessel ischemic  disease in a patient this age. The basal cisterns are clear.    The bony calvaria and the bones of the skull base are normal. The  visualized portions of the paranasal sinuses and mastoid air cells are  clear.      Impression    Impression:    1. No acute intracranial pathology.   2. 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    EDDIE HUERTA MD         SYSTEM ID:  [id number]   XR Chest 2 Views    Narrative    XR CHEST 2 VW  7/13/2021 3:29 PM      HISTORY: Fall, R rib pain    COMPARISON: 6/11/2019    FINDINGS:   AP and lateral views of the chest. Trachea is midline. Cardiac  silhouette is within normal limits. No pneumothorax or pleural  effusion. Mild right perihilar interstitial and airspace opacities.  Streaky bibasilar atelectasis. Diffuse low bone mineral density.  Redemonstrated chronic bilateral rib fracture deformities with acute  mildly displaced fracture of the lateral right 9th rib. No definite  additional acute osseous abnormalities. Diffuse chronic compression  wedge deformities of the thoracic spine. Similar scoliotic curvature  of the spine.      Impression    IMPRESSION:   1. Redemonstrated chronic  bilateral rib fracture deformities with  acute mildly displaced fracture of the lateral right 9th rib. No  definite additional acute osseous abnormalities.   2. Mild right perihilar interstitial and airspace opacities, which may  represent aspiration/infection, pulmonary contusion, or mild pulmonary  edema.  3. Diffuse chronic compression wedge deformities of the thoracic  spine.     I have personally reviewed the examination and initial interpretation  and I agree with the findings.    WILLOW SNEED MD         SYSTEM ID:  [id number]   XR Pelvis 1/2 Views    Narrative    EXAM: XR PELVIS 1/2 VW  LOCATION: [facility]  DATE/TIME: 7/13/2021 5:46 PM    INDICATION: fall, pain in the coccyx area and hips  COMPARISON: None.      Impression    IMPRESSION: Normal joint spaces and alignment. No fracture. Osteopenia.   XR Shoulder Bilateral G/E 2 Views    Narrative    EXAM: XR SHOULDER BILATERAL G/E 2 VIEWS  LOCATION: [facility]  DATE/TIME: 7/13/2021 5:46 PM    INDICATION: Bilateral shoulder pain after a fall.  COMPARISON: None.      Impression    IMPRESSION:     RIGHT SHOULDER: No acute fracture or joint malalignment. Mild glenohumeral and acromioclavicular degenerative arthrosis. Multiple old healed right rib fractures. Bone demineralization.    LEFT SHOULDER: No acute fracture or joint malalignment. Mild glenohumeral and acromioclavicular degenerative arthrosis. Old healed left rib fracture. Bone demineralization.     OTHER: Old thoracic spine compression fractures.       Time Spent on this Encounter   Moncho TONY NP, personally saw the patient today and spent greater than 30 minutes discharging this patient.    We appreciate the opportunity to care for your patient while in the hospital.  Should you have any questions about their injuries or this discharge summary our contact information is below.    Trauma Services  [facility]   Department of Critical Care and Acute Care  Surgery  [address]  [place] [address]  Office: [phone]

## 2021-07-15 NOTE — PROVIDER NOTIFICATION
Dr. Liu, Trauma night cross cover, updated on rib fracture BPA alert due to vital capacity of 810 last evening.  Spoke with RT, near patients baseline.  Will continue to encourage incentive spirometer when awake.

## 2021-07-16 ENCOUNTER — APPOINTMENT (OUTPATIENT)
Dept: OCCUPATIONAL THERAPY | Facility: CLINIC | Age: 86
DRG: 543 | End: 2021-07-16
Payer: COMMERCIAL

## 2021-07-16 VITALS
SYSTOLIC BLOOD PRESSURE: 130 MMHG | OXYGEN SATURATION: 93 % | TEMPERATURE: 98 F | HEART RATE: 80 BPM | DIASTOLIC BLOOD PRESSURE: 86 MMHG | WEIGHT: 99.21 LBS | HEIGHT: 58 IN | BODY MASS INDEX: 20.82 KG/M2 | RESPIRATION RATE: 16 BRPM

## 2021-07-16 LAB
BACTERIA UR CULT: NORMAL
MAGNESIUM SERPL-MCNC: 1.7 MG/DL (ref 1.6–2.3)
PHOSPHATE SERPL-MCNC: 2 MG/DL (ref 2.5–4.5)

## 2021-07-16 PROCEDURE — 84100 ASSAY OF PHOSPHORUS: CPT | Performed by: NURSE PRACTITIONER

## 2021-07-16 PROCEDURE — 94150 VITAL CAPACITY TEST: CPT

## 2021-07-16 PROCEDURE — 99239 HOSP IP/OBS DSCHRG MGMT >30: CPT | Performed by: NURSE PRACTITIONER

## 2021-07-16 PROCEDURE — 83735 ASSAY OF MAGNESIUM: CPT | Performed by: NURSE PRACTITIONER

## 2021-07-16 PROCEDURE — 97530 THERAPEUTIC ACTIVITIES: CPT | Mod: GO | Performed by: OCCUPATIONAL THERAPIST

## 2021-07-16 PROCEDURE — 97110 THERAPEUTIC EXERCISES: CPT | Mod: GO | Performed by: OCCUPATIONAL THERAPIST

## 2021-07-16 PROCEDURE — 250N000013 HC RX MED GY IP 250 OP 250 PS 637: Performed by: NURSE PRACTITIONER

## 2021-07-16 PROCEDURE — 97535 SELF CARE MNGMENT TRAINING: CPT | Mod: GO | Performed by: OCCUPATIONAL THERAPIST

## 2021-07-16 PROCEDURE — 36415 COLL VENOUS BLD VENIPUNCTURE: CPT | Performed by: NURSE PRACTITIONER

## 2021-07-16 PROCEDURE — 250N000011 HC RX IP 250 OP 636: Performed by: NURSE PRACTITIONER

## 2021-07-16 PROCEDURE — 999N000157 HC STATISTIC RCP TIME EA 10 MIN

## 2021-07-16 RX ADMIN — Medication 25 MCG: at 08:29

## 2021-07-16 RX ADMIN — CEFTRIAXONE 1 G: 1 INJECTION, POWDER, FOR SOLUTION INTRAMUSCULAR; INTRAVENOUS at 08:30

## 2021-07-16 RX ADMIN — AMLODIPINE BESYLATE 5 MG: 5 TABLET ORAL at 08:29

## 2021-07-16 RX ADMIN — CALCIUM 500 MG: 500 TABLET ORAL at 08:30

## 2021-07-16 ASSESSMENT — ACTIVITIES OF DAILY LIVING (ADL)
ADLS_ACUITY_SCORE: 21
ADLS_ACUITY_SCORE: 21
ADLS_ACUITY_SCORE: 22
ADLS_ACUITY_SCORE: 18

## 2021-07-16 NOTE — PLAN OF CARE
Pt here for fall and sustained R rib fx, vss, neuros include: a/o x4, 4/5 throughout with poor poster when getting up, soft speech. PIV removed per orders, regular diet with assistance ordering and meal setup, ate 100% for breakfast, voids spont and BM was continent, LMB 7/16, but pt has intermittent incontinence @ HS. Pt left via w/c to [facility], RN provided verbal report to receiving TCU RN.

## 2021-07-16 NOTE — PLAN OF CARE
Occupational Therapy Discharge Summary    Reason for therapy discharge:    Discharged to transitional care facility.    Progress towards therapy goal(s). See goals on Care Plan in [facility] electronic health record for goal details.  Goals not met.  Barriers to achieving goals:   discharge from facility.    Therapy recommendation(s):    Continued therapy is recommended.  Rationale/Recommendations:  Pt is below baseline and would benefit from continued skilled therapy to increase activity tolerance and independence with ADL completion. Has support familly member to assist and is motivated to return to PLOF..

## 2021-07-16 NOTE — PLAN OF CARE
Physical Therapy Discharge Summary    Reason for therapy discharge:    Discharged to transitional care facility.    Progress towards therapy goal(s). See goals on Care Plan in [facility] electronic health record for goal details.  Goals partially met.  Barriers to achieving goals:   discharge from facility.    Therapy recommendation(s):    Continued therapy is recommended.  Rationale/Recommendations:  TCU.

## 2021-07-16 NOTE — PROVIDER NOTIFICATION
Trauma night cross cover paged at 0017 regarding pt confusion when woken for 0030 assessment. Pt d/o  and situation.  Also having a hard time completing a sentence; will start a sentence and stop half way through.  MD also notified of high diastolic BP and asked for parameters on when to notify trauma team.  Dr. Lui called back, plan is to continue to monitor and notify MD if any other changes noted.

## 2021-07-16 NOTE — PLAN OF CARE
Status: Pt fell @ home (independent living facility), sustained right 9th rib fracture  Vitals: VSS on RA  Neuros: A&Ox4, denies numbness/tingling, strengths 4/5 throughout  IV: PIV SL  Labs/Electrolytes: WNL  Resp/trach: Encourage pt to use IS, clear lung sounds  Diet: Regular, ate 100% of dinner. Needs help with ordering and set up  Bowel status: 2 incontinent bowel movements this shift  : Voiding with incontinence  Skin: Abrasion on upper back, generalized bruising  Pain: Denies pain, denied scheduled tylenol   Activity: Up with assist of 1-2, GB and walker  Social: Pt's son here for most of afternoon  Plan: Discharge to [facility] tomorrow, Friday 7/16 at noon

## 2021-07-16 NOTE — PROGRESS NOTES
Care Management Discharge Note    Discharge Date: 7/16/21    Discharge Disposition: Transitional Care - [facility]   [address] [place] [address]  Ph: [phone] f: [phone]    Discharge Services: TCU    Discharge DME: NA    Discharge Transportation: [facility] [phone] w/c ride arranged for 1200 today 7/16/21.    Private pay costs discussed: transportation costs    PAS Confirmation Code: [id number]  Patient/family educated on Medicare website which has current facility and service quality ratings: yes    Education Provided on the Discharge Plan:  Yes  Persons Notified of Discharge Plans: Pt, pt's children Lubna and Luis Carlos, primary medical team, charge RN, bedside RN, and TCU admissions.   Patient/Family in Agreement with the Plan: yes    Handoff Referral Completed: Yes    Additional Information:  [facility] faxed orders to the above fax number.    ALEXX Quintana, [facility]  [facility] Adult Acute [facility]  Ph:[phone]  Pager [phone]

## 2021-07-16 NOTE — PLAN OF CARE
VSS exception HTN; within MD parameters.  Denied pain.  Confused at 0030 assessment (see previous note).  A&O x 4 and back to baseline at 0400.  Denied any hallucinations overnight.  4/5 strength t/o.  R PIV SL.  On a regular diet; needs assistance with ordering and tray setup.  Voiding with incontinence.  Last BM 7/15.  Up with 1-2, GB, walker.  Using IS when encouraged.  Plan is to discharge today at 1200 to [facility].  Continue with POC.

## 2021-07-19 ENCOUNTER — TRANSITIONAL CARE UNIT VISIT (OUTPATIENT)
Dept: GERIATRICS | Facility: CLINIC | Age: 86
End: 2021-07-19
Payer: COMMERCIAL

## 2021-07-19 DIAGNOSIS — M81.0 AGE-RELATED OSTEOPOROSIS WITHOUT CURRENT PATHOLOGICAL FRACTURE: ICD-10-CM

## 2021-07-19 DIAGNOSIS — G31.84 MILD COGNITIVE IMPAIRMENT: ICD-10-CM

## 2021-07-19 DIAGNOSIS — I10 ESSENTIAL HYPERTENSION: ICD-10-CM
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
## 2021-07-19 DIAGNOSIS — S22.31XD CLOSED FRACTURE OF ONE RIB OF RIGHT SIDE WITH ROUTINE HEALING, SUBSEQUENT ENCOUNTER: ICD-10-CM

## 2021-07-19 DIAGNOSIS — M15.0 PRIMARY OSTEOARTHRITIS INVOLVING MULTIPLE JOINTS: ICD-10-CM

## 2021-07-19 PROCEDURE — 99305 1ST NF CARE MODERATE MDM 35: CPT | Performed by: NURSE PRACTITIONER

## 2021-07-19 NOTE — LETTER
"    2021        RE: Belia Sheets  [address]  [address]  [facility] [address]        [facility]    Name:   Belia Sheets  :   1930  MRN:    [id number]     Facility:   [facility] (SNF) [[id number]]   Room: [facility] 311  Code Status: FULL CODE and POLST AVAILABLE -     DOS: 2021  Previous visit: N/A    PCP:  Alejandro Sandhu    CHIEF COMPLAINT / REASON FOR VISIT:  Chief Complaint   Patient presents with     Hospital F/U      [facility] from 2021 until 2021      HPI: Belia is a 89 year old female with a history of osteoporosis, essential hypertension, and remote history of visual hallucinations, who was brought to the ED for evaluation after she was found in her senior apartment down on the floor.  Patient, herself, reported having \"interesting visual hallucinations,\" and she also remembered being on the floor and unable to get up.  She was unsure how long she was down.  Found by staff during routine rounds.  She reported right posterior rib pain, shoulder, and hip pain.  She denied any shortness of breath.  Imaging in the ED included a CT head and CXR, bilateral shoulders and pelvic bones notable for an acute right ninth rib fracture.  She also had an elevated creatinine kinase and troponin.  She was admitted to the trauma service given the injury identified.    Acute right lateral rib fracture  -- Pulmonary toilet and good pain control allowing for this is paramount.  Prior to discharge, her pain was well controlled with scheduled acetaminophen and topical pain medications.  In order to prevent common pulmonary complications found with rib fracture patients, she would need to continue with aggressive pulmonary toileting that includes incentive spirometry, coughing and deep breathing exercises.  He was recommended that she continue these exercises a minimum of 4 times daily for 1 month after " "discharge.    Visual hallucinations  -- The patient and her son, Delmar, reported a remote history of visual hallucinations, mostly pleasant in nature.  She was worked up for a possible UTI that was found to be negative.  After receiving 2 doses of IV Rocephin, antibiotics were discontinued.    Troponinemia  -- Elevated at 0.184 on presentation.  Post trauma, fall with prolonged immobility, and demand ischemia.  Twelve-lead EKG was without ischemic changes.  She denied chest pains, palpitations or shortness of breath.  This also improved with subsequent trends prior to discharge.  No medication changes were made.    Recommendations: TCU for physical and occupational therapies upon discharge.      CURRENT/RECENT TCU ISSUES    Disposition: It is noted that in the hospital, she was listed as DNR/DNI; however, at [facility] she is listed as full code.  This was not noted prior to my visit with the patient and her son, Delmar.  It will be addressed at a future visit.    There may be some mild cognitive deficits, and that this was also noted by the patient's son who endorses his mother's memory loss, although she appears to be doing well today.  When asked why she is here, she stated \"because of dizziness and danger of falling.\"  She indicated that she becomes \"unsteady.\"  When asked the year, she hesitates a bit, stating, \"that's such a simple question \".  Eventually, see tells me it is 2021.  She has no problem with the month.    ROS: She denies any significant pain, even at the fracture site.  She can, in fact, using incentive spirometer without producing pain.    Past Medical History:   Diagnosis Date     Carpal tunnel syndrome (aka CTS)      H/O calcium pyrophosphate deposition disease (CPPD)      History of encephalopathy 10/2017     Hypertension      Kyphoscoliosis      Osteoarthritis     hands     Osteoporosis      Rectal prolapse               Family History   Problem Relation Age of Onset     Depression/Anxiety Son  "     Depression/Anxiety Son         alcohol abuse  age 24     Hypertension Mother      Hypertension Brother      Diabetes No family hx of      Breast Cancer No family hx of      Colon Cancer No family hx of      Prostate Cancer No family hx of      Other Cancer No family hx of      Social History     Socioeconomic History     Marital status:      Spouse name: Not on file     Number of children: Not on file     Years of education: Not on file     Highest education level: Not on file   Occupational History     Not on file   Tobacco Use     Smoking status: Never Smoker     Smokeless tobacco: Never Used   Substance and Sexual Activity     Alcohol use: No     Drug use: No     Sexual activity: Never   Other Topics Concern     Not on file   Social History Narrative     Not on file     Social Determinants of Health     Financial Resource Strain:      Difficulty of Paying Living Expenses:    Food Insecurity:      Worried About Running Out of Food in the Last Year:      Ran Out of Food in the Last Year:    Transportation Needs:      Lack of Transportation (Medical):      Lack of Transportation (Non-Medical):    Physical Activity:      Days of Exercise per Week:      Minutes of Exercise per Session:    Stress:      Feeling of Stress :    Social Connections:      Frequency of Communication with Friends and Family:      Frequency of Social Gatherings with Friends and Family:      Attends Christian Services:      Active Member of Clubs or Organizations:      Attends Club or Organization Meetings:      Marital Status:    Intimate Partner Violence:      Fear of Current or Ex-Partner:      Emotionally Abused:      Physically Abused:      Sexually Abused:        MEDICATIONS: Reviewed from the MAR, physician orders, and/or earlier progress notes.  Post Discharge Medication Reconciliation Status: discharge medications reconciled, continue medications without change.    Current Outpatient Medications   Medication Sig      "acetaminophen (TYLENOL) 325 MG tablet Take 2 tablets (650 mg) by mouth every 4 hours as needed for mild pain     amLODIPine (NORVASC) 5 MG tablet Take 1 tablet (5 mg) by mouth daily     calcium carbonate 600 mg-vitamin D 400 units (CALTRATE) 600-400 MG-UNIT per tablet Take 1 tablet by mouth 2 times daily     ibuprofen (ADVIL/MOTRIN) 200 MG tablet Take 1 tablet (200 mg) by mouth every 6 hours as needed for moderate pain     Lidocaine (LIDOCARE) 4 % Patch Place 1 patch onto the skin every 24 hours Apply to the right lateral/post chest wall for pain  To prevent lidocaine toxicity, patient should be patch free for 12 hrs daily.     magnesium oxide (MAG-OX) 400 (241.3 Mg) MG tablet Take 1 tablet (400 mg) by mouth daily     order for DME Equipment being ordered: Incentive spirometer     order for DME Equipment being ordered: Home BP monitor and cuff     polyethylene glycol (MIRALAX) 17 GM/Dose powder Take 17 g by mouth daily     No current facility-administered medications for this visit.     ALLERGIES: No Known Allergies    DIET: Regular, regular texture, thin liquids.    Vitals:    07/21/21 1018   BP: 124/84   Pulse: 82   Resp: 18   Temp: 98.1  F (36.7  C)   SpO2: 95%   Height: 1.473 m (4' 10\")     Body mass index is 20.73 kg/m .    EXAMINATION:   General: Pleasant, alert, and conversant elderly female, sitting in a recliner, in no apparent distress.  Her son, Delmar, is in attendance and sitting on the bed.  Head: Normocephalic and atraumatic.   Eyes: PERRLA, sclerae clear.   ENT: Moist oral mucosa.  She has several of her own teeth.  No rhinorrhea or nasal discharge.  Hearing seems unimpaired.  Cardiovascular: Regular rate and rhythm.   Respiratory: Lungs clear to auscultation bilaterally.   Abdomen: Nondistended.   Musculoskeletal/Extremities: Age-related degenerative joint disease and moderate to severe kyphosis.  No peripheral edema.  Integument: No rashes, clinically significant lesions, or skin breakdown. "   Cognitive/Psychiatric: Generally alert and oriented, although there do appear to be some issues as noted above.  Affect is euthymic.    DIAGNOSTICS:   Recent Results (from the past 240 hour(s))   Comprehensive metabolic panel    Collection Time: 07/13/21 12:29 PM   Result Value Ref Range    Sodium 144 133 - 144 mmol/L    Potassium 3.9 3.4 - 5.3 mmol/L    Chloride 108 94 - 109 mmol/L    Carbon Dioxide (CO2) 26 20 - 32 mmol/L    Anion Gap 10 3 - 14 mmol/L    Urea Nitrogen 58 (H) 7 - 30 mg/dL    Creatinine 0.61 0.52 - 1.04 mg/dL    Calcium 9.9 8.5 - 10.1 mg/dL    Glucose 83 70 - 99 mg/dL    Alkaline Phosphatase 67 40 - 150 U/L    AST      ALT 72 (H) 0 - 50 U/L    Protein Total 7.4 6.8 - 8.8 g/dL    Albumin 3.5 3.4 - 5.0 g/dL    Bilirubin Total 0.8 0.2 - 1.3 mg/dL    GFR Estimate 80 >60 mL/min/1.73m2   Troponin I    Collection Time: 07/13/21 12:29 PM   Result Value Ref Range    Troponin I 0.184 (HH) 0.000 - 0.045 ug/L   CK total    Collection Time: 07/13/21 12:29 PM   Result Value Ref Range    CK     CBC with platelets and differential    Collection Time: 07/13/21 12:29 PM   Result Value Ref Range    WBC Count 17.6 (H) 4.0 - 11.0 10e3/uL    RBC Count 5.39 (H) 3.80 - 5.20 10e6/uL    Hemoglobin 16.2 (H) 11.7 - 15.7 g/dL    Hematocrit 49.5 (H) 35.0 - 47.0 %    MCV 92 78 - 100 fL    MCH 30.1 26.5 - 33.0 pg    MCHC 32.7 31.5 - 36.5 g/dL    RDW 12.4 10.0 - 15.0 %    Platelet Count 262 150 - 450 10e3/uL   Manual Differential    Collection Time: 07/13/21 12:29 PM   Result Value Ref Range    % Neutrophils 89 %    % Lymphocytes 3 %    % Monocytes 8 %    % Eosinophils 0 %    % Basophils 0 %    Absolute Neutrophils 15.7 (H) 1.6 - 8.3 10e3/uL    Absolute Lymphocytes 0.5 (L) 0.8 - 5.3 10e3/uL    Absolute Monocytes 1.4 (H) 0.0 - 1.3 10e3/uL    Absolute Eosinophils 0.0 0.0 - 0.7 10e3/uL    Absolute Basophils 0.0 0.0 - 0.2 10e3/uL    RBC Morphology Confirmed RBC Indices     Platelet Assessment  Automated Count Confirmed. Platelet  morphology is normal.     Automated Count Confirmed. Platelet morphology is normal.    Sina Cells Moderate (A) None Seen   Extra Green Top (Lithium Heparin) ON ICE    Collection Time: 07/13/21 12:30 PM   Result Value Ref Range    Hold Specimen JIC    Extra Green Top (Lithium Heparin) Tube    Collection Time: 07/13/21 12:30 PM   Result Value Ref Range    Hold Specimen JIC    EKG 12-lead, tracing only    Collection Time: 07/13/21 12:30 PM   Result Value Ref Range    Systolic Blood Pressure  mmHg    Diastolic Blood Pressure  mmHg    Ventricular Rate 100 BPM    Atrial Rate 100 BPM    MI Interval 146 ms    QRS Duration 80 ms     ms    QTc 487 ms    P Axis 73 degrees    R AXIS -23 degrees    T Axis 86 degrees    Interpretation ECG Click View Image link to view waveform and result    Extra Blue Top Tube    Collection Time: 07/13/21 12:31 PM   Result Value Ref Range    Hold Specimen JIC    Extra Red Top Tube    Collection Time: 07/13/21 12:31 PM   Result Value Ref Range    Hold Specimen JIC    CK total    Collection Time: 07/13/21  2:36 PM   Result Value Ref Range    CK 1,422 (HH) 30 - 225 U/L   AST    Collection Time: 07/13/21  2:36 PM   Result Value Ref Range     (H) 0 - 45 U/L   SARS-COV2 (COVID-19) Virus RT-PCR    Collection Time: 07/13/21  5:22 PM    Specimen: Nasopharyngeal; Swab   Result Value Ref Range    SARS CoV2 PCR Negative Negative   UA with Microscopic reflex to Culture    Collection Time: 07/13/21  6:20 PM    Specimen: Urine, Clean Catch   Result Value Ref Range    Color Urine Light Yellow Colorless, Straw, Light Yellow, Yellow    Appearance Urine Clear Clear    Glucose Urine Negative Negative mg/dL    Bilirubin Urine Negative Negative    Ketones Urine 60  (A) Negative mg/dL    Specific Gravity Urine 1.025 1.003 - 1.035    Blood Urine Moderate (A) Negative    pH Urine 6.0 5.0 - 7.0    Protein Albumin Urine 100  (A) Negative mg/dL    Urobilinogen Urine Normal Normal, 2.0 mg/dL    Nitrite Urine  Negative Negative    Leukocyte Esterase Urine Small (A) Negative    Bacteria Urine Few (A) None Seen /HPF    Mucus Urine Present (A) None Seen /LPF    RBC Urine 2 <=2 /HPF    WBC Urine 12 (H) <=5 /HPF    Squamous Epithelials Urine 1 <=1 /HPF    Transitional Epithelials Urine 1 <=1 /HPF    Hyaline Casts Urine 3 (H) <=2 /LPF   Urine Culture    Collection Time: 07/13/21  6:20 PM    Specimen: Urine, Clean Catch   Result Value Ref Range    Culture 50,000-100,000 CFU/mL Mixture of urogenital elijah    Troponin I    Collection Time: 07/13/21  9:56 PM   Result Value Ref Range    Troponin I 0.123 (HH) 0.000 - 0.045 ug/L   Lactic acid whole blood    Collection Time: 07/13/21  9:56 PM   Result Value Ref Range    Lactic Acid 0.6 (L) 0.7 - 2.0 mmol/L   Basic metabolic panel    Collection Time: 07/14/21  7:42 AM   Result Value Ref Range    Sodium 144 133 - 144 mmol/L    Potassium 3.4 3.4 - 5.3 mmol/L    Chloride 116 (H) 94 - 109 mmol/L    Carbon Dioxide (CO2) 23 20 - 32 mmol/L    Anion Gap 5 3 - 14 mmol/L    Urea Nitrogen 41 (H) 7 - 30 mg/dL    Creatinine 0.52 0.52 - 1.04 mg/dL    Calcium 7.9 (L) 8.5 - 10.1 mg/dL    Glucose 92 70 - 99 mg/dL    GFR Estimate 84 >60 mL/min/1.73m2   Vitamin B12    Collection Time: 07/14/21  7:42 AM   Result Value Ref Range    Vitamin B12 3,330 (H) 193 - 986 pg/mL   Magnesium    Collection Time: 07/14/21  7:42 AM   Result Value Ref Range    Magnesium 2.0 1.6 - 2.3 mg/dL   Phosphorus    Collection Time: 07/14/21  7:42 AM   Result Value Ref Range    Phosphorus 1.6 (L) 2.5 - 4.5 mg/dL   Troponin I    Collection Time: 07/14/21  7:42 AM   Result Value Ref Range    Troponin I 0.104 (H) 0.000 - 0.045 ug/L   CK total    Collection Time: 07/14/21  7:42 AM   Result Value Ref Range     (H) 30 - 225 U/L   Hemoglobin    Collection Time: 07/14/21 10:13 AM   Result Value Ref Range    Hemoglobin 14.2 11.7 - 15.7 g/dL   Lactic acid whole blood    Collection Time: 07/14/21 10:13 AM   Result Value Ref Range     Lactic Acid 1.8 0.7 - 2.0 mmol/L   Magnesium    Collection Time: 07/15/21  7:00 AM   Result Value Ref Range    Magnesium 1.8 1.6 - 2.3 mg/dL   Phosphorus    Collection Time: 07/15/21  7:00 AM   Result Value Ref Range    Phosphorus 2.2 (L) 2.5 - 4.5 mg/dL   Potassium    Collection Time: 07/15/21  7:00 AM   Result Value Ref Range    Potassium 3.5 3.4 - 5.3 mmol/L   Magnesium    Collection Time: 07/16/21  7:27 AM   Result Value Ref Range    Magnesium 1.7 1.6 - 2.3 mg/dL   Phosphorus    Collection Time: 07/16/21  7:27 AM   Result Value Ref Range    Phosphorus 2.0 (L) 2.5 - 4.5 mg/dL       ASSESSMENT/Plan:    1. Fall, subsequent encounter  Receiving both physical and occupational therapies.    2. Closed fracture of one rib of right side with routine healing, subsequent encounter  Minimal pain.  Encouraged use of incentive spirometry.    3. Age-related osteoporosis without current pathological fracture  Taking calcium with vitamin D    4. Essential hypertension  On amlodipine.  Blood pressures are acceptable.    5. Primary osteoarthritis involving multiple joints  Has orders for as needed acetaminophen.    6. Mild cognitive impairment  Some memory issues.      CHANGES:    None.    CARE PLAN:    The care plan, medications, vital signs, orders, and nursing notes have been reviewed, and all orders signed. Changes to care plan, if any, as noted. Otherwise, continue current plan of care.    The above has been created using voice recognition software. Please be aware that this may unintentionally  produce inaccuracies and/or nonsensical sentences.      Electronically signed by: SUSIE Nieves CNP            Sincerely,        SUSIE Nieves CNP

## 2021-07-20 ENCOUNTER — DOCUMENTATION ONLY (OUTPATIENT)
Dept: OTHER | Facility: CLINIC | Age: 86
End: 2021-07-20

## 2021-07-21 VITALS
HEART RATE: 82 BPM | TEMPERATURE: 98.1 F | SYSTOLIC BLOOD PRESSURE: 124 MMHG | HEIGHT: 58 IN | DIASTOLIC BLOOD PRESSURE: 84 MMHG | OXYGEN SATURATION: 95 % | RESPIRATION RATE: 18 BRPM | BODY MASS INDEX: 20.73 KG/M2

## 2021-07-21 PROBLEM — G31.84 MILD COGNITIVE IMPAIRMENT: Status: ACTIVE | Noted: 2021-07-21

## 2021-07-21 PROBLEM — I10 BENIGN ESSENTIAL HYPERTENSION: Status: ACTIVE | Noted: 2020-10-07

## 2021-07-21 NOTE — PROGRESS NOTES
"[facility]    Name:   Belia Sheets  :   1930  MRN:    [id number]     Facility:   [facility] (SNF) [[id number]]   Room:   Code Status: FULL CODE and POLST AVAILABLE -     DOS: 2021  Previous visit: N/A    PCP:  Alejandro Sandhu    CHIEF COMPLAINT / REASON FOR VISIT:  Chief Complaint   Patient presents with     Hospital F/U      [facility] from 2021 until 2021      HPI: Belia is a 89 year old female with a history of osteoporosis, essential hypertension, and remote history of visual hallucinations, who was brought to the ED for evaluation after she was found in her senior apartment down on the floor.  Patient, herself, reported having \"interesting visual hallucinations,\" and she also remembered being on the floor and unable to get up.  She was unsure how long she was down.  Found by staff during routine rounds.  She reported right posterior rib pain, shoulder, and hip pain.  She denied any shortness of breath.  Imaging in the ED included a CT head and CXR, bilateral shoulders and pelvic bones notable for an acute right ninth rib fracture.  She also had an elevated creatinine kinase and troponin.  She was admitted to the trauma service given the injury identified.    Acute right lateral rib fracture  -- Pulmonary toilet and good pain control allowing for this is paramount.  Prior to discharge, her pain was well controlled with scheduled acetaminophen and topical pain medications.  In order to prevent common pulmonary complications found with rib fracture patients, she would need to continue with aggressive pulmonary toileting that includes incentive spirometry, coughing and deep breathing exercises.  He was recommended that she continue these exercises a minimum of 4 times daily for 1 month after discharge.    Visual hallucinations  -- The patient and her son, Delmar, reported a remote history of visual " "hallucinations, mostly pleasant in nature.  She was worked up for a possible UTI that was found to be negative.  After receiving 2 doses of IV Rocephin, antibiotics were discontinued.    Troponinemia  -- Elevated at 0.184 on presentation.  Post trauma, fall with prolonged immobility, and demand ischemia.  Twelve-lead EKG was without ischemic changes.  She denied chest pains, palpitations or shortness of breath.  This also improved with subsequent trends prior to discharge.  No medication changes were made.    Recommendations: TCU for physical and occupational therapies upon discharge.      CURRENT/RECENT TCU ISSUES    Disposition: It is noted that in the hospital, she was listed as DNR/DNI; however, at [facility] she is listed as full code.  This was not noted prior to my visit with the patient and her son, Delmar.  It will be addressed at a future visit.    There may be some mild cognitive deficits, and that this was also noted by the patient's son who endorses his mother's memory loss, although she appears to be doing well today.  When asked why she is here, she stated \"because of dizziness and danger of falling.\"  She indicated that she becomes \"unsteady.\"  When asked the year, she hesitates a bit, stating, \"that's such a simple question \".  Eventually, see tells me it is .  She has no problem with the month.    ROS: She denies any significant pain, even at the fracture site.  She can, in fact, using incentive spirometer without producing pain.    Past Medical History:   Diagnosis Date     Carpal tunnel syndrome (aka CTS)      H/O calcium pyrophosphate deposition disease (CPPD)      History of encephalopathy 10/2017     Hypertension      Kyphoscoliosis      Osteoarthritis     hands     Osteoporosis      Rectal prolapse               Family History   Problem Relation Age of Onset     Depression/Anxiety Son      Depression/Anxiety Son         alcohol abuse  age 24     Hypertension Mother      Hypertension " Brother      Diabetes No family hx of      Breast Cancer No family hx of      Colon Cancer No family hx of      Prostate Cancer No family hx of      Other Cancer No family hx of      Social History     Socioeconomic History     Marital status:      Spouse name: Not on file     Number of children: Not on file     Years of education: Not on file     Highest education level: Not on file   Occupational History     Not on file   Tobacco Use     Smoking status: Never Smoker     Smokeless tobacco: Never Used   Substance and Sexual Activity     Alcohol use: No     Drug use: No     Sexual activity: Never   Other Topics Concern     Not on file   Social History Narrative     Not on file     Social Determinants of Health     Financial Resource Strain:      Difficulty of Paying Living Expenses:    Food Insecurity:      Worried About Running Out of Food in the Last Year:      Ran Out of Food in the Last Year:    Transportation Needs:      Lack of Transportation (Medical):      Lack of Transportation (Non-Medical):    Physical Activity:      Days of Exercise per Week:      Minutes of Exercise per Session:    Stress:      Feeling of Stress :    Social Connections:      Frequency of Communication with Friends and Family:      Frequency of Social Gatherings with Friends and Family:      Attends [employer] Services:      Active Member of Clubs or Organizations:      Attends Club or Organization Meetings:      Marital Status:    Intimate Partner Violence:      Fear of Current or Ex-Partner:      Emotionally Abused:      Physically Abused:      Sexually Abused:        MEDICATIONS: Reviewed from the MAR, physician orders, and/or earlier progress notes.  Post Discharge Medication Reconciliation Status: discharge medications reconciled, continue medications without change.    Current Outpatient Medications   Medication Sig     acetaminophen (TYLENOL) 325 MG tablet Take 2 tablets (650 mg) by mouth every 4 hours as needed for mild pain  "    amLODIPine (NORVASC) 5 MG tablet Take 1 tablet (5 mg) by mouth daily     calcium carbonate 600 mg-vitamin D 400 units (CALTRATE) 600-400 MG-UNIT per tablet Take 1 tablet by mouth 2 times daily     ibuprofen (ADVIL/MOTRIN) 200 MG tablet Take 1 tablet (200 mg) by mouth every 6 hours as needed for moderate pain     Lidocaine (LIDOCARE) 4 % Patch Place 1 patch onto the skin every 24 hours Apply to the right lateral/post chest wall for pain  To prevent lidocaine toxicity, patient should be patch free for 12 hrs daily.     magnesium oxide (MAG-OX) 400 (241.3 Mg) MG tablet Take 1 tablet (400 mg) by mouth daily     order for DME Equipment being ordered: Incentive spirometer     order for DME Equipment being ordered: Home BP monitor and cuff     polyethylene glycol (MIRALAX) 17 GM/Dose powder Take 17 g by mouth daily     No current facility-administered medications for this visit.     ALLERGIES: No Known Allergies    DIET: Regular, regular texture, thin liquids.    Vitals:    07/21/21 1018   BP: 124/84   Pulse: 82   Resp: 18   Temp: 98.1  F (36.7  C)   SpO2: 95%   Height: 1.473 m (4' 10\")     Body mass index is 20.73 kg/m .    EXAMINATION:   General: Pleasant, alert, and conversant elderly female, sitting in a recliner, in no apparent distress.  Her son, Delmar, is in attendance and sitting on the bed.  Head: Normocephalic and atraumatic.   Eyes: PERRLA, sclerae clear.   ENT: Moist oral mucosa.  She has several of her own teeth.  No rhinorrhea or nasal discharge.  Hearing seems unimpaired.  Cardiovascular: Regular rate and rhythm.   Respiratory: Lungs clear to auscultation bilaterally.   Abdomen: Nondistended.   Musculoskeletal/Extremities: Age-related degenerative joint disease and moderate to severe kyphosis.  No peripheral edema.  Integument: No rashes, clinically significant lesions, or skin breakdown.   Cognitive/Psychiatric: Generally alert and oriented, although there do appear to be some issues as noted above.  " Affect is euthymic.    DIAGNOSTICS:   Recent Results (from the past 240 hour(s))   Comprehensive metabolic panel    Collection Time: 07/13/21 12:29 PM   Result Value Ref Range    Sodium 144 133 - 144 mmol/L    Potassium 3.9 3.4 - 5.3 mmol/L    Chloride 108 94 - 109 mmol/L    Carbon Dioxide (CO2) 26 20 - 32 mmol/L    Anion Gap 10 3 - 14 mmol/L    Urea Nitrogen 58 (H) 7 - 30 mg/dL    Creatinine 0.61 0.52 - 1.04 mg/dL    Calcium 9.9 8.5 - 10.1 mg/dL    Glucose 83 70 - 99 mg/dL    Alkaline Phosphatase 67 40 - 150 U/L    AST      ALT 72 (H) 0 - 50 U/L    Protein Total 7.4 6.8 - 8.8 g/dL    Albumin 3.5 3.4 - 5.0 g/dL    Bilirubin Total 0.8 0.2 - 1.3 mg/dL    GFR Estimate 80 >60 mL/min/1.73m2   Troponin I    Collection Time: 07/13/21 12:29 PM   Result Value Ref Range    Troponin I 0.184 (HH) 0.000 - 0.045 ug/L   CK total    Collection Time: 07/13/21 12:29 PM   Result Value Ref Range    CK     CBC with platelets and differential    Collection Time: 07/13/21 12:29 PM   Result Value Ref Range    WBC Count 17.6 (H) 4.0 - 11.0 10e3/uL    RBC Count 5.39 (H) 3.80 - 5.20 10e6/uL    Hemoglobin 16.2 (H) 11.7 - 15.7 g/dL    Hematocrit 49.5 (H) 35.0 - 47.0 %    MCV 92 78 - 100 fL    MCH 30.1 26.5 - 33.0 pg    MCHC 32.7 31.5 - 36.5 g/dL    RDW 12.4 10.0 - 15.0 %    Platelet Count 262 150 - 450 10e3/uL   Manual Differential    Collection Time: 07/13/21 12:29 PM   Result Value Ref Range    % Neutrophils 89 %    % Lymphocytes 3 %    % Monocytes 8 %    % Eosinophils 0 %    % Basophils 0 %    Absolute Neutrophils 15.7 (H) 1.6 - 8.3 10e3/uL    Absolute Lymphocytes 0.5 (L) 0.8 - 5.3 10e3/uL    Absolute Monocytes 1.4 (H) 0.0 - 1.3 10e3/uL    Absolute Eosinophils 0.0 0.0 - 0.7 10e3/uL    Absolute Basophils 0.0 0.0 - 0.2 10e3/uL    RBC Morphology Confirmed RBC Indices     Platelet Assessment  Automated Count Confirmed. Platelet morphology is normal.     Automated Count Confirmed. Platelet morphology is normal.    Sina Cells Moderate (A) None  Seen   Extra Green Top (Lithium Heparin) ON ICE    Collection Time: 07/13/21 12:30 PM   Result Value Ref Range    Hold Specimen JIC    Extra Green Top (Lithium Heparin) Tube    Collection Time: 07/13/21 12:30 PM   Result Value Ref Range    Hold Specimen JIC    EKG 12-lead, tracing only    Collection Time: 07/13/21 12:30 PM   Result Value Ref Range    Systolic Blood Pressure  mmHg    Diastolic Blood Pressure  mmHg    Ventricular Rate 100 BPM    Atrial Rate 100 BPM    RI Interval 146 ms    QRS Duration 80 ms     ms    QTc 487 ms    P Axis 73 degrees    R AXIS -23 degrees    T Axis 86 degrees    Interpretation ECG Click View Image link to view waveform and result    Extra Blue Top Tube    Collection Time: 07/13/21 12:31 PM   Result Value Ref Range    Hold Specimen JIC    Extra Red Top Tube    Collection Time: 07/13/21 12:31 PM   Result Value Ref Range    Hold Specimen JIC    CK total    Collection Time: 07/13/21  2:36 PM   Result Value Ref Range    CK 1,422 (HH) 30 - 225 U/L   AST    Collection Time: 07/13/21  2:36 PM   Result Value Ref Range     (H) 0 - 45 U/L   SARS-COV2 (COVID-19) Virus RT-PCR    Collection Time: 07/13/21  5:22 PM    Specimen: Nasopharyngeal; Swab   Result Value Ref Range    SARS CoV2 PCR Negative Negative   UA with Microscopic reflex to Culture    Collection Time: 07/13/21  6:20 PM    Specimen: Urine, Clean Catch   Result Value Ref Range    Color Urine Light Yellow Colorless, Straw, Light Yellow, Yellow    Appearance Urine Clear Clear    Glucose Urine Negative Negative mg/dL    Bilirubin Urine Negative Negative    Ketones Urine 60  (A) Negative mg/dL    Specific Gravity Urine 1.025 1.003 - 1.035    Blood Urine Moderate (A) Negative    pH Urine 6.0 5.0 - 7.0    Protein Albumin Urine 100  (A) Negative mg/dL    Urobilinogen Urine Normal Normal, 2.0 mg/dL    Nitrite Urine Negative Negative    Leukocyte Esterase Urine Small (A) Negative    Bacteria Urine Few (A) None Seen /HPF    Mucus  Urine Present (A) None Seen /LPF    RBC Urine 2 <=2 /HPF    WBC Urine 12 (H) <=5 /HPF    Squamous Epithelials Urine 1 <=1 /HPF    Transitional Epithelials Urine 1 <=1 /HPF    Hyaline Casts Urine 3 (H) <=2 /LPF   Urine Culture    Collection Time: 07/13/21  6:20 PM    Specimen: Urine, Clean Catch   Result Value Ref Range    Culture 50,000-100,000 CFU/mL Mixture of urogenital elijah    Troponin I    Collection Time: 07/13/21  9:56 PM   Result Value Ref Range    Troponin I 0.123 (HH) 0.000 - 0.045 ug/L   Lactic acid whole blood    Collection Time: 07/13/21  9:56 PM   Result Value Ref Range    Lactic Acid 0.6 (L) 0.7 - 2.0 mmol/L   Basic metabolic panel    Collection Time: 07/14/21  7:42 AM   Result Value Ref Range    Sodium 144 133 - 144 mmol/L    Potassium 3.4 3.4 - 5.3 mmol/L    Chloride 116 (H) 94 - 109 mmol/L    Carbon Dioxide (CO2) 23 20 - 32 mmol/L    Anion Gap 5 3 - 14 mmol/L    Urea Nitrogen 41 (H) 7 - 30 mg/dL    Creatinine 0.52 0.52 - 1.04 mg/dL    Calcium 7.9 (L) 8.5 - 10.1 mg/dL    Glucose 92 70 - 99 mg/dL    GFR Estimate 84 >60 mL/min/1.73m2   Vitamin B12    Collection Time: 07/14/21  7:42 AM   Result Value Ref Range    Vitamin B12 3,330 (H) 193 - 986 pg/mL   Magnesium    Collection Time: 07/14/21  7:42 AM   Result Value Ref Range    Magnesium 2.0 1.6 - 2.3 mg/dL   Phosphorus    Collection Time: 07/14/21  7:42 AM   Result Value Ref Range    Phosphorus 1.6 (L) 2.5 - 4.5 mg/dL   Troponin I    Collection Time: 07/14/21  7:42 AM   Result Value Ref Range    Troponin I 0.104 (H) 0.000 - 0.045 ug/L   CK total    Collection Time: 07/14/21  7:42 AM   Result Value Ref Range     (H) 30 - 225 U/L   Hemoglobin    Collection Time: 07/14/21 10:13 AM   Result Value Ref Range    Hemoglobin 14.2 11.7 - 15.7 g/dL   Lactic acid whole blood    Collection Time: 07/14/21 10:13 AM   Result Value Ref Range    Lactic Acid 1.8 0.7 - 2.0 mmol/L   Magnesium    Collection Time: 07/15/21  7:00 AM   Result Value Ref Range     Magnesium 1.8 1.6 - 2.3 mg/dL   Phosphorus    Collection Time: 07/15/21  7:00 AM   Result Value Ref Range    Phosphorus 2.2 (L) 2.5 - 4.5 mg/dL   Potassium    Collection Time: 07/15/21  7:00 AM   Result Value Ref Range    Potassium 3.5 3.4 - 5.3 mmol/L   Magnesium    Collection Time: 07/16/21  7:27 AM   Result Value Ref Range    Magnesium 1.7 1.6 - 2.3 mg/dL   Phosphorus    Collection Time: 07/16/21  7:27 AM   Result Value Ref Range    Phosphorus 2.0 (L) 2.5 - 4.5 mg/dL       ASSESSMENT/Plan:    1. Fall, subsequent encounter  Receiving both physical and occupational therapies.    2. Closed fracture of one rib of right side with routine healing, subsequent encounter  Minimal pain.  Encouraged use of incentive spirometry.    3. Age-related osteoporosis without current pathological fracture  Taking calcium with vitamin D    4. Essential hypertension  On amlodipine.  Blood pressures are acceptable.    5. Primary osteoarthritis involving multiple joints  Has orders for as needed acetaminophen.    6. Mild cognitive impairment  Some memory issues.      CHANGES:    None.    CARE PLAN:    The care plan, medications, vital signs, orders, and nursing notes have been reviewed, and all orders signed. Changes to care plan, if any, as noted. Otherwise, continue current plan of care.    The above has been created using voice recognition software. Please be aware that this may unintentionally  produce inaccuracies and/or nonsensical sentences.      Electronically signed by: SUSIE Nieves CNP

## 2021-07-22 ENCOUNTER — TRANSITIONAL CARE UNIT VISIT (OUTPATIENT)
Dept: GERIATRICS | Facility: CLINIC | Age: 86
End: 2021-07-22
Payer: COMMERCIAL

## 2021-07-22 VITALS
HEART RATE: 80 BPM | OXYGEN SATURATION: 95 % | TEMPERATURE: 98.4 F | SYSTOLIC BLOOD PRESSURE: 135 MMHG | BODY MASS INDEX: 20.73 KG/M2 | HEIGHT: 58 IN | RESPIRATION RATE: 18 BRPM | DIASTOLIC BLOOD PRESSURE: 74 MMHG

## 2021-07-22 DIAGNOSIS — G31.84 MILD COGNITIVE IMPAIRMENT: ICD-10-CM

## 2021-07-22 DIAGNOSIS — I10 ESSENTIAL HYPERTENSION: ICD-10-CM

## 2021-07-22 DIAGNOSIS — M81.0 AGE-RELATED OSTEOPOROSIS WITHOUT CURRENT PATHOLOGICAL FRACTURE: ICD-10-CM

## 2021-07-22 DIAGNOSIS — M15.0 PRIMARY OSTEOARTHRITIS INVOLVING MULTIPLE JOINTS: ICD-10-CM
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
## 2021-07-22 DIAGNOSIS — S22.31XD CLOSED FRACTURE OF ONE RIB OF RIGHT SIDE WITH ROUTINE HEALING, SUBSEQUENT ENCOUNTER: ICD-10-CM

## 2021-07-22 PROCEDURE — 99309 SBSQ NF CARE MODERATE MDM 30: CPT | Performed by: NURSE PRACTITIONER

## 2021-07-22 NOTE — LETTER
"    2021        RE: Belia Sheets  [address]  [address]  [facility] [id number]        [facility]    Name:   Belia Sheets (Sudeep)  :   1930  MRN:    [id number]     Facility:   [facility] (SNF) [[id number]]   Room: [place] [address]  Code Status: FULL CODE and POLST AVAILABLE -     DOS: 2021  Previous visit: 2021    PCP:  Alejandro Sandhu    CHIEF COMPLAINT / REASON FOR VISIT:  Chief Complaint   Patient presents with     Clinic Care Coordination - Follow-up      [facility] from 2021 until 2021      HPI: Belia is a 89 year old female with a history of osteoporosis, essential hypertension, and remote history of visual hallucinations, who was brought to the ED for evaluation after she was found in her senior apartment down on the floor.  Patient, herself, reported having \"interesting visual hallucinations,\" and she also remembered being on the floor and unable to get up.  She was unsure how long she was down.  Found by staff during routine rounds.  She reported right posterior rib pain, shoulder, and hip pain.  She denied any shortness of breath.  Imaging in the ED included a CT head and CXR, bilateral shoulders and pelvic bones notable for an acute right ninth rib fracture.  She also had an elevated creatinine kinase and troponin.  She was admitted to the trauma service given the injury identified.    Acute right lateral rib fracture  -- Pulmonary toilet and good pain control allowing for this is paramount.  Prior to discharge, her pain was well controlled with scheduled acetaminophen and topical pain medications.  In order to prevent common pulmonary complications found with rib fracture patients, she would need to continue with aggressive pulmonary toileting that includes incentive spirometry, coughing and deep breathing exercises.  He was recommended that she continue these exercises a minimum of 4 " "times daily for 1 month after discharge.    Visual hallucinations  -- The patient and her son, Delmar, reported a remote history of visual hallucinations, mostly pleasant in nature.  She was worked up for a possible UTI that was found to be negative.  After receiving 2 doses of IV Rocephin, antibiotics were discontinued.    Troponinemia  -- Elevated at 0.184 on presentation.  Post trauma, fall with prolonged immobility, and demand ischemia.  Twelve-lead EKG was without ischemic changes.  She denied chest pains, palpitations or shortness of breath.  This also improved with subsequent trends prior to discharge.  No medication changes were made.    Recommendations: TCU for physical and occupational therapies upon discharge.      CURRENT/RECENT TCU ISSUES    Disposition: It is noted that in the hospital, she was listed as DNR/DNI; however, at [facility] she is listed as full code.  This was not noted prior to my visit with the patient and her son, Delmar.  It may be addressed at a future visit.    There may be some mild cognitive deficits, and that this was also noted by the patient's son who endorses his mother's memory loss, although she appears to be doing well today.  During the initial visit, when asked why she is here, she stated \"because of dizziness and danger of falling.\"  She indicated that she becomes \"unsteady.\"  When asked the year, she hesitates a bit, stating, \"that's such a simple question \".  Eventually, she indicates it is 2021.  She has no problem with the month.    ROS: She denies any significant pain, even at the fracture site.  She can, in fact, using incentive spirometer without producing pain.  No headaches or chest pains, coughing or congestion, nausea or vomiting, dyspnea, dysuria, diplopia, constipation or diarrhea, difficulty chewing or swallowing, integumentary issues, or problems with appetite or sleep    Past Medical History:   Diagnosis Date     Carpal tunnel syndrome (aka CTS)      H/O calcium " pyrophosphate deposition disease (CPPD)      History of encephalopathy 10/2017     Hypertension      Kyphoscoliosis      Osteoarthritis     hands     Osteoporosis      Rectal prolapse               Family History   Problem Relation Age of Onset     Depression/Anxiety Son      Depression/Anxiety Son         alcohol abuse  age 24     Hypertension Mother      Hypertension Brother      Diabetes No family hx of      Breast Cancer No family hx of      Colon Cancer No family hx of      Prostate Cancer No family hx of      Other Cancer No family hx of      Social History     Socioeconomic History     Marital status:      Spouse name: Not on file     Number of children: Not on file     Years of education: Not on file     Highest education level: Not on file   Occupational History     Not on file   Tobacco Use     Smoking status: Never Smoker     Smokeless tobacco: Never Used   Substance and Sexual Activity     Alcohol use: No     Drug use: No     Sexual activity: Never   Other Topics Concern     Not on file   Social History Narrative     Not on file     Social Determinants of Health     Financial Resource Strain:      Difficulty of Paying Living Expenses:    Food Insecurity:      Worried About Running Out of Food in the Last Year:      Ran Out of Food in the Last Year:    Transportation Needs:      Lack of Transportation (Medical):      Lack of Transportation (Non-Medical):    Physical Activity:      Days of Exercise per Week:      Minutes of Exercise per Session:    Stress:      Feeling of Stress :    Social Connections:      Frequency of Communication with Friends and Family:      Frequency of Social Gatherings with Friends and Family:      Attends [employer] Services:      Active Member of Clubs or Organizations:      Attends Club or Organization Meetings:      Marital Status:    Intimate Partner Violence:      Fear of Current or Ex-Partner:      Emotionally Abused:      Physically Abused:      Sexually Abused:   "      MEDICATIONS: Reviewed from the MAR, physician orders, and/or earlier progress notes.  Post Discharge Medication Reconciliation Status: discharge medications reconciled, continue medications without change.    Current Outpatient Medications   Medication Sig     acetaminophen (TYLENOL) 325 MG tablet Take 2 tablets (650 mg) by mouth every 4 hours as needed for mild pain     amLODIPine (NORVASC) 5 MG tablet Take 1 tablet (5 mg) by mouth daily     calcium carbonate 600 mg-vitamin D 400 units (CALTRATE) 600-400 MG-UNIT per tablet Take 1 tablet by mouth 2 times daily     ibuprofen (ADVIL/MOTRIN) 200 MG tablet Take 1 tablet (200 mg) by mouth every 6 hours as needed for moderate pain     Lidocaine (LIDOCARE) 4 % Patch Place 1 patch onto the skin every 24 hours Apply to the right lateral/post chest wall for pain  To prevent lidocaine toxicity, patient should be patch free for 12 hrs daily.     magnesium oxide (MAG-OX) 400 (241.3 Mg) MG tablet Take 1 tablet (400 mg) by mouth daily     order for DME Equipment being ordered: Incentive spirometer     order for DME Equipment being ordered: Home BP monitor and cuff     polyethylene glycol (MIRALAX) 17 GM/Dose powder Take 17 g by mouth daily     No current facility-administered medications for this visit.     ALLERGIES: No Known Allergies    DIET: Regular, regular texture, thin liquids.    Vitals:    07/22/21 1151   BP: 135/74   Pulse: 80   Resp: 18   Temp: 98.4  F (36.9  C)   SpO2: 95%   Height: 1.473 m (4' 10\")     Body mass index is 20.73 kg/m .    EXAMINATION:   General: Pleasant, alert, and conversant elderly female, sitting in a recliner, in no apparent distress.  Her son, Delmar, is in attendance and sitting on the bed.  Head: Normocephalic and atraumatic.   Eyes: PERRLA, sclerae clear.   ENT: Moist oral mucosa.  She has several of her own teeth but missing all lower teeth on the right as well as several uppercase.  No rhinorrhea or nasal discharge.  Hearing seems " unimpaired.  Cardiovascular: Regular rate and rhythm.  No appreciable murmur.  Respiratory: Lungs clear to auscultation bilaterally.   Abdomen: Nondistended.   Musculoskeletal/Extremities: Age-related degenerative joint disease and moderate to severe kyphosis.  Hands and fingers shows significant deformities without a diagnosis of rheumatoid arthritis.  No peripheral edema.  Integument: No rashes, clinically significant lesions, or skin breakdown.   Cognitive/Psychiatric: Generally alert and oriented, although there do appear to be some cognitive issues as noted above.  Affect is euthymic.    DIAGNOSTICS:   Recent Results (from the past 240 hour(s))   Basic metabolic panel    Collection Time: 07/14/21  7:42 AM   Result Value Ref Range    Sodium 144 133 - 144 mmol/L    Potassium 3.4 3.4 - 5.3 mmol/L    Chloride 116 (H) 94 - 109 mmol/L    Carbon Dioxide (CO2) 23 20 - 32 mmol/L    Anion Gap 5 3 - 14 mmol/L    Urea Nitrogen 41 (H) 7 - 30 mg/dL    Creatinine 0.52 0.52 - 1.04 mg/dL    Calcium 7.9 (L) 8.5 - 10.1 mg/dL    Glucose 92 70 - 99 mg/dL    GFR Estimate 84 >60 mL/min/1.73m2   Vitamin B12    Collection Time: 07/14/21  7:42 AM   Result Value Ref Range    Vitamin B12 3,330 (H) 193 - 986 pg/mL   Magnesium    Collection Time: 07/14/21  7:42 AM   Result Value Ref Range    Magnesium 2.0 1.6 - 2.3 mg/dL   Phosphorus    Collection Time: 07/14/21  7:42 AM   Result Value Ref Range    Phosphorus 1.6 (L) 2.5 - 4.5 mg/dL   Troponin I    Collection Time: 07/14/21  7:42 AM   Result Value Ref Range    Troponin I 0.104 (H) 0.000 - 0.045 ug/L   CK total    Collection Time: 07/14/21  7:42 AM   Result Value Ref Range     (H) 30 - 225 U/L   Hemoglobin    Collection Time: 07/14/21 10:13 AM   Result Value Ref Range    Hemoglobin 14.2 11.7 - 15.7 g/dL   Lactic acid whole blood    Collection Time: 07/14/21 10:13 AM   Result Value Ref Range    Lactic Acid 1.8 0.7 - 2.0 mmol/L   Magnesium    Collection Time: 07/15/21  7:00 AM   Result  Value Ref Range    Magnesium 1.8 1.6 - 2.3 mg/dL   Phosphorus    Collection Time: 07/15/21  7:00 AM   Result Value Ref Range    Phosphorus 2.2 (L) 2.5 - 4.5 mg/dL   Potassium    Collection Time: 07/15/21  7:00 AM   Result Value Ref Range    Potassium 3.5 3.4 - 5.3 mmol/L   Magnesium    Collection Time: 07/16/21  7:27 AM   Result Value Ref Range    Magnesium 1.7 1.6 - 2.3 mg/dL   Phosphorus    Collection Time: 07/16/21  7:27 AM   Result Value Ref Range    Phosphorus 2.0 (L) 2.5 - 4.5 mg/dL   UA with Microscopic    Collection Time: 07/23/21  9:30 PM   Result Value Ref Range    Color Urine Yellow Colorless, Straw, Light Yellow, Yellow    Appearance Urine Cloudy (A) Clear    Glucose Urine Negative Negative mg/dL    Bilirubin Urine Negative Negative    Ketones Urine Negative Negative mg/dL    Specific Gravity Urine 1.015 1.001 - 1.030    Blood Urine Negative Negative    pH Urine 8.0 (H) 5.0 - 7.0    Protein Albumin Urine Negative Negative mg/dL    Urobilinogen Urine <2.0 <2.0 mg/dL    Nitrite Urine Negative Negative    Leukocyte Esterase Urine Negative Negative    Bacteria Urine Moderate (A) None Seen /HPF    WBC Clumps Urine Present (A) None Seen /HPF    Mucus Urine Present (A) None Seen /LPF    Amorphous Crystals Urine Moderate (A) None Seen /HPF    RBC Urine 5 (H) <=2 /HPF    WBC Urine 22 (H) <=5 /HPF    Granular Casts Urine 1 (H) None Seen /LPF       ASSESSMENT/Plan:     Diagnosis Comments   1. Fall, subsequent encounter  Receiving both physical and occupational therapies.   2. Closed fracture of one rib of right side with routine healing, subsequent encounter  Minimal pain.  Encouraged use of incentive spirometry.   3. Age-related osteoporosis without current pathological fracture  Taking calcium with vitamin D   4. Essential hypertension  On amlodipine.  Blood pressures are acceptable.   5. Primary osteoarthritis involving multiple joints  Has orders for as needed acetaminophen.   6. Mild cognitive impairment  Some  memory issues.       CHANGES:    None.    CARE PLAN:    The care plan, medications, vital signs, orders, and nursing notes have been reviewed, and all orders signed. Changes to care plan, if any, as noted. Otherwise, continue current plan of care.    The above has been created using voice recognition software. Please be aware that this may unintentionally  produce inaccuracies and/or nonsensical sentences.      Electronically signed by: SUSIE Nieves CNP          Sincerely,        SUSIE Nieves CNP

## 2021-07-23 PROCEDURE — 81001 URINALYSIS AUTO W/SCOPE: CPT | Performed by: INTERNAL MEDICINE

## 2021-07-23 PROCEDURE — 87086 URINE CULTURE/COLONY COUNT: CPT | Mod: ORL | Performed by: INTERNAL MEDICINE

## 2021-07-24 ENCOUNTER — LAB REQUISITION (OUTPATIENT)
Dept: LAB | Facility: CLINIC | Age: 86
End: 2021-07-24
Payer: COMMERCIAL

## 2021-07-24 DIAGNOSIS — N39.0 URINARY TRACT INFECTION, SITE NOT SPECIFIED: ICD-10-CM

## 2021-07-24 LAB
ALBUMIN UR-MCNC: NEGATIVE MG/DL
AMORPH CRY #/AREA URNS HPF: ABNORMAL /HPF
APPEARANCE UR: ABNORMAL
BACTERIA #/AREA URNS HPF: ABNORMAL /HPF
BILIRUB UR QL STRIP: NEGATIVE
COLOR UR AUTO: YELLOW
GLUCOSE UR STRIP-MCNC: NEGATIVE MG/DL
GRANULAR CAST: 1 /LPF
HGB UR QL STRIP: NEGATIVE
KETONES UR STRIP-MCNC: NEGATIVE MG/DL
LEUKOCYTE ESTERASE UR QL STRIP: NEGATIVE
MUCOUS THREADS #/AREA URNS LPF: PRESENT /LPF
NITRATE UR QL: NEGATIVE
PH UR STRIP: 8 [PH] (ref 5–7)
RBC URINE: 5 /HPF
SP GR UR STRIP: 1.01 (ref 1–1.03)
UROBILINOGEN UR STRIP-MCNC: <2 MG/DL
WBC CLUMPS #/AREA URNS HPF: PRESENT /HPF
WBC URINE: 22 /HPF

## 2021-07-24 NOTE — PROGRESS NOTES
"[facility]    Name:   Belia Sheets (Sudeep)  :   1930  MRN:    [id number]     Facility:   [facility] ([facility]) [[id number]]   Room:   Code Status: FULL CODE and POLST AVAILABLE -     DOS: 2021  Previous visit: 2021    PCP:  Alejandro Sandhu    CHIEF COMPLAINT / REASON FOR VISIT:  Chief Complaint   Patient presents with     Clinic Care Coordination - Follow-up      [facility] from 2021 until 2021      HPI: Belia is a 89 year old female with a history of osteoporosis, essential hypertension, and remote history of visual hallucinations, who was brought to the ED for evaluation after she was found in her senior apartment down on the floor.  Patient, herself, reported having \"interesting visual hallucinations,\" and she also remembered being on the floor and unable to get up.  She was unsure how long she was down.  Found by staff during routine rounds.  She reported right posterior rib pain, shoulder, and hip pain.  She denied any shortness of breath.  Imaging in the ED included a CT head and CXR, bilateral shoulders and pelvic bones notable for an acute right ninth rib fracture.  She also had an elevated creatinine kinase and troponin.  She was admitted to the trauma service given the injury identified.    Acute right lateral rib fracture  -- Pulmonary toilet and good pain control allowing for this is paramount.  Prior to discharge, her pain was well controlled with scheduled acetaminophen and topical pain medications.  In order to prevent common pulmonary complications found with rib fracture patients, she would need to continue with aggressive pulmonary toileting that includes incentive spirometry, coughing and deep breathing exercises.  He was recommended that she continue these exercises a minimum of 4 times daily for 1 month after discharge.    Visual hallucinations  -- The patient and her son, Delmar, " "reported a remote history of visual hallucinations, mostly pleasant in nature.  She was worked up for a possible UTI that was found to be negative.  After receiving 2 doses of IV Rocephin, antibiotics were discontinued.    Troponinemia  -- Elevated at 0.184 on presentation.  Post trauma, fall with prolonged immobility, and demand ischemia.  Twelve-lead EKG was without ischemic changes.  She denied chest pains, palpitations or shortness of breath.  This also improved with subsequent trends prior to discharge.  No medication changes were made.    Recommendations: TCU for physical and occupational therapies upon discharge.      CURRENT/RECENT TCU ISSUES    Disposition: It is noted that in the hospital, she was listed as DNR/DNI; however, at [facility] she is listed as full code.  This was not noted prior to my visit with the patient and her son, Delmar.  It may be addressed at a future visit.    There may be some mild cognitive deficits, and that this was also noted by the patient's son who endorses his mother's memory loss, although she appears to be doing well today.  During the initial visit, when asked why she is here, she stated \"because of dizziness and danger of falling.\"  She indicated that she becomes \"unsteady.\"  When asked the year, she hesitates a bit, stating, \"that's such a simple question \".  Eventually, she indicates it is 2021.  She has no problem with the month.    ROS: She denies any significant pain, even at the fracture site.  She can, in fact, using incentive spirometer without producing pain.  No headaches or chest pains, coughing or congestion, nausea or vomiting, dyspnea, dysuria, diplopia, constipation or diarrhea, difficulty chewing or swallowing, integumentary issues, or problems with appetite or sleep    Past Medical History:   Diagnosis Date     Carpal tunnel syndrome (aka CTS)      H/O calcium pyrophosphate deposition disease (CPPD)      History of encephalopathy 10/2017     Hypertension      " Kyphoscoliosis      Osteoarthritis     hands     Osteoporosis      Rectal prolapse               Family History   Problem Relation Age of Onset     Depression/Anxiety Son      Depression/Anxiety Son         alcohol abuse  age 24     Hypertension Mother      Hypertension Brother      Diabetes No family hx of      Breast Cancer No family hx of      Colon Cancer No family hx of      Prostate Cancer No family hx of      Other Cancer No family hx of      Social History     Socioeconomic History     Marital status:      Spouse name: Not on file     Number of children: Not on file     Years of education: Not on file     Highest education level: Not on file   Occupational History     Not on file   Tobacco Use     Smoking status: Never Smoker     Smokeless tobacco: Never Used   Substance and Sexual Activity     Alcohol use: No     Drug use: No     Sexual activity: Never   Other Topics Concern     Not on file   Social History Narrative     Not on file     Social Determinants of Health     Financial Resource Strain:      Difficulty of Paying Living Expenses:    Food Insecurity:      Worried About Running Out of Food in the Last Year:      Ran Out of Food in the Last Year:    Transportation Needs:      Lack of Transportation (Medical):      Lack of Transportation (Non-Medical):    Physical Activity:      Days of Exercise per Week:      Minutes of Exercise per Session:    Stress:      Feeling of Stress :    Social Connections:      Frequency of Communication with Friends and Family:      Frequency of Social Gatherings with Friends and Family:      Attends [employer] Services:      Active Member of Clubs or Organizations:      Attends Club or Organization Meetings:      Marital Status:    Intimate Partner Violence:      Fear of Current or Ex-Partner:      Emotionally Abused:      Physically Abused:      Sexually Abused:        MEDICATIONS: Reviewed from the MAR, physician orders, and/or earlier progress notes.  Post  "Discharge Medication Reconciliation Status: discharge medications reconciled, continue medications without change.    Current Outpatient Medications   Medication Sig     acetaminophen (TYLENOL) 325 MG tablet Take 2 tablets (650 mg) by mouth every 4 hours as needed for mild pain     amLODIPine (NORVASC) 5 MG tablet Take 1 tablet (5 mg) by mouth daily     calcium carbonate 600 mg-vitamin D 400 units (CALTRATE) 600-400 MG-UNIT per tablet Take 1 tablet by mouth 2 times daily     ibuprofen (ADVIL/MOTRIN) 200 MG tablet Take 1 tablet (200 mg) by mouth every 6 hours as needed for moderate pain     Lidocaine (LIDOCARE) 4 % Patch Place 1 patch onto the skin every 24 hours Apply to the right lateral/post chest wall for pain  To prevent lidocaine toxicity, patient should be patch free for 12 hrs daily.     magnesium oxide (MAG-OX) 400 (241.3 Mg) MG tablet Take 1 tablet (400 mg) by mouth daily     order for DME Equipment being ordered: Incentive spirometer     order for DME Equipment being ordered: Home BP monitor and cuff     polyethylene glycol (MIRALAX) 17 GM/Dose powder Take 17 g by mouth daily     No current facility-administered medications for this visit.     ALLERGIES: No Known Allergies    DIET: Regular, regular texture, thin liquids.    Vitals:    07/22/21 1151   BP: 135/74   Pulse: 80   Resp: 18   Temp: 98.4  F (36.9  C)   SpO2: 95%   Height: 1.473 m (4' 10\")     Body mass index is 20.73 kg/m .    EXAMINATION:   General: Pleasant, alert, and conversant elderly female, sitting in a recliner, in no apparent distress.  Her son, Delmar, is in attendance and sitting on the bed.  Head: Normocephalic and atraumatic.   Eyes: PERRLA, sclerae clear.   ENT: Moist oral mucosa.  She has several of her own teeth but missing all lower teeth on the right as well as several uppercase.  No rhinorrhea or nasal discharge.  Hearing seems unimpaired.  Cardiovascular: Regular rate and rhythm.  No appreciable murmur.  Respiratory: Lungs clear " to auscultation bilaterally.   Abdomen: Nondistended.   Musculoskeletal/Extremities: Age-related degenerative joint disease and moderate to severe kyphosis.  Hands and fingers shows significant deformities without a diagnosis of rheumatoid arthritis.  No peripheral edema.  Integument: No rashes, clinically significant lesions, or skin breakdown.   Cognitive/Psychiatric: Generally alert and oriented, although there do appear to be some cognitive issues as noted above.  Affect is euthymic.    DIAGNOSTICS:   Recent Results (from the past 240 hour(s))   Basic metabolic panel    Collection Time: 07/14/21  7:42 AM   Result Value Ref Range    Sodium 144 133 - 144 mmol/L    Potassium 3.4 3.4 - 5.3 mmol/L    Chloride 116 (H) 94 - 109 mmol/L    Carbon Dioxide (CO2) 23 20 - 32 mmol/L    Anion Gap 5 3 - 14 mmol/L    Urea Nitrogen 41 (H) 7 - 30 mg/dL    Creatinine 0.52 0.52 - 1.04 mg/dL    Calcium 7.9 (L) 8.5 - 10.1 mg/dL    Glucose 92 70 - 99 mg/dL    GFR Estimate 84 >60 mL/min/1.73m2   Vitamin B12    Collection Time: 07/14/21  7:42 AM   Result Value Ref Range    Vitamin B12 3,330 (H) 193 - 986 pg/mL   Magnesium    Collection Time: 07/14/21  7:42 AM   Result Value Ref Range    Magnesium 2.0 1.6 - 2.3 mg/dL   Phosphorus    Collection Time: 07/14/21  7:42 AM   Result Value Ref Range    Phosphorus 1.6 (L) 2.5 - 4.5 mg/dL   Troponin I    Collection Time: 07/14/21  7:42 AM   Result Value Ref Range    Troponin I 0.104 (H) 0.000 - 0.045 ug/L   CK total    Collection Time: 07/14/21  7:42 AM   Result Value Ref Range     (H) 30 - 225 U/L   Hemoglobin    Collection Time: 07/14/21 10:13 AM   Result Value Ref Range    Hemoglobin 14.2 11.7 - 15.7 g/dL   Lactic acid whole blood    Collection Time: 07/14/21 10:13 AM   Result Value Ref Range    Lactic Acid 1.8 0.7 - 2.0 mmol/L   Magnesium    Collection Time: 07/15/21  7:00 AM   Result Value Ref Range    Magnesium 1.8 1.6 - 2.3 mg/dL   Phosphorus    Collection Time: 07/15/21  7:00 AM    Result Value Ref Range    Phosphorus 2.2 (L) 2.5 - 4.5 mg/dL   Potassium    Collection Time: 07/15/21  7:00 AM   Result Value Ref Range    Potassium 3.5 3.4 - 5.3 mmol/L   Magnesium    Collection Time: 07/16/21  7:27 AM   Result Value Ref Range    Magnesium 1.7 1.6 - 2.3 mg/dL   Phosphorus    Collection Time: 07/16/21  7:27 AM   Result Value Ref Range    Phosphorus 2.0 (L) 2.5 - 4.5 mg/dL   UA with Microscopic    Collection Time: 07/23/21  9:30 PM   Result Value Ref Range    Color Urine Yellow Colorless, Straw, Light Yellow, Yellow    Appearance Urine Cloudy (A) Clear    Glucose Urine Negative Negative mg/dL    Bilirubin Urine Negative Negative    Ketones Urine Negative Negative mg/dL    Specific Gravity Urine 1.015 1.001 - 1.030    Blood Urine Negative Negative    pH Urine 8.0 (H) 5.0 - 7.0    Protein Albumin Urine Negative Negative mg/dL    Urobilinogen Urine <2.0 <2.0 mg/dL    Nitrite Urine Negative Negative    Leukocyte Esterase Urine Negative Negative    Bacteria Urine Moderate (A) None Seen /HPF    WBC Clumps Urine Present (A) None Seen /HPF    Mucus Urine Present (A) None Seen /LPF    Amorphous Crystals Urine Moderate (A) None Seen /HPF    RBC Urine 5 (H) <=2 /HPF    WBC Urine 22 (H) <=5 /HPF    Granular Casts Urine 1 (H) None Seen /LPF       ASSESSMENT/Plan:     Diagnosis Comments   1. Fall, subsequent encounter  Receiving both physical and occupational therapies.   2. Closed fracture of one rib of right side with routine healing, subsequent encounter  Minimal pain.  Encouraged use of incentive spirometry.   3. Age-related osteoporosis without current pathological fracture  Taking calcium with vitamin D   4. Essential hypertension  On amlodipine.  Blood pressures are acceptable.   5. Primary osteoarthritis involving multiple joints  Has orders for as needed acetaminophen.   6. Mild cognitive impairment  Some memory issues.       CHANGES:    None.    CARE PLAN:    The care plan, medications, vital signs,  orders, and nursing notes have been reviewed, and all orders signed. Changes to care plan, if any, as noted. Otherwise, continue current plan of care.    The above has been created using voice recognition software. Please be aware that this may unintentionally  produce inaccuracies and/or nonsensical sentences.      Electronically signed by: USSIE Nieves CNP

## 2021-07-25 LAB — BACTERIA UR CULT: ABNORMAL

## 2021-07-26 ENCOUNTER — TRANSITIONAL CARE UNIT VISIT (OUTPATIENT)
Dept: GERIATRICS | Facility: CLINIC | Age: 86
End: 2021-07-26
Payer: COMMERCIAL

## 2021-07-26 VITALS
DIASTOLIC BLOOD PRESSURE: 86 MMHG | HEIGHT: 58 IN | BODY MASS INDEX: 19.39 KG/M2 | RESPIRATION RATE: 18 BRPM | SYSTOLIC BLOOD PRESSURE: 155 MMHG | WEIGHT: 92.4 LBS | TEMPERATURE: 97.8 F | HEART RATE: 80 BPM | OXYGEN SATURATION: 96 %

## 2021-07-26 DIAGNOSIS — S22.31XD CLOSED FRACTURE OF ONE RIB OF RIGHT SIDE WITH ROUTINE HEALING, SUBSEQUENT ENCOUNTER: ICD-10-CM

## 2021-07-26 DIAGNOSIS — M15.0 PRIMARY OSTEOARTHRITIS INVOLVING MULTIPLE JOINTS: ICD-10-CM

## 2021-07-26 DIAGNOSIS — G31.84 MILD COGNITIVE IMPAIRMENT: ICD-10-CM
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
## 2021-07-26 DIAGNOSIS — M81.0 AGE-RELATED OSTEOPOROSIS WITHOUT CURRENT PATHOLOGICAL FRACTURE: ICD-10-CM

## 2021-07-26 DIAGNOSIS — I10 ESSENTIAL HYPERTENSION: ICD-10-CM

## 2021-07-26 PROCEDURE — 99309 SBSQ NF CARE MODERATE MDM 30: CPT | Performed by: NURSE PRACTITIONER

## 2021-07-26 ASSESSMENT — MIFFLIN-ST. JEOR: SCORE: 723.87

## 2021-07-26 NOTE — PROGRESS NOTES
"[facility] Services    Name:   Belia Sheets (Sudeep)  :   1930  MRN:    [id number]     Facility:   [facility] () [[id number]]   Room:   Code Status: FULL CODE and POLST AVAILABLE -     DOS: 2021  Previous visit: 2021    PCP:  Alejandro Sandhu    CHIEF COMPLAINT / REASON FOR VISIT:  Chief Complaint   Patient presents with     Clinic Care Coordination - Follow-up     Fall and right 9th rib fracture.      [facility] from 2021 until 2021      HPI: Belia is a 89 year old female with a history of osteoporosis, essential hypertension, and remote history of visual hallucinations, who was brought to the ED for evaluation after she was found in her senior apartment down on the floor.  Patient, herself, reported having \"interesting visual hallucinations,\" and she also remembered being on the floor and unable to get up.  She was unsure how long she was down.  Found by staff during routine rounds.  She reported right posterior rib pain, shoulder, and hip pain.  She denied any shortness of breath.  Imaging in the ED included a CT head and CXR, bilateral shoulders and pelvic bones notable for an acute right ninth rib fracture.  She also had an elevated creatinine kinase and troponin.  She was admitted to the trauma service given the injury identified.    Acute right lateral rib fracture  -- Pulmonary toilet and good pain control allowing for this is paramount. Prior to discharge, her pain was well controlled with scheduled acetaminophen and topical pain medications.  In order to prevent common pulmonary complications found with rib fracture patients, she would need to continue with aggressive pulmonary toileting that includes incentive spirometry, coughing and deep breathing exercises.  Recommended that she continue these exercises a minimum of 4 times daily for 1 month after discharge.     Visual hallucinations  -- The " "patient and her son, Delmar, reported a remote history of visual hallucinations, mostly pleasant in nature.  She was worked up for a possible UTI that was found to be negative.  After receiving 2 doses of IV Rocephin, antibiotics were discontinued.    Troponinemia  -- Elevated at 0.184 on presentation.  Post trauma, fall with prolonged immobility, and demand ischemia.  Twelve-lead EKG was without ischemic changes.  She denied chest pains, palpitations or shortness of breath.  This also improved with subsequent trends prior to discharge.  No medication changes were made.    Recommendations: TCU for physical and occupational therapies upon discharge.      CURRENT/RECENT TCU ISSUES    Disposition: It is noted that in the hospital, she was listed as DNR/DNI; however, at [facility] she is listed as full code.  This was not noted prior to my visit with the patient and her son, Delmar. During today's visit, patient stated that her pain is somewhat under controlled and that it does not bother her. Patient reported today that she has been using the incentive spirometry and that makes her to feel good but sometime lazy to do it.    There may be some mild cognitive deficits, and that this was also noted by the patient's son who endorses his mother's memory loss, although she appears to be doing well today.  During the initial visit, when asked why she is here, she stated \"because of dizziness and danger of falling.\"  She indicated that she becomes \"unsteady.\"  When asked the year, she hesitated a bit, stating, \"that's such a simple question \".  Eventually, she indicated it is 2021.  However, during today's visit, patient was able to state the month and year without any hesitation and answer most questions correctly.    ROS: She denies any significant pain, even at the fracture site.  She can, in fact, using incentive spirometer without producing pain.  No headaches or chest pains, coughing or congestion, nausea or vomiting, dyspnea, " dysuria, diplopia, constipation or diarrhea, difficulty chewing or swallowing, integumentary issues, or problems with appetite or sleep    Past Medical History:   Diagnosis Date     Carpal tunnel syndrome (aka CTS)      H/O calcium pyrophosphate deposition disease (CPPD)      History of encephalopathy 10/2017     Hypertension      Kyphoscoliosis      Osteoarthritis     hands     Osteoporosis      Rectal prolapse               Family History   Problem Relation Age of Onset     Depression/Anxiety Son      Depression/Anxiety Son         alcohol abuse  age 24     Hypertension Mother      Hypertension Brother      Diabetes No family hx of      Breast Cancer No family hx of      Colon Cancer No family hx of      Prostate Cancer No family hx of      Other Cancer No family hx of      Social History     Socioeconomic History     Marital status:      Spouse name: Not on file     Number of children: Not on file     Years of education: Not on file     Highest education level: Not on file   Occupational History     Not on file   Tobacco Use     Smoking status: Never Smoker     Smokeless tobacco: Never Used   Substance and Sexual Activity     Alcohol use: No     Drug use: No     Sexual activity: Never   Other Topics Concern     Not on file   Social History Narrative     Not on file     Social Determinants of Health     Financial Resource Strain:      Difficulty of Paying Living Expenses:    Food Insecurity:      Worried About Running Out of Food in the Last Year:      Ran Out of Food in the Last Year:    Transportation Needs:      Lack of Transportation (Medical):      Lack of Transportation (Non-Medical):    Physical Activity:      Days of Exercise per Week:      Minutes of Exercise per Session:    Stress:      Feeling of Stress :    Social Connections:      Frequency of Communication with Friends and Family:      Frequency of Social Gatherings with Friends and Family:      Attends [employer] Services:      Active  "Member of Clubs or Organizations:      Attends Club or Organization Meetings:      Marital Status:    Intimate Partner Violence:      Fear of Current or Ex-Partner:      Emotionally Abused:      Physically Abused:      Sexually Abused:        MEDICATIONS: Reviewed from the MAR, physician orders, and/or earlier progress notes.  Post Discharge Medication Reconciliation Status: medication reconcilation previously completed during another office visit.    Current Outpatient Medications   Medication Sig     acetaminophen (TYLENOL) 325 MG tablet Take 2 tablets (650 mg) by mouth every 4 hours as needed for mild pain     amLODIPine (NORVASC) 5 MG tablet Take 1 tablet (5 mg) by mouth daily     calcium carbonate 600 mg-vitamin D 400 units (CALTRATE) 600-400 MG-UNIT per tablet Take 1 tablet by mouth 2 times daily     ibuprofen (ADVIL/MOTRIN) 200 MG tablet Take 1 tablet (200 mg) by mouth every 6 hours as needed for moderate pain     Lidocaine (LIDOCARE) 4 % Patch Place 1 patch onto the skin every 24 hours Apply to the right lateral/post chest wall for pain  To prevent lidocaine toxicity, patient should be patch free for 12 hrs daily.     magnesium oxide (MAG-OX) 400 (241.3 Mg) MG tablet Take 1 tablet (400 mg) by mouth daily     order for DME Equipment being ordered: Incentive spirometer     order for DME Equipment being ordered: Home BP monitor and cuff     polyethylene glycol (MIRALAX) 17 GM/Dose powder Take 17 g by mouth daily     No current facility-administered medications for this visit.     ALLERGIES: No Known Allergies    DIET: Regular, regular texture, thin liquids.    Vitals:    07/26/21 1239   BP: (!) 155/86   Pulse: 80   Resp: 18   Temp: 97.8  F (36.6  C)   SpO2: 96%   Weight: 41.9 kg (92 lb 6.4 oz)   Height: 1.473 m (4' 10\")     Body mass index is 19.31 kg/m .    EXAMINATION:   General: Pleasant, alert, and conversant elderly female, sitting in a recliner, in no apparent distress.    Head: Normocephalic and " atraumatic.   Eyes: PERRLA, sclerae clear.   ENT: Moist oral mucosa.  She has several of her own teeth but missing all lower teeth on the right as well as several uppercase.  No rhinorrhea or nasal discharge.  Hearing seems unimpaired.  Cardiovascular: Regular rate and rhythm.  No appreciable murmur.  Respiratory: Lungs clear to auscultation bilaterally.   Abdomen: Nondistended.   Musculoskeletal/Extremities: Age-related degenerative joint disease and moderate to severe kyphosis.  Hands and fingers shows significant deformities without a diagnosis of rheumatoid arthritis.  No peripheral edema.  Integument: No rashes, clinically significant lesions, or skin breakdown.   Cognitive/Psychiatric: Generally alert and oriented today, although there do appear to be some cognitive issues as noted in previous visit.  Affect is euthymic.    DIAGNOSTICS:   Recent Results (from the past 240 hour(s))   UA with Microscopic    Collection Time: 07/23/21  9:30 PM   Result Value Ref Range    Color Urine Yellow Colorless, Straw, Light Yellow, Yellow    Appearance Urine Cloudy (A) Clear    Glucose Urine Negative Negative mg/dL    Bilirubin Urine Negative Negative    Ketones Urine Negative Negative mg/dL    Specific Gravity Urine 1.015 1.001 - 1.030    Blood Urine Negative Negative    pH Urine 8.0 (H) 5.0 - 7.0    Protein Albumin Urine Negative Negative mg/dL    Urobilinogen Urine <2.0 <2.0 mg/dL    Nitrite Urine Negative Negative    Leukocyte Esterase Urine Negative Negative    Bacteria Urine Moderate (A) None Seen /HPF    WBC Clumps Urine Present (A) None Seen /HPF    Mucus Urine Present (A) None Seen /LPF    Amorphous Crystals Urine Moderate (A) None Seen /HPF    RBC Urine 5 (H) <=2 /HPF    WBC Urine 22 (H) <=5 /HPF    Granular Casts Urine 1 (H) None Seen /LPF   Urine Culture    Collection Time: 07/23/21  9:30 PM    Specimen: Urine, NOS   Result Value Ref Range    Culture 10,000-50,000 CFU/mL Enterococcus species (A)         ASSESSMENT/Plan:     Diagnosis Comments   1. Fall, subsequent encounter  Receiving both physical and occupational therapies.   2. Closed fracture of one rib of right side with routine healing, subsequent encounter  Minimal pain.  Encouraged use of incentive spirometry.   3. Age-related osteoporosis without current pathological fracture  Taking calcium with vitamin D   4. Essential hypertension  On amlodipine.  Blood pressures are acceptable.   5. Primary osteoarthritis involving multiple joints  Has orders for as needed acetaminophen.   6. Mild cognitive impairment  Some memory issues.       CHANGES:    None.    CARE PLAN:    The care plan, medications, vital signs, orders, and nursing notes have been reviewed, and all orders signed. Changes to care plan, if any, as noted. Otherwise, continue current plan of care.    The above has been created using voice recognition software. Please be aware that this may unintentionally  produce inaccuracies and/or nonsensical sentences.      Electronically signed by: SUSIE Nieves CNP

## 2021-07-26 NOTE — LETTER
"    2021        RE: Belia Sheets  [address]  [address]  [facility] [address]        [facility]    Name:   Belia Sheets (Sudeep)  :   1930  MRN:    [id number]     Facility:   [facility] () [[id number]]   Room: [place] [address]  Code Status: FULL CODE and POLST AVAILABLE -     DOS: 2021  Previous visit: 2021    PCP:  Alejandro Sandhu    CHIEF COMPLAINT / REASON FOR VISIT:  Chief Complaint   Patient presents with     Clinic Care Coordination - Follow-up     Fall and right 9th rib fracture.      [facility] from 2021 until 2021      HPI: Belia is a 89 year old female with a history of osteoporosis, essential hypertension, and remote history of visual hallucinations, who was brought to the ED for evaluation after she was found in her senior apartment down on the floor.  Patient, herself, reported having \"interesting visual hallucinations,\" and she also remembered being on the floor and unable to get up.  She was unsure how long she was down.  Found by staff during routine rounds.  She reported right posterior rib pain, shoulder, and hip pain.  She denied any shortness of breath.  Imaging in the ED included a CT head and CXR, bilateral shoulders and pelvic bones notable for an acute right ninth rib fracture.  She also had an elevated creatinine kinase and troponin.  She was admitted to the trauma service given the injury identified.    Acute right lateral rib fracture  -- Pulmonary toilet and good pain control allowing for this is paramount. Prior to discharge, her pain was well controlled with scheduled acetaminophen and topical pain medications.  In order to prevent common pulmonary complications found with rib fracture patients, she would need to continue with aggressive pulmonary toileting that includes incentive spirometry, coughing and deep breathing exercises.  Recommended that she continue " "these exercises a minimum of 4 times daily for 1 month after discharge.     Visual hallucinations  -- The patient and her son, Delmar, reported a remote history of visual hallucinations, mostly pleasant in nature.  She was worked up for a possible UTI that was found to be negative.  After receiving 2 doses of IV Rocephin, antibiotics were discontinued.    Troponinemia  -- Elevated at 0.184 on presentation.  Post trauma, fall with prolonged immobility, and demand ischemia.  Twelve-lead EKG was without ischemic changes.  She denied chest pains, palpitations or shortness of breath.  This also improved with subsequent trends prior to discharge.  No medication changes were made.    Recommendations: TCU for physical and occupational therapies upon discharge.      CURRENT/RECENT TCU ISSUES    Disposition: It is noted that in the hospital, she was listed as DNR/DNI; however, at [facility] she is listed as full code.  This was not noted prior to my visit with the patient and her son, Delmar. During today's visit, patient stated that her pain is somewhat under controlled and that it does not bother her. Patient reported today that she has been using the incentive spirometry and that makes her to feel good but sometime lazy to do it.    There may be some mild cognitive deficits, and that this was also noted by the patient's son who endorses his mother's memory loss, although she appears to be doing well today.  During the initial visit, when asked why she is here, she stated \"because of dizziness and danger of falling.\"  She indicated that she becomes \"unsteady.\"  When asked the year, she hesitated a bit, stating, \"that's such a simple question \".  Eventually, she indicated it is 2021.  However, during today's visit, patient was able to state the month and year without any hesitation and answer most questions correctly.    ROS: She denies any significant pain, even at the fracture site.  She can, in fact, using incentive spirometer " without producing pain.  No headaches or chest pains, coughing or congestion, nausea or vomiting, dyspnea, dysuria, diplopia, constipation or diarrhea, difficulty chewing or swallowing, integumentary issues, or problems with appetite or sleep    Past Medical History:   Diagnosis Date     Carpal tunnel syndrome (aka CTS)      H/O calcium pyrophosphate deposition disease (CPPD)      History of encephalopathy 10/2017     Hypertension      Kyphoscoliosis      Osteoarthritis     hands     Osteoporosis      Rectal prolapse               Family History   Problem Relation Age of Onset     Depression/Anxiety Son      Depression/Anxiety Son         alcohol abuse  age 24     Hypertension Mother      Hypertension Brother      Diabetes No family hx of      Breast Cancer No family hx of      Colon Cancer No family hx of      Prostate Cancer No family hx of      Other Cancer No family hx of      Social History     Socioeconomic History     Marital status:      Spouse name: Not on file     Number of children: Not on file     Years of education: Not on file     Highest education level: Not on file   Occupational History     Not on file   Tobacco Use     Smoking status: Never Smoker     Smokeless tobacco: Never Used   Substance and Sexual Activity     Alcohol use: No     Drug use: No     Sexual activity: Never   Other Topics Concern     Not on file   Social History Narrative     Not on file     Social Determinants of Health     Financial Resource Strain:      Difficulty of Paying Living Expenses:    Food Insecurity:      Worried About Running Out of Food in the Last Year:      Ran Out of Food in the Last Year:    Transportation Needs:      Lack of Transportation (Medical):      Lack of Transportation (Non-Medical):    Physical Activity:      Days of Exercise per Week:      Minutes of Exercise per Session:    Stress:      Feeling of Stress :    Social Connections:      Frequency of Communication with Friends and Family:   "    Frequency of Social Gatherings with Friends and Family:      Attends [employer] Services:      Active Member of Clubs or Organizations:      Attends Club or Organization Meetings:      Marital Status:    Intimate Partner Violence:      Fear of Current or Ex-Partner:      Emotionally Abused:      Physically Abused:      Sexually Abused:        MEDICATIONS: Reviewed from the MAR, physician orders, and/or earlier progress notes.  Post Discharge Medication Reconciliation Status: medication reconcilation previously completed during another office visit.    Current Outpatient Medications   Medication Sig     acetaminophen (TYLENOL) 325 MG tablet Take 2 tablets (650 mg) by mouth every 4 hours as needed for mild pain     amLODIPine (NORVASC) 5 MG tablet Take 1 tablet (5 mg) by mouth daily     calcium carbonate 600 mg-vitamin D 400 units (CALTRATE) 600-400 MG-UNIT per tablet Take 1 tablet by mouth 2 times daily     ibuprofen (ADVIL/MOTRIN) 200 MG tablet Take 1 tablet (200 mg) by mouth every 6 hours as needed for moderate pain     Lidocaine (LIDOCARE) 4 % Patch Place 1 patch onto the skin every 24 hours Apply to the right lateral/post chest wall for pain  To prevent lidocaine toxicity, patient should be patch free for 12 hrs daily.     magnesium oxide (MAG-OX) 400 (241.3 Mg) MG tablet Take 1 tablet (400 mg) by mouth daily     order for DME Equipment being ordered: Incentive spirometer     order for DME Equipment being ordered: Home BP monitor and cuff     polyethylene glycol (MIRALAX) 17 GM/Dose powder Take 17 g by mouth daily     No current facility-administered medications for this visit.     ALLERGIES: No Known Allergies    DIET: Regular, regular texture, thin liquids.    Vitals:    07/26/21 1239   BP: (!) 155/86   Pulse: 80   Resp: 18   Temp: 97.8  F (36.6  C)   SpO2: 96%   Weight: 41.9 kg (92 lb 6.4 oz)   Height: 1.473 m (4' 10\")     Body mass index is 19.31 kg/m .    EXAMINATION:   General: Pleasant, alert, and " conversant elderly female, sitting in a recliner, in no apparent distress.    Head: Normocephalic and atraumatic.   Eyes: PERRLA, sclerae clear.   ENT: Moist oral mucosa.  She has several of her own teeth but missing all lower teeth on the right as well as several uppercase.  No rhinorrhea or nasal discharge.  Hearing seems unimpaired.  Cardiovascular: Regular rate and rhythm.  No appreciable murmur.  Respiratory: Lungs clear to auscultation bilaterally.   Abdomen: Nondistended.   Musculoskeletal/Extremities: Age-related degenerative joint disease and moderate to severe kyphosis.  Hands and fingers shows significant deformities without a diagnosis of rheumatoid arthritis.  No peripheral edema.  Integument: No rashes, clinically significant lesions, or skin breakdown.   Cognitive/Psychiatric: Generally alert and oriented today, although there do appear to be some cognitive issues as noted in previous visit.  Affect is euthymic.    DIAGNOSTICS:   Recent Results (from the past 240 hour(s))   UA with Microscopic    Collection Time: 07/23/21  9:30 PM   Result Value Ref Range    Color Urine Yellow Colorless, Straw, Light Yellow, Yellow    Appearance Urine Cloudy (A) Clear    Glucose Urine Negative Negative mg/dL    Bilirubin Urine Negative Negative    Ketones Urine Negative Negative mg/dL    Specific Gravity Urine 1.015 1.001 - 1.030    Blood Urine Negative Negative    pH Urine 8.0 (H) 5.0 - 7.0    Protein Albumin Urine Negative Negative mg/dL    Urobilinogen Urine <2.0 <2.0 mg/dL    Nitrite Urine Negative Negative    Leukocyte Esterase Urine Negative Negative    Bacteria Urine Moderate (A) None Seen /HPF    WBC Clumps Urine Present (A) None Seen /HPF    Mucus Urine Present (A) None Seen /LPF    Amorphous Crystals Urine Moderate (A) None Seen /HPF    RBC Urine 5 (H) <=2 /HPF    WBC Urine 22 (H) <=5 /HPF    Granular Casts Urine 1 (H) None Seen /LPF   Urine Culture    Collection Time: 07/23/21  9:30 PM    Specimen: Urine,  NOS   Result Value Ref Range    Culture 10,000-50,000 CFU/mL Enterococcus species (A)        ASSESSMENT/Plan:     Diagnosis Comments   1. Fall, subsequent encounter  Receiving both physical and occupational therapies.   2. Closed fracture of one rib of right side with routine healing, subsequent encounter  Minimal pain.  Encouraged use of incentive spirometry.   3. Age-related osteoporosis without current pathological fracture  Taking calcium with vitamin D   4. Essential hypertension  On amlodipine.  Blood pressures are acceptable.   5. Primary osteoarthritis involving multiple joints  Has orders for as needed acetaminophen.   6. Mild cognitive impairment  Some memory issues.       CHANGES:    None.    CARE PLAN:    The care plan, medications, vital signs, orders, and nursing notes have been reviewed, and all orders signed. Changes to care plan, if any, as noted. Otherwise, continue current plan of care.    The above has been created using voice recognition software. Please be aware that this may unintentionally  produce inaccuracies and/or nonsensical sentences.      Electronically signed by: SUSIE Nieves CNP        Sincerely,        SUSIE Nieves CNP

## 2021-07-29 ENCOUNTER — TRANSITIONAL CARE UNIT VISIT (OUTPATIENT)
Dept: GERIATRICS | Facility: CLINIC | Age: 86
End: 2021-07-29
Payer: COMMERCIAL

## 2021-07-29 VITALS
OXYGEN SATURATION: 95 % | WEIGHT: 92.4 LBS | SYSTOLIC BLOOD PRESSURE: 120 MMHG | HEART RATE: 62 BPM | RESPIRATION RATE: 16 BRPM | DIASTOLIC BLOOD PRESSURE: 65 MMHG | TEMPERATURE: 97.3 F | HEIGHT: 58 IN | BODY MASS INDEX: 19.39 KG/M2

## 2021-07-29 DIAGNOSIS — G31.84 MILD COGNITIVE IMPAIRMENT: ICD-10-CM

## 2021-07-29 DIAGNOSIS — S22.31XD CLOSED FRACTURE OF ONE RIB OF RIGHT SIDE WITH ROUTINE HEALING, SUBSEQUENT ENCOUNTER: ICD-10-CM

## 2021-07-29 DIAGNOSIS — I10 ESSENTIAL HYPERTENSION: ICD-10-CM

## 2021-07-29 DIAGNOSIS — M81.0 AGE-RELATED OSTEOPOROSIS WITHOUT CURRENT PATHOLOGICAL FRACTURE: ICD-10-CM
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
## 2021-07-29 DIAGNOSIS — M15.0 PRIMARY OSTEOARTHRITIS INVOLVING MULTIPLE JOINTS: ICD-10-CM

## 2021-07-29 PROBLEM — Z48.815 ENCOUNTER FOR SURGICAL AFTERCARE FOLLOWING SURGERY OF DIGESTIVE SYSTEM: Status: ACTIVE | Noted: 2018-01-16

## 2021-07-29 PROBLEM — D64.9 NORMOCYTIC ANEMIA: Status: ACTIVE | Noted: 2018-01-16

## 2021-07-29 PROBLEM — E87.6 HYPOKALEMIA: Status: ACTIVE | Noted: 2018-01-25

## 2021-07-29 PROBLEM — E83.42 HYPOMAGNESEMIA: Status: ACTIVE | Noted: 2018-01-25

## 2021-07-29 PROCEDURE — 99309 SBSQ NF CARE MODERATE MDM 30: CPT | Performed by: NURSE PRACTITIONER

## 2021-07-29 ASSESSMENT — MIFFLIN-ST. JEOR: SCORE: 723.87

## 2021-07-29 NOTE — LETTER
"    2021        RE: Belia Sheets  [address]  [address]  [facility] [address]        [facility]    Name:   Belia Sheets (Sudeep)  :   1930  MRN:    [id number]     Facility:   [facility] ([facility]) [[id number]]   Room: [place] [address]  Code Status: FULL CODE and POLST AVAILABLE -     DOS: 2021  Previous visit: 2021    PCP:  Alejandro Sandhu    CHIEF COMPLAINT / REASON FOR VISIT:  Chief Complaint   Patient presents with     Clinic Care Coordination - Follow-up       Fall and right 9th rib fracture      [facility] from 2021 until 2021      HPI: Belia is a 89 year old female with a history of osteoporosis, essential hypertension, and remote history of visual hallucinations, who was brought to the ED for evaluation after she was found in her senior apartment down on the floor.  Patient, herself, reported having \"interesting visual hallucinations,\" and she also remembered being on the floor and unable to get up.  She was unsure how long she was down.  Found by staff during routine rounds.  She reported right posterior rib pain, shoulder, and hip pain.  She denied any shortness of breath.  Imaging in the ED included a CT head and CXR, bilateral shoulders and pelvic bones notable for an acute right ninth rib fracture.  She also had an elevated creatinine kinase and troponin.  She was admitted to the trauma service given the injury identified.    Acute right lateral rib fracture  -- Pulmonary toilet and good pain control allowing for this is paramount. Prior to discharge, her pain was well controlled with scheduled acetaminophen and topical pain medications.  In order to prevent common pulmonary complications found with rib fracture patients, she would need to continue with aggressive pulmonary toileting that includes incentive spirometry, coughing and deep breathing exercises.  Recommended that she continue " "these exercises a minimum of 4 times daily for 1 month after discharge.     Visual hallucinations  -- The patient and her son, Delmar, reported a remote history of visual hallucinations, mostly pleasant in nature.  She was worked up for a possible UTI that was found to be negative.  After receiving 2 doses of IV Rocephin, antibiotics were discontinued.    Troponinemia  -- Elevated at 0.184 on presentation.  Post trauma, fall with prolonged immobility, and demand ischemia.  Twelve-lead EKG was without ischemic changes.  She denied chest pains, palpitations or shortness of breath.  This also improved with subsequent trends prior to discharge.  No medication changes were made.    Recommendations: TCU for physical and occupational therapies upon discharge.      CURRENT/RECENT TCU ISSUES    Disposition: It is noted that in the hospital, she was listed as DNR/DNI; however, at [facility] she is full code.  This was not noted prior to my visit with the patient and her son, Delmar.     During my last visit, patient stated that her pain is somewhat under controlled and that it does not bother her. Patient reported today that she has been using the incentive spirometry and that makes her to feel good but sometime lazy to do it.    Cognitive impairment: There or at least mild cognitive deficits, and that this was also noted by the patient's son who also endorses his mother's memory loss, although she appears, for the most part, to be doing fairly well.  During the initial visit, when asked why she is here, she stated \"because of dizziness and danger of falling.\"  She indicated that she becomes \"unsteady.\"  When asked the year, she hesitated a bit, stating, \"that's such a simple question \".  Eventually, she indicated it is 2021.  The following visit, however, she could not do that.  I did speak with the SLP and was told she needs quite a bit of support.  She did rather poorly on the clock test, making a pie and then adding numbers but " no hands.  She scored 2/17 on the storytelling, 10/17 on orientation, and 23/30 on the MMSE.  Her daughter apparently thinks it is an acute issue, but I would have to disagree.    A CT head was performed in the hospital.  Findings are below:  There is no intracranial hemorrhage, mass effect, or midline  shift. Gray/white matter differentiation in both cerebral hemispheres  is preserved. Ventricles are proportionate to the cerebral sulci.  There is mild generalized cerebral atrophy. Moderate confluent  periventricular and subcortical white matter hypoattenuation is  nonspecific, but likely represents chronic small vessel ischemic  disease in a patient this age. The basal cisterns are clear.     The bony calvaria and the bones of the skull base are normal. The  visualized portions of the paranasal sinuses and mastoid air cells are  clear.    ROS: She denies any significant pain, even at the fracture site.  She can, in fact, using incentive spirometer without producing pain.  No headaches or chest pains, coughing or congestion, nausea or vomiting, dyspnea, dysuria, diplopia, constipation or diarrhea, difficulty chewing or swallowing, integumentary issues, or problems with appetite or sleep    Past Medical History:   Diagnosis Date     Carpal tunnel syndrome (aka CTS)      H/O calcium pyrophosphate deposition disease (CPPD)      History of encephalopathy 10/2017     Hypertension      Kyphoscoliosis      Osteoarthritis     hands     Osteoporosis      Rectal prolapse               Family History   Problem Relation Age of Onset     Depression/Anxiety Son      Depression/Anxiety Son         alcohol abuse  age 24     Hypertension Mother      Hypertension Brother      Diabetes No family hx of      Breast Cancer No family hx of      Colon Cancer No family hx of      Prostate Cancer No family hx of      Other Cancer No family hx of      Social History     Socioeconomic History     Marital status:      Spouse name:  Not on file     Number of children: Not on file     Years of education: Not on file     Highest education level: Not on file   Occupational History     Not on file   Tobacco Use     Smoking status: Never Smoker     Smokeless tobacco: Never Used   Substance and Sexual Activity     Alcohol use: No     Drug use: No     Sexual activity: Never   Other Topics Concern     Not on file   Social History Narrative     Not on file     Social Determinants of Health     Financial Resource Strain:      Difficulty of Paying Living Expenses:    Food Insecurity:      Worried About Running Out of Food in the Last Year:      Ran Out of Food in the Last Year:    Transportation Needs:      Lack of Transportation (Medical):      Lack of Transportation (Non-Medical):    Physical Activity:      Days of Exercise per Week:      Minutes of Exercise per Session:    Stress:      Feeling of Stress :    Social Connections:      Frequency of Communication with Friends and Family:      Frequency of Social Gatherings with Friends and Family:      Attends [employer] Services:      Active Member of Clubs or Organizations:      Attends Club or Organization Meetings:      Marital Status:    Intimate Partner Violence:      Fear of Current or Ex-Partner:      Emotionally Abused:      Physically Abused:      Sexually Abused:        MEDICATIONS: Reviewed from the MAR, physician orders, and/or earlier progress notes.  Post Discharge Medication Reconciliation Status: medication reconcilation previously completed during another office visit.    Current Outpatient Medications   Medication Sig     acetaminophen (TYLENOL) 325 MG tablet Take 2 tablets (650 mg) by mouth every 4 hours as needed for mild pain     amLODIPine (NORVASC) 5 MG tablet Take 1 tablet (5 mg) by mouth daily     calcium carbonate 600 mg-vitamin D 400 units (CALTRATE) 600-400 MG-UNIT per tablet Take 1 tablet by mouth 2 times daily     ibuprofen (ADVIL/MOTRIN) 200 MG tablet Take 1 tablet (200 mg)  "by mouth every 6 hours as needed for moderate pain     Lidocaine (LIDOCARE) 4 % Patch Place 1 patch onto the skin every 24 hours Apply to the right lateral/post chest wall for pain  To prevent lidocaine toxicity, patient should be patch free for 12 hrs daily.     magnesium oxide (MAG-OX) 400 (241.3 Mg) MG tablet Take 1 tablet (400 mg) by mouth daily     order for DME Equipment being ordered: Incentive spirometer     order for DME Equipment being ordered: Home BP monitor and cuff     polyethylene glycol (MIRALAX) 17 GM/Dose powder Take 17 g by mouth daily     No current facility-administered medications for this visit.     ALLERGIES: No Known Allergies    DIET: Regular, regular texture, thin liquids.    Vitals:    07/29/21 1141   BP: 120/65   Pulse: 62   Resp: 16   Temp: 97.3  F (36.3  C)   SpO2: 95%   Weight: 41.9 kg (92 lb 6.4 oz)   Height: 1.473 m (4' 10\")     Body mass index is 19.31 kg/m .    EXAMINATION:   General: Pleasant, alert, and conversant elderly female, sitting in a recliner, in no apparent distress.    Head: Normocephalic and atraumatic.   Eyes: PERRLA, sclerae clear.   ENT: Moist oral mucosa.  She has several of her own teeth but missing all lower teeth on the right as well as several uppercase.  No rhinorrhea or nasal discharge.  Hearing seems unimpaired.  Cardiovascular: Regular rate and rhythm.  No appreciable murmur.  Respiratory: Lungs clear to auscultation bilaterally.   Abdomen: Nondistended.   Musculoskeletal/Extremities: Age-related degenerative joint disease and moderate to severe kyphosis.  Hands and fingers shows significant deformities without a diagnosis of rheumatoid arthritis.  Bilateral hallux valgus deformities with partial great toe overlap.  No peripheral edema.  Integument: No rashes, clinically significant lesions, or skin breakdown.   Cognitive/Psychiatric: Generally alert and oriented today, although there do appear to be some cognitive issues as noted in previous visit.  " Affect is euthymic.    DIAGNOSTICS:   Recent Results (from the past 240 hour(s))   UA with Microscopic    Collection Time: 07/23/21  9:30 PM   Result Value Ref Range    Color Urine Yellow Colorless, Straw, Light Yellow, Yellow    Appearance Urine Cloudy (A) Clear    Glucose Urine Negative Negative mg/dL    Bilirubin Urine Negative Negative    Ketones Urine Negative Negative mg/dL    Specific Gravity Urine 1.015 1.001 - 1.030    Blood Urine Negative Negative    pH Urine 8.0 (H) 5.0 - 7.0    Protein Albumin Urine Negative Negative mg/dL    Urobilinogen Urine <2.0 <2.0 mg/dL    Nitrite Urine Negative Negative    Leukocyte Esterase Urine Negative Negative    Bacteria Urine Moderate (A) None Seen /HPF    WBC Clumps Urine Present (A) None Seen /HPF    Mucus Urine Present (A) None Seen /LPF    Amorphous Crystals Urine Moderate (A) None Seen /HPF    RBC Urine 5 (H) <=2 /HPF    WBC Urine 22 (H) <=5 /HPF    Granular Casts Urine 1 (H) None Seen /LPF   Urine Culture    Collection Time: 07/23/21  9:30 PM    Specimen: Urine, NOS   Result Value Ref Range    Culture 10,000-50,000 CFU/mL Enterococcus species (A)        ASSESSMENT/Plan:     Diagnosis Comments   1. Fall, subsequent encounter  Receiving both physical and occupational therapies.   2. Closed fracture of one rib of right side with routine healing, subsequent encounter  Minimal pain.  Encouraged use of incentive spirometry.   3. Age-related osteoporosis without current pathological fracture  Taking calcium with vitamin D   4. Essential hypertension  On amlodipine.  Blood pressures are acceptable.   5. Primary osteoarthritis involving multiple joints  Has orders for as needed acetaminophen.   6. Mild cognitive impairment  Some memory issues.       CHANGES:    None.    CARE PLAN:    The care plan, medications, vital signs, orders, and nursing notes have been reviewed, and all orders signed. Changes to care plan, if any, as noted. Otherwise, continue current plan of  care.    The above has been created using voice recognition software. Please be aware that this may unintentionally  produce inaccuracies and/or nonsensical sentences.      Electronically signed by: SUSIE Nieves CNP        Sincerely,        SUSIE Nieves CNP

## 2021-07-30 NOTE — PROGRESS NOTES
"[facility]    Name:   eBlia Sheets (Sudeep)  :   1930  MRN:    [id number]     Facility:   [facility] ([facility]) [[id number]]   Room:   Code Status: FULL CODE and POLST AVAILABLE -     DOS: 2021  Previous visit: 2021    PCP:  Alejandro Sandhu    CHIEF COMPLAINT / REASON FOR VISIT:  Chief Complaint   Patient presents with     Clinic Care Coordination - Follow-up       Fall and right 9th rib fracture      [facility] from 2021 until 2021      HPI: Belia is a 89 year old female with a history of osteoporosis, essential hypertension, and remote history of visual hallucinations, who was brought to the ED for evaluation after she was found in her senior apartment down on the floor.  Patient, herself, reported having \"interesting visual hallucinations,\" and she also remembered being on the floor and unable to get up.  She was unsure how long she was down.  Found by staff during routine rounds.  She reported right posterior rib pain, shoulder, and hip pain.  She denied any shortness of breath.  Imaging in the ED included a CT head and CXR, bilateral shoulders and pelvic bones notable for an acute right ninth rib fracture.  She also had an elevated creatinine kinase and troponin.  She was admitted to the trauma service given the injury identified.    Acute right lateral rib fracture  -- Pulmonary toilet and good pain control allowing for this is paramount. Prior to discharge, her pain was well controlled with scheduled acetaminophen and topical pain medications.  In order to prevent common pulmonary complications found with rib fracture patients, she would need to continue with aggressive pulmonary toileting that includes incentive spirometry, coughing and deep breathing exercises.  Recommended that she continue these exercises a minimum of 4 times daily for 1 month after discharge.     Visual hallucinations  -- The " "patient and her son, Delmar, reported a remote history of visual hallucinations, mostly pleasant in nature.  She was worked up for a possible UTI that was found to be negative.  After receiving 2 doses of IV Rocephin, antibiotics were discontinued.    Troponinemia  -- Elevated at 0.184 on presentation.  Post trauma, fall with prolonged immobility, and demand ischemia.  Twelve-lead EKG was without ischemic changes.  She denied chest pains, palpitations or shortness of breath.  This also improved with subsequent trends prior to discharge.  No medication changes were made.    Recommendations: TCU for physical and occupational therapies upon discharge.      CURRENT/RECENT TCU ISSUES    Disposition: It is noted that in the hospital, she was listed as DNR/DNI; however, at [facility] she is full code.  This was not noted prior to my visit with the patient and her son, Delmar.     During my last visit, patient stated that her pain is somewhat under controlled and that it does not bother her. Patient reported today that she has been using the incentive spirometry and that makes her to feel good but sometime lazy to do it.    Cognitive impairment: There or at least mild cognitive deficits, and that this was also noted by the patient's son who also endorses his mother's memory loss, although she appears, for the most part, to be doing fairly well.  During the initial visit, when asked why she is here, she stated \"because of dizziness and danger of falling.\"  She indicated that she becomes \"unsteady.\"  When asked the year, she hesitated a bit, stating, \"that's such a simple question \".  Eventually, she indicated it is 2021.  The following visit, however, she could not do that.  I did speak with the SLP and was told she needs quite a bit of support.  She did rather poorly on the clock test, making a pie and then adding numbers but no hands.  She scored 2/17 on the storytelling, 10/17 on orientation, and 23/30 on the MMSE.  Her daughter " apparently thinks it is an acute issue, but I would have to disagree.    A CT head was performed in the hospital.  Findings are below:  There is no intracranial hemorrhage, mass effect, or midline  shift. Gray/white matter differentiation in both cerebral hemispheres  is preserved. Ventricles are proportionate to the cerebral sulci.  There is mild generalized cerebral atrophy. Moderate confluent  periventricular and subcortical white matter hypoattenuation is  nonspecific, but likely represents chronic small vessel ischemic  disease in a patient this age. The basal cisterns are clear.     The bony calvaria and the bones of the skull base are normal. The  visualized portions of the paranasal sinuses and mastoid air cells are  clear.    ROS: She denies any significant pain, even at the fracture site.  She can, in fact, using incentive spirometer without producing pain.  No headaches or chest pains, coughing or congestion, nausea or vomiting, dyspnea, dysuria, diplopia, constipation or diarrhea, difficulty chewing or swallowing, integumentary issues, or problems with appetite or sleep    Past Medical History:   Diagnosis Date     Carpal tunnel syndrome (aka CTS)      H/O calcium pyrophosphate deposition disease (CPPD)      History of encephalopathy 10/2017     Hypertension      Kyphoscoliosis      Osteoarthritis     hands     Osteoporosis      Rectal prolapse               Family History   Problem Relation Age of Onset     Depression/Anxiety Son      Depression/Anxiety Son         alcohol abuse  age 24     Hypertension Mother      Hypertension Brother      Diabetes No family hx of      Breast Cancer No family hx of      Colon Cancer No family hx of      Prostate Cancer No family hx of      Other Cancer No family hx of      Social History     Socioeconomic History     Marital status:      Spouse name: Not on file     Number of children: Not on file     Years of education: Not on file     Highest education  level: Not on file   Occupational History     Not on file   Tobacco Use     Smoking status: Never Smoker     Smokeless tobacco: Never Used   Substance and Sexual Activity     Alcohol use: No     Drug use: No     Sexual activity: Never   Other Topics Concern     Not on file   Social History Narrative     Not on file     Social Determinants of Health     Financial Resource Strain:      Difficulty of Paying Living Expenses:    Food Insecurity:      Worried About Running Out of Food in the Last Year:      Ran Out of Food in the Last Year:    Transportation Needs:      Lack of Transportation (Medical):      Lack of Transportation (Non-Medical):    Physical Activity:      Days of Exercise per Week:      Minutes of Exercise per Session:    Stress:      Feeling of Stress :    Social Connections:      Frequency of Communication with Friends and Family:      Frequency of Social Gatherings with Friends and Family:      Attends Spiritism Services:      Active Member of Clubs or Organizations:      Attends Club or Organization Meetings:      Marital Status:    Intimate Partner Violence:      Fear of Current or Ex-Partner:      Emotionally Abused:      Physically Abused:      Sexually Abused:        MEDICATIONS: Reviewed from the MAR, physician orders, and/or earlier progress notes.  Post Discharge Medication Reconciliation Status: medication reconcilation previously completed during another office visit.    Current Outpatient Medications   Medication Sig     acetaminophen (TYLENOL) 325 MG tablet Take 2 tablets (650 mg) by mouth every 4 hours as needed for mild pain     amLODIPine (NORVASC) 5 MG tablet Take 1 tablet (5 mg) by mouth daily     calcium carbonate 600 mg-vitamin D 400 units (CALTRATE) 600-400 MG-UNIT per tablet Take 1 tablet by mouth 2 times daily     ibuprofen (ADVIL/MOTRIN) 200 MG tablet Take 1 tablet (200 mg) by mouth every 6 hours as needed for moderate pain     Lidocaine (LIDOCARE) 4 % Patch Place 1 patch onto  "the skin every 24 hours Apply to the right lateral/post chest wall for pain  To prevent lidocaine toxicity, patient should be patch free for 12 hrs daily.     magnesium oxide (MAG-OX) 400 (241.3 Mg) MG tablet Take 1 tablet (400 mg) by mouth daily     order for DME Equipment being ordered: Incentive spirometer     order for DME Equipment being ordered: Home BP monitor and cuff     polyethylene glycol (MIRALAX) 17 GM/Dose powder Take 17 g by mouth daily     No current facility-administered medications for this visit.     ALLERGIES: No Known Allergies    DIET: Regular, regular texture, thin liquids.    Vitals:    07/29/21 1141   BP: 120/65   Pulse: 62   Resp: 16   Temp: 97.3  F (36.3  C)   SpO2: 95%   Weight: 41.9 kg (92 lb 6.4 oz)   Height: 1.473 m (4' 10\")     Body mass index is 19.31 kg/m .    EXAMINATION:   General: Pleasant, alert, and conversant elderly female, sitting in a recliner, in no apparent distress.    Head: Normocephalic and atraumatic.   Eyes: PERRLA, sclerae clear.   ENT: Moist oral mucosa.  She has several of her own teeth but missing all lower teeth on the right as well as several uppercase.  No rhinorrhea or nasal discharge.  Hearing seems unimpaired.  Cardiovascular: Regular rate and rhythm.  No appreciable murmur.  Respiratory: Lungs clear to auscultation bilaterally.   Abdomen: Nondistended.   Musculoskeletal/Extremities: Age-related degenerative joint disease and moderate to severe kyphosis.  Hands and fingers shows significant deformities without a diagnosis of rheumatoid arthritis.  Bilateral hallux valgus deformities with partial great toe overlap.  No peripheral edema.  Integument: No rashes, clinically significant lesions, or skin breakdown.   Cognitive/Psychiatric: Generally alert and oriented today, although there do appear to be some cognitive issues as noted in previous visit.  Affect is euthymic.    DIAGNOSTICS:   Recent Results (from the past 240 hour(s))   UA with Microscopic    " Collection Time: 07/23/21  9:30 PM   Result Value Ref Range    Color Urine Yellow Colorless, Straw, Light Yellow, Yellow    Appearance Urine Cloudy (A) Clear    Glucose Urine Negative Negative mg/dL    Bilirubin Urine Negative Negative    Ketones Urine Negative Negative mg/dL    Specific Gravity Urine 1.015 1.001 - 1.030    Blood Urine Negative Negative    pH Urine 8.0 (H) 5.0 - 7.0    Protein Albumin Urine Negative Negative mg/dL    Urobilinogen Urine <2.0 <2.0 mg/dL    Nitrite Urine Negative Negative    Leukocyte Esterase Urine Negative Negative    Bacteria Urine Moderate (A) None Seen /HPF    WBC Clumps Urine Present (A) None Seen /HPF    Mucus Urine Present (A) None Seen /LPF    Amorphous Crystals Urine Moderate (A) None Seen /HPF    RBC Urine 5 (H) <=2 /HPF    WBC Urine 22 (H) <=5 /HPF    Granular Casts Urine 1 (H) None Seen /LPF   Urine Culture    Collection Time: 07/23/21  9:30 PM    Specimen: Urine, NOS   Result Value Ref Range    Culture 10,000-50,000 CFU/mL Enterococcus species (A)        ASSESSMENT/Plan:     Diagnosis Comments   1. Fall, subsequent encounter  Receiving both physical and occupational therapies.   2. Closed fracture of one rib of right side with routine healing, subsequent encounter  Minimal pain.  Encouraged use of incentive spirometry.   3. Age-related osteoporosis without current pathological fracture  Taking calcium with vitamin D   4. Essential hypertension  On amlodipine.  Blood pressures are acceptable.   5. Primary osteoarthritis involving multiple joints  Has orders for as needed acetaminophen.   6. Mild cognitive impairment  Some memory issues.       CHANGES:    None.    CARE PLAN:    The care plan, medications, vital signs, orders, and nursing notes have been reviewed, and all orders signed. Changes to care plan, if any, as noted. Otherwise, continue current plan of care.    The above has been created using voice recognition software. Please be aware that this may unintentionally   produce inaccuracies and/or nonsensical sentences.      Electronically signed by: SUSIE Nieves CNP

## 2021-08-02 ENCOUNTER — MYC MEDICAL ADVICE (OUTPATIENT)
Dept: FAMILY MEDICINE | Facility: CLINIC | Age: 86
End: 2021-08-02

## 2021-08-02 ENCOUNTER — TRANSITIONAL CARE UNIT VISIT (OUTPATIENT)
Dept: GERIATRICS | Facility: CLINIC | Age: 86
End: 2021-08-02
Payer: COMMERCIAL

## 2021-08-02 DIAGNOSIS — M15.0 PRIMARY OSTEOARTHRITIS INVOLVING MULTIPLE JOINTS: ICD-10-CM

## 2021-08-02 DIAGNOSIS — S22.31XD CLOSED FRACTURE OF ONE RIB OF RIGHT SIDE WITH ROUTINE HEALING, SUBSEQUENT ENCOUNTER: ICD-10-CM
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
## 2021-08-02 DIAGNOSIS — I10 ESSENTIAL HYPERTENSION: ICD-10-CM

## 2021-08-02 DIAGNOSIS — M81.0 AGE-RELATED OSTEOPOROSIS WITHOUT CURRENT PATHOLOGICAL FRACTURE: ICD-10-CM

## 2021-08-02 DIAGNOSIS — G31.84 MILD COGNITIVE IMPAIRMENT: ICD-10-CM

## 2021-08-02 PROCEDURE — 99309 SBSQ NF CARE MODERATE MDM 30: CPT | Performed by: NURSE PRACTITIONER

## 2021-08-02 ASSESSMENT — MIFFLIN-ST. JEOR: SCORE: 722.06

## 2021-08-02 NOTE — LETTER
"    2021        RE: Belia Sheets  [address]  [address]  [facility] [address]        [facility]    Name:   Belia Sheets (Sudeep)  :   1930  MRN:    [id number]     Facility:   [facility] ([facility]) [[id number]]   Room: [place] [address]  Code Status: FULL CODE and POLST AVAILABLE -     DOS: 2021  Previous visit: 2021    PCP:  Alejandro Sandhu    CHIEF COMPLAINT / REASON FOR VISIT:  Chief Complaint   Patient presents with     Clinic Care Coordination - Follow-up     Fall and right ninth rib fracture      [facility] from 2021 until 2021      HPI: Belia is a 89 year old female with a history of osteoporosis, essential hypertension, and remote history of visual hallucinations, who was brought to the ED for evaluation after she was found in her senior apartment down on the floor.  Patient, herself, reported having \"interesting visual hallucinations,\" and she also remembered being on the floor and unable to get up.  She was unsure how long she was down.  Found by staff during routine rounds.  She reported right posterior rib pain, shoulder, and hip pain.  She denied any shortness of breath.  Imaging in the ED included a CT head and CXR, bilateral shoulders and pelvic bones notable for an acute right ninth rib fracture.  She also had an elevated creatinine kinase and troponin.  She was admitted to the trauma service given the injury identified.    Acute right lateral rib fracture  -- Pulmonary toilet and good pain control allowing for this is paramount. Prior to discharge, her pain was well controlled with scheduled acetaminophen and topical pain medications.  In order to prevent common pulmonary complications found with rib fracture patients, she would need to continue with aggressive pulmonary toileting that includes incentive spirometry, coughing and deep breathing exercises.  Recommended that she continue " "these exercises a minimum of 4 times daily for 1 month after discharge.     Visual hallucinations  -- The patient and her son, Delmar, reported a remote history of visual hallucinations, mostly pleasant in nature.  She was worked up for a possible UTI that was found to be negative.  After receiving 2 doses of IV Rocephin, antibiotics were discontinued.  -- CT head:     Troponinemia  -- Elevated at 0.184 on presentation.  Post trauma, fall with prolonged immobility, and demand ischemia.  Twelve-lead EKG was without ischemic changes.  She denied chest pains, palpitations or shortness of breath.  This also improved with subsequent trends prior to discharge.  No medication changes were made.    Recommendations: TCU for physical and occupational therapies upon discharge.      CURRENT/RECENT TCU ISSUES    Disposition: Doing well and making progress.    Code status: It is noted that in the hospital, she was listed as DNR/DNI; however, at [facility] she is full code.  This was not noted prior to my visit with the patient and her son, Delmar.    Cognitive impairment: There or at least mild cognitive deficits, and that this was also noted by the patient's son who also endorses his mother's memory loss, although she appears, for the most part, to be doing fairly well.  During the initial visit, when asked why she is here, she stated \"because of dizziness and danger of falling.\"  She indicated that she becomes \"unsteady.\"  When asked the year, she hesitated a bit, stating, \"that's such a simple question \".  Eventually, she indicated it is 2021.  The following visit, however, she could not do that.  I did speak with the SLP and was told she needs quite a bit of support.  She did rather poorly on the clock test, making a pie and then adding numbers but no hands.  She scored 2/17 on the storytelling, 10/17 on orientation, and 23/30 on the MMSE.  Her daughter apparently believes it to be acute issue.  At the last care conference, it was " noted that she has made significant progress, both physically and cognitively.  Cognition is expected to be reassessed shortly.  Today, she knew my name without seeing my badge and was able to tell me both of the correct year and month.      ROS: She denies any significant pain, even at the fracture site.  She can, in fact, using incentive spirometer without producing pain.  No headaches or chest pains, coughing or congestion, nausea or vomiting, dyspnea, dysuria, diplopia, constipation or diarrhea (stools are soft but formed), difficulty chewing or swallowing, integumentary issues, or problems with appetite or sleep    Past Medical History:   Diagnosis Date     Carpal tunnel syndrome (aka CTS)      H/O calcium pyrophosphate deposition disease (CPPD)      History of encephalopathy 10/2017     Hypertension      Kyphoscoliosis      Osteoarthritis     hands     Osteoporosis      Rectal prolapse               Family History   Problem Relation Age of Onset     Depression/Anxiety Son      Depression/Anxiety Son         alcohol abuse  age 24     Hypertension Mother      Hypertension Brother      Diabetes No family hx of      Breast Cancer No family hx of      Colon Cancer No family hx of      Prostate Cancer No family hx of      Other Cancer No family hx of      Social History     Socioeconomic History     Marital status:      Spouse name: Not on file     Number of children: Not on file     Years of education: Not on file     Highest education level: Not on file   Occupational History     Not on file   Tobacco Use     Smoking status: Never Smoker     Smokeless tobacco: Never Used   Substance and Sexual Activity     Alcohol use: No     Drug use: No     Sexual activity: Never   Other Topics Concern     Not on file   Social History Narrative     Not on file     Social Determinants of Health     Financial Resource Strain:      Difficulty of Paying Living Expenses:    Food Insecurity:      Worried About Running Out  of Food in the Last Year:      Ran Out of Food in the Last Year:    Transportation Needs:      Lack of Transportation (Medical):      Lack of Transportation (Non-Medical):    Physical Activity:      Days of Exercise per Week:      Minutes of Exercise per Session:    Stress:      Feeling of Stress :    Social Connections:      Frequency of Communication with Friends and Family:      Frequency of Social Gatherings with Friends and Family:      Attends [employer] Services:      Active Member of Clubs or Organizations:      Attends Club or Organization Meetings:      Marital Status:    Intimate Partner Violence:      Fear of Current or Ex-Partner:      Emotionally Abused:      Physically Abused:      Sexually Abused:        MEDICATIONS: Reviewed from the MAR, physician orders, and/or earlier progress notes.  Post Discharge Medication Reconciliation Status: medication reconcilation previously completed during another office visit.    Current Outpatient Medications   Medication Sig     acetaminophen (TYLENOL) 325 MG tablet Take 2 tablets (650 mg) by mouth every 4 hours as needed for mild pain     amLODIPine (NORVASC) 5 MG tablet Take 1 tablet (5 mg) by mouth daily     calcium carbonate 600 mg-vitamin D 400 units (CALTRATE) 600-400 MG-UNIT per tablet Take 1 tablet by mouth 2 times daily     ibuprofen (ADVIL/MOTRIN) 200 MG tablet Take 1 tablet (200 mg) by mouth every 6 hours as needed for moderate pain     Lidocaine (LIDOCARE) 4 % Patch Place 1 patch onto the skin every 24 hours Apply to the right lateral/post chest wall for pain  To prevent lidocaine toxicity, patient should be patch free for 12 hrs daily.     magnesium oxide (MAG-OX) 400 (241.3 Mg) MG tablet Take 1 tablet (400 mg) by mouth daily     order for DME Equipment being ordered: Incentive spirometer     order for DME Equipment being ordered: Home BP monitor and cuff     polyethylene glycol (MIRALAX) 17 GM/Dose powder Take 17 g by mouth daily     No current  "facility-administered medications for this visit.     ALLERGIES: No Known Allergies    DIET: Regular, regular texture, thin liquids.    Vitals:    08/02/21 1536   BP: 125/88   Pulse: 78   Resp: 20   Temp: 98  F (36.7  C)   SpO2: 98%   Weight: 41.7 kg (92 lb)   Height: 1.473 m (4' 10\")     Body mass index is 19.23 kg/m .    EXAMINATION:   General: Pleasant, alert, and conversant elderly female, sitting in a recliner, in no apparent distress.    Head: Normocephalic and atraumatic.   Eyes: PERRLA, sclerae clear.   ENT: Moist oral mucosa.  She has several of her own teeth but missing all lower teeth on the right as well as several uppercase.  No rhinorrhea or nasal discharge.  Hearing seems unimpaired.  Cardiovascular: Regular rate and rhythm.  No appreciable murmur.  Respiratory: Lungs clear to auscultation bilaterally.   Abdomen: Nondistended.   Musculoskeletal/Extremities: Age-related degenerative joint disease and moderate to severe kyphosis.  Hands and fingers shows significant deformities without a diagnosis of rheumatoid arthritis.  Bilateral hallux valgus deformities with partial great toe overlap.  No peripheral edema.  Integument: No rashes, clinically significant lesions, or skin breakdown.   Cognitive/Psychiatric: Generally alert and oriented today, although there do appear to be some cognitive issues as noted in previous visit.  Affect is euthymic.    DIAGNOSTICS:   No results found for this or any previous visit (from the past 240 hour(s)).   Last Comprehensive Metabolic Panel:  Sodium   Date Value Ref Range Status   07/14/2021 144 133 - 144 mmol/L Final   01/30/2019 140 133 - 144 mmol/L Final     Potassium   Date Value Ref Range Status   07/15/2021 3.5 3.4 - 5.3 mmol/L Final   01/30/2019 4.0 3.4 - 5.3 mmol/L Final     Chloride   Date Value Ref Range Status   07/14/2021 116 (H) 94 - 109 mmol/L Final   01/30/2019 104 94 - 109 mmol/L Final     Carbon Dioxide   Date Value Ref Range Status   01/30/2019 30 20 - " 32 mmol/L Final     Carbon Dioxide (CO2)   Date Value Ref Range Status   07/14/2021 23 20 - 32 mmol/L Final     Anion Gap   Date Value Ref Range Status   07/14/2021 5 3 - 14 mmol/L Final   01/30/2019 6 3 - 14 mmol/L Final     Glucose   Date Value Ref Range Status   07/14/2021 92 70 - 99 mg/dL Final   01/30/2019 129 (H) 70 - 99 mg/dL Final     Urea Nitrogen   Date Value Ref Range Status   07/14/2021 41 (H) 7 - 30 mg/dL Final   07/13/2020 16 7 - 30 mg/dL Final     Creatinine   Date Value Ref Range Status   07/14/2021 0.52 0.52 - 1.04 mg/dL Final   07/13/2020 0.67 0.52 - 1.04 mg/dL Final     GFR Estimate   Date Value Ref Range Status   07/14/2021 84 >60 mL/min/1.73m2 Final     Comment:     As of July 11, 2021, eGFR is calculated by the CKD-EPI creatinine equation, without race adjustment. eGFR can be influenced by muscle mass, exercise, and diet. The reported eGFR is an estimation only and is only applicable if the renal function is stable.   07/13/2020 77 >60 mL/min/[1.73_m2] Final     Comment:     Non  GFR Calc  Starting 12/18/2018, serum creatinine based estimated GFR (eGFR) will be   calculated using the Chronic Kidney Disease Epidemiology Collaboration   (CKD-EPI) equation.       Calcium   Date Value Ref Range Status   07/14/2021 7.9 (L) 8.5 - 10.1 mg/dL Final   07/13/2020 8.8 8.5 - 10.1 mg/dL Final     Lab Results   Component Value Date    WBC 17.6 07/13/2021    WBC 7.2 01/09/2018     Lab Results   Component Value Date    RBC 5.39 07/13/2021    RBC 4.04 01/09/2018     Lab Results   Component Value Date    HGB 14.2 07/14/2021    HGB 13.0 06/20/2018     Lab Results   Component Value Date    HCT 49.5 07/13/2021    HCT 43.1 06/20/2018     Lab Results   Component Value Date    MCV 92 07/13/2021    MCV 96.0 06/20/2018     Lab Results   Component Value Date    MCH 30.1 07/13/2021    MCH 29.0 06/20/2018     Lab Results   Component [facility] Date    [facility]    [facility] 30.2 06/20/2018     Lab Results    Component Value Date    RDW 12.4 07/13/2021    RDW 13.3 01/09/2018     Lab Results   Component Value Date     07/13/2021     01/09/2018         ASSESSMENT/Plan:     Diagnosis Comments   1. Fall, subsequent encounter  Receiving both physical and occupational therapies.   2. Closed fracture of one rib of right side with routine healing, subsequent encounter  Minimal pain.  Encouraged use of incentive spirometry.   3. Age-related osteoporosis without current pathological fracture  Taking calcium with vitamin D   4. Essential hypertension  On amlodipine.  Blood pressures are acceptable.   5. Primary osteoarthritis involving multiple joints  Has orders for as needed acetaminophen.   6. Mild cognitive impairment  Some memory issues.       CHANGES:    None.    CARE PLAN:    The care plan, medications, vital signs, orders, and nursing notes have been reviewed, and all orders signed. Changes to care plan, if any, as noted. Otherwise, continue current plan of care.    The above has been created using voice recognition software. Please be aware that this may unintentionally  produce inaccuracies and/or nonsensical sentences.      Electronically signed by: SUSIE Nieves CNP        Sincerely,        SUSIE Nieves CNP

## 2021-08-05 ENCOUNTER — TRANSITIONAL CARE UNIT VISIT (OUTPATIENT)
Dept: GERIATRICS | Facility: CLINIC | Age: 86
End: 2021-08-05
Payer: COMMERCIAL

## 2021-08-05 VITALS
WEIGHT: 92 LBS | SYSTOLIC BLOOD PRESSURE: 133 MMHG | BODY MASS INDEX: 19.23 KG/M2 | RESPIRATION RATE: 16 BRPM | OXYGEN SATURATION: 92 % | DIASTOLIC BLOOD PRESSURE: 80 MMHG | HEART RATE: 73 BPM | TEMPERATURE: 97.5 F

## 2021-08-05 DIAGNOSIS — M15.0 PRIMARY OSTEOARTHRITIS INVOLVING MULTIPLE JOINTS: ICD-10-CM

## 2021-08-05 DIAGNOSIS — M41.9 KYPHOSCOLIOSIS: ICD-10-CM

## 2021-08-05 DIAGNOSIS — G31.84 MILD COGNITIVE IMPAIRMENT: ICD-10-CM

## 2021-08-05 DIAGNOSIS — M81.0 AGE-RELATED OSTEOPOROSIS WITHOUT CURRENT PATHOLOGICAL FRACTURE: ICD-10-CM
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
## 2021-08-05 DIAGNOSIS — M80.00XD AGE-RELATED OSTEOPOROSIS WITH CURRENT PATHOLOGICAL FRACTURE WITH ROUTINE HEALING, SUBSEQUENT ENCOUNTER: ICD-10-CM

## 2021-08-05 DIAGNOSIS — S22.31XD CLOSED FRACTURE OF ONE RIB OF RIGHT SIDE WITH ROUTINE HEALING, SUBSEQUENT ENCOUNTER: ICD-10-CM

## 2021-08-05 DIAGNOSIS — I10 ESSENTIAL HYPERTENSION: ICD-10-CM

## 2021-08-05 PROCEDURE — 99309 SBSQ NF CARE MODERATE MDM 30: CPT | Performed by: NURSE PRACTITIONER

## 2021-08-05 NOTE — LETTER
"    2021        RE: Belia Sheets  [address]  [address]  [facility] [address]        [facility]    Name:   Belia Sheets (Sudeep)  :   1930  MRN:    [id number]     Facility:   [facility] [place] ([facility]) [[id number]]   Room:   Code Status: FULL CODE and POLST AVAILABLE -     DOS: 2021  Previous visit: 2021    PCP:  Alejandro Sandhu    CHIEF COMPLAINT / REASON FOR VISIT:  Chief Complaint   Patient presents with     Clinic Care Coordination - Follow-up     TCU Follow up      [facility] from 2021 until 2021      HPI: Belia is a 89 year old female with a history of osteoporosis, essential hypertension, and remote history of visual hallucinations, who was brought to the ED for evaluation after she was found in her senior apartment down on the floor.  Patient, herself, reported having \"interesting visual hallucinations,\" and she also remembered being on the floor and unable to get up.  She was unsure how long she was down.  Found by staff during routine rounds.  She reported right posterior rib pain, shoulder, and hip pain.  She denied any shortness of breath.  Imaging in the ED included a CT head and CXR, bilateral shoulders and pelvic bones notable for an acute right ninth rib fracture.  She also had an elevated creatinine kinase and troponin.  She was admitted to the trauma service given the injury identified.    Acute right lateral rib fracture  -- Pulmonary toilet and good pain control allowing for this is paramount. Prior to discharge, her pain was well controlled with scheduled acetaminophen and topical pain medications.  In order to prevent common pulmonary complications found with rib fracture patients, she would need to continue with aggressive pulmonary toileting that includes incentive spirometry, coughing and deep breathing exercises.  Recommended that she continue these exercises a minimum of 4 " "times daily for 1 month after discharge.     Visual hallucinations  -- The patient and her son, Delmar, reported a remote history of visual hallucinations, mostly pleasant in nature.  She was worked up for a possible UTI that was found to be negative.  After receiving 2 doses of IV Rocephin, antibiotics were discontinued.  -- CT head:     Troponinemia  -- Elevated at 0.184 on presentation.  Post trauma, fall with prolonged immobility, and demand ischemia.  Twelve-lead EKG was without ischemic changes.  She denied chest pains, palpitations or shortness of breath.  This also improved with subsequent trends prior to discharge.  No medication changes were made.    Recommendations: TCU for physical and occupational therapies upon discharge.      CURRENT/RECENT TCU ISSUES    Disposition: Doing well and making progress.  Asked how much pain that she is having, her reply is, \"none.\"  Per patient request, we are going to place her lidocaine patch on hold.  She does have orders for both acetaminophen (650 mg every 4 hours as needed) and ibuprofen (200 mg every 6 hours as needed) complaint.  Patient rated    Cognitive impairment: There or at least mild cognitive deficits, and that this was also noted by the patient's son who also endorses his mother's memory loss, although she appears, for the most part, to be doing fairly well.  During the initial visit, when asked why she is here, she stated \"because of dizziness and danger of falling.\"  She indicated that she becomes \"unsteady.\"  When asked the year, she hesitated a bit, stating, \"that's such a simple question \".  Eventually, she indicated it is 2021.  The following visit, however, she could not do that.  I did speak with the SLP and was told she needs quite a bit of support.  She did rather poorly on the clock test, making a pie and then adding numbers but no hands.  She scored 2/17 on the storytelling, 10/17 on orientation, and 23/30 on the MMSE.  Her daughter apparently " "believes cognitive impairment to be an acute issue.  At the last care conference, it was noted that she has made significant progress, both physically and cognitively.  Cognition is expected to be reassessed shortly.  Today, she knew my name without seeing my badge and was able to tell me both of the correct year and month.    Code status: It is noted that in the hospital, she was listed as DNR/DNI; however, at [facility] she is full code.  This was not noted prior to my visit with the patient and her son, Delmar.    Discharge planning: She lives in a \"very pleasant small apartment.\"      ROS: She denies any significant pain, even at the fracture site.  She can, in fact, using incentive spirometer without producing pain.  No headaches or chest pains, coughing or congestion, nausea or vomiting, dyspnea, dysuria, diplopia, constipation or diarrhea (stools are soft but formed), difficulty chewing or swallowing, integumentary issues, or problems with appetite or sleep    Past Medical History:   Diagnosis Date     Carpal tunnel syndrome (aka CTS)      H/O calcium pyrophosphate deposition disease (CPPD)      History of encephalopathy 10/2017     Hypertension      Kyphoscoliosis      Osteoarthritis     hands     Osteoporosis      Rectal prolapse               Family History   Problem Relation Age of Onset     Depression/Anxiety Son      Depression/Anxiety Son         alcohol abuse  age 24     Hypertension Mother      Hypertension Brother      Diabetes No family hx of      Breast Cancer No family hx of      Colon Cancer No family hx of      Prostate Cancer No family hx of      Other Cancer No family hx of      Social History     Socioeconomic History     Marital status:      Spouse name: Not on file     Number of children: Not on file     Years of education: Not on file     Highest education level: Not on file   Occupational History     Not on file   Tobacco Use     Smoking status: Never Smoker     Smokeless tobacco: " Never Used   Substance and Sexual Activity     Alcohol use: No     Drug use: No     Sexual activity: Never   Other Topics Concern     Not on file   Social History Narrative     Not on file     Social Determinants of Health     Financial Resource Strain:      Difficulty of Paying Living Expenses:    Food Insecurity:      Worried About Running Out of Food in the Last Year:      Ran Out of Food in the Last Year:    Transportation Needs:      Lack of Transportation (Medical):      Lack of Transportation (Non-Medical):    Physical Activity:      Days of Exercise per Week:      Minutes of Exercise per Session:    Stress:      Feeling of Stress :    Social Connections:      Frequency of Communication with Friends and Family:      Frequency of Social Gatherings with Friends and Family:      Attends Orthodoxy Services:      Active Member of Clubs or Organizations:      Attends Club or Organization Meetings:      Marital Status:    Intimate Partner Violence:      Fear of Current or Ex-Partner:      Emotionally Abused:      Physically Abused:      Sexually Abused:        MEDICATIONS: Reviewed from the MAR, physician orders, and/or earlier progress notes.  Post Discharge Medication Reconciliation Status: medication reconcilation previously completed during another office visit.  Lidocaine patches being held as of 08/05/2021.  Current Outpatient Medications   Medication Sig     acetaminophen (TYLENOL) 325 MG tablet Take 2 tablets (650 mg) by mouth every 4 hours as needed for mild pain     amLODIPine (NORVASC) 5 MG tablet Take 1 tablet (5 mg) by mouth daily     calcium carbonate 600 mg-vitamin D 400 units (CALTRATE) 600-400 MG-UNIT per tablet Take 1 tablet by mouth 2 times daily     ibuprofen (ADVIL/MOTRIN) 200 MG tablet Take 1 tablet (200 mg) by mouth every 6 hours as needed for moderate pain     Lidocaine (LIDOCARE) 4 % Patch Place 1 patch onto the skin every 24 hours Apply to the right lateral/post chest wall for pain  To  prevent lidocaine toxicity, patient should be patch free for 12 hrs daily.     magnesium oxide (MAG-OX) 400 (241.3 Mg) MG tablet Take 1 tablet (400 mg) by mouth daily     order for DME Equipment being ordered: Incentive spirometer     order for DME Equipment being ordered: Home BP monitor and cuff     polyethylene glycol (MIRALAX) 17 GM/Dose powder Take 17 g by mouth daily     No current facility-administered medications for this visit.     ALLERGIES: No Known Allergies    DIET: Regular, regular texture, thin liquids.    Vitals:    08/05/21 1240   BP: 133/80   Pulse: 73   Resp: 16   Temp: 97.5  F (36.4  C)   SpO2: 92%   Weight: 41.7 kg (92 lb)     Body mass index is 19.23 kg/m .    EXAMINATION:   General: Pleasant, alert, and conversant elderly female, sitting in a recliner, in no apparent distress.    Head: Normocephalic and atraumatic.   Eyes: PERRLA, sclerae clear.   ENT: Moist oral mucosa.  She has several of her own teeth but missing all lower teeth on the right as well as several uppercase.  No rhinorrhea or nasal discharge.  Hearing seems unimpaired.  Cardiovascular: Regular rate and rhythm.  No appreciable murmur.  Respiratory: Lungs clear to auscultation bilaterally.   Abdomen: Nondistended.   Musculoskeletal/Extremities: Age-related degenerative joint disease and moderate to severe kyphosis.  Hands and fingers shows significant deformities without a diagnosis of rheumatoid arthritis.  Bilateral hallux valgus deformities with partial great toe overlap.  No peripheral edema.  Integument: No rashes, clinically significant lesions, or skin breakdown.   Cognitive/Psychiatric: Generally alert and oriented today, although there do appear to be some cognitive issues as noted in previous visit.  Affect is euthymic.    DIAGNOSTICS:   No results found for this or any previous visit (from the past 240 hour(s)).   Last Comprehensive Metabolic Panel:  Sodium   Date Value Ref Range Status   07/14/2021 144 133 - 144  mmol/L Final   01/30/2019 140 133 - 144 mmol/L Final     Potassium   Date Value Ref Range Status   07/15/2021 3.5 3.4 - 5.3 mmol/L Final   01/30/2019 4.0 3.4 - 5.3 mmol/L Final     Chloride   Date Value Ref Range Status   07/14/2021 116 (H) 94 - 109 mmol/L Final   01/30/2019 104 94 - 109 mmol/L Final     Carbon Dioxide   Date Value Ref Range Status   01/30/2019 30 20 - 32 mmol/L Final     Carbon Dioxide (CO2)   Date Value Ref Range Status   07/14/2021 23 20 - 32 mmol/L Final     Anion Gap   Date Value Ref Range Status   07/14/2021 5 3 - 14 mmol/L Final   01/30/2019 6 3 - 14 mmol/L Final     Glucose   Date Value Ref Range Status   07/14/2021 92 70 - 99 mg/dL Final   01/30/2019 129 (H) 70 - 99 mg/dL Final     Urea Nitrogen   Date Value Ref Range Status   07/14/2021 41 (H) 7 - 30 mg/dL Final   07/13/2020 16 7 - 30 mg/dL Final     Creatinine   Date Value Ref Range Status   07/14/2021 0.52 0.52 - 1.04 mg/dL Final   07/13/2020 0.67 0.52 - 1.04 mg/dL Final     GFR Estimate   Date Value Ref Range Status   07/14/2021 84 >60 mL/min/1.73m2 Final     Comment:     As of July 11, 2021, eGFR is calculated by the CKD-EPI creatinine equation, without race adjustment. eGFR can be influenced by muscle mass, exercise, and diet. The reported eGFR is an estimation only and is only applicable if the renal function is stable.   07/13/2020 77 >60 mL/min/[1.73_m2] Final     Comment:     Non  GFR Calc  Starting 12/18/2018, serum creatinine based estimated GFR (eGFR) will be   calculated using the Chronic Kidney Disease Epidemiology Collaboration   (CKD-EPI) equation.       Calcium   Date Value Ref Range Status   07/14/2021 7.9 (L) 8.5 - 10.1 mg/dL Final   07/13/2020 8.8 8.5 - 10.1 mg/dL Final     Lab Results   Component Value Date    WBC 17.6 07/13/2021    WBC 7.2 01/09/2018     Lab Results   Component Value Date    RBC 5.39 07/13/2021    RBC 4.04 01/09/2018     Lab Results   Component Value Date    HGB 14.2 07/14/2021    HGB  13.0 06/20/2018     Lab Results   Component Value Date    HCT 49.5 07/13/2021    HCT 43.1 06/20/2018     Lab Results   Component Value Date    MCV 92 07/13/2021    MCV 96.0 06/20/2018     Lab Results   Component Value Date    MCH 30.1 07/13/2021    MCH 29.0 06/20/2018     Lab Results   Component Value Date    MCHC 32.7 07/13/2021    MCHC 30.2 06/20/2018     Lab Results   Component Value Date    RDW 12.4 07/13/2021    RDW 13.3 01/09/2018     Lab Results   Component Value Date     07/13/2021     01/09/2018       ASSESSMENT/Plan:     Diagnosis Comments   1. Fall, subsequent encounter  Receiving both physical and occupational therapies.   2. Closed fracture of one rib of right side with routine healing, subsequent encounter  Minimal pain.  Encouraged use of incentive spirometry.   3. Age-related osteoporosis without current pathological fracture  Taking calcium with vitamin D   4. Essential hypertension  On amlodipine.  Blood pressures are acceptable.   5. Primary osteoarthritis involving multiple joints  Has orders for as needed acetaminophen.   6. Mild cognitive impairment  Some memory issues.       CHANGES:    None.    CARE PLAN:    The care plan, medications, vital signs, orders, and nursing notes have been reviewed, and all orders signed. Changes to care plan, if any, as noted. Otherwise, continue current plan of care.    The above has been created using voice recognition software. Please be aware that this may unintentionally  produce inaccuracies and/or nonsensical sentences.      Electronically signed by: SUSIE Nieves CNP        Sincerely,        SUSIE Nieves CNP

## 2021-08-08 VITALS
SYSTOLIC BLOOD PRESSURE: 125 MMHG | HEART RATE: 78 BPM | WEIGHT: 92 LBS | BODY MASS INDEX: 19.31 KG/M2 | TEMPERATURE: 98 F | OXYGEN SATURATION: 98 % | HEIGHT: 58 IN | RESPIRATION RATE: 20 BRPM | DIASTOLIC BLOOD PRESSURE: 88 MMHG

## 2021-08-08 NOTE — PROGRESS NOTES
"[facility]    Name:   Belia Sheets (Sudeep)  :   1930  MRN:    [id number]     Facility:   [facility] ([facility]) [[id number]]   Room:   Code Status: FULL CODE and POLST AVAILABLE -     DOS: 2021  Previous visit: 2021    PCP:  Alejandro Sandhu    CHIEF COMPLAINT / REASON FOR VISIT:  Chief Complaint   Patient presents with     Clinic Care Coordination - Follow-up     TCU Follow up      [facility] from 2021 until 2021      HPI: Belia is a 89 year old female with a history of osteoporosis, essential hypertension, and remote history of visual hallucinations, who was brought to the ED for evaluation after she was found in her senior apartment down on the floor.  Patient, herself, reported having \"interesting visual hallucinations,\" and she also remembered being on the floor and unable to get up.  She was unsure how long she was down.  Found by staff during routine rounds.  She reported right posterior rib pain, shoulder, and hip pain.  She denied any shortness of breath.  Imaging in the ED included a CT head and CXR, bilateral shoulders and pelvic bones notable for an acute right ninth rib fracture.  She also had an elevated creatinine kinase and troponin.  She was admitted to the trauma service given the injury identified.    Acute right lateral rib fracture  -- Pulmonary toilet and good pain control allowing for this is paramount. Prior to discharge, her pain was well controlled with scheduled acetaminophen and topical pain medications.  In order to prevent common pulmonary complications found with rib fracture patients, she would need to continue with aggressive pulmonary toileting that includes incentive spirometry, coughing and deep breathing exercises.  Recommended that she continue these exercises a minimum of 4 times daily for 1 month after discharge.     Visual hallucinations  -- The patient and her son, Delmar, " "reported a remote history of visual hallucinations, mostly pleasant in nature.  She was worked up for a possible UTI that was found to be negative.  After receiving 2 doses of IV Rocephin, antibiotics were discontinued.  -- CT head:     Troponinemia  -- Elevated at 0.184 on presentation.  Post trauma, fall with prolonged immobility, and demand ischemia.  Twelve-lead EKG was without ischemic changes.  She denied chest pains, palpitations or shortness of breath.  This also improved with subsequent trends prior to discharge.  No medication changes were made.    Recommendations: TCU for physical and occupational therapies upon discharge.      CURRENT/RECENT TCU ISSUES    Disposition: Doing well and making progress.  Asked how much pain that she is having, her reply is, \"none.\"  Per patient request, we are going to place her lidocaine patch on hold.  She does have orders for both acetaminophen (650 mg every 4 hours as needed) and ibuprofen (200 mg every 6 hours as needed) complaint.  Patient rated    Cognitive impairment: There or at least mild cognitive deficits, and that this was also noted by the patient's son who also endorses his mother's memory loss, although she appears, for the most part, to be doing fairly well.  During the initial visit, when asked why she is here, she stated \"because of dizziness and danger of falling.\"  She indicated that she becomes \"unsteady.\"  When asked the year, she hesitated a bit, stating, \"that's such a simple question \".  Eventually, she indicated it is 2021.  The following visit, however, she could not do that.  I did speak with the SLP and was told she needs quite a bit of support.  She did rather poorly on the clock test, making a pie and then adding numbers but no hands.  She scored 2/17 on the storytelling, 10/17 on orientation, and 23/30 on the MMSE.  Her daughter apparently believes cognitive impairment to be an acute issue.  At the last care conference, it was noted that she " "has made significant progress, both physically and cognitively.  Cognition is expected to be reassessed shortly.  Today, she knew my name without seeing my badge and was able to tell me both of the correct year and month.    Code status: It is noted that in the hospital, she was listed as DNR/DNI; however, at [facility] she is full code.  This was not noted prior to my visit with the patient and her son, Delmar.    Discharge planning: She lives in a \"very pleasant small apartment.\"      ROS: She denies any significant pain, even at the fracture site.  She can, in fact, using incentive spirometer without producing pain.  No headaches or chest pains, coughing or congestion, nausea or vomiting, dyspnea, dysuria, diplopia, constipation or diarrhea (stools are soft but formed), difficulty chewing or swallowing, integumentary issues, or problems with appetite or sleep    Past Medical History:   Diagnosis Date     Carpal tunnel syndrome (aka CTS)      H/O calcium pyrophosphate deposition disease (CPPD)      History of encephalopathy 10/2017     Hypertension      Kyphoscoliosis      Osteoarthritis     hands     Osteoporosis      Rectal prolapse               Family History   Problem Relation Age of Onset     Depression/Anxiety Son      Depression/Anxiety Son         alcohol abuse  age 24     Hypertension Mother      Hypertension Brother      Diabetes No family hx of      Breast Cancer No family hx of      Colon Cancer No family hx of      Prostate Cancer No family hx of      Other Cancer No family hx of      Social History     Socioeconomic History     Marital status:      Spouse name: Not on file     Number of children: Not on file     Years of education: Not on file     Highest education level: Not on file   Occupational History     Not on file   Tobacco Use     Smoking status: Never Smoker     Smokeless tobacco: Never Used   Substance and Sexual Activity     Alcohol use: No     Drug use: No     Sexual activity: " Never   Other Topics Concern     Not on file   Social History Narrative     Not on file     Social Determinants of Health     Financial Resource Strain:      Difficulty of Paying Living Expenses:    Food Insecurity:      Worried About Running Out of Food in the Last Year:      Ran Out of Food in the Last Year:    Transportation Needs:      Lack of Transportation (Medical):      Lack of Transportation (Non-Medical):    Physical Activity:      Days of Exercise per Week:      Minutes of Exercise per Session:    Stress:      Feeling of Stress :    Social Connections:      Frequency of Communication with Friends and Family:      Frequency of Social Gatherings with Friends and Family:      Attends Spiritism Services:      Active Member of Clubs or Organizations:      Attends Club or Organization Meetings:      Marital Status:    Intimate Partner Violence:      Fear of Current or Ex-Partner:      Emotionally Abused:      Physically Abused:      Sexually Abused:        MEDICATIONS: Reviewed from the MAR, physician orders, and/or earlier progress notes.  Post Discharge Medication Reconciliation Status: medication reconcilation previously completed during another office visit.  Lidocaine patches being held as of 08/05/2021.  Current Outpatient Medications   Medication Sig     acetaminophen (TYLENOL) 325 MG tablet Take 2 tablets (650 mg) by mouth every 4 hours as needed for mild pain     amLODIPine (NORVASC) 5 MG tablet Take 1 tablet (5 mg) by mouth daily     calcium carbonate 600 mg-vitamin D 400 units (CALTRATE) 600-400 MG-UNIT per tablet Take 1 tablet by mouth 2 times daily     ibuprofen (ADVIL/MOTRIN) 200 MG tablet Take 1 tablet (200 mg) by mouth every 6 hours as needed for moderate pain     Lidocaine (LIDOCARE) 4 % Patch Place 1 patch onto the skin every 24 hours Apply to the right lateral/post chest wall for pain  To prevent lidocaine toxicity, patient should be patch free for 12 hrs daily.     magnesium oxide (MAG-OX)  400 (241.3 Mg) MG tablet Take 1 tablet (400 mg) by mouth daily     order for DME Equipment being ordered: Incentive spirometer     order for DME Equipment being ordered: Home BP monitor and cuff     polyethylene glycol (MIRALAX) 17 GM/Dose powder Take 17 g by mouth daily     No current facility-administered medications for this visit.     ALLERGIES: No Known Allergies    DIET: Regular, regular texture, thin liquids.    Vitals:    08/05/21 1240   BP: 133/80   Pulse: 73   Resp: 16   Temp: 97.5  F (36.4  C)   SpO2: 92%   Weight: 41.7 kg (92 lb)     Body mass index is 19.23 kg/m .    EXAMINATION:   General: Pleasant, alert, and conversant elderly female, sitting in a recliner, in no apparent distress.    Head: Normocephalic and atraumatic.   Eyes: PERRLA, sclerae clear.   ENT: Moist oral mucosa.  She has several of her own teeth but missing all lower teeth on the right as well as several uppercase.  No rhinorrhea or nasal discharge.  Hearing seems unimpaired.  Cardiovascular: Regular rate and rhythm.  No appreciable murmur.  Respiratory: Lungs clear to auscultation bilaterally.   Abdomen: Nondistended.   Musculoskeletal/Extremities: Age-related degenerative joint disease and moderate to severe kyphosis.  Hands and fingers shows significant deformities without a diagnosis of rheumatoid arthritis.  Bilateral hallux valgus deformities with partial great toe overlap.  No peripheral edema.  Integument: No rashes, clinically significant lesions, or skin breakdown.   Cognitive/Psychiatric: Generally alert and oriented today, although there do appear to be some cognitive issues as noted in previous visit.  Affect is euthymic.    DIAGNOSTICS:   No results found for this or any previous visit (from the past 240 hour(s)).   Last Comprehensive Metabolic Panel:  Sodium   Date Value Ref Range Status   07/14/2021 144 133 - 144 mmol/L Final   01/30/2019 140 133 - 144 mmol/L Final     Potassium   Date Value Ref Range Status   07/15/2021  3.5 3.4 - 5.3 mmol/L Final   01/30/2019 4.0 3.4 - 5.3 mmol/L Final     Chloride   Date Value Ref Range Status   07/14/2021 116 (H) 94 - 109 mmol/L Final   01/30/2019 104 94 - 109 mmol/L Final     Carbon Dioxide   Date Value Ref Range Status   01/30/2019 30 20 - 32 mmol/L Final     Carbon Dioxide (CO2)   Date Value Ref Range Status   07/14/2021 23 20 - 32 mmol/L Final     Anion Gap   Date Value Ref Range Status   07/14/2021 5 3 - 14 mmol/L Final   01/30/2019 6 3 - 14 mmol/L Final     Glucose   Date Value Ref Range Status   07/14/2021 92 70 - 99 mg/dL Final   01/30/2019 129 (H) 70 - 99 mg/dL Final     Urea Nitrogen   Date Value Ref Range Status   07/14/2021 41 (H) 7 - 30 mg/dL Final   07/13/2020 16 7 - 30 mg/dL Final     Creatinine   Date Value Ref Range Status   07/14/2021 0.52 0.52 - 1.04 mg/dL Final   07/13/2020 0.67 0.52 - 1.04 mg/dL Final     GFR Estimate   Date Value Ref Range Status   07/14/2021 84 >60 mL/min/1.73m2 Final     Comment:     As of July 11, 2021, eGFR is calculated by the CKD-EPI creatinine equation, without race adjustment. eGFR can be influenced by muscle mass, exercise, and diet. The reported eGFR is an estimation only and is only applicable if the renal function is stable.   07/13/2020 77 >60 mL/min/[1.73_m2] Final     Comment:     Non  GFR Calc  Starting 12/18/2018, serum creatinine based estimated GFR (eGFR) will be   calculated using the Chronic Kidney Disease Epidemiology Collaboration   (CKD-EPI) equation.       Calcium   Date Value Ref Range Status   07/14/2021 7.9 (L) 8.5 - 10.1 mg/dL Final   07/13/2020 8.8 8.5 - 10.1 mg/dL Final     Lab Results   Component Value Date    WBC 17.6 07/13/2021    WBC 7.2 01/09/2018     Lab Results   Component Value Date    RBC 5.39 07/13/2021    RBC 4.04 01/09/2018     Lab Results   Component Value Date    HGB 14.2 07/14/2021    HGB 13.0 06/20/2018     Lab Results   Component Value Date    HCT 49.5 07/13/2021    HCT 43.1 06/20/2018     Lab  Results   Component Value Date    MCV 92 07/13/2021    MCV 96.0 06/20/2018     Lab Results   Component Value Date    MCH 30.1 07/13/2021    MCH 29.0 06/20/2018     Lab Results   Component Value Date    MCHC 32.7 07/13/2021    MCHC 30.2 06/20/2018     Lab Results   Component Value Date    RDW 12.4 07/13/2021    RDW 13.3 01/09/2018     Lab Results   Component Value Date     07/13/2021     01/09/2018       ASSESSMENT/Plan:     Diagnosis Comments   1. Fall, subsequent encounter  Receiving both physical and occupational therapies.   2. Closed fracture of one rib of right side with routine healing, subsequent encounter  Minimal pain.  Encouraged use of incentive spirometry.   3. Age-related osteoporosis without current pathological fracture  Taking calcium with vitamin D   4. Essential hypertension  On amlodipine.  Blood pressures are acceptable.   5. Primary osteoarthritis involving multiple joints  Has orders for as needed acetaminophen.   6. Mild cognitive impairment  Some memory issues.       CHANGES:    None.    CARE PLAN:    The care plan, medications, vital signs, orders, and nursing notes have been reviewed, and all orders signed. Changes to care plan, if any, as noted. Otherwise, continue current plan of care.    The above has been created using voice recognition software. Please be aware that this may unintentionally  produce inaccuracies and/or nonsensical sentences.      Electronically signed by: SUSIE Nieves CNP

## 2021-08-09 ENCOUNTER — TRANSITIONAL CARE UNIT VISIT (OUTPATIENT)
Dept: GERIATRICS | Facility: CLINIC | Age: 86
End: 2021-08-09
Payer: COMMERCIAL

## 2021-08-09 VITALS
OXYGEN SATURATION: 94 % | WEIGHT: 92 LBS | DIASTOLIC BLOOD PRESSURE: 76 MMHG | SYSTOLIC BLOOD PRESSURE: 130 MMHG | HEART RATE: 72 BPM | RESPIRATION RATE: 18 BRPM | TEMPERATURE: 97.8 F | BODY MASS INDEX: 19.23 KG/M2

## 2021-08-09 DIAGNOSIS — G31.84 MILD COGNITIVE IMPAIRMENT: ICD-10-CM

## 2021-08-09 DIAGNOSIS — S22.31XD CLOSED FRACTURE OF ONE RIB OF RIGHT SIDE WITH ROUTINE HEALING, SUBSEQUENT ENCOUNTER: ICD-10-CM

## 2021-08-09 DIAGNOSIS — M80.00XD AGE-RELATED OSTEOPOROSIS WITH CURRENT PATHOLOGICAL FRACTURE WITH ROUTINE HEALING, SUBSEQUENT ENCOUNTER: ICD-10-CM
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
## 2021-08-09 DIAGNOSIS — M81.0 AGE-RELATED OSTEOPOROSIS WITHOUT CURRENT PATHOLOGICAL FRACTURE: ICD-10-CM

## 2021-08-09 DIAGNOSIS — I10 ESSENTIAL HYPERTENSION: ICD-10-CM

## 2021-08-09 PROCEDURE — 99309 SBSQ NF CARE MODERATE MDM 30: CPT | Performed by: NURSE PRACTITIONER

## 2021-08-09 NOTE — LETTER
"    2021        RE: Belia Sheets  [address]  [address]  [facility] [address]        [facility]    Name:   Belia Sheets (Sudeep)  :   1930  MRN:    [id number]     Facility:   [facility] ([facility]) [[id number]]   Room:   Code Status: FULL CODE and POLST AVAILABLE -     DOS: 2021  Previous visit: 2021    PCP:  Alejandro Sandhu    CHIEF COMPLAINT / REASON FOR VISIT:  Chief Complaint   Patient presents with     Clinic Care Coordination - Follow-up     TCU Follow up      [facility] from 2021 until 2021      HPI: Belia is a 89 year old female with a history of osteoporosis, essential hypertension, and remote history of visual hallucinations, who was brought to the ED for evaluation after she was found in her senior apartment down on the floor.  Patient, herself, reported having \"interesting visual hallucinations,\" and she also remembered being on the floor and unable to get up.  She was unsure how long she was down.  Found by staff during routine rounds.  She reported right posterior rib pain, shoulder, and hip pain.  She denied any shortness of breath.  Imaging in the ED included a CT head and CXR, bilateral shoulders and pelvic bones notable for an acute right ninth rib fracture.  She also had an elevated creatinine kinase and troponin.  She was admitted to the trauma service given the injury identified.    Acute right lateral rib fracture  -- Pulmonary toilet and good pain control allowing for this is paramount. Prior to discharge, her pain was well controlled with scheduled acetaminophen and topical pain medications.  In order to prevent common pulmonary complications found with rib fracture patients, she would need to continue with aggressive pulmonary toileting that includes incentive spirometry, coughing and deep breathing exercises.  Recommended that she continue these exercises a minimum of " "4 times daily for 1 month after discharge.     Visual hallucinations  -- The patient and her son, Delmar, reported a remote history of visual hallucinations, mostly pleasant in nature.  She was worked up for a possible UTI that was found to be negative.  After receiving 2 doses of IV Rocephin, antibiotics were discontinued.  -- CT head:     Troponinemia  -- Elevated at 0.184 on presentation.  Post trauma, fall with prolonged immobility, and demand ischemia.  Twelve-lead EKG was without ischemic changes.  She denied chest pains, palpitations or shortness of breath.  This also improved with subsequent trends prior to discharge.  No medication changes were made.    Recommendations: TCU for physical and occupational therapies upon discharge.      CURRENT/RECENT TCU ISSUES    Disposition: Doing well and making progress.  Asked how much pain that she is having, her reply is, \"none.\"  Per patient request, we placed her lidocaine patch on hold and now will discontinue it.  She does have orders for both acetaminophen (650 mg every 4 hours as needed) and ibuprofen (200 mg every 6 hours as needed) complaint.  Patient rated    Cognitive impairment: There or at least mild cognitive deficits, and that this was also noted by the patient's son who also endorses his mother's memory loss, although she appears, for the most part, to be doing fairly well.  During the initial visit, when asked why she is here, she stated \"because of dizziness and danger of falling.\"  She indicated that she becomes \"unsteady.\"  When asked the year, she hesitated a bit, stating, \"that's such a simple question \".  Eventually, she indicated it is 2021.  The following visit, however, she could not do that.  I did speak with the SLP and was told she needs quite a bit of support.  She did rather poorly on the clock test, making a pie and then adding numbers but no hands.  She scored 2/17 on the storytelling, 10/17 on orientation, and 23/30 on the MMSE.  Her " "daughter apparently believes cognitive impairment to be an acute issue.  At the last care conference, it was noted that she has made significant progress, both physically and cognitively.  Cognition is expected to be reassessed shortly.  TODAY, she knew my name (as she did previously) without seeing my badge and could tell me both the correct year and month.    Code status: It is noted that in the hospital, she was listed as DNR/DNI; however, at [facility] she is full code.  This was not noted prior to my visit with the patient and her son, Delmar.    Discharge planning: She lives in a \"very pleasant small apartment.\"      ROS: She denies any significant pain, even at the fracture site.  She can, in fact, using incentive spirometer without producing pain.  No headaches or chest pains, coughing or congestion, nausea or vomiting, dyspnea, dysuria, diplopia, constipation or diarrhea (stools are soft but formed), difficulty chewing or swallowing, integumentary issues, or problems with appetite or sleep    Past Medical History:   Diagnosis Date     Carpal tunnel syndrome (aka CTS)      H/O calcium pyrophosphate deposition disease (CPPD)      History of encephalopathy 10/2017     Hypertension      Kyphoscoliosis      Osteoarthritis     hands     Osteoporosis      Rectal prolapse               Family History   Problem Relation Age of Onset     Depression/Anxiety Son      Depression/Anxiety Son         alcohol abuse  age 24     Hypertension Mother      Hypertension Brother      Diabetes No family hx of      Breast Cancer No family hx of      Colon Cancer No family hx of      Prostate Cancer No family hx of      Other Cancer No family hx of      Social History     Socioeconomic History     Marital status:      Spouse name: Not on file     Number of children: Not on file     Years of education: Not on file     Highest education level: Not on file   Occupational History     Not on file   Tobacco Use     Smoking status: " Never Smoker     Smokeless tobacco: Never Used   Substance and Sexual Activity     Alcohol use: No     Drug use: No     Sexual activity: Never   Other Topics Concern     Not on file   Social History Narrative     Not on file     Social Determinants of Health     Financial Resource Strain:      Difficulty of Paying Living Expenses:    Food Insecurity:      Worried About Running Out of Food in the Last Year:      Ran Out of Food in the Last Year:    Transportation Needs:      Lack of Transportation (Medical):      Lack of Transportation (Non-Medical):    Physical Activity:      Days of Exercise per Week:      Minutes of Exercise per Session:    Stress:      Feeling of Stress :    Social Connections:      Frequency of Communication with Friends and Family:      Frequency of Social Gatherings with Friends and Family:      Attends [employer] Services:      Active Member of Clubs or Organizations:      Attends Club or Organization Meetings:      Marital Status:    Intimate Partner Violence:      Fear of Current or Ex-Partner:      Emotionally Abused:      Physically Abused:      Sexually Abused:        MEDICATIONS: Reviewed from the MAR, physician orders, and/or earlier progress notes.  Post Discharge Medication Reconciliation Status: medication reconcilation previously completed during another office visit.  Updated by me today (08/09/2021) with discontinuation of lidocaine patch reflected below.  Current Outpatient Medications   Medication Sig     acetaminophen (TYLENOL) 325 MG tablet Take 2 tablets (650 mg) by mouth every 4 hours as needed for mild pain     amLODIPine (NORVASC) 5 MG tablet Take 1 tablet (5 mg) by mouth daily     calcium carbonate 600 mg-vitamin D 400 units (CALTRATE) 600-400 MG-UNIT per tablet Take 1 tablet by mouth 2 times daily     ibuprofen (ADVIL/MOTRIN) 200 MG tablet Take 1 tablet (200 mg) by mouth every 6 hours as needed for moderate pain     magnesium oxide (MAG-OX) 400 (241.3 Mg) MG tablet Take  1 tablet (400 mg) by mouth daily     order for DME Equipment being ordered: Incentive spirometer     order for DME Equipment being ordered: Home BP monitor and cuff     polyethylene glycol (MIRALAX) 17 GM/Dose powder Take 17 g by mouth daily     No current facility-administered medications for this visit.     ALLERGIES: No Known Allergies    DIET: Regular, regular texture, thin liquids.    Vitals:    08/09/21 1518   BP: 130/76   Pulse: 72   Resp: 18   Temp: 97.8  F (36.6  C)   SpO2: 94%   Weight: 41.7 kg (92 lb)     Body mass index is 19.23 kg/m .    EXAMINATION:   General: Pleasant, alert, and conversant elderly female, lying in bed, in no apparent distress.    Head: Normocephalic and atraumatic.   Eyes: PERRLA, sclerae clear.   ENT: Moist oral mucosa.  She has several of her own teeth but missing all lower teeth on the right as well as several uppercase.  No rhinorrhea or nasal discharge.  Hearing seems unimpaired.  Cardiovascular: Regular rate and rhythm.  No appreciable murmur.  Respiratory: Lungs clear to auscultation bilaterally.   Abdomen: Nondistended.   Musculoskeletal/Extremities: Age-related degenerative joint disease and moderate to severe kyphosis.  Hands and fingers shows significant deformities without a diagnosis of rheumatoid arthritis.  Bilateral hallux valgus deformities with partial great toe overlap.  No peripheral edema.  Integument: No rashes, clinically significant lesions, or skin breakdown.   Cognitive/Psychiatric: Mostly alert and oriented, although there may be some minor cognitive issues.  Affect is euthymic.    DIAGNOSTICS:   No results found for this or any previous visit (from the past 240 hour(s)).   Last Comprehensive Metabolic Panel:  Sodium   Date Value Ref Range Status   07/14/2021 144 133 - 144 mmol/L Final   01/30/2019 140 133 - 144 mmol/L Final     Potassium   Date Value Ref Range Status   07/15/2021 3.5 3.4 - 5.3 mmol/L Final   01/30/2019 4.0 3.4 - 5.3 mmol/L Final      Chloride   Date Value Ref Range Status   07/14/2021 116 (H) 94 - 109 mmol/L Final   01/30/2019 104 94 - 109 mmol/L Final     Carbon Dioxide   Date Value Ref Range Status   01/30/2019 30 20 - 32 mmol/L Final     Carbon Dioxide (CO2)   Date Value Ref Range Status   07/14/2021 23 20 - 32 mmol/L Final     Anion Gap   Date Value Ref Range Status   07/14/2021 5 3 - 14 mmol/L Final   01/30/2019 6 3 - 14 mmol/L Final     Glucose   Date Value Ref Range Status   07/14/2021 92 70 - 99 mg/dL Final   01/30/2019 129 (H) 70 - 99 mg/dL Final     Urea Nitrogen   Date Value Ref Range Status   07/14/2021 41 (H) 7 - 30 mg/dL Final   07/13/2020 16 7 - 30 mg/dL Final     Creatinine   Date Value Ref Range Status   07/14/2021 0.52 0.52 - 1.04 mg/dL Final   07/13/2020 0.67 0.52 - 1.04 mg/dL Final     GFR Estimate   Date Value Ref Range Status   07/14/2021 84 >60 mL/min/1.73m2 Final     Comment:     As of July 11, 2021, eGFR is calculated by the CKD-EPI creatinine equation, without race adjustment. eGFR can be influenced by muscle mass, exercise, and diet. The reported eGFR is an estimation only and is only applicable if the renal function is stable.   07/13/2020 77 >60 mL/min/[1.73_m2] Final     Comment:     Non  GFR Calc  Starting 12/18/2018, serum creatinine based estimated GFR (eGFR) will be   calculated using the Chronic Kidney Disease Epidemiology Collaboration   (CKD-EPI) equation.       Calcium   Date Value Ref Range Status   07/14/2021 7.9 (L) 8.5 - 10.1 mg/dL Final   07/13/2020 8.8 8.5 - 10.1 mg/dL Final     Lab Results   Component Value Date    WBC 17.6 07/13/2021    WBC 7.2 01/09/2018     Lab Results   Component Value Date    RBC 5.39 07/13/2021    RBC 4.04 01/09/2018     Lab Results   Component Value Date    HGB 14.2 07/14/2021    HGB 13.0 06/20/2018     Lab Results   Component Value Date    HCT 49.5 07/13/2021    HCT 43.1 06/20/2018     Lab Results   Component Value Date    MCV 92 07/13/2021    MCV 96.0  06/20/2018     Lab Results   Component Value Date    MCH 30.1 07/13/2021    MCH 29.0 06/20/2018     Lab Results   Component Value Date    MCHC 32.7 07/13/2021    MCHC 30.2 06/20/2018     Lab Results   Component Value Date    RDW 12.4 07/13/2021    RDW 13.3 01/09/2018     Lab Results   Component Value Date     07/13/2021     01/09/2018       ASSESSMENT/Plan:      ICD-10-CM    1. Fall, subsequent encounter  W19.XXXD    2. Closed fracture of the 9th rib of right side with routine healing, subsequent encounter  S22.31XD    3. Age-related osteoporosis without current pathological fracture  M81.0    4. Essential hypertension  I10    5. Mild cognitive impairment  G31.84    6. Age-related osteoporosis with current pathological fracture with routine healing, subsequent encounter  M80.00XD        CHANGES:    Discontinue lidocaine patch.    CARE PLAN:    The care plan, medications, vital signs, orders, and nursing notes have been reviewed, and all orders signed. Changes to care plan, if any, as noted. Otherwise, continue current plan of care.    The above has been created using voice recognition software. Please be aware that this may unintentionally  produce inaccuracies and/or nonsensical sentences.      Electronically signed by: SUSIE Nieves CNP        Sincerely,        SUSIE Nieves CNP

## 2021-08-11 NOTE — PROGRESS NOTES
"[facility]    Name:   Belia Sheets (Sudeep)  :   1930  MRN:    [id number]     Facility:   [facility] ([facility]) [[id number]]   Room:   Code Status: FULL CODE and POLST AVAILABLE -     DOS: 2021  Previous visit: 2021    PCP:  Alejandro Sandhu    CHIEF COMPLAINT / REASON FOR VISIT:  Chief Complaint   Patient presents with     Clinic Care Coordination - Follow-up     TCU Follow up      [facility] from 2021 until 2021      HPI: Belia is a 89 year old female with a history of osteoporosis, essential hypertension, and remote history of visual hallucinations, who was brought to the ED for evaluation after she was found in her senior apartment down on the floor.  Patient, herself, reported having \"interesting visual hallucinations,\" and she also remembered being on the floor and unable to get up.  She was unsure how long she was down.  Found by staff during routine rounds.  She reported right posterior rib pain, shoulder, and hip pain.  She denied any shortness of breath.  Imaging in the ED included a CT head and CXR, bilateral shoulders and pelvic bones notable for an acute right ninth rib fracture.  She also had an elevated creatinine kinase and troponin.  She was admitted to the trauma service given the injury identified.    Acute right lateral rib fracture  -- Pulmonary toilet and good pain control allowing for this is paramount. Prior to discharge, her pain was well controlled with scheduled acetaminophen and topical pain medications.  In order to prevent common pulmonary complications found with rib fracture patients, she would need to continue with aggressive pulmonary toileting that includes incentive spirometry, coughing and deep breathing exercises.  Recommended that she continue these exercises a minimum of 4 times daily for 1 month after discharge.     Visual hallucinations  -- The patient and her son, " "Delmar, reported a remote history of visual hallucinations, mostly pleasant in nature.  She was worked up for a possible UTI that was found to be negative.  After receiving 2 doses of IV Rocephin, antibiotics were discontinued.  -- CT head:     Troponinemia  -- Elevated at 0.184 on presentation.  Post trauma, fall with prolonged immobility, and demand ischemia.  Twelve-lead EKG was without ischemic changes.  She denied chest pains, palpitations or shortness of breath.  This also improved with subsequent trends prior to discharge.  No medication changes were made.    Recommendations: TCU for physical and occupational therapies upon discharge.      CURRENT/RECENT TCU ISSUES    Disposition: Doing well and making progress.  Asked how much pain that she is having, her reply is, \"none.\"  Per patient request, we placed her lidocaine patch on hold and now will discontinue it.  She does have orders for both acetaminophen (650 mg every 4 hours as needed) and ibuprofen (200 mg every 6 hours as needed) complaint.  Patient rated    Cognitive impairment: There or at least mild cognitive deficits, and that this was also noted by the patient's son who also endorses his mother's memory loss, although she appears, for the most part, to be doing fairly well.  During the initial visit, when asked why she is here, she stated \"because of dizziness and danger of falling.\"  She indicated that she becomes \"unsteady.\"  When asked the year, she hesitated a bit, stating, \"that's such a simple question \".  Eventually, she indicated it is 2021.  The following visit, however, she could not do that.  I did speak with the SLP and was told she needs quite a bit of support.  She did rather poorly on the clock test, making a pie and then adding numbers but no hands.  She scored 2/17 on the storytelling, 10/17 on orientation, and 23/30 on the MMSE.  Her daughter apparently believes cognitive impairment to be an acute issue.  At the last care conference, " "it was noted that she has made significant progress, both physically and cognitively.  Cognition is expected to be reassessed shortly.  TODAY, she knew my name (as she did previously) without seeing my badge and could tell me both the correct year and month.    Code status: It is noted that in the hospital, she was listed as DNR/DNI; however, at [facility] she is full code.  This was not noted prior to my visit with the patient and her son, Delmar.    Discharge planning: She lives in a \"very pleasant small apartment.\"      ROS: She denies any significant pain, even at the fracture site.  She can, in fact, using incentive spirometer without producing pain.  No headaches or chest pains, coughing or congestion, nausea or vomiting, dyspnea, dysuria, diplopia, constipation or diarrhea (stools are soft but formed), difficulty chewing or swallowing, integumentary issues, or problems with appetite or sleep    Past Medical History:   Diagnosis Date     Carpal tunnel syndrome (aka CTS)      H/O calcium pyrophosphate deposition disease (CPPD)      History of encephalopathy 10/2017     Hypertension      Kyphoscoliosis      Osteoarthritis     hands     Osteoporosis      Rectal prolapse               Family History   Problem Relation Age of Onset     Depression/Anxiety Son      Depression/Anxiety Son         alcohol abuse  age 24     Hypertension Mother      Hypertension Brother      Diabetes No family hx of      Breast Cancer No family hx of      Colon Cancer No family hx of      Prostate Cancer No family hx of      Other Cancer No family hx of      Social History     Socioeconomic History     Marital status:      Spouse name: Not on file     Number of children: Not on file     Years of education: Not on file     Highest education level: Not on file   Occupational History     Not on file   Tobacco Use     Smoking status: Never Smoker     Smokeless tobacco: Never Used   Substance and Sexual Activity     Alcohol use: No     " Drug use: No     Sexual activity: Never   Other Topics Concern     Not on file   Social History Narrative     Not on file     Social Determinants of Health     Financial Resource Strain:      Difficulty of Paying Living Expenses:    Food Insecurity:      Worried About Running Out of Food in the Last Year:      Ran Out of Food in the Last Year:    Transportation Needs:      Lack of Transportation (Medical):      Lack of Transportation (Non-Medical):    Physical Activity:      Days of Exercise per Week:      Minutes of Exercise per Session:    Stress:      Feeling of Stress :    Social Connections:      Frequency of Communication with Friends and Family:      Frequency of Social Gatherings with Friends and Family:      Attends [employer] Services:      Active Member of Clubs or Organizations:      Attends Club or Organization Meetings:      Marital Status:    Intimate Partner Violence:      Fear of Current or Ex-Partner:      Emotionally Abused:      Physically Abused:      Sexually Abused:        MEDICATIONS: Reviewed from the MAR, physician orders, and/or earlier progress notes.  Post Discharge Medication Reconciliation Status: medication reconcilation previously completed during another office visit.  Updated by me today (08/09/2021) with discontinuation of lidocaine patch reflected below.  Current Outpatient Medications   Medication Sig     acetaminophen (TYLENOL) 325 MG tablet Take 2 tablets (650 mg) by mouth every 4 hours as needed for mild pain     amLODIPine (NORVASC) 5 MG tablet Take 1 tablet (5 mg) by mouth daily     calcium carbonate 600 mg-vitamin D 400 units (CALTRATE) 600-400 MG-UNIT per tablet Take 1 tablet by mouth 2 times daily     ibuprofen (ADVIL/MOTRIN) 200 MG tablet Take 1 tablet (200 mg) by mouth every 6 hours as needed for moderate pain     magnesium oxide (MAG-OX) 400 (241.3 Mg) MG tablet Take 1 tablet (400 mg) by mouth daily     order for DME Equipment being ordered: Incentive spirometer      order for DME Equipment being ordered: Home BP monitor and cuff     polyethylene glycol (MIRALAX) 17 GM/Dose powder Take 17 g by mouth daily     No current facility-administered medications for this visit.     ALLERGIES: No Known Allergies    DIET: Regular, regular texture, thin liquids.    Vitals:    08/09/21 1518   BP: 130/76   Pulse: 72   Resp: 18   Temp: 97.8  F (36.6  C)   SpO2: 94%   Weight: 41.7 kg (92 lb)     Body mass index is 19.23 kg/m .    EXAMINATION:   General: Pleasant, alert, and conversant elderly female, lying in bed, in no apparent distress.    Head: Normocephalic and atraumatic.   Eyes: PERRLA, sclerae clear.   ENT: Moist oral mucosa.  She has several of her own teeth but missing all lower teeth on the right as well as several uppercase.  No rhinorrhea or nasal discharge.  Hearing seems unimpaired.  Cardiovascular: Regular rate and rhythm.  No appreciable murmur.  Respiratory: Lungs clear to auscultation bilaterally.   Abdomen: Nondistended.   Musculoskeletal/Extremities: Age-related degenerative joint disease and moderate to severe kyphosis.  Hands and fingers shows significant deformities without a diagnosis of rheumatoid arthritis.  Bilateral hallux valgus deformities with partial great toe overlap.  No peripheral edema.  Integument: No rashes, clinically significant lesions, or skin breakdown.   Cognitive/Psychiatric: Mostly alert and oriented, although there may be some minor cognitive issues.  Affect is euthymic.    DIAGNOSTICS:   No results found for this or any previous visit (from the past 240 hour(s)).   Last Comprehensive Metabolic Panel:  Sodium   Date Value Ref Range Status   07/14/2021 144 133 - 144 mmol/L Final   01/30/2019 140 133 - 144 mmol/L Final     Potassium   Date Value Ref Range Status   07/15/2021 3.5 3.4 - 5.3 mmol/L Final   01/30/2019 4.0 3.4 - 5.3 mmol/L Final     Chloride   Date Value Ref Range Status   07/14/2021 116 (H) 94 - 109 mmol/L Final   01/30/2019 104 94 - 109  mmol/L Final     Carbon Dioxide   Date Value Ref Range Status   01/30/2019 30 20 - 32 mmol/L Final     Carbon Dioxide (CO2)   Date Value Ref Range Status   07/14/2021 23 20 - 32 mmol/L Final     Anion Gap   Date Value Ref Range Status   07/14/2021 5 3 - 14 mmol/L Final   01/30/2019 6 3 - 14 mmol/L Final     Glucose   Date Value Ref Range Status   07/14/2021 92 70 - 99 mg/dL Final   01/30/2019 129 (H) 70 - 99 mg/dL Final     Urea Nitrogen   Date Value Ref Range Status   07/14/2021 41 (H) 7 - 30 mg/dL Final   07/13/2020 16 7 - 30 mg/dL Final     Creatinine   Date Value Ref Range Status   07/14/2021 0.52 0.52 - 1.04 mg/dL Final   07/13/2020 0.67 0.52 - 1.04 mg/dL Final     GFR Estimate   Date Value Ref Range Status   07/14/2021 84 >60 mL/min/1.73m2 Final     Comment:     As of July 11, 2021, eGFR is calculated by the CKD-EPI creatinine equation, without race adjustment. eGFR can be influenced by muscle mass, exercise, and diet. The reported eGFR is an estimation only and is only applicable if the renal function is stable.   07/13/2020 77 >60 mL/min/[1.73_m2] Final     Comment:     Non  GFR Calc  Starting 12/18/2018, serum creatinine based estimated GFR (eGFR) will be   calculated using the Chronic Kidney Disease Epidemiology Collaboration   (CKD-EPI) equation.       Calcium   Date Value Ref Range Status   07/14/2021 7.9 (L) 8.5 - 10.1 mg/dL Final   07/13/2020 8.8 8.5 - 10.1 mg/dL Final     Lab Results   Component Value Date    WBC 17.6 07/13/2021    WBC 7.2 01/09/2018     Lab Results   Component Value Date    RBC 5.39 07/13/2021    RBC 4.04 01/09/2018     Lab Results   Component Value Date    HGB 14.2 07/14/2021    HGB 13.0 06/20/2018     Lab Results   Component Value Date    HCT 49.5 07/13/2021    HCT 43.1 06/20/2018     Lab Results   Component Value Date    MCV 92 07/13/2021    MCV 96.0 06/20/2018     Lab Results   Component Value Date    MCH 30.1 07/13/2021    MCH 29.0 06/20/2018     Lab Results    [facility] Value Date    [facility]    [facility] 30.2 06/20/2018     Lab Results   Component Value Date    RDW 12.4 07/13/2021    RDW 13.3 01/09/2018     Lab Results   Component Value Date     07/13/2021     01/09/2018       ASSESSMENT/Plan:      ICD-10-CM    1. Fall, subsequent encounter  W19.XXXD    2. Closed fracture of the 9th rib of right side with routine healing, subsequent encounter  S22.31XD    3. Age-related osteoporosis without current pathological fracture  M81.0    4. Essential hypertension  I10    5. Mild cognitive impairment  G31.84    6. Age-related osteoporosis with current pathological fracture with routine healing, subsequent encounter  M80.00XD        CHANGES:    Discontinue lidocaine patch.    CARE PLAN:    The care plan, medications, vital signs, orders, and nursing notes have been reviewed, and all orders signed. Changes to care plan, if any, as noted. Otherwise, continue current plan of care.    The above has been created using voice recognition software. Please be aware that this may unintentionally  produce inaccuracies and/or nonsensical sentences.      Electronically signed by: SUSIE Nieves CNP

## 2021-08-16 ENCOUNTER — DISCHARGE SUMMARY NURSING HOME (OUTPATIENT)
Dept: GERIATRICS | Facility: CLINIC | Age: 86
End: 2021-08-16
Payer: COMMERCIAL

## 2021-08-16 VITALS
RESPIRATION RATE: 18 BRPM | TEMPERATURE: 97.8 F | OXYGEN SATURATION: 96 % | HEART RATE: 72 BPM | BODY MASS INDEX: 19.23 KG/M2 | SYSTOLIC BLOOD PRESSURE: 120 MMHG | DIASTOLIC BLOOD PRESSURE: 69 MMHG | WEIGHT: 92 LBS

## 2021-08-16 DIAGNOSIS — I10 ESSENTIAL HYPERTENSION: ICD-10-CM

## 2021-08-16 DIAGNOSIS — M41.9 KYPHOSCOLIOSIS: ICD-10-CM
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
## 2021-08-16 DIAGNOSIS — M15.0 PRIMARY OSTEOARTHRITIS INVOLVING MULTIPLE JOINTS: ICD-10-CM

## 2021-08-16 DIAGNOSIS — G31.84 MILD COGNITIVE IMPAIRMENT: ICD-10-CM

## 2021-08-16 DIAGNOSIS — S22.31XD CLOSED FRACTURE OF ONE RIB OF RIGHT SIDE WITH ROUTINE HEALING, SUBSEQUENT ENCOUNTER: ICD-10-CM

## 2021-08-16 DIAGNOSIS — M81.0 AGE-RELATED OSTEOPOROSIS WITHOUT CURRENT PATHOLOGICAL FRACTURE: ICD-10-CM

## 2021-08-16 DIAGNOSIS — M80.00XD AGE-RELATED OSTEOPOROSIS WITH CURRENT PATHOLOGICAL FRACTURE WITH ROUTINE HEALING, SUBSEQUENT ENCOUNTER: ICD-10-CM

## 2021-08-16 PROCEDURE — 99316 NF DSCHRG MGMT 30 MIN+: CPT | Performed by: NURSE PRACTITIONER

## 2021-08-16 NOTE — LETTER
"    2021        RE: Belia Sheets  [address]  [address]  [facility] [address]        [facility]    Name:   Belia Sheets (Sudeep)  :   1930  MRN:    [id number]     Facility:   [facility] (SNF) [[id number]]   Room: [facility] 311  Code Status: FULL CODE and POLST AVAILABLE -     DOS: 2021  Previous visit: 2021    PCP:  Alejandro Sandhu    CHIEF COMPLAINT / REASON FOR VISIT:  Chief Complaint   Patient presents with     Discharge Summary Nursing Home     DISCHARGE SUMMARY: Fall and right rib fracture      [facility] from 2021 until 2021      HPI: Belia is a 89 year old female with a history of osteoporosis, essential hypertension, and remote history of visual hallucinations, who was brought to the ED for evaluation after she was found in her senior apartment down on the floor.  Patient, herself, reported having \"interesting visual hallucinations,\" and she also remembered being on the floor and unable to get up.  She was unsure how long she was down.  Found by staff during routine rounds.  She reported right posterior rib pain, shoulder, and hip pain.  She denied any shortness of breath.  Imaging in the ED included a CT head and CXR, bilateral shoulders and pelvic bones notable for an acute right ninth rib fracture.  She also had an elevated creatinine kinase and troponin.  She was admitted to the trauma service given the injury identified.    Acute right lateral rib fracture  -- Pulmonary toilet and good pain control allowing for this is paramount. Prior to discharge, her pain was well controlled with scheduled acetaminophen and topical pain medications.  In order to prevent common pulmonary complications found with rib fracture patients, she would need to continue with aggressive pulmonary toileting that includes incentive spirometry, coughing and deep breathing exercises.  Recommended that she continue " "these exercises a minimum of 4 times daily for 1 month after discharge.     Visual hallucinations  -- The patient and her son, Delmar, reported a remote history of visual hallucinations, mostly pleasant in nature.  She was worked up for a possible UTI that was found to be negative.  After receiving 2 doses of IV Rocephin, antibiotics were discontinued.  -- CT head:     Troponinemia  -- Elevated at 0.184 on presentation.  Post trauma, fall with prolonged immobility, and demand ischemia.  Twelve-lead EKG was without ischemic changes.  She denied chest pains, palpitations or shortness of breath.  This also improved with subsequent trends prior to discharge.  No medication changes were made.    Recommendations: TCU for physical and occupational therapies upon discharge.      CURRENT/RECENT TCU ISSUES    Disposition: Doing well and making progress.  Asked how much pain that she was having, her reply was, \"none.\"  Per patient request, we placed her lidocaine patch on hold and ultimately discontinued it.  She has had orders for both acetaminophen (650 mg every 4 hours as needed) and ibuprofen (200 mg every 6 hours as needed) complaint.     Cognitive impairment: There or at least mild cognitive deficits, and that this was also noted by the patient's son who also endorses his mother's memory loss, although she appears, for the most part, to be doing fairly well.  During the initial visit, when asked why she is here, she stated \"because of dizziness and danger of falling.\"  She indicated that she becomes \"unsteady.\"  When asked the year, she hesitated a bit, stating, \"that's such a simple question \".  Eventually, she indicated it is 2021.  The following visit, however, she could not do that.  I did speak with the SLP and was told she needs quite a bit of support.  She did rather poorly on the clock test, making a pie and then adding numbers but no hands.  She scored 2/17 on the storytelling, 10/17 on orientation, and 23/30 on " "the MMSE.  Her daughter apparently believes cognitive impairment to be an acute issue.  At the last care conference, it was noted that she has made significant progress, both physically and cognitively.  Cognition is expected to be reassessed shortly.  At each visit, she knew my name without seeing my badge and could tell me both the correct year and month.  She is no longer experiencing any hallucinations.    Code status: It is noted that in the hospital, she was listed as DNR/DNI; however, at [facility] she is full code.  This was not noted prior to my visit with the patient and her son, Delmar.    Discharge planning: She lives in a \"very pleasant small apartment.\"  She feels ready to go.  The plan is for discharge on 2021, receiving a home health aide, home PT, and home OT.      ROS: She denies any significant pain, even at the fracture site.  She can, in fact, using incentive spirometer without producing pain.  No headaches or chest pains, coughing or congestion, nausea or vomiting, dyspnea, dysuria, diplopia, constipation or diarrhea (stools are soft but formed), difficulty chewing or swallowing, integumentary issues, or problems with appetite or sleep    Past Medical History:   Diagnosis Date     Carpal tunnel syndrome (aka CTS)      H/O calcium pyrophosphate deposition disease (CPPD)      History of encephalopathy 10/2017     Hypertension      Kyphoscoliosis      Osteoarthritis     hands     Osteoporosis      Rectal prolapse               Family History   Problem Relation Age of Onset     Depression/Anxiety Son      Depression/Anxiety Son         alcohol abuse  age 24     Hypertension Mother      Hypertension Brother      Diabetes No family hx of      Breast Cancer No family hx of      Colon Cancer No family hx of      Prostate Cancer No family hx of      Other Cancer No family hx of      Social History     Socioeconomic History     Marital status:      Spouse name: Not on file     Number of " children: Not on file     Years of education: Not on file     Highest education level: Not on file   Occupational History     Not on file   Tobacco Use     Smoking status: Never Smoker     Smokeless tobacco: Never Used   Substance and Sexual Activity     Alcohol use: No     Drug use: No     Sexual activity: Never   Other Topics Concern     Not on file   Social History Narrative     Not on file     Social Determinants of Health     Financial Resource Strain:      Difficulty of Paying Living Expenses:    Food Insecurity:      Worried About Running Out of Food in the Last Year:      Ran Out of Food in the Last Year:    Transportation Needs:      Lack of Transportation (Medical):      Lack of Transportation (Non-Medical):    Physical Activity:      Days of Exercise per Week:      Minutes of Exercise per Session:    Stress:      Feeling of Stress :    Social Connections:      Frequency of Communication with Friends and Family:      Frequency of Social Gatherings with Friends and Family:      Attends [employer] Services:      Active Member of Clubs or Organizations:      Attends Club or Organization Meetings:      Marital Status:    Intimate Partner Violence:      Fear of Current or Ex-Partner:      Emotionally Abused:      Physically Abused:      Sexually Abused:        MEDICATIONS: Reviewed from the MAR, physician orders, and/or earlier progress notes.  Post Discharge Medication Reconciliation Status: medication reconcilation previously completed during another office visit.  Updated by me today (08/09/2021) with discontinuation of lidocaine patch reflected below.  Current Outpatient Medications   Medication Sig     acetaminophen (TYLENOL) 325 MG tablet Take 2 tablets (650 mg) by mouth every 4 hours as needed for mild pain     amLODIPine (NORVASC) 5 MG tablet Take 1 tablet (5 mg) by mouth daily     calcium carbonate 600 mg-vitamin D 400 units (CALTRATE) 600-400 MG-UNIT per tablet Take 1 tablet by mouth 2 times daily      ibuprofen (ADVIL/MOTRIN) 200 MG tablet Take 1 tablet (200 mg) by mouth every 6 hours as needed for moderate pain     magnesium oxide (MAG-OX) 400 (241.3 Mg) MG tablet Take 1 tablet (400 mg) by mouth daily     order for DME Equipment being ordered: Incentive spirometer     order for DME Equipment being ordered: Home BP monitor and cuff     polyethylene glycol (MIRALAX) 17 GM/Dose powder Take 17 g by mouth daily     No current facility-administered medications for this visit.     ALLERGIES: No Known Allergies    DIET: Regular, regular texture, thin liquids.    Vitals:    08/16/21 1214   BP: 120/69   Pulse: 72   Resp: 18   Temp: 97.8  F (36.6  C)   SpO2: 96%   Weight: 41.7 kg (92 lb)     Body mass index is 19.23 kg/m .    EXAMINATION:   General: Pleasant, alert, and conversant elderly female in no apparent distress.    Head: Normocephalic and atraumatic.   Eyes: PERRLA, sclerae clear.   ENT: Moist oral mucosa.  She has several of her own teeth but missing all lower teeth on the right as well as several uppercase.  No rhinorrhea or nasal discharge.  Hearing seems unimpaired.  Cardiovascular: Regular rate and rhythm.  No appreciable murmur.  Respiratory: Lungs clear to auscultation bilaterally.   Abdomen: Nondistended.   Musculoskeletal/Extremities: Age-related degenerative joint disease and moderate to severe kyphosis.  Hands and fingers shows significant deformities without a diagnosis of rheumatoid arthritis.  Bilateral hallux valgus deformities with partial great toe overlap.  No peripheral edema.  Integument: No rashes, clinically significant lesions, or skin breakdown.   Cognitive/Psychiatric: Mostly alert and oriented, although there may be some minor cognitive issues.  Affect is euthymic.    DIAGNOSTICS:   No results found for this or any previous visit (from the past 240 hour(s)).   Last Comprehensive Metabolic Panel:  Sodium   Date Value Ref Range Status   07/14/2021 144 133 - 144 mmol/L Final   01/30/2019 140  133 - 144 mmol/L Final     Potassium   Date Value Ref Range Status   07/15/2021 3.5 3.4 - 5.3 mmol/L Final   01/30/2019 4.0 3.4 - 5.3 mmol/L Final     Chloride   Date Value Ref Range Status   07/14/2021 116 (H) 94 - 109 mmol/L Final   01/30/2019 104 94 - 109 mmol/L Final     Carbon Dioxide   Date Value Ref Range Status   01/30/2019 30 20 - 32 mmol/L Final     Carbon Dioxide (CO2)   Date Value Ref Range Status   07/14/2021 23 20 - 32 mmol/L Final     Anion Gap   Date Value Ref Range Status   07/14/2021 5 3 - 14 mmol/L Final   01/30/2019 6 3 - 14 mmol/L Final     Glucose   Date Value Ref Range Status   07/14/2021 92 70 - 99 mg/dL Final   01/30/2019 129 (H) 70 - 99 mg/dL Final     Urea Nitrogen   Date Value Ref Range Status   07/14/2021 41 (H) 7 - 30 mg/dL Final   07/13/2020 16 7 - 30 mg/dL Final     Creatinine   Date Value Ref Range Status   07/14/2021 0.52 0.52 - 1.04 mg/dL Final   07/13/2020 0.67 0.52 - 1.04 mg/dL Final     GFR Estimate   Date Value Ref Range Status   07/14/2021 84 >60 mL/min/1.73m2 Final     Comment:     As of July 11, 2021, eGFR is calculated by the CKD-EPI creatinine equation, without race adjustment. eGFR can be influenced by muscle mass, exercise, and diet. The reported eGFR is an estimation only and is only applicable if the renal function is stable.   07/13/2020 77 >60 mL/min/[1.73_m2] Final     Comment:     Non  GFR Calc  Starting 12/18/2018, serum creatinine based estimated GFR (eGFR) will be   calculated using the Chronic Kidney Disease Epidemiology Collaboration   (CKD-EPI) equation.       Calcium   Date Value Ref Range Status   07/14/2021 7.9 (L) 8.5 - 10.1 mg/dL Final   07/13/2020 8.8 8.5 - 10.1 mg/dL Final     Lab Results   Component Value Date    WBC 17.6 07/13/2021    WBC 7.2 01/09/2018     Lab Results   Component Value Date    RBC 5.39 07/13/2021    RBC 4.04 01/09/2018     Lab Results   Component Value Date    HGB 14.2 07/14/2021    HGB 13.0 06/20/2018     Lab  Results   Component Value Date    HCT 49.5 07/13/2021    HCT 43.1 06/20/2018     Lab Results   Component Value Date    MCV 92 07/13/2021    MCV 96.0 06/20/2018     Lab Results   Component Value Date    MCH 30.1 07/13/2021    MCH 29.0 06/20/2018     Lab Results   Component Value Date    MCHC 32.7 07/13/2021    MCHC 30.2 06/20/2018     Lab Results   Component Value Date    RDW 12.4 07/13/2021    RDW 13.3 01/09/2018     Lab Results   Component Value Date     07/13/2021     01/09/2018       ASSESSMENT/Plan:      ICD-10-CM    1. Fall, subsequent encounter  W19.XXXD    2. Closed fracture of the 9th rib of right side with routine healing, subsequent encounter  S22.31XD    3. Age-related osteoporosis without current pathological fracture  M81.0    4. Essential hypertension  I10    5. Mild cognitive impairment  G31.84    6. Age-related osteoporosis with current pathological fracture with routine healing, subsequent encounter  M80.00XD        DISCHARGE PLAN/FACE TO FACE:  I certify that this patient is under my care and that I had a face-to-face encounter that meets the physician face-to-face encounter requirements with this patient.     Date of Face-to-Face Encounter: 08/16/2021     I certify that, based on my findings, the following services are medically necessary home health services:  Home health aide, home PT, and home OT    My clinical findings support the need for the above skilled services because: (Please write a brief narrative summary that describes what the RN, PT, SLP, or other services will be doing in the home. A list of diagnoses in this section does not meet the CMS requirements):  Home health aide for bathing and ADL needs; home PT for strengthening, balance, endurance, and safety with mobility/ambulation; home OT for strengthening, ADL needs, adaptive equipment, and safety.    This patient is homebound because: (Please write a brief narrative summmary describing the functional limitations as  to why this patient is homebound and specifically what makes this patient homebound.):   Above services necessarily need to be performed in the home to be of benefit.    The patient is, or has been, under my care and I have initiated the establishment of the plan of care. This patient will be followed by a physician who will periodically review the plan of care.  Initial follow-up should be within 7-10 days.    Approximate time spent with this patient was greater than 30 minutes with greater than 50% spent in discussions regarding services required upon discharge.      The above has been created using voice recognition software. Please be aware that this may unintentionally  produce inaccuracies and/or nonsensical sentences.      Electronically signed by: SUSIE Nieves CNP        Sincerely,        SUSIE Nieves CNP

## 2021-08-17 NOTE — PROGRESS NOTES
"[facility]    Name:   Belia Sheets (Sudeep)  :   1930  MRN:    [id number]     Facility:   [facility] (SNF) [[id number]]   Room:   Code Status: FULL CODE and POLST AVAILABLE -     DOS: 2021  Previous visit: 2021    PCP:  Alejandro Sandhu    CHIEF COMPLAINT / REASON FOR VISIT:  Chief Complaint   Patient presents with     Discharge Summary Nursing Home     DISCHARGE SUMMARY: Fall and right rib fracture      [facility] from 2021 until 2021      HPI: Belia is a 89 year old female with a history of osteoporosis, essential hypertension, and remote history of visual hallucinations, who was brought to the ED for evaluation after she was found in her senior apartment down on the floor.  Patient, herself, reported having \"interesting visual hallucinations,\" and she also remembered being on the floor and unable to get up.  She was unsure how long she was down.  Found by staff during routine rounds.  She reported right posterior rib pain, shoulder, and hip pain.  She denied any shortness of breath.  Imaging in the ED included a CT head and CXR, bilateral shoulders and pelvic bones notable for an acute right ninth rib fracture.  She also had an elevated creatinine kinase and troponin.  She was admitted to the trauma service given the injury identified.    Acute right lateral rib fracture  -- Pulmonary toilet and good pain control allowing for this is paramount. Prior to discharge, her pain was well controlled with scheduled acetaminophen and topical pain medications.  In order to prevent common pulmonary complications found with rib fracture patients, she would need to continue with aggressive pulmonary toileting that includes incentive spirometry, coughing and deep breathing exercises.  Recommended that she continue these exercises a minimum of 4 times daily for 1 month after discharge.     Visual hallucinations  -- The " "patient and her son, Delmar, reported a remote history of visual hallucinations, mostly pleasant in nature.  She was worked up for a possible UTI that was found to be negative.  After receiving 2 doses of IV Rocephin, antibiotics were discontinued.  -- CT head:     Troponinemia  -- Elevated at 0.184 on presentation.  Post trauma, fall with prolonged immobility, and demand ischemia.  Twelve-lead EKG was without ischemic changes.  She denied chest pains, palpitations or shortness of breath.  This also improved with subsequent trends prior to discharge.  No medication changes were made.    Recommendations: TCU for physical and occupational therapies upon discharge.      CURRENT/RECENT TCU ISSUES    Disposition: Doing well and making progress.  Asked how much pain that she was having, her reply was, \"none.\"  Per patient request, we placed her lidocaine patch on hold and ultimately discontinued it.  She has had orders for both acetaminophen (650 mg every 4 hours as needed) and ibuprofen (200 mg every 6 hours as needed) complaint.     Cognitive impairment: There or at least mild cognitive deficits, and that this was also noted by the patient's son who also endorses his mother's memory loss, although she appears, for the most part, to be doing fairly well.  During the initial visit, when asked why she is here, she stated \"because of dizziness and danger of falling.\"  She indicated that she becomes \"unsteady.\"  When asked the year, she hesitated a bit, stating, \"that's such a simple question \".  Eventually, she indicated it is 2021.  The following visit, however, she could not do that.  I did speak with the SLP and was told she needs quite a bit of support.  She did rather poorly on the clock test, making a pie and then adding numbers but no hands.  She scored 2/17 on the storytelling, 10/17 on orientation, and 23/30 on the MMSE.  Her daughter apparently believes cognitive impairment to be an acute issue.  At the last care " "conference, it was noted that she has made significant progress, both physically and cognitively.  Cognition is expected to be reassessed shortly.  At each visit, she knew my name without seeing my badge and could tell me both the correct year and month.  She is no longer experiencing any hallucinations.    Code status: It is noted that in the hospital, she was listed as DNR/DNI; however, at [employer] she is full code.  This was not noted prior to my visit with the patient and her son, Delmar.    Discharge planning: She lives in a \"very pleasant small apartment.\"  She feels ready to go.  The plan is for discharge on 2021, receiving a home health aide, home PT, and home OT.      ROS: She denies any significant pain, even at the fracture site.  She can, in fact, using incentive spirometer without producing pain.  No headaches or chest pains, coughing or congestion, nausea or vomiting, dyspnea, dysuria, diplopia, constipation or diarrhea (stools are soft but formed), difficulty chewing or swallowing, integumentary issues, or problems with appetite or sleep    Past Medical History:   Diagnosis Date     Carpal tunnel syndrome (aka CTS)      H/O calcium pyrophosphate deposition disease (CPPD)      History of encephalopathy 10/2017     Hypertension      Kyphoscoliosis      Osteoarthritis     hands     Osteoporosis      Rectal prolapse               Family History   Problem Relation Age of Onset     Depression/Anxiety Son      Depression/Anxiety Son         alcohol abuse  age 24     Hypertension Mother      Hypertension Brother      Diabetes No family hx of      Breast Cancer No family hx of      Colon Cancer No family hx of      Prostate Cancer No family hx of      Other Cancer No family hx of      Social History     Socioeconomic History     Marital status:      Spouse name: Not on file     Number of children: Not on file     Years of education: Not on file     Highest education level: Not on file "   Occupational History     Not on file   Tobacco Use     Smoking status: Never Smoker     Smokeless tobacco: Never Used   Substance and Sexual Activity     Alcohol use: No     Drug use: No     Sexual activity: Never   Other Topics Concern     Not on file   Social History Narrative     Not on file     Social Determinants of Health     Financial Resource Strain:      Difficulty of Paying Living Expenses:    Food Insecurity:      Worried About Running Out of Food in the Last Year:      Ran Out of Food in the Last Year:    Transportation Needs:      Lack of Transportation (Medical):      Lack of Transportation (Non-Medical):    Physical Activity:      Days of Exercise per Week:      Minutes of Exercise per Session:    Stress:      Feeling of Stress :    Social Connections:      Frequency of Communication with Friends and Family:      Frequency of Social Gatherings with Friends and Family:      Attends [employer] Services:      Active Member of Clubs or Organizations:      Attends Club or Organization Meetings:      Marital Status:    Intimate Partner Violence:      Fear of Current or Ex-Partner:      Emotionally Abused:      Physically Abused:      Sexually Abused:        MEDICATIONS: Reviewed from the MAR, physician orders, and/or earlier progress notes.  Post Discharge Medication Reconciliation Status: medication reconcilation previously completed during another office visit.  Updated by me today (08/09/2021) with discontinuation of lidocaine patch reflected below.  Current Outpatient Medications   Medication Sig     acetaminophen (TYLENOL) 325 MG tablet Take 2 tablets (650 mg) by mouth every 4 hours as needed for mild pain     amLODIPine (NORVASC) 5 MG tablet Take 1 tablet (5 mg) by mouth daily     calcium carbonate 600 mg-vitamin D 400 units (CALTRATE) 600-400 MG-UNIT per tablet Take 1 tablet by mouth 2 times daily     ibuprofen (ADVIL/MOTRIN) 200 MG tablet Take 1 tablet (200 mg) by mouth every 6 hours as needed for  moderate pain     magnesium oxide (MAG-OX) 400 (241.3 Mg) MG tablet Take 1 tablet (400 mg) by mouth daily     order for DME Equipment being ordered: Incentive spirometer     order for DME Equipment being ordered: Home BP monitor and cuff     polyethylene glycol (MIRALAX) 17 GM/Dose powder Take 17 g by mouth daily     No current facility-administered medications for this visit.     ALLERGIES: No Known Allergies    DIET: Regular, regular texture, thin liquids.    Vitals:    08/16/21 1214   BP: 120/69   Pulse: 72   Resp: 18   Temp: 97.8  F (36.6  C)   SpO2: 96%   Weight: 41.7 kg (92 lb)     Body mass index is 19.23 kg/m .    EXAMINATION:   General: Pleasant, alert, and conversant elderly female in no apparent distress.    Head: Normocephalic and atraumatic.   Eyes: PERRLA, sclerae clear.   ENT: Moist oral mucosa.  She has several of her own teeth but missing all lower teeth on the right as well as several uppercase.  No rhinorrhea or nasal discharge.  Hearing seems unimpaired.  Cardiovascular: Regular rate and rhythm.  No appreciable murmur.  Respiratory: Lungs clear to auscultation bilaterally.   Abdomen: Nondistended.   Musculoskeletal/Extremities: Age-related degenerative joint disease and moderate to severe kyphosis.  Hands and fingers shows significant deformities without a diagnosis of rheumatoid arthritis.  Bilateral hallux valgus deformities with partial great toe overlap.  No peripheral edema.  Integument: No rashes, clinically significant lesions, or skin breakdown.   Cognitive/Psychiatric: Mostly alert and oriented, although there may be some minor cognitive issues.  Affect is euthymic.    DIAGNOSTICS:   No results found for this or any previous visit (from the past 240 hour(s)).   Last Comprehensive Metabolic Panel:  Sodium   Date Value Ref Range Status   07/14/2021 144 133 - 144 mmol/L Final   01/30/2019 140 133 - 144 mmol/L Final     Potassium   Date Value Ref Range Status   07/15/2021 3.5 3.4 - 5.3  mmol/L Final   01/30/2019 4.0 3.4 - 5.3 mmol/L Final     Chloride   Date Value Ref Range Status   07/14/2021 116 (H) 94 - 109 mmol/L Final   01/30/2019 104 94 - 109 mmol/L Final     Carbon Dioxide   Date Value Ref Range Status   01/30/2019 30 20 - 32 mmol/L Final     Carbon Dioxide (CO2)   Date Value Ref Range Status   07/14/2021 23 20 - 32 mmol/L Final     Anion Gap   Date Value Ref Range Status   07/14/2021 5 3 - 14 mmol/L Final   01/30/2019 6 3 - 14 mmol/L Final     Glucose   Date Value Ref Range Status   07/14/2021 92 70 - 99 mg/dL Final   01/30/2019 129 (H) 70 - 99 mg/dL Final     Urea Nitrogen   Date Value Ref Range Status   07/14/2021 41 (H) 7 - 30 mg/dL Final   07/13/2020 16 7 - 30 mg/dL Final     Creatinine   Date Value Ref Range Status   07/14/2021 0.52 0.52 - 1.04 mg/dL Final   07/13/2020 0.67 0.52 - 1.04 mg/dL Final     GFR Estimate   Date Value Ref Range Status   07/14/2021 84 >60 mL/min/1.73m2 Final     Comment:     As of July 11, 2021, eGFR is calculated by the CKD-EPI creatinine equation, without race adjustment. eGFR can be influenced by muscle mass, exercise, and diet. The reported eGFR is an estimation only and is only applicable if the renal function is stable.   07/13/2020 77 >60 mL/min/[1.73_m2] Final     Comment:     Non  GFR Calc  Starting 12/18/2018, serum creatinine based estimated GFR (eGFR) will be   calculated using the Chronic Kidney Disease Epidemiology Collaboration   (CKD-EPI) equation.       Calcium   Date Value Ref Range Status   07/14/2021 7.9 (L) 8.5 - 10.1 mg/dL Final   07/13/2020 8.8 8.5 - 10.1 mg/dL Final     Lab Results   Component Value Date    WBC 17.6 07/13/2021    WBC 7.2 01/09/2018     Lab Results   Component Value Date    RBC 5.39 07/13/2021    RBC 4.04 01/09/2018     Lab Results   Component Value Date    HGB 14.2 07/14/2021    HGB 13.0 06/20/2018     Lab Results   Component Value Date    HCT 49.5 07/13/2021    HCT 43.1 06/20/2018     Lab Results    Component Value Date    MCV 92 07/13/2021    MCV 96.0 06/20/2018     Lab Results   Component Value Date    MCH 30.1 07/13/2021    MCH 29.0 06/20/2018     Lab Results   Component Value Date    MCHC 32.7 07/13/2021    MCHC 30.2 06/20/2018     Lab Results   Component Value Date    RDW 12.4 07/13/2021    RDW 13.3 01/09/2018     Lab Results   Component Value Date     07/13/2021     01/09/2018       ASSESSMENT/Plan:      ICD-10-CM    1. Fall, subsequent encounter  W19.XXXD    2. Closed fracture of the 9th rib of right side with routine healing, subsequent encounter  S22.31XD    3. Age-related osteoporosis without current pathological fracture  M81.0    4. Essential hypertension  I10    5. Mild cognitive impairment  G31.84    6. Age-related osteoporosis with current pathological fracture with routine healing, subsequent encounter  M80.00XD        DISCHARGE PLAN/FACE TO FACE:  I certify that this patient is under my care and that I had a face-to-face encounter that meets the physician face-to-face encounter requirements with this patient.     Date of Face-to-Face Encounter: 08/16/2021     I certify that, based on my findings, the following services are medically necessary home health services:  Home health aide, home PT, and home OT    My clinical findings support the need for the above skilled services because: (Please write a brief narrative summary that describes what the RN, PT, SLP, or other services will be doing in the home. A list of diagnoses in this section does not meet the CMS requirements):  Home health aide for bathing and ADL needs; home PT for strengthening, balance, endurance, and safety with mobility/ambulation; home OT for strengthening, ADL needs, adaptive equipment, and safety.    This patient is homebound because: (Please write a brief narrative summmary describing the functional limitations as to why this patient is homebound and specifically what makes this patient homebound.):    Above services necessarily need to be performed in the home to be of benefit.    The patient is, or has been, under my care and I have initiated the establishment of the plan of care. This patient will be followed by a physician who will periodically review the plan of care.  Initial follow-up should be within 7-10 days.    Approximate time spent with this patient was greater than 30 minutes with greater than 50% spent in discussions regarding services required upon discharge.      The above has been created using voice recognition software. Please be aware that this may unintentionally  produce inaccuracies and/or nonsensical sentences.      Electronically signed by: SUSIE Nieves CNP

## 2021-08-19 NOTE — TELEPHONE ENCOUNTER
[facility] phone call message - order or referral request for patient:     Order or referral being requested: Rut from [facility] called requesting patient be followed by Dr. Alejandro Sandhu for home health care orders.       Additional Comments: Any additional questions contact Rut @ [phone]    OK to leave a message on voice mail? Yes    Primary language: English      needed? No    Call taken on August 19, 2021 at 11:42 AM by Carrie Guzman

## 2021-08-20 NOTE — TELEPHONE ENCOUNTER
RN contacted [facility] and relayed message below from Dr. Sandhu. She verbalized understanding and had no further questions at this time.   Dorothea Davis RN

## 2021-08-25 ENCOUNTER — OFFICE VISIT (OUTPATIENT)
Dept: FAMILY MEDICINE | Facility: CLINIC | Age: 86
End: 2021-08-25
Payer: COMMERCIAL

## 2021-08-25 VITALS
OXYGEN SATURATION: 97 % | BODY MASS INDEX: 20.73 KG/M2 | HEART RATE: 87 BPM | TEMPERATURE: 97.9 F | WEIGHT: 99.2 LBS | DIASTOLIC BLOOD PRESSURE: 79 MMHG | SYSTOLIC BLOOD PRESSURE: 115 MMHG

## 2021-08-25 DIAGNOSIS — R29.6 FALLS FREQUENTLY: ICD-10-CM

## 2021-08-25 DIAGNOSIS — R63.4 WEIGHT LOSS: ICD-10-CM

## 2021-08-25 DIAGNOSIS — S22.39XD CLOSED FRACTURE OF ONE RIB WITH ROUTINE HEALING, UNSPECIFIED LATERALITY, SUBSEQUENT ENCOUNTER: Primary | ICD-10-CM

## 2021-08-25 DIAGNOSIS — R41.0 DELIRIUM: ICD-10-CM

## 2021-08-25 DIAGNOSIS — R41.3 MEMORY DEFICIT: ICD-10-CM

## 2021-08-25 LAB
ALBUMIN SERPL-MCNC: 3.6 G/DL (ref 3.4–5)
ALP SERPL-CCNC: 66 U/L (ref 40–150)
ALT SERPL W P-5'-P-CCNC: 18 U/L (ref 0–50)
ANION GAP SERPL CALCULATED.3IONS-SCNC: 5 MMOL/L (ref 3–14)
AST SERPL W P-5'-P-CCNC: 21 U/L (ref 0–45)
BILIRUB SERPL-MCNC: 0.4 MG/DL (ref 0.2–1.3)
BUN SERPL-MCNC: 12 MG/DL (ref 7–30)
CALCIUM SERPL-MCNC: 9.1 MG/DL (ref 8.5–10.1)
CHLORIDE BLD-SCNC: 104 MMOL/L (ref 94–109)
CO2 SERPL-SCNC: 27 MMOL/L (ref 20–32)
CREAT SERPL-MCNC: 0.62 MG/DL (ref 0.52–1.04)
ERYTHROCYTE [DISTWIDTH] IN BLOOD BY AUTOMATED COUNT: 13.5 % (ref 10–15)
FOLATE SERPL-MCNC: 9.5 NG/ML
GFR SERPL CREATININE-BSD FRML MDRD: 79 ML/MIN/1.73M2
GLUCOSE BLD-MCNC: 71 MG/DL (ref 70–99)
HCT VFR BLD AUTO: 44.2 %
HGB BLD-MCNC: 14.2 G/DL
MCH RBC QN AUTO: 29.6 PG (ref 26.5–33)
MCHC RBC AUTO-ENTMCNC: 32.1 G/DL (ref 31.5–36.5)
MCV RBC AUTO: 92 FL (ref 78–100)
PLATELET # BLD AUTO: 187 10E3/UL (ref 150–450)
POTASSIUM BLD-SCNC: 3.6 MMOL/L (ref 3.4–5.3)
PROT SERPL-MCNC: 7.1 G/DL (ref 6.8–8.8)
RBC # BLD AUTO: 4.79 10E6/UL
SODIUM SERPL-SCNC: 136 MMOL/L (ref 133–144)
VIT B12 SERPL-MCNC: 819 PG/ML (ref 193–986)
WBC # BLD AUTO: 9.6 10E3/UL (ref 4–11)

## 2021-08-25 PROCEDURE — 82607 VITAMIN B-12: CPT | Performed by: FAMILY MEDICINE

## 2021-08-25 PROCEDURE — 82746 ASSAY OF FOLIC ACID SERUM: CPT | Performed by: FAMILY MEDICINE

## 2021-08-25 PROCEDURE — 80053 COMPREHEN METABOLIC PANEL: CPT | Performed by: FAMILY MEDICINE

## 2021-08-25 PROCEDURE — 99215 OFFICE O/P EST HI 40 MIN: CPT | Performed by: FAMILY MEDICINE

## 2021-08-25 PROCEDURE — 85027 COMPLETE CBC AUTOMATED: CPT | Performed by: FAMILY MEDICINE

## 2021-08-25 PROCEDURE — 36415 COLL VENOUS BLD VENIPUNCTURE: CPT | Performed by: FAMILY MEDICINE

## 2021-08-25 NOTE — PROGRESS NOTES
Belia was seen today for recheck.    Diagnoses and all orders for this visit:    Closed fracture of one rib with routine healing, unspecified laterality, subsequent encounter.  She is healing well.  Still not back to functional baseline.    Falls frequently.  Continue PT /OT.  Fortunately no falls since discharge.    Weight loss.  Interestingly her weight was the same today as it was prior to her hospitalization on our scales.  She does look quite thin to me and I will check some lab work to investigate possible metabolic causes.  -     Comprehensive Metabolic Panel; Future  -     CBC with platelets; Future  -     Comprehensive Metabolic Panel  -     CBC with platelets    Memory deficit.  Mentation is not back to baseline but she was alert and oriented x3 today.  Mentation has not fluctuated and I do not believe that she is delirious.  -     Folate; Future  -     Vitamin B12; Future  -     Folate  -     Vitamin B12    Delirium.  She has a history of prolonged delirium along with some acute delirious episodes when she was in the hospital, but these have resolved.      Total time of visit: 42 minutes for review of hospital and nursing home records, evaluation of patient and discussion of plan, and documentation.          David Sheets is a 89 year old who presents for the following health issues: Discharged from nursing home, falls, memory complaints.    HPI   She is home from a prolonged stay in the TCU following hospitalization for a fall and rib fracture.  Was complicated by episodes of delirium.  She is gradually returning to her cognitive baseline and both she and her son feel that she is doing quite well.  Son feels that her mental status is not quite back to baseline.    Participating in home PT/OT.  No falls.  There is concerned about possible weight loss.          Review of Systems         Objective    /79 (BP Location: Left arm, Patient Position: Sitting, Cuff Size: Adult Small)   Pulse 87    Temp 97.9  F (36.6  C) (Oral)   Wt 45 kg (99 lb 3.2 oz)   SpO2 97%   Breastfeeding No   BMI 20.73 kg/m    Body mass index is 20.73 kg/m .  Physical Exam  Constitutional:       Comments: Alert, pleasant.  Uses a 4-wheeled walker with seat.   Cardiovascular:      Rate and Rhythm: Normal rate and regular rhythm.      Heart sounds: Normal heart sounds.   Pulmonary:      Effort: No respiratory distress.      Breath sounds: No wheezing or rales.   Abdominal:      General: Bowel sounds are normal.      Palpations: Abdomen is soft. There is no mass.      Tenderness: There is no abdominal tenderness.   Musculoskeletal:      Comments: + marked scoliosis, kyphosis.  + muscle wasting throughout   Neurological:      Mental Status: She is alert and oriented to person, place, and time.   Psychiatric:         Behavior: Behavior normal.      Comments: Alert and oriented to date, month, year, place.            Lab Requisition on 07/23/2021   Component Date Value Ref Range Status     Color Urine 07/23/2021 Yellow  Colorless, Straw, Light Yellow, Yellow Final     Appearance Urine 07/23/2021 Cloudy* Clear Final     Glucose Urine 07/23/2021 Negative  Negative mg/dL Final     Bilirubin Urine 07/23/2021 Negative  Negative Final     Ketones Urine 07/23/2021 Negative  Negative mg/dL Final     Specific Gravity Urine 07/23/2021 1.015  1.001 - 1.030 Final     Blood Urine 07/23/2021 Negative  Negative Final     pH Urine 07/23/2021 8.0* 5.0 - 7.0 Final     Protein Albumin Urine 07/23/2021 Negative  Negative mg/dL Final     Urobilinogen Urine 07/23/2021 <2.0  <2.0 mg/dL Final     Nitrite Urine 07/23/2021 Negative  Negative Final     Leukocyte Esterase Urine 07/23/2021 Negative  Negative Final     Bacteria Urine 07/23/2021 Moderate* None Seen /HPF Final     WBC Clumps Urine 07/23/2021 Present* None Seen /HPF Final     Mucus Urine 07/23/2021 Present* None Seen /LPF Final     Amorphous Crystals Urine 07/23/2021 Moderate* None Seen /HPF Final      RBC Urine 07/23/2021 5* <=2 /HPF Final     WBC Urine 07/23/2021 22* <=5 /HPF Final     Granular Casts Urine 07/23/2021 1* None Seen /LPF Final     Culture 07/23/2021 10,000-50,000 CFU/mL Enterococcus species*  Final

## 2021-09-11 ENCOUNTER — HEALTH MAINTENANCE LETTER (OUTPATIENT)
Age: 86
End: 2021-09-11

## 2021-09-11 ENCOUNTER — APPOINTMENT (OUTPATIENT)
Dept: GENERAL RADIOLOGY | Facility: CLINIC | Age: 86
End: 2021-09-11
Attending: INTERNAL MEDICINE
Payer: COMMERCIAL

## 2021-09-11 ENCOUNTER — HOSPITAL ENCOUNTER (OUTPATIENT)
Facility: CLINIC | Age: 86
Setting detail: OBSERVATION
Discharge: HOME | End: 2021-09-13
Attending: INTERNAL MEDICINE | Admitting: SURGERY
Payer: COMMERCIAL

## 2021-09-11 DIAGNOSIS — S32.501A CLOSED FRACTURE OF RIGHT PUBIS, UNSPECIFIED PORTION OF PUBIS, INITIAL ENCOUNTER (H): ICD-10-CM
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
## 2021-09-11 DIAGNOSIS — Z11.52 ENCOUNTER FOR SCREENING LABORATORY TESTING FOR SEVERE ACUTE RESPIRATORY SYNDROME CORONAVIRUS 2 (SARS-COV-2): ICD-10-CM

## 2021-09-11 DIAGNOSIS — S32.591A CLOSED BILATERAL FRACTURE OF PUBIC RAMI, INITIAL ENCOUNTER (H): ICD-10-CM

## 2021-09-11 DIAGNOSIS — S32.592A CLOSED BILATERAL FRACTURE OF PUBIC RAMI, INITIAL ENCOUNTER (H): ICD-10-CM

## 2021-09-11 LAB
ABO/RH(D): NORMAL
ALBUMIN SERPL-MCNC: 3.4 G/DL (ref 3.4–5)
ALP SERPL-CCNC: 74 U/L (ref 40–150)
ALT SERPL W P-5'-P-CCNC: 15 U/L (ref 0–50)
ANION GAP SERPL CALCULATED.3IONS-SCNC: 8 MMOL/L (ref 3–14)
ANTIBODY SCREEN: NEGATIVE
AST SERPL W P-5'-P-CCNC: 22 U/L (ref 0–45)
ATRIAL RATE - MUSE: 86 BPM
BASOPHILS # BLD AUTO: 0.1 10E3/UL (ref 0–0.2)
BASOPHILS NFR BLD AUTO: 1 %
BILIRUB SERPL-MCNC: 1.2 MG/DL (ref 0.2–1.3)
BUN SERPL-MCNC: 12 MG/DL (ref 7–30)
CALCIUM SERPL-MCNC: 9 MG/DL (ref 8.5–10.1)
CHLORIDE BLD-SCNC: 104 MMOL/L (ref 94–109)
CO2 SERPL-SCNC: 27 MMOL/L (ref 20–32)
CREAT SERPL-MCNC: 0.59 MG/DL (ref 0.52–1.04)
DIASTOLIC BLOOD PRESSURE - MUSE: NORMAL MMHG
EOSINOPHIL # BLD AUTO: 0.2 10E3/UL (ref 0–0.7)
EOSINOPHIL NFR BLD AUTO: 1 %
ERYTHROCYTE [DISTWIDTH] IN BLOOD BY AUTOMATED COUNT: 13.2 % (ref 10–15)
GFR SERPL CREATININE-BSD FRML MDRD: 80 ML/MIN/1.73M2
GLUCOSE BLD-MCNC: 109 MG/DL (ref 70–99)
HCT VFR BLD AUTO: 47.4 % (ref 35–47)
HGB BLD-MCNC: 14.8 G/DL (ref 11.7–15.7)
HOLD SPECIMEN: NORMAL
IMM GRANULOCYTES # BLD: 0.1 10E3/UL
IMM GRANULOCYTES NFR BLD: 0 %
INR PPP: 1.1 (ref 0.85–1.15)
INTERPRETATION ECG - MUSE: NORMAL
LYMPHOCYTES # BLD AUTO: 1.3 10E3/UL (ref 0.8–5.3)
LYMPHOCYTES NFR BLD AUTO: 12 %
MAGNESIUM SERPL-MCNC: 1.7 MG/DL (ref 1.6–2.3)
MAGNESIUM SERPL-MCNC: 1.9 MG/DL (ref 1.6–2.3)
MCH RBC QN AUTO: 30.3 PG (ref 26.5–33)
MCHC RBC AUTO-ENTMCNC: 31.2 G/DL (ref 31.5–36.5)
MCV RBC AUTO: 97 FL (ref 78–100)
MONOCYTES # BLD AUTO: 0.7 10E3/UL (ref 0–1.3)
MONOCYTES NFR BLD AUTO: 6 %
NEUTROPHILS # BLD AUTO: 8.9 10E3/UL (ref 1.6–8.3)
NEUTROPHILS NFR BLD AUTO: 80 %
NRBC # BLD AUTO: 0 10E3/UL
NRBC BLD AUTO-RTO: 0 /100
P AXIS - MUSE: 84 DEGREES
PHOSPHATE SERPL-MCNC: 2.8 MG/DL (ref 2.5–4.5)
PHOSPHATE SERPL-MCNC: 3.1 MG/DL (ref 2.5–4.5)
PLATELET # BLD AUTO: 216 10E3/UL (ref 150–450)
POTASSIUM BLD-SCNC: 3.3 MMOL/L (ref 3.4–5.3)
POTASSIUM BLD-SCNC: 3.5 MMOL/L (ref 3.4–5.3)
PR INTERVAL - MUSE: 168 MS
PROT SERPL-MCNC: 7.5 G/DL (ref 6.8–8.8)
QRS DURATION - MUSE: 80 MS
QT - MUSE: 384 MS
QTC - MUSE: 459 MS
R AXIS - MUSE: -5 DEGREES
RBC # BLD AUTO: 4.88 10E6/UL (ref 3.8–5.2)
SARS-COV-2 RNA RESP QL NAA+PROBE: NEGATIVE
SODIUM SERPL-SCNC: 139 MMOL/L (ref 133–144)
SPECIMEN EXPIRATION DATE: NORMAL
SYSTOLIC BLOOD PRESSURE - MUSE: NORMAL MMHG
T AXIS - MUSE: 80 DEGREES
TROPONIN I SERPL-MCNC: <0.015 UG/L (ref 0–0.04)
VENTRICULAR RATE- MUSE: 86 BPM
WBC # BLD AUTO: 11.2 10E3/UL (ref 4–11)

## 2021-09-11 PROCEDURE — 99222 1ST HOSP IP/OBS MODERATE 55: CPT | Performed by: PHYSICIAN ASSISTANT

## 2021-09-11 PROCEDURE — 99285 EMERGENCY DEPT VISIT HI MDM: CPT | Performed by: INTERNAL MEDICINE

## 2021-09-11 PROCEDURE — 36415 COLL VENOUS BLD VENIPUNCTURE: CPT | Performed by: INTERNAL MEDICINE

## 2021-09-11 PROCEDURE — 80053 COMPREHEN METABOLIC PANEL: CPT | Performed by: INTERNAL MEDICINE

## 2021-09-11 PROCEDURE — 120N000002 HC R&B MED SURG/OB UMMC

## 2021-09-11 PROCEDURE — 250N000013 HC RX MED GY IP 250 OP 250 PS 637: Performed by: PHYSICIAN ASSISTANT

## 2021-09-11 PROCEDURE — 682N000003 HC TRAUMA EVALUATION W/O CC LEVEL II

## 2021-09-11 PROCEDURE — 84132 ASSAY OF SERUM POTASSIUM: CPT | Mod: 91 | Performed by: SURGERY

## 2021-09-11 PROCEDURE — 72170 X-RAY EXAM OF PELVIS: CPT

## 2021-09-11 PROCEDURE — 71045 X-RAY EXAM CHEST 1 VIEW: CPT

## 2021-09-11 PROCEDURE — 93005 ELECTROCARDIOGRAM TRACING: CPT | Performed by: INTERNAL MEDICINE

## 2021-09-11 PROCEDURE — 84100 ASSAY OF PHOSPHORUS: CPT | Performed by: PHYSICIAN ASSISTANT

## 2021-09-11 PROCEDURE — 71045 X-RAY EXAM CHEST 1 VIEW: CPT | Mod: 26 | Performed by: RADIOLOGY

## 2021-09-11 PROCEDURE — U0003 INFECTIOUS AGENT DETECTION BY NUCLEIC ACID (DNA OR RNA); SEVERE ACUTE RESPIRATORY SYNDROME CORONAVIRUS 2 (SARS-COV-2) (CORONAVIRUS DISEASE [COVID-19]), AMPLIFIED PROBE TECHNIQUE, MAKING USE OF HIGH THROUGHPUT TECHNOLOGIES AS DESCRIBED BY CMS-2020-01-R: HCPCS | Performed by: INTERNAL MEDICINE

## 2021-09-11 PROCEDURE — 83735 ASSAY OF MAGNESIUM: CPT | Performed by: SURGERY

## 2021-09-11 PROCEDURE — C9803 HOPD COVID-19 SPEC COLLECT: HCPCS | Performed by: INTERNAL MEDICINE

## 2021-09-11 PROCEDURE — 250N000011 HC RX IP 250 OP 636: Performed by: PHYSICIAN ASSISTANT

## 2021-09-11 PROCEDURE — 84100 ASSAY OF PHOSPHORUS: CPT | Performed by: SURGERY

## 2021-09-11 PROCEDURE — 96372 THER/PROPH/DIAG INJ SC/IM: CPT | Performed by: PHYSICIAN ASSISTANT

## 2021-09-11 PROCEDURE — 85610 PROTHROMBIN TIME: CPT | Performed by: INTERNAL MEDICINE

## 2021-09-11 PROCEDURE — 99285 EMERGENCY DEPT VISIT HI MDM: CPT | Mod: 25 | Performed by: INTERNAL MEDICINE

## 2021-09-11 PROCEDURE — 250N000013 HC RX MED GY IP 250 OP 250 PS 637: Performed by: SURGERY

## 2021-09-11 PROCEDURE — 36415 COLL VENOUS BLD VENIPUNCTURE: CPT | Performed by: SURGERY

## 2021-09-11 PROCEDURE — 86900 BLOOD TYPING SEROLOGIC ABO: CPT | Performed by: INTERNAL MEDICINE

## 2021-09-11 PROCEDURE — 72170 X-RAY EXAM OF PELVIS: CPT | Mod: 26 | Performed by: RADIOLOGY

## 2021-09-11 PROCEDURE — 85025 COMPLETE CBC W/AUTO DIFF WBC: CPT | Performed by: INTERNAL MEDICINE

## 2021-09-11 PROCEDURE — 84484 ASSAY OF TROPONIN QUANT: CPT | Performed by: INTERNAL MEDICINE

## 2021-09-11 PROCEDURE — 83735 ASSAY OF MAGNESIUM: CPT | Performed by: PHYSICIAN ASSISTANT

## 2021-09-11 RX ORDER — POLYETHYLENE GLYCOL 3350 17 G/17G
17 POWDER, FOR SOLUTION ORAL DAILY PRN
Status: DISCONTINUED | OUTPATIENT
Start: 2021-09-11 | End: 2021-09-13 | Stop reason: HOSPADM

## 2021-09-11 RX ORDER — TRAMADOL HYDROCHLORIDE 50 MG/1
50 TABLET ORAL EVERY 6 HOURS PRN
Status: DISCONTINUED | OUTPATIENT
Start: 2021-09-11 | End: 2021-09-13 | Stop reason: HOSPADM

## 2021-09-11 RX ORDER — POTASSIUM CHLORIDE 750 MG/1
10 TABLET, EXTENDED RELEASE ORAL ONCE
Status: DISCONTINUED | OUTPATIENT
Start: 2021-09-11 | End: 2021-09-13 | Stop reason: HOSPADM

## 2021-09-11 RX ORDER — POTASSIUM CHLORIDE 1.5 G/1.58G
20 POWDER, FOR SOLUTION ORAL ONCE
Status: COMPLETED | OUTPATIENT
Start: 2021-09-11 | End: 2021-09-11

## 2021-09-11 RX ORDER — METHOCARBAMOL 750 MG/1
750 TABLET, FILM COATED ORAL 3 TIMES DAILY
Status: DISCONTINUED | OUTPATIENT
Start: 2021-09-11 | End: 2021-09-13 | Stop reason: HOSPADM

## 2021-09-11 RX ORDER — AMOXICILLIN 250 MG
1-2 CAPSULE ORAL 2 TIMES DAILY
Status: DISCONTINUED | OUTPATIENT
Start: 2021-09-11 | End: 2021-09-13 | Stop reason: HOSPADM

## 2021-09-11 RX ORDER — PROCHLORPERAZINE 25 MG
12.5 SUPPOSITORY, RECTAL RECTAL EVERY 12 HOURS PRN
Status: DISCONTINUED | OUTPATIENT
Start: 2021-09-11 | End: 2021-09-13 | Stop reason: HOSPADM

## 2021-09-11 RX ORDER — ONDANSETRON 4 MG/1
4 TABLET, ORALLY DISINTEGRATING ORAL EVERY 6 HOURS PRN
Status: DISCONTINUED | OUTPATIENT
Start: 2021-09-11 | End: 2021-09-13 | Stop reason: HOSPADM

## 2021-09-11 RX ORDER — AMLODIPINE BESYLATE 5 MG/1
1 TABLET ORAL DAILY
COMMUNITY
Start: 2021-07-17 | End: 2021-09-11

## 2021-09-11 RX ORDER — PROCHLORPERAZINE MALEATE 5 MG
5 TABLET ORAL EVERY 6 HOURS PRN
Status: DISCONTINUED | OUTPATIENT
Start: 2021-09-11 | End: 2021-09-13 | Stop reason: HOSPADM

## 2021-09-11 RX ORDER — ACETAMINOPHEN 325 MG/1
975 TABLET ORAL 3 TIMES DAILY
Status: DISCONTINUED | OUTPATIENT
Start: 2021-09-11 | End: 2021-09-13 | Stop reason: HOSPADM

## 2021-09-11 RX ORDER — AMLODIPINE BESYLATE 5 MG/1
5 TABLET ORAL DAILY
Status: DISCONTINUED | OUTPATIENT
Start: 2021-09-11 | End: 2021-09-13 | Stop reason: HOSPADM

## 2021-09-11 RX ORDER — ONDANSETRON 2 MG/ML
4 INJECTION INTRAMUSCULAR; INTRAVENOUS EVERY 6 HOURS PRN
Status: DISCONTINUED | OUTPATIENT
Start: 2021-09-11 | End: 2021-09-13 | Stop reason: HOSPADM

## 2021-09-11 RX ORDER — HYDROMORPHONE HCL IN WATER/PF 6 MG/30 ML
0.2 PATIENT CONTROLLED ANALGESIA SYRINGE INTRAVENOUS
Status: DISCONTINUED | OUTPATIENT
Start: 2021-09-11 | End: 2021-09-13 | Stop reason: HOSPADM

## 2021-09-11 RX ORDER — LIDOCAINE 4 G/G
1-3 PATCH TOPICAL
Status: DISCONTINUED | OUTPATIENT
Start: 2021-09-11 | End: 2021-09-13 | Stop reason: HOSPADM

## 2021-09-11 RX ORDER — MAGNESIUM OXIDE 400 MG/1
400 TABLET ORAL 2 TIMES DAILY
Status: COMPLETED | OUTPATIENT
Start: 2021-09-11 | End: 2021-09-13

## 2021-09-11 RX ADMIN — ENOXAPARIN SODIUM 30 MG: 30 INJECTION SUBCUTANEOUS at 21:12

## 2021-09-11 RX ADMIN — POTASSIUM CHLORIDE 20 MEQ: 1.5 POWDER, FOR SOLUTION ORAL at 18:15

## 2021-09-11 RX ADMIN — MAGNESIUM OXIDE TAB 400 MG (241.3 MG ELEMENTAL MG) 400 MG: 400 (241.3 MG) TAB at 21:01

## 2021-09-11 RX ADMIN — METHOCARBAMOL 750 MG: 750 TABLET ORAL at 21:01

## 2021-09-11 RX ADMIN — ACETAMINOPHEN 975 MG: 325 TABLET, FILM COATED ORAL at 21:01

## 2021-09-11 RX ADMIN — AMLODIPINE BESYLATE 5 MG: 5 TABLET ORAL at 18:15

## 2021-09-11 RX ADMIN — CALCIUM CARBONATE 600 MG (1,500 MG)-VITAMIN D3 400 UNIT TABLET 1 TABLET: at 21:01

## 2021-09-11 ASSESSMENT — ACTIVITIES OF DAILY LIVING (ADL)
DIFFICULTY_COMMUNICATING: NO
FALL_HISTORY_WITHIN_LAST_SIX_MONTHS: YES
ADLS_ACUITY_SCORE: 18
CONCENTRATING,_REMEMBERING_OR_MAKING_DECISIONS_DIFFICULTY: NO
DIFFICULTY_EATING/SWALLOWING: NO
WALKING_OR_CLIMBING_STAIRS: STAIR CLIMBING DIFFICULTY, ASSISTANCE 1 PERSON
PATIENT_/_FAMILY_COMMUNICATION_STYLE: SPOKEN LANGUAGE (ENGLISH OR BILINGUAL)
WHICH_OF_THE_ABOVE_FUNCTIONAL_RISKS_HAD_A_RECENT_ONSET_OR_CHANGE?: AMBULATION
DOING_ERRANDS_INDEPENDENTLY_DIFFICULTY: NO
WERE_AUXILIARY_AIDS_OFFERED?: NO
HEARING_DIFFICULTY_OR_DEAF: NO
WALKING_OR_CLIMBING_STAIRS_DIFFICULTY: YES
DEPENDENT_IADLS:: INDEPENDENT
DRESSING/BATHING: BATHING DIFFICULTY, ASSISTANCE 1 PERSON
EQUIPMENT_CURRENTLY_USED_AT_HOME: OTHER (SEE COMMENTS)
WEAR_GLASSES_OR_BLIND: YES
VISION_MANAGEMENT: GLASSES
NUMBER_OF_TIMES_PATIENT_HAS_FALLEN_WITHIN_LAST_SIX_MONTHS: 2
DRESSING/BATHING_DIFFICULTY: YES
TOILETING_ISSUES: NO

## 2021-09-11 ASSESSMENT — ENCOUNTER SYMPTOMS
WEAKNESS: 1
EYE REDNESS: 0
HEADACHES: 0
SHORTNESS OF BREATH: 0
BACK PAIN: 0
COLOR CHANGE: 0
ABDOMINAL PAIN: 0
DIFFICULTY URINATING: 0
FEVER: 0
NECK STIFFNESS: 0
CONFUSION: 0
NECK PAIN: 0
ARTHRALGIAS: 0

## 2021-09-11 ASSESSMENT — MIFFLIN-ST. JEOR
SCORE: 773.69
SCORE: 739.69

## 2021-09-11 NOTE — PLAN OF CARE
Pt.  Arrived from er  into room. She is on room air, vss. Positive pedal pulses. Has some pain but refuses pain meds. Purewick  set up. Ordered juice from dietary. Accompanied by son. Bed alarm on.

## 2021-09-11 NOTE — CONSULTS
[facility]  ORTHOPAEDIC SURGERY CONSULT    DATE OF CONSULT: 9/11/2021 2:02 PM    REQUESTING PROVIDER: Porfirio Asencio*, MD - [facility] General Surgery Staff.    CC: Pelvis pain with WB    DATE OF INJURY: 9/10/21    HISTORY OF PRESENT ILLNESS:   The orthopaedic surgery service was consulted by Porfirio Paula,* for evaluation and treatment recommendations of nondisplaced right superior and inferior pubic rami fractures.      Belia Sheets is a 89 year old  female with pmhx significant for HTN, osteoporosis, rectal prolapse, CPPD, carpal tunnel, OA of hands who presented to the ED due to a GLF on 9/10/2021 at 8:30 PM. She endorses falling out of bed after moving too quickly. She states that she hit her head but denies LOC. She was unable to get off the ground and needed assistance from EMS.  She endorses pain in her groin and from her R knee down to her ankle  w/ weight bearing and ambulation. No pain at rest. Denies lower back pain. Endorses weakness in her RLE. Denies numbness, and tingling to the affected extremities. Ambulates with walker at baseline. Patient is [facility] resident and is in independent living. She has been able to complete her ADLs prior to her fall.     PAST MEDICAL HISTORY:   Past Medical History:   Diagnosis Date     Carpal tunnel syndrome (aka CTS)      H/O calcium pyrophosphate deposition disease (CPPD)      History of encephalopathy 10/2017     Hypertension      Kyphoscoliosis      Osteoarthritis     hands     Osteoporosis      Rectal prolapse      [Patient denies any personal history of bleeding disorders, clotting disorders, or adverse reactions to anesthesia].    PAST SURGICAL HISTORY:    Past Surgical History:   Procedure Laterality Date     COLONOSCOPY  2010     HYSTERECTOMY      for uterine prolapse     RECTOSIGMOIDECTOMY PERINEAL N/A 1/12/2018    Procedure: RECTOSIGMOIDECTOMY PERINEAL;  Perineal Rectosigmoidectomy  ;  Surgeon:  Amrik Mariano MD;  Location: [facility] OR       MEDICATIONS:   Prior to Admission medications    Medication Sig Last Dose Taking? Auth Provider   amLODIPine (NORVASC) 5 MG tablet Take 1 tablet by mouth daily  Yes Reported, Patient   acetaminophen (TYLENOL) 325 MG tablet Take 2 tablets (650 mg) by mouth every 4 hours as needed for mild pain  Patient not taking: Reported on 8/25/2021   Darnell Woods MD   amLODIPine (NORVASC) 5 MG tablet Take 1 tablet (5 mg) by mouth daily   Alejandro Sandhu MD   calcium carbonate 600 mg-vitamin D 400 units (CALTRATE) 600-400 MG-UNIT per tablet Take 1 tablet by mouth 2 times daily   Saurabh Pittman MD   order for DME Equipment being ordered: Incentive spirometer   Maday Berger MD   order for DME Equipment being ordered: Home BP monitor and cuff   Alejandro Sandhu MD   polyethylene glycol (MIRALAX) 17 GM/Dose powder Take 17 g by mouth daily  Patient not taking: Reported on 8/25/2021   Ricky Lawrence APRN CNP       ALLERGIES:   Patient has no known allergies.    SOCIAL HISTORY:   Social History     Socioeconomic History     Marital status:      Spouse name: Not on file     Number of children: Not on file     Years of education: Not on file     Highest education level: Not on file   Occupational History     Not on file   Tobacco Use     Smoking status: Never Smoker     Smokeless tobacco: Never Used   Substance and Sexual Activity     Alcohol use: No     Drug use: No     Sexual activity: Never   Other Topics Concern     Not on file   Social History Narrative     Not on file     Social Determinants of Health     Financial Resource Strain:      Difficulty of Paying Living Expenses:    Food Insecurity:      Worried About Running Out of Food in the Last Year:      Ran Out of Food in the Last Year:    Transportation Needs:      Lack of Transportation (Medical):      Lack of Transportation (Non-Medical):    Physical Activity:      Days of Exercise per Week:       Minutes of Exercise per Session:    Stress:      Feeling of Stress :    Social Connections:      Frequency of Communication with Friends and Family:      Frequency of Social Gatherings with Friends and Family:      Attends Jewish Services:      Active Member of Clubs or Organizations:      Attends Club or Organization Meetings:      Marital Status:    Intimate Partner Violence:      Fear of Current or Ex-Partner:      Emotionally Abused:      Physically Abused:      Sexually Abused:      Living situation: Patient lives at [facility]   Tobacco: denies  Alcohol: denies  Illicit Drugs: denies    FAMILY HISTORY:  Family History   Problem Relation Age of Onset     Depression/Anxiety Son      Depression/Anxiety Son         alcohol abuse  age 24     Hypertension Mother      Hypertension Brother      Diabetes No family hx of      Breast Cancer No family hx of      Colon Cancer No family hx of      Prostate Cancer No family hx of      Other Cancer No family hx of        REVIEW OF SYSTEMS:    Otherwise a 10-point reviews of systems was negative except as noted above in the HPI.     PHYSICAL EXAM:   Vitals:    21 1200 21 1230 21 1300 21 1330   BP: (!) 136/101 (!) 141/94 136/87 (!) 145/88   Pulse: 76 80 80 88   Resp: 18 18 17 16   Temp:       TempSrc:       SpO2: 93% 94% 92% 94%   Weight:       Height:         General: Awake, alert, appropriate, following commands, NAD.  Lungs: Breathing comfortably and nonlabored, no wheezes or stridor noted.  Heart/Cardiovascular: Regular pulse, no peripheral cyanosis.  Pelvis: Lateral and AP compression of pelvis deferred due to known fractures. Bilateral SI joints non-tender.   Right Upper Extremity:skin intact. No TTP to shoulder, clavicle, arm, elbow, forearm, wrists, hands. +Arthritic changes in all digits.  Unable complete OK sign due to deformity at baseline. Wiggles fingers. SILT ax/m/r/u nerve distributions. Radial pulse palpable,  2+.   Left Upper Extremity: skin intact. No TTP to shoulder, clavicle, arm, elbow, forearm, wrists, hands. +Arthritic changes in all digits.  Unable complete OK sign due to deformity at baseline. Wiggles fingers. SILT ax/m/r/u nerve distributions. Radial pulse palpable, 2+  Right Lower Extremity: No deformity, skin intact. No significant tenderness to palpation over thigh, knee, leg, ankle/foot. No pain with ROM hip/knee/ankle. Motor intact distally TA/GSC/EHL/FHL with 5/5 strength. SILT sp/dp/tibial/saph/sural nerves. DP pulses palpable, 2+, toes warm and well perfused.   Left Lower Extremity: No deformity, skin intact. No significant tenderness to palpation over thigh, knee, leg, ankle/foot. No pain with ROM hip/knee/ankle. Motor intact distally TA/GSC/EHL/FHL with 5/5 strength. SILT sp/dp/tibial/saph/sural nerves. DP pulses palpable, 2+, toes warm and well perfused.     LABS:  Hemoglobin   Date Value Ref Range Status   09/11/2021 14.8 11.7 - 15.7 g/dL Final   08/25/2021 14.2 g/dL Final   06/20/2018 13.0 11.7 - 15.7 g/dL Final   01/09/2018 11.8 11.7 - 15.7 g/dL Final     WBC   Date Value Ref Range Status   01/09/2018 7.2 4.0 - 11.0 10e9/L Final     WBC Count   Date Value Ref Range Status   09/11/2021 11.2 (H) 4.0 - 11.0 10e3/uL Final     Platelet Count   Date Value Ref Range Status   09/11/2021 216 150 - 450 10e3/uL Final   01/09/2018 184 150 - 450 10e9/L Final     INR   Date Value Ref Range Status   09/11/2021 1.10 0.85 - 1.15 Final     Comment:     Effective 7/11/2021, the reference range for this assay has changed.   10/25/2017 1.03 0.86 - 1.14 Final     Creatinine   Date Value Ref Range Status   09/11/2021 0.59 0.52 - 1.04 mg/dL Final   07/13/2020 0.67 0.52 - 1.04 mg/dL Final     Glucose   Date Value Ref Range Status   09/11/2021 109 (H) 70 - 99 mg/dL Final   01/30/2019 129 (H) 70 - 99 mg/dL Final     No results found for: CRP  No results found for: SED      IMAGING:  AP pelvis 9/11/21 demonstrates  nondisplaced right superior and inferior pubic rami fractures. No hip fracture or dislocation seen.     IMPRESSION:   Belia Sheets is a 89 year old  female with pmhx significant for HTN, osteoporosis, rectal prolapse, CPPD, carpal tunnel, OA of hands s/p GLF on 9/10/2021 with the following Orthopaedic Injuries:    1. Nondisplaced right superior and inferior pubic rami fractures.     Diagnosis and nature of injury was discussed. Recommend non-operative management. Patient is being admitted to observation for therapies.      RECOMMENDATIONS:   Trauma Primary  - X-rays/Imaging: no additional imaging indicated, no posterior pain to suggest sacral ala fracture   - Activity: no restrictions   - Weight bearing: WBAT with walker  - Pain control: defer to primary   - Diet: defer to primary team   - Follow-up: 3-4 weeks with non-operative orthopaedics provider with AP pelvis XR vs with PCP if PCP comfortable  - Disposition: floor.     Assessment and Plan to be discussed with Dr. Downey,    Nieves Eaton MD  PGY-4  Orthopaedic Surgery    Orthopaedics will follow this patient peripherally at this time. Please call or page me or the on-call resident with any concerns or questions.

## 2021-09-11 NOTE — PROGRESS NOTES
Admitted/transferred from: ED  2 RN full   skin assessment completed by Isatu Sogbeh, RN and Marnie GRAJEDA  Skin assessment finding: skin intact, no problems   Interventions/actions: skin interventions pneumoboots placed on legs.     Will continue to monitor.

## 2021-09-11 NOTE — ED TRIAGE NOTES
"Pt brought in by ambulance ([facility])  from [facility], found on floor next to bed this morning, fell sometime early last evening after \"turning too quickly while walking\". Denies loss of consciousness. Unable to walk when EMS arrived, usually uses walker. Complains bilateral groin pain  "

## 2021-09-11 NOTE — H&P
[facility]    History and Physical: Trauma Service       Date of Admission:  9/11/2021    Time of Admission/Consult Request (page/call): 11:36    Time of my evaluation: 12:00  Consulting services:  Orthopedics - Called by me    Assessment   Trauma mechanism: Ground Level Fall  Time/date of injury: 9/10/21 (1 day prior to admission)  Known Injuries:  1.Nondisplaced right superior and inferior pubic rami fracture      Neuro/Pain:  # Hx of TIA   # Ground level fall, no LOC, no dizziness or signs of syncope   # Acute on chronic pain   - Scheduled: Tylenol, Lidoderm, methocarbamol,    - Prn: dilaudid, ultram     Pulmonary:  - Supplemental oxygen to keep saturation above 92 %.  - Incentive spirometer while awake   - CXR: Clear lungs. No pneumothorax. No pleural effusions.     Cardiovascular:    # Hypertension   # Hx of PACs on EKGs  - Monitor hemodynamic status.  - Trop: neg   - EKG in ED: Sinus rhythm with PACs, Nonspecific T wave abnormality. Abnormal ECG  - Patient has mild hypokalemia, electrolyte protocols in place for high replacement   - Continue PTA: amlodipine     GI/Nutrition:    - Regular Diet    Renal/ Fluids/Electrolytes:  # Hypokalemia, mild  - K: 3.3  - Creatinine: 059  - electrolyte replacement protocol in place.     Endocrine:  - No management indication.     Infectious disease:   # Stress Leukocytosis  - WBC: 11.2  - No indications for antibiotics.     Hematology:    - Hgb 14.8. Monitor and trend.   - Threshold for transfusion if hgb <7.0 or signs/symptoms of hypoperfusion.       # DVT Prophylaxis: Lovenox 30mg bid     Musculoskeletal:  # Osteoarthritis   # Calcium pyrophosphate deposition disease  # Kyphosis   # Old right-sided rib fractures, seen on CXR, from previous falls   # Weakness and deconditioning of chronic illness and age   - Physical and occupational therapy consults.    # Right superior/inferior pubic rami fracture   - Ortho consulted.      Skin:  - dilgent cares to prevent skin breakdown and wound formation.      Code status: Full Code confirmed with Patient .     General Cares:  GI Prophylaxis: NA  DVT Prophylaxis: Lovenox   Date of last stool/Bowel Regimen: in place  Pulmonary toilet: IS    ETOH: This patient was asked if in the last 3-6 months there has been a time when she had 4 or more drinks in a single day/outing.. Patient answer to the screening question was in the negative. No intervention needed.  Primary Care Physician   Alejandro Sandhu      Plan   1. Admit to trauma  2. Follow-up on Ortho recs      Tanya Mcfarlane PA-C  Primary team: Trauma Services   Job code pager [phone] (24 hours a day)  Use [employer] to text page (not text page compatible with [email or web address]).    Dial * * * [phone] then  [phone], wait for prompt and then enter call back number.   Do NOT call numbers listed to right in Treatment Team section.       Chief Complaint   Weakness and pain with standing post fall    History is obtained from the patient    History of Present Illness   Belia Sheets is a 89 year old female who presented via EMS. Patient fell yesterday, 9/10, after getting out of bed. She stated that she started to move too fast and lost her balance, then fell backwards. She may have hit her head slightly, no LOC, no nausea, no dizziness.   Per patient it took her time to get up off the ground. Since then she has not been able to move or bear weight without pain.     Past Medical History    I have reviewed this patient's medical history and updated it with pertinent information if needed.   Past Medical History:   Diagnosis Date     Carpal tunnel syndrome (aka CTS)      H/O calcium pyrophosphate deposition disease (CPPD)      History of encephalopathy 10/2017     Hypertension      Kyphoscoliosis      Osteoarthritis     hands     Osteoporosis      Rectal prolapse        Past Surgical History   I have reviewed this patient's surgical history and updated it with pertinent  information if needed.  Past Surgical History:   Procedure Laterality Date     COLONOSCOPY  2010     HYSTERECTOMY      for uterine prolapse     RECTOSIGMOIDECTOMY PERINEAL N/A 1/12/2018    Procedure: RECTOSIGMOIDECTOMY PERINEAL;  Perineal Rectosigmoidectomy  ;  Surgeon: Amrik Mariano MD;  Location: [facility] OR     Prior to Admission Medications   Prior to Admission Medications   Prescriptions Last Dose Informant Patient Reported? Taking?   acetaminophen (TYLENOL) 325 MG tablet  Self No No   Sig: Take 2 tablets (650 mg) by mouth every 4 hours as needed for mild pain   Patient not taking: Reported on 8/25/2021   amLODIPine (NORVASC) 5 MG tablet  Self No No   Sig: Take 1 tablet (5 mg) by mouth daily   amLODIPine (NORVASC) 5 MG tablet   Yes Yes   Sig: Take 1 tablet by mouth daily   calcium carbonate 600 mg-vitamin D 400 units (CALTRATE) 600-400 MG-UNIT per tablet  Self No No   Sig: Take 1 tablet by mouth 2 times daily   order for DME  Self No No   Sig: Equipment being ordered: Home BP monitor and cuff   order for DME  Self No No   Sig: Equipment being ordered: Incentive spirometer   polyethylene glycol (MIRALAX) 17 GM/Dose powder   No No   Sig: Take 17 g by mouth daily   Patient not taking: Reported on 8/25/2021      Facility-Administered Medications: None     Allergies   No Known Allergies    Social History   Social History     Socioeconomic History     Marital status:      Spouse name: Not on file     Number of children: Not on file     Years of education: Not on file     Highest education level: Not on file   Occupational History     Not on file   Tobacco Use     Smoking status: Never Smoker     Smokeless tobacco: Never Used   Substance and Sexual Activity     Alcohol use: No     Drug use: No     Sexual activity: Never   Other Topics Concern     Not on file   Social History Narrative     Not on file     Social Determinants of Health     Financial Resource Strain:      Difficulty of Paying Living  Expenses:    Food Insecurity:      Worried About Running Out of Food in the Last Year:      Ran Out of Food in the Last Year:    Transportation Needs:      Lack of Transportation (Medical):      Lack of Transportation (Non-Medical):    Physical Activity:      Days of Exercise per Week:      Minutes of Exercise per Session:    Stress:      Feeling of Stress :    Social Connections:      Frequency of Communication with Friends and Family:      Frequency of Social Gatherings with Friends and Family:      Attends [employer] Services:      Active Member of Clubs or Organizations:      Attends Club or Organization Meetings:      Marital Status:    Intimate Partner Violence:      Fear of Current or Ex-Partner:      Emotionally Abused:      Physically Abused:      Sexually Abused:        Family History   Family history reviewed with patient and is noncontributory.    Review of Systems   CONSTITUTIONAL: No fever, chills, sweats, fatigue  EYES: no visual blurring, no double vision or visual loss  ENT: decrease in hearing baseline, no tinnitus, no vertigo, no hoarseness  RESPIRATORY: no shortness of breath, no cough, no sputum   CARDIOVASCULAR: no palpitations, no chest  pain, no exertional chest pain or pressure  GASTROINTESTINAL: no nausea or vomiting, or abd pain  GENITOURINARY: no dysuria, no frequency or hesitancy, no hematuria  MUSCULOSKELETAL: positive for weakness and joint pain. no redness, no swelling,  SKIN: no rashes, ecchymoses, abrasions or lacerations  NEUROLOGIC: no numbness or tingling of hands, no numbness or tingling  of feet, no syncope, no tremors or weakness  PSYCHIATRIC: no sleep disturbances, no anxiety or depression    Physical Exam   Temp: 97.7  F (36.5  C) Temp src: Oral BP: (!) 167/107 Pulse: 89   Resp: 18 SpO2: 97 % O2 Device: None (Room air)    Vital Signs with Ranges  Temp:  [97.7  F (36.5  C)] 97.7  F (36.5  C)  Pulse:  [89] 89  Resp:  [18] 18  BP: (167)/(107) 167/107  SpO2:  [97 %] 97 % 101 lbs  10.11 oz    Primary Survey:   Airway: patient talking  Breathing: symmetric respiratory effort bilaterally  Circulation: central pulses present and peripheral pulses present  Disability: Pupils - left 4 mm and brisk, right 4 mm and brisk     Jessica Coma Scale - Total 15/15  Eye Response (E): 4  4= spontaneous,  3= to verbal/voice, 2=  to pain, 1= No response   Verbal Response (V): 5   5= Orientated, converses,  4= Confused, converses, 3= Inappropriate words,  2= Incomprehensible sounds,  1=No response   Motor Response (M): 6   6= Obeys commands, 5= Localizes to pain, 4= Withdrawal to pain, 3=Fexion to pain, 2= Extension to pain, 1= No response  3  Secondary Survey:  General: alert, oriented to person, place, time  Head: atraumatic, normocephalic,  Eyes: PERRLA, pupils 4mm, EOMI, corneas and conjunctivae clear  Nose: nares patent, no drainage, nasal septum non-tender  Mouth/Throat: no exudates or erythema,  no dental tenderness or malocclusions, no tongue lacerations  Neck: Trachea midline. No midline posterior tenderness, full AROM without pain or tenderness   Chest/Pulmonary: normal respiratory rate and rhythm,  bilateral clear breath sounds, no wheezes, rales or rhonchi, no chest wall tenderness or deformities,   Cardiovascular: S1, S2,  normal and regular rate and rhythm, no murmurs  Abdomen: soft, non-tender, no guarding, no rebound tenderness and no tenderness to palpation  : Pelvis stable to lateral compression, no khan, no urine assess  Back/Spine: no deformity, no midline tenderness, no sacral tenderness,  no step-offs  Musculoskel/Extremities: ROM restricted in right hip 2/2 pain, normal extremities, No edema, erythema, ecchymosis, or abrasions. + PP. - edema.   Hand: no acute gross deformities of hands or fingers, arthritic changes at baseline. strength equal and symmetric.   Skin: no rashes, laceration, ecchymosis, skin warm and dry.   Neuro: PERRLA, alert, oriented x 4. CN II-XII grossly intact.  No focal deficits. Strength 3/5 x 4 extremities.  Sensation intact.  Psychiatric: affect/mood normal, cooperative, normal judgement/insight and memory intact  # Pain Assessment:  Current Pain Score 9/11/2021   Patient currently in pain? denies   Pain location -   Pain descriptors -   - Belia is experiencing pain due to fall and fracture. Pain management was discussed with Belia and her family and the plan was created in a collaborative fashion.  Belia's response to the current recommendations: engaged  - Please see the plan for pain management as documented above      Data   Results for orders placed or performed during the hospital encounter of 09/11/21 (from the past 24 hour(s))   [place] Draw    Narrative    The following orders were created for panel order [place] Draw.  Procedure                               Abnormality         Status                     ---------                               -----------         ------                     Extra Red Top Tube[[id number]]                               Final result               Extra Green Top (Lithium...[[id number]]                      Final result               Extra Purple Top Tube[[id number]]                            Final result                 Please view results for these tests on the individual orders.   Extra Red Top Tube   Result Value Ref Range    Hold Specimen [facility]    CBC with platelets differential    Narrative    The following orders were created for panel order CBC with platelets differential.  Procedure                               Abnormality         Status                     ---------                               -----------         ------                     CBC with platelets and d...[[id number]]  Abnormal            Final result                 Please view results for these tests on the individual orders.   Comprehensive metabolic panel   Result Value Ref Range    Sodium 139 133 - 144 mmol/L    Potassium 3.3 (L) 3.4 - 5.3 mmol/L    Chloride  104 94 - 109 mmol/L    Carbon Dioxide (CO2) 27 20 - 32 mmol/L    Anion Gap 8 3 - 14 mmol/L    Urea Nitrogen 12 7 - 30 mg/dL    Creatinine 0.59 0.52 - 1.04 mg/dL    Calcium 9.0 8.5 - 10.1 mg/dL    Glucose 109 (H) 70 - 99 mg/dL    Alkaline Phosphatase 74 40 - 150 U/L    AST 22 0 - 45 U/L    ALT 15 0 - 50 U/L    Protein Total 7.5 6.8 - 8.8 g/dL    Albumin 3.4 3.4 - 5.0 g/dL    Bilirubin Total 1.2 0.2 - 1.3 mg/dL    GFR Estimate 80 >60 mL/min/1.73m2   Extra Green Top (Lithium Heparin) Tube   Result Value Ref Range    Hold Specimen [facility]    Extra Purple Top Tube   Result Value Ref Range    Hold Specimen [facility]    CBC with platelets and differential   Result Value Ref Range    WBC Count 11.2 (H) 4.0 - 11.0 10e3/uL    RBC Count 4.88 3.80 - 5.20 10e6/uL    Hemoglobin 14.8 11.7 - 15.7 g/dL    Hematocrit 47.4 (H) 35.0 - 47.0 %    MCV 97 78 - 100 fL    MCH 30.3 26.5 - 33.0 pg    MCHC 31.2 (L) 31.5 - 36.5 g/dL    RDW 13.2 10.0 - 15.0 %    Platelet Count 216 150 - 450 10e3/uL    % Neutrophils 80 %    % Lymphocytes 12 %    % Monocytes 6 %    % Eosinophils 1 %    % Basophils 1 %    % Immature Granulocytes 0 %    NRBCs per 100 WBC 0 <1 /100    Absolute Neutrophils 8.9 (H) 1.6 - 8.3 10e3/uL    Absolute Lymphocytes 1.3 0.8 - 5.3 10e3/uL    Absolute Monocytes 0.7 0.0 - 1.3 10e3/uL    Absolute Eosinophils 0.2 0.0 - 0.7 10e3/uL    Absolute Basophils 0.1 0.0 - 0.2 10e3/uL    Absolute Immature Granulocytes 0.1 (H) <=0.0 10e3/uL    Absolute NRBCs 0.0 10e3/uL   ABO/Rh type and screen    Narrative    The following orders were created for panel order ABO/Rh type and screen.  Procedure                               Abnormality         Status                     ---------                               -----------         ------                     Adult Type and Screen[[id number]]                            Final result                 Please view results for these tests on the individual orders.   Troponin I   Result Value Ref Range    Troponin I  <0.015 0.000 - 0.045 ug/L   Adult Type and Screen   Result Value Ref Range    ABO/RH(D) O POS     Antibody Screen Negative Negative    SPECIMEN EXPIRATION DATE [id number]    EKG 12-lead, tracing only   Result Value Ref Range    Systolic Blood Pressure  mmHg    Diastolic Blood Pressure  mmHg    Ventricular Rate 86 BPM    Atrial Rate 86 BPM    AR Interval 168 ms    QRS Duration 80 ms     ms    QTc 459 ms    P Axis 84 degrees    R AXIS -5 degrees    T Axis 80 degrees    Interpretation ECG       Sinus rhythm with Premature atrial complexes  Nonspecific T wave abnormality  Abnormal ECG     XR Chest Port 1 View    Narrative     Examination:  XR CHEST PORT 1 VIEW     Date:  9/11/2021 10:53 AM      Clinical Information: fall     Additional Information: none    Comparison: 7/13/2021    Findings:     Pulmonary vasculature is distinct. Cardiac silhouette is upper limits  of normal. Lungs and pleural spaces are clear. A severe scoliosis of  the thoracolumbar spine apex right. Again demonstrated are old right  lower thoracic rib fractures.      Impression    Impression:  1. Clear lungs. No pneumothorax. No pleural effusions.  2. Old right-sided rib fractures.    CLEMENT DELANEY MD         SYSTEM ID:  [id number]   XR Pelvis Port 1/2 Views    Narrative    1 views pelvis radiograph(s) 9/11/2021 10:58 AM    History: fall pain    Comparison: 7/13/2021    Findings:    Single AP view(s) of the pelvis were obtained.     Bones appear severely osteopenic.    Nondisplaced right superior and inferior pubic rami fractures on the  single projection study.    Sclerosis of left superior pubic ramus, presumably from remote trauma.    Lumbosacral transitional anatomy with pseudoarthrosis on the left.  Severe degenerative changes of visualized lower lumbar spine.    Vascular calcifications. Mild joint space loss of the right hip.      Impression    Impression: Nondisplaced right superior and inferior pubic rami  fractures.    ADALBERTO  [employer]         SYSTEM ID:  [id number]   INR   Result Value Ref Range    INR 1.10 0.85 - 1.15       Studies:  XR Pelvis Port 1/2 Views   Final Result   Impression: Nondisplaced right superior and inferior pubic rami   fractures.      ADALBERTO [employer]            SYSTEM ID:  [id number]      XR Chest Port 1 View   Final Result   Impression:   1. Clear lungs. No pneumothorax. No pleural effusions.   2. Old right-sided rib fractures.      CLEMENT DELANEY MD            SYSTEM ID:  [id number]

## 2021-09-11 NOTE — ED PROVIDER NOTES
"    [facility] DE[facility]MENT ([facility])  9/11/21    History     Chief Complaint   Patient presents with     Fall     The history is provided by the patient and medical records.     Belia Sheets is a 89 year old female with a past medical history significant for hypertension, osteoporosis, osteoarthritis, calcium pyrophosphate deposition disease, kyphosis, history of encephalopathy who presents to the Emergency Department via EMS for evaluation following a fall.  Patient reports that she had a fall yesterday after getting out of bed.  She reports that she started moving too fast and lost her balance and fell.  Patient reports that she feels very weak and that it hurts to stand up since her fall.  She states that she thinks she \"bumped\" her head slightly.  Denies loss of consciousness.  Patient endorses bilateral groin pain.  She denies back pain, neck pain, or head pain.  Patient reports that she only feels pain when she tries to stand up, but when she is laying down she is not in any pain.  Patient states she is not on any anticoagulants.    Past Medical History  Past Medical History:   Diagnosis Date     Carpal tunnel syndrome (aka CTS)      H/O calcium pyrophosphate deposition disease (CPPD)      History of encephalopathy 10/2017     Hypertension      Kyphoscoliosis      Osteoarthritis     hands     Osteoporosis      Rectal prolapse      Past Surgical History:   Procedure Laterality Date     COLONOSCOPY  2010     HYSTERECTOMY      for uterine prolapse     RECTOSIGMOIDECTOMY PERINEAL N/A 1/12/2018    Procedure: RECTOSIGMOIDECTOMY PERINEAL;  Perineal Rectosigmoidectomy  ;  Surgeon: Amrik Mariano MD;  Location: [facility] OR     amLODIPine (NORVASC) 5 MG tablet  calcium carbonate 600 mg-vitamin D 400 units (CALTRATE) 600-400 MG-UNIT per tablet  order for DME  order for DME      No Known Allergies  Family History  Family History   Problem Relation Age of Onset     Depression/Anxiety Son      " "Depression/Anxiety Son         alcohol abuse  age 24     Hypertension Mother      Hypertension Brother      Diabetes No family hx of      Breast Cancer No family hx of      Colon Cancer No family hx of      Prostate Cancer No family hx of      Other Cancer No family hx of      Social History   Social History     Tobacco Use     Smoking status: Never Smoker     Smokeless tobacco: Never Used   Substance Use Topics     Alcohol use: No     Drug use: No      Past medical history, past surgical history, medications, allergies, family history, and social history were reviewed with the patient. No additional pertinent items.       Review of Systems   Constitutional: Negative for fever.   HENT: Negative for congestion.    Eyes: Negative for redness.   Respiratory: Negative for shortness of breath.    Cardiovascular: Negative for chest pain.   Gastrointestinal: Negative for abdominal pain.   Genitourinary: Negative for difficulty urinating.   Musculoskeletal: Negative for arthralgias, back pain, neck pain and neck stiffness.        Positive for bilateral groin pain   Skin: Negative for color change.   Neurological: Positive for weakness. Negative for headaches.   Psychiatric/Behavioral: Negative for confusion.   All other systems reviewed and are negative.    A complete review of systems was performed with pertinent positives and negatives noted in the HPI, and all other systems negative.    Physical Exam   BP: (!) 167/107  Pulse: 89  Temp: 97.7  F (36.5  C)  Resp: 18  Height: 148.6 cm (4' 10.5\")  Weight: 46.1 kg (101 lb 10.1 oz)  SpO2: 97 %  Physical Exam  Constitutional:       General: She is not in acute distress.     Appearance: She is not diaphoretic.   HENT:      Head: Atraumatic.      Mouth/Throat:      Pharynx: No oropharyngeal exudate.   Eyes:      General: No scleral icterus.     Pupils: Pupils are equal, round, and reactive to light.   Cardiovascular:      Rate and Rhythm: Normal rate and regular rhythm.      " Heart sounds: Normal heart sounds. No murmur heard.   No friction rub. No gallop.    Pulmonary:      Effort: Pulmonary effort is normal. No respiratory distress.      Breath sounds: Normal breath sounds. No stridor. No wheezing, rhonchi or rales.   Chest:      Chest wall: No tenderness.   Abdominal:      General: Abdomen is flat. Bowel sounds are normal. There is no distension.      Palpations: Abdomen is soft. There is no mass.      Tenderness: There is abdominal tenderness in the suprapubic area. There is no right CVA tenderness, left CVA tenderness, guarding or rebound. Negative signs include Jackson's sign, Rovsing's sign, McBurney's sign, psoas sign and obturator sign.      Hernia: No hernia is present.       Musculoskeletal:         General: No tenderness.      Cervical back: Neck supple.   Skin:     General: Skin is warm.      Findings: No rash.   Neurological:      General: No focal deficit present.      Cranial Nerves: No cranial nerve deficit.         ED Course     9:57 AM  The patient was seen and examined by Andrzej Webb MD in Room [facility].  Procedures              Results for orders placed or performed during the hospital encounter of 09/11/21   XR Chest Port 1 View     Status: None    Narrative     Examination:  XR CHEST PORT 1 VIEW     Date:  9/11/2021 10:53 AM      Clinical Information: fall     Additional Information: none    Comparison: 7/13/2021    Findings:     Pulmonary vasculature is distinct. Cardiac silhouette is upper limits  of normal. Lungs and pleural spaces are clear. A severe scoliosis of  the thoracolumbar spine apex right. Again demonstrated are old right  lower thoracic rib fractures.      Impression    Impression:  1. Clear lungs. No pneumothorax. No pleural effusions.  2. Old right-sided rib fractures.    CLEMENT DELANEY MD         SYSTEM ID:  [id number]   XR Pelvis Port 1/2 Views     Status: None    Narrative    1 views pelvis radiograph(s) 9/11/2021 10:58 AM    History: fall  pain    Comparison: 7/13/2021    Findings:    Single AP view(s) of the pelvis were obtained.     Bones appear severely osteopenic.    Nondisplaced right superior and inferior pubic rami fractures on the  single projection study.    Sclerosis of left superior pubic ramus, presumably from remote trauma.    Lumbosacral transitional anatomy with pseudoarthrosis on the left.  Severe degenerative changes of visualized lower lumbar spine.    Vascular calcifications. Mild joint space loss of the right hip.      Impression    Impression: Nondisplaced right superior and inferior pubic rami  fractures.    [employer]         SYSTEM ID:  [id number]   CBC with platelets differential     Status: Abnormal    Narrative    The following orders were created for panel order CBC with platelets differential.  Procedure                               Abnormality         Status                     ---------                               -----------         ------                     CBC with platelets and d...[[id number]]  Abnormal            Final result                 Please view results for these tests on the individual orders.   Comprehensive metabolic panel     Status: Abnormal   Result Value Ref Range    Sodium 139 133 - 144 mmol/L    Potassium 3.3 (L) 3.4 - 5.3 mmol/L    Chloride 104 94 - 109 mmol/L    Carbon Dioxide (CO2) 27 20 - 32 mmol/L    Anion Gap 8 3 - 14 mmol/L    Urea Nitrogen 12 7 - 30 mg/dL    Creatinine 0.59 0.52 - 1.04 mg/dL    Calcium 9.0 8.5 - 10.1 mg/dL    Glucose 109 (H) 70 - 99 mg/dL    Alkaline Phosphatase 74 40 - 150 U/L    AST 22 0 - 45 U/L    ALT 15 0 - 50 U/L    Protein Total 7.5 6.8 - 8.8 g/dL    Albumin 3.4 3.4 - 5.0 g/dL    Bilirubin Total 1.2 0.2 - 1.3 mg/dL    GFR Estimate 80 >60 mL/min/1.73m2   Extra Red Top Tube     Status: None   Result Value Ref Range    Hold Specimen JIC    Extra Green Top (Lithium Heparin) Tube     Status: None   Result Value Ref Range    Hold Specimen JIC    Extra Purple Top  Tube     Status: None   Result Value Ref Range    Hold Specimen JIC    CBC with platelets and differential     Status: Abnormal   Result Value Ref Range    WBC Count 11.2 (H) 4.0 - 11.0 10e3/uL    RBC Count 4.88 3.80 - 5.20 10e6/uL    Hemoglobin 14.8 11.7 - 15.7 g/dL    Hematocrit 47.4 (H) 35.0 - 47.0 %    MCV 97 78 - 100 fL    MCH 30.3 26.5 - 33.0 pg    MCHC 31.2 (L) 31.5 - 36.5 g/dL    RDW 13.2 10.0 - 15.0 %    Platelet Count 216 150 - 450 10e3/uL    % Neutrophils 80 %    % Lymphocytes 12 %    % Monocytes 6 %    % Eosinophils 1 %    % Basophils 1 %    % Immature Granulocytes 0 %    NRBCs per 100 WBC 0 <1 /100    Absolute Neutrophils 8.9 (H) 1.6 - 8.3 10e3/uL    Absolute Lymphocytes 1.3 0.8 - 5.3 10e3/uL    Absolute Monocytes 0.7 0.0 - 1.3 10e3/uL    Absolute Eosinophils 0.2 0.0 - 0.7 10e3/uL    Absolute Basophils 0.1 0.0 - 0.2 10e3/uL    Absolute Immature Granulocytes 0.1 (H) <=0.0 10e3/uL    Absolute NRBCs 0.0 10e3/uL   INR     Status: Normal   Result Value Ref Range    INR 1.10 0.85 - 1.15   Troponin I     Status: Normal   Result Value Ref Range    Troponin I <0.015 0.000 - 0.045 ug/L   Asymptomatic COVID-19 Virus (Coronavirus) by PCR Nasopharyngeal     Status: Normal    Specimen: Nasopharyngeal; Swab   Result Value Ref Range    SARS CoV2 PCR Negative Negative    Narrative    Testing was performed using the Xpert Xpress SARS-CoV-2 Assay on the  Cepheid Gene-Xpert Instrument Systems. Additional information about  this Emergency Use Authorization (EUA) assay can be found via the Lab  Guide. This test should be ordered for the detection of SARS-CoV-2 in  individuals who meet SARS-CoV-2 clinical and/or epidemiological  criteria. Test performance is unknown in asymptomatic patients. This  test is for in vitro diagnostic use under the FDA EUA for  laboratories certified under CLIA to perform high complexity testing.  This test has not been FDA cleared or approved. A negative result  does not rule out the presence of  PCR inhibitors in the specimen or  target RNA in concentration below the limit of detection for the  assay. The possibility of a false negative should be considered if  the patient's recent exposure or clinical presentation suggests  COVID-19. This test was validated by the [facility]. This laboratory is certified under  the Clinical Laboratory Improvement Amendments of 1988 (CLIA-88) as  qualified to perform high complexity laboratory testing.     EKG 12-lead, tracing only     Status: None   Result Value Ref Range    Systolic Blood Pressure  mmHg    Diastolic Blood Pressure  mmHg    Ventricular Rate 86 BPM    Atrial Rate 86 BPM    CA Interval 168 ms    QRS Duration 80 ms     ms    QTc 459 ms    P Axis 84 degrees    R AXIS -5 degrees    T Axis 80 degrees    Interpretation ECG       Sinus rhythm with Premature atrial complexes  Nonspecific T wave abnormality  Abnormal ECG     Care Management / Social Work IP Consult     Status: None ()    Ute Norton MSW     9/11/2021  3:21 PM  Care Management Initial Consult    General Information  Assessment completed with: Patient, Children     Type of CM/SW Visit: Initial Assessment    Primary Care Provider verified and updated as needed: No     Readmission within the last 30 days: no previous admission in   last 30 days        Reason for Consult: discharge planning    Advance Care Planning: no concerns identified        Communication Assessment  Patient's communication style: spoken language (English or   Bilingual)      Hearing Difficulty or Deaf: yes     Wear Glasses or Blind: yes    Cognitive  Cognitive/Neuro/Behavioral: WDL                      Living Environment:   People in home: alone       Current living Arrangements: assisted living      Name of Facility: [facility]     Able to return to prior arrangements: yes     Family/Social Support:  Care provided by: self    Provides care for: no  one    Marital Status:     Support System: Children, Neighbor             Description of Support System: Supportive, Involved       Current Resources:   Patient receiving home care services: Yes    Skilled Home Care Services: Physicial Therapy, Occupational   Therapy    Community Resources: None    Equipment currently used at home: walker, rolling, raised toilet   seat, shower chair    Supplies currently used at home: None    Employment/Financial:  Employment Status: retired        Financial Concerns: No concerns identified     Referral to Financial Counselor: No       Lifestyle & Psychosocial Needs:  Social Determinants of Health     Tobacco Use: Low Risk      Smoking Tobacco Use: Never Smoker     Smokeless Tobacco Use: Never Used   Alcohol Use:      Frequency of Alcohol Consumption:      Average Number of Drinks:      Frequency of Binge Drinking:    Financial Resource Strain:      Difficulty of Paying Living Expenses:    Food Insecurity:      Worried About Running Out of Food in the Last Year:      Ran Out of Food in the Last Year:    Transportation Needs:      Lack of Transportation (Medical):      Lack of Transportation (Non-Medical):    Physical Activity:      Days of Exercise per Week:      Minutes of Exercise per Session:    Stress:      Feeling of Stress :    Social Connections:      Frequency of Communication with Friends and Family:      Frequency of Social Gatherings with Friends and Family:      Attends Mu-ism Services:      Active Member of Clubs or Organizations:      Attends Club or Organization Meetings:      Marital Status:    Intimate Partner Violence:      Fear of Current or Ex-Partner:      Emotionally Abused:      Physically Abused:      Sexually Abused:    Depression: Not at risk     PHQ-2 Score: 0   Housing Stability:      Unable to Pay for Housing in the Last Year:      Number of Places Lived in the Last Year:      Unstable Housing in the Last Year:        Functional Status:  Prior  to admission patient needed assistance:   Dependent ADLs:: Independent  Dependent IADLs:: Independent  Assesssment of Functional Status: Not at baseline with mobility    Additional Information:  Belia Sheets is a 89 year old female who presented via EMS.   Patient fell yesterday, 9/10, after getting out of bed. She   stated that she started to move too fast and lost her balance,   then fell backwards. She may have hit her head slightly, no LOC,   no nausea, no dizziness.     Writer met with Pt in Pt's room.  Pt was awake, alert, and   willing to meet with Pt. Pt's son, Delmar, was present in the room   with her.  Writer introduced self and explained SW role and    reason for visit to discuss discharge planning.    Pt reports that she lives in an assisted living apartment   complex, [facility].  Pt states that she is fully independent and   only requires occasional assistance with taking a bath.  Pt   states that a nurse checks on her every morning to see if she   needs assistance with bathing, dressing, or meals.  Pt is very   happy in her apartment.  Pt states that she is on the 13th floor   sand has many windows, providing her with a beautiful view of   [facility].      Pt states that she uses a 4-wheeled, seated walker for her   mobility.  Pt states that the only medical equipment she has at   home is a blood pressure cuff.  Pt has been receiving home PT and   OT services since a TCU stay she had earlier this summer related   to a fall in July.  Pt no longer drives.  Pt stated that her son,   Delmar, drives her to appointments.      Pt shared that she has not been going out much recently due to   COVID.  Pt stated that, prior to COVID, she went to exercise   classes in her building 6 times per week.  She enjoyed yoga and   chair exercises/  Pt looks forward to being able to take these   classes again since she really enjoyed them.  Pt states that she   has friends at [facility] who provide her with social support  and   will also help out by watering her plants when she was at TCU.    Pt's adult children Lubna (lives in [place], [place]), Ciarra (lives in the   [facility]), and Time (lives in the [facility]) are all close with and   supportive of Pt.    Pt stated that she had a very positive experience at [facility] when she needed TCU after her previous fall in July.    Pt would like a referral to [facility] again if PT   recommends TCU.  Pt recognizes she is currently very weak and is   open to another TCU stay.  Delmar confirmed this.  Writer explained   that PT should come to see Pt and make recommendations.  Writer   explained that we will discuss additional TCU facilities for   referral if PT does indeed recommend a TCU stay.  Writer shared   that Writer will return to meet with Pt after PT recommendations   and encouraged Pt to ask nurse to page Writer if she has   questions or needs.  ________________________    ALEXX Ortega, LGSW  ED/Obs   [facility]  Phone: [phone]  Pager: [phone]     Adult Type and Screen     Status: None   Result Value Ref Range    ABO/RH(D) O POS     Antibody Screen Negative Negative    SPECIMEN EXPIRATION DATE [id number]    Happy Draw     Status: None    Narrative    The following orders were created for panel order Happy Draw.  Procedure                               Abnormality         Status                     ---------                               -----------         ------                     Extra Red Top Tube[[id number]]                               Final result               Extra Green Top (Lithium...[[id number]]                      Final result               Extra Purple Top Tube[[id number]]                            Final result                 Please view results for these tests on the individual orders.   ABO/Rh type and screen     Status: None    Narrative    The following orders were created for panel order ABO/Rh type and  screen.  Procedure                               Abnormality         Status                     ---------                               -----------         ------                     Adult Type and Screen[[id number]]                            Final result                 Please view results for these tests on the individual orders.     Medications   Lidocaine (LIDOCARE) 4 % Patch 1-3 patch (has no administration in time range)     And   lidocaine patch in PLACE (has no administration in time range)   HYDROmorphone (DILAUDID) injection 0.2 mg (0.2 mg Intravenous Not Given 9/11/21 1432)   senna-docusate (SENOKOT-S/PERICOLACE) 8.6-50 MG per tablet 1-2 tablet (has no administration in time range)   polyethylene glycol (MIRALAX) Packet 17 g (has no administration in time range)   ondansetron (ZOFRAN-ODT) ODT tab 4 mg ( Oral See Alternative 9/11/21 1433)     Or   ondansetron (ZOFRAN) injection 4 mg (4 mg Intravenous Not Given 9/11/21 1433)   prochlorperazine (COMPAZINE) injection 5 mg (has no administration in time range)     Or   prochlorperazine (COMPAZINE) tablet 5 mg (has no administration in time range)     Or   prochlorperazine (COMPAZINE) suppository 12.5 mg (has no administration in time range)   methocarbamol (ROBAXIN) tablet 750 mg (has no administration in time range)   acetaminophen (TYLENOL) tablet 975 mg (has no administration in time range)   traMADol (ULTRAM) tablet 50 mg (has no administration in time range)   amLODIPine (NORVASC) tablet 5 mg (has no administration in time range)   calcium carbonate 600 mg-vitamin D 400 units (CALTRATE) per tablet 1 tablet (has no administration in time range)   enoxaparin ANTICOAGULANT (LOVENOX) injection 30 mg (has no administration in time range)   potassium chloride (KLOR-CON) Packet 20 mEq (has no administration in time range)        Assessments & Plan (with Medical Decision Making)  Right superior and inferior pubic rami fracture after fall, no other significant  injuries, now cannot ambulate even with a walker(that is baseline), D/W Trauma-admit for pain control, likely Ortho consult and TCU placement.       I have reviewed the nursing notes. I have reviewed the findings, diagnosis, plan and need for follow up with the patient.    New Prescriptions    No medications on file       Final diagnoses:   Closed fracture of right pubis, unspecified portion of pubis, initial encounter (H)   Fall, initial encounter     I, Veronica Oreilly, am serving as a trained medical scribe to document services personally performed by Andrzej Webb MD, based on the provider's statements to me.     I, Andrzej Webb MD, was physically present and have reviewed and verified the accuracy of this note documented by Veronica Oreilly.  --  Andrzej Webb MD  [facility] EMERGENCY DEPARTMENT  9/11/2021     Andrzej Webb MD  09/11/21 1640

## 2021-09-11 NOTE — LETTER
Transition Communication Hand-off for Care Transitions to Next Level of Care Provider    Name: Belia Sheets  : 1930  MRN #: [id number]  Primary Care Provider: Alejandro Sandhu     Primary Clinic: [address] [address]     Reason for Hospitalization:  Fall, initial encounter [W19.XXXA]  Closed fracture of right pubis, unspecified portion of pubis, initial encounter (H) [S32.501A]  Admit Date/Time: 2021  9:52 AM  Discharge Date: 21  Payor Source: Payor: [facility] / Plan: [employer] MEDICARE / Product Type: HMO /     Reason for Communication Hand-off Referral: Other Continuity of care    Discharge Plan:  TCU placement at [facility]           BYRON Duarte    AVS/Discharge Summary is the source of truth; this is a helpful guide for improved communication of patient story

## 2021-09-11 NOTE — CONSULTS
Care Management Initial Consult    General Information  Assessment completed with: Patient, Children     Type of CM/SW Visit: Initial Assessment    Primary Care Provider verified and updated as needed: No     Readmission within the last 30 days: no previous admission in last 30 days        Reason for Consult: discharge planning    Advance Care Planning: no concerns identified        Communication Assessment  Patient's communication style: spoken language (English or Bilingual)      Hearing Difficulty or Deaf: yes     Wear Glasses or Blind: yes    Cognitive  Cognitive/Neuro/Behavioral: WDL                      Living Environment:   People in home: alone       Current living Arrangements: assisted living      [facility] Facility: [facility]     Able to return to prior arrangements: yes     Family/Social Support:  Care provided by: self    Provides care for: no one    Marital Status:     Support System: Children, Neighbor             Description of Support System: Supportive, Involved       Current Resources:   Patient receiving home care services: Yes    Skilled Home Care Services: Physicial Therapy, Occupational Therapy    Community Resources: None    Equipment currently used at home: walker, rolling, raised toilet seat, shower chair    Supplies currently used at home: None    Employment/Financial:  Employment Status: retired        Financial Concerns: No concerns identified     Referral to Financial Counselor: No       Lifestyle & Psychosocial Needs:  Social Determinants of Health     Tobacco Use: Low Risk      Smoking Tobacco Use: Never Smoker     Smokeless Tobacco Use: Never Used   Alcohol Use:      Frequency of Alcohol Consumption:      Average Number of Drinks:      Frequency of Binge Drinking:    Financial Resource Strain:      Difficulty of Paying Living Expenses:    Food Insecurity:      Worried About Running Out of Food in the Last Year:      Ran Out of Food in the Last Year:    Transportation Needs:       Lack of Transportation (Medical):      Lack of Transportation (Non-Medical):    Physical Activity:      Days of Exercise per Week:      Minutes of Exercise per Session:    Stress:      Feeling of Stress :    Social Connections:      Frequency of Communication with Friends and Family:      Frequency of Social Gatherings with Friends and Family:      Attends Orthodoxy Services:      Active Member of Clubs or Organizations:      Attends Club or Organization Meetings:      Marital Status:    Intimate Partner Violence:      Fear of Current or Ex-Partner:      Emotionally Abused:      Physically Abused:      Sexually Abused:    Depression: Not at risk     PHQ-2 Score: 0   Housing Stability:      Unable to Pay for Housing in the Last Year:      Number of Places Lived in the Last Year:      Unstable Housing in the Last Year:        Functional Status:  Prior to admission patient needed assistance:   Dependent ADLs:: Independent  Dependent IADLs:: Independent  Assesssment of Functional Status: Not at baseline with mobility    Additional Information:  Belia Sheets is a 89 year old female who presented via EMS. Patient fell yesterday, 9/10, after getting out of bed. She stated that she started to move too fast and lost her balance, then fell backwards. She may have hit her head slightly, no LOC, no nausea, no dizziness.     Writer met with Pt in Pt's room.  Pt was awake, alert, and willing to meet with Pt. Pt's son, Delmar, was present in the room with her.  Writer introduced self and explained SW role and  reason for visit to discuss discharge planning.    Pt reports that she lives in an assisted living apartment complex, [place].  Pt states that she is fully independent and only requires occasional assistance with taking a bath.  Pt states that a nurse checks on her every morning to see if she needs assistance with bathing, dressing, or meals.  Pt is very happy in her apartment.  Pt states that she is on the 13th floor sand  has many windows, providing her with a beautiful view of [facility].      Pt states that she uses a 4-wheeled, seated walker for her mobility.  Pt states that the only medical equipment she has at home is a blood pressure cuff.  Pt has been receiving home PT and OT services since a TCU stay she had earlier this summer related to a fall in July.  Pt no longer drives.  Pt stated that her son, Delmar, drives her to appointments.      Pt shared that she has not been going out much recently due to COVID.  Pt stated that, prior to COVID, she went to exercise classes in her building 6 times per week.  She enjoyed yoga and chair exercises/  Pt looks forward to being able to take these classes again since she really enjoyed them.  Pt states that she has friends at [place] who provide her with social support and will also help out by watering her plants when she was at [facility].  Pt's adult children Lubna (lives in [place], [place]), Ciarra (lives in the [facility]), and Time (lives in the [facility]) are all close with and supportive of Pt.    Pt stated that she had a very positive experience at [facility] when she needed TCU after her previous fall in July.  Pt would like a referral to [facility] again if PT recommends TCU.  Pt recognizes she is currently very weak and is open to another TCU stay.  Delmar confirmed this.  Writer explained that PT should come to see Pt and make recommendations.  Writer explained that we will discuss additional TCU facilities for referral if PT does indeed recommend a TCU stay.  Writer shared that Writer will return to meet with Pt after PT recommendations and encouraged Pt to ask nurse to page Writer if she has questions or needs.  ________________________    ALEXX Ortega, [facility]  ED/Obs   [facility] [place]  Phone: [phone]  Pager: [phone]

## 2021-09-12 ENCOUNTER — APPOINTMENT (OUTPATIENT)
Dept: PHYSICAL THERAPY | Facility: CLINIC | Age: 86
End: 2021-09-12
Attending: PHYSICIAN ASSISTANT
Payer: COMMERCIAL

## 2021-09-12 LAB
ANION GAP SERPL CALCULATED.3IONS-SCNC: 4 MMOL/L (ref 3–14)
BUN SERPL-MCNC: 11 MG/DL (ref 7–30)
CALCIUM SERPL-MCNC: 8.9 MG/DL (ref 8.5–10.1)
CHLORIDE BLD-SCNC: 108 MMOL/L (ref 94–109)
CO2 SERPL-SCNC: 28 MMOL/L (ref 20–32)
CREAT SERPL-MCNC: 0.66 MG/DL (ref 0.52–1.04)
ERYTHROCYTE [DISTWIDTH] IN BLOOD BY AUTOMATED COUNT: 13.7 % (ref 10–15)
GFR SERPL CREATININE-BSD FRML MDRD: 77 ML/MIN/1.73M2
GLUCOSE BLD-MCNC: 98 MG/DL (ref 70–99)
HCT VFR BLD AUTO: 39.5 % (ref 35–47)
HGB BLD-MCNC: 12.9 G/DL (ref 11.7–15.7)
MCH RBC QN AUTO: 30.1 PG (ref 26.5–33)
MCHC RBC AUTO-ENTMCNC: 32.7 G/DL (ref 31.5–36.5)
MCV RBC AUTO: 92 FL (ref 78–100)
PHOSPHATE SERPL-MCNC: 3.3 MG/DL (ref 2.5–4.5)
PLATELET # BLD AUTO: 191 10E3/UL (ref 150–450)
POTASSIUM BLD-SCNC: 3.9 MMOL/L (ref 3.4–5.3)
RBC # BLD AUTO: 4.29 10E6/UL (ref 3.8–5.2)
SODIUM SERPL-SCNC: 140 MMOL/L (ref 133–144)
WBC # BLD AUTO: 8 10E3/UL (ref 4–11)

## 2021-09-12 PROCEDURE — G0378 HOSPITAL OBSERVATION PER HR: HCPCS

## 2021-09-12 PROCEDURE — 250N000013 HC RX MED GY IP 250 OP 250 PS 637: Performed by: PHYSICIAN ASSISTANT

## 2021-09-12 PROCEDURE — 82374 ASSAY BLOOD CARBON DIOXIDE: CPT | Performed by: PHYSICIAN ASSISTANT

## 2021-09-12 PROCEDURE — 250N000013 HC RX MED GY IP 250 OP 250 PS 637: Performed by: SURGERY

## 2021-09-12 PROCEDURE — 36415 COLL VENOUS BLD VENIPUNCTURE: CPT | Performed by: PHYSICIAN ASSISTANT

## 2021-09-12 PROCEDURE — 85014 HEMATOCRIT: CPT | Performed by: PHYSICIAN ASSISTANT

## 2021-09-12 PROCEDURE — 250N000011 HC RX IP 250 OP 636: Performed by: PHYSICIAN ASSISTANT

## 2021-09-12 PROCEDURE — 96372 THER/PROPH/DIAG INJ SC/IM: CPT | Performed by: PHYSICIAN ASSISTANT

## 2021-09-12 PROCEDURE — 84100 ASSAY OF PHOSPHORUS: CPT | Performed by: SURGERY

## 2021-09-12 PROCEDURE — 97162 PT EVAL MOD COMPLEX 30 MIN: CPT | Mod: GP

## 2021-09-12 PROCEDURE — 99232 SBSQ HOSP IP/OBS MODERATE 35: CPT | Performed by: PHYSICIAN ASSISTANT

## 2021-09-12 PROCEDURE — 97530 THERAPEUTIC ACTIVITIES: CPT | Mod: GP

## 2021-09-12 RX ORDER — TRAMADOL HYDROCHLORIDE 50 MG/1
50 TABLET ORAL EVERY 6 HOURS PRN
Qty: 20 TABLET | Refills: 0 | Status: SHIPPED | DISCHARGE
Start: 2021-09-12 | End: 2021-10-20

## 2021-09-12 RX ORDER — LIDOCAINE 4 G/G
1-3 PATCH TOPICAL EVERY 24 HOURS
Qty: 30 PATCH | Refills: 0 | DISCHARGE
Start: 2021-09-12 | End: 2021-09-29 | Stop reason: ALTCHOICE

## 2021-09-12 RX ORDER — ACETAMINOPHEN 325 MG/1
975 TABLET ORAL 3 TIMES DAILY
Qty: 42 TABLET | Refills: 0 | DISCHARGE
Start: 2021-09-12 | End: 2022-11-03

## 2021-09-12 RX ORDER — METHOCARBAMOL 750 MG/1
750 TABLET, FILM COATED ORAL 3 TIMES DAILY
Qty: 42 TABLET | Refills: 0 | Status: SHIPPED | OUTPATIENT
Start: 2021-09-12 | End: 2021-10-20

## 2021-09-12 RX ADMIN — AMLODIPINE BESYLATE 5 MG: 5 TABLET ORAL at 09:19

## 2021-09-12 RX ADMIN — LIDOCAINE 1 PATCH: 560 PATCH PERCUTANEOUS; TOPICAL; TRANSDERMAL at 12:50

## 2021-09-12 RX ADMIN — MAGNESIUM OXIDE TAB 400 MG (241.3 MG ELEMENTAL MG) 400 MG: 400 (241.3 MG) TAB at 19:52

## 2021-09-12 RX ADMIN — CALCIUM CARBONATE 600 MG (1,500 MG)-VITAMIN D3 400 UNIT TABLET 1 TABLET: at 19:52

## 2021-09-12 RX ADMIN — DOCUSATE SODIUM 50 MG AND SENNOSIDES 8.6 MG 2 TABLET: 8.6; 5 TABLET, FILM COATED ORAL at 09:19

## 2021-09-12 RX ADMIN — ACETAMINOPHEN 975 MG: 325 TABLET, FILM COATED ORAL at 19:51

## 2021-09-12 RX ADMIN — ACETAMINOPHEN 975 MG: 325 TABLET, FILM COATED ORAL at 09:20

## 2021-09-12 RX ADMIN — METHOCARBAMOL 750 MG: 750 TABLET ORAL at 12:50

## 2021-09-12 RX ADMIN — METHOCARBAMOL 750 MG: 750 TABLET ORAL at 19:52

## 2021-09-12 RX ADMIN — MAGNESIUM OXIDE TAB 400 MG (241.3 MG ELEMENTAL MG) 400 MG: 400 (241.3 MG) TAB at 09:20

## 2021-09-12 RX ADMIN — ENOXAPARIN SODIUM 30 MG: 30 INJECTION SUBCUTANEOUS at 09:20

## 2021-09-12 RX ADMIN — CALCIUM CARBONATE 600 MG (1,500 MG)-VITAMIN D3 400 UNIT TABLET 1 TABLET: at 09:20

## 2021-09-12 RX ADMIN — METHOCARBAMOL 750 MG: 750 TABLET ORAL at 09:20

## 2021-09-12 ASSESSMENT — ACTIVITIES OF DAILY LIVING (ADL)
ADLS_ACUITY_SCORE: 18
ADLS_ACUITY_SCORE: 19

## 2021-09-12 NOTE — DISCHARGE SUMMARY
[facility]    Discharge Summary  Trauma Surgery Service    Date of Admission:  9/11/2021  Date of Discharge:  9/13/2021  Attending Physician: Dr. Noemi Solomon  Discharging Provider: Ricky Lawrence CNP  Date of Service (when I saw the patient): 09/13/21    Primary Provider: Alejandro Sandhu  Primary Care clinic: [address] [address]  Phone: [phone]  Fax number: [phone]     Discharge Diagnoses   Active Problems:    Fall, initial encounter    Closed fracture of right pubis, unspecified portion of pubis, initial encounter (H)      Hospital Course  Belia Sheets is a 89 year old female who presented 9/11/21 via EMS, day after fall d/t pain and inability to bear weight.      Orthopedic Injury  Right Superior Inferior Pubic Rami fracture:  Belia Sheets was evaluated by Orthopedics and her fractures were managed conservatively with pain control and guided weight bearing recommendations. She can bear weight as tolerated with a walker for safety  Orthopaedic Surgery recommends follow up in 3-4 weeks with non-operative orthopaedics provider with AP pelvis XR or with PCP if PCP comfortable  She  was evaluated by physical and occupational therapies during her hospitalization.  Please see summary of most current therapy notes below.      Acute on chronic pain   - Scheduled: Tylenol, Lidoderm, methocarbamol,      No changes were made to her other medications.  Therapy Recommendations:   Current status of physical therapies on discharge:  TCU. Pt is below functional baseline requiring mod-max A x 1 with walker for transfers. Pt is a falls risk. Nursing: assist x 2 with walker for pivot transfers only; R LE WBAT    Code Status    Full Code    SUBJECTIVE: No acute issues overnight. Vitals and chart reviewed.    Physical Exam   Temp: (!) 95.9  F (35.5  C) Temp src: Oral BP: 131/75 Pulse: 67   Resp: 16 SpO2: 94 % O2 Device: None (Room air)    Vitals:  "   09/11/21 0935 09/11/21 1700   Weight: 46.1 kg (101 lb 10.1 oz) 42.7 kg (94 lb 2.2 oz)     Vital Signs with Ranges  Temp:  [95.9  F (35.5  C)-97.4  F (36.3  C)] 95.9  F (35.5  C)  Pulse:  [67-76] 67  Resp:  [16] 16  BP: (121-134)/(71-77) 131/75  SpO2:  [94 %-95 %] 94 %  I/O last 3 completed shifts:  In: -   Out: 200 [Urine:200]      Constitutional: Awake, alert, cooperative, no apparent distress.  Eyes: Lids and lashes normal, pupils equal, round  intact, sclera clear, conjunctiva normal.  HENT: Normocephalic, atraumatic  Respiratory: clear bilaterally without wheezes  Cardiovascular:  regular rate and rhythm, normal S1 and S2  GI: Normal bowel sounds, abdomen soft,  Genitourinary:  Incontinent of bowel and bladder  Skin:  Normal skin color, no redness, warmth  Musculoskeletal: There is no redness, warmth, or swelling of the joints.  Pedal pulse palpated.  Neurologic: Awake, alert, oriented. Strength and sensory is intact. No focal deficits.  Neuropsychiatric: Calm, normal eye contact, alert, affect appropriate to situation, oriented, thought process normal.    Discharge Disposition   Discharged to short-term care facility  Condition at discharge: Stable  Discharge VS: Blood pressure 131/75, pulse 67, temperature (!) 95.9  F (35.5  C), temperature source Oral, resp. rate 16, height 1.486 m (4' 10.5\"), weight 42.7 kg (94 lb 2.2 oz), SpO2 94 %, not currently breastfeeding.    Consultations This Hospital Stay   ORTHOPAEDIC SURGERY ADULT/PEDS IP CONSULT  OCCUPATIONAL THERAPY ADULT IP CONSULT  PHYSICAL THERAPY ADULT IP CONSULT  CARE MANAGEMENT / SOCIAL WORK IP CONSULT    Discharge Orders      Follow Up ([facility])    Follow up with your primary care provider for continued medical care and hospital follow up in 5-10 days.      Follow-up: 3-4 weeks with non-operative orthopaedics provider with AP pelvis XR vs with PCP if PCP comfortable  Orthopaedic Clinic  [facility]  Floor [address]   [address] [place] [address] " "  [place] [address]   Appointments: [phone]           You have been involved in a recent trauma incident resulting in an injury.  Studies show us that people affected by trauma have higher levels of post-traumatic stress disorder (PTSD) and/or depressive symptoms during the year following an injury.     Please consider the following.  Have you:  Had migraines about the event(s) or thought about the event(s) when you didn't want to?  Tried hard not to think about the event(s) or went out of your way to avoid situations that reminded you of the event(s)?  Been constantly on guard, watchful, or easily startled?  Felt numb or detached from people, activities, or your surroundings?   Felt guilty or unable to stop blaming yourself or others for the event(s) or any problems the event (s) may have caused?    If you answered \"yes\" to 3 or more of these questions, or if you simply want to discuss any of your feelings further, we recommend that you talk with your Primary Care Provider or a mental health professional.     General info for SNF    Length of Stay Estimate: Short Term Care: Estimated # of Days <30  Condition at Discharge: Stable  Level of care:skilled   Rehabilitation Potential: Good  Admission H&P remains valid and up-to-date: Yes  Recent Chemotherapy: N/A  Use Nursing Home Standing Orders: Yes     Mantoux instructions    Give two-step Mantoux (PPD) Per Facility Policy Yes     Reason for your hospital stay    Fall, right pelvic rami fracture     Activity - Up with nursing assistance     Weight bearing status    Weight bearing as tolerated with walker     Physical Therapy Adult Consult    Evaluate and treat as clinically indicated.    Reason:  Fall, right pelvic rami fracture     Occupational Therapy Adult Consult    Evaluate and treat as clinically indicated.    Reason:  Fall, right pelvic rami fracture     Fall precautions     Advance Diet as Tolerated    Follow this diet upon discharge: Regular "     Discharge Medications   Discharge Medication List as of 9/13/2021  9:24 AM      START taking these medications    Details   acetaminophen (TYLENOL) 325 MG tablet Take 3 tablets (975 mg) by mouth 3 times daily, Disp-42 tablet, R-0, Transitional      Lidocaine (LIDOCARE) 4 % Patch Place 1-3 patches onto the skin every 24 hours To prevent lidocaine toxicity, patient should be patch free for 12 hrs daily.Disp-30 patch, R-0Transitional      methocarbamol (ROBAXIN) 750 MG tablet Take 1 tablet (750 mg) by mouth 3 times daily, Disp-42 tablet, R-0, E-Prescribe      traMADol (ULTRAM) 50 MG tablet Take 1 tablet (50 mg) by mouth every 6 hours as needed for moderate pain, Disp-20 tablet, R-0, Transitional         CONTINUE these medications which have NOT CHANGED    Details   amLODIPine (NORVASC) 5 MG tablet Take 1 tablet (5 mg) by mouth daily, Disp-90 tablet, R-1, E-Prescribe      calcium carbonate 600 mg-vitamin D 400 units (CALTRATE) 600-400 MG-UNIT per tablet Take 1 tablet by mouth 2 times daily, Disp-180 tablet, R-3, E-Prescribe      !! order for DME Equipment being ordered: Incentive spirometerDisp-1 Units, R-0, Local Print      !! order for DME Equipment being ordered: Home BP monitor and cuffDisp-1 each, R-0, Local Print       !! - Potential duplicate medications found. Please discuss with provider.        Allergies   No Known Allergies  Data   Most Recent 3 CBC's:  Recent Labs   Lab Test 09/12/21  0818 09/11/21  0955 08/25/21  1536   WBC 8.0 11.2* 9.6   HGB 12.9 14.8 14.2   MCV 92 97 92    216 187      Most Recent 3 BMP's:  Recent Labs   Lab Test 09/12/21  0818 09/11/21  2137 09/11/21  0955 08/25/21  1536     --  139 136   POTASSIUM 3.9 3.5 3.3* 3.6   CHLORIDE 108  --  104 104   CO2 28  --  27 27   BUN 11  --  12 12   CR 0.66  --  0.59 0.62   ANIONGAP 4  --  8 5   BOY 8.9  --  9.0 9.1   GLC 98  --  109* 71     Most Recent 2 LFT's:  Recent Labs   Lab Test 09/11/21  0955 08/25/21  1536   AST 22 21   ALT 15  18   ALKPHOS 74 66   BILITOTAL 1.2 0.4     Most Recent INR's and Anticoagulation Dosing History:  Anticoagulation Dose History     Recent Dosing and Labs Latest Ref Rng & Units 10/25/2017 9/11/2021    INR 0.85 - 1.15 1.03 1.10        Most Recent 3 Troponin's:  Recent Labs   Lab Test 09/11/21  0955 07/14/21  0742 07/13/21  2156 01/07/18  2057 10/23/17  1140   TROPI  --   --   --  <0.015 <0.015   TROPONIN <0.015 0.104* 0.123*  --   --      Most Recent 6 Bacteria Isolates From Any Culture (See EPIC Reports for Culture Details):  Recent Labs   Lab Test 01/07/18  2222 10/25/17  1000   CULT <10,000 colonies/mL  mixed urogenital elijah    Susceptibility testing not routinely done No growth     Most Recent TSH, T4 and A1c Labs:  Recent Labs   Lab Test 03/13/19  1338 10/23/17  2208 03/15/17  1402   TSH 0.88  --   --    T4  --  1.27  --    A1C  --   --  5.6     Results for orders placed or performed during the hospital encounter of 09/11/21   XR Chest Port 1 View    Narrative     Examination:  XR CHEST PORT 1 VIEW     Date:  9/11/2021 10:53 AM      Clinical Information: fall     Additional Information: none    Comparison: 7/13/2021    Findings:     Pulmonary vasculature is distinct. Cardiac silhouette is upper limits  of normal. Lungs and pleural spaces are clear. A severe scoliosis of  the thoracolumbar spine apex right. Again demonstrated are old right  lower thoracic rib fractures.      Impression    Impression:  1. Clear lungs. No pneumothorax. No pleural effusions.  2. Old right-sided rib fractures.    CLEMENT DELANEY MD         SYSTEM ID:  [id number]   XR Pelvis Port 1/2 Views    Narrative    1 views pelvis radiograph(s) 9/11/2021 10:58 AM    History: fall pain    Comparison: 7/13/2021    Findings:    Single AP view(s) of the pelvis were obtained.     Bones appear severely osteopenic.    Nondisplaced right superior and inferior pubic rami fractures on the  single projection study.    Sclerosis of left superior pubic  ramus, presumably from remote trauma.    Lumbosacral transitional anatomy with pseudoarthrosis on the left.  Severe degenerative changes of visualized lower lumbar spine.    Vascular calcifications. Mild joint space loss of the right hip.      Impression    Impression: Nondisplaced right superior and inferior pubic rami  fractures.    ADALBERTO LEAHYASHI         SYSTEM ID:  [id number]       Time Spent on this Encounter   Ricky TONY APRN CNP, personally saw the patient today and spent greater than 30 minutes discharging this patient.    We appreciate the opportunity to care for your patient while in the hospital.  Should you have any questions about their injuries or this discharge summary our contact information is below.    Trauma Services  [facility]   Department of Critical Care and Acute Care Surgery  [address] [address]  Office: [phone]

## 2021-09-12 NOTE — PROGRESS NOTES
Care Management Discharge Note    Discharge Date: 09/13/2021 at 10:AM        Discharge Disposition:  [facility] TCU ([phone]))    Discharge Services:  Rehab    Discharge DME:  She has her own walker, but it is likely still at her home.     Discharge Transportation: Wheelchair transport via [facility] ([phone])    Private pay costs discussed: transportation costs    PAS Confirmation Code:  [id number]  Patient/family educated on Medicare website which has current facility and service quality ratings:  Yes, but they preferred [facility], as she was there earlier this summer.     Education Provided on the Discharge Plan:  Yes  Persons Notified of Discharge Plans:  and her daughter Lubna; LORETA Mei from trauma service; Bubba at [facility];  staff  Patient/Family in Agreement with the Plan:  Yes    Handoff Referral Completed: Yes    Additional Information:  I received a call from the trauma team saying that PT was recommending TCU after seeing this patient today. She was referred and accepted for admission tomorrow at her preferred TCU. Please contact the unit socia worker on Monday morning for any questions or concerns related to this discharge.     Please fax her discharge orders as soon as available to [facility], fax #([phone]).    ALEXX Tyler, [facility]     Searchable on [employer]-search SOCIAL WORK - for text paging options    Saturday & Sunday & Holidays  On-Call Pager [phone]    4A 4C 4E 5A 5B [phone]    6A 6B 6C 6D  [phone]    5C 7A 7B 7C 7D [phone]    For Hours 1600-Midnight Pager [phone]

## 2021-09-12 NOTE — PLAN OF CARE
"/68 (BP Location: Left arm)   Pulse 76   Temp 97.4  F (36.3  C) (Oral)   Resp 16   Ht 1.486 m (4' 10.5\")   Wt 42.7 kg (94 lb 2.2 oz)   SpO2 93%   BMI 19.34 kg/m    Reason for Admission: Pt fell getting out bed; stating that she moved too fast and lost her balance. No LOC, nausea, or dizziness. Since she has not been able to move or bear wt w/o pain d/t R pelvic fracture.   Activity: Up with assist. Wt bearing as tolerated w/ walker, not out of bed this shift.   Neuros: A&Ox2, disoriented to time and situation.   Cardiac: WDL.   Respiratory: WDL on RA.   GI/: Voiding adequate amounts via Purewick, medium BM this shift.  Diet: Regular   Skin: WDL.  Lines: (R) PIV - SL    Labs: K+ 3.5, Mg 1.9, recheck in AM   Pain/nausea: no pain at rest, R hip pain w/ repositioning. Refuses pain meds.   Plan: Continue plan of care.    "

## 2021-09-12 NOTE — UTILIZATION REVIEW
"  Admission Status; Secondary Review Determination         Under the authority of the Utilization Management Committee, the utilization review process indicated a secondary review on the above patient.  The review outcome is based on review of the medical records, discussions with staff, and applying clinical experience noted on the date of the review.          (x) Observation Status Appropriate - This patient does not meet hospital inpatient criteria and is placed in observation status. If this patient's primary payer is Medicare and was admitted as an inpatient, Condition Code 44 should be used and patient status changed to \"observation\".     RATIONALE FOR DETERMINATION   89-year-old woman admitted with trauma, nondisplaced superior and inferior pubic rami fractures, pain is controlled by Tylenol, medically clear for discharge requiring transitional care unit per documentation.  No other clear indication for prolonged inpatient hospital care.  The severity of illness, intensity of service provided, expected LOS and risk for adverse outcome make the care appropriate for further observation; however, doesn't meet criteria for hospital inpatient admission. Tanya Mcfarlane PA-C notified of this determination.    This document was produced using voice recognition software.      The information on this document is developed by the utilization review team in order for the business office to ensure compliance.  This only denotes the appropriateness of proper admission status and does not reflect the quality of care rendered.         The definitions of Inpatient Status and Observation Status used in making the determination above are those provided in the CMS Coverage Manual, Chapter 1 and Chapter 6, section 70.4.      Sincerely,     ISHMAEL JUDD MD    System Medical Director  Utilization Management  [facility].        "

## 2021-09-12 NOTE — PLAN OF CARE
[id number]  VSS. A&Ox4. Pelvic fx after fall at home. WBAT. Denies pain when not moving. Tylenol and robaxin scheduled. Tolerating reg diet. PIV SL'd. Voiding via purewick. K+ 3.5, po replacement ordered, recheck in am.

## 2021-09-12 NOTE — PROGRESS NOTES
[facility]   Tertiary Survey Progress Note     Date of Service: 09/12/2021    Trauma mechanism: Ground Level Fall  Time/date of injury: 9/10/21 (1 day prior to admission)  Known Injuries:  1. Nondisplaced right superior and inferior pubic rami fracture       Assessment & Plan   Neuro/Pain:  # Hx of TIA   # Ground level fall, no LOC, no dizziness or signs of syncope   # Acute on chronic pain   - Scheduled: Tylenol, Lidoderm, methocarbamol,    - Prn: dilaudid, ultram      Pulmonary:  - Supplemental oxygen to keep saturation above 92 %.  - Incentive spirometer while awake   - CXR: Clear lungs. No pneumothorax. No pleural effusions.      Cardiovascular:    # Hypertension   # Hx of PACs on EKGs  - Monitor hemodynamic status.  - Trop: neg   - EKG in ED: Sinus rhythm with PACs, Nonspecific T wave abnormality. Abnormal ECG  - Patient has mild hypokalemia, electrolyte protocols in place for high replacement   - Continue PTA: amlodipine     GI/Nutrition:    - Regular Diet    Renal/ Fluids/Electrolytes:  # Hypokalemia, resolved   - K: 3.9  - Creatinine: 0.66  - electrolyte replacement protocol in place.      Endocrine:  - No management indication.      Infectious disease:   # Stress Leukocytosis  - WBC: 11.2  - No indications for antibiotics.      Hematology:    - Hgb 12.9. Monitor and trend.   - Threshold for transfusion if hgb <7.0 or signs/symptoms of hypoperfusion.        # DVT Prophylaxis: Lovenox 30mg bid     Musculoskeletal:  # Osteoarthritis   # Calcium pyrophosphate deposition disease  # Kyphosis   # Old right-sided rib fractures, seen on CXR, from previous falls   # Weakness and deconditioning of chronic illness and age  # Right superior/inferior pubic rami fracture   - Physical and occupational therapy consults.  Ortho consult: Follow-up: 3-4 weeks with non-operative orthopaedics provider with AP pelvis XR vs with PCP if PCP comfortable    Skin:  - dilgent cares to  prevent skin breakdown and wound formation.      Lines/ tubes/ drains:  - PIV     General Cares:    PPI/H2 blocker:  NA   DVT prophylaxis: Lovenox    Bowel Regimen/Date of last stool: in place/ this am 9/12   Pulmonary toilet: IS    Code status:  Full     Discharge goals:     Adequate pain management: yes    VSS x24 hours: yes    Hemoglobin stable x 48 hours: yes    Ambulating safely and/or therapy evals complete: in process     Drains/lines removed or plan in place to manage: yes    Teaching done:     Other:  Expected D/C date: Medically ready, disposition to TCU waiting on PT evaluation    Tanya Mcfarlane PA-C  To contact the trauma service use job code pager [phone],   Numeric texts or alpha text [facility]      Interval History   Review of Systems   Skin: negative  Eyes: negative  Ears/Nose/Throat: negative  Respiratory: No shortness of breath, dyspnea on exertion, cough, or hemoptysis  Cardiovascular: negative  Gastrointestinal: negative  Genitourinary: negative  Musculoskeletal: positive for fracture and joint pain  Neurologic: negative  Psychiatric: negative  Hematologic/Lymphatic/Immunologic: negative  Endocrine: negative     Physical Exam   Jessica Coma Scale - Total 15/15  Eye Response (E): 4   4= spontaneous, 3= to verbal/voice, 2= to pain, 1= No response   Verbal Response (V): 5   5= Orientated, converses, 4= Confused, converses, 3= Inappropriate words, 2= Incomprehensible sounds, 1=No response   Motor Response (M): 6   6= Obeys commands, 5= Localizes to pain, 4= Withdrawal to pain, 3=Fexion to pain, 2= Extension to pain, 1= No response     Frailty Questionnaire: To be done for all patients age 60+  F (Fatigue): Is the patient easily fatigued? Yes= 1  R (Resistance): Is the patient unable to walk one flight of stairs? NO = 0  A (Ambulation): Is the patient unable to walk one block? NO = 0  I  (Illness): Does the patient have more than five illnesses? NO = 0  L (Loss of weight): Has the patient lost  more than 5% of weight in the past 6 months. NO = 0  Lost five pounds or more in the last 3 months without trying? AND/OR Unintended weight loss?  Does the patient have difficulty performing housework such as washing windows or scrubbing floors? AND Activity in a typical 24-hour day- No moderate or vigorous activity    Score: 1    Score: 0-2: Ensure appropriate therapies consulted if needed     Physical Exam  Constitutional: Awake, alert, cooperative, no apparent distress.  Eyes: Lids and lashes normal, PERRL, EOMI, sclera clear, conjunctiva normal.  HENT: Normocephalic, atraumatic  Respiratory: No increased work of breathing, good air exchange, clear to auscultation bilaterally, no crackles or wheezing.  Cardiovascular:  regular rate and rhythm    GI: Abdomen soft, non-distended, non-tender  Genitourinary:  Voids independently   Skin:  Warm & dry   Musculoskeletal: Tenderness to right hip with palpation. There is no redness, warmth, or swelling of the joints.  Pedal pulse palpated.  Neurologic: Awake, alert, oriented. Strength and sensory is intact. No focal deficits.  Neuropsychiatric: Calm, normal eye contact, alert, affect appropriate to situation, oriented, thought process normal.    Temp: (!) 95.7  F (35.4  C) Temp src: Oral BP: 134/80 Pulse: 74   Resp: 16 SpO2: 95 % O2 Device: None (Room air)    Vitals:    09/11/21 0935 09/11/21 1700   Weight: 46.1 kg (101 lb 10.1 oz) 42.7 kg (94 lb 2.2 oz)     Vital Signs with Ranges  Temp:  [95.6  F (35.3  C)-97.7  F (36.5  C)] 95.7  F (35.4  C)  Pulse:  [69-89] 74  Resp:  [16-25] 16  BP: (105-167)/() 134/80  SpO2:  [90 %-97 %] 95 %  I/O last 3 completed shifts:  In: -   Out: 650 [Urine:650]

## 2021-09-12 NOTE — PROGRESS NOTES
"   09/12/21 1156   Quick Adds   Type of Visit Initial PT Evaluation   Living Environment   Current Living Arrangements independent living facility   Home Accessibility no concerns   Living Environment Comments Pt not a reliable historian at time of evaluation as pt was unsure of what type of facility she lived in and was disoriented.    Self-Care   Usual Activity Tolerance moderate   Current Activity Tolerance poor   Regular Exercise Yes   Activity/Exercise Type other (see comments)  (pt reports she received PT 5x/week)   Equipment Currently Used at Home other (see comments)  (rollator)   Activity/Exercise/Self-Care Comment Pt reports that she was previously [facility] prior to admission to [facility] but then was unclear with answer when therapist asked if she received assistance living facility. Per chart: \"[facility] resident and is in independent living. She has been able to complete her ADLs prior to her fall.\"   Disability/Function   Hearing Difficulty or Deaf no   Wear Glasses or Blind yes   Vision Management glasses   Concentrating, Remembering or Making Decisions Difficulty no   Difficulty Communicating no   Difficulty Eating/Swallowing no   Walking or Climbing Stairs Difficulty yes   Walking or Climbing Stairs ambulation difficulty, requires equipment   Dressing/Bathing Difficulty no   Toileting issues no   Doing Errands Independently Difficulty (such as shopping) yes   Fall history within last six months yes   Number of times patient has fallen within last six months 2   Change in Functional Status Since Onset of Current Illness/Injury yes   General Information   Onset of Illness/Injury or Date of Surgery 09/11/21   Referring Physician Tanya Mcafrlane PA-C   Patient/Family Therapy Goals Statement (PT) Return to PLOF   Pertinent History of Current Problem (include personal factors and/or comorbidities that impact the POC) Per chart: \"89 year old  female with pmhx significant for HTN, osteoporosis, " "rectal prolapse, CPPD, carpal tunnel, OA of hands who presented to the ED due to a GLF on 9/10/2021 at 8:30 PM. She endorses falling out of bed after moving too quickly.  She endorses pain in her groin and from her R knee down to her ankle  w/ weight bearing and ambulation. No pain at rest. Denies lower back pain. Endorses weakness in her RLE.\"   Existing Precautions/Restrictions fall;weight bearing   Weight-Bearing Status - RLE weight-bearing as tolerated   General Observations Activity: Up with Assist   Cognition   Orientation Status (Cognition) disoriented to;place;situation   Affect/Mental Status (Cognition) confused;low arousal/lethargic   Pain Assessment   Patient Currently in Pain Yes, see Vital Sign flowsheet  (R LE when weight bearing)   Posture    Posture Forward head position;Protracted shoulders;Kyphosis   Range of Motion (ROM)   ROM Quick Adds ROM WFL   Strength   Strength Comments Grossly deconditioned   Bed Mobility   Comment (Bed Mobility) min-mod A    Transfers   Transfer Safety Comments mod-max A with 4WW   Gait/Stairs (Locomotion)   Comment (Gait/Stairs) NA - not safe today   Clinical Impression   Criteria for Skilled Therapeutic Intervention yes, treatment indicated   PT Diagnosis (PT) Impaired functional mobility   Influenced by the following impairments pain, strength, balance, endurance, WBAT through R LE   Functional limitations due to impairments limited activity tolerance, increased assistance, no gait   Clinical Presentation Evolving/Changing   Clinical Presentation Rationale clinical judgement and chart review   Clinical Decision Making (Complexity) moderate complexity   Therapy Frequency (PT) 5x/week   Predicted Duration of Therapy Intervention (days/wks) days   Planned Therapy Interventions (PT) balance training;bed mobility training;gait training;patient/family education;strengthening;transfer training   Risk & Benefits of therapy have been explained evaluation/treatment results " reviewed;care plan/treatment goals reviewed;risks/benefits reviewed;current/potential barriers reviewed;participants voiced agreement with care plan;participants included;patient   PT Discharge Planning    PT Discharge Recommendation (DC Rec) Transitional Care Facility   PT Rationale for DC Rec Pt is below functional baseline requiring mod-max A x 1 with walker for transfers. Pt is a falls risk.    PT Brief overview of current status  Nursing: assist x 2 with walker for pivot transfers only; R LE WBAT   Total Evaluation Time   Total Evaluation Time (Minutes) 6

## 2021-09-13 VITALS
RESPIRATION RATE: 16 BRPM | BODY MASS INDEX: 18.98 KG/M2 | DIASTOLIC BLOOD PRESSURE: 75 MMHG | SYSTOLIC BLOOD PRESSURE: 131 MMHG | WEIGHT: 94.14 LBS | HEIGHT: 59 IN | TEMPERATURE: 95.9 F | OXYGEN SATURATION: 94 % | HEART RATE: 67 BPM

## 2021-09-13 PROCEDURE — 96372 THER/PROPH/DIAG INJ SC/IM: CPT | Performed by: SURGERY

## 2021-09-13 PROCEDURE — 250N000013 HC RX MED GY IP 250 OP 250 PS 637: Performed by: SURGERY

## 2021-09-13 PROCEDURE — G0378 HOSPITAL OBSERVATION PER HR: HCPCS

## 2021-09-13 PROCEDURE — 250N000013 HC RX MED GY IP 250 OP 250 PS 637: Performed by: PHYSICIAN ASSISTANT

## 2021-09-13 PROCEDURE — 250N000011 HC RX IP 250 OP 636: Performed by: SURGERY

## 2021-09-13 PROCEDURE — 99239 HOSP IP/OBS DSCHRG MGMT >30: CPT | Performed by: NURSE PRACTITIONER

## 2021-09-13 RX ADMIN — ENOXAPARIN SODIUM 30 MG: 30 INJECTION SUBCUTANEOUS at 08:18

## 2021-09-13 RX ADMIN — AMLODIPINE BESYLATE 5 MG: 5 TABLET ORAL at 08:19

## 2021-09-13 RX ADMIN — METHOCARBAMOL 750 MG: 750 TABLET ORAL at 08:19

## 2021-09-13 RX ADMIN — MAGNESIUM OXIDE TAB 400 MG (241.3 MG ELEMENTAL MG) 400 MG: 400 (241.3 MG) TAB at 08:19

## 2021-09-13 RX ADMIN — CALCIUM CARBONATE 600 MG (1,500 MG)-VITAMIN D3 400 UNIT TABLET 1 TABLET: at 08:23

## 2021-09-13 RX ADMIN — ACETAMINOPHEN 975 MG: 325 TABLET, FILM COATED ORAL at 08:18

## 2021-09-13 NOTE — PROGRESS NOTES
OBS GOALS  Evaluation By PT or OT : MET  Pain management: MET  Recommendations from consulting services: MET  Discharge plan: MET

## 2021-09-13 NOTE — PLAN OF CARE
Vitals: Temp: 96.8  F (36  C) Temp src: Oral BP: 121/71 Pulse: 70   Resp: 16 SpO2: 94 % O2 Device: None (Room air)        Time: 1833-6217  Reason for admission: R pubic fracture post fall.   Activity:  Ax1 w/ walker. RLE WBAT. Not oob overnight - repositioned as needed.   Pain:  Mild discomfort with movement. Denies pain meds.   Neuro: A&O x3. Calls appropriately.   Cardiac: WDL.   Respiratory:  LS clear. Sating mid-90's on RA. IS at bedside - encouraged.  GI/:  Incontinent of B&B. Purewick in place. No BM. +BS.   Diet: Regular.   Lines:  R PIV SL.   Incisions/Drains/Skin:  R elbow scab.   Lab:  Reviewed.      New changes this shift:  N/A. Plan to discharge back to [facility]CU nursing home at 1000 this morning.     OBS GOALS  Evaluation By PT or OT : MET  Pain management: MET  Recommendations from consulting services: MET  Discharge plan: MET

## 2021-09-13 NOTE — PLAN OF CARE
Patient reports pain with movement LEs,relief with Tylenol/Robaxin.Transferred with assist 2/walker,able to bare full weight.Tolerated food/fluid intake,incontinent urine,no stool episode.Transferred via W/C to [facility] TCU patient voiced familiar and positve feeling acceptance from past experience in July.

## 2021-09-13 NOTE — PROGRESS NOTES
Vitals: Temp: 97.4  F (36.3  C) Temp src: Axillary BP: 134/77 Pulse: 75   Resp: 16 SpO2: 94 % O2 Device: None (Room air)       Time: 4619-7435  Reason for admission: Fall, Closed fracture of right pubis, unspecified portion of pubis.  Activity: assist x 2 with walker for pivot transfers only; R LE WBAT Ax2 with repositioning. Ax1 for personal hygiene.   Pain:  Reports pain 1/10 when rolling in bed. At rest reports 0/10 pain.   Neuro: A&O x3. Pleasant and calm. Does not use call light.  Cardiac: WDL. Denies cardiac chest pain. No edema.   Respiratory:  WDL. Denies Sob. No cough. Uses IS independently able to set goal at 750.  GI/:  Incontinent with bowel and bladder. Uses pure wick. Low urina out put, akira in color. Fluids encouraged.   Diet: Regular diet with good intake. No c/o nausea.   Lines:  (R) PIV, saline locked.   Incisions/Drains/Skin:  Small scab on (R) elbow with no redness around.   Lab:  Reviewed.   New changes this shift: Planned discharge 09/13/2021 to [facility] TCU ([phone]).

## 2021-09-14 ENCOUNTER — PATIENT OUTREACH (OUTPATIENT)
Dept: CARE COORDINATION | Facility: CLINIC | Age: 86
End: 2021-09-14

## 2021-09-14 DIAGNOSIS — Z71.89 OTHER SPECIFIED COUNSELING: ICD-10-CM

## 2021-09-14 NOTE — PROGRESS NOTES
Clinic Care Coordination Contact    Background: Care Coordination referral placed from [facility] discharge report for reason of patient meeting criteria for a TCM outreach call by [facility] team.    Assessment: Upon chart review, [facility] Team member will cancel/close the referral for TCM outreach due to reason below:     Patient has discharged to another healthcare facility, skilled nursing facility or group home.     Plan: Care Coordination referral for TCM outreach canceled.    Dalia Salgado  [facility] [place], [facility]

## 2021-09-15 ENCOUNTER — TELEPHONE (OUTPATIENT)
Dept: ORTHOPEDICS | Facility: CLINIC | Age: 86
End: 2021-09-15

## 2021-09-15 NOTE — TELEPHONE ENCOUNTER
RN calling and spoke with her daughter  RN is cancelling the appointment today with Dr. Stratton, per Dr. Stratton.  Patient should follow up with her PCP in 3 weeks for this Closed fracture of right pubis, post discharge.  Daughter states that she is still in rehab.

## 2021-09-16 ENCOUNTER — DOCUMENTATION ONLY (OUTPATIENT)
Dept: FAMILY MEDICINE | Facility: CLINIC | Age: 86
End: 2021-09-16

## 2021-09-16 NOTE — PROGRESS NOTES
"When opening a documentation only encounter, be sure to enter in \"Chief Complaint\" Forms and in \" Comments\" Title of form, description if needed.    Sudeep is a 89 year old  female  Form received via: Fax  Form now resides in: Provider Ready    Veronica Gaffney MA                  "

## 2021-09-20 ENCOUNTER — TRANSITIONAL CARE UNIT VISIT (OUTPATIENT)
Dept: GERIATRICS | Facility: CLINIC | Age: 86
End: 2021-09-20
Payer: COMMERCIAL

## 2021-09-20 VITALS
BODY MASS INDEX: 19.92 KG/M2 | DIASTOLIC BLOOD PRESSURE: 86 MMHG | HEIGHT: 59 IN | HEART RATE: 83 BPM | TEMPERATURE: 98.5 F | WEIGHT: 98.8 LBS | RESPIRATION RATE: 16 BRPM | OXYGEN SATURATION: 91 % | SYSTOLIC BLOOD PRESSURE: 128 MMHG

## 2021-09-20 DIAGNOSIS — I10 ESSENTIAL HYPERTENSION: ICD-10-CM

## 2021-09-20 DIAGNOSIS — G31.84 MILD COGNITIVE IMPAIRMENT: ICD-10-CM

## 2021-09-20 DIAGNOSIS — M81.0 AGE-RELATED OSTEOPOROSIS WITHOUT CURRENT PATHOLOGICAL FRACTURE: ICD-10-CM

## 2021-09-20 DIAGNOSIS — S32.591D: Primary | ICD-10-CM

## 2021-09-20 DIAGNOSIS — Z87.81 HISTORY OF RIB FRACTURE: ICD-10-CM

## 2021-09-20 DIAGNOSIS — K59.02 CONSTIPATION DUE TO OUTLET DYSFUNCTION: ICD-10-CM

## 2021-09-20 DIAGNOSIS — M15.0 PRIMARY OSTEOARTHRITIS INVOLVING MULTIPLE JOINTS: ICD-10-CM

## 2021-09-20 DIAGNOSIS — K62.3 RECTAL PROLAPSE: ICD-10-CM

## 2021-09-20 PROCEDURE — 99305 1ST NF CARE MODERATE MDM 35: CPT | Performed by: NURSE PRACTITIONER

## 2021-09-20 ASSESSMENT — MIFFLIN-ST. JEOR: SCORE: 760.84

## 2021-09-20 NOTE — LETTER
2021        RE: Belia Sheets  [address]  [address]  [facility] [address]        [facility]    Name:   Belia Sheets (Sudeep)  :   1930  MRN:    [id number]     Facility:   [facility] (SNF) [[id number]]   Room:   Code Status: FULL CODE and POLST AVAILABLE -     DOS: 2021  Previous visit: 2021, a discharge visit from her last stay    PCP:  Alejandro Sandhu    CHIEF COMPLAINT / REASON FOR VISIT:  Chief Complaint   Patient presents with     Hospital F/U     Fall with pubic rami fractures      [facility] from 2021 until 2021 (fall and right 9th rib fracture)      HPI: Belia is a 89 year old female with a history of osteoporosis, significant osteoarthritis (especially in the hands), essential hypertension, and remote history of visual hallucinations.  She was undergoing rehab in the TCU here just 3 months ago after falling and sustaining a right ninth rib fracture.    She fell again on 2021 and taken by EMS to [facility] ED.  Imaging revealed right superior and inferior pubic rami fractures.  She was evaluated by orthopedics with fractures to be managed conservatively utilizing pain control and guided weightbearing recommendations.  She can bear weight as tolerated with a walker for safety.  Orthopedic surgery did recommend follow-up in 3 to 4 weeks with nonoperative orthopedics provider.  This should include AP x-rays of the pelvis.  She may be seen by her PCP if the PCP feels comfortable.      She was also evaluated by physical and occupational therapies with recommendations for TCU on discharge.  At discharge, she was below her functional baseline, requiring mod to max assist x1 with a walker for transfers.  Also recommended for nursing home with assist x2 with walker for pivot transfers only.  She is a fall risk.  RLE WBAT.      CURRENT/RECENT TCU ISSUES    Disposition: Pain can be severe  "at times but short-lived.  Ordered medications included acetaminophen, methocarbamol, and lidocaine patch, although the last was discontinued, as there was really no place to put it.  The main thing for her, she says, is to sit or  the way that does not hurt.  Her personal preference is to not take narcotics if at all possible.    Cognitive impairment: During her last stay, there were at least mild cognitive deficits noted, and this was corroborated by the patient's son who also endorsed his mother's memory loss, although she appears, for the most part, to be quite cogent.  SLP did work with her during her last stay, and it was felt that that if she needed quite a bit of support.   She did rather poorly on the clock test, making a pie and then adding numbers but no hands.  She scored 2/17 on the storytelling, 10/17 on orientation.  She scored 23/30 on the MMSE.  Her daughter apparently believed cognitive impairment to be an acute issue.      She seems very cogent today and is, in fact, quite witty.  I mentioned the 2 falls, and she responded with, \"I try a new way to fall every time.\"    SLP will again be working with her on both swallowing and cognitive issues    Osteoarthritis: Rather severe in the hands with noted deformities of various joints.  I watched her attempted to  a flat knife with significant difficulty, and we talked about the possibility of adaptive utensils.  We will plan to have OT assess.    Code status: It is noted that in the hospital, she was listed as DNR/DNI; however, at [facility] she became full code.  I spoke to her again about this, and her response was, \"I'm in better health and have good responses than I had 2 years ago.\"    Discharge planning: When last here, she told me she lives in a \"very pleasant small apartment.\"        ROS: She denies any significant pain, even at the fracture site.  She can, in fact, using incentive spirometer without producing pain.  No headaches " or chest pains, coughing or congestion, nausea or vomiting, dyspnea, dysuria, diplopia, constipation or diarrhea (stools are soft but formed), difficulty chewing or swallowing, integumentary issues, or problems with appetite or sleep    Past Medical History:   Diagnosis Date     Carpal tunnel syndrome (aka CTS)      Constipation due to outlet dysfunction 2021     Fracture of multiple rami of right pubis with routine healing, subsequent encounter 2021     H/O calcium pyrophosphate deposition disease (CPPD)      History of 9th right rib fracture due to fall (2021) 2021     History of encephalopathy 10/2017     Hypertension      Kyphoscoliosis      Osteoarthritis     hands     Osteoporosis      Rectal prolapse               Family History   Problem Relation Age of Onset     Depression/Anxiety Son      Depression/Anxiety Son         alcohol abuse  age 24     Hypertension Mother      Hypertension Brother      Diabetes No family hx of      Breast Cancer No family hx of      Colon Cancer No family hx of      Prostate Cancer No family hx of      Other Cancer No family hx of      Social History     Socioeconomic History     Marital status:      Spouse name: Not on file     Number of children: Not on file     Years of education: Not on file     Highest education level: Not on file   Occupational History     Not on file   Tobacco Use     Smoking status: Never Smoker     Smokeless tobacco: Never Used   Substance and Sexual Activity     Alcohol use: No     Drug use: No     Sexual activity: Never   Other Topics Concern     Not on file   Social History Narrative     Not on file     Social Determinants of Health     Financial Resource Strain:      Difficulty of Paying Living Expenses:    Food Insecurity:      Worried About Running Out of Food in the Last Year:      Ran Out of Food in the Last Year:    Transportation Needs:      Lack of Transportation (Medical):      Lack of Transportation  (Non-Medical):    Physical Activity:      Days of Exercise per Week:      Minutes of Exercise per Session:    Stress:      Feeling of Stress :    Social Connections:      Frequency of Communication with Friends and Family:      Frequency of Social Gatherings with Friends and Family:      Attends [employer] Services:      Active Member of Clubs or Organizations:      Attends Club or Organization Meetings:      Marital Status:    Intimate Partner Violence:      Fear of Current or Ex-Partner:      Emotionally Abused:      Physically Abused:      Sexually Abused:        MEDICATIONS: Reviewed from the MAR, physician orders, and/or earlier progress notes.  Post Discharge Medication Reconciliation Status: medication reconcilation previously completed during another office visit.  Updated by me today (09/20/2021) with the addition of MiraLAX reflected below..  Current Outpatient Medications   Medication Sig     polyethylene glycol (MIRALAX) 17 g packet Take 1 packet by mouth daily     acetaminophen (TYLENOL) 325 MG tablet Take 3 tablets (975 mg) by mouth 3 times daily     amLODIPine (NORVASC) 5 MG tablet Take 1 tablet (5 mg) by mouth daily     calcium carbonate 600 mg-vitamin D 400 units (CALTRATE) 600-400 MG-UNIT per tablet Take 1 tablet by mouth 2 times daily     Lidocaine (LIDOCARE) 4 % Patch Place 1-3 patches onto the skin every 24 hours To prevent lidocaine toxicity, patient should be patch free for 12 hrs daily.     methocarbamol (ROBAXIN) 750 MG tablet Take 1 tablet (750 mg) by mouth 3 times daily     order for DME Equipment being ordered: Incentive spirometer     order for DME Equipment being ordered: Home BP monitor and cuff     traMADol (ULTRAM) 50 MG tablet Take 1 tablet (50 mg) by mouth every 6 hours as needed for moderate pain     No current facility-administered medications for this visit.     ALLERGIES: No Known Allergies    DIET: Regular, regular texture, thin liquids.    Vitals:    09/20/21 1158   BP: 128/86  "  Pulse: 83   Resp: 16   Temp: 98.5  F (36.9  C)   SpO2: 91%   Weight: 44.8 kg (98 lb 12.8 oz)   Height: 1.486 m (4' 10.5\")     Body mass index is 20.3 kg/m .    EXAMINATION:   General: Pleasant, alert, and conversant elderly female, lying in bed, in no apparent distress.   She is quickwitted and funny.  Head: Normocephalic and atraumatic.   Eyes: PERRLA, sclerae clear.   ENT: Moist oral mucosa.  She has several of her own teeth but missing all lower teeth on the right as well as several uppercase.  No rhinorrhea or nasal discharge.  Hearing seems unimpaired.  Cardiovascular: Regular rate and rhythm.  No appreciable murmur.  Respiratory: Lungs clear to auscultation bilaterally.   Abdomen: Nondistended.   Musculoskeletal/Extremities: Age-related degenerative joint disease and moderate to severe kyphosis.  Hands and fingers shows significant deformities due to osteoarthritis.  Right fifth trigger finger.  Bilateral hallux valgus deformities with partial great toe overlap.  No peripheral edema.  I did observe her having some difficulty eating and are requesting OT evaluate for possible adaptive utensils.  She could barely  the flat knife.  Integument: No rashes, clinically significant lesions, or skin breakdown.   Cognitive/Psychiatric: Mostly alert and oriented, although there may be some minor cognitive issues.  Affect is euthymic.    DIAGNOSTICS:   No results found for this or any previous visit (from the past 240 hour(s)).   Last Comprehensive Metabolic Panel:  Sodium   Date Value Ref Range Status   09/12/2021 140 133 - 144 mmol/L Final   01/30/2019 140 133 - 144 mmol/L Final     Potassium   Date Value Ref Range Status   09/12/2021 3.9 3.4 - 5.3 mmol/L Final   01/30/2019 4.0 3.4 - 5.3 mmol/L Final     Chloride   Date Value Ref Range Status   09/12/2021 108 94 - 109 mmol/L Final   01/30/2019 104 94 - 109 mmol/L Final     Carbon Dioxide   Date Value Ref Range Status   01/30/2019 30 20 - 32 mmol/L Final "     Carbon Dioxide (CO2)   Date Value Ref Range Status   09/12/2021 28 20 - 32 mmol/L Final     Anion Gap   Date Value Ref Range Status   09/12/2021 4 3 - 14 mmol/L Final   01/30/2019 6 3 - 14 mmol/L Final     Glucose   Date Value Ref Range Status   09/12/2021 98 70 - 99 mg/dL Final   01/30/2019 129 (H) 70 - 99 mg/dL Final     Urea Nitrogen   Date Value Ref Range Status   09/12/2021 11 7 - 30 mg/dL Final   07/13/2020 16 7 - 30 mg/dL Final     Creatinine   Date Value Ref Range Status   09/12/2021 0.66 0.52 - 1.04 mg/dL Final   07/13/2020 0.67 0.52 - 1.04 mg/dL Final     GFR Estimate   Date Value Ref Range Status   09/12/2021 77 >60 mL/min/1.73m2 Final     Comment:     As of July 11, 2021, eGFR is calculated by the CKD-EPI creatinine equation, without race adjustment. eGFR can be influenced by muscle mass, exercise, and diet. The reported eGFR is an estimation only and is only applicable if the renal function is stable.   07/13/2020 77 >60 mL/min/[1.73_m2] Final     Comment:     Non  GFR Calc  Starting 12/18/2018, serum creatinine based estimated GFR (eGFR) will be   calculated using the Chronic Kidney Disease Epidemiology Collaboration   (CKD-EPI) equation.       Calcium   Date Value Ref Range Status   09/12/2021 8.9 8.5 - 10.1 mg/dL Final   07/13/2020 8.8 8.5 - 10.1 mg/dL Final     Lab Results   Component Value Date    WBC 17.6 07/13/2021    WBC 7.2 01/09/2018     Lab Results   Component Value Date    RBC 5.39 07/13/2021    RBC 4.04 01/09/2018     Lab Results   Component Value Date    HGB 14.2 07/14/2021    HGB 13.0 06/20/2018     Lab Results   Component Value Date    HCT 49.5 07/13/2021    HCT 43.1 06/20/2018     Lab Results   Component Value Date    MCV 92 07/13/2021    MCV 96.0 06/20/2018     Lab Results   Component Value Date    MCH 30.1 07/13/2021    MCH 29.0 06/20/2018     Lab Results   Component Value Date    MCHC 32.7 07/13/2021    [facility] 30.2 06/20/2018     Lab Results   Component Value Date     RDW 12.4 07/13/2021    RDW 13.3 01/09/2018     Lab Results   Component Value Date     07/13/2021     01/09/2018       ASSESSMENT/Plan:      ICD-10-CM    1. Fracture of multiple rami of right pubis with routine healing, subsequent encounter  S32.591D    2. History of 9th right rib fracture due to fall (07/13/2021)  Z87.81    3. Primary osteoarthritis involving multiple joints, especially bilateral hands  M89.49    4. Age-related osteoporosis without current pathological fracture  M81.0    5. Mild cognitive impairment  G31.84    6. Essential hypertension  I10    7. Rectal prolapse  K62.3    8. Constipation due to outlet dysfunction  K59.02        CHANGES:  1.  Have occupational therapy assess for possible adaptive utensils.  2.  MiraLAX 17 g in 8 ounces of fluid daily.    CARE PLAN:  The care plan has been reviewed and all orders signed.  Changes to care plan, if any, as noted.  Otherwise, continue current plan of care.    The above has been created using voice recognition software. Please be aware that this may unintentionally  produce inaccuracies and/or nonsensical sentences.      Electronically signed by: SUSIE Nieves CNP        Sincerely,        SUSIE Nieves CNP

## 2021-09-22 PROBLEM — Z87.81 HISTORY OF RIB FRACTURE: Status: ACTIVE | Noted: 2021-09-22

## 2021-09-22 PROBLEM — K59.02 CONSTIPATION DUE TO OUTLET DYSFUNCTION: Status: ACTIVE | Noted: 2021-09-22

## 2021-09-22 PROBLEM — S32.591D: Status: ACTIVE | Noted: 2021-09-22

## 2021-09-22 RX ORDER — POLYETHYLENE GLYCOL 3350 17 G/17G
1 POWDER, FOR SOLUTION ORAL DAILY
COMMUNITY
Start: 2021-09-20 | End: 2022-11-03

## 2021-09-22 NOTE — PROGRESS NOTES
[facility]    Name:   Belia Sheets (Sudeep)  :   1930  MRN:    [id number]     Facility:   [facility] (SNF) [[id number]]   Room:   Code Status: FULL CODE and POLST AVAILABLE -     DOS: 2021  Previous visit: 2021, a discharge visit from her last stay    PCP:  Alejandro Sandhu    CHIEF COMPLAINT / REASON FOR VISIT:  Chief Complaint   Patient presents with     Hospital F/U     Fall with pubic rami fractures      [facility] from 2021 until 2021 (fall and right 9th rib fracture)      HPI: Belia is a 89 year old female with a history of osteoporosis, significant osteoarthritis (especially in the hands), essential hypertension, and remote history of visual hallucinations.  She was undergoing rehab in the TCU here just 3 months ago after falling and sustaining a right ninth rib fracture.    She fell again on 2021 and taken by EMS to [facility] ED.  Imaging revealed right superior and inferior pubic rami fractures.  She was evaluated by orthopedics with fractures to be managed conservatively utilizing pain control and guided weightbearing recommendations.  She can bear weight as tolerated with a walker for safety.  Orthopedic surgery did recommend follow-up in 3 to 4 weeks with nonoperative orthopedics provider.  This should include AP x-rays of the pelvis.  She may be seen by her PCP if the PCP feels comfortable.      She was also evaluated by physical and occupational therapies with recommendations for TCU on discharge.  At discharge, she was below her functional baseline, requiring mod to max assist x1 with a walker for transfers.  Also recommended for nursing home with assist x2 with walker for pivot transfers only.  She is a fall risk.  RLE WBAT.      CURRENT/RECENT TCU ISSUES    Disposition: Pain can be severe at times but short-lived.  Ordered medications included acetaminophen, methocarbamol, and lidocaine  "patch, although the last was discontinued, as there was really no place to put it.  The main thing for her, she says, is to sit or  the way that does not hurt.  Her personal preference is to not take narcotics if at all possible.    Cognitive impairment: During her last stay, there were at least mild cognitive deficits noted, and this was corroborated by the patient's son who also endorsed his mother's memory loss, although she appears, for the most part, to be quite cogent.  SLP did work with her during her last stay, and it was felt that that if she needed quite a bit of support.   She did rather poorly on the clock test, making a pie and then adding numbers but no hands.  She scored 2/17 on the storytelling, 10/17 on orientation.  She scored 23/30 on the MMSE.  Her daughter apparently believed cognitive impairment to be an acute issue.      She seems very cogent today and is, in fact, quite witty.  I mentioned the 2 falls, and she responded with, \"I try a new way to fall every time.\"    SLP will again be working with her on both swallowing and cognitive issues    Osteoarthritis: Rather severe in the hands with noted deformities of various joints.  I watched her attempted to  a flat knife with significant difficulty, and we talked about the possibility of adaptive utensils.  We will plan to have OT assess.    Code status: It is noted that in the hospital, she was listed as DNR/DNI; however, at [facility] she became full code.  I spoke to her again about this, and her response was, \"I'm in better health and have good responses than I had 2 years ago.\"    Discharge planning: When last here, she told me she lives in a \"very pleasant small apartment.\"        ROS: She denies any significant pain, even at the fracture site.  She can, in fact, using incentive spirometer without producing pain.  No headaches or chest pains, coughing or congestion, nausea or vomiting, dyspnea, dysuria, diplopia, constipation " or diarrhea (stools are soft but formed), difficulty chewing or swallowing, integumentary issues, or problems with appetite or sleep    Past Medical History:   Diagnosis Date     Carpal tunnel syndrome (aka CTS)      Constipation due to outlet dysfunction 2021     Fracture of multiple rami of right pubis with routine healing, subsequent encounter 2021     H/O calcium pyrophosphate deposition disease (CPPD)      History of 9th right rib fracture due to fall (2021) 2021     History of encephalopathy 10/2017     Hypertension      Kyphoscoliosis      Osteoarthritis     hands     Osteoporosis      Rectal prolapse               Family History   Problem Relation Age of Onset     Depression/Anxiety Son      Depression/Anxiety Son         alcohol abuse  age 24     Hypertension Mother      Hypertension Brother      Diabetes No family hx of      Breast Cancer No family hx of      Colon Cancer No family hx of      Prostate Cancer No family hx of      Other Cancer No family hx of      Social History     Socioeconomic History     Marital status:      Spouse name: Not on file     Number of children: Not on file     Years of education: Not on file     Highest education level: Not on file   Occupational History     Not on file   Tobacco Use     Smoking status: Never Smoker     Smokeless tobacco: Never Used   Substance and Sexual Activity     Alcohol use: No     Drug use: No     Sexual activity: Never   Other Topics Concern     Not on file   Social History Narrative     Not on file     Social Determinants of Health     Financial Resource Strain:      Difficulty of Paying Living Expenses:    Food Insecurity:      Worried About Running Out of Food in the Last Year:      Ran Out of Food in the Last Year:    Transportation Needs:      Lack of Transportation (Medical):      Lack of Transportation (Non-Medical):    Physical Activity:      Days of Exercise per Week:      Minutes of Exercise per Session:     Stress:      Feeling of Stress :    Social Connections:      Frequency of Communication with Friends and Family:      Frequency of Social Gatherings with Friends and Family:      Attends [employer] Services:      Active Member of Clubs or Organizations:      Attends Club or Organization Meetings:      Marital Status:    Intimate Partner Violence:      Fear of Current or Ex-Partner:      Emotionally Abused:      Physically Abused:      Sexually Abused:        MEDICATIONS: Reviewed from the MAR, physician orders, and/or earlier progress notes.  Post Discharge Medication Reconciliation Status: medication reconcilation previously completed during another office visit.  Updated by me today (09/20/2021) with the addition of MiraLAX reflected below..  Current Outpatient Medications   Medication Sig     polyethylene glycol (MIRALAX) 17 g packet Take 1 packet by mouth daily     acetaminophen (TYLENOL) 325 MG tablet Take 3 tablets (975 mg) by mouth 3 times daily     amLODIPine (NORVASC) 5 MG tablet Take 1 tablet (5 mg) by mouth daily     calcium carbonate 600 mg-vitamin D 400 units (CALTRATE) 600-400 MG-UNIT per tablet Take 1 tablet by mouth 2 times daily     Lidocaine (LIDOCARE) 4 % Patch Place 1-3 patches onto the skin every 24 hours To prevent lidocaine toxicity, patient should be patch free for 12 hrs daily.     methocarbamol (ROBAXIN) 750 MG tablet Take 1 tablet (750 mg) by mouth 3 times daily     order for DME Equipment being ordered: Incentive spirometer     order for DME Equipment being ordered: Home BP monitor and cuff     traMADol (ULTRAM) 50 MG tablet Take 1 tablet (50 mg) by mouth every 6 hours as needed for moderate pain     No current facility-administered medications for this visit.     ALLERGIES: No Known Allergies    DIET: Regular, regular texture, thin liquids.    Vitals:    09/20/21 1158   BP: 128/86   Pulse: 83   Resp: 16   Temp: 98.5  F (36.9  C)   SpO2: 91%   Weight: 44.8 kg (98 lb 12.8 oz)   Height:  "1.486 m (4' 10.5\")     Body mass index is 20.3 kg/m .    EXAMINATION:   General: Pleasant, alert, and conversant elderly female, lying in bed, in no apparent distress.   She is quickwitted and funny.  Head: Normocephalic and atraumatic.   Eyes: PERRLA, sclerae clear.   ENT: Moist oral mucosa.  She has several of her own teeth but missing all lower teeth on the right as well as several uppercase.  No rhinorrhea or nasal discharge.  Hearing seems unimpaired.  Cardiovascular: Regular rate and rhythm.  No appreciable murmur.  Respiratory: Lungs clear to auscultation bilaterally.   Abdomen: Nondistended.   Musculoskeletal/Extremities: Age-related degenerative joint disease and moderate to severe kyphosis.  Hands and fingers shows significant deformities due to osteoarthritis.  Right fifth trigger finger.  Bilateral hallux valgus deformities with partial great toe overlap.  No peripheral edema.  I did observe her having some difficulty eating and are requesting OT evaluate for possible adaptive utensils.  She could barely  the flat knife.  Integument: No rashes, clinically significant lesions, or skin breakdown.   Cognitive/Psychiatric: Mostly alert and oriented, although there may be some minor cognitive issues.  Affect is euthymic.    DIAGNOSTICS:   No results found for this or any previous visit (from the past 240 hour(s)).   Last Comprehensive Metabolic Panel:  Sodium   Date Value Ref Range Status   09/12/2021 140 133 - 144 mmol/L Final   01/30/2019 140 133 - 144 mmol/L Final     Potassium   Date Value Ref Range Status   09/12/2021 3.9 3.4 - 5.3 mmol/L Final   01/30/2019 4.0 3.4 - 5.3 mmol/L Final     Chloride   Date Value Ref Range Status   09/12/2021 108 94 - 109 mmol/L Final   01/30/2019 104 94 - 109 mmol/L Final     Carbon Dioxide   Date Value Ref Range Status   01/30/2019 30 20 - 32 mmol/L Final     Carbon Dioxide (CO2)   Date Value Ref Range Status   09/12/2021 28 20 - 32 mmol/L Final     Anion Gap   Date " Value Ref Range Status   09/12/2021 4 3 - 14 mmol/L Final   01/30/2019 6 3 - 14 mmol/L Final     Glucose   Date Value Ref Range Status   09/12/2021 98 70 - 99 mg/dL Final   01/30/2019 129 (H) 70 - 99 mg/dL Final     Urea Nitrogen   Date Value Ref Range Status   09/12/2021 11 7 - 30 mg/dL Final   07/13/2020 16 7 - 30 mg/dL Final     Creatinine   Date Value Ref Range Status   09/12/2021 0.66 0.52 - 1.04 mg/dL Final   07/13/2020 0.67 0.52 - 1.04 mg/dL Final     GFR Estimate   Date Value Ref Range Status   09/12/2021 77 >60 mL/min/1.73m2 Final     Comment:     As of July 11, 2021, eGFR is calculated by the CKD-EPI creatinine equation, without race adjustment. eGFR can be influenced by muscle mass, exercise, and diet. The reported eGFR is an estimation only and is only applicable if the renal function is stable.   07/13/2020 77 >60 mL/min/[1.73_m2] Final     Comment:     Non  GFR Calc  Starting 12/18/2018, serum creatinine based estimated GFR (eGFR) will be   calculated using the Chronic Kidney Disease Epidemiology Collaboration   (CKD-EPI) equation.       Calcium   Date Value Ref Range Status   09/12/2021 8.9 8.5 - 10.1 mg/dL Final   07/13/2020 8.8 8.5 - 10.1 mg/dL Final     Lab Results   Component Value Date    WBC 17.6 07/13/2021    WBC 7.2 01/09/2018     Lab Results   Component Value Date    RBC 5.39 07/13/2021    RBC 4.04 01/09/2018     Lab Results   Component Value Date    HGB 14.2 07/14/2021    HGB 13.0 06/20/2018     Lab Results   Component Value Date    HCT 49.5 07/13/2021    HCT 43.1 06/20/2018     Lab Results   Component Value Date    MCV 92 07/13/2021    MCV 96.0 06/20/2018     Lab Results   Component Value Date    MCH 30.1 07/13/2021    MCH 29.0 06/20/2018     Lab Results   Component Value Date    MCHC 32.7 07/13/2021    MCHC 30.2 06/20/2018     Lab Results   Component Value Date    RDW 12.4 07/13/2021    RDW 13.3 01/09/2018     Lab Results   Component Value Date     07/13/2021    PLT  184 01/09/2018       ASSESSMENT/Plan:      ICD-10-CM    1. Fracture of multiple rami of right pubis with routine healing, subsequent encounter  S32.591D    2. History of 9th right rib fracture due to fall (07/13/2021)  Z87.81    3. Primary osteoarthritis involving multiple joints, especially bilateral hands  M89.49    4. Age-related osteoporosis without current pathological fracture  M81.0    5. Mild cognitive impairment  G31.84    6. Essential hypertension  I10    7. Rectal prolapse  K62.3    8. Constipation due to outlet dysfunction  K59.02        CHANGES:  1.  Have occupational therapy assess for possible adaptive utensils.  2.  MiraLAX 17 g in 8 ounces of fluid daily.    CARE PLAN:  The care plan has been reviewed and all orders signed.  Changes to care plan, if any, as noted.  Otherwise, continue current plan of care.    The above has been created using voice recognition software. Please be aware that this may unintentionally  produce inaccuracies and/or nonsensical sentences.      Electronically signed by: SUSIE Nieves CNP

## 2021-09-23 ENCOUNTER — TRANSITIONAL CARE UNIT VISIT (OUTPATIENT)
Dept: GERIATRICS | Facility: CLINIC | Age: 86
End: 2021-09-23
Payer: COMMERCIAL

## 2021-09-23 VITALS
BODY MASS INDEX: 20.28 KG/M2 | TEMPERATURE: 98.5 F | SYSTOLIC BLOOD PRESSURE: 140 MMHG | DIASTOLIC BLOOD PRESSURE: 83 MMHG | RESPIRATION RATE: 18 BRPM | WEIGHT: 98.7 LBS | HEART RATE: 73 BPM | OXYGEN SATURATION: 93 %

## 2021-09-23 DIAGNOSIS — M81.0 AGE-RELATED OSTEOPOROSIS WITHOUT CURRENT PATHOLOGICAL FRACTURE: ICD-10-CM

## 2021-09-23 DIAGNOSIS — M15.0 PRIMARY OSTEOARTHRITIS INVOLVING MULTIPLE JOINTS: ICD-10-CM

## 2021-09-23 DIAGNOSIS — K62.3 RECTAL PROLAPSE: ICD-10-CM

## 2021-09-23 DIAGNOSIS — Z87.81 HISTORY OF RIB FRACTURE: ICD-10-CM

## 2021-09-23 DIAGNOSIS — I10 ESSENTIAL HYPERTENSION: ICD-10-CM

## 2021-09-23 DIAGNOSIS — K59.02 CONSTIPATION DUE TO OUTLET DYSFUNCTION: ICD-10-CM

## 2021-09-23 DIAGNOSIS — S32.591D: Primary | ICD-10-CM

## 2021-09-23 DIAGNOSIS — G31.84 MILD COGNITIVE IMPAIRMENT: ICD-10-CM

## 2021-09-23 PROCEDURE — 99309 SBSQ NF CARE MODERATE MDM 30: CPT | Performed by: NURSE PRACTITIONER

## 2021-09-23 NOTE — LETTER
2021        RE: Belia Sheets  [address]  [address]  [facility] [address]        [facility]    Name:   Belia Sheets (Sudeep)  :   1930  MRN:    [id number]     Facility:   [facility] (SNF) [[id number]]   Room: [facility] 203  Code Status: FULL CODE and POLST AVAILABLE -     DOS: 2021  Previous visit: 2021    PCP:  Alejandro Sandhu    CHIEF COMPLAINT / REASON FOR VISIT:  Chief Complaint   Patient presents with     Clinic Care Coordination - Follow-up     Fall with pubic rami fractures      [facility] from 2021 until 2021 (fall and right 9th rib fracture)  [facility] from 2021 until 2021 (fall with pubic rami fractures)      HPI: Belia is a 89 year old female with a history of osteoporosis, significant osteoarthritis (especially in the hands), essential hypertension, and remote history of visual hallucinations.  She was undergoing rehab in the TCU here just 3 months ago after falling and sustaining a right ninth rib fracture.    She fell again on 2021 and taken by EMS to [facility] ED.  Imaging revealed right superior and inferior pubic rami fractures.  She was evaluated by orthopedics with fractures to be managed conservatively utilizing pain control and guided weightbearing recommendations.  She can bear weight as tolerated with a walker for safety.  Orthopedic surgery did recommend follow-up in 3 to 4 weeks with nonoperative orthopedics provider.  This should include AP x-rays of the pelvis.  She may be seen by her PCP if the PCP feels comfortable.      She was also evaluated by physical and occupational therapies with recommendations for TCU on discharge.  At discharge, she was below her functional baseline, requiring mod to max assist x1 with a walker for transfers.  Also recommended for nursing home with assist x2 with walker for pivot  "transfers only.  She is a fall risk.  RLE WBAT.      CURRENT/RECENT TCU ISSUES    Disposition: Pain can be severe at times but short-lived.  Ordered medications included acetaminophen, methocarbamol, and lidocaine patch, although the last was discontinued, as there was really no place to put it.  The main thing for her, she says, is to sit or  a way that does not hurt.  She does feel as though her pain might be lessening.  Her personal preference is to not take narcotics if at all possible.    Cognitive impairment: During her last stay, there were at least mild cognitive deficits noted, and this was corroborated by the patient's son who also endorsed his mother's memory loss, although she appears, for the most part, to be quite cogent.  SLP did work with her during her last stay, and it was felt that that if she needed quite a bit of support.   She did rather poorly on the clock test, making a pie and then adding numbers but no hands.  She scored 2/17 on the storytelling, 10/17 on orientation.  She scored 23/30 on the MMSE.  Her daughter apparently believed cognitive impairment to be an acute issue.  However, the patient tells me that she felt she had a trouble with her thinking 2 to 3 years ago.    She seems very cogent during this day and is, in fact, quite witty.  I mentioned the 2 falls, and she responded with, \"I try a new way to fall every time.\"    SLP should be working with her on both swallowing and cognitive issues.    Osteoarthritis: Rather severe in the hands with noted deformities of various joints.  I watched her attempted to  a flat knife with significant difficulty, and we talked about the possibility of adaptive utensils through an assessment by OT.    Code status: It is noted that in the hospital, she was listed as DNR/DNI; however, at [facility] she became full code.  I spoke to her again about this, and her response was, \"I'm in better health and have good responses than I had 2 " "years ago.\"    Discharge planning: When last here, she told me she lives in a \"very pleasant small apartment.\"  She has a care conference today (2021).  She does say that she didn't need assistance but does now.      ROS: She denies any significant pain, even at the fracture site.  She can, in fact, using incentive spirometer without producing pain.  No headaches or chest pains, coughing or congestion, nausea or vomiting, dyspnea, dysuria, diplopia, constipation or diarrhea (stools are soft but formed), difficulty chewing or swallowing, integumentary issues, or problems with appetite or sleep    Past Medical History:   Diagnosis Date     Carpal tunnel syndrome (aka CTS)      Constipation due to outlet dysfunction 2021     Fracture of multiple rami of right pubis with routine healing, subsequent encounter 2021     H/O calcium pyrophosphate deposition disease (CPPD)      History of 9th right rib fracture due to fall (2021) 2021     History of encephalopathy 10/2017     Hypertension      Kyphoscoliosis      Osteoarthritis     hands     Osteoporosis      Rectal prolapse               Family History   Problem Relation Age of Onset     Depression/Anxiety Son      Depression/Anxiety Son         alcohol abuse  age 24     Hypertension Mother      Hypertension Brother      Diabetes No family hx of      Breast Cancer No family hx of      Colon Cancer No family hx of      Prostate Cancer No family hx of      Other Cancer No family hx of      Social History     Socioeconomic History     Marital status:      Spouse name: Not on file     Number of children: Not on file     Years of education: Not on file     Highest education level: Not on file   Occupational History     Not on file   Tobacco Use     Smoking status: Never Smoker     Smokeless tobacco: Never Used   Substance and Sexual Activity     Alcohol use: No     Drug use: No     Sexual activity: Never   Other Topics Concern     Not on " file   Social History Narrative     Not on file     Social Determinants of Health     Financial Resource Strain:      Difficulty of Paying Living Expenses:    Food Insecurity:      Worried About Running Out of Food in the Last Year:      Ran Out of Food in the Last Year:    Transportation Needs:      Lack of Transportation (Medical):      Lack of Transportation (Non-Medical):    Physical Activity:      Days of Exercise per Week:      Minutes of Exercise per Session:    Stress:      Feeling of Stress :    Social Connections:      Frequency of Communication with Friends and Family:      Frequency of Social Gatherings with Friends and Family:      Attends Sabianism Services:      Active Member of Clubs or Organizations:      Attends Club or Organization Meetings:      Marital Status:    Intimate Partner Violence:      Fear of Current or Ex-Partner:      Emotionally Abused:      Physically Abused:      Sexually Abused:        MEDICATIONS: Reviewed from the MAR, physician orders, and/or earlier progress notes.  Post Discharge Medication Reconciliation Status: medication reconcilation previously completed during another office visit.    Current Outpatient Medications   Medication Sig     acetaminophen (TYLENOL) 325 MG tablet Take 3 tablets (975 mg) by mouth 3 times daily     amLODIPine (NORVASC) 5 MG tablet Take 1 tablet (5 mg) by mouth daily     calcium carbonate 600 mg-vitamin D 400 units (CALTRATE) 600-400 MG-UNIT per tablet Take 1 tablet by mouth 2 times daily     Lidocaine (LIDOCARE) 4 % Patch Place 1-3 patches onto the skin every 24 hours To prevent lidocaine toxicity, patient should be patch free for 12 hrs daily.     methocarbamol (ROBAXIN) 750 MG tablet Take 1 tablet (750 mg) by mouth 3 times daily     order for DME Equipment being ordered: Incentive spirometer     order for DME Equipment being ordered: Home BP monitor and cuff     polyethylene glycol (MIRALAX) 17 g packet Take 1 packet by mouth daily      traMADol (ULTRAM) 50 MG tablet Take 1 tablet (50 mg) by mouth every 6 hours as needed for moderate pain     No current facility-administered medications for this visit.     ALLERGIES: No Known Allergies    DIET: Regular, regular texture, thin liquids.  Mighty Shake.    Vitals:    09/23/21 1327   BP: (!) 140/83   Pulse: 73   Resp: 18   Temp: 98.5  F (36.9  C)   SpO2: 93%   Weight: 44.8 kg (98 lb 11.2 oz)     Body mass index is 20.28 kg/m .    EXAMINATION:   General: Pleasant, alert, and conversant elderly female, lying in bed, in no apparent distress.   She is quickwitted and funny.  Head: Normocephalic and atraumatic.   Eyes: PERRLA, sclerae clear.   ENT: Moist oral mucosa.  She has several of her own teeth but missing all lower teeth on the right as well as several uppercase.  No rhinorrhea or nasal discharge.  Hearing seems unimpaired.  Cardiovascular: Regular rate and rhythm.  No appreciable murmur.  Respiratory: Lungs clear to auscultation bilaterally.   Abdomen: Nondistended.   Musculoskeletal/Extremities: Age-related degenerative joint disease and moderate to severe kyphosis.  Hands and fingers shows significant deformities due to osteoarthritis.  Right fifth trigger finger.  Bilateral hallux valgus deformities with partial great toe overlap.  No peripheral edema.  I did observe her having some difficulty eating and are requesting OT evaluate for possible adaptive utensils.  She could barely  the flat knife.  Integument: No rashes, clinically significant lesions, or skin breakdown.   Cognitive/Psychiatric: Mostly alert and oriented, although there may be some minor cognitive issues.  Affect is euthymic.    DIAGNOSTICS:   No results found for this or any previous visit (from the past 240 hour(s)).   Last Comprehensive Metabolic Panel:  Sodium   Date Value Ref Range Status   09/12/2021 140 133 - 144 mmol/L Final   01/30/2019 140 133 - 144 mmol/L Final     Potassium   Date Value Ref Range Status    09/12/2021 3.9 3.4 - 5.3 mmol/L Final   01/30/2019 4.0 3.4 - 5.3 mmol/L Final     Chloride   Date Value Ref Range Status   09/12/2021 108 94 - 109 mmol/L Final   01/30/2019 104 94 - 109 mmol/L Final     Carbon Dioxide   Date Value Ref Range Status   01/30/2019 30 20 - 32 mmol/L Final     Carbon Dioxide (CO2)   Date Value Ref Range Status   09/12/2021 28 20 - 32 mmol/L Final     Anion Gap   Date Value Ref Range Status   09/12/2021 4 3 - 14 mmol/L Final   01/30/2019 6 3 - 14 mmol/L Final     Glucose   Date Value Ref Range Status   09/12/2021 98 70 - 99 mg/dL Final   01/30/2019 129 (H) 70 - 99 mg/dL Final     Urea Nitrogen   Date Value Ref Range Status   09/12/2021 11 7 - 30 mg/dL Final   07/13/2020 16 7 - 30 mg/dL Final     Creatinine   Date Value Ref Range Status   09/12/2021 0.66 0.52 - 1.04 mg/dL Final   07/13/2020 0.67 0.52 - 1.04 mg/dL Final     GFR Estimate   Date Value Ref Range Status   09/12/2021 77 >60 mL/min/1.73m2 Final     Comment:     As of July 11, 2021, eGFR is calculated by the CKD-EPI creatinine equation, without race adjustment. eGFR can be influenced by muscle mass, exercise, and diet. The reported eGFR is an estimation only and is only applicable if the renal function is stable.   07/13/2020 77 >60 mL/min/[1.73_m2] Final     Comment:     Non  GFR Calc  Starting 12/18/2018, serum creatinine based estimated GFR (eGFR) will be   calculated using the Chronic Kidney Disease Epidemiology Collaboration   (CKD-EPI) equation.       Calcium   Date Value Ref Range Status   09/12/2021 8.9 8.5 - 10.1 mg/dL Final   07/13/2020 8.8 8.5 - 10.1 mg/dL Final     Lab Results   Component Value Date    WBC 17.6 07/13/2021    WBC 7.2 01/09/2018     Lab Results   Component Value Date    RBC 5.39 07/13/2021    RBC 4.04 01/09/2018     Lab Results   Component Value Date    HGB 14.2 07/14/2021    HGB 13.0 06/20/2018     Lab Results   Component Value Date    HCT 49.5 07/13/2021    HCT 43.1 06/20/2018     Lab  Results   Component Value Date    MCV 92 07/13/2021    MCV 96.0 06/20/2018     Lab Results   Component Value Date    MCH 30.1 07/13/2021    MCH 29.0 06/20/2018     Lab Results   Component Value Date    MCHC 32.7 07/13/2021    MCHC 30.2 06/20/2018     Lab Results   Component Value Date    RDW 12.4 07/13/2021    RDW 13.3 01/09/2018     Lab Results   Component Value Date     07/13/2021     01/09/2018       ASSESSMENT/Plan:      ICD-10-CM    1. Fracture of multiple rami of right pubis with routine healing, subsequent encounter  S32.591D    2. History of 9th right rib fracture due to fall (07/13/2021)  Z87.81    3. Primary osteoarthritis involving multiple joints, especially bilateral hands  M89.49    4. Age-related osteoporosis without current pathological fracture  M81.0    5. Mild cognitive impairment  G31.84    6. Essential hypertension  I10    7. Rectal prolapse  K62.3    8. Constipation due to outlet dysfunction  K59.02        CHANGES:  None.    CARE PLAN:  The care plan has been reviewed and all orders signed.  Changes to care plan, if any, as noted.  Otherwise, continue current plan of care.    The above has been created using voice recognition software. Please be aware that this may unintentionally  produce inaccuracies and/or nonsensical sentences.      Electronically signed by: SUSIE Nieves CNP        Sincerely,        SUSIE Nieves CNP

## 2021-09-24 NOTE — PROGRESS NOTES
Form has been completed by provider.     Form sent out via: Fax to [employer] at Fax Number: [phone]  Patient informed: No, Reason:  Output date: September 24, 2021    Catie Mason MA      **Please close the encounter**

## 2021-09-25 NOTE — PROGRESS NOTES
[facility]    Name:   Belia Sheets (Sudeep)  :   1930  MRN:    [id number]     Facility:   [facility] (SNF) [[id number]]   Room:   Code Status: FULL CODE and POLST AVAILABLE -     DOS: 2021  Previous visit: 2021    PCP:  Alejandro Sandhu    CHIEF COMPLAINT / REASON FOR VISIT:  Chief Complaint   Patient presents with     Clinic Care Coordination - Follow-up     Fall with pubic rami fractures      [facility] from 2021 until 2021 (fall and right 9th rib fracture)  [facility] from 2021 until 2021 (fall with pubic rami fractures)      HPI: Belia is a 89 year old female with a history of osteoporosis, significant osteoarthritis (especially in the hands), essential hypertension, and remote history of visual hallucinations.  She was undergoing rehab in the TCU here just 3 months ago after falling and sustaining a right ninth rib fracture.    She fell again on 2021 and taken by EMS to [facility] ED.  Imaging revealed right superior and inferior pubic rami fractures.  She was evaluated by orthopedics with fractures to be managed conservatively utilizing pain control and guided weightbearing recommendations.  She can bear weight as tolerated with a walker for safety.  Orthopedic surgery did recommend follow-up in 3 to 4 weeks with nonoperative orthopedics provider.  This should include AP x-rays of the pelvis.  She may be seen by her PCP if the PCP feels comfortable.      She was also evaluated by physical and occupational therapies with recommendations for TCU on discharge.  At discharge, she was below her functional baseline, requiring mod to max assist x1 with a walker for transfers.  Also recommended for nursing home with assist x2 with walker for pivot transfers only.  She is a fall risk.  RLE WBAT.      CURRENT/RECENT TCU ISSUES    Disposition: Pain can be  "severe at times but short-lived.  Ordered medications included acetaminophen, methocarbamol, and lidocaine patch, although the last was discontinued, as there was really no place to put it.  The main thing for her, she says, is to sit or  a way that does not hurt.  She does feel as though her pain might be lessening.  Her personal preference is to not take narcotics if at all possible.    Cognitive impairment: During her last stay, there were at least mild cognitive deficits noted, and this was corroborated by the patient's son who also endorsed his mother's memory loss, although she appears, for the most part, to be quite cogent.  SLP did work with her during her last stay, and it was felt that that if she needed quite a bit of support.   She did rather poorly on the clock test, making a pie and then adding numbers but no hands.  She scored 2/17 on the storytelling, 10/17 on orientation.  She scored 23/30 on the MMSE.  Her daughter apparently believed cognitive impairment to be an acute issue.  However, the patient tells me that she felt she had a trouble with her thinking 2 to 3 years ago.    She seems very cogent during this day and is, in fact, quite witty.  I mentioned the 2 falls, and she responded with, \"I try a new way to fall every time.\"    SLP should be working with her on both swallowing and cognitive issues.    Osteoarthritis: Rather severe in the hands with noted deformities of various joints.  I watched her attempted to  a flat knife with significant difficulty, and we talked about the possibility of adaptive utensils through an assessment by OT.    Code status: It is noted that in the hospital, she was listed as DNR/DNI; however, at [facility] she became full code.  I spoke to her again about this, and her response was, \"I'm in better health and have good responses than I had 2 years ago.\"    Discharge planning: When last here, she told me she lives in a \"very pleasant small apartment.\" "  She has a care conference today (2021).  She does say that she didn't need assistance but does now.      ROS: She denies any significant pain, even at the fracture site.  She can, in fact, using incentive spirometer without producing pain.  No headaches or chest pains, coughing or congestion, nausea or vomiting, dyspnea, dysuria, diplopia, constipation or diarrhea (stools are soft but formed), difficulty chewing or swallowing, integumentary issues, or problems with appetite or sleep    Past Medical History:   Diagnosis Date     Carpal tunnel syndrome (aka CTS)      Constipation due to outlet dysfunction 2021     Fracture of multiple rami of right pubis with routine healing, subsequent encounter 2021     H/O calcium pyrophosphate deposition disease (CPPD)      History of 9th right rib fracture due to fall (2021) 2021     History of encephalopathy 10/2017     Hypertension      Kyphoscoliosis      Osteoarthritis     hands     Osteoporosis      Rectal prolapse               Family History   Problem Relation Age of Onset     Depression/Anxiety Son      Depression/Anxiety Son         alcohol abuse  age 24     Hypertension Mother      Hypertension Brother      Diabetes No family hx of      Breast Cancer No family hx of      Colon Cancer No family hx of      Prostate Cancer No family hx of      Other Cancer No family hx of      Social History     Socioeconomic History     Marital status:      Spouse name: Not on file     Number of children: Not on file     Years of education: Not on file     Highest education level: Not on file   Occupational History     Not on file   Tobacco Use     Smoking status: Never Smoker     Smokeless tobacco: Never Used   Substance and Sexual Activity     Alcohol use: No     Drug use: No     Sexual activity: Never   Other Topics Concern     Not on file   Social History Narrative     Not on file     Social Determinants of Health     Financial Resource Strain:       Difficulty of Paying Living Expenses:    Food Insecurity:      Worried About Running Out of Food in the Last Year:      Ran Out of Food in the Last Year:    Transportation Needs:      Lack of Transportation (Medical):      Lack of Transportation (Non-Medical):    Physical Activity:      Days of Exercise per Week:      Minutes of Exercise per Session:    Stress:      Feeling of Stress :    Social Connections:      Frequency of Communication with Friends and Family:      Frequency of Social Gatherings with Friends and Family:      Attends [employer] Services:      Active Member of Clubs or Organizations:      Attends Club or Organization Meetings:      Marital Status:    Intimate Partner Violence:      Fear of Current or Ex-Partner:      Emotionally Abused:      Physically Abused:      Sexually Abused:        MEDICATIONS: Reviewed from the MAR, physician orders, and/or earlier progress notes.  Post Discharge Medication Reconciliation Status: medication reconcilation previously completed during another office visit.    Current Outpatient Medications   Medication Sig     acetaminophen (TYLENOL) 325 MG tablet Take 3 tablets (975 mg) by mouth 3 times daily     amLODIPine (NORVASC) 5 MG tablet Take 1 tablet (5 mg) by mouth daily     calcium carbonate 600 mg-vitamin D 400 units (CALTRATE) 600-400 MG-UNIT per tablet Take 1 tablet by mouth 2 times daily     Lidocaine (LIDOCARE) 4 % Patch Place 1-3 patches onto the skin every 24 hours To prevent lidocaine toxicity, patient should be patch free for 12 hrs daily.     methocarbamol (ROBAXIN) 750 MG tablet Take 1 tablet (750 mg) by mouth 3 times daily     order for DME Equipment being ordered: Incentive spirometer     order for DME Equipment being ordered: Home BP monitor and cuff     polyethylene glycol (MIRALAX) 17 g packet Take 1 packet by mouth daily     traMADol (ULTRAM) 50 MG tablet Take 1 tablet (50 mg) by mouth every 6 hours as needed for moderate pain     No current  facility-administered medications for this visit.     ALLERGIES: No Known Allergies    DIET: Regular, regular texture, thin liquids.  Mighty Shake.    Vitals:    09/23/21 1327   BP: (!) 140/83   Pulse: 73   Resp: 18   Temp: 98.5  F (36.9  C)   SpO2: 93%   Weight: 44.8 kg (98 lb 11.2 oz)     Body mass index is 20.28 kg/m .    EXAMINATION:   General: Pleasant, alert, and conversant elderly female, lying in bed, in no apparent distress.   She is quickwitted and funny.  Head: Normocephalic and atraumatic.   Eyes: PERRLA, sclerae clear.   ENT: Moist oral mucosa.  She has several of her own teeth but missing all lower teeth on the right as well as several uppercase.  No rhinorrhea or nasal discharge.  Hearing seems unimpaired.  Cardiovascular: Regular rate and rhythm.  No appreciable murmur.  Respiratory: Lungs clear to auscultation bilaterally.   Abdomen: Nondistended.   Musculoskeletal/Extremities: Age-related degenerative joint disease and moderate to severe kyphosis.  Hands and fingers shows significant deformities due to osteoarthritis.  Right fifth trigger finger.  Bilateral hallux valgus deformities with partial great toe overlap.  No peripheral edema.  I did observe her having some difficulty eating and are requesting OT evaluate for possible adaptive utensils.  She could barely  the flat knife.  Integument: No rashes, clinically significant lesions, or skin breakdown.   Cognitive/Psychiatric: Mostly alert and oriented, although there may be some minor cognitive issues.  Affect is euthymic.    DIAGNOSTICS:   No results found for this or any previous visit (from the past 240 hour(s)).   Last Comprehensive Metabolic Panel:  Sodium   Date Value Ref Range Status   09/12/2021 140 133 - 144 mmol/L Final   01/30/2019 140 133 - 144 mmol/L Final     Potassium   Date Value Ref Range Status   09/12/2021 3.9 3.4 - 5.3 mmol/L Final   01/30/2019 4.0 3.4 - 5.3 mmol/L Final     Chloride   Date Value Ref Range Status    09/12/2021 108 94 - 109 mmol/L Final   01/30/2019 104 94 - 109 mmol/L Final     Carbon Dioxide   Date Value Ref Range Status   01/30/2019 30 20 - 32 mmol/L Final     Carbon Dioxide (CO2)   Date Value Ref Range Status   09/12/2021 28 20 - 32 mmol/L Final     Anion Gap   Date Value Ref Range Status   09/12/2021 4 3 - 14 mmol/L Final   01/30/2019 6 3 - 14 mmol/L Final     Glucose   Date Value Ref Range Status   09/12/2021 98 70 - 99 mg/dL Final   01/30/2019 129 (H) 70 - 99 mg/dL Final     Urea Nitrogen   Date Value Ref Range Status   09/12/2021 11 7 - 30 mg/dL Final   07/13/2020 16 7 - 30 mg/dL Final     Creatinine   Date Value Ref Range Status   09/12/2021 0.66 0.52 - 1.04 mg/dL Final   07/13/2020 0.67 0.52 - 1.04 mg/dL Final     GFR Estimate   Date Value Ref Range Status   09/12/2021 77 >60 mL/min/1.73m2 Final     Comment:     As of July 11, 2021, eGFR is calculated by the CKD-EPI creatinine equation, without race adjustment. eGFR can be influenced by muscle mass, exercise, and diet. The reported eGFR is an estimation only and is only applicable if the renal function is stable.   07/13/2020 77 >60 mL/min/[1.73_m2] Final     Comment:     Non  GFR Calc  Starting 12/18/2018, serum creatinine based estimated GFR (eGFR) will be   calculated using the Chronic Kidney Disease Epidemiology Collaboration   (CKD-EPI) equation.       Calcium   Date Value Ref Range Status   09/12/2021 8.9 8.5 - 10.1 mg/dL Final   07/13/2020 8.8 8.5 - 10.1 mg/dL Final     Lab Results   Component Value Date    WBC 17.6 07/13/2021    WBC 7.2 01/09/2018     Lab Results   Component Value Date    RBC 5.39 07/13/2021    RBC 4.04 01/09/2018     Lab Results   Component Value Date    HGB 14.2 07/14/2021    HGB 13.0 06/20/2018     Lab Results   Component Value Date    HCT 49.5 07/13/2021    HCT 43.1 06/20/2018     Lab Results   Component Value Date    MCV 92 07/13/2021    MCV 96.0 06/20/2018     Lab Results   Component Value Date    MCH  30.1 07/13/2021    MCH 29.0 06/20/2018     Lab Results   Component Value Date    MCHC 32.7 07/13/2021    MCHC 30.2 06/20/2018     Lab Results   Component Value Date    RDW 12.4 07/13/2021    RDW 13.3 01/09/2018     Lab Results   Component Value Date     07/13/2021     01/09/2018       ASSESSMENT/Plan:      ICD-10-CM    1. Fracture of multiple rami of right pubis with routine healing, subsequent encounter  S32.591D    2. History of 9th right rib fracture due to fall (07/13/2021)  Z87.81    3. Primary osteoarthritis involving multiple joints, especially bilateral hands  M89.49    4. Age-related osteoporosis without current pathological fracture  M81.0    5. Mild cognitive impairment  G31.84    6. Essential hypertension  I10    7. Rectal prolapse  K62.3    8. Constipation due to outlet dysfunction  K59.02        CHANGES:  None.    CARE PLAN:  The care plan has been reviewed and all orders signed.  Changes to care plan, if any, as noted.  Otherwise, continue current plan of care.    The above has been created using voice recognition software. Please be aware that this may unintentionally  produce inaccuracies and/or nonsensical sentences.      Electronically signed by: SUSIE Nieves CNP

## 2021-09-27 ENCOUNTER — TRANSITIONAL CARE UNIT VISIT (OUTPATIENT)
Dept: GERIATRICS | Facility: CLINIC | Age: 86
End: 2021-09-27
Payer: COMMERCIAL

## 2021-09-27 VITALS
TEMPERATURE: 98.2 F | WEIGHT: 98.7 LBS | SYSTOLIC BLOOD PRESSURE: 126 MMHG | BODY MASS INDEX: 19.9 KG/M2 | OXYGEN SATURATION: 92 % | HEIGHT: 59 IN | HEART RATE: 78 BPM | RESPIRATION RATE: 18 BRPM | DIASTOLIC BLOOD PRESSURE: 80 MMHG

## 2021-09-27 DIAGNOSIS — I10 ESSENTIAL HYPERTENSION: ICD-10-CM

## 2021-09-27 DIAGNOSIS — M15.0 PRIMARY OSTEOARTHRITIS INVOLVING MULTIPLE JOINTS: ICD-10-CM

## 2021-09-27 DIAGNOSIS — S32.591D: Primary | ICD-10-CM

## 2021-09-27 DIAGNOSIS — K62.3 RECTAL PROLAPSE: ICD-10-CM

## 2021-09-27 DIAGNOSIS — M81.0 AGE-RELATED OSTEOPOROSIS WITHOUT CURRENT PATHOLOGICAL FRACTURE: ICD-10-CM

## 2021-09-27 DIAGNOSIS — G31.84 MILD COGNITIVE IMPAIRMENT: ICD-10-CM

## 2021-09-27 DIAGNOSIS — K59.02 CONSTIPATION DUE TO OUTLET DYSFUNCTION: ICD-10-CM

## 2021-09-27 DIAGNOSIS — Z87.81 HISTORY OF RIB FRACTURE: ICD-10-CM

## 2021-09-27 PROCEDURE — 99309 SBSQ NF CARE MODERATE MDM 30: CPT | Performed by: NURSE PRACTITIONER

## 2021-09-27 ASSESSMENT — MIFFLIN-ST. JEOR: SCORE: 760.39

## 2021-09-27 NOTE — LETTER
2021        RE: Belia Sheets  [address]  [address]  [facility] [address]        [facility]    Name:   Belia Sheets (Sudeep)  :   1930  MRN:    [id number]     Facility:   [facility] (SNF) [[id number]]   Room: [facility] 203  Code Status: FULL CODE and POLST AVAILABLE -     DOS: 2021  Previous visit: 2021    PCP:  Alejandro Sandhu    CHIEF COMPLAINT / REASON FOR VISIT:  Chief Complaint   Patient presents with     Clinic Care Coordination - Follow-up     Fall with right pubic rami fractures      [facility] from 2021 until 2021 (fall and right 9th rib fracture)  [facility] from 2021 until 2021 (fall with pubic rami fractures)      HPI: Belia is a 89 year old female with a history of osteoporosis, significant osteoarthritis (especially in the hands), essential hypertension, and remote history of visual hallucinations.  She was undergoing rehab in the TCU here just 3 months ago after falling and sustaining a right ninth rib fracture.    She fell again on 2021 and taken by EMS to [facility] ED.  Imaging revealed right superior and inferior pubic rami fractures.  She was evaluated by orthopedics with fractures to be managed conservatively utilizing pain control and guided weightbearing recommendations.  She can bear weight as tolerated with a walker for safety.  Orthopedic surgery did recommend follow-up in 3 to 4 weeks with nonoperative orthopedics provider.  This should include AP x-rays of the pelvis.  She may be seen by her PCP if the PCP feels comfortable.      She was also evaluated by physical and occupational therapies with recommendations for TCU on discharge.  At discharge, she was below her functional baseline, requiring mod to max assist x1 with a walker for transfers.  Also recommended for nursing home with assist x2 with walker for  "pivot transfers only.  She is a fall risk.  RLE WBAT.      CURRENT/RECENT TCU ISSUES    Disposition: Yesterday, she complained of rectal pain.  She does have a history of rectal prolapse, and she states that it \"hurts trying to push out small pieces of stool.\"  This is not new for her.  She says that she has not been bothered with this in the last 18 to 24 hours.  She has undergone surgery within the last 5 years.  Currently, stools are \"comfortably soft.\"    She is ambulating with a 2 wheeled walker, having gone >250 feet.  Pain, she says, is tolerable if she steps carefully.    Pain management: Medications for pain included acetaminophen, methocarbamol, and lidocaine patch, although the last was discontinued, as there was really no place to put it.  The main thing for her, she says, is to sit or  a way that does not hurt.  She does feel as though her pain might be lessening.  Her personal preference is to not take narcotics if at all possible.    Cognitive impairment: During her last stay, there were at least mild cognitive deficits noted, and this was corroborated by the patient's son who also endorsed his mother's memory loss, although she appears, for the most part, to be quite cogent.  SLP did work with her during her last stay, and it was felt that that if she needed quite a bit of support.   She did rather poorly on the clock test, making a pie and then adding numbers but no hands.  She scored 2/17 on the storytelling, 10/17 on orientation.  She scored 23/30 on the MMSE.  Her daughter apparently believed cognitive impairment to be an acute issue.  However, the patient told me that she felt she had trouble with her thinking 2 to 3 years ago but claims to feel sharper now.    She seems very cogent during this day and is, in fact, quite witty.  The other day, I mentioned the 2 falls that resulted in hospitalization, and she responded with, \"I try a new way to fall every time.\"    SLP should be working " "with her on both swallowing and cognitive issues.  Currently, she denies any trouble swallowing.    Osteoarthritis: Rather severe in the hands with noted deformities of various joints.  I watched her attempted to  a flat knife with significant difficulty, and we talked about the possibility of adaptive utensils through an assessment by OT.    Code status: It is noted that in the hospital, she was listed as DNR/DNI; however, at [facility] she became full code.  I spoke to her again about this, and her response was, \"I'm in better health and have good responses than I had 2 years ago.\"    Discharge planning: When last here, she told me she lives in a \"very pleasant small apartment.\"  She has been living at the [facility] and has not been receiving any assistance.  That said, she is cognizant of her abilities or lack thereof.        ROS: She denies any significant pain, even at the fracture site.  She can, in fact, using incentive spirometer without producing pain.  No headaches or chest pains, coughing or congestion, nausea or vomiting, dyspnea, dysuria, diplopia, constipation or diarrhea (stools are soft but formed), difficulty chewing or swallowing, integumentary issues, or problems with appetite or sleep    Past Medical History:   Diagnosis Date     Carpal tunnel syndrome (aka CTS)      Constipation due to outlet dysfunction 2021     Fracture of multiple rami of right pubis with routine healing, subsequent encounter 2021     H/O calcium pyrophosphate deposition disease (CPPD)      History of 9th right rib fracture due to fall (2021) 2021     History of encephalopathy 10/2017     Hypertension      Kyphoscoliosis      Osteoarthritis     hands     Osteoporosis      Rectal prolapse               Family History   Problem Relation Age of Onset     Depression/Anxiety Son      Depression/Anxiety Son         alcohol abuse  age 24     Hypertension Mother      Hypertension Brother      Diabetes No " family hx of      Breast Cancer No family hx of      Colon Cancer No family hx of      Prostate Cancer No family hx of      Other Cancer No family hx of      Social History     Socioeconomic History     Marital status:      Spouse name: Not on file     Number of children: Not on file     Years of education: Not on file     Highest education level: Not on file   Occupational History     Not on file   Tobacco Use     Smoking status: Never Smoker     Smokeless tobacco: Never Used   Substance and Sexual Activity     Alcohol use: No     Drug use: No     Sexual activity: Never   Other Topics Concern     Not on file   Social History Narrative     Not on file     Social Determinants of Health     Financial Resource Strain:      Difficulty of Paying Living Expenses:    Food Insecurity:      Worried About Running Out of Food in the Last Year:      Ran Out of Food in the Last Year:    Transportation Needs:      Lack of Transportation (Medical):      Lack of Transportation (Non-Medical):    Physical Activity:      Days of Exercise per Week:      Minutes of Exercise per Session:    Stress:      Feeling of Stress :    Social Connections:      Frequency of Communication with Friends and Family:      Frequency of Social Gatherings with Friends and Family:      Attends Judaism Services:      Active Member of Clubs or Organizations:      Attends Club or Organization Meetings:      Marital Status:    Intimate Partner Violence:      Fear of Current or Ex-Partner:      Emotionally Abused:      Physically Abused:      Sexually Abused:        MEDICATIONS: Reviewed from the MAR, physician orders, and/or earlier progress notes.  Post Discharge Medication Reconciliation Status: medication reconcilation previously completed during another office visit.    Current Outpatient Medications   Medication Sig     acetaminophen (TYLENOL) 325 MG tablet Take 3 tablets (975 mg) by mouth 3 times daily     amLODIPine (NORVASC) 5 MG tablet Take 1  "tablet (5 mg) by mouth daily     calcium carbonate 600 mg-vitamin D 400 units (CALTRATE) 600-400 MG-UNIT per tablet Take 1 tablet by mouth 2 times daily     methocarbamol (ROBAXIN) 750 MG tablet Take 1 tablet (750 mg) by mouth 3 times daily     order for DME Equipment being ordered: Incentive spirometer     order for DME Equipment being ordered: Home BP monitor and cuff     polyethylene glycol (MIRALAX) 17 g packet Take 1 packet by mouth daily     traMADol (ULTRAM) 50 MG tablet Take 1 tablet (50 mg) by mouth every 6 hours as needed for moderate pain     No current facility-administered medications for this visit.     ALLERGIES: No Known Allergies    DIET: Regular, regular texture, thin liquids.  Mighty Shake.    Vitals:    09/27/21 1321   BP: 126/80   Pulse: 78   Resp: 18   Temp: 98.2  F (36.8  C)   SpO2: 92%   Weight: 44.8 kg (98 lb 11.2 oz)   Height: 1.486 m (4' 10.5\")     Body mass index is 20.28 kg/m .    EXAMINATION:   General: Pleasant, alert, and conversant elderly female, sitting in a recliner, in no apparent distress.   She is quickwitted and funny.  Head: Normocephalic and atraumatic.   Eyes: PERRLA, sclerae clear.   ENT: Moist oral mucosa.  She has several of her own teeth but missing all lower teeth on the right as well as several uppercase.  No rhinorrhea or nasal discharge.  Hearing seems unimpaired.  Cardiovascular: Regular rate and rhythm.  No appreciable murmur.  Respiratory: Lungs clear to auscultation bilaterally.   Abdomen: Nondistended.   Musculoskeletal/Extremities: Age-related degenerative joint disease and moderate to severe kyphosis.  Hands and fingers shows significant deformities due to osteoarthritis.  Right fifth trigger finger.  Bilateral hallux valgus deformities with partial great toe overlap.  No peripheral edema.  I did observe her having some difficulty eating and are requesting OT evaluate for possible adaptive utensils.  She could barely  the flat knife.  Integument: No " rashes, clinically significant lesions, or skin breakdown.   Cognitive/Psychiatric: Mostly alert and oriented, although there may be some minor cognitive issues.  Affect is euthymic.    DIAGNOSTICS:   No results found for this or any previous visit (from the past 240 hour(s)).   Last Comprehensive Metabolic Panel:  Sodium   Date Value Ref Range Status   09/12/2021 140 133 - 144 mmol/L Final   01/30/2019 140 133 - 144 mmol/L Final     Potassium   Date Value Ref Range Status   09/12/2021 3.9 3.4 - 5.3 mmol/L Final   01/30/2019 4.0 3.4 - 5.3 mmol/L Final     Chloride   Date Value Ref Range Status   09/12/2021 108 94 - 109 mmol/L Final   01/30/2019 104 94 - 109 mmol/L Final     Carbon Dioxide   Date Value Ref Range Status   01/30/2019 30 20 - 32 mmol/L Final     Carbon Dioxide (CO2)   Date Value Ref Range Status   09/12/2021 28 20 - 32 mmol/L Final     Anion Gap   Date Value Ref Range Status   09/12/2021 4 3 - 14 mmol/L Final   01/30/2019 6 3 - 14 mmol/L Final     Glucose   Date Value Ref Range Status   09/12/2021 98 70 - 99 mg/dL Final   01/30/2019 129 (H) 70 - 99 mg/dL Final     Urea Nitrogen   Date Value Ref Range Status   09/12/2021 11 7 - 30 mg/dL Final   07/13/2020 16 7 - 30 mg/dL Final     Creatinine   Date Value Ref Range Status   09/12/2021 0.66 0.52 - 1.04 mg/dL Final   07/13/2020 0.67 0.52 - 1.04 mg/dL Final     GFR Estimate   Date Value Ref Range Status   09/12/2021 77 >60 mL/min/1.73m2 Final     Comment:     As of July 11, 2021, eGFR is calculated by the CKD-EPI creatinine equation, without race adjustment. eGFR can be influenced by muscle mass, exercise, and diet. The reported eGFR is an estimation only and is only applicable if the renal function is stable.   07/13/2020 77 >60 mL/min/[1.73_m2] Final     Comment:     Non  GFR Calc  Starting 12/18/2018, serum creatinine based estimated GFR (eGFR) will be   calculated using the Chronic Kidney Disease Epidemiology Collaboration   (CKD-EPI)  equation.       Calcium   Date Value Ref Range Status   09/12/2021 8.9 8.5 - 10.1 mg/dL Final   07/13/2020 8.8 8.5 - 10.1 mg/dL Final     Lab Results   Component Value Date    WBC 17.6 07/13/2021    WBC 7.2 01/09/2018     Lab Results   Component Value Date    RBC 5.39 07/13/2021    RBC 4.04 01/09/2018     Lab Results   Component Value Date    HGB 14.2 07/14/2021    HGB 13.0 06/20/2018     Lab Results   Component Value Date    HCT 49.5 07/13/2021    HCT 43.1 06/20/2018     Lab Results   Component Value Date    MCV 92 07/13/2021    MCV 96.0 06/20/2018     Lab Results   Component Value Date    MCH 30.1 07/13/2021    MCH 29.0 06/20/2018     Lab Results   Component Value Date    MCHC 32.7 07/13/2021    MCHC 30.2 06/20/2018     Lab Results   Component Value Date    RDW 12.4 07/13/2021    RDW 13.3 01/09/2018     Lab Results   Component Value Date     07/13/2021     01/09/2018       ASSESSMENT/Plan:      ICD-10-CM        ICD-10-CM    1. Fracture of multiple rami of right pubis with routine healing, subsequent encounter  S32.591D    2. History of 9th right rib fracture due to fall (07/13/2021)  Z87.81    3. Primary osteoarthritis involving multiple joints, especially bilateral hands  M89.49    4. Age-related osteoporosis without current pathological fracture  M81.0    5. Mild cognitive impairment  G31.84    6. Essential hypertension  I10    7. Rectal prolapse  K62.3    8. Constipation due to outlet dysfunction  K59.02        CHANGES:  None.    CARE PLAN:  The care plan has been reviewed and all orders signed.  Changes to care plan, if any, as noted.  Otherwise, continue current plan of care.    The above has been created using voice recognition software. Please be aware that this may unintentionally  produce inaccuracies and/or nonsensical sentences.      Electronically signed by: SUSIE Nieves CNP        Sincerely,        SUSIE Nieves CNP

## 2021-09-27 NOTE — PLAN OF CARE
[facility]      OUTPATIENT PHYSICAL THERAPY EVALUATION  PLAN OF TREATMENT FOR OUTPATIENT REHABILITATION  (COMPLETE FOR INITIAL CLAIMS ONLY)  Patient's Last Name, First Name, M.I.  YOB: 1930  Belia Sheets                        Provider's Name  [facility] Medical Record No.  [id number]                               Onset Date:  09/11/21   Start of Care Date:  09/12/21      Type:     _X_PT   ___OT   ___SLP Medical Diagnosis:   Closed fracture of right pubis, unspecified portion of pubis                        PT Diagnosis:  Impaired functional mobility   Visits from SOC:  1   _________________________________________________________________________________  Plan of Treatment/Functional Goals    Planned Interventions: balance training, bed mobility training, gait training, patient/family education, strengthening, transfer training     Goals: See Physical Therapy Goals on Care Plan in [facility] electronic health record.    Therapy Frequency: 5x/week  Predicted Duration of Therapy Intervention: days  _________________________________________________________________________________    I CERTIFY THE NEED FOR THESE SERVICES FURNISHED UNDER        THIS PLAN OF TREATMENT AND WHILE UNDER MY CARE     (Physician co-signature of this document indicates review and certification of the therapy plan).              Certification date from: 09/12/21, Certification date to: 09/12/21    Referring Physician: Tanya Mcfarlane PA-C            Initial Assessment        See Physical Therapy evaluation dated 09/12/21 in Epic electronic health record.

## 2021-09-29 ENCOUNTER — TRANSFERRED RECORDS (OUTPATIENT)
Dept: HEALTH INFORMATION MANAGEMENT | Facility: CLINIC | Age: 86
End: 2021-09-29

## 2021-09-29 NOTE — PROGRESS NOTES
[facility]    Name:   Belia Sheets (Sudeep)  :   1930  MRN:    [id number]     Facility:   [facility] (SNF) [[id number]]   Room:   Code Status: FULL CODE and POLST AVAILABLE -     DOS: 2021  Previous visit: 2021    PCP:  Alejandro Sandhu    CHIEF COMPLAINT / REASON FOR VISIT:  Chief Complaint   Patient presents with     Clinic Care Coordination - Follow-up     Fall with right pubic rami fractures      [facility] from 2021 until 2021 (fall and right 9th rib fracture)  [facility] from 2021 until 2021 (fall with pubic rami fractures)      HPI: Belia is a 89 year old female with a history of osteoporosis, significant osteoarthritis (especially in the hands), essential hypertension, and remote history of visual hallucinations.  She was undergoing rehab in the TCU here just 3 months ago after falling and sustaining a right ninth rib fracture.    She fell again on 2021 and taken by EMS to [facility] ED.  Imaging revealed right superior and inferior pubic rami fractures.  She was evaluated by orthopedics with fractures to be managed conservatively utilizing pain control and guided weightbearing recommendations.  She can bear weight as tolerated with a walker for safety.  Orthopedic surgery did recommend follow-up in 3 to 4 weeks with nonoperative orthopedics provider.  This should include AP x-rays of the pelvis.  She may be seen by her PCP if the PCP feels comfortable.      She was also evaluated by physical and occupational therapies with recommendations for TCU on discharge.  At discharge, she was below her functional baseline, requiring mod to max assist x1 with a walker for transfers.  Also recommended for nursing home with assist x2 with walker for pivot transfers only.  She is a fall risk.  RLE WBAT.      CURRENT/RECENT TCU ISSUES    Disposition:  "Yesterday, she complained of rectal pain.  She does have a history of rectal prolapse, and she states that it \"hurts trying to push out small pieces of stool.\"  This is not new for her.  She says that she has not been bothered with this in the last 18 to 24 hours.  She has undergone surgery within the last 5 years.  Currently, stools are \"comfortably soft.\"    She is ambulating with a 2 wheeled walker, having gone >250 feet.  Pain, she says, is tolerable if she steps carefully.    Pain management: Medications for pain included acetaminophen, methocarbamol, and lidocaine patch, although the last was discontinued, as there was really no place to put it.  The main thing for her, she says, is to sit or  a way that does not hurt.  She does feel as though her pain might be lessening.  Her personal preference is to not take narcotics if at all possible.    Cognitive impairment: During her last stay, there were at least mild cognitive deficits noted, and this was corroborated by the patient's son who also endorsed his mother's memory loss, although she appears, for the most part, to be quite cogent.  SLP did work with her during her last stay, and it was felt that that if she needed quite a bit of support.   She did rather poorly on the clock test, making a pie and then adding numbers but no hands.  She scored 2/17 on the storytelling, 10/17 on orientation.  She scored 23/30 on the MMSE.  Her daughter apparently believed cognitive impairment to be an acute issue.  However, the patient told me that she felt she had trouble with her thinking 2 to 3 years ago but claims to feel sharper now.    She seems very cogent during this day and is, in fact, quite witty.  The other day, I mentioned the 2 falls that resulted in hospitalization, and she responded with, \"I try a new way to fall every time.\"    SLP should be working with her on both swallowing and cognitive issues.  Currently, she denies any trouble " "swallowing.    Osteoarthritis: Rather severe in the hands with noted deformities of various joints.  I watched her attempted to  a flat knife with significant difficulty, and we talked about the possibility of adaptive utensils through an assessment by OT.    Code status: It is noted that in the hospital, she was listed as DNR/DNI; however, at [facility] she became full code.  I spoke to her again about this, and her response was, \"I'm in better health and have good responses than I had 2 years ago.\"    Discharge planning: When last here, she told me she lives in a \"very pleasant small apartment.\"  She has been living at the [facility] and has not been receiving any assistance.  That said, she is cognizant of her abilities or lack thereof.        ROS: She denies any significant pain, even at the fracture site.  She can, in fact, using incentive spirometer without producing pain.  No headaches or chest pains, coughing or congestion, nausea or vomiting, dyspnea, dysuria, diplopia, constipation or diarrhea (stools are soft but formed), difficulty chewing or swallowing, integumentary issues, or problems with appetite or sleep    Past Medical History:   Diagnosis Date     Carpal tunnel syndrome (aka CTS)      Constipation due to outlet dysfunction 2021     Fracture of multiple rami of right pubis with routine healing, subsequent encounter 2021     H/O calcium pyrophosphate deposition disease (CPPD)      History of 9th right rib fracture due to fall (2021) 2021     History of encephalopathy 10/2017     Hypertension      Kyphoscoliosis      Osteoarthritis     hands     Osteoporosis      Rectal prolapse               Family History   Problem Relation Age of Onset     Depression/Anxiety Son      Depression/Anxiety Son         alcohol abuse  age 24     Hypertension Mother      Hypertension Brother      Diabetes No family hx of      Breast Cancer No family hx of      Colon Cancer No family hx of  "     Prostate Cancer No family hx of      Other Cancer No family hx of      Social History     Socioeconomic History     Marital status:      Spouse name: Not on file     Number of children: Not on file     Years of education: Not on file     Highest education level: Not on file   Occupational History     Not on file   Tobacco Use     Smoking status: Never Smoker     Smokeless tobacco: Never Used   Substance and Sexual Activity     Alcohol use: No     Drug use: No     Sexual activity: Never   Other Topics Concern     Not on file   Social History Narrative     Not on file     Social Determinants of Health     Financial Resource Strain:      Difficulty of Paying Living Expenses:    Food Insecurity:      Worried About Running Out of Food in the Last Year:      Ran Out of Food in the Last Year:    Transportation Needs:      Lack of Transportation (Medical):      Lack of Transportation (Non-Medical):    Physical Activity:      Days of Exercise per Week:      Minutes of Exercise per Session:    Stress:      Feeling of Stress :    Social Connections:      Frequency of Communication with Friends and Family:      Frequency of Social Gatherings with Friends and Family:      Attends Confucianist Services:      Active Member of Clubs or Organizations:      Attends Club or Organization Meetings:      Marital Status:    Intimate Partner Violence:      Fear of Current or Ex-Partner:      Emotionally Abused:      Physically Abused:      Sexually Abused:        MEDICATIONS: Reviewed from the MAR, physician orders, and/or earlier progress notes.  Post Discharge Medication Reconciliation Status: medication reconcilation previously completed during another office visit.    Current Outpatient Medications   Medication Sig     acetaminophen (TYLENOL) 325 MG tablet Take 3 tablets (975 mg) by mouth 3 times daily     amLODIPine (NORVASC) 5 MG tablet Take 1 tablet (5 mg) by mouth daily     calcium carbonate 600 mg-vitamin D 400 units  "(CALTRATE) 600-400 MG-UNIT per tablet Take 1 tablet by mouth 2 times daily     methocarbamol (ROBAXIN) 750 MG tablet Take 1 tablet (750 mg) by mouth 3 times daily     order for DME Equipment being ordered: Incentive spirometer     order for DME Equipment being ordered: Home BP monitor and cuff     polyethylene glycol (MIRALAX) 17 g packet Take 1 packet by mouth daily     traMADol (ULTRAM) 50 MG tablet Take 1 tablet (50 mg) by mouth every 6 hours as needed for moderate pain     No current facility-administered medications for this visit.     ALLERGIES: No Known Allergies    DIET: Regular, regular texture, thin liquids.  Mighty Shake.    Vitals:    09/27/21 1321   BP: 126/80   Pulse: 78   Resp: 18   Temp: 98.2  F (36.8  C)   SpO2: 92%   Weight: 44.8 kg (98 lb 11.2 oz)   Height: 1.486 m (4' 10.5\")     Body mass index is 20.28 kg/m .    EXAMINATION:   General: Pleasant, alert, and conversant elderly female, sitting in a recliner, in no apparent distress.   She is quickwitted and funny.  Head: Normocephalic and atraumatic.   Eyes: PERRLA, sclerae clear.   ENT: Moist oral mucosa.  She has several of her own teeth but missing all lower teeth on the right as well as several uppercase.  No rhinorrhea or nasal discharge.  Hearing seems unimpaired.  Cardiovascular: Regular rate and rhythm.  No appreciable murmur.  Respiratory: Lungs clear to auscultation bilaterally.   Abdomen: Nondistended.   Musculoskeletal/Extremities: Age-related degenerative joint disease and moderate to severe kyphosis.  Hands and fingers shows significant deformities due to osteoarthritis.  Right fifth trigger finger.  Bilateral hallux valgus deformities with partial great toe overlap.  No peripheral edema.  I did observe her having some difficulty eating and are requesting OT evaluate for possible adaptive utensils.  She could barely  the flat knife.  Integument: No rashes, clinically significant lesions, or skin breakdown. "   Cognitive/Psychiatric: Mostly alert and oriented, although there may be some minor cognitive issues.  Affect is euthymic.    DIAGNOSTICS:   No results found for this or any previous visit (from the past 240 hour(s)).   Last Comprehensive Metabolic Panel:  Sodium   Date Value Ref Range Status   09/12/2021 140 133 - 144 mmol/L Final   01/30/2019 140 133 - 144 mmol/L Final     Potassium   Date Value Ref Range Status   09/12/2021 3.9 3.4 - 5.3 mmol/L Final   01/30/2019 4.0 3.4 - 5.3 mmol/L Final     Chloride   Date Value Ref Range Status   09/12/2021 108 94 - 109 mmol/L Final   01/30/2019 104 94 - 109 mmol/L Final     Carbon Dioxide   Date Value Ref Range Status   01/30/2019 30 20 - 32 mmol/L Final     Carbon Dioxide (CO2)   Date Value Ref Range Status   09/12/2021 28 20 - 32 mmol/L Final     Anion Gap   Date Value Ref Range Status   09/12/2021 4 3 - 14 mmol/L Final   01/30/2019 6 3 - 14 mmol/L Final     Glucose   Date Value Ref Range Status   09/12/2021 98 70 - 99 mg/dL Final   01/30/2019 129 (H) 70 - 99 mg/dL Final     Urea Nitrogen   Date Value Ref Range Status   09/12/2021 11 7 - 30 mg/dL Final   07/13/2020 16 7 - 30 mg/dL Final     Creatinine   Date Value Ref Range Status   09/12/2021 0.66 0.52 - 1.04 mg/dL Final   07/13/2020 0.67 0.52 - 1.04 mg/dL Final     GFR Estimate   Date Value Ref Range Status   09/12/2021 77 >60 mL/min/1.73m2 Final     Comment:     As of July 11, 2021, eGFR is calculated by the CKD-EPI creatinine equation, without race adjustment. eGFR can be influenced by muscle mass, exercise, and diet. The reported eGFR is an estimation only and is only applicable if the renal function is stable.   07/13/2020 77 >60 mL/min/[1.73_m2] Final     Comment:     Non  GFR Calc  Starting 12/18/2018, serum creatinine based estimated GFR (eGFR) will be   calculated using the Chronic Kidney Disease Epidemiology Collaboration   (CKD-EPI) equation.       Calcium   Date Value Ref Range Status    09/12/2021 8.9 8.5 - 10.1 mg/dL Final   07/13/2020 8.8 8.5 - 10.1 mg/dL Final     Lab Results   Component Value Date    WBC 17.6 07/13/2021    WBC 7.2 01/09/2018     Lab Results   Component Value Date    RBC 5.39 07/13/2021    RBC 4.04 01/09/2018     Lab Results   Component Value Date    HGB 14.2 07/14/2021    HGB 13.0 06/20/2018     Lab Results   Component Value Date    HCT 49.5 07/13/2021    HCT 43.1 06/20/2018     Lab Results   Component Value Date    MCV 92 07/13/2021    MCV 96.0 06/20/2018     Lab Results   Component Value Date    MCH 30.1 07/13/2021    MCH 29.0 06/20/2018     Lab Results   Component Value Date    MCHC 32.7 07/13/2021    MCHC 30.2 06/20/2018     Lab Results   Component Value Date    RDW 12.4 07/13/2021    RDW 13.3 01/09/2018     Lab Results   Component Value Date     07/13/2021     01/09/2018       ASSESSMENT/Plan:      ICD-10-CM        ICD-10-CM    1. Fracture of multiple rami of right pubis with routine healing, subsequent encounter  S32.591D    2. History of 9th right rib fracture due to fall (07/13/2021)  Z87.81    3. Primary osteoarthritis involving multiple joints, especially bilateral hands  M89.49    4. Age-related osteoporosis without current pathological fracture  M81.0    5. Mild cognitive impairment  G31.84    6. Essential hypertension  I10    7. Rectal prolapse  K62.3    8. Constipation due to outlet dysfunction  K59.02        CHANGES:  None.    CARE PLAN:  The care plan has been reviewed and all orders signed.  Changes to care plan, if any, as noted.  Otherwise, continue current plan of care.    The above has been created using voice recognition software. Please be aware that this may unintentionally  produce inaccuracies and/or nonsensical sentences.      Electronically signed by: SUSIE Nieves CNP

## 2021-09-30 ENCOUNTER — DOCUMENTATION ONLY (OUTPATIENT)
Dept: FAMILY MEDICINE | Facility: CLINIC | Age: 86
End: 2021-09-30

## 2021-09-30 ENCOUNTER — TRANSITIONAL CARE UNIT VISIT (OUTPATIENT)
Dept: GERIATRICS | Facility: CLINIC | Age: 86
End: 2021-09-30
Payer: COMMERCIAL

## 2021-09-30 VITALS
HEIGHT: 59 IN | DIASTOLIC BLOOD PRESSURE: 79 MMHG | BODY MASS INDEX: 19.92 KG/M2 | SYSTOLIC BLOOD PRESSURE: 137 MMHG | HEART RATE: 74 BPM | TEMPERATURE: 98.3 F | OXYGEN SATURATION: 90 % | WEIGHT: 98.8 LBS | RESPIRATION RATE: 18 BRPM

## 2021-09-30 DIAGNOSIS — G31.84 MILD COGNITIVE IMPAIRMENT: ICD-10-CM

## 2021-09-30 DIAGNOSIS — K59.02 CONSTIPATION DUE TO OUTLET DYSFUNCTION: ICD-10-CM

## 2021-09-30 DIAGNOSIS — S32.591D: Primary | ICD-10-CM

## 2021-09-30 DIAGNOSIS — M15.0 PRIMARY OSTEOARTHRITIS INVOLVING MULTIPLE JOINTS: ICD-10-CM

## 2021-09-30 DIAGNOSIS — K62.3 RECTAL PROLAPSE: ICD-10-CM

## 2021-09-30 DIAGNOSIS — I10 ESSENTIAL HYPERTENSION: ICD-10-CM

## 2021-09-30 DIAGNOSIS — Z87.81 HISTORY OF RIB FRACTURE: ICD-10-CM

## 2021-09-30 DIAGNOSIS — M81.0 AGE-RELATED OSTEOPOROSIS WITHOUT CURRENT PATHOLOGICAL FRACTURE: ICD-10-CM

## 2021-09-30 PROCEDURE — 99309 SBSQ NF CARE MODERATE MDM 30: CPT | Performed by: NURSE PRACTITIONER

## 2021-09-30 ASSESSMENT — MIFFLIN-ST. JEOR: SCORE: 760.84

## 2021-09-30 NOTE — PROGRESS NOTES
"When opening a documentation only encounter, be sure to enter in \"Chief Complaint\" Forms and in \" Comments\" Title of form, description if needed.    Sudeep is a 89 year old  female  Form received via: Fax  Form now resides in: Provider Ready    Catie Mason MA     Form has been completed by provider.     Form sent out via: Fax to [employer] at Fax Number: [phone]  Patient informed: No, Reason:n/a  Output date: October 7, 2021    Veronica Gaffney MA      **Please close the encounter**                      "

## 2021-09-30 NOTE — LETTER
2021        RE: Belia Sheets  [address]  [address]  [facility] [address]        [facility]    Name:   Belia Sheets (Sudeep)  :   1930  MRN:    [id number]     Facility:   [facility] (SNF) [[id number]]   Room: [facility] 203  Code Status: FULL CODE and POLST AVAILABLE -     DOS: 2021  Previous visit: 2021    PCP:  Alejandro Sandhu    CHIEF COMPLAINT / REASON FOR VISIT:  Chief Complaint   Patient presents with     Clinic Care Coordination - Follow-up     Fall with right pubic rami fractures      [facility] from 2021 until 2021 (fall and right 9th rib fracture)  [facility] from 2021 until 2021 (fall with pubic rami fractures)      HPI: Belia is a 89 year old female with a history of osteoporosis, significant osteoarthritis (especially in the hands), essential hypertension, and remote history of visual hallucinations.  She was undergoing rehab in the TCU here just 3 months ago after falling and sustaining a right ninth rib fracture.    She fell again on 2021 and taken by EMS to [facility] ED.  Imaging revealed right superior and inferior pubic rami fractures.  She was evaluated by orthopedics with fractures to be managed conservatively utilizing pain control and guided weightbearing recommendations.  She can bear weight as tolerated with a walker for safety.  Orthopedic surgery did recommend follow-up in 3 to 4 weeks with nonoperative orthopedics provider.  This should include AP x-rays of the pelvis.  She may be seen by her PCP if the PCP feels comfortable.      She was also evaluated by physical and occupational therapies with recommendations for TCU on discharge.  At discharge, she was below her functional baseline, requiring mod to max assist x1 with a walker for transfers.  Also recommended for nursing home with assist x2 with walker for  "pivot transfers only.  She is a fall risk.  RLE WBAT.      CURRENT/RECENT TCU ISSUES    Disposition: On 09/28, she complained of rectal pain.  She does have a history of rectal prolapse, and she stated that it \"hurts trying to push out small pieces of stool.\"  This is not new for her and apparently comes and goes.  She stated that she had not been bothered with this in the previous 18 to 24 hours.  She has undergone surgery within the last 5 years.  When last seen, she told me that her stools were \"comfortably soft.\"    She is ambulating with a 2 wheeled walker, having gone >250 feet.  Pain, she says, is tolerable if she steps carefully.    Pain management: Medications for pain included acetaminophen, methocarbamol, and lidocaine patch, although the last was discontinued, as there was really no place to put it.  The main thing for her, she says, is to sit or  a way that does not hurt.  She does feel as though her pain might be lessening.  Her personal preference is to not take narcotics if at all possible.  TODAY, she tells me the pain is mostly manageable but can be severe at times.    Cognitive impairment: During her last stay, there were at least mild cognitive deficits noted, and this was corroborated by the patient's son who also endorsed his mother's memory loss, although she appears, for the most part, to be quite cogent.  SLP did work with her during her last stay, and it was felt that that if she needed quite a bit of support.   She did rather poorly on the clock test, making a pie and then adding numbers but no hands.  She scored 2/17 on the storytelling, 10/17 on orientation.  She scored 23/30 on the MMSE.  Her daughter apparently believed cognitive impairment to be an acute issue.  However, the patient told me that she felt she had trouble with her thinking 2 to 3 years ago but claims to feel sharper now.    She seems very cogent during this day and is, in fact, quite witty.  The other day, I " "mentioned the 2 falls that resulted in hospitalization, and she responded with, \"I try a new way to fall every time.\"    SLP should be working with her on both swallowing and cognitive issues.  Currently, she denies any trouble swallowing.    Osteoarthritis: Rather severe in the hands with noted deformities of various joints.  I watched her attempted to  a flat knife with significant difficulty, and we talked about the possibility of adaptive utensils through an assessment by OT.  She tells me they did get \"round tubes\" to go over the utensils, but it did not help much.    Code status: It is noted that in the hospital, she was listed as DNR/DNI; however, at [facility] she became full code.  I spoke to her again about this, and her response was, \"I'm in better health and have good responses than I had 2 years ago.\"    Discharge planning: When last here, she told me she lives in a \"very pleasant small apartment.\"  She has been living at the [place] and has not been receiving any assistance.  That said, she is cognizant of her abilities or lack thereof.        ROS: She denies any significant pain, even at the fracture site.  She can, in fact, using incentive spirometer without producing pain.  No headaches or chest pains, coughing or congestion, nausea or vomiting, dyspnea, dysuria, diplopia, constipation or diarrhea (stools are soft but formed), difficulty chewing or swallowing, integumentary issues, or problems with appetite or sleep    Past Medical History:   Diagnosis Date     Carpal tunnel syndrome (aka CTS)      Constipation due to outlet dysfunction 9/22/2021     Fracture of multiple rami of right pubis with routine healing, subsequent encounter 9/22/2021     H/O calcium pyrophosphate deposition disease (CPPD)      History of 9th right rib fracture due to fall (07/13/2021) 9/22/2021     History of encephalopathy 10/2017     Hypertension      Kyphoscoliosis      Osteoarthritis     hands     Osteoporosis  "     Rectal prolapse               Family History   Problem Relation Age of Onset     Depression/Anxiety Son      Depression/Anxiety Son         alcohol abuse  age 24     Hypertension Mother      Hypertension Brother      Diabetes No family hx of      Breast Cancer No family hx of      Colon Cancer No family hx of      Prostate Cancer No family hx of      Other Cancer No family hx of      Social History     Socioeconomic History     Marital status:      Spouse name: Not on file     Number of children: Not on file     Years of education: Not on file     Highest education level: Not on file   Occupational History     Not on file   Tobacco Use     Smoking status: Never Smoker     Smokeless tobacco: Never Used   Substance and Sexual Activity     Alcohol use: No     Drug use: No     Sexual activity: Never   Other Topics Concern     Not on file   Social History Narrative     Not on file     Social Determinants of Health     Financial Resource Strain:      Difficulty of Paying Living Expenses:    Food Insecurity:      Worried About Running Out of Food in the Last Year:      Ran Out of Food in the Last Year:    Transportation Needs:      Lack of Transportation (Medical):      Lack of Transportation (Non-Medical):    Physical Activity:      Days of Exercise per Week:      Minutes of Exercise per Session:    Stress:      Feeling of Stress :    Social Connections:      Frequency of Communication with Friends and Family:      Frequency of Social Gatherings with Friends and Family:      Attends Church Services:      Active Member of Clubs or Organizations:      Attends Club or Organization Meetings:      Marital Status:    Intimate Partner Violence:      Fear of Current or Ex-Partner:      Emotionally Abused:      Physically Abused:      Sexually Abused:        MEDICATIONS: Reviewed from the MAR, physician orders, and/or earlier progress notes.  Post Discharge Medication Reconciliation Status: medication  "reconcilation previously completed during another office visit.    Current Outpatient Medications   Medication Sig     acetaminophen (TYLENOL) 325 MG tablet Take 3 tablets (975 mg) by mouth 3 times daily     amLODIPine (NORVASC) 5 MG tablet Take 1 tablet (5 mg) by mouth daily     calcium carbonate 600 mg-vitamin D 400 units (CALTRATE) 600-400 MG-UNIT per tablet Take 1 tablet by mouth 2 times daily     methocarbamol (ROBAXIN) 750 MG tablet Take 1 tablet (750 mg) by mouth 3 times daily     order for DME Equipment being ordered: Incentive spirometer     order for DME Equipment being ordered: Home BP monitor and cuff     polyethylene glycol (MIRALAX) 17 g packet Take 1 packet by mouth daily     traMADol (ULTRAM) 50 MG tablet Take 1 tablet (50 mg) by mouth every 6 hours as needed for moderate pain     No current facility-administered medications for this visit.     ALLERGIES: No Known Allergies    DIET: Regular, regular texture, thin liquids.  Mighty Shake.    Vitals:    09/30/21 1224   BP: 137/79   Pulse: 74   Resp: 18   Temp: 98.3  F (36.8  C)   SpO2: 90%   Weight: 44.8 kg (98 lb 12.8 oz)   Height: 1.486 m (4' 10.5\")     Body mass index is 20.3 kg/m .    EXAMINATION:   General: Pleasant, alert, and conversant elderly female, sitting in a recliner, in no apparent distress.   She is quickwitted and funny.  Head: Normocephalic and atraumatic.   Eyes: PERRLA, sclerae clear.   ENT: Moist oral mucosa.  She has several of her own teeth but missing all lower teeth on the right as well as several uppercase.  No rhinorrhea or nasal discharge.  Hearing seems unimpaired.  Cardiovascular: Regular rate and rhythm.  No appreciable murmur.  Respiratory: Lungs clear to auscultation bilaterally.   Abdomen: Nondistended.   Musculoskeletal/Extremities: Age-related degenerative joint disease and moderate to severe kyphosis.  Hands and fingers shows significant deformities due to osteoarthritis.  Right fifth trigger finger.  Bilateral " hallux valgus deformities with partial great toe overlap.  No peripheral edema.  I did observe her having some difficulty eating and are requesting OT evaluate for possible adaptive utensils.  She could barely  the flat knife.  Integument: No rashes, clinically significant lesions, or skin breakdown.   Cognitive/Psychiatric: Mostly alert and oriented, although there may be some minor cognitive issues.  Affect is euthymic.    DIAGNOSTICS:   No results found for this or any previous visit (from the past 240 hour(s)).   Last Comprehensive Metabolic Panel:  Sodium   Date Value Ref Range Status   09/12/2021 140 133 - 144 mmol/L Final   01/30/2019 140 133 - 144 mmol/L Final     Potassium   Date Value Ref Range Status   09/12/2021 3.9 3.4 - 5.3 mmol/L Final   01/30/2019 4.0 3.4 - 5.3 mmol/L Final     Chloride   Date Value Ref Range Status   09/12/2021 108 94 - 109 mmol/L Final   01/30/2019 104 94 - 109 mmol/L Final     Carbon Dioxide   Date Value Ref Range Status   01/30/2019 30 20 - 32 mmol/L Final     Carbon Dioxide (CO2)   Date Value Ref Range Status   09/12/2021 28 20 - 32 mmol/L Final     Anion Gap   Date Value Ref Range Status   09/12/2021 4 3 - 14 mmol/L Final   01/30/2019 6 3 - 14 mmol/L Final     Glucose   Date Value Ref Range Status   09/12/2021 98 70 - 99 mg/dL Final   01/30/2019 129 (H) 70 - 99 mg/dL Final     Urea Nitrogen   Date Value Ref Range Status   09/12/2021 11 7 - 30 mg/dL Final   07/13/2020 16 7 - 30 mg/dL Final     Creatinine   Date Value Ref Range Status   09/12/2021 0.66 0.52 - 1.04 mg/dL Final   07/13/2020 0.67 0.52 - 1.04 mg/dL Final     GFR Estimate   Date Value Ref Range Status   09/12/2021 77 >60 mL/min/1.73m2 Final     Comment:     As of July 11, 2021, eGFR is calculated by the CKD-EPI creatinine equation, without race adjustment. eGFR can be influenced by muscle mass, exercise, and diet. The reported eGFR is an estimation only and is only applicable if the renal function is stable.    07/13/2020 77 >60 mL/min/[1.73_m2] Final     Comment:     Non  GFR Calc  Starting 12/18/2018, serum creatinine based estimated GFR (eGFR) will be   calculated using the Chronic Kidney Disease Epidemiology Collaboration   (CKD-EPI) equation.       Calcium   Date Value Ref Range Status   09/12/2021 8.9 8.5 - 10.1 mg/dL Final   07/13/2020 8.8 8.5 - 10.1 mg/dL Final     Lab Results   Component Value Date    WBC 17.6 07/13/2021    WBC 7.2 01/09/2018     Lab Results   Component Value Date    RBC 5.39 07/13/2021    RBC 4.04 01/09/2018     Lab Results   Component Value Date    HGB 14.2 07/14/2021    HGB 13.0 06/20/2018     Lab Results   Component Value Date    HCT 49.5 07/13/2021    HCT 43.1 06/20/2018     Lab Results   Component Value Date    MCV 92 07/13/2021    MCV 96.0 06/20/2018     Lab Results   Component Value Date    MCH 30.1 07/13/2021    MCH 29.0 06/20/2018     Lab Results   Component Value Date    MCHC 32.7 07/13/2021    MCHC 30.2 06/20/2018     Lab Results   Component Value Date    RDW 12.4 07/13/2021    RDW 13.3 01/09/2018     Lab Results   Component Value Date     07/13/2021     01/09/2018       ASSESSMENT/Plan:      ICD-10-CM        ICD-10-CM    1. Fracture of multiple rami of right pubis with routine healing, subsequent encounter  S32.591D    2. History of 9th right rib fracture due to fall (07/13/2021)  Z87.81    3. Primary osteoarthritis involving multiple joints, especially bilateral hands  M89.49    4. Age-related osteoporosis without current pathological fracture  M81.0    5. Mild cognitive impairment  G31.84    6. Essential hypertension  I10    7. Rectal prolapse  K62.3    8. Constipation due to outlet dysfunction  K59.02        CHANGES:  None.    CARE PLAN:  The care plan has been reviewed and all orders signed.  Changes to care plan, if any, as noted.  Otherwise, continue current plan of care.    The above has been created using voice recognition software. Please be  aware that this may unintentionally  produce inaccuracies and/or nonsensical sentences.      Electronically signed by: SUSIE Nieves CNP        Sincerely,        SUSIE Nieves CNP

## 2021-10-03 NOTE — PROGRESS NOTES
[facility]    Name:   Belia Sheets (Sudeep)  :   1930  MRN:    [id number]     Facility:   [facility] (SNF) [[id number]]   Room:   Code Status: FULL CODE and POLST AVAILABLE -     DOS: 2021  Previous visit: 2021    PCP:  Alejandro Sandhu    CHIEF COMPLAINT / REASON FOR VISIT:  Chief Complaint   Patient presents with     Clinic Care Coordination - Follow-up     Fall with right pubic rami fractures      [facility] from 2021 until 2021 (fall and right 9th rib fracture)  [facility] from 2021 until 2021 (fall with pubic rami fractures)      HPI: Belia is a 89 year old female with a history of osteoporosis, significant osteoarthritis (especially in the hands), essential hypertension, and remote history of visual hallucinations.  She was undergoing rehab in the TCU here just 3 months ago after falling and sustaining a right ninth rib fracture.    She fell again on 2021 and taken by EMS to [facility] ED.  Imaging revealed right superior and inferior pubic rami fractures.  She was evaluated by orthopedics with fractures to be managed conservatively utilizing pain control and guided weightbearing recommendations.  She can bear weight as tolerated with a walker for safety.  Orthopedic surgery did recommend follow-up in 3 to 4 weeks with nonoperative orthopedics provider.  This should include AP x-rays of the pelvis.  She may be seen by her PCP if the PCP feels comfortable.      She was also evaluated by physical and occupational therapies with recommendations for TCU on discharge.  At discharge, she was below her functional baseline, requiring mod to max assist x1 with a walker for transfers.  Also recommended for nursing home with assist x2 with walker for pivot transfers only.  She is a fall risk.  RLE WBAT.      CURRENT/RECENT TCU ISSUES    Disposition: On  "09/28, she complained of rectal pain.  She does have a history of rectal prolapse, and she stated that it \"hurts trying to push out small pieces of stool.\"  This is not new for her and apparently comes and goes.  She stated that she had not been bothered with this in the previous 18 to 24 hours.  She has undergone surgery within the last 5 years.  When last seen, she told me that her stools were \"comfortably soft.\"    She is ambulating with a 2 wheeled walker, having gone >250 feet.  Pain, she says, is tolerable if she steps carefully.    Pain management: Medications for pain included acetaminophen, methocarbamol, and lidocaine patch, although the last was discontinued, as there was really no place to put it.  The main thing for her, she says, is to sit or  a way that does not hurt.  She does feel as though her pain might be lessening.  Her personal preference is to not take narcotics if at all possible.  TODAY, she tells me the pain is mostly manageable but can be severe at times.    Cognitive impairment: During her last stay, there were at least mild cognitive deficits noted, and this was corroborated by the patient's son who also endorsed his mother's memory loss, although she appears, for the most part, to be quite cogent.  SLP did work with her during her last stay, and it was felt that that if she needed quite a bit of support.   She did rather poorly on the clock test, making a pie and then adding numbers but no hands.  She scored 2/17 on the storytelling, 10/17 on orientation.  She scored 23/30 on the MMSE.  Her daughter apparently believed cognitive impairment to be an acute issue.  However, the patient told me that she felt she had trouble with her thinking 2 to 3 years ago but claims to feel sharper now.    She seems very cogent during this day and is, in fact, quite witty.  The other day, I mentioned the 2 falls that resulted in hospitalization, and she responded with, \"I try a new way to fall " "every time.\"    SLP should be working with her on both swallowing and cognitive issues.  Currently, she denies any trouble swallowing.    Osteoarthritis: Rather severe in the hands with noted deformities of various joints.  I watched her attempted to  a flat knife with significant difficulty, and we talked about the possibility of adaptive utensils through an assessment by OT.  She tells me they did get \"round tubes\" to go over the utensils, but it did not help much.    Code status: It is noted that in the hospital, she was listed as DNR/DNI; however, at [facility] she became full code.  I spoke to her again about this, and her response was, \"I'm in better health and have good responses than I had 2 years ago.\"    Discharge planning: When last here, she told me she lives in a \"very pleasant small apartment.\"  She has been living at the [place] and has not been receiving any assistance.  That said, she is cognizant of her abilities or lack thereof.        ROS: She denies any significant pain, even at the fracture site.  She can, in fact, using incentive spirometer without producing pain.  No headaches or chest pains, coughing or congestion, nausea or vomiting, dyspnea, dysuria, diplopia, constipation or diarrhea (stools are soft but formed), difficulty chewing or swallowing, integumentary issues, or problems with appetite or sleep    Past Medical History:   Diagnosis Date     Carpal tunnel syndrome (aka CTS)      Constipation due to outlet dysfunction 9/22/2021     Fracture of multiple rami of right pubis with routine healing, subsequent encounter 9/22/2021     H/O calcium pyrophosphate deposition disease (CPPD)      History of 9th right rib fracture due to fall (07/13/2021) 9/22/2021     History of encephalopathy 10/2017     Hypertension      Kyphoscoliosis      Osteoarthritis     hands     Osteoporosis      Rectal prolapse               Family History   Problem Relation Age of Onset     Depression/Anxiety Son "      Depression/Anxiety Son         alcohol abuse  age 24     Hypertension Mother      Hypertension Brother      Diabetes No family hx of      Breast Cancer No family hx of      Colon Cancer No family hx of      Prostate Cancer No family hx of      Other Cancer No family hx of      Social History     Socioeconomic History     Marital status:      Spouse name: Not on file     Number of children: Not on file     Years of education: Not on file     Highest education level: Not on file   Occupational History     Not on file   Tobacco Use     Smoking status: Never Smoker     Smokeless tobacco: Never Used   Substance and Sexual Activity     Alcohol use: No     Drug use: No     Sexual activity: Never   Other Topics Concern     Not on file   Social History Narrative     Not on file     Social Determinants of Health     Financial Resource Strain:      Difficulty of Paying Living Expenses:    Food Insecurity:      Worried About Running Out of Food in the Last Year:      Ran Out of Food in the Last Year:    Transportation Needs:      Lack of Transportation (Medical):      Lack of Transportation (Non-Medical):    Physical Activity:      Days of Exercise per Week:      Minutes of Exercise per Session:    Stress:      Feeling of Stress :    Social Connections:      Frequency of Communication with Friends and Family:      Frequency of Social Gatherings with Friends and Family:      Attends Nondenominational Services:      Active Member of Clubs or Organizations:      Attends Club or Organization Meetings:      Marital Status:    Intimate Partner Violence:      Fear of Current or Ex-Partner:      Emotionally Abused:      Physically Abused:      Sexually Abused:        MEDICATIONS: Reviewed from the MAR, physician orders, and/or earlier progress notes.  Post Discharge Medication Reconciliation Status: medication reconcilation previously completed during another office visit.    Current Outpatient Medications   Medication Sig      "acetaminophen (TYLENOL) 325 MG tablet Take 3 tablets (975 mg) by mouth 3 times daily     amLODIPine (NORVASC) 5 MG tablet Take 1 tablet (5 mg) by mouth daily     calcium carbonate 600 mg-vitamin D 400 units (CALTRATE) 600-400 MG-UNIT per tablet Take 1 tablet by mouth 2 times daily     methocarbamol (ROBAXIN) 750 MG tablet Take 1 tablet (750 mg) by mouth 3 times daily     order for DME Equipment being ordered: Incentive spirometer     order for DME Equipment being ordered: Home BP monitor and cuff     polyethylene glycol (MIRALAX) 17 g packet Take 1 packet by mouth daily     traMADol (ULTRAM) 50 MG tablet Take 1 tablet (50 mg) by mouth every 6 hours as needed for moderate pain     No current facility-administered medications for this visit.     ALLERGIES: No Known Allergies    DIET: Regular, regular texture, thin liquids.  Mighty Shake.    Vitals:    09/30/21 1224   BP: 137/79   Pulse: 74   Resp: 18   Temp: 98.3  F (36.8  C)   SpO2: 90%   Weight: 44.8 kg (98 lb 12.8 oz)   Height: 1.486 m (4' 10.5\")     Body mass index is 20.3 kg/m .    EXAMINATION:   General: Pleasant, alert, and conversant elderly female, sitting in a recliner, in no apparent distress.   She is quickwitted and funny.  Head: Normocephalic and atraumatic.   Eyes: PERRLA, sclerae clear.   ENT: Moist oral mucosa.  She has several of her own teeth but missing all lower teeth on the right as well as several uppercase.  No rhinorrhea or nasal discharge.  Hearing seems unimpaired.  Cardiovascular: Regular rate and rhythm.  No appreciable murmur.  Respiratory: Lungs clear to auscultation bilaterally.   Abdomen: Nondistended.   Musculoskeletal/Extremities: Age-related degenerative joint disease and moderate to severe kyphosis.  Hands and fingers shows significant deformities due to osteoarthritis.  Right fifth trigger finger.  Bilateral hallux valgus deformities with partial great toe overlap.  No peripheral edema.  I did observe her having some difficulty " eating and are requesting OT evaluate for possible adaptive utensils.  She could barely  the flat knife.  Integument: No rashes, clinically significant lesions, or skin breakdown.   Cognitive/Psychiatric: Mostly alert and oriented, although there may be some minor cognitive issues.  Affect is euthymic.    DIAGNOSTICS:   No results found for this or any previous visit (from the past 240 hour(s)).   Last Comprehensive Metabolic Panel:  Sodium   Date Value Ref Range Status   09/12/2021 140 133 - 144 mmol/L Final   01/30/2019 140 133 - 144 mmol/L Final     Potassium   Date Value Ref Range Status   09/12/2021 3.9 3.4 - 5.3 mmol/L Final   01/30/2019 4.0 3.4 - 5.3 mmol/L Final     Chloride   Date Value Ref Range Status   09/12/2021 108 94 - 109 mmol/L Final   01/30/2019 104 94 - 109 mmol/L Final     Carbon Dioxide   Date Value Ref Range Status   01/30/2019 30 20 - 32 mmol/L Final     Carbon Dioxide (CO2)   Date Value Ref Range Status   09/12/2021 28 20 - 32 mmol/L Final     Anion Gap   Date Value Ref Range Status   09/12/2021 4 3 - 14 mmol/L Final   01/30/2019 6 3 - 14 mmol/L Final     Glucose   Date Value Ref Range Status   09/12/2021 98 70 - 99 mg/dL Final   01/30/2019 129 (H) 70 - 99 mg/dL Final     Urea Nitrogen   Date Value Ref Range Status   09/12/2021 11 7 - 30 mg/dL Final   07/13/2020 16 7 - 30 mg/dL Final     Creatinine   Date Value Ref Range Status   09/12/2021 0.66 0.52 - 1.04 mg/dL Final   07/13/2020 0.67 0.52 - 1.04 mg/dL Final     GFR Estimate   Date Value Ref Range Status   09/12/2021 77 >60 mL/min/1.73m2 Final     Comment:     As of July 11, 2021, eGFR is calculated by the CKD-EPI creatinine equation, without race adjustment. eGFR can be influenced by muscle mass, exercise, and diet. The reported eGFR is an estimation only and is only applicable if the renal function is stable.   07/13/2020 77 >60 mL/min/[1.73_m2] Final     Comment:     Non  GFR Calc  Starting 12/18/2018, serum  creatinine based estimated GFR (eGFR) will be   calculated using the Chronic Kidney Disease Epidemiology Collaboration   (CKD-EPI) equation.       Calcium   Date Value Ref Range Status   09/12/2021 8.9 8.5 - 10.1 mg/dL Final   07/13/2020 8.8 8.5 - 10.1 mg/dL Final     Lab Results   Component Value Date    WBC 17.6 07/13/2021    WBC 7.2 01/09/2018     Lab Results   Component Value Date    RBC 5.39 07/13/2021    RBC 4.04 01/09/2018     Lab Results   Component Value Date    HGB 14.2 07/14/2021    HGB 13.0 06/20/2018     Lab Results   Component Value Date    HCT 49.5 07/13/2021    HCT 43.1 06/20/2018     Lab Results   Component Value Date    MCV 92 07/13/2021    MCV 96.0 06/20/2018     Lab Results   Component Value Date    MCH 30.1 07/13/2021    MCH 29.0 06/20/2018     Lab Results   Component Value Date    MCHC 32.7 07/13/2021    MCHC 30.2 06/20/2018     Lab Results   Component Value Date    RDW 12.4 07/13/2021    RDW 13.3 01/09/2018     Lab Results   Component Value Date     07/13/2021     01/09/2018       ASSESSMENT/Plan:      ICD-10-CM        ICD-10-CM    1. Fracture of multiple rami of right pubis with routine healing, subsequent encounter  S32.591D    2. History of 9th right rib fracture due to fall (07/13/2021)  Z87.81    3. Primary osteoarthritis involving multiple joints, especially bilateral hands  M89.49    4. Age-related osteoporosis without current pathological fracture  M81.0    5. Mild cognitive impairment  G31.84    6. Essential hypertension  I10    7. Rectal prolapse  K62.3    8. Constipation due to outlet dysfunction  K59.02        CHANGES:  None.    CARE PLAN:  The care plan has been reviewed and all orders signed.  Changes to care plan, if any, as noted.  Otherwise, continue current plan of care.    The above has been created using voice recognition software. Please be aware that this may unintentionally  produce inaccuracies and/or nonsensical sentences.      Electronically signed by:  SUSIE Nieves CNP

## 2021-10-04 ENCOUNTER — DISCHARGE SUMMARY NURSING HOME (OUTPATIENT)
Dept: GERIATRICS | Facility: CLINIC | Age: 86
End: 2021-10-04
Payer: COMMERCIAL

## 2021-10-04 VITALS
BODY MASS INDEX: 19.92 KG/M2 | TEMPERATURE: 97.5 F | WEIGHT: 98.8 LBS | DIASTOLIC BLOOD PRESSURE: 97 MMHG | SYSTOLIC BLOOD PRESSURE: 189 MMHG | RESPIRATION RATE: 18 BRPM | HEIGHT: 59 IN | OXYGEN SATURATION: 92 % | HEART RATE: 75 BPM

## 2021-10-04 DIAGNOSIS — K59.02 CONSTIPATION DUE TO OUTLET DYSFUNCTION: ICD-10-CM

## 2021-10-04 DIAGNOSIS — S32.591D: Primary | ICD-10-CM

## 2021-10-04 DIAGNOSIS — M81.0 AGE-RELATED OSTEOPOROSIS WITHOUT CURRENT PATHOLOGICAL FRACTURE: ICD-10-CM

## 2021-10-04 DIAGNOSIS — Z87.81 HISTORY OF RIB FRACTURE: ICD-10-CM

## 2021-10-04 DIAGNOSIS — K62.3 RECTAL PROLAPSE: ICD-10-CM

## 2021-10-04 DIAGNOSIS — M15.0 PRIMARY OSTEOARTHRITIS INVOLVING MULTIPLE JOINTS: ICD-10-CM

## 2021-10-04 DIAGNOSIS — I10 ESSENTIAL HYPERTENSION: ICD-10-CM

## 2021-10-04 DIAGNOSIS — G31.84 MILD COGNITIVE IMPAIRMENT: ICD-10-CM

## 2021-10-04 PROCEDURE — 99316 NF DSCHRG MGMT 30 MIN+: CPT | Performed by: NURSE PRACTITIONER

## 2021-10-04 ASSESSMENT — MIFFLIN-ST. JEOR: SCORE: 760.84

## 2021-10-04 NOTE — LETTER
10/4/2021        RE: Belia Sheets  [address]  [address]  [facility] [address]        [facility]    Name:   Belia Sheets (Sudeep)  :   1930  MRN:    [id number]     Facility:   [facility] (SNF) [[id number]]   Room:   Code Status: FULL CODE and POLST AVAILABLE -     DOS: 10/04/2021  Previous visit: 2021    PCP:  Alejandro Sandhu    CHIEF COMPLAINT / REASON FOR VISIT:  Chief Complaint   Patient presents with     Discharge Summary Nursing Home     Fall with right pubic rami fractures      [facility] from 2021 until 2021 (fall and right 9th rib fracture)  [facility] from 2021 until 2021 (fall with pubic rami fractures)  [facility] TCU from 2021 until 10/08/2021 (expected discharge date)      HPI: Belia is a 89 year old female with a history of osteoporosis, significant osteoarthritis (especially in the hands), essential hypertension, and remote history of visual hallucinations.  She was undergoing rehab in the TCU here just 3 months ago after falling and sustaining a right ninth rib fracture.    She fell again on 2021 and taken by EMS to [facility] ED.  Imaging revealed right superior and inferior pubic rami fractures.  She was evaluated by orthopedics with fractures to be managed conservatively utilizing pain control and guided weightbearing recommendations.  She can bear weight as tolerated with a walker for safety.  Orthopedic surgery did recommend follow-up in 3 to 4 weeks with nonoperative orthopedics provider.  This should include AP x-rays of the pelvis.  She may be seen by her PCP if the PCP feels comfortable.      She was also evaluated by physical and occupational therapies with recommendations for TCU on discharge.  At discharge, she was below her functional baseline, requiring mod to max assist x1 with a walker for  "transfers.  Also recommended for nursing home with assist x2 with walker for pivot transfers only.  She is a fall risk.  RLE WBAT.      CURRENT/RECENT TCU ISSUES    Disposition: On 09/28, she complained of rectal pain.  She does have a history of rectal prolapse, and she stated that it \"hurts trying to push out small pieces of stool.\"  This is not new for her and apparently comes and goes.  She stated that she had not been bothered with this in the previous 18 to 24 hours.  She has undergone surgery within the last 5 years.  When last seen, she told me that her stools were \"comfortably soft.\"    She is ambulating with a 2 wheeled walker, having gone >250 feet.  Pain, she says, is tolerable if she steps carefully.    Pain management: Medications for pain included acetaminophen, methocarbamol, and lidocaine patch, although the last was discontinued, as there was really no place to put it.  The main thing for her, she says, is to sit or  a way that does not hurt.  She does feel as though her pain might be lessening.  Her personal preference is to not take narcotics if at all possible.  TODAY, she tells me the pain is mostly manageable but can be severe at times.    Cognitive impairment: During her last stay, there were at least mild cognitive deficits noted, and this was corroborated by the patient's son who also endorsed his mother's memory loss, although she appears, for the most part, to be quite cogent.  SLP did work with her during her last stay, and it was felt that that if she needed quite a bit of support.   She did rather poorly on the clock test, making a pie and then adding numbers but no hands.  She scored 2/17 on the storytelling, 10/17 on orientation.  She scored 23/30 on the MMSE.  Her daughter apparently believed cognitive impairment to be an acute issue.  However, the patient told me that she felt she had trouble with her thinking 2 to 3 years ago but claims to feel sharper now.    She seems " "very cogent during this day and is, in fact, quite witty.  The other day, I mentioned the 2 falls that resulted in hospitalization, and she responded with, \"I try a new way to fall every time.\"    SLP should be working with her on both swallowing and cognitive issues.  Currently, she denies any trouble swallowing.    Osteoarthritis: Rather severe in the hands with noted deformities of various joints.  I watched her attempted to  a flat knife with significant difficulty, and we talked about the possibility of adaptive utensils through an assessment by OT.  She tells me they did get \"round tubes\" to go over the utensils, but it did not help much.    Code status: It is noted that in the hospital, she was listed as DNR/DNI; however, at [facility] she became full code.  I spoke to her again about this, and her response was, \"I'm in better health and have good responses than I had 2 years ago.\"    Discharge planning: When last here, she told me she lives in a \"very pleasant small apartment.\"  She has been living at the [place] and has not been receiving any assistance.  That said, she is cognizant of her abilities or lack thereof.  She will be discharging on 10/08/2021.  Home services to include home health aide, PT, and OT.      ROS: She denies any significant pain, even at the fracture site.  No headaches or chest pains, coughing or congestion, nausea or vomiting, dyspnea, dysuria, diplopia, constipation or diarrhea (stools are soft but formed), difficulty chewing or swallowing, integumentary issues, or problems with appetite or sleep    Past Medical History:   Diagnosis Date     Carpal tunnel syndrome (aka CTS)      Constipation due to outlet dysfunction 9/22/2021     Fracture of multiple rami of right pubis with routine healing, subsequent encounter 9/22/2021     H/O calcium pyrophosphate deposition disease (CPPD)      History of 9th right rib fracture due to fall (07/13/2021) 9/22/2021     History of " encephalopathy 10/2017     Hypertension      Kyphoscoliosis      Osteoarthritis     hands     Osteoporosis      Rectal prolapse               Family History   Problem Relation Age of Onset     Depression/Anxiety Son      Depression/Anxiety Son         alcohol abuse  age 24     Hypertension Mother      Hypertension Brother      Diabetes No family hx of      Breast Cancer No family hx of      Colon Cancer No family hx of      Prostate Cancer No family hx of      Other Cancer No family hx of      Social History     Socioeconomic History     Marital status:      Spouse name: Not on file     Number of children: Not on file     Years of education: Not on file     Highest education level: Not on file   Occupational History     Not on file   Tobacco Use     Smoking status: Never Smoker     Smokeless tobacco: Never Used   Substance and Sexual Activity     Alcohol use: No     Drug use: No     Sexual activity: Never   Other Topics Concern     Not on file   Social History Narrative     Not on file     Social Determinants of Health     Financial Resource Strain:      Difficulty of Paying Living Expenses:    Food Insecurity:      Worried About Running Out of Food in the Last Year:      Ran Out of Food in the Last Year:    Transportation Needs:      Lack of Transportation (Medical):      Lack of Transportation (Non-Medical):    Physical Activity:      Days of Exercise per Week:      Minutes of Exercise per Session:    Stress:      Feeling of Stress :    Social Connections:      Frequency of Communication with Friends and Family:      Frequency of Social Gatherings with Friends and Family:      Attends Jainism Services:      Active Member of Clubs or Organizations:      Attends Club or Organization Meetings:      Marital Status:    Intimate Partner Violence:      Fear of Current or Ex-Partner:      Emotionally Abused:      Physically Abused:      Sexually Abused:        MEDICATIONS: Reviewed from the MAR, physician  "orders, and/or earlier progress notes.  Post Discharge Medication Reconciliation Status: medication reconcilation previously completed during another office visit.    Current Outpatient Medications   Medication Sig     acetaminophen (TYLENOL) 325 MG tablet Take 3 tablets (975 mg) by mouth 3 times daily     amLODIPine (NORVASC) 5 MG tablet Take 1 tablet (5 mg) by mouth daily     calcium carbonate 600 mg-vitamin D 400 units (CALTRATE) 600-400 MG-UNIT per tablet Take 1 tablet by mouth 2 times daily     methocarbamol (ROBAXIN) 750 MG tablet Take 1 tablet (750 mg) by mouth 3 times daily     order for DME Equipment being ordered: Incentive spirometer     order for DME Equipment being ordered: Home BP monitor and cuff     polyethylene glycol (MIRALAX) 17 g packet Take 1 packet by mouth daily     traMADol (ULTRAM) 50 MG tablet Take 1 tablet (50 mg) by mouth every 6 hours as needed for moderate pain     No current facility-administered medications for this visit.     ALLERGIES: No Known Allergies    DIET: Regular, regular texture, thin liquids.  Mighty Shake.    Vitals:    10/04/21 1437   BP: (!) 189/97   Pulse: 75   Resp: 18   Temp: 97.5  F (36.4  C)   SpO2: 92%   Weight: 44.8 kg (98 lb 12.8 oz)   Height: 1.486 m (4' 10.5\")     Body mass index is 20.3 kg/m .    EXAMINATION:   General: Pleasant, alert, and conversant elderly female, lying in bed, in no apparent distress.   She is quickwitted and funny.  Head: Normocephalic and atraumatic.   Eyes: PERRLA, sclerae clear.   ENT: Moist oral mucosa.  She has several of her own teeth but missing all lower teeth on the right as well as several uppercase.  No rhinorrhea or nasal discharge.  Hearing seems unimpaired.  Cardiovascular: Regular rate and rhythm.  No appreciable murmur.  Respiratory: Lungs clear to auscultation bilaterally.   Abdomen: Nondistended.   Musculoskeletal/Extremities: Age-related degenerative joint disease and moderate to severe kyphosis.  Hands and fingers " shows significant deformities due to osteoarthritis.  Right fifth trigger finger.  Bilateral hallux valgus deformities with partial great toe overlap.  No peripheral edema.  I did observe her having some difficulty eating and are requesting OT evaluate for possible adaptive utensils.  She could barely  the flat knife.  Integument: No rashes, clinically significant lesions, or skin breakdown.   Cognitive/Psychiatric: Mostly alert and oriented, although there may be some minor cognitive issues.  Affect is euthymic.    DIAGNOSTICS:   No results found for this or any previous visit (from the past 240 hour(s)).   Last Comprehensive Metabolic Panel:  Sodium   Date Value Ref Range Status   09/12/2021 140 133 - 144 mmol/L Final   01/30/2019 140 133 - 144 mmol/L Final     Potassium   Date Value Ref Range Status   09/12/2021 3.9 3.4 - 5.3 mmol/L Final   01/30/2019 4.0 3.4 - 5.3 mmol/L Final     Chloride   Date Value Ref Range Status   09/12/2021 108 94 - 109 mmol/L Final   01/30/2019 104 94 - 109 mmol/L Final     Carbon Dioxide   Date Value Ref Range Status   01/30/2019 30 20 - 32 mmol/L Final     Carbon Dioxide (CO2)   Date Value Ref Range Status   09/12/2021 28 20 - 32 mmol/L Final     Anion Gap   Date Value Ref Range Status   09/12/2021 4 3 - 14 mmol/L Final   01/30/2019 6 3 - 14 mmol/L Final     Glucose   Date Value Ref Range Status   09/12/2021 98 70 - 99 mg/dL Final   01/30/2019 129 (H) 70 - 99 mg/dL Final     Urea Nitrogen   Date Value Ref Range Status   09/12/2021 11 7 - 30 mg/dL Final   07/13/2020 16 7 - 30 mg/dL Final     Creatinine   Date Value Ref Range Status   09/12/2021 0.66 0.52 - 1.04 mg/dL Final   07/13/2020 0.67 0.52 - 1.04 mg/dL Final     GFR Estimate   Date Value Ref Range Status   09/12/2021 77 >60 mL/min/1.73m2 Final     Comment:     As of July 11, 2021, eGFR is calculated by the CKD-EPI creatinine equation, without race adjustment. eGFR can be influenced by muscle mass, exercise, and diet. The  reported eGFR is an estimation only and is only applicable if the renal function is stable.   07/13/2020 77 >60 mL/min/[1.73_m2] Final     Comment:     Non  GFR Calc  Starting 12/18/2018, serum creatinine based estimated GFR (eGFR) will be   calculated using the Chronic Kidney Disease Epidemiology Collaboration   (CKD-EPI) equation.       Calcium   Date Value Ref Range Status   09/12/2021 8.9 8.5 - 10.1 mg/dL Final   07/13/2020 8.8 8.5 - 10.1 mg/dL Final     Lab Results   Component Value Date    WBC 17.6 07/13/2021    WBC 7.2 01/09/2018     Lab Results   Component Value Date    RBC 5.39 07/13/2021    RBC 4.04 01/09/2018     Lab Results   Component Value Date    HGB 14.2 07/14/2021    HGB 13.0 06/20/2018     Lab Results   Component Value Date    HCT 49.5 07/13/2021    HCT 43.1 06/20/2018     Lab Results   Component Value Date    MCV 92 07/13/2021    MCV 96.0 06/20/2018     Lab Results   Component Value Date    MCH 30.1 07/13/2021    MCH 29.0 06/20/2018     Lab Results   Component Value Date    MCHC 32.7 07/13/2021    MCHC 30.2 06/20/2018     Lab Results   Component Value Date    RDW 12.4 07/13/2021    RDW 13.3 01/09/2018     Lab Results   Component Value Date     07/13/2021     01/09/2018       ASSESSMENT/Plan:      ICD-10-CM    1. Fracture of multiple rami of right pubis with routine healing, subsequent encounter  S32.591D    2. History of 9th right rib fracture due to fall (07/13/2021)  Z87.81    3. Primary osteoarthritis involving multiple joints, especially bilateral hands  M89.49    4. Age-related osteoporosis without current pathological fracture  M81.0    5. Mild cognitive impairment  G31.84    6. Essential hypertension  I10    7. Rectal prolapse  K62.3    8. Constipation due to outlet dysfunction  K59.02        DISCHARGE PLAN/FACE TO FACE:  I certify that this patient is under my care and that I had a face-to-face encounter that meets the physician face-to-face encounter  requirements with this patient.     Date of Face-to-Face Encounter: 10/04/2021     I certify that, based on my findings, the following services are medically necessary home health services:  Home health aide, home PT, and home OT    My clinical findings support the need for the above skilled services because: (Please write a brief narrative summary that describes what the RN, PT, SLP, or other services will be doing in the home. A list of diagnoses in this section does not meet the CMS requirements):  Home health aide for bathing and ADL needs; home PT for strengthening, balance, endurance, and safety with mobility/ambulation; home OT for strengthening, ADL needs, adaptive equipment, and safety.    This patient is homebound because: (Please write a brief narrative summmary describing the functional limitations as to why this patient is homebound and specifically what makes this patient homebound.):   Above services necessarily need to be performed in the home to be of benefit.    The patient is, or has been, under my care and I have initiated the establishment of the plan of care. This patient will be followed by a physician who will periodically review the plan of care.  Initial follow-up should be within 7-10 days.    Approximate time spent with this patient was greater than 30 minutes with greater than 50% spent in discussions regarding services required upon discharge.      The above has been created using voice recognition software. Please be aware that this may unintentionally  produce inaccuracies and/or nonsensical sentences.      Electronically signed by: SUSIE Nieves CNP        Sincerely,        SUSIE Nieves CNP

## 2021-10-04 NOTE — LETTER
10/4/2021        RE: Belia Sheets  [address]  [address]  [facility] [id number]        [facility]    Name:   Belia Sheets (Sudeep)  :   1930  MRN:    [id number]     Facility:   [facility] (SNF) [[id number]]   Room:   Code Status: FULL CODE and POLST AVAILABLE -     DOS: 10/04/2021  Previous visit: 2021    PCP:  Alejandro Sandhu    CHIEF COMPLAINT / REASON FOR VISIT:  Chief Complaint   Patient presents with     Discharge Summary Nursing Home     Fall with right pubic rami fractures      [facility] from 2021 until 2021 (fall and right 9th rib fracture)  [facility] from 2021 until 2021 (fall with pubic rami fractures)  [facility] from 2021 until 10/08/2021 (expected discharge date)      HPI: Belia is a 89 year old female with a history of osteoporosis, significant osteoarthritis (especially in the hands), essential hypertension, and remote history of visual hallucinations.  She was undergoing rehab in the TCU here just 3 months ago after falling and sustaining a right ninth rib fracture.    She fell again on 2021 and taken by EMS to [facility] ED.  Imaging revealed right superior and inferior pubic rami fractures.  She was evaluated by orthopedics with fractures to be managed conservatively utilizing pain control and guided weightbearing recommendations.  She can bear weight as tolerated with a walker for safety.  Orthopedic surgery did recommend follow-up in 3 to 4 weeks with nonoperative orthopedics provider.  This should include AP x-rays of the pelvis.  She may be seen by her PCP if the PCP feels comfortable.      She was also evaluated by physical and occupational therapies with recommendations for TCU on discharge.  At discharge, she was below her functional baseline, requiring mod to max assist x1 with a walker for  "transfers.  Also recommended for nursing home with assist x2 with walker for pivot transfers only.  She is a fall risk.  RLE WBAT.      CURRENT/RECENT TCU ISSUES    Disposition: On 09/28, she complained of rectal pain.  She does have a history of rectal prolapse, and she stated that it \"hurts trying to push out small pieces of stool.\"  This is not new for her and apparently comes and goes.  She stated that she had not been bothered with this in the previous 18 to 24 hours.  She has undergone surgery within the last 5 years.  When last seen, she told me that her stools were \"comfortably soft.\"    She is ambulating with a 2 wheeled walker, having gone >250 feet.  Pain, she says, is tolerable if she steps carefully.    Pain management: Medications for pain included acetaminophen, methocarbamol, and lidocaine patch, although the last was discontinued, as there was really no place to put it.  The main thing for her, she says, is to sit or  a way that does not hurt.  She does feel as though her pain might be lessening.  Her personal preference is to not take narcotics if at all possible.  TODAY, she tells me the pain is mostly manageable but can be severe at times.    Cognitive impairment: During her last stay, there were at least mild cognitive deficits noted, and this was corroborated by the patient's son who also endorsed his mother's memory loss, although she appears, for the most part, to be quite cogent.  SLP did work with her during her last stay, and it was felt that that if she needed quite a bit of support.   She did rather poorly on the clock test, making a pie and then adding numbers but no hands.  She scored 2/17 on the storytelling, 10/17 on orientation.  She scored 23/30 on the MMSE.  Her daughter apparently believed cognitive impairment to be an acute issue.  However, the patient told me that she felt she had trouble with her thinking 2 to 3 years ago but claims to feel sharper now.    She seems " "very cogent during this day and is, in fact, quite witty.  The other day, I mentioned the 2 falls that resulted in hospitalization, and she responded with, \"I try a new way to fall every time.\"    SLP should be working with her on both swallowing and cognitive issues.  Currently, she denies any trouble swallowing.    Osteoarthritis: Rather severe in the hands with noted deformities of various joints.  I watched her attempted to  a flat knife with significant difficulty, and we talked about the possibility of adaptive utensils through an assessment by OT.  She tells me they did get \"round tubes\" to go over the utensils, but it did not help much.    Code status: It is noted that in the hospital, she was listed as DNR/DNI; however, at [facility] she became full code.  I spoke to her again about this, and her response was, \"I'm in better health and have good responses than I had 2 years ago.\"    Discharge planning: When last here, she told me she lives in a \"very pleasant small apartment.\"  She has been living at the [facility] and has not been receiving any assistance.  That said, she is cognizant of her abilities or lack thereof.  She will be discharging on 10/08/2021.  Home services to include home health aide, PT, and OT.      ROS: She denies any significant pain, even at the fracture site.  No headaches or chest pains, coughing or congestion, nausea or vomiting, dyspnea, dysuria, diplopia, constipation or diarrhea (stools are soft but formed), difficulty chewing or swallowing, integumentary issues, or problems with appetite or sleep    Past Medical History:   Diagnosis Date     Carpal tunnel syndrome (aka CTS)      Constipation due to outlet dysfunction 9/22/2021     Fracture of multiple rami of right pubis with routine healing, subsequent encounter 9/22/2021     H/O calcium pyrophosphate deposition disease (CPPD)      History of 9th right rib fracture due to fall (07/13/2021) 9/22/2021     History of " encephalopathy 10/2017     Hypertension      Kyphoscoliosis      Osteoarthritis     hands     Osteoporosis      Rectal prolapse               Family History   Problem Relation Age of Onset     Depression/Anxiety Son      Depression/Anxiety Son         alcohol abuse  age 24     Hypertension Mother      Hypertension Brother      Diabetes No family hx of      Breast Cancer No family hx of      Colon Cancer No family hx of      Prostate Cancer No family hx of      Other Cancer No family hx of      Social History     Socioeconomic History     Marital status:      Spouse name: Not on file     Number of children: Not on file     Years of education: Not on file     Highest education level: Not on file   Occupational History     Not on file   Tobacco Use     Smoking status: Never Smoker     Smokeless tobacco: Never Used   Substance and Sexual Activity     Alcohol use: No     Drug use: No     Sexual activity: Never   Other Topics Concern     Not on file   Social History Narrative     Not on file     Social Determinants of Health     Financial Resource Strain:      Difficulty of Paying Living Expenses:    Food Insecurity:      Worried About Running Out of Food in the Last Year:      Ran Out of Food in the Last Year:    Transportation Needs:      Lack of Transportation (Medical):      Lack of Transportation (Non-Medical):    Physical Activity:      Days of Exercise per Week:      Minutes of Exercise per Session:    Stress:      Feeling of Stress :    Social Connections:      Frequency of Communication with Friends and Family:      Frequency of Social Gatherings with Friends and Family:      Attends [employer] Services:      Active Member of Clubs or Organizations:      Attends Club or Organization Meetings:      Marital Status:    Intimate Partner Violence:      Fear of Current or Ex-Partner:      Emotionally Abused:      Physically Abused:      Sexually Abused:        MEDICATIONS: Reviewed from the MAR, physician  "orders, and/or earlier progress notes.  Post Discharge Medication Reconciliation Status: medication reconcilation previously completed during another office visit.    Current Outpatient Medications   Medication Sig     acetaminophen (TYLENOL) 325 MG tablet Take 3 tablets (975 mg) by mouth 3 times daily     amLODIPine (NORVASC) 5 MG tablet Take 1 tablet (5 mg) by mouth daily     calcium carbonate 600 mg-vitamin D 400 units (CALTRATE) 600-400 MG-UNIT per tablet Take 1 tablet by mouth 2 times daily     methocarbamol (ROBAXIN) 750 MG tablet Take 1 tablet (750 mg) by mouth 3 times daily     order for DME Equipment being ordered: Incentive spirometer     order for DME Equipment being ordered: Home BP monitor and cuff     polyethylene glycol (MIRALAX) 17 g packet Take 1 packet by mouth daily     traMADol (ULTRAM) 50 MG tablet Take 1 tablet (50 mg) by mouth every 6 hours as needed for moderate pain     No current facility-administered medications for this visit.     ALLERGIES: No Known Allergies    DIET: Regular, regular texture, thin liquids.  Mighty Shake.    Vitals:    10/04/21 1437   BP: (!) 189/97   Pulse: 75   Resp: 18   Temp: 97.5  F (36.4  C)   SpO2: 92%   Weight: 44.8 kg (98 lb 12.8 oz)   Height: 1.486 m (4' 10.5\")     Body mass index is 20.3 kg/m .    EXAMINATION:   General: Pleasant, alert, and conversant elderly female, lying in bed, in no apparent distress.   She is quickwitted and funny.  Head: Normocephalic and atraumatic.   Eyes: PERRLA, sclerae clear.   ENT: Moist oral mucosa.  She has several of her own teeth but missing all lower teeth on the right as well as several uppercase.  No rhinorrhea or nasal discharge.  Hearing seems unimpaired.  Cardiovascular: Regular rate and rhythm.  No appreciable murmur.  Respiratory: Lungs clear to auscultation bilaterally.   Abdomen: Nondistended.   Musculoskeletal/Extremities: Age-related degenerative joint disease and moderate to severe kyphosis.  Hands and fingers " shows significant deformities due to osteoarthritis.  Right fifth trigger finger.  Bilateral hallux valgus deformities with partial great toe overlap.  No peripheral edema.  I did observe her having some difficulty eating and are requesting OT evaluate for possible adaptive utensils.  She could barely  the flat knife.  Integument: No rashes, clinically significant lesions, or skin breakdown.   Cognitive/Psychiatric: Mostly alert and oriented, although there may be some minor cognitive issues.  Affect is euthymic.    DIAGNOSTICS:   No results found for this or any previous visit (from the past 240 hour(s)).   Last Comprehensive Metabolic Panel:  Sodium   Date Value Ref Range Status   09/12/2021 140 133 - 144 mmol/L Final   01/30/2019 140 133 - 144 mmol/L Final     Potassium   Date Value Ref Range Status   09/12/2021 3.9 3.4 - 5.3 mmol/L Final   01/30/2019 4.0 3.4 - 5.3 mmol/L Final     Chloride   Date Value Ref Range Status   09/12/2021 108 94 - 109 mmol/L Final   01/30/2019 104 94 - 109 mmol/L Final     Carbon Dioxide   Date Value Ref Range Status   01/30/2019 30 20 - 32 mmol/L Final     Carbon Dioxide (CO2)   Date Value Ref Range Status   09/12/2021 28 20 - 32 mmol/L Final     Anion Gap   Date Value Ref Range Status   09/12/2021 4 3 - 14 mmol/L Final   01/30/2019 6 3 - 14 mmol/L Final     Glucose   Date Value Ref Range Status   09/12/2021 98 70 - 99 mg/dL Final   01/30/2019 129 (H) 70 - 99 mg/dL Final     Urea Nitrogen   Date Value Ref Range Status   09/12/2021 11 7 - 30 mg/dL Final   07/13/2020 16 7 - 30 mg/dL Final     Creatinine   Date Value Ref Range Status   09/12/2021 0.66 0.52 - 1.04 mg/dL Final   07/13/2020 0.67 0.52 - 1.04 mg/dL Final     GFR Estimate   Date Value Ref Range Status   09/12/2021 77 >60 mL/min/1.73m2 Final     Comment:     As of July 11, 2021, eGFR is calculated by the CKD-EPI creatinine equation, without race adjustment. eGFR can be influenced by muscle mass, exercise, and diet. The  reported eGFR is an estimation only and is only applicable if the renal function is stable.   07/13/2020 77 >60 mL/min/[1.73_m2] Final     Comment:     Non  GFR Calc  Starting 12/18/2018, serum creatinine based estimated GFR (eGFR) will be   calculated using the Chronic Kidney Disease Epidemiology Collaboration   (CKD-EPI) equation.       Calcium   Date Value Ref Range Status   09/12/2021 8.9 8.5 - 10.1 mg/dL Final   07/13/2020 8.8 8.5 - 10.1 mg/dL Final     Lab Results   Component Value Date    WBC 17.6 07/13/2021    WBC 7.2 01/09/2018     Lab Results   Component Value Date    RBC 5.39 07/13/2021    RBC 4.04 01/09/2018     Lab Results   Component Value Date    HGB 14.2 07/14/2021    HGB 13.0 06/20/2018     Lab Results   Component Value Date    HCT 49.5 07/13/2021    HCT 43.1 06/20/2018     Lab Results   Component Value Date    MCV 92 07/13/2021    MCV 96.0 06/20/2018     Lab Results   Component Value Date    MCH 30.1 07/13/2021    MCH 29.0 06/20/2018     Lab Results   Component Value Date    MCHC 32.7 07/13/2021    MCHC 30.2 06/20/2018     Lab Results   Component Value Date    RDW 12.4 07/13/2021    RDW 13.3 01/09/2018     Lab Results   Component Value Date     07/13/2021     01/09/2018       ASSESSMENT/Plan:      ICD-10-CM    1. Fracture of multiple rami of right pubis with routine healing, subsequent encounter  S32.591D    2. History of 9th right rib fracture due to fall (07/13/2021)  Z87.81    3. Primary osteoarthritis involving multiple joints, especially bilateral hands  M89.49    4. Age-related osteoporosis without current pathological fracture  M81.0    5. Mild cognitive impairment  G31.84    6. Essential hypertension  I10    7. Rectal prolapse  K62.3    8. Constipation due to outlet dysfunction  K59.02        DISCHARGE PLAN/FACE TO FACE:  I certify that this patient is under my care and that I had a face-to-face encounter that meets the physician face-to-face encounter  requirements with this patient.     Date of Face-to-Face Encounter: 10/04/2021     I certify that, based on my findings, the following services are medically necessary home health services:  Home health aide, home PT, and home OT    My clinical findings support the need for the above skilled services because: (Please write a brief narrative summary that describes what the RN, PT, SLP, or other services will be doing in the home. A list of diagnoses in this section does not meet the CMS requirements):  Home health aide for bathing and ADL needs; home PT for strengthening, balance, endurance, and safety with mobility/ambulation; home OT for strengthening, ADL needs, adaptive equipment, and safety.    This patient is homebound because: (Please write a brief narrative summmary describing the functional limitations as to why this patient is homebound and specifically what makes this patient homebound.):   Above services necessarily need to be performed in the home to be of benefit.    The patient is, or has been, under my care and I have initiated the establishment of the plan of care. This patient will be followed by a physician who will periodically review the plan of care.  Initial follow-up should be within 7-10 days.    Approximate time spent with this patient was greater than 30 minutes with greater than 50% spent in discussions regarding services required upon discharge.      The above has been created using voice recognition software. Please be aware that this may unintentionally  produce inaccuracies and/or nonsensical sentences.      Electronically signed by: SUSIE Nieves CNP        Sincerely,        SUSIE Nieves CNP

## 2021-10-06 PROCEDURE — G0180 MD CERTIFICATION HHA PATIENT: HCPCS | Performed by: FAMILY MEDICINE

## 2021-10-08 NOTE — PROGRESS NOTES
[facility]    Name:   Belia Sheets (Sudeep)  :   1930  MRN:    [id number]     Facility:   [facility] (SNF) [[id number]]   Room:   Code Status: FULL CODE and POLST AVAILABLE -     DOS: 10/04/2021  Previous visit: 2021    PCP:  Alejandro Sandhu    CHIEF COMPLAINT / REASON FOR VISIT:  Chief Complaint   Patient presents with     Discharge Summary Nursing Home     Fall with right pubic rami fractures      [facility] from 2021 until 2021 (fall and right 9th rib fracture)  [facility] from 2021 until 2021 (fall with pubic rami fractures)  [facility] TCU from 2021 until 10/08/2021 (expected discharge date)      HPI: Belia is a 89 year old female with a history of osteoporosis, significant osteoarthritis (especially in the hands), essential hypertension, and remote history of visual hallucinations.  She was undergoing rehab in the TCU here just 3 months ago after falling and sustaining a right ninth rib fracture.    She fell again on 2021 and taken by EMS to [facility] ED.  Imaging revealed right superior and inferior pubic rami fractures.  She was evaluated by orthopedics with fractures to be managed conservatively utilizing pain control and guided weightbearing recommendations.  She can bear weight as tolerated with a walker for safety.  Orthopedic surgery did recommend follow-up in 3 to 4 weeks with nonoperative orthopedics provider.  This should include AP x-rays of the pelvis.  She may be seen by her PCP if the PCP feels comfortable.      She was also evaluated by physical and occupational therapies with recommendations for TCU on discharge.  At discharge, she was below her functional baseline, requiring mod to max assist x1 with a walker for transfers.  Also recommended for nursing home with assist x2 with walker for pivot transfers only.  She  "is a fall risk.  RLE WBAT.      CURRENT/RECENT TCU ISSUES    Disposition: On 09/28, she complained of rectal pain.  She does have a history of rectal prolapse, and she stated that it \"hurts trying to push out small pieces of stool.\"  This is not new for her and apparently comes and goes.  She stated that she had not been bothered with this in the previous 18 to 24 hours.  She has undergone surgery within the last 5 years.  When last seen, she told me that her stools were \"comfortably soft.\"    She is ambulating with a 2 wheeled walker, having gone >250 feet.  Pain, she says, is tolerable if she steps carefully.    Pain management: Medications for pain included acetaminophen, methocarbamol, and lidocaine patch, although the last was discontinued, as there was really no place to put it.  The main thing for her, she says, is to sit or  a way that does not hurt.  She does feel as though her pain might be lessening.  Her personal preference is to not take narcotics if at all possible.  TODAY, she tells me the pain is mostly manageable but can be severe at times.    Cognitive impairment: During her last stay, there were at least mild cognitive deficits noted, and this was corroborated by the patient's son who also endorsed his mother's memory loss, although she appears, for the most part, to be quite cogent.  SLP did work with her during her last stay, and it was felt that that if she needed quite a bit of support.   She did rather poorly on the clock test, making a pie and then adding numbers but no hands.  She scored 2/17 on the storytelling, 10/17 on orientation.  She scored 23/30 on the MMSE.  Her daughter apparently believed cognitive impairment to be an acute issue.  However, the patient told me that she felt she had trouble with her thinking 2 to 3 years ago but claims to feel sharper now.    She seems very cogent during this day and is, in fact, quite witty.  The other day, I mentioned the 2 falls that " "resulted in hospitalization, and she responded with, \"I try a new way to fall every time.\"    SLP should be working with her on both swallowing and cognitive issues.  Currently, she denies any trouble swallowing.    Osteoarthritis: Rather severe in the hands with noted deformities of various joints.  I watched her attempted to  a flat knife with significant difficulty, and we talked about the possibility of adaptive utensils through an assessment by OT.  She tells me they did get \"round tubes\" to go over the utensils, but it did not help much.    Code status: It is noted that in the hospital, she was listed as DNR/DNI; however, at [facility] she became full code.  I spoke to her again about this, and her response was, \"I'm in better health and have good responses than I had 2 years ago.\"    Discharge planning: When last here, she told me she lives in a \"very pleasant small apartment.\"  She has been living at the [facility] and has not been receiving any assistance.  That said, she is cognizant of her abilities or lack thereof.  She will be discharging on 10/08/2021.  Home services to include home health aide, PT, and OT.      ROS: She denies any significant pain, even at the fracture site.  No headaches or chest pains, coughing or congestion, nausea or vomiting, dyspnea, dysuria, diplopia, constipation or diarrhea (stools are soft but formed), difficulty chewing or swallowing, integumentary issues, or problems with appetite or sleep    Past Medical History:   Diagnosis Date     Carpal tunnel syndrome (aka CTS)      Constipation due to outlet dysfunction 9/22/2021     Fracture of multiple rami of right pubis with routine healing, subsequent encounter 9/22/2021     H/O calcium pyrophosphate deposition disease (CPPD)      History of 9th right rib fracture due to fall (07/13/2021) 9/22/2021     History of encephalopathy 10/2017     Hypertension      Kyphoscoliosis      Osteoarthritis     hands     Osteoporosis      " Rectal prolapse               Family History   Problem Relation Age of Onset     Depression/Anxiety Son      Depression/Anxiety Son         alcohol abuse  age 24     Hypertension Mother      Hypertension Brother      Diabetes No family hx of      Breast Cancer No family hx of      Colon Cancer No family hx of      Prostate Cancer No family hx of      Other Cancer No family hx of      Social History     Socioeconomic History     Marital status:      Spouse name: Not on file     Number of children: Not on file     Years of education: Not on file     Highest education level: Not on file   Occupational History     Not on file   Tobacco Use     Smoking status: Never Smoker     Smokeless tobacco: Never Used   Substance and Sexual Activity     Alcohol use: No     Drug use: No     Sexual activity: Never   Other Topics Concern     Not on file   Social History Narrative     Not on file     Social Determinants of Health     Financial Resource Strain:      Difficulty of Paying Living Expenses:    Food Insecurity:      Worried About Running Out of Food in the Last Year:      Ran Out of Food in the Last Year:    Transportation Needs:      Lack of Transportation (Medical):      Lack of Transportation (Non-Medical):    Physical Activity:      Days of Exercise per Week:      Minutes of Exercise per Session:    Stress:      Feeling of Stress :    Social Connections:      Frequency of Communication with Friends and Family:      Frequency of Social Gatherings with Friends and Family:      Attends Yarsani Services:      Active Member of Clubs or Organizations:      Attends Club or Organization Meetings:      Marital Status:    Intimate Partner Violence:      Fear of Current or Ex-Partner:      Emotionally Abused:      Physically Abused:      Sexually Abused:        MEDICATIONS: Reviewed from the MAR, physician orders, and/or earlier progress notes.  Post Discharge Medication Reconciliation Status: medication reconcilation  "previously completed during another office visit.    Current Outpatient Medications   Medication Sig     acetaminophen (TYLENOL) 325 MG tablet Take 3 tablets (975 mg) by mouth 3 times daily     amLODIPine (NORVASC) 5 MG tablet Take 1 tablet (5 mg) by mouth daily     calcium carbonate 600 mg-vitamin D 400 units (CALTRATE) 600-400 MG-UNIT per tablet Take 1 tablet by mouth 2 times daily     methocarbamol (ROBAXIN) 750 MG tablet Take 1 tablet (750 mg) by mouth 3 times daily     order for DME Equipment being ordered: Incentive spirometer     order for DME Equipment being ordered: Home BP monitor and cuff     polyethylene glycol (MIRALAX) 17 g packet Take 1 packet by mouth daily     traMADol (ULTRAM) 50 MG tablet Take 1 tablet (50 mg) by mouth every 6 hours as needed for moderate pain     No current facility-administered medications for this visit.     ALLERGIES: No Known Allergies    DIET: Regular, regular texture, thin liquids.  Mighty Shake.    Vitals:    10/04/21 1437   BP: (!) 189/97   Pulse: 75   Resp: 18   Temp: 97.5  F (36.4  C)   SpO2: 92%   Weight: 44.8 kg (98 lb 12.8 oz)   Height: 1.486 m (4' 10.5\")     Body mass index is 20.3 kg/m .    EXAMINATION:   General: Pleasant, alert, and conversant elderly female, lying in bed, in no apparent distress.   She is quickwitted and funny.  Head: Normocephalic and atraumatic.   Eyes: PERRLA, sclerae clear.   ENT: Moist oral mucosa.  She has several of her own teeth but missing all lower teeth on the right as well as several uppercase.  No rhinorrhea or nasal discharge.  Hearing seems unimpaired.  Cardiovascular: Regular rate and rhythm.  No appreciable murmur.  Respiratory: Lungs clear to auscultation bilaterally.   Abdomen: Nondistended.   Musculoskeletal/Extremities: Age-related degenerative joint disease and moderate to severe kyphosis.  Hands and fingers shows significant deformities due to osteoarthritis.  Right fifth trigger finger.  Bilateral hallux valgus " deformities with partial great toe overlap.  No peripheral edema.  I did observe her having some difficulty eating and are requesting OT evaluate for possible adaptive utensils.  She could barely  the flat knife.  Integument: No rashes, clinically significant lesions, or skin breakdown.   Cognitive/Psychiatric: Mostly alert and oriented, although there may be some minor cognitive issues.  Affect is euthymic.    DIAGNOSTICS:   No results found for this or any previous visit (from the past 240 hour(s)).   Last Comprehensive Metabolic Panel:  Sodium   Date Value Ref Range Status   09/12/2021 140 133 - 144 mmol/L Final   01/30/2019 140 133 - 144 mmol/L Final     Potassium   Date Value Ref Range Status   09/12/2021 3.9 3.4 - 5.3 mmol/L Final   01/30/2019 4.0 3.4 - 5.3 mmol/L Final     Chloride   Date Value Ref Range Status   09/12/2021 108 94 - 109 mmol/L Final   01/30/2019 104 94 - 109 mmol/L Final     Carbon Dioxide   Date Value Ref Range Status   01/30/2019 30 20 - 32 mmol/L Final     Carbon Dioxide (CO2)   Date Value Ref Range Status   09/12/2021 28 20 - 32 mmol/L Final     Anion Gap   Date Value Ref Range Status   09/12/2021 4 3 - 14 mmol/L Final   01/30/2019 6 3 - 14 mmol/L Final     Glucose   Date Value Ref Range Status   09/12/2021 98 70 - 99 mg/dL Final   01/30/2019 129 (H) 70 - 99 mg/dL Final     Urea Nitrogen   Date Value Ref Range Status   09/12/2021 11 7 - 30 mg/dL Final   07/13/2020 16 7 - 30 mg/dL Final     Creatinine   Date Value Ref Range Status   09/12/2021 0.66 0.52 - 1.04 mg/dL Final   07/13/2020 0.67 0.52 - 1.04 mg/dL Final     GFR Estimate   Date Value Ref Range Status   09/12/2021 77 >60 mL/min/1.73m2 Final     Comment:     As of July 11, 2021, eGFR is calculated by the CKD-EPI creatinine equation, without race adjustment. eGFR can be influenced by muscle mass, exercise, and diet. The reported eGFR is an estimation only and is only applicable if the renal function is stable.   07/13/2020 77  >60 mL/min/[1.73_m2] Final     Comment:     Non  GFR Calc  Starting 12/18/2018, serum creatinine based estimated GFR (eGFR) will be   calculated using the Chronic Kidney Disease Epidemiology Collaboration   (CKD-EPI) equation.       Calcium   Date Value Ref Range Status   09/12/2021 8.9 8.5 - 10.1 mg/dL Final   07/13/2020 8.8 8.5 - 10.1 mg/dL Final     Lab Results   Component Value Date    WBC 17.6 07/13/2021    WBC 7.2 01/09/2018     Lab Results   Component Value Date    RBC 5.39 07/13/2021    RBC 4.04 01/09/2018     Lab Results   Component Value Date    HGB 14.2 07/14/2021    HGB 13.0 06/20/2018     Lab Results   Component Value Date    HCT 49.5 07/13/2021    HCT 43.1 06/20/2018     Lab Results   Component Value Date    MCV 92 07/13/2021    MCV 96.0 06/20/2018     Lab Results   Component Value Date    MCH 30.1 07/13/2021    MCH 29.0 06/20/2018     Lab Results   Component Value Date    MCHC 32.7 07/13/2021    MCHC 30.2 06/20/2018     Lab Results   Component Value Date    RDW 12.4 07/13/2021    RDW 13.3 01/09/2018     Lab Results   Component Value Date     07/13/2021     01/09/2018       ASSESSMENT/Plan:      ICD-10-CM    1. Fracture of multiple rami of right pubis with routine healing, subsequent encounter  S32.591D    2. History of 9th right rib fracture due to fall (07/13/2021)  Z87.81    3. Primary osteoarthritis involving multiple joints, especially bilateral hands  M89.49    4. Age-related osteoporosis without current pathological fracture  M81.0    5. Mild cognitive impairment  G31.84    6. Essential hypertension  I10    7. Rectal prolapse  K62.3    8. Constipation due to outlet dysfunction  K59.02        DISCHARGE PLAN/FACE TO FACE:  I certify that this patient is under my care and that I had a face-to-face encounter that meets the physician face-to-face encounter requirements with this patient.     Date of Face-to-Face Encounter: 10/04/2021     I certify that, based on my  findings, the following services are medically necessary home health services:  Home health aide, home PT, and home OT    My clinical findings support the need for the above skilled services because: (Please write a brief narrative summary that describes what the RN, PT, SLP, or other services will be doing in the home. A list of diagnoses in this section does not meet the CMS requirements):  Home health aide for bathing and ADL needs; home PT for strengthening, balance, endurance, and safety with mobility/ambulation; home OT for strengthening, ADL needs, adaptive equipment, and safety.    This patient is homebound because: (Please write a brief narrative summmary describing the functional limitations as to why this patient is homebound and specifically what makes this patient homebound.):   Above services necessarily need to be performed in the home to be of benefit.    The patient is, or has been, under my care and I have initiated the establishment of the plan of care. This patient will be followed by a physician who will periodically review the plan of care.  Initial follow-up should be within 7-10 days.    Approximate time spent with this patient was greater than 30 minutes with greater than 50% spent in discussions regarding services required upon discharge.      The above has been created using voice recognition software. Please be aware that this may unintentionally  produce inaccuracies and/or nonsensical sentences.      Electronically signed by: SUSIE Nieves CNP

## 2021-10-11 ENCOUNTER — DOCUMENTATION ONLY (OUTPATIENT)
Dept: FAMILY MEDICINE | Facility: CLINIC | Age: 86
End: 2021-10-11
Payer: COMMERCIAL

## 2021-10-13 DIAGNOSIS — Z53.9 DIAGNOSIS NOT YET DEFINED: Primary | ICD-10-CM

## 2021-10-19 ENCOUNTER — MEDICAL CORRESPONDENCE (OUTPATIENT)
Dept: HEALTH INFORMATION MANAGEMENT | Facility: CLINIC | Age: 86
End: 2021-10-19
Payer: COMMERCIAL

## 2021-10-20 ENCOUNTER — OFFICE VISIT (OUTPATIENT)
Dept: FAMILY MEDICINE | Facility: CLINIC | Age: 86
End: 2021-10-20
Payer: COMMERCIAL

## 2021-10-20 VITALS
DIASTOLIC BLOOD PRESSURE: 95 MMHG | RESPIRATION RATE: 18 BRPM | HEART RATE: 84 BPM | OXYGEN SATURATION: 94 % | TEMPERATURE: 97.5 F | BODY MASS INDEX: 20.87 KG/M2 | SYSTOLIC BLOOD PRESSURE: 149 MMHG | WEIGHT: 101.6 LBS

## 2021-10-20 DIAGNOSIS — S32.591D: Primary | ICD-10-CM

## 2021-10-20 DIAGNOSIS — R29.6 RECURRENT FALLS: ICD-10-CM

## 2021-10-20 DIAGNOSIS — I10 ESSENTIAL HYPERTENSION: ICD-10-CM

## 2021-10-20 PROCEDURE — 99214 OFFICE O/P EST MOD 30 MIN: CPT | Performed by: FAMILY MEDICINE

## 2021-10-20 RX ORDER — AMLODIPINE BESYLATE 2.5 MG/1
2.5 TABLET ORAL DAILY
Qty: 90 TABLET | Refills: 3 | Status: SHIPPED | OUTPATIENT
Start: 2021-10-20 | End: 2022-09-17

## 2021-10-20 NOTE — Clinical Note
See my assessment from visit of October 20.  Please call daughter to discuss possible home care support, exploring getting [facility] covered.

## 2021-10-20 NOTE — PROGRESS NOTES
Belia was seen today for recheck and recheck medication.    Diagnoses and all orders for this visit:    Fracture of multiple rami of right pubis with routine healing, subsequent encounter.  The fractures appears to be healing well with minimal pain.  Continue rehab exercises.  I reviewed all transitional care notes.    Essential hypertension.  See discussion below.  We will set a higher goal for antihypertensive therapy and reduce amlodipine to 2.5 mg.  -     amLODIPine (NORVASC) 2.5 MG tablet; Take 1 tablet (2.5 mg) by mouth daily    Recurrent falls.  The patient has now had injuries falls resulting in fractures in June and September of this year.  The etiology of the falls are undoubtedly multifactorial, but I wonder if antihypertensive therapy could be contributing through blood pressure fluctuations.  No way of knowing for sure but given her age I think it is reasonable to reduce her amlodipine dosage and tolerate a higher blood pressure baseline.  She was hypertensive today but had not taken any medication prior to coming to the office.    Discussion with the patient and her children yielded the possibility of perhaps augmenting her right sizing the amount of assistance she is getting at home, including perhaps another home safety assessment.  Family has been satisfied with [facility] in the past but there has been some difficulty getting that service covered by insurance.  I will have my care coordinator call the daughter to discuss this.          David Sheets is a 89 year old who presents for the following health issues: Follow-up of pelvic fracture.    HPI    The patient had a fall and sustained a pelvic fracture on September 11.  After 2 days in the hospital was sent to TCU and she is now been home for the past 2 weeks.  States she is doing okay at home.  Has a personal care attendant help her dress in the morning but does not feel she needs it.  Family is wondering whether or not the patient  could benefit from other types of in-home care.    The patient is a bit vague on the events leading up to each of the past 2 falls, but states that there was some balance issues.  She denies lightheadedness, or syncope.          Review of Systems         Objective    BP (!) 149/95 (BP Location: Left arm, Patient Position: Sitting, Cuff Size: Adult Small)   Pulse 84   Temp 97.5  F (36.4  C) (Oral)   Resp 18   Wt 46.1 kg (101 lb 9.6 oz)   SpO2 94%   Breastfeeding No   BMI 20.87 kg/m    Body mass index is 20.87 kg/m .  Physical Exam  Constitutional:       Comments: Alert, pleasant.  Uses a 4-wheeled walker with seat.   Cardiovascular:      Rate and Rhythm: Normal rate and regular rhythm.      Heart sounds: Normal heart sounds.   Pulmonary:      Effort: No respiratory distress.      Breath sounds: No wheezing or rales.   Abdominal:      General: Bowel sounds are normal.      Palpations: Abdomen is soft. There is no mass.      Tenderness: There is no abdominal tenderness.   Musculoskeletal:      Comments: + marked scoliosis, kyphosis.  + muscle wasting throughout   Neurological:      Mental Status: She is alert and oriented to person, place, and time.   Psychiatric:         Behavior: Behavior normal.            Admission on 09/11/2021, Discharged on 09/13/2021   Component Date Value Ref Range Status     Sodium 09/11/2021 139  133 - 144 mmol/L Final     Potassium 09/11/2021 3.3* 3.4 - 5.3 mmol/L Final     Chloride 09/11/2021 104  94 - 109 mmol/L Final     Carbon Dioxide (CO2) 09/11/2021 27  20 - 32 mmol/L Final     Anion Gap 09/11/2021 8  3 - 14 mmol/L Final     Urea Nitrogen 09/11/2021 12  7 - 30 mg/dL Final     Creatinine 09/11/2021 0.59  0.52 - 1.04 mg/dL Final     Calcium 09/11/2021 9.0  8.5 - 10.1 mg/dL Final     Glucose 09/11/2021 109* 70 - 99 mg/dL Final     Alkaline Phosphatase 09/11/2021 74  40 - 150 U/L Final     AST 09/11/2021 22  0 - 45 U/L Final     ALT 09/11/2021 15  0 - 50 U/L Final     Protein Total  09/11/2021 7.5  6.8 - 8.8 g/dL Final     Albumin 09/11/2021 3.4  3.4 - 5.0 g/dL Final     Bilirubin Total 09/11/2021 1.2  0.2 - 1.3 mg/dL Final     GFR Estimate 09/11/2021 80  >60 mL/min/1.73m2 Final    As of July 11, 2021, eGFR is calculated by the CKD-EPI creatinine equation, without race adjustment. eGFR can be influenced by muscle mass, exercise, and diet. The reported eGFR is an estimation only and is only applicable if the renal function is stable.     Hold Specimen 09/11/2021 [facility]   Final     Hold Specimen 09/11/2021 [facility]   Final     Hold Specimen 09/11/2021 [facility]   Final     WBC Count 09/11/2021 11.2* 4.0 - 11.0 10e3/uL Final     RBC Count 09/11/2021 4.88  3.80 - 5.20 10e6/uL Final     Hemoglobin 09/11/2021 14.8  11.7 - 15.7 g/dL Final     Hematocrit 09/11/2021 47.4* 35.0 - 47.0 % Final     MCV 09/11/2021 97  78 - 100 fL Final     MCH 09/11/2021 30.3  26.5 - 33.0 pg Final     MCHC 09/11/2021 31.2* 31.5 - 36.5 g/dL Final     RDW 09/11/2021 13.2  10.0 - 15.0 % Final     Platelet Count 09/11/2021 216  150 - 450 10e3/uL Final     % Neutrophils 09/11/2021 80  % Final     % Lymphocytes 09/11/2021 12  % Final     % Monocytes 09/11/2021 6  % Final     % Eosinophils 09/11/2021 1  % Final     % Basophils 09/11/2021 1  % Final     % Immature Granulocytes 09/11/2021 0  % Final     NRBCs per 100 WBC 09/11/2021 0  <1 /100 Final     Absolute Neutrophils 09/11/2021 8.9* 1.6 - 8.3 10e3/uL Final     Absolute Lymphocytes 09/11/2021 1.3  0.8 - 5.3 10e3/uL Final     Absolute Monocytes 09/11/2021 0.7  0.0 - 1.3 10e3/uL Final     Absolute Eosinophils 09/11/2021 0.2  0.0 - 0.7 10e3/uL Final     Absolute Basophils 09/11/2021 0.1  0.0 - 0.2 10e3/uL Final     Absolute Immature Granulocytes 09/11/2021 0.1* <=0.0 10e3/uL Final     Absolute NRBCs 09/11/2021 0.0  10e3/uL Final     INR 09/11/2021 1.10  0.85 - 1.15 Final    Effective 7/11/2021, the reference range for this assay has changed.     Troponin I 09/11/2021 <0.015  0.000 - 0.045 ug/L  Final    The 99th percentile for upper reference range is 0.045ug/L.  Troponin values in the range of 0.045 - 0.120 ug/L may be associated with risks of adverse clinical events.     Ventricular Rate 09/11/2021 86  BPM Incomplete     Atrial Rate 09/11/2021 86  BPM Incomplete     NE Interval 09/11/2021 168  ms Incomplete     QRS Duration 09/11/2021 80  ms Incomplete     QT 09/11/2021 384  ms Incomplete     QTc 09/11/2021 459  ms Incomplete     P Axis 09/11/2021 84  degrees Incomplete     R AXIS 09/11/2021 -5  degrees Incomplete     T Axis 09/11/2021 80  degrees Incomplete     Interpretation ECG 09/11/2021    Incomplete                    Value:Sinus rhythm with Premature atrial complexes  Nonspecific T wave abnormality  Abnormal ECG       ABO/RH(D) 09/11/2021 O POS   Final     Antibody Screen 09/11/2021 Negative  Negative Final     SPECIMEN EXPIRATION DATE 09/11/2021 [id number]   Final     SARS CoV2 PCR 09/11/2021 Negative  Negative Final    NEGATIVE: SARS-CoV-2 (COVID-19) RNA not detected, presumed negative.     Magnesium 09/11/2021 1.7  1.6 - 2.3 mg/dL Final     Phosphorus 09/11/2021 2.8  2.5 - 4.5 mg/dL Final     Magnesium 09/11/2021 1.9  1.6 - 2.3 mg/dL Final     Phosphorus 09/11/2021 3.1  2.5 - 4.5 mg/dL Final     Potassium 09/11/2021 3.5  3.4 - 5.3 mmol/L Final     Phosphorus 09/12/2021 3.3  2.5 - 4.5 mg/dL Final     WBC Count 09/12/2021 8.0  4.0 - 11.0 10e3/uL Final     RBC Count 09/12/2021 4.29  3.80 - 5.20 10e6/uL Final     Hemoglobin 09/12/2021 12.9  11.7 - 15.7 g/dL Final     Hematocrit 09/12/2021 39.5  35.0 - 47.0 % Final     MCV 09/12/2021 92  78 - 100 fL Final     MCH 09/12/2021 30.1  26.5 - 33.0 pg Final     MCHC 09/12/2021 32.7  31.5 - 36.5 g/dL Final     RDW 09/12/2021 13.7  10.0 - 15.0 % Final     Platelet Count 09/12/2021 191  150 - 450 10e3/uL Final     Sodium 09/12/2021 140  133 - 144 mmol/L Final     Potassium 09/12/2021 3.9  3.4 - 5.3 mmol/L Final     Chloride 09/12/2021 108  94 - 109 mmol/L  Final     Carbon Dioxide (CO2) 09/12/2021 28  20 - 32 mmol/L Final     Anion Gap 09/12/2021 4  3 - 14 mmol/L Final     Urea Nitrogen 09/12/2021 11  7 - 30 mg/dL Final     Creatinine 09/12/2021 0.66  0.52 - 1.04 mg/dL Final     Calcium 09/12/2021 8.9  8.5 - 10.1 mg/dL Final     Glucose 09/12/2021 98  70 - 99 mg/dL Final     GFR Estimate 09/12/2021 77  >60 mL/min/1.73m2 Final    As of July 11, 2021, eGFR is calculated by the CKD-EPI creatinine equation, without race adjustment. eGFR can be influenced by muscle mass, exercise, and diet. The reported eGFR is an estimation only and is only applicable if the renal function is stable.

## 2021-10-21 ENCOUNTER — DOCUMENTATION ONLY (OUTPATIENT)
Dept: FAMILY MEDICINE | Facility: CLINIC | Age: 86
End: 2021-10-21

## 2021-10-25 ENCOUNTER — MEDICAL CORRESPONDENCE (OUTPATIENT)
Dept: HEALTH INFORMATION MANAGEMENT | Facility: CLINIC | Age: 86
End: 2021-10-25
Payer: COMMERCIAL

## 2021-10-25 NOTE — PROGRESS NOTES
Form has been completed by provider.     Form sent out via: Fax to [employer] at Fax Number: [phone]  Patient informed: No  Output date: October 25, 2021    Catie Mason MA      **Please close the encounter**

## 2021-11-10 ENCOUNTER — DOCUMENTATION ONLY (OUTPATIENT)
Dept: FAMILY MEDICINE | Facility: CLINIC | Age: 86
End: 2021-11-10
Payer: COMMERCIAL

## 2021-11-10 NOTE — PROGRESS NOTES
"When opening a documentation only encounter, be sure to enter in \"Chief Complaint\" Forms and in \" Comments\" Title of form, description if needed.    Sudeep is a 89 year old  female  Form received via: Fax  Form now resides in: Provider Ready    Veronica Gaffney MA      Form has been completed by provider.     Form sent out via: Fax to [employer] at Fax Number: [phone]  Patient informed: No, Reason:n/a  Output date: November 18, 2021    Veronica Gaffney MA      **Please close the encounter**                "

## 2021-11-10 NOTE — PROGRESS NOTES
"When opening a documentation only encounter, be sure to enter in \"Chief Complaint\" Forms and in \" Comments\" Title of form, description if needed.    Sudeep is a 89 year old  female  Form received via: Fax  Form now resides in: Provider Ready    Veronica Gaffney MA    Form has been completed by provider.     Form sent out via: Fax to [employer] at Fax Number: [phone]  Patient informed: No, Reason:n/a  Output date: November 18, 2021    Veronica Gaffney MA      **Please close the encounter**                "

## 2021-11-17 ENCOUNTER — OFFICE VISIT (OUTPATIENT)
Dept: FAMILY MEDICINE | Facility: CLINIC | Age: 86
End: 2021-11-17
Payer: COMMERCIAL

## 2021-11-17 ENCOUNTER — MEDICAL CORRESPONDENCE (OUTPATIENT)
Dept: HEALTH INFORMATION MANAGEMENT | Facility: CLINIC | Age: 86
End: 2021-11-17

## 2021-11-17 VITALS
TEMPERATURE: 97.4 F | HEART RATE: 88 BPM | DIASTOLIC BLOOD PRESSURE: 89 MMHG | OXYGEN SATURATION: 95 % | SYSTOLIC BLOOD PRESSURE: 131 MMHG | BODY MASS INDEX: 19.72 KG/M2 | RESPIRATION RATE: 18 BRPM | WEIGHT: 96 LBS

## 2021-11-17 DIAGNOSIS — I10 ESSENTIAL HYPERTENSION: ICD-10-CM

## 2021-11-17 DIAGNOSIS — S32.591D: ICD-10-CM

## 2021-11-17 DIAGNOSIS — G93.40 ENCEPHALOPATHY: ICD-10-CM

## 2021-11-17 DIAGNOSIS — R63.4 WEIGHT LOSS: Primary | ICD-10-CM

## 2021-11-17 PROCEDURE — 99214 OFFICE O/P EST MOD 30 MIN: CPT | Performed by: FAMILY MEDICINE

## 2021-11-17 NOTE — PROGRESS NOTES
Belia was seen today for recheck and medication question.    Diagnoses and all orders for this visit:    Weight loss.  She has lost almost 3 pounds from her baseline weight.  Encouraged her to maintain and if possible increase p.o. intake.  She does not have much of an appetite routinely and I reinforced the importance of maintaining her weight.    Encephalopathy.  She seems to have cleared cognitively.  Observe.    Fracture of multiple rami of right pubis with routine healing, subsequent encounter.  Good healing from a clinical standpoint of her pelvic fracture.  No further falls.    Essential hypertension.  She is at goal on a reduced dose of amlodipine and we will continue her on this.            David Sheets is a 89 year old who presents for the following health issues: Follow-up of multiple chronic conditions.    HPI she reports that she is doing well.  Has no new complaints.  No further falls.  Her son Luis Carlos reports that she seems to be doing well and that she also seems to have completely cleared up from a cognitive standpoint.  Patient corroborates this.  Taking reduced dose of amlodipine without problems.  No dizziness.          Review of Systems         Objective    /89 (BP Location: Left arm, Patient Position: Sitting, Cuff Size: Adult Small)   Pulse 88   Temp 97.4  F (36.3  C) (Oral)   Resp 18   Wt 43.5 kg (96 lb)   SpO2 95%   Breastfeeding No   BMI 19.72 kg/m    Body mass index is 19.72 kg/m .  Physical Exam  Constitutional:       Comments: Alert, pleasant.  Uses a 4-wheeled walker with seat.   Cardiovascular:      Rate and Rhythm: Normal rate and regular rhythm.      Heart sounds: Normal heart sounds.   Pulmonary:      Effort: No respiratory distress.      Breath sounds: No wheezing or rales.   Abdominal:      General: Bowel sounds are normal.      Palpations: Abdomen is soft. There is no mass.      Tenderness: There is no abdominal tenderness.   Musculoskeletal:      Comments: +  marked scoliosis, kyphosis.  + muscle wasting throughout   Neurological:      Mental Status: She is alert and oriented to person, place, and time.   Psychiatric:         Behavior: Behavior normal.            Admission on 09/11/2021, Discharged on 09/13/2021   Component Date Value Ref Range Status     Sodium 09/11/2021 139  133 - 144 mmol/L Final     Potassium 09/11/2021 3.3* 3.4 - 5.3 mmol/L Final     Chloride 09/11/2021 104  94 - 109 mmol/L Final     Carbon Dioxide (CO2) 09/11/2021 27  20 - 32 mmol/L Final     Anion Gap 09/11/2021 8  3 - 14 mmol/L Final     Urea Nitrogen 09/11/2021 12  7 - 30 mg/dL Final     Creatinine 09/11/2021 0.59  0.52 - 1.04 mg/dL Final     Calcium 09/11/2021 9.0  8.5 - 10.1 mg/dL Final     Glucose 09/11/2021 109* 70 - 99 mg/dL Final     Alkaline Phosphatase 09/11/2021 74  40 - 150 U/L Final     AST 09/11/2021 22  0 - 45 U/L Final     ALT 09/11/2021 15  0 - 50 U/L Final     Protein Total 09/11/2021 7.5  6.8 - 8.8 g/dL Final     Albumin 09/11/2021 3.4  3.4 - 5.0 g/dL Final     Bilirubin Total 09/11/2021 1.2  0.2 - 1.3 mg/dL Final     GFR Estimate 09/11/2021 80  >60 mL/min/1.73m2 Final    As of July 11, 2021, eGFR is calculated by the CKD-EPI creatinine equation, without race adjustment. eGFR can be influenced by muscle mass, exercise, and diet. The reported eGFR is an estimation only and is only applicable if the renal function is stable.     Hold Specimen 09/11/2021 JI   Final     Hold Specimen 09/11/2021 JI   Final     Hold Specimen 09/11/2021 [facility]   Final     WBC Count 09/11/2021 11.2* 4.0 - 11.0 10e3/uL Final     RBC Count 09/11/2021 4.88  3.80 - 5.20 10e6/uL Final     Hemoglobin 09/11/2021 14.8  11.7 - 15.7 g/dL Final     Hematocrit 09/11/2021 47.4* 35.0 - 47.0 % Final     MCV 09/11/2021 97  78 - 100 fL Final     MCH 09/11/2021 30.3  26.5 - 33.0 pg Final     MCHC 09/11/2021 31.2* 31.5 - 36.5 g/dL Final     RDW 09/11/2021 13.2  10.0 - 15.0 % Final     Platelet Count 09/11/2021 216  150 -  450 10e3/uL Final     % Neutrophils 09/11/2021 80  % Final     % Lymphocytes 09/11/2021 12  % Final     % Monocytes 09/11/2021 6  % Final     % Eosinophils 09/11/2021 1  % Final     % Basophils 09/11/2021 1  % Final     % Immature Granulocytes 09/11/2021 0  % Final     NRBCs per 100 WBC 09/11/2021 0  <1 /100 Final     Absolute Neutrophils 09/11/2021 8.9* 1.6 - 8.3 10e3/uL Final     Absolute Lymphocytes 09/11/2021 1.3  0.8 - 5.3 10e3/uL Final     Absolute Monocytes 09/11/2021 0.7  0.0 - 1.3 10e3/uL Final     Absolute Eosinophils 09/11/2021 0.2  0.0 - 0.7 10e3/uL Final     Absolute Basophils 09/11/2021 0.1  0.0 - 0.2 10e3/uL Final     Absolute Immature Granulocytes 09/11/2021 0.1* <=0.0 10e3/uL Final     Absolute NRBCs 09/11/2021 0.0  10e3/uL Final     INR 09/11/2021 1.10  0.85 - 1.15 Final    Effective 7/11/2021, the reference range for this assay has changed.     Troponin I 09/11/2021 <0.015  0.000 - 0.045 ug/L Final    The 99th percentile for upper reference range is 0.045ug/L.  Troponin values in the range of 0.045 - 0.120 ug/L may be associated with risks of adverse clinical events.     Ventricular Rate 09/11/2021 86  BPM Incomplete     Atrial Rate 09/11/2021 86  BPM Incomplete     IA Interval 09/11/2021 168  ms Incomplete     QRS Duration 09/11/2021 80  ms Incomplete     QT 09/11/2021 384  ms Incomplete     QTc 09/11/2021 459  ms Incomplete     P Axis 09/11/2021 84  degrees Incomplete     R AXIS 09/11/2021 -5  degrees Incomplete     T Axis 09/11/2021 80  degrees Incomplete     Interpretation ECG 09/11/2021    Incomplete                    Value:Sinus rhythm with Premature atrial complexes  Nonspecific T wave abnormality  Abnormal ECG       ABO/RH(D) 09/11/2021 O POS   Final     Antibody Screen 09/11/2021 Negative  Negative Final     SPECIMEN EXPIRATION DATE 09/11/2021 [id number]   Final     SARS CoV2 PCR 09/11/2021 Negative  Negative Final    NEGATIVE: SARS-CoV-2 (COVID-19) RNA not detected, presumed  negative.     Magnesium 09/11/2021 1.7  1.6 - 2.3 mg/dL Final     Phosphorus 09/11/2021 2.8  2.5 - 4.5 mg/dL Final     Magnesium 09/11/2021 1.9  1.6 - 2.3 mg/dL Final     Phosphorus 09/11/2021 3.1  2.5 - 4.5 mg/dL Final     Potassium 09/11/2021 3.5  3.4 - 5.3 mmol/L Final     Phosphorus 09/12/2021 3.3  2.5 - 4.5 mg/dL Final     WBC Count 09/12/2021 8.0  4.0 - 11.0 10e3/uL Final     RBC Count 09/12/2021 4.29  3.80 - 5.20 10e6/uL Final     Hemoglobin 09/12/2021 12.9  11.7 - 15.7 g/dL Final     Hematocrit 09/12/2021 39.5  35.0 - 47.0 % Final     MCV 09/12/2021 92  78 - 100 fL Final     MCH 09/12/2021 30.1  26.5 - 33.0 pg Final     MCHC 09/12/2021 32.7  31.5 - 36.5 g/dL Final     RDW 09/12/2021 13.7  10.0 - 15.0 % Final     Platelet Count 09/12/2021 191  150 - 450 10e3/uL Final     Sodium 09/12/2021 140  133 - 144 mmol/L Final     Potassium 09/12/2021 3.9  3.4 - 5.3 mmol/L Final     Chloride 09/12/2021 108  94 - 109 mmol/L Final     Carbon Dioxide (CO2) 09/12/2021 28  20 - 32 mmol/L Final     Anion Gap 09/12/2021 4  3 - 14 mmol/L Final     Urea Nitrogen 09/12/2021 11  7 - 30 mg/dL Final     Creatinine 09/12/2021 0.66  0.52 - 1.04 mg/dL Final     Calcium 09/12/2021 8.9  8.5 - 10.1 mg/dL Final     Glucose 09/12/2021 98  70 - 99 mg/dL Final     GFR Estimate 09/12/2021 77  >60 mL/min/1.73m2 Final    As of July 11, 2021, eGFR is calculated by the CKD-EPI creatinine equation, without race adjustment. eGFR can be influenced by muscle mass, exercise, and diet. The reported eGFR is an estimation only and is only applicable if the renal function is stable.           PHQ-2 Score:   PHQ-2 ( 1999 [employer]) 10/20/2021 8/25/2021   Q1: Little interest or pleasure in doing things 0 0   Q2: Feeling down, depressed or hopeless 0 0   PHQ-2 Score 0 0      PHQ9x3 No flowsheet data found.  Mini Cog: No flowsheet data found.    Patient Active Problem List   Diagnosis     Osteoporosis     Osteoarthritis     Recurrent falls     Chronic fatigue      Senile osteoporosis     Lumbar verterbral fracture, non-traumatic     Advance care planning     Encephalopathy     Generalized weakness     Rectal prolapse     Kyphosis     Postnasal drip     Age-related osteoporosis with current pathological fracture with routine healing, subsequent encounter     Essential hypertension     Age-related osteoporosis without current pathological fracture     Fall, subsequent encounter     Altered mental status, unspecified altered mental status type     Closed fracture of the 9th rib of right side with routine healing, subsequent encounter     Mild cognitive impairment     Primary osteoarthritis involving multiple joints, especially bilateral hands     Conversion disorder     Leukocytosis     Internal hemorrhoids     Incontinence of feces     Hyponatremia     Hypomagnesemia     Hypokalemia     History of hysterectomy     History of arthritis     Encounter for surgical aftercare following surgery of digestive system     Disorder of upper respiratory system     Dehydration     Carpal tunnel syndrome     Benign neoplasm of colon     Axis IV diagnosis     Uterine prolapse     Urinary tract infection     Transient ischemic attack     Screening for malignant neoplasm of cervix     Kyphoscoliosis     Right knee pain     Numbness     Normocytic anemia     Fall, initial encounter     Closed fracture of right pubis, unspecified portion of pubis, initial encounter (H)     Fracture of multiple rami of right pubis with routine healing, subsequent encounter     History of 9th right rib fracture due to fall (07/13/2021)     Constipation due to outlet dysfunction

## 2021-11-18 NOTE — PROGRESS NOTES
[facility]      OUTPATIENT PHYSICAL THERAPY EVALUATION  PLAN OF TREATMENT FOR OUTPATIENT REHABILITATION  (COMPLETE FOR INITIAL CLAIMS ONLY)  Patient's Last Name, First Name, M.I.  YOB: 1930  Belia Sheets                        Provider's Name  [facility] Medical Record No.  [id number]                               Onset Date:  09/11/21   Start of Care Date:  09/12/21      Type:     _X_PT   ___OT   ___SLP Medical Diagnosis:   Closed fracture of right pubis, unspecified portion of pubis                        PT Diagnosis:  Impaired functional mobility   Visits from SOC:  1   _________________________________________________________________________________  Plan of Treatment/Functional Goals    Planned Interventions: balance training,bed mobility training,gait training,patient/family education,strengthening,transfer training     Goals: See Physical Therapy Goals on Care Plan in [employer] electronic health record.    Therapy Frequency: 5x/week  Predicted Duration of Therapy Intervention: days  _________________________________________________________________________________    I CERTIFY THE NEED FOR THESE SERVICES FURNISHED UNDER        THIS PLAN OF TREATMENT AND WHILE UNDER MY CARE     (Physician co-signature of this document indicates review and certification of the therapy plan).                Certification date from: 09/12/21, Certification date to: 09/12/21    Referring Physician: Tanya Mcfarlane PA-C            Initial Assessment        See Physical Therapy evaluation dated 09/12/21 in Epic electronic health record.

## 2021-11-22 ENCOUNTER — MEDICAL CORRESPONDENCE (OUTPATIENT)
Dept: HEALTH INFORMATION MANAGEMENT | Facility: CLINIC | Age: 86
End: 2021-11-22
Payer: COMMERCIAL

## 2021-11-30 RX ORDER — HEPARIN SODIUM (PORCINE) LOCK FLUSH IV SOLN 100 UNIT/ML 100 UNIT/ML
5 SOLUTION INTRAVENOUS
Status: CANCELLED | OUTPATIENT
Start: 2021-11-30

## 2021-11-30 RX ORDER — MEPERIDINE HYDROCHLORIDE 25 MG/ML
25 INJECTION INTRAMUSCULAR; INTRAVENOUS; SUBCUTANEOUS EVERY 30 MIN PRN
Status: CANCELLED | OUTPATIENT
Start: 2021-11-30

## 2021-11-30 RX ORDER — DIPHENHYDRAMINE HYDROCHLORIDE 50 MG/ML
50 INJECTION INTRAMUSCULAR; INTRAVENOUS
Status: CANCELLED
Start: 2021-11-30

## 2021-11-30 RX ORDER — ALBUTEROL SULFATE 0.83 MG/ML
2.5 SOLUTION RESPIRATORY (INHALATION)
Status: CANCELLED | OUTPATIENT
Start: 2021-11-30

## 2021-11-30 RX ORDER — EPINEPHRINE 1 MG/ML
0.3 INJECTION, SOLUTION INTRAMUSCULAR; SUBCUTANEOUS EVERY 5 MIN PRN
Status: CANCELLED | OUTPATIENT
Start: 2021-11-30

## 2021-11-30 RX ORDER — ZOLEDRONIC ACID 5 MG/100ML
5 INJECTION, SOLUTION INTRAVENOUS ONCE
Status: CANCELLED
Start: 2021-11-30 | End: 2021-11-30

## 2021-11-30 RX ORDER — HEPARIN SODIUM,PORCINE 10 UNIT/ML
5 VIAL (ML) INTRAVENOUS
Status: CANCELLED | OUTPATIENT
Start: 2021-11-30

## 2021-11-30 RX ORDER — ALBUTEROL SULFATE 90 UG/1
1-2 AEROSOL, METERED RESPIRATORY (INHALATION)
Status: CANCELLED
Start: 2021-11-30

## 2021-11-30 RX ORDER — METHYLPREDNISOLONE SODIUM SUCCINATE 125 MG/2ML
125 INJECTION, POWDER, LYOPHILIZED, FOR SOLUTION INTRAMUSCULAR; INTRAVENOUS
Status: CANCELLED
Start: 2021-11-30

## 2021-11-30 RX ORDER — NALOXONE HYDROCHLORIDE 0.4 MG/ML
0.2 INJECTION, SOLUTION INTRAMUSCULAR; INTRAVENOUS; SUBCUTANEOUS
Status: CANCELLED | OUTPATIENT
Start: 2021-11-30

## 2021-12-01 ENCOUNTER — INFUSION THERAPY VISIT (OUTPATIENT)
Dept: INFUSION THERAPY | Facility: CLINIC | Age: 86
End: 2021-12-01
Attending: INTERNAL MEDICINE
Payer: COMMERCIAL

## 2021-12-01 ENCOUNTER — OFFICE VISIT (OUTPATIENT)
Dept: ENDOCRINOLOGY | Facility: CLINIC | Age: 86
End: 2021-12-01
Payer: COMMERCIAL

## 2021-12-01 ENCOUNTER — APPOINTMENT (OUTPATIENT)
Dept: LAB | Facility: CLINIC | Age: 86
End: 2021-12-01
Attending: INTERNAL MEDICINE
Payer: COMMERCIAL

## 2021-12-01 VITALS — DIASTOLIC BLOOD PRESSURE: 103 MMHG | SYSTOLIC BLOOD PRESSURE: 159 MMHG | BODY MASS INDEX: 19.78 KG/M2 | WEIGHT: 96.3 LBS

## 2021-12-01 VITALS
WEIGHT: 96 LBS | SYSTOLIC BLOOD PRESSURE: 145 MMHG | OXYGEN SATURATION: 94 % | BODY MASS INDEX: 19.72 KG/M2 | DIASTOLIC BLOOD PRESSURE: 84 MMHG | RESPIRATION RATE: 20 BRPM | TEMPERATURE: 99.1 F | HEART RATE: 72 BPM

## 2021-12-01 DIAGNOSIS — M80.00XD AGE-RELATED OSTEOPOROSIS WITH CURRENT PATHOLOGICAL FRACTURE WITH ROUTINE HEALING, SUBSEQUENT ENCOUNTER: ICD-10-CM

## 2021-12-01 DIAGNOSIS — M84.48XA: ICD-10-CM

## 2021-12-01 DIAGNOSIS — M81.0 AGE-RELATED OSTEOPOROSIS WITHOUT CURRENT PATHOLOGICAL FRACTURE: Primary | ICD-10-CM

## 2021-12-01 DIAGNOSIS — Z79.83 LONG TERM (CURRENT) USE OF BISPHOSPHONATES: ICD-10-CM

## 2021-12-01 LAB
ALBUMIN SERPL-MCNC: 3.7 G/DL (ref 3.4–5)
BUN SERPL-MCNC: 15 MG/DL (ref 7–30)
CALCIUM SERPL-MCNC: 9.4 MG/DL (ref 8.5–10.1)
CREAT SERPL-MCNC: 0.66 MG/DL (ref 0.52–1.04)
GFR SERPL CREATININE-BSD FRML MDRD: 77 ML/MIN/1.73M2

## 2021-12-01 PROCEDURE — 84520 ASSAY OF UREA NITROGEN: CPT

## 2021-12-01 PROCEDURE — 99215 OFFICE O/P EST HI 40 MIN: CPT | Performed by: INTERNAL MEDICINE

## 2021-12-01 PROCEDURE — 96365 THER/PROPH/DIAG IV INF INIT: CPT

## 2021-12-01 PROCEDURE — 82310 ASSAY OF CALCIUM: CPT | Performed by: INTERNAL MEDICINE

## 2021-12-01 PROCEDURE — 82306 VITAMIN D 25 HYDROXY: CPT

## 2021-12-01 PROCEDURE — 250N000011 HC RX IP 250 OP 636: Performed by: INTERNAL MEDICINE

## 2021-12-01 PROCEDURE — 82565 ASSAY OF CREATININE: CPT | Performed by: INTERNAL MEDICINE

## 2021-12-01 PROCEDURE — 82040 ASSAY OF SERUM ALBUMIN: CPT

## 2021-12-01 PROCEDURE — 36415 COLL VENOUS BLD VENIPUNCTURE: CPT | Performed by: INTERNAL MEDICINE

## 2021-12-01 RX ORDER — ZOLEDRONIC ACID 5 MG/100ML
5 INJECTION, SOLUTION INTRAVENOUS ONCE
Status: COMPLETED | OUTPATIENT
Start: 2021-12-01 | End: 2021-12-01

## 2021-12-01 RX ADMIN — ZOLEDRONIC ACID 5 MG: 0.05 INJECTION, SOLUTION INTRAVENOUS at 15:44

## 2021-12-01 ASSESSMENT — PAIN SCALES - GENERAL: PAINLEVEL: NO PAIN (0)

## 2021-12-01 NOTE — LETTER
12/1/2021         RE: Belia Sheets  [address]  [address]  [place] [address]        Dear Colleague,    Thank you for referring your patient, Belia Sheets, to the [facility]. Please see a copy of my visit note below.    Nursing Note  Belia Sheets presents today to [facility] for:   Chief Complaint   Patient presents with     Blood Draw     labs drawn with piv start by rn.  vs taken     During today's [facility] appointment, orders from Dr Pittman were completed.  Frequency: once    Progress note:  Patient identification verified by name and date of birth.  Assessment completed.  Vitals recorded in Doc Flowsheets.  Patient was provided with education regarding medication/procedure and possible side effects.  Patient verbalized understanding.     present during visit today: Not Applicable.    Treatment Conditions: Patient's Creatinine Clearance is within paramaters to administer medication per orders.    Premedications: were not ordered.    Drug Waste Record: No    Infusion length and rate:  infusion given over approximately 15 minutes    Labs: were drawn per orders.     Vascular access: peripheral IV was placed prior to appointment at [facility] on 12/1 in the lab.    Is the next appt scheduled? Not applicable  Asymptomatic COVID test completed? no    Post Infusion Assessment:  Patient tolerated infusion without incident.  Site patent and intact, free from redness, edema or discomfort.  Access discontinued per protocol.     Discharge Plan:   Follow up plan of care with: ordering provider as scheduled. and after visit summary given to patient  Discharge instructions were reviewed with patient.  Patient/representative verbalized understanding of discharge instructions and all questions answered.  Patient discharged from [facility] in stable condition.    Estee Grant RN    Administrations This Visit     zoledronic Acid (RECLAST) infusion 5 mg     Admin Date  12/01/2021 Action  New Bag Dose  5 mg Rate  400 mL/hr Route  Intravenous Administered By  Estee Grant RN                BP (!) 146/90 (BP Location: Right arm, Patient Position: Sitting, Cuff Size: Adult Small)   Pulse 81   Temp 99.1  F (37.3  C) (Tympanic)   Resp 20   Wt 43.5 kg (96 lb)   SpO2 94%   BMI 19.72 kg/m          Again, thank you for allowing me to participate in the care of your patient.        Sincerely,        [facility] [place]

## 2021-12-01 NOTE — LETTER
12/1/2021       RE: Belia Sheets  [address]  [address]  [place] [address]     Dear Colleague,    Thank you for referring your patient, Belia Sheets, to the [facility] [place] [facility]. Please see a copy of my visit note below.    Endocrinology Clinic Visit    Chief Complaint: Follow Up (Diabetes)     Information obtained from: Patient + daughter.    Subjective:         HPI: Belia Sheets is a 89 year old year old female with history of osteoporosis who is here for a follow up.      Again, this is 89 yo female with past medical history of osteoporosis first diagnosed in 1999.     She was treated with alendronate 70 mg weekly from 1999 to October 2015.  She has never stopped this medication all these years.    +History of vertebral fracture; she was folding laundry when she fell from standing up position and sustained injury.  Broken hip and noted to have thoracic compression fractures.  Previous fragility fracture, morphometric vertebral fracture: imaging done in 7/2015 has showed multiple compression fractures.   7/2015 spine film reported as <Multiple old healed left rib fractures. Diffuse osteopenia. New moderate compression fracture of T10 since the prior examination. New moderate compression fractures in T4 and T5 are new since the prior exam. Multilevel degenerative changes in the thoracic spine are otherwise stable. Marked scoliosis of the thoracolumbar spine is redemonstrated.>    Due to severe osteoporosis, long standing previous alendronate therapy and alkaline phosphatase which was within the normal limits; we treated with FORTEO 2017- 2/24/2019.  Then she received to doses of RECLAST treatment.  She uses a walker for walking.      After completion of treatment with Forteo; she received Reclast in March 2019, 8/2020.    She is taking calcium and vitamin d supplement 600-800; 1 tab daily.      Previously documented below.   Risk factors for osteoporosis and complications:  Lost 6 inch height loss/kyphosis    Weight - 91 pounds (high risk); gained 5 pounds since her last visit.   Falls/risk factors: Multiple falls. 5 falls ine one year and half but again no falls over the last one year.  Sustained rib and vertebral fractures secondary to the previous falls. Currently participating in home physical therapy for osteoporosis.   Walks three times per day in hallways of her apartment. Uses walker/cane for walking short distances and 4 barros for long distances. Fall: 7/2015, 12/2014, 4/2016, 6/2016. 01/2017.    She lives in a senior apartment.  She gets some services. Mostly manages by her own.     Review of system - multiple falls since I saw her last. Blood pressure medication reduced. Didn't take BP meds today (high). Noted pelvic fracture.     No Known Allergies    Current Outpatient Medications   Medication Sig Dispense Refill     calcium carbonate 600 mg-vitamin D 400 units (CALTRATE) 600-400 MG-UNIT per tablet Take 1 tablet by mouth 2 times daily (before meals) 180 tablet 3     acetaminophen (TYLENOL) 325 MG tablet Take 3 tablets (975 mg) by mouth 3 times daily (Patient not taking: Reported on 11/17/2021) 42 tablet 0     amLODIPine (NORVASC) 2.5 MG tablet Take 1 tablet (2.5 mg) by mouth daily 90 tablet 3     order for DME Equipment being ordered: Incentive spirometer 1 Units 0     order for DME Equipment being ordered: Home BP monitor and cuff 1 each 0     polyethylene glycol (MIRALAX) 17 g packet Take 1 packet by mouth daily (Patient not taking: Reported on 10/20/2021)         Review of Systems      as per HPI above.         Objective:   BP (!) 159/103   Wt 43.7 kg (96 lb 4.8 oz)   BMI 19.78 kg/m    Constitutional: Pleasant no acute cardiopulmonary distress.   EYES: anicteric, normal extra-ocular movementsCardiovascular: RRR, S1, S2 normal.   Pulmonary/Chest: CTAB. No wheezing or rales.    Abdominal: +BS. Non tender to palpation.  Stretch marks: none.   Neurological: Alert and oriented.    Back/Extremities: kyphosis.  Psychological: appropriate mood and affect      In House Labs:   ENDO CALCIUM LABS-[facility] Latest Ref Rng & Units 12/1/2021   ALBUMIN 3.4 - 5.0 g/dL 3.7   ALKPHOS 40 - 150 U/L    CALCIUM IONIZED WB 4.4 - 5.2 mg/dL    CALCIUM 8.5 - 10.1 mg/dL 9.4   CK TOTAL 30 - 225 U/L    CREATININE 0.52 - 1.04 mg/dL 0.66     Region Date BMD T - score Z - score BMD change from baseline BMD % change from baseline   L1-L4 01/30/2019 0.758 g/cm  -3.5 -0.8 -0.057 g/cm  -7.0%   L1-L4 01/05/2017 0.815 g/cm                            L1-L2 01/30/2019 0.659 g/cm  -4.2 -1.5 0.025 g/cm  3.9%   L1-L2 01/05/2017 0.634 g/cm               Neck Left 01/30/2019 0.662 g/cm  -2.7 0.3       Neck Left 01/05/2017 0.644 g/cm            Total Left 01/30/2019 0.580 g/cm  -3.4 -0.4 -0.047 g/cm  -7.5%   Total Left 01/05/2017 0.627 g/cm            Neck Right 01/30/2019 0.600 g/cm  -3.2 -0.2       Neck Right 01/05/2017 0.603 g/cm            Total Right 01/30/2019 0.608 g/cm  -3.2 -0.2 0.020 g/cm  3.4%   Total Right 01/05/2017 0.588 g/cm            Radius 33% 01/30/2019 0.394 g/cm  -4.4 -0.9 -0.064 g/cm  -13.9%   Radius 33% 01/05/2017 0.457 g/cm                Assessment/Treatment Plan:      # Osteoporosis with fragility fracture:  Belia Sheets is a 89 year old year old female with Osteoporosis and fragility fracture (7/2015); diagnosed in 1999.    Alendronate for 16 years -2015  FORTEO [id number]  RECLAST 2019 -2021  Recent pelvic fracture; multiple falls. No hip, spine, humoral or wrist fractures. Not sure if we can consider pelvic # as treatment dailyre.  Discussed options of therapy. Decision was made to proceed with RECLAST infusion for now. Will obtain follow up DEXA scan.  I   Patient Instructions   Sudeep,        Continue with RECLAST infusion as scheduled today.     Tylenol 500 mg (extra strength) every eight hours for  three days after RECLAST infusion starting from the day of infusion.      Continue calcium with D 600-400 - 1 tablet with meals twice daily.     Virtual follow up in six months.     I will contact the patient with the test results.  Return to clinic in 6 months.        Saurabh Pittman MD  Staff Endocrinologist   Division of Endocrinology and Diabetes  48 minutes spent on the date of the encounter doing chart review, history and exam, documentation and further activities per the note.

## 2021-12-01 NOTE — PATIENT INSTRUCTIONS
Dear Belia Sheets    Thank you for choosing [facility] ([facility]) for your infusion.  The following information is a summary of our appointment as well as important reminders.      Patient Education     Zoledronic Acid Solution for injection  What is this medicine?  ZOLEDRONIC ACID (THEA le dron ik AS id) lowers the amount of calcium loss from bone. It is used to treat Paget's disease and osteoporosis in women.  This medicine may be used for other purposes; ask your health care provider or pharmacist if you have questions.  What should I tell my health care provider before I take this medicine?  They need to know if you have any of these conditions:    aspirin-sensitive asthma    cancer, especially if you are receiving medicines used to treat cancer    dental disease or wear dentures    infection    kidney disease    low levels of calcium in the blood    past surgery on the parathyroid gland or intestines    receiving corticosteroids like dexamethasone or prednisone    an unusual or allergic reaction to zoledronic acid, other medicines, foods, dyes, or preservatives    pregnant or trying to get pregnant    breast-feeding  How should I use this medicine?  This medicine is for infusion into a vein. It is given by a health care professional in a hospital or clinic setting.  Talk to your pediatrician regarding the use of this medicine in children. This medicine is not approved for use in children.  Overdosage: If you think you have taken too much of this medicine contact a poison control center or emergency room at once.  NOTE: This medicine is only for you. Do not share this medicine with others.  What if I miss a dose?  It is important not to miss your dose. Call your doctor or health care professional if you are unable to keep an appointment.  What may interact with this medicine?    certain antibiotics given by injection    NSAIDs, medicines for pain  and inflammation, like ibuprofen or naproxen    some diuretics like bumetanide, furosemide    teriparatide  This list may not describe all possible interactions. Give your health care provider a list of all the medicines, herbs, non-prescription drugs, or dietary supplements you use. Also tell them if you smoke, drink alcohol, or use illegal drugs. Some items may interact with your medicine.  What should I watch for while using this medicine?  Visit your doctor or health care professional for regular checkups. It may be some time before you see the benefit from this medicine. Do not stop taking your medicine unless your doctor tells you to. Your doctor may order blood tests or other tests to see how you are doing.  Women should inform their doctor if they wish to become pregnant or think they might be pregnant. There is a potential for serious side effects to an unborn child. Talk to your health care professional or pharmacist for more information.  You should make sure that you get enough calcium and vitamin D while you are taking this medicine. Discuss the foods you eat and the vitamins you take with your health care professional.  Some people who take this medicine have severe bone, joint, and/or muscle pain. This medicine may also increase your risk for jaw problems or a broken thigh bone. Tell your doctor right away if you have severe pain in your jaw, bones, joints, or muscles. Tell your doctor if you have any pain that does not go away or that gets worse.  Tell your dentist and dental surgeon that you are taking this medicine. You should not have major dental surgery while on this medicine. See your dentist to have a dental exam and fix any dental problems before starting this medicine. Take good care of your teeth while on this medicine. Make sure you see your dentist for regular follow-up appointments.  What side effects may I notice from receiving this medicine?  Side effects that you should report to your  doctor or health care professional as soon as possible:    allergic reactions like skin rash, itching or hives, swelling of the face, lips, or tongue    anxiety, confusion, or depression    breathing problems    changes in vision    eye pain    feeling faint or lightheaded, falls    jaw pain, especially after dental work    mouth sores    muscle cramps, stiffness, or weakness    redness, blistering, peeling or loosening of the skin, including inside the mouth    trouble passing urine or change in the amount of urine  Side effects that usually do not require medical attention (report to your doctor or health care professional if they continue or are bothersome):    bone, joint, or muscle pain    constipation    diarrhea    fever    hair loss    irritation at site where injected    loss of appetite    nausea, vomiting    stomach upset    trouble sleeping    trouble swallowing    weak or tired  This list may not describe all possible side effects. Call your doctor for medical advice about side effects. You may report side effects to FDA at 0-859-NCP-5357.  Where should I keep my medicine?  This drug is given in a hospital or clinic and will not be stored at home.  NOTE:This sheet is a summary. It may not cover all possible information. If you have questions about this medicine, talk to your doctor, pharmacist, or health care provider. Copyright  2016 Gold Standard             We look forward in seeing you on your next appointment here at [facility] ([facility]).  Please don t hesitate to call us at [phone] to reschedule any of your appointments or to speak with one of the [facility] registered nurses.  It was a pleasure taking care of you today.    Sincerely,    [facility]  [address] [place] [address]  [id number]  Phone:  [phone]

## 2021-12-01 NOTE — PROGRESS NOTES
Endocrinology Clinic Visit    Chief Complaint: Follow Up (Diabetes)     Information obtained from: Patient + daughter.    Subjective:         HPI: Belia Sheets is a 89 year old year old female with history of osteoporosis who is here for a follow up.      Again, this is 89 yo female with past medical history of osteoporosis first diagnosed in 1999.     She was treated with alendronate 70 mg weekly from 1999 to October 2015.  She has never stopped this medication all these years.    +History of vertebral fracture; she was folding laundry when she fell from standing up position and sustained injury.  Broken hip and noted to have thoracic compression fractures.  Previous fragility fracture, morphometric vertebral fracture: imaging done in 7/2015 has showed multiple compression fractures.   7/2015 spine film reported as <Multiple old healed left rib fractures. Diffuse osteopenia. New moderate compression fracture of T10 since the prior examination. New moderate compression fractures in T4 and T5 are new since the prior exam. Multilevel degenerative changes in the thoracic spine are otherwise stable. Marked scoliosis of the thoracolumbar spine is redemonstrated.>    Due to severe osteoporosis, long standing previous alendronate therapy and alkaline phosphatase which was within the normal limits; we treated with FORTEO 2017- 2/24/2019.  Then she received to doses of RECLAST treatment.  She uses a walker for walking.      After completion of treatment with Forteo; she received Reclast in March 2019, 8/2020.    She is taking calcium and vitamin d supplement 600-800; 1 tab daily.     Previously documented below.   Risk factors for osteoporosis and complications:  Lost 6 inch height loss/kyphosis    Weight - 91 pounds (high risk); gained 5 pounds since her last visit.   Falls/risk factors: Multiple falls. 5 falls ine one year and half but again no falls over the last one year.  Sustained rib and vertebral fractures  secondary to the previous falls. Currently participating in home physical therapy for osteoporosis.   Walks three times per day in hallways of her apartment. Uses walker/cane for walking short distances and 4 barros for long distances. Fall: 7/2015, 12/2014, 4/2016, 6/2016. 01/2017.    She lives in a senior apartment.  She gets some services. Mostly manages by her own.     Review of system - multiple falls since I saw her last. Blood pressure medication reduced. Didn't take BP meds today (high). Noted pelvic fracture.     No Known Allergies    Current Outpatient Medications   Medication Sig Dispense Refill     calcium carbonate 600 mg-vitamin D 400 units (CALTRATE) 600-400 MG-UNIT per tablet Take 1 tablet by mouth 2 times daily (before meals) 180 tablet 3     acetaminophen (TYLENOL) 325 MG tablet Take 3 tablets (975 mg) by mouth 3 times daily (Patient not taking: Reported on 11/17/2021) 42 tablet 0     amLODIPine (NORVASC) 2.5 MG tablet Take 1 tablet (2.5 mg) by mouth daily 90 tablet 3     order for DME Equipment being ordered: Incentive spirometer 1 Units 0     order for DME Equipment being ordered: Home BP monitor and cuff 1 each 0     polyethylene glycol (MIRALAX) 17 g packet Take 1 packet by mouth daily (Patient not taking: Reported on 10/20/2021)         Review of Systems      as per HPI above.         Objective:   BP (!) 159/103   Wt 43.7 kg (96 lb 4.8 oz)   BMI 19.78 kg/m    Constitutional: Pleasant no acute cardiopulmonary distress.   EYES: anicteric, normal extra-ocular movementsCardiovascular: RRR, S1, S2 normal.   Pulmonary/Chest: CTAB. No wheezing or rales.   Abdominal: +BS. Non tender to palpation.  Stretch marks: none.   Neurological: Alert and oriented.    Back/Extremities: kyphosis.  Psychological: appropriate mood and affect      In House Labs:   ENDO CALCIUM LABS-UMP Latest Ref Rng & Units 12/1/2021   ALBUMIN 3.4 - 5.0 g/dL 3.7   ALKPHOS 40 - 150 U/L    CALCIUM IONIZED WB 4.4 - 5.2 mg/dL     CALCIUM 8.5 - 10.1 mg/dL 9.4   CK TOTAL 30 - 225 U/L    CREATININE 0.52 - 1.04 mg/dL 0.66     Region Date BMD T - score Z - score BMD change from baseline BMD % change from baseline   L1-L4 01/30/2019 0.758 g/cm  -3.5 -0.8 -0.057 g/cm  -7.0%   L1-L4 01/05/2017 0.815 g/cm                            L1-L2 01/30/2019 0.659 g/cm  -4.2 -1.5 0.025 g/cm  3.9%   L1-L2 01/05/2017 0.634 g/cm               Neck Left 01/30/2019 0.662 g/cm  -2.7 0.3       Neck Left 01/05/2017 0.644 g/cm            Total Left 01/30/2019 0.580 g/cm  -3.4 -0.4 -0.047 g/cm  -7.5%   Total Left 01/05/2017 0.627 g/cm            Neck Right 01/30/2019 0.600 g/cm  -3.2 -0.2       Neck Right 01/05/2017 0.603 g/cm            Total Right 01/30/2019 0.608 g/cm  -3.2 -0.2 0.020 g/cm  3.4%   Total Right 01/05/2017 0.588 g/cm            Radius 33% 01/30/2019 0.394 g/cm  -4.4 -0.9 -0.064 g/cm  -13.9%   Radius 33% 01/05/2017 0.457 g/cm                Assessment/Treatment Plan:      # Osteoporosis with fragility fracture:  Belia Keaton Sheets is a 89 year old year old female with Osteoporosis and fragility fracture (7/2015); diagnosed in 1999.    Alendronate for 16 years -2015  FORTEO [id number]  RECLAST 2019 -2021  Recent pelvic fracture; multiple falls. No hip, spine, humoral or wrist fractures. Not sure if we can consider pelvic # as treatment dailyre.  Discussed options of therapy. Decision was made to proceed with RECLAST infusion for now. Will obtain follow up DEXA scan.  I   Patient Instructions   Sudeep,        Continue with RECLAST infusion as scheduled today.     Tylenol 500 mg (extra strength) every eight hours for three days after RECLAST infusion starting from the day of infusion.      Continue calcium with D 600-400 - 1 tablet with meals twice daily.     Virtual follow up in six months.     I will contact the patient with the test results.  Return to clinic in 6 months.        Saurabh Pittman MD  Staff Endocrinologist   Division of Endocrinology and  Diabetes  48 minutes spent on the date of the encounter doing chart review, history and exam, documentation and further activities per the note.

## 2021-12-01 NOTE — PATIENT INSTRUCTIONS
Sudeep,        Continue with RECLAST infusion as scheduled today.     Tylenol 500 mg (extra strength) every eight hours for three days after RECLAST infusion starting from the day of infusion.      Continue calcium with D 600-400 - 1 tablet with meals twice daily.     Virtual follow up in six months.

## 2021-12-01 NOTE — PROGRESS NOTES
Nursing Note  Belia Sheets presents [facility] for:   Chief Complaint   Patient presents with     Blood Draw     labs drawn with piv start by rn.  vs taken     During today's [facility] appointment, orders from Dr Pittman were completed.  Frequency: once    Progress note:  Patient identification verified by name and date of birth.  Assessment completed.  Vitals recorded in Doc Flowsheets.  Patient was provided with education regarding medication/procedure and possible side effects.  Patient verbalized understanding.     present during visit today: Not Applicable.    Treatment Conditions: Patient's Creatinine Clearance is within paramaters to administer medication per orders.    Premedications: were not ordered.    Drug Waste Record: No    Infusion length and rate:  infusion given over approximately 15 minutes    Labs: were drawn per orders.     Vascular access: peripheral IV was placed prior to appointment at [facility] on 12/1 in the lab.    Is the next appt scheduled? Not applicable  Asymptomatic COVID test completed? no    Post Infusion Assessment:  Patient tolerated infusion without incident.  Site patent and intact, free from redness, edema or discomfort.  Access discontinued per protocol.     Discharge Plan:   Follow up plan of care with: ordering provider as scheduled. and after visit summary given to patient  Discharge instructions were reviewed with patient.  Patient/representative verbalized understanding of discharge instructions and all questions answered.  Patient discharged from [facility] in stable condition.    Estee Grant RN    Administrations This Visit     zoledronic Acid (RECLAST) infusion 5 mg     Admin Date  12/01/2021 Action  New Bag Dose  5 mg Rate  400 mL/hr Route  Intravenous Administered By  Estee Grant RN                 BP (!) 146/90 (BP Location: Right arm, Patient Position: Sitting, Cuff Size: Adult Small)   Pulse 81   Temp 99.1  F (37.3  C) (Tympanic)   Resp 20   Wt 43.5 kg (96 lb)   SpO2 94%   BMI 19.72 kg/m

## 2021-12-02 ENCOUNTER — TELEPHONE (OUTPATIENT)
Dept: ENDOCRINOLOGY | Facility: CLINIC | Age: 86
End: 2021-12-02
Payer: COMMERCIAL

## 2021-12-02 LAB — DEPRECATED CALCIDIOL+CALCIFEROL SERPL-MC: 56 UG/L (ref 20–75)

## 2021-12-02 NOTE — RESULT ENCOUNTER NOTE
Team, Please call and inform Mrs Sheets that blood work looks good. Please continue your current calcium and vitamin D supplement. Thank you. Saurabh Pittman MD

## 2021-12-02 NOTE — TELEPHONE ENCOUNTER
Spoke w/ Pts daughter, Lubna,   zoledronic acid infusion yesterday - took 1 extra strength tylenol. Had episode of feeling light headed for about 40 minutes until she went to sleep, feels fine this AM.  Did not take additional tylenol.   Informed Pt/family to notify if  Symptom returns, persists or worsens. Confirms understanding.   Provider notified.   Keysha Skinner, RN on 12/2/2021 at 1:30 PM         RE    Message  Received: Today  Saurabh Pittman MD  [facility] Triage-  Team, Please call and inform Mrs Sheets that blood work looks good. Please continue your current calcium and vitamin D supplement. Thank you. Saurabh Pittman MD

## 2021-12-22 ENCOUNTER — DOCUMENTATION ONLY (OUTPATIENT)
Dept: FAMILY MEDICINE | Facility: CLINIC | Age: 86
End: 2021-12-22
Payer: COMMERCIAL

## 2021-12-22 NOTE — PROGRESS NOTES
"When opening a documentation only encounter, be sure to enter in \"Chief Complaint\" Forms and in \" Comments\" Title of form, description if needed.    Sudeep is a 89 year old  female  Form received via: Fax  Form now resides in: Provider Ready    Veronica Gaffney MA       Form has been completed by provider.     Form sent out via: Fax to [facility] at Fax Number: [phone]  Patient informed: No, Reason:n/a  Output date: December 23, 2021    Veronica Gaffney MA      **Please close the encounter**                "

## 2021-12-23 ENCOUNTER — DOCUMENTATION ONLY (OUTPATIENT)
Dept: FAMILY MEDICINE | Facility: CLINIC | Age: 86
End: 2021-12-23
Payer: COMMERCIAL

## 2021-12-23 NOTE — PROGRESS NOTES
"When opening a documentation only encounter, be sure to enter in \"Chief Complaint\" Forms and in \" Comments\" Title of form, description if needed.    Sudeep is a 89 year old  female  Form received via: Fax  Form now resides in: Provider Ready    Veronica Gaffney MA    Form has been completed by provider.     Form sent out via: Fax to [employer] at Fax Number: [phone]  Patient informed: No, Reason:n/a  Output date: December 23, 2021    Veronica Gaffney MA      **Please close the encounter**                  "

## 2021-12-23 NOTE — PROGRESS NOTES
"When opening a documentation only encounter, be sure to enter in \"Chief Complaint\" Forms and in \" Comments\" Title of form, description if needed.    Sudeep is a 89 year old  female  Form received via: Fax  Form now resides in: Provider Ready    Veronica Gaffney MA    Form has been completed by provider.     Form sent out via: Fax to [employer] at Fax Number: [phone]  Patient informed: No, Reason:n/a  Output date: December 23, 2021    Veronica Gaffney MA      **Please close the encounter**                  "

## 2022-01-01 ENCOUNTER — HEALTH MAINTENANCE LETTER (OUTPATIENT)
Age: 87
End: 2022-01-01

## 2022-01-01 NOTE — PROGRESS NOTES
"When opening a documentation only encounter, be sure to enter in \"Chief Complaint\" Forms and in \" Comments\" Title of form, description if needed.    Form received via: Fax  Form now resides in: Provider Ready    Form sent out via: Fax  Patient informed: No  Output date: May 2, 2017      Form received by recipient via fax confirmation  Please close the encounter.    " [facility] PRIMARY CARE PEDIATRICS          6 MONTH WELL CHILD EXAM     Pedro is a 6 m.o. male infant     History given by Mother and Father    CONCERNS/QUESTIONS: No     IMMUNIZATION: up to date and documented     NUTRITION, ELIMINATION, SLEEP, SOCIAL      NUTRITION HISTORY:   Breast, every 3-5 hours, latches on well, good suck.   Rice Cereal: 1 times a day.  Vegetables? yes  Fruits? Yes    MULTIVITAMIN: No    ELIMINATION:   Has ample  wet diapers per day, and has 3-5 BM per day. BM is soft.    SLEEP PATTERN:    Sleeps through the night? Yes  Sleeps in crib? Yes  Sleeps with parent? No  Sleeps on back? Yes    SOCIAL HISTORY:   The patient lives at home with mother, father, and brother (1 siblings). No .   Smokers at home? No    HISTORY     Patient's medications, allergies, past medical, surgical, social and family histories were reviewed and updated as appropriate.    No past medical history on file.  Patient Active Problem List    Diagnosis Date Noted    [place] infant of 39 completed weeks of gestation 2022    Echogenic intracardiac focus of fetus on prenatal ultrasound 2022     No past surgical history on file.  No family history on file.  No current outpatient medications on file.     No current facility-administered medications for this visit.     No Known Allergies    REVIEW OF SYSTEMS     Constitutional: Afebrile, good appetite, alert.  HENT: No abnormal head shape, No congestion, no nasal drainage.   Eyes: Negative for any discharge in eyes, appears to focus, not cross eyed.  Respiratory: Negative for any difficulty breathing or noisy breathing.   Cardiovascular: Negative for changes in color/activity.   Gastrointestinal: Negative for any vomiting or excessive spitting up, constipation or blood in stool.   Genitourinary: Ample amount of wet diapers.   Musculoskeletal: Negative for any sign of arm pain or leg pain with movement.   Skin: Negative for rash or skin  "infection.  Neurological: Negative for any weakness or decrease in strength.     Psychiatric/Behavioral: Appropriate for age.     DEVELOPMENTAL SURVEILLANCE      Sits briefly without support? Yes  Babbles? Yes  Make sounds like \"ga\" \"ma\" or \"ba\"? Yes  Rolls both ways? Yes  Feeds self crackers? Yes  [place] small objects with 4 fingers? Yes  No head lag? Yes  Transfers? Yes  Bears weight on legs? Yes    SCREENINGS      ORAL HEALTH: After first tooth eruption   Primary water source is deficient in fluoride? yes  Oral Fluoride Supplementation recommended? yes  Cleaning teeth twice a day, daily oral fluoride? yes    Depression: Maternal [place]  [place]  Depression Scale:  In the Past 7 Days  I have been able to laugh and see the funny side of things.: As much as I always could  I have looked forward with enjoyment to things.: As much as I ever did  I have blamed myself unnecessarily when things went wrong.: No, never  I have been anxious or worried for no good reason.: No, not at all  I have felt scared or panicky for no good reason.: No, not at all  Things have been getting on top of me.: No, I have been coping as well as ever  I have been so unhappy that I have had difficulty sleeping.: Not at all  I have felt sad or miserable.: No, not at all  I have been so unhappy that I have been crying.: No, never  The thought of harming myself has occurred to me.: Never  [place]  Depression Scale Total: 0    SELECTIVE SCREENINGS INDICATED WITH SPECIFIC RISK CONDITIONS:   Blood pressure indicated   + vision risk  +hearing risk   No      LEAD RISK ASSESSMENT:    Does your child live in or visit a home or  facility with an identified  lead hazard or a home built before  that is in poor repair or was  renovated in the past 6 months? No    TB RISK ASSESMENT:   Has child been diagnosed with AIDS? Has family member had a positive TB test? Travel to high risk country? No    OBJECTIVE      PHYSICAL " "EXAM:  Pulse 140   Temp 36.4 °C (97.6 °F) (Temporal)   Resp 40   Ht 0.622 m (2' 0.5\")   Wt 6.41 kg (14 lb 2.1 oz)   HC 43 cm (16.93\")   BMI 16.55 kg/m²   Length - No height on file for this encounter.  Weight - 2 %ile (Z= -2.02) based on WHO (Boys, 0-2 years) weight-for-age data using vitals from 2022.  HC - No head circumference on file for this encounter.    GENERAL: This is an alert, active infant in no distress.   HEAD: Normocephalic, atraumatic. Anterior fontanelle is open, soft and flat.   EYES: PERRL, positive red reflex bilaterally. No conjunctival infection or discharge.   EARS: TM’s are transparent with good landmarks. Canals are patent.  NOSE: Nares are patent and free of congestion.  THROAT: Oropharynx has no lesions, moist mucus membranes, palate intact. Pharynx without erythema, tonsils normal.  NECK: Supple, no lymphadenopathy or masses.   HEART: Regular rate and rhythm without murmur. Brachial and femoral pulses are 2+ and equal.  LUNGS: Clear bilaterally to auscultation, no wheezes or rhonchi. No retractions, nasal flaring, or distress noted.  ABDOMEN: Normal bowel sounds, soft and non-tender without hepatomegaly or splenomegaly or masses.   GENITALIA: Normal male genitalia. normal uncircumcised penis, no urethral discharge, scrotal contents normal to inspection and palpation, normal testes palpated bilaterally, no varicocele present, no hernia detected.  MUSCULOSKELETAL: Hips have normal range of motion with negative Allen and Ortolani. Spine is straight. Sacrum normal without dimple. Extremities are without abnormalities. Moves all extremities well and symmetrically with normal tone.    NEURO: Alert, active, normal infant reflexes.  SKIN: Intact without significant rash or birthmarks. Skin is warm, dry, and pink.     ASSESSMENT AND PLAN     1. Well Child Exam:  Healthy 6 m.o. old with good growth and development.    Anticipatory guidance was reviewed and age appropriate Bright Futures " handout provided.  2. Return to clinic for 9 month well child exam or as needed.  3. Immunizations given today: see below.  4. Vaccine Information statements given for each vaccine. Discussed benefits and side effects of each vaccine with patient/family, answered all patient/family questions.   5. Multivitamin with 400iu of Vitamin D po daily if breast fed.  6. Introduce solid foods if you have not done so already. Begin fruits and vegetables starting with vegetables. Introduce single ingredient foods one at a time. Wait 48-72 hours prior to beginning each new food to monitor for abnormal reactions.    7. Safety Priority: Car safety seats, safe sleep, safe home environment, choking.     1. Encounter for well child check without abnormal findings      2. Need for vaccination    - Pneumococcal Conjugate Vaccine 13-Valent  - Rotavirus Vaccine Pentavalent, 3-Dose Oral [[id number]]  - DTAP IPV/HIB Combined Vaccine IM (6W-4Y)  - Hepatitis B Vaccine Ped/Adolescent 3-Dose 0-18 Y/O  - INFLUENZA VACCINE QUAD INJ (PF)  - ibuprofen (MOTRIN) 100 MG/5ML Suspension; Take 3 mL by mouth every 6 hours as needed for Moderate Pain for up to 30 days.  Dispense: 150 mL; Refill: 0    3. Person consulting on behalf of another person  [place]  Depression Scale  I have been able to laugh and see the funny side of things.: As much as I always could  I have looked forward with enjoyment to things.: As much as I ever did  I have blamed myself unnecessarily when things went wrong.: No, never  I have been anxious or worried for no good reason.: No, not at all  I have felt scared or panicky for no good reason.: No, not at all  Things have been getting on top of me.: No, I have been coping as well as ever  I have been so unhappy that I have had difficulty sleeping.: Not at all  I have felt sad or miserable.: No, not at all  I have been so unhappy that I have been crying.: No, never  The thought of harming myself has occurred to me.:  Never  [place]  Depression Scale Total: 0  Negative screen.

## 2022-01-14 ENCOUNTER — DOCUMENTATION ONLY (OUTPATIENT)
Dept: FAMILY MEDICINE | Facility: CLINIC | Age: 87
End: 2022-01-14
Payer: COMMERCIAL

## 2022-01-14 NOTE — PROGRESS NOTES
"When opening a documentation only encounter, be sure to enter in \"Chief Complaint\" Forms and in \" Comments\" Title of form, description if needed.    Sudeep is a 89 year old  female  Form received via: Fax  Form now resides in: Provider Ready    Veronica Gaffney MA    Form has been completed by provider.     Form sent out via: Fax to [employer] at Fax Number: [phone]  Patient informed: No, Reason:n/a  Output date: January 19, 2022    Veronica Gaffney MA      **Please close the encounter**                    "

## 2022-01-17 ENCOUNTER — DOCUMENTATION ONLY (OUTPATIENT)
Dept: FAMILY MEDICINE | Facility: CLINIC | Age: 87
End: 2022-01-17
Payer: COMMERCIAL

## 2022-01-19 ENCOUNTER — MEDICAL CORRESPONDENCE (OUTPATIENT)
Dept: HEALTH INFORMATION MANAGEMENT | Facility: CLINIC | Age: 87
End: 2022-01-19
Payer: COMMERCIAL

## 2022-01-20 NOTE — PROGRESS NOTES
Form has been completed by provider.     Form sent out via: Fax to [email or web address] [facility] at Fax Number: [phone]  Patient informed: No, Reason:  Output date: January 20, 2022    Catie Mason MA      **Please close the encounter**

## 2022-02-28 ENCOUNTER — OFFICE VISIT (OUTPATIENT)
Dept: OTHER | Facility: CLINIC | Age: 87
End: 2022-02-28
Payer: COMMERCIAL

## 2022-02-28 VITALS
TEMPERATURE: 98.7 F | OXYGEN SATURATION: 92 % | HEART RATE: 72 BPM | WEIGHT: 98 LBS | SYSTOLIC BLOOD PRESSURE: 131 MMHG | RESPIRATION RATE: 16 BRPM | BODY MASS INDEX: 20.13 KG/M2 | DIASTOLIC BLOOD PRESSURE: 83 MMHG

## 2022-02-28 DIAGNOSIS — Z00.00 ENCOUNTER FOR MEDICARE ANNUAL WELLNESS EXAM: Primary | ICD-10-CM

## 2022-02-28 PROCEDURE — G0438 PPPS, INITIAL VISIT: HCPCS | Performed by: FAMILY MEDICINE

## 2022-02-28 ASSESSMENT — ACTIVITIES OF DAILY LIVING (ADL)
CURRENT_FUNCTION: SHOPPING REQUIRES ASSISTANCE
CURRENT_FUNCTION: TRANSPORTATION REQUIRES ASSISTANCE
CURRENT_FUNCTION: PREPARING MEALS REQUIRES ASSISTANCE

## 2022-02-28 NOTE — PATIENT INSTRUCTIONS
Patient Education   Personalized Prevention Plan  You are due for the preventive services outlined below.  Your care team is available to assist you in scheduling these services.  If you have already completed any of these items, please share that information with your care team to update in your medical record.  Health Maintenance Due   Topic Date Due     Cholesterol Lab  Never done     ANNUAL REVIEW OF HM ORDERS  Never done

## 2022-02-28 NOTE — PROGRESS NOTES
"Assessment & Plan     ICD-10-CM    1. Encounter for Medicare annual wellness exam  Z00.00        Follow Up/Next Steps    Return in about 53 weeks (around 3/6/2023) for Annual Wellness Visit.  Recommended that patient follow up with PCP for care of chronic conditions and regular health maintenance.  Patient stated no hx of falls in the past 2 years.  Patient hx of falls 7-13-21 and 9-.  Counseling and Education  Reviewed preventive health counseling, as reflected in patient instructions        Appropriate preventive services were discussed with this patient, including applicable screening as appropriate for cardiovascular disease, diabetes, osteopenia/osteoporosis, and glaucoma.  As appropriate for age/gender, discussed screening for colorectal cancer, prostate cancer, breast cancer, and cervical cancer. Checklist reviewing preventive services available has been given to the patient.    Reviewed patients plan of care and provided an AVS. The Basic Care Plan (routine screening as documented in Health Maintenance) for Veryl meets the Care Plan requirement. This Care Plan has been established and reviewed with the Patient.    Visit Provider: Nieves Martin RN  Supervising Provider: Merle Taylor MD, MD  [facility]       David Sheets is a 89 year old who is being seen for an Annual Wellness Visit in her home.    Healthy Habits:     In general, how would you rate your overall health?  Excellent    Frequency of exercise:  None    Do you usually eat at least 4 servings of fruit and vegetables a day, include whole grains    & fiber and avoid regularly eating high fat or \"junk\" foods?  Yes    Taking medications regularly:  Yes    Medication side effects:  None, No muscle aches and No significant flushing    Ability to successfully perform activities of daily living:  Transportation requires assistance, shopping requires assistance and preparing meals requires " assistance    Home Safety:  No safety concerns identified    Hearing Impairment:  Difficulty understanding soft or whispered speech    In the past 6 months, have you been bothered by leaking of urine?  No    In general, how would you rate your overall mental or emotional health?  Excellent      PHQ-2 Total Score: 0    Additional concerns today:  No    Do you feel safe in your environment? Yes    Have you ever done Advance Care Planning? (For example, a Health Directive, POLST, or a discussion with a medical provider or your loved ones about your wishes): Yes, advance care planning is on file.    Fall risk  Fallen 2 or more times in the past year?: No  Any fall with injury in the past year?: No  Cognitive Screening   1) Repeat 3 items (Leader, Season, Table)    2) Clock draw: NORMAL  3) 3 item recall: Recalls 3 objects  Results: 3 items recalled: COGNITIVE IMPAIRMENT LESS LIKELY    Mini-CogTM Copyright S Ale. Licensed by the author for use in [facility]; reprinted with permission ([email or web address]). All rights reserved.      Do you have sleep apnea, excessive snoring or daytime drowsiness?: no    Reviewed and updated as needed this visit by clinical staff   Tobacco  Allergies  Meds  Problems  Med Hx  Surg Hx  Fam Hx  Soc   Hx        Social History     Tobacco Use     Smoking status: Never Smoker     Smokeless tobacco: Never Used   Substance Use Topics     Alcohol use: No       Current providers sharing in care for this patient include:   Patient Care Team:  Alejandro Sandhu MD as PCP - General (Family Practice)  Joslyn Daugherty APRN CNP as Nurse Practitioner (Nurse Practitioner)  Nida Hui MD as MD (Urology)  Nighat Philip RN as Registered Nurse (Urology)  Saurabh Pittman MD as Assigned Endocrinology Provider  Alejandro Sandhu MD as Assigned PCP    The following health maintenance items are reviewed in Epic and correct as of today:  Health Maintenance Due   Topic  "Date Due     LIPID  Never done     ANNUAL REVIEW OF HM ORDERS  Never done         Pertinent mammograms are reviewed under the imaging tab.        Objective    /83 (BP Location: Left arm, Patient Position: Sitting)   Pulse 72   Temp 98.7  F (37.1  C) (Temporal)   Resp 16   Wt 44.5 kg (98 lb)   SpO2 92%   BMI 20.13 kg/m   Estimated body mass index is 20.13 kg/m  as calculated from the following:    Height as of 10/4/21: 1.486 m (4' 10.5\").    Weight as of this encounter: 44.5 kg (98 lb).  Physical Exam  Patient appears comfortable and in no acute distress.        Identified Health Risks:  "

## 2022-03-30 ENCOUNTER — OFFICE VISIT (OUTPATIENT)
Dept: FAMILY MEDICINE | Facility: CLINIC | Age: 87
End: 2022-03-30
Payer: COMMERCIAL

## 2022-03-30 VITALS
OXYGEN SATURATION: 95 % | HEART RATE: 91 BPM | SYSTOLIC BLOOD PRESSURE: 136 MMHG | BODY MASS INDEX: 19.85 KG/M2 | RESPIRATION RATE: 18 BRPM | DIASTOLIC BLOOD PRESSURE: 89 MMHG | WEIGHT: 96.6 LBS

## 2022-03-30 DIAGNOSIS — M81.0 AGE-RELATED OSTEOPOROSIS WITHOUT CURRENT PATHOLOGICAL FRACTURE: Primary | ICD-10-CM

## 2022-03-30 DIAGNOSIS — R29.6 RECURRENT FALLS: ICD-10-CM

## 2022-03-30 DIAGNOSIS — L30.9 DERMATITIS: ICD-10-CM

## 2022-03-30 DIAGNOSIS — R41.82 ALTERED MENTAL STATUS, UNSPECIFIED ALTERED MENTAL STATUS TYPE: ICD-10-CM

## 2022-03-30 DIAGNOSIS — I10 ESSENTIAL HYPERTENSION: ICD-10-CM

## 2022-03-30 LAB
ALBUMIN SERPL-MCNC: 3.2 G/DL (ref 3.4–5)
ALP SERPL-CCNC: 60 U/L (ref 40–150)
ALT SERPL W P-5'-P-CCNC: 15 U/L (ref 0–50)
ANION GAP SERPL CALCULATED.3IONS-SCNC: 6 MMOL/L (ref 3–14)
AST SERPL W P-5'-P-CCNC: 22 U/L (ref 0–45)
BILIRUB SERPL-MCNC: 0.4 MG/DL (ref 0.2–1.3)
BUN SERPL-MCNC: 9 MG/DL (ref 7–30)
CALCIUM SERPL-MCNC: 10.6 MG/DL (ref 8.5–10.1)
CHLORIDE BLD-SCNC: 101 MMOL/L (ref 94–109)
CO2 SERPL-SCNC: 30 MMOL/L (ref 20–32)
CREAT SERPL-MCNC: 0.56 MG/DL (ref 0.52–1.04)
CRP SERPL-MCNC: 37 MG/L (ref 0–8)
ERYTHROCYTE [DISTWIDTH] IN BLOOD BY AUTOMATED COUNT: 12 % (ref 10–15)
ERYTHROCYTE [SEDIMENTATION RATE] IN BLOOD BY WESTERGREN METHOD: 20 MM/HR (ref 0–30)
GFR SERPL CREATININE-BSD FRML MDRD: 86 ML/MIN/1.73M2
GLUCOSE BLD-MCNC: 118 MG/DL (ref 70–99)
HCT VFR BLD AUTO: 47.8 % (ref 35–47)
HGB BLD-MCNC: 15.2 G/DL (ref 11.7–15.7)
MCH RBC QN AUTO: 29.7 PG (ref 26.5–33)
MCHC RBC AUTO-ENTMCNC: 31.8 G/DL (ref 31.5–36.5)
MCV RBC AUTO: 94 FL (ref 78–100)
PLATELET # BLD AUTO: 190 10E3/UL (ref 150–450)
POTASSIUM BLD-SCNC: 3.2 MMOL/L (ref 3.4–5.3)
PROT SERPL-MCNC: 7.4 G/DL (ref 6.8–8.8)
RBC # BLD AUTO: 5.11 10E6/UL (ref 3.8–5.2)
SODIUM SERPL-SCNC: 137 MMOL/L (ref 133–144)
TSH SERPL DL<=0.005 MIU/L-ACNC: 0.68 MU/L (ref 0.4–4)
VIT B12 SERPL-MCNC: 991 PG/ML (ref 193–986)
WBC # BLD AUTO: 7.9 10E3/UL (ref 4–11)

## 2022-03-30 PROCEDURE — 84443 ASSAY THYROID STIM HORMONE: CPT | Performed by: FAMILY MEDICINE

## 2022-03-30 PROCEDURE — 85652 RBC SED RATE AUTOMATED: CPT | Performed by: FAMILY MEDICINE

## 2022-03-30 PROCEDURE — 99214 OFFICE O/P EST MOD 30 MIN: CPT | Performed by: FAMILY MEDICINE

## 2022-03-30 PROCEDURE — 82607 VITAMIN B-12: CPT | Performed by: FAMILY MEDICINE

## 2022-03-30 PROCEDURE — 36415 COLL VENOUS BLD VENIPUNCTURE: CPT | Performed by: FAMILY MEDICINE

## 2022-03-30 PROCEDURE — 85027 COMPLETE CBC AUTOMATED: CPT | Performed by: FAMILY MEDICINE

## 2022-03-30 PROCEDURE — 86140 C-REACTIVE PROTEIN: CPT | Performed by: FAMILY MEDICINE

## 2022-03-30 PROCEDURE — 80053 COMPREHEN METABOLIC PANEL: CPT | Performed by: FAMILY MEDICINE

## 2022-03-30 RX ORDER — TRIAMCINOLONE ACETONIDE 1 MG/G
CREAM TOPICAL 2 TIMES DAILY
Qty: 30 G | Refills: 1 | Status: SHIPPED | OUTPATIENT
Start: 2022-03-30 | End: 2022-11-03

## 2022-04-03 NOTE — PROGRESS NOTES
Belia was seen today for recheck.    Diagnoses and all orders for this visit:    Age-related osteoporosis without current pathological fracture.  Stable, continue calcium and vitamin D therapy.    Essential hypertension.  At goal.  Continue low-dose amlodipine.    Dermatitis.  Uncertain etiology but will treat with medium strength topical steroid.  -     triamcinolone (KENALOG) 0.1 % external cream; Apply topically 2 times daily    Recurrent falls.  Still at high risk for falls.    Altered mental status, unspecified altered mental status type.  According to daughter and son, the patient has declined in cognition over the past several months.  Not as attentive to personal care and seems to be confused more often.  Also having trouble remembering recent events.  She did have some more significant orientation difficulties today than I have seen in the past that would potentially place her in a diagnostic category of dementia rather than MCI.  She has had prolonged periods of delirium in the past, although this is presenting somewhat differently than those episodes.  I will check a battery of lab test to rule out metabolic causes.  -     Comprehensive Metabolic Panel; Future  -     Vitamin B12; Future  -     TSH; Future  -     CBC with platelets; Future  -     Erythrocyte sedimentation rate auto; Future  -     CRP inflammation; Future  -     Comprehensive Metabolic Panel  -     Vitamin B12  -     TSH  -     CBC with platelets  -     Erythrocyte sedimentation rate auto  -     CRP inflammation              David Sheets is a 89 year old who presents for the following health issues: Follow-up of falls, severe osteoporosis, concerns of cognitive decline.    HPI the patient says that she is doing fine and has no new complaints.  Both daughter and son expressed concerns that the patient has had some decline in cognitive status.  She has had no injuries falls but did have 1 minor fall recently.  The patient did admit to  being a little bit dizzy at the time.          Review of Systems         Objective    /89 (BP Location: Left arm, Patient Position: Sitting, Cuff Size: Adult Small)   Pulse 91   Resp 18   Wt 43.8 kg (96 lb 9.6 oz)   SpO2 95%   Breastfeeding No   BMI 19.85 kg/m    Body mass index is 19.85 kg/m .  Physical Exam  Constitutional:       Comments: Alert, pleasant.  Uses a 4-wheeled walker with seat.   Cardiovascular:      Rate and Rhythm: Normal rate and regular rhythm.      Heart sounds: Normal heart sounds.   Pulmonary:      Effort: No respiratory distress.      Breath sounds: No wheezing or rales.   Abdominal:      General: Bowel sounds are normal.      Palpations: Abdomen is soft. There is no mass.      Tenderness: There is no abdominal tenderness.   Musculoskeletal:      Comments: + marked scoliosis, kyphosis.  + muscle wasting throughout   Neurological:      Mental Status: She is alert.   Psychiatric:         Mood and Affect: Mood normal.         Behavior: Behavior normal.      Comments: Oriented to the day and month, misses the date and year.  Oriented to place.  More significant short-term memory deficits that have been seen in the past.            Infusion Therapy Visit on 12/01/2021   Component Date Value Ref Range Status     Creatinine 12/01/2021 0.66  0.52 - 1.04 mg/dL Final     GFR Estimate 12/01/2021 77  >60 mL/min/1.73m2 Final    As of July 11, 2021, eGFR is calculated by the CKD-EPI creatinine equation, without race adjustment. eGFR can be influenced by muscle mass, exercise, and diet. The reported eGFR is an estimation only and is only applicable if the renal function is stable.     Calcium 12/01/2021 9.4  8.5 - 10.1 mg/dL Final     Vitamin D, Total (25-Hydroxy) 12/01/2021 56  20 - 75 ug/L Final     Albumin 12/01/2021 3.7  3.4 - 5.0 g/dL Final     Urea Nitrogen 12/01/2021 15  7 - 30 mg/dL Final

## 2022-04-27 ENCOUNTER — OFFICE VISIT (OUTPATIENT)
Dept: FAMILY MEDICINE | Facility: CLINIC | Age: 87
End: 2022-04-27
Payer: COMMERCIAL

## 2022-04-27 VITALS
DIASTOLIC BLOOD PRESSURE: 87 MMHG | TEMPERATURE: 97.6 F | WEIGHT: 99 LBS | OXYGEN SATURATION: 95 % | SYSTOLIC BLOOD PRESSURE: 142 MMHG | HEART RATE: 94 BPM | BODY MASS INDEX: 20.34 KG/M2 | RESPIRATION RATE: 16 BRPM

## 2022-04-27 DIAGNOSIS — R29.6 RECURRENT FALLS: ICD-10-CM

## 2022-04-27 DIAGNOSIS — I10 ESSENTIAL HYPERTENSION: ICD-10-CM

## 2022-04-27 DIAGNOSIS — R41.0 CONFUSION: Primary | ICD-10-CM

## 2022-04-27 LAB
ALBUMIN SERPL-MCNC: 3.5 G/DL (ref 3.4–5)
CRP SERPL-MCNC: <2.9 MG/L (ref 0–8)

## 2022-04-27 PROCEDURE — 99214 OFFICE O/P EST MOD 30 MIN: CPT | Mod: 25 | Performed by: FAMILY MEDICINE

## 2022-04-27 PROCEDURE — 0064A COVID-19,PF,MODERNA (18+ YRS BOOSTER .25ML): CPT | Performed by: FAMILY MEDICINE

## 2022-04-27 PROCEDURE — 91306 COVID-19,PF,MODERNA (18+ YRS BOOSTER .25ML): CPT | Performed by: FAMILY MEDICINE

## 2022-04-27 PROCEDURE — 82040 ASSAY OF SERUM ALBUMIN: CPT | Performed by: FAMILY MEDICINE

## 2022-04-27 PROCEDURE — 86140 C-REACTIVE PROTEIN: CPT | Performed by: FAMILY MEDICINE

## 2022-04-27 PROCEDURE — 36415 COLL VENOUS BLD VENIPUNCTURE: CPT | Performed by: FAMILY MEDICINE

## 2022-04-27 NOTE — PROGRESS NOTES
Belia was seen today for results.    Diagnoses and all orders for this visit:    Confusion.  By patient and family member's reports, this is spontaneously improved although she may not quite be back to her baseline.  Reviewed lab data from last visit which showed decreasing albumin level and elevated CRP.  I would like to repeat these.  Otherwise we will continue to encourage increased social contact and robust caloric intake.  -     Albumin level; Future  -     CRP inflammation; Future  -     Albumin level  -     CRP inflammation    Essential hypertension.  Not at goal today but she has been stable chronically on low-dose amlodipine which we will continue.    Recurrent falls.  No falls since last visit.    Other orders  -     COVID-19,PF,MODERNA (18+ YRS BOOSTER .25ML)            David Sheets is a 89 year old who presents for the following health issues: Confusion, hypertension, falls    HPI overall she thinks she is doing well.  Daughter reports that she seems cognitively brighter than 1 month ago.  Son corroborates this though still has concerns that she has periods of confusion.  No falls since last visit.    She is engaging in more social events.  Going to congregate dining, especially for breakfast and enjoying this.          Review of Systems         Objective    BP (!) 142/87 (BP Location: Left arm, Patient Position: Sitting, Cuff Size: Adult Small)   Pulse 94   Temp 97.6  F (36.4  C) (Oral)   Resp 16   Wt 44.9 kg (99 lb)   SpO2 95%   Breastfeeding No   BMI 20.34 kg/m    Body mass index is 20.34 kg/m .  Physical Exam  Constitutional:       Comments: Alert, pleasant.  Uses a 4-wheeled walker with seat.   Cardiovascular:      Rate and Rhythm: Normal rate and regular rhythm.      Heart sounds: Normal heart sounds.   Pulmonary:      Effort: No respiratory distress.      Breath sounds: No wheezing or rales.   Abdominal:      General: Bowel sounds are normal.      Palpations: Abdomen is soft. There is  no mass.      Tenderness: There is no abdominal tenderness.   Musculoskeletal:      Comments: + marked scoliosis, kyphosis.  + muscle wasting throughout   Neurological:      Mental Status: She is alert.   Psychiatric:         Mood and Affect: Mood normal.         Behavior: Behavior normal.      Comments: Oriented to the month and year, misses the day and date..  Oriented to place.              Office Visit on 03/30/2022   Component Date Value Ref Range Status     Sodium 03/30/2022 137  133 - 144 mmol/L Final     Potassium 03/30/2022 3.2 (A) 3.4 - 5.3 mmol/L Final     Chloride 03/30/2022 101  94 - 109 mmol/L Final     Carbon Dioxide (CO2) 03/30/2022 30  20 - 32 mmol/L Final     Anion Gap 03/30/2022 6  3 - 14 mmol/L Final     Urea Nitrogen 03/30/2022 9  7 - 30 mg/dL Final     Creatinine 03/30/2022 0.56  0.52 - 1.04 mg/dL Final     Calcium 03/30/2022 10.6 (A) 8.5 - 10.1 mg/dL Final     Glucose 03/30/2022 118 (A) 70 - 99 mg/dL Final     Alkaline Phosphatase 03/30/2022 60  40 - 150 U/L Final     AST 03/30/2022 22  0 - 45 U/L Final     ALT 03/30/2022 15  0 - 50 U/L Final     Protein Total 03/30/2022 7.4  6.8 - 8.8 g/dL Final     Albumin 03/30/2022 3.2 (A) 3.4 - 5.0 g/dL Final     Bilirubin Total 03/30/2022 0.4  0.2 - 1.3 mg/dL Final     GFR Estimate 03/30/2022 86  >60 mL/min/1.73m2 Final    Effective December 21, 2021 eGFRcr in adults is calculated using the 2021 CKD-EPI creatinine equation which includes age and gender (Montse et al., NEJM, DOI: 10.1056/HJNAzx3144521)     Vitamin B12 03/30/2022 991 (A) 193 - 986 pg/mL Final     TSH 03/30/2022 0.68  0.40 - 4.00 mU/L Final     WBC Count 03/30/2022 7.9  4.0 - 11.0 10e3/uL Final     RBC Count 03/30/2022 5.11  3.80 - 5.20 10e6/uL Final     Hemoglobin 03/30/2022 15.2  11.7 - 15.7 g/dL Final     Hematocrit 03/30/2022 47.8 (A) 35.0 - 47.0 % Final     MCV 03/30/2022 94  78 - 100 fL Final     MCH 03/30/2022 29.7  26.5 - 33.0 pg Final     MCHC 03/30/2022 31.8  31.5 - 36.5 g/dL Final      RDW 03/30/2022 12.0  10.0 - 15.0 % Final     Platelet Count 03/30/2022 190  150 - 450 10e3/uL Final     Erythrocyte Sedimentation Rate 03/30/2022 20  0 - 30 mm/hr Final     CRP Inflammation 03/30/2022 37.0 (A) 0.0 - 8.0 mg/L Final

## 2022-05-24 ENCOUNTER — APPOINTMENT (OUTPATIENT)
Dept: CT IMAGING | Facility: CLINIC | Age: 87
End: 2022-05-24
Attending: NURSE PRACTITIONER
Payer: COMMERCIAL

## 2022-05-24 ENCOUNTER — HOSPITAL ENCOUNTER (EMERGENCY)
Facility: CLINIC | Age: 87
Discharge: HOME | End: 2022-05-24
Attending: EMERGENCY MEDICINE | Admitting: EMERGENCY MEDICINE
Payer: COMMERCIAL

## 2022-05-24 VITALS
TEMPERATURE: 96.4 F | DIASTOLIC BLOOD PRESSURE: 93 MMHG | OXYGEN SATURATION: 93 % | HEART RATE: 70 BPM | SYSTOLIC BLOOD PRESSURE: 156 MMHG

## 2022-05-24 DIAGNOSIS — R41.0 CONFUSION: ICD-10-CM

## 2022-05-24 LAB
ALBUMIN SERPL-MCNC: 3.5 G/DL (ref 3.4–5)
ALBUMIN UR-MCNC: NEGATIVE MG/DL
ALP SERPL-CCNC: 62 U/L (ref 40–150)
ALT SERPL W P-5'-P-CCNC: 13 U/L (ref 0–50)
ANION GAP SERPL CALCULATED.3IONS-SCNC: 7 MMOL/L (ref 3–14)
APPEARANCE UR: CLEAR
AST SERPL W P-5'-P-CCNC: 15 U/L (ref 0–45)
BACTERIA #/AREA URNS HPF: ABNORMAL /HPF
BASOPHILS # BLD AUTO: 0.1 10E3/UL (ref 0–0.2)
BASOPHILS NFR BLD AUTO: 1 %
BILIRUB SERPL-MCNC: 0.6 MG/DL (ref 0.2–1.3)
BILIRUB UR QL STRIP: NEGATIVE
BUN SERPL-MCNC: 12 MG/DL (ref 7–30)
CALCIUM SERPL-MCNC: 9.4 MG/DL (ref 8.5–10.1)
CHLORIDE BLD-SCNC: 104 MMOL/L (ref 94–109)
CO2 SERPL-SCNC: 29 MMOL/L (ref 20–32)
COLOR UR AUTO: ABNORMAL
CREAT SERPL-MCNC: 0.57 MG/DL (ref 0.52–1.04)
EOSINOPHIL # BLD AUTO: 0.4 10E3/UL (ref 0–0.7)
EOSINOPHIL NFR BLD AUTO: 4 %
ERYTHROCYTE [DISTWIDTH] IN BLOOD BY AUTOMATED COUNT: 13.6 % (ref 10–15)
GFR SERPL CREATININE-BSD FRML MDRD: 85 ML/MIN/1.73M2
GLUCOSE BLD-MCNC: 101 MG/DL (ref 70–99)
GLUCOSE UR STRIP-MCNC: NEGATIVE MG/DL
HCT VFR BLD AUTO: 42.8 % (ref 35–47)
HGB BLD-MCNC: 13.9 G/DL (ref 11.7–15.7)
HGB UR QL STRIP: NEGATIVE
HOLD SPECIMEN: NORMAL
IMM GRANULOCYTES # BLD: 0 10E3/UL
IMM GRANULOCYTES NFR BLD: 0 %
KETONES UR STRIP-MCNC: NEGATIVE MG/DL
LEUKOCYTE ESTERASE UR QL STRIP: NEGATIVE
LYMPHOCYTES # BLD AUTO: 1.4 10E3/UL (ref 0.8–5.3)
LYMPHOCYTES NFR BLD AUTO: 16 %
MCH RBC QN AUTO: 29.8 PG (ref 26.5–33)
MCHC RBC AUTO-ENTMCNC: 32.5 G/DL (ref 31.5–36.5)
MCV RBC AUTO: 92 FL (ref 78–100)
MONOCYTES # BLD AUTO: 0.6 10E3/UL (ref 0–1.3)
MONOCYTES NFR BLD AUTO: 6 %
NEUTROPHILS # BLD AUTO: 6.3 10E3/UL (ref 1.6–8.3)
NEUTROPHILS NFR BLD AUTO: 73 %
NITRATE UR QL: NEGATIVE
NRBC # BLD AUTO: 0 10E3/UL
NRBC BLD AUTO-RTO: 0 /100
PH UR STRIP: 7.5 [PH] (ref 5–7)
PLATELET # BLD AUTO: 237 10E3/UL (ref 150–450)
POTASSIUM BLD-SCNC: 3.3 MMOL/L (ref 3.4–5.3)
PROT SERPL-MCNC: 7.2 G/DL (ref 6.8–8.8)
RBC # BLD AUTO: 4.67 10E6/UL (ref 3.8–5.2)
RBC URINE: 1 /HPF
SODIUM SERPL-SCNC: 140 MMOL/L (ref 133–144)
SP GR UR STRIP: 1.01 (ref 1–1.03)
SQUAMOUS EPITHELIAL: 1 /HPF
TRANSITIONAL EPI: <1 /HPF
TSH SERPL DL<=0.005 MIU/L-ACNC: 1.04 MU/L (ref 0.4–4)
UROBILINOGEN UR STRIP-MCNC: NORMAL MG/DL
WBC # BLD AUTO: 8.7 10E3/UL (ref 4–11)
WBC URINE: <1 /HPF

## 2022-05-24 PROCEDURE — 99285 EMERGENCY DEPT VISIT HI MDM: CPT | Mod: 25 | Performed by: EMERGENCY MEDICINE

## 2022-05-24 PROCEDURE — 84443 ASSAY THYROID STIM HORMONE: CPT | Performed by: EMERGENCY MEDICINE

## 2022-05-24 PROCEDURE — 36415 COLL VENOUS BLD VENIPUNCTURE: CPT | Performed by: EMERGENCY MEDICINE

## 2022-05-24 PROCEDURE — 70450 CT HEAD/BRAIN W/O DYE: CPT | Mod: 26 | Performed by: RADIOLOGY

## 2022-05-24 PROCEDURE — 72125 CT NECK SPINE W/O DYE: CPT | Mod: 26 | Performed by: RADIOLOGY

## 2022-05-24 PROCEDURE — 72125 CT NECK SPINE W/O DYE: CPT

## 2022-05-24 PROCEDURE — 85025 COMPLETE CBC W/AUTO DIFF WBC: CPT | Performed by: EMERGENCY MEDICINE

## 2022-05-24 PROCEDURE — 70450 CT HEAD/BRAIN W/O DYE: CPT

## 2022-05-24 PROCEDURE — 99285 EMERGENCY DEPT VISIT HI MDM: CPT | Performed by: EMERGENCY MEDICINE

## 2022-05-24 PROCEDURE — 80053 COMPREHEN METABOLIC PANEL: CPT | Performed by: EMERGENCY MEDICINE

## 2022-05-24 PROCEDURE — 81001 URINALYSIS AUTO W/SCOPE: CPT | Performed by: EMERGENCY MEDICINE

## 2022-05-24 ASSESSMENT — ENCOUNTER SYMPTOMS
FEVER: 0
CONFUSION: 1
NECK PAIN: 0
COUGH: 0
NUMBNESS: 0
HEADACHES: 0
ABDOMINAL PAIN: 0
BACK PAIN: 0
WEAKNESS: 1
DYSURIA: 0

## 2022-05-24 NOTE — CONSULTS
[facility]       NEUROSURGERY CONSULTATION NOTE    This consultation was requested by Dr. Mccurdy from the Emergency service.    Reason for Consultation: Cervical and thoracic compression fractures    HPI: Belia Sheets is a 89 year old female who was seen for C7, T2, and T3 compression fractures. Patient has a history of osteoarthritis, osteoporosis, hypertension, and beginning of cognitive decline who sustained an unwitnessed fall at her facility today, no loss of consciousness per patient. She was brought [facility] ED where CT cervical spine demonstrated C7, T2, and T3 compression fractures with significant height loss and mild retropulsion at C7 but chronic in nature and no major concern for spinal canal narrowing. Patient denies any pain, numbness, tingling, or weakness. Per son, she seems to be at her baseline. Patient is not on any anticoagulation.       PAST MEDICAL HISTORY:   Past Medical History:   Diagnosis Date     Carpal tunnel syndrome (aka CTS)      Cataract      Chronic osteoarthritis      Constipation due to outlet dysfunction 9/22/2021     Fracture of multiple rami of right pubis with routine healing, subsequent encounter 9/22/2021     H/O calcium pyrophosphate deposition disease (CPPD)      History of 9th right rib fracture due to fall (07/13/2021) 9/22/2021     History of encephalopathy 10/2017     Hypertension      Kyphoscoliosis      Osteoarthritis     hands     Osteoporosis      Rectal prolapse        PAST SURGICAL HISTORY:   Past Surgical History:   Procedure Laterality Date     COLONOSCOPY  2010     HYSTERECTOMY      for uterine prolapse     RECTOSIGMOIDECTOMY PERINEAL N/A 1/12/2018    Procedure: RECTOSIGMOIDECTOMY PERINEAL;  Perineal Rectosigmoidectomy  ;  Surgeon: Amrik Mariano MD;  Location:  OR       FAMILY HISTORY:   Family History   Problem Relation Age of Onset     Depression/Anxiety Son      Depression/Anxiety Son          alcohol abuse  age 24     Hypertension Mother      Hypertension Brother      Diabetes No family hx of      Breast Cancer No family hx of      Colon Cancer No family hx of      Prostate Cancer No family hx of      Other Cancer No family hx of        SOCIAL HISTORY:   Social History     Tobacco Use     Smoking status: Never Smoker     Smokeless tobacco: Never Used   Substance Use Topics     Alcohol use: No       MEDICATIONS:  Amlodipine    Allergies:  No Known Allergies    ROS: 10 point ROS were all negative except for pertinent positives noted in my HPI.    Physical exam:   Blood pressure (!) 166/92, pulse 74, temperature (!) 96.3  F (35.7  C), temperature source Oral, SpO2 93 %, not currently breastfeeding.  CV: HR and BP as noted above  PULM: breathing comfortably on room air  ABD: soft, non-distended  NEUROLOGIC:  -- Awake; Alert; oriented x 3  -- Follows commands briskly  Cranial Nerves:  -- visual fields full to confrontation, PERRL 3-2mm bilat and brisk, extraocular movements intact  -- face symmetrical, tongue midline  -- sensory V1-V3 intact bilaterally  -- palate elevates symmetrically, uvula midline  -- hearing grossly intact bilat  -- Trapezii 5/5 strength bilat symmetric  -- Cerebellar: Finger nose finger without dysmetria, intact rapid alternating motions bilaterally    Motor:  Normal bulk / tone; no tremor, rigidity, or bradykinesia.  No muscle wasting or fasciculations  No Pronator Drift     Delt Bi Tri Hand Flexion/  Extension Iliopsoas Quadriceps Hamstrings Tibialis Anterior Gastroc    C5 C6 C7 C8/T1 L2 L3 L4-S1 L4 S1   R 5 5 5 5 5 5 5 5 5   L 5 5 5 5 5 5 5 5 5   Sensory:  intact to LT x 4 extremities     Reflexes:     Bi Tri BR Gerald Pat Ach Bab    C5-6 C7-8 C6 UMN L2-4 S1 UMN   R 2+ 2+ 2+ Norm 2+ 2+ Norm   L 2+ 2+ 2+ Norm 2+ 2+ Norm     Gait: Deferred      LABS:  Recent Labs   Lab 22  1600      POTASSIUM 3.3*   CHLORIDE 104   CO2 29   ANIONGAP 7   *   BUN 12   CR 0.57   BOY  9.4       Recent Labs   Lab 05/24/22  1600   WBC 8.7   RBC 4.67   HGB 13.9   HCT 42.8   MCV 92   MCH 29.8   MCHC 32.5   RDW 13.6            IMAGING:  CT Cervical spine 5/24/2022                                                                   Impression:   1. No acute traumatic subluxation.  2. Multilevel compression deformities involving the C7, T2, and T3 vertebral bodies, age indeterminate but likely chronic. Retropulsion associated with the C7 compression fracture contributing to mild spinal canal narrowing.  3. Multilevel cervical degenerative changes.    ASSESSMENT:  Belia Sheets is a 89 year old female with a PMHx of osteoarthritis, osteoporosis, hypertension, and beginning of cognitive decline who sustained an unwitnessed fall at her facility today, no loss of consciousness, and was found to have chronic C7, T2, and T3 compression fractures with mild retropulsion at C7. Patient is intact neurologically and denies any pain.     RECOMMENDATIONS:  No neurosurgical intervention indicated at this time   Can consider C collar for comfort if patient begins endorsing pain  No further imaging at this time and no follow up required    Neurosurgery will sign off at this time.     Clinically Significant Risk Factors Present on Admission          The patient was discussed with Dr. Bernardo, neurosurgery chief resident, and Dr. Erwin, neurosurgery staff, and they agree with the above.    Sumit Rocha MD  Neurosurgery PGY-1

## 2022-05-24 NOTE — ED PROVIDER NOTES
[facility] ([facility])  5/24/22      History     Chief Complaint   Patient presents with     Fall     The history is provided by the patient and medical records.     Belia Sheets is a 89 year old female with past medical history significant for kyphoscoliosis, osteoarthritis, calcium pyrophosphate deposition disease, hypertension, frequent falls, and reported cognitive decline who presents to the ED for evaluation of concern for unwitnessed fall.  Per EMS, patient was found on a different floor than the one that she lives in at a senior living facility.  Patient believes that she was on the floor coming from the dining ware. She does not think she fell today.  Here in the ED, patient denies neck pain or any other pain.  No headache.  No numbness.  However, patient's son does note a gradual, progressive generalized weakness over the past several months.  He notes that patient uses a walker at baseline.  He also notes bilateral ankle swelling of the patient. No cough, chest pain, or fever.  No dysuria.  Patient is not anticoagulated.  No abdominal pain or rib pain.  Patient currently resides in a senior living facility.  She is oriented to place and time.    Past Medical History  Past Medical History:   Diagnosis Date     Carpal tunnel syndrome (aka CTS)      Cataract      Chronic osteoarthritis      Constipation due to outlet dysfunction 9/22/2021     Fracture of multiple rami of right pubis with routine healing, subsequent encounter 9/22/2021     H/O calcium pyrophosphate deposition disease (CPPD)      History of 9th right rib fracture due to fall (07/13/2021) 9/22/2021     History of encephalopathy 10/2017     Hypertension      Kyphoscoliosis      Osteoarthritis     hands     Osteoporosis      Rectal prolapse      Past Surgical History:   Procedure Laterality Date     COLONOSCOPY  2010     HYSTERECTOMY      for uterine prolapse     RECTOSIGMOIDECTOMY PERINEAL N/A 1/12/2018     Procedure: RECTOSIGMOIDECTOMY PERINEAL;  Perineal Rectosigmoidectomy  ;  Surgeon: Amrik Mariano MD;  Location: [facility] OR     acetaminophen (TYLENOL) 325 MG tablet  amLODIPine (NORVASC) 2.5 MG tablet  calcium carbonate 600 mg-vitamin D 400 units (CALTRATE) 600-400 MG-UNIT per tablet  order for DME  order for DME  polyethylene glycol (MIRALAX) 17 g packet  triamcinolone (KENALOG) 0.1 % external cream      No Known Allergies  Family History  Family History   Problem Relation Age of Onset     Depression/Anxiety Son      Depression/Anxiety Son         alcohol abuse  age 24     Hypertension Mother      Hypertension Brother      Diabetes No family hx of      Breast Cancer No family hx of      Colon Cancer No family hx of      Prostate Cancer No family hx of      Other Cancer No family hx of      Social History   Social History     Tobacco Use     Smoking status: Never Smoker     Smokeless tobacco: Never Used   Vaping Use     Vaping Use: Never used   Substance Use Topics     Alcohol use: No     Drug use: No      Past medical history, past surgical history, medications, allergies, family history, and social history were reviewed with the patient. No additional pertinent items.       Review of Systems   Constitutional: Negative for fever.   Respiratory: Negative for cough.    Cardiovascular: Negative for chest pain.        Bilateral ankle swelling   Gastrointestinal: Negative for abdominal pain.   Genitourinary: Negative for dysuria.   Musculoskeletal: Negative for back pain and neck pain.   Neurological: Positive for weakness. Negative for numbness and headaches.   Psychiatric/Behavioral: Positive for confusion.   All other systems reviewed and are negative.    A complete review of systems was performed with pertinent positives and negatives noted in the HPI, and all other systems negative.    Physical Exam   BP: (!) 166/92  Pulse: 74  Temp: (!) 96.3  F (35.7  C)  SpO2: 93 %  Physical Exam  Vitals and nursing  note reviewed.   Constitutional:       General: She is not in acute distress.     Appearance: Normal appearance. She is well-developed and normal weight. She is not ill-appearing or diaphoretic.   HENT:      Head: Normocephalic and atraumatic. No raccoon eyes, abrasion, contusion or laceration.      Nose: Nose normal.      Mouth/Throat:      Mouth: Mucous membranes are moist.   Eyes:      General: No scleral icterus.     Extraocular Movements: Extraocular movements intact.      Conjunctiva/sclera: Conjunctivae normal.   Cardiovascular:      Rate and Rhythm: Normal rate.   Pulmonary:      Effort: Pulmonary effort is normal. No respiratory distress.      Breath sounds: No stridor.   Chest:      Chest wall: No tenderness.   Abdominal:      General: There is no distension.      Palpations: Abdomen is soft.      Tenderness: There is no abdominal tenderness. There is no guarding or rebound.   Musculoskeletal:         General: No deformity or signs of injury. Normal range of motion.      Cervical back: Normal range of motion. No rigidity or tenderness. No spinous process tenderness or muscular tenderness. Normal range of motion.   Skin:     General: Skin is warm and dry.      Coloration: Skin is not jaundiced or pale.      Findings: No bruising or rash.   Neurological:      General: No focal deficit present.      Mental Status: She is alert and oriented to person, place, and time.      Cranial Nerves: No cranial nerve deficit.      Sensory: No sensory deficit.      Motor: No weakness.   Psychiatric:         Mood and Affect: Mood normal.         Behavior: Behavior normal.         Thought Content: Thought content normal.         ED Course     4:23 PM  The patient was seen and examined by Gissell Mccurdy MD in Room [facility].     Procedures                     Results for orders placed or performed during the hospital encounter of 05/24/22   CT Head w/o Contrast     Status: None    Narrative    CT HEAD W/O CONTRAST 5/24/2022 3:53  PM    History: Head trauma, minor (Age >= 65y)   ICD-10:    Comparison: Head CT 7/13/2021    Technique: Using multidetector thin collimation helical acquisition  technique, axial, coronal and sagittal CT images from the skull base  to the vertex were obtained without intravenous contrast.   (topogram) image(s) also obtained and reviewed.    Findings: Moderate generalized parenchymal volume loss. There is no  intracranial hemorrhage, mass effect, or midline shift. Gray/white  matter differentiation in both cerebral hemispheres is preserved.  Ventricles are proportionate to the cerebral sulci. The basal cisterns  are clear. Moderate confluent periventricular and subcortical white  matter hypoattenuation similar to previous.    The bony calvaria and the bones of the skull base are normal. Mild to  moderate paranasal sinus mucosal thickening. Mastoid air cells are  clear.      Impression    Impression:  1. No acute intracranial pathology.   2. Moderate generalized parenchymal volume loss and chronic small  vessel ischemic disease.    I have personally reviewed the examination and initial interpretation  and I agree with the findings.    ALBERT OSEGUERA MD         SYSTEM ID:  [id number]   CT Cervical Spine w/o Contrast     Status: None    Narrative    CT CERVICAL SPINE W/O CONTRAST 5/24/2022 3:54 PM    Provided History: Neck trauma (Age >= 65y)    Comparison: Same day head CT. No prior dedicated cervical spine  imaging available for comparison.    Technique: Using multidetector thin collimation helical acquisition  technique, axial, coronal and sagittal CT images through the cervical  spine were obtained without intravenous contrast.     Findings:  The cervical vertebrae are normally aligned. Exaggerated cervical  lordosis. Wedge compression deformity of the C7 vertebral body.  Associated retropulsion contributing to mild spinal canal narrowing at  C7. No prevertebral edema. Moderate loss of intervertebral disc  height  at C5-6. Trace degenerative anterolisthesis of C4 on C5. Additional  multilevel degenerative changes including endplate osteophytic  spurring, uncinate hypertrophy, and facet hypertrophy. No high-grade  spinal canal stenosis. Multilevel mild to moderate neural foraminal  narrowing, greatest on the right at C5-6.    Additional moderate to severe compression deformities of the T2 and T3  vertebral bodies.      Impression    Impression:   1. No acute traumatic subluxation.  2. Multilevel compression deformities involving the C7, T2, and T3  vertebral bodies, age indeterminate but likely chronic. Retropulsion  associated with the C7 compression fracture contributing to mild  spinal canal narrowing.  3. Multilevel cervical degenerative changes.    ALBERT OSEGUERA MD         SYSTEM ID:  [id number]   Comprehensive metabolic panel     Status: Abnormal   Result Value Ref Range    Sodium 140 133 - 144 mmol/L    Potassium 3.3 (L) 3.4 - 5.3 mmol/L    Chloride 104 94 - 109 mmol/L    Carbon Dioxide (CO2) 29 20 - 32 mmol/L    Anion Gap 7 3 - 14 mmol/L    Urea Nitrogen 12 7 - 30 mg/dL    Creatinine 0.57 0.52 - 1.04 mg/dL    Calcium 9.4 8.5 - 10.1 mg/dL    Glucose 101 (H) 70 - 99 mg/dL    Alkaline Phosphatase 62 40 - 150 U/L    AST 15 0 - 45 U/L    ALT 13 0 - 50 U/L    Protein Total 7.2 6.8 - 8.8 g/dL    Albumin 3.5 3.4 - 5.0 g/dL    Bilirubin Total 0.6 0.2 - 1.3 mg/dL    GFR Estimate 85 >60 mL/min/1.73m2   UA with Microscopic reflex to Culture     Status: Abnormal    Specimen: Urine, Clean Catch   Result Value Ref Range    Color Urine Straw Colorless, Straw, Light Yellow, Yellow    Appearance Urine Clear Clear    Glucose Urine Negative Negative mg/dL    Bilirubin Urine Negative Negative    Ketones Urine Negative Negative mg/dL    Specific Gravity Urine 1.007 1.003 - 1.035    Blood Urine Negative Negative    pH Urine 7.5 (H) 5.0 - 7.0    Protein Albumin Urine Negative Negative mg/dL    Urobilinogen Urine Normal Normal, 2.0  mg/dL    Nitrite Urine Negative Negative    Leukocyte Esterase Urine Negative Negative    Bacteria Urine Few (A) None Seen /HPF    RBC Urine 1 <=2 /HPF    WBC Urine <1 <=5 /HPF    Squamous Epithelials Urine 1 <=1 /HPF    Transitional Epithelials Urine <1 <=1 /HPF    Narrative    Urine Culture not indicated   [facility] Draw     Status: None    Narrative    The following orders were created for panel order [facility] Draw.  Procedure                               Abnormality         Status                     ---------                               -----------         ------                     Extra Blue Top Tube[[id number]]                              Final result                 Please view results for these tests on the individual orders.   CBC with platelets and differential     Status: None   Result Value Ref Range    WBC Count 8.7 4.0 - 11.0 10e3/uL    RBC Count 4.67 3.80 - 5.20 10e6/uL    Hemoglobin 13.9 11.7 - 15.7 g/dL    Hematocrit 42.8 35.0 - 47.0 %    MCV 92 78 - 100 fL    MCH 29.8 26.5 - 33.0 pg    MCHC 32.5 31.5 - 36.5 g/dL    RDW 13.6 10.0 - 15.0 %    Platelet Count 237 150 - 450 10e3/uL    % Neutrophils 73 %    % Lymphocytes 16 %    % Monocytes 6 %    % Eosinophils 4 %    % Basophils 1 %    % Immature Granulocytes 0 %    NRBCs per 100 WBC 0 <1 /100    Absolute Neutrophils 6.3 1.6 - 8.3 10e3/uL    Absolute Lymphocytes 1.4 0.8 - 5.3 10e3/uL    Absolute Monocytes 0.6 0.0 - 1.3 10e3/uL    Absolute Eosinophils 0.4 0.0 - 0.7 10e3/uL    Absolute Basophils 0.1 0.0 - 0.2 10e3/uL    Absolute Immature Granulocytes 0.0 <=0.4 10e3/uL    Absolute NRBCs 0.0 10e3/uL   Extra Blue Top Tube     Status: None   Result Value Ref Range    Hold Specimen JIC    TSH with free T4 reflex     Status: Normal   Result Value Ref Range    TSH 1.04 0.40 - 4.00 mU/L   CBC with platelets differential     Status: None    Narrative    The following orders were created for panel order CBC with platelets differential.  Procedure                                Abnormality         Status                     ---------                               -----------         ------                     CBC with platelets and d...[[id number]]                      Final result                 Please view results for these tests on the individual orders.     Medications - No data to display     Assessments & Plan (with Medical Decision Making)   Belia Sheets is a 89 year old female with past medical history significant for kyphoscoliosis, osteoarthritis, calcium pyrophosphate deposition disease, hypertension, frequent falls, and reported cognitive decline who presents to the ED for evaluation of concern for unwitnessed fall.     Ddx: unwitnessed fall, head injury, c spine injury, UTI, progressive weakness    Medical work up unremarkable. CT head wnl. CT C spine with multilevel compression deformities likely chronic but with retropulsion associated with C7 compression fracture contributing to mild spinal canal narrowing. No focal weakness appreciated on exam. No tenderness or neck pain. NSG consulted and did not think patient required any neurosurgical intervention or follow up. Discussed PCP follow up for further eval of progressive weakness and to discuss appropriateness of transfer to an assisted living facility for additional fall prevention.          I have reviewed the nursing notes. I have reviewed the findings, diagnosis, plan and need for follow up with the patient.    Discharge Medication List as of 5/24/2022  6:26 PM          Final diagnoses:   Confusion     I, Lissy Fitzgerald, am serving as a trained medical scribe to document services personally performed by Gissell Mccurdy MD based on the provider's statements to me on May 24, 2022.  This document has been checked and approved by the attending provider.    I, Gissell Mccurdy MD, was physically present and have reviewed and verified the accuracy of this note documented by Lissy Fitzgerald medical scribe.      Gissell  RANDA Mccurdy MD      --  Gissell Mccurdy  [facility] EMERGENCY DEPARTMENT  5/24/2022     Gissell Mccurdy MD  05/24/22 2025

## 2022-05-24 NOTE — ED TRIAGE NOTES
ED Triage Provider Note  [facility]  Encounter Date: May 24, 2022    History:  Chief Complaint   Patient presents with     Fall     Malial Keaton Sheets is a 89 year old female with a history of hypertension, confusion, and recurrent falls who presents to the ED via EMS after an unwitnessed fall. Patient is uncertain how she fell but does endorse hitting her forehead on the floor. Per EMS she was found [facility] Patient denies any pain anywhere in her body. Does report increased ankle swelling for past 1 month, but denies any ankle pain, knee pain, hip or pelvic pain, shoulder or arm pain, headache, or neck pain. She does not take any blood thinners.    Review of Systems:   ROS: 10 point ROS neg other than the symptoms noted above in the HPI.    Exam:  BP (!) 166/92   Pulse 74   Temp (!) 96.3  F (35.7  C) (Oral)   SpO2 93%   General: No acute distress. Appears stated age.   Head: atraumatic, no ecchymosis, nontender. Neck: nontender, full ROM.   Cardio: Regular rate, extremities well perfused  Resp: Normal work of breathing, grossly normal respiratory rate  Neuro/Musc: Alert. CN II-XII grossly intact. Grossly intact strength. Full ROM ankles, knees, hips, shoulders, elbows. Limited ROM hands due to arthritis pain.  Abd: soft, nontender, nondistended.    Medical Decision Making:  Patient arriving to the ED with problem as above. A medical screening exam was performed. CT head/neck w/o contrast orders initiated from Triage. The patient is appropriate to be immediately roomed.      SUSIE Winston CNP on 5/24/2022 at 3:15 PM

## 2022-05-24 NOTE — ED TRIAGE NOTES
Pt BIBA following a fall. HX osteoporosis, chronic fatigue. Per EMS pt independently lives in a senior high rise, was found this AM on a different floor than where she lives lying on the ground, and thought to have an unwitnessed fall. Pt denies LOC, blood thinner use, headache, and endorses hitting her forehead. Pt A&Ox4, VSS on RA, denies pain at this time.

## 2022-05-24 NOTE — ED NOTES
Bed: [facility]  Expected date: 5/24/22  Expected time: 2:21 PM  Means of arrival: Ambulance  Comments:  [address]    89 y/o Female    Fall  Head injury

## 2022-05-24 NOTE — DISCHARGE INSTRUCTIONS
Follow up with your primary care doctor as needed for further evaluation of increased weakness and falls.

## 2022-05-26 ENCOUNTER — DOCUMENTATION ONLY (OUTPATIENT)
Dept: OTHER | Facility: CLINIC | Age: 87
End: 2022-05-26
Payer: COMMERCIAL

## 2022-06-07 ENCOUNTER — MYC MEDICAL ADVICE (OUTPATIENT)
Dept: ENDOCRINOLOGY | Facility: CLINIC | Age: 87
End: 2022-06-07
Payer: COMMERCIAL

## 2022-06-08 ENCOUNTER — VIRTUAL VISIT (OUTPATIENT)
Dept: ENDOCRINOLOGY | Facility: CLINIC | Age: 87
End: 2022-06-08
Payer: COMMERCIAL

## 2022-06-08 DIAGNOSIS — M81.0 AGE-RELATED OSTEOPOROSIS WITHOUT CURRENT PATHOLOGICAL FRACTURE: Primary | ICD-10-CM

## 2022-06-08 PROCEDURE — 99215 OFFICE O/P EST HI 40 MIN: CPT | Mod: 95 | Performed by: INTERNAL MEDICINE

## 2022-06-08 NOTE — LETTER
6/8/2022       RE: Belia Sheets  [address]  [address]  [facility] [address]     Dear Colleague,    Thank you for referring your patient, Belia Sheets, to the [facility] ENDOCRINOLOGY CLINIC [place] [facility]. Please see a copy of my visit note below.    Taking b-12 over the counter also.    Sudeep is a 89 year old who is being evaluated via a billable telephone visit.      What phone number would you like to be contacted at? [phone]   How would you like to obtain your AVS? MyChart  Phone call duration:     Endocrinology Clinic Visit    Chief Complaint: Follow Up     Information obtained from: Patient + daughter.    Subjective:         HPI: Belia Sheets is a 89 year old year old female with history of osteoporosis who is here for a follow up.      Osteoporosis first diagnosed in 1999.     She was treated with alendronate 70 mg weekly from 1999 to October 2015.      +History of vertebral fracture; she was folding laundry when she fell from standing up position and sustained injury.  Broken hip and noted to have thoracic compression fractures.  Previous fragility fracture, morphometric vertebral fracture: imaging done in 7/2015 has showed multiple compression fractures.   7/2015 spine film reported as <Multiple old healed left rib fractures. Diffuse osteopenia. New moderate compression fracture of T10 since the prior examination. New moderate compression fractures in T4 and T5 are new since the prior exam. Multilevel degenerative changes in the thoracic spine are otherwise stable. Marked scoliosis of the thoracolumbar spine is redemonstrated.>    Due to severe osteoporosis, long standing previous alendronate therapy and alkaline phosphatase which was within the normal limits; we treated with FORTEO 2017- 2/24/2019.  Then she received 3 doses of RECLAST treatment.  She uses a walker for walking.      After completion of treatment  with Forteo; she received Reclast in March 2019, 8/2020, 12/2021.    She is taking calcium and vitamin d supplement 600-800; 1 tab daily.     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  She gets some services. Mostly manages by her own.     Review of system - no falls since her last visit with me    No Known Allergies    Current Outpatient Medications   Medication Sig Dispense Refill     amLODIPine (NORVASC) 2.5 MG tablet Take 1 tablet (2.5 mg) by mouth daily 90 tablet 3     calcium carbonate 600 mg-vitamin D 400 units (CALTRATE) 600-400 MG-UNIT per tablet Take 1 tablet by mouth 2 times daily (before meals) 180 tablet 3     order for DME Equipment being ordered: Incentive spirometer 1 Units 0     order for DME Equipment being ordered: Home BP monitor and cuff 1 each 0     acetaminophen (TYLENOL) 325 MG tablet Take 3 tablets (975 mg) by mouth 3 times daily (Patient not taking: No sig reported) 42 tablet 0     polyethylene glycol (MIRALAX) 17 g packet Take 1 packet by mouth daily (Patient not taking: No sig reported)       triamcinolone (KENALOG) 0.1 % external cream Apply topically 2 times daily (Patient not taking: Reported on 6/8/2022) 30 g 1       Review of Systems      as per HPI above.         Objective:   There were no vitals taken for this visit.  Constitutional: Pleasant  Psychological: appropriate mood and affect, at times seems to be lost on the telephone conversation.      In House Labs:   ENDO CALCIUM LABS-UMP Latest Ref Rng & Units 12/1/2021   ALBUMIN 3.4 - 5.0 g/dL 3.7   ALKPHOS 40 - 150 U/L    CALCIUM IONIZED WB 4.4 - 5.2  mg/dL    CALCIUM 8.5 - 10.1 mg/dL 9.4   CK TOTAL 30 - 225 U/L    CREATININE 0.52 - 1.04 mg/dL 0.66     Region Date BMD T - score Z - score BMD change from baseline BMD % change from baseline   L1-L4 01/30/2019 0.758 g/cm  -3.5 -0.8 -0.057 g/cm  -7.0%   L1-L4 01/05/2017 0.815 g/cm                            L1-L2 01/30/2019 0.659 g/cm  -4.2 -1.5 0.025 g/cm  3.9%   L1-L2 01/05/2017 0.634 g/cm               Neck Left 01/30/2019 0.662 g/cm  -2.7 0.3       Neck Left 01/05/2017 0.644 g/cm            Total Left 01/30/2019 0.580 g/cm  -3.4 -0.4 -0.047 g/cm  -7.5%   Total Left 01/05/2017 0.627 g/cm            Neck Right 01/30/2019 0.600 g/cm  -3.2 -0.2       Neck Right 01/05/2017 0.603 g/cm            Total Right 01/30/2019 0.608 g/cm  -3.2 -0.2 0.020 g/cm  3.4%   Total Right 01/05/2017 0.588 g/cm            Radius 33% 01/30/2019 0.394 g/cm  -4.4 -0.9 -0.064 g/cm  -13.9%   Radius 33% 01/05/2017 0.457 g/cm                Assessment/Treatment Plan:      # Osteoporosis with fragility fracture:  Belia Keaton Sheets is a 89 year old year old female with Osteoporosis and fragility fracture (7/2015); diagnosed in 1999.    Alendronate for 16 years 1999 -2015  FORTEO [id number]  RECLAST 2019 -2021  No fall or fracture since last visit. Vitamin D normal. Taking calcium.  No hip, spine, humoral or wrist fractures.  Will obtain follow up DEXA scan and c-telepeptide to inform treatment strategies.   I  Patient Instructions     Sudeep,        Continue calcium with D 600-400 at the current dose and your vitamin d supplement.     Schedule DEXA scan and blood work. Blood work to be done early in the morning before breakfast. Both DEXA scan and blood work can be scheduled on the same day and can be completed prior to your next visit any time convenient for you.   Orders Placed This Encounter   Procedures     Dexa Wrist/Heel     Dexa hip/pelvis/spine     C-Telopeptide, Beta-Cross-Linked    Lab scheduling to schedule at any [place] lab locations:  [phone] select option 1  Saurabh Pittman MD    I will contact the patient with the test results.  Return to clinic in 7 months.    Saurabh Pittman MD  Staff Endocrinologist   Division of Endocrinology and Diabetes    Total telephone minutes 15 minutes.   40 minutes spent on the date of the encounter doing chart review, history, coordination of care, documentation and further activities per the note.

## 2022-06-08 NOTE — PATIENT INSTRUCTIONS
Sudeep,      Continue calcium with D 600-400 at the current dose and your vitamin d supplement.   Schedule DEXA scan and blood work. Blood work to be done early in the morning before breakfast. Both DEXA scan and blood work can be scheduled on the same day and can be completed prior to your next visit any time convenient for you.   Orders Placed This Encounter   Procedures    Dexa Wrist/Heel    Dexa hip/pelvis/spine    C-Telopeptide, Beta-Cross-Linked    Lab scheduling to schedule at any [place] lab locations: [phone] select option 1  Saurabh Pittman MD

## 2022-06-08 NOTE — PROGRESS NOTES
Taking b-12 over the counter also.    Sudeep is a 89 year old who is being evaluated via a billable telephone visit.      What phone number would you like to be contacted at? [phone]   How would you like to obtain your AVS? Convercent  Phone call duration:

## 2022-06-08 NOTE — PROGRESS NOTES
Endocrinology Clinic Visit    Chief Complaint: Follow Up     Information obtained from: Patient + daughter.    Subjective:         HPI: Belia Sheets is a 89 year old year old female with history of osteoporosis who is here for a follow up.      Osteoporosis first diagnosed in 1999.     She was treated with alendronate 70 mg weekly from 1999 to October 2015.      +History of vertebral fracture; she was folding laundry when she fell from standing up position and sustained injury.  Broken hip and noted to have thoracic compression fractures.  Previous fragility fracture, morphometric vertebral fracture: imaging done in 7/2015 has showed multiple compression fractures.   7/2015 spine film reported as <Multiple old healed left rib fractures. Diffuse osteopenia. New moderate compression fracture of T10 since the prior examination. New moderate compression fractures in T4 and T5 are new since the prior exam. Multilevel degenerative changes in the thoracic spine are otherwise stable. Marked scoliosis of the thoracolumbar spine is redemonstrated.>    Due to severe osteoporosis, long standing previous alendronate therapy and alkaline phosphatase which was within the normal limits; we treated with FORTEO 2017- 2/24/2019.  Then she received 3 doses of RECLAST treatment.  She uses a walker for walking.      After completion of treatment with Forteo; she received Reclast in March 2019, 8/2020, 12/2021.    She is taking calcium and vitamin d supplement 600-800; 1 tab daily.     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  She gets some services.  Mostly manages by her own.     Review of system - no falls since her last visit with me    No Known Allergies    Current Outpatient Medications   Medication Sig Dispense Refill     amLODIPine (NORVASC) 2.5 MG tablet Take 1 tablet (2.5 mg) by mouth daily 90 tablet 3     calcium carbonate 600 mg-vitamin D 400 units (CALTRATE) 600-400 MG-UNIT per tablet Take 1 tablet by mouth 2 times daily (before meals) 180 tablet 3     order for DME Equipment being ordered: Incentive spirometer 1 Units 0     order for DME Equipment being ordered: Home BP monitor and cuff 1 each 0     acetaminophen (TYLENOL) 325 MG tablet Take 3 tablets (975 mg) by mouth 3 times daily (Patient not taking: No sig reported) 42 tablet 0     polyethylene glycol (MIRALAX) 17 g packet Take 1 packet by mouth daily (Patient not taking: No sig reported)       triamcinolone (KENALOG) 0.1 % external cream Apply topically 2 times daily (Patient not taking: Reported on 6/8/2022) 30 g 1       Review of Systems      as per HPI above.         Objective:   There were no vitals taken for this visit.  Constitutional: Pleasant  Psychological: appropriate mood and affect, at times seems to be lost on the telephone conversation.      In House Labs:   ENDO CALCIUM LABS-UMP Latest Ref Rng & Units 12/1/2021   ALBUMIN 3.4 - 5.0 g/dL 3.7   ALKPHOS 40 - 150 U/L    CALCIUM IONIZED WB 4.4 - 5.2 mg/dL    CALCIUM 8.5 - 10.1 mg/dL 9.4   CK TOTAL 30 - 225 U/L    CREATININE 0.52 - 1.04 mg/dL 0.66     Region Date BMD T - score Z - score BMD change from baseline BMD % change from baseline   L1-L4 01/30/2019 0.758 g/cm  -3.5 -0.8 -0.057 g/cm  -7.0%   L1-L4 01/05/2017 0.815 g/cm                            L1-L2 01/30/2019 0.659 g/cm  -4.2 -1.5 0.025 g/cm  3.9%   L1-L2 01/05/2017 0.634 g/cm               Neck Left 01/30/2019 0.662 g/cm  -2.7 0.3       Neck Left 01/05/2017 0.644 g/cm            Total Left 01/30/2019 0.580 g/cm  -3.4 -0.4 -0.047 g/cm  -7.5%   Total Left 01/05/2017 0.627 g/cm             Neck Right 01/30/2019 0.600 g/cm  -3.2 -0.2       Neck Right 01/05/2017 0.603 g/cm            Total Right 01/30/2019 0.608 g/cm  -3.2 -0.2 0.020 g/cm  3.4%   Total Right 01/05/2017 0.588 g/cm            Radius 33% 01/30/2019 0.394 g/cm  -4.4 -0.9 -0.064 g/cm  -13.9%   Radius 33% 01/05/2017 0.457 g/cm                Assessment/Treatment Plan:      # Osteoporosis with fragility fracture:  Belia Sheets is a 89 year old year old female with Osteoporosis and fragility fracture (7/2015); diagnosed in 1999.    Alendronate for 16 years 1999 -2015  FORTEO [id number]  RECLAST 2019 -2021  No fall or fracture since last visit. Vitamin D normal. Taking calcium.  No hip, spine, humoral or wrist fractures.  Will obtain follow up DEXA scan and c-telepeptide to inform treatment strategies.   I  Patient Instructions     Sudeep,        Continue calcium with D 600-400 at the current dose and your vitamin d supplement.     Schedule DEXA scan and blood work. Blood work to be done early in the morning before breakfast. Both DEXA scan and blood work can be scheduled on the same day and can be completed prior to your next visit any time convenient for you.   Orders Placed This Encounter   Procedures     Dexa Wrist/Heel     Dexa hip/pelvis/spine     C-Telopeptide, Beta-Cross-Linked    Lab scheduling to schedule at any [place] lab locations: [phone] [place] [address] select option 1  Saurabh Pittman MD        I will contact the patient with the test results.  Return to clinic in 7 months.        Saurabh Pittman MD  Staff Endocrinologist   Division of Endocrinology and Diabetes    Total telephone minutes 15 minutes.   40 minutes spent on the date of the encounter doing chart review, history, coordination of care, documentation and further activities per the note.

## 2022-08-17 ENCOUNTER — OFFICE VISIT (OUTPATIENT)
Dept: FAMILY MEDICINE | Facility: CLINIC | Age: 87
End: 2022-08-17
Payer: COMMERCIAL

## 2022-08-17 VITALS
DIASTOLIC BLOOD PRESSURE: 97 MMHG | SYSTOLIC BLOOD PRESSURE: 157 MMHG | OXYGEN SATURATION: 98 % | HEART RATE: 70 BPM | WEIGHT: 97 LBS | BODY MASS INDEX: 19.93 KG/M2 | RESPIRATION RATE: 16 BRPM

## 2022-08-17 DIAGNOSIS — M81.0 AGE-RELATED OSTEOPOROSIS WITHOUT CURRENT PATHOLOGICAL FRACTURE: ICD-10-CM

## 2022-08-17 DIAGNOSIS — M54.50 ACUTE BILATERAL LOW BACK PAIN WITHOUT SCIATICA: Primary | ICD-10-CM

## 2022-08-17 DIAGNOSIS — I10 ESSENTIAL HYPERTENSION: ICD-10-CM

## 2022-08-17 DIAGNOSIS — R41.0 CONFUSION: ICD-10-CM

## 2022-08-17 PROCEDURE — 99214 OFFICE O/P EST MOD 30 MIN: CPT | Performed by: FAMILY MEDICINE

## 2022-08-17 NOTE — PROGRESS NOTES
Belia was seen today for back pain and derm problem.    Diagnoses and all orders for this visit:    Acute bilateral low back pain without sciatica.  This appears to be mostly muscular.  I recommended both moist warm compresses and heating pad, as well as massage or shower spray to the area.  She and her daughter will see what they can do about this.      Essential hypertension.  Stable, continue low-dose amlodipine.    Age-related osteoporosis without current pathological fracture.  I do not see evidence of a new compression fracture.  Continue current care as managed by endocrinology.    Confusion.  She has not been as cognitively sharp over the past 6 months.  I do not believe that she is gotten any worse since last visit and daughter Lubna agrees.  All functional needs being met and I will plan to see her again in 2 to 3 months.                David Sheets is a 89 year old, presenting for the following health issues:  Back Pain (New back pain, especially if patient moves in specific ways, patient states it may have started about 4 weeks ago. Patient states the pain is very sharp. ) and Derm Problem (Patient has been feeling itchy all over the body, noticeable areas of scratching on the torso. Patient states eyes are also very itchy. )      HPI   Chief complaint is of low back pain.  This is been worse recently but is acute on chronic.  Pain is in the para lumbar areas bilaterally, slightly worse on the right.  No radiation or lower extremity weakness.  She continues to walk with a walker.  No recent falls.        Review of Systems         Objective    BP (!) 157/97 (BP Location: Left arm, Patient Position: Sitting, Cuff Size: Adult Small)   Pulse 70   Resp 16   Wt 44 kg (97 lb)   SpO2 98%   Breastfeeding No   BMI 19.93 kg/m    Body mass index is 19.93 kg/m .  Physical Exam  Constitutional:       Comments: Alert, pleasant.  Uses a 4-wheeled walker with seat.   Cardiovascular:      Rate and Rhythm: Normal  rate and regular rhythm.      Heart sounds: Normal heart sounds.   Pulmonary:      Effort: No respiratory distress.      Breath sounds: No wheezing or rales.   Abdominal:      General: Bowel sounds are normal.      Palpations: Abdomen is soft. There is no mass.      Tenderness: There is no abdominal tenderness.   Musculoskeletal:      Comments: + marked scoliosis, kyphosis.  + muscle wasting throughout  No spinal tenderness.  Mild paralumbar muscular tenderness which improves with massage.   Neurological:      Mental Status: She is alert.   Psychiatric:         Mood and Affect: Mood normal.         Behavior: Behavior normal.      Comments: Oriented to the month and year, misses the day and date..  Oriented to place.              Admission on 05/24/2022, Discharged on 05/24/2022   Component Date Value Ref Range Status     Sodium 05/24/2022 140  133 - 144 mmol/L Final     Potassium 05/24/2022 3.3 (A) 3.4 - 5.3 mmol/L Final     Chloride 05/24/2022 104  94 - 109 mmol/L Final     Carbon Dioxide (CO2) 05/24/2022 29  20 - 32 mmol/L Final     Anion Gap 05/24/2022 7  3 - 14 mmol/L Final     Urea Nitrogen 05/24/2022 12  7 - 30 mg/dL Final     Creatinine 05/24/2022 0.57  0.52 - 1.04 mg/dL Final     Calcium 05/24/2022 9.4  8.5 - 10.1 mg/dL Final     Glucose 05/24/2022 101 (A) 70 - 99 mg/dL Final     Alkaline Phosphatase 05/24/2022 62  40 - 150 U/L Final     AST 05/24/2022 15  0 - 45 U/L Final     ALT 05/24/2022 13  0 - 50 U/L Final     Protein Total 05/24/2022 7.2  6.8 - 8.8 g/dL Final     Albumin 05/24/2022 3.5  3.4 - 5.0 g/dL Final     Bilirubin Total 05/24/2022 0.6  0.2 - 1.3 mg/dL Final     GFR Estimate 05/24/2022 85  >60 mL/min/1.73m2 Final    Effective December 21, 2021 eGFRcr in adults is calculated using the 2021 CKD-EPI creatinine equation which includes age and gender (Montse et al., NEJM, DOI: 10.1056/QXWHpw4918998)     Color Urine 05/24/2022 Straw  Colorless, Straw, Light Yellow, Yellow Final     Appearance Urine  05/24/2022 Clear  Clear Final     Glucose Urine 05/24/2022 Negative  Negative mg/dL Final     Bilirubin Urine 05/24/2022 Negative  Negative Final     Ketones Urine 05/24/2022 Negative  Negative mg/dL Final     Specific Gravity Urine 05/24/2022 1.007  1.003 - 1.035 Final     Blood Urine 05/24/2022 Negative  Negative Final     pH Urine 05/24/2022 7.5 (A) 5.0 - 7.0 Final     Protein Albumin Urine 05/24/2022 Negative  Negative mg/dL Final     Urobilinogen Urine 05/24/2022 Normal  Normal, 2.0 mg/dL Final     Nitrite Urine 05/24/2022 Negative  Negative Final     Leukocyte Esterase Urine 05/24/2022 Negative  Negative Final     Bacteria Urine 05/24/2022 Few (A) None Seen /HPF Final     RBC Urine 05/24/2022 1  <=2 /HPF Final     WBC Urine 05/24/2022 <1  <=5 /HPF Final     Squamous Epithelials Urine 05/24/2022 1  <=1 /HPF Final     Transitional Epithelials Urine 05/24/2022 <1  <=1 /HPF Final     WBC Count 05/24/2022 8.7  4.0 - 11.0 10e3/uL Final     RBC Count 05/24/2022 4.67  3.80 - 5.20 10e6/uL Final     Hemoglobin 05/24/2022 13.9  11.7 - 15.7 g/dL Final     Hematocrit 05/24/2022 42.8  35.0 - 47.0 % Final     MCV 05/24/2022 92  78 - 100 fL Final     MCH 05/24/2022 29.8  26.5 - 33.0 pg Final     MCHC 05/24/2022 32.5  31.5 - 36.5 g/dL Final     RDW 05/24/2022 13.6  10.0 - 15.0 % Final     Platelet Count 05/24/2022 237  150 - 450 10e3/uL Final     % Neutrophils 05/24/2022 73  % Final     % Lymphocytes 05/24/2022 16  % Final     % Monocytes 05/24/2022 6  % Final     % Eosinophils 05/24/2022 4  % Final     % Basophils 05/24/2022 1  % Final     % Immature Granulocytes 05/24/2022 0  % Final     NRBCs per 100 WBC 05/24/2022 0  <1 /100 Final     Absolute Neutrophils 05/24/2022 6.3  1.6 - 8.3 10e3/uL Final     Absolute Lymphocytes 05/24/2022 1.4  0.8 - 5.3 10e3/uL Final     Absolute Monocytes 05/24/2022 0.6  0.0 - 1.3 10e3/uL Final     Absolute Eosinophils 05/24/2022 0.4  0.0 - 0.7 10e3/uL Final     Absolute Basophils 05/24/2022 0.1   0.0 - 0.2 10e3/uL Final     Absolute Immature Granulocytes 05/24/2022 0.0  <=0.4 10e3/uL Final     Absolute NRBCs 05/24/2022 0.0  10e3/uL Final     Hold Specimen 05/24/2022 [facility]   Final     TSH 05/24/2022 1.04  0.40 - 4.00 mU/L Final                   .  ..

## 2022-09-17 ENCOUNTER — NURSE TRIAGE (OUTPATIENT)
Dept: NURSING | Facility: CLINIC | Age: 87
End: 2022-09-17

## 2022-09-17 ENCOUNTER — TELEPHONE (OUTPATIENT)
Dept: FAMILY MEDICINE | Facility: CLINIC | Age: 87
End: 2022-09-17

## 2022-09-17 DIAGNOSIS — I10 ESSENTIAL HYPERTENSION: ICD-10-CM

## 2022-09-17 RX ORDER — AMLODIPINE BESYLATE 2.5 MG/1
2.5 TABLET ORAL DAILY
Qty: 90 TABLET | Refills: 3 | Status: SHIPPED | OUTPATIENT
Start: 2022-09-17 | End: 2023-03-23

## 2022-09-18 NOTE — TELEPHONE ENCOUNTER
Call received from daughter, Lubna  Consent to Communicate on chart    She is not currently with the pt.   Pt added to the call via 3-way calling    Belia is completely out of her blood pressure medication - Amlodipine 2.5 mg  Her Last dose ~2 days ago - Maybe Wed 9/14 or Thu 9/15    Lubna just found out about this tonight during a routine call with her mother.  Belia has not received her last refill of the medication from the [facility]    ~7:30 pm - Her BP = 196/111 (taken by staff at her facility)  Denies chest pain, shortness of breath, dizziness/lightheadedness, headache, blurred vision    9:02 pm - Warm transferred Malinta's clinic page , Christen, to speak to on-call provider. Lubna knows of a pharmacy open near her mother - [facility] in the [place] area of [place]. There is also someone available to  the medication for her.    Eugenia Tejeda RN  [facility]      Additional Information    Negative: Difficult to awaken or acting confused (e.g., disoriented, slurred speech)    Negative: SEVERE difficulty breathing (e.g., struggling for each breath, speaks in single words)    Negative: [1] Weakness of the face, arm or leg on one side of the body AND [2] new-onset    Negative: [1] Numbness (i.e., loss of sensation) of the face, arm or leg on one side of the body AND [2] new-onset    Negative: [1] Chest pain lasts > 5 minutes AND [2] history of heart disease (i.e., heart attack, bypass surgery, angina, angioplasty, CHF)    Negative: [1] Chest pain AND [2] took nitrogylcerin AND [3] pain was not relieved    Negative: Sounds like a life-threatening emergency to the triager    Negative: Symptom is main concern (e.g., headache, chest pain)    Negative: Low blood pressure is main concern    Negative: [1] Systolic BP  >= 160 OR Diastolic >= 100 AND [2] cardiac or neurologic symptoms (e.g., chest pain, difficulty breathing, unsteady gait, blurred vision)    Negative: [1]  Pregnant 20 or more weeks (or postpartum < 6 weeks) AND [2] new hand or face swelling    Negative: [1] Pregnant 20 or more weeks (or postpartum < 6 weeks) AND [2] Systolic BP >= 160 OR Diastolic >= 100    Negative: [1] Systolic BP  >= 200 OR Diastolic >= 120 AND [2] having NO cardiac or neurologic symptoms    Negative: [1] Pregnant 20 or more weeks (or postpartum < 6 weeks) AND [2] Systolic BP  >= 140 OR Diastolic >= 90    Protocols used: BLOOD PRESSURE - HIGH-A-AH

## 2022-09-18 NOTE — TELEPHONE ENCOUNTER
Message Return  9/17/2022  9:11 PM    Message returned by Yen King MD    Patient: Sudeep Sheets   Phone number-  [phone] (home)       Phone conversation with: daughter    Patient has a history of hypertension, currently on 2.5mg amlodipine daily. Per daughter, patient is out of medication and has not taken it in 3 days. Lives in assisted living, and RN reported current blood pressure of 196/111, without any symptoms.    Ordered refill of [facility]lodipine to [facility]. Advised daughter to bring patient to the ED if she develops headache, nausea, vomiting, chest pain or shortness of breath. Otherwise resume daily BP medication.     Yen King MD

## 2022-09-19 NOTE — NURSING NOTE
Chief Complaint   Patient presents with     Blood Draw     labs drawn with piv start by rn.  vs taken     Labs drawn with PIV start by rn.  Pt tolerated well.  VS taken and pt checked in for next appt.    Coni Murphy RN     Pt arrived to ED from home via EMS C/O AMS. EMS reports pt's daughter found pt in home with bruise on L eye and mental status altered. Last known well Friday. Pt is A&Ox2. Pt states she has pain in her neck for ~5-6mo that's 4/10. CMS intact. Resp even and unlabored. Pt not on thinners.

## 2022-10-30 ENCOUNTER — HEALTH MAINTENANCE LETTER (OUTPATIENT)
Age: 87
End: 2022-10-30

## 2022-11-02 ENCOUNTER — DOCUMENTATION ONLY (OUTPATIENT)
Dept: FAMILY MEDICINE | Facility: CLINIC | Age: 87
End: 2022-11-02

## 2022-11-02 NOTE — PROGRESS NOTES
"When opening a documentation only encounter, be sure to enter in \"Chief Complaint\" Forms and in \" Comments\" Title of form, description if needed.    Sudeep is a 89 year old  female  Form received via: Fax  Form now resides in: Provider Ready    Angeline Barnes RN                  "

## 2022-11-03 ENCOUNTER — OFFICE VISIT (OUTPATIENT)
Dept: FAMILY MEDICINE | Facility: CLINIC | Age: 87
End: 2022-11-03
Payer: COMMERCIAL

## 2022-11-03 VITALS
OXYGEN SATURATION: 95 % | SYSTOLIC BLOOD PRESSURE: 138 MMHG | BODY MASS INDEX: 19.39 KG/M2 | RESPIRATION RATE: 16 BRPM | TEMPERATURE: 97.6 F | DIASTOLIC BLOOD PRESSURE: 90 MMHG | WEIGHT: 94.4 LBS | HEART RATE: 87 BPM

## 2022-11-03 DIAGNOSIS — R41.0 CONFUSION: Primary | ICD-10-CM

## 2022-11-03 DIAGNOSIS — Z13.6 ENCOUNTER FOR SCREENING FOR CARDIOVASCULAR DISORDERS: ICD-10-CM

## 2022-11-03 LAB
ALBUMIN SERPL BCG-MCNC: 3.9 G/DL (ref 3.5–5.2)
ALP SERPL-CCNC: 55 U/L (ref 35–104)
ALT SERPL W P-5'-P-CCNC: 6 U/L (ref 10–35)
ANION GAP SERPL CALCULATED.3IONS-SCNC: 16 MMOL/L (ref 7–15)
AST SERPL W P-5'-P-CCNC: 29 U/L (ref 10–35)
BILIRUB SERPL-MCNC: 0.6 MG/DL
BUN SERPL-MCNC: 15.8 MG/DL (ref 8–23)
CALCIUM SERPL-MCNC: 9.9 MG/DL (ref 8.2–9.6)
CHLORIDE SERPL-SCNC: 101 MMOL/L (ref 98–107)
CHOLEST SERPL-MCNC: 200 MG/DL
CREAT SERPL-MCNC: 0.6 MG/DL (ref 0.51–0.95)
DEPRECATED HCO3 PLAS-SCNC: 25 MMOL/L (ref 22–29)
ERYTHROCYTE [DISTWIDTH] IN BLOOD BY AUTOMATED COUNT: 13.5 % (ref 10–15)
GFR SERPL CREATININE-BSD FRML MDRD: 84 ML/MIN/1.73M2
GLUCOSE SERPL-MCNC: 155 MG/DL (ref 70–99)
HCT VFR BLD AUTO: 42.6 % (ref 35–47)
HDLC SERPL-MCNC: 78 MG/DL
HGB BLD-MCNC: 13.8 G/DL (ref 11.7–15.7)
HOLD SPECIMEN: NORMAL
LDLC SERPL CALC-MCNC: 111 MG/DL
MCH RBC QN AUTO: 30.5 PG (ref 26.5–33)
MCHC RBC AUTO-ENTMCNC: 32.4 G/DL (ref 31.5–36.5)
MCV RBC AUTO: 94 FL (ref 78–100)
NONHDLC SERPL-MCNC: 122 MG/DL
PLATELET # BLD AUTO: 277 10E3/UL (ref 150–450)
POTASSIUM SERPL-SCNC: 3.3 MMOL/L (ref 3.4–5.3)
PROT SERPL-MCNC: 6.8 G/DL (ref 6.4–8.3)
RBC # BLD AUTO: 4.52 10E6/UL (ref 3.8–5.2)
SODIUM SERPL-SCNC: 142 MMOL/L (ref 136–145)
TRIGL SERPL-MCNC: 56 MG/DL
TSH SERPL DL<=0.005 MIU/L-ACNC: 1.52 UIU/ML (ref 0.3–4.2)
VIT B12 SERPL-MCNC: 699 PG/ML (ref 232–1245)
WBC # BLD AUTO: 7.2 10E3/UL (ref 4–11)

## 2022-11-03 PROCEDURE — 82607 VITAMIN B-12: CPT | Performed by: STUDENT IN AN ORGANIZED HEALTH CARE EDUCATION/TRAINING PROGRAM

## 2022-11-03 PROCEDURE — 84443 ASSAY THYROID STIM HORMONE: CPT | Performed by: STUDENT IN AN ORGANIZED HEALTH CARE EDUCATION/TRAINING PROGRAM

## 2022-11-03 PROCEDURE — 85027 COMPLETE CBC AUTOMATED: CPT | Performed by: STUDENT IN AN ORGANIZED HEALTH CARE EDUCATION/TRAINING PROGRAM

## 2022-11-03 PROCEDURE — 99215 OFFICE O/P EST HI 40 MIN: CPT | Mod: GC | Performed by: STUDENT IN AN ORGANIZED HEALTH CARE EDUCATION/TRAINING PROGRAM

## 2022-11-03 PROCEDURE — 80061 LIPID PANEL: CPT | Performed by: STUDENT IN AN ORGANIZED HEALTH CARE EDUCATION/TRAINING PROGRAM

## 2022-11-03 PROCEDURE — 36415 COLL VENOUS BLD VENIPUNCTURE: CPT | Performed by: STUDENT IN AN ORGANIZED HEALTH CARE EDUCATION/TRAINING PROGRAM

## 2022-11-03 PROCEDURE — 80053 COMPREHEN METABOLIC PANEL: CPT | Performed by: STUDENT IN AN ORGANIZED HEALTH CARE EDUCATION/TRAINING PROGRAM

## 2022-11-03 NOTE — PROGRESS NOTES
Assessment & Plan     Confusion  Patient presenting with 4 days of increased confusion.  Witnessed by multiple people.  She is reportedly drinking and eating mostly at baseline although workers are having to come remind her to come get meals she is still drinking lots of cranberry juice.  No abdominal pain, pelvic pain, pain with urination that she remembers, fevers, chills, cough, runny nose.  Her symptoms did reportedly improve a little bit this morning compared to the last 3 days.  This may have been a mild upper respiratory infection or other infection that primarily presented as encephalopathy.  I do not see neurological findings consistent with stroke and we are 4 days out from onset. Will start with labs, and call daughter with results. If she continues to improve over the weekend will pursue no further. If stable or worsening, will advise MRI. Hospital precautions discussed, given risk of delirium would avoid hospital if at all possible- do not feel it is indicated at this time. Will follow up in 4 days to check on symptoms.   - CBC with Platelets and Reflex to Iron Studies; Future  - Comprehensive metabolic panel; Future  - TSH with free T4 reflex; Future  - Lipid panel reflex to direct LDL Non-fasting; Future  - UA reflex to Microscopic and Culture; Future  - CBC with Platelets and Reflex to Iron Studies  - Comprehensive metabolic panel  - TSH with free T4 reflex  - Lipid panel reflex to direct LDL Non-fasting  - Vitamin B12; Future    Encounter for screening for cardiovascular disorders  Baseline obtained. Due for checks, as patient is a difficult stick and we are getting labs for confusion.   - Lipid panel reflex to direct LDL Non-fasting; Future  - Lipid panel reflex to direct LDL Non-fasting    Return in about 1 week (around 11/10/2022) for RN call.    Marina Pastrana MD  [facility] ANTHONY Sheets is a 89 year old accompanied by her daughter, presenting for the  following health issues:  RECHECK (Pt reports increased confusion and disorientation)      HPI     Patient with 4 days of increasing confusion reported from [facility] workers where she lives to her daughters.  She always has baseline confusion and this does seem worse and different than usual.  Standing in the elevator riding up and down, not sure where she is going then when asked confabulates and reports that she is going to see her uncle in the hospital and is waiting to get off on the right floor.  Walking into rooms and not knowing why she is there, seems to be forgetting to eat and drink.  She is in control of her own medications however there is no medication that she is on that would lead to the symptoms, she is denied any fevers or physical complaints to staff.  She is dependent on a walker and would not be able to get up herself if she fell, no signs of falling.  She has not recently been ill or had any symptoms for which she got as needed medications that are not on her list (like Benadryl or other allergy medications).  While there is always someone with COVID in her facility she has not had any friends Who have been ill.     Review of Systems   See HPI      Objective    BP (!) 138/90   Pulse 87   Temp 97.6  F (36.4  C) (Oral)   Resp 16   Wt 42.8 kg (94 lb 6.4 oz)   SpO2 95%   BMI 19.39 kg/m    Body mass index is 19.39 kg/m .  Physical Exam   Vital signs reviewed  Constitutional: Age appropriate human with misaligned buttons, no acute distress  HENT: Sclera non-icteric and not injected, pink conjunctivae, mm light pink and a little tacky, her partial bridge is not in which impacts the symmetric of her facial exam but muscle effort is symmetric. CN II-XII grossly intact.  Atraumatic  Cardiac: Regular rate  Resp: Speaking in full sentences on room air, no respiratory distress, lungs CTAB to the bases  Abd: Abdomen nondistended   Skin: Warm, dry, no ecchymoses or excoriation  Msk:  Ambulates with walker which is baseline, full range of gesture, wrists hands elbows shoulders ankles knees nontender no swelling or signs of trauma  Neuro: Alert and oriented to self, birthday, month, year, location, situation; knows she doesn't understand what is going on but feels her thinking is otherwise clear, movements coordinated and symmetric, appropriate shuffling slow gait that is baseline per daughter  Psych: Pleasantly confused, Affect appropriate to conversation and situation       Results for orders placed or performed in visit on 11/03/22 (from the past 24 hour(s))   CBC with Platelets and Reflex to Iron Studies    Narrative    The following orders were created for panel order CBC with Platelets and Reflex to Iron Studies.  Procedure                               Abnormality         Status                     ---------                               -----------         ------                     CBC with Platelets and R...[[id number]]                      In process                 Extra Green Top (Lithium...[[id number]]                      In process                   Please view results for these tests on the individual orders.

## 2022-11-05 ENCOUNTER — APPOINTMENT (OUTPATIENT)
Dept: GENERAL RADIOLOGY | Facility: CLINIC | Age: 87
DRG: 065 | End: 2022-11-05
Attending: EMERGENCY MEDICINE
Payer: COMMERCIAL

## 2022-11-05 ENCOUNTER — APPOINTMENT (OUTPATIENT)
Dept: CT IMAGING | Facility: CLINIC | Age: 87
DRG: 065 | End: 2022-11-05
Attending: EMERGENCY MEDICINE
Payer: COMMERCIAL

## 2022-11-05 ENCOUNTER — HOSPITAL ENCOUNTER (INPATIENT)
Facility: CLINIC | Age: 87
LOS: 3 days | Discharge: NURSING, REHAB OR LONG-TERM CARE | DRG: 065 | End: 2022-11-09
Attending: EMERGENCY MEDICINE | Admitting: PSYCHIATRY & NEUROLOGY
Payer: COMMERCIAL

## 2022-11-05 ENCOUNTER — TELEPHONE (OUTPATIENT)
Dept: FAMILY MEDICINE | Facility: CLINIC | Age: 87
End: 2022-11-05

## 2022-11-05 DIAGNOSIS — I63.432 CEREBROVASCULAR ACCIDENT (CVA) DUE TO EMBOLISM OF LEFT POSTERIOR CEREBRAL ARTERY (H): Primary | ICD-10-CM

## 2022-11-05 DIAGNOSIS — M54.50 CHRONIC MIDLINE LOW BACK PAIN WITHOUT SCIATICA: ICD-10-CM

## 2022-11-05 DIAGNOSIS — G89.29 CHRONIC MIDLINE LOW BACK PAIN WITHOUT SCIATICA: ICD-10-CM

## 2022-11-05 DIAGNOSIS — M54.50 LOW BACK PAIN WITHOUT SCIATICA, UNSPECIFIED BACK PAIN LATERALITY, UNSPECIFIED CHRONICITY: ICD-10-CM

## 2022-11-05 DIAGNOSIS — Z11.52 ENCOUNTER FOR SCREENING LABORATORY TESTING FOR SEVERE ACUTE RESPIRATORY SYNDROME CORONAVIRUS 2 (SARS-COV-2): ICD-10-CM

## 2022-11-05 DIAGNOSIS — R41.0 CONFUSION: ICD-10-CM

## 2022-11-05 LAB
ALBUMIN SERPL-MCNC: 3.5 G/DL (ref 3.4–5)
ALP SERPL-CCNC: 64 U/L (ref 40–150)
ALT SERPL W P-5'-P-CCNC: 14 U/L (ref 0–50)
ANION GAP SERPL CALCULATED.3IONS-SCNC: 15 MMOL/L (ref 3–14)
APTT PPP: 24 SECONDS (ref 22–38)
AST SERPL W P-5'-P-CCNC: 27 U/L (ref 0–45)
BASOPHILS # BLD AUTO: 0.1 10E3/UL (ref 0–0.2)
BASOPHILS NFR BLD AUTO: 0 %
BILIRUB SERPL-MCNC: 1.1 MG/DL (ref 0.2–1.3)
BUN SERPL-MCNC: 19 MG/DL (ref 7–30)
CALCIUM SERPL-MCNC: 9.7 MG/DL (ref 8.5–10.1)
CHLORIDE BLD-SCNC: 99 MMOL/L (ref 94–109)
CO2 SERPL-SCNC: 26 MMOL/L (ref 20–32)
CREAT SERPL-MCNC: 0.69 MG/DL (ref 0.52–1.04)
EOSINOPHIL # BLD AUTO: 0.1 10E3/UL (ref 0–0.7)
EOSINOPHIL NFR BLD AUTO: 1 %
ERYTHROCYTE [DISTWIDTH] IN BLOOD BY AUTOMATED COUNT: 13.4 % (ref 10–15)
GFR SERPL CREATININE-BSD FRML MDRD: 81 ML/MIN/1.73M2
GLUCOSE BLD-MCNC: 102 MG/DL (ref 70–99)
HCT VFR BLD AUTO: 42.8 % (ref 35–47)
HGB BLD-MCNC: 14.1 G/DL (ref 11.7–15.7)
HOLD SPECIMEN: NORMAL
IMM GRANULOCYTES # BLD: 0.1 10E3/UL
IMM GRANULOCYTES NFR BLD: 1 %
INR PPP: 1.1 (ref 0.85–1.15)
LACTATE SERPL-SCNC: 1.6 MMOL/L (ref 0.7–2)
LYMPHOCYTES # BLD AUTO: 1.5 10E3/UL (ref 0.8–5.3)
LYMPHOCYTES NFR BLD AUTO: 13 %
MCH RBC QN AUTO: 30.7 PG (ref 26.5–33)
MCHC RBC AUTO-ENTMCNC: 32.9 G/DL (ref 31.5–36.5)
MCV RBC AUTO: 93 FL (ref 78–100)
MONOCYTES # BLD AUTO: 0.9 10E3/UL (ref 0–1.3)
MONOCYTES NFR BLD AUTO: 8 %
NEUTROPHILS # BLD AUTO: 8.9 10E3/UL (ref 1.6–8.3)
NEUTROPHILS NFR BLD AUTO: 77 %
NRBC # BLD AUTO: 0 10E3/UL
NRBC BLD AUTO-RTO: 0 /100
PLATELET # BLD AUTO: 251 10E3/UL (ref 150–450)
POTASSIUM BLD-SCNC: 3.5 MMOL/L (ref 3.4–5.3)
PROT SERPL-MCNC: 7.7 G/DL (ref 6.8–8.8)
RBC # BLD AUTO: 4.59 10E6/UL (ref 3.8–5.2)
SODIUM SERPL-SCNC: 140 MMOL/L (ref 133–144)
WBC # BLD AUTO: 11.5 10E3/UL (ref 4–11)

## 2022-11-05 PROCEDURE — 36415 COLL VENOUS BLD VENIPUNCTURE: CPT | Performed by: EMERGENCY MEDICINE

## 2022-11-05 PROCEDURE — 99285 EMERGENCY DEPT VISIT HI MDM: CPT | Performed by: EMERGENCY MEDICINE

## 2022-11-05 PROCEDURE — 85730 THROMBOPLASTIN TIME PARTIAL: CPT | Performed by: EMERGENCY MEDICINE

## 2022-11-05 PROCEDURE — 70450 CT HEAD/BRAIN W/O DYE: CPT

## 2022-11-05 PROCEDURE — 70450 CT HEAD/BRAIN W/O DYE: CPT | Mod: 26 | Performed by: RADIOLOGY

## 2022-11-05 PROCEDURE — 80053 COMPREHEN METABOLIC PANEL: CPT | Performed by: EMERGENCY MEDICINE

## 2022-11-05 PROCEDURE — 71046 X-RAY EXAM CHEST 2 VIEWS: CPT | Mod: 26 | Performed by: RADIOLOGY

## 2022-11-05 PROCEDURE — 93005 ELECTROCARDIOGRAM TRACING: CPT | Performed by: EMERGENCY MEDICINE

## 2022-11-05 PROCEDURE — 83605 ASSAY OF LACTIC ACID: CPT | Performed by: EMERGENCY MEDICINE

## 2022-11-05 PROCEDURE — 71046 X-RAY EXAM CHEST 2 VIEWS: CPT

## 2022-11-05 PROCEDURE — 85025 COMPLETE CBC W/AUTO DIFF WBC: CPT | Performed by: EMERGENCY MEDICINE

## 2022-11-05 PROCEDURE — 99285 EMERGENCY DEPT VISIT HI MDM: CPT | Mod: 25 | Performed by: EMERGENCY MEDICINE

## 2022-11-05 PROCEDURE — 85610 PROTHROMBIN TIME: CPT | Performed by: EMERGENCY MEDICINE

## 2022-11-05 PROCEDURE — 87040 BLOOD CULTURE FOR BACTERIA: CPT | Performed by: EMERGENCY MEDICINE

## 2022-11-05 PROCEDURE — C9803 HOPD COVID-19 SPEC COLLECT: HCPCS | Performed by: EMERGENCY MEDICINE

## 2022-11-05 ASSESSMENT — ACTIVITIES OF DAILY LIVING (ADL)
ADLS_ACUITY_SCORE: 35
ADLS_ACUITY_SCORE: 35

## 2022-11-05 NOTE — LETTER
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 [M54.50, G89.29]  Stroke (H) [I63.9]  Admit Date/Time: 2022  7:17 PM  Discharge Date: 22 @ 1:00PM  Payor Source: Payor: [facility] / Plan: ARE MEDICARE / Product Type: HMO /          Reason for Communication Hand-off Referral: Other care coordination    Discharge Plan:       Concern for non-adherence with plan of care:   Y/N Y  Discharge Needs Assessment:  Needs    Flowsheet Row Most Recent Value   Equipment Currently Used at Home walker, rolling  [4WW]          Already enrolled in Tele-monitoring program and name of program:  Y  Follow-up specialty is recommended: Yes    Follow-up plan:    Future Appointments   Date Time Provider Department Center   2022  2:45 PM Gini Gtz OT [facility]   2023  1:30 PM Saurabh Pittman MD [facility]       Any outstanding tests or procedures:              Key Recommendations:      ALEXX Rivera    AVS/Discharge Summary is the source of truth; this is a helpful guide for improved communication of patient story

## 2022-11-05 NOTE — TELEPHONE ENCOUNTER
Telephone Message     11/5/2022  3:17 PM    Call returned by Meliza Ford MD    Patient: Sudeep Sheets   Phone number-  [phone] (home)       return their call  Phone conversation with: daughterLubna    Situation: Sudeep Pugh is a 89 year old  Female. This call is regarding worsening confusion over the weekend.     Background: Patient with several months to years of underlying cognitive impairment was seen 2 days ago by Dr. Molina for acute confusion that was different than baseline. She had a normal neurologic exam at that appointment and no symptoms suggestion an infection. Labs were ordered--CMP, TSH, lipids, CBC--all were grossly unremarkable, no suggestive etiology for confusion. Unfortunately, the UA was ordered but not obtained so a UTI has not been ruled out. She does have a history of a TIA about a decade ago but no CVA concerns since then. Current symptoms are mostly confusion, including illogical statements and confabulation. Patient was also found sitting in own feces today and had not alerted staff at her current residence. Given that it's late afternoon on the weekend and she is having worsening confusion, I am concerned she should be evaluated more urgently for CVA, infection, and other acute etiologies of confusion.    Assessment: Worsening acute on chronic confusion in 89 year old with history of TIA.    Recommendation/Plan: Recommended family bring patient to ED for evaluation and brain imaging.     Meliza Ford MD  [facility], PGY-2

## 2022-11-05 NOTE — ED TRIAGE NOTES
Pt arrives accompanied by daughter with complaints of increased confusion over the past week. Pt was seen at [facility] where labs were drawn at that time. Today pt endorses new onset of back pain. Per daughter pt has confusion at baseline, however this is worse then normal.

## 2022-11-06 ENCOUNTER — APPOINTMENT (OUTPATIENT)
Dept: MRI IMAGING | Facility: CLINIC | Age: 87
DRG: 065 | End: 2022-11-06
Payer: COMMERCIAL

## 2022-11-06 ENCOUNTER — APPOINTMENT (OUTPATIENT)
Dept: NEUROLOGY | Facility: CLINIC | Age: 87
DRG: 065 | End: 2022-11-06
Attending: STUDENT IN AN ORGANIZED HEALTH CARE EDUCATION/TRAINING PROGRAM
Payer: COMMERCIAL

## 2022-11-06 ENCOUNTER — APPOINTMENT (OUTPATIENT)
Dept: GENERAL RADIOLOGY | Facility: CLINIC | Age: 87
DRG: 065 | End: 2022-11-06
Payer: COMMERCIAL

## 2022-11-06 ENCOUNTER — APPOINTMENT (OUTPATIENT)
Dept: CT IMAGING | Facility: CLINIC | Age: 87
DRG: 065 | End: 2022-11-06
Attending: STUDENT IN AN ORGANIZED HEALTH CARE EDUCATION/TRAINING PROGRAM
Payer: COMMERCIAL

## 2022-11-06 PROBLEM — I63.9 STROKE (H): Status: ACTIVE | Noted: 2022-11-06

## 2022-11-06 LAB
ALBUMIN SERPL BCG-MCNC: 3 G/DL (ref 3.5–5.2)
ALBUMIN UR-MCNC: 30 MG/DL
ALP SERPL-CCNC: 48 U/L (ref 35–104)
ALT SERPL W P-5'-P-CCNC: <5 U/L (ref 10–35)
ANION GAP SERPL CALCULATED.3IONS-SCNC: 12 MMOL/L (ref 7–15)
APPEARANCE UR: CLEAR
AST SERPL W P-5'-P-CCNC: 15 U/L (ref 10–35)
ATRIAL RATE - MUSE: 71 BPM
BASOPHILS # BLD AUTO: 0.1 10E3/UL (ref 0–0.2)
BASOPHILS NFR BLD AUTO: 1 %
BILIRUB SERPL-MCNC: 0.8 MG/DL
BILIRUB UR QL STRIP: NEGATIVE
BUN SERPL-MCNC: 15.8 MG/DL (ref 8–23)
C PNEUM DNA SPEC QL NAA+PROBE: NOT DETECTED
CALCIUM SERPL-MCNC: 8.9 MG/DL (ref 8.2–9.6)
CHLORIDE SERPL-SCNC: 102 MMOL/L (ref 98–107)
COLOR UR AUTO: YELLOW
CREAT SERPL-MCNC: 0.61 MG/DL (ref 0.51–0.95)
CRP SERPL-MCNC: 68.9 MG/L
DEPRECATED HCO3 PLAS-SCNC: 26 MMOL/L (ref 22–29)
DIASTOLIC BLOOD PRESSURE - MUSE: NORMAL MMHG
EOSINOPHIL # BLD AUTO: 0.1 10E3/UL (ref 0–0.7)
EOSINOPHIL NFR BLD AUTO: 2 %
ERYTHROCYTE [DISTWIDTH] IN BLOOD BY AUTOMATED COUNT: 13.4 % (ref 10–15)
FLUAV H1 2009 PAND RNA SPEC QL NAA+PROBE: NOT DETECTED
FLUAV H1 RNA SPEC QL NAA+PROBE: NOT DETECTED
FLUAV H3 RNA SPEC QL NAA+PROBE: NOT DETECTED
FLUAV RNA SPEC QL NAA+PROBE: NOT DETECTED
FLUBV RNA SPEC QL NAA+PROBE: NOT DETECTED
GFR SERPL CREATININE-BSD FRML MDRD: 83 ML/MIN/1.73M2
GLUCOSE BLDC GLUCOMTR-MCNC: 163 MG/DL (ref 70–99)
GLUCOSE SERPL-MCNC: 92 MG/DL (ref 70–99)
GLUCOSE UR STRIP-MCNC: NEGATIVE MG/DL
HADV DNA SPEC QL NAA+PROBE: NOT DETECTED
HBA1C MFR BLD: 5.6 %
HCOV PNL SPEC NAA+PROBE: NOT DETECTED
HCT VFR BLD AUTO: 40.8 % (ref 35–47)
HGB BLD-MCNC: 13 G/DL (ref 11.7–15.7)
HGB UR QL STRIP: NEGATIVE
HMPV RNA SPEC QL NAA+PROBE: NOT DETECTED
HOLD SPECIMEN: NORMAL
HPIV1 RNA SPEC QL NAA+PROBE: NOT DETECTED
HPIV2 RNA SPEC QL NAA+PROBE: NOT DETECTED
HPIV3 RNA SPEC QL NAA+PROBE: NOT DETECTED
HPIV4 RNA SPEC QL NAA+PROBE: NOT DETECTED
HYALINE CASTS: 7 /LPF
IMM GRANULOCYTES # BLD: 0 10E3/UL
IMM GRANULOCYTES NFR BLD: 0 %
INTERPRETATION ECG - MUSE: NORMAL
KETONES UR STRIP-MCNC: 20 MG/DL
LEUKOCYTE ESTERASE UR QL STRIP: NEGATIVE
LYMPHOCYTES # BLD AUTO: 1.6 10E3/UL (ref 0.8–5.3)
LYMPHOCYTES NFR BLD AUTO: 17 %
M PNEUMO DNA SPEC QL NAA+PROBE: NOT DETECTED
MAGNESIUM SERPL-MCNC: 1.5 MG/DL (ref 1.7–2.3)
MCH RBC QN AUTO: 30.3 PG (ref 26.5–33)
MCHC RBC AUTO-ENTMCNC: 31.9 G/DL (ref 31.5–36.5)
MCV RBC AUTO: 95 FL (ref 78–100)
MONOCYTES # BLD AUTO: 0.8 10E3/UL (ref 0–1.3)
MONOCYTES NFR BLD AUTO: 8 %
MUCOUS THREADS #/AREA URNS LPF: PRESENT /LPF
NEUTROPHILS # BLD AUTO: 7 10E3/UL (ref 1.6–8.3)
NEUTROPHILS NFR BLD AUTO: 72 %
NITRATE UR QL: NEGATIVE
NRBC # BLD AUTO: 0 10E3/UL
NRBC BLD AUTO-RTO: 0 /100
P AXIS - MUSE: 93 DEGREES
PH UR STRIP: 5.5 [PH] (ref 5–7)
PHOSPHATE SERPL-MCNC: 3.6 MG/DL (ref 2.5–4.5)
PLATELET # BLD AUTO: 160 10E3/UL (ref 150–450)
POTASSIUM SERPL-SCNC: 3.1 MMOL/L (ref 3.4–5.3)
POTASSIUM SERPL-SCNC: 3.2 MMOL/L (ref 3.4–5.3)
PR INTERVAL - MUSE: 192 MS
PROCALCITONIN SERPL IA-MCNC: 0.08 NG/ML
PROT SERPL-MCNC: 5.6 G/DL (ref 6.4–8.3)
QRS DURATION - MUSE: 92 MS
QT - MUSE: 406 MS
QTC - MUSE: 441 MS
R AXIS - MUSE: -4 DEGREES
RADIOLOGIST FLAGS: ABNORMAL
RBC # BLD AUTO: 4.29 10E6/UL (ref 3.8–5.2)
RBC URINE: 1 /HPF
RSV RNA SPEC QL NAA+PROBE: NOT DETECTED
RSV RNA SPEC QL NAA+PROBE: NOT DETECTED
RV+EV RNA SPEC QL NAA+PROBE: NOT DETECTED
SARS-COV-2 RNA RESP QL NAA+PROBE: NEGATIVE
SODIUM SERPL-SCNC: 140 MMOL/L (ref 136–145)
SP GR UR STRIP: 1.02 (ref 1–1.03)
SQUAMOUS EPITHELIAL: <1 /HPF
SYSTOLIC BLOOD PRESSURE - MUSE: NORMAL MMHG
T AXIS - MUSE: 57 DEGREES
TROPONIN T SERPL HS-MCNC: 19 NG/L
TROPONIN T SERPL HS-MCNC: 21 NG/L
TROPONIN T SERPL HS-MCNC: 22 NG/L
TSH SERPL DL<=0.005 MIU/L-ACNC: 0.82 UIU/ML (ref 0.3–4.2)
UROBILINOGEN UR STRIP-MCNC: 2 MG/DL
VENTRICULAR RATE- MUSE: 71 BPM
WBC # BLD AUTO: 9.6 10E3/UL (ref 4–11)
WBC URINE: 2 /HPF

## 2022-11-06 PROCEDURE — 258N000003 HC RX IP 258 OP 636: Performed by: PHYSICIAN ASSISTANT

## 2022-11-06 PROCEDURE — 87486 CHLMYD PNEUM DNA AMP PROBE: CPT | Performed by: PHYSICIAN ASSISTANT

## 2022-11-06 PROCEDURE — 36415 COLL VENOUS BLD VENIPUNCTURE: CPT | Performed by: STUDENT IN AN ORGANIZED HEALTH CARE EDUCATION/TRAINING PROGRAM

## 2022-11-06 PROCEDURE — 84132 ASSAY OF SERUM POTASSIUM: CPT

## 2022-11-06 PROCEDURE — 255N000002 HC RX 255 OP 636

## 2022-11-06 PROCEDURE — A9585 GADOBUTROL INJECTION: HCPCS

## 2022-11-06 PROCEDURE — 86140 C-REACTIVE PROTEIN: CPT | Performed by: PHYSICIAN ASSISTANT

## 2022-11-06 PROCEDURE — 70553 MRI BRAIN STEM W/O & W/DYE: CPT

## 2022-11-06 PROCEDURE — 70498 CT ANGIOGRAPHY NECK: CPT | Mod: 26 | Performed by: RADIOLOGY

## 2022-11-06 PROCEDURE — 84100 ASSAY OF PHOSPHORUS: CPT | Performed by: PHYSICIAN ASSISTANT

## 2022-11-06 PROCEDURE — 96374 THER/PROPH/DIAG INJ IV PUSH: CPT

## 2022-11-06 PROCEDURE — 70496 CT ANGIOGRAPHY HEAD: CPT | Mod: 26 | Performed by: RADIOLOGY

## 2022-11-06 PROCEDURE — G0378 HOSPITAL OBSERVATION PER HR: HCPCS

## 2022-11-06 PROCEDURE — 250N000013 HC RX MED GY IP 250 OP 250 PS 637: Performed by: STUDENT IN AN ORGANIZED HEALTH CARE EDUCATION/TRAINING PROGRAM

## 2022-11-06 PROCEDURE — 72080 X-RAY EXAM THORACOLMB 2/> VW: CPT

## 2022-11-06 PROCEDURE — U0003 INFECTIOUS AGENT DETECTION BY NUCLEIC ACID (DNA OR RNA); SEVERE ACUTE RESPIRATORY SYNDROME CORONAVIRUS 2 (SARS-COV-2) (CORONAVIRUS DISEASE [COVID-19]), AMPLIFIED PROBE TECHNIQUE, MAKING USE OF HIGH THROUGHPUT TECHNOLOGIES AS DESCRIBED BY CMS-2020-01-R: HCPCS | Performed by: PHYSICIAN ASSISTANT

## 2022-11-06 PROCEDURE — 84443 ASSAY THYROID STIM HORMONE: CPT | Performed by: PHYSICIAN ASSISTANT

## 2022-11-06 PROCEDURE — 85025 COMPLETE CBC W/AUTO DIFF WBC: CPT | Performed by: PHYSICIAN ASSISTANT

## 2022-11-06 PROCEDURE — 84145 PROCALCITONIN (PCT): CPT | Performed by: PHYSICIAN ASSISTANT

## 2022-11-06 PROCEDURE — 84484 ASSAY OF TROPONIN QUANT: CPT | Performed by: STUDENT IN AN ORGANIZED HEALTH CARE EDUCATION/TRAINING PROGRAM

## 2022-11-06 PROCEDURE — 70450 CT HEAD/BRAIN W/O DYE: CPT | Mod: 26 | Performed by: RADIOLOGY

## 2022-11-06 PROCEDURE — 80053 COMPREHEN METABOLIC PANEL: CPT | Performed by: PHYSICIAN ASSISTANT

## 2022-11-06 PROCEDURE — 72080 X-RAY EXAM THORACOLMB 2/> VW: CPT | Mod: 26 | Performed by: RADIOLOGY

## 2022-11-06 PROCEDURE — 250N000011 HC RX IP 250 OP 636: Performed by: STUDENT IN AN ORGANIZED HEALTH CARE EDUCATION/TRAINING PROGRAM

## 2022-11-06 PROCEDURE — 36415 COLL VENOUS BLD VENIPUNCTURE: CPT

## 2022-11-06 PROCEDURE — 83735 ASSAY OF MAGNESIUM: CPT | Performed by: PHYSICIAN ASSISTANT

## 2022-11-06 PROCEDURE — 120N000002 HC R&B MED SURG/OB UMMC

## 2022-11-06 PROCEDURE — 70496 CT ANGIOGRAPHY HEAD: CPT

## 2022-11-06 PROCEDURE — C9113 INJ PANTOPRAZOLE SODIUM, VIA: HCPCS | Performed by: PHYSICIAN ASSISTANT

## 2022-11-06 PROCEDURE — 70553 MRI BRAIN STEM W/O & W/DYE: CPT | Mod: 26 | Performed by: RADIOLOGY

## 2022-11-06 PROCEDURE — 250N000013 HC RX MED GY IP 250 OP 250 PS 637: Performed by: PHYSICIAN ASSISTANT

## 2022-11-06 PROCEDURE — 96375 TX/PRO/DX INJ NEW DRUG ADDON: CPT

## 2022-11-06 PROCEDURE — 250N000013 HC RX MED GY IP 250 OP 250 PS 637

## 2022-11-06 PROCEDURE — 36415 COLL VENOUS BLD VENIPUNCTURE: CPT | Performed by: PHYSICIAN ASSISTANT

## 2022-11-06 PROCEDURE — 81001 URINALYSIS AUTO W/SCOPE: CPT | Performed by: EMERGENCY MEDICINE

## 2022-11-06 PROCEDURE — 99222 1ST HOSP IP/OBS MODERATE 55: CPT | Mod: 25 | Performed by: PSYCHIATRY & NEUROLOGY

## 2022-11-06 PROCEDURE — 87086 URINE CULTURE/COLONY COUNT: CPT | Performed by: EMERGENCY MEDICINE

## 2022-11-06 PROCEDURE — 250N000011 HC RX IP 250 OP 636: Performed by: PHYSICIAN ASSISTANT

## 2022-11-06 PROCEDURE — 83036 HEMOGLOBIN GLYCOSYLATED A1C: CPT | Performed by: STUDENT IN AN ORGANIZED HEALTH CARE EDUCATION/TRAINING PROGRAM

## 2022-11-06 PROCEDURE — 70450 CT HEAD/BRAIN W/O DYE: CPT

## 2022-11-06 PROCEDURE — 84484 ASSAY OF TROPONIN QUANT: CPT | Performed by: PHYSICIAN ASSISTANT

## 2022-11-06 RX ORDER — CALCIUM CARBONATE/VITAMIN D3 600 MG-10
1 TABLET ORAL
Status: DISCONTINUED | OUTPATIENT
Start: 2022-11-06 | End: 2022-11-09 | Stop reason: HOSPADM

## 2022-11-06 RX ORDER — DEXTROSE MONOHYDRATE 25 G/50ML
25-50 INJECTION, SOLUTION INTRAVENOUS
Status: DISCONTINUED | OUTPATIENT
Start: 2022-11-06 | End: 2022-11-09 | Stop reason: HOSPADM

## 2022-11-06 RX ORDER — LIDOCAINE 4 G/G
2 PATCH TOPICAL
Status: DISCONTINUED | OUTPATIENT
Start: 2022-11-06 | End: 2022-11-09 | Stop reason: HOSPADM

## 2022-11-06 RX ORDER — ONDANSETRON 2 MG/ML
4 INJECTION INTRAMUSCULAR; INTRAVENOUS EVERY 6 HOURS PRN
Status: DISCONTINUED | OUTPATIENT
Start: 2022-11-06 | End: 2022-11-09 | Stop reason: HOSPADM

## 2022-11-06 RX ORDER — GADOBUTROL 604.72 MG/ML
0.1 INJECTION INTRAVENOUS ONCE
Status: COMPLETED | OUTPATIENT
Start: 2022-11-06 | End: 2022-11-06

## 2022-11-06 RX ORDER — SODIUM CHLORIDE 9 MG/ML
INJECTION, SOLUTION INTRAVENOUS CONTINUOUS
Status: DISCONTINUED | OUTPATIENT
Start: 2022-11-06 | End: 2022-11-06

## 2022-11-06 RX ORDER — POTASSIUM CHLORIDE 7.45 MG/ML
10 INJECTION INTRAVENOUS
Status: ACTIVE | OUTPATIENT
Start: 2022-11-06 | End: 2022-11-06

## 2022-11-06 RX ORDER — IOPAMIDOL 755 MG/ML
75 INJECTION, SOLUTION INTRAVASCULAR ONCE
Status: COMPLETED | OUTPATIENT
Start: 2022-11-06 | End: 2022-11-06

## 2022-11-06 RX ORDER — AMLODIPINE BESYLATE 2.5 MG/1
2.5 TABLET ORAL DAILY
Status: DISCONTINUED | OUTPATIENT
Start: 2022-11-06 | End: 2022-11-09 | Stop reason: HOSPADM

## 2022-11-06 RX ORDER — NICOTINE POLACRILEX 4 MG
15-30 LOZENGE BUCCAL
Status: DISCONTINUED | OUTPATIENT
Start: 2022-11-06 | End: 2022-11-09 | Stop reason: HOSPADM

## 2022-11-06 RX ORDER — ASPIRIN 81 MG/1
81 TABLET, CHEWABLE ORAL DAILY
Status: DISCONTINUED | OUTPATIENT
Start: 2022-11-06 | End: 2022-11-09 | Stop reason: HOSPADM

## 2022-11-06 RX ORDER — MAGNESIUM SULFATE HEPTAHYDRATE 40 MG/ML
2 INJECTION, SOLUTION INTRAVENOUS ONCE
Status: COMPLETED | OUTPATIENT
Start: 2022-11-06 | End: 2022-11-06

## 2022-11-06 RX ORDER — ONDANSETRON 4 MG/1
4 TABLET, ORALLY DISINTEGRATING ORAL EVERY 6 HOURS PRN
Status: DISCONTINUED | OUTPATIENT
Start: 2022-11-06 | End: 2022-11-09 | Stop reason: HOSPADM

## 2022-11-06 RX ORDER — ENOXAPARIN SODIUM 100 MG/ML
40 INJECTION SUBCUTANEOUS EVERY 24 HOURS
Status: DISCONTINUED | OUTPATIENT
Start: 2022-11-06 | End: 2022-11-09 | Stop reason: HOSPADM

## 2022-11-06 RX ORDER — LIDOCAINE 40 MG/G
CREAM TOPICAL
Status: CANCELLED | OUTPATIENT
Start: 2022-11-06

## 2022-11-06 RX ORDER — POTASSIUM CHLORIDE 750 MG/1
10 TABLET, EXTENDED RELEASE ORAL ONCE
Status: COMPLETED | OUTPATIENT
Start: 2022-11-06 | End: 2022-11-06

## 2022-11-06 RX ORDER — ATORVASTATIN CALCIUM 20 MG/1
20 TABLET, FILM COATED ORAL EVERY EVENING
Status: DISCONTINUED | OUTPATIENT
Start: 2022-11-06 | End: 2022-11-09 | Stop reason: HOSPADM

## 2022-11-06 RX ORDER — AMOXICILLIN 250 MG
1 CAPSULE ORAL 2 TIMES DAILY
Status: DISCONTINUED | OUTPATIENT
Start: 2022-11-06 | End: 2022-11-09 | Stop reason: HOSPADM

## 2022-11-06 RX ORDER — LIDOCAINE 40 MG/G
CREAM TOPICAL
Status: DISCONTINUED | OUTPATIENT
Start: 2022-11-06 | End: 2022-11-09 | Stop reason: HOSPADM

## 2022-11-06 RX ORDER — ASPIRIN 81 MG/1
81 TABLET, CHEWABLE ORAL DAILY
Status: DISCONTINUED | OUTPATIENT
Start: 2022-11-06 | End: 2022-11-06

## 2022-11-06 RX ADMIN — GADOBUTROL 4.2 ML: 604.72 INJECTION INTRAVENOUS at 13:43

## 2022-11-06 RX ADMIN — CALCIUM CARBONATE 600 MG (1,500 MG)-VITAMIN D3 400 UNIT TABLET 1 TABLET: at 08:20

## 2022-11-06 RX ADMIN — MAGNESIUM SULFATE IN WATER 2 G: 40 INJECTION, SOLUTION INTRAVENOUS at 08:20

## 2022-11-06 RX ADMIN — SENNOSIDES AND DOCUSATE SODIUM 1 TABLET: 8.6; 5 TABLET ORAL at 19:25

## 2022-11-06 RX ADMIN — AMLODIPINE BESYLATE 2.5 MG: 2.5 TABLET ORAL at 08:20

## 2022-11-06 RX ADMIN — ENOXAPARIN SODIUM 40 MG: 40 INJECTION SUBCUTANEOUS at 19:25

## 2022-11-06 RX ADMIN — ATORVASTATIN CALCIUM 20 MG: 20 TABLET, FILM COATED ORAL at 19:25

## 2022-11-06 RX ADMIN — POTASSIUM CHLORIDE 10 MEQ: 750 TABLET, EXTENDED RELEASE ORAL at 08:20

## 2022-11-06 RX ADMIN — SENNOSIDES AND DOCUSATE SODIUM 1 TABLET: 8.6; 5 TABLET ORAL at 08:20

## 2022-11-06 RX ADMIN — PANTOPRAZOLE SODIUM 40 MG: 40 INJECTION, POWDER, FOR SOLUTION INTRAVENOUS at 08:21

## 2022-11-06 RX ADMIN — SODIUM CHLORIDE 500 ML: 9 INJECTION, SOLUTION INTRAVENOUS at 03:55

## 2022-11-06 RX ADMIN — IOPAMIDOL 75 ML: 755 INJECTION, SOLUTION INTRAVENOUS at 20:13

## 2022-11-06 RX ADMIN — ASPIRIN 81 MG CHEWABLE TABLET 81 MG: 81 TABLET CHEWABLE at 19:25

## 2022-11-06 RX ADMIN — POTASSIUM CHLORIDE 10 MEQ: 750 TABLET, EXTENDED RELEASE ORAL at 14:20

## 2022-11-06 ASSESSMENT — ACTIVITIES OF DAILY LIVING (ADL)
ADLS_ACUITY_SCORE: 43
ADLS_ACUITY_SCORE: 35
ADLS_ACUITY_SCORE: 35
ADLS_ACUITY_SCORE: 43
ADLS_ACUITY_SCORE: 51
ADLS_ACUITY_SCORE: 43

## 2022-11-06 NOTE — PLAN OF CARE
Goal Outcome Evaluation:    -vital signs normal or at patient baseline. Not met, HTN      -tolerating oral intake to maintain hydration. Not met, infusing 75 mL/hr NS     -adequate pain control on oral analgesics. Met      -tolerating oral antibiotics or has plans for home infusion setup. Not met      -infection is improving. Not met      -returns to baseline functional status. Not met      -safe disposition plan has been identified. Not met      -PT evaluation completed. Not met        25.8

## 2022-11-06 NOTE — PROGRESS NOTES
Care Management Follow Up    Length of Stay (days): 0    Expected Discharge Date: 11/07/2022     Concerns to be Addressed:     Vazquez document  Patient plan of care discussed at interdisciplinary rounds: Yes    Anticipated Discharge Disposition:  TBD     Anticipated Discharge Services:  TBD  Anticipated Discharge DME:  TBD    Patient/family educated on Medicare website which has current facility and service quality ratings:  N/A  Education Provided on the Discharge Plan:  N/A  Patient/Family in Agreement with the Plan:  N/A    Referrals Placed by CM/SW:  None at this time  Private pay costs discussed: insurance costs out of pocket expenses, co-pays and deductibles.     Additional Information:  2251  BONIFACIO met with pt at bedside, introduced self and explained the reason for the visit. Pt agreed to speak with SW. SW discuss and complete VAZQUEZ paperwork. Pt express she understood document, but when asked if she can verify her house address, she was not able to tell SW. SW encouraged patient to follow up with their insurance plan directly to receive the most accurate information. Patient signed VAZQUEZ form. SW fax Moon form to [facility] and placed form in the patient's chart.    BONIFACIO spoke with pt's daughter, Ciarra over the phone and informed her SW has given a copy of the VAZQUEZ document to pt. BONIFACIO explained the document. Ciarra understood the document and will have plan to come by today. Ciarra thanked BONIFACIO for calling to informed her of the signed document.      will continue to follow for discharge planning as needed.     _______________________    LIZZIE Zapien, LSW  ED/OBS   [facility]  Phone: [phone]  Pager: [phone]  Fax: [phone]     On-call pager, [phone], 4:00 pm to midnight

## 2022-11-06 NOTE — PLAN OF CARE
"Goal Outcome Evaluation:    -vital signs normal or at patient baseline. Met     -tolerating oral intake to maintain hydration. Not met, infusing 75 mL/hr NS     -adequate pain control on oral analgesics. Met      -tolerating oral antibiotics or has plans for home infusion setup. Not met      -infection is improving. Not met      -returns to baseline functional status. Not met      -safe disposition plan has been identified. Not met      -PT evaluation completed. Not met    /78 (BP Location: Left arm)   Pulse 63   Temp 98.7  F (37.1  C) (Oral)   Resp 20   Ht 1.499 m (4' 11\")   Wt 42.8 kg (94 lb 5.7 oz)   SpO2 100%   BMI 19.06 kg/m           "

## 2022-11-06 NOTE — PROGRESS NOTES
Patient ID:  Belia Sheets  MRN: [id number]  89 year old  YOB: 1930    Observation Admit Date: 11/5/2022    ED Admitting Attending: LORETA Avendaño    Transfer Date and Time: November 6, 2022 at 2:54 PM     Transferring Observation Provider: SUSIE Kaye CNP    Admission Diagnoses:     1. Confusion    2. Chronic midline low back pain without sciatica        Transfer Diagnoses:    L occipital ischemic strokes, likely due to ESUS vs atherosclerotic disease    Emergency Department and Observation Course:     Belia Sheets is a 89 year old female admitted on 11/5/2022. She has a  PMH significant for kyphoscoliosis, osteoarthritis, osteoporosis, C7/T2/T3 compression fractures (5/2022), cognitive impairment, calcium pyrophosphate deposition disease, HTN, frequent falls who presented to the [facility] ED from her [facility] with worsening confusion in the last week.     ##. Confusion  ##. Back Pain  ##. Leukocytosis  Patient's PCP received a call from [facility] facility 11/2/2022 with report of changes in patient regarding confusion but had worsened in the last day. Patient wanders to various places in the building and falls asleep, riding up and down elevator not knowing where she is going. Seen in [facility] clinic 11/3/2022 with 4 days of increased confusion, though reportedly improved that morning. No abdominal complaints, pain with urination, fevers, chills, cough, runny nose. Lab was obtained with plan that if patient is worsening would obtain MRI per PCP. Patients daughter states patient was complaining of back pain. Patient's daughter reports patient is dependent on a walker to get around and if patient did have an accidental unwitnessed fall she would not be able to get herself back up.  There is no known sick contacts that family is aware of.  Her daughter states patient has not complained of any headaches.  Patient is only on amlodipine for HTN as well as supplements  and has not been on any new medications.  Per chart review patient, it is noted that in 2021 patient had scored 23/30 on MMSE. In ED, HR 70s-90s, BP 110s-150s/50s-90s, RR 13-17, SaO2 94-95% on RA, Temp 98.6  F. Labs show CMP with AG 15 otherwise normal.  Normal lactic acid.  CBC with WBC 11.5 (7.2 on 11/3/2022) otherwise normal.  Covid 19 PCR negative. EKG with sinus rhythm, PACs, nonspecific T wave abnormality (similar to previous).  CT head without contrast reported no acute intracranial pathology; mild ventricular megaly likely related to generalized cerebral volume loss, similar in prior exam; generalized periventricular hypodensity likely result of chronic small vessel ischemic disease.  CXR negative reports no acute abnormality, infiltrate, atelectasis, findings consistent with CHF; kyphotic changes of the thoracic spine with minimal compression fracture; partly tortuous and calcified thoracic aorta; old rib fractures. Differential diagnosis includes infection, metabolic derangements, medication/drug related, CVA, seizure, heart failure, ACS, new compression fracture. Upon arrival to the Observation Unit, patient is awake and alert, responsive to questions and knows where she is. No spinal tenderness. No skin wounds. External hemorrhoids noted with spot of blood on diaper.  No events overnight. Patient is alert and orientated to self only. She is able to follow commands and other than orientation her neuro exam is intact. Work up thus far is negative. We will consult neurology for further recommendations. PT/OT consults for assessment. Labs show mag 1.5, potassium 3.3, CMP, CRP,  and troponin pending. Procal 0.08. phos 3.6. Respiratory panel negative.   -Neuro checks q4h  -Telemetry  -Check CRP, troponin, procalcitonin, magnesium, phosphorus, TSH  -CMP, CRP  and troponin pending   -Follow blood culture  -500 mL NS bolus  -NS 75 mL/h  -Lidoderm patch  -Protonix for GI prophylaxis while  "admitted  -PT/OT consult  -SW/CC consult     Addendum: Spoke with Neuro. Initial recommendations includes: EEG, MRI brain, and labwork. Ordered by neurology. MRI brain results show:     \"  1. Multiple adjacent small foci of acute infarct in the posterior left  occipital lobe.  2. Punctate focus of enhancement and T2 hyperintensity in the  posterolateral left cerebellar hemisphere. This is favored sequela of  a now chronic lacunar infarct. Attention on follow-up is recommended  to exclude enhancing lesion.  3. Periventricular white matter changes similar to prior and most  suggestive of chronic small vessel ischemic disease.   This result has not been signed. Information might be incomplete.     Results were discussed with neurology and patient will be transferred inpatient under the neurology service for stroke work up.      ##Electrolyte Abnormalities  Potassium 3.3 and magnesium 1.5 today.   -Potassium replacement protocol  -Magnesium replacement protocol     ##. HTN: - Continue with PTA Amlodipine      At this time the patient has failed observation management due to MRI findings and need for admission for stroke work up and will be transferred to inpatient status under neurology.    Consults: neurology    DATA:    Transfer Exam:    /78 (BP Location: Left arm)   Pulse 63   Temp 98.7  F (37.1  C) (Oral)   Resp 20   Ht 1.499 m (4' 11\")   Wt 42.8 kg (94 lb 5.7 oz)   SpO2 100%   BMI 19.06 kg/m      Constitutional: awake, alert, no apparent distress, and appears stated age. Orientated to self only   Eyes: Lids and lashes normal, pupils equal, round and reactive to light, extra ocular muscles intact, sclera clear, conjunctiva normal  ENT: Normocephalic, without obvious abnormality, atraumatic, sinuses nontender on palpation, external ears without lesions, oral pharynx with moist mucous membranes, tonsils without erythema or exudates, gums normal and good dentition.  Hematologic / Lymphatic: no cervical " "lymphadenopathy  Respiratory: No increased work of breathing, good air exchange, clear to auscultation bilaterally, no crackles or wheezing  Cardiovascular: Normal apical impulse, regular rate and rhythm, normal S1 and S2, no S3 or S4, and no murmur noted  GI: No scars, normal bowel sounds, soft, non-distended, non-tender, no masses palpated, no hepatosplenomegally  Skin: no bruising or bleeding  Musculoskeletal: There is no redness, warmth, or swelling of the joints.  Full range of motion noted.  Motor strength is 5 out of 5 all extremities bilaterally.  Tone is normal.  Neurologic: Awake, alert, oriented to self only. Follow commands. Some illogical answers and thoughts express, I.e.:  \"I better get back to work.\"Cranial nerves II-XII are grossly intact.  Motor is 5 out of 5 bilaterally. Strength 4/5 in all five extremities, equal bilaterally  Neuropsychiatric: General: normal, calm and normal eye contact        Current Medications:    No current outpatient medications on file.       Medications Prior to Admission:    Medications Prior to Admission   Medication Sig Dispense Refill Last Dose     amLODIPine (NORVASC) 2.5 MG tablet Take 1 tablet (2.5 mg) by mouth daily 90 tablet 3      calcium carbonate 600 mg-vitamin D 400 units (CALTRATE) 600-400 MG-UNIT per tablet Take 1 tablet by mouth 2 times daily (before meals) 180 tablet 3      order for DME Equipment being ordered: Incentive spirometer 1 Units 0      order for DME Equipment being ordered: Home BP monitor and cuff 1 each 0        Significant Diagnostic Studies:     Results for orders placed or performed during the hospital encounter of 11/05/22   Head CT w/o contrast     Status: None    Narrative    CT HEAD W/O CONTRAST 11/5/2022 9:26 PM    History: AMS, r/o intracranial hemorrhage   ICD-10:    Comparison: 5/24/22    Technique: Using multidetector thin collimation helical acquisition  technique, axial, coronal and sagittal CT images from the skull base  to " the vertex were obtained without intravenous contrast.   (topogram) image(s) also obtained and reviewed.    Findings: There is no intracranial hemorrhage, mass effect, or midline  shift. Gray/white matter differentiation in both cerebral hemispheres  is preserved. Vertebral artery calcifications.. The basal cisterns are  clear. Similar periventricular generalized periventricular hypodensity  which is likely sequelae of chronic small vessel ischemic disease.  Generalized cortical volume loss with mild associated  ventriculomegaly.    The bony calvaria and the bones of the skull base are normal. The  visualized portions of the paranasal sinuses and mastoid air cells are  clear.      Impression    Impression:  1. No acute intracranial pathology.   2. Mild ventriculomegaly likely related to generalized cerebral volume  loss is very similar to the prior exam.  3. There is generalized periventricular hypodensity likely the result  of chronic small vessel ischemic disease.    I have personally reviewed the examination and initial interpretation  and I agree with the findings.    TIERRA WATTS MD         SYSTEM ID:  [id number]   Chest XR,  PA & LAT     Status: None    Narrative    EXAM: XR CHEST 2 VIEWS  LOCATION: [facility]  DATE/TIME: 11/5/2022 10:44 PM    INDICATION: AMS, rule out CAP vs. fluid overload.  COMPARISON: 09/11/2021.      Impression    IMPRESSION: No obvious acute cardiopulmonary abnormalities identified. Kyphotic changes of the thoracic spine with minimal compression fractures. Partly tortuous and calcified thoracic aorta. Old rib fractures. No obvious infiltrate/atelectasis or   findings of active CHF. Advanced thoracolumbar curvature convex to the right.   XR Thoracic Lumbar Spine 2 Views     Status: None    Narrative    EXAM: XR THORACIC LUMBAR SPINE 2 VIEWS  LOCATION: [facility]  DATE/TIME: 11/6/2022 1:20  AM    INDICATION: Back pain with spinal tenderness.  COMPARISON: None.  TECHNIQUE: CR Thoraco-Lumbar Spine.      Impression    IMPRESSION: Moderate right convexity lumbar curvature. The lateral view is significantly limited by overlying soft tissue artifact and motion. No obvious acute compression fracture deformity though there may be mild height loss at T11 and T12. Consider   CT for further evaluation. Mild to moderate multilevel degenerative disc disease and facet arthropathy. Vascular calcifications.   MR Brain w/o & w Contrast     Status: None (Preliminary result)    Impression    RESIDENT PRELIMINARY INTERPRETATION  IMPRESSION:    1. Multiple adjacent small foci of acute infarct in the posterior left  occipital lobe.  2. Punctate focus of enhancement and T2 hyperintensity in the  posterolateral left cerebellar hemisphere. This is favored sequela of  a now chronic lacunar infarct. Attention on follow-up is recommended  to exclude enhancing lesion.  3. Periventricular white matter changes similar to prior and most  suggestive of chronic small vessel ischemic disease.   INR     Status: Normal   Result Value Ref Range    INR 1.10 0.85 - 1.15   Partial thromboplastin time     Status: Normal   Result Value Ref Range    aPTT 24 22 - 38 Seconds   UA with Microscopic     Status: Abnormal   Result Value Ref Range    Color Urine Yellow Colorless, Straw, Light Yellow, Yellow    Appearance Urine Clear Clear    Glucose Urine Negative Negative mg/dL    Bilirubin Urine Negative Negative    Ketones Urine 20 (A) Negative mg/dL    Specific Gravity Urine 1.022 1.003 - 1.035    Blood Urine Negative Negative    pH Urine 5.5 5.0 - 7.0    Protein Albumin Urine 30 (A) Negative mg/dL    Urobilinogen Urine 2.0 Normal, 2.0 mg/dL    Nitrite Urine Negative Negative    Leukocyte Esterase Urine Negative Negative    Mucus Urine Present (A) None Seen /LPF    RBC Urine 1 <=2 /HPF    WBC Urine 2 <=5 /HPF    Squamous Epithelials Urine <1 <=1  /HPF    Hyaline Casts Urine 7 (H) <=2 /LPF   CBC with platelets and differential     Status: Abnormal   Result Value Ref Range    WBC Count 11.5 (H) 4.0 - 11.0 10e3/uL    RBC Count 4.59 3.80 - 5.20 10e6/uL    Hemoglobin 14.1 11.7 - 15.7 g/dL    Hematocrit 42.8 35.0 - 47.0 %    MCV 93 78 - 100 fL    MCH 30.7 26.5 - 33.0 pg    MCHC 32.9 31.5 - 36.5 g/dL    RDW 13.4 10.0 - 15.0 %    Platelet Count 251 150 - 450 10e3/uL    % Neutrophils 77 %    % Lymphocytes 13 %    % Monocytes 8 %    % Eosinophils 1 %    % Basophils 0 %    % Immature Granulocytes 1 %    NRBCs per 100 WBC 0 <1 /100    Absolute Neutrophils 8.9 (H) 1.6 - 8.3 10e3/uL    Absolute Lymphocytes 1.5 0.8 - 5.3 10e3/uL    Absolute Monocytes 0.9 0.0 - 1.3 10e3/uL    Absolute Eosinophils 0.1 0.0 - 0.7 10e3/uL    Absolute Basophils 0.1 0.0 - 0.2 10e3/uL    Absolute Immature Granulocytes 0.1 <=0.4 10e3/uL    Absolute NRBCs 0.0 10e3/uL   Extra Tube (Fieldon Draw)     Status: None    Narrative    The following orders were created for panel order Extra Tube (Fieldon Draw).  Procedure                               Abnormality         Status                     ---------                               -----------         ------                     Extra Red Top Tube[[id number]]                               Final result                 Please view results for these tests on the individual orders.   Extra Red Top Tube     Status: None   Result Value Ref Range    Hold Specimen [facility]    Comprehensive metabolic panel     Status: Abnormal   Result Value Ref Range    Sodium 140 133 - 144 mmol/L    Potassium 3.5 3.4 - 5.3 mmol/L    Chloride 99 94 - 109 mmol/L    Carbon Dioxide (CO2) 26 20 - 32 mmol/L    Anion Gap 15 (H) 3 - 14 mmol/L    Urea Nitrogen 19 7 - 30 mg/dL    Creatinine 0.69 0.52 - 1.04 mg/dL    Calcium 9.7 8.5 - 10.1 mg/dL    Glucose 102 (H) 70 - 99 mg/dL    Alkaline Phosphatase 64 40 - 150 U/L    AST 27 0 - 45 U/L    ALT 14 0 - 50 U/L    Protein Total 7.7 6.8 - 8.8 g/dL     Albumin 3.5 3.4 - 5.0 g/dL    Bilirubin Total 1.1 0.2 - 1.3 mg/dL    GFR Estimate 81 >60 mL/min/1.73m2   Lactic acid whole blood     Status: Normal   Result Value Ref Range    Lactic Acid 1.6 0.7 - 2.0 mmol/L   Asymptomatic COVID-19 Virus (Coronavirus) by PCR Nose     Status: Normal    Specimen: Nose; Swab   Result Value Ref Range    SARS CoV2 PCR Negative Negative    Narrative    Testing was performed using the [employer] Xpress SARS-CoV-2 Assay on the  Cepheid Gene-Xpert Instrument Systems. Additional information about  this Emergency Use Authorization (EUA) assay can be found via the Lab  Guide. This test should be ordered for the detection of SARS-CoV-2 in  individuals who meet SARS-CoV-2 clinical and/or epidemiological  criteria. Test performance is unknown in asymptomatic patients. This  test is for in vitro diagnostic use under the FDA EUA for  laboratories certified under CLIA to perform high complexity testing.  This test has not been FDA cleared or approved. A negative result  does not rule out the presence of PCR inhibitors in the specimen or  target RNA in concentration below the limit of detection for the  assay. The possibility of a false negative should be considered if  the patient's recent exposure or clinical presentation suggests  COVID-19. This test was validated by the [facility]. This laboratory is certified under  the Clinical Laboratory Improvement Amendments of 1988 (CLIA-88) as  qualified to perform high complexity laboratory testing.     Procalcitonin     Status: Abnormal   Result Value Ref Range    Procalcitonin 0.08 (H) <0.05 ng/mL   CBC with platelets and differential     Status: None   Result Value Ref Range    WBC Count 9.6 4.0 - 11.0 10e3/uL    RBC Count 4.29 3.80 - 5.20 10e6/uL    Hemoglobin 13.0 11.7 - 15.7 g/dL    Hematocrit 40.8 35.0 - 47.0 %    MCV 95 78 - 100 fL    MCH 30.3 26.5 - 33.0 pg    MCHC 31.9 31.5 - 36.5 g/dL    RDW 13.4  10.0 - 15.0 %    Platelet Count 160 150 - 450 10e3/uL    % Neutrophils 72 %    % Lymphocytes 17 %    % Monocytes 8 %    % Eosinophils 2 %    % Basophils 1 %    % Immature Granulocytes 0 %    NRBCs per 100 WBC 0 <1 /100    Absolute Neutrophils 7.0 1.6 - 8.3 10e3/uL    Absolute Lymphocytes 1.6 0.8 - 5.3 10e3/uL    Absolute Monocytes 0.8 0.0 - 1.3 10e3/uL    Absolute Eosinophils 0.1 0.0 - 0.7 10e3/uL    Absolute Basophils 0.1 0.0 - 0.2 10e3/uL    Absolute Immature Granulocytes 0.0 <=0.4 10e3/uL    Absolute NRBCs 0.0 10e3/uL   Troponin T, High Sensitivity     Status: Abnormal   Result Value Ref Range    Troponin T, High Sensitivity 21 (H) <=14 ng/L   Magnesium     Status: Abnormal   Result Value Ref Range    Magnesium 1.5 (L) 1.7 - 2.3 mg/dL   Phosphorus     Status: Normal   Result Value Ref Range    Phosphorus 3.6 2.5 - 4.5 mg/dL   TSH with free T4 reflex     Status: Normal   Result Value Ref Range    TSH 0.82 0.30 - 4.20 uIU/mL   Troponin T, High Sensitivity     Status: Abnormal   Result Value Ref Range    Troponin T, High Sensitivity 22 (H) <=14 ng/L   Comprehensive metabolic panel     Status: Abnormal   Result Value Ref Range    Sodium 140 136 - 145 mmol/L    Potassium 3.1 (L) 3.4 - 5.3 mmol/L    Chloride 102 98 - 107 mmol/L    Carbon Dioxide (CO2) 26 22 - 29 mmol/L    Anion Gap 12 7 - 15 mmol/L    Urea Nitrogen 15.8 8.0 - 23.0 mg/dL    Creatinine 0.61 0.51 - 0.95 mg/dL    Calcium 8.9 8.2 - 9.6 mg/dL    Glucose 92 70 - 99 mg/dL    Alkaline Phosphatase 48 35 - 104 U/L    AST 15 10 - 35 U/L    ALT <5 (L) 10 - 35 U/L    Protein Total 5.6 (L) 6.4 - 8.3 g/dL    Albumin 3.0 (L) 3.5 - 5.2 g/dL    Bilirubin Total 0.8 <=1.2 mg/dL    GFR Estimate 83 >60 mL/min/1.73m2   EKG 12 lead     Status: None   Result Value Ref Range    Systolic Blood Pressure  mmHg    Diastolic Blood Pressure  mmHg    Ventricular Rate 71 BPM    Atrial Rate 71 BPM    MT Interval 192 ms    QRS Duration 92 ms     ms    QTc 441 ms    P Axis 93 degrees     R AXIS -4 degrees    T Axis 57 degrees    Interpretation ECG       Sinus rhythm with Premature atrial complexes  Nonspecific T wave abnormality  Abnormal ECG  Unconfirmed report - interpretation of this ECG is computer generated - see medical record for final interpretation  Confirmed by - EMERGENCY ROOM, PHYSICIAN ([id number]),  CATHERINE VALERA ([id number]) on 11/6/2022 8:45:25 AM     Blood Culture Peripheral Blood     Status: Normal (Preliminary result)    Specimen: Peripheral Blood   Result Value Ref Range    Culture No growth after 12 hours    Blood Culture Peripheral Blood     Status: Normal (Preliminary result)    Specimen: Peripheral Blood   Result Value Ref Range    Culture No growth after 12 hours    Respiratory Panel PCR     Status: Normal    Specimen: Nasopharyngeal; Swab   Result Value Ref Range    Adenovirus Not Detected Not Detected    Coronavirus Not Detected Not Detected    Human Metapneumovirus Not Detected Not Detected    Human Rhin/Enterovirus Not Detected Not Detected    Influenza A Not Detected Not Detected    Influenza A, H1 Not Detected Not Detected    Influenza A 2009 H1N1 Not Detected Not Detected    Influenza A, H3 Not Detected Not Detected    Influenza B Not Detected Not Detected    Parainfluenza Virus 1 Not Detected Not Detected    Parainfluenza Virus 2 Not Detected Not Detected    Parainfluenza Virus 3 Not Detected Not Detected    Parainfluenza Virus 4 Not Detected Not Detected    Respiratory Syncytial Virus A Not Detected Not Detected    Respiratory Syncytial Virus B Not Detected Not Detected    Chlamydia Pneumoniae Not Detected Not Detected    Mycoplasma Pneumoniae Not Detected Not Detected    Narrative    The [facility] Respiratory Panel is a qualitative nucleic acid, multiplex, in vitro diagnostic test for the simultaneous detection and identification of multiple respiratory viral and bacterial nucleic acids in nasopharyngeal swabs collected in viral transport media from individual  exhibiting signs and symptoms of respiratory infection. The assay has received FDA approval for the testing of nasopharyngeal (NP) swabs only. The Infectious Diseases Diagnostic Laboratory at [facility] has validated the performance characteristics for bronchial alveolar lavage specimens. This test is used for clinical purposes and should not be regarded as investigational or for research. This laboratory is certified under the Clinical Laboratory Improvement Amendments of 1988 (CLIA-88) as qualified to perform high complexity clinical laboratory testing.    CBC with platelets differential     Status: Abnormal    Narrative    The following orders were created for panel order CBC with platelets differential.  Procedure                               Abnormality         Status                     ---------                               -----------         ------                     CBC with platelets and d...[[id number]]  Abnormal            Final result                 Please view results for these tests on the individual orders.   CBC with Platelets & Differential     Status: None    Narrative    The following orders were created for panel order CBC with Platelets & Differential.  Procedure                               Abnormality         Status                     ---------                               -----------         ------                     CBC with platelets and d...[[id number]]                      Final result                 Please view results for these tests on the individual orders.       Signed:  SUSIE Kaye CNP  November 6, 2022 at 2:54 PM      --    ED Attending Physician Attestation    I Nilay Hurt MD, cared for this patient with the Advanced Practice Provider (CEZAR). I have performed a history and physical examination of the patient independent of the CEZAR. I reviewed the CEZAR's documentation above and agree with the documented findings and plan of care. I personally  provided a substantive portion of the care for this patient.    I personally performed the substantive portion of the medical decision making for this visit - please see the CEZAR's documentation for full details.    Key management decisions made by me and carried out under my direction: neuro consult with eval along with mild hypokalemia corrected with po potassium and follow up on labs.    I personally performed the substantive portion of the history for this visit - please see the CEZAR's documentation for full details.  Key additional history findings made by me: transient confusion now improved.    Summary of HPI, PE, ED Course   Patient is a 89 year old female evaluated in the emergency department for confusion. Exam notable for nonfocal. ED course notable for neuro consult eith MRI findings of recent strokes. After the completion of care in the emergency department, the patient was admitted to inpatient.    Critical Care & Procedures  Not applicable.    Medical Decision Making  The medical record was reviewed and interpreted.  Current labs reviewed and interpreted.  Previous labs reviewed and interpreted.  Current images reviewed and interpreted: see MRI report.      Nilay Hurt MD  Emergency Medicine

## 2022-11-06 NOTE — CONSULTS
Care Management Initial Consult    General Information  Assessment completed with: Patient, Children (dsughter: Ciarra),    Type of CM/SW Visit: Initial Assessment    Primary Care Provider verified and updated as needed: Yes   Readmission within the last 30 days: no previous admission in last 30 days      Reason for Consult: discharge planning  Advance Care Planning: Advance Care Planning Reviewed: present on chart          Communication Assessment  Patient's communication style: spoken language (English or Bilingual)    Hearing Difficulty or Deaf: yes        Cognitive  Cognitive/Neuro/Behavioral: .WDL except, orientation, speech  Level of Consciousness: confused  Arousal Level: opens eyes spontaneously  Orientation: disoriented to, place, situation, time  Mood/Behavior: calm, cooperative  Best Language: 1 - Mild to moderate  Speech: slow, word-finding difficulty, forced/pressured, hesitant    Living Environment:   People in home: alone     Current living Arrangements: assisted living  Name of Facility: [facility]   Able to return to prior arrangements:         Family/Social Support:  Care provided by: self  Provides care for: no one  Marital Status:   Children          Description of Support System: Supportive, Involved    Support Assessment: Adequate family and caregiver support, Adequate social supports    Current Resources:   Patient receiving home care services:       Community Resources:    Equipment currently used at home: grab bar, tub/shower, shower chair, walker, rolling  Supplies currently used at home: None    Employment/Financial:  Employment Status:          Financial Concerns:             Lifestyle & Psychosocial Needs:  Social Determinants of Health     Tobacco Use: Low Risk      Smoking Tobacco Use: Never     Smokeless Tobacco Use: Never     Passive Exposure: Not on file   Alcohol Use: Not on file   Financial Resource Strain: Not on file   Food Insecurity: Not on file   Transportation Needs:  Not on file   Physical Activity: Not on file   Stress: Not on file   Social Connections: Not on file   Intimate Partner Violence: Not on file   Depression: Not at risk     PHQ-2 Score: 0   Housing Stability: Not on file       Functional Status:  Prior to admission patient needed assistance:   Dependent ADLs:: Ambulation-walker, Dressing  Dependent IADLs:: Meal Preparation, Medication Management, Transportation, Money Management       Mental Health Status:          Chemical Dependency Status:                Values/Beliefs:  Spiritual, Cultural Beliefs, Orthodoxy Practices, Values that affect care:                 Additional Information:  1430  SW met with Pt and her daughter, Ciarra to complete initial assessment. SW  introduced self and explained the reason for the visit. Pt along with Ciarra agreed to speak with SW. Pt was eating during meeting. Pt's daughter, Ciarra answer most questions asked.     Pt lives alone at [facility] facility. Pt use a wheel walker to get around her apartment and building.  Pt has shower chair and grab bars in her bathroom. Per Ciarra's report due to current back pain pt will most likely need some help now to get dressed and groomed. Pt has services from [facility] to have daily checks on her. Ciarra report they will be planning to move pt's medication to [facility] to manage moving forward. Pt's children drives her to all her medical appointments. Pt receives three daily meals delivered to her room.     BONIFACIO explain to Pt and Ciarra that PT and OT is scheduled to see pt and depending on the recommendations pt may need to go to short term rehab. If this happens SW will assist pt in securing a SNF placement as necessary. Kis suggest SW to call Lubna the other daughter and provider her an update on pt.     BONIFACIO made phone to pt's daughter Lubna Proctor [phone] and provide her with update on pt. Pt has returned from MRI. PT and OT to see pt for further recommendations for discharge plans for pt. Lubna  express concern why her mother was place under observation status verse inpatient status. She is concern pt's insurance will not cover under observation status. SW explain there is a utilization medical team here at  that reviews all pt's in Observation status to make sure their diagnosis fit the status they are in. Lubna has any further questions she can call to speak with [facility] medical utilization team. Lubna is on her way to  from [place] and will reach out if she has further questions when she arrives.      will continue to follow for discharge planning as needed.     _______________________    LIZZIE Zapien, LSW  ED/OBS   [facility]  Phone: [phone]  Pager: [phone]  Fax: [phone]     On-call pager, [phone], 4:00 pm to midnight

## 2022-11-06 NOTE — CONSULTS
"[facility]  Neurology Consultation    Patient Name:  Belia Sheets  MRN:  [id number]    :  1930  Date of Service:  2022  Primary care provider:  Alejandro Sandhu      The neurology consultation service was asked to see Belia Sheets by ED OBS to evaluate for acute encephalopathy.     History of Present Illness   Belia Sheets is a 89 year old female with osteoporosis and HTN who is being seen in evaluation for acute encephaloapthy.    She is unable to provide additional history, thus history was obtained in discussion with her daughter.      Her daughter, Lubna, reports that her mom has a \"different confusion\" than compared to baseline (has had some processing issues in the past). Family has seen a difference in difficulty following train of thought since . She occasionally knows the month, but not necessarily the date. She does typically know that she lives in [place]. She likes to keep up with public events, but in July/2022 she had a medical evaluation with health insurance where she couldn't recall the president in that conversation. Memory stayed stable through summer months. Starting 5-6 days ago, she has been experiencing a \"different reality\". On , she wanted to put her daughter in contact to speak to an  for a children's party and she was perseverating on ensuring that this party occurs (there is no event like that). When she went to [facility], she was sure that the RN from [facility] was taking her to lunch and to visit her uncle in the hospital after the clinic appt (this was not going to happen). Over the past couple of weeks-to-months, her daughter has noticed that, she wasn't taking her medications every day, it has been more challenging.    At home, she does not manage her finances (her daughter manages this). Her breakfast is supplied by the building that she lives in. She has been resistant to taking her other meals in the " dining room, her daughter buys her groceries (basic groceries that need microwave heating and are easy to cook). It has been harder for the patient to make a plan and initiating it.    In 2017, she was hallucinating and had aphasia (could not find words). She was seeing strings floating around in the air.  She started to improve in the hospital and PT/OT/SLP was ordered at home. Over a period of weeks/months, she got these therapies. She recovered over time.    She has had two falls within the past couple of years. She was disoriented when she was in rehab.    ROS  A 10-point ROS performed and negative unless documented in HPI.    PMH  Past Medical History:   Diagnosis Date     Carpal tunnel syndrome (aka CTS)      Cataract      Chronic osteoarthritis      Constipation due to outlet dysfunction 9/22/2021     Fracture of multiple rami of right pubis with routine healing, subsequent encounter 9/22/2021     H/O calcium pyrophosphate deposition disease (CPPD)      History of 9th right rib fracture due to fall (07/13/2021) 9/22/2021     History of encephalopathy 10/2017     Hypertension      Kyphoscoliosis      Osteoarthritis     hands     Osteoporosis      Rectal prolapse      Past Surgical History:   Procedure Laterality Date     COLONOSCOPY  2010     HYSTERECTOMY      for uterine prolapse     RECTOSIGMOIDECTOMY PERINEAL N/A 1/12/2018    Procedure: RECTOSIGMOIDECTOMY PERINEAL;  Perineal Rectosigmoidectomy  ;  Surgeon: Amrik Mariano MD;  Location: [facility] OR       Medications   Medications Prior to Admission   Medication Sig Dispense Refill Last Dose     amLODIPine (NORVASC) 2.5 MG tablet Take 1 tablet (2.5 mg) by mouth daily 90 tablet 3      calcium carbonate 600 mg-vitamin D 400 units (CALTRATE) 600-400 MG-UNIT per tablet Take 1 tablet by mouth 2 times daily (before meals) 180 tablet 3      order for DME Equipment being ordered: Incentive spirometer 1 Units 0      order for DME Equipment being ordered: Home BP  "monitor and cuff 1 each 0        Allergies  No Known Allergies    Social History  Lives in [facility]. She has two sons and two daughters.    Family History    Unable to provide due to altered mental status.     Physical Examination     Vitals:   /78 (BP Location: Left arm)   Pulse 63   Temp 98.7  F (37.1  C) (Oral)   Resp 20   Ht 1.499 m (4' 11\")   Wt 42.8 kg (94 lb 5.7 oz)   SpO2 100%   BMI 19.06 kg/m      General: Laying in bed, no acute distress.  HEENT: Normocephalic, atraumatic. Sclera anicteric. Moist mucus membranes. No epistaxis.  Cardiac: Extremities appear well-perfused.  Pulmonary: Non-labored, no accessory muscle use.  GI/: Soft, non-distended, non-tender.  MSK: Warm, no edema, pedal pulses palpable.  Integument: Warm, dry. No jaundice.     Neuro:  Mental status: Alert and conversant. Oriented to self; reports current location as [facility] - she is not oriented to month/year. She does not recall the current president of the [place]. Follows simple commands and complex two-step commands (points to the wall after pointing to the ceiling). Naming and repetition intact. Able to spell the word WORLD forward, but NOT backward. Can calculate the number of quarters in $1.75. Can identify how orange-banana, bicycle-train, and watch-ruler are similar.  Cranial nerves: Visual fields intact. Pupils 2-3mm, equal, round, reactive. Conjugate gaze. EOMI. Facial sensation intact in V1-V3 regions. Face symmetric. Hearing intact to conversation. Tongue protrudes midline.  Motor: 5/5 strength in the upper and lower extremities.  Reflexes: 1+ biceps and brachioradialis reflexes bilaterally. Trace patellar reflexes.  No clonus, toes equivocal.  Sensory: Intact to vibration in the upper and lower extremities.  Coordination: FNF without dysmetria.  Gait: Deferred.     Investigations   Labs  BMP  Recent Labs   Lab 11/06/22 0905 11/05/22 1959 11/03/22  1153    140 142   POTASSIUM 3.1* 3.5 3.3*   CHLORIDE " 102 99 101   CO2 26 26 25   BUN 15.8 19 15.8   CR 0.61 0.69 0.60   BOY 8.9 9.7 9.9*     CBC  Recent Labs   Lab 11/06/22  0145 11/05/22 2000 11/03/22  1153   WBC 9.6 11.5* 7.2   HGB 13.0 14.1 13.8    251 277     Imaging  Personally reviewed. The radiologists interpretation is documented below.    Head CT without contrast 11/5/2022:  Impression:  1. No acute intracranial pathology.   2. Mild ventriculomegaly likely related to generalized cerebral volume loss is very similar to the prior exam.  3. There is generalized periventricular hypodensity likely the result of chronic small vessel ischemic disease.     Impression & Recommendations   This is a 89 year old female who is being seen in evaluation for encephalopathy.     In discussion with her daughter, there appears to be a long-standing progression of cognitive decline. This most recent episode of confusion seems to be new in a line of cognitive symptoms extending back to the early Spring at least. She does not have a formal diagnosis of dementia. Though, in the setting of the above symptoms and the diffuse atrophy noted on head CT, suspect there is an underlying neurocognitive disorder.    Nevertheless, we find it reasonable to obtain brain MRI and routine EEG to rule out lesions and subclinical seizures. The brain MRI has been obtained and shows acute-subacute strokes in the L occipital lobe.    #Encephalopathy, acute on chronic  #Suspect underlying neurodegenerative disorder  -Routine EEG (ordered by neurology)  -Brain MRI  -TSH, B12, and ammonia levels    #L occipital ischemic strokes, likely due to ESUS vs atherosclerotic disease  -Admit for stroke work-up  -Stroke service to take over as primary    Thank you for allowing the neurology service to participate in the care of Belia Sheets.  Please contact us with questions.      The patient was seen and discussed with the attending neurologist, Dr. Vanessa. She was also discussed with the attending  vascular neurologist, Dr. Burns.    Veronika Fajardo MD  Neurology PGY-4  11/06/2022

## 2022-11-06 NOTE — PROGRESS NOTES
"S: Patient is point is no complaints was brought in evaluation for some transient confusion she had some history of cognitive impairment in the past.  Patient self is oriented to person at this point but denies any complaints at this point.  Vitally stable  O:BP (!) 142/92 (BP Location: Left arm, Patient Position: Supine, Cuff Size: Adult Small)   Pulse 79   Temp 97.9  F (36.6  C) (Oral)   Resp 19   Ht 1.499 m (4' 11\")   Wt 42.8 kg (94 lb 5.7 oz)   SpO2 98%   BMI 19.06 kg/m    In general patient is oriented to person primarily here in the ER.  Is in no distress otherwise HD exam otherwise negative no neurological focal findings seen.  Lungs are clear card has some rhythm abdomen soft benign no injuries noted.  Patient states she is been ambulating with walker.  A: Transient confusion questionable cognitive impairment  P: Patient had a negative work-up with CT infectious etc. would consider talking to neurology regarding any other possible work-up and diagnosis.    "

## 2022-11-06 NOTE — UTILIZATION REVIEW
Admission Status; Secondary Review Determination       Under the authority of the Utilization Management Committee, the utilization review process indicated a secondary review on the above patient. The review outcome is based on review of the medical records, discussions with staff, and applying clinical experience noted on the date of the review.     (x) Inpatient Status Appropriate - This patient's medical care is consistent with medical management for inpatient care and reasonable inpatient medical practice.     RATIONALE FOR DETERMINATION      Patient requires inpatient admission versus short stay observation or outpatient treatment for the following reasons:  89 year old female admitted on 11/5/2022. She has a  PMH significant for kyphoscoliosis, osteoarthritis, osteoporosis, C7/T2/T3 compression fractures (5/2022), cognitive impairment, calcium pyrophosphate deposition disease, HTN, frequent falls who presented to the [facility] ED from her half-way ([place]) with worsening confusion.   The patient was found with multiple small foci of acute infarct in the posterior left occipital lobe and foci of subacute infarct in the posterior lateral left cerebellar hemisphere.  The patient requires close neurologic monitoring, requires PT/OT/SLP, requires close monitoring of the vital signs, especially the blood pressure.  She requires further test: TTE, CTA    The expected length of stay at the time of admission was more than 2 nights because of the severity of illness, intensity of service provided, and risk for adverse outcome. Inpatient admission is appropriate.     Olivia RICHARDS CNP - notified     This document was produced using voice recognition software       The information on this document is developed by the utilization review team in order for the business office to ensure compliance. This only denotes the appropriateness of proper admission status and does not reflect the quality of care  rendered.   The definitions of Inpatient Status and Observation Status used in making the determination above are those provided in the CMS Coverage Manual, Chapter 1 and Chapter 6, section 70.4.   Sincerely,   TINA DOTSON MD   Utilization Review  Physician Advisor  [facility]

## 2022-11-06 NOTE — PLAN OF CARE
Goal Outcome Evaluation:    -vital signs normal or at patient baseline. Met     -tolerating oral intake to maintain hydration. Not met, infusing 75 mL/hr NS     -adequate pain control on oral analgesics. Met      -tolerating oral antibiotics or has plans for home infusion setup. Not met      -infection is improving. Not met      -returns to baseline functional status. Not met      -safe disposition plan has been identified. Not met      -PT evaluation completed. Not met

## 2022-11-06 NOTE — H&P
[facility]    History and Physical - ED Observation Service      Date of Admission:  11/5/2022    Assessment & Plan      Belia Sheets is a 89 year old female admitted on 11/5/2022. She has a  PMH significant for kyphoscoliosis, osteoarthritis, osteoporosis, C7/T2/T3 compression fractures (5/2022), cognitive impairment, calcium pyrophosphate deposition disease, HTN, frequent falls who presented to the [facility] ED from her [facility] ([place]) with worsening confusion in the last week.    ##. Confusion  ##. Back Pain  ##. Leukocytosis  Patient's PCP received a call from [facility] facility 11/2/2022 with report of changes in patient regarding confusion but had worsened in the last day. Patient wanders to various places in the building and falls asleep, riding up and down elevator not knowing where she is going. Seen in [facility] 11/3/2022 with 4 days of increased confusion, though reportedly improved that morning. No abdominal complaints, pain with urination, fevers, chills, cough, runny nose. Lab was obtained with plan that if patient is worsening would obtain MRI per PCP. Patients daughter states patient was complaining of back pain. Patient's daughter reports patient is dependent on a walker to get around and if patient did have an accidental unwitnessed fall she would not be able to get herself back up.  There is no known sick contacts that family is aware of.  Her daughter states patient has not complained of any headaches.  Patient is only on amlodipine for HTN as well as supplements and has not been on any new medications.  Per chart review patient, it is noted that in 2021 patient had scored 23/30 on MMSE. In ED, HR 70s-90s, BP 110s-150s/50s-90s, RR 13-17, SaO2 94-95% on RA, Temp 98.6  F. Labs show CMP with AG 15 otherwise normal.  Normal lactic acid.  CBC with WBC 11.5 (7.2 on 11/3/2022) otherwise normal.  Covid 19 PCR negative. EKG with sinus rhythm, PACs,  nonspecific T wave abnormality (similar to previous).  CT head without contrast reported no acute intracranial pathology; mild ventricular megaly likely related to generalized cerebral volume loss, similar in prior exam; generalized periventricular hypodensity likely result of chronic small vessel ischemic disease.  CXR negative reports no acute abnormality, infiltrate, atelectasis, findings consistent with CHF; kyphotic changes of the thoracic spine with minimal compression fracture; partly tortuous and calcified thoracic aorta; old rib fractures. Differential diagnosis includes infection, metabolic derangements, medication/drug related, CVA, seizure, heart failure, ACS, new compression fracture  Upon arrival to the Observation Unit, patient is awake and alert, responsive to questions and knows where she is. No spinal tenderness. No skin wounds. External hemorrhoids noted with spot of blood on diaper.   -Neuro checks q4h  -Telemetry  -Check CRP, troponin, procalcitonin, magnesium, phosphorus, TSH  -Repeat CBC, CMP, CRP, Procalcitonin in AM  -Check respiratory viral panel  -Follow blood culture  -500 mL NS bolus  -NS 75 mL/h  -Lidoderm patch  -Protonix for GI prophylaxis while admitted  -PT/OT consult  -SW/CC consult    ##. HTN: - Continue with PTA Amlodipine      Diet: Advance Diet as Tolerated: Regular Diet Adult  DVT Prophylaxis: Pneumatic Compression Devices  Perez Catheter: Not present  Central Lines: None  Cardiac Monitoring: None  Code Status: Full Code    Clinically Significant Risk Factors Present on Admission                              Disposition Plan      Expected Discharge Date: 11/07/2022                The patient's care was discussed with the Attending Physician, Dr. Hurt.    LORETA Avendaño   ED Observation Service   [facility]  Securely message with the Vocera Web Console (learn more here)  Text page via [facility] Paging/Directory      ______________________________________________________________________    Chief Complaint   Confusion    History is obtained from the patient's daughter    History of Present Illness   Belia Sheets is a 89 year old female with a PMH significant for kyphoscoliosis, osteoarthritis, osteoporosis, C7/T2/T3 compression fractures (5/2022), cognitive impairment, calcium pyrophosphate deposition disease, HTN, frequent falls who presented [facility] from her [facility] ([place]) with worsening confusion in the last week.    Per chart review it appears that patient's PCP received a call from her [facility] facility on 11/2/2022 with report of changes in the patient regarding confusion but had worsened in the last day.  There is report that patient wanders to various places in the building and falls asleep, writing up and down the elevator not knowing where she is going.  Patient was seen in the [facility] clinic on 11/3/2022 with 4 days of increased confusion.  At that time there was no report of abdominal pain, pelvic pain, pain with urination, fevers, chills, cough, runny nose.  It is noted in her chart that patient had improved that morning compared to the previous days.  Lab work was obtained with plan that if patient is worsening would obtain MRI.    Patient's daughter reports that patient is dependent on a walker to get around and if patient did have an accidental fall that was unwitnessed she would not be able to get herself back up.  There is no known sick contacts that family is aware of.  Her daughter states patient has not complained of any headaches.  Patient is only on amlodipine for HTN as well as supplements and has not been on any new medications.  Per chart review patient, it is noted that in 2021 patient had scored 23/30 on MMSE.    In the ED, HR 70s-90s, BP 110s-150s/50s-90s, RR 13-17, SaO2 94-95% on RA, Temp 98.6  F. Labs show CMP with AG 15 otherwise normal.  Normal lactic acid.  CBC with WBC 11.5  (7.2 on 11/3/2022) otherwise normal.  EKG with sinus rhythm, PACs, nonspecific T wave abnormality (similar to previous).  CT head without contrast reported no acute intracranial pathology; mild ventricular megaly likely related to generalized cerebral volume loss, similar in prior exam; generalized periventricular hypodensity likely result of chronic small vessel ischemic disease.  CXR negative reports no acute abnormality, infiltrate, atelectasis, findings consistent with CHF; kyphotic changes of the thoracic spine with minimal compression fracture; partly tortuous and calcified thoracic aorta; old rib fractures.     Review of Systems    All other ROS negative except those mentioned in above note.      Past Medical History    I have reviewed this patient's medical history and updated it with pertinent information if needed.   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  Past Surgical History:   Procedure Laterality Date     COLONOSCOPY  2010     HYSTERECTOMY      for uterine prolapse     RECTOSIGMOIDECTOMY PERINEAL N/A 1/12/2018    Procedure: RECTOSIGMOIDECTOMY PERINEAL;  Perineal Rectosigmoidectomy  ;  Surgeon: Amrik Mariano MD;  Location: [facility] OR       Social History   I have reviewed this patient's social history and updated it with pertinent information if needed.  Social History      Tobacco Use     Smoking status: Never     Smokeless tobacco: Never   Vaping Use     Vaping Use: Never used   Substance Use Topics     Alcohol use: No     Drug use: No       Family History   I have reviewed this patient's family history and updated it with pertinent information if needed.  Family History   Problem Relation Age of Onset     Depression/Anxiety Son      Depression/Anxiety Son         alcohol abuse  age 24     Hypertension Mother      Hypertension Brother      Diabetes No family hx of      Breast Cancer No family hx of      Colon Cancer No family hx of      Prostate Cancer No family hx of      Other Cancer No family hx of        Prior to Admission Medications   Prior to Admission Medications   Prescriptions Last Dose Informant Patient Reported? Taking?   amLODIPine (NORVASC) 2.5 MG tablet   No No   Sig: Take 1 tablet (2.5 mg) by mouth daily   calcium carbonate 600 mg-vitamin D 400 units (CALTRATE) 600-400 MG-UNIT per tablet   No No   Sig: Take 1 tablet by mouth 2 times daily (before meals)   order for DME  Self No No   Sig: Equipment being ordered: Home BP monitor and cuff   order for DME  Self No No   Sig: Equipment being ordered: Incentive spirometer      Facility-Administered Medications: None     Allergies   No Known Allergies    Physical Exam   Vital Signs: Temp: 98.6  F (37  C) Temp src: Oral BP: 120/80 Pulse: 78   Resp: 16 SpO2: 94 % O2 Device: None (Room air)    Weight: 94 lbs 5.71 oz    Constitutional: awake, alert, no apparent distress, and appears stated age  Eyes: Lids and lashes normal, pupils equal, round and reactive to light, extra ocular muscles intact, sclera clear, conjunctiva normal  ENT: Normocephalic, without obvious abnormality, atraumatic, sinuses nontender on palpation, external ears without lesions, oral pharynx with moist mucous membranes, tonsils without erythema or exudates, gums normal and good dentition.  Hematologic / Lymphatic: no cervical  lymphadenopathy  Respiratory: No increased work of breathing, good air exchange, clear to auscultation bilaterally, no crackles or wheezing  Cardiovascular: Normal apical impulse, regular rate and rhythm, normal S1 and S2, no S3 or S4, and no murmur noted  GI: No scars, normal bowel sounds, soft, non-distended, non-tender, no masses palpated, no hepatosplenomegally  Skin: no bruising or bleeding  Musculoskeletal: There is no redness, warmth, or swelling of the joints.  Full range of motion noted.  Motor strength is 5 out of 5 all extremities bilaterally.  Tone is normal.  Neurologic: Awake, alert, oriented to place.  Cranial nerves II-XII are grossly intact.  Motor is 5 out of 5 bilaterally.  Neuropsychiatric: General: normal, calm and normal eye contact     Data   Data reviewed today: I reviewed all medications, new labs and imaging results over the last 24 hours. I personally reviewed  Recent Labs   Lab 11/06/22 0145 11/05/22 2000 11/05/22 1959 11/03/22  1153   WBC 9.6 11.5*  --  7.2   HGB 13.0 14.1  --  13.8   MCV 95 93  --  94    251  --  277   INR  --  1.10  --   --    NA  --   --  140 142   POTASSIUM  --   --  3.5 3.3*   CHLORIDE  --   --  99 101   CO2  --   --  26 25   BUN  --   --  19 15.8   CR  --   --  0.69 0.60   ANIONGAP  --   --  15* 16*   BOY  --   --  9.7 9.9*   GLC  --   --  102* 155*   ALBUMIN  --   --  3.5 3.9   PROTTOTAL  --   --  7.7 6.8   BILITOTAL  --   --  1.1 0.6   ALKPHOS  --   --  64 55   ALT  --   --  14 6*   AST  --   --  27 29     Most Recent 3 CBC's:Recent Labs   Lab Test 11/06/22 0145 11/05/22 2000 11/03/22  1153   WBC 9.6 11.5* 7.2   HGB 13.0 14.1 13.8   MCV 95 93 94    251 277     Most Recent 3 BMP's:Recent Labs   Lab Test 11/05/22 1959 11/03/22 1153 05/24/22  1600    142 140   POTASSIUM 3.5 3.3* 3.3*   CHLORIDE 99 101 104   CO2 26 25 29   BUN 19 15.8 12   CR 0.69 0.60 0.57   ANIONGAP 15* 16* 7   BOY 9.7 9.9* 9.4   * 155* 101*     Most Recent 2  LFT's:Recent Labs   Lab Test 11/05/22 1959 11/03/22  1153   AST 27 29   ALT 14 6*   ALKPHOS 64 55   BILITOTAL 1.1 0.6     Most Recent 3 INR's:Recent Labs   Lab Test 11/05/22 2000 09/11/21  1225 10/25/17  0759   INR 1.10 1.10 1.03     Most Recent 3 Creatinines:Recent Labs   Lab Test 11/05/22 1959 11/03/22  1153 05/24/22  1600   CR 0.69 0.60 0.57     Most Recent 3 Hemoglobins:Recent Labs   Lab Test 11/06/22 0145 11/05/22 2000 11/03/22  1153   HGB 13.0 14.1 13.8     Most Recent 3 Troponin's:Recent Labs   Lab Test 09/11/21 0955 07/14/21  0742 07/13/21  2156 07/13/21  1229 01/07/18  2057 10/23/17  1140   TROPI  --   --   --   --  <0.015 <0.015   TROPONIN <0.015 0.104* 0.123*   < >  --   --     < > = values in this interval not displayed.     Most Recent TSH and T4:Recent Labs   Lab Test 11/03/22  1153 10/24/17  0722 10/23/17  2208   TSH 1.52   < >  --    T4  --   --  1.27    < > = values in this interval not displayed.     Most Recent Urinalysis:Recent Labs   Lab Test 11/06/22  0151   COLOR Yellow   APPEARANCE Clear   URINEGLC Negative   URINEBILI Negative   URINEKETONE 20*   SG 1.022   UBLD Negative   URINEPH 5.5   PROTEIN 30*   NITRITE Negative   LEUKEST Negative   RBCU 1   WBCU 2     Most Recent ESR & CRP:Recent Labs   Lab Test 04/27/22  1456 03/30/22  1619   SED  --  20   CRP <2.9 37.0*     Most Recent Anemia Panel:Recent Labs   Lab Test 11/06/22 0145 11/05/22 2000 11/03/22  1153 09/11/21  0955 08/25/21  1536   WBC 9.6   < > 7.2   < > 9.6   HGB 13.0   < > 13.8   < > 14.2   HCT 40.8   < > 42.6   < > 44.2   MCV 95   < > 94   < > 92      < > 277   < > 187   B12  --   --  699   < > 819   FOLIC  --   --   --   --  9.5    < > = values in this interval not displayed.     9.6    \    13.0    /    160   N 72    L N/A    140    99    19 /   ------------------------------------ 102 (H)   ALT 14   AST 27   AP 64   ALB 3.5   Ca 9.7  3.5    26    0.69 \    % RETIC N/A    LDH N/A  Troponin N/A    BNP N/A    CK  N/A  INR 1.10   PTT 24    D-dimer N/A    Fibrinogen N/A    Antithrombin N/A  Ferritin N/A  CRP N/A    IL-6 N/A  Recent Results (from the past 24 hour(s))   Head CT w/o contrast    Narrative    CT HEAD W/O CONTRAST 11/5/2022 9:26 PM    History: AMS, r/o intracranial hemorrhage   ICD-10:    Comparison: 5/24/22    Technique: Using multidetector thin collimation helical acquisition  technique, axial, coronal and sagittal CT images from the skull base  to the vertex were obtained without intravenous contrast.   (topogram) image(s) also obtained and reviewed.    Findings: There is no intracranial hemorrhage, mass effect, or midline  shift. Gray/white matter differentiation in both cerebral hemispheres  is preserved. Vertebral artery calcifications.. The basal cisterns are  clear. Similar periventricular generalized periventricular hypodensity  which is likely sequelae of chronic small vessel ischemic disease.  Generalized cortical volume loss with mild associated  ventriculomegaly.    The bony calvaria and the bones of the skull base are normal. The  visualized portions of the paranasal sinuses and mastoid air cells are  clear.      Impression    Impression:  1. No acute intracranial pathology.   2. Mild ventriculomegaly likely related to generalized cerebral volume  loss is very similar to the prior exam.  3. There is generalized periventricular hypodensity likely the result  of chronic small vessel ischemic disease.    I have personally reviewed the examination and initial interpretation  and I agree with the findings.    TIERRA WATTS MD         SYSTEM ID:  [id number]   Chest XR,  PA & LAT    Narrative    EXAM: XR CHEST 2 VIEWS  LOCATION: [facility]  DATE/TIME: 11/5/2022 10:44 PM    INDICATION: AMS, rule out CAP vs. fluid overload.  COMPARISON: 09/11/2021.      Impression    IMPRESSION: No obvious acute cardiopulmonary abnormalities identified. Kyphotic changes of the  thoracic spine with minimal compression fractures. Partly tortuous and calcified thoracic aorta. Old rib fractures. No obvious infiltrate/atelectasis or   findings of active CHF. Advanced thoracolumbar curvature convex to the right.   XR Thoracic Lumbar Spine 2 Views    Narrative    EXAM: XR THORACIC LUMBAR SPINE 2 VIEWS  LOCATION: [facility]  DATE/TIME: 11/6/2022 1:20 AM    INDICATION: Back pain with spinal tenderness.  COMPARISON: None.  TECHNIQUE: CR Thoraco-Lumbar Spine.      Impression    IMPRESSION: Moderate right convexity lumbar curvature. The lateral view is significantly limited by overlying soft tissue artifact and motion. No obvious acute compression fracture deformity though there may be mild height loss at T11 and T12. Consider   CT for further evaluation. Mild to moderate multilevel degenerative disc disease and facet arthropathy. Vascular calcifications.

## 2022-11-06 NOTE — ED PROVIDER NOTES
[facility] ([facility])  11/05/22  ED Provider Note      History     Chief Complaint   Patient presents with     Altered Mental Status     HPI  Belia Sheets is a 89 year old female with a past medical history of kyphoscoliosis, osteoarthritis, age-related osteoporosis, calcium pyrophosphate deposition disease, HTN, frequent falls, and reported cognitive decline with worsening confusion who presents to the emergency department for evaluation of confusion. Per her daughter she has become increasingly more confused over the last week. She does not think she has had any falls as she cannot get up if she falls. She denies any F/C/cough/CP/SOB/abd pain/N/V/dysuria. She has become confused like this in the past when she has had UTIs.        Past Medical History  Past Medical History:   Diagnosis Date     Carpal tunnel syndrome (aka CTS)      Cataract      Chronic osteoarthritis      Constipation due to outlet dysfunction 9/22/2021     Fracture of multiple rami of right pubis with routine healing, subsequent encounter 9/22/2021     H/O calcium pyrophosphate deposition disease (CPPD)      History of 9th right rib fracture due to fall (07/13/2021) 9/22/2021     History of encephalopathy 10/2017     Hypertension      Kyphoscoliosis      Osteoarthritis     hands     Osteoporosis      Rectal prolapse      Past Surgical History:   Procedure Laterality Date     COLONOSCOPY  2010     HYSTERECTOMY      for uterine prolapse     RECTOSIGMOIDECTOMY PERINEAL N/A 1/12/2018    Procedure: RECTOSIGMOIDECTOMY PERINEAL;  Perineal Rectosigmoidectomy  ;  Surgeon: Amrik Mariano MD;  Location: [facility] OR     amLODIPine (NORVASC) 2.5 MG tablet  calcium carbonate 600 mg-vitamin D 400 units (CALTRATE) 600-400 MG-UNIT per tablet  order for DME  order for DME      No Known Allergies  Family History  Family History   Problem Relation Age of Onset     Depression/Anxiety Son      Depression/Anxiety Son          "alcohol abuse  age 24     Hypertension Mother      Hypertension Brother      Diabetes No family hx of      Breast Cancer No family hx of      Colon Cancer No family hx of      Prostate Cancer No family hx of      Other Cancer No family hx of      Social History   Social History     Tobacco Use     Smoking status: Never     Smokeless tobacco: Never   Vaping Use     Vaping Use: Never used   Substance Use Topics     Alcohol use: No     Drug use: No      Past medical history, past surgical history, medications, allergies, family history, and social history were reviewed with the patient. No additional pertinent items.       Review of Systems  A complete review of systems was performed with pertinent positives and negatives noted in the HPI, and all other systems negative.    Physical Exam   BP: (!) 142/93  Pulse: 97  Temp: 98.6  F (37  C)  Resp: 16  Height: 149.9 cm (4' 11\")  Weight: 42.8 kg (94 lb 5.7 oz)  SpO2: 95 %  Physical Exam  Vitals and nursing note reviewed.   Constitutional:       General: She is not in acute distress.     Appearance: She is well-developed. She is not toxic-appearing.   HENT:      Head: Normocephalic and atraumatic.      Mouth/Throat:      Mouth: Mucous membranes are moist.      Pharynx: Oropharynx is clear.   Eyes:      General: No visual field deficit or scleral icterus.     Extraocular Movements: Extraocular movements intact.      Pupils: Pupils are equal, round, and reactive to light.   Cardiovascular:      Rate and Rhythm: Normal rate and regular rhythm.      Heart sounds: Normal heart sounds. No murmur heard.    No friction rub. No gallop.   Pulmonary:      Effort: Pulmonary effort is normal.      Breath sounds: Normal breath sounds. No wheezing, rhonchi or rales.   Abdominal:      General: Bowel sounds are normal. There is no distension.      Palpations: Abdomen is soft.      Tenderness: There is no abdominal tenderness. There is no guarding.   Musculoskeletal:         General: No " swelling or tenderness. Normal range of motion.      Cervical back: Normal range of motion and neck supple.   Lymphadenopathy:      Cervical: No cervical adenopathy.   Skin:     General: Skin is warm and dry.      Capillary Refill: Capillary refill takes less than 2 seconds.      Findings: No erythema or rash.   Neurological:      Mental Status: She is alert. She is disoriented and confused.      Cranial Nerves: No cranial nerve deficit, dysarthria or facial asymmetry.      Sensory: No sensory deficit.      Motor: No weakness.      Deep Tendon Reflexes: Reflexes normal.   Psychiatric:         Mood and Affect: Mood normal.         Speech: Speech normal.         Behavior: Behavior normal.       ED Course      Procedures       -----  Observation Addendum  With this Addendum, this ED Provider Note may also serve as an Observation H&P    Observation Initiation Date: Nov 6, 2022    The patient's condition of confusion and likely UTI would benefit from an observation stay for further monitoring and treatment. The observation plan includes likely treatment for UTI, serial assessments of the patient's condition, and further disposition pending the patient's course during the monitoring period.    The patient's outpatient medications were reconciled and ordered.   -----  The medical record was reviewed and interpreted.  Current labs reviewed and interpreted.  Current images reviewed and interpreted: negative.              Results for orders placed or performed during the hospital encounter of 11/05/22   Head CT w/o contrast     Status: None    Narrative    CT HEAD W/O CONTRAST 11/5/2022 9:26 PM    History: AMS, r/o intracranial hemorrhage   ICD-10:    Comparison: 5/24/22    Technique: Using multidetector thin collimation helical acquisition  technique, axial, coronal and sagittal CT images from the skull base  to the vertex were obtained without intravenous contrast.   (topogram) image(s) also obtained and  reviewed.    Findings: There is no intracranial hemorrhage, mass effect, or midline  shift. Gray/white matter differentiation in both cerebral hemispheres  is preserved. Vertebral artery calcifications.. The basal cisterns are  clear. Similar periventricular generalized periventricular hypodensity  which is likely sequelae of chronic small vessel ischemic disease.  Generalized cortical volume loss with mild associated  ventriculomegaly.    The bony calvaria and the bones of the skull base are normal. The  visualized portions of the paranasal sinuses and mastoid air cells are  clear.      Impression    Impression:  1. No acute intracranial pathology.   2. Mild ventriculomegaly likely related to generalized cerebral volume  loss is very similar to the prior exam.  3. There is generalized periventricular hypodensity likely the result  of chronic small vessel ischemic disease.    I have personally reviewed the examination and initial interpretation  and I agree with the findings.    TIERRA WATTS MD         SYSTEM ID:  [id number]   Chest XR,  PA & LAT     Status: None    Narrative    EXAM: XR CHEST 2 VIEWS  LOCATION: [facility]  DATE/TIME: 11/5/2022 10:44 PM    INDICATION: AMS, rule out CAP vs. fluid overload.  COMPARISON: 09/11/2021.      Impression    IMPRESSION: No obvious acute cardiopulmonary abnormalities identified. Kyphotic changes of the thoracic spine with minimal compression fractures. Partly tortuous and calcified thoracic aorta. Old rib fractures. No obvious infiltrate/atelectasis or   findings of active CHF. Advanced thoracolumbar curvature convex to the right.   INR     Status: Normal   Result Value Ref Range    INR 1.10 0.85 - 1.15   Partial thromboplastin time     Status: Normal   Result Value Ref Range    aPTT 24 22 - 38 Seconds   CBC with platelets and differential     Status: Abnormal   Result Value Ref Range    WBC Count 11.5 (H) 4.0 - 11.0 10e3/uL    RBC  Count 4.59 3.80 - 5.20 10e6/uL    Hemoglobin 14.1 11.7 - 15.7 g/dL    Hematocrit 42.8 35.0 - 47.0 %    MCV 93 78 - 100 fL    MCH 30.7 26.5 - 33.0 pg    MCHC 32.9 31.5 - 36.5 g/dL    RDW 13.4 10.0 - 15.0 %    Platelet Count 251 150 - 450 10e3/uL    % Neutrophils 77 %    % Lymphocytes 13 %    % Monocytes 8 %    % Eosinophils 1 %    % Basophils 0 %    % Immature Granulocytes 1 %    NRBCs per 100 WBC 0 <1 /100    Absolute Neutrophils 8.9 (H) 1.6 - 8.3 10e3/uL    Absolute Lymphocytes 1.5 0.8 - 5.3 10e3/uL    Absolute Monocytes 0.9 0.0 - 1.3 10e3/uL    Absolute Eosinophils 0.1 0.0 - 0.7 10e3/uL    Absolute Basophils 0.1 0.0 - 0.2 10e3/uL    Absolute Immature Granulocytes 0.1 <=0.4 10e3/uL    Absolute NRBCs 0.0 10e3/uL   Extra Tube ([facility] Draw)     Status: None    Narrative    The following orders were created for panel order Extra Tube ([place] Draw).  Procedure                               Abnormality         Status                     ---------                               -----------         ------                     Extra Red Top Tube[[id number]]                               Final result                 Please view results for these tests on the individual orders.   Extra Red Top Tube     Status: None   Result Value Ref Range    Hold Specimen [facility]    Comprehensive metabolic panel     Status: Abnormal   Result Value Ref Range    Sodium 140 133 - 144 mmol/L    Potassium 3.5 3.4 - 5.3 mmol/L    Chloride 99 94 - 109 mmol/L    Carbon Dioxide (CO2) 26 20 - 32 mmol/L    Anion Gap 15 (H) 3 - 14 mmol/L    Urea Nitrogen 19 7 - 30 mg/dL    Creatinine 0.69 0.52 - 1.04 mg/dL    Calcium 9.7 8.5 - 10.1 mg/dL    Glucose 102 (H) 70 - 99 mg/dL    Alkaline Phosphatase 64 40 - 150 U/L    AST 27 0 - 45 U/L    ALT 14 0 - 50 U/L    Protein Total 7.7 6.8 - 8.8 g/dL    Albumin 3.5 3.4 - 5.0 g/dL    Bilirubin Total 1.1 0.2 - 1.3 mg/dL    GFR Estimate 81 >60 mL/min/1.73m2   Lactic acid whole blood     Status: Normal   Result Value Ref Range     Lactic Acid 1.6 0.7 - 2.0 mmol/L   EKG 12 lead     Status: None (Preliminary result)   Result Value Ref Range    Systolic Blood Pressure  mmHg    Diastolic Blood Pressure  mmHg    Ventricular Rate 71 BPM    Atrial Rate 71 BPM    PA Interval 192 ms    QRS Duration 92 ms     ms    QTc 441 ms    P Axis 93 degrees    R AXIS -4 degrees    T Axis 57 degrees    Interpretation ECG       Sinus rhythm with Premature atrial complexes  Nonspecific T wave abnormality  Abnormal ECG     CBC with platelets differential     Status: Abnormal    Narrative    The following orders were created for panel order CBC with platelets differential.  Procedure                               Abnormality         Status                     ---------                               -----------         ------                     CBC with platelets and d...[[id number]]  Abnormal            Final result                 Please view results for these tests on the individual orders.     Medications   sodium chloride (PF) 0.9% PF flush 3 mL (has no administration in time range)   sodium chloride (PF) 0.9% PF flush 3 mL (3 mLs Intracatheter Given 11/5/22 2003)   amLODIPine (NORVASC) tablet 2.5 mg (has no administration in time range)   calcium carbonate-vitamin D (CALTRATE) 600-10 MG-MCG per tablet 1 tablet (has no administration in time range)   melatonin tablet 1 mg (has no administration in time range)   ondansetron (ZOFRAN ODT) ODT tab 4 mg (has no administration in time range)     Or   ondansetron (ZOFRAN) injection 4 mg (has no administration in time range)        Assessments & Plan (with Medical Decision Making)   89 yo female her with increasing confusion over the last week likely secondary to UTI. Labs show WBC 11.5, otherwise relatively unremarkable. Urinalysis pending. She will need ED Observation for likely treatment of her UTI and her acute on chronic back pain.    I have reviewed the nursing notes. I have reviewed the findings,  diagnosis, plan and need for follow up with the patient.    New Prescriptions    No medications on file       Final diagnoses:   Confusion   Chronic midline low back pain without sciatica       --  Juan M Lindsay MD  [facility] EMERGENCY DEPARTMENT  11/5/2022     Juan M Lindsay MD  11/06/22 0142

## 2022-11-06 NOTE — PROGRESS NOTES
[facility]    Medicine Progress Note - ED Observation   Date of Admission:  11/5/2022    Assessment & Plan     Belia Sheets is a 89 year old female admitted on 11/5/2022. She has a  PMH significant for kyphoscoliosis, osteoarthritis, osteoporosis, C7/T2/T3 compression fractures (5/2022), cognitive impairment, calcium pyrophosphate deposition disease, HTN, frequent falls who presented to the [facility] ED from her skilled nursing ([place]) with worsening confusion in the last week.     ##. Confusion  ##. Back Pain  ##. Leukocytosis  Patient's PCP received a call from skilled nursing facility 11/2/2022 with report of changes in patient regarding confusion but had worsened in the last day. Patient wanders to various places in the building and falls asleep, riding up and down elevator not knowing where she is going. Seen in [facility] clinic 11/3/2022 with 4 days of increased confusion, though reportedly improved that morning. No abdominal complaints, pain with urination, fevers, chills, cough, runny nose. Lab was obtained with plan that if patient is worsening would obtain MRI per PCP. Patients daughter states patient was complaining of back pain. Patient's daughter reports patient is dependent on a walker to get around and if patient did have an accidental unwitnessed fall she would not be able to get herself back up.  There is no known sick contacts that family is aware of.  Her daughter states patient has not complained of any headaches.  Patient is only on amlodipine for HTN as well as supplements and has not been on any new medications.  Per chart review patient, it is noted that in 2021 patient had scored 23/30 on MMSE. In ED, HR 70s-90s, BP 110s-150s/50s-90s, RR 13-17, SaO2 94-95% on RA, Temp 98.6  F. Labs show CMP with AG 15 otherwise normal.  Normal lactic acid.  CBC with WBC 11.5 (7.2 on 11/3/2022) otherwise normal.  Covid 19 PCR negative. EKG with sinus rhythm, PACs, nonspecific  T wave abnormality (similar to previous).  CT head without contrast reported no acute intracranial pathology; mild ventricular megaly likely related to generalized cerebral volume loss, similar in prior exam; generalized periventricular hypodensity likely result of chronic small vessel ischemic disease.  CXR negative reports no acute abnormality, infiltrate, atelectasis, findings consistent with CHF; kyphotic changes of the thoracic spine with minimal compression fracture; partly tortuous and calcified thoracic aorta; old rib fractures. Differential diagnosis includes infection, metabolic derangements, medication/drug related, CVA, seizure, heart failure, ACS, new compression fracture. Upon arrival to the Observation Unit, patient is awake and alert, responsive to questions and knows where she is. No spinal tenderness. No skin wounds. External hemorrhoids noted with spot of blood on diaper.  No events overnight. Patient is alert and orientated to self only. She is able to follow commands and other than orientation her neuro exam is intact. Work up thus far is negative. We will consult neurology for further recommendations. PT/OT consults for assessment. Labs show mag 1.5, potassium 3.3, CMP, CRP,  and troponin pending. Procal 0.08. phos 3.6. Respiratory panel negative.   -Neuro checks q4h  -Telemetry  -Check CRP, troponin, procalcitonin, magnesium, phosphorus, TSH  -CMP, CRP  and troponin pending   -Follow blood culture  -500 mL NS bolus  -NS 75 mL/h  -Lidoderm patch  -Protonix for GI prophylaxis while admitted  -PT/OT consult  -SW/CC consult    Addendum: Spoke with Neuro. Initial recommendations includes: EEG, MRI brain, and labwork. Ordered by neurology.     ##Electrolyte Abnormalities  Potassium 3.3 and magnesium 1.5 today.   -Potassium replacement protocol  -Magnesium replacement protocol     ##. HTN: - Continue with PTA Amlodipine      Diet: Advance Diet as Tolerated: Regular Diet Adult    DVT Prophylaxis:  Pneumatic Compression Devices and Ambulate every shift  Perez Catheter: Not present  Central Lines: None  Cardiac Monitoring: ACTIVE order. Indication: Syncope- low cardiac risk (24 hours)  Code Status: Full Code      Disposition Plan    Discharge pending neuro recommendations and PT/OT consults    The patient's care was discussed with the Attending Physician, Dr. Hurt, Bedside Nurse, Care Coordinator/, Patient, Patient's Family and Neurology, PT and OT Consultant.    SUSIE Kaye CNP  ED Observation   [facility]  Securely message with the Vocera Web Console (learn more here)  Text page via [facility] Paging/Directory         Clinically Significant Risk Factors Present on Admission            # Hypomagnesemia: Lowest Mg = 1.5 mg/dL (Ref range: 1.7-2.3) in last 2 days, will replace as needed                   ______________________________________________________________________    Interval History   No events overnight. Patient is alert and orientated to self only. She is able to follow commands and other than orientation her neuro exam is intact. Work up thus far is negative. We will consult neurology for further recommendations. PT/OT consults for assessment.    Data reviewed today: I reviewed all medications, new labs and imaging results over the last 24 hours.    Physical Exam   Vital Signs: Temp: 97.9  F (36.6  C) Temp src: Oral BP: (!) 142/92 Pulse: 79   Resp: 19 SpO2: 98 % O2 Device: Nasal cannula Oxygen Delivery: 1.5 LPM  Weight: 94 lbs 5.71 oz    Constitutional: awake, alert, no apparent distress, and appears stated age. Orientated to self only   Eyes: Lids and lashes normal, pupils equal, round and reactive to light, extra ocular muscles intact, sclera clear, conjunctiva normal  ENT: Normocephalic, without obvious abnormality, atraumatic, sinuses nontender on palpation, external ears without lesions, oral pharynx with moist mucous  "membranes, tonsils without erythema or exudates, gums normal and good dentition.  Hematologic / Lymphatic: no cervical lymphadenopathy  Respiratory: No increased work of breathing, good air exchange, clear to auscultation bilaterally, no crackles or wheezing  Cardiovascular: Normal apical impulse, regular rate and rhythm, normal S1 and S2, no S3 or S4, and no murmur noted  GI: No scars, normal bowel sounds, soft, non-distended, non-tender, no masses palpated, no hepatosplenomegally  Skin: no bruising or bleeding  Musculoskeletal: There is no redness, warmth, or swelling of the joints.  Full range of motion noted.  Motor strength is 5 out of 5 all extremities bilaterally.  Tone is normal.  Neurologic: Awake, alert, oriented to self only. Follow commands. Some illogical answers and thoughts express, I.e.:  \"I better get back to work.\"Cranial nerves II-XII are grossly intact.  Motor is 5 out of 5 bilaterally. Strength 4/5 in all five extremities, equal bilaterally  Neuropsychiatric: General: normal, calm and normal eye contact         Data   Recent Labs   Lab 11/06/22  0145 11/05/22 2000 11/05/22 1959 11/03/22  1153   WBC 9.6 11.5*  --  7.2   HGB 13.0 14.1  --  13.8   MCV 95 93  --  94    251  --  277   INR  --  1.10  --   --    NA  --   --  140 142   POTASSIUM  --   --  3.5 3.3*   CHLORIDE  --   --  99 101   CO2  --   --  26 25   BUN  --   --  19 15.8   CR  --   --  0.69 0.60   ANIONGAP  --   --  15* 16*   BOY  --   --  9.7 9.9*   GLC  --   --  102* 155*   ALBUMIN  --   --  3.5 3.9   PROTTOTAL  --   --  7.7 6.8   BILITOTAL  --   --  1.1 0.6   ALKPHOS  --   --  64 55   ALT  --   --  14 6*   AST  --   --  27 29     Recent Results (from the past 24 hour(s))   Head CT w/o contrast    Narrative    CT HEAD W/O CONTRAST 11/5/2022 9:26 PM    History: AMS, r/o intracranial hemorrhage   ICD-10:    Comparison: 5/24/22    Technique: Using multidetector thin collimation helical acquisition  technique, axial, coronal and " sagittal CT images from the skull base  to the vertex were obtained without intravenous contrast.   (topogram) image(s) also obtained and reviewed.    Findings: There is no intracranial hemorrhage, mass effect, or midline  shift. Gray/white matter differentiation in both cerebral hemispheres  is preserved. Vertebral artery calcifications.. The basal cisterns are  clear. Similar periventricular generalized periventricular hypodensity  which is likely sequelae of chronic small vessel ischemic disease.  Generalized cortical volume loss with mild associated  ventriculomegaly.    The bony calvaria and the bones of the skull base are normal. The  visualized portions of the paranasal sinuses and mastoid air cells are  clear.      Impression    Impression:  1. No acute intracranial pathology.   2. Mild ventriculomegaly likely related to generalized cerebral volume  loss is very similar to the prior exam.  3. There is generalized periventricular hypodensity likely the result  of chronic small vessel ischemic disease.    I have personally reviewed the examination and initial interpretation  and I agree with the findings.    TIERRA WATTS MD         SYSTEM ID:  [id number]   Chest XR,  PA & LAT    Narrative    EXAM: XR CHEST 2 VIEWS  LOCATION: [facility]  DATE/TIME: 11/5/2022 10:44 PM    INDICATION: AMS, rule out CAP vs. fluid overload.  COMPARISON: 09/11/2021.      Impression    IMPRESSION: No obvious acute cardiopulmonary abnormalities identified. Kyphotic changes of the thoracic spine with minimal compression fractures. Partly tortuous and calcified thoracic aorta. Old rib fractures. No obvious infiltrate/atelectasis or   findings of active CHF. Advanced thoracolumbar curvature convex to the right.   XR Thoracic Lumbar Spine 2 Views    Narrative    EXAM: XR THORACIC LUMBAR SPINE 2 VIEWS  LOCATION: [facility]  DATE/TIME: 11/6/2022  1:20 AM    INDICATION: Back pain with spinal tenderness.  COMPARISON: None.  TECHNIQUE: CR Thoraco-Lumbar Spine.      Impression    IMPRESSION: Moderate right convexity lumbar curvature. The lateral view is significantly limited by overlying soft tissue artifact and motion. No obvious acute compression fracture deformity though there may be mild height loss at T11 and T12. Consider   CT for further evaluation. Mild to moderate multilevel degenerative disc disease and facet arthropathy. Vascular calcifications.     Medications     sodium chloride 75 mL/hr at 11/06/22 0823       amLODIPine  2.5 mg Oral Daily     calcium carbonate-vitamin D  1 tablet Oral BID AC     lidocaine  2 patch Transdermal Q24H     lidocaine   Transdermal Q8H MAK     pantoprazole  40 mg Intravenous QAM AC     potassium chloride  10 mEq Oral Once     senna-docusate  1 tablet Oral BID     sodium chloride (PF)  3 mL Intracatheter Q8H

## 2022-11-06 NOTE — H&P
"      [facility]    Stroke Admission Note    Chief Complaint  Altered mental status    HPI  Belia Sheets is a 89 year old female with osteoporosis and HTN who is being seen in evaluation for acute encephaloapthy.     She is unable to provide additional history, thus history was obtained in discussion with her daughter.       Her daughter, Lubna, reports that her mom has a \"different confusion\" than compared to baseline (has had some processing issues in the past). Family has seen a difference in difficulty following train of thought since March/April. She occasionally knows the month, but not necessarily the date. She does typically know that she lives in [place]. She likes to keep up with public events, but in July/August 2022 she had a medical evaluation with health insurance where she couldn't recall the president in that conversation. Memory stayed stable through summer months. Starting 5-6 days ago, she has been experiencing a \"different reality\". On 11/2, she wanted to put her daughter in contact to speak to an  for a children's party and she was perseverating on ensuring that this party occurs (there is no event like that). When she went to [facility], she was sure that the RN from [facility] was taking her to lunch and to visit her uncle in the hospital after the clinic appt (this was not going to happen). Over the past couple of weeks-to-months, her daughter has noticed that, she wasn't taking her medications every day, it has been more challenging.     At home, she does not manage her finances (her daughter manages this). Her breakfast is supplied by the building that she lives in. She has been resistant to taking her other meals in the dining room, her daughter buys her groceries (basic groceries that need microwave heating and are easy to cook). It has been harder for the patient to make a plan and initiating it.     In 2017, she was hallucinating and had " aphasia (could not find words). She was seeing strings floating around in the air.  She started to improve in the hospital and PT/OT/SLP was ordered at home. Over a period of weeks/months, she got these therapies. She recovered over time.     She has had two falls within the past couple of years. She was disoriented when she was in rehab.    Intravenous Thrombolysis  Not given due to:   - unclear or unfavorable risk-benefit profile for extended window thrombolysis beyond the conventional 4.5 hour time window    Endovascular Treatment  Not initiated due to absence of proximal vessel occlusion    Impression   Acute ischemic stroke of the left occipital lobe due to undetermined etiology   This is a 89-year-old female with HTN and osteoporosis who presented with acute on chronic encephalopathy. Brain MRI was obtained due to change in mental status. No other stroke symptoms were noted on evaluation. Brain MRI showed L occipital lobe stroke of undetermined etiology. Ddx includes ESUS and atherosclerotic disease.     She is not a candidate for TNK/thrombolytics due to unfavorable benefit-risk profile (LKW >4.5 hours) and no thrombectomy due to low NIHSS.     Stroke risk factors include HTN and HLD. Most recent LDL was 111 on 11/3/2022 and Hgb A1cwas 5.6 in 2017.     Will admit for stroke work-up and obtain routine EEG per general neurology's initial consult note.     Plan  #Acute ischemic stroke of the left occipital lobe  #Acute on chronic encephalopathy (suspect underlying neurodegenerative disorder) due to acute ischemic stroke  - Admit to Vascular Neurology service  - Neurochecks Q 4 hours  - Permissive HTN not indicated due to last known well >24 hours  - Avoid hypotonic IV fluids  - Daily aspirin 81 mg for secondary stroke prevention  - Statin: start atorvastatin 20mg daily  - MRI Stroke Protocol (completed)  - CTA head and neck with contrast  - Routine EEG  - TTE with Bubble Study  - Cardiac monitoring, EKG  -  Bedside Glucose Monitoring  - A1c, Lipid Panel, Troponin x 3  - PT/OT/SLP  - Stroke Education  - Depression and Apnea Screen  - Euthermia, Euglycemia  - Outpatient follow-up for likely neurodegerative disorder    #HTN  - PTA amlodipine 2.5mg daily    #Osteoporosis  - Calcium carbonate supplementation      Prophylaxis            For VTE Prevention:  - enoxaparin    For Acid Suppression:  - GI prophylaxis is not indicated    Code Status  Full Code    During initial physical assessment, the plan of care discussion was not possible due to patient unable to participate and family not available at this time.    Patient was admitted via [facility] ED ([place])    The patient will be admitted to the Neuro Critical Care/Stroke team..     The patient was discussed with the Stroke Staff, Dr. Burns. She will be formally evaluated by the oncoming vascular neurologist, Dr. Coughlin, tomorrow.    Veronika Fajardo MD  Neurology PGY-4  ___________________________________________________    Nutrition:   Orders Placed This Encounter      Advance Diet as Tolerated: Regular Diet Adult    Clinically Significant Risk Factors Present on Admission        # Hypokalemia: Lowest K = 3.1 mmol/L (Ref range: 3.4-5.3) in last 2 days, will replace as needed    # Hypercalcemia: corrected calcium is >10.1, will monitor as appropriate  # Hypomagnesemia: Lowest Mg = 1.5 mg/dL (Ref range: 1.7-2.3) in last 2 days, will replace as needed   # Hypoalbuminemia: Lowest albumin = 3 g/dL (Ref range: 3.5-5.2) at 11/6/2022  9:05 AM, will monitor as appropriate        Past Medical History   Past Medical History:   Diagnosis Date     Carpal tunnel syndrome (aka CTS)      Cataract      Chronic osteoarthritis      Constipation due to outlet dysfunction 9/22/2021     Fracture of multiple rami of right pubis with routine healing, subsequent encounter 9/22/2021     H/O calcium pyrophosphate deposition disease (CPPD)      History of 9th right rib fracture  due to fall (2021) 2021     History of encephalopathy 10/2017     Hypertension      Kyphoscoliosis      Osteoarthritis     hands     Osteoporosis      Rectal prolapse      Past Surgical History   Past Surgical History:   Procedure Laterality Date     COLONOSCOPY       HYSTERECTOMY      for uterine prolapse     RECTOSIGMOIDECTOMY PERINEAL N/A 2018    Procedure: RECTOSIGMOIDECTOMY PERINEAL;  Perineal Rectosigmoidectomy  ;  Surgeon: Amrik Mariano MD;  Location: [facility] OR     Medications   Home Meds  Prior to Admission medications    Medication Sig Start Date End Date Taking? Authorizing Provider   amLODIPine (NORVASC) 2.5 MG tablet Take 1 tablet (2.5 mg) by mouth daily 22   Yen King MD   calcium carbonate 600 mg-vitamin D 400 units (CALTRATE) 600-400 MG-UNIT per tablet Take 1 tablet by mouth 2 times daily (before meals) 21   Saurabh Pittman MD   order for DME Equipment being ordered: Incentive spirometer 19   Maday Berger MD   order for DME Equipment being ordered: Home BP monitor and cuff 17   Alejandro Sandhu MD       Scheduled Meds    amLODIPine  2.5 mg Oral Daily     calcium carbonate-vitamin D  1 tablet Oral BID AC     lidocaine  2 patch Transdermal Q24H     lidocaine   Transdermal Q8H MAK     pantoprazole  40 mg Intravenous QAM AC     senna-docusate  1 tablet Oral BID     sodium chloride (PF)  3 mL Intracatheter Q8H       Infusion Meds    sodium chloride 75 mL/hr at 22 0823       PRN Meds  melatonin, ondansetron **OR** ondansetron, sodium chloride (PF)    Allergies   No Known Allergies  Family History   Family History   Problem Relation Age of Onset     Depression/Anxiety Son      Depression/Anxiety Son         alcohol abuse  age 24     Hypertension Mother      Hypertension Brother      Diabetes No family hx of      Breast Cancer No family hx of      Colon Cancer No family hx of      Prostate Cancer No family hx of      Other Cancer  No family hx of      Social History   Social History     Tobacco Use     Smoking status: Never     Smokeless tobacco: Never   Vaping Use     Vaping Use: Never used   Substance Use Topics     Alcohol use: No     Drug use: No       Review of Systems   The 10 point Review of Systems is negative other than noted in the HPI or here.        PHYSICAL EXAMINATION  Temp:  [97.9  F (36.6  C)-98.7  F (37.1  C)] 98.7  F (37.1  C)  Pulse:  [63-97] 63  Resp:  [13-20] 20  BP: (118-154)/(59-95) 131/78  SpO2:  [94 %-100 %] 100 %    General:  patient lying in bed without any acute distress    HEENT:  normocephalic/atraumatic, no epistaxis   Cardio:  RRR  Pulmonary:  no respiratory distress  Abdomen:  soft, non-tender, non-distended  Extremities:  no edema, peripheral pulses palpable  Skin:  intact, warm/dry     Neurologic  Mental status: Alert and conversant. Oriented to self; reports current location as [facility] - she is not oriented to month/year. She does not recall the current president of the [place]. Follows simple commands and complex two-step commands (points to the wall after pointing to the ceiling). Naming and repetition intact. Able to spell the word WORLD forward, but NOT backward. Can calculate the number of quarters in $1.75. Can identify how orange-banana, bicycle-train, and watch-ruler are similar.  Cranial nerves: Visual fields intact. Pupils 2-3mm, equal, round, reactive. Conjugate gaze. EOMI. Facial sensation intact in V1-V3 regions. Face symmetric. Hearing intact to conversation. Tongue protrudes midline.  Motor: 5/5 strength in the upper and lower extremities.  Reflexes: 1+ biceps and brachioradialis reflexes bilaterally. Trace patellar reflexes.  No clonus, toes equivocal.  Sensory: Intact to vibration in the upper and lower extremities.  Coordination: FNF without dysmetria.  Gait: Deferred.    Dysphagia Screen  Per Nursing    Stroke Scales    NIHSS  1a. Level of Consciousness 0-->Alert, keenly  responsive   1b. LOC Questions 2-->Answers neither question correctly   1c. LOC Commands 0-->Performs both tasks correctly   2.   Best Gaze 0-->Normal   3.   Visual 0-->No visual loss   4.   Facial Palsy 0-->Normal symmetrical movements   5a. Motor Arm, Left 0-->No drift, limb holds 90 (or 45) degrees for full 10 secs   5b. Motor Arm, Right 0-->No drift, limb holds 90 (or 45) degrees for full 10 secs   6a. Motor Leg, Left 0-->No drift, leg holds 30 degree position for full 5 secs   6b. Motor Leg, right 0-->No drift, leg holds 30 degree position for full 5 secs   7.   Limb Ataxia 0-->Absent   8.   Sensory 0-->Normal, no sensory loss   9.   Best Language 0-->No aphasia, normal   10. Dysarthria 0-->Normal   11. Extinction and Inattention  0-->No abnormality   Total 2 (11/06/22 1507)       Modified Celia Score (Pre-morbid)  0-No deficits    Imaging  I personally reviewed all imaging; relevant findings per the HPI.    Lab Results Data   CBC  Recent Labs   Lab 11/06/22  0145 11/05/22 2000 11/03/22  1153   WBC 9.6 11.5* 7.2   RBC 4.29 4.59 4.52   HGB 13.0 14.1 13.8   HCT 40.8 42.8 42.6    251 277     Basic Metabolic Panel   Recent Labs   Lab 11/06/22  0905 11/05/22 1959 11/03/22  1153    140 142   POTASSIUM 3.1* 3.5 3.3*   CHLORIDE 102 99 101   CO2 26 26 25   BUN 15.8 19 15.8   CR 0.61 0.69 0.60   GLC 92 102* 155*   BOY 8.9 9.7 9.9*     Liver Panel  Recent Labs   Lab 11/06/22  0905 11/05/22 1959 11/03/22  1153   PROTTOTAL 5.6* 7.7 6.8   ALBUMIN 3.0* 3.5 3.9   BILITOTAL 0.8 1.1 0.6   ALKPHOS 48 64 55   AST 15 27 29   ALT <5* 14 6*     INR    Recent Labs   Lab Test 11/05/22 2000 09/11/21  1225 10/25/17  0759   INR 1.10 1.10 1.03      Lipid Profile    Recent Labs   Lab Test 11/03/22  1153   CHOL 200*   HDL 78   *   TRIG 56     A1C    Recent Labs   Lab Test 03/15/17  1402 08/31/16  1433   A1C 5.6 5.9*     Troponin    Recent Labs   Lab 11/06/22  0905 11/06/22  0145   CTROPT 22* 21*          Stroke Code  / Stroke Consult Data Data    Not a stroke code

## 2022-11-07 ENCOUNTER — APPOINTMENT (OUTPATIENT)
Dept: EDUCATION SERVICES | Facility: CLINIC | Age: 87
DRG: 065 | End: 2022-11-07
Attending: STUDENT IN AN ORGANIZED HEALTH CARE EDUCATION/TRAINING PROGRAM
Payer: COMMERCIAL

## 2022-11-07 ENCOUNTER — APPOINTMENT (OUTPATIENT)
Dept: PHYSICAL THERAPY | Facility: CLINIC | Age: 87
DRG: 065 | End: 2022-11-07
Attending: STUDENT IN AN ORGANIZED HEALTH CARE EDUCATION/TRAINING PROGRAM
Payer: COMMERCIAL

## 2022-11-07 ENCOUNTER — APPOINTMENT (OUTPATIENT)
Dept: CARDIOLOGY | Facility: CLINIC | Age: 87
DRG: 065 | End: 2022-11-07
Attending: STUDENT IN AN ORGANIZED HEALTH CARE EDUCATION/TRAINING PROGRAM
Payer: COMMERCIAL

## 2022-11-07 ENCOUNTER — APPOINTMENT (OUTPATIENT)
Dept: OCCUPATIONAL THERAPY | Facility: CLINIC | Age: 87
DRG: 065 | End: 2022-11-07
Attending: STUDENT IN AN ORGANIZED HEALTH CARE EDUCATION/TRAINING PROGRAM
Payer: COMMERCIAL

## 2022-11-07 LAB
ANION GAP SERPL CALCULATED.3IONS-SCNC: 12 MMOL/L (ref 7–15)
BACTERIA UR CULT: NO GROWTH
BUN SERPL-MCNC: 12.8 MG/DL (ref 8–23)
CALCIUM SERPL-MCNC: 7.8 MG/DL (ref 8.2–9.6)
CHLORIDE SERPL-SCNC: 103 MMOL/L (ref 98–107)
CREAT SERPL-MCNC: 0.5 MG/DL (ref 0.51–0.95)
DEPRECATED HCO3 PLAS-SCNC: 22 MMOL/L (ref 22–29)
ERYTHROCYTE [DISTWIDTH] IN BLOOD BY AUTOMATED COUNT: 13.2 % (ref 10–15)
GFR SERPL CREATININE-BSD FRML MDRD: 88 ML/MIN/1.73M2
GLUCOSE BLDC GLUCOMTR-MCNC: 101 MG/DL (ref 70–99)
GLUCOSE BLDC GLUCOMTR-MCNC: 107 MG/DL (ref 70–99)
GLUCOSE BLDC GLUCOMTR-MCNC: 117 MG/DL (ref 70–99)
GLUCOSE BLDC GLUCOMTR-MCNC: 129 MG/DL (ref 70–99)
GLUCOSE BLDC GLUCOMTR-MCNC: 130 MG/DL (ref 70–99)
GLUCOSE SERPL-MCNC: 97 MG/DL (ref 70–99)
HCT VFR BLD AUTO: 37.1 % (ref 35–47)
HGB BLD-MCNC: 12.1 G/DL (ref 11.7–15.7)
LVEF ECHO: NORMAL
MAGNESIUM SERPL-MCNC: 1.5 MG/DL (ref 1.7–2.3)
MAGNESIUM SERPL-MCNC: 1.5 MG/DL (ref 1.7–2.3)
MCH RBC QN AUTO: 30.3 PG (ref 26.5–33)
MCHC RBC AUTO-ENTMCNC: 32.6 G/DL (ref 31.5–36.5)
MCV RBC AUTO: 93 FL (ref 78–100)
PHOSPHATE SERPL-MCNC: 2.3 MG/DL (ref 2.5–4.5)
PLATELET # BLD AUTO: 212 10E3/UL (ref 150–450)
POTASSIUM SERPL-SCNC: 3.1 MMOL/L (ref 3.4–5.3)
POTASSIUM SERPL-SCNC: 3.8 MMOL/L (ref 3.4–5.3)
RBC # BLD AUTO: 4 10E6/UL (ref 3.8–5.2)
SODIUM SERPL-SCNC: 137 MMOL/L (ref 136–145)
WBC # BLD AUTO: 9.1 10E3/UL (ref 4–11)

## 2022-11-07 PROCEDURE — 250N000013 HC RX MED GY IP 250 OP 250 PS 637

## 2022-11-07 PROCEDURE — 84132 ASSAY OF SERUM POTASSIUM: CPT | Performed by: PSYCHIATRY & NEUROLOGY

## 2022-11-07 PROCEDURE — 250N000011 HC RX IP 250 OP 636: Performed by: PSYCHIATRY & NEUROLOGY

## 2022-11-07 PROCEDURE — 36415 COLL VENOUS BLD VENIPUNCTURE: CPT | Performed by: PSYCHIATRY & NEUROLOGY

## 2022-11-07 PROCEDURE — 36415 COLL VENOUS BLD VENIPUNCTURE: CPT | Performed by: STUDENT IN AN ORGANIZED HEALTH CARE EDUCATION/TRAINING PROGRAM

## 2022-11-07 PROCEDURE — 120N000002 HC R&B MED SURG/OB UMMC

## 2022-11-07 PROCEDURE — 999N000226 HC STATISTIC SLP IP EVAL DEFER

## 2022-11-07 PROCEDURE — 97530 THERAPEUTIC ACTIVITIES: CPT | Mod: GP

## 2022-11-07 PROCEDURE — 97530 THERAPEUTIC ACTIVITIES: CPT | Mod: GO

## 2022-11-07 PROCEDURE — 83735 ASSAY OF MAGNESIUM: CPT | Performed by: PSYCHIATRY & NEUROLOGY

## 2022-11-07 PROCEDURE — 97165 OT EVAL LOW COMPLEX 30 MIN: CPT | Mod: GO

## 2022-11-07 PROCEDURE — C9113 INJ PANTOPRAZOLE SODIUM, VIA: HCPCS

## 2022-11-07 PROCEDURE — 250N000013 HC RX MED GY IP 250 OP 250 PS 637: Performed by: STUDENT IN AN ORGANIZED HEALTH CARE EDUCATION/TRAINING PROGRAM

## 2022-11-07 PROCEDURE — 83735 ASSAY OF MAGNESIUM: CPT | Performed by: STUDENT IN AN ORGANIZED HEALTH CARE EDUCATION/TRAINING PROGRAM

## 2022-11-07 PROCEDURE — 97161 PT EVAL LOW COMPLEX 20 MIN: CPT | Mod: GP

## 2022-11-07 PROCEDURE — 80048 BASIC METABOLIC PNL TOTAL CA: CPT | Performed by: STUDENT IN AN ORGANIZED HEALTH CARE EDUCATION/TRAINING PROGRAM

## 2022-11-07 PROCEDURE — 93306 TTE W/DOPPLER COMPLETE: CPT

## 2022-11-07 PROCEDURE — 85027 COMPLETE CBC AUTOMATED: CPT | Performed by: STUDENT IN AN ORGANIZED HEALTH CARE EDUCATION/TRAINING PROGRAM

## 2022-11-07 PROCEDURE — 250N000011 HC RX IP 250 OP 636: Performed by: STUDENT IN AN ORGANIZED HEALTH CARE EDUCATION/TRAINING PROGRAM

## 2022-11-07 PROCEDURE — 93306 TTE W/DOPPLER COMPLETE: CPT | Mod: 26 | Performed by: INTERNAL MEDICINE

## 2022-11-07 PROCEDURE — 99233 SBSQ HOSP IP/OBS HIGH 50: CPT | Mod: GC | Performed by: PSYCHIATRY & NEUROLOGY

## 2022-11-07 PROCEDURE — 250N000013 HC RX MED GY IP 250 OP 250 PS 637: Performed by: PSYCHIATRY & NEUROLOGY

## 2022-11-07 PROCEDURE — 97535 SELF CARE MNGMENT TRAINING: CPT | Mod: GO

## 2022-11-07 PROCEDURE — 97116 GAIT TRAINING THERAPY: CPT | Mod: GP

## 2022-11-07 PROCEDURE — 84100 ASSAY OF PHOSPHORUS: CPT | Performed by: STUDENT IN AN ORGANIZED HEALTH CARE EDUCATION/TRAINING PROGRAM

## 2022-11-07 PROCEDURE — 250N000011 HC RX IP 250 OP 636

## 2022-11-07 RX ORDER — POTASSIUM CHLORIDE 750 MG/1
20 TABLET, EXTENDED RELEASE ORAL ONCE
Status: COMPLETED | OUTPATIENT
Start: 2022-11-07 | End: 2022-11-07

## 2022-11-07 RX ORDER — IBUPROFEN 200 MG
600 TABLET ORAL 3 TIMES DAILY PRN
Status: ON HOLD | COMMUNITY
End: 2023-04-07

## 2022-11-07 RX ORDER — ACETAMINOPHEN 325 MG/1
650 TABLET ORAL EVERY 6 HOURS PRN
Status: DISCONTINUED | OUTPATIENT
Start: 2022-11-07 | End: 2022-11-09 | Stop reason: HOSPADM

## 2022-11-07 RX ORDER — CLOPIDOGREL BISULFATE 75 MG/1
75 TABLET ORAL DAILY
Status: DISCONTINUED | OUTPATIENT
Start: 2022-11-07 | End: 2022-11-09 | Stop reason: HOSPADM

## 2022-11-07 RX ORDER — ACETAMINOPHEN 500 MG
1000 TABLET ORAL 3 TIMES DAILY PRN
Status: ON HOLD | COMMUNITY
End: 2023-04-07

## 2022-11-07 RX ORDER — MAGNESIUM SULFATE HEPTAHYDRATE 40 MG/ML
2 INJECTION, SOLUTION INTRAVENOUS ONCE
Status: COMPLETED | OUTPATIENT
Start: 2022-11-07 | End: 2022-11-07

## 2022-11-07 RX ORDER — UREA 10 %
500 LOTION (ML) TOPICAL DAILY
COMMUNITY
End: 2023-04-20

## 2022-11-07 RX ADMIN — SENNOSIDES AND DOCUSATE SODIUM 1 TABLET: 8.6; 5 TABLET ORAL at 07:51

## 2022-11-07 RX ADMIN — ASPIRIN 81 MG CHEWABLE TABLET 81 MG: 81 TABLET CHEWABLE at 07:51

## 2022-11-07 RX ADMIN — ATORVASTATIN CALCIUM 20 MG: 20 TABLET, FILM COATED ORAL at 19:46

## 2022-11-07 RX ADMIN — POTASSIUM CHLORIDE 20 MEQ: 750 TABLET, EXTENDED RELEASE ORAL at 12:17

## 2022-11-07 RX ADMIN — AMLODIPINE BESYLATE 2.5 MG: 2.5 TABLET ORAL at 07:51

## 2022-11-07 RX ADMIN — ACETAMINOPHEN 650 MG: 325 TABLET, FILM COATED ORAL at 15:11

## 2022-11-07 RX ADMIN — ENOXAPARIN SODIUM 40 MG: 40 INJECTION SUBCUTANEOUS at 19:46

## 2022-11-07 RX ADMIN — SENNOSIDES AND DOCUSATE SODIUM 1 TABLET: 8.6; 5 TABLET ORAL at 19:46

## 2022-11-07 RX ADMIN — CALCIUM CARBONATE 600 MG (1,500 MG)-VITAMIN D3 400 UNIT TABLET 1 TABLET: at 07:51

## 2022-11-07 RX ADMIN — PANTOPRAZOLE SODIUM 40 MG: 40 INJECTION, POWDER, FOR SOLUTION INTRAVENOUS at 07:52

## 2022-11-07 RX ADMIN — CALCIUM CARBONATE 600 MG (1,500 MG)-VITAMIN D3 400 UNIT TABLET 1 TABLET: at 17:35

## 2022-11-07 RX ADMIN — CLOPIDOGREL BISULFATE 75 MG: 75 TABLET ORAL at 15:11

## 2022-11-07 RX ADMIN — MAGNESIUM SULFATE HEPTAHYDRATE 2 G: 40 INJECTION, SOLUTION INTRAVENOUS at 15:11

## 2022-11-07 ASSESSMENT — ACTIVITIES OF DAILY LIVING (ADL)
ADLS_ACUITY_SCORE: 40
ADLS_ACUITY_SCORE: 40
DESCRIBE_HEARING_LOSS: BILATERAL HEARING LOSS
WEAR_GLASSES_OR_BLIND: YES
ADLS_ACUITY_SCORE: 51
DOING_ERRANDS_INDEPENDENTLY_DIFFICULTY: NO
DIFFICULTY_COMMUNICATING: NO
ADLS_ACUITY_SCORE: 51
THE_FOLLOWING_AIDS_WERE_PROVIDED;: PATIENT DECLINED OFFER OF AUXILIARY AIDS
VISION_MANAGEMENT: GLASSES AT BEDSIDE
DIFFICULTY_EATING/SWALLOWING: NO
WALKING_OR_CLIMBING_STAIRS: AMBULATION DIFFICULTY, REQUIRES EQUIPMENT
ADLS_ACUITY_SCORE: 40
USE_OF_HEARING_ASSISTIVE_DEVICES: BILATERAL HEARING AIDS
DRESSING/BATHING_DIFFICULTY: NO
HEARING_DIFFICULTY_OR_DEAF: YES
CHANGE_IN_FUNCTIONAL_STATUS_SINCE_ONSET_OF_CURRENT_ILLNESS/INJURY: YES
ADLS_ACUITY_SCORE: 51
PATIENT'S_PREFERRED_MEANS_OF_COMMUNICATION: VERBAL
ADLS_ACUITY_SCORE: 40
CONCENTRATING,_REMEMBERING_OR_MAKING_DECISIONS_DIFFICULTY: NO
ADLS_ACUITY_SCORE: 38
WALKING_OR_CLIMBING_STAIRS_DIFFICULTY: YES
FALL_HISTORY_WITHIN_LAST_SIX_MONTHS: YES
ADLS_ACUITY_SCORE: 40
NUMBER_OF_TIMES_PATIENT_HAS_FALLEN_WITHIN_LAST_SIX_MONTHS: 2
ADLS_ACUITY_SCORE: 40
ADLS_ACUITY_SCORE: 51
ADLS_ACUITY_SCORE: 40
WERE_AUXILIARY_AIDS_OFFERED?: NO
TOILETING_ISSUES: NO
EQUIPMENT_CURRENTLY_USED_AT_HOME: GRAB BAR, TUB/SHOWER;SHOWER CHAIR;WALKER, ROLLING

## 2022-11-07 ASSESSMENT — VISUAL ACUITY
OU: NORMAL ACUITY

## 2022-11-07 NOTE — PHARMACY-ADMISSION MEDICATION HISTORY
Admission Medication History Completed by Pharmacy    See [facility] Admission Navigator for allergy information, preferred outpatient pharmacy, prior to admission medications and immunization status.     Medication History Sources:     Care Everywhere    Family member interview - Lubna (Daughter) - [phone]    Changes made to PTA medication list (reason):    Added: Vitamin B12 500 mcg tablet take once daily, Acetaminophen 500 mg TID PRN, Ibuprofen 600 mg TID PRN (reported by daughter)    Deleted: None    Changed: None    Additional Information:    Family is currently working to set up medication management at [facility] where the patient stays    The family realized that Sudeep was not taking her medications as prescribed starting in September but are not sure when she really started not taking her medications.     Lubna set up Sudeep's medications 3 weeks ago and believes that the Sudeep has been taking them pretty consistently since then because the [facility] staff would check on her medication box but were not allowed to administer the medicine during that time    Lubna stated that Sudeep is pretty resistant to taking medications and typically will not take her PRN pain medications even if she is in a significant amount of pain    Prior to Admission medications    Medication Sig Last Dose Taking? Auth Provider Long Term End Date   acetaminophen (TYLENOL) 500 MG tablet Take 1,000 mg by mouth 3 times daily as needed for mild pain Unknown Yes Unknown, Entered By History No    amLODIPine (NORVASC) 2.5 MG tablet Take 1 tablet (2.5 mg) by mouth daily Past Week Yes Yen King MD Yes    calcium carbonate 600 mg-vitamin D 400 units (CALTRATE) 600-400 MG-UNIT per tablet Take 1 tablet by mouth 2 times daily (before meals) Past Week Yes Saurabh Pittman MD     cyanocobalamin (VITAMIN B-12) 500 MCG tablet Take 500 mcg by mouth daily Past Week Yes Unknown, Entered By History No    ibuprofen (ADVIL/MOTRIN) 200 MG tablet Take 600  mg by mouth 3 times daily as needed for pain Unknown Yes Unknown, Entered By History       Date completed: 11/07/22    Medication history completed by:     Ayaan Kulkarni, PharmD  PGY1 Pharmacist Resident

## 2022-11-07 NOTE — PROGRESS NOTES
"      [facility]    Stroke Progress Note    Interval EventsPatient feeling ok this AM, no complaints.  Intermittently confused.  Blinds open to allow light into room.      Changes today:  -Added Plavix 75 mg daily to current medication regimen  -TTE was obtained revealing no source of embolus  -Awaiting PT recommendations for potential placement    HPI Summary  Belia Sheets is a 89 year old female with osteoporosis and HTN who is being seen in evaluation for acute encephaloapthy.     She is unable to provide additional history, thus history was obtained in discussion with her daughter.       Her daughter, Lubna, reports that her mom has a \"different confusion\" than compared to baseline (has had some processing issues in the past). Family has seen a difference in difficulty following train of thought since March/April. She occasionally knows the month, but not necessarily the date. She does typically know that she lives in [place]. She likes to keep up with public events, but in July/August 2022 she had a medical evaluation with health insurance where she couldn't recall the president in that conversation. Memory stayed stable through summer months. Starting 5-6 days ago, she has been experiencing a \"different reality\". On 11/2, she wanted to put her daughter in contact to speak to an  for a children's party and she was perseverating on ensuring that this party occurs (there is no event like that). When she went to [facility], she was sure that the RN from [facility] was taking her to lunch and to visit her uncle in the hospital after the clinic appt (this was not going to happen). Over the past couple of weeks-to-months, her daughter has noticed that, she wasn't taking her medications every day, it has been more challenging.     At home, she does not manage her finances (her daughter manages this). Her breakfast is supplied by the building that she lives in. She has been " resistant to taking her other meals in the dining room, her daughter buys her groceries (basic groceries that need microwave heating and are easy to cook). It has been harder for the patient to make a plan and initiating it.     In 2017, she was hallucinating and had aphasia (could not find words). She was seeing strings floating around in the air.  She started to improve in the hospital and PT/OT/SLP was ordered at home. Over a period of weeks/months, she got these therapies. She recovered over time.     She has had two falls within the past couple of years. She was disoriented when she was in rehab.    Stroke Evaluation Summarized    MRI/Head CT CT Impression:  1. Subtle area of gray-white differentiation loss in the left  occipital lobe corresponding to known acute infarct.  2. Moderate leukoaraiosis and parenchymal volume loss.       MRI  IMPRESSION:     1. Multiple adjacent small foci of acute infarct in the posterior left  occipital lobe.  2. Punctate focus of enhancement and T2 hyperintensity in the  posterolateral left cerebellar hemisphere. This is favored sequela of  a subacute infarct. Attention on follow-up is recommended to exclude  enhancing lesion.  3. Periventricular white matter changes similar to prior and most  suggestive of chronic small vessel ischemic disease.   Intracranial Vasculature Head CTA demonstrates no definite aneurysm or stenosis of the major intracranial arteries. Unchanged appearance of the fusiform dilation in the ICA termini.    Cervical Vasculature Kinking of the left subclavian artery proximal to the vertebral artery origin. Neck CTA demonstrates no other stenosis of the major cervical arteries.     Echocardiogram No cardiac source for embolus identified.   EKG/Telemetry Sinus rhythm with Premature atrial complexes   Nonspecific T wave abnormality   Abnormal ECG   Other Testing Not Applicable      LDL  11/3/2022: 111 mg/dL   A1C  11/6/2022: 5.6 %   Troponin 11/6/2022: 19 ng/L        Impression   Acute ischemic stroke of L occipital lobe due to embolic stroke of undetermined source (ESUS)  Acute ischemic stroke of the left occipital lobe due to undetermined etiology   This is a 89-year-old female with HTN and osteoporosis who presented with acute on chronic encephalopathy. Brain MRI was obtained due to change in mental status. No other stroke symptoms were noted on evaluation. Brain MRI showed L occipital lobe stroke of undetermined etiology.  Further work-up including head CT and CT revealed no vascular abnormalities.  Echocardiogram showed no cardiac source for embolus.  EKG and cardiac monitoring has shown sinus rhythm with some premature atrial complexes and a nonspecific T wave abnormality, but no evidence of atrial fibrillation.  Patient does have some risk factors for stroke including elevated LDL at 111 and hypertension.  We will plan on controlling these risk factors inpatient as well as upon discharge.     Etiology at this point is likely ESUS.  Patient will require 30 days cardiac monitoring in the outpatient setting to rule out A. fib as a potential cause.        Plan  #Acute ischemic stroke of the left occipital lobe  #Acute on chronic encephalopathy  #Undiagnosed dementia  Etiology of ESUS at this point.  Increased confusion in this patient likely secondary to stroke in setting of possible baseline undiagnosed dementia.    - Neurochecks Q 4 hours - consider DND at night for delirium precautions  - Permissive HTN not indicated due to last known well >24 hours  - Avoid hypotonic IV fluids  - Daily aspirin 81 mg for secondary stroke prevention  - Plavix 75 mg for 21 days  - Statin: start atorvastatin 20mg daily  - MRI Stroke Protocol (completed)  - CTA head and neck with contrast (completed)  - Routine EEG (completed)  - TTE with Bubble Study  - Cardiac monitoring, EKG (completed)  - Bedside Glucose Monitoring  - A1c 5.6, , Troponin x 3 (21, 22, 19)  - PT/OT/SLP -  recommending TCU  - Stroke Education (completed)  - Depression and Apnea Screen  - Euthermia, Euglycemia  - Outpatient follow-up for likely neurodegerative disorder  - Outpatient cardiac monitoring    #HTN  - PTA Amlodipine 2.5 mg daily    #Osteoporosis  - Calcium carbonate supplementation    VTE prophylaxis  Enoxaparin    Patient Follow-up    - final recommendation pending work-up      The patient was discussed with Stroke attending Dr. Coughlin.     Nimesh Urrutia DO  Neurology Resident PGY-1  ASCOM *[id number]  ______________________________________________________    Clinically Significant Risk Factors Present on Admission        # Hypokalemia: Lowest K = 3.1 mmol/L (Ref range: 3.4-5.3) in last 2 days, will replace as needed    # Hypercalcemia: corrected calcium is >10.1, will monitor as appropriate  # Hypomagnesemia: Lowest Mg = 1.5 mg/dL (Ref range: 1.7-2.3) in last 2 days, will replace as needed   # Hypoalbuminemia: Lowest albumin = 3 g/dL (Ref range: 3.5-5.2) at 11/6/2022  9:05 AM, will monitor as appropriate            Medications   Scheduled Meds    amLODIPine  2.5 mg Oral Daily     aspirin  81 mg Oral or Feeding Tube Daily     atorvastatin  20 mg Oral or Feeding Tube QPM     calcium carbonate-vitamin D  1 tablet Oral BID AC     enoxaparin ANTICOAGULANT  40 mg Subcutaneous Q24H     insulin aspart  1-3 Units Subcutaneous TID AC     insulin aspart  1-3 Units Subcutaneous At Bedtime     lidocaine  2 patch Transdermal Q24H     lidocaine   Transdermal Q8H MAK     pantoprazole  40 mg Intravenous QAM AC     senna-docusate  1 tablet Oral BID     sodium chloride (PF)  3 mL Intracatheter Q8H       Infusion Meds    - MEDICATION INSTRUCTIONS -       - MEDICATION INSTRUCTIONS -         PRN Meds  glucose **OR** dextrose **OR** glucagon, lidocaine 4%, lidocaine (buffered or not buffered), - MEDICATION INSTRUCTIONS -, - MEDICATION INSTRUCTIONS -, ondansetron **OR** ondansetron, sodium chloride (PF)       PHYSICAL  EXAMINATION  Temp:  [97.2  F (36.2  C)-98.6  F (37  C)] 97.8  F (36.6  C)  Pulse:  [70-92] 92  Resp:  [16-20] 20  BP: (122-157)/(81-97) 134/88  SpO2:  [93 %-100 %] 94 %      General Exam  General:  patient lying in bed without any acute distress    HEENT:  normocephalic/atraumatic  Cardio:  RRR  Pulmonary:  no respiratory distress  Abdomen:  non-distended  Extremities:  no edema  Skin:  intact, warm/dry     Neuro Exam  Mental Status:  follows commands, speech clear and fluent, patient often confused, unable to answer questions, stated her age was 89 (actually 89), thought the year was 1977, knows full name, unable to name days of week backwards, unable to subtract 7 from 100  Cranial Nerves:  visual fields intact, PERRL, EOMI with normal smooth pursuit, facial sensation intact and symmetric, facial movements symmetric, hearing not formally tested but intact to conversation, palate elevation symmetric and uvula midline, no dysarthria, shoulder shrug strong bilaterally, tongue protrusion midline  Motor:  normal muscle tone and bulk, no abnormal movements, able to move all limbs spontaneously, no pronator drift  Reflexes:  2+ and symmetric throughout  Sensory:  light touch sensation intact and symmetric throughout upper and lower extremities, no extinction on double simultaneous stimulation   Coordination:  normal finger-to-nose and heel-to-shin bilaterally without dysmetria  Station/Gait:  deferred    Stroke Scales    NIHSS  1a. Level of Consciousness 0-->Alert, keenly responsive   1b. LOC Questions 1-->Answers one question correctly   1c. LOC Commands 0-->Performs both tasks correctly   2.   Best Gaze 0-->Normal   3.   Visual 0-->No visual loss   4.   Facial Palsy 0-->Normal symmetrical movements   5a. Motor Arm, Left 0-->No drift, limb holds 90 (or 45) degrees for full 10 secs   5b. Motor Arm, Right 0-->No drift, limb holds 90 (or 45) degrees for full 10 secs   6a. Motor Leg, Left 0-->No drift, leg holds 30 degree  position for full 5 secs   6b. Motor Leg, right 0-->No drift, leg holds 30 degree position for full 5 secs   7.   Limb Ataxia 0-->Absent   8.   Sensory 0-->Normal, no sensory loss   9.   Best Language 0-->No aphasia, normal   10. Dysarthria 0-->Normal   11. Extinction and Inattention  0-->No abnormality   Total 1 (11/07/22 1530)       Modified McCone Score (Pre-morbid)  3-Moderate disability; requiring some help, but able to walk without assistance    Imaging  I personally reviewed all imaging; relevant findings per HPI.     Lab Results Data   CBC  Recent Labs   Lab 11/07/22  0910 11/06/22  0145 11/05/22 2000   WBC 9.1 9.6 11.5*   RBC 4.00 4.29 4.59   HGB 12.1 13.0 14.1   HCT 37.1 40.8 42.8    160 251     Basic Metabolic Panel    Recent Labs   Lab 11/07/22  1216 11/07/22  0953 11/07/22  0910 11/06/22 2112 11/06/22 1807 11/06/22 0905 11/05/22 1959   NA  --   --  137  --   --  140 140   POTASSIUM  --   --  3.1*  --  3.2* 3.1* 3.5   CHLORIDE  --   --  103  --   --  102 99   CO2  --   --  22  --   --  26 26   BUN  --   --  12.8  --   --  15.8 19   CR  --   --  0.50*  --   --  0.61 0.69   * 130* 97   < >  --  92 102*   BOY  --   --  7.8*  --   --  8.9 9.7    < > = values in this interval not displayed.     Liver Panel  Recent Labs   Lab 11/06/22  0905 11/05/22 1959 11/03/22  1153   PROTTOTAL 5.6* 7.7 6.8   ALBUMIN 3.0* 3.5 3.9   BILITOTAL 0.8 1.1 0.6   ALKPHOS 48 64 55   AST 15 27 29   ALT <5* 14 6*     INR    Recent Labs   Lab Test 11/05/22 2000 09/11/21  1225 10/25/17  0759   INR 1.10 1.10 1.03      Lipid Profile    Recent Labs   Lab Test 11/03/22  1153   CHOL 200*   HDL 78   *   TRIG 56     A1C    Recent Labs   Lab Test 11/06/22  1912 03/15/17  1402 08/31/16  1433   A1C 5.6 5.6 5.9*     Troponin    Recent Labs   Lab 11/06/22  1807 11/06/22  0905 11/06/22  0145   CTROPT 19* 22* 21*

## 2022-11-07 NOTE — PHARMACY-CONSULT NOTE
Pharmacy Consult to evaluate for medication related stroke core measures    Belia Sheets, 89 year old female admitted for altered mental status on 11/5/2022.    Thrombolytic was not given because of Time from onset contraindications    VTE Prophylaxis Enoxaparin given on 11/6 as appropriate prior to end of hospital day 2.    Antithrombotic: aspirin started on 11/6, as appropriate by end of hospital day 2. Continue antithrombotic therapy on discharge to meet quality measures, unless contraindicated.    Anticoagulation if history of A-fib/flutter: Patient does not have history of A-fib/flutter - anticoagulation not required for medication related stroke core measures.     LDL Cholesterol Calculated   Date Value Ref Range Status   11/03/2022 111 (H) <=100 mg/dL Final       Patient currently receiving Lipitor (atorvastatin) continue statin on discharge to meet quality measures, unless contraindicated.    Recommendations: None at this time    Thank you for the consult.    Adriano Cat RPH 11/7/2022 12:42 PM

## 2022-11-07 NOTE — PLAN OF CARE
Status: Admitted for worsening confusion. Acute ischemic stroke of the left occipital lobe   Vitals: HTN within parameters. Keep SBP <220. CCM.   Neuros: Disoriented to time this shift. NIHSS 2 for LOC questions. Strengths 4/5 throughout.   IV: PIV x2 SL  Labs/Electrolytes: BG checks   Resp/trach: Satting >92% on RA  Diet: Regular diet   Bowel status: LBM PTA.   : Voiding spont  Skin: Intact   Pain: Denied pain this shift  Activity: Up with 2/GB pivot to the bedside commode.   Social: No visitors this shift   Plan: Needs stroke PLC. Continue to monitor and follow POC.

## 2022-11-07 NOTE — PLAN OF CARE
Arrived from: Observation Unit   Belongings/meds: Remain with patient, no meds   2 RN Skin Assessment Completed by: Eloy Mooney  Non-intact findings documented (yes/no/NA): No     Status: Admitted for altered mental status/confusion, MRI on 11/6 revealed acute ischemic stroke of the left occipital lobe   Vitals: VSS  Neuros: Alert, disoriented to situation and time, strength 4/5 throughout, NIHSS:2   IV: PIV SL  Resp/trach: On RA, on cardiac monitoring   Diet: Regular, needs assistance ordering meals, and rosanna set up   Bowel status: 11/4 per patient  : Incontinent   Skin: Intact   Pain: Denied pain  Activity: Ax2, GB and walker  Plan: Continue to monitor,  and PT&OT following   Updates this shift: Head & Neck CT was completed this shift     Stroke Patients:   PLC scheduled or completed: Not scheduled yet  Pneumoboots in place: Yes     Detail Level: Zone Initiate Treatment: Cloderm 0.1% topical cream bid to the rashes of the eyelids

## 2022-11-07 NOTE — CONSULTS
Stroke Education Note    The following information has been reviewed with the patient and family:    1. Warning signs of stroke    2. Calling 911 if having warning signs of stroke    3. All modifiable risk factors: hypertension, CAD, atrial fib, diabetes, hypercholesterolemia, smoking, substance abuse, diet, physical inactivity, obesity, sleep apnea.    4. Patient's risk factors for stroke which include: HTN, HLD, physical inactivity, unhealthy diet    5. Follow-up plan for after discharge    6. Discharge medications which include: aspirin, norvasc, lipitor    In addition, the above information was given to the patient and family in writing as a part of the [facility] Stroke Class Handout.    Learner's response to risk factors / lifestyle modification education: NA - Precontemplative     Tanya Sanz RN     [facility] at bedside for stroke education, patient very sleepy throughout. Daughters were active participants and easily able to teach back symptoms of stroke and risk factors. It is hard to determine if patient would be willing to make any changes to improve risk factors at this time.

## 2022-11-07 NOTE — CARE PLAN
Speech Language Pathology: Orders received. Chart reviewed and discussed with care team.? Speech Language Pathology not indicated due to pt tolerating regular/thin liquid diet. No concerns reports per RN regarding swallow or speech/language functioning.? Defer discharge recommendations to PT/OT/MD.? Will complete orders.     Lu Mondragon MS CCC SLP

## 2022-11-07 NOTE — PROGRESS NOTES
11/07/22 1600   Appointment Info   Signing Clinician's Name / Credentials (PT) Jm Zepeda DPT   Living Environment   People in Home alone   Current Living Arrangements assisted living   Transportation Anticipated family or friend will provide   Living Environment Comments Patient lives in [facility], per daughters patient is resistant to receiving assistance, facility provides daily wellness checks and breakfast.   Self-Care   Usual Activity Tolerance moderate   Current Activity Tolerance fair   Equipment Currently Used at Home walker, rolling  (4WW)   Fall history within last six months no  (daughter reports last fall was in May)   Activity/Exercise/Self-Care Comment Patient ambulates with 4WW, independent with ADLs although daughters reprt patient usually sponge bathes.   General Information   Onset of Illness/Injury or Date of Surgery 11/05/22   Referring Physician Veronika Fajardo MD   Patient/Family Therapy Goals Statement (PT) to return to [facility]   Pertinent History of Current Problem (include personal factors and/or comorbidities that impact the POC) This is a 89-year-old female with HTN and osteoporosis who presented with acute on chronic encephalopathy. Brain MRI showed L occipital lobe stroke of undetermined etiology.   Existing Precautions/Restrictions fall   Cognition   Affect/Mental Status (Cognition) confused   Orientation Status (Cognition) oriented to;person;place;time   Follows Commands (Cognition) follows one-step commands;75-90% accuracy;delayed response/completion;increased processing time needed;initiation impaired   Safety Deficit (Cognition) at risk behavior observed;insight into deficits/self-awareness   Pain Assessment   Patient Currently in Pain Yes, see Vital Sign flowsheet  (back pain)   Posture    Posture Forward head position;Protracted shoulders;Kyphosis   Range of Motion (ROM)   ROM Comment BLE WFL   Strength (Manual Muscle Testing)   Strength Comments generalized weakness and  deconditioning   Bed Mobility   Comment, (Bed Mobility) supine>sit min A   Transfers   Comment, (Transfers) sit<>stand min A and FWW   Gait/Stairs (Locomotion)   Comment, (Gait/Stairs) ambulates with FWW and CGA-min A, decreased gait speed and step lengh, flexed posture   Balance   Balance Comments fair sitting and standing balance, requiring FWW and up to min A for standing balance   Sensory Examination   Sensory Perception patient reports no sensory changes   Clinical Impression   Criteria for Skilled Therapeutic Intervention Yes, treatment indicated   PT Diagnosis (PT) impaired functional mobility   Influenced by the following impairments strength, balance, activity tolerance, pain, cognition   Functional limitations due to impairments bed mobility, transfers, gait   Clinical Presentation (PT Evaluation Complexity) Stable/Uncomplicated   Clinical Presentation Rationale clinical judgement   Clinical Decision Making (Complexity) low complexity   Planned Therapy Interventions (PT) balance training;bed mobility training;gait training;patient/family education;strengthening;transfer training;risk factor education;home program guidelines   Risk & Benefits of therapy have been explained evaluation/treatment results reviewed;care plan/treatment goals reviewed;risks/benefits reviewed;participants voiced agreement with care plan;participants included;patient;daughter   PT Total Evaluation Time   PT Eval, Low Complexity Minutes (63370) 10   Physical Therapy Goals   PT Frequency 5x/week   PT Predicted Duration/Target Date for Goal Attainment 11/18/22   PT Goals Bed Mobility;Transfers;Gait   PT: Bed Mobility Modified independent;Supine to/from sit   PT: Transfers Modified independent;Sit to/from stand;Assistive device   PT: Gait Modified independent;Assistive device;100 feet   PT Discharge Planning   PT Discharge Recommendation (DC Rec) Transitional Care Facility;home with assist;home with home care physical therapy   PT  Rationale for DC Rec Patient below IND baseline, recommend TCU to increase safety/indepenence with mobility and cognitive concerns.  Per daughters patient has had decreased motor planning/cognition concerns over past few months, if cognition does not improve may be more appropraite for increased assist at [facility] vs LTC.   Total Session Time   Total Session Time (sum of timed and untimed services) 10

## 2022-11-07 NOTE — PROGRESS NOTES
"   11/07/22 0946   Appointment Info   Signing Clinician's Name / Credentials (OT) KAREEM Murphy   Student Supervision On-site supervision provided   Living Environment   Current Living Arrangements assisted living   Transportation Anticipated family or friend will provide   Living Environment Comments Pt lives in an assisted living facility   Self-Care   Usual Activity Tolerance moderate   Current Activity Tolerance poor   Equipment Currently Used at Home   (difficult to assess 2/2 cognition)   Fall history within last six months no  (Pt states she has not had a fall in past year; 2 per chart)   Activity/Exercise/Self-Care Comment Pt typically was able to get dressed, brush teeth, brush hair, and shower IND.   Instrumental Activities of Daily Living (IADL)   IADL Comments Per chart review - patient got assistance at assisted living facility for breakfast meals - patient's daughter purchases groceries for simple meal prep. Per MD notes, it was getting difficult to plan and initiate meal prep. Daughter also assisting with financial management. Unclear if [facility] staff assist with medication management. Per MD notes, patient's daughter reporting she noticed patient wasn't always taking medications.   General Information   Onset of Illness/Injury or Date of Surgery 11/05/22   Referring Physician Veronika Fajardo MD   Additional Occupational Profile Info/Pertinent History of Current Problem Per chart: \"Belia Sheets is a 89 year old female admitted on 11/5/2022. She has a  PMH significant for kyphoscoliosis, osteoarthritis, osteoporosis, C7/T2/T3 compression fractures (5/2022), cognitive impairment, calcium pyrophosphate deposition disease, HTN, frequent falls who presented to the [facility] ED from her [facility] ([place]) with worsening confusion in the last week. Spoke with Neuro. Initial recommendations includes: EEG, MRI brain, and labwork. Ordered by neurology. MRI brain results show: 1. Multiple adjacent small " "foci of acute infarct in the posterior left  occipital lobe.  2. Punctate focus of enhancement and T2 hyperintensity in the  posterolateral left cerebellar hemisphere. This is favored sequela of  a now chronic lacunar infarct. Attention on follow-up is recommended  to exclude enhancing lesion.  3. Periventricular white matter changes similar to prior and most  suggestive of chronic small vessel ischemic disease.  This result has not been signed. Information might be incomplete.\"   Limitations/Impairments safety/cognitive   General Observations and Info Activity orders: Up with assist   Cognitive Status Examination   Orientation Status   (Oriented to place, year, month, not oriented to numerical date. Pt also did not know why she was in the hospital yet later on asked when her stroke PLC was scheduled for.)   Follows Commands delayed response/completion;increased processing time needed;initiation impaired;repetition of directions required;verbal cues/prompting required   Safety Deficit awareness of need for assistance;judgment;problem-solving;severe deficit;at risk behavior observed;insight into deficits/self-awareness   Memory Deficit short-term memory;working memory   Executive Function Deficit insight/awareness of deficits;problem-solving/reasoning;self-monitoring/self-correction;planning/decision-making;organization/sequencing;judgment   Cognitive Status Comments Pt has severe deficits w/ safety awareness, short-term/working memory, insight/awareness into deficits, slowed processing speeds, difficulty w/ initiation of tasks, and at one point portrayed an suden irritable tone of voice when asked if she thought she might need assistance standing at the sink (irritable tone was not appropriate/congruent for question being asked). History of worsening cognition this year - per MD note: \"Family has seen a difference in difficulty following train of thought since March/April. She occasionally knows the month, but not " "necessarily the date. She does typically know that she lives in [place]. She likes to keep up with public events, but in July/August 2022 she had a medical evaluation with health insurance where she couldn't recall the president in that conversation. Memory stayed stable through summer months. Starting 5-6 days ago, she has been experiencing a \"different reality\"\".   Visual Perception   Impact of Vision Impairment on Function (Vision) wears reading glasses, no acute changes   Sensory   Sensory Comments WFL   Pain Assessment   Patient Currently in Pain   (Pt states she is in pain all over)   Posture   Posture kyphosis;protracted shoulders   Range of Motion Comprehensive   Comment, General Range of Motion WFL   Strength Comprehensive (MMT)   General Manual Muscle Testing (MMT) Assessment hand strength deficits identified  (difficulty squeezing toothpaste onto toothbrush, difficulty taking cover off of orange juice)   Coordination   Fine Motor Coordination difficulty w/ FMC   Bed Mobility   Comment (Bed Mobility) mod A, required VC   Transfers   Transfers sit-stand transfer   Sit-Stand Transfer   Sit/Stand Transfer Comments Min Ax2 for safety   Balance   Balance Assessment standing balance: static;sitting balance: static   Balance Comments mod A to maintain postural control EOB, mod Ax1-2 standing   Activities of Daily Living   BADL Assessment/Intervention bathing;lower body dressing;toileting;upper body dressing;feeding;grooming   Bathing Assessment/Intervention   Comment, (Bathing) anticipate max A   Upper Body Dressing Assessment/Training   Comment, (Upper Body Dressing) anticipate max A   Lower Body Dressing Assessment/Training   Comment, (Lower Body Dressing) Max A   Grooming Assessment/Training   Comment, (Grooming) anticipate max A for standing ADL grooming tasks, mod VC for sequencing g/h tasks sitting   Eating/Self Feeding   Comment, (Feeding) had difficulty w/ opening containers   Toileting   Comment, (Toileting) " Anticipate Mod A   Clinical Impression   Criteria for Skilled Therapeutic Interventions Met (OT) Yes, treatment indicated   OT Diagnosis decreased IND in ADLs   OT Problem List-Impairments impacting ADL problems related to;activity tolerance impaired;cognition;balance;mobility;strength;postural control   Assessment of Occupational Performance 3-5 Performance Deficits   Identified Performance Deficits bathing, dressing, toileting, functional mobility, g/h   Planned Therapy Interventions (OT) ADL retraining;balance training;bed mobility training;cognition;strengthening;transfer training;progressive activity/exercise   Clinical Decision Making Complexity (OT) low complexity   Anticipated Equipment Needs Upon Discharge (OT)   (TBD)   Risk & Benefits of therapy have been explained evaluation/treatment results reviewed;care plan/treatment goals reviewed;risks/benefits reviewed;current/potential barriers reviewed;participants voiced agreement with care plan;participants included;patient   Clinical Impression Comments Pt would benefit from skilled OT to facilitate safety, functional cognition, ADL IND, and functional mobility.   OT Total Evaluation Time   OT Eval, Low Complexity Minutes (97223) 15   OT Goals   Therapy Frequency (OT) 5 times/wk   OT Predicted Duration/Target Date for Goal Attainment 11/21/22   OT Goals Hygiene/Grooming;Upper Body Dressing;Lower Body Dressing;Upper Body Bathing;Lower Body Bathing;Bed Mobility;Toilet Transfer/Toileting;Transfers   OT: Hygiene/Grooming modified independent   OT: Upper Body Dressing Modified independent   OT: Lower Body Dressing Modified independent   OT: Upper Body Bathing Supervision/stand-by assist   OT: Lower Body Bathing Supervision/stand-by assist   OT: Bed Mobility Independent   OT: Transfer Modified independent;with assistive device   OT: Toilet Transfer/Toileting Modified independent   OT Discharge Planning   OT Plan OT: cognitive screen, sequencing/one step directions  for ADL tasks, simple repetitive task training in ADLs, safety questions   OT Discharge Recommendation (DC Rec) Transitional Care Facility;Long term care facility   OT Rationale for DC Rec OT: Pt presenting w/ significant deficits in cognition and functional mobility impacting safe performance in ADLs. Recommend TCU to progress IND in ADLs through remediation/adaptation for cognitive deficits and for progression of functional mobility.To return to assisted living, pt would require 24/7 assist w/ all ADLs/IADLs given cognitive, functional mobility, and safety deficits. Pt should not be left alone at assisted living facility due to severity of cognitive deficits. Ultimately may require higher level of assistance at penitentiary or LTC setting.   OT Brief overview of current status mod Ax2 w/ FWW STS, VC/simple commands to carry out seated ADLs   Total Session Time   Timed Code Treatment Minutes 40   Total Session Time (sum of timed and untimed services) 55

## 2022-11-08 ENCOUNTER — APPOINTMENT (OUTPATIENT)
Dept: OCCUPATIONAL THERAPY | Facility: CLINIC | Age: 87
DRG: 065 | End: 2022-11-08
Payer: COMMERCIAL

## 2022-11-08 LAB
ANION GAP SERPL CALCULATED.3IONS-SCNC: 13 MMOL/L (ref 7–15)
BUN SERPL-MCNC: 12.3 MG/DL (ref 8–23)
CALCIUM SERPL-MCNC: 9.1 MG/DL (ref 8.2–9.6)
CHLORIDE SERPL-SCNC: 102 MMOL/L (ref 98–107)
CREAT SERPL-MCNC: 0.49 MG/DL (ref 0.51–0.95)
DEPRECATED HCO3 PLAS-SCNC: 23 MMOL/L (ref 22–29)
ERYTHROCYTE [DISTWIDTH] IN BLOOD BY AUTOMATED COUNT: 13.1 % (ref 10–15)
GFR SERPL CREATININE-BSD FRML MDRD: 88 ML/MIN/1.73M2
GLUCOSE BLDC GLUCOMTR-MCNC: 104 MG/DL (ref 70–99)
GLUCOSE BLDC GLUCOMTR-MCNC: 106 MG/DL (ref 70–99)
GLUCOSE BLDC GLUCOMTR-MCNC: 108 MG/DL (ref 70–99)
GLUCOSE BLDC GLUCOMTR-MCNC: 127 MG/DL (ref 70–99)
GLUCOSE BLDC GLUCOMTR-MCNC: 130 MG/DL (ref 70–99)
GLUCOSE SERPL-MCNC: 99 MG/DL (ref 70–99)
HCT VFR BLD AUTO: 38.3 % (ref 35–47)
HGB BLD-MCNC: 12.6 G/DL (ref 11.7–15.7)
MAGNESIUM SERPL-MCNC: 1.8 MG/DL (ref 1.7–2.3)
MCH RBC QN AUTO: 30.3 PG (ref 26.5–33)
MCHC RBC AUTO-ENTMCNC: 32.9 G/DL (ref 31.5–36.5)
MCV RBC AUTO: 92 FL (ref 78–100)
PHOSPHATE SERPL-MCNC: 2.8 MG/DL (ref 2.5–4.5)
PLATELET # BLD AUTO: 251 10E3/UL (ref 150–450)
POTASSIUM SERPL-SCNC: 3.5 MMOL/L (ref 3.4–5.3)
RBC # BLD AUTO: 4.16 10E6/UL (ref 3.8–5.2)
SODIUM SERPL-SCNC: 138 MMOL/L (ref 136–145)
WBC # BLD AUTO: 10 10E3/UL (ref 4–11)

## 2022-11-08 PROCEDURE — 36415 COLL VENOUS BLD VENIPUNCTURE: CPT | Performed by: STUDENT IN AN ORGANIZED HEALTH CARE EDUCATION/TRAINING PROGRAM

## 2022-11-08 PROCEDURE — 97535 SELF CARE MNGMENT TRAINING: CPT | Mod: GO

## 2022-11-08 PROCEDURE — 250N000013 HC RX MED GY IP 250 OP 250 PS 637: Performed by: STUDENT IN AN ORGANIZED HEALTH CARE EDUCATION/TRAINING PROGRAM

## 2022-11-08 PROCEDURE — 250N000013 HC RX MED GY IP 250 OP 250 PS 637

## 2022-11-08 PROCEDURE — 36415 COLL VENOUS BLD VENIPUNCTURE: CPT | Performed by: PSYCHIATRY & NEUROLOGY

## 2022-11-08 PROCEDURE — 80048 BASIC METABOLIC PNL TOTAL CA: CPT | Performed by: STUDENT IN AN ORGANIZED HEALTH CARE EDUCATION/TRAINING PROGRAM

## 2022-11-08 PROCEDURE — 83735 ASSAY OF MAGNESIUM: CPT | Performed by: PSYCHIATRY & NEUROLOGY

## 2022-11-08 PROCEDURE — 85027 COMPLETE CBC AUTOMATED: CPT | Performed by: STUDENT IN AN ORGANIZED HEALTH CARE EDUCATION/TRAINING PROGRAM

## 2022-11-08 PROCEDURE — 120N000002 HC R&B MED SURG/OB UMMC

## 2022-11-08 PROCEDURE — 84100 ASSAY OF PHOSPHORUS: CPT | Performed by: STUDENT IN AN ORGANIZED HEALTH CARE EDUCATION/TRAINING PROGRAM

## 2022-11-08 PROCEDURE — 99233 SBSQ HOSP IP/OBS HIGH 50: CPT | Mod: GC | Performed by: PSYCHIATRY & NEUROLOGY

## 2022-11-08 PROCEDURE — 250N000011 HC RX IP 250 OP 636: Performed by: STUDENT IN AN ORGANIZED HEALTH CARE EDUCATION/TRAINING PROGRAM

## 2022-11-08 PROCEDURE — 250N000013 HC RX MED GY IP 250 OP 250 PS 637: Performed by: PSYCHIATRY & NEUROLOGY

## 2022-11-08 RX ORDER — POTASSIUM CHLORIDE 750 MG/1
10 TABLET, EXTENDED RELEASE ORAL ONCE
Status: COMPLETED | OUTPATIENT
Start: 2022-11-08 | End: 2022-11-08

## 2022-11-08 RX ORDER — MAGNESIUM OXIDE 400 MG/1
400 TABLET ORAL EVERY 4 HOURS
Status: COMPLETED | OUTPATIENT
Start: 2022-11-08 | End: 2022-11-08

## 2022-11-08 RX ADMIN — CALCIUM CARBONATE 600 MG (1,500 MG)-VITAMIN D3 400 UNIT TABLET 1 TABLET: at 08:17

## 2022-11-08 RX ADMIN — ACETAMINOPHEN 650 MG: 325 TABLET, FILM COATED ORAL at 08:17

## 2022-11-08 RX ADMIN — ENOXAPARIN SODIUM 40 MG: 40 INJECTION SUBCUTANEOUS at 19:43

## 2022-11-08 RX ADMIN — MAGNESIUM OXIDE TAB 400 MG (241.3 MG ELEMENTAL MG) 400 MG: 400 (241.3 MG) TAB at 19:37

## 2022-11-08 RX ADMIN — MAGNESIUM OXIDE TAB 400 MG (241.3 MG ELEMENTAL MG) 400 MG: 400 (241.3 MG) TAB at 16:19

## 2022-11-08 RX ADMIN — SENNOSIDES AND DOCUSATE SODIUM 1 TABLET: 8.6; 5 TABLET ORAL at 08:17

## 2022-11-08 RX ADMIN — ACETAMINOPHEN 650 MG: 325 TABLET, FILM COATED ORAL at 16:39

## 2022-11-08 RX ADMIN — CLOPIDOGREL BISULFATE 75 MG: 75 TABLET ORAL at 08:17

## 2022-11-08 RX ADMIN — ASPIRIN 81 MG CHEWABLE TABLET 81 MG: 81 TABLET CHEWABLE at 08:16

## 2022-11-08 RX ADMIN — POTASSIUM CHLORIDE 10 MEQ: 750 TABLET, EXTENDED RELEASE ORAL at 11:23

## 2022-11-08 RX ADMIN — CALCIUM CARBONATE 600 MG (1,500 MG)-VITAMIN D3 400 UNIT TABLET 1 TABLET: at 16:19

## 2022-11-08 RX ADMIN — AMLODIPINE BESYLATE 2.5 MG: 2.5 TABLET ORAL at 08:17

## 2022-11-08 RX ADMIN — ATORVASTATIN CALCIUM 20 MG: 20 TABLET, FILM COATED ORAL at 19:37

## 2022-11-08 ASSESSMENT — VISUAL ACUITY
OU: NORMAL ACUITY

## 2022-11-08 ASSESSMENT — ACTIVITIES OF DAILY LIVING (ADL)
ADLS_ACUITY_SCORE: 35

## 2022-11-08 NOTE — PLAN OF CARE
Status: Pt admitted for worsening confusion, found to have L occipital infarct  Vitals: VSS on RA ex hypertensive within parameters. On CCM.    Neuros: Orientation waxes and wanes, but forgetful. Disoriented to place @ 2000. Oriented x4 @ 0000 with choices. Disoriented to time and situation @ 0400. Strengths 4/5 throughout. Decreased hearing in both ears. Slow speech and word finding difficulty. NIHSS of 0-1.  IV: PIV SL  Labs/Electrolytes: Redraws this AM.  Resp/trach: LS diminished in the lower lobes  Diet: Regular diet, tolerating well   Bowel status: BS+, no BM this shift. LBM 11/4.  : Voiding with intermittent incontinence.  Skin: Bruising throughout  Pain: c/o back pain but declined pain medication.  Activity: A1-2/GB/Walker/pivot. Needs cues.  Social: Patient sleeping in between cares.  Plan: PT/OT recommending TCU at discharge. Continue to monitor and follow POC. Stroke PLC completed yesterday.

## 2022-11-08 NOTE — PROGRESS NOTES
"      [facility]    Stroke Progress Note    Interval EventsPatient less confused this morning, is alert and oriented x3 and accurately follows all commands.  Per nursing patients cognitive function waxes and wanes.  NIH SS was 0 today, however sometimes scores 2 for inability to answer orientation questions correctly.    Changes today:  -Both PT and OT recommendations are in.  Recommending TCU placement.  -Need to discuss with family if able to take patient home or if TCU is the most appropriate option.    HPI Summary  Belia Sheets is a 89 year old female with osteoporosis and HTN who is being seen in evaluation for acute encephaloapthy.     She is unable to provide additional history, thus history was obtained in discussion with her daughter.       Her daughter, Lubna, reports that her mom has a \"different confusion\" than compared to baseline (has had some processing issues in the past). Family has seen a difference in difficulty following train of thought since March/April. She occasionally knows the month, but not necessarily the date. She does typically know that she lives in [place]. She likes to keep up with public events, but in July/August 2022 she had a medical evaluation with health insurance where she couldn't recall the president in that conversation. Memory stayed stable through summer months. Starting 5-6 days ago, she has been experiencing a \"different reality\". On 11/2, she wanted to put her daughter in contact to speak to an  for a children's party and she was perseverating on ensuring that this party occurs (there is no event like that). When she went to [facility], she was sure that the RN from [facility] was taking her to lunch and to visit her uncle in the hospital after the clinic appt (this was not going to happen). Over the past couple of weeks-to-months, her daughter has noticed that, she wasn't taking her medications every day, it has been more " challenging.     At home, she does not manage her finances (her daughter manages this). Her breakfast is supplied by the building that she lives in. She has been resistant to taking her other meals in the dining room, her daughter buys her groceries (basic groceries that need microwave heating and are easy to cook). It has been harder for the patient to make a plan and initiating it.     In 2017, she was hallucinating and had aphasia (could not find words). She was seeing strings floating around in the air.  She started to improve in the hospital and PT/OT/SLP was ordered at home. Over a period of weeks/months, she got these therapies. She recovered over time.     She has had two falls within the past couple of years. She was disoriented when she was in rehab.    Stroke Evaluation Summarized    MRI/Head CT CT Impression:  1. Subtle area of gray-white differentiation loss in the left  occipital lobe corresponding to known acute infarct.  2. Moderate leukoaraiosis and parenchymal volume loss.       MRI  IMPRESSION:     1. Multiple adjacent small foci of acute infarct in the posterior left  occipital lobe.  2. Punctate focus of enhancement and T2 hyperintensity in the  posterolateral left cerebellar hemisphere. This is favored sequela of  a subacute infarct. Attention on follow-up is recommended to exclude  enhancing lesion.  3. Periventricular white matter changes similar to prior and most  suggestive of chronic small vessel ischemic disease.   Intracranial Vasculature Head CTA demonstrates no definite aneurysm or stenosis of the major intracranial arteries. Unchanged appearance of the fusiform dilation in the ICA termini.    Cervical Vasculature Kinking of the left subclavian artery proximal to the vertebral artery origin. Neck CTA demonstrates no other stenosis of the major cervical arteries.     Echocardiogram No cardiac source for embolus identified.   EKG/Telemetry Sinus rhythm with Premature atrial complexes    Nonspecific T wave abnormality   Abnormal ECG   Other Testing Not Applicable      LDL  11/3/2022: 111 mg/dL   A1C  11/6/2022: 5.6 %   Troponin 11/6/2022: 19 ng/L       Impression   Acute ischemic stroke of L occipital lobe due to embolic stroke of undetermined source (ESUS)  Acute ischemic stroke of the left occipital lobe due to undetermined etiology   This is a 89-year-old female with HTN and osteoporosis who presented with acute on chronic encephalopathy. Brain MRI was obtained due to change in mental status. No other stroke symptoms were noted on evaluation. Brain MRI showed L occipital lobe stroke of undetermined etiology.  Further work-up including head CT and CT revealed no vascular abnormalities.  Echocardiogram showed no cardiac source for embolus.  EKG and cardiac monitoring has shown sinus rhythm with some premature atrial complexes and a nonspecific T wave abnormality, but no evidence of atrial fibrillation.  Patient does have some risk factors for stroke including elevated LDL at 111 and hypertension.  We will plan on controlling these risk factors inpatient as well as upon discharge.     Etiology at this point is likely ESUS.  Patient will require 30 days cardiac monitoring in the outpatient setting to rule out A. fib as a potential cause.        Plan  #Acute ischemic stroke of the left occipital lobe  #Acute on chronic encephalopathy  #Undiagnosed dementia  Etiology of ESUS at this point.  Increased confusion in this patient likely secondary to stroke in setting of possible baseline undiagnosed dementia.    - Neurochecks Q 4 hours - consider DND at night for delirium precautions  - Permissive HTN not indicated due to last known well >24 hours  - Avoid hypotonic IV fluids  - Daily aspirin 81 mg for secondary stroke prevention  - Plavix 75 mg for 21 days  - Statin: start atorvastatin 20mg daily  - MRI Stroke Protocol (completed)  - CTA head and neck with contrast (completed)  - Routine EEG  (completed)  - TTE with Bubble Study  - Cardiac monitoring, EKG (completed)  - Bedside Glucose Monitoring  - A1c 5.6, , Troponin x 3 (21, 22, 19)  - PT/OT/SLP - recommending TCU  - Stroke Education (completed)  - Depression and Apnea Screen  - Euthermia, Euglycemia  - Outpatient follow-up for likely neurodegerative disorder  - Outpatient cardiac monitoring    #HTN  - PTA Amlodipine 2.5 mg daily    #Osteoporosis  - Calcium carbonate supplementation    VTE prophylaxis  Enoxaparin    Patient Follow-up    - final recommendation pending work-up      The patient was discussed with Stroke attending Dr. Coughlin.     Nimesh Urrutia DO  Neurology Resident PGY-1  [facility] *[id number]  ______________________________________________________    Clinically Significant Risk Factors Present on Admission                    Medications   Scheduled Meds    amLODIPine  2.5 mg Oral Daily     aspirin  81 mg Oral or Feeding Tube Daily     atorvastatin  20 mg Oral or Feeding Tube QPM     calcium carbonate-vitamin D  1 tablet Oral BID AC     clopidogrel  75 mg Oral Daily     enoxaparin ANTICOAGULANT  40 mg Subcutaneous Q24H     insulin aspart  1-3 Units Subcutaneous TID AC     insulin aspart  1-3 Units Subcutaneous At Bedtime     lidocaine  2 patch Transdermal Q24H     lidocaine   Transdermal Q8H MAK     pantoprazole  40 mg Intravenous QAM AC     senna-docusate  1 tablet Oral BID     sodium chloride (PF)  3 mL Intracatheter Q8H       Infusion Meds    - MEDICATION INSTRUCTIONS -       - MEDICATION INSTRUCTIONS -         PRN Meds  acetaminophen, glucose **OR** dextrose **OR** glucagon, lidocaine 4%, lidocaine (buffered or not buffered), - MEDICATION INSTRUCTIONS -, - MEDICATION INSTRUCTIONS -, ondansetron **OR** ondansetron, sodium chloride (PF)       PHYSICAL EXAMINATION  Temp:  [97.7  F (36.5  C)-99.2  F (37.3  C)] 97.7  F (36.5  C)  Pulse:  [68-92] 73  Resp:  [16-20] 18  BP: (127-166)/() 158/107  SpO2:  [93 %-95 %] 93 %      General  Exam  General:  patient lying in bed without any acute distress    HEENT:  normocephalic/atraumatic  Cardio:  RRR  Pulmonary:  no respiratory distress  Abdomen:  non-distended  Extremities:  no edema  Skin:  intact, warm/dry     Neuro Exam  Mental Status:  alert, oriented x 3, follows commands, speech clear and fluent  Cranial Nerves:  visual fields intact, PERRL, EOMI with normal smooth pursuit, facial sensation intact and symmetric, facial movements symmetric, hearing not formally tested but intact to conversation, palate elevation symmetric and uvula midline, no dysarthria, shoulder shrug strong bilaterally, tongue protrusion midline  Motor:  normal muscle tone and bulk, no abnormal movements, able to move all limbs spontaneously, no pronator drift  Reflexes:  2+ and symmetric throughout  Sensory:  light touch sensation intact and symmetric throughout upper and lower extremities, no extinction on double simultaneous stimulation   Coordination:  normal finger-to-nose and heel-to-shin bilaterally without dysmetria  Station/Gait:  deferred    Stroke Scales    NIHSS  1a. Level of Consciousness 0-->Alert, keenly responsive   1b. LOC Questions 0-->Answers both questions correctly   1c. LOC Commands 0-->Performs both tasks correctly   2.   Best Gaze 0-->Normal   3.   Visual 0-->No visual loss   4.   Facial Palsy 0-->Normal symmetrical movements   5a. Motor Arm, Left 0-->No drift, limb holds 90 (or 45) degrees for full 10 secs   5b. Motor Arm, Right 0-->No drift, limb holds 90 (or 45) degrees for full 10 secs   6a. Motor Leg, Left 0-->No drift, leg holds 30 degree position for full 5 secs   6b. Motor Leg, right 0-->No drift, leg holds 30 degree position for full 5 secs   7.   Limb Ataxia 0-->Absent   8.   Sensory 0-->Normal, no sensory loss   9.   Best Language 0-->No aphasia, normal   10. Dysarthria 0-->Normal   11. Extinction and Inattention  0-->No abnormality   Total 0 (11/08/22 0815)       Modified Granville Score  (Pre-morbid)  3-Moderate disability; requiring some help, but able to walk without assistance    Imaging  I personally reviewed all imaging; relevant findings per HPI.     Lab Results Data   CBC  Recent Labs   Lab 11/08/22 0426 11/07/22  0910 11/06/22  0145   WBC 10.0 9.1 9.6   RBC 4.16 4.00 4.29   HGB 12.6 12.1 13.0   HCT 38.3 37.1 40.8    212 160     Basic Metabolic Panel    Recent Labs   Lab 11/08/22  0758 11/08/22  0426 11/08/22  0218 11/07/22  1739 11/07/22  1622 11/07/22  0953 11/07/22  0910 11/06/22  1807 11/06/22  0905   NA  --  138  --   --   --   --  137  --  140   POTASSIUM  --  3.5  --   --  3.8  --  3.1*   < > 3.1*   CHLORIDE  --  102  --   --   --   --  103  --  102   CO2  --  23  --   --   --   --  22  --  26   BUN  --  12.3  --   --   --   --  12.8  --  15.8   CR  --  0.49*  --   --   --   --  0.50*  --  0.61   * 99 104*   < >  --    < > 97   < > 92   BOY  --  9.1  --   --   --   --  7.8*  --  8.9    < > = values in this interval not displayed.     Liver Panel  Recent Labs   Lab 11/06/22 0905 11/05/22 1959 11/03/22  1153   PROTTOTAL 5.6* 7.7 6.8   ALBUMIN 3.0* 3.5 3.9   BILITOTAL 0.8 1.1 0.6   ALKPHOS 48 64 55   AST 15 27 29   ALT <5* 14 6*     INR    Recent Labs   Lab Test 11/05/22 2000 09/11/21  1225 10/25/17  0759   INR 1.10 1.10 1.03      Lipid Profile    Recent Labs   Lab Test 11/03/22  1153   CHOL 200*   HDL 78   *   TRIG 56     A1C    Recent Labs   Lab Test 11/06/22  1912 03/15/17  1402 08/31/16  1433   A1C 5.6 5.6 5.9*     Troponin    Recent Labs   Lab 11/06/22  1807 11/06/22  0905 11/06/22  0145   CTROPT 19* 22* 21*

## 2022-11-08 NOTE — PLAN OF CARE
Goal Outcome Evaluation:      Plan of Care Reviewed With: patient    Overall Patient Progress: no change     Status: Pt admitted for worsening confusion, found to have L occipital infarct  Vitals: HTN with parameters, CCM  Neuros: Orientation waxes and weans. Disoriented to situation and hospital this AM. Strengths 4/5 throughout. NIHSS of 1-2  IV: PIV SL  Labs/Electrolytes: Potassium and Mag replaced shift  Resp/trach: LS diminished in the lower lobes  Diet: Regular diet, tolerating well   Bowel status: Last BM PTA, bowel meds given  : Voiding with intermittent incontinence  Skin: Bruising throughout  Pain: Tylenol given for chronic back pain  Activity: Up with assist 1-2, pivot. Needs cues  Social: Daughters at bedside, supportive  Plan: Therapies recommending TCU at discharge, continue to monitor and follow POC    Stroke Patients:   PLC scheduled or completed: Completed this afternoon with family  Pneumoboots in place: Patient up in chair

## 2022-11-08 NOTE — PROGRESS NOTES
Care Management Follow Up    Length of Stay (days): 2    Expected Discharge Date: 11/09/2022     Concerns to be Addressed: all concerns addressed in this encounter     Patient plan of care discussed at interdisciplinary rounds: Yes    Anticipated Discharge Disposition:  TCU     Anticipated Discharge Services:  Therapies  Anticipated Discharge DME:  TBD    Patient/family educated on Medicare website which has current facility and service quality ratings:  Yes  Education Provided on the Discharge Plan:  Yes  Patient/Family in Agreement with the Plan:  Yes    Referrals Placed by CM/SW:    [facility]    [facility]  Private pay costs discussed: Not applicable    Additional Information:  SW met with Pt's daughter at bedside. SW provided Pt's daughter with a copy of TCU list from Medicare.gov. SW received a call from Pt's other daughter who identified that Pt had previously been at [facility] and [facility]. BONIFACIO requested that Pt and family review Medicare.gov list for additional referrals and identified that until then SW would fax referrals to [facility] and [facility]. BONIFACIO will continue to follow.     ________________    ALEXX Rodriges, LICSW  6D   [facility]  Phone: [phone]  Pager: [phone]  Fax: [phone]

## 2022-11-09 ENCOUNTER — APPOINTMENT (OUTPATIENT)
Dept: CARDIOLOGY | Facility: CLINIC | Age: 87
DRG: 065 | End: 2022-11-09
Payer: COMMERCIAL

## 2022-11-09 ENCOUNTER — MEDICAL CORRESPONDENCE (OUTPATIENT)
Dept: HEALTH INFORMATION MANAGEMENT | Facility: CLINIC | Age: 87
End: 2022-11-09

## 2022-11-09 ENCOUNTER — APPOINTMENT (OUTPATIENT)
Dept: OCCUPATIONAL THERAPY | Facility: CLINIC | Age: 87
DRG: 065 | End: 2022-11-09
Payer: COMMERCIAL

## 2022-11-09 VITALS
SYSTOLIC BLOOD PRESSURE: 161 MMHG | TEMPERATURE: 97.6 F | HEART RATE: 70 BPM | HEIGHT: 59 IN | OXYGEN SATURATION: 96 % | BODY MASS INDEX: 19.02 KG/M2 | DIASTOLIC BLOOD PRESSURE: 96 MMHG | WEIGHT: 94.36 LBS | RESPIRATION RATE: 16 BRPM

## 2022-11-09 LAB
ANION GAP SERPL CALCULATED.3IONS-SCNC: 10 MMOL/L (ref 7–15)
BUN SERPL-MCNC: 16.6 MG/DL (ref 8–23)
CALCIUM SERPL-MCNC: 9.8 MG/DL (ref 8.2–9.6)
CHLORIDE SERPL-SCNC: 102 MMOL/L (ref 98–107)
CREAT SERPL-MCNC: 0.49 MG/DL (ref 0.51–0.95)
DEPRECATED HCO3 PLAS-SCNC: 23 MMOL/L (ref 22–29)
ERYTHROCYTE [DISTWIDTH] IN BLOOD BY AUTOMATED COUNT: 13.4 % (ref 10–15)
GFR SERPL CREATININE-BSD FRML MDRD: 88 ML/MIN/1.73M2
GLUCOSE BLDC GLUCOMTR-MCNC: 101 MG/DL (ref 70–99)
GLUCOSE BLDC GLUCOMTR-MCNC: 104 MG/DL (ref 70–99)
GLUCOSE SERPL-MCNC: 99 MG/DL (ref 70–99)
HCT VFR BLD AUTO: 39.6 % (ref 35–47)
HGB BLD-MCNC: 13.1 G/DL (ref 11.7–15.7)
MAGNESIUM SERPL-MCNC: 1.7 MG/DL (ref 1.7–2.3)
MCH RBC QN AUTO: 30.8 PG (ref 26.5–33)
MCHC RBC AUTO-ENTMCNC: 33.1 G/DL (ref 31.5–36.5)
MCV RBC AUTO: 93 FL (ref 78–100)
PHOSPHATE SERPL-MCNC: 3.5 MG/DL (ref 2.5–4.5)
PLATELET # BLD AUTO: 259 10E3/UL (ref 150–450)
POTASSIUM SERPL-SCNC: 3.7 MMOL/L (ref 3.4–5.3)
RBC # BLD AUTO: 4.26 10E6/UL (ref 3.8–5.2)
SODIUM SERPL-SCNC: 135 MMOL/L (ref 136–145)
WBC # BLD AUTO: 6 10E3/UL (ref 4–11)

## 2022-11-09 PROCEDURE — 82310 ASSAY OF CALCIUM: CPT | Performed by: STUDENT IN AN ORGANIZED HEALTH CARE EDUCATION/TRAINING PROGRAM

## 2022-11-09 PROCEDURE — 93246 EXT ECG>7D<15D RECORDING: CPT

## 2022-11-09 PROCEDURE — 93248 EXT ECG>7D<15D REV&INTERPJ: CPT | Performed by: INTERNAL MEDICINE

## 2022-11-09 PROCEDURE — 250N000013 HC RX MED GY IP 250 OP 250 PS 637

## 2022-11-09 PROCEDURE — 83735 ASSAY OF MAGNESIUM: CPT | Performed by: STUDENT IN AN ORGANIZED HEALTH CARE EDUCATION/TRAINING PROGRAM

## 2022-11-09 PROCEDURE — 99239 HOSP IP/OBS DSCHRG MGMT >30: CPT | Mod: GC | Performed by: PSYCHIATRY & NEUROLOGY

## 2022-11-09 PROCEDURE — 84100 ASSAY OF PHOSPHORUS: CPT | Performed by: STUDENT IN AN ORGANIZED HEALTH CARE EDUCATION/TRAINING PROGRAM

## 2022-11-09 PROCEDURE — 97130 THER IVNTJ EA ADDL 15 MIN: CPT | Mod: GO | Performed by: OCCUPATIONAL THERAPIST

## 2022-11-09 PROCEDURE — 97129 THER IVNTJ 1ST 15 MIN: CPT | Mod: GO | Performed by: OCCUPATIONAL THERAPIST

## 2022-11-09 PROCEDURE — 36415 COLL VENOUS BLD VENIPUNCTURE: CPT | Performed by: STUDENT IN AN ORGANIZED HEALTH CARE EDUCATION/TRAINING PROGRAM

## 2022-11-09 PROCEDURE — 250N000013 HC RX MED GY IP 250 OP 250 PS 637: Performed by: STUDENT IN AN ORGANIZED HEALTH CARE EDUCATION/TRAINING PROGRAM

## 2022-11-09 PROCEDURE — 85027 COMPLETE CBC AUTOMATED: CPT | Performed by: STUDENT IN AN ORGANIZED HEALTH CARE EDUCATION/TRAINING PROGRAM

## 2022-11-09 RX ORDER — ASPIRIN 81 MG/1
81 TABLET, CHEWABLE ORAL DAILY
Qty: 90 TABLET | Refills: 3 | DISCHARGE
Start: 2022-11-10 | End: 2023-01-09

## 2022-11-09 RX ORDER — PANTOPRAZOLE SODIUM 40 MG/1
40 TABLET, DELAYED RELEASE ORAL
Status: DISCONTINUED | OUTPATIENT
Start: 2022-11-09 | End: 2022-11-09 | Stop reason: HOSPADM

## 2022-11-09 RX ORDER — ATORVASTATIN CALCIUM 20 MG/1
20 TABLET, FILM COATED ORAL EVERY EVENING
Qty: 90 TABLET | Refills: 3 | DISCHARGE
Start: 2022-11-09 | End: 2023-01-09

## 2022-11-09 RX ORDER — CLOPIDOGREL BISULFATE 75 MG/1
75 TABLET ORAL DAILY
Qty: 18 TABLET | Refills: 0 | DISCHARGE
Start: 2022-11-10 | End: 2022-12-07

## 2022-11-09 RX ADMIN — AMLODIPINE BESYLATE 2.5 MG: 2.5 TABLET ORAL at 08:42

## 2022-11-09 RX ADMIN — ASPIRIN 81 MG CHEWABLE TABLET 81 MG: 81 TABLET CHEWABLE at 08:42

## 2022-11-09 RX ADMIN — CALCIUM CARBONATE 600 MG (1,500 MG)-VITAMIN D3 400 UNIT TABLET 1 TABLET: at 08:41

## 2022-11-09 RX ADMIN — CLOPIDOGREL BISULFATE 75 MG: 75 TABLET ORAL at 08:41

## 2022-11-09 ASSESSMENT — VISUAL ACUITY: OU: NORMAL ACUITY

## 2022-11-09 ASSESSMENT — ACTIVITIES OF DAILY LIVING (ADL)
ADLS_ACUITY_SCORE: 35
ADLS_ACUITY_SCORE: 36
ADLS_ACUITY_SCORE: 35

## 2022-11-09 ASSESSMENT — PATIENT HEALTH QUESTIONNAIRE - PHQ9: SUM OF ALL RESPONSES TO PHQ QUESTIONS 1-9: 0

## 2022-11-09 NOTE — PROGRESS NOTES
Physical Therapy Discharge Summary  Reason for discharge:   Discharge from hospital to [facility]   Progress towards goals: See goals on Care Plan in [facility] electronic health record for goal details.  Goals partially met. Barriers to achieving goals: limited tolerance,    Recommendation(s):   Continued therapy is recommended. Rationale/Recommendations: [facility] to increase safety and independence with basic functional mobility, and to address unmet goals.    Per notes

## 2022-11-09 NOTE — PLAN OF CARE
Status: Pt admitted for worsening confusion, found to have L occipital infarct  Vitals: VSS on RA ex hypertensive within parameters. SBP<220, DBP<110. On CCM.    Neuros: Orientation waxes and wanes, but forgetful. Disoriented to time @ 2000. Oriented x4 @ 0000 & 0400 with choices. 0400. Strengths 4/5 throughout. Decreased hearing in both ears. Slow speech and word finding difficulty. NIHSS 0.  IV: PIV SL  Labs/Electrolytes: Redraws this AM.  Resp/trach: LS diminished in the lower lobes. Denies SOB.  Diet: Regular diet, fair intake.  Bowel status: BS+, no BM this shift. LBM 11/8, intermittently incontinent.  : Voiding with intermittent incontinence.  Skin: Bruising throughout  Pain: c/o tongue pain but declined pain medication.  Activity: A1/GB/Walker.   Social: Daughter at bedside in evening, supportive. Patient sleeping in between cares.  Plan: PT/OT recommending TCU at discharge. Continue to monitor and follow POC.

## 2022-11-09 NOTE — PROGRESS NOTES
Care Management Discharge Note    Discharge Date: 11/09/2022       Discharge Disposition:  [facility]--nurse to nurse [phone]    Discharge Services:  [facility]    Discharge DME:  TBD    Discharge Transportation: Daughter will provide  Private pay costs discussed: transportation costs    PAS Confirmation Code:  [id number]  Patient/family educated on Medicare website which has current facility and service quality ratings:  Yes    Education Provided on the Discharge Plan:  Yes  Persons Notified of Discharge Plans: Yes  Patient/Family in Agreement with the Plan:  Yes    Handoff Referral Completed: Yes    Additional Information:  SW received call from [facility] that they are able to accept Pt. SW updated bedside nurse, completed PAS, updated Pt's daughter and Neuro Stroke. SW will send discharge orders via in-box once they are completed. Pt's daughter will pick Pt up around 1PM.       ________________    ALEXX Rodriges, LICSW  6D   [facility]  Phone: [phone]  Pager: [phone]  Fax: [phone]

## 2022-11-09 NOTE — DISCHARGE SUMMARY
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 Brain MRI was obtained due to change in mental status. No other stroke symptoms were noted on evaluation. Brain MRI showed L occipital lobe stroke of undetermined etiology.  Further work-up including head CT and CT revealed no vascular abnormalities.  Echocardiogram showed no cardiac source for embolus.  EKG and cardiac monitoring has shown sinus rhythm with some premature atrial complexes and a nonspecific T wave abnormality, but no evidence of atrial fibrillation.  Patient does have some risk factors for stroke including elevated LDL at 111 and hypertension.  We will plan on controlling these risk factors with medication at discharge.     #HLD  LDL found to be 111 on admission  - Started atorvastatin 20 mg    #HTN  - PTA Amlodipine 2.5 mg daily     #Osteoporosis  - Calcium carbonate supplementation    Problem Leading to Hospitalization (from HPI):   Belia Sheets is a 89 year old female with osteoporosis and HTN who is being seen in evaluation for acute encephaloapthy.     She is unable to provide additional history, thus history was obtained in discussion with her daughter.       Her daughter, Lubna, reports that her mom has a \"different confusion\" than compared to baseline (has had some processing issues in the past). Family has seen a difference in difficulty following train of thought since March/April. She " "occasionally knows the month, but not necessarily the date. She does typically know that she lives in [place]. She likes to keep up with public events, but in July/August 2022 she had a medical evaluation with health insurance where she couldn't recall the president in that conversation. Memory stayed stable through summer months. Starting 5-6 days ago, she has been experiencing a \"different reality\". On 11/2, she wanted to put her daughter in contact to speak to an  for a children's party and she was perseverating on ensuring that this party occurs (there is no event like that). When she went to [facility], she was sure that the RN from [place] was taking her to lunch and to visit her uncle in the hospital after the clinic appt (this was not going to happen). Over the past couple of weeks-to-months, her daughter has noticed that, she wasn't taking her medications every day, it has been more challenging.     At home, she does not manage her finances (her daughter manages this). Her breakfast is supplied by the building that she lives in. She has been resistant to taking her other meals in the dining room, her daughter buys her groceries (basic groceries that need microwave heating and are easy to cook). It has been harder for the patient to make a plan and initiating it.     In 2017, she was hallucinating and had aphasia (could not find words). She was seeing strings floating around in the air.  She started to improve in the hospital and PT/OT/SLP was ordered at home. Over a period of weeks/months, she got these therapies. She recovered over time.     She has had two falls within the past couple of years. She was disoriented when she was in rehab.    Please see H&P dated 11/5/2022 for further details about presentation.    Brief Hospital Course:   Patient presented with acute confusion on chronic cognitive decline.      Found to have L occipital territory ischemic infarct on MRI.      IV thrombolysis was not " administered due to outside of treatment window.      Work-up as stated below under Pertinent Investigations.    Etiology is thought to be ESUS.      Rehab evaluation: OT and PT.     Smoking Cessation: patient is not a smoker    BP Long-term Goal: 140/90 or less    Antithrombotic/Anticoagulant Agent: aspirin 81 mg and clopidogrel (Plavix) 75 mg    Statins: Started on atorvastatin 20 mg       Hgb A1C Goal: < 7.0    Complications: None.     Other problems addressed during this hospitalization:  #HLD  LDL found to be 111 on admission  - Started atorvastatin 20 mg    #HTN  - Continued home dose of Amlodipine 2.5 mg daily     #Osteoporosis  - Calcium carbonate supplementation    PERTINENT INVESTIGATIONS    Labs  Lipid Panel: Recent Labs   Lab Test 11/03/22  1153   CHOL 200*   HDL 78   *   TRIG 56     A1C:   Lab Results   Component Value Date    A1C 5.6 11/06/2022    A1C 5.6 03/15/2017     INR:   Recent Labs   Lab 11/05/22 2000   INR 1.10      Coag Panel / Hypercoag Workup: Not indicated  Pending test results: Heart monitor results    Echo: No cardiac source for embolus identified.    Imaging:  CT   Impression:  1. Subtle area of gray-white differentiation loss in the left  occipital lobe corresponding to known acute infarct.  2. Moderate leukoaraiosis and parenchymal volume loss.         MRI  Impression:  1. Multiple adjacent small foci of acute infarct in the posterior left  occipital lobe.  2. Punctate focus of enhancement and T2 hyperintensity in the  posterolateral left cerebellar hemisphere. This is favored sequela of  a subacute infarct. Attention on follow-up is recommended to exclude  enhancing lesion.  3. Periventricular white matter changes similar to prior and most  suggestive of chronic small vessel ischemic disease.    Head CTA demonstrates no definite aneurysm or stenosis of the major intracranial arteries. Unchanged appearance of the fusiform dilation in the ICA termini    Neck CTA revealed kinking  of the left subclavian artery proximal to the vertebral artery origin.  Neck CTA demonstrates no other stenosis of the major cervical arteries    Endovascular procedure: None       Cardiac Monitoring: Patient had > 24 hrs of cardiac monitor while in hospital.    Findings: No atrial fibrillation was found.    Sleep Apnea Screen:   Questions/Answers      1. Prior to your stroke, have you been told that you snore? No.    2. Prior to your stroke, have you been told that you struggle to breath while you are sleeping? No.    3. Prior to your stroke, do you feel tired and sleepy even after getting a normal night of sleep? Yes.    Sleep Apnea Screen Findings: Patient has 0-1 symptoms of sleep apnea.  Further sleep study is not recommended at this time.    PHQ-9 Depression Screen Score: 0    Education discussed with: patient and family on blood pressure management, cholesterol management, medical management and follow-up recommendations/plan.    During daily rounds, the plan of care was discussed and developed with patient and family.  Plan of care includes: Aspirin, plavix and atorvastatin medications along with outpatinet cardiac monitoring, general neurology follow up and TCU placement.    PHYSICAL EXAMINATION  Vital Signs:  B/P: 161/96, T: 97.6, P: 70, R: 16    General:  patient lying in bed without any acute distress     HEENT:  normocephalic/atraumatic  Cardio:  RRR  Pulmonary:  no respiratory distress  Abdomen:  non-distended  Extremities:  no edema  Skin:  intact, warm/dry     Neurologic  Mental Status:  fully alert, attentive and oriented, follows commands, speech clear and fluent  Cranial Nerves:  visual fields intact, PERRL, EOMI with normal smooth pursuit, facial sensation intact and symmetric, facial movements symmetric, hearing not formally tested but intact to conversation, no dysarthria, shoulder shrug strong bilaterally, tongue protrusion midline  Motor:  no abnormal movements, normal tone throughout, able  to move all limbs spontaneously, strength 5/5 throughout upper and lower extremities  Reflexes:  2+ and symmetric throughout  Sensory:  intact/symmetric to light touch and pin prick throughout upper and lower extremities  Coordination:  FNF and HS intact without dysmetria  Station/Gait:  Deffered    National Institutes of Health Stroke Scale (on day of discharge)  NIHSS Total Score: 0    Modified Celia Score (Discharge)  1-No significant disability despite symptoms    Medications    Current Discharge Medication List      START taking these medications    Details   aspirin (ASA) 81 MG chewable tablet 1 tablet (81 mg) by Oral or Feeding Tube route daily  Qty: 90 tablet, Refills: 3    Associated Diagnoses: Cerebrovascular accident (CVA) due to embolism of left posterior cerebral artery (H)      atorvastatin (LIPITOR) 20 MG tablet 1 tablet (20 mg) by Oral or Feeding Tube route every evening  Qty: 90 tablet, Refills: 3    Associated Diagnoses: Cerebrovascular accident (CVA) due to embolism of left posterior cerebral artery (H)      clopidogrel (PLAVIX) 75 MG tablet Take 1 tablet (75 mg) by mouth daily  Qty: 18 tablet, Refills: 0    Comments: Take until 11/28/22  Associated Diagnoses: Cerebrovascular accident (CVA) due to embolism of left posterior cerebral artery (H)         CONTINUE these medications which have NOT CHANGED    Details   acetaminophen (TYLENOL) 500 MG tablet Take 1,000 mg by mouth 3 times daily as needed for mild pain      amLODIPine (NORVASC) 2.5 MG tablet Take 1 tablet (2.5 mg) by mouth daily  Qty: 90 tablet, Refills: 3    Associated Diagnoses: Essential hypertension      calcium carbonate 600 mg-vitamin D 400 units (CALTRATE) 600-400 MG-UNIT per tablet Take 1 tablet by mouth 2 times daily (before meals)  Qty: 180 tablet, Refills: 3    Associated Diagnoses: Age-related osteoporosis with current pathological fracture with routine healing, subsequent encounter      cyanocobalamin (VITAMIN B-12) 500 MCG  tablet Take 500 mcg by mouth daily      ibuprofen (ADVIL/MOTRIN) 200 MG tablet Take 600 mg by mouth 3 times daily as needed for pain      !! order for DME Equipment being ordered: Incentive spirometer  Qty: 1 Units, Refills: 0    Associated Diagnoses: Atelectasis      !! order for DME Equipment being ordered: Home BP monitor and cuff  Qty: 1 each, Refills: 0    Associated Diagnoses: Severe hypertension       !! - Potential duplicate medications found. Please discuss with provider.          Additional recommendations and follow up:       General info for SNF    Length of Stay Estimate: Short Term Care: Estimated # of Days <30  Condition at Discharge: Improving  Level of care:skilled   Rehabilitation Potential: Good  Admission H&P remains valid and up-to-date: Yes  Recent Chemotherapy: N/A  Use Nursing Home Standing Orders: Yes     Mantoux instructions    Give two-step Mantoux (PPD) Per Facility Policy Yes     Follow Up and recommended labs and tests    Follow up with Nursing home physician.  No follow up labs or test are needed.  Follow up with general neurology in 4-6 weeks.  No follow up labs or test are needed.     Reason for your hospital stay    You were in the hospital because of confusion and a stroke in the back of your brain.     Activity - Up with nursing assistance     Full Code     Physical Therapy Adult Consult    Evaluate and treat as clinically indicated.    Reason:  post stroke rehab     Occupational Therapy Adult Consult    Evaluate and treat as clinically indicated.    Reason:  post stroke rehab     Adult Leadless EKG Monitor 8 to 14 Days     Adult Leadless EKG Monitor 8 to 14 Days     Fall precautions     Diet    Follow this diet upon discharge: Regular     Stroke Hospital Follow Up    DAPT for 21 days  [facility] will call you to coordinate care as prescribed by your provider. If you don t hear from a representative within 2 business days, please call [phone].         Patient was seen  and discussed with the Attending, Dr. Coughlin.    Nimesh Urrutia, DO  Neurology Resident PGY-1  ASCOM *[phone]

## 2022-11-09 NOTE — PLAN OF CARE
Goal Outcome Evaluation:      Plan of Care Reviewed With: patient    Overall Patient Progress: no change        Status: Pt admitted for worsening confusion, found to have L occipital infarct  Vitals: HTN with parameters, CCM  Neuros: Orientation waxes and weans. Oriented 2-4 this shift with choices. Strengths 4/5 throughout. Forgetful. NIHSS of 0  IV: PIV SL  Labs/Electrolytes: Potassium and Mag replaced shift  Resp/trach: LS diminished in the lower lobes  Diet: Regular diet, tolerating well   Bowel status: BM x 2 today, intermittently incontinent   : Voiding with intermittent incontinence  Skin: Bruising throughout  Pain: Tylenol given for chronic back pain  Activity: Up with assist 1, GB, walker  Social: Daughters at bedside, supportive  Plan: Therapies recommending TCU at discharge, continue to monitor and follow POC     Stroke Patients:   PLC scheduled or completed: Completed with family  Pneumoboots in place: Patient up in chair

## 2022-11-09 NOTE — PLAN OF CARE
Vitals: HTN with parameters, CCM  Neuros:  Oriented  x4 this shift with choices. Strengths 4/5 throughout. Forgetful. NIHSS of 0  IV: removed   Labs/Electrolytes: WNL  Resp/trach: LS diminished in the lower lobes  Diet: Regular diet - good po   Bowel status: BM x 1 today   : Voiding with intermittent incontinence  Skin: Bruising throughout  Pain: Denies  Activity: Up with assist 1, GB, walker. Sat in chair most of day   Social: Daughter at bedside   Plan: Pt is discharging to TCU at this time. Report called to receiving facility. Daughter brought pt to TCU, packet sent. RN helped pt into car with daughter. Heart monitor was placed prior to leaving

## 2022-11-09 NOTE — PROGRESS NOTES
Occupational Therapy Discharge Summary    Reason for therapy discharge:    Discharged to [facility]    Progress towards therapy goal(s). See goals on Care Plan in [facility] electronic health record for goal details.  Goals partially met.  Barriers to achieving goals:   discharge from facility.    Therapy recommendation(s):    Continued therapy is recommended.  Rationale/Recommendations:  recommend TCU to progress functional mobility, may ultimately require higher level of care at assisted living facility or LTC due to severity of cognitive concerns impacting safe performance of ADLs/IADLs..

## 2022-11-10 ENCOUNTER — TRANSITIONAL CARE UNIT VISIT (OUTPATIENT)
Dept: GERIATRICS | Facility: CLINIC | Age: 87
End: 2022-11-10
Payer: COMMERCIAL

## 2022-11-10 ENCOUNTER — LAB REQUISITION (OUTPATIENT)
Dept: LAB | Facility: CLINIC | Age: 87
End: 2022-11-10
Payer: COMMERCIAL

## 2022-11-10 VITALS
OXYGEN SATURATION: 94 % | DIASTOLIC BLOOD PRESSURE: 68 MMHG | TEMPERATURE: 98 F | HEIGHT: 59 IN | HEART RATE: 84 BPM | WEIGHT: 96.8 LBS | SYSTOLIC BLOOD PRESSURE: 120 MMHG | RESPIRATION RATE: 18 BRPM | BODY MASS INDEX: 19.52 KG/M2

## 2022-11-10 DIAGNOSIS — R41.89 COGNITIVE IMPAIRMENT: Primary | ICD-10-CM

## 2022-11-10 DIAGNOSIS — M81.0 AGE-RELATED OSTEOPOROSIS WITHOUT CURRENT PATHOLOGICAL FRACTURE: ICD-10-CM

## 2022-11-10 DIAGNOSIS — I63.9 OCCIPITAL STROKE (H): ICD-10-CM

## 2022-11-10 DIAGNOSIS — Z91.89 AT RISK FOR MALNUTRITION: ICD-10-CM

## 2022-11-10 DIAGNOSIS — Z71.89 GOALS OF CARE, COUNSELING/DISCUSSION: ICD-10-CM

## 2022-11-10 DIAGNOSIS — N39.46 MIXED INCONTINENCE: ICD-10-CM

## 2022-11-10 DIAGNOSIS — K59.02 CONSTIPATION DUE TO OUTLET DYSFUNCTION: ICD-10-CM

## 2022-11-10 DIAGNOSIS — R53.81 PHYSICAL DECONDITIONING: ICD-10-CM

## 2022-11-10 DIAGNOSIS — G93.40 ACUTE ENCEPHALOPATHY: ICD-10-CM

## 2022-11-10 DIAGNOSIS — I10 ESSENTIAL (PRIMARY) HYPERTENSION: ICD-10-CM

## 2022-11-10 DIAGNOSIS — M15.0 PRIMARY OSTEOARTHRITIS INVOLVING MULTIPLE JOINTS: ICD-10-CM

## 2022-11-10 DIAGNOSIS — M41.9 KYPHOSCOLIOSIS: ICD-10-CM

## 2022-11-10 DIAGNOSIS — R29.6 FALLS FREQUENTLY: ICD-10-CM

## 2022-11-10 LAB
BACTERIA BLD CULT: NO GROWTH
BACTERIA BLD CULT: NO GROWTH

## 2022-11-10 PROCEDURE — 99306 1ST NF CARE HIGH MDM 50: CPT | Performed by: FAMILY MEDICINE

## 2022-11-10 NOTE — PROGRESS NOTES
Form has been completed by provider.     Form sent out via: Fax to [facility] at Fax Number: [phone]  Patient informed: N/A  Output date: November 10, 2022    Dinesh Hightower MA      **Please close the encounter**

## 2022-11-10 NOTE — PROGRESS NOTES
"[facility] GERIATRICS    PRIMARY CARE PROVIDER AND CLINIC:  Alejandro Sandhu MD, [address] [address] / [facility] [address]  Chief Complaint   Patient presents with     Hospital F/U      [facility] Medical Record Number:  [id number]  Place of Service where encounter took place:  [facility] (SNF) [[id number]]    Belia Sheets  is a 89 year old,   (1930), living at [facility], with pmhx including falls, osteoporosis, cognitive impairment, and HTN who was admitted to the above facility from  [facility]. Hospital stay 22 through 22.     HPI:      Hospital course  Per her daughters, she had been having worsening of her cognition with \"different confusion\" than her baseline.  Her confusion has been worsening and day prior to admission.  Notably she had been having more difficulty with trains of thought, wandering in the months prior, but this had been worsening in the days prior to admission with falling asleep at different places in her facility and riding the elevators without knowing where she was going.  Further review of this noted that she had asked her daughter to call in the  for her children's party and was described as experiencing \"a different reality\".  No other significant symptoms noted including abdominal pain, dysuria hematuria, fevers chills, cough or shortness of breath.    Work-up in the ED notable for negative COVID-19 PCR, EKG with sinus rhythm and nonspecific T wave abnormality similar to prior.  No leukocytosis.  Normal vitals.  CT of the head without acute intracranial pathology, noted generalized reveal volume loss and evidence of chronic small vessel disease.  CMP overall normal with mild hypokalemia.  Low procalcitonin, troponins mildly elevated but stable.  Neurology was consulted and recommended EEG and MRI.  MRI showed acute ischemic stroke of the left occipital lobe of unclear etiology but suspected " "to be ESUS.  EEG was performed without evidence of seizures, did show evidence of mild to moderate diffuse encephalopathy.  Evaluation with A1c of 5.6% and .  She was started on 20 mg atorvastatin and aspirin and Plavix for 21 days, which point recommendation to continue on daily aspirin (started 11/28/2022).  She continues on 2.5 mg amlodipine.  Due to concern for ESUS, she was discharged with a Zio patch to evaluate for atrial fibrillation.    Today  Seen today with 2 of her daughters.  They provide much of the history due to her cognitive status.  However, she says she is feeling better.  She feels her strength is good.  She does recognize some ongoing confusion but has trouble describing it.  She has not felt any weakness.  Her daughters did say she seems more alert and seems to be \"recovering her ability to cover her confusion.\"  She has no headache or visual changes.  No chest pain or pressure, no shortness of breath.    She had been living at [facility] assisted living facility -though she had difficulty recalling this.  She was receiving many services.  She would have breakfast and daily check-in's.  They were setting up her medications though her daughters noted she has been having more difficulty that with this and were looking into having facility assist more with her medications.  Her daughter has been taking over her finances.  Her daughters order her microwavable meals for her lunch and dinner.  She requires assistance with some transfers.  She describes both urge and overflow incontinence and primarily experiences constipation as well.    Her daughters did say she is had 2 falls in the past year.  No significant injuries related to this.    As far as her cognitive status, her daughters did state seems like its been progressing over the last 6 months or so.  Was significantly worse in the few days prior to her admission and continues to be below her baseline.  Appears in the past she underwent MMSE " with a score 23/30 (unable to review in detail).  They were wondering about completing neuropsych testing in the future.    GeriROS  Weight changes: None, low weight but stable  Appetite/Nutrition/Swallowing: No dysphagia, chews slowly  Vision: Wear bifocals  CV: Negative  Resp: Negative  Bowel: Constipation  Bladder: Incontinence, no dysuria/hematuria  Memory: See HPI  Mood: No depression or anxiety  Sleep: Sleeping well  Pain: No current pain concerns  Falls: See HPI  MSK issues/Bone health: DXA 2019 with osteoporosis. Previously on fosamax [id number]  Skin: No rashes, no ulcerations    CODE STATUS/ADVANCE DIRECTIVES DISCUSSION:  DNR  ALLERGIES: No Known Allergies   PAST MEDICAL HISTORY:   Past Medical History:   Diagnosis Date     Carpal tunnel syndrome (aka CTS)      Cataract      Chronic osteoarthritis      Constipation due to outlet dysfunction 9/22/2021     Fracture of multiple rami of right pubis with routine healing, subsequent encounter 9/22/2021     H/O calcium pyrophosphate deposition disease (CPPD)      History of 9th right rib fracture due to fall (07/13/2021) 9/22/2021     History of encephalopathy 10/2017     Hypertension      Kyphoscoliosis      Osteoarthritis     hands     Osteoporosis      Rectal prolapse       PAST SURGICAL HISTORY:   has a past surgical history that includes Hysterectomy; colonoscopy (2010); and Rectosigmoidectomy perineal (N/A, 1/12/2018).  FAMILY HISTORY: family history includes Depression/Anxiety in her son and son; Hypertension in her brother and mother.  SOCIAL HISTORY:   reports that she has never smoked. She has never used smokeless tobacco. She reports that she does not drink alcohol and does not use drugs.  Patient's living condition: lives in an assisted living facility.  for 58 years    Post Discharge Medication Reconciliation Status:   MED REC REQUIRED  Post Medication Reconciliation Status:  Discharge medications reconciled and changed, see notes/orders      "    Current Outpatient Medications   Medication Sig     acetaminophen (TYLENOL) 500 MG tablet Take 1,000 mg by mouth 3 times daily as needed for mild pain     amLODIPine (NORVASC) 2.5 MG tablet Take 1 tablet (2.5 mg) by mouth daily     aspirin (ASA) 81 MG chewable tablet 1 tablet (81 mg) by Oral or Feeding Tube route daily     atorvastatin (LIPITOR) 20 MG tablet 1 tablet (20 mg) by Oral or Feeding Tube route every evening     calcium carbonate 600 mg-vitamin D 400 units (CALTRATE) 600-400 MG-UNIT per tablet Take 1 tablet by mouth 2 times daily (before meals)     clopidogrel (PLAVIX) 75 MG tablet Take 1 tablet (75 mg) by mouth daily     cyanocobalamin (VITAMIN B-12) 500 MCG tablet Take 500 mcg by mouth daily     ibuprofen (ADVIL/MOTRIN) 200 MG tablet Take 600 mg by mouth 3 times daily as needed for pain     order for DME Equipment being ordered: Incentive spirometer     order for DME Equipment being ordered: Home BP monitor and cuff     No current facility-administered medications for this visit.       ROS:  10 point ROS of systems including Constitutional, Eyes, Respiratory, Cardiovascular, Gastroenterology, Genitourinary, Integumentary, Musculoskeletal, Psychiatric were all negative except for pertinent positives noted in my HPI.    Vitals:  /68   Pulse 84   Temp 98  F (36.7  C)   Resp 18   Ht 1.499 m (4' 11\")   Wt 43.9 kg (96 lb 12.8 oz)   SpO2 94%   BMI 19.55 kg/m    Exam:  GENERAL APPEARANCE: Seatedcomfortably, NAD  HENT:  Moderate temporal atrophy, moderately [place]  EYES:  Conjunctiva clear, anicteric, EOMI, PERRL  PULM  Normal WOB on RA, lungs CTAB, no wheezes or crackles  CV:  RRR, S1/S2 normal, no murmurs; no LE edema  ABDOMEN: Abdomen soft, not tender, not distended, BS normal and active throughout   M/S:   Kyphosis, arthritic changes of bilateral hands  NEURO: Alert, disoriented (states July 10 at the [facility]), delayed thought processes, memory poor - often required cues from daughters; CN " II-XII grossly intact, 5/5 strength in distal/proximal extremities throughout; decreased bulk throughout  --Months in reverse: Unable to complete and with 2 errors  PSYCH:  Mood normal with congruent affect, insight limited    Lab/Diagnostic data:  Recent labs and imaging in [facility] reviewed by me today.     ASSESSMENT/PLAN:    (R41.89) Cognitive impairment  (primary encounter diagnosis)  Comment: Progressive cognitive impairment over number of months if not longer, concerning for likely neurodegenerative process.  Given age, would suggest Alzheimer dementia.  Also consider vascular dementia given stroke and vascular disease.  Also consider LATE.  Previous evaluation with normal TSH, folate and B12 levels.  Plan:   -Further cognitive evaluation with therapies  -Supportive cares and facility  -Consider neuropsych testing outpatient in at least 3 months    (G93.40) Acute encephalopathy  Comment: Worsened cognitive impairment relative to baseline, likely delirium related to stroke.  Per her daughters, does seem to be improving but not yet at previous function, may represent new baseline.  Consideration for further cognitive evaluation as above but in a few months to allow delirium to clear    (Z71.89) Goals of care, counseling/discussion  Comment: CODE STATUS in the hospital was full code, however with POLST here she had marked DNR.  Reviewed with her and her daughters today and values are consistent with DNR/DNI CODE STATUS.  Updated in the chart today.    (I63.9) Occipital stroke (H)  Comment: Etiology unclear, thrombotic versus ESUS.  Contributing to cognitive changes as above.  Was seen by neurology with antiplatelet therapy and statin started.  Plan:   -Continue aspirin and Plavix through 11/28/2022, then 81 mg daily aspirin alone  -Continue atorvastatin 20 mg daily  -Hold ibuprofen while on DAPT    (R53.81) Physical deconditioning  Comment: Related to stroke and hospital deconditioning.  Also has a contribution of  cognitive impairment and malnutrition.  Plan:   -PT/OT    (R29.6) Falls frequently  Comment: Multifactorial related to cognitive impairment, malnutrition, visual difficulties, and deconditioning    (M81.0) Age-related osteoporosis without current pathological fracture  (M41.9) Kyphoscoliosis  Comment: Significant history of osteoporosis and previously on Fosamax for multiple years.  Reasonable to be off for this time.   Plan:   -Continue calcium vitamin D  - management fall risk    (Z91.89) At risk for malnutrition  Comment: Significantly decreased bulk and low creatinine, suggestive of malnutrition and sarcopenia.  Contributing to multiple concerns as above.  Plan:   -RD eval and treat    (M15.9) Primary osteoarthritis involving multiple joints  Comment: Pain well managed with Tylenol and ibuprofen.  Holding ibuprofen with DAPT as above.  Currently pain is well controlled.    (N39.46) Mixed incontinence  Comment: Describes both urge and overflow incontinence.  Partially functional incontinence with difficulty getting to bathroom unassisted.  No other urinary symptoms at this time.    (K59.02) Constipation due to outlet dysfunction  Comment: Constipated at times.  MiraLAX as needed    Orders:  -Hold ibuprofen  -BMP next week  -DNR/I code status    Total time spent with patient visit at the skilled nursing facility was 45 min including patient visit, review of past records, discussion with family. Greater than 50% of total time spent with counseling and coordinating care due to multiple comorbid conditions, review of medication changes and recommendations, goals of care.     Electronically signed by:    Benjamin Rosenstein, MD, MA  [facility]    This note was completed with the assistance of dictation software. Typos and word substitution-errors are expected and unintended.

## 2022-11-10 NOTE — LETTER
"    11/10/2022        RE: Belia Sheets  [address]  [address]  [facility] [address]        [facility] GERIATRICS    PRIMARY CARE PROVIDER AND CLINIC:  Alejandro Sandhu MD, [address] [facility] [address] / [facility] [id number]  Chief Complaint   Patient presents with     Hospital F/U      [facility] Medical Record Number:  [id number]  Place of Service where encounter took place:  [facility] (SNF) [[id number]]    eBlia Sheets  is a 89 year old,   (1930), living at [facility], with pmhx including falls, osteoporosis, cognitive impairment, and HTN who was admitted to the above facility from  [facility]. Hospital stay 22 through 22.     HPI:      Hospital course  Per her daughters, she had been having worsening of her cognition with \"different confusion\" than her baseline.  Her confusion has been worsening and day prior to admission.  Notably she had been having more difficulty with trains of thought, wandering in the months prior, but this had been worsening in the days prior to admission with falling asleep at different places in her facility and riding the elevators without knowing where she was going.  Further review of this noted that she had asked her daughter to call in the  for her children's party and was described as experiencing \"a different reality\".  No other significant symptoms noted including abdominal pain, dysuria hematuria, fevers chills, cough or shortness of breath.    Work-up in the ED notable for negative COVID-19 PCR, EKG with sinus rhythm and nonspecific T wave abnormality similar to prior.  No leukocytosis.  Normal vitals.  CT of the head without acute intracranial pathology, noted generalized reveal volume loss and evidence of chronic small vessel disease.  CMP overall normal with mild hypokalemia.  Low procalcitonin, troponins mildly elevated but stable.  Neurology was consulted and recommended EEG " "and MRI.  MRI showed acute ischemic stroke of the left occipital lobe of unclear etiology but suspected to be ESUS.  EEG was performed without evidence of seizures, did show evidence of mild to moderate diffuse encephalopathy.  Evaluation with A1c of 5.6% and .  She was started on 20 mg atorvastatin and aspirin and Plavix for 21 days, which point recommendation to continue on daily aspirin (started 11/28/2022).  She continues on 2.5 mg amlodipine.  Due to concern for ESUS, she was discharged with a Zio patch to evaluate for atrial fibrillation.    Today  Seen today with 2 of her daughters.  They provide much of the history due to her cognitive status.  However, she says she is feeling better.  She feels her strength is good.  She does recognize some ongoing confusion but has trouble describing it.  She has not felt any weakness.  Her daughters did say she seems more alert and seems to be \"recovering her ability to cover her confusion.\"  She has no headache or visual changes.  No chest pain or pressure, no shortness of breath.    She had been living at [facility] assisted living facility -though she had difficulty recalling this.  She was receiving many services.  She would have breakfast and daily check-in's.  They were setting up her medications though her daughters noted she has been having more difficulty that with this and were looking into having facility assist more with her medications.  Her daughter has been taking over her finances.  Her daughters order her microwavable meals for her lunch and dinner.  She requires assistance with some transfers.  She describes both urge and overflow incontinence and primarily experiences constipation as well.    Her daughters did say she is had 2 falls in the past year.  No significant injuries related to this.    As far as her cognitive status, her daughters did state seems like its been progressing over the last 6 months or so.  Was significantly worse in the few " days prior to her admission and continues to be below her baseline.  Appears in the past she underwent MMSE with a score 23/30 (unable to review in detail).  They were wondering about completing neuropsych testing in the future.    GeriROS  Weight changes: None, low weight but stable  Appetite/Nutrition/Swallowing: No dysphagia, chews slowly  Vision: Wear bifocals  CV: Negative  Resp: Negative  Bowel: Constipation  Bladder: Incontinence, no dysuria/hematuria  Memory: See HPI  Mood: No depression or anxiety  Sleep: Sleeping well  Pain: No current pain concerns  Falls: See HPI  MSK issues/Bone health: DXA 2019 with osteoporosis. Previously on fosamax [id number]  Skin: No rashes, no ulcerations    CODE STATUS/ADVANCE DIRECTIVES DISCUSSION:  DNR  ALLERGIES: No Known Allergies   PAST MEDICAL HISTORY:   Past Medical History:   Diagnosis Date     Carpal tunnel syndrome (aka CTS)      Cataract      Chronic osteoarthritis      Constipation due to outlet dysfunction 9/22/2021     Fracture of multiple rami of right pubis with routine healing, subsequent encounter 9/22/2021     H/O calcium pyrophosphate deposition disease (CPPD)      History of 9th right rib fracture due to fall (07/13/2021) 9/22/2021     History of encephalopathy 10/2017     Hypertension      Kyphoscoliosis      Osteoarthritis     hands     Osteoporosis      Rectal prolapse       PAST SURGICAL HISTORY:   has a past surgical history that includes Hysterectomy; colonoscopy (2010); and Rectosigmoidectomy perineal (N/A, 1/12/2018).  FAMILY HISTORY: family history includes Depression/Anxiety in her son and son; Hypertension in her brother and mother.  SOCIAL HISTORY:   reports that she has never smoked. She has never used smokeless tobacco. She reports that she does not drink alcohol and does not use drugs.  Patient's living condition: lives in an assisted living facility.  for 58 years    Post Discharge Medication Reconciliation Status:   MED REC  "REQUIRED  Post Medication Reconciliation Status:  Discharge medications reconciled and changed, see notes/orders         Current Outpatient Medications   Medication Sig     acetaminophen (TYLENOL) 500 MG tablet Take 1,000 mg by mouth 3 times daily as needed for mild pain     amLODIPine (NORVASC) 2.5 MG tablet Take 1 tablet (2.5 mg) by mouth daily     aspirin (ASA) 81 MG chewable tablet 1 tablet (81 mg) by Oral or Feeding Tube route daily     atorvastatin (LIPITOR) 20 MG tablet 1 tablet (20 mg) by Oral or Feeding Tube route every evening     calcium carbonate 600 mg-vitamin D 400 units (CALTRATE) 600-400 MG-UNIT per tablet Take 1 tablet by mouth 2 times daily (before meals)     clopidogrel (PLAVIX) 75 MG tablet Take 1 tablet (75 mg) by mouth daily     cyanocobalamin (VITAMIN B-12) 500 MCG tablet Take 500 mcg by mouth daily     ibuprofen (ADVIL/MOTRIN) 200 MG tablet Take 600 mg by mouth 3 times daily as needed for pain     order for DME Equipment being ordered: Incentive spirometer     order for DME Equipment being ordered: Home BP monitor and cuff     No current facility-administered medications for this visit.       ROS:  10 point ROS of systems including Constitutional, Eyes, Respiratory, Cardiovascular, Gastroenterology, Genitourinary, Integumentary, Musculoskeletal, Psychiatric were all negative except for pertinent positives noted in my HPI.    Vitals:  /68   Pulse 84   Temp 98  F (36.7  C)   Resp 18   Ht 1.499 m (4' 11\")   Wt 43.9 kg (96 lb 12.8 oz)   SpO2 94%   BMI 19.55 kg/m    Exam:  GENERAL APPEARANCE: Seatedcomfortably, NAD  HENT:  Moderate temporal atrophy, moderately [place]  EYES:  Conjunctiva clear, anicteric, EOMI, PERRL  PULM  Normal WOB on RA, lungs CTAB, no wheezes or crackles  CV:  RRR, S1/S2 normal, no murmurs; no LE edema  ABDOMEN: Abdomen soft, not tender, not distended, BS normal and active throughout   M/S:   Kyphosis, arthritic changes of bilateral hands  NEURO: Alert, disoriented " (states July 10 at [facility]), delayed thought processes, memory poor - often required cues from daughters; CN II-XII grossly intact, 5/5 strength in distal/proximal extremities throughout; decreased bulk throughout  --Months in reverse: Unable to complete and with 2 errors  PSYCH:  Mood normal with congruent affect, insight limited    Lab/Diagnostic data:  Recent labs and imaging in [facility] reviewed by me today.     ASSESSMENT/PLAN:    (R41.89) Cognitive impairment  (primary encounter diagnosis)  Comment: Progressive cognitive impairment over number of months if not longer, concerning for likely neurodegenerative process.  Given age, would suggest Alzheimer dementia.  Also consider vascular dementia given stroke and vascular disease.  Also consider LATE.  Previous evaluation with normal TSH, folate and B12 levels.  Plan:   -Further cognitive evaluation with therapies  -Supportive cares and facility  -Consider neuropsych testing outpatient in at least 3 months    (G93.40) Acute encephalopathy  Comment: Worsened cognitive impairment relative to baseline, likely delirium related to stroke.  Per her daughters, does seem to be improving but not yet at previous function, may represent new baseline.  Consideration for further cognitive evaluation as above but in a few months to allow delirium to clear    (Z71.89) Goals of care, counseling/discussion  Comment: CODE STATUS in the hospital was full code, however with POLST here she had marked DNR.  Reviewed with her and her daughters today and values are consistent with DNR/DNI CODE STATUS.  Updated in the chart today.    (I63.9) Occipital stroke (H)  Comment: Etiology unclear, thrombotic versus ESUS.  Contributing to cognitive changes as above.  Was seen by neurology with antiplatelet therapy and statin started.  Plan:   -Continue aspirin and Plavix through 11/28/2022, then 81 mg daily aspirin alone  -Continue atorvastatin 20 mg daily  -Hold ibuprofen while on  DAPT    (R53.81) Physical deconditioning  Comment: Related to stroke and hospital deconditioning.  Also has a contribution of cognitive impairment and malnutrition.  Plan:   -PT/OT    (R29.6) Falls frequently  Comment: Multifactorial related to cognitive impairment, malnutrition, visual difficulties, and deconditioning    (M81.0) Age-related osteoporosis without current pathological fracture  (M41.9) Kyphoscoliosis  Comment: Significant history of osteoporosis and previously on Fosamax for multiple years.  Reasonable to be off for this time.   Plan:   -Continue calcium vitamin D  - management fall risk    (Z91.89) At risk for malnutrition  Comment: Significantly decreased bulk and low creatinine, suggestive of malnutrition and sarcopenia.  Contributing to multiple concerns as above.  Plan:   -RD eval and treat    (M15.9) Primary osteoarthritis involving multiple joints  Comment: Pain well managed with Tylenol and ibuprofen.  Holding ibuprofen with DAPT as above.  Currently pain is well controlled.    (N39.46) Mixed incontinence  Comment: Describes both urge and overflow incontinence.  Partially functional incontinence with difficulty getting to bathroom unassisted.  No other urinary symptoms at this time.    (K59.02) Constipation due to outlet dysfunction  Comment: Constipated at times.  MiraLAX as needed    Orders:  -Hold ibuprofen  -BMP next week  -DNR/I code status    Total time spent with patient visit at the skilled nursing facility was 45 min including patient visit, review of past records, discussion with family. Greater than 50% of total time spent with counseling and coordinating care due to multiple comorbid conditions, review of medication changes and recommendations, goals of care.     Electronically signed by:    Benjamin Rosenstein, MD, MA  [facility] Faculty    This note was completed with the assistance of dictation software. Typos and word substitution-errors are expected and  unintended.                        Sincerely,        Benjamin Rosenstein, MD

## 2022-11-14 ENCOUNTER — TRANSITIONAL CARE UNIT VISIT (OUTPATIENT)
Dept: GERIATRICS | Facility: CLINIC | Age: 87
End: 2022-11-14
Payer: COMMERCIAL

## 2022-11-14 VITALS
TEMPERATURE: 98 F | WEIGHT: 96.8 LBS | BODY MASS INDEX: 20.32 KG/M2 | RESPIRATION RATE: 18 BRPM | OXYGEN SATURATION: 94 % | SYSTOLIC BLOOD PRESSURE: 140 MMHG | HEART RATE: 81 BPM | HEIGHT: 58 IN | DIASTOLIC BLOOD PRESSURE: 92 MMHG

## 2022-11-14 DIAGNOSIS — K59.02 CONSTIPATION DUE TO OUTLET DYSFUNCTION: ICD-10-CM

## 2022-11-14 DIAGNOSIS — Z87.81 HISTORY OF RIB FRACTURE: ICD-10-CM

## 2022-11-14 DIAGNOSIS — R41.89 COGNITIVE IMPAIRMENT: ICD-10-CM

## 2022-11-14 DIAGNOSIS — M15.0 PRIMARY OSTEOARTHRITIS INVOLVING MULTIPLE JOINTS: ICD-10-CM

## 2022-11-14 DIAGNOSIS — Z86.73 HISTORY OF CVA (CEREBROVASCULAR ACCIDENT): Primary | ICD-10-CM

## 2022-11-14 DIAGNOSIS — R29.6 RECURRENT FALLS: ICD-10-CM

## 2022-11-14 DIAGNOSIS — K62.3 RECTAL PROLAPSE: ICD-10-CM

## 2022-11-14 DIAGNOSIS — Z87.81 HISTORY OF PELVIC FRACTURE: ICD-10-CM

## 2022-11-14 DIAGNOSIS — I10 ESSENTIAL HYPERTENSION: ICD-10-CM

## 2022-11-14 DIAGNOSIS — M81.0 SENILE OSTEOPOROSIS: ICD-10-CM

## 2022-11-14 LAB
ANION GAP SERPL CALCULATED.3IONS-SCNC: 13 MMOL/L (ref 7–15)
BUN SERPL-MCNC: 24.9 MG/DL (ref 8–23)
CALCIUM SERPL-MCNC: 9.9 MG/DL (ref 8.2–9.6)
CHLORIDE SERPL-SCNC: 100 MMOL/L (ref 98–107)
CREAT SERPL-MCNC: 0.55 MG/DL (ref 0.51–0.95)
DEPRECATED HCO3 PLAS-SCNC: 25 MMOL/L (ref 22–29)
GFR SERPL CREATININE-BSD FRML MDRD: 86 ML/MIN/1.73M2
GLUCOSE SERPL-MCNC: 114 MG/DL (ref 70–99)
POTASSIUM SERPL-SCNC: 3.6 MMOL/L (ref 3.4–5.3)
SODIUM SERPL-SCNC: 138 MMOL/L (ref 136–145)

## 2022-11-14 PROCEDURE — 36415 COLL VENOUS BLD VENIPUNCTURE: CPT | Performed by: FAMILY MEDICINE

## 2022-11-14 PROCEDURE — 99310 SBSQ NF CARE HIGH MDM 45: CPT | Performed by: NURSE PRACTITIONER

## 2022-11-14 PROCEDURE — P9604 ONE-WAY ALLOW PRORATED TRIP: HCPCS | Performed by: FAMILY MEDICINE

## 2022-11-14 PROCEDURE — 80048 BASIC METABOLIC PNL TOTAL CA: CPT | Performed by: FAMILY MEDICINE

## 2022-11-15 PROBLEM — R41.89 COGNITIVE IMPAIRMENT: Status: ACTIVE | Noted: 2022-11-15

## 2022-11-15 PROBLEM — Z86.73 HISTORY OF CVA (CEREBROVASCULAR ACCIDENT): Status: ACTIVE | Noted: 2022-11-15

## 2022-11-15 PROBLEM — Z87.81 HISTORY OF PELVIC FRACTURE: Status: ACTIVE | Noted: 2022-11-15

## 2022-11-15 NOTE — PROGRESS NOTES
[facility]    Name:   Belia Sheets (Sudeep)  :   1930  MRN:    [id number]     Facility:   [facility] (SNF) [[id number]]   Room: TCU / [place] [address]  Code Status: FULL CODE and POLST AVAILABLE -     DOS: 2021    PCP:  Alejandro Sandhu    CHIEF COMPLAINT / REASON FOR VISIT:  Chief Complaint   Patient presents with     Clinic Care Coordination - Follow-up      [facility] from 2021 until 2021 (fall and right 9th rib fracture)  [facility] from 2021 until 2021 (fall with pubic rami fractures)  [facility] TCU from 2021 until 10/08/2021  [facility] from 2022 until 2022 (acute ischemic stroke left occipital lobe within watershed region to the ESUS)      HPI: Belia is a 89 year old female with a history of osteoporosis, significant osteoarthritis (especially in the hands), essential hypertension, and remote history of visual hallucinations.  She had just undergone rehab in the TCU here a few months prior after falling and sustaining a right ninth rib fracture.    She fell again on 2021 and was taken by EMS to [facility] ED.  Imaging revealed right superior and inferior pubic rami fractures.  Evaluated by orthopedics with fractures to be managed conservatively, utilizing pain control and guided weightbearing recommendations.  She can bear weight as tolerated with a walker for safety.  Orthopedic surgery did recommend follow-up in 3 to 4 weeks with nonoperative orthopedics provider.  This should include AP x-rays of the pelvis.  She may be seen by her PCP if the PCP feels comfortable.      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  No abnormalities were noted on MRI.  It was  "suggested that he suffered from acute on chronic encephalopathy.  Echo showed no cardiac source of the embolus.  EKG and cardiac monitoring showed sinus rhythm with PACs and a nonspecific T wave abnormality but no evidence of atrial fibrillation.  She does have some risk factors for stroke, including elevated LDL (111) and hypertension.    According to Lubna, her daughter, her mother was experiencing a \"different confusion\" compared to baseline.  Difficulty following train of thought since March or April 2022.        CURRENT/RECENT TCU ISSUES    Disposition: Impaired cognition is not readily apparent initially; however, confusion and short-term memory impairment become clearer.  She can tell me neither the year nor month. SLP did work with her during her last stay here, and it was felt she required quite a bit of support.    Rectal prolapse: She has occasionally complained of rectal pain.  She does have a history of rectal prolapse, and she stated that it \"hurts trying to push out small pieces of stool.\"  This is not new for her and apparently comes and goes.  She stated that she had not been bothered with this in the previous 18 to 24 hours.  She has undergone surgery within the last 5 years.  When last seen, she told me that her stools were \"comfortably soft.\"    Pain management:  Her personal preference is to not take narcotics if at all possible. She denies any pain currently.    Cognitive impairment: During an earlier stay, there were at least mild cognitive deficits noted, and this was corroborated by the patient's son who also endorsed his mother's memory loss, although she can appear, for the most part, to be quite cogent.  SLP did work with her in the past, and it was felt that she needed quite a bit of support.   On the SLUMS, she did rather poorly on the clock test, making a pie and then adding numbers but no hands.  She scored 2/17 on the storytelling, 10/17 on orientation.  She scored 23/30 on the MMSE.  " Her daughter apparently believed cognitive impairment to be an acute issue.  However, the patient told me that she felt she had trouble with her thinking 2-3 years earlier but that she was feeling much clearer. Nonetheless, with her cognitive impairment history, and the likelihood that it has progressed, we will request evaluation and treatment with SLP.    Osteoarthritis: Rather severe in the hands with noted deformities of various joints.  Continue with current pain management.    Discharge planning: To be determined.    ROS:  See above for any positives.  Otherwise, no headaches or chest pains, coughing or congestion, nausea or vomiting, dyspnea, dysuria, diplopia, constipation or diarrhea, difficulty chewing or swallowing, integumentary issues, or problems with appetite or sleep      Past Medical History:   Diagnosis Date     Carpal tunnel syndrome (aka CTS)      Cataract      Chronic osteoarthritis      Constipation due to outlet dysfunction 2021     Fracture of multiple rami of right pubis with routine healing, subsequent encounter 2021     H/O calcium pyrophosphate deposition disease (CPPD)      History of 9th right rib fracture due to fall (2021) 2021     History of encephalopathy 10/2017     Hypertension      Kyphoscoliosis      Osteoarthritis     hands     Osteoporosis      Rectal prolapse               Family History   Problem Relation Age of Onset     Depression/Anxiety Son      Depression/Anxiety Son         alcohol abuse  age 24     Hypertension Mother      Hypertension Brother      Diabetes No family hx of      Breast Cancer No family hx of      Colon Cancer No family hx of      Prostate Cancer No family hx of      Other Cancer No family hx of      Social History     Socioeconomic History     Marital status:      Spouse name: Not on file     Number of children: Not on file     Years of education: Not on file     Highest education level: Not on file   Occupational  History     Not on file   Tobacco Use     Smoking status: Never Smoker     Smokeless tobacco: Never Used   Substance and Sexual Activity     Alcohol use: No     Drug use: No     Sexual activity: Never   Other Topics Concern     Not on file   Social History Narrative     Not on file     Social Determinants of Health     Financial Resource Strain:      Difficulty of Paying Living Expenses:    Food Insecurity:      Worried About Running Out of Food in the Last Year:      Ran Out of Food in the Last Year:    Transportation Needs:      Lack of Transportation (Medical):      Lack of Transportation (Non-Medical):    Physical Activity:      Days of Exercise per Week:      Minutes of Exercise per Session:    Stress:      Feeling of Stress :    Social Connections:      Frequency of Communication with Friends and Family:      Frequency of Social Gatherings with Friends and Family:      Attends [employer] Services:      Active Member of Clubs or Organizations:      Attends Club or Organization Meetings:      Marital Status:    Intimate Partner Violence:      Fear of Current or Ex-Partner:      Emotionally Abused:      Physically Abused:      Sexually Abused:        MEDICATIONS: Reviewed from the MAR, physician orders, and/or earlier progress notes.  Post Medication Reconciliation Status: discharge medications reconciled, continue medications without change    Current Outpatient Medications   Medication Sig     acetaminophen (TYLENOL) 500 MG tablet Take 1,000 mg by mouth 3 times daily as needed for mild pain     amLODIPine (NORVASC) 2.5 MG tablet Take 1 tablet (2.5 mg) by mouth daily     aspirin (ASA) 81 MG chewable tablet 1 tablet (81 mg) by Oral or Feeding Tube route daily     atorvastatin (LIPITOR) 20 MG tablet 1 tablet (20 mg) by Oral or Feeding Tube route every evening     calcium carbonate 600 mg-vitamin D 400 units (CALTRATE) 600-400 MG-UNIT per tablet Take 1 tablet by mouth 2 times daily (before meals)     clopidogrel  "(PLAVIX) 75 MG tablet Take 1 tablet (75 mg) by mouth daily     cyanocobalamin (VITAMIN B-12) 500 MCG tablet Take 500 mcg by mouth daily     ibuprofen (ADVIL/MOTRIN) 200 MG tablet Take 600 mg by mouth 3 times daily as needed for pain     order for DME Equipment being ordered: Incentive spirometer     order for DME Equipment being ordered: Home BP monitor and cuff     No current facility-administered medications for this visit.     ALLERGIES: No Known Allergies    DIET: Regular, regular texture, thin liquids.  Mighty Shake.    Vitals:    11/14/22 1356   BP: (!) 140/92   Pulse: 81   Resp: 18   Temp: 98  F (36.7  C)   SpO2: 94%   Weight: 43.9 kg (96 lb 12.8 oz)   Height: 1.473 m (4' 10\")     Body mass index is 20.23 kg/m .    EXAMINATION:   General: Pleasant, alert, and conversant elderly female, up in a recliner, in NAD.   Head: Normocephalic and atraumatic.   Eyes: PERRLA, sclerae clear.   ENT: Moist oral mucosa.  Several of her own teeth but missing all lower teeth on the right as well as several uppercase.  No nasal discharge.  Hearing seems unimpaired.  Cardiovascular: Regular rate and rhythm with distinct split sounds.   Respiratory: Lungs CTAB.   Abdomen: Nondistended.   Musculoskeletal/Extremities: Age-related DJD and moderate to severe kyphosis.  Hands and fingers show significant deformities due to OA.  Right fifth trigger finger.  Bilateral hallux valgus deformities with partial great toe overlap.  No peripheral edema.   Integument: No rashes, clinically significant lesions, or skin breakdown.   Cognitive/Psychiatric: Mild to moderate cognitive impairment.  Affect is euthymic.    DIAGNOSTICS:   Recent Results (from the past 240 hour(s))   Comprehensive metabolic panel    Collection Time: 11/05/22  7:59 PM   Result Value Ref Range    Sodium 140 133 - 144 mmol/L    Potassium 3.5 3.4 - 5.3 mmol/L    Chloride 99 94 - 109 mmol/L    Carbon Dioxide (CO2) 26 20 - 32 mmol/L    Anion Gap 15 (H) 3 - 14 mmol/L    Urea " Nitrogen 19 7 - 30 mg/dL    Creatinine 0.69 0.52 - 1.04 mg/dL    Calcium 9.7 8.5 - 10.1 mg/dL    Glucose 102 (H) 70 - 99 mg/dL    Alkaline Phosphatase 64 40 - 150 U/L    AST 27 0 - 45 U/L    ALT 14 0 - 50 U/L    Protein Total 7.7 6.8 - 8.8 g/dL    Albumin 3.5 3.4 - 5.0 g/dL    Bilirubin Total 1.1 0.2 - 1.3 mg/dL    GFR Estimate 81 >60 mL/min/1.73m2   INR    Collection Time: 11/05/22  8:00 PM   Result Value Ref Range    INR 1.10 0.85 - 1.15   Partial thromboplastin time    Collection Time: 11/05/22  8:00 PM   Result Value Ref Range    aPTT 24 22 - 38 Seconds   Blood Culture Peripheral Blood    Collection Time: 11/05/22  8:00 PM    Specimen: Peripheral Blood   Result Value Ref Range    Culture No Growth    CBC with platelets and differential    Collection Time: 11/05/22  8:00 PM   Result Value Ref Range    WBC Count 11.5 (H) 4.0 - 11.0 10e3/uL    RBC Count 4.59 3.80 - 5.20 10e6/uL    Hemoglobin 14.1 11.7 - 15.7 g/dL    Hematocrit 42.8 35.0 - 47.0 %    MCV 93 78 - 100 fL    MCH 30.7 26.5 - 33.0 pg    MCHC 32.9 31.5 - 36.5 g/dL    RDW 13.4 10.0 - 15.0 %    Platelet Count 251 150 - 450 10e3/uL    % Neutrophils 77 %    % Lymphocytes 13 %    % Monocytes 8 %    % Eosinophils 1 %    % Basophils 0 %    % Immature Granulocytes 1 %    NRBCs per 100 WBC 0 <1 /100    Absolute Neutrophils 8.9 (H) 1.6 - 8.3 10e3/uL    Absolute Lymphocytes 1.5 0.8 - 5.3 10e3/uL    Absolute Monocytes 0.9 0.0 - 1.3 10e3/uL    Absolute Eosinophils 0.1 0.0 - 0.7 10e3/uL    Absolute Basophils 0.1 0.0 - 0.2 10e3/uL    Absolute Immature Granulocytes 0.1 <=0.4 10e3/uL    Absolute NRBCs 0.0 10e3/uL   Extra Red Top Tube    Collection Time: 11/05/22  8:00 PM   Result Value Ref Range    Hold Specimen JIC    Lactic acid whole blood    Collection Time: 11/05/22 10:10 PM   Result Value Ref Range    Lactic Acid 1.6 0.7 - 2.0 mmol/L   EKG 12 lead    Collection Time: 11/05/22 10:12 PM   Result Value Ref Range    Systolic Blood Pressure  mmHg    Diastolic Blood  Pressure  mmHg    Ventricular Rate 71 BPM    Atrial Rate 71 BPM    DC Interval 192 ms    QRS Duration 92 ms     ms    QTc 441 ms    P Axis 93 degrees    R AXIS -4 degrees    T Axis 57 degrees    Interpretation ECG       Sinus rhythm with Premature atrial complexes  Nonspecific T wave abnormality  Abnormal ECG  Unconfirmed report - interpretation of this ECG is computer generated - see medical record for final interpretation  Confirmed by - EMERGENCY ROOM, PHYSICIAN ([id number]),  CATHERINE VALERA ([id number]) on 11/6/2022 8:45:25 AM     Blood Culture Peripheral Blood    Collection Time: 11/05/22 10:15 PM    Specimen: Peripheral Blood   Result Value Ref Range    Culture No Growth    Asymptomatic COVID-19 Virus (Coronavirus) by PCR Nose    Collection Time: 11/06/22  1:30 AM    Specimen: Nose; Swab   Result Value Ref Range    SARS CoV2 PCR Negative Negative   Respiratory Panel PCR    Collection Time: 11/06/22  1:30 AM    Specimen: Nasopharyngeal; Swab   Result Value Ref Range    Adenovirus Not Detected Not Detected    Coronavirus Not Detected Not Detected    Human Metapneumovirus Not Detected Not Detected    Human Rhin/Enterovirus Not Detected Not Detected    Influenza A Not Detected Not Detected    Influenza A, H1 Not Detected Not Detected    Influenza A 2009 H1N1 Not Detected Not Detected    Influenza A, H3 Not Detected Not Detected    Influenza B Not Detected Not Detected    Parainfluenza Virus 1 Not Detected Not Detected    Parainfluenza Virus 2 Not Detected Not Detected    Parainfluenza Virus 3 Not Detected Not Detected    Parainfluenza Virus 4 Not Detected Not Detected    Respiratory Syncytial Virus A Not Detected Not Detected    Respiratory Syncytial Virus B Not Detected Not Detected    Chlamydia Pneumoniae Not Detected Not Detected    Mycoplasma Pneumoniae Not Detected Not Detected   Procalcitonin    Collection Time: 11/06/22  1:45 AM   Result Value Ref Range    Procalcitonin 0.08 (H) <0.05 ng/mL   CBC with  platelets and differential    Collection Time: 11/06/22  1:45 AM   Result Value Ref Range    WBC Count 9.6 4.0 - 11.0 10e3/uL    RBC Count 4.29 3.80 - 5.20 10e6/uL    Hemoglobin 13.0 11.7 - 15.7 g/dL    Hematocrit 40.8 35.0 - 47.0 %    MCV 95 78 - 100 fL    MCH 30.3 26.5 - 33.0 pg    MCHC 31.9 31.5 - 36.5 g/dL    RDW 13.4 10.0 - 15.0 %    Platelet Count 160 150 - 450 10e3/uL    % Neutrophils 72 %    % Lymphocytes 17 %    % Monocytes 8 %    % Eosinophils 2 %    % Basophils 1 %    % Immature Granulocytes 0 %    NRBCs per 100 WBC 0 <1 /100    Absolute Neutrophils 7.0 1.6 - 8.3 10e3/uL    Absolute Lymphocytes 1.6 0.8 - 5.3 10e3/uL    Absolute Monocytes 0.8 0.0 - 1.3 10e3/uL    Absolute Eosinophils 0.1 0.0 - 0.7 10e3/uL    Absolute Basophils 0.1 0.0 - 0.2 10e3/uL    Absolute Immature Granulocytes 0.0 <=0.4 10e3/uL    Absolute NRBCs 0.0 10e3/uL   Troponin T, High Sensitivity    Collection Time: 11/06/22  1:45 AM   Result Value Ref Range    Troponin T, High Sensitivity 21 (H) <=14 ng/L   TSH with free T4 reflex    Collection Time: 11/06/22  1:45 AM   Result Value Ref Range    TSH 0.82 0.30 - 4.20 uIU/mL   UA with Microscopic    Collection Time: 11/06/22  1:51 AM   Result Value Ref Range    Color Urine Yellow Colorless, Straw, Light Yellow, Yellow    Appearance Urine Clear Clear    Glucose Urine Negative Negative mg/dL    Bilirubin Urine Negative Negative    Ketones Urine 20 (A) Negative mg/dL    Specific Gravity Urine 1.022 1.003 - 1.035    Blood Urine Negative Negative    pH Urine 5.5 5.0 - 7.0    Protein Albumin Urine 30 (A) Negative mg/dL    Urobilinogen Urine 2.0 Normal, 2.0 mg/dL    Nitrite Urine Negative Negative    Leukocyte Esterase Urine Negative Negative    Mucus Urine Present (A) None Seen /LPF    RBC Urine 1 <=2 /HPF    WBC Urine 2 <=5 /HPF    Squamous Epithelials Urine <1 <=1 /HPF    Hyaline Casts Urine 7 (H) <=2 /LPF   Urine Culture Aerobic Bacterial    Collection Time: 11/06/22  1:51 AM    Specimen: Urine,  Perez Catheter   Result Value Ref Range    Culture No Growth    Magnesium    Collection Time: 11/06/22  3:02 AM   Result Value Ref Range    Magnesium 1.5 (L) 1.7 - 2.3 mg/dL   Phosphorus    Collection Time: 11/06/22  3:02 AM   Result Value Ref Range    Phosphorus 3.6 2.5 - 4.5 mg/dL   CRP inflammation    Collection Time: 11/06/22  9:05 AM   Result Value Ref Range    CRP Inflammation 68.90 (H) <5.00 mg/L   Troponin T, High Sensitivity    Collection Time: 11/06/22  9:05 AM   Result Value Ref Range    Troponin T, High Sensitivity 22 (H) <=14 ng/L   Comprehensive metabolic panel    Collection Time: 11/06/22  9:05 AM   Result Value Ref Range    Sodium 140 136 - 145 mmol/L    Potassium 3.1 (L) 3.4 - 5.3 mmol/L    Chloride 102 98 - 107 mmol/L    Carbon Dioxide (CO2) 26 22 - 29 mmol/L    Anion Gap 12 7 - 15 mmol/L    Urea Nitrogen 15.8 8.0 - 23.0 mg/dL    Creatinine 0.61 0.51 - 0.95 mg/dL    Calcium 8.9 8.2 - 9.6 mg/dL    Glucose 92 70 - 99 mg/dL    Alkaline Phosphatase 48 35 - 104 U/L    AST 15 10 - 35 U/L    ALT <5 (L) 10 - 35 U/L    Protein Total 5.6 (L) 6.4 - 8.3 g/dL    Albumin 3.0 (L) 3.5 - 5.2 g/dL    Bilirubin Total 0.8 <=1.2 mg/dL    GFR Estimate 83 >60 mL/min/1.73m2   MR Brain w/o & w Contrast    Collection Time: 11/06/22  2:15 PM   Result Value Ref Range    Radiologist flags (Urgent)      Acute occipital infarct and focus of enhancement in   Potassium    Collection Time: 11/06/22  6:07 PM   Result Value Ref Range    Potassium 3.2 (L) 3.4 - 5.3 mmol/L   Extra Blue Top Tube    Collection Time: 11/06/22  6:07 PM   Result Value Ref Range    Hold Specimen JIC    Extra Red Top Tube    Collection Time: 11/06/22  6:07 PM   Result Value Ref Range    Hold Specimen JIC    Extra Purple Top Tube    Collection Time: 11/06/22  6:07 PM   Result Value Ref Range    Hold Specimen JIC    Troponin T, High Sensitivity    Collection Time: 11/06/22  6:07 PM   Result Value Ref Range    Troponin T, High Sensitivity 19 (H) <=14 ng/L    Hemoglobin A1c    Collection Time: 11/06/22  7:12 PM   Result Value Ref Range    Hemoglobin A1C 5.6 <5.7 %   Glucose by meter    Collection Time: 11/06/22  9:12 PM   Result Value Ref Range    GLUCOSE BY METER POCT 163 (H) 70 - 99 mg/dL   Glucose by meter    Collection Time: 11/07/22  2:33 AM   Result Value Ref Range    GLUCOSE BY METER POCT 129 (H) 70 - 99 mg/dL   Basic metabolic panel    Collection Time: 11/07/22  9:10 AM   Result Value Ref Range    Sodium 137 136 - 145 mmol/L    Potassium 3.1 (L) 3.4 - 5.3 mmol/L    Chloride 103 98 - 107 mmol/L    Carbon Dioxide (CO2) 22 22 - 29 mmol/L    Anion Gap 12 7 - 15 mmol/L    Urea Nitrogen 12.8 8.0 - 23.0 mg/dL    Creatinine 0.50 (L) 0.51 - 0.95 mg/dL    Calcium 7.8 (L) 8.2 - 9.6 mg/dL    Glucose 97 70 - 99 mg/dL    GFR Estimate 88 >60 mL/min/1.73m2   CBC with platelets    Collection Time: 11/07/22  9:10 AM   Result Value Ref Range    WBC Count 9.1 4.0 - 11.0 10e3/uL    RBC Count 4.00 3.80 - 5.20 10e6/uL    Hemoglobin 12.1 11.7 - 15.7 g/dL    Hematocrit 37.1 35.0 - 47.0 %    MCV 93 78 - 100 fL    MCH 30.3 26.5 - 33.0 pg    MCHC 32.6 31.5 - 36.5 g/dL    RDW 13.2 10.0 - 15.0 %    Platelet Count 212 150 - 450 10e3/uL   Magnesium    Collection Time: 11/07/22  9:10 AM   Result Value Ref Range    Magnesium 1.5 (L) 1.7 - 2.3 mg/dL   Phosphorus    Collection Time: 11/07/22  9:10 AM   Result Value Ref Range    Phosphorus 2.3 (L) 2.5 - 4.5 mg/dL   Glucose by meter    Collection Time: 11/07/22  9:53 AM   Result Value Ref Range    GLUCOSE BY METER POCT 130 (H) 70 - 99 mg/dL   Magnesium    Collection Time: 11/07/22 11:01 AM   Result Value Ref Range    Magnesium 1.5 (L) 1.7 - 2.3 mg/dL   Echocardiogram Complete - For age > 60 yrs    Collection Time: 11/07/22 11:45 AM   Result Value Ref Range    LVEF  60-65%    Glucose by meter    Collection Time: 11/07/22 12:16 PM   Result Value Ref Range    GLUCOSE BY METER POCT 107 (H) 70 - 99 mg/dL   Potassium    Collection Time: 11/07/22  4:22 PM    Result Value Ref Range    Potassium 3.8 3.4 - 5.3 mmol/L   Glucose by meter    Collection Time: 11/07/22  5:39 PM   Result Value Ref Range    GLUCOSE BY METER POCT 117 (H) 70 - 99 mg/dL   Glucose by meter    Collection Time: 11/07/22 10:05 PM   Result Value Ref Range    GLUCOSE BY METER POCT 101 (H) 70 - 99 mg/dL   Glucose by meter    Collection Time: 11/08/22  2:18 AM   Result Value Ref Range    GLUCOSE BY METER POCT 104 (H) 70 - 99 mg/dL   Magnesium    Collection Time: 11/08/22  4:26 AM   Result Value Ref Range    Magnesium 1.8 1.7 - 2.3 mg/dL   Basic metabolic panel    Collection Time: 11/08/22  4:26 AM   Result Value Ref Range    Sodium 138 136 - 145 mmol/L    Potassium 3.5 3.4 - 5.3 mmol/L    Chloride 102 98 - 107 mmol/L    Carbon Dioxide (CO2) 23 22 - 29 mmol/L    Anion Gap 13 7 - 15 mmol/L    Urea Nitrogen 12.3 8.0 - 23.0 mg/dL    Creatinine 0.49 (L) 0.51 - 0.95 mg/dL    Calcium 9.1 8.2 - 9.6 mg/dL    Glucose 99 70 - 99 mg/dL    GFR Estimate 88 >60 mL/min/1.73m2   CBC with platelets    Collection Time: 11/08/22  4:26 AM   Result Value Ref Range    WBC Count 10.0 4.0 - 11.0 10e3/uL    RBC Count 4.16 3.80 - 5.20 10e6/uL    Hemoglobin 12.6 11.7 - 15.7 g/dL    Hematocrit 38.3 35.0 - 47.0 %    MCV 92 78 - 100 fL    MCH 30.3 26.5 - 33.0 pg    MCHC 32.9 31.5 - 36.5 g/dL    RDW 13.1 10.0 - 15.0 %    Platelet Count 251 150 - 450 10e3/uL   Phosphorus    Collection Time: 11/08/22  4:26 AM   Result Value Ref Range    Phosphorus 2.8 2.5 - 4.5 mg/dL   Glucose by meter    Collection Time: 11/08/22  7:58 AM   Result Value Ref Range    GLUCOSE BY METER POCT 108 (H) 70 - 99 mg/dL   Glucose by meter    Collection Time: 11/08/22 12:27 PM   Result Value Ref Range    GLUCOSE BY METER POCT 106 (H) 70 - 99 mg/dL   Glucose by meter    Collection Time: 11/08/22  5:18 PM   Result Value Ref Range    GLUCOSE BY METER POCT 130 (H) 70 - 99 mg/dL   Glucose by meter    Collection Time: 11/08/22  9:42 PM   Result Value Ref Range    GLUCOSE  BY METER POCT 127 (H) 70 - 99 mg/dL   Glucose by meter    Collection Time: 11/09/22  1:35 AM   Result Value Ref Range    GLUCOSE BY METER POCT 104 (H) 70 - 99 mg/dL   Glucose by meter    Collection Time: 11/09/22  7:49 AM   Result Value Ref Range    GLUCOSE BY METER POCT 101 (H) 70 - 99 mg/dL   Basic metabolic panel    Collection Time: 11/09/22 10:17 AM   Result Value Ref Range    Sodium 135 (L) 136 - 145 mmol/L    Potassium 3.7 3.4 - 5.3 mmol/L    Chloride 102 98 - 107 mmol/L    Carbon Dioxide (CO2) 23 22 - 29 mmol/L    Anion Gap 10 7 - 15 mmol/L    Urea Nitrogen 16.6 8.0 - 23.0 mg/dL    Creatinine 0.49 (L) 0.51 - 0.95 mg/dL    Calcium 9.8 (H) 8.2 - 9.6 mg/dL    Glucose 99 70 - 99 mg/dL    GFR Estimate 88 >60 mL/min/1.73m2   CBC with platelets    Collection Time: 11/09/22 10:17 AM   Result Value Ref Range    WBC Count 6.0 4.0 - 11.0 10e3/uL    RBC Count 4.26 3.80 - 5.20 10e6/uL    Hemoglobin 13.1 11.7 - 15.7 g/dL    Hematocrit 39.6 35.0 - 47.0 %    MCV 93 78 - 100 fL    MCH 30.8 26.5 - 33.0 pg    MCHC 33.1 31.5 - 36.5 g/dL    RDW 13.4 10.0 - 15.0 %    Platelet Count 259 150 - 450 10e3/uL   Magnesium    Collection Time: 11/09/22 10:17 AM   Result Value Ref Range    Magnesium 1.7 1.7 - 2.3 mg/dL   Phosphorus    Collection Time: 11/09/22 10:17 AM   Result Value Ref Range    Phosphorus 3.5 2.5 - 4.5 mg/dL   Basic metabolic panel    Collection Time: 11/14/22  1:55 PM   Result Value Ref Range    Sodium 138 136 - 145 mmol/L    Potassium 3.6 3.4 - 5.3 mmol/L    Chloride 100 98 - 107 mmol/L    Carbon Dioxide (CO2) 25 22 - 29 mmol/L    Anion Gap 13 7 - 15 mmol/L    Urea Nitrogen 24.9 (H) 8.0 - 23.0 mg/dL    Creatinine 0.55 0.51 - 0.95 mg/dL    Calcium 9.9 (H) 8.2 - 9.6 mg/dL    Glucose 114 (H) 70 - 99 mg/dL    GFR Estimate 86 >60 mL/min/1.73m2      Last Comprehensive Metabolic Panel:  Sodium   Date Value Ref Range Status   11/14/2022 138 136 - 145 mmol/L Final   01/30/2019 140 133 - 144 mmol/L Final     Potassium   Date  Value Ref Range Status   11/14/2022 3.6 3.4 - 5.3 mmol/L Final   11/05/2022 3.5 3.4 - 5.3 mmol/L Final   01/30/2019 4.0 3.4 - 5.3 mmol/L Final     Chloride   Date Value Ref Range Status   11/14/2022 100 98 - 107 mmol/L Final   11/05/2022 99 94 - 109 mmol/L Final   01/30/2019 104 94 - 109 mmol/L Final     Carbon Dioxide   Date Value Ref Range Status   01/30/2019 30 20 - 32 mmol/L Final     Carbon Dioxide (CO2)   Date Value Ref Range Status   11/14/2022 25 22 - 29 mmol/L Final   11/05/2022 26 20 - 32 mmol/L Final     Anion Gap   Date Value Ref Range Status   11/14/2022 13 7 - 15 mmol/L Final   11/05/2022 15 (H) 3 - 14 mmol/L Final   01/30/2019 6 3 - 14 mmol/L Final     Glucose   Date Value Ref Range Status   11/14/2022 114 (H) 70 - 99 mg/dL Final   11/05/2022 102 (H) 70 - 99 mg/dL Final   01/30/2019 129 (H) 70 - 99 mg/dL Final     GLUCOSE BY METER POCT   Date Value Ref Range Status   11/09/2022 101 (H) 70 - 99 mg/dL Final     Urea Nitrogen   Date Value Ref Range Status   11/14/2022 24.9 (H) 8.0 - 23.0 mg/dL Final   11/05/2022 19 7 - 30 mg/dL Final   07/13/2020 16 7 - 30 mg/dL Final     Creatinine   Date Value Ref Range Status   11/14/2022 0.55 0.51 - 0.95 mg/dL Final   07/13/2020 0.67 0.52 - 1.04 mg/dL Final     GFR Estimate   Date Value Ref Range Status   11/14/2022 86 >60 mL/min/1.73m2 Final     Comment:     Effective December 21, 2021 eGFRcr in adults is calculated using the 2021 CKD-EPI creatinine equation which includes age and gender (Montse regalado al., NEJM, DOI: 10.1056/YILFub3188100)   07/13/2020 77 >60 mL/min/[1.73_m2] Final     Comment:     Non  GFR Calc  Starting 12/18/2018, serum creatinine based estimated GFR (eGFR) will be   calculated using the Chronic Kidney Disease Epidemiology Collaboration   (CKD-EPI) equation.       Calcium   Date Value Ref Range Status   11/14/2022 9.9 (H) 8.2 - 9.6 mg/dL Final   07/13/2020 8.8 8.5 - 10.1 mg/dL Final     Lab Results   Component Value Date    WBC 6.0  11/09/2022    WBC 7.2 01/09/2018     Lab Results   Component Value Date    RBC 4.26 11/09/2022    RBC 4.04 01/09/2018     Lab Results   Component Value Date    HGB 13.1 11/09/2022    HGB 13.0 06/20/2018     Lab Results   Component Value Date    HCT 39.6 11/09/2022    HCT 43.1 06/20/2018     Lab Results   Component Value Date    MCV 93 11/09/2022    MCV 96.0 06/20/2018     Lab Results   Component Value Date    MCH 30.8 11/09/2022    MCH 29.0 06/20/2018     Lab Results   Component Value Date    MCHC 33.1 11/09/2022    MCHC 30.2 06/20/2018     Lab Results   Component Value Date    RDW 13.4 11/09/2022    RDW 13.3 01/09/2018     Lab Results   Component Value Date     11/09/2022     01/09/2018       ASSESSMENT/Plan:      ICD-10-CM    1. 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 Cognitive impairment  R41.89       3. Primary osteoarthritis involving multiple joints  M15.9       4. Recurrent falls  R29.6       5. History of pelvic fracture due to fall  Z87.81       6. History of rib fracture due to fall  Z87.81       7. Essential hypertension  I10       8. Rectal prolapse  K62.3       9. Constipation due to outlet dysfunction  K59.02       10. Senile osteoporosis  M81.0           CHANGES:  SLP to eval and treat for cognitive issues.    CARE PLAN:  The care plan has been reviewed and all orders signed.  Changes to care plan, if any, as noted.  Otherwise, continue current plan of care. Total time spent in this encounter was 39 minutes, with greater than 50 % spent in counseling and coordination of care involving a review of her recent hospital discharge summary, becoming acquainted with recent/progressive changes, and consulting with therapies.    The above has been created using voice recognition software. Please be aware that this may unintentionally  produce inaccuracies and/or nonsensical  sentences.      Electronically signed by: SUSIE Nieves CNP

## 2022-11-21 ENCOUNTER — TRANSITIONAL CARE UNIT VISIT (OUTPATIENT)
Dept: GERIATRICS | Facility: CLINIC | Age: 87
End: 2022-11-21
Payer: COMMERCIAL

## 2022-11-21 VITALS
DIASTOLIC BLOOD PRESSURE: 60 MMHG | TEMPERATURE: 97.3 F | RESPIRATION RATE: 20 BRPM | HEIGHT: 58 IN | SYSTOLIC BLOOD PRESSURE: 139 MMHG | BODY MASS INDEX: 20.32 KG/M2 | WEIGHT: 96.8 LBS | HEART RATE: 82 BPM | OXYGEN SATURATION: 92 %

## 2022-11-21 DIAGNOSIS — M81.0 AGE-RELATED OSTEOPOROSIS WITHOUT CURRENT PATHOLOGICAL FRACTURE: ICD-10-CM

## 2022-11-21 DIAGNOSIS — N39.46 URINARY INCONTINENCE, MIXED: ICD-10-CM

## 2022-11-21 DIAGNOSIS — I63.9 OCCIPITAL STROKE (H): ICD-10-CM

## 2022-11-21 DIAGNOSIS — M15.0 PRIMARY OSTEOARTHRITIS INVOLVING MULTIPLE JOINTS: ICD-10-CM

## 2022-11-21 DIAGNOSIS — M41.9 KYPHOSCOLIOSIS: ICD-10-CM

## 2022-11-21 DIAGNOSIS — R41.89 COGNITIVE IMPAIRMENT: ICD-10-CM

## 2022-11-21 DIAGNOSIS — K59.02 CONSTIPATION DUE TO OUTLET DYSFUNCTION: ICD-10-CM

## 2022-11-21 DIAGNOSIS — R53.81 PHYSICAL DECONDITIONING: ICD-10-CM

## 2022-11-21 DIAGNOSIS — R29.6 RECURRENT FALLS: Primary | ICD-10-CM

## 2022-11-21 PROCEDURE — 99309 SBSQ NF CARE MODERATE MDM 30: CPT | Performed by: FAMILY MEDICINE

## 2022-11-21 NOTE — LETTER
"    11/21/2022        RE: Belia Sheets  [address]  [address]  [facility] [address]        [facility] GERIATRICS    Chief Complaint   Patient presents with     RECHECK     HPI:  Belia Sheets is a 89 year old  (5/16/1930),  previously living at [facility], with pmhx including falls, osteoporosis, cognitive impairment, and HTN who is being seen today for an episodic care visit at: [facility] ([facility]) [[id number]].     Per prior admission note:  \"Hospital course  Per her daughters, she had been having worsening of her cognition with \"different confusion\" than her baseline.  Her confusion has been worsening and day prior to admission.  Notably she had been having more difficulty with trains of thought, wandering in the months prior, but this had been worsening in the days prior to admission with falling asleep at different places in her facility and riding the elevators without knowing where she was going.  Further review of this noted that she had asked her daughter to call in the  for her children's party and was described as experiencing \"a different reality\".  No other significant symptoms noted including abdominal pain, dysuria hematuria, fevers chills, cough or shortness of breath.     Work-up in the ED notable for negative COVID-19 PCR, EKG with sinus rhythm and nonspecific T wave abnormality similar to prior.  No leukocytosis.  Normal vitals.  CT of the head without acute intracranial pathology, noted generalized reveal volume loss and evidence of chronic small vessel disease.  CMP overall normal with mild hypokalemia.  Low procalcitonin, troponins mildly elevated but stable.  Neurology was consulted and recommended EEG and MRI.  MRI showed acute ischemic stroke of the left occipital lobe of unclear etiology but suspected to be ESUS.  EEG was performed without evidence of seizures, did show evidence of mild to moderate diffuse encephalopathy.  Evaluation with A1c of 5.6% " "and .  She was started on 20 mg atorvastatin and aspirin and Plavix for 21 days, which point recommendation to continue on daily aspirin (started 11/28/2022).  She continues on 2.5 mg amlodipine.  Due to concern for ESUS, she was discharged with a Zio patch to evaluate for atrial fibrillation.\"    Overall she is doing well with no concerns today.  She is seen on her own today.  Did try to contact her daughters with no response.  She is feeling well.  She does not recall having a stroke.  She does believe she has been working with therapies and this is going well.  She does use a four-wheel walker and has been able to ambulate about 200 feet.  Her appetite is good, today she did say she has to chew for very long time.  Her pain is well controlled on Tylenol.  She does need assistance to the bathroom.  Denies any dysuria or hematuria.  No diarrhea or constipation.    Notably, she did have a fall on 11/17 with no concerning findings.  Per care conference recently she scored 13/20 on short Blessed consistent with moderate cognitive impairments.      Allergies, and PMH/PSH reviewed in EPIC today.  REVIEW OF SYSTEMS:  4 point ROS including Respiratory, CV, GI and , other than that noted in the HPI,  is negative    Objective:   /60   Pulse 82   Temp 97.3  F (36.3  C)   Resp 20   Ht 1.473 m (4' 10\")   Wt 43.9 kg (96 lb 12.8 oz)   SpO2 92%   BMI 20.23 kg/m    GENERAL APPEARANCE: Laying in bed comfortably, NAD  HENT:  Moderate temporal atrophy, moderately [place], good dentition  EYES:  Conjunctiva clear, anicteric, EOMI, PERRL  PULM  Normal WOB on RA, lungs CTAB, no wheezes or crackles  CV:  RRR, S1/S2 normal, no murmurs; no LE edema  ABDOMEN: Abdomen soft, not tender, not distended, BS normal and active throughout   M/S:   Kyphosis, significant arthritic changes of bilateral hands with deformities and nodules  NEURO: Alert, disoriented, poor memory; delayed thought processes. Interaction clearer than prior " visit. ; CN II-XII grossly intact, decreased bulk throughout  PSYCH:  Mood normal with bright affect    Recent labs in EPIC reviewed by me today.     Assessment/Plan:  (R29.6) Recurrent falls  (primary encounter diagnosis)  Comment: Did have a fall in the facility 11/17.  Significant history of falls likely multifactorial related to cognitive impairment, malnutrition, visual difficulties, and overall deconditioning.  Does ambulate well with a four-wheel walker.  Plan:   -PT/OT    (R41.89) Cognitive impairment  Comment: Progressive cognitive impairment over number months, though likely longer, most likely represent a neurodegenerative process.  Given age, suggest Alzheimer dementia.  Also consider vascular dementia given stroke and other cerebrovascular disease.  Likely multifactorial.  Short Blessed year are consistent with significant cognitive impairment.  Plan:   -SLP for cognitive evaluation previously ordered  -Supportive cares in the facility    (I63.9) Occipital stroke (H)  Comment: Etiology unclear, concern for ESUS.  Suspect contributing to cognitive changes as above.  Was seen by neurology during her hospital admission with antiplatelet and statin started.  Currently wearing a Zio patch.  Plan:   -Continue aspirin and Plavix for 11/28/2022, then 81 mg aspirin daily  -Continue atorvastatin 20 mg daily    (R53.81) Physical deconditioning  Comment: Related to stroke and deconditioning in addition to underlying cognitive impairment and evidence of malnutrition.  Plan:   -PT/OT  -RD eval and treat    (M81.0) Age-related osteoporosis without current pathological fracture  (M41.9) Kyphoscoliosis  Comment: Significant history of osteoporosis and was previously on Fosamax for many years.  Continues off at this time.  Plan:   -Continue calcium/vitamin D  - management of fall risk    (M15.9) Primary osteoarthritis involving multiple joints  Comment: Pain has been well managed with Tylenol, ibuprofen being held while  on DAPT.    (K59.02) Constipation due to outlet dysfunction  Comment: Constipated at times.  She does have a history of rectal prolapse that underwent surgical repair.  Continue MiraLAX as needed    (N39.46) Urinary incontinence, mixed  Comment: Has described both urge and overflow incontinence.  Also with functional incontinence with difficulty getting to the bathroom.  Continues to required CGA for toileting    MED REC REQUIRED  Post Medication Reconciliation Status:  Medication reconciliation previously completed during another office visit    Orders:  -NNO    Electronically signed by:     Benjamin Rosenstein, MD, MA  [facility] [place] [facility]    This note was completed with the assistance of dictation software. Typos and word substitution-errors are expected and unintended.              Sincerely,        Benjamin Rosenstein, MD

## 2022-11-21 NOTE — PROGRESS NOTES
"[facility] GERIATRICS    Chief Complaint   Patient presents with     RECHECK     HPI:  Belia Sheets is a 89 year old  (5/16/1930),  previously living at [facility], with pmhx including falls, osteoporosis, cognitive impairment, and HTN who is being seen today for an episodic care visit at: [facility] ([facility]) [[id number]].     Per prior admission note:  \"Hospital course  Per her daughters, she had been having worsening of her cognition with \"different confusion\" than her baseline.  Her confusion has been worsening and day prior to admission.  Notably she had been having more difficulty with trains of thought, wandering in the months prior, but this had been worsening in the days prior to admission with falling asleep at different places in her facility and riding the elevators without knowing where she was going.  Further review of this noted that she had asked her daughter to call in the  for her children's party and was described as experiencing \"a different reality\".  No other significant symptoms noted including abdominal pain, dysuria hematuria, fevers chills, cough or shortness of breath.     Work-up in the ED notable for negative COVID-19 PCR, EKG with sinus rhythm and nonspecific T wave abnormality similar to prior.  No leukocytosis.  Normal vitals.  CT of the head without acute intracranial pathology, noted generalized reveal volume loss and evidence of chronic small vessel disease.  CMP overall normal with mild hypokalemia.  Low procalcitonin, troponins mildly elevated but stable.  Neurology was consulted and recommended EEG and MRI.  MRI showed acute ischemic stroke of the left occipital lobe of unclear etiology but suspected to be ESUS.  EEG was performed without evidence of seizures, did show evidence of mild to moderate diffuse encephalopathy.  Evaluation with A1c of 5.6% and .  She was started on 20 mg atorvastatin and aspirin and Plavix for 21 days, which point " "recommendation to continue on daily aspirin (started 11/28/2022).  She continues on 2.5 mg amlodipine.  Due to concern for ESUS, she was discharged with a Zio patch to evaluate for atrial fibrillation.\"    Overall she is doing well with no concerns today.  She is seen on her own today.  Did try to contact her daughters with no response.  She is feeling well.  She does not recall having a stroke.  She does believe she has been working with therapies and this is going well.  She does use a four-wheel walker and has been able to ambulate about 200 feet.  Her appetite is good, today she did say she has to chew for very long time.  Her pain is well controlled on Tylenol.  She does need assistance to the bathroom.  Denies any dysuria or hematuria.  No diarrhea or constipation.    Notably, she did have a fall on 11/17 with no concerning findings.  Per care conference recently she scored 13/20 on short Blessed consistent with moderate cognitive impairments.      Allergies, and PMH/PSH reviewed in EPIC today.  REVIEW OF SYSTEMS:  4 point ROS including Respiratory, CV, GI and , other than that noted in the HPI,  is negative    Objective:   /60   Pulse 82   Temp 97.3  F (36.3  C)   Resp 20   Ht 1.473 m (4' 10\")   Wt 43.9 kg (96 lb 12.8 oz)   SpO2 92%   BMI 20.23 kg/m    GENERAL APPEARANCE: Laying in bed comfortably, NAD  HENT:  Moderate temporal atrophy, moderately [place], good dentition  EYES:  Conjunctiva clear, anicteric, EOMI, PERRL  PULM  Normal WOB on RA, lungs CTAB, no wheezes or crackles  CV:  RRR, S1/S2 normal, no murmurs; no LE edema  ABDOMEN: Abdomen soft, not tender, not distended, BS normal and active throughout   M/S:   Kyphosis, significant arthritic changes of bilateral hands with deformities and nodules  NEURO: Alert, disoriented, poor memory; delayed thought processes. Interaction clearer than prior visit. ; CN II-XII grossly intact, decreased bulk throughout  PSYCH:  Mood normal with bright " affect    Recent labs in [facility] reviewed by me today.     Assessment/Plan:  (R29.6) Recurrent falls  (primary encounter diagnosis)  Comment: Did have a fall in the facility 11/17.  Significant history of falls likely multifactorial related to cognitive impairment, malnutrition, visual difficulties, and overall deconditioning.  Does ambulate well with a four-wheel walker.  Plan:   -PT/OT    (R41.89) Cognitive impairment  Comment: Progressive cognitive impairment over number months, though likely longer, most likely represent a neurodegenerative process.  Given age, suggest Alzheimer dementia.  Also consider vascular dementia given stroke and other cerebrovascular disease.  Likely multifactorial.  Short Blessed year are consistent with significant cognitive impairment.  Plan:   -SLP for cognitive evaluation previously ordered  -Supportive cares in the facility    (I63.9) Occipital stroke (H)  Comment: Etiology unclear, concern for ESUS.  Suspect contributing to cognitive changes as above.  Was seen by neurology during her hospital admission with antiplatelet and statin started.  Currently wearing a Zio patch.  Plan:   -Continue aspirin and Plavix for 11/28/2022, then 81 mg aspirin daily  -Continue atorvastatin 20 mg daily    (R53.81) Physical deconditioning  Comment: Related to stroke and deconditioning in addition to underlying cognitive impairment and evidence of malnutrition.  Plan:   -PT/OT  -RD eval and treat    (M81.0) Age-related osteoporosis without current pathological fracture  (M41.9) Kyphoscoliosis  Comment: Significant history of osteoporosis and was previously on Fosamax for many years.  Continues off at this time.  Plan:   -Continue calcium/vitamin D  - management of fall risk    (M15.9) Primary osteoarthritis involving multiple joints  Comment: Pain has been well managed with Tylenol, ibuprofen being held while on DAPT.    (K59.02) Constipation due to outlet dysfunction  Comment: Constipated at times.   She does have a history of rectal prolapse that underwent surgical repair.  Continue MiraLAX as needed    (N39.46) Urinary incontinence, mixed  Comment: Has described both urge and overflow incontinence.  Also with functional incontinence with difficulty getting to the bathroom.  Continues to required CGA for toileting    MED REC REQUIRED  Post Medication Reconciliation Status:  Medication reconciliation previously completed during another office visit    Orders:  -NNO    Electronically signed by:     Benjamin Rosenstein, MD, MA  [facility] [place]    This note was completed with the assistance of dictation software. Typos and word substitution-errors are expected and unintended.

## 2022-11-28 ENCOUNTER — DISCHARGE SUMMARY NURSING HOME (OUTPATIENT)
Dept: GERIATRICS | Facility: CLINIC | Age: 87
End: 2022-11-28
Payer: COMMERCIAL

## 2022-11-28 VITALS
HEIGHT: 58 IN | WEIGHT: 99.2 LBS | BODY MASS INDEX: 20.82 KG/M2 | DIASTOLIC BLOOD PRESSURE: 99 MMHG | HEART RATE: 67 BPM | OXYGEN SATURATION: 94 % | SYSTOLIC BLOOD PRESSURE: 153 MMHG | RESPIRATION RATE: 18 BRPM | TEMPERATURE: 97.7 F

## 2022-11-28 DIAGNOSIS — I10 ESSENTIAL HYPERTENSION: ICD-10-CM

## 2022-11-28 DIAGNOSIS — M15.0 PRIMARY OSTEOARTHRITIS INVOLVING MULTIPLE JOINTS: ICD-10-CM

## 2022-11-28 DIAGNOSIS — K62.3 RECTAL PROLAPSE: ICD-10-CM

## 2022-11-28 DIAGNOSIS — Z87.81 HISTORY OF FRACTURE OF RIB: ICD-10-CM

## 2022-11-28 DIAGNOSIS — M81.0 SENILE OSTEOPOROSIS: ICD-10-CM

## 2022-11-28 DIAGNOSIS — K59.02 CONSTIPATION DUE TO OUTLET DYSFUNCTION: ICD-10-CM

## 2022-11-28 DIAGNOSIS — Z86.73 HISTORY OF CVA (CEREBROVASCULAR ACCIDENT): Primary | ICD-10-CM

## 2022-11-28 DIAGNOSIS — R41.89 COGNITIVE IMPAIRMENT: ICD-10-CM

## 2022-11-28 DIAGNOSIS — R29.6 RECURRENT FALLS: ICD-10-CM

## 2022-11-28 DIAGNOSIS — Z87.81 HISTORY OF PELVIC FRACTURE: ICD-10-CM

## 2022-11-28 PROCEDURE — 99316 NF DSCHRG MGMT 30 MIN+: CPT | Performed by: NURSE PRACTITIONER

## 2022-11-28 NOTE — LETTER
11/28/2022        RE: Belai Sheets  [address]  [address]  [facility] [address]        No notes on file      Sincerely,        Fredy Parkinson, SUSIE CNP

## 2022-11-28 NOTE — LETTER
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  She had just undergone rehab in the TCU here a few months prior after falling and sustaining a right ninth rib fracture.    She fell again on 2021 and was taken by EMS to [facility] ED.  Imaging revealed right superior and inferior pubic rami fractures.  Evaluated by orthopedics with fractures to be managed conservatively, utilizing pain control and guided weightbearing recommendations.  She can bear weight as tolerated with a walker for safety.  Orthopedic surgery did recommend follow-up in 3 to 4 weeks with nonoperative orthopedics provider.  This should include AP x-rays of the pelvis.  She may be seen by her PCP if the PCP  "feels comfortable.      She was admitted to [facility] on 11/05/2022 with an acute ischemic stroke of the left occipital lobe within watershed region due to embolic stroke of undetermined source (ESUS).  No abnormalities were noted on MRI.  It was suggested that he suffered from acute on chronic encephalopathy.  Echo showed no cardiac source of the embolus.  EKG and cardiac monitoring showed sinus rhythm with PACs and a nonspecific T wave abnormality but no evidence of atrial fibrillation.  She does have some risk factors for stroke, including elevated LDL (111) and hypertension.    According to Lubna, her daughter, her mother was experiencing a \"different confusion\" compared to baseline.  Difficulty following train of thought since March or April 2022.    History of mitral injury,      CURRENT/RECENT TCU ISSUES    Disposition: Impaired cognition is not readily apparent initially; however, confusion and short-term memory difficulties become clearer.  When asked, she could tell me neither the year nor month. SLP did work with her during her last stay here, and it was felt she required quite a bit of support.    Rectal prolapse and bowel management: She has occasionally complained of rectal pain.  She does have a history of rectal prolapse, and she stated that it \"hurts trying to push out small pieces of stool.\"  This is not new for her and apparently comes and goes.  She stated that she had not been bothered with this in the previous 18 to 24 hours.  She has undergone surgery within the last 5 years.  When last seen, she told me that her stools were \"comfortably soft.\"    Pain management:  Her personal preference is to not take narcotics if at all possible. She denies any pain currently.    Cognitive impairment: During an earlier stay, there were at least mild cognitive deficits noted, and this was corroborated by the patient's son who also endorsed his mother's memory loss, although she can appear, for the most part, to be " quite cogent.  SLP did work with her in the past, and it was felt that she needed quite a bit of support.   On the SLUMS, she did rather poorly on the clock test, making a pie and then adding numbers but no hands.  She scored 2/17 on the storytelling, 10/17 on orientation.  She scored 23/30 on the MMSE.  Her daughter apparently believed cognitive impairment to be an acute issue.  However, the patient told me that she felt she had trouble with her thinking 2-3 years earlier but that she was feeling much clearer at the time.  Cognition may wax and wane and may be multifactorial in nature.  Soon after this admission, I requested evaluation and treatment by SLP, not only for her cognitive issues but for complaints of swallowing difficulty.  There was no need to change her diet.  I am aware that they worked together at least twice during this stay (and probably longer).  Interestingly, she could remember Rosey, the SLP's name.    Osteoarthritis: Rather severe in the hands with noted deformities of various joints.  Current pain management appears to be working well.    Discharge planning: Discharging to [facility] on 12/01/2022.  In-home services to be provided include PT and OT.    ROS:  See above for any positives.  Otherwise, no headaches or chest pains, coughing or congestion, nausea or vomiting, dyspnea, dysuria, diplopia, constipation or diarrhea, difficulty chewing or swallowing, integumentary issues, or problems with appetite or sleep      Past Medical History:   Diagnosis Date     Carpal tunnel syndrome (aka CTS)      Cataract      Chronic osteoarthritis      Constipation due to outlet dysfunction 9/22/2021     Fracture of multiple rami of right pubis with routine healing, subsequent encounter 9/22/2021     H/O calcium pyrophosphate deposition disease (CPPD)      History of 9th right rib fracture due to fall (07/13/2021) 9/22/2021     History of encephalopathy 10/2017     Hypertension      Kyphoscoliosis       Osteoarthritis     hands     Osteoporosis      Rectal prolapse               Family History   Problem Relation Age of Onset     Depression/Anxiety Son      Depression/Anxiety Son         alcohol abuse  age 24     Hypertension Mother      Hypertension Brother      Diabetes No family hx of      Breast Cancer No family hx of      Colon Cancer No family hx of      Prostate Cancer No family hx of      Other Cancer No family hx of      Social History     Socioeconomic History     Marital status:      Spouse name: Not on file     Number of children: Not on file     Years of education: Not on file     Highest education level: Not on file   Occupational History     Not on file   Tobacco Use     Smoking status: Never Smoker     Smokeless tobacco: Never Used   Substance and Sexual Activity     Alcohol use: No     Drug use: No     Sexual activity: Never   Other Topics Concern     Not on file   Social History Narrative     Not on file     Social Determinants of Health     Financial Resource Strain:      Difficulty of Paying Living Expenses:    Food Insecurity:      Worried About Running Out of Food in the Last Year:      Ran Out of Food in the Last Year:    Transportation Needs:      Lack of Transportation (Medical):      Lack of Transportation (Non-Medical):    Physical Activity:      Days of Exercise per Week:      Minutes of Exercise per Session:    Stress:      Feeling of Stress :    Social Connections:      Frequency of Communication with Friends and Family:      Frequency of Social Gatherings with Friends and Family:      Attends [employer] Services:      Active Member of Clubs or Organizations:      Attends Club or Organization Meetings:      Marital Status:    Intimate Partner Violence:      Fear of Current or Ex-Partner:      Emotionally Abused:      Physically Abused:      Sexually Abused:        MEDICATIONS: Reviewed from the MAR, physician orders, and/or earlier progress notes.  Post Medication  "Reconciliation Status: medication reconcilation previously completed during another office visit    Current Outpatient Medications   Medication Sig     acetaminophen (TYLENOL) 500 MG tablet Take 1,000 mg by mouth 3 times daily as needed for mild pain     amLODIPine (NORVASC) 2.5 MG tablet Take 1 tablet (2.5 mg) by mouth daily     aspirin (ASA) 81 MG chewable tablet 1 tablet (81 mg) by Oral or Feeding Tube route daily     atorvastatin (LIPITOR) 20 MG tablet 1 tablet (20 mg) by Oral or Feeding Tube route every evening     calcium carbonate 600 mg-vitamin D 400 units (CALTRATE) 600-400 MG-UNIT per tablet Take 1 tablet by mouth 2 times daily (before meals)     clopidogrel (PLAVIX) 75 MG tablet Take 1 tablet (75 mg) by mouth daily     cyanocobalamin (VITAMIN B-12) 500 MCG tablet Take 500 mcg by mouth daily     ibuprofen (ADVIL/MOTRIN) 200 MG tablet Take 600 mg by mouth 3 times daily as needed for pain     order for DME Equipment being ordered: Incentive spirometer     order for DME Equipment being ordered: Home BP monitor and cuff     No current facility-administered medications for this visit.     ALLERGIES: No Known Allergies    DIET: Regular, regular texture, thin liquids.  Mighty Shake.    Vitals:    11/28/22 1342   BP: (!) 153/99   Pulse: 67   Resp: 18   Temp: 97.7  F (36.5  C)   SpO2: 94%   Weight: 45 kg (99 lb 3.2 oz)   Height: 1.473 m (4' 10\")     Body mass index is 20.73 kg/m .    EXAMINATION:   General: Pleasant, alert, and conversant elderly female, up in a recliner, in NAD.   Head: Normocephalic and atraumatic.   Eyes: PERRLA, sclerae clear.   ENT: Moist oral mucosa.  Has several of her own teeth, but missing all lower teeth on the right as well as several upper teeth.  No nasal discharge.  Hearing seems unimpaired or is at least adequate for conversation in a quiet room.  Cardiovascular: RRR with distinct split sounds.   Respiratory: Lungs CTAB.   Abdomen: Nondistended.   Musculoskeletal/Extremities: " Age-related DJD and moderate to severe kyphosis.  Hands and fingers show significant deformities due to OA.  Right fifth trigger finger.  Bilateral hallux valgus deformities with partial great toe overlap.  No peripheral edema.   Integument: No rashes, clinically significant lesions, or skin breakdown.   Cognitive/Psychiatric: Mild to moderate cognitive impairment.  Affect is euthymic.    DIAGNOSTICS:   No results found for this or any previous visit (from the past 240 hour(s)).     Last Comprehensive Metabolic Panel:  Sodium   Date Value Ref Range Status   11/14/2022 138 136 - 145 mmol/L Final   01/30/2019 140 133 - 144 mmol/L Final     Potassium   Date Value Ref Range Status   11/14/2022 3.6 3.4 - 5.3 mmol/L Final   11/05/2022 3.5 3.4 - 5.3 mmol/L Final   01/30/2019 4.0 3.4 - 5.3 mmol/L Final     Chloride   Date Value Ref Range Status   11/14/2022 100 98 - 107 mmol/L Final   11/05/2022 99 94 - 109 mmol/L Final   01/30/2019 104 94 - 109 mmol/L Final     Carbon Dioxide   Date Value Ref Range Status   01/30/2019 30 20 - 32 mmol/L Final     Carbon Dioxide (CO2)   Date Value Ref Range Status   11/14/2022 25 22 - 29 mmol/L Final   11/05/2022 26 20 - 32 mmol/L Final     Anion Gap   Date Value Ref Range Status   11/14/2022 13 7 - 15 mmol/L Final   11/05/2022 15 (H) 3 - 14 mmol/L Final   01/30/2019 6 3 - 14 mmol/L Final     Glucose   Date Value Ref Range Status   11/14/2022 114 (H) 70 - 99 mg/dL Final   11/05/2022 102 (H) 70 - 99 mg/dL Final   01/30/2019 129 (H) 70 - 99 mg/dL Final     GLUCOSE BY METER POCT   Date Value Ref Range Status   11/09/2022 101 (H) 70 - 99 mg/dL Final     Urea Nitrogen   Date Value Ref Range Status   11/14/2022 24.9 (H) 8.0 - 23.0 mg/dL Final   11/05/2022 19 7 - 30 mg/dL Final   07/13/2020 16 7 - 30 mg/dL Final     Creatinine   Date Value Ref Range Status   11/14/2022 0.55 0.51 - 0.95 mg/dL Final   07/13/2020 0.67 0.52 - 1.04 mg/dL Final     GFR Estimate   Date Value Ref Range Status   11/14/2022  86 >60 mL/min/1.73m2 Final     Comment:     Effective December 21, 2021 eGFRcr in adults is calculated using the 2021 CKD-EPI creatinine equation which includes age and gender (Montse et al., [place], DOI: 10.1056/LGUMuc0178496)   07/13/2020 77 >60 mL/min/[1.73_m2] Final     Comment:     Non  GFR Calc  Starting 12/18/2018, serum creatinine based estimated GFR (eGFR) will be   calculated using the Chronic Kidney Disease Epidemiology Collaboration   (CKD-EPI) equation.       Calcium   Date Value Ref Range Status   11/14/2022 9.9 (H) 8.2 - 9.6 mg/dL Final   07/13/2020 8.8 8.5 - 10.1 mg/dL Final     Lab Results   Component Value Date    WBC 6.0 11/09/2022    WBC 7.2 01/09/2018     Lab Results   Component Value Date    RBC 4.26 11/09/2022    RBC 4.04 01/09/2018     Lab Results   Component Value Date    HGB 13.1 11/09/2022    HGB 13.0 06/20/2018     Lab Results   Component Value Date    HCT 39.6 11/09/2022    HCT 43.1 06/20/2018     Lab Results   Component Value Date    MCV 93 11/09/2022    MCV 96.0 06/20/2018     Lab Results   Component Value Date    MCH 30.8 11/09/2022    MCH 29.0 06/20/2018     Lab Results   Component Value Date    MCHC 33.1 11/09/2022    MCHC 30.2 06/20/2018     Lab Results   Component Value Date    RDW 13.4 11/09/2022    RDW 13.3 01/09/2018     Lab Results   Component Value Date     11/09/2022     01/09/2018       ASSESSMENT/Plan:      ICD-10-CM    1. 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 Cognitive impairment  R41.89       3. Primary osteoarthritis involving multiple joints  M15.9       4. Recurrent falls  R29.6       5. History of pelvic fracture due to fall  Z87.81       6. History of rib fracture due to fall  Z87.81       7. Essential hypertension  I10       8. Rectal prolapse  K62.3       9. Constipation due to outlet dysfunction  K59.02       10. Senile osteoporosis   M81.0           DISCHARGE FACE TO FACE:  I certify that this patient is under my care and that I had a face-to-face encounter that meets the physician face-to-face encounter requirements with this patient.     Date of Face-to-Face Encounter: 11/28/2022     I certify that, based on my findings, the following services are medically necessary home health services:  Home PT, and home OT    My clinical findings support the need for the above skilled services because: (Please write a brief narrative summary that describes what the RN, PT, SLP, or other services will be doing in the home. A list of diagnoses in this section does not meet the CMS requirements):  Home OT for strengthening, ADL needs, adaptive equipment, and safety.    This patient is homebound because: (Please write a brief narrative summmary describing the functional limitations as to why this patient is homebound and specifically what makes this patient homebound.):  Above services necessarily need to be performed in the home to be of benefit.    The patient is, or has been, under my care and I have initiated the establishment of the plan of care. This patient will be followed by a physician who will periodically review the plan of care.  Initial follow-up should be within 7-10 days.    Approximate time spent with this patient was greater than 30 minutes with greater than 50% spent in discussions regarding services required upon discharge.      The above has been created using voice recognition software. Please be aware that this may unintentionally  produce inaccuracies and/or nonsensical sentences.      Electronically signed by: SUSIE Nieves CNP        Sincerely,        SUSIE Nieves CNP

## 2022-11-30 NOTE — PROGRESS NOTES
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  She had just undergone rehab in the TCU here a few months prior after falling and sustaining a right ninth rib fracture.    She fell again on 2021 and was taken by EMS to [facility] ED.  Imaging revealed right superior and inferior pubic rami fractures.  Evaluated by orthopedics with fractures to be managed conservatively, utilizing pain control and guided weightbearing recommendations.  She can bear weight as tolerated with a walker for safety.  Orthopedic surgery did recommend follow-up in 3 to 4 weeks with nonoperative orthopedics provider.  This should include AP x-rays of the pelvis.  She may be seen by her PCP if the PCP feels comfortable.      She was admitted to [facility] on 2022 with an acute ischemic stroke of the left  "occipital lobe within watershed region due to embolic stroke of undetermined source (ESUS).  No abnormalities were noted on MRI.  It was suggested that he suffered from acute on chronic encephalopathy.  Echo showed no cardiac source of the embolus.  EKG and cardiac monitoring showed sinus rhythm with PACs and a nonspecific T wave abnormality but no evidence of atrial fibrillation.  She does have some risk factors for stroke, including elevated LDL (111) and hypertension.    According to Lubna, her daughter, her mother was experiencing a \"different confusion\" compared to baseline.  Difficulty following train of thought since March or April 2022.    History of mitral injury,      CURRENT/RECENT TCU ISSUES    Disposition: Impaired cognition is not readily apparent initially; however, confusion and short-term memory difficulties become clearer.  When asked, she could tell me neither the year nor month. SLP did work with her during her last stay here, and it was felt she required quite a bit of support.    Rectal prolapse and bowel management: She has occasionally complained of rectal pain.  She does have a history of rectal prolapse, and she stated that it \"hurts trying to push out small pieces of stool.\"  This is not new for her and apparently comes and goes.  She stated that she had not been bothered with this in the previous 18 to 24 hours.  She has undergone surgery within the last 5 years.  When last seen, she told me that her stools were \"comfortably soft.\"    Pain management:  Her personal preference is to not take narcotics if at all possible. She denies any pain currently.    Cognitive impairment: During an earlier stay, there were at least mild cognitive deficits noted, and this was corroborated by the patient's son who also endorsed his mother's memory loss, although she can appear, for the most part, to be quite cogent.  SLP did work with her in the past, and it was felt that she needed quite a bit of support. "   On the SLUMS, she did rather poorly on the clock test, making a pie and then adding numbers but no hands.  She scored 2/17 on the storytelling, 10/17 on orientation.  She scored 23/30 on the MMSE.  Her daughter apparently believed cognitive impairment to be an acute issue.  However, the patient told me that she felt she had trouble with her thinking 2-3 years earlier but that she was feeling much clearer at the time.  Cognition may wax and wane and may be multifactorial in nature.  Soon after this admission, I requested evaluation and treatment by SLP, not only for her cognitive issues but for complaints of swallowing difficulty.  There was no need to change her diet.  I am aware that they worked together at least twice during this stay (and probably longer).  Interestingly, she could remember Rosey, the SLP's name.    Osteoarthritis: Rather severe in the hands with noted deformities of various joints.  Current pain management appears to be working well.    Discharge planning: Discharging to [facility] on 12/01/2022.  In-home services to be provided include PT and OT.    ROS:  See above for any positives.  Otherwise, no headaches or chest pains, coughing or congestion, nausea or vomiting, dyspnea, dysuria, diplopia, constipation or diarrhea, difficulty chewing or swallowing, integumentary issues, or problems with appetite or sleep      Past Medical History:   Diagnosis Date     Carpal tunnel syndrome (aka CTS)      Cataract      Chronic osteoarthritis      Constipation due to outlet dysfunction 9/22/2021     Fracture of multiple rami of right pubis with routine healing, subsequent encounter 9/22/2021     H/O calcium pyrophosphate deposition disease (CPPD)      History of 9th right rib fracture due to fall (07/13/2021) 9/22/2021     History of encephalopathy 10/2017     Hypertension      Kyphoscoliosis      Osteoarthritis     hands     Osteoporosis      Rectal prolapse               Family History   Problem Relation  Age of Onset     Depression/Anxiety Son      Depression/Anxiety Son         alcohol abuse  age 24     Hypertension Mother      Hypertension Brother      Diabetes No family hx of      Breast Cancer No family hx of      Colon Cancer No family hx of      Prostate Cancer No family hx of      Other Cancer No family hx of      Social History     Socioeconomic History     Marital status:      Spouse name: Not on file     Number of children: Not on file     Years of education: Not on file     Highest education level: Not on file   Occupational History     Not on file   Tobacco Use     Smoking status: Never Smoker     Smokeless tobacco: Never Used   Substance and Sexual Activity     Alcohol use: No     Drug use: No     Sexual activity: Never   Other Topics Concern     Not on file   Social History Narrative     Not on file     Social Determinants of Health     Financial Resource Strain:      Difficulty of Paying Living Expenses:    Food Insecurity:      Worried About Running Out of Food in the Last Year:      Ran Out of Food in the Last Year:    Transportation Needs:      Lack of Transportation (Medical):      Lack of Transportation (Non-Medical):    Physical Activity:      Days of Exercise per Week:      Minutes of Exercise per Session:    Stress:      Feeling of Stress :    Social Connections:      Frequency of Communication with Friends and Family:      Frequency of Social Gatherings with Friends and Family:      Attends Ten[employer] Services:      Active Member of Clubs or Organizations:      Attends Club or Organization Meetings:      Marital Status:    Intimate Partner Violence:      Fear of Current or Ex-Partner:      Emotionally Abused:      Physically Abused:      Sexually Abused:        MEDICATIONS: Reviewed from the MAR, physician orders, and/or earlier progress notes.  Post Medication Reconciliation Status: medication reconcilation previously completed during another office visit    Current Outpatient  "Medications   Medication Sig     acetaminophen (TYLENOL) 500 MG tablet Take 1,000 mg by mouth 3 times daily as needed for mild pain     amLODIPine (NORVASC) 2.5 MG tablet Take 1 tablet (2.5 mg) by mouth daily     aspirin (ASA) 81 MG chewable tablet 1 tablet (81 mg) by Oral or Feeding Tube route daily     atorvastatin (LIPITOR) 20 MG tablet 1 tablet (20 mg) by Oral or Feeding Tube route every evening     calcium carbonate 600 mg-vitamin D 400 units (CALTRATE) 600-400 MG-UNIT per tablet Take 1 tablet by mouth 2 times daily (before meals)     clopidogrel (PLAVIX) 75 MG tablet Take 1 tablet (75 mg) by mouth daily     cyanocobalamin (VITAMIN B-12) 500 MCG tablet Take 500 mcg by mouth daily     ibuprofen (ADVIL/MOTRIN) 200 MG tablet Take 600 mg by mouth 3 times daily as needed for pain     order for DME Equipment being ordered: Incentive spirometer     order for DME Equipment being ordered: Home BP monitor and cuff     No current facility-administered medications for this visit.     ALLERGIES: No Known Allergies    DIET: Regular, regular texture, thin liquids.  Mighty Shake.    Vitals:    11/28/22 1342   BP: (!) 153/99   Pulse: 67   Resp: 18   Temp: 97.7  F (36.5  C)   SpO2: 94%   Weight: 45 kg (99 lb 3.2 oz)   Height: 1.473 m (4' 10\")     Body mass index is 20.73 kg/m .    EXAMINATION:   General: Pleasant, alert, and conversant elderly female, up in a recliner, in NAD.   Head: Normocephalic and atraumatic.   Eyes: PERRLA, sclerae clear.   ENT: Moist oral mucosa.  Has several of her own teeth, but missing all lower teeth on the right as well as several upper teeth.  No nasal discharge.  Hearing seems unimpaired or is at least adequate for conversation in a quiet room.  Cardiovascular: RRR with distinct split sounds.   Respiratory: Lungs CTAB.   Abdomen: Nondistended.   Musculoskeletal/Extremities: Age-related DJD and moderate to severe kyphosis.  Hands and fingers show significant deformities due to OA.  Right fifth " trigger finger.  Bilateral hallux valgus deformities with partial great toe overlap.  No peripheral edema.   Integument: No rashes, clinically significant lesions, or skin breakdown.   Cognitive/Psychiatric: Mild to moderate cognitive impairment.  Affect is euthymic.    DIAGNOSTICS:   No results found for this or any previous visit (from the past 240 hour(s)).     Last Comprehensive Metabolic Panel:  Sodium   Date Value Ref Range Status   11/14/2022 138 136 - 145 mmol/L Final   01/30/2019 140 133 - 144 mmol/L Final     Potassium   Date Value Ref Range Status   11/14/2022 3.6 3.4 - 5.3 mmol/L Final   11/05/2022 3.5 3.4 - 5.3 mmol/L Final   01/30/2019 4.0 3.4 - 5.3 mmol/L Final     Chloride   Date Value Ref Range Status   11/14/2022 100 98 - 107 mmol/L Final   11/05/2022 99 94 - 109 mmol/L Final   01/30/2019 104 94 - 109 mmol/L Final     Carbon Dioxide   Date Value Ref Range Status   01/30/2019 30 20 - 32 mmol/L Final     Carbon Dioxide (CO2)   Date Value Ref Range Status   11/14/2022 25 22 - 29 mmol/L Final   11/05/2022 26 20 - 32 mmol/L Final     Anion Gap   Date Value Ref Range Status   11/14/2022 13 7 - 15 mmol/L Final   11/05/2022 15 (H) 3 - 14 mmol/L Final   01/30/2019 6 3 - 14 mmol/L Final     Glucose   Date Value Ref Range Status   11/14/2022 114 (H) 70 - 99 mg/dL Final   11/05/2022 102 (H) 70 - 99 mg/dL Final   01/30/2019 129 (H) 70 - 99 mg/dL Final     GLUCOSE BY METER POCT   Date Value Ref Range Status   11/09/2022 101 (H) 70 - 99 mg/dL Final     Urea Nitrogen   Date Value Ref Range Status   11/14/2022 24.9 (H) 8.0 - 23.0 mg/dL Final   11/05/2022 19 7 - 30 mg/dL Final   07/13/2020 16 7 - 30 mg/dL Final     Creatinine   Date Value Ref Range Status   11/14/2022 0.55 0.51 - 0.95 mg/dL Final   07/13/2020 0.67 0.52 - 1.04 mg/dL Final     GFR Estimate   Date Value Ref Range Status   11/14/2022 86 >60 mL/min/1.73m2 Final     Comment:     Effective December 21, 2021 eGFRcr in adults is calculated using the 2021  CKD-EPI creatinine equation which includes age and gender (Montse regalado al., NEJM, DOI: 10.1056/CWKOhp7874106)   07/13/2020 77 >60 mL/min/[1.73_m2] Final     Comment:     Non  GFR Calc  Starting 12/18/2018, serum creatinine based estimated GFR (eGFR) will be   calculated using the Chronic Kidney Disease Epidemiology Collaboration   (CKD-EPI) equation.       Calcium   Date Value Ref Range Status   11/14/2022 9.9 (H) 8.2 - 9.6 mg/dL Final   07/13/2020 8.8 8.5 - 10.1 mg/dL Final     Lab Results   Component Value Date    WBC 6.0 11/09/2022    WBC 7.2 01/09/2018     Lab Results   Component Value Date    RBC 4.26 11/09/2022    RBC 4.04 01/09/2018     Lab Results   Component Value Date    HGB 13.1 11/09/2022    HGB 13.0 06/20/2018     Lab Results   Component Value Date    HCT 39.6 11/09/2022    HCT 43.1 06/20/2018     Lab Results   Component Value Date    MCV 93 11/09/2022    MCV 96.0 06/20/2018     Lab Results   Component Value Date    MCH 30.8 11/09/2022    MCH 29.0 06/20/2018     Lab Results   Component Value Date    MCHC 33.1 11/09/2022    MCHC 30.2 06/20/2018     Lab Results   Component Value Date    RDW 13.4 11/09/2022    RDW 13.3 01/09/2018     Lab Results   Component Value Date     11/09/2022     01/09/2018       ASSESSMENT/Plan:      ICD-10-CM    1. 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 Cognitive impairment  R41.89       3. Primary osteoarthritis involving multiple joints  M15.9       4. Recurrent falls  R29.6       5. History of pelvic fracture due to fall  Z87.81       6. History of rib fracture due to fall  Z87.81       7. Essential hypertension  I10       8. Rectal prolapse  K62.3       9. Constipation due to outlet dysfunction  K59.02       10. Senile osteoporosis  M81.0           DISCHARGE FACE TO FACE:  I certify that this patient is under my care and that I had a face-to-face  encounter that meets the physician face-to-face encounter requirements with this patient.     Date of Face-to-Face Encounter: 11/28/2022     I certify that, based on my findings, the following services are medically necessary home health services:  Home PT, and home OT    My clinical findings support the need for the above skilled services because: (Please write a brief narrative summary that describes what the RN, PT, SLP, or other services will be doing in the home. A list of diagnoses in this section does not meet the CMS requirements):  Home OT for strengthening, ADL needs, adaptive equipment, and safety.    This patient is homebound because: (Please write a brief narrative summmary describing the functional limitations as to why this patient is homebound and specifically what makes this patient homebound.):  Above services necessarily need to be performed in the home to be of benefit.    The patient is, or has been, under my care and I have initiated the establishment of the plan of care. This patient will be followed by a physician who will periodically review the plan of care.  Initial follow-up should be within 7-10 days.    Approximate time spent with this patient was greater than 30 minutes with greater than 50% spent in discussions regarding services required upon discharge.      The above has been created using voice recognition software. Please be aware that this may unintentionally  produce inaccuracies and/or nonsensical sentences.      Electronically signed by: SUSIE Nieves CNP

## 2022-12-05 ENCOUNTER — TELEPHONE (OUTPATIENT)
Dept: FAMILY MEDICINE | Facility: CLINIC | Age: 87
End: 2022-12-05

## 2022-12-05 NOTE — TELEPHONE ENCOUNTER
Returned call to home care PT to give verbal orders per protocol as requested. PT verbalized understanding.    Faye Fontaine RN

## 2022-12-05 NOTE — TELEPHONE ENCOUNTER
[facility] phone call message- general phone call:    Reason for call: The patient's daughter is looking for a call back b/c the patient have now been put on blood thinners since the stroke and now the daughter is wondering if the patient's appt should be moved up to this week. The current appt is scheduled for 12/14/2022.    Return call needed: Yes    OK to leave a message on voice mail? Yes    Primary language: English      needed? No    Call taken on December 5, 2022 at 10:03 AM by Bea Mejia

## 2022-12-05 NOTE — TELEPHONE ENCOUNTER
RN called patient's daughter back to reschedule appointment to this Wednesday 12/07 as patient was placed on Plavix with only 18 tablets. Would like to establish if patient needs long term anticoagulation.    Faye Fontaine RN

## 2022-12-06 NOTE — TELEPHONE ENCOUNTER
[facility] phone call message - order or referral request from patient:     Order or referral being requested: Requested order    Additional Details: request verbal order for home aide once a week for 4 weeks  Also speech evaluation  Therapy     Referral only -Specialty  Location    OK to leave a message on voice mail? Yes    Primary language: English      needed? No    Call taken on December 6, 2022 at 4:25 PM by VIPIN Ng    Order request route to [facility] TRIAGE   Referrals Route to [facility] (Green/[facility]/Purple) CARE COORDINATOR

## 2022-12-06 NOTE — TELEPHONE ENCOUNTER
RN left confidential VM for verbal orders.     Returned call to home care OT, unable to reach. Left VM with verbal orders per protocol as requested. Left callback number for questions.    Faye Fontaine RN

## 2022-12-07 ENCOUNTER — TELEPHONE (OUTPATIENT)
Dept: FAMILY MEDICINE | Facility: CLINIC | Age: 87
End: 2022-12-07

## 2022-12-07 ENCOUNTER — OFFICE VISIT (OUTPATIENT)
Dept: FAMILY MEDICINE | Facility: CLINIC | Age: 87
End: 2022-12-07
Payer: COMMERCIAL

## 2022-12-07 VITALS
SYSTOLIC BLOOD PRESSURE: 143 MMHG | HEART RATE: 79 BPM | OXYGEN SATURATION: 95 % | DIASTOLIC BLOOD PRESSURE: 92 MMHG | TEMPERATURE: 97.5 F

## 2022-12-07 DIAGNOSIS — Z23 HIGH PRIORITY FOR 2019-NCOV VACCINE: ICD-10-CM

## 2022-12-07 DIAGNOSIS — G93.40 ENCEPHALOPATHY: ICD-10-CM

## 2022-12-07 DIAGNOSIS — Z86.73 HISTORY OF CVA (CEREBROVASCULAR ACCIDENT): Primary | ICD-10-CM

## 2022-12-07 PROCEDURE — 0134A COVID-19 VACCINE BIVALENT BOOSTER 18+ (MODERNA): CPT | Performed by: FAMILY MEDICINE

## 2022-12-07 PROCEDURE — 99214 OFFICE O/P EST MOD 30 MIN: CPT | Mod: 25 | Performed by: FAMILY MEDICINE

## 2022-12-07 PROCEDURE — 91313 COVID-19 VACCINE BIVALENT BOOSTER 18+ (MODERNA): CPT | Performed by: FAMILY MEDICINE

## 2022-12-07 NOTE — TELEPHONE ENCOUNTER
"Beata called regarding OT orders. \"Need urgently as patient has appt today at 10:00am\".  Beata @ [phone]  "

## 2022-12-07 NOTE — TELEPHONE ENCOUNTER
Returned call to home care OT, unable to reach. Left VM with verbal orders per protocol as requested. Left callback number for questions.    Faye Fontaine RN

## 2022-12-07 NOTE — TELEPHONE ENCOUNTER
[facility] phone call message - order or referral request for patient:     Order or referral being requested: Verbal Orders      Additional Comments: Occupational Therapy    -Increase frequency to 2x a week for 3 weeks  -Adding lymphedema management to POC    OK to leave a message on voice mail? Yes    Primary language: English      needed? No    Call taken on December 7, 2022 at 1:06 PM by Bea Mejia

## 2022-12-07 NOTE — PATIENT INSTRUCTIONS
Stop plavix, continue amlodipine, aspirin, and atorvastatin (lipitor).        ORDER:  DISCONTINUE CLOPIDOGREL

## 2022-12-08 ENCOUNTER — TELEPHONE (OUTPATIENT)
Dept: NEUROLOGY | Facility: CLINIC | Age: 87
End: 2022-12-08

## 2022-12-08 ENCOUNTER — DOCUMENTATION ONLY (OUTPATIENT)
Dept: FAMILY MEDICINE | Facility: CLINIC | Age: 87
End: 2022-12-08

## 2022-12-08 NOTE — PROGRESS NOTES
"Form has been completed by provider.     Form sent out via: Fax to [employer]  at Fax Number: [phone]  Patient informed: No  Output date: December 19, 2022    Angeline Barnes RN      **Please close the encounter**    When opening a documentation only encounter, be sure to enter in \"Chief Complaint\" Forms and in \" Comments\" Title of form, description if needed.    Sudeep is a 89 year old  female  Form received via: Fax  Form now resides in: Provider Ready    Angeline Barnes RN                  "

## 2022-12-08 NOTE — TELEPHONE ENCOUNTER
Called and left VM to offer sooner stroke hospital follow up with Stroke [facility] team, Patient can be seen virtually as soon as next week.    NAZIA SAUNDERS CMA

## 2022-12-08 NOTE — PROGRESS NOTES
Belia was seen today for hospital f/u and imm/inj.    Diagnoses and all orders for this visit:    History of ischemic stroke of L occipital lobe within watershed region due to embolic stroke of undetermined source (ESUS).  She is now 9 days back in her assisted living facility with increased home services.  Currently all functional needs being met.  There was some confusion whether or not she should be on dual antiplatelet therapy and after review of the hospital records the plan was to stop it on November 28.  Thus I will discontinue the Plavix formally and she will continue on aspirin.  Blood pressure control not at goal today but she is also had episodes of hypotension so we will continue on very low-dose amlodipine.  -     Occupational Therapy Referral; Future    Encephalopathy.  She is not cognitively back to her baseline but is much improved since the stroke.  Continue supportive therapy.    High priority for 2019-nCoV vaccine  -     COVID-19,PF,MODERNA BIVALENT 18+Yrs              Subjective     Hospital F/U (Pt present for a hospital follow up. Pt also wants to follow up on medications that the pt was given during their hospital visit. ) and Imm/Inj (COVID-19 VACCINE)        HPI   The patient sustained a left occipital lobe stroke and is 9 days back in her own place at the [facility] after a TCU stay.  Had delirium and encephalopathy associated with the stroke which has markedly improved according to the patient and daughter.  She denies any pain.  There are increased home services and the patient does require some assistance in the morning for ADLs.  Mood is good.        Review of Systems         Objective    BP (!) 143/92   Pulse 79   Temp 97.5  F (36.4  C) (Oral)   SpO2 95%   There is no height or weight on file to calculate BMI.  Physical Exam  Constitutional:       Comments: Alert, pleasant.  Uses a 4-wheeled walker with seat.   Cardiovascular:      Rate and Rhythm: Normal rate and regular rhythm.       Heart sounds: Normal heart sounds.   Pulmonary:      Effort: No respiratory distress.      Breath sounds: No wheezing or rales.   Abdominal:      General: Bowel sounds are normal.      Palpations: Abdomen is soft. There is no mass.      Tenderness: There is no abdominal tenderness.   Musculoskeletal:      Comments: + marked scoliosis, kyphosis.  + muscle wasting throughout  No spinal tenderness.  Mild paralumbar muscular tenderness which improves with massage.   Neurological:      Mental Status: She is alert.   Psychiatric:         Mood and Affect: Mood normal.         Behavior: Behavior normal.      Comments: Oriented to the month and year, misses the day and date..  Oriented to place.              Lab Requisition on 11/14/2022   Component Date Value Ref Range Status     Sodium 11/14/2022 138  136 - 145 mmol/L Final     Potassium 11/14/2022 3.6  3.4 - 5.3 mmol/L Final     Chloride 11/14/2022 100  98 - 107 mmol/L Final     Carbon Dioxide (CO2) 11/14/2022 25  22 - 29 mmol/L Final     Anion Gap 11/14/2022 13  7 - 15 mmol/L Final     Urea Nitrogen 11/14/2022 24.9 (H)  8.0 - 23.0 mg/dL Final     Creatinine 11/14/2022 0.55  0.51 - 0.95 mg/dL Final     Calcium 11/14/2022 9.9 (H)  8.2 - 9.6 mg/dL Final     Glucose 11/14/2022 114 (H)  70 - 99 mg/dL Final     GFR Estimate 11/14/2022 86  >60 mL/min/1.73m2 Final    Effective December 21, 2021 eGFRcr in adults is calculated using the 2021 CKD-EPI creatinine equation which includes age and gender (Montse et al., NEJM, DOI: 10.1056/ENMBuw5384556)

## 2022-12-08 NOTE — TELEPHONE ENCOUNTER
[facility] Call Center    Phone Message    May a detailed message be left on voicemail: yes     Reason for Call: Other: Patients daughter calling back to schedule appt. Did not know how soon appt would be needed. Please call Lubna tomorrow after 9:30 am at  [phone].    Action Taken: Message routed to:  Other: neurology    Travel Screening: Not Applicable

## 2022-12-13 ENCOUNTER — DOCUMENTATION ONLY (OUTPATIENT)
Dept: FAMILY MEDICINE | Facility: CLINIC | Age: 87
End: 2022-12-13

## 2022-12-13 ENCOUNTER — TELEPHONE (OUTPATIENT)
Dept: FAMILY MEDICINE | Facility: CLINIC | Age: 87
End: 2022-12-13

## 2022-12-13 NOTE — PROGRESS NOTES
"Form has been completed by provider.     Form sent out via: Fax to [employer]  at Fax Number: [phone]  Patient informed: No  Output date: December 19, 2022    Angeline Barnes RN      **Please close the encounter**    When opening a documentation only encounter, be sure to enter in \"Chief Complaint\" Forms and in \" Comments\" Title of form, description if needed.    Sudeep is a 89 year old  female  Form received via: Fax  Form now resides in: Provider Ready    Angeline Barnes RN                    "

## 2022-12-13 NOTE — TELEPHONE ENCOUNTER
Telephone Message     12/13/2022  5:20 PM    Call returned by Kathy Sandhu DO    Patient: Sudeep Pugh  Phone number-  [phone] (home)       return their call  Phone conversation with: caregiver Tae from [employer]    Situation: Belia Sheets a 89 year old female with a h/o HTN and oesteoporosis s/p ischemic stroke of L occipital lobe within watershed region due to embolic stroke of undetermined source (ESUS) requiring hospitalization 11/5-11/9/22 and encephalopathy. Received after hours call asx 160/102 when working with OT, /100 while not doing anything strenuous (had just gotten out of the bed).     Background: Per chart review, last appt with Dr. BARON Sandhu 12/7/22 and while her BP was not at goal (143/92), low-dose amlodipine was continued in addition to ASA and Lipitor- Plavix stopped.      Assessment: As patient was asx (no reported headache, SOB,  Dizziness, N/V), nothing to do tonight. Will route chart to RN triage and PCP for review 12/14.      Kathy Sandhu DO   she/her  PGY-3  Family Medicine

## 2022-12-13 NOTE — PROGRESS NOTES
"Form has been completed by provider.     Form sent out via: Fax to [employer]  at Fax Number: [phone]  Patient informed: No  Output date: December 19, 2022    Angeline Barnes RN      **Please close the encounter**    When opening a documentation only encounter, be sure to enter in \"Chief Complaint\" Forms and in \" Comments\" Title of form, description if needed.    Sudeep is a 89 year old  female  Form received via: Fax  Form now resides in: Provider Ready    Angeline Barnes RN                  "

## 2022-12-14 ENCOUNTER — MEDICAL CORRESPONDENCE (OUTPATIENT)
Dept: HEALTH INFORMATION MANAGEMENT | Facility: CLINIC | Age: 87
End: 2022-12-14

## 2022-12-14 ENCOUNTER — TELEPHONE (OUTPATIENT)
Dept: FAMILY MEDICINE | Facility: CLINIC | Age: 87
End: 2022-12-14

## 2022-12-14 NOTE — TELEPHONE ENCOUNTER
[facility] phone call message - order or referral request for patient:     Order or referral being requested: Verbal orders for Speech therapy  1x a week for 2 weeks      Additional Comments: Tatiana @ [phone]    OK to leave a message on voice mail? Yes    Primary language: English      needed? No    Call taken on December 14, 2022 at 10:30 AM by Carrie Guzman

## 2022-12-14 NOTE — TELEPHONE ENCOUNTER
Returned call to Speech Therapy, unable to reach. Left VM with verbal orders per protocol as requested. Left callback number for questions.    Faye Fontaine RN

## 2022-12-15 ENCOUNTER — VIRTUAL VISIT (OUTPATIENT)
Dept: NEUROLOGY | Facility: CLINIC | Age: 87
End: 2022-12-15
Payer: COMMERCIAL

## 2022-12-15 ENCOUNTER — LAB (OUTPATIENT)
Dept: LAB | Facility: CLINIC | Age: 87
End: 2022-12-15
Payer: COMMERCIAL

## 2022-12-15 ENCOUNTER — DOCUMENTATION ONLY (OUTPATIENT)
Dept: FAMILY MEDICINE | Facility: CLINIC | Age: 87
End: 2022-12-15

## 2022-12-15 DIAGNOSIS — G93.40 ENCEPHALOPATHY: ICD-10-CM

## 2022-12-15 DIAGNOSIS — I63.531 CEREBROVASCULAR ACCIDENT (CVA) DUE TO OCCLUSION OF RIGHT POSTERIOR CEREBRAL ARTERY (H): Primary | ICD-10-CM

## 2022-12-15 DIAGNOSIS — M81.0 AGE-RELATED OSTEOPOROSIS WITHOUT CURRENT PATHOLOGICAL FRACTURE: ICD-10-CM

## 2022-12-15 LAB — DEPRECATED CALCIDIOL+CALCIFEROL SERPL-MC: 33 UG/L (ref 20–75)

## 2022-12-15 PROCEDURE — 99000 SPECIMEN HANDLING OFFICE-LAB: CPT | Performed by: PATHOLOGY

## 2022-12-15 PROCEDURE — 82523 COLLAGEN CROSSLINKS: CPT | Mod: 90 | Performed by: PATHOLOGY

## 2022-12-15 PROCEDURE — 82306 VITAMIN D 25 HYDROXY: CPT | Mod: 90 | Performed by: PATHOLOGY

## 2022-12-15 PROCEDURE — 36415 COLL VENOUS BLD VENIPUNCTURE: CPT | Performed by: PATHOLOGY

## 2022-12-15 PROCEDURE — 99204 OFFICE O/P NEW MOD 45 MIN: CPT | Mod: 95 | Performed by: NURSE PRACTITIONER

## 2022-12-15 NOTE — PROGRESS NOTES
"Form has been completed by provider.     Form sent out via: Fax to [employer]  at Fax Number: [phone]  Patient informed: No  Output date: December 19, 2022    Angeline Barnes RN      **Please close the encounter**      When opening a documentation only encounter, be sure to enter in \"Chief Complaint\" Forms and in \" Comments\" Title of form, description if needed.    Sudeep is a 89 year old  female  Form received via: Fax  Form now resides in: Provider Ready    Angeline Barnes RN                  "

## 2022-12-15 NOTE — PROGRESS NOTES
Sudeep is a 89 year old who is being evaluated via a billable video visit.      How would you like to obtain your AVS? MyChart  If the video visit is dropped, the invitation should be resent by: Send to e-mail at: [email or web address]  Will anyone else be joining your video visit? No    Video-Visit Details    Video Start Time: 1300    Type of service:  Video Visit    Video End Time: 1330    Originating Location (pt. Location): Home    Distant Location (provider location):  On-site    Platform used for Video Visit: Beepi    __________________________________________________________      [facility]   [phone]  __________________________________________________________    Chief Complaint: Patient presents with:  Stroke      History of Present Illness: Malial Keaton Sheets is a 89 year old female presenting for follow-up for acute left occipital lobe watershed strokes.     She was hospitalized at [facility] on 11/5-11/9/22. Prior to the hospital stay, she had a past medical history of HTN and osteoporosis.    She presented to the hospital with acute encephalopathy on chronic cognitive decline (has some confusion at baseline but was different/acutely worse).     Stroke Evaluation summarized:  MRI: multiple small posterior left occipital lobe acute infarcts; subacute punctate posterolateral left cerebellar hemisphere; CSVID  Head CT: acute left occipital lobe infarct; moderate leukoaraiosis and volume loss   Head vessels: no LVO or significant stenoses, unchanged fusiform ICA terminus dilation  Neck vessels: kinking of the left subclavian artery, no other stenosis   Echo: EF 60-65%, no WMA, no cardiac source for embolus   EKG/Tele: SR w/ PACs  LDL: 111  A1c: 5.6  Troponin: 19   EEG: mild-moderate diffuse encephalopathy, no epileptiform discharges or electrographic seizures  Ziopatch:       She was started on aspirin and plavix for recurrent stroke prevention. She is  "now on aspirin monotherapy. Her daughter Lubna is present for this visit as well and provides collateral information. She was discharged from the hospital to rehab and is now back home in her assisted living facility. Since being discharged, she is slightly improved as compared to her baseline prior to admission. Her daughter attributes this to rehab. She is continuing to receive therapies.     She has been \"foggy\" since March/April. It would not be out of the ordinary for her to not know the date; she sometimes knows the month, she typically knows where she is/lives. Her cognitive health was stable through the summer. Approximately one week prior to her presentation she had an acute change. She was more perseverative and confused and she wasn't taking her medications regularly like she had been in the past. At baseline she does not manage her finances. At least one meal per day is prepared/supplied by [facility]. Her daughter does her shopping and keeps her stocked with with food that can be easily prepared.     We reviewed her labs and images together today.     Modified Pryor Scale  Score: 3-Moderate disability; requiring some help, but able to walk without assistance    Impression:   Problem List Items Addressed This Visit    None    89 y/o woman with multiple small left occipital lobe infarcts concerning for ESUS.     Plan:   - continue aspirin 81 mg daily and atorvastatin 20 mg daily for secondary stroke prevention   - continue therapies and any prescribed home exercise programs  - BP goal 130/80  - LDL goal 40-70  - Return in about 3 months (around 3/15/2023) for Follow up, with GISELA Alejandro or other Stroke CEZAR, in person or e-visit (whichever patient prefers).    Stroke Education provided.  She will call us with any questions.  For any acute neurologic deficits she was advised to  go directly to the hospital rather than call the clinic.    Kathy Alejandro, ADRIANE  Neurology  01/02/2023 1:38 PM  To page me or covering " "stroke neurology team member, click here: AMCOM  Choose \"On Call\" tab at top, then search dropdown box for \"Neurology Adult\" & press Enter, look for Neuro ICU/Stroke    ___________________________________________________________________    Current Medications  Current Outpatient Medications   Medication Sig     amLODIPine (NORVASC) 2.5 MG tablet Take 1 tablet (2.5 mg) by mouth daily     aspirin (ASA) 81 MG chewable tablet 1 tablet (81 mg) by Oral or Feeding Tube route daily     atorvastatin (LIPITOR) 20 MG tablet 1 tablet (20 mg) by Oral or Feeding Tube route every evening     calcium carbonate 600 mg-vitamin D 400 units (CALTRATE) 600-400 MG-UNIT per tablet Take 1 tablet by mouth 2 times daily (before meals)     cyanocobalamin (VITAMIN B-12) 500 MCG tablet Take 500 mcg by mouth daily     order for DME Equipment being ordered: Incentive spirometer     order for DME Equipment being ordered: Home BP monitor and cuff     acetaminophen (TYLENOL) 500 MG tablet Take 1,000 mg by mouth 3 times daily as needed for mild pain (Patient not taking: Reported on 12/15/2022)     ibuprofen (ADVIL/MOTRIN) 200 MG tablet Take 600 mg by mouth 3 times daily as needed for pain (Patient not taking: Reported on 12/7/2022)     vitamin D3 (CHOLECALCIFEROL) 50 mcg (2000 units) tablet Take 1 tablet (50 mcg) by mouth once a week     No current facility-administered medications for this visit.       Past Medical History  Past Medical History:   Diagnosis Date     Carpal tunnel syndrome (aka CTS)      Cataract      Chronic osteoarthritis      Constipation due to outlet dysfunction 9/22/2021     Fracture of multiple rami of right pubis with routine healing, subsequent encounter 9/22/2021     H/O calcium pyrophosphate deposition disease (CPPD)      History of 9th right rib fracture due to fall (07/13/2021) 9/22/2021     History of encephalopathy 10/2017     Hypertension      Kyphoscoliosis      Osteoarthritis     hands     Osteoporosis      Rectal " "prolapse        Social History  Social History     Tobacco Use     Smoking status: Never     Smokeless tobacco: Never   Vaping Use     Vaping Use: Never used   Substance Use Topics     Alcohol use: No     Drug use: No       Family History  Family History   Problem Relation Age of Onset     Depression/Anxiety Son      Depression/Anxiety Son         alcohol abuse  age 24     Hypertension Mother      Hypertension Brother      Diabetes No family hx of      Breast Cancer No family hx of      Colon Cancer No family hx of      Prostate Cancer No family hx of      Other Cancer No family hx of        Physical Exam    Vitals - Patient Reported 2022 12/15/2022   Weight (Patient Reported) 98 lb 100 lb             Estimated body mass index is 20.73 kg/m  as calculated from the following:    Height as of 22: 1.473 m (4' 10\").    Weight as of 22: 45 kg (99 lb 3.2 oz).      General:  no acute distress  HEENT:  normocephalic/atraumatic  Pulmonary:  no respiratory distress    Neurologic  Mental Status:  alert, oriented to person and place, follows commands, speech clear and fluent   Cranial Nerves:  Face appears symmetric, hearing not formally tested but intact to conversation, no dysarthria   Motor:  no abnormal movements, able to move all limbs antigravity spontaneously    Reflexes:  unable to test (telestroke)  Sensory:  unable to test (telestroke)  Station/Gait:  unable to test (telestroke)    Neuroimaging: as per HPI. I personally reviewed those images.    Labs:    Coagulation studies:  Recent Labs   Lab Test 22  1225 10/25/17  0759   INR 1.10 1.10 1.03        Lipid panel:  Recent Labs   Lab Test 22  1153   CHOL 200*   HDL 78   *   TRIG 56       HbA1C:  Recent Labs   Lab Test 22  1912 03/15/17  1402 16  1433   A1C 5.6 5.6 5.9*       Troponin:  No lab results found.  No lab results found.  No lab results found.    Questionnaires:    No relevant " questionnaires    Billing:    I spent a total of 40 minutes on the day of the visit.   Time spent doing chart review, history and exam, documentation and further activities per the note

## 2022-12-16 LAB — COLLAGEN CTX SERPL-MCNC: 283 PG/ML

## 2022-12-19 ENCOUNTER — DOCUMENTATION ONLY (OUTPATIENT)
Dept: FAMILY MEDICINE | Facility: CLINIC | Age: 87
End: 2022-12-19

## 2022-12-19 ENCOUNTER — MYC MEDICAL ADVICE (OUTPATIENT)
Dept: ENDOCRINOLOGY | Facility: CLINIC | Age: 87
End: 2022-12-19

## 2022-12-19 DIAGNOSIS — M81.0 AGE-RELATED OSTEOPOROSIS WITHOUT CURRENT PATHOLOGICAL FRACTURE: Primary | ICD-10-CM

## 2022-12-19 DIAGNOSIS — Z53.9 DIAGNOSIS NOT YET DEFINED: Primary | ICD-10-CM

## 2022-12-19 PROCEDURE — G0179 MD RECERTIFICATION HHA PT: HCPCS | Performed by: FAMILY MEDICINE

## 2022-12-19 RX ORDER — CHOLECALCIFEROL (VITAMIN D3) 50 MCG
1 TABLET ORAL WEEKLY
Qty: 30 TABLET | Refills: 3 | Status: SHIPPED | OUTPATIENT
Start: 2022-12-19 | End: 2023-01-11 | Stop reason: ALTCHOICE

## 2022-12-19 NOTE — PROGRESS NOTES
"When opening a documentation only encounter, be sure to enter in \"Chief Complaint\" Forms and in \" Comments\" Title of form, description if needed.    Sudeep is a 89 year old  female  Form received via: Fax  Form now resides in: Provider Ready    Faye Fontaine RN                  "

## 2022-12-19 NOTE — TELEPHONE ENCOUNTER
----- Message from Saurabh Pittman MD sent at 12/19/2022  1:13 PM CST -----  Please inform the following to pt.   Dear Sudeep,      Here are your recent results.      C-Telopept B-X-Linked  - the bone turnover marker is low which is desired. This results suggest that the reclast infusion from last year injection is still effective. Will contact you after DEXA scan results.   The vitamin D level is normal. In order to target a slightly higher vitamin D level; please take vitamin d 2000 international unit(s) once a week. This is in addition to the vitamin D in your current calcium supplement.       Please let us know if you have any questions or concerns.     Regards,  Saurabh Pittman MD

## 2022-12-26 ENCOUNTER — TELEPHONE (OUTPATIENT)
Dept: FAMILY MEDICINE | Facility: CLINIC | Age: 87
End: 2022-12-26

## 2022-12-26 NOTE — PROGRESS NOTES
Form has been completed by provider.     Form sent out via: Fax to [employer] at Fax Number: [phone]  Patient informed: N/A  Output date: December 26, 2022    Dinesh Hightower MA      **Please close the encounter**

## 2022-12-26 NOTE — TELEPHONE ENCOUNTER
[facility] phone call message - order or referral request for patient:     Order or referral being requested: OT home care to increase visit frequency for this week (12/26) to 3x a week to complete reassessment for continuation of care       Additional Comments:     OK to leave a message on voice mail? Yes    Primary language: English      needed? No    Call taken on December 26, 2022 at 9:19 AM by Mitchell Romero

## 2022-12-26 NOTE — TELEPHONE ENCOUNTER
Called and left message with VO ok to increase visit frequentcy to 3x/week for week of 12/26/22    Angeline Barnes RN

## 2022-12-28 ENCOUNTER — TELEPHONE (OUTPATIENT)
Dept: FAMILY MEDICINE | Facility: CLINIC | Age: 87
End: 2022-12-28

## 2022-12-28 NOTE — TELEPHONE ENCOUNTER
Returned call to home care PT, unable to reach. Left VM with verbal orders per protocol as requested. Left callback number for questions.    Luisana Corbett RN

## 2022-12-28 NOTE — TELEPHONE ENCOUNTER
[facility] phone call message - order or referral request for patient:     Order or referral being requested: Verbal orders      Additional Comments: Continuing home healthcare  -1x a wee for 4 weeks        -balance         -gait training    OK to leave a message on voice mail? Yes    Primary language: English      needed? No    Call taken on December 28, 2022 at 3:21 PM by Bea Mejia

## 2022-12-30 ENCOUNTER — DOCUMENTATION ONLY (OUTPATIENT)
Dept: FAMILY MEDICINE | Facility: CLINIC | Age: 87
End: 2022-12-30

## 2023-01-02 ENCOUNTER — ANCILLARY PROCEDURE (OUTPATIENT)
Dept: BONE DENSITY | Facility: CLINIC | Age: 88
End: 2023-01-02
Attending: INTERNAL MEDICINE
Payer: COMMERCIAL

## 2023-01-02 DIAGNOSIS — M81.0 AGE-RELATED OSTEOPOROSIS WITHOUT CURRENT PATHOLOGICAL FRACTURE: ICD-10-CM

## 2023-01-02 PROCEDURE — 77081 DXA BONE DENSITY APPENDICULR: CPT | Mod: XU | Performed by: INTERNAL MEDICINE

## 2023-01-02 PROCEDURE — 77080 DXA BONE DENSITY AXIAL: CPT | Performed by: INTERNAL MEDICINE

## 2023-01-02 NOTE — PROGRESS NOTES
Sudeep is a 89 year old who is being evaluated via a billable video visit.      How would you like to obtain your AVS? MyChart  If the video visit is dropped, the invitation should be resent by: Send to e-mail at: [email or web address]  Will anyone else be joining your video visit? No    Video-Visit Details    Video Start Time: 1300    Type of service:  Video Visit    Video End Time: 1330    Originating Location (pt. Location): Home    Distant Location (provider location):  On-site    Platform used for Video Visit: Dashride    __________________________________________________________      [facility]   [phone]  __________________________________________________________    Chief Complaint: Patient presents with:  Stroke      History of Present Illness: Malial Keaton Sheets is a 89 year old female presenting for follow-up for acute left occipital lobe watershed strokes.     She was hospitalized at [facility] on 11/5-11/9/22. Prior to the hospital stay, she had a past medical history of HTN and osteoporosis.    She presented to the hospital with acute encephalopathy on chronic cognitive decline (has some confusion at baseline but was different/acutely worse).     Stroke Evaluation summarized:  MRI: multiple small posterior left occipital lobe acute infarcts; subacute punctate posterolateral left cerebellar hemisphere; CSVID  Head CT: acute left occipital lobe infarct; moderate leukoaraiosis and volume loss   Head vessels: no LVO or significant stenoses, unchanged fusiform ICA terminus dilation  Neck vessels: kinking of the left subclavian artery, no other stenosis   Echo: EF 60-65%, no WMA, no cardiac source for embolus   EKG/Tele: SR w/ PACs  LDL: 111  A1c: 5.6  Troponin: 19   EEG: mild-moderate diffuse encephalopathy, no epileptiform discharges or electrographic seizures  Ziopatch:       She was started on aspirin and plavix for recurrent stroke prevention. She is  "now on aspirin monotherapy. Her daughter Lubna is present for this visit as well and provides collateral information. She was discharged from the hospital to rehab and is now back home in her assisted living facility. Since being discharged, she is slightly improved as compared to her baseline prior to admission. Her daughter attributes this to rehab. She is continuing to receive therapies.     She has been \"foggy\" since March/April. It would not be out of the ordinary for her to not know the date; she sometimes knows the month, she typically knows where she is/lives. Her cognitive health was stable through the summer. Approximately one week prior to her presentation she had an acute change. She was more perseverative and confused and she wasn't taking her medications regularly like she had been in the past. At baseline she does not manage her finances. At least one meal per day is prepared/supplied by [facility]. Her daughter does her shopping and keeps her stocked with with food that can be easily prepared.     We reviewed her labs and images together today.     Modified Jenner Scale  Score: 3-Moderate disability; requiring some help, but able to walk without assistance    Impression:   Problem List Items Addressed This Visit    None    89 y/o woman with multiple small left occipital lobe infarcts concerning for ESUS.     Plan:   - continue aspirin 81 mg daily and atorvastatin 20 mg daily for secondary stroke prevention   - continue therapies and any prescribed home exercise programs  - BP goal 130/80  - LDL goal 40-70  - Return in about 3 months (around 3/15/2023) for Follow up, with GISELA Alejandro or other Stroke CEZAR, in person or e-visit (whichever patient prefers).    Stroke Education provided.  She will call us with any questions.  For any acute neurologic deficits she was advised to  go directly to the hospital rather than call the clinic.    Kathy Alejandro, ADRIANE  Neurology  01/02/2023 1:38 PM  To page me or covering " "stroke neurology team member, click here: AMCOM  Choose \"On Call\" tab at top, then search dropdown box for \"Neurology Adult\" & press Enter, look for Neuro ICU/Stroke    ___________________________________________________________________    Current Medications  Current Outpatient Medications   Medication Sig     amLODIPine (NORVASC) 2.5 MG tablet Take 1 tablet (2.5 mg) by mouth daily     aspirin (ASA) 81 MG chewable tablet 1 tablet (81 mg) by Oral or Feeding Tube route daily     atorvastatin (LIPITOR) 20 MG tablet 1 tablet (20 mg) by Oral or Feeding Tube route every evening     calcium carbonate 600 mg-vitamin D 400 units (CALTRATE) 600-400 MG-UNIT per tablet Take 1 tablet by mouth 2 times daily (before meals)     cyanocobalamin (VITAMIN B-12) 500 MCG tablet Take 500 mcg by mouth daily     order for DME Equipment being ordered: Incentive spirometer     order for DME Equipment being ordered: Home BP monitor and cuff     acetaminophen (TYLENOL) 500 MG tablet Take 1,000 mg by mouth 3 times daily as needed for mild pain (Patient not taking: Reported on 12/15/2022)     ibuprofen (ADVIL/MOTRIN) 200 MG tablet Take 600 mg by mouth 3 times daily as needed for pain (Patient not taking: Reported on 12/7/2022)     vitamin D3 (CHOLECALCIFEROL) 50 mcg (2000 units) tablet Take 1 tablet (50 mcg) by mouth once a week     No current facility-administered medications for this visit.       Past Medical History  Past Medical History:   Diagnosis Date     Carpal tunnel syndrome (aka CTS)      Cataract      Chronic osteoarthritis      Constipation due to outlet dysfunction 9/22/2021     Fracture of multiple rami of right pubis with routine healing, subsequent encounter 9/22/2021     H/O calcium pyrophosphate deposition disease (CPPD)      History of 9th right rib fracture due to fall (07/13/2021) 9/22/2021     History of encephalopathy 10/2017     Hypertension      Kyphoscoliosis      Osteoarthritis     hands     Osteoporosis      Rectal " "prolapse        Social History  Social History     Tobacco Use     Smoking status: Never     Smokeless tobacco: Never   Vaping Use     Vaping Use: Never used   Substance Use Topics     Alcohol use: No     Drug use: No       Family History  Family History   Problem Relation Age of Onset     Depression/Anxiety Son      Depression/Anxiety Son         alcohol abuse  age 24     Hypertension Mother      Hypertension Brother      Diabetes No family hx of      Breast Cancer No family hx of      Colon Cancer No family hx of      Prostate Cancer No family hx of      Other Cancer No family hx of        Physical Exam    Vitals - Patient Reported 2022 12/15/2022   Weight (Patient Reported) 98 lb 100 lb             Estimated body mass index is 20.73 kg/m  as calculated from the following:    Height as of 22: 1.473 m (4' 10\").    Weight as of 22: 45 kg (99 lb 3.2 oz).      General:  no acute distress  HEENT:  normocephalic/atraumatic  Pulmonary:  no respiratory distress    Neurologic  Mental Status:  alert, oriented to person and place, follows commands, speech clear and fluent   Cranial Nerves:  Face appears symmetric, hearing not formally tested but intact to conversation, no dysarthria   Motor:  no abnormal movements, able to move all limbs antigravity spontaneously    Reflexes:  unable to test (telestroke)  Sensory:  unable to test (telestroke)  Station/Gait:  unable to test (telestroke)    Neuroimaging: as per HPI. I personally reviewed those images.    Labs:    Coagulation studies:  Recent Labs   Lab Test 22  1225 10/25/17  0759   INR 1.10 1.10 1.03        Lipid panel:  Recent Labs   Lab Test 22  1153   CHOL 200*   HDL 78   *   TRIG 56       HbA1C:  Recent Labs   Lab Test 22  1912 03/15/17  1402 16  1433   A1C 5.6 5.6 5.9*       Troponin:  No lab results found.  No lab results found.  No lab results found.    Questionnaires:    No relevant " questionnaires    Billing:    I spent a total of 40 minutes on the day of the visit.   Time spent doing chart review, history and exam, documentation and further activities per the note

## 2023-01-02 NOTE — PATIENT INSTRUCTIONS
- continue aspirin 81 mg daily and atorvastatin 20 mg daily for secondary stroke prevention   - continue therapies and any prescribed home exercise programs  - BP goal 130/80  - LDL goal 40-70  - Return in about 3 months (around 3/15/2023) for Follow up, with GISELA Alejandro or other Stroke CEZAR, in person or e-visit (whichever patient prefers).

## 2023-01-03 ENCOUNTER — TELEPHONE (OUTPATIENT)
Dept: NEUROLOGY | Facility: CLINIC | Age: 88
End: 2023-01-03

## 2023-01-03 ENCOUNTER — DOCUMENTATION ONLY (OUTPATIENT)
Dept: FAMILY MEDICINE | Facility: CLINIC | Age: 88
End: 2023-01-03

## 2023-01-03 NOTE — TELEPHONE ENCOUNTER
----- Message from Viola Jacobs PA-C sent at 1/3/2023 10:11 AM CST -----  Regarding: reschedule from [employer]  Please schedule with an CEZAR in feb instead of [employer] appt

## 2023-01-04 NOTE — PROGRESS NOTES
Form has been completed by provider.     Form sent out via: Fax to [employer] [facility] at Fax Number: [phone]  Patient informed: N/A  Output date: January 4, 2023    Dinesh Hightower MA      **Please close the encounter**

## 2023-01-04 NOTE — PROGRESS NOTES
Form has been completed by provider.     Form sent out via: Fax to [employer] at Fax Number: [phone]  Patient informed: N/A  Output date: January 4, 2023    Dinesh Hightower MA      **Please close the encounter**

## 2023-01-04 NOTE — PROGRESS NOTES
Form has been completed by provider.     Form sent out via: Fax to [employer] [facility] at Fax Number: [phone]  Patient informed: N/A  Output date: January 4, 2023    Dinesh Hightower MA      **Please close the encounter**

## 2023-01-11 ENCOUNTER — VIRTUAL VISIT (OUTPATIENT)
Dept: ENDOCRINOLOGY | Facility: CLINIC | Age: 88
End: 2023-01-11
Payer: COMMERCIAL

## 2023-01-11 ENCOUNTER — MYC MEDICAL ADVICE (OUTPATIENT)
Dept: ENDOCRINOLOGY | Facility: CLINIC | Age: 88
End: 2023-01-11

## 2023-01-11 DIAGNOSIS — M80.00XD AGE-RELATED OSTEOPOROSIS WITH CURRENT PATHOLOGICAL FRACTURE WITH ROUTINE HEALING, SUBSEQUENT ENCOUNTER: ICD-10-CM

## 2023-01-11 DIAGNOSIS — M81.0 AGE-RELATED OSTEOPOROSIS WITHOUT CURRENT PATHOLOGICAL FRACTURE: Primary | ICD-10-CM

## 2023-01-11 PROCEDURE — 99215 OFFICE O/P EST HI 40 MIN: CPT | Mod: 95 | Performed by: INTERNAL MEDICINE

## 2023-01-11 RX ORDER — ACETAMINOPHEN 325 MG/1
325 TABLET ORAL ONCE
Status: CANCELLED | OUTPATIENT
Start: 2023-01-11

## 2023-01-11 RX ORDER — ALBUTEROL SULFATE 90 UG/1
1-2 AEROSOL, METERED RESPIRATORY (INHALATION)
Status: CANCELLED
Start: 2023-01-11

## 2023-01-11 RX ORDER — ZOLEDRONIC ACID 5 MG/100ML
5 INJECTION, SOLUTION INTRAVENOUS ONCE
Status: CANCELLED
Start: 2023-01-11

## 2023-01-11 RX ORDER — ALBUTEROL SULFATE 0.83 MG/ML
2.5 SOLUTION RESPIRATORY (INHALATION)
Status: CANCELLED | OUTPATIENT
Start: 2023-01-11

## 2023-01-11 RX ORDER — METHYLPREDNISOLONE SODIUM SUCCINATE 125 MG/2ML
125 INJECTION, POWDER, LYOPHILIZED, FOR SOLUTION INTRAMUSCULAR; INTRAVENOUS
Status: CANCELLED
Start: 2023-01-11

## 2023-01-11 RX ORDER — HEPARIN SODIUM (PORCINE) LOCK FLUSH IV SOLN 100 UNIT/ML 100 UNIT/ML
5 SOLUTION INTRAVENOUS
Status: CANCELLED | OUTPATIENT
Start: 2023-01-11

## 2023-01-11 RX ORDER — HEPARIN SODIUM,PORCINE 10 UNIT/ML
5 VIAL (ML) INTRAVENOUS
Status: CANCELLED | OUTPATIENT
Start: 2023-01-11

## 2023-01-11 RX ORDER — VITAMIN B COMPLEX
1 TABLET ORAL DAILY
Qty: 90 TABLET | Refills: 3 | Status: SHIPPED | OUTPATIENT
Start: 2023-01-11

## 2023-01-11 RX ORDER — DIPHENHYDRAMINE HYDROCHLORIDE 50 MG/ML
50 INJECTION INTRAMUSCULAR; INTRAVENOUS
Status: CANCELLED
Start: 2023-01-11

## 2023-01-11 RX ORDER — EPINEPHRINE 1 MG/ML
0.3 INJECTION, SOLUTION, CONCENTRATE INTRAVENOUS EVERY 5 MIN PRN
Status: CANCELLED | OUTPATIENT
Start: 2023-01-11

## 2023-01-11 NOTE — LETTER
"         [facility] [address] [address]  [phone]    FACSIMILE TRANSMITTAL sent 12 JAN 2023 at 0841AM    TO:  Nursing   COMPANY: [employer]   FAX NUMBER: [phone]  PHONE NUMBER:      FROM:   PHONE:   DATE: 01/12/23  NUMBER OF PAGES:     _____URGENT _____REVIEW ONLY _____PLEASE COMMENT____PLEASE REPLY    NOTES/COMMENTS: Please see attached medication order. Confirmed by Pharmacy.   Belia Sheets \"Sudeep\"  Female, 89 year old, 5/16/1930      IF YOU DID NOT RECEIVE THE CORRECT NUMBER OF PAGES OR THE FAX DID NOT COME THROUGH CLEARLY, PLEASE CALL THE SENDER     CONFIDENTIALITY STATEMENT: Confidential information that may accompany this transmission contains protected health information under state and federal law and is legally privileged. This information is intended only for the use of the individual or entity named above and may be used only for carrying out treatment, payment or other healthcare operations. The recipient or person responsible for delivering this information is prohibited by law from disclosing this information without proper authorization to any other party, unless required to do so by law or regulation. If you are not the intended recipient, you are hereby notified that any review, dissemination, distribution, or copying of this message is strictly prohibited. If you have received this communication in error, please destroy the materials and contact us immediately by calling the number listed above. No response indicates that the information was received by the appropriate authorized party   "

## 2023-01-11 NOTE — PATIENT INSTRUCTIONS
Sudeep,      Continue calcium with D 600-400 at the current dose   Change vitamin d supplement to 1000 international unit(s) daily.    Schedule RECLAST infusion.

## 2023-01-11 NOTE — LETTER
1/11/2023       RE: Belia Sheets  [address]  [address]  [facility] [address]     Dear Colleague,    Thank you for referring your patient, Belia Sheets, to the [facility] [place] at [facility]. Please see a copy of my visit note below.    Endocrinology Clinic Visit    Chief Complaint: Follow Up     Information obtained from: Patient + daughter.      Assessment/Treatment Plan:      # Osteoporosis with fragility fracture:  Belia Sheets is a 89 year old year old female with Osteoporosis and fragility fracture (7/2015); diagnosed in 1999.    Alendronate for 16 years 1999 -2015  FORTEO [id number]  RECLAST 2019 -2021  No fall or fracture since last visit. Vitamin D normal. Taking calcium.  No hip, spine, humoral or wrist fractures.  DEXA scan worsening at the hip; stable at other sites. C-telepeptide within the desired range.   Options of treatment: continue with RECLAST vs switch to Prolia (worsening BMD at the hips) however due to postural issues; she is at increased risk for pneumonia therefore decision was made to stay on RECLAST infusion.  Admit therapy placed. Continue calcium supplement at the same dose. Vitamin D 1000 international units daily.     Saurabh Pittman MD  Staff Endocrinologist   Division of Endocrinology and Diabetes  Subjective:         HPI: Belia Sheets is a 89 year old year old female with history of osteoporosis who is here for a follow up.      Osteoporosis first diagnosed in 1999.     She was treated with alendronate 70 mg weekly from 1999 to October 2015.      +History of vertebral fracture; she was folding laundry when she fell from standing up position and sustained injury.  Broken hip and noted to have thoracic compression fractures.  Previous fragility fracture, morphometric vertebral fracture: imaging done in 7/2015 has showed multiple compression fractures.   7/2015 spine film  reported as <Multiple old healed left rib fractures. Diffuse osteopenia. New moderate compression fracture of T10 since the prior examination. New moderate compression fractures in T4 and T5 are new since the prior exam. Multilevel degenerative changes in the thoracic spine are otherwise stable. Marked scoliosis of the thoracolumbar spine is redemonstrated.>    Due to severe osteoporosis, long standing previous alendronate therapy; we treated with FORTEO 2017- 2/24/2019.  Then she received 3 doses of RECLAST treatment.  She uses a walker for walking.      After completion of treatment with Forteo; she received Reclast in March 2019, 8/2020, 12/2021.    She is taking calcium and vitamin d supplement 600-800; 1 tab daily. D 50 mcg once a week.     Lost 6 inch height loss/kyphosis    Wt Readings from Last 10 Encounters:   11/28/22 45 kg (99 lb 3.2 oz)   11/21/22 43.9 kg (96 lb 12.8 oz)   11/14/22 43.9 kg (96 lb 12.8 oz)   11/10/22 43.9 kg (96 lb 12.8 oz)   11/05/22 42.8 kg (94 lb 5.7 oz)   11/03/22 42.8 kg (94 lb 6.4 oz)   08/17/22 44 kg (97 lb)   04/27/22 44.9 kg (99 lb)   03/30/22 43.8 kg (96 lb 9.6 oz)   02/28/22 44.5 kg (98 lb)     Stroke in 11/2022/     Review of system - no falls since her last visit with me    No Known Allergies    Current Outpatient Medications   Medication Sig Dispense Refill     amLODIPine (NORVASC) 2.5 MG tablet Take 1 tablet (2.5 mg) by mouth daily 90 tablet 3     aspirin (ASA) 81 MG chewable tablet 1 tablet (81 mg) by Oral or Feeding Tube route daily 90 tablet 3     atorvastatin (LIPITOR) 20 MG tablet 1 tablet (20 mg) by Oral or Feeding Tube route every evening 90 tablet 3     calcium carbonate 600 mg-vitamin D 400 units (CALTRATE) 600-400 MG-UNIT per tablet Take 1 tablet by mouth 2 times daily (before meals) 180 tablet 3     cyanocobalamin (VITAMIN B-12) 500 MCG tablet Take 500 mcg by mouth daily       order for DME Equipment being ordered: Incentive spirometer 1 Units 0     order for DME  "Equipment being ordered: Home BP monitor and cuff 1 each 0     vitamin D3 (CHOLECALCIFEROL) 50 mcg (2000 units) tablet Take 1 tablet (50 mcg) by mouth once a week 30 tablet 3     acetaminophen (TYLENOL) 500 MG tablet Take 1,000 mg by mouth 3 times daily as needed for mild pain (Patient not taking: Reported on 12/15/2022)       ibuprofen (ADVIL/MOTRIN) 200 MG tablet Take 600 mg by mouth 3 times daily as needed for pain (Patient not taking: Reported on 12/7/2022)         Review of Systems      as per HPI above.         Objective:   There were no vitals taken for this visit.  Telephone visit.    In House Labs:    \"Left femoral neck  T-score -3.1     Right femoral neck  T-score -3.6     Left total femur  T-score -3.4 , BMD is 0.575 g/cm2     Right total femur  T-score -3.7, BMD is 0.543 g/cm2     Radius (left)  T-score -4.6, BMD 0.381 g/cm2     Interval change  Based on historical least significant change data, bone density compared to the prior study has changed as follows:  - decreased 10.7% at the right total femur, this change is significant  - no significant change at the left total femur, radius\"  Component      Latest Ref Rng & Units 12/15/2022   C-Telopept B-X-Linked      pg/mL 283   Vitamin D Deficiency screening      20 - 75 ug/L 33   Total telephone time with the patient: 10 minutes. Discussion of plan with the [place]; daughter telephone time = 11 minutes. 45 minutes spent on the date of the encounter doing chart review, history, coordination of care, documentation and further activities per the note.    Sudeep is a 89 year old who is being evaluated via a billable telephone visi  t.      What phone number would you like to be contacted at? [phone]   How would you like to obtain your AVS? MyChart      Again, thank you for allowing me to participate in the care of your patient.      Sincerely,    Saurabh Pittman MD      "

## 2023-01-11 NOTE — PROGRESS NOTES
Endocrinology Clinic Visit    Chief Complaint: Follow Up     Information obtained from: Patient + daughter.      Assessment/Treatment Plan:      # Osteoporosis with fragility fracture:  Belia Sheets is a 89 year old year old female with Osteoporosis and fragility fracture (7/2015); diagnosed in 1999.    Alendronate for 16 years 1999 -2015  FORTEO [id number]  RECLAST 2019 -2021  No fall or fracture since last visit. Vitamin D normal. Taking calcium.  No hip, spine, humoral or wrist fractures.  DEXA scan worsening at the hip; stable at other sites. C-telepeptide within the desired range.   Options of treatment: continue with RECLAST vs switch to Prolia (worsening BMD at the hips) however due to postural issues; she is at increased risk for pneumonia therefore decision was made to stay on RECLAST infusion.  Admit therapy placed. Continue calcium supplement at the same dose. Vitamin D 1000 international units daily.     Saurabh Pittman MD  Staff Endocrinologist   Division of Endocrinology and Diabetes  Subjective:         HPI: Belia Sheets is a 89 year old year old female with history of osteoporosis who is here for a follow up.      Osteoporosis first diagnosed in 1999.     She was treated with alendronate 70 mg weekly from 1999 to October 2015.      +History of vertebral fracture; she was folding laundry when she fell from standing up position and sustained injury.  Broken hip and noted to have thoracic compression fractures.  Previous fragility fracture, morphometric vertebral fracture: imaging done in 7/2015 has showed multiple compression fractures.   7/2015 spine film reported as <Multiple old healed left rib fractures. Diffuse osteopenia. New moderate compression fracture of T10 since the prior examination. New moderate compression fractures in T4 and T5 are new since the prior exam. Multilevel degenerative changes in the thoracic spine are otherwise stable. Marked scoliosis of the thoracolumbar  spine is redemonstrated.>    Due to severe osteoporosis, long standing previous alendronate therapy; we treated with FORTEO 2017- 2/24/2019.  Then she received 3 doses of RECLAST treatment.  She uses a walker for walking.      After completion of treatment with Forteo; she received Reclast in March 2019, 8/2020, 12/2021.    She is taking calcium and vitamin d supplement 600-800; 1 tab daily. D 50 mcg once a week.     Lost 6 inch height loss/kyphosis    Wt Readings from Last 10 Encounters:   11/28/22 45 kg (99 lb 3.2 oz)   11/21/22 43.9 kg (96 lb 12.8 oz)   11/14/22 43.9 kg (96 lb 12.8 oz)   11/10/22 43.9 kg (96 lb 12.8 oz)   11/05/22 42.8 kg (94 lb 5.7 oz)   11/03/22 42.8 kg (94 lb 6.4 oz)   08/17/22 44 kg (97 lb)   04/27/22 44.9 kg (99 lb)   03/30/22 43.8 kg (96 lb 9.6 oz)   02/28/22 44.5 kg (98 lb)     Stroke in 11/2022/     Review of system - no falls since her last visit with me    No Known Allergies    Current Outpatient Medications   Medication Sig Dispense Refill     amLODIPine (NORVASC) 2.5 MG tablet Take 1 tablet (2.5 mg) by mouth daily 90 tablet 3     aspirin (ASA) 81 MG chewable tablet 1 tablet (81 mg) by Oral or Feeding Tube route daily 90 tablet 3     atorvastatin (LIPITOR) 20 MG tablet 1 tablet (20 mg) by Oral or Feeding Tube route every evening 90 tablet 3     calcium carbonate 600 mg-vitamin D 400 units (CALTRATE) 600-400 MG-UNIT per tablet Take 1 tablet by mouth 2 times daily (before meals) 180 tablet 3     cyanocobalamin (VITAMIN B-12) 500 MCG tablet Take 500 mcg by mouth daily       order for DME Equipment being ordered: Incentive spirometer 1 Units 0     order for DME Equipment being ordered: Home BP monitor and cuff 1 each 0     vitamin D3 (CHOLECALCIFEROL) 50 mcg (2000 units) tablet Take 1 tablet (50 mcg) by mouth once a week 30 tablet 3     acetaminophen (TYLENOL) 500 MG tablet Take 1,000 mg by mouth 3 times daily as needed for mild pain (Patient not taking: Reported on 12/15/2022)        "ibuprofen (ADVIL/MOTRIN) 200 MG tablet Take 600 mg by mouth 3 times daily as needed for pain (Patient not taking: Reported on 12/7/2022)         Review of Systems      as per HPI above.         Objective:   There were no vitals taken for this visit.  Telephone visit.    In House Labs:    \"Left femoral neck  T-score -3.1     Right femoral neck  T-score -3.6     Left total femur  T-score -3.4 , BMD is 0.575 g/cm2     Right total femur  T-score -3.7, BMD is 0.543 g/cm2     Radius (left)  T-score -4.6, BMD 0.381 g/cm2     Interval change  Based on historical least significant change data, bone density compared to the prior study has changed as follows:  - decreased 10.7% at the right total femur, this change is significant  - no significant change at the left total femur, radius\"  Component      Latest Ref Rng & Units 12/15/2022   C-Telopept B-X-Linked      pg/mL 283   Vitamin D Deficiency screening      20 - 75 ug/L 33   Total telephone time with the patient: 10 minutes. Discussion of plan with the [place]; daughter telephone time = 11 minutes. 45 minutes spent on the date of the encounter doing chart review, history, coordination of care, documentation and further activities per the note.    Sudeep is a 89 year old who is being evaluated via a billable telephone visi  t.      What phone number would you like to be contacted at? [phone]   How would you like to obtain your AVS? MyChart  "

## 2023-01-12 NOTE — TELEPHONE ENCOUNTER
----- Message from Saurabh Pittman MD sent at 1/11/2023  5:06 PM CST -----  Hello,     Please fax medication instructions on vitamin D to [facility] memory care center. She is at [place]. Thank you.   Saurabh Pittman MD

## 2023-01-31 ENCOUNTER — TELEPHONE (OUTPATIENT)
Dept: ENDOCRINOLOGY | Facility: CLINIC | Age: 88
End: 2023-01-31
Payer: COMMERCIAL

## 2023-01-31 NOTE — TELEPHONE ENCOUNTER
Spoke to pt's daughter over the phone (preferred number) to schedule reclast infusion per check out orders from Dr. Pittman on 1/11/23. Waited on hold for 20+ minutes. Pt requested that we call back at a later time to schedule as we had been waiting for too long. Please assist daughter with scheduling pt's reclast infusion.

## 2023-02-02 NOTE — TELEPHONE ENCOUNTER
Spoke to Billy from specialty infusion scheduling 2/2/23 - Billy stated he would assist pt in scheduling Reclast infusion.

## 2023-02-06 ENCOUNTER — VIRTUAL VISIT (OUTPATIENT)
Dept: NEUROLOGY | Facility: CLINIC | Age: 88
End: 2023-02-06
Payer: COMMERCIAL

## 2023-02-06 DIAGNOSIS — I63.531 CEREBROVASCULAR ACCIDENT (CVA) DUE TO OCCLUSION OF RIGHT POSTERIOR CEREBRAL ARTERY (H): Primary | ICD-10-CM

## 2023-02-06 DIAGNOSIS — I63.432 CEREBROVASCULAR ACCIDENT (CVA) DUE TO EMBOLISM OF LEFT POSTERIOR CEREBRAL ARTERY (H): ICD-10-CM

## 2023-02-06 PROCEDURE — 99215 OFFICE O/P EST HI 40 MIN: CPT | Mod: 95 | Performed by: NURSE PRACTITIONER

## 2023-02-06 NOTE — PROGRESS NOTES
Video-Visit Details    Type of service:  Video Visit    Video Start Time (time video started): 1018    Video End Time (time video stopped): 1032    Originating Location (pt. Location): Home    Distant Location (provider location):  On-site    Mode of Communication:  Video Conference via [employer]        __________________________________________________________      [facility] [place]   [phone]  __________________________________________________________    Chief Complaint: Patient presents with:  Stroke      History of Present Illness: Belia Sheets is a 89 year old female presenting for follow-up for left occipital watershed strokes.     She was hospitalized at [facility] on 11/5-11/9/22. Prior to the hospital stay, she had a past medical history of cognitive impairment, HTN, and osteoporosis.    She presented to the hospital with acute encephalopathy on chronic cognitive decline (has some confusion at baseline but was different/acutely worse).     MRI: multiple small posterior left occipital lobe acute infarcts; subacute punctate posterolateral left cerebellar hemisphere; CSVID  Head CT: acute left occipital lobe infarct; moderate leukoaraiosis and volume loss   Head vessels: no LVO or significant stenoses, unchanged fusiform ICA terminus dilation  Neck vessels: kinking of the left subclavian artery, no other stenosis   Echo: EF 60-65%, no WMA, no cardiac source for embolus   EKG/Tele: SR w/ PACs  LDL: 111  A1c: 5.6  Troponin: 19   EEG: mild-moderate diffuse encephalopathy, no epileptiform discharges or electrographic seizures  [place]:        She was started on aspirin and plavix for recurrent stroke prevention. She is now on aspirin monotherapy. Her daughter Ciarra is present for this visit as well and provides collateral information. She was discharged from the hospital to rehab and is now back home in her assisted living facility. She has been  "essentially stable since discharge. She initially improved, even from her baseline, which her other daughter attributed to rehab. She is now slightly below her baseline. Therapies have been discontinued but she is continuing with her home exercise program. She is still living in her apartment. Meals are provided. She also has her medications managed for her.   Sudeep herself feels like she is at her baseline. Nurses set up and give medications and she has been taking those without any issues. Her appetite is good; she thinks she's getting enough fluids. She reports that she's sleeping well and wakes feeling well rested. She gets around with a wheeled walker. She denies any falls. She does get checked on by staff at her apartment at meals times and when she get medications. She denies any symptoms concerning for stroke/TIA.     Modified [place] Scale  Score: 3-Moderate disability; requiring some help, but able to walk without assistance    Impression:   Problem List Items Addressed This Visit        Neurology Diagnoses    Stroke (H) - Primary     89 y/o woman with multiple small left occipital lobe infarcts concerning for ESUS.     Plan:   - continue aspirin 81 mg daily and atorvastatin 20 mg daily for secondary stroke prevention  - continue home exercise program  - BP goal 130/80  - LDL goal 40-70  - No follow-ups on file.    Stroke Education provided.  She will call us with any questions.  For any acute neurologic deficits she was advised to  go directly to the hospital rather than call the clinic.    Kathy Alejandro NP  Neurology  02/06/2023 10:38 AM  To page me or covering stroke neurology team member, click here: AMCOM  Choose \"On Call\" tab at top, then search dropdown box for \"Neurology Adult\" & press Enter, look for Neuro ICU/Stroke    ___________________________________________________________________    Current Medications  Current Outpatient Medications   Medication Sig     acetaminophen (TYLENOL) 500 MG " tablet Take 1,000 mg by mouth 3 times daily as needed for mild pain     amLODIPine (NORVASC) 2.5 MG tablet Take 1 tablet (2.5 mg) by mouth daily     aspirin (ASA) 81 MG chewable tablet 1 tablet (81 mg) by Oral or Feeding Tube route daily     atorvastatin (LIPITOR) 20 MG tablet 1 tablet (20 mg) by Oral or Feeding Tube route every evening     calcium carbonate 600 mg-vitamin D 400 units (CALTRATE) 600-400 MG-UNIT per tablet Take 1 tablet by mouth 2 times daily (before meals)     cyanocobalamin (VITAMIN B-12) 500 MCG tablet Take 500 mcg by mouth daily     ibuprofen (ADVIL/MOTRIN) 200 MG tablet Take 600 mg by mouth 3 times daily as needed for pain (Patient not taking: Reported on 2022)     order for DME Equipment being ordered: Incentive spirometer     order for DME Equipment being ordered: Home BP monitor and cuff     Vitamin D3 (CHOLECALCIFEROL) 25 mcg (1000 units) tablet Take 1 tablet (25 mcg) by mouth daily     No current facility-administered medications for this visit.       Past Medical History  Past Medical History:   Diagnosis Date     Carpal tunnel syndrome (aka CTS)      Cataract      Chronic osteoarthritis      Constipation due to outlet dysfunction 2021     Fracture of multiple rami of right pubis with routine healing, subsequent encounter 2021     H/O calcium pyrophosphate deposition disease (CPPD)      History of 9th right rib fracture due to fall (2021) 2021     History of encephalopathy 10/2017     Hypertension      Kyphoscoliosis      Osteoarthritis     hands     Osteoporosis      Rectal prolapse        Social History  Social History     Tobacco Use     Smoking status: Never     Smokeless tobacco: Never   Vaping Use     Vaping Use: Never used   Substance Use Topics     Alcohol use: No     Drug use: No       Family History  Family History   Problem Relation Age of Onset     Depression/Anxiety Son      Depression/Anxiety Son         alcohol abuse  age 24     Hypertension  "Mother      Hypertension Brother      Diabetes No family hx of      Breast Cancer No family hx of      Colon Cancer No family hx of      Prostate Cancer No family hx of      Other Cancer No family hx of        Physical Exam    Vitals - Patient Reported 12/15/2022 1/11/2023 2/6/2023   Weight (Patient Reported) 100 lb 100 lb 100 lb             Estimated body mass index is 20.73 kg/m  as calculated from the following:    Height as of 11/28/22: 1.473 m (4' 10\").    Weight as of 11/28/22: 45 kg (99 lb 3.2 oz).      General:  no acute distress  HEENT:  normocephalic/atraumatic  Pulmonary:  no respiratory distress    Neurologic  Mental Status:  alert, oriented to person, city, month (wrong year and age), follows commands, speech clear and fluent, some difficulty with naming (couldn't identify watch/band)   Cranial Nerves:  facial movements symmetric, hearing not formally tested but intact to conversation, no dysarthria, shoulder shrug equal bilaterally, tongue protrusion midline  Motor:  no abnormal movements, able to move all limbs antigravity spontaneously with no signs of hemiparesis observed, no pronator drift  Reflexes:  unable to test (telestroke)  Sensory:  unable to test (telestroke)  Station/Gait:  unable to test (telestroke)    Neuroimaging: as per HPI. I personally reviewed those images.    Labs:    Coagulation studies:  Recent Labs   Lab Test 11/05/22 2000 09/11/21  1225 10/25/17  0759   INR 1.10 1.10 1.03        Lipid panel:  Recent Labs   Lab Test 11/03/22  1153   CHOL 200*   HDL 78   *   TRIG 56       HbA1C:  Recent Labs   Lab Test 11/06/22  1912 03/15/17  1402 08/31/16  1433   A1C 5.6 5.6 5.9*       Troponin:  No lab results found.  No lab results found.  No lab results found.      Billing:    I spent a total of 40 minutes on the day of the visit.   Time spent doing chart review, history and exam, documentation and further activities per the note      "

## 2023-02-06 NOTE — NURSING NOTE
Medication list not reviewed with patient, states Nursing staff gives her medications and was not present during visit.    NAZIA SAUNDERS, CMA

## 2023-02-06 NOTE — LETTER
2/6/2023         RE: Belia Sheets  [address]  [address]  [facility] [address]        Dear Colleague,    Thank you for referring your patient, Belia Sheets, to the [facility] NEUROLOGY [facility]. Please see a copy of my visit note below.    Video-Visit Details    Type of service:  Video Visit    Video Start Time (time video started): 1018    Video End Time (time video stopped): 1032    Originating Location (pt. Location): Home    Distant Location (provider location):  On-site    Mode of Communication:  Video Conference via Softricity        __________________________________________________________      [facility] Neurology [facility]   [phone]  __________________________________________________________    Chief Complaint: Patient presents with:  Stroke      History of Present Illness: Belia Sheets is a 89 year old female presenting for follow-up for left occipital watershed strokes.     She was hospitalized at [facility] on 11/5-11/9/22. Prior to the hospital stay, she had a past medical history of cognitive impairment, HTN, and osteoporosis.    She presented to the hospital with acute encephalopathy on chronic cognitive decline (has some confusion at baseline but was different/acutely worse).     MRI: multiple small posterior left occipital lobe acute infarcts; subacute punctate posterolateral left cerebellar hemisphere; CSVID  Head CT: acute left occipital lobe infarct; moderate leukoaraiosis and volume loss   Head vessels: no LVO or significant stenoses, unchanged fusiform ICA terminus dilation  Neck vessels: kinking of the left subclavian artery, no other stenosis   Echo: EF 60-65%, no WMA, no cardiac source for embolus   EKG/Tele: SR w/ PACs  LDL: 111  A1c: 5.6  Troponin: 19   EEG: mild-moderate diffuse encephalopathy, no epileptiform discharges or electrographic seizures  Ziopatch:        She was started on aspirin and plavix for  "recurrent stroke prevention. She is now on aspirin monotherapy. Her daughter Ciarra is present for this visit as well and provides collateral information. She was discharged from the hospital to rehab and is now back home in her assisted living facility. She has been essentially stable since discharge. She initially improved, even from her baseline, which her other daughter attributed to rehab. She is now slightly below her baseline. Therapies have been discontinued but she is continuing with her home exercise program. She is still living in her apartment. Meals are provided. She also has her medications managed for her.   Sudeep herself feels like she is at her baseline. Nurses set up and give medications and she has been taking those without any issues. Her appetite is good; she thinks she's getting enough fluids. She reports that she's sleeping well and wakes feeling well rested. She gets around with a wheeled walker. She denies any falls. She does get checked on by staff at her apartment at meals times and when she get medications. She denies any symptoms concerning for stroke/TIA.     Modified Celia Scale  Score: 3-Moderate disability; requiring some help, but able to walk without assistance    Impression:   Problem List Items Addressed This Visit        Neurology Diagnoses    Stroke (H) - Primary     89 y/o woman with multiple small left occipital lobe infarcts concerning for ESUS.     Plan:   - continue aspirin 81 mg daily and atorvastatin 20 mg daily for secondary stroke prevention  - continue home exercise program  - BP goal 130/80  - LDL goal 40-70  - No follow-ups on file.    Stroke Education provided.  She will call us with any questions.  For any acute neurologic deficits she was advised to  go directly to the hospital rather than call the clinic.    Kathy Alejandro NP  Neurology  02/06/2023 10:38 AM  To page me or covering stroke neurology team member, click here: AMCOM  Choose \"On Call\" tab at top, then " "search dropdown box for \"Neurology Adult\" & press Enter, look for Neuro ICU/Stroke    ___________________________________________________________________    Current Medications  Current Outpatient Medications   Medication Sig     acetaminophen (TYLENOL) 500 MG tablet Take 1,000 mg by mouth 3 times daily as needed for mild pain     amLODIPine (NORVASC) 2.5 MG tablet Take 1 tablet (2.5 mg) by mouth daily     aspirin (ASA) 81 MG chewable tablet 1 tablet (81 mg) by Oral or Feeding Tube route daily     atorvastatin (LIPITOR) 20 MG tablet 1 tablet (20 mg) by Oral or Feeding Tube route every evening     calcium carbonate 600 mg-vitamin D 400 units (CALTRATE) 600-400 MG-UNIT per tablet Take 1 tablet by mouth 2 times daily (before meals)     cyanocobalamin (VITAMIN B-12) 500 MCG tablet Take 500 mcg by mouth daily     ibuprofen (ADVIL/MOTRIN) 200 MG tablet Take 600 mg by mouth 3 times daily as needed for pain (Patient not taking: Reported on 12/7/2022)     order for DME Equipment being ordered: Incentive spirometer     order for DME Equipment being ordered: Home BP monitor and cuff     Vitamin D3 (CHOLECALCIFEROL) 25 mcg (1000 units) tablet Take 1 tablet (25 mcg) by mouth daily     No current facility-administered medications for this visit.       Past Medical History  Past Medical History:   Diagnosis Date     Carpal tunnel syndrome (aka CTS)      Cataract      Chronic osteoarthritis      Constipation due to outlet dysfunction 9/22/2021     Fracture of multiple rami of right pubis with routine healing, subsequent encounter 9/22/2021     H/O calcium pyrophosphate deposition disease (CPPD)      History of 9th right rib fracture due to fall (07/13/2021) 9/22/2021     History of encephalopathy 10/2017     Hypertension      Kyphoscoliosis      Osteoarthritis     hands     Osteoporosis      Rectal prolapse        Social History  Social History     Tobacco Use     Smoking status: Never     Smokeless tobacco: Never   Vaping Use     " "Vaping Use: Never used   Substance Use Topics     Alcohol use: No     Drug use: No       Family History  Family History   Problem Relation Age of Onset     Depression/Anxiety Son      Depression/Anxiety Son         alcohol abuse  age 24     Hypertension Mother      Hypertension Brother      Diabetes No family hx of      Breast Cancer No family hx of      Colon Cancer No family hx of      Prostate Cancer No family hx of      Other Cancer No family hx of        Physical Exam    Vitals - Patient Reported 12/15/2022 2023 2023   Weight (Patient Reported) 100 lb 100 lb 100 lb             Estimated body mass index is 20.73 kg/m  as calculated from the following:    Height as of 22: 1.473 m (4' 10\").    Weight as of 22: 45 kg (99 lb 3.2 oz).      General:  no acute distress  HEENT:  normocephalic/atraumatic  Pulmonary:  no respiratory distress    Neurologic  Mental Status:  alert, oriented to person, city, month (wrong year and age), follows commands, speech clear and fluent, some difficulty with naming (couldn't identify watch/band)   Cranial Nerves:  facial movements symmetric, hearing not formally tested but intact to conversation, no dysarthria, shoulder shrug equal bilaterally, tongue protrusion midline  Motor:  no abnormal movements, able to move all limbs antigravity spontaneously with no signs of hemiparesis observed, no pronator drift  Reflexes:  unable to test (telestroke)  Sensory:  unable to test (telestroke)  Station/Gait:  unable to test (telestroke)    Neuroimaging: as per HPI. I personally reviewed those images.    Labs:    Coagulation studies:  Recent Labs   Lab Test 22  1225 10/25/17  0759   INR 1.10 1.10 1.03        Lipid panel:  Recent Labs   Lab Test 22  1153   CHOL 200*   HDL 78   *   TRIG 56       HbA1C:  Recent Labs   Lab Test 22  1912 03/15/17  1402 16  1433   A1C 5.6 5.6 5.9*       Troponin:  No lab results found.  No lab " results found.  No lab results found.      Billing:    I spent a total of 40 minutes on the day of the visit.   Time spent doing chart review, history and exam, documentation and further activities per the note          Again, thank you for allowing me to participate in the care of your patient.        Sincerely,        Kathy Alejandro NP

## 2023-02-06 NOTE — PATIENT INSTRUCTIONS
Continue aspirin and atorvastatin for secondary stroke prevention.     Stroke Prevention Recommendations  High blood pressure, or hypertension, is a leading cause of stroke and the most significant controllable risk factor. You should aim to keep your blood pressure below 130/80.     Smoking cessation: The nicotine and carbon monoxide in cigarette smoke damage the cardiovascular system and pave the way for a stroke. If you use nicotine, please reach out to your primary care provider for assistance with quitting or consider utilizing one of [place]'s free quit support programs (https://www.[facility]./communities/tobacco/quitting/index.html)    If you have Type 1 or 2 diabetes, control you blood sugar. You should aim to keep your HGBA1C below 7.0.     Eat an overall health dietary pattern that emphasizes:  - a wide variety of fruits and vegetables   - whole grains and products made up of mostly whole grains   - healthy sources of protein (mostly plants such as legumes and nuts; fish and seafood; low-fat or non-fat dairy; and, if you eat meat and poultry, ensuring it is lean and unprocessed)  - liquid non-tropical vegetable oils  - minimally processed foods   - minimize intake of added sugars  - foods prepared with little or no salt  - limited or preferably no alcohol intake    Aim for being active at least 150 minutes a week, but if you don't want to sweat the numbers, just move more and sit less.    Excess body weight and obesity are linked with an increased risk of high blood pressure, diabetes, heart disease, and stroke. Losing as little as 5 to 10 pounds can make a significant difference in your risks. [facility] has a Comprehensive Weight Management program if you would like assistance/support: https://[email or web address].[email or web address]/treatments/comprehensive-weight-management    Large amounts of cholesterol in the blood can build up and cause blood clots - leading to a stroke. Aim to keep your LDL  cholesterol between 40 and 70.     Call 911 if these signs are present

## 2023-02-16 RX ORDER — HEPARIN SODIUM,PORCINE 10 UNIT/ML
5 VIAL (ML) INTRAVENOUS
Status: CANCELLED | OUTPATIENT
Start: 2023-02-16

## 2023-02-16 RX ORDER — ALBUTEROL SULFATE 90 UG/1
1-2 AEROSOL, METERED RESPIRATORY (INHALATION)
Status: CANCELLED
Start: 2023-02-16

## 2023-02-16 RX ORDER — ALBUTEROL SULFATE 0.83 MG/ML
2.5 SOLUTION RESPIRATORY (INHALATION)
Status: CANCELLED | OUTPATIENT
Start: 2023-02-16

## 2023-02-16 RX ORDER — ACETAMINOPHEN 325 MG/1
325 TABLET ORAL ONCE
Status: CANCELLED | OUTPATIENT
Start: 2023-02-16

## 2023-02-16 RX ORDER — METHYLPREDNISOLONE SODIUM SUCCINATE 125 MG/2ML
125 INJECTION, POWDER, LYOPHILIZED, FOR SOLUTION INTRAMUSCULAR; INTRAVENOUS
Status: CANCELLED
Start: 2023-02-16

## 2023-02-16 RX ORDER — DIPHENHYDRAMINE HYDROCHLORIDE 50 MG/ML
50 INJECTION INTRAMUSCULAR; INTRAVENOUS
Status: CANCELLED
Start: 2023-02-16

## 2023-02-16 RX ORDER — HEPARIN SODIUM (PORCINE) LOCK FLUSH IV SOLN 100 UNIT/ML 100 UNIT/ML
5 SOLUTION INTRAVENOUS
Status: CANCELLED | OUTPATIENT
Start: 2023-02-16

## 2023-02-16 RX ORDER — EPINEPHRINE 1 MG/ML
0.3 INJECTION, SOLUTION INTRAMUSCULAR; SUBCUTANEOUS EVERY 5 MIN PRN
Status: CANCELLED | OUTPATIENT
Start: 2023-02-16

## 2023-02-16 RX ORDER — ZOLEDRONIC ACID 5 MG/100ML
5 INJECTION, SOLUTION INTRAVENOUS ONCE
Status: CANCELLED
Start: 2023-02-16

## 2023-02-17 ENCOUNTER — LAB (OUTPATIENT)
Dept: LAB | Facility: CLINIC | Age: 88
End: 2023-02-17
Attending: INTERNAL MEDICINE
Payer: COMMERCIAL

## 2023-02-17 ENCOUNTER — INFUSION THERAPY VISIT (OUTPATIENT)
Dept: INFUSION THERAPY | Facility: CLINIC | Age: 88
End: 2023-02-17
Attending: INTERNAL MEDICINE
Payer: COMMERCIAL

## 2023-02-17 VITALS
WEIGHT: 104 LBS | SYSTOLIC BLOOD PRESSURE: 129 MMHG | TEMPERATURE: 98 F | BODY MASS INDEX: 21.74 KG/M2 | RESPIRATION RATE: 18 BRPM | OXYGEN SATURATION: 94 % | DIASTOLIC BLOOD PRESSURE: 83 MMHG | HEART RATE: 89 BPM

## 2023-02-17 DIAGNOSIS — M80.00XD AGE-RELATED OSTEOPOROSIS WITH CURRENT PATHOLOGICAL FRACTURE WITH ROUTINE HEALING, SUBSEQUENT ENCOUNTER: ICD-10-CM

## 2023-02-17 DIAGNOSIS — M81.0 AGE-RELATED OSTEOPOROSIS WITHOUT CURRENT PATHOLOGICAL FRACTURE: Primary | ICD-10-CM

## 2023-02-17 LAB
CALCIUM SERPL-MCNC: 9.3 MG/DL (ref 8.2–9.6)
CREAT SERPL-MCNC: 0.66 MG/DL (ref 0.51–0.95)
GFR SERPL CREATININE-BSD FRML MDRD: 82 ML/MIN/1.73M2

## 2023-02-17 PROCEDURE — 82565 ASSAY OF CREATININE: CPT | Performed by: INTERNAL MEDICINE

## 2023-02-17 PROCEDURE — 999N000248 HC STATISTIC IV INSERT WITH US BY RN

## 2023-02-17 PROCEDURE — 96365 THER/PROPH/DIAG IV INF INIT: CPT

## 2023-02-17 PROCEDURE — 82310 ASSAY OF CALCIUM: CPT | Performed by: INTERNAL MEDICINE

## 2023-02-17 PROCEDURE — 250N000011 HC RX IP 250 OP 636: Performed by: INTERNAL MEDICINE

## 2023-02-17 PROCEDURE — 36415 COLL VENOUS BLD VENIPUNCTURE: CPT | Performed by: INTERNAL MEDICINE

## 2023-02-17 PROCEDURE — 999N000285 HC STATISTIC VASC ACCESS LAB DRAW WITH PIV START

## 2023-02-17 RX ORDER — ZOLEDRONIC ACID 5 MG/100ML
5 INJECTION, SOLUTION INTRAVENOUS ONCE
Status: COMPLETED | OUTPATIENT
Start: 2023-02-17 | End: 2023-02-17

## 2023-02-17 RX ADMIN — ZOLEDRONIC ACID 5 MG: 0.05 INJECTION, SOLUTION INTRAVENOUS at 15:57

## 2023-02-17 ASSESSMENT — PAIN SCALES - GENERAL: PAINLEVEL: NO PAIN (0)

## 2023-02-17 NOTE — NURSING NOTE
Chief Complaint   Patient presents with     Blood Draw     Labs drawn by RN via PIV placed by VAT, vitals taken.     Labs drawn from PIV placed by VAT. Line flushed with saline. Vitals taken. Pt checked in for appointment(s).    Irlanda Evans RN

## 2023-02-17 NOTE — LETTER
2/17/2023         RE: Belia Sheets  [address]  [address]  [facility] [address]        Dear Colleague,    Thank you for referring your patient, Belia Sheets, to the [facility] ADVANCED TREATMENT [facility]. Please see a copy of my visit note below.    Nursing Note  Belia Sheets presents [facility] to [facility] for:   Chief Complaint   Patient presents with     Blood Draw     Labs drawn by RN via PIV placed by VAT, vitals taken.     Infusion     Reclast     During today's [facility] appointment, orders from Dr. Wiley were completed.  Frequency: yearly    Progress note:  Patient identification verified by name and date of birth.  Assessment completed.  Vitals recorded in Doc Flowsheets.  Patient was provided with education regarding medication/procedure and possible side effects.  Patient verbalized understanding.     present during visit today: Not Applicable.    Treatment Conditions: Non-applicable.    Premedications: were not ordered.    Drug Waste Record: No    Infusion length and rate:  infusion given over approximately 15 minutes    Labs: drawn today, prior to appointment in second floor lab.    Vascular access: peripheral IV was placed by vascular access nurse in second floor lab.    Is the next appt scheduled? No, yearly    Post Infusion Assessment:  Patient tolerated infusion without incident.     Discharge Plan:   Follow up plan of care with: ongoing infusions at [facility]. and ordering provider as scheduled.  Discharge instructions were reviewed with patient.  Patient/representative verbalized understanding of discharge instructions and all questions answered.  Patient discharged from [facility] in stable condition.    Luba Soto RN       Administrations This Visit     zoledronic Acid (RECLAST) infusion 5 mg     Admin Date  02/17/2023  Action  New Bag Dose  5 mg Rate  400 mL/hr Route  Intravenous Administered By  Luba Soto, TREVON                /83   Pulse 89   Temp 98  F (36.7  C)   Resp 18   Wt 47.2 kg (104 lb)   SpO2 94%   BMI 21.74 kg/m            Again, thank you for allowing me to participate in the care of your patient.        Sincerely,        [facility] Nurse

## 2023-02-17 NOTE — LETTER
Date:February 20, 2023      Provider requested that no letter be sent. Do not send.       [facility]

## 2023-02-17 NOTE — PROGRESS NOTES
Nursing Note  Belia Sheets presents today to [facility] for:   Chief Complaint   Patient presents with     Blood Draw     Labs drawn by RN via PIV placed by VAT, vitals taken.     Infusion     Reclast     During today's [facility] appointment, orders from Dr. Wiley were completed.  Frequency: yearly    Progress note:  Patient identification verified by name and date of birth.  Assessment completed.  Vitals recorded in Doc Flowsheets.  Patient was provided with education regarding medication/procedure and possible side effects.  Patient verbalized understanding.     present during visit today: Not Applicable.    Treatment Conditions: Non-applicable.    Premedications: were not ordered.    Drug Waste Record: No    Infusion length and rate:  infusion given over approximately 15 minutes    Labs: drawn today, prior to appointment in second floor lab.    Vascular access: peripheral IV was placed by vascular access nurse in second floor lab.    Is the next appt scheduled? No, yearly    Post Infusion Assessment:  Patient tolerated infusion without incident.     Discharge Plan:   Follow up plan of care with: ongoing infusions at [facility]. and ordering provider as scheduled.  Discharge instructions were reviewed with patient.  Patient/representative verbalized understanding of discharge instructions and all questions answered.  Patient discharged from [facility] in stable condition.    Luba Soto, TREVON       Administrations This Visit     zoledronic Acid (RECLAST) infusion 5 mg     Admin Date  02/17/2023 Action  New Bag Dose  5 mg Rate  400 mL/hr Route  Intravenous Administered By  Luba Soto, RN                /83   Pulse 89   Temp 98  F (36.7  C)   Resp 18   Wt 47.2 kg (104 lb)   SpO2 94%   BMI 21.74 kg/m

## 2023-02-21 ENCOUNTER — DOCUMENTATION ONLY (OUTPATIENT)
Dept: FAMILY MEDICINE | Facility: CLINIC | Age: 88
End: 2023-02-21
Payer: COMMERCIAL

## 2023-02-21 NOTE — PROGRESS NOTES
"When opening a documentation only encounter, be sure to enter in \"Chief Complaint\" Forms and in \" Comments\" Title of form, description if needed.    Sudeep is a 89 year old  female  Form received via: Fax  Form now resides in: Provider Ready    Nguyen Livingston MA                  "

## 2023-02-28 ENCOUNTER — DOCUMENTATION ONLY (OUTPATIENT)
Dept: FAMILY MEDICINE | Facility: CLINIC | Age: 88
End: 2023-02-28
Payer: COMMERCIAL

## 2023-03-02 NOTE — PROGRESS NOTES
Form has been completed by provider.     Form sent out via: Fax to [facility] at Fax Number: [phone]  Patient informed: N/A  Output date: March 2, 2023    Dinesh Hightower MA      **Please close the encounter**

## 2023-03-23 DIAGNOSIS — I10 ESSENTIAL HYPERTENSION: ICD-10-CM

## 2023-03-23 RX ORDER — AMLODIPINE BESYLATE 2.5 MG/1
2.5 TABLET ORAL DAILY
Qty: 90 TABLET | Refills: 3 | Status: SHIPPED | OUTPATIENT
Start: 2023-03-23 | End: 2023-04-17

## 2023-03-23 NOTE — TELEPHONE ENCOUNTER
[facility] phone call message- patient requesting a refill:    Full Medication Name: amLODIPine (NORVASC) 2.5 MG tablet        Pharmacy confirmed as     [facility], [address]  [address]  [facility] [address]  Phone: [phone] Fax: [phone]    : Yes    Additional Comments: The patient is requesting a refill.    OK to leave a message on voice mail? Yes    Primary language: English      needed? No    Call taken on March 23, 2023 at 8:15 AM by Bea Mejia

## 2023-03-26 ENCOUNTER — MYC MEDICAL ADVICE (OUTPATIENT)
Dept: FAMILY MEDICINE | Facility: CLINIC | Age: 88
End: 2023-03-26
Payer: COMMERCIAL

## 2023-03-26 DIAGNOSIS — R26.81 UNSTEADY GAIT WHEN WALKING: Primary | ICD-10-CM

## 2023-03-31 ENCOUNTER — TELEPHONE (OUTPATIENT)
Dept: FAMILY MEDICINE | Facility: CLINIC | Age: 88
End: 2023-03-31
Payer: COMMERCIAL

## 2023-03-31 ENCOUNTER — DOCUMENTATION ONLY (OUTPATIENT)
Dept: FAMILY MEDICINE | Facility: CLINIC | Age: 88
End: 2023-03-31
Payer: COMMERCIAL

## 2023-03-31 NOTE — TELEPHONE ENCOUNTER
[facility] phone call message - order or referral request for patient:     Order or referral being requested: Order    Ok, to delay PT until April 4th.      Additional Comments: Asia from [facility] [employer] called for verbal order to ok delay of PT until April 4th.    OK to leave a message on voice mail? Yes    Primary language: English      needed? No    Call taken on March 31, 2023 at 4:11 PM by Rivas Boateng

## 2023-03-31 NOTE — TELEPHONE ENCOUNTER
[facility] phone call message - order or referral request for patient:     Order or referral being requested: [facility] has accepted patient.  Need verbal orders for PT EVAL & treat dated 3/31/2023      Additional Comments: Augustina @ [phone]    OK to leave a message on voice mail? Yes    Primary language: English      needed? No    Call taken on March 31, 2023 at 8:56 AM by Carrie Guzman

## 2023-03-31 NOTE — TELEPHONE ENCOUNTER
Faxed referral to [facility] for physical therapy per MD request.    Carrie Guzman  Care Coordinator  [facility]  Phone:[phone]

## 2023-03-31 NOTE — TELEPHONE ENCOUNTER
"Office Visit-Follow up  HISTORY OF PRESENT ILLNESS:    Kathryn Banks is a 74 year old female who is seen in follow up for   Chief Complaint   Patient presents with     RECHECK     OPEN REDUCTION INTERNAL FIXATION LEFT HIP NAILING.  DOS: 7/3/16 (6 months postop)     Surgical Followup     PREOPERATIVE DIAGNOSIS:  Left hip displaced intertrochanteric fracture.  POSTOPERATIVE DIAGNOSIS:  Left hip displaced intertrochanteric fracture. PROCEDURE:  Closed reduction with internal fixation using intramedullary device.       Patient had called requesting a refill of her pain medicines. We had recognized that she was to return to discuss her hip progress.  Present symptoms: Patient feels as though there may have been slight worsening in her lateral hip pain since last office visit. She thinks the affect of the trochanteric injection that we gave previously is worn off. However in addition to the lateral hip pain she describes having some groin pain.  Treatments tried to this point: She had surgery for the intertrochanteric fracture 6 months ago. We did a trochanteric injection at least 4 months ago. She has been functioning at home with her .    REVIEW OF SYSTEMS  General: negative for, night sweats, dizziness, fatigue  Resp: No shortness of breath and no cough  CV: negative for chest pain, syncope or near-syncope  GI: negative for nausea, vomiting and diarrhea  : negative for dysuria and hematuria  Musculoskeletal: as above  Neurologic: negative for syncope   Hematologic: negative for bleeding disorder    Physical Exam:  Vitals: Pulse 88  Ht 1.626 m (5' 4\")  Wt 81.647 kg (180 lb)  BMI 30.88 kg/m2  BMI= Body mass index is 30.88 kg/(m^2).  Constitutional: healthy, alert and no acute distress   Psychiatric: mentation appears normal and affect normal/bright  NEURO: no focal deficits  SKIN: no excoriation or erythema. No signs of infection.    GAIT: With our assistance we started her and had her walk. She struggles " Called and gave VO again    Angeline Barnes RN     to lift the left leg and move it. However she is able to push herself up from a chair and initiate her ambulation with our assistance. As she is ambulating she did describes lateral hip pain but also groin discomfort.    JOINT/EXTREMITIES:     Supine examination reveals that she does not like the leg down into full extension. She gets about 5  short full extension at the hip joint proper. She does not like us to logroll the hip. When this is accomplished she describes groin discomfort.    IMAGING INTERPRETATION: We repeated her hip x-rays today. We carefully compared them to all of her previous views. On the AP x-ray today the femur is a few degrees more internally rotated than we had on any of her previous views. We can now see that the femoral head tried flanged nail has just penetrated subchondral bone. There does not appear to be any erosion into the acetabulum. The orientation of the fracture overall is unchanged.       Impression:      ICD-10-CM    1. Pain of left hip joint M25.552 XR Hip Left 2-3 Views   2. Trochanteric bursitis of left hip M70.62    3. Fracture of hip, left, closed, initial encounter (H) S72.002A        Status post left hip pinning for trochanteric fracture. She had some migration of the fracture fragments early on but now it appears as though the fragments healed and there is no further migration.  Today's x-ray does show mild penetration of the tri-flanged nail.  Her symptoms include trochanteric pain and some groin pain.    It is possible that the nail is irritating her hip joint. She more than likely has typical trochanteric pain following this type of fracture and fixation.  We did consider on last visit that she might have some spine intervening factors but I think this is less likely given our findings today.      Plan:   The above was reviewed with Kathryn and her .     We showed her the x-rays and we discussed them very carefully.  We then presented several different treatment  options.  The simplest of which is to reinject the trochanteric region.  More complex treatments would be to consider either selective hardware removal, complete hardware removal, complete hardware removal with abductor exploration and possible repair, complete hardware removal with conversion to total hip.    These were all discussed so that she knew what her options were.    At the end of the discussion it was chosen to simply repeat the trochanteric injection. We will use a response to this injection to try and decide which further treatment , if necessary, may be most appropriate.    Therefore with her consent in the decubitus position the left greater trochanteric region was injected in her standard fashion. Sterile technique was used. 3 cc of Celestone was injected diluted with 10 cc of Marcaine and 17 cc of lidocaine.    After letting the medicine worked for about 10 minutes we had her stand again and we walked with her. She describes improvement in pain but she still had groin and thigh discomfort.    Return to clinic 2, weeks, we will assess the benefit from this injection and decide on direction we might want to take    Lake Schulz MD

## 2023-04-03 ENCOUNTER — APPOINTMENT (OUTPATIENT)
Dept: CT IMAGING | Facility: CLINIC | Age: 88
DRG: 480 | End: 2023-04-03
Attending: EMERGENCY MEDICINE
Payer: COMMERCIAL

## 2023-04-03 ENCOUNTER — APPOINTMENT (OUTPATIENT)
Dept: GENERAL RADIOLOGY | Facility: CLINIC | Age: 88
DRG: 480 | End: 2023-04-03
Payer: COMMERCIAL

## 2023-04-03 ENCOUNTER — HOSPITAL ENCOUNTER (INPATIENT)
Facility: CLINIC | Age: 88
LOS: 5 days | Discharge: NURSING, REHAB OR LONG-TERM CARE | DRG: 480 | End: 2023-04-08
Attending: EMERGENCY MEDICINE | Admitting: PEDIATRICS
Payer: COMMERCIAL

## 2023-04-03 ENCOUNTER — DOCUMENTATION ONLY (OUTPATIENT)
Dept: FAMILY MEDICINE | Facility: CLINIC | Age: 88
End: 2023-04-03
Payer: COMMERCIAL

## 2023-04-03 ENCOUNTER — APPOINTMENT (OUTPATIENT)
Dept: GENERAL RADIOLOGY | Facility: CLINIC | Age: 88
DRG: 480 | End: 2023-04-03
Attending: EMERGENCY MEDICINE
Payer: COMMERCIAL

## 2023-04-03 DIAGNOSIS — S32.010A COMPRESSION FRACTURE OF L1 VERTEBRA, INITIAL ENCOUNTER (H): ICD-10-CM

## 2023-04-03 DIAGNOSIS — S32.019A CLOSED FRACTURE OF FIRST LUMBAR VERTEBRA, UNSPECIFIED FRACTURE MORPHOLOGY, INITIAL ENCOUNTER (H): ICD-10-CM

## 2023-04-03 DIAGNOSIS — S72.141A CLOSED DISPLACED INTERTROCHANTERIC FRACTURE OF RIGHT FEMUR, INITIAL ENCOUNTER (H): ICD-10-CM

## 2023-04-03 DIAGNOSIS — G31.84 MILD COGNITIVE IMPAIRMENT: ICD-10-CM

## 2023-04-03 DIAGNOSIS — S32.501A CLOSED FRACTURE OF RIGHT PUBIS, UNSPECIFIED PORTION OF PUBIS, INITIAL ENCOUNTER (H): ICD-10-CM
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
## 2023-04-03 DIAGNOSIS — S72.001A CLOSED FRACTURE OF RIGHT HIP, INITIAL ENCOUNTER (H): Primary | ICD-10-CM

## 2023-04-03 DIAGNOSIS — K59.00 CONSTIPATION, UNSPECIFIED CONSTIPATION TYPE: ICD-10-CM

## 2023-04-03 DIAGNOSIS — S32.010A COMPRESSION FRACTURE OF L1 LUMBAR VERTEBRA, CLOSED, INITIAL ENCOUNTER (H): ICD-10-CM

## 2023-04-03 LAB
ALBUMIN SERPL BCG-MCNC: 3.8 G/DL (ref 3.5–5.2)
ALBUMIN UR-MCNC: 20 MG/DL
ALP SERPL-CCNC: 53 U/L (ref 35–104)
ALT SERPL W P-5'-P-CCNC: 11 U/L (ref 10–35)
ANION GAP SERPL CALCULATED.3IONS-SCNC: 14 MMOL/L (ref 7–15)
APPEARANCE UR: CLEAR
APTT PPP: 25 SECONDS (ref 22–38)
AST SERPL W P-5'-P-CCNC: 22 U/L (ref 10–35)
ATRIAL RATE - MUSE: 75 BPM
BASOPHILS # BLD AUTO: 0.1 10E3/UL (ref 0–0.2)
BASOPHILS NFR BLD AUTO: 1 %
BILIRUB SERPL-MCNC: 0.5 MG/DL
BILIRUB UR QL STRIP: NEGATIVE
BUN SERPL-MCNC: 22.1 MG/DL (ref 8–23)
CALCIUM SERPL-MCNC: 9.9 MG/DL (ref 8.2–9.6)
CHLORIDE SERPL-SCNC: 104 MMOL/L (ref 98–107)
CK SERPL-CCNC: 52 U/L (ref 26–192)
COLOR UR AUTO: YELLOW
CREAT BLD-MCNC: 1 MG/DL (ref 0.5–1)
CREAT SERPL-MCNC: 0.9 MG/DL (ref 0.51–0.95)
DEPRECATED HCO3 PLAS-SCNC: 22 MMOL/L (ref 22–29)
DIASTOLIC BLOOD PRESSURE - MUSE: NORMAL MMHG
EOSINOPHIL # BLD AUTO: 0.5 10E3/UL (ref 0–0.7)
EOSINOPHIL NFR BLD AUTO: 5 %
ERYTHROCYTE [DISTWIDTH] IN BLOOD BY AUTOMATED COUNT: 13.2 % (ref 10–15)
GFR SERPL CREATININE-BSD FRML MDRD: 53 ML/MIN/1.73M2
GFR SERPL CREATININE-BSD FRML MDRD: 60 ML/MIN/1.73M2
GLUCOSE BLDC GLUCOMTR-MCNC: 109 MG/DL (ref 70–99)
GLUCOSE SERPL-MCNC: 107 MG/DL (ref 70–99)
GLUCOSE UR STRIP-MCNC: NEGATIVE MG/DL
HCO3 BLDV-SCNC: 27 MMOL/L (ref 21–28)
HCT VFR BLD AUTO: 42.5 % (ref 35–47)
HGB BLD-MCNC: 13.4 G/DL (ref 11.7–15.7)
HGB UR QL STRIP: NEGATIVE
HOLD SPECIMEN: NORMAL
HYALINE CASTS: 7 /LPF
IMM GRANULOCYTES # BLD: 0.1 10E3/UL
IMM GRANULOCYTES NFR BLD: 1 %
INR BLD: 1.2 (ref 2–3)
INR PPP: 0.99 (ref 0.85–1.15)
INTERPRETATION ECG - MUSE: NORMAL
KETONES UR STRIP-MCNC: NEGATIVE MG/DL
LACTATE BLD-SCNC: 2.1 MMOL/L
LACTATE SERPL-SCNC: 2.4 MMOL/L (ref 0.7–2)
LEUKOCYTE ESTERASE UR QL STRIP: NEGATIVE
LYMPHOCYTES # BLD AUTO: 2.1 10E3/UL (ref 0.8–5.3)
LYMPHOCYTES NFR BLD AUTO: 18 %
MAGNESIUM SERPL-MCNC: 1.8 MG/DL (ref 1.7–2.3)
MCH RBC QN AUTO: 28.5 PG (ref 26.5–33)
MCHC RBC AUTO-ENTMCNC: 31.5 G/DL (ref 31.5–36.5)
MCV RBC AUTO: 90 FL (ref 78–100)
MONOCYTES # BLD AUTO: 0.7 10E3/UL (ref 0–1.3)
MONOCYTES NFR BLD AUTO: 6 %
MUCOUS THREADS #/AREA URNS LPF: PRESENT /LPF
NEUTROPHILS # BLD AUTO: 8.1 10E3/UL (ref 1.6–8.3)
NEUTROPHILS NFR BLD AUTO: 69 %
NITRATE UR QL: NEGATIVE
NRBC # BLD AUTO: 0 10E3/UL
NRBC BLD AUTO-RTO: 0 /100
P AXIS - MUSE: 99 DEGREES
PCO2 BLDV: 44 MM HG (ref 40–50)
PH BLDV: 7.39 [PH] (ref 7.32–7.43)
PH UR STRIP: 6.5 [PH] (ref 5–7)
PHOSPHATE SERPL-MCNC: 3 MG/DL (ref 2.5–4.5)
PLATELET # BLD AUTO: 218 10E3/UL (ref 150–450)
PO2 BLDV: 46 MM HG (ref 25–47)
POTASSIUM SERPL-SCNC: 4 MMOL/L (ref 3.4–5.3)
PR INTERVAL - MUSE: 202 MS
PROT SERPL-MCNC: 6.7 G/DL (ref 6.4–8.3)
QRS DURATION - MUSE: 86 MS
QT - MUSE: 426 MS
QTC - MUSE: 475 MS
R AXIS - MUSE: 22 DEGREES
RADIOLOGIST FLAGS: ABNORMAL
RADIOLOGIST FLAGS: ABNORMAL
RBC # BLD AUTO: 4.71 10E6/UL (ref 3.8–5.2)
RBC URINE: 8 /HPF
SAO2 % BLDV: 81 % (ref 94–100)
SODIUM SERPL-SCNC: 140 MMOL/L (ref 136–145)
SP GR UR STRIP: 1.02 (ref 1–1.03)
SYSTOLIC BLOOD PRESSURE - MUSE: NORMAL MMHG
T AXIS - MUSE: 66 DEGREES
UROBILINOGEN UR STRIP-MCNC: NORMAL MG/DL
VENTRICULAR RATE- MUSE: 75 BPM
WBC # BLD AUTO: 11.5 10E3/UL (ref 4–11)
WBC URINE: 1 /HPF

## 2023-04-03 PROCEDURE — 71045 X-RAY EXAM CHEST 1 VIEW: CPT

## 2023-04-03 PROCEDURE — 83735 ASSAY OF MAGNESIUM: CPT | Performed by: EMERGENCY MEDICINE

## 2023-04-03 PROCEDURE — 73502 X-RAY EXAM HIP UNI 2-3 VIEWS: CPT

## 2023-04-03 PROCEDURE — 99285 EMERGENCY DEPT VISIT HI MDM: CPT | Mod: 25 | Performed by: EMERGENCY MEDICINE

## 2023-04-03 PROCEDURE — 93308 TTE F-UP OR LMTD: CPT | Mod: 26 | Performed by: EMERGENCY MEDICINE

## 2023-04-03 PROCEDURE — 72131 CT LUMBAR SPINE W/O DYE: CPT | Mod: 26 | Performed by: RADIOLOGY

## 2023-04-03 PROCEDURE — 80053 COMPREHEN METABOLIC PANEL: CPT | Performed by: EMERGENCY MEDICINE

## 2023-04-03 PROCEDURE — 76705 ECHO EXAM OF ABDOMEN: CPT | Performed by: EMERGENCY MEDICINE

## 2023-04-03 PROCEDURE — 71045 X-RAY EXAM CHEST 1 VIEW: CPT | Mod: 26 | Performed by: RADIOLOGY

## 2023-04-03 PROCEDURE — 73552 X-RAY EXAM OF FEMUR 2/>: CPT | Mod: 26 | Performed by: RADIOLOGY

## 2023-04-03 PROCEDURE — 76705 ECHO EXAM OF ABDOMEN: CPT | Mod: 26 | Performed by: EMERGENCY MEDICINE

## 2023-04-03 PROCEDURE — 76604 US EXAM CHEST: CPT | Mod: 26 | Performed by: EMERGENCY MEDICINE

## 2023-04-03 PROCEDURE — 83605 ASSAY OF LACTIC ACID: CPT

## 2023-04-03 PROCEDURE — 85004 AUTOMATED DIFF WBC COUNT: CPT | Performed by: EMERGENCY MEDICINE

## 2023-04-03 PROCEDURE — 70450 CT HEAD/BRAIN W/O DYE: CPT

## 2023-04-03 PROCEDURE — 682N000003 HC TRAUMA EVALUATION W/O CC LEVEL II: Performed by: EMERGENCY MEDICINE

## 2023-04-03 PROCEDURE — 83605 ASSAY OF LACTIC ACID: CPT | Performed by: EMERGENCY MEDICINE

## 2023-04-03 PROCEDURE — 82550 ASSAY OF CK (CPK): CPT | Performed by: EMERGENCY MEDICINE

## 2023-04-03 PROCEDURE — 93308 TTE F-UP OR LMTD: CPT | Performed by: EMERGENCY MEDICINE

## 2023-04-03 PROCEDURE — 250N000011 HC RX IP 250 OP 636: Performed by: EMERGENCY MEDICINE

## 2023-04-03 PROCEDURE — 258N000003 HC RX IP 258 OP 636: Performed by: EMERGENCY MEDICINE

## 2023-04-03 PROCEDURE — 84100 ASSAY OF PHOSPHORUS: CPT | Performed by: EMERGENCY MEDICINE

## 2023-04-03 PROCEDURE — 93010 ELECTROCARDIOGRAM REPORT: CPT | Mod: 59 | Performed by: EMERGENCY MEDICINE

## 2023-04-03 PROCEDURE — 76604 US EXAM CHEST: CPT | Performed by: EMERGENCY MEDICINE

## 2023-04-03 PROCEDURE — 70450 CT HEAD/BRAIN W/O DYE: CPT | Mod: 26 | Performed by: RADIOLOGY

## 2023-04-03 PROCEDURE — 85610 PROTHROMBIN TIME: CPT

## 2023-04-03 PROCEDURE — 72128 CT CHEST SPINE W/O DYE: CPT | Mod: 26 | Performed by: RADIOLOGY

## 2023-04-03 PROCEDURE — 85730 THROMBOPLASTIN TIME PARTIAL: CPT | Performed by: EMERGENCY MEDICINE

## 2023-04-03 PROCEDURE — 72131 CT LUMBAR SPINE W/O DYE: CPT

## 2023-04-03 PROCEDURE — 72128 CT CHEST SPINE W/O DYE: CPT

## 2023-04-03 PROCEDURE — 96361 HYDRATE IV INFUSION ADD-ON: CPT | Performed by: EMERGENCY MEDICINE

## 2023-04-03 PROCEDURE — 72125 CT NECK SPINE W/O DYE: CPT

## 2023-04-03 PROCEDURE — 73501 X-RAY EXAM HIP UNI 1 VIEW: CPT | Mod: 26 | Performed by: RADIOLOGY

## 2023-04-03 PROCEDURE — 85610 PROTHROMBIN TIME: CPT | Performed by: EMERGENCY MEDICINE

## 2023-04-03 PROCEDURE — 36415 COLL VENOUS BLD VENIPUNCTURE: CPT | Performed by: EMERGENCY MEDICINE

## 2023-04-03 PROCEDURE — 93005 ELECTROCARDIOGRAM TRACING: CPT | Performed by: EMERGENCY MEDICINE

## 2023-04-03 PROCEDURE — 96376 TX/PRO/DX INJ SAME DRUG ADON: CPT | Performed by: EMERGENCY MEDICINE

## 2023-04-03 PROCEDURE — 120N000002 HC R&B MED SURG/OB UMMC

## 2023-04-03 PROCEDURE — 72125 CT NECK SPINE W/O DYE: CPT | Mod: 26 | Performed by: RADIOLOGY

## 2023-04-03 PROCEDURE — 82962 GLUCOSE BLOOD TEST: CPT

## 2023-04-03 PROCEDURE — 82565 ASSAY OF CREATININE: CPT

## 2023-04-03 PROCEDURE — 96375 TX/PRO/DX INJ NEW DRUG ADDON: CPT | Performed by: EMERGENCY MEDICINE

## 2023-04-03 PROCEDURE — 81001 URINALYSIS AUTO W/SCOPE: CPT | Performed by: EMERGENCY MEDICINE

## 2023-04-03 PROCEDURE — 73552 X-RAY EXAM OF FEMUR 2/>: CPT | Mod: RT

## 2023-04-03 PROCEDURE — 82803 BLOOD GASES ANY COMBINATION: CPT

## 2023-04-03 RX ORDER — PROCHLORPERAZINE MALEATE 5 MG
5 TABLET ORAL EVERY 6 HOURS PRN
Status: DISCONTINUED | OUTPATIENT
Start: 2023-04-03 | End: 2023-04-08 | Stop reason: HOSPADM

## 2023-04-03 RX ORDER — POLYETHYLENE GLYCOL 3350 17 G/17G
17 POWDER, FOR SOLUTION ORAL DAILY PRN
Status: DISCONTINUED | OUTPATIENT
Start: 2023-04-03 | End: 2023-04-08 | Stop reason: HOSPADM

## 2023-04-03 RX ORDER — OXYCODONE HYDROCHLORIDE 5 MG/1
5 TABLET ORAL EVERY 4 HOURS PRN
Status: DISCONTINUED | OUTPATIENT
Start: 2023-04-03 | End: 2023-04-07

## 2023-04-03 RX ORDER — AMOXICILLIN 250 MG
1-2 CAPSULE ORAL 2 TIMES DAILY
Status: DISCONTINUED | OUTPATIENT
Start: 2023-04-03 | End: 2023-04-08 | Stop reason: HOSPADM

## 2023-04-03 RX ORDER — DEXTROSE MONOHYDRATE, SODIUM CHLORIDE, AND POTASSIUM CHLORIDE 50; 1.49; 4.5 G/1000ML; G/1000ML; G/1000ML
INJECTION, SOLUTION INTRAVENOUS CONTINUOUS
Status: DISCONTINUED | OUTPATIENT
Start: 2023-04-03 | End: 2023-04-04 | Stop reason: DRUGHIGH

## 2023-04-03 RX ORDER — FENTANYL CITRATE 50 UG/ML
25 INJECTION, SOLUTION INTRAMUSCULAR; INTRAVENOUS
Status: DISCONTINUED | OUTPATIENT
Start: 2023-04-03 | End: 2023-04-04

## 2023-04-03 RX ORDER — ACETAMINOPHEN 325 MG/1
975 TABLET ORAL 3 TIMES DAILY
Status: DISCONTINUED | OUTPATIENT
Start: 2023-04-04 | End: 2023-04-08 | Stop reason: HOSPADM

## 2023-04-03 RX ORDER — FENTANYL CITRATE 50 UG/ML
25 INJECTION, SOLUTION INTRAMUSCULAR; INTRAVENOUS ONCE
Status: COMPLETED | OUTPATIENT
Start: 2023-04-03 | End: 2023-04-03

## 2023-04-03 RX ORDER — SODIUM CHLORIDE, SODIUM LACTATE, POTASSIUM CHLORIDE, CALCIUM CHLORIDE 600; 310; 30; 20 MG/100ML; MG/100ML; MG/100ML; MG/100ML
1000 INJECTION, SOLUTION INTRAVENOUS CONTINUOUS
Status: DISCONTINUED | OUTPATIENT
Start: 2023-04-03 | End: 2023-04-04 | Stop reason: DRUGHIGH

## 2023-04-03 RX ORDER — PROCHLORPERAZINE 25 MG
12.5 SUPPOSITORY, RECTAL RECTAL EVERY 12 HOURS PRN
Status: DISCONTINUED | OUTPATIENT
Start: 2023-04-03 | End: 2023-04-08 | Stop reason: HOSPADM

## 2023-04-03 RX ADMIN — FENTANYL CITRATE 25 MCG: 50 INJECTION, SOLUTION INTRAMUSCULAR; INTRAVENOUS at 22:39

## 2023-04-03 RX ADMIN — SODIUM CHLORIDE, POTASSIUM CHLORIDE, SODIUM LACTATE AND CALCIUM CHLORIDE 1000 ML: 600; 310; 30; 20 INJECTION, SOLUTION INTRAVENOUS at 21:15

## 2023-04-03 RX ADMIN — FENTANYL CITRATE 25 MCG: 50 INJECTION, SOLUTION INTRAMUSCULAR; INTRAVENOUS at 18:50

## 2023-04-03 ASSESSMENT — ACTIVITIES OF DAILY LIVING (ADL)
ADLS_ACUITY_SCORE: 35

## 2023-04-03 NOTE — LETTER
Health Information Management Services               Recipient:  [facility]          Sender:  VIPIN Werner  [phone]          Date: April 8, 2023  Patient Name:  Belia Sheets  Patient YOB: 1930  Routing Message:  Discharge orders for [facility]  Hard scripts to follow.          The documents accompanying this notice contain confidential information belonging to the sender.  This information is intended only for the use of the individual or entity named above.  The authorized recipient of this information is prohibited from disclosing this information to any other party and is required to destroy the information after its stated need has been fulfilled, unless otherwise required by state law.      If you are not the intended recipient, you are hereby notified that any disclosure, copy, distribution or action taken in reliance on the contents of these documents is strictly prohibited.  If you have received this document in error, please return it by fax to [phone] with a note on the cover sheet explaining why you are returning it (e.g. not your patient, not your provider, etc.).  If you need further assistance, please call [facility] Centralized Transcription at [phone].  Documents may also be returned by mail to [employer], , [facility] [address], [place], [place] [address].

## 2023-04-03 NOTE — ED NOTES
Bed: ED03  Expected date:   Expected time:   Means of arrival:   Comments:  H449  89F  Fall w/hip pain

## 2023-04-03 NOTE — ED TRIAGE NOTES
Fall. Lives in an independent living facility with no services. Unknown what time today she fell. She is confused. EMS reports patient smelling of urine. EMS reports blood sugar is 138 prior to arrival. Patient complains of right hip pain and confusion. She is alert. Unknown mechanism of fall. Patient is unable to verbalize how her fall occurred or how she ended up on the floor.

## 2023-04-03 NOTE — PROGRESS NOTES
"When opening a documentation only encounter, be sure to enter in \"Chief Complaint\" Forms and in \" Comments\" Title of form, description if needed.    Sudeep is a 89 year old  female  Form received via: Fax  Form now resides in: Provider Ready    Dinesh Hightower MA                  "

## 2023-04-04 ENCOUNTER — ANESTHESIA EVENT (OUTPATIENT)
Dept: SURGERY | Facility: CLINIC | Age: 88
DRG: 480 | End: 2023-04-04
Payer: COMMERCIAL

## 2023-04-04 ENCOUNTER — APPOINTMENT (OUTPATIENT)
Dept: GENERAL RADIOLOGY | Facility: CLINIC | Age: 88
DRG: 480 | End: 2023-04-04
Payer: COMMERCIAL

## 2023-04-04 ENCOUNTER — ANESTHESIA (OUTPATIENT)
Dept: SURGERY | Facility: CLINIC | Age: 88
DRG: 480 | End: 2023-04-04
Payer: COMMERCIAL

## 2023-04-04 ENCOUNTER — APPOINTMENT (OUTPATIENT)
Dept: GENERAL RADIOLOGY | Facility: CLINIC | Age: 88
DRG: 480 | End: 2023-04-04
Attending: ORTHOPAEDIC SURGERY
Payer: COMMERCIAL

## 2023-04-04 LAB
ABO/RH(D): NORMAL
ANTIBODY SCREEN: NEGATIVE
HOLD SPECIMEN: NORMAL
LACTATE SERPL-SCNC: 1.4 MMOL/L (ref 0.7–2)
SPECIMEN EXPIRATION DATE: NORMAL

## 2023-04-04 PROCEDURE — 73552 X-RAY EXAM OF FEMUR 2/>: CPT | Mod: 26 | Performed by: ORTHOPAEDIC SURGERY

## 2023-04-04 PROCEDURE — 999N000065 XR PELVIS PORT 1/2 VIEWS

## 2023-04-04 PROCEDURE — 999N000141 HC STATISTIC PRE-PROCEDURE NURSING ASSESSMENT: Performed by: ORTHOPAEDIC SURGERY

## 2023-04-04 PROCEDURE — 710N000010 HC RECOVERY PHASE 1, LEVEL 2, PER MIN: Performed by: ORTHOPAEDIC SURGERY

## 2023-04-04 PROCEDURE — 120N000002 HC R&B MED SURG/OB UMMC

## 2023-04-04 PROCEDURE — 258N000003 HC RX IP 258 OP 636: Performed by: INTERNAL MEDICINE

## 2023-04-04 PROCEDURE — 250N000009 HC RX 250: Performed by: NURSE ANESTHETIST, CERTIFIED REGISTERED

## 2023-04-04 PROCEDURE — 250N000013 HC RX MED GY IP 250 OP 250 PS 637: Performed by: STUDENT IN AN ORGANIZED HEALTH CARE EDUCATION/TRAINING PROGRAM

## 2023-04-04 PROCEDURE — 27245 TREAT THIGH FRACTURE: CPT | Mod: RT | Performed by: ORTHOPAEDIC SURGERY

## 2023-04-04 PROCEDURE — 250N000011 HC RX IP 250 OP 636: Performed by: NURSE ANESTHETIST, CERTIFIED REGISTERED

## 2023-04-04 PROCEDURE — 272N000001 HC OR GENERAL SUPPLY STERILE: Performed by: ORTHOPAEDIC SURGERY

## 2023-04-04 PROCEDURE — 272N000002 HC OR SUPPLY OTHER OPNP: Performed by: ORTHOPAEDIC SURGERY

## 2023-04-04 PROCEDURE — C1713 ANCHOR/SCREW BN/BN,TIS/BN: HCPCS | Performed by: ORTHOPAEDIC SURGERY

## 2023-04-04 PROCEDURE — 99222 1ST HOSP IP/OBS MODERATE 55: CPT | Performed by: PEDIATRICS

## 2023-04-04 PROCEDURE — 250N000011 HC RX IP 250 OP 636: Performed by: EMERGENCY MEDICINE

## 2023-04-04 PROCEDURE — 250N000013 HC RX MED GY IP 250 OP 250 PS 637: Performed by: INTERNAL MEDICINE

## 2023-04-04 PROCEDURE — 370N000017 HC ANESTHESIA TECHNICAL FEE, PER MIN: Performed by: ORTHOPAEDIC SURGERY

## 2023-04-04 PROCEDURE — 0QS606Z REPOSITION RIGHT UPPER FEMUR WITH INTRAMEDULLARY INTERNAL FIXATION DEVICE, OPEN APPROACH: ICD-10-PCS | Performed by: ORTHOPAEDIC SURGERY

## 2023-04-04 PROCEDURE — 36415 COLL VENOUS BLD VENIPUNCTURE: CPT | Performed by: EMERGENCY MEDICINE

## 2023-04-04 PROCEDURE — 86850 RBC ANTIBODY SCREEN: CPT | Performed by: SURGERY

## 2023-04-04 PROCEDURE — 999N000180 XR SURGERY CARM FLUORO LESS THAN 5 MIN: Mod: TC

## 2023-04-04 PROCEDURE — 250N000025 HC SEVOFLURANE, PER MIN: Performed by: ORTHOPAEDIC SURGERY

## 2023-04-04 PROCEDURE — 360N000083 HC SURGERY LEVEL 3 W/ FLUORO, PER MIN: Performed by: ORTHOPAEDIC SURGERY

## 2023-04-04 PROCEDURE — 258N000003 HC RX IP 258 OP 636: Performed by: NURSE ANESTHETIST, CERTIFIED REGISTERED

## 2023-04-04 PROCEDURE — 250N000011 HC RX IP 250 OP 636: Performed by: STUDENT IN AN ORGANIZED HEALTH CARE EDUCATION/TRAINING PROGRAM

## 2023-04-04 PROCEDURE — 999N000065 XR FEMUR PORT RIGHT 2 VIEWS: Mod: RT

## 2023-04-04 PROCEDURE — 250N000009 HC RX 250: Performed by: ORTHOPAEDIC SURGERY

## 2023-04-04 PROCEDURE — 258N000003 HC RX IP 258 OP 636: Performed by: STUDENT IN AN ORGANIZED HEALTH CARE EDUCATION/TRAINING PROGRAM

## 2023-04-04 PROCEDURE — 83605 ASSAY OF LACTIC ACID: CPT | Performed by: EMERGENCY MEDICINE

## 2023-04-04 PROCEDURE — 96365 THER/PROPH/DIAG IV INF INIT: CPT | Performed by: EMERGENCY MEDICINE

## 2023-04-04 DEVICE — LAG SCREW, TI
Type: IMPLANTABLE DEVICE | Site: LEG | Status: FUNCTIONAL
Brand: GAMMA

## 2023-04-04 DEVICE — LONG NAIL KIT R1.5, TI, RIGHT
Type: IMPLANTABLE DEVICE | Site: LEG | Status: FUNCTIONAL
Brand: GAMMA

## 2023-04-04 DEVICE — LOCKING SCREW, FULLY THREADED: Type: IMPLANTABLE DEVICE | Site: LEG | Status: FUNCTIONAL

## 2023-04-04 RX ORDER — NALOXONE HYDROCHLORIDE 0.4 MG/ML
0.2 INJECTION, SOLUTION INTRAMUSCULAR; INTRAVENOUS; SUBCUTANEOUS
Status: DISCONTINUED | OUTPATIENT
Start: 2023-04-04 | End: 2023-04-08 | Stop reason: HOSPADM

## 2023-04-04 RX ORDER — ONDANSETRON 2 MG/ML
INJECTION INTRAMUSCULAR; INTRAVENOUS PRN
Status: DISCONTINUED | OUTPATIENT
Start: 2023-04-04 | End: 2023-04-04

## 2023-04-04 RX ORDER — LORAZEPAM 2 MG/ML
0.5 INJECTION INTRAMUSCULAR
Status: DISCONTINUED | OUTPATIENT
Start: 2023-04-04 | End: 2023-04-07

## 2023-04-04 RX ORDER — ACETAMINOPHEN 325 MG/1
975 TABLET ORAL ONCE
Status: DISCONTINUED | OUTPATIENT
Start: 2023-04-04 | End: 2023-04-04 | Stop reason: HOSPADM

## 2023-04-04 RX ORDER — SODIUM CHLORIDE, SODIUM LACTATE, POTASSIUM CHLORIDE, CALCIUM CHLORIDE 600; 310; 30; 20 MG/100ML; MG/100ML; MG/100ML; MG/100ML
INJECTION, SOLUTION INTRAVENOUS CONTINUOUS
Status: DISCONTINUED | OUTPATIENT
Start: 2023-04-04 | End: 2023-04-04 | Stop reason: HOSPADM

## 2023-04-04 RX ORDER — FENTANYL CITRATE 50 UG/ML
25 INJECTION, SOLUTION INTRAMUSCULAR; INTRAVENOUS
Status: DISCONTINUED | OUTPATIENT
Start: 2023-04-04 | End: 2023-04-04

## 2023-04-04 RX ORDER — LIDOCAINE 40 MG/G
CREAM TOPICAL
Status: DISCONTINUED | OUTPATIENT
Start: 2023-04-04 | End: 2023-04-04 | Stop reason: HOSPADM

## 2023-04-04 RX ORDER — HYDROMORPHONE HCL IN WATER/PF 6 MG/30 ML
.2-.4 PATIENT CONTROLLED ANALGESIA SYRINGE INTRAVENOUS
Status: DISCONTINUED | OUTPATIENT
Start: 2023-04-04 | End: 2023-04-06

## 2023-04-04 RX ORDER — FENTANYL CITRATE 50 UG/ML
25 INJECTION, SOLUTION INTRAMUSCULAR; INTRAVENOUS EVERY 5 MIN PRN
Status: DISCONTINUED | OUTPATIENT
Start: 2023-04-04 | End: 2023-04-04

## 2023-04-04 RX ORDER — ENOXAPARIN SODIUM 100 MG/ML
30 INJECTION SUBCUTANEOUS EVERY 24 HOURS
Status: DISCONTINUED | OUTPATIENT
Start: 2023-04-05 | End: 2023-04-08 | Stop reason: HOSPADM

## 2023-04-04 RX ORDER — KETAMINE HYDROCHLORIDE 10 MG/ML
INJECTION INTRAMUSCULAR; INTRAVENOUS PRN
Status: DISCONTINUED | OUTPATIENT
Start: 2023-04-04 | End: 2023-04-04

## 2023-04-04 RX ORDER — ONDANSETRON 4 MG/1
4 TABLET, ORALLY DISINTEGRATING ORAL EVERY 30 MIN PRN
Status: DISCONTINUED | OUTPATIENT
Start: 2023-04-04 | End: 2023-04-04

## 2023-04-04 RX ORDER — PROPOFOL 10 MG/ML
INJECTION, EMULSION INTRAVENOUS PRN
Status: DISCONTINUED | OUTPATIENT
Start: 2023-04-04 | End: 2023-04-04

## 2023-04-04 RX ORDER — BUPIVACAINE HYDROCHLORIDE 5 MG/ML
INJECTION, SOLUTION PERINEURAL PRN
Status: DISCONTINUED | OUTPATIENT
Start: 2023-04-04 | End: 2023-04-04 | Stop reason: HOSPADM

## 2023-04-04 RX ORDER — NALOXONE HYDROCHLORIDE 0.4 MG/ML
0.4 INJECTION, SOLUTION INTRAMUSCULAR; INTRAVENOUS; SUBCUTANEOUS
Status: DISCONTINUED | OUTPATIENT
Start: 2023-04-04 | End: 2023-04-08 | Stop reason: HOSPADM

## 2023-04-04 RX ORDER — ALBUTEROL SULFATE 0.83 MG/ML
2.5 SOLUTION RESPIRATORY (INHALATION) EVERY 4 HOURS PRN
Status: DISCONTINUED | OUTPATIENT
Start: 2023-04-04 | End: 2023-04-04

## 2023-04-04 RX ORDER — ACETAMINOPHEN 325 MG/1
975 TABLET ORAL ONCE
Status: DISCONTINUED | OUTPATIENT
Start: 2023-04-04 | End: 2023-04-04

## 2023-04-04 RX ORDER — ENOXAPARIN SODIUM 300 MG/3ML
0.5 INJECTION INTRAVENOUS; SUBCUTANEOUS EVERY 24 HOURS
Status: DISCONTINUED | OUTPATIENT
Start: 2023-04-04 | End: 2023-04-04

## 2023-04-04 RX ORDER — DEXAMETHASONE SODIUM PHOSPHATE 4 MG/ML
4 INJECTION, SOLUTION INTRA-ARTICULAR; INTRALESIONAL; INTRAMUSCULAR; INTRAVENOUS; SOFT TISSUE
Status: DISCONTINUED | OUTPATIENT
Start: 2023-04-04 | End: 2023-04-04

## 2023-04-04 RX ORDER — PROPOFOL 10 MG/ML
INJECTION, EMULSION INTRAVENOUS CONTINUOUS PRN
Status: DISCONTINUED | OUTPATIENT
Start: 2023-04-04 | End: 2023-04-04

## 2023-04-04 RX ORDER — HYDROMORPHONE HYDROCHLORIDE 1 MG/ML
0.4 INJECTION, SOLUTION INTRAMUSCULAR; INTRAVENOUS; SUBCUTANEOUS EVERY 5 MIN PRN
Status: DISCONTINUED | OUTPATIENT
Start: 2023-04-04 | End: 2023-04-04

## 2023-04-04 RX ORDER — LABETALOL HYDROCHLORIDE 5 MG/ML
10 INJECTION, SOLUTION INTRAVENOUS
Status: DISCONTINUED | OUTPATIENT
Start: 2023-04-04 | End: 2023-04-04

## 2023-04-04 RX ORDER — OXYCODONE HYDROCHLORIDE 10 MG/1
10 TABLET ORAL
Status: DISCONTINUED | OUTPATIENT
Start: 2023-04-04 | End: 2023-04-04

## 2023-04-04 RX ORDER — LORAZEPAM 2 MG/ML
.5-1 INJECTION INTRAMUSCULAR
Status: DISCONTINUED | OUTPATIENT
Start: 2023-04-04 | End: 2023-04-04

## 2023-04-04 RX ORDER — SODIUM CHLORIDE, SODIUM LACTATE, POTASSIUM CHLORIDE, CALCIUM CHLORIDE 600; 310; 30; 20 MG/100ML; MG/100ML; MG/100ML; MG/100ML
INJECTION, SOLUTION INTRAVENOUS CONTINUOUS
Status: DISCONTINUED | OUTPATIENT
Start: 2023-04-04 | End: 2023-04-06

## 2023-04-04 RX ORDER — CEFAZOLIN SODIUM/WATER 2 G/20 ML
2 SYRINGE (ML) INTRAVENOUS
Status: COMPLETED | OUTPATIENT
Start: 2023-04-04 | End: 2023-04-04

## 2023-04-04 RX ORDER — ONDANSETRON 2 MG/ML
4 INJECTION INTRAMUSCULAR; INTRAVENOUS EVERY 30 MIN PRN
Status: DISCONTINUED | OUTPATIENT
Start: 2023-04-04 | End: 2023-04-04

## 2023-04-04 RX ORDER — SODIUM CHLORIDE, SODIUM LACTATE, POTASSIUM CHLORIDE, CALCIUM CHLORIDE 600; 310; 30; 20 MG/100ML; MG/100ML; MG/100ML; MG/100ML
INJECTION, SOLUTION INTRAVENOUS CONTINUOUS PRN
Status: DISCONTINUED | OUTPATIENT
Start: 2023-04-04 | End: 2023-04-04

## 2023-04-04 RX ORDER — SODIUM CHLORIDE, SODIUM LACTATE, POTASSIUM CHLORIDE, CALCIUM CHLORIDE 600; 310; 30; 20 MG/100ML; MG/100ML; MG/100ML; MG/100ML
INJECTION, SOLUTION INTRAVENOUS CONTINUOUS
Status: DISCONTINUED | OUTPATIENT
Start: 2023-04-04 | End: 2023-04-04

## 2023-04-04 RX ORDER — FENTANYL CITRATE 50 UG/ML
INJECTION, SOLUTION INTRAMUSCULAR; INTRAVENOUS PRN
Status: DISCONTINUED | OUTPATIENT
Start: 2023-04-04 | End: 2023-04-04

## 2023-04-04 RX ORDER — DIMENHYDRINATE 50 MG/ML
25 INJECTION, SOLUTION INTRAMUSCULAR; INTRAVENOUS
Status: DISCONTINUED | OUTPATIENT
Start: 2023-04-04 | End: 2023-04-04

## 2023-04-04 RX ORDER — FENTANYL CITRATE 50 UG/ML
50 INJECTION, SOLUTION INTRAMUSCULAR; INTRAVENOUS EVERY 5 MIN PRN
Status: DISCONTINUED | OUTPATIENT
Start: 2023-04-04 | End: 2023-04-04

## 2023-04-04 RX ORDER — CEFAZOLIN SODIUM/WATER 2 G/20 ML
2 SYRINGE (ML) INTRAVENOUS SEE ADMIN INSTRUCTIONS
Status: DISCONTINUED | OUTPATIENT
Start: 2023-04-04 | End: 2023-04-04 | Stop reason: HOSPADM

## 2023-04-04 RX ORDER — OXYCODONE HYDROCHLORIDE 5 MG/1
5 TABLET ORAL
Status: DISCONTINUED | OUTPATIENT
Start: 2023-04-04 | End: 2023-04-04

## 2023-04-04 RX ORDER — HYDROMORPHONE HYDROCHLORIDE 1 MG/ML
0.2 INJECTION, SOLUTION INTRAMUSCULAR; INTRAVENOUS; SUBCUTANEOUS EVERY 5 MIN PRN
Status: DISCONTINUED | OUTPATIENT
Start: 2023-04-04 | End: 2023-04-04

## 2023-04-04 RX ORDER — KETOROLAC TROMETHAMINE 15 MG/ML
15 INJECTION, SOLUTION INTRAMUSCULAR; INTRAVENOUS
Status: DISCONTINUED | OUTPATIENT
Start: 2023-04-04 | End: 2023-04-04

## 2023-04-04 RX ORDER — MAGNESIUM HYDROXIDE 1200 MG/15ML
LIQUID ORAL PRN
Status: DISCONTINUED | OUTPATIENT
Start: 2023-04-04 | End: 2023-04-04 | Stop reason: HOSPADM

## 2023-04-04 RX ORDER — AMLODIPINE BESYLATE 2.5 MG/1
2.5 TABLET ORAL DAILY
Status: DISCONTINUED | OUTPATIENT
Start: 2023-04-05 | End: 2023-04-08 | Stop reason: HOSPADM

## 2023-04-04 RX ORDER — HYDROXYZINE HYDROCHLORIDE 10 MG/1
10 TABLET, FILM COATED ORAL EVERY 6 HOURS PRN
Status: DISCONTINUED | OUTPATIENT
Start: 2023-04-04 | End: 2023-04-04

## 2023-04-04 RX ORDER — CEFAZOLIN SODIUM 1 G/3ML
1 INJECTION, POWDER, FOR SOLUTION INTRAMUSCULAR; INTRAVENOUS EVERY 8 HOURS
Status: COMPLETED | OUTPATIENT
Start: 2023-04-04 | End: 2023-04-05

## 2023-04-04 RX ORDER — CALCIUM CARBONATE/VITAMIN D3 600 MG-10
1 TABLET ORAL 2 TIMES DAILY WITH MEALS
Status: DISCONTINUED | OUTPATIENT
Start: 2023-04-04 | End: 2023-04-08 | Stop reason: HOSPADM

## 2023-04-04 RX ORDER — HYDRALAZINE HYDROCHLORIDE 20 MG/ML
2.5-5 INJECTION INTRAMUSCULAR; INTRAVENOUS EVERY 10 MIN PRN
Status: DISCONTINUED | OUTPATIENT
Start: 2023-04-04 | End: 2023-04-04

## 2023-04-04 RX ORDER — ATORVASTATIN CALCIUM 20 MG/1
20 TABLET, FILM COATED ORAL EVERY EVENING
Status: DISCONTINUED | OUTPATIENT
Start: 2023-04-04 | End: 2023-04-08 | Stop reason: HOSPADM

## 2023-04-04 RX ORDER — HALOPERIDOL 5 MG/ML
1 INJECTION INTRAMUSCULAR
Status: DISCONTINUED | OUTPATIENT
Start: 2023-04-04 | End: 2023-04-04

## 2023-04-04 RX ORDER — MEPERIDINE HYDROCHLORIDE 25 MG/ML
12.5 INJECTION INTRAMUSCULAR; INTRAVENOUS; SUBCUTANEOUS EVERY 5 MIN PRN
Status: DISCONTINUED | OUTPATIENT
Start: 2023-04-04 | End: 2023-04-04

## 2023-04-04 RX ADMIN — SODIUM CHLORIDE, POTASSIUM CHLORIDE, SODIUM LACTATE AND CALCIUM CHLORIDE: 600; 310; 30; 20 INJECTION, SOLUTION INTRAVENOUS at 08:40

## 2023-04-04 RX ADMIN — PHENYLEPHRINE HYDROCHLORIDE 100 MCG: 10 INJECTION INTRAVENOUS at 09:46

## 2023-04-04 RX ADMIN — Medication 2 G: at 10:09

## 2023-04-04 RX ADMIN — Medication 10 MG: at 09:05

## 2023-04-04 RX ADMIN — Medication 2.5 MG: at 02:57

## 2023-04-04 RX ADMIN — SODIUM CHLORIDE, POTASSIUM CHLORIDE, SODIUM LACTATE AND CALCIUM CHLORIDE: 600; 310; 30; 20 INJECTION, SOLUTION INTRAVENOUS at 21:57

## 2023-04-04 RX ADMIN — PROPOFOL 70 MG: 10 INJECTION, EMULSION INTRAVENOUS at 09:20

## 2023-04-04 RX ADMIN — SENNOSIDES AND DOCUSATE SODIUM 1 TABLET: 50; 8.6 TABLET ORAL at 21:13

## 2023-04-04 RX ADMIN — Medication 15 MG: at 08:58

## 2023-04-04 RX ADMIN — ATORVASTATIN CALCIUM 20 MG: 20 TABLET, FILM COATED ORAL at 21:13

## 2023-04-04 RX ADMIN — ACETAMINOPHEN 975 MG: 325 TABLET, FILM COATED ORAL at 21:13

## 2023-04-04 RX ADMIN — CALCIUM CARBONATE 600 MG (1,500 MG)-VITAMIN D3 400 UNIT TABLET 1 TABLET: at 17:27

## 2023-04-04 RX ADMIN — FENTANYL CITRATE 25 MCG: 50 INJECTION, SOLUTION INTRAMUSCULAR; INTRAVENOUS at 06:13

## 2023-04-04 RX ADMIN — FENTANYL CITRATE 12.5 MCG: 50 INJECTION, SOLUTION INTRAMUSCULAR; INTRAVENOUS at 09:05

## 2023-04-04 RX ADMIN — PHENYLEPHRINE HYDROCHLORIDE 50 MCG: 10 INJECTION INTRAVENOUS at 09:25

## 2023-04-04 RX ADMIN — PHENYLEPHRINE HYDROCHLORIDE 0.3 MCG/KG/MIN: 10 INJECTION INTRAVENOUS at 09:47

## 2023-04-04 RX ADMIN — PROPOFOL 25 MCG/KG/MIN: 10 INJECTION, EMULSION INTRAVENOUS at 09:08

## 2023-04-04 RX ADMIN — Medication 2 G: at 08:42

## 2023-04-04 RX ADMIN — PHENYLEPHRINE HYDROCHLORIDE 50 MCG: 10 INJECTION INTRAVENOUS at 09:28

## 2023-04-04 RX ADMIN — PHENYLEPHRINE HYDROCHLORIDE 100 MCG: 10 INJECTION INTRAVENOUS at 09:58

## 2023-04-04 RX ADMIN — CEFAZOLIN 1 G: 1 INJECTION, POWDER, FOR SOLUTION INTRAMUSCULAR; INTRAVENOUS at 17:26

## 2023-04-04 RX ADMIN — ONDANSETRON 4 MG: 2 INJECTION INTRAMUSCULAR; INTRAVENOUS at 08:50

## 2023-04-04 RX ADMIN — PHENYLEPHRINE HYDROCHLORIDE 100 MCG: 10 INJECTION INTRAVENOUS at 09:59

## 2023-04-04 RX ADMIN — POTASSIUM CHLORIDE, DEXTROSE MONOHYDRATE AND SODIUM CHLORIDE: 150; 5; 450 INJECTION, SOLUTION INTRAVENOUS at 00:37

## 2023-04-04 ASSESSMENT — ACTIVITIES OF DAILY LIVING (ADL)
ADLS_ACUITY_SCORE: 51
ADLS_ACUITY_SCORE: 51
ADLS_ACUITY_SCORE: 35
ADLS_ACUITY_SCORE: 51
ADLS_ACUITY_SCORE: 35
ADLS_ACUITY_SCORE: 53
ADLS_ACUITY_SCORE: 51
ADLS_ACUITY_SCORE: 51
ADLS_ACUITY_SCORE: 55
ADLS_ACUITY_SCORE: 51
ADLS_ACUITY_SCORE: 55
ADLS_ACUITY_SCORE: 51

## 2023-04-04 NOTE — PROGRESS NOTES
Care Management Follow Up    Received message from Jaya with [facility]. Patient was going to have home care services with them prior to being admitted to the hospital. If patient needs home care services at discharge [facility] would be able to accept her insurance and her on their service.     [facility]  Phone [phone]  Fax [phone]    Noy Segundo RN, BSN  Care Coordinator,  [facility]  Phone [phone]  Pager [phone]

## 2023-04-04 NOTE — PLAN OF CARE
BP (!) 147/95   Pulse 86   Temp 99.6  F (37.6  C) (Axillary)   Resp 18   SpO2 95%     Goal Outcome Evaluation:      Plan of Care Reviewed With: patient, child    Overall Patient Progress: no changeOverall Patient Progress: no change    Status: R hip/femur fx s/p fall   Neuro: A&Ox4.   Cardiac: Afebrile, VSS.   Respiratory: 4 lpm nc   GI/: incontinent of urine. No BM this shift.   Diet/appetite: NPO. Denies nausea   Activity: flat bed rest. Log roll repositioning  Pain: 2.5 oxycodone admin x1. 25 mcg fentanyl admin prior to transport to [facility].  Piv: sl'd prior to transfer.    Report given to [facility]. Pt transported via EMS. Off floor 0625    Rested btwn cares. Able to make needs known.

## 2023-04-04 NOTE — ANESTHESIA POSTPROCEDURE EVALUATION
Patient: Belia Sheets    Procedure: Procedure(s):  Open reduction internal fixation hip nailing       Anesthesia Type:  Spinal, General    Note:  Disposition: Inpatient   Postop Pain Control: Uneventful            Sign Out: Well controlled pain   PONV:    Neuro/Psych: Uneventful            Sign Out: Acceptable/Baseline neuro status   Airway/Respiratory: Uneventful            Sign Out: Acceptable/Baseline resp. status   CV/Hemodynamics: Uneventful            Sign Out: Acceptable CV status; No obvious hypovolemia; No obvious fluid overload   Other NRE:    DID A NON-ROUTINE EVENT OCCUR?     Event details/Postop Comments:  LMA placed as spinal did not appear to be dense on surgical side. Help improve ventilation of patient as well           Last vitals:  Vitals Value Taken Time   /70 04/04/23 1200   Temp 36.6  C (97.9  F) 04/04/23 1145   Pulse 80 04/04/23 1206   Resp 18 04/04/23 1206   SpO2 97 % 04/04/23 1212   Vitals shown include unvalidated device data.    Electronically Signed By: Todd Chicas MD  April 4, 2023  12:16 PM

## 2023-04-04 NOTE — H&P (VIEW-ONLY)
[place] [facility] ([facility])  April 3, 2023     History     Chief Complaint   Patient presents with     Fall     HPI  Malial Keaton Sheets is a 89 year old female who presents for altered mental status.    Per EMS, the patient is in independent living. Someone today went to check on her and found that she was down on the floor smelling of urine and confused. We are not sure how long she was down.  She appeared to have pain in her right hip.  Blood sugar was normal and on arrival to the ED she was able to follow commands in all extremities and did not have any focal neurologic deficits, but did struggle to give a history of what happened.      This part of the medical record was transcribed by Essie Cunningham, Medical Scribe, from a dictation done by Kait Sanford MD.      Past Medical History  Past Medical History:   Diagnosis Date     Carpal tunnel syndrome (aka CTS)      Cataract      Chronic osteoarthritis      Constipation due to outlet dysfunction 9/22/2021     Fracture of multiple rami of right pubis with routine healing, subsequent encounter 9/22/2021     H/O calcium pyrophosphate deposition disease (CPPD)      History of 9th right rib fracture due to fall (07/13/2021) 9/22/2021     History of encephalopathy 10/2017     Hypertension      Kyphoscoliosis      Osteoarthritis     hands     Osteoporosis      Rectal prolapse      Past Surgical History:   Procedure Laterality Date     COLONOSCOPY  2010     HYSTERECTOMY      for uterine prolapse     RECTOSIGMOIDECTOMY PERINEAL N/A 1/12/2018    Procedure: RECTOSIGMOIDECTOMY PERINEAL;  Perineal Rectosigmoidectomy  ;  Surgeon: Amrik Mariano MD;  Location: [facility] OR     acetaminophen (TYLENOL) 500 MG tablet  amLODIPine (NORVASC) 2.5 MG tablet  aspirin (ASA) 81 MG chewable tablet  atorvastatin (LIPITOR) 20 MG tablet  calcium carbonate 600 mg-vitamin D 400 units (CALTRATE) 600-400 MG-UNIT per tablet  cyanocobalamin (VITAMIN B-12) 500 MCG  tablet  ibuprofen (ADVIL/MOTRIN) 200 MG tablet  order for DME  order for DME  Vitamin D3 (CHOLECALCIFEROL) 25 mcg (1000 units) tablet      No Known Allergies  Family History  Family History   Problem Relation Age of Onset     Depression/Anxiety Son      Depression/Anxiety Son         alcohol abuse  age 24     Hypertension Mother      Hypertension Brother      Diabetes No family hx of      Breast Cancer No family hx of      Colon Cancer No family hx of      Prostate Cancer No family hx of      Other Cancer No family hx of      Social History   Social History     Tobacco Use     Smoking status: Never     Smokeless tobacco: Never   Vaping Use     Vaping status: Never Used   Substance Use Topics     Alcohol use: No     Drug use: No      Past medical history, past surgical history, medications, allergies, family history, and social history were reviewed with the patient. No additional pertinent items.      A medically appropriate review of systems was performed with pertinent positives and negatives noted in the HPI, and all other systems negative.    Physical Exam   BP: (!) 144/97  Pulse: 77  Resp: (!) 38  SpO2: 94 %  Physical Exam  Gen: cooperative but unable to give history  HEENT:PERRL, no facial tenderness or wounds, head atraumatic, oropharynx clear, mucous membranes moist, TMs clear bilaterally, no skull base fracture signs.   Neck:no bony tenderness or step offs, no JVD, trachea midline  Back: no CVA tenderness, mid thoracic tenderness and diffuse lumbar tenderness  CV:RRR without murmurs  PULM:Clear to auscultation bilaterally, no chest wall tenderness  Abd:soft, nontender, nondistended. Bowel sounds present and normal, pelvis stable.   UE:No traumatic injuries, skin normal, + radial pulses with normal perfusion  LE: + DP and PT pulses intact. Holds right leg rotated but not shortened. Tenderness of lateral right hip.   Neuro:CN II-XII intact, strength 5/5 throughout other than limited exam of proximal right  leg.   Skin: no rashes or ecchymoses    ED Course, Procedures, & Data      Procedures  Results for orders placed during the hospital encounter of 04/03/23    POC US ABDOMEN LIMITED    Impression  Bedside FAST (Focused Assessment with Sonography in Trauma), performed and interpreted by me.  Indication: Trauma    With the patient in Trendelenburg, the RUQ, LUQ and subxiphoid views were examined for intraabdominal and thoracic free fluid and pericardial effusion. With the patient in reverse Trendelenburg, the suprapubic view was examined for intraabdominal free fluid. Image quality was satisfactory..    Findings: There is no evidence of free fluid above or below bilateral diaphragms, in the splenorenal or hepatorenal space, or in bilateral paracolic gutters. There was no free fluid seen in the pelvis adjacent to the urinary bladder. There is no free fluid within the pericardium.  Extended FAST exam (eFAST):  Indication: Trauma  The chest wall was evaluated for evidence of pneumothorax.    Right side:  Lung sliding artifact  Present  Comet tail artifacts or B-lines  Present  Left side:  Lung sliding artifact  Present  Comet tail artifacts or B-lines Present    IMPRESSION:  Negative FAST       ED Course Selections:        EKG Interpretation:      Interpreted by Kait Sanford MD  Time reviewed: 6:54pm  Symptoms at time of EKG: fall   Rhythm: normal sinus   Rate: 75  Axis: normal  Ectopy: PVCs  Conduction: QTc prolonged at 475ms  ST Segments/ T Waves: No ST-T wave changes  Q Waves: none  Comparison to prior: No old EKG available    Clinical Impression: NSR with prolonged QTc      ED Course Selections: The Lactic acid level is elevated due to trauma, at this time there is no sign of severe sepsis or septic shock.  Trauma:  Level of trauma activation: Trauma evaluation (consult) called at 8:49pm  Full Primary and Secondary survey with appropriate immobilization of spine completed in exam section.  C-collar and  immobilization: not indicated, cleared.  CSpine Clearance: C spine cleared clinically  GCS at arrival: 15  GCS at disposition: unchanged  Consults prior to admission or transfer: Orthopedics called at 8:52 and Neurosurgery called at 8:54  Procedures done in the ED: none  Disposition: Admit. Admission ordered at 2300 PM       Results for orders placed or performed during the hospital encounter of 04/03/23   XR Chest Port 1 View     Status: None    Narrative    EXAM: XR CHEST PORT 1 VIEW  4/3/2023 7:06 PM     HISTORY:  fall, hypoxia       COMPARISON:  Chest x-ray 11/5/2022    FINDINGS:   AP view of the chest. Normal heart size. Mild perihilar opacities. No  airspace consolidation. No pleural effusion or pneumothorax. Chronic  bilateral rib fracture deformities. Dextroscoliosis of the thoracic  spine. Mild degenerative changes of the shoulders.      Impression    IMPRESSION:   1. No evidence of acute airspace disease.  2. Chronic bilateral rib fracture deformities. No evidence of acute  osseous abnormality.    I have personally reviewed the examination and initial interpretation  and I agree with the findings.    RHODA AZAR MD         SYSTEM ID:  [id number]   Head CT w/o contrast     Status: None    Narrative    CT HEAD W/O CONTRAST 4/3/2023 8:13 PM    Provided History: fall, confusion  ICD-10: Trauma.    Comparison: CT head 11/6/2022. Brain MRI 11/6/2022.    Technique: Using multidetector thin collimation helical acquisition  technique, axial, coronal and sagittal CT images from the skull base  to the vertex were obtained without intravenous contrast.     Findings:    No intracranial hemorrhage. No mass effect. No midline shift. No  extra-axial fluid collection. The gray to white matter differentiation  of the cerebral hemispheres is preserved. Ventricles are proportionate  to the sulci.. No sulcal effacement..  The basal cisterns are patent.  Moderate generalized cerebral volume loss and  confluent  periventricular white matter hypoattenuation, nonspecific but favored  to represent chronic small vessel ischemic disease given the patient's  age.    Scattered mucosal thickening of the paranasal sinuses. The mastoid air  cells are clear. Orbits appear unremarkable. No acute fracture.      Impression    Impression:   1.  No acute intracranial pathology.  2.  Moderate generalized cerebral volume loss and advanced  leukoaraiosis.    I have personally reviewed the examination and initial interpretation  and I agree with the findings.    TIERRA WATTS MD         SYSTEM ID:  [id number]   Cervical spine CT w/o contrast     Status: None    Narrative    EXAM: CT CERVICAL SPINE W/O CONTRAST  4/3/2023 8:14 PM     HISTORY:  back pain, fall, back pain, fall       COMPARISON:  CT cervical spine 5/24/2022.    TECHNIQUE: Using multidetector thin collimation helical acquisition  technique, axial, coronal and sagittal CT images through the cervical  spine were obtained without intravenous contrast.    FINDINGS:  Exaggerated cervical lordosis. Advanced C7 anterior wedge compression  fracture with 5 mm of retropulsion, not significant changed since  5/24/2022. Additional partially visualized advanced wedge compression  deformities of the upper thoracic spine beginning at T2 also appear  similar in appearance. Multilevel intervertebral disc space narrowing.  No prevertebral edema. Trace grade 1 anterolisthesis of C4 on C5.    The findings on a level by level basis are as follows:    C2-3:  No spinal canal or neural foraminal stenosis.    C3-4:  Bilateral uncinate facet hypertrophy. No significant neural  foraminal or spinal canal stenosis.    C4-5:  Bilateral uncinate and facet hypertrophy. Mild bilateral neural  foraminal stenosis. No significant spinal canal stenosis.    C5-6:  Bilateral uncinate and facet hypertrophy. No significant spinal  canal or neural foraminal stenosis.    C6-7:  No significant neural foraminal  stenosis. Mild spinal canal  stenosis.    C7-T1:  No spinal canal or neural foraminal stenosis.     No abnormality of the paraspinous soft tissues. Scarring of the left  lung apex.      Impression    IMPRESSION:  1. No acute fracture or traumatic subluxation.  2. Grossly unchanged wedge compression deformities of C7 and partially  visualized upper thoracic spine starting at T2. Retropulsion at the  level of C7 with associated mild spinal canal stenosis.  3. Multilevel cervical spondylosis.    I have personally reviewed the examination and initial interpretation  and I agree with the findings.    TIERRA WATTS MD         SYSTEM ID:  [id number]   CT Thoracic Spine w/o Contrast     Status: Abnormal   Result Value Ref Range    Radiologist flags L1 vertebral body compression fracture (Urgent)     Narrative    EXAM: CT THORACIC SPINE W/O CONTRAST  4/3/2023 8:14 PM     HISTORY:  back pain, fall       COMPARISON:  Chest radiographs 11/5/2022 and today.    TECHNIQUE: Using multidetector thin collimation helical acquisition  technique, axial, sagittal and coronal 3 mm thickness CT  reconstructions were obtained through the thoracic spine without  intravenous contrast.  Images were viewed in bone and soft tissue  windows.    FINDINGS:  Exaggerated thoracic kyphosis. Age indeterminate compression fracture  of the superior endplates of L1. Additional multilevel age  indeterminate advanced wedge compression fractures at T2-T4, T6, and  T9 although suspect chronic in nature. Multilevel intervertebral disc  space narrowing. Patent spinal canal and neural foramina.    The visualized paraspinous tissues are within normal limits. Small  left pleural effusion. Right basilar infiltrates. Old bilateral rib  fractures      Impression    IMPRESSION:  1.  L1 vertebral body compression fracture with mild height loss  possibly worsened since 11/6/2022.  2.  Additional advanced wedge compression fractures at T2-T4, T6, T9,  and L1, most of  which were likely present on the prior radiographs  11/5/2022, although exact comparison is difficult.  3.  Right basilar infiltrates, aspiration versus infection.  4.  Small left pleural effusion.    [Urgent Result:   L1 vertebral body compression fracture ]    Finding was identified on 4/3/2023 8:38 PM.     Kait Sanford MD was contacted by Dr. Irby at 4/3/2023 8:44 PM  and verbalized understanding of the urgent finding.      I have personally reviewed the examination and initial interpretation  and I agree with the findings.    TIERRA WATTS MD         SYSTEM ID:  [id number]   Lumbar spine CT w/o contrast     Status: Abnormal   Result Value Ref Range    Radiologist flags L1 compression fracture (Urgent)     Narrative    EXAM: CT LUMBAR SPINE W/O CONTRAST  4/3/2023 8:14 PM     HISTORY:  back pain, fall       COMPARISON:  Thoracolumbar radiographs 11/6/2022.     TECHNIQUE: Using multidetector thin collimation helical acquisition  technique, axial, sagittal and coronal 3 mm thickness CT  reconstructions were obtained through the lumbar spine without  intravenous contrast.  Images were viewed in bone and soft tissue  windows.    FINDINGS:  There are 5 lumbar type vertebrae, used for the purposes of this  dictation. Normal vertebral body alignment. Multilevel intervertebral  disc space narrowing. Age-indeterminate compression fracture of the  superior endplate of L1 with associated mild height loss. Additional  L4 wedge compression deformity, suspect chronic. Multilevel  degenerative changes of lumbar spine including posterior disc  osteophytes at the level of L1-L2, L2-L3, L3-L4. Moderate right neural  foraminal stenosis at the level of T12-L1. No significant spinal canal  stenosis at any level.    The visualized adjacent paraspinous tissues are grossly within normal  limits. Pancreatic parenchymal calcifications, likely sequelae of  chronic pancreatitis.    Old bilateral rib fractures.      Impression     IMPRESSION:  1. L1 compression fracture of the superior endplate with mild height  loss, potentially slightly worsened since 11/6/2022.  2. Multilevel lumbar spondylosis.    [Urgent Result: L1 compression fracture]    Finding was identified on 4/3/2023 8:46 PM.     Kait Sanford MD was contacted by Dr. Irby at 4/3/2023 8:49 PM  and verbalized understanding of the urgent finding.     I have personally reviewed the examination and initial interpretation  and I agree with the findings.    TIERRA WATTS MD         SYSTEM ID:  [id number]   POC US ABDOMEN LIMITED     Status: None    Impression    Bedside FAST (Focused Assessment with Sonography in Trauma), performed and interpreted by me.   Indication: Trauma    With the patient in Trendelenburg, the RUQ, LUQ and subxiphoid views were examined for intraabdominal and thoracic free fluid and pericardial effusion. With the patient in reverse Trendelenburg, the suprapubic view was examined for intraabdominal free fluid. Image quality was satisfactory..     Findings: There is no evidence of free fluid above or below bilateral diaphragms, in the splenorenal or hepatorenal space, or in bilateral paracolic gutters. There was no free fluid seen in the pelvis adjacent to the urinary bladder. There is no free fluid within the pericardium.   Extended FAST exam (eFAST):   Indication: Trauma   The chest wall was evaluated for evidence of pneumothorax.     Right side:  Lung sliding artifact  Present     Comet tail artifacts or B-lines  Present   Left side:  Lung sliding artifact  Present     Comet tail artifacts or B-lines Present     IMPRESSION:  Negative FAST     XR Pelvis w Hip Port Right 1 View     Status: None    Narrative    EXAM: XR PELVIS AND HIP PORTABLE RIGHT 1 VIEW  LOCATION: [facility]  DATE/TIME: 4/3/2023 7:07 PM    INDICATION: Portable, right hip pain, fall.  COMPARISON: None.      Impression    IMPRESSION:   1. Mildly  displaced intertrochanteric right hip fracture. Mild apex anterior angulation.  2. Extensive scoliosis and degenerative changes in the spine. There are also mild degenerative changes in the left hip and mild to moderate degenerative changes in the SI joints.  3. Old healed right superior and inferior pubic ramus fractures.  4. Diffuse bone demineralization.  5. Extensive arterial calcifications.   XR Femur Right 2 Views     Status: None    Narrative    EXAM: XR FEMUR RIGHT 2 VIEWS  LOCATION: [facility]  DATE/TIME: 4/3/2023 9:49 PM    INDICATION: known hip fracture, completion imaging for surgical planning  COMPARISON: 04/03/2023      Impression    IMPRESSION: Acute mildly displaced and angulated fracture of the intertrochanteric proximal right femur unchanged. No additional fracture of the more distal femur. Diffuse bony demineralization. Degenerative arthritis right knee.   Rodeo Draw     Status: None    Narrative    The following orders were created for panel order [place]o Draw.  Procedure                               Abnormality         Status                     ---------                               -----------         ------                     Extra Red Top Tube[[id number]]                               Final result               Extra Green Top (Lithium...[[id number]]                      Final result               Extra Purple Top Tube[[id number]]                            Final result                 Please view results for these tests on the individual orders.   Extra Red Top Tube     Status: None   Result Value Ref Range    Hold Specimen JIC    Extra Green Top (Lithium Heparin) Tube     Status: None   Result Value Ref Range    Hold Specimen JIC    Extra Purple Top Tube     Status: None   Result Value Ref Range    Hold Specimen JIC    Rodeo Draw     Status: None    Narrative    The following orders were created for panel order [place]o Draw.  Procedure                                Abnormality         Status                     ---------                               -----------         ------                     Extra Blue Top Tube[[id number]]                              Final result                 Please view results for these tests on the individual orders.   Extra Blue Top Tube     Status: None   Result Value Ref Range    Hold Specimen JIC    iStat INR, POCT     Status: Abnormal   Result Value Ref Range    INR POCT 1.2 (L) 2.0 - 3.0   iStat Gases (lactate) venous, POCT     Status: Abnormal   Result Value Ref Range    Lactic Acid POCT 2.1 (H) <=2.0 mmol/L    Bicarbonate Venous POCT 27 21 - 28 mmol/L    O2 Sat, Venous POCT 81 (L) 94 - 100 %    pCO2V Venous POCT 44 40 - 50 mm Hg    pH Venous POCT 7.39 7.32 - 7.43    pO2 Venous POCT 46 25 - 47 mm Hg   INR     Status: Normal   Result Value Ref Range    INR 0.99 0.85 - 1.15   Partial thromboplastin time     Status: Normal   Result Value Ref Range    aPTT 25 22 - 38 Seconds   Comprehensive metabolic panel     Status: Abnormal   Result Value Ref Range    Sodium 140 136 - 145 mmol/L    Potassium 4.0 3.4 - 5.3 mmol/L    Chloride 104 98 - 107 mmol/L    Carbon Dioxide (CO2) 22 22 - 29 mmol/L    Anion Gap 14 7 - 15 mmol/L    Urea Nitrogen 22.1 8.0 - 23.0 mg/dL    Creatinine 0.90 0.51 - 0.95 mg/dL    Calcium 9.9 (H) 8.2 - 9.6 mg/dL    Glucose 107 (H) 70 - 99 mg/dL    Alkaline Phosphatase 53 35 - 104 U/L    AST 22 10 - 35 U/L    ALT 11 10 - 35 U/L    Protein Total 6.7 6.4 - 8.3 g/dL    Albumin 3.8 3.5 - 5.2 g/dL    Bilirubin Total 0.5 <=1.2 mg/dL    GFR Estimate 60 (L) >60 mL/min/1.73m2   CK total     Status: Normal   Result Value Ref Range    CK 52 26 - 192 U/L   UA with Microscopic reflex to Culture     Status: Abnormal    Specimen: Urine, Catheter   Result Value Ref Range    Color Urine Yellow Colorless, Straw, Light Yellow, Yellow    Appearance Urine Clear Clear    Glucose Urine Negative Negative mg/dL    Bilirubin Urine  Negative Negative    Ketones Urine Negative Negative mg/dL    Specific Gravity Urine 1.019 1.003 - 1.035    Blood Urine Negative Negative    pH Urine 6.5 5.0 - 7.0    Protein Albumin Urine 20 (A) Negative mg/dL    Urobilinogen Urine Normal Normal, 2.0 mg/dL    Nitrite Urine Negative Negative    Leukocyte Esterase Urine Negative Negative    Mucus Urine Present (A) None Seen /LPF    RBC Urine 8 (H) <=2 /HPF    WBC Urine 1 <=5 /HPF    Hyaline Casts Urine 7 (H) <=2 /LPF    Narrative    Urine Culture not indicated   Magnesium     Status: Normal   Result Value Ref Range    Magnesium 1.8 1.7 - 2.3 mg/dL   Phosphorus     Status: Normal   Result Value Ref Range    Phosphorus 3.0 2.5 - 4.5 mg/dL   CBC with platelets and differential     Status: Abnormal   Result Value Ref Range    WBC Count 11.5 (H) 4.0 - 11.0 10e3/uL    RBC Count 4.71 3.80 - 5.20 10e6/uL    Hemoglobin 13.4 11.7 - 15.7 g/dL    Hematocrit 42.5 35.0 - 47.0 %    MCV 90 78 - 100 fL    MCH 28.5 26.5 - 33.0 pg    MCHC 31.5 31.5 - 36.5 g/dL    RDW 13.2 10.0 - 15.0 %    Platelet Count 218 150 - 450 10e3/uL    % Neutrophils 69 %    % Lymphocytes 18 %    % Monocytes 6 %    % Eosinophils 5 %    % Basophils 1 %    % Immature Granulocytes 1 %    NRBCs per 100 WBC 0 <1 /100    Absolute Neutrophils 8.1 1.6 - 8.3 10e3/uL    Absolute Lymphocytes 2.1 0.8 - 5.3 10e3/uL    Absolute Monocytes 0.7 0.0 - 1.3 10e3/uL    Absolute Eosinophils 0.5 0.0 - 0.7 10e3/uL    Absolute Basophils 0.1 0.0 - 0.2 10e3/uL    Absolute Immature Granulocytes 0.1 <=0.4 10e3/uL    Absolute NRBCs 0.0 10e3/uL   Creatinine POCT     Status: Abnormal   Result Value Ref Range    Creatinine POCT 1.0 0.5 - 1.0 mg/dL    GFR, ESTIMATED POCT 53 (L) >60 mL/min/1.73m2   Lactic acid whole blood     Status: Abnormal   Result Value Ref Range    Lactic Acid 2.4 (H) 0.7 - 2.0 mmol/L   Glucose by meter     Status: Abnormal   Result Value Ref Range    GLUCOSE BY METER POCT 109 (H) 70 - 99 mg/dL   Lactic acid whole blood      Status: Normal   Result Value Ref Range    Lactic Acid 1.4 0.7 - 2.0 mmol/L   Extra Tube     Status: None (In process)    Narrative    The following orders were created for panel order Extra Tube.  Procedure                               Abnormality         Status                     ---------                               -----------         ------                     Extra Purple Top Tube[[id number]]                            In process                   Please view results for these tests on the individual orders.   EKG 12 lead     Status: None   Result Value Ref Range    Systolic Blood Pressure  mmHg    Diastolic Blood Pressure  mmHg    Ventricular Rate 75 BPM    Atrial Rate 75 BPM    DC Interval 202 ms    QRS Duration 86 ms     ms    QTc 475 ms    P Axis 99 degrees    R AXIS 22 degrees    T Axis 66 degrees    Interpretation ECG       Sinus rhythm with occasional Premature ventricular complexes  Nonspecific T wave abnormality  Prolonged QT  Abnormal ECG  Unconfirmed report - interpretation of this ECG is computer generated - see medical record for final interpretation  Confirmed by - EMERGENCY ROOM, PHYSICIAN ([id number]),  MIGUEL GASCA ([id number]) on 4/3/2023 9:32:06 PM     CBC with platelets differential     Status: Abnormal    Narrative    The following orders were created for panel order CBC with platelets differential.  Procedure                               Abnormality         Status                     ---------                               -----------         ------                     CBC with platelets and d...[[id number]]  Abnormal            Final result                 Please view results for these tests on the individual orders.   ABO/Rh type and screen *Canceled*     Status: None ()    Narrative    The following orders were created for panel order ABO/Rh type and screen.  Procedure                               Abnormality         Status                     ---------                                -----------         ------                       Please view results for these tests on the individual orders.   ABO/Rh type and screen *Canceled*     Status: None ()    Narrative    The following orders were created for panel order ABO/Rh type and screen.  Procedure                               Abnormality         Status                     ---------                               -----------         ------                     Adult Type and Screen[[id number]]                                                         Please view results for these tests on the individual orders.   ABO/Rh type and screen     Status: None (In process)    Narrative    The following orders were created for panel order ABO/Rh type and screen.  Procedure                               Abnormality         Status                     ---------                               -----------         ------                     Adult Type and Screen[[id number]]                            In process                   Please view results for these tests on the individual orders.     Medications   lactated ringers infusion (0 mLs Intravenous Stopped 4/4/23 0036)   dextrose 5% and 0.45% NaCl + KCl 20 mEq/L infusion ( Intravenous $New Bag 4/4/23 0037)   acetaminophen (TYLENOL) tablet 975 mg (has no administration in time range)   oxyCODONE IR (ROXICODONE) half-tab 2.5 mg (has no administration in time range)     Or   oxyCODONE (ROXICODONE) tablet 5 mg (has no administration in time range)   senna-docusate (SENOKOT-S/PERICOLACE) 8.6-50 MG per tablet 1-2 tablet (1 tablet Oral Not Given 4/4/23 0104)   polyethylene glycol (MIRALAX) Packet 17 g (has no administration in time range)   prochlorperazine (COMPAZINE) injection 5 mg (has no administration in time range)     Or   prochlorperazine (COMPAZINE) tablet 5 mg (has no administration in time range)     Or   prochlorperazine (COMPAZINE) suppository 12.5 mg (has no administration in time range)   fentaNYL (PF)  (SUBLIMAZE) injection 25 mcg (25 mcg Intravenous $Given 4/3/23 3799)   fentaNYL (PF) (SUBLIMAZE) injection 25 mcg (25 mcg Intravenous $Given 4/3/23 1850)   lactated ringers BOLUS 1,000 mL (0 mLs Intravenous Stopped 4/4/23 0027)     Labs Ordered and Resulted from Time of ED Arrival to Time of ED Departure   ISTAT INR POCT - Abnormal       Result Value    INR POCT 1.2 (*)    ISTAT GASES LACTATE VENOUS POCT - Abnormal    Lactic Acid POCT 2.1 (*)     Bicarbonate Venous POCT 27      O2 Sat, Venous POCT 81 (*)     pCO2V Venous POCT 44      pH Venous POCT 7.39      pO2 Venous POCT 46     COMPREHENSIVE METABOLIC PANEL - Abnormal    Sodium 140      Potassium 4.0      Chloride 104      Carbon Dioxide (CO2) 22      Anion Gap 14      Urea Nitrogen 22.1      Creatinine 0.90      Calcium 9.9 (*)     Glucose 107 (*)     Alkaline Phosphatase 53      AST 22      ALT 11      Protein Total 6.7      Albumin 3.8      Bilirubin Total 0.5      GFR Estimate 60 (*)    ROUTINE UA WITH MICROSCOPIC REFLEX TO CULTURE - Abnormal    Color Urine Yellow      Appearance Urine Clear      Glucose Urine Negative      Bilirubin Urine Negative      Ketones Urine Negative      Specific Gravity Urine 1.019      Blood Urine Negative      pH Urine 6.5      Protein Albumin Urine 20 (*)     Urobilinogen Urine Normal      Nitrite Urine Negative      Leukocyte Esterase Urine Negative      Mucus Urine Present (*)     RBC Urine 8 (*)     WBC Urine 1      Hyaline Casts Urine 7 (*)    CBC WITH PLATELETS AND DIFFERENTIAL - Abnormal    WBC Count 11.5 (*)     RBC Count 4.71      Hemoglobin 13.4      Hematocrit 42.5      MCV 90      MCH 28.5      MCHC 31.5      RDW 13.2      Platelet Count 218      % Neutrophils 69      % Lymphocytes 18      % Monocytes 6      % Eosinophils 5      % Basophils 1      % Immature Granulocytes 1      NRBCs per 100 WBC 0      Absolute Neutrophils 8.1      Absolute Lymphocytes 2.1      Absolute Monocytes 0.7      Absolute Eosinophils 0.5       Absolute Basophils 0.1      Absolute Immature Granulocytes 0.1      Absolute NRBCs 0.0     ISTAT CREATININE POCT - Abnormal    Creatinine POCT 1.0      GFR, ESTIMATED POCT 53 (*)    LACTIC ACID WHOLE BLOOD - Abnormal    Lactic Acid 2.4 (*)    GLUCOSE BY METER - Abnormal    GLUCOSE BY METER POCT 109 (*)    INR - Normal    INR 0.99     PARTIAL THROMBOPLASTIN TIME - Normal    aPTT 25     CK TOTAL - Normal    CK 52     MAGNESIUM - Normal    Magnesium 1.8     PHOSPHORUS - Normal    Phosphorus 3.0     LACTIC ACID WHOLE BLOOD - Normal    Lactic Acid 1.4     TYPE AND SCREEN, ADULT   ABO/RH TYPE AND SCREEN     XR Femur Right 2 Views   Final Result   IMPRESSION: Acute mildly displaced and angulated fracture of the intertrochanteric proximal right femur unchanged. No additional fracture of the more distal femur. Diffuse bony demineralization. Degenerative arthritis right knee.      Lumbar spine CT w/o contrast   Final Result   Abnormal   IMPRESSION:   1. L1 compression fracture of the superior endplate with mild height   loss, potentially slightly worsened since 11/6/2022.   2. Multilevel lumbar spondylosis.      [Urgent Result: L1 compression fracture]      Finding was identified on 4/3/2023 8:46 PM.       Kait Sanford MD was contacted by Dr. Irby at 4/3/2023 8:49 PM   and verbalized understanding of the urgent finding.       I have personally reviewed the examination and initial interpretation   and I agree with the findings.      TIERRA WATTS MD            SYSTEM ID:  [id number]      CT Thoracic Spine w/o Contrast   Final Result   Abnormal   IMPRESSION:   1.  L1 vertebral body compression fracture with mild height loss   possibly worsened since 11/6/2022.   2.  Additional advanced wedge compression fractures at T2-T4, T6, T9,   and L1, most of which were likely present on the prior radiographs   11/5/2022, although exact comparison is difficult.   3.  Right basilar infiltrates, aspiration versus infection.   4.   Small left pleural effusion.      [Urgent Result:   L1 vertebral body compression fracture ]      Finding was identified on 4/3/2023 8:38 PM.       Kait Sanford MD was contacted by Dr. Irby at 4/3/2023 8:44 PM   and verbalized understanding of the urgent finding.        I have personally reviewed the examination and initial interpretation   and I agree with the findings.      TIERRA WATTS MD            SYSTEM ID:  [id number]      Cervical spine CT w/o contrast   Final Result   IMPRESSION:   1. No acute fracture or traumatic subluxation.   2. Grossly unchanged wedge compression deformities of C7 and partially   visualized upper thoracic spine starting at T2. Retropulsion at the   level of C7 with associated mild spinal canal stenosis.   3. Multilevel cervical spondylosis.      I have personally reviewed the examination and initial interpretation   and I agree with the findings.      TIERRA WATTS MD            SYSTEM ID:  [id number]      Head CT w/o contrast   Final Result   Impression:    1.  No acute intracranial pathology.   2.  Moderate generalized cerebral volume loss and advanced   leukoaraiosis.      I have personally reviewed the examination and initial interpretation   and I agree with the findings.      TIERRA WATTS MD            SYSTEM ID:  [id number]      XR Pelvis w Hip Port Right 1 View   Final Result   IMPRESSION:    1. Mildly displaced intertrochanteric right hip fracture. Mild apex anterior angulation.   2. Extensive scoliosis and degenerative changes in the spine. There are also mild degenerative changes in the left hip and mild to moderate degenerative changes in the SI joints.   3. Old healed right superior and inferior pubic ramus fractures.   4. Diffuse bone demineralization.   5. Extensive arterial calcifications.      XR Chest Port 1 View   Final Result   IMPRESSION:    1. No evidence of acute airspace disease.   2. Chronic bilateral rib fracture deformities. No evidence of acute    osseous abnormality.      I have personally reviewed the examination and initial interpretation   and I agree with the findings.      RHODA AZAR MD            SYSTEM ID:  [id number]      POC US ABDOMEN LIMITED   Final Result   Bedside FAST (Focused Assessment with Sonography in Trauma), performed and interpreted by me.    Indication: Trauma      With the patient in Trendelenburg, the RUQ, LUQ and subxiphoid views were examined for intraabdominal and thoracic free fluid and pericardial effusion. With the patient in reverse Trendelenburg, the suprapubic view was examined for intraabdominal free fluid. Image quality was satisfactory..       Findings: There is no evidence of free fluid above or below bilateral diaphragms, in the splenorenal or hepatorenal space, or in bilateral paracolic gutters. There was no free fluid seen in the pelvis adjacent to the urinary bladder. There is no free fluid within the pericardium.    Extended FAST exam (eFAST):    Indication: Trauma    The chest wall was evaluated for evidence of pneumothorax.       Right side:  Lung sliding artifact  Present      Comet tail artifacts or B-lines  Present    Left side:  Lung sliding artifact  Present      Comet tail artifacts or B-lines Present       IMPRESSION:  Negative FAST                Critical care was not performed.     Medical Decision Making  The patient's presentation was of high complexity (an acute health issue posing potential threat to life or bodily function).    The patient's evaluation involved:  an assessment requiring an independent historian (history obtained from EMS and pt's daughter)  ordering and/or review of 3+ test(s) in this encounter (see separate area of note for details)  discussion of management or test interpretation with another health professional (trauma, ortho, neurosurgery)    The patient's management necessitated high risk (a decision regarding emergency major procedure (timing of hip repair by ortho))  and high risk (a decision regarding hospitalization).      Assessment & Plan    89-year-old female presents after a possible fall with signs of a right hip fracture in addition to altered mental status.  Differential diagnosis included UTI, metabolic derangement, mechanical fall with prolonged downtime raising concern for possibility of rhabdomyolysis.  She arrived afebrile and hemodynamically stable  She did get placed on 2 L O2 by nasal cannula for an O2 sat of 92%.    IV access was obtained and laboratory testing done. Repeat blood sugar normal. CBC shows white blood cell count of 11.5, hemoglobin stable at 13.4, and normal platelets. Lactic acid is 2.1. Creatinine normal at 0.9 and electrolytes unremarkable. CK is low at 52. Straight cath urine without obvious signs of UTI.    Imaging included CT head and CT spine which showed no intracranial injury. Has possible acute to subacute L1 compression fracture.   X-ray chest shows chronic bilateral rib fractures, no acute bony injuries or pneumothorax.   X-ray of the pelvis does show a mildly displaced intertrochanteric right hip fracture and old, healed right superior and inferior pubic rami fractures.  She was treated with 0.9 normal saline for her elevated lactate.   Discussed with orthopedics, neurosurgery, trauma and the [facility] triage hospitalist.   Will admit to  on [facility].     This part of the medical record was transcribed by Essie Cunningham, Medical Scribe, from a dictation done by Kait Sanford MD.     I have reviewed the nursing notes. I have reviewed the findings, diagnosis, plan and need for follow up with the patient.    New Prescriptions    No medications on file       Final diagnoses:   Compression fracture of L1 vertebra, initial encounter (H)   Closed fracture of right hip, initial encounter (H)     I, Essie Cunningham, am serving as a trained medical scribe to document services personally performed by Kait Sanford MD, based on the  provider's statements to me.   I, Kait Sanford MD, was physically present and have reviewed and verified the accuracy of this note documented by Essie Cunningham.    Kait Sanford MD  [facility] EMERGENCY DEPARTMENT  4/3/2023     Kait Sanford MD  04/04/23 0122

## 2023-04-04 NOTE — ANESTHESIA PREPROCEDURE EVALUATION
Anesthesia Pre-Procedure Evaluation    Patient: Belia Sheets   MRN: [id number] : 1930        Procedure : Procedure(s):  Open reduction internal fixation hip nailing          Past Medical History:   Diagnosis Date     Carpal tunnel syndrome (aka CTS)      Cataract      Chronic osteoarthritis      Constipation due to outlet dysfunction 2021     Fracture of multiple rami of right pubis with routine healing, subsequent encounter 2021     H/O calcium pyrophosphate deposition disease (CPPD)      History of 9th right rib fracture due to fall (2021) 2021     History of encephalopathy 10/2017     Hypertension      Kyphoscoliosis      Osteoarthritis     hands     Osteoporosis      Rectal prolapse       Past Surgical History:   Procedure Laterality Date     COLONOSCOPY       HYSTERECTOMY      for uterine prolapse     RECTOSIGMOIDECTOMY PERINEAL N/A 2018    Procedure: RECTOSIGMOIDECTOMY PERINEAL;  Perineal Rectosigmoidectomy  ;  Surgeon: Amrik Mariano MD;  Location: [facility] OR      No Known Allergies   Social History     Tobacco Use     Smoking status: Never     Smokeless tobacco: Never   Vaping Use     Vaping status: Never Used   Substance Use Topics     Alcohol use: No      Wt Readings from Last 1 Encounters:   23 47.2 kg (104 lb 0.9 oz)           Physical Exam    Airway        Mallampati: II   TM distance: > 3 FB   Neck ROM: full   Mouth opening: > 3 cm    Respiratory Devices and Support         Dental     Comment: Upper dentures    (+) Multiple visibly decayed, broken teeth      Cardiovascular   cardiovascular exam normal          Pulmonary   pulmonary exam normal            Other findings: 18; 0743; Mask Ventilation: Easy with oral airway; Ease of Intubation: Easy; Airway Size: 6.5;  Cuffed;  Oral;  Blade Type: Skinner;  Blade Size: 2;  Place by: Tg Harris;  Insertion Attempts: 1;  Secured at (cm)to lip: 20 cm;  Breath Sounds: Equal, clear and bilateral;   End Tidal CO2: Present;  Dentition: Intact, Unchanged;  Grade View of Cords: 1    OUTSIDE LABS:  CBC:   Lab Results   Component Value Date    WBC 11.5 (H) 04/03/2023    WBC 6.0 11/09/2022    HGB 13.4 04/03/2023    HGB 13.1 11/09/2022    HCT 42.5 04/03/2023    HCT 39.6 11/09/2022     04/03/2023     11/09/2022     BMP:   Lab Results   Component Value Date     04/03/2023     11/14/2022    POTASSIUM 4.0 04/03/2023    POTASSIUM 3.6 11/14/2022    CHLORIDE 104 04/03/2023    CHLORIDE 100 11/14/2022    CO2 22 04/03/2023    CO2 25 11/14/2022    BUN 22.1 04/03/2023    BUN 24.9 (H) 11/14/2022    CR 0.90 04/03/2023    CR 1.0 04/03/2023     (H) 04/03/2023     (H) 04/03/2023     COAGS:   Lab Results   Component Value Date    PTT 25 04/03/2023    INR 0.99 04/03/2023     POC:   Lab Results   Component Value Date    BGM 97 01/12/2018     HEPATIC:   Lab Results   Component Value Date    ALBUMIN 3.8 04/03/2023    PROTTOTAL 6.7 04/03/2023    ALT 11 04/03/2023    AST 22 04/03/2023    ALKPHOS 53 04/03/2023    BILITOTAL 0.5 04/03/2023    BOZENA <10 (L) 10/23/2017     OTHER:   Lab Results   Component Value Date    PH 7.39 04/03/2023    LACT 1.4 04/04/2023    A1C 5.6 11/06/2022    BOY 9.9 (H) 04/03/2023    PHOS 3.0 04/03/2023    MAG 1.8 04/03/2023    LIPASE 195 10/23/2017    TSH 0.82 11/06/2022    T4 1.27 10/23/2017    CRP <2.9 04/27/2022    SED 20 03/30/2022       Anesthesia Plan    ASA Status:  3   NPO Status:  NPO Appropriate    Anesthesia Type: Spinal.   Induction: Intravenous, Propofol.   Maintenance: TIVA.   Techniques and Equipment:     - Lines/Monitors: 2nd IV     Consents    Anesthesia Plan(s) and associated risks, benefits, and realistic alternatives discussed. Questions answered and patient/representative(s) expressed understanding.    - Discussed:     - Discussed with:  Patient      - Extended Intubation/Ventilatory Support Discussed: Yes.      - Patient is DNR/DNI Status: Yes              Suspend during perioperative period? Yes.             Agree to: chest compression/defibrillation, chemical resuscitation.   Use of blood products discussed: Yes.     - Discussed with: Patient.     Postoperative Care    Pain management: IV analgesics, Oral pain medications, Multi-modal analgesia.   PONV prophylaxis: Ondansetron (or other 5HT-3), Dexamethasone or Solumedrol, Background Propofol Infusion     Comments:    Other Comments: Spinal vs GEN/LMA backup if unsuccessful or unable to sit up 2/2 fracture.     Attempted to chris daughter to discuss anesthetic plan x 2 without answer.    Patient with answering most questions appropriately. Ok to waive DNR/DNI during surgical course            Todd Chicas MD

## 2023-04-04 NOTE — H&P
[facility]    History and Physical - Hospitalist Service, GOLD TEAM 18       Date of Admission:  4/3/2023    Assessment & Plan      Belia Sheets is a 89 year old female admitted on 4/3/2023 for a fractured right hip.     Fractured right hip due to fall of uncertain etiology; complicated by AMS on arrival  - ortho planning surgery this morning - no reservations from my end  - looks like she was found down yesterday evening just after dinner (~6p), I spoke with her daughter Ciarra on the phone who reports that immediately after the fall she say Veryl in the ED and Veryl has been at her baseline - no more confused than usual; she's a little out of it on arrival at the [facility] [place] but she's hardly slept all night and got fentanyl not too long ago, no appreciable deficits on exam though she struggles with some orientation questions and her RLE is difficult to assess given her fracture    htn  - takes amlodipine 2.5 mg daily at home    Cerebrovascular disease  - statin, aspirin; would've last taken aspirin 4/3  - prior stroke presented with acute encephalopathy - her daughter Ciarra assures me that she has been at her baseline since the accident and that her mental state around the time of the fall was nothing like when she presented with acute encephalopathy in November    Osteoporosis  - takes vitamin D3; would benefit from bisphosphonate or denosumab       Diet: NPO for Medical/Clinical Reasons Except for: Meds, Ice Chips    DVT Prophylaxis: scd's until after surgery; heparin or lovenox with hemostasis or defer to ortho  Perez Catheter: Not present  Lines: None     Cardiac Monitoring: None  Code Status: No CPR- Do NOT Intubate      Clinically Significant Risk Factors Present on Admission                               Disposition Plan      Expected Discharge Date: 04/06/2023                  Amos Dangelo DO  Hospitalist Service, GOLD TEAM 18  [facility]  [facility]  Securely message with Colette (more info)  Text page via [facility] Paging/Directory   See signed in provider for up to date coverage information    ______________________________________________________________________    Chief Complaint   Right hip pain    History is obtained from the patient    History of Present Illness   Belia Sheets is a 89 year old female who presented for a fall and altered mental status. Brought in by EMS after being found down and confused, uncertain how long she was down. Struggled initially to give an account of what happened, later said she fell and thinks she hit her head but didn't lose consciousness. Trauma and ortho consulted in the ED. Imaging demonstrates acute, mildly displaced and angulated fx of intertrochanteric prox rt femur. Lumbar imaging shows L1 compression fracture superior endplate, stable to slightly worse relative to 11/22 - trauma service not advising intervention for this. No acute findings on the rest of her spine or head imaging via CT.  Labs relatively unremarkable.    Spoke with her daughter Ciarra who was with her in the ED all evening after she arrived - Ciarra assures me her mother is at her baseline and that she is not experiencing the level of confusion that preceded discovery of her stroke in the fall.    Other conditions for which she receives treatment include stroke in fall 22, htn, hld, and osteoporosis.    In reading the discharge summary from her stroke in the fall it appears she's had some progressive cognitive decline over months to years.     Past Medical History    Past Medical History:   Diagnosis Date     Carpal tunnel syndrome (aka CTS)      Cataract      Chronic osteoarthritis      Constipation due to outlet dysfunction 9/22/2021     Fracture of multiple rami of right pubis with routine healing, subsequent encounter 9/22/2021     H/O calcium pyrophosphate deposition disease (CPPD)      History of 9th right rib fracture  due to fall (2021) 2021     History of encephalopathy 10/2017     Hypertension      Kyphoscoliosis      Osteoarthritis     hands     Osteoporosis      Rectal prolapse        Past Surgical History   Past Surgical History:   Procedure Laterality Date     COLONOSCOPY       HYSTERECTOMY      for uterine prolapse     RECTOSIGMOIDECTOMY PERINEAL N/A 2018    Procedure: RECTOSIGMOIDECTOMY PERINEAL;  Perineal Rectosigmoidectomy  ;  Surgeon: Amrik Mariano MD;  Location: [facility] OR       Prior to Admission Medications   Prior to Admission Medications   Prescriptions Last Dose Informant Patient Reported? Taking?   Vitamin D3 (CHOLECALCIFEROL) 25 mcg (1000 units) tablet   No No   Sig: Take 1 tablet (25 mcg) by mouth daily   acetaminophen (TYLENOL) 500 MG tablet   Yes No   Sig: Take 1,000 mg by mouth 3 times daily as needed for mild pain   amLODIPine (NORVASC) 2.5 MG tablet   No No   Sig: Take 1 tablet (2.5 mg) by mouth daily   aspirin (ASA) 81 MG chewable tablet   No No   Si tablet (81 mg) by Oral or Feeding Tube route daily   atorvastatin (LIPITOR) 20 MG tablet   No No   Si tablet (20 mg) by Oral or Feeding Tube route every evening   calcium carbonate 600 mg-vitamin D 400 units (CALTRATE) 600-400 MG-UNIT per tablet   No No   Sig: Take 1 tablet by mouth 2 times daily (before meals)   cyanocobalamin (VITAMIN B-12) 500 MCG tablet   Yes No   Sig: Take 500 mcg by mouth daily   ibuprofen (ADVIL/MOTRIN) 200 MG tablet   Yes No   Sig: Take 600 mg by mouth 3 times daily as needed for pain   Patient not taking: Reported on 2022   order for DME  Self No No   Sig: Equipment being ordered: Home BP monitor and cuff   order for DME  Self No No   Sig: Equipment being ordered: Incentive spirometer      Facility-Administered Medications: None        Physical Exam   Vital Signs: Temp: 99.6  F (37.6  C) Temp src: Axillary BP: (!) 147/95 Pulse: 86   Resp: 18 SpO2: 95 %      Weight: 0 lbs 0 oz    lying in bed  in no distress  Awake, alert, answers questions and follows commands  Respiratory rate and work of breathing are normal  HRRR without murmur, extremities warm and well perfused  Abdomen is soft, non tender, non distended  Extremities are without edema or cyanosis, RLE held in abd/ER  Sensation is equal in distal LE and UE  Strength is 5/5 in bilateral UE and in LLE  Reflexes are +2/4 LUE and LLE C5 and L4, unable to test on right due to IV site and fracture  Oriented to self, place, not time  Speech is clear and coherent, she's able to follow commands, GCS 15      Medical Decision Making       70 MINUTES SPENT BY ME on the date of service doing chart review, history, exam, documentation & further activities per the note.      Data     I have personally reviewed the following data over the past 24 hrs:    11.5 (H)  \   13.4   / 218     140 104 22.1 /  107 (H)   4.0 22 0.90 \       ALT: 11 AST: 22 AP: 53 TBILI: 0.5   ALB: 3.8 TOT PROTEIN: 6.7 LIPASE: N/A       Procal: N/A CRP: N/A Lactic Acid: 1.4       INR:  0.99 PTT:  25   D-dimer:  N/A Fibrinogen:  N/A       Imaging results reviewed over the past 24 hrs:   Recent Results (from the past 24 hour(s))   POC US ABDOMEN LIMITED    Impression    Bedside FAST (Focused Assessment with Sonography in Trauma), performed and interpreted by me.   Indication: Trauma    With the patient in Trendelenburg, the RUQ, LUQ and subxiphoid views were examined for intraabdominal and thoracic free fluid and pericardial effusion. With the patient in reverse Trendelenburg, the suprapubic view was examined for intraabdominal free fluid. Image quality was satisfactory..     Findings: There is no evidence of free fluid above or below bilateral diaphragms, in the splenorenal or hepatorenal space, or in bilateral paracolic gutters. There was no free fluid seen in the pelvis adjacent to the urinary bladder. There is no free fluid within the pericardium.   Extended FAST exam (eFAST):   Indication:  Trauma   The chest wall was evaluated for evidence of pneumothorax.     Right side:  Lung sliding artifact  Present     Comet tail artifacts or B-lines  Present   Left side:  Lung sliding artifact  Present     Comet tail artifacts or B-lines Present     IMPRESSION:  Negative FAST     XR Chest Port 1 View    Narrative    EXAM: XR CHEST PORT 1 VIEW  4/3/2023 7:06 PM     HISTORY:  fall, hypoxia       COMPARISON:  Chest x-ray 11/5/2022    FINDINGS:   AP view of the chest. Normal heart size. Mild perihilar opacities. No  airspace consolidation. No pleural effusion or pneumothorax. Chronic  bilateral rib fracture deformities. Dextroscoliosis of the thoracic  spine. Mild degenerative changes of the shoulders.      Impression    IMPRESSION:   1. No evidence of acute airspace disease.  2. Chronic bilateral rib fracture deformities. No evidence of acute  osseous abnormality.    I have personally reviewed the examination and initial interpretation  and I agree with the findings.    RHODA AZAR MD         SYSTEM ID:  [id number]   XR Pelvis w Hip Port Right 1 View    Narrative    EXAM: XR PELVIS AND HIP PORTABLE RIGHT 1 VIEW  LOCATION: [facility]  DATE/TIME: 4/3/2023 7:07 PM    INDICATION: Portable, right hip pain, fall.  COMPARISON: None.      Impression    IMPRESSION:   1. Mildly displaced intertrochanteric right hip fracture. Mild apex anterior angulation.  2. Extensive scoliosis and degenerative changes in the spine. There are also mild degenerative changes in the left hip and mild to moderate degenerative changes in the SI joints.  3. Old healed right superior and inferior pubic ramus fractures.  4. Diffuse bone demineralization.  5. Extensive arterial calcifications.   Head CT w/o contrast    Narrative    CT HEAD W/O CONTRAST 4/3/2023 8:13 PM    Provided History: fall, confusion  ICD-10: Trauma.    Comparison: CT head 11/6/2022. Brain MRI 11/6/2022.    Technique: Using  multidetector thin collimation helical acquisition  technique, axial, coronal and sagittal CT images from the skull base  to the vertex were obtained without intravenous contrast.     Findings:    No intracranial hemorrhage. No mass effect. No midline shift. No  extra-axial fluid collection. The gray to white matter differentiation  of the cerebral hemispheres is preserved. Ventricles are proportionate  to the sulci.. No sulcal effacement..  The basal cisterns are patent.  Moderate generalized cerebral volume loss and confluent  periventricular white matter hypoattenuation, nonspecific but favored  to represent chronic small vessel ischemic disease given the patient's  age.    Scattered mucosal thickening of the paranasal sinuses. The mastoid air  cells are clear. Orbits appear unremarkable. No acute fracture.      Impression    Impression:   1.  No acute intracranial pathology.  2.  Moderate generalized cerebral volume loss and advanced  leukoaraiosis.    I have personally reviewed the examination and initial interpretation  and I agree with the findings.    TIERRA WATTS MD         SYSTEM ID:  [id number]   Cervical spine CT w/o contrast    Narrative    EXAM: CT CERVICAL SPINE W/O CONTRAST  4/3/2023 8:14 PM     HISTORY:  back pain, fall, back pain, fall       COMPARISON:  CT cervical spine 5/24/2022.    TECHNIQUE: Using multidetector thin collimation helical acquisition  technique, axial, coronal and sagittal CT images through the cervical  spine were obtained without intravenous contrast.    FINDINGS:  Exaggerated cervical lordosis. Advanced C7 anterior wedge compression  fracture with 5 mm of retropulsion, not significant changed since  5/24/2022. Additional partially visualized advanced wedge compression  deformities of the upper thoracic spine beginning at T2 also appear  similar in appearance. Multilevel intervertebral disc space narrowing.  No prevertebral edema. Trace grade 1 anterolisthesis of C4 on  C5.    The findings on a level by level basis are as follows:    C2-3:  No spinal canal or neural foraminal stenosis.    C3-4:  Bilateral uncinate facet hypertrophy. No significant neural  foraminal or spinal canal stenosis.    C4-5:  Bilateral uncinate and facet hypertrophy. Mild bilateral neural  foraminal stenosis. No significant spinal canal stenosis.    C5-6:  Bilateral uncinate and facet hypertrophy. No significant spinal  canal or neural foraminal stenosis.    C6-7:  No significant neural foraminal stenosis. Mild spinal canal  stenosis.    C7-T1:  No spinal canal or neural foraminal stenosis.     No abnormality of the paraspinous soft tissues. Scarring of the left  lung apex.      Impression    IMPRESSION:  1. No acute fracture or traumatic subluxation.  2. Grossly unchanged wedge compression deformities of C7 and partially  visualized upper thoracic spine starting at T2. Retropulsion at the  level of C7 with associated mild spinal canal stenosis.  3. Multilevel cervical spondylosis.    I have personally reviewed the examination and initial interpretation  and I agree with the findings.    TIERRA WATTS MD         SYSTEM ID:  [id number]   CT Thoracic Spine w/o Contrast   Result Value    Radiologist flags L1 vertebral body compression fracture (Urgent)    Narrative    EXAM: CT THORACIC SPINE W/O CONTRAST  4/3/2023 8:14 PM     HISTORY:  back pain, fall       COMPARISON:  Chest radiographs 11/5/2022 and today.    TECHNIQUE: Using multidetector thin collimation helical acquisition  technique, axial, sagittal and coronal 3 mm thickness CT  reconstructions were obtained through the thoracic spine without  intravenous contrast.  Images were viewed in bone and soft tissue  windows.    FINDINGS:  Exaggerated thoracic kyphosis. Age indeterminate compression fracture  of the superior endplates of L1. Additional multilevel age  indeterminate advanced wedge compression fractures at T2-T4, T6, and  T9 although suspect  chronic in nature. Multilevel intervertebral disc  space narrowing. Patent spinal canal and neural foramina.    The visualized paraspinous tissues are within normal limits. Small  left pleural effusion. Right basilar infiltrates. Old bilateral rib  fractures      Impression    IMPRESSION:  1.  L1 vertebral body compression fracture with mild height loss  possibly worsened since 11/6/2022.  2.  Additional advanced wedge compression fractures at T2-T4, T6, T9,  and L1, most of which were likely present on the prior radiographs  11/5/2022, although exact comparison is difficult.  3.  Right basilar infiltrates, aspiration versus infection.  4.  Small left pleural effusion.    [Urgent Result:   L1 vertebral body compression fracture ]    Finding was identified on 4/3/2023 8:38 PM.     Kait Sanford MD was contacted by Dr. Irby at 4/3/2023 8:44 PM  and verbalized understanding of the urgent finding.      I have personally reviewed the examination and initial interpretation  and I agree with the findings.    TIERRA WATTS MD         SYSTEM ID:  [id number]   Lumbar spine CT w/o contrast   Result Value    Radiologist flags L1 compression fracture (Urgent)    Narrative    EXAM: CT LUMBAR SPINE W/O CONTRAST  4/3/2023 8:14 PM     HISTORY:  back pain, fall       COMPARISON:  Thoracolumbar radiographs 11/6/2022.     TECHNIQUE: Using multidetector thin collimation helical acquisition  technique, axial, sagittal and coronal 3 mm thickness CT  reconstructions were obtained through the lumbar spine without  intravenous contrast.  Images were viewed in bone and soft tissue  windows.    FINDINGS:  There are 5 lumbar type vertebrae, used for the purposes of this  dictation. Normal vertebral body alignment. Multilevel intervertebral  disc space narrowing. Age-indeterminate compression fracture of the  superior endplate of L1 with associated mild height loss. Additional  L4 wedge compression deformity, suspect chronic.  Multilevel  degenerative changes of lumbar spine including posterior disc  osteophytes at the level of L1-L2, L2-L3, L3-L4. Moderate right neural  foraminal stenosis at the level of T12-L1. No significant spinal canal  stenosis at any level.    The visualized adjacent paraspinous tissues are grossly within normal  limits. Pancreatic parenchymal calcifications, likely sequelae of  chronic pancreatitis.    Old bilateral rib fractures.      Impression    IMPRESSION:  1. L1 compression fracture of the superior endplate with mild height  loss, potentially slightly worsened since 11/6/2022.  2. Multilevel lumbar spondylosis.    [Urgent Result: L1 compression fracture]    Finding was identified on 4/3/2023 8:46 PM.     Kait Sanford MD was contacted by Dr. Irby at 4/3/2023 8:49 PM  and verbalized understanding of the urgent finding.     I have personally reviewed the examination and initial interpretation  and I agree with the findings.    TIERRA WATTS MD         SYSTEM ID:  [id number]   XR Femur Right 2 Views    Narrative    EXAM: XR FEMUR RIGHT 2 VIEWS  LOCATION: [facility]  DATE/TIME: 4/3/2023 9:49 PM    INDICATION: known hip fracture, completion imaging for surgical planning  COMPARISON: 04/03/2023      Impression    IMPRESSION: Acute mildly displaced and angulated fracture of the intertrochanteric proximal right femur unchanged. No additional fracture of the more distal femur. Diffuse bony demineralization. Degenerative arthritis right knee.

## 2023-04-04 NOTE — PHARMACY-ADMISSION MEDICATION HISTORY
Admission Medication History Completed by Pharmacy    See [facility] Admission Navigator for allergy information, preferred outpatient pharmacy, prior to admission medications and immunization status.     Medication History Sources:     Verbally with patient and patient's daughter at bedside    Changes made to PTA medication list (reason):    Added: Cranberry juice and assortment of tea (under Unable to find)    Deleted: None    Changed: None    Additional Information:    Patient stated that she takes all her medications daily    Daughter presented her laptop with all medications that Sudeep is currently taking    Patient stated that she drinks an assortment of tea and is unsure of what they are    Patient's daughter and Sudeep, stated that she also drinks cranberry juice often, may not be daily but very often    Patient stated that there are no other herbal or dietary supplements other than those mentioned above    Prior to Admission medications    Medication Sig Last Dose Taking? Auth Provider Long Term End Date   acetaminophen (TYLENOL) 500 MG tablet Take 1,000 mg by mouth 3 times daily as needed for mild pain Unknown at prn Yes Unknown, Entered By History No    amLODIPine (NORVASC) 2.5 MG tablet Take 1 tablet (2.5 mg) by mouth daily Past Week Yes Alejandro Sandhu MD Yes    aspirin (ASA) 81 MG chewable tablet 1 tablet (81 mg) by Oral or Feeding Tube route daily Past Week Yes Alejandro Sandhu MD     atorvastatin (LIPITOR) 20 MG tablet 1 tablet (20 mg) by Oral or Feeding Tube route every evening Past Week Yes Alejandro Sandhu MD Yes    calcium carbonate 600 mg-vitamin D 400 units (CALTRATE) 600-400 MG-UNIT per tablet Take 1 tablet by mouth 2 times daily (before meals) Past Week Yes Saurabh Pitmtan MD     cyanocobalamin (VITAMIN B-12) 500 MCG tablet Take 500 mcg by mouth daily Past Week Yes Unknown, Entered By History No    ibuprofen (ADVIL/MOTRIN) 200 MG tablet Take 600 mg by mouth 3 times daily as needed for pain Unknown  at prn Yes Unknown, Entered By History     order for DME Equipment being ordered: Incentive spirometer Unknown Yes Maday Berger MD     order for DME Equipment being ordered: Home BP monitor and cuff Unknown Yes Alejandro Sandhu MD     UNABLE TO FIND Patient drinks an assortment of tea (unsure of what kind specifically) and cranberry juice (not daily but, often). Unknown Yes Reported, Patient     Vitamin D3 (CHOLECALCIFEROL) 25 mcg (1000 units) tablet Take 1 tablet (25 mcg) by mouth daily Past Week Yes Saurabh Pittman MD         Date completed: 04/04/23    Medication history completed by: WESLY VENTURA - FRANCISCA Student

## 2023-04-04 NOTE — ED NOTES
[facility] will transport pt to [facility] for surgical procedure.

## 2023-04-04 NOTE — CONSULTS
Orthopaedic Surgery Consultation     DATE OF SERVICE: 4/3/2023      This is a consultation requested by ED for right hip fracture.     CHIEF COMPLAINT: right hip pain     HISTORY OBTAINED FROM: daughter, chart review, patient     HISTORY: This is a 89 year old old female with PMH osteoarthritis, osteoporosis, hypertension, and prior stroke identified during hospitalization for acute encephalopathy from 11/5 to 11/9/22. She presents with right hip pain after a ground level fall today. The patient is unable to recall the exact circumstances of the fall but does state that she did strike her head -- it was the last thing when she hit the ground -- does not believe she lost consciousness. The patient was unable to bear weight after the fall due to pain.  Denies numbness, paresthesias, pain anywhere else in the body. Denies pain in her back. Denies history of injury or surgery to the right lower extremity. Denies antecedent pain in the right hip.     Patient lives in senior apartments assisted living with some services and ambulates with the use of a walker at baseline.       PAST MEDICAL HISTORY:   Past Medical History:   Diagnosis Date     Carpal tunnel syndrome (aka CTS)      Cataract      Chronic osteoarthritis      Constipation due to outlet dysfunction 9/22/2021     Fracture of multiple rami of right pubis with routine healing, subsequent encounter 9/22/2021     H/O calcium pyrophosphate deposition disease (CPPD)      History of 9th right rib fracture due to fall (07/13/2021) 9/22/2021     History of encephalopathy 10/2017     Hypertension      Kyphoscoliosis      Osteoarthritis     hands     Osteoporosis      Rectal prolapse         PAST SURGICAL HISTORY:  Past Surgical History:   Procedure Laterality Date     COLONOSCOPY  2010     HYSTERECTOMY      for uterine prolapse     RECTOSIGMOIDECTOMY PERINEAL N/A 1/12/2018    Procedure: RECTOSIGMOIDECTOMY PERINEAL;  Perineal Rectosigmoidectomy  ;  Surgeon: García  Amrik Lock MD;  Location: [facility] OR          FAMILY HISTORY: Reviewed with patient and is non-contributory.   No known personal or family history of bleeding or clotting disorders  No known personal or family history of adverse reactions to anesthesia      SOCIAL HISTORY:   Tobacco - nonsmoker  Alcohol - denies alcohol use  Social History     Tobacco Use     Smoking status: Never     Smokeless tobacco: Never   Vaping Use     Vaping status: Never Used   Substance Use Topics     Alcohol use: No     Drug use: No          ALLERGIES:   No Known Allergies     MEDS:   Prior to Admission medications    Medication Sig Last Dose Taking? Auth Provider Long Term End Date   acetaminophen (TYLENOL) 500 MG tablet Take 1,000 mg by mouth 3 times daily as needed for mild pain   Unknown, Entered By History No    amLODIPine (NORVASC) 2.5 MG tablet Take 1 tablet (2.5 mg) by mouth daily   Alejadnro Sandhu MD Yes    aspirin (ASA) 81 MG chewable tablet 1 tablet (81 mg) by Oral or Feeding Tube route daily   Alejandro Sandhu MD     atorvastatin (LIPITOR) 20 MG tablet 1 tablet (20 mg) by Oral or Feeding Tube route every evening   Alejandro Sandhu MD Yes    calcium carbonate 600 mg-vitamin D 400 units (CALTRATE) 600-400 MG-UNIT per tablet Take 1 tablet by mouth 2 times daily (before meals)   Saurabh Pittman MD     cyanocobalamin (VITAMIN B-12) 500 MCG tablet Take 500 mcg by mouth daily   Unknown, Entered By History No    ibuprofen (ADVIL/MOTRIN) 200 MG tablet Take 600 mg by mouth 3 times daily as needed for pain  Patient not taking: Reported on 12/7/2022   Unknown, Entered By History     order for DME Equipment being ordered: Incentive spirometer   Maday Berger MD     order for DME Equipment being ordered: Home BP monitor and cuff   Alejandro Sandhu MD     Vitamin D3 (CHOLECALCIFEROL) 25 mcg (1000 units) tablet Take 1 tablet (25 mcg) by mouth daily   Saurabh Pittman MD             REVIEW OF SYSTEMS: An 11-point systems review was  performed and negative except as noted in the HPI.        PHYSICAL EXAMINATION:   Vitals:    04/03/23 2009 04/03/23 2058 04/03/23 2125 04/03/23 2200   BP: (!) 150/93 (!) 141/87 (!) 145/93 (!) 154/102   Pulse: 81   91   Resp:       SpO2: 99% 94% 97% 97%        Gen: Awake and Alert, pleasant, interactive, no acute distress.   Psych: Articulates and Communicates with a normal affect   HEENT: Normal and atraumatic   Pulm: Non-labored breathing at rest  CV: RRR   Extremities:     RUE: Chronic degenerative deformity of the hand and wrist. skin intact.   Non-tender to palpation over clavicle, shoulder, arm, elbow, forearm, wrist.   Normal ROM shoulder, elbow, wrist without pain. + FPL/EPL/intrinsics.   SILT over m/r/u distributions.   Radial pulse palpable.      LUE: Chronic degenerative deformity of the hand and wrist. skin intact.   Non-tender to palpation over clavicle, shoulder, arm, elbow, forearm, wrist.   Normal ROM shoulder, elbow, wrist without pain. + FPL/EPL/intrinsics.   SILT over m/r/u distributions.   Radial pulse palpable.      RLE:   Hip held in flexion and ER, skin intact.   TTP over hip/lateral thigh. Non-tender to palpation over knee, leg, ankle/foot.   Unable to perform ROM at hip due to pain. Pain with log roll and simulated WB. No pain with ROM ankle.   + TA/Gsc/EHL/FHL.   SILT DP/SP/sural/saph/tibial distributions.   DP palpable, toes warm/well-perfused.      LLE: skin intact.   Non-tender to palpation over thigh, knee, leg, ankle/foot.   No pain with ROM hip/knee/ankle.   + TA/Gsc/EHL/FHL.   SILT DP/SP/sural/saph/tibial distributions.   DP palpable, toes warm/well-perfused.      LABS/IMAGING:  CBC RESULTS:   Recent Labs   Lab Test 04/03/23 1838   WBC 11.5*   RBC 4.71   HGB 13.4   HCT 42.5   MCV 90   MCH 28.5   MCHC 31.5   RDW 13.2        Last Comprehensive Metabolic Panel:  Sodium   Date Value Ref Range Status   04/03/2023 140 136 - 145 mmol/L Final   01/30/2019 140 133 - 144 mmol/L Final      Potassium   Date Value Ref Range Status   04/03/2023 4.0 3.4 - 5.3 mmol/L Final   11/05/2022 3.5 3.4 - 5.3 mmol/L Final   01/30/2019 4.0 3.4 - 5.3 mmol/L Final     Chloride   Date Value Ref Range Status   04/03/2023 104 98 - 107 mmol/L Final   11/05/2022 99 94 - 109 mmol/L Final   01/30/2019 104 94 - 109 mmol/L Final     Carbon Dioxide   Date Value Ref Range Status   01/30/2019 30 20 - 32 mmol/L Final     Carbon Dioxide (CO2)   Date Value Ref Range Status   04/03/2023 22 22 - 29 mmol/L Final   11/05/2022 26 20 - 32 mmol/L Final     Anion Gap   Date Value Ref Range Status   04/03/2023 14 7 - 15 mmol/L Final   11/05/2022 15 (H) 3 - 14 mmol/L Final   01/30/2019 6 3 - 14 mmol/L Final     Glucose   Date Value Ref Range Status   04/03/2023 107 (H) 70 - 99 mg/dL Final   11/05/2022 102 (H) 70 - 99 mg/dL Final   01/30/2019 129 (H) 70 - 99 mg/dL Final     GLUCOSE BY METER POCT   Date Value Ref Range Status   11/09/2022 101 (H) 70 - 99 mg/dL Final     Urea Nitrogen   Date Value Ref Range Status   04/03/2023 22.1 8.0 - 23.0 mg/dL Final   11/05/2022 19 7 - 30 mg/dL Final   07/13/2020 16 7 - 30 mg/dL Final     Creatinine   Date Value Ref Range Status   04/03/2023 0.90 0.51 - 0.95 mg/dL Final   07/13/2020 0.67 0.52 - 1.04 mg/dL Final     Creatinine POCT   Date Value Ref Range Status   04/03/2023 1.0 0.5 - 1.0 mg/dL Final     GFR Estimate   Date Value Ref Range Status   04/03/2023 60 (L) >60 mL/min/1.73m2 Final     Comment:     eGFR calculated using 2021 CKD-EPI equation.   07/13/2020 77 >60 mL/min/[1.73_m2] Final     Comment:     Non  GFR Calc  Starting 12/18/2018, serum creatinine based estimated GFR (eGFR) will be   calculated using the Chronic Kidney Disease Epidemiology Collaboration   (CKD-EPI) equation.       GFR, ESTIMATED POCT   Date Value Ref Range Status   04/03/2023 53 (L) >60 mL/min/1.73m2 Final     Calcium   Date Value Ref Range Status   04/03/2023 9.9 (H) 8.2 - 9.6 mg/dL Final   07/13/2020 8.8 8.5  - 10.1 mg/dL Final     INR   Date Value Ref Range Status   04/03/2023 0.99 0.85 - 1.15 Final   10/25/2017 1.03 0.86 - 1.14 Final     INR POCT   Date Value Ref Range Status   04/03/2023 1.2 (L) 2.0 - 3.0 Final      CRP Inflammation   Date Value Ref Range Status   04/27/2022 <2.9 0.0 - 8.0 mg/L Final        Imaging:  Review of x-rays shows mildly displaced intertrochanteric right femur fracture.     PROCEDURES:  none     IMPRESSION AND PLAN: A 89 year old with closed displaced right intertrochanteric femur fracture. Risks, benefits and alternatives of both operative and closed treatment were discussed. Questions answered.    Discussed the goals of surgery would be fixation of femur fracture for primary goals of palliation and pain relief as well as to provide the best chance at returning to pre-injury level of function. Discussed gravity of this injury in a nonagenarian particularly the risks of infection, bleeding, damage to neurovascular structures, effects of anesthesia including stroke MI or even death, as well as the high risk of perioperative delirium in this 89 year old patient with preexistent cognitive deficit due to history of stroke.    Other injuries include:  - L1 superior endplate compression fracture with mild height loss    NSGY consulted by [facility] for this. They are recommending flexion extension XR of spine to assess stability. Which will be obtained when the patient is able to stand. Until then the patient will be on partial, lumbar spinal precautions and we will logroll and keep the patient supine when operating on the hip.     - Admit to Orthopaedics  - Plan for OR: will plan for IMN R femur 4/4  - Consent: obtained -- hard copy in patients chart  - Pre-op labs: complete  - Medicine/trauma clearance: pending  - Anticoagulation: hold in anticipation of OR  - Antibiotics/tetanus:  preop abx ordered  - Xrays/imaging: complete  - Activity: bedrest for now  - Weight Bearing: NWB  RLE  - Pain control:  Per primary service, will place regional anesthesia pain service consult (for block in AM)  - Diet:  NPO in anticipation of OR  - Dispo:  TBD  - Follow-up:  TBD     Patient will be discussed with Dr. Olivia, staff.     --  Ariel Chandra MD  Orthopedic Surgery PGY-2  [phone]

## 2023-04-04 NOTE — OP NOTE
Procedure Date: 04/04/2023    PREOPERATIVE DIAGNOSIS:  Right femur intertrochanteric fracture.    POSTOPERATIVE DIAGNOSIS:  Right femur intertrochanteric fracture.    PROCEDURE:  Right femur intermedullary nailing.    SURGEON:  Benitez Todd M.D.     ASSISTANT:  Ariel Chandra, PGY2, Orthopedic Resident.    COMPLICATIONS:  None.    DRAINS:  None.    ESTIMATED BLOOD LOSS:  100 mL    ANESTHESIA:  General endotracheal.    INDICATIONS FOR PROCEDURE:  Please refer to hospital chart for further details discussing indications with Mrs. Sheets's case.    DESCRIPTION OF PROCEDURE:  On 04/04/2023, the patient was taken to surgery.  Preoperative antibiotics were administered to the patient prior to arrival to the OR.    After successful induction of general endotracheal anesthesia, she was placed supine on the operating table, and the right lower extremity was prepped and draped in sterile fashion.    The pause for the cause was performed according to institution's policy, which confirmed laterality of the procedure.    We proceeded with identification of the entry point for the greater trochanter under fluoroscopic control, a K-wire was passed into the canal and eventually an opening reamer was placed.  This was followed by placement of a guidewire into the canal and we proceeded then with reaming up to 11.5 mm.  A long [place] nail of 380 mm x 10 mm was placed into the canal, and eventually under fluoroscopic control, we proceeded with placement of a K-wire in a center-center position across the femoral head.  Eventually, a 90 mm lag screw was placed without any complications with excellent purchase.    Finally, we proceeded with locking of the nail distally through a dynamic hole in a dynamic fashion, which was done through a freehand technique without any complications.    We confirmed the fluoroscopic examination in AP and lateral projections of the right femur to have excellent reduction of the fracture fragments and  location of the hardware.  These images were sent to PACS for definitive documentation.    Copious irrigation was applied.  The wound was closed in layers.  Sterile dressings were applied.  The patient was transferred in stable condition to PACU.    PLAN:  The patient will remain weightbearing as tolerated.  The patient will proceed with physical therapy without restrictions on postoperative day #1.  She will return to clinic in 2 weeks for suture removal, which will be at the foot and ankle nurse clinic schedule.    The patient then will proceed with further physical therapy and she will be reevaluated at 6 weeks from surgery, and at that time, AP and lateral x-rays of the right femur will be obtained.    Benitez Todd MD        D: 2023   T: 2023   MT: OMAR    Name:     SID CONNELLYHENRY CONSUELOJohanna  MRN:      [id number]        Account:        [id number]   :      1930           Procedure Date: 2023     Document: [id number]

## 2023-04-04 NOTE — CONSULTS
[facility]       NEUROSURGERY CONSULTATION NOTE    This consultation was requested by Dr. Sanford from the ED service.    Reason for Consultation: L1 compression fracture    HPI:    Ms. Sheets is a 89-year-old female, on aspirin, who presented for a fall and altered mental status.  Patient was brought in by EMS after being found down and confused, uncertain how long the patient was down.  Patient initially struggled to give the complete history but later she reported that she might have hit her head but did not lose consciousness.  On investigations and imaging patient was found to have L1 compression fracture of the superior and plate and right femur fracture.  Neurosurgery team was then consulted for the evaluation and management of L1 compression fracture.    At the time of this history taking the neurosurgery team spoke with the patient's daughter Ciarra who was present with the patient in the ED room and she reported that the patient was at her baseline and was not experiencing any change in mental status or any neurological weakness other than the pain limited weakness in the right lower extremity due to hip fracture.    PAST MEDICAL HISTORY:   Past Medical History:   Diagnosis Date     Carpal tunnel syndrome (aka CTS)      Cataract      Chronic osteoarthritis      Constipation due to outlet dysfunction 9/22/2021     Fracture of multiple rami of right pubis with routine healing, subsequent encounter 9/22/2021     H/O calcium pyrophosphate deposition disease (CPPD)      History of 9th right rib fracture due to fall (07/13/2021) 9/22/2021     History of encephalopathy 10/2017     Hypertension      Kyphoscoliosis      Osteoarthritis     hands     Osteoporosis      Rectal prolapse        PAST SURGICAL HISTORY:   Past Surgical History:   Procedure Laterality Date     COLONOSCOPY  2010     HYSTERECTOMY      for uterine prolapse     RECTOSIGMOIDECTOMY PERINEAL N/A 1/12/2018     Procedure: RECTOSIGMOIDECTOMY PERINEAL;  Perineal Rectosigmoidectomy  ;  Surgeon: Amrik Mariano MD;  Location: [facility] OR       FAMILY HISTORY:   Family History   Problem Relation Age of Onset     Depression/Anxiety Son      Depression/Anxiety Son         alcohol abuse  age 24     Hypertension Mother      Hypertension Brother      Diabetes No family hx of      Breast Cancer No family hx of      Colon Cancer No family hx of      Prostate Cancer No family hx of      Other Cancer No family hx of        SOCIAL HISTORY:   Social History     Tobacco Use     Smoking status: Never     Smokeless tobacco: Never   Vaping Use     Vaping status: Never Used   Substance Use Topics     Alcohol use: No       MEDICATIONS:  Current Outpatient Medications   Medication Sig Dispense Refill     acetaminophen (TYLENOL) 500 MG tablet Take 1,000 mg by mouth 3 times daily as needed for mild pain       amLODIPine (NORVASC) 2.5 MG tablet Take 1 tablet (2.5 mg) by mouth daily 90 tablet 3     aspirin (ASA) 81 MG chewable tablet 1 tablet (81 mg) by Oral or Feeding Tube route daily 90 tablet 3     atorvastatin (LIPITOR) 20 MG tablet 1 tablet (20 mg) by Oral or Feeding Tube route every evening 90 tablet 3     calcium carbonate 600 mg-vitamin D 400 units (CALTRATE) 600-400 MG-UNIT per tablet Take 1 tablet by mouth 2 times daily (before meals) 180 tablet 3     cyanocobalamin (VITAMIN B-12) 500 MCG tablet Take 500 mcg by mouth daily       ibuprofen (ADVIL/MOTRIN) 200 MG tablet Take 600 mg by mouth 3 times daily as needed for pain (Patient not taking: Reported on 2022)       order for DME Equipment being ordered: Incentive spirometer 1 Units 0     order for DME Equipment being ordered: Home BP monitor and cuff 1 each 0     Vitamin D3 (CHOLECALCIFEROL) 25 mcg (1000 units) tablet Take 1 tablet (25 mcg) by mouth daily 90 tablet 3       Allergies:  No Known Allergies    ROS: 10 point ROS of systems including Constitutional, Eyes,  Respiratory, Cardiovascular, Gastroenterology, Genitourinary, Integumentary, Muscularskeletal, Psychiatric were all negative except for pertinent positives noted in my HPI.    Physical exam:   Blood pressure (!) 150/93, pulse 81, resp. rate 14, SpO2 99 %, not currently breastfeeding.  General: awake and alert  PULM: breathing comfortably on room air  NEUROLOGIC:  -- Awake; Alert; oriented x 3  -- Follows commands briskly  -- +repetition, calculation, and naming  -- Speech fluent, spontaneous. No aphasia or dysarthria.  -- no gaze preference. No apparent hemineglect.  Cranial Nerves:  -- visual fields full to confrontation, PERRL 3-2mm bilat and brisk, extraocular movements intact  -- face symmetrical, tongue midline  -- sensory V1-V3 intact bilaterally  -- palate elevates symmetrically, uvula midline  -- hearing grossly intact bilat  -- Trapezii 5/5 strength bilat symmetric  -- Cerebellar: Finger nose finger without dysmetria, intact rapid alternating motions bilaterally    Motor:  Normal bulk / tone; no tremor, rigidity, or bradykinesia.  No muscle wasting or fasciculations  No Pronator Drift    Age-appropriate full strength in bilateral upper extremities and left lower extremity.  Pain limited weakness and limited examination in the right lower extremity.    Sensory:  intact to LT x 4 extremities     Reflexes:    2+ reflexes in bilateral upper and lower extremities.    Gait: Deferred.    IMAGING:  We reviewed the CT imaging and the patient has an L1 compression fracture.    LABS:   Last Comprehensive Metabolic Panel:  Lab Results   Component Value Date     04/03/2023    POTASSIUM 4.0 04/03/2023    CHLORIDE 104 04/03/2023    CO2 22 04/03/2023    ANIONGAP 14 04/03/2023     (H) 04/03/2023    BUN 22.1 04/03/2023    CR 0.90 04/03/2023    GFRESTIMATED 60 (L) 04/03/2023    BOY 9.9 (H) 04/03/2023         Lab Results   Component Value Date    WBC 11.5 04/03/2023    WBC 7.2 01/09/2018     Lab Results    Component Value Date    RBC 4.71 04/03/2023    RBC 4.04 01/09/2018     Lab Results   Component Value Date    HGB 13.4 04/03/2023    HGB 13.0 06/20/2018     Lab Results   Component Value Date    HCT 42.5 04/03/2023    HCT 43.1 06/20/2018     Lab Results   Component Value Date    MCV 90 04/03/2023    MCV 96.0 06/20/2018     Lab Results   Component Value Date    MCH 28.5 04/03/2023    MCH 29.0 06/20/2018     Lab Results   Component Value Date    MCHC 31.5 04/03/2023    MCHC 30.2 06/20/2018     Lab Results   Component Value Date    RDW 13.2 04/03/2023    RDW 13.3 01/09/2018     Lab Results   Component Value Date     04/03/2023     01/09/2018     INR   Date Value Ref Range Status   04/03/2023 0.99 0.85 - 1.15 Final   10/25/2017 1.03 0.86 - 1.14 Final     INR POCT   Date Value Ref Range Status   04/03/2023 1.2 (L) 2.0 - 3.0 Final      PTT   Date Value Ref Range Status   10/25/2017 31 22 - 37 sec Final     aPTT   Date Value Ref Range Status   04/03/2023 25 22 - 38 Seconds Final      ASSESSMENT:    89-year-old female, on aspirin with L1 compression fracture and right femur fracture.  Patient is neurological baseline examination in terms of motor strength.    RECOMMENDATIONS:    No neurosurgical intervention indicated at this time   Pain management per primary team  Please inform neurosurgery if there is any acute change in neurological examination in terms of motor weakness in lower extremities or any bladder or bowel incontinence  Would recommend bone health clinic referral  Follow-up with neurosurgery as needed  Neurosurgery will follow peripherally    Bebo [facility]  Neurosurgery Resident, PGY-2    The patient was discussed with Dr. Suarez, neurosurgery chief resident.

## 2023-04-04 NOTE — ED PROVIDER NOTES
[place] EMERGENCY DEPARTMENT ([facility])  April 3, 2023     History     Chief Complaint   Patient presents with     Fall     HPI  Malial Keaton Sheets is a 89 year old female who presents for altered mental status.    Per EMS, the patient is in independent living. Someone today went to check on her and found that she was down on the floor smelling of urine and confused. We are not sure how long she was down.  She appeared to have pain in her right hip.  Blood sugar was normal and on arrival to the ED she was able to follow commands in all extremities and did not have any focal neurologic deficits, but did struggle to give a history of what happened.      This part of the medical record was transcribed by Essie Cunningham, Medical Scribe, from a dictation done by Kait Sanford MD.      Past Medical History  Past Medical History:   Diagnosis Date     Carpal tunnel syndrome (aka CTS)      Cataract      Chronic osteoarthritis      Constipation due to outlet dysfunction 9/22/2021     Fracture of multiple rami of right pubis with routine healing, subsequent encounter 9/22/2021     H/O calcium pyrophosphate deposition disease (CPPD)      History of 9th right rib fracture due to fall (07/13/2021) 9/22/2021     History of encephalopathy 10/2017     Hypertension      Kyphoscoliosis      Osteoarthritis     hands     Osteoporosis      Rectal prolapse      Past Surgical History:   Procedure Laterality Date     COLONOSCOPY  2010     HYSTERECTOMY      for uterine prolapse     RECTOSIGMOIDECTOMY PERINEAL N/A 1/12/2018    Procedure: RECTOSIGMOIDECTOMY PERINEAL;  Perineal Rectosigmoidectomy  ;  Surgeon: Amrik Mariano MD;  Location: [facility] OR     acetaminophen (TYLENOL) 500 MG tablet  amLODIPine (NORVASC) 2.5 MG tablet  aspirin (ASA) 81 MG chewable tablet  atorvastatin (LIPITOR) 20 MG tablet  calcium carbonate 600 mg-vitamin D 400 units (CALTRATE) 600-400 MG-UNIT per tablet  cyanocobalamin (VITAMIN B-12) 500 MCG  tablet  ibuprofen (ADVIL/MOTRIN) 200 MG tablet  order for DME  order for DME  Vitamin D3 (CHOLECALCIFEROL) 25 mcg (1000 units) tablet      No Known Allergies  Family History  Family History   Problem Relation Age of Onset     Depression/Anxiety Son      Depression/Anxiety Son         alcohol abuse  age 24     Hypertension Mother      Hypertension Brother      Diabetes No family hx of      Breast Cancer No family hx of      Colon Cancer No family hx of      Prostate Cancer No family hx of      Other Cancer No family hx of      Social History   Social History     Tobacco Use     Smoking status: Never     Smokeless tobacco: Never   Vaping Use     Vaping status: Never Used   Substance Use Topics     Alcohol use: No     Drug use: No      Past medical history, past surgical history, medications, allergies, family history, and social history were reviewed with the patient. No additional pertinent items.      A medically appropriate review of systems was performed with pertinent positives and negatives noted in the HPI, and all other systems negative.    Physical Exam   BP: (!) 144/97  Pulse: 77  Resp: (!) 38  SpO2: 94 %  Physical Exam  Gen: cooperative but unable to give history  HEENT:PERRL, no facial tenderness or wounds, head atraumatic, oropharynx clear, mucous membranes moist, TMs clear bilaterally, no skull base fracture signs.   Neck:no bony tenderness or step offs, no JVD, trachea midline  Back: no CVA tenderness, mid thoracic tenderness and diffuse lumbar tenderness  CV:RRR without murmurs  PULM:Clear to auscultation bilaterally, no chest wall tenderness  Abd:soft, nontender, nondistended. Bowel sounds present and normal, pelvis stable.   UE:No traumatic injuries, skin normal, + radial pulses with normal perfusion  LE: + DP and PT pulses intact. Holds right leg rotated but not shortened. Tenderness of lateral right hip.   Neuro:CN II-XII intact, strength 5/5 throughout other than limited exam of proximal right  leg.   Skin: no rashes or ecchymoses    ED Course, Procedures, & Data      Procedures  Results for orders placed during the hospital encounter of 04/03/23    POC US ABDOMEN LIMITED    Impression  Bedside FAST (Focused Assessment with Sonography in Trauma), performed and interpreted by me.  Indication: Trauma    With the patient in Trendelenburg, the RUQ, LUQ and subxiphoid views were examined for intraabdominal and thoracic free fluid and pericardial effusion. With the patient in reverse Trendelenburg, the suprapubic view was examined for intraabdominal free fluid. Image quality was satisfactory..    Findings: There is no evidence of free fluid above or below bilateral diaphragms, in the splenorenal or hepatorenal space, or in bilateral paracolic gutters. There was no free fluid seen in the pelvis adjacent to the urinary bladder. There is no free fluid within the pericardium.  Extended FAST exam (eFAST):  Indication: Trauma  The chest wall was evaluated for evidence of pneumothorax.    Right side:  Lung sliding artifact  Present  Comet tail artifacts or B-lines  Present  Left side:  Lung sliding artifact  Present  Comet tail artifacts or B-lines Present    IMPRESSION:  Negative FAST       ED Course Selections:        EKG Interpretation:      Interpreted by Kait Sanford MD  Time reviewed: 6:54pm  Symptoms at time of EKG: fall   Rhythm: normal sinus   Rate: 75  Axis: normal  Ectopy: PVCs  Conduction: QTc prolonged at 475ms  ST Segments/ T Waves: No ST-T wave changes  Q Waves: none  Comparison to prior: No old EKG available    Clinical Impression: NSR with prolonged QTc      ED Course Selections: The Lactic acid level is elevated due to trauma, at this time there is no sign of severe sepsis or septic shock.  Trauma:  Level of trauma activation: Trauma evaluation (consult) called at 8:49pm  Full Primary and Secondary survey with appropriate immobilization of spine completed in exam section.  C-collar and  immobilization: not indicated, cleared.  CSpine Clearance: C spine cleared clinically  GCS at arrival: 15  GCS at disposition: unchanged  Consults prior to admission or transfer: Orthopedics called at 8:52 and Neurosurgery called at 8:54  Procedures done in the ED: none  Disposition: Admit. Admission ordered at 2300 PM       Results for orders placed or performed during the hospital encounter of 04/03/23   XR Chest Port 1 View     Status: None    Narrative    EXAM: XR CHEST PORT 1 VIEW  4/3/2023 7:06 PM     HISTORY:  fall, hypoxia       COMPARISON:  Chest x-ray 11/5/2022    FINDINGS:   AP view of the chest. Normal heart size. Mild perihilar opacities. No  airspace consolidation. No pleural effusion or pneumothorax. Chronic  bilateral rib fracture deformities. Dextroscoliosis of the thoracic  spine. Mild degenerative changes of the shoulders.      Impression    IMPRESSION:   1. No evidence of acute airspace disease.  2. Chronic bilateral rib fracture deformities. No evidence of acute  osseous abnormality.    I have personally reviewed the examination and initial interpretation  and I agree with the findings.    RHODA AZAR MD         SYSTEM ID:  [id number]   Head CT w/o contrast     Status: None    Narrative    CT HEAD W/O CONTRAST 4/3/2023 8:13 PM    Provided History: fall, confusion  ICD-10: Trauma.    Comparison: CT head 11/6/2022. Brain MRI 11/6/2022.    Technique: Using multidetector thin collimation helical acquisition  technique, axial, coronal and sagittal CT images from the skull base  to the vertex were obtained without intravenous contrast.     Findings:    No intracranial hemorrhage. No mass effect. No midline shift. No  extra-axial fluid collection. The gray to white matter differentiation  of the cerebral hemispheres is preserved. Ventricles are proportionate  to the sulci.. No sulcal effacement..  The basal cisterns are patent.  Moderate generalized cerebral volume loss and  confluent  periventricular white matter hypoattenuation, nonspecific but favored  to represent chronic small vessel ischemic disease given the patient's  age.    Scattered mucosal thickening of the paranasal sinuses. The mastoid air  cells are clear. Orbits appear unremarkable. No acute fracture.      Impression    Impression:   1.  No acute intracranial pathology.  2.  Moderate generalized cerebral volume loss and advanced  leukoaraiosis.    I have personally reviewed the examination and initial interpretation  and I agree with the findings.    TIERRA WATTS MD         SYSTEM ID:  [id number]   Cervical spine CT w/o contrast     Status: None    Narrative    EXAM: CT CERVICAL SPINE W/O CONTRAST  4/3/2023 8:14 PM     HISTORY:  back pain, fall, back pain, fall       COMPARISON:  CT cervical spine 5/24/2022.    TECHNIQUE: Using multidetector thin collimation helical acquisition  technique, axial, coronal and sagittal CT images through the cervical  spine were obtained without intravenous contrast.    FINDINGS:  Exaggerated cervical lordosis. Advanced C7 anterior wedge compression  fracture with 5 mm of retropulsion, not significant changed since  5/24/2022. Additional partially visualized advanced wedge compression  deformities of the upper thoracic spine beginning at T2 also appear  similar in appearance. Multilevel intervertebral disc space narrowing.  No prevertebral edema. Trace grade 1 anterolisthesis of C4 on C5.    The findings on a level by level basis are as follows:    C2-3:  No spinal canal or neural foraminal stenosis.    C3-4:  Bilateral uncinate facet hypertrophy. No significant neural  foraminal or spinal canal stenosis.    C4-5:  Bilateral uncinate and facet hypertrophy. Mild bilateral neural  foraminal stenosis. No significant spinal canal stenosis.    C5-6:  Bilateral uncinate and facet hypertrophy. No significant spinal  canal or neural foraminal stenosis.    C6-7:  No significant neural foraminal  stenosis. Mild spinal canal  stenosis.    C7-T1:  No spinal canal or neural foraminal stenosis.     No abnormality of the paraspinous soft tissues. Scarring of the left  lung apex.      Impression    IMPRESSION:  1. No acute fracture or traumatic subluxation.  2. Grossly unchanged wedge compression deformities of C7 and partially  visualized upper thoracic spine starting at T2. Retropulsion at the  level of C7 with associated mild spinal canal stenosis.  3. Multilevel cervical spondylosis.    I have personally reviewed the examination and initial interpretation  and I agree with the findings.    TIERRA WATTS MD         SYSTEM ID:  [id number]   CT Thoracic Spine w/o Contrast     Status: Abnormal   Result Value Ref Range    Radiologist flags L1 vertebral body compression fracture (Urgent)     Narrative    EXAM: CT THORACIC SPINE W/O CONTRAST  4/3/2023 8:14 PM     HISTORY:  back pain, fall       COMPARISON:  Chest radiographs 11/5/2022 and today.    TECHNIQUE: Using multidetector thin collimation helical acquisition  technique, axial, sagittal and coronal 3 mm thickness CT  reconstructions were obtained through the thoracic spine without  intravenous contrast.  Images were viewed in bone and soft tissue  windows.    FINDINGS:  Exaggerated thoracic kyphosis. Age indeterminate compression fracture  of the superior endplates of L1. Additional multilevel age  indeterminate advanced wedge compression fractures at T2-T4, T6, and  T9 although suspect chronic in nature. Multilevel intervertebral disc  space narrowing. Patent spinal canal and neural foramina.    The visualized paraspinous tissues are within normal limits. Small  left pleural effusion. Right basilar infiltrates. Old bilateral rib  fractures      Impression    IMPRESSION:  1.  L1 vertebral body compression fracture with mild height loss  possibly worsened since 11/6/2022.  2.  Additional advanced wedge compression fractures at T2-T4, T6, T9,  and L1, most of  which were likely present on the prior radiographs  11/5/2022, although exact comparison is difficult.  3.  Right basilar infiltrates, aspiration versus infection.  4.  Small left pleural effusion.    [Urgent Result:   L1 vertebral body compression fracture ]    Finding was identified on 4/3/2023 8:38 PM.     Kait Sanford MD was contacted by Dr. Irby at 4/3/2023 8:44 PM  and verbalized understanding of the urgent finding.      I have personally reviewed the examination and initial interpretation  and I agree with the findings.    TIERRA WATTS MD         SYSTEM ID:  [id number]   Lumbar spine CT w/o contrast     Status: Abnormal   Result Value Ref Range    Radiologist flags L1 compression fracture (Urgent)     Narrative    EXAM: CT LUMBAR SPINE W/O CONTRAST  4/3/2023 8:14 PM     HISTORY:  back pain, fall       COMPARISON:  Thoracolumbar radiographs 11/6/2022.     TECHNIQUE: Using multidetector thin collimation helical acquisition  technique, axial, sagittal and coronal 3 mm thickness CT  reconstructions were obtained through the lumbar spine without  intravenous contrast.  Images were viewed in bone and soft tissue  windows.    FINDINGS:  There are 5 lumbar type vertebrae, used for the purposes of this  dictation. Normal vertebral body alignment. Multilevel intervertebral  disc space narrowing. Age-indeterminate compression fracture of the  superior endplate of L1 with associated mild height loss. Additional  L4 wedge compression deformity, suspect chronic. Multilevel  degenerative changes of lumbar spine including posterior disc  osteophytes at the level of L1-L2, L2-L3, L3-L4. Moderate right neural  foraminal stenosis at the level of T12-L1. No significant spinal canal  stenosis at any level.    The visualized adjacent paraspinous tissues are grossly within normal  limits. Pancreatic parenchymal calcifications, likely sequelae of  chronic pancreatitis.    Old bilateral rib fractures.      Impression     IMPRESSION:  1. L1 compression fracture of the superior endplate with mild height  loss, potentially slightly worsened since 11/6/2022.  2. Multilevel lumbar spondylosis.    [Urgent Result: L1 compression fracture]    Finding was identified on 4/3/2023 8:46 PM.     Kait Sanford MD was contacted by Dr. Irby at 4/3/2023 8:49 PM  and verbalized understanding of the urgent finding.     I have personally reviewed the examination and initial interpretation  and I agree with the findings.    TIERRA WATTS MD         SYSTEM ID:  [id number]   POC US ABDOMEN LIMITED     Status: None    Impression    Bedside FAST (Focused Assessment with Sonography in Trauma), performed and interpreted by me.   Indication: Trauma    With the patient in Trendelenburg, the RUQ, LUQ and subxiphoid views were examined for intraabdominal and thoracic free fluid and pericardial effusion. With the patient in reverse Trendelenburg, the suprapubic view was examined for intraabdominal free fluid. Image quality was satisfactory..     Findings: There is no evidence of free fluid above or below bilateral diaphragms, in the splenorenal or hepatorenal space, or in bilateral paracolic gutters. There was no free fluid seen in the pelvis adjacent to the urinary bladder. There is no free fluid within the pericardium.   Extended FAST exam (eFAST):   Indication: Trauma   The chest wall was evaluated for evidence of pneumothorax.     Right side:  Lung sliding artifact  Present     Comet tail artifacts or B-lines  Present   Left side:  Lung sliding artifact  Present     Comet tail artifacts or B-lines Present     IMPRESSION:  Negative FAST     XR Pelvis w Hip Port Right 1 View     Status: None    Narrative    EXAM: XR PELVIS AND HIP PORTABLE RIGHT 1 VIEW  LOCATION: [facility]  DATE/TIME: 4/3/2023 7:07 PM    INDICATION: Portable, right hip pain, fall.  COMPARISON: None.      Impression    IMPRESSION:   1. Mildly  displaced intertrochanteric right hip fracture. Mild apex anterior angulation.  2. Extensive scoliosis and degenerative changes in the spine. There are also mild degenerative changes in the left hip and mild to moderate degenerative changes in the SI joints.  3. Old healed right superior and inferior pubic ramus fractures.  4. Diffuse bone demineralization.  5. Extensive arterial calcifications.   XR Femur Right 2 Views     Status: None    Narrative    EXAM: XR FEMUR RIGHT 2 VIEWS  LOCATION: [facility]  DATE/TIME: 4/3/2023 9:49 PM    INDICATION: known hip fracture, completion imaging for surgical planning  COMPARISON: 04/03/2023      Impression    IMPRESSION: Acute mildly displaced and angulated fracture of the intertrochanteric proximal right femur unchanged. No additional fracture of the more distal femur. Diffuse bony demineralization. Degenerative arthritis right knee.   [place] Draw     Status: None    Narrative    The following orders were created for panel order [place] Draw.  Procedure                               Abnormality         Status                     ---------                               -----------         ------                     Extra Red Top Tube[[id number]]                               Final result               Extra Green Top (Lithium...[[id number]]                      Final result               Extra Purple Top Tube[[id number]]                            Final result                 Please view results for these tests on the individual orders.   Extra Red Top Tube     Status: None   Result Value Ref Range    Hold Specimen JIC    Extra Green Top (Lithium Heparin) Tube     Status: None   Result Value Ref Range    Hold Specimen JIC    Extra Purple Top Tube     Status: None   Result Value Ref Range    Hold Specimen JIC    [place] Draw     Status: None    Narrative    The following orders were created for panel order [place] Draw.  Procedure                                Abnormality         Status                     ---------                               -----------         ------                     Extra Blue Top Tube[[id number]]                              Final result                 Please view results for these tests on the individual orders.   Extra Blue Top Tube     Status: None   Result Value Ref Range    Hold Specimen JIC    iStat INR, POCT     Status: Abnormal   Result Value Ref Range    INR POCT 1.2 (L) 2.0 - 3.0   iStat Gases (lactate) venous, POCT     Status: Abnormal   Result Value Ref Range    Lactic Acid POCT 2.1 (H) <=2.0 mmol/L    Bicarbonate Venous POCT 27 21 - 28 mmol/L    O2 Sat, Venous POCT 81 (L) 94 - 100 %    pCO2V Venous POCT 44 40 - 50 mm Hg    pH Venous POCT 7.39 7.32 - 7.43    pO2 Venous POCT 46 25 - 47 mm Hg   INR     Status: Normal   Result Value Ref Range    INR 0.99 0.85 - 1.15   Partial thromboplastin time     Status: Normal   Result Value Ref Range    aPTT 25 22 - 38 Seconds   Comprehensive metabolic panel     Status: Abnormal   Result Value Ref Range    Sodium 140 136 - 145 mmol/L    Potassium 4.0 3.4 - 5.3 mmol/L    Chloride 104 98 - 107 mmol/L    Carbon Dioxide (CO2) 22 22 - 29 mmol/L    Anion Gap 14 7 - 15 mmol/L    Urea Nitrogen 22.1 8.0 - 23.0 mg/dL    Creatinine 0.90 0.51 - 0.95 mg/dL    Calcium 9.9 (H) 8.2 - 9.6 mg/dL    Glucose 107 (H) 70 - 99 mg/dL    Alkaline Phosphatase 53 35 - 104 U/L    AST 22 10 - 35 U/L    ALT 11 10 - 35 U/L    Protein Total 6.7 6.4 - 8.3 g/dL    Albumin 3.8 3.5 - 5.2 g/dL    Bilirubin Total 0.5 <=1.2 mg/dL    GFR Estimate 60 (L) >60 mL/min/1.73m2   CK total     Status: Normal   Result Value Ref Range    CK 52 26 - 192 U/L   UA with Microscopic reflex to Culture     Status: Abnormal    Specimen: Urine, Catheter   Result Value Ref Range    Color Urine Yellow Colorless, Straw, Light Yellow, Yellow    Appearance Urine Clear Clear    Glucose Urine Negative Negative mg/dL    Bilirubin Urine  Negative Negative    Ketones Urine Negative Negative mg/dL    Specific Gravity Urine 1.019 1.003 - 1.035    Blood Urine Negative Negative    pH Urine 6.5 5.0 - 7.0    Protein Albumin Urine 20 (A) Negative mg/dL    Urobilinogen Urine Normal Normal, 2.0 mg/dL    Nitrite Urine Negative Negative    Leukocyte Esterase Urine Negative Negative    Mucus Urine Present (A) None Seen /LPF    RBC Urine 8 (H) <=2 /HPF    WBC Urine 1 <=5 /HPF    Hyaline Casts Urine 7 (H) <=2 /LPF    Narrative    Urine Culture not indicated   Magnesium     Status: Normal   Result Value Ref Range    Magnesium 1.8 1.7 - 2.3 mg/dL   Phosphorus     Status: Normal   Result Value Ref Range    Phosphorus 3.0 2.5 - 4.5 mg/dL   CBC with platelets and differential     Status: Abnormal   Result Value Ref Range    WBC Count 11.5 (H) 4.0 - 11.0 10e3/uL    RBC Count 4.71 3.80 - 5.20 10e6/uL    Hemoglobin 13.4 11.7 - 15.7 g/dL    Hematocrit 42.5 35.0 - 47.0 %    MCV 90 78 - 100 fL    MCH 28.5 26.5 - 33.0 pg    MCHC 31.5 31.5 - 36.5 g/dL    RDW 13.2 10.0 - 15.0 %    Platelet Count 218 150 - 450 10e3/uL    % Neutrophils 69 %    % Lymphocytes 18 %    % Monocytes 6 %    % Eosinophils 5 %    % Basophils 1 %    % Immature Granulocytes 1 %    NRBCs per 100 WBC 0 <1 /100    Absolute Neutrophils 8.1 1.6 - 8.3 10e3/uL    Absolute Lymphocytes 2.1 0.8 - 5.3 10e3/uL    Absolute Monocytes 0.7 0.0 - 1.3 10e3/uL    Absolute Eosinophils 0.5 0.0 - 0.7 10e3/uL    Absolute Basophils 0.1 0.0 - 0.2 10e3/uL    Absolute Immature Granulocytes 0.1 <=0.4 10e3/uL    Absolute NRBCs 0.0 10e3/uL   Creatinine POCT     Status: Abnormal   Result Value Ref Range    Creatinine POCT 1.0 0.5 - 1.0 mg/dL    GFR, ESTIMATED POCT 53 (L) >60 mL/min/1.73m2   Lactic acid whole blood     Status: Abnormal   Result Value Ref Range    Lactic Acid 2.4 (H) 0.7 - 2.0 mmol/L   Glucose by meter     Status: Abnormal   Result Value Ref Range    GLUCOSE BY METER POCT 109 (H) 70 - 99 mg/dL   Lactic acid whole blood      Status: Normal   Result Value Ref Range    Lactic Acid 1.4 0.7 - 2.0 mmol/L   Extra Tube     Status: None (In process)    Narrative    The following orders were created for panel order Extra Tube.  Procedure                               Abnormality         Status                     ---------                               -----------         ------                     Extra Purple Top Tube[[id number]]                            In process                   Please view results for these tests on the individual orders.   EKG 12 lead     Status: None   Result Value Ref Range    Systolic Blood Pressure  mmHg    Diastolic Blood Pressure  mmHg    Ventricular Rate 75 BPM    Atrial Rate 75 BPM    NC Interval 202 ms    QRS Duration 86 ms     ms    QTc 475 ms    P Axis 99 degrees    R AXIS 22 degrees    T Axis 66 degrees    Interpretation ECG       Sinus rhythm with occasional Premature ventricular complexes  Nonspecific T wave abnormality  Prolonged QT  Abnormal ECG  Unconfirmed report - interpretation of this ECG is computer generated - see medical record for final interpretation  Confirmed by - EMERGENCY ROOM, PHYSICIAN ([id number]),  MIGUEL GASCA ([id number]) on 4/3/2023 9:32:06 PM     CBC with platelets differential     Status: Abnormal    Narrative    The following orders were created for panel order CBC with platelets differential.  Procedure                               Abnormality         Status                     ---------                               -----------         ------                     CBC with platelets and d...[[id number]]  Abnormal            Final result                 Please view results for these tests on the individual orders.   ABO/Rh type and screen *Canceled*     Status: None ()    Narrative    The following orders were created for panel order ABO/Rh type and screen.  Procedure                               Abnormality         Status                     ---------                                -----------         ------                       Please view results for these tests on the individual orders.   ABO/Rh type and screen *Canceled*     Status: None ()    Narrative    The following orders were created for panel order ABO/Rh type and screen.  Procedure                               Abnormality         Status                     ---------                               -----------         ------                     Adult Type and Screen[[id number]]                                                         Please view results for these tests on the individual orders.   ABO/Rh type and screen     Status: None (In process)    Narrative    The following orders were created for panel order ABO/Rh type and screen.  Procedure                               Abnormality         Status                     ---------                               -----------         ------                     Adult Type and Screen[[id number]]                            In process                   Please view results for these tests on the individual orders.     Medications   lactated ringers infusion (0 mLs Intravenous Stopped 4/4/23 0036)   dextrose 5% and 0.45% NaCl + KCl 20 mEq/L infusion ( Intravenous $New Bag 4/4/23 0037)   acetaminophen (TYLENOL) tablet 975 mg (has no administration in time range)   oxyCODONE IR (ROXICODONE) half-tab 2.5 mg (has no administration in time range)     Or   oxyCODONE (ROXICODONE) tablet 5 mg (has no administration in time range)   senna-docusate (SENOKOT-S/PERICOLACE) 8.6-50 MG per tablet 1-2 tablet (1 tablet Oral Not Given 4/4/23 0104)   polyethylene glycol (MIRALAX) Packet 17 g (has no administration in time range)   prochlorperazine (COMPAZINE) injection 5 mg (has no administration in time range)     Or   prochlorperazine (COMPAZINE) tablet 5 mg (has no administration in time range)     Or   prochlorperazine (COMPAZINE) suppository 12.5 mg (has no administration in time range)   fentaNYL (PF)  (SUBLIMAZE) injection 25 mcg (25 mcg Intravenous $Given 4/3/23 9939)   fentaNYL (PF) (SUBLIMAZE) injection 25 mcg (25 mcg Intravenous $Given 4/3/23 1850)   lactated ringers BOLUS 1,000 mL (0 mLs Intravenous Stopped 4/4/23 0027)     Labs Ordered and Resulted from Time of ED Arrival to Time of ED Departure   ISTAT INR POCT - Abnormal       Result Value    INR POCT 1.2 (*)    ISTAT GASES LACTATE VENOUS POCT - Abnormal    Lactic Acid POCT 2.1 (*)     Bicarbonate Venous POCT 27      O2 Sat, Venous POCT 81 (*)     pCO2V Venous POCT 44      pH Venous POCT 7.39      pO2 Venous POCT 46     COMPREHENSIVE METABOLIC PANEL - Abnormal    Sodium 140      Potassium 4.0      Chloride 104      Carbon Dioxide (CO2) 22      Anion Gap 14      Urea Nitrogen 22.1      Creatinine 0.90      Calcium 9.9 (*)     Glucose 107 (*)     Alkaline Phosphatase 53      AST 22      ALT 11      Protein Total 6.7      Albumin 3.8      Bilirubin Total 0.5      GFR Estimate 60 (*)    ROUTINE UA WITH MICROSCOPIC REFLEX TO CULTURE - Abnormal    Color Urine Yellow      Appearance Urine Clear      Glucose Urine Negative      Bilirubin Urine Negative      Ketones Urine Negative      Specific Gravity Urine 1.019      Blood Urine Negative      pH Urine 6.5      Protein Albumin Urine 20 (*)     Urobilinogen Urine Normal      Nitrite Urine Negative      Leukocyte Esterase Urine Negative      Mucus Urine Present (*)     RBC Urine 8 (*)     WBC Urine 1      Hyaline Casts Urine 7 (*)    CBC WITH PLATELETS AND DIFFERENTIAL - Abnormal    WBC Count 11.5 (*)     RBC Count 4.71      Hemoglobin 13.4      Hematocrit 42.5      MCV 90      MCH 28.5      MCHC 31.5      RDW 13.2      Platelet Count 218      % Neutrophils 69      % Lymphocytes 18      % Monocytes 6      % Eosinophils 5      % Basophils 1      % Immature Granulocytes 1      NRBCs per 100 WBC 0      Absolute Neutrophils 8.1      Absolute Lymphocytes 2.1      Absolute Monocytes 0.7      Absolute Eosinophils 0.5       Absolute Basophils 0.1      Absolute Immature Granulocytes 0.1      Absolute NRBCs 0.0     ISTAT CREATININE POCT - Abnormal    Creatinine POCT 1.0      GFR, ESTIMATED POCT 53 (*)    LACTIC ACID WHOLE BLOOD - Abnormal    Lactic Acid 2.4 (*)    GLUCOSE BY METER - Abnormal    GLUCOSE BY METER POCT 109 (*)    INR - Normal    INR 0.99     PARTIAL THROMBOPLASTIN TIME - Normal    aPTT 25     CK TOTAL - Normal    CK 52     MAGNESIUM - Normal    Magnesium 1.8     PHOSPHORUS - Normal    Phosphorus 3.0     LACTIC ACID WHOLE BLOOD - Normal    Lactic Acid 1.4     TYPE AND SCREEN, ADULT   ABO/RH TYPE AND SCREEN     XR Femur Right 2 Views   Final Result   IMPRESSION: Acute mildly displaced and angulated fracture of the intertrochanteric proximal right femur unchanged. No additional fracture of the more distal femur. Diffuse bony demineralization. Degenerative arthritis right knee.      Lumbar spine CT w/o contrast   Final Result   Abnormal   IMPRESSION:   1. L1 compression fracture of the superior endplate with mild height   loss, potentially slightly worsened since 11/6/2022.   2. Multilevel lumbar spondylosis.      [Urgent Result: L1 compression fracture]      Finding was identified on 4/3/2023 8:46 PM.       Kait Sanford MD was contacted by Dr. Irby at 4/3/2023 8:49 PM   and verbalized understanding of the urgent finding.       I have personally reviewed the examination and initial interpretation   and I agree with the findings.      TIERRA WATTS MD            SYSTEM ID:  [id number]      CT Thoracic Spine w/o Contrast   Final Result   Abnormal   IMPRESSION:   1.  L1 vertebral body compression fracture with mild height loss   possibly worsened since 11/6/2022.   2.  Additional advanced wedge compression fractures at T2-T4, T6, T9,   and L1, most of which were likely present on the prior radiographs   11/5/2022, although exact comparison is difficult.   3.  Right basilar infiltrates, aspiration versus infection.   4.   Small left pleural effusion.      [Urgent Result:   L1 vertebral body compression fracture ]      Finding was identified on 4/3/2023 8:38 PM.       Kait Sanford MD was contacted by Dr. Irby at 4/3/2023 8:44 PM   and verbalized understanding of the urgent finding.        I have personally reviewed the examination and initial interpretation   and I agree with the findings.      TIERRA WATTS MD            SYSTEM ID:  [id number]      Cervical spine CT w/o contrast   Final Result   IMPRESSION:   1. No acute fracture or traumatic subluxation.   2. Grossly unchanged wedge compression deformities of C7 and partially   visualized upper thoracic spine starting at T2. Retropulsion at the   level of C7 with associated mild spinal canal stenosis.   3. Multilevel cervical spondylosis.      I have personally reviewed the examination and initial interpretation   and I agree with the findings.      TIERRA WATTS MD            SYSTEM ID:  [id number]      Head CT w/o contrast   Final Result   Impression:    1.  No acute intracranial pathology.   2.  Moderate generalized cerebral volume loss and advanced   leukoaraiosis.      I have personally reviewed the examination and initial interpretation   and I agree with the findings.      TIERRA WATTS MD            SYSTEM ID:  [id number]      XR Pelvis w Hip Port Right 1 View   Final Result   IMPRESSION:    1. Mildly displaced intertrochanteric right hip fracture. Mild apex anterior angulation.   2. Extensive scoliosis and degenerative changes in the spine. There are also mild degenerative changes in the left hip and mild to moderate degenerative changes in the SI joints.   3. Old healed right superior and inferior pubic ramus fractures.   4. Diffuse bone demineralization.   5. Extensive arterial calcifications.      XR Chest Port 1 View   Final Result   IMPRESSION:    1. No evidence of acute airspace disease.   2. Chronic bilateral rib fracture deformities. No evidence of acute    osseous abnormality.      I have personally reviewed the examination and initial interpretation   and I agree with the findings.      RHODA AZAR MD            SYSTEM ID:  [id number]      POC US ABDOMEN LIMITED   Final Result   Bedside FAST (Focused Assessment with Sonography in Trauma), performed and interpreted by me.    Indication: Trauma      With the patient in Trendelenburg, the RUQ, LUQ and subxiphoid views were examined for intraabdominal and thoracic free fluid and pericardial effusion. With the patient in reverse Trendelenburg, the suprapubic view was examined for intraabdominal free fluid. Image quality was satisfactory..       Findings: There is no evidence of free fluid above or below bilateral diaphragms, in the splenorenal or hepatorenal space, or in bilateral paracolic gutters. There was no free fluid seen in the pelvis adjacent to the urinary bladder. There is no free fluid within the pericardium.    Extended FAST exam (eFAST):    Indication: Trauma    The chest wall was evaluated for evidence of pneumothorax.       Right side:  Lung sliding artifact  Present      Comet tail artifacts or B-lines  Present    Left side:  Lung sliding artifact  Present      Comet tail artifacts or B-lines Present       IMPRESSION:  Negative FAST                Critical care was not performed.     Medical Decision Making  The patient's presentation was of high complexity (an acute health issue posing potential threat to life or bodily function).    The patient's evaluation involved:  an assessment requiring an independent historian (history obtained from EMS and pt's daughter)  ordering and/or review of 3+ test(s) in this encounter (see separate area of note for details)  discussion of management or test interpretation with another health professional (trauma, ortho, neurosurgery)    The patient's management necessitated high risk (a decision regarding emergency major procedure (timing of hip repair by ortho))  and high risk (a decision regarding hospitalization).      Assessment & Plan    89-year-old female presents after a possible fall with signs of a right hip fracture in addition to altered mental status.  Differential diagnosis included UTI, metabolic derangement, mechanical fall with prolonged downtime raising concern for possibility of rhabdomyolysis.  She arrived afebrile and hemodynamically stable  She did get placed on 2 L O2 by nasal cannula for an O2 sat of 92%.    IV access was obtained and laboratory testing done. Repeat blood sugar normal. CBC shows white blood cell count of 11.5, hemoglobin stable at 13.4, and normal platelets. Lactic acid is 2.1. Creatinine normal at 0.9 and electrolytes unremarkable. CK is low at 52. Straight cath urine without obvious signs of UTI.    Imaging included CT head and CT spine which showed no intracranial injury. Has possible acute to subacute L1 compression fracture.   X-ray chest shows chronic bilateral rib fractures, no acute bony injuries or pneumothorax.   X-ray of the pelvis does show a mildly displaced intertrochanteric right hip fracture and old, healed right superior and inferior pubic rami fractures.  She was treated with 0.9 normal saline for her elevated lactate.   Discussed with orthopedics, neurosurgery, trauma and the [facility] triage hospitalist.   Will admit to  on [facility].     This part of the medical record was transcribed by Essie Cunningham, Medical Scribe, from a dictation done by Kait Sanford MD.     I have reviewed the nursing notes. I have reviewed the findings, diagnosis, plan and need for follow up with the patient.    New Prescriptions    No medications on file       Final diagnoses:   Compression fracture of L1 vertebra, initial encounter (H)   Closed fracture of right hip, initial encounter (H)     I, Essie Cunningham, am serving as a trained medical scribe to document services personally performed by Kait Sanford MD, based on the  provider's statements to me.   I, Kait Sanford MD, was physically present and have reviewed and verified the accuracy of this note documented by Essie Cunningham.    Kait Sanford MD  [facility] EMERGENCY DEPARTMENT  4/3/2023     Kait Sanford MD  04/04/23 0122

## 2023-04-04 NOTE — PROGRESS NOTES
/68 (BP Location: Right arm)   Pulse 80   Temp (!) 96.1  F (35.6  C) (Axillary)   Resp 18   Wt 47.2 kg (104 lb 0.9 oz)   SpO2 97%   BMI 21.75 kg/m        Unable to assess Pt. Patient is sleeping and daughter want her to rest. Pt is confused but alert to place and person. Visible skin is CDI.

## 2023-04-04 NOTE — PROGRESS NOTES
Took over cares for pt at [phone].     Pt is only alert to self and has periods of unresponsiveness when asking questions.     Placed on oxymask due to pt desatting on nasal cannula due to mouth breathing-- pt also tends to hold breath and increase RR when moving or when in pain.      Pt has not been able to pee since arriving to the unit-- bladder scan complete -- continue to monitor.      Primapore dressing on the R hip has scant amount of drainage.      CAPNO in place.     PIV in the LUE running maintenance fluids.

## 2023-04-04 NOTE — CARE PLAN
Keaton Sheets is a 89-year-old female, history of hypertension, osteoarthritis, osteoporosis presents with altered mental status with imaging showing right intertrochanteric proximal fracture and L1 compression fracture of superior endplate.  She is now s/p OR right intramedullary nail on 4/4 with Dr. Todd.     #Right intertrochanteric fracture s/p R-IMN  #L1 superior endplate compression fracture with mild height loss  #CKD  #Lactic acid elevation-resolved  #Leukocytosis-likely reactive, anticipate improvement.  - resume Amlodipine 2.5 mg every day  - likely  mg for dvt ppx  - pain control with Acetaminophen, IV dialudid prn, oral oxycodone prn  - WBAT  - Ancef x3 doses  - Lovenox for DVT ppx  - PT/OT  - okay to advance diet  - outpatient follow-up with FARZANA WHITTEN MD  Hospitalist Service  [facility]

## 2023-04-04 NOTE — CONSULTS
"[facility]    History and Physical / Consult note: Trauma Service     Date of Admission:  4/3/2023    Time of Admission/Consult Request (page/call): 1930    Time of my evaluation: 2000  Consulting services:  Orthopedics - Emergent consult (within 30 mins): Called by ED  Neurosurgery - Emergent consult (within 30 mins): Called by ED    Assessment & Plan   Trauma mechanism: Fall  Time/date of injury: Yesterday, found this morning by staff at home  Known Injuries:  1. L1 compression fracture  2. Right femur fracture    Other diagnoses:   1. N/A      Procedure: None    Plan:  - Orthopedic surgery consult for right femur fracture, they would like the patient transferred to the [facility] for surgery on the morning of 4/4  - Neurosurgery consult for L1 compression fracture, they recommend that the patient have an up right xray but no other interventions needed  - Remainder of exam and trauma work up unremarkable for other injuries and patient stable for transfer to the [facility]    Code status: DNR confirmed with patient.       ETOH: This patient was asked if in the last 3-6 months there has been a time when she had 4 or more drinks in a single day/outing.. Patient answer to the screening question was in the negative. No intervention needed.  Primary Care Physician   Alejandro Sandhu    Chief Complaint   Fall    History is obtained from the patient and daughter    History of Present Illness   Belia Sheets is a 89 year old female with osteoporosis, HTN, and history of falls who presents to the ED after being found down in her home yesterday. She does not remember the exact events of her fall but remembers that she did not have chest pain, dizziness, or SOB prior. She stated when asked how she fell \"gravity\". She has right hip pain but no numbness, weakness, headaches. She did not have a LOC. She takes Aspirin but no other blood thinners.    Past Medical History    I have " reviewed this patient's medical history and updated it with pertinent information if needed.   Past Medical History:   Diagnosis Date     Carpal tunnel syndrome (aka CTS)      Cataract      Chronic osteoarthritis      Constipation due to outlet dysfunction 2021     Fracture of multiple rami of right pubis with routine healing, subsequent encounter 2021     H/O calcium pyrophosphate deposition disease (CPPD)      History of 9th right rib fracture due to fall (2021) 2021     History of encephalopathy 10/2017     Hypertension      Kyphoscoliosis      Osteoarthritis     hands     Osteoporosis      Rectal prolapse        Past Surgical History   I have reviewed this patient's surgical history and updated it with pertinent information if needed.  Past Surgical History:   Procedure Laterality Date     COLONOSCOPY       HYSTERECTOMY      for uterine prolapse     RECTOSIGMOIDECTOMY PERINEAL N/A 2018    Procedure: RECTOSIGMOIDECTOMY PERINEAL;  Perineal Rectosigmoidectomy  ;  Surgeon: Amrik Mariano MD;  Location: [facility] OR     Prior to Admission Medications   Prior to Admission Medications   Prescriptions Last Dose Informant Patient Reported? Taking?   Vitamin D3 (CHOLECALCIFEROL) 25 mcg (1000 units) tablet   No No   Sig: Take 1 tablet (25 mcg) by mouth daily   acetaminophen (TYLENOL) 500 MG tablet   Yes No   Sig: Take 1,000 mg by mouth 3 times daily as needed for mild pain   amLODIPine (NORVASC) 2.5 MG tablet   No No   Sig: Take 1 tablet (2.5 mg) by mouth daily   aspirin (ASA) 81 MG chewable tablet   No No   Si tablet (81 mg) by Oral or Feeding Tube route daily   atorvastatin (LIPITOR) 20 MG tablet   No No   Si tablet (20 mg) by Oral or Feeding Tube route every evening   calcium carbonate 600 mg-vitamin D 400 units (CALTRATE) 600-400 MG-UNIT per tablet   No No   Sig: Take 1 tablet by mouth 2 times daily (before meals)   cyanocobalamin (VITAMIN B-12) 500 MCG tablet   Yes No   Sig:  Take 500 mcg by mouth daily   ibuprofen (ADVIL/MOTRIN) 200 MG tablet   Yes No   Sig: Take 600 mg by mouth 3 times daily as needed for pain   Patient not taking: Reported on 12/7/2022   order for DME  Self No No   Sig: Equipment being ordered: Home BP monitor and cuff   order for DME  Self No No   Sig: Equipment being ordered: Incentive spirometer      Facility-Administered Medications: None     Allergies   No Known Allergies    Social History   Social History     Socioeconomic History     Marital status:      Spouse name: Not on file     Number of children: Not on file     Years of education: Not on file     Highest education level: Not on file   Occupational History     Not on file   Tobacco Use     Smoking status: Never     Smokeless tobacco: Never   Vaping Use     Vaping status: Never Used   Substance and Sexual Activity     Alcohol use: No     Drug use: No     Sexual activity: Never   Other Topics Concern     Not on file   Social History Narrative     Not on file     Social Determinants of Health     Financial Resource Strain: Not on file   Food Insecurity: Not on file   Transportation Needs: Not on file   Physical Activity: Not on file   Stress: Not on file   Social Connections: Not on file   Intimate Partner Violence: Not on file   Housing Stability: Not on file       Family History   Non-contributory    Review of Systems   10 point ROS negative aside from symptoms in HPI    Physical Exam       BP: (!) 150/93 Pulse: 81   Resp: 14 SpO2: 99 %      Vital Signs with Ranges  Pulse:  [77-83] 81  Resp:  [14-38] 14  BP: (144-150)/(93-97) 150/93  SpO2:  [94 %-99 %] 99 % 0 lbs 0 oz    Primary Survey:  Airway: patient talking  Breathing: symmetric respiratory effort bilaterally  Circulation: central pulses present and peripheral pulses present  Disability: Pupils - left 4 mm and brisk, right 4 mm and brisk     Jessica Coma Scale - Total 15/15  Eye Response (E): 3  4= spontaneous,  3= to verbal/voice, 2=  to pain,  1= No response   Verbal Response (V): 4   5= Orientated, converses,  4= Confused, converses, 3= Inappropriate words,  2= Incomprehensible sounds,  1=No response   Motor Response (M): 5   6= Obeys commands, 5= Localizes to pain, 4= Withdrawal to pain, 3=Fexion to pain, 2= Extension to pain, 1= No response    Secondary Survey:  General: alert, oriented to person, place, time  Head: atraumatic, normocephalic, trachea midline  Eyes: PERRLA, pupils 3 mm, EOMI, corneas and conjunctivae clear  Ears: non-inflamed external ear canals  Nose: nares patent, no drainage  Mouth/Throat: no exudates or erythema, no tongue lacerations  Neck:  No cervical collar present. No midline posterior tenderness, full AROM without pain or tenderness   Chest/Pulmonary: normal respiratory rate and rhythm  Cardiovascular: normal and regular rate and rhythm  Abdomen: soft, non-tender, no guarding, no rebound tenderness and no tenderness to palpation  : Pain with compression of the pelvis, most remarkably on the right  Back/Spine: no deformity, no midline tenderness, no sacral tenderness,  no step-offs and no abrasions or contusions  Musculoskel/Extremities: normal extremities, full AROM of major joints without tenderness, edema, erythema, ecchymosis, or abrasions. 2 + PP. No edema.   Hand: no gross deformities of hands or fingers. Full AROM of hand and fingers in flexion and extension.  strength equal and symmetric.   Skin: no rashes, laceration, ecchymosis, skin warm and dry.   Neuro: PERRLA, alert, oriented x 3. CN II-XII grossly intact. No focal deficits. Sensation intact.  Psychiatric: affect/mood normal, cooperative, normal judgement/insight and memory intact    # Pain Assessment:      4/3/2023     7:10 PM   Current Pain Score   Patient currently in pain? yes   - Belia is experiencing pain due to right hip pain. Pain management was discussed with Belia and her daughter and the plan was created in a collaborative fashion.  Belia's  response to the current recommendations: engaged  - PRN tylenol and limited oxycodone      Data       Results for orders placed or performed during the hospital encounter of 04/03/23 (from the past 24 hour(s))   iStat INR, POCT   Result Value Ref Range    INR POCT 1.2 (L) 2.0 - 3.0   Glucose by meter   Result Value Ref Range    GLUCOSE BY METER POCT 109 (H) 70 - 99 mg/dL   iStat Gases (lactate) venous, POCT   Result Value Ref Range    Lactic Acid POCT 2.1 (H) <=2.0 mmol/L    Bicarbonate Venous POCT 27 21 - 28 mmol/L    O2 Sat, Venous POCT 81 (L) 94 - 100 %    pCO2V Venous POCT 44 40 - 50 mm Hg    pH Venous POCT 7.39 7.32 - 7.43    pO2 Venous POCT 46 25 - 47 mm Hg   Creatinine POCT   Result Value Ref Range    Creatinine POCT 1.0 0.5 - 1.0 mg/dL    GFR, ESTIMATED POCT 53 (L) >60 mL/min/1.73m2   [place] Draw    Narrative    The following orders were created for panel order [place] Draw.  Procedure                               Abnormality         Status                     ---------                               -----------         ------                     Extra Red Top Tube[[id number]]                               Final result               Extra Green Top (Lithium...[[id number]]                      Final result               Extra Purple Top Tube[[id number]]                            Final result                 Please view results for these tests on the individual orders.   Extra Red Top Tube   Result Value Ref Range    Hold Specimen JIC    Extra Green Top (Lithium Heparin) Tube   Result Value Ref Range    Hold Specimen JIC    Extra Purple Top Tube   Result Value Ref Range    Hold Specimen JIC    CBC with platelets differential    Narrative    The following orders were created for panel order CBC with platelets differential.  Procedure                               Abnormality         Status                     ---------                               -----------         ------                     CBC with  platelets and d...[[id number]]  Abnormal            Final result                 Please view results for these tests on the individual orders.   Comprehensive metabolic panel   Result Value Ref Range    Sodium 140 136 - 145 mmol/L    Potassium 4.0 3.4 - 5.3 mmol/L    Chloride 104 98 - 107 mmol/L    Carbon Dioxide (CO2) 22 22 - 29 mmol/L    Anion Gap 14 7 - 15 mmol/L    Urea Nitrogen 22.1 8.0 - 23.0 mg/dL    Creatinine 0.90 0.51 - 0.95 mg/dL    Calcium 9.9 (H) 8.2 - 9.6 mg/dL    Glucose 107 (H) 70 - 99 mg/dL    Alkaline Phosphatase 53 35 - 104 U/L    AST 22 10 - 35 U/L    ALT 11 10 - 35 U/L    Protein Total 6.7 6.4 - 8.3 g/dL    Albumin 3.8 3.5 - 5.2 g/dL    Bilirubin Total 0.5 <=1.2 mg/dL    GFR Estimate 60 (L) >60 mL/min/1.73m2   CK total   Result Value Ref Range    CK 52 26 - 192 U/L   Magnesium   Result Value Ref Range    Magnesium 1.8 1.7 - 2.3 mg/dL   Phosphorus   Result Value Ref Range    Phosphorus 3.0 2.5 - 4.5 mg/dL   CBC with platelets and differential   Result Value Ref Range    WBC Count 11.5 (H) 4.0 - 11.0 10e3/uL    RBC Count 4.71 3.80 - 5.20 10e6/uL    Hemoglobin 13.4 11.7 - 15.7 g/dL    Hematocrit 42.5 35.0 - 47.0 %    MCV 90 78 - 100 fL    MCH 28.5 26.5 - 33.0 pg    MCHC 31.5 31.5 - 36.5 g/dL    RDW 13.2 10.0 - 15.0 %    Platelet Count 218 150 - 450 10e3/uL    % Neutrophils 69 %    % Lymphocytes 18 %    % Monocytes 6 %    % Eosinophils 5 %    % Basophils 1 %    % Immature Granulocytes 1 %    NRBCs per 100 WBC 0 <1 /100    Absolute Neutrophils 8.1 1.6 - 8.3 10e3/uL    Absolute Lymphocytes 2.1 0.8 - 5.3 10e3/uL    Absolute Monocytes 0.7 0.0 - 1.3 10e3/uL    Absolute Eosinophils 0.5 0.0 - 0.7 10e3/uL    Absolute Basophils 0.1 0.0 - 0.2 10e3/uL    Absolute Immature Granulocytes 0.1 <=0.4 10e3/uL    Absolute NRBCs 0.0 10e3/uL   ABO/Rh type and screen *Canceled*    Narrative    The following orders were created for panel order ABO/Rh type and screen.  Procedure                               Abnormality          Status                     ---------                               -----------         ------                     Adult Type and Screen[[id number]]                                                         Please view results for these tests on the individual orders.   [place] Draw    Narrative    The following orders were created for panel order [place] Draw.  Procedure                               Abnormality         Status                     ---------                               -----------         ------                     Extra Blue Top Tube[[id number]]                              Final result                 Please view results for these tests on the individual orders.   Extra Blue Top Tube   Result Value Ref Range    Hold Specimen JIC    INR   Result Value Ref Range    INR 0.99 0.85 - 1.15   Partial thromboplastin time   Result Value Ref Range    aPTT 25 22 - 38 Seconds   POC US ABDOMEN LIMITED    Impression    Bedside FAST (Focused Assessment with Sonography in Trauma), performed and interpreted by me.   Indication: Trauma    With the patient in Trendelenburg, the RUQ, LUQ and subxiphoid views were examined for intraabdominal and thoracic free fluid and pericardial effusion. With the patient in reverse Trendelenburg, the suprapubic view was examined for intraabdominal free fluid. Image quality was satisfactory..     Findings: There is no evidence of free fluid above or below bilateral diaphragms, in the splenorenal or hepatorenal space, or in bilateral paracolic gutters. There was no free fluid seen in the pelvis adjacent to the urinary bladder. There is no free fluid within the pericardium.   Extended FAST exam (eFAST):   Indication: Trauma   The chest wall was evaluated for evidence of pneumothorax.     Right side:  Lung sliding artifact  Present     Comet tail artifacts or B-lines  Present   Left side:  Lung sliding artifact  Present     Comet tail artifacts or B-lines Present     IMPRESSION:   Negative FAST     EKG 12 lead   Result Value Ref Range    Systolic Blood Pressure  mmHg    Diastolic Blood Pressure  mmHg    Ventricular Rate 75 BPM    Atrial Rate 75 BPM    PA Interval 202 ms    QRS Duration 86 ms     ms    QTc 475 ms    P Axis 99 degrees    R AXIS 22 degrees    T Axis 66 degrees    Interpretation ECG       Sinus rhythm with occasional Premature ventricular complexes  Nonspecific T wave abnormality  Prolonged QT  Abnormal ECG  Unconfirmed report - interpretation of this ECG is computer generated - see medical record for final interpretation  Confirmed by - EMERGENCY ROOM, PHYSICIAN ([id number]),  MIGUEL GASCA ([id number]) on 4/3/2023 9:32:06 PM     XR Chest Port 1 View    Narrative    EXAM: XR CHEST PORT 1 VIEW  4/3/2023 7:06 PM     HISTORY:  fall, hypoxia       COMPARISON:  Chest x-ray 11/5/2022    FINDINGS:   AP view of the chest. Normal heart size. Mild perihilar opacities. No  airspace consolidation. No pleural effusion or pneumothorax. Chronic  bilateral rib fracture deformities. Dextroscoliosis of the thoracic  spine. Mild degenerative changes of the shoulders.      Impression    IMPRESSION:   1. No evidence of acute airspace disease.  2. Chronic bilateral rib fracture deformities. No evidence of acute  osseous abnormality.    I have personally reviewed the examination and initial interpretation  and I agree with the findings.    RHODA AZAR MD         SYSTEM ID:  [id number]   XR Pelvis w Hip Port Right 1 View    Narrative    EXAM: XR PELVIS AND HIP PORTABLE RIGHT 1 VIEW  LOCATION: [facility]  DATE/TIME: 4/3/2023 7:07 PM    INDICATION: Portable, right hip pain, fall.  COMPARISON: None.      Impression    IMPRESSION:   1. Mildly displaced intertrochanteric right hip fracture. Mild apex anterior angulation.  2. Extensive scoliosis and degenerative changes in the spine. There are also mild degenerative changes in the left hip and mild to  moderate degenerative changes in the SI joints.  3. Old healed right superior and inferior pubic ramus fractures.  4. Diffuse bone demineralization.  5. Extensive arterial calcifications.   UA with Microscopic reflex to Culture    Specimen: Urine, Catheter   Result Value Ref Range    Color Urine Yellow Colorless, Straw, Light Yellow, Yellow    Appearance Urine Clear Clear    Glucose Urine Negative Negative mg/dL    Bilirubin Urine Negative Negative    Ketones Urine Negative Negative mg/dL    Specific Gravity Urine 1.019 1.003 - 1.035    Blood Urine Negative Negative    pH Urine 6.5 5.0 - 7.0    Protein Albumin Urine 20 (A) Negative mg/dL    Urobilinogen Urine Normal Normal, 2.0 mg/dL    Nitrite Urine Negative Negative    Leukocyte Esterase Urine Negative Negative    Mucus Urine Present (A) None Seen /LPF    RBC Urine 8 (H) <=2 /HPF    WBC Urine 1 <=5 /HPF    Hyaline Casts Urine 7 (H) <=2 /LPF    Narrative    Urine Culture not indicated   Head CT w/o contrast    Narrative    CT HEAD W/O CONTRAST 4/3/2023 8:13 PM    Provided History: fall, confusion  ICD-10: Trauma.    Comparison: CT head 11/6/2022. Brain MRI 11/6/2022.    Technique: Using multidetector thin collimation helical acquisition  technique, axial, coronal and sagittal CT images from the skull base  to the vertex were obtained without intravenous contrast.     Findings:    No intracranial hemorrhage. No mass effect. No midline shift. No  extra-axial fluid collection. The gray to white matter differentiation  of the cerebral hemispheres is preserved. Ventricles are proportionate  to the sulci.. No sulcal effacement..  The basal cisterns are patent.  Moderate generalized cerebral volume loss and confluent  periventricular white matter hypoattenuation, nonspecific but favored  to represent chronic small vessel ischemic disease given the patient's  age.    Scattered mucosal thickening of the paranasal sinuses. The mastoid air  cells are clear. Orbits appear  unremarkable. No acute fracture.      Impression    Impression:   1.  No acute intracranial pathology.  2.  Moderate generalized cerebral volume loss and advanced  leukoaraiosis.    I have personally reviewed the examination and initial interpretation  and I agree with the findings.    TIERRA WATTS MD         SYSTEM ID:  [id number]   Cervical spine CT w/o contrast    Narrative    EXAM: CT CERVICAL SPINE W/O CONTRAST  4/3/2023 8:14 PM     HISTORY:  back pain, fall, back pain, fall       COMPARISON:  CT cervical spine 5/24/2022.    TECHNIQUE: Using multidetector thin collimation helical acquisition  technique, axial, coronal and sagittal CT images through the cervical  spine were obtained without intravenous contrast.    FINDINGS:  Exaggerated cervical lordosis. Advanced C7 anterior wedge compression  fracture with 5 mm of retropulsion, not significant changed since  5/24/2022. Additional partially visualized advanced wedge compression  deformities of the upper thoracic spine beginning at T2 also appear  similar in appearance. Multilevel intervertebral disc space narrowing.  No prevertebral edema. Trace grade 1 anterolisthesis of C4 on C5.    The findings on a level by level basis are as follows:    C2-3:  No spinal canal or neural foraminal stenosis.    C3-4:  Bilateral uncinate facet hypertrophy. No significant neural  foraminal or spinal canal stenosis.    C4-5:  Bilateral uncinate and facet hypertrophy. Mild bilateral neural  foraminal stenosis. No significant spinal canal stenosis.    C5-6:  Bilateral uncinate and facet hypertrophy. No significant spinal  canal or neural foraminal stenosis.    C6-7:  No significant neural foraminal stenosis. Mild spinal canal  stenosis.    C7-T1:  No spinal canal or neural foraminal stenosis.     No abnormality of the paraspinous soft tissues. Scarring of the left  lung apex.      Impression    IMPRESSION:  1. No acute fracture or traumatic subluxation.  2. Grossly unchanged  wedge compression deformities of C7 and partially  visualized upper thoracic spine starting at T2. Retropulsion at the  level of C7 with associated mild spinal canal stenosis.  3. Multilevel cervical spondylosis.    I have personally reviewed the examination and initial interpretation  and I agree with the findings.    TIERRA WATTS MD         SYSTEM ID:  [id number]   CT Thoracic Spine w/o Contrast   Result Value Ref Range    Radiologist flags L1 vertebral body compression fracture (Urgent)     Narrative    EXAM: CT THORACIC SPINE W/O CONTRAST  4/3/2023 8:14 PM     HISTORY:  back pain, fall       COMPARISON:  Chest radiographs 11/5/2022 and today.    TECHNIQUE: Using multidetector thin collimation helical acquisition  technique, axial, sagittal and coronal 3 mm thickness CT  reconstructions were obtained through the thoracic spine without  intravenous contrast.  Images were viewed in bone and soft tissue  windows.    FINDINGS:  Exaggerated thoracic kyphosis. Age indeterminate compression fracture  of the superior endplates of L1. Additional multilevel age  indeterminate advanced wedge compression fractures at T2-T4, T6, and  T9 although suspect chronic in nature. Multilevel intervertebral disc  space narrowing. Patent spinal canal and neural foramina.    The visualized paraspinous tissues are within normal limits. Small  left pleural effusion. Right basilar infiltrates. Old bilateral rib  fractures      Impression    IMPRESSION:  1.  L1 vertebral body compression fracture with mild height loss  possibly worsened since 11/6/2022.  2.  Additional advanced wedge compression fractures at T2-T4, T6, T9,  and L1, most of which were likely present on the prior radiographs  11/5/2022, although exact comparison is difficult.  3.  Right basilar infiltrates, aspiration versus infection.  4.  Small left pleural effusion.    [Urgent Result:   L1 vertebral body compression fracture ]    Finding was identified on 4/3/2023 8:38 PM.      Kait Sanford MD was contacted by Dr. Irby at 4/3/2023 8:44 PM  and verbalized understanding of the urgent finding.      I have personally reviewed the examination and initial interpretation  and I agree with the findings.    TIERRA WATTS MD         SYSTEM ID:  [id number]   Lumbar spine CT w/o contrast   Result Value Ref Range    Radiologist flags L1 compression fracture (Urgent)     Narrative    EXAM: CT LUMBAR SPINE W/O CONTRAST  4/3/2023 8:14 PM     HISTORY:  back pain, fall       COMPARISON:  Thoracolumbar radiographs 11/6/2022.     TECHNIQUE: Using multidetector thin collimation helical acquisition  technique, axial, sagittal and coronal 3 mm thickness CT  reconstructions were obtained through the lumbar spine without  intravenous contrast.  Images were viewed in bone and soft tissue  windows.    FINDINGS:  There are 5 lumbar type vertebrae, used for the purposes of this  dictation. Normal vertebral body alignment. Multilevel intervertebral  disc space narrowing. Age-indeterminate compression fracture of the  superior endplate of L1 with associated mild height loss. Additional  L4 wedge compression deformity, suspect chronic. Multilevel  degenerative changes of lumbar spine including posterior disc  osteophytes at the level of L1-L2, L2-L3, L3-L4. Moderate right neural  foraminal stenosis at the level of T12-L1. No significant spinal canal  stenosis at any level.    The visualized adjacent paraspinous tissues are grossly within normal  limits. Pancreatic parenchymal calcifications, likely sequelae of  chronic pancreatitis.    Old bilateral rib fractures.      Impression    IMPRESSION:  1. L1 compression fracture of the superior endplate with mild height  loss, potentially slightly worsened since 11/6/2022.  2. Multilevel lumbar spondylosis.    [Urgent Result: L1 compression fracture]    Finding was identified on 4/3/2023 8:46 PM.     Kait Sanford MD was contacted by Dr. Irby at 4/3/2023 8:49  PM  and verbalized understanding of the urgent finding.     I have personally reviewed the examination and initial interpretation  and I agree with the findings.    TIERRA WATTS MD         SYSTEM ID:  [id number]   ABO/Rh type and screen *Canceled*    Narrative    The following orders were created for panel order ABO/Rh type and screen.  Procedure                               Abnormality         Status                     ---------                               -----------         ------                       Please view results for these tests on the individual orders.   XR Femur Right 2 Views    Narrative    EXAM: XR FEMUR RIGHT 2 VIEWS  LOCATION: [facility]  DATE/TIME: 4/3/2023 9:49 PM    INDICATION: known hip fracture, completion imaging for surgical planning  COMPARISON: 04/03/2023      Impression    IMPRESSION: Acute mildly displaced and angulated fracture of the intertrochanteric proximal right femur unchanged. No additional fracture of the more distal femur. Diffuse bony demineralization. Degenerative arthritis right knee.   Lactic acid whole blood   Result Value Ref Range    Lactic Acid 2.4 (H) 0.7 - 2.0 mmol/L   Lactic acid whole blood   Result Value Ref Range    Lactic Acid 1.4 0.7 - 2.0 mmol/L   ABO/Rh type and screen    Narrative    The following orders were created for panel order ABO/Rh type and screen.  Procedure                               Abnormality         Status                     ---------                               -----------         ------                     Adult Type and Screen[[id number]]                            Final result                 Please view results for these tests on the individual orders.   Extra Tube    Narrative    The following orders were created for panel order Extra Tube.  Procedure                               Abnormality         Status                     ---------                               -----------          ------                     Extra Purple Top Tube[[id number]]                            Final result                 Please view results for these tests on the individual orders.   Extra Purple Top Tube   Result Value Ref Range    Hold Specimen [facility]    Adult Type and Screen   Result Value Ref Range    ABO/RH(D) O POS     Antibody Screen Negative Negative    SPECIMEN EXPIRATION DATE [id number]

## 2023-04-04 NOTE — BRIEF OP NOTE
[facility]    Brief Operative Note    Pre-operative diagnosis: Intertrochanteric fracture of right femur (H) [S72.141A]  Post-operative diagnosis Same as pre-operative diagnosis    Procedure: Procedure(s):  Open reduction internal fixation hip nailing  Surgeon: Surgeon(s) and Role:     * Benitez Todd MD - Primary  Anesthesia: General   Estimated Blood Loss: 100 mL from 4/4/2023  8:42 AM to 4/4/2023 10:34 AM      Drains: none  Specimens: * No specimens in log *  Findings:   see dictated op report.  Complications: None.  Implants:   Implant Name Type Inv. Item Serial No.  Lot No. LRB No. Used Action   NAIL GAMMA3 S R [id number] X 130DEG [id number] - [id number] Metallic Hardware/[employer] NAIL GAMMA3 S R [id number] X 130DEG [id number]  [employer] [id number] Right 1 Implanted   IMP SCR BONE STRK G3 LAG 10.5X90MM TI [id number] - [id number] Metallic [employer]ware/[employer] IMP SCR BONE STRK G3 LAG 10.5X90MM TI [id number]  [employer] [id number] Right 1 Implanted   IMP SCR STRK LOCKING T2 F/T 5X47.5MM [id number] - [id number] Metallic Hardware/[employer] IMP SCR STRK LOCKING T2 F/T 5X47.5MM [id number]  [employer] [id number] Right 1 Implanted       Activity: progress as tolerated with appropriate assistive devices  Weight bearing status: WBAT RLE  Antibiotics: Cefazolin x 24 hours  Diet: OK for diet from orthopedic perspective. Bowel regimen. Anti-emetics PRN.    DVT prophylaxis: Mechanical + Lovenox 0.5mg/kg x 6 weeks  Elevation: Elevate heels off of bed on pillows   Wound Care: keep dressings in place until POD7  Drains: none  Pain management: per primary service  X-rays: AP and lateral right femur in PACU  Physical Therapy: Mobilization, ROM, ADL's  Occupational Therapy: ADL's  Labs: Trend Hgb on POD #1, 2 until stable  Disposition: per primary service, pending progress with therapy and disposition planning   Follow up: 2 weeks with RN for wound check  and then 6 weeks with Dr. Perez Chandra MD  Orthopaedic Surgery, PGY2

## 2023-04-04 NOTE — ANESTHESIA CARE TRANSFER NOTE
Patient: Belia Sheets    Procedure: Procedure(s):  Open reduction internal fixation hip nailing       Diagnosis: Intertrochanteric fracture of right femur (H) [S72.141A]  Diagnosis Additional Information: No value filed.    Anesthesia Type:   Spinal, General     Note:    Oropharynx: oropharynx clear of all foreign objects  Level of Consciousness: drowsy  Oxygen Supplementation: face mask  Level of Supplemental Oxygen (L/min / FiO2): 8  Independent Airway: airway patency satisfactory and stable  Dentition: dentition unchanged  Vital Signs Stable: post-procedure vital signs reviewed and stable  Report to RN Given: handoff report given  Patient transferred to: PACU    Handoff Report: Identifed the Patient, Identified the Reponsible Provider, Reviewed the pertinent medical history, Discussed the surgical course, Reviewed Intra-OP anesthesia mangement and issues during anesthesia, Set expectations for post-procedure period and Allowed opportunity for questions and acknowledgement of understanding      Vitals:  Vitals Value Taken Time   /79 04/04/23 1037   Temp     Pulse 77 04/04/23 1048   Resp 14 04/04/23 1048   SpO2 96 % 04/04/23 1048   Vitals shown include unvalidated device data.    Electronically Signed By: SUSIE Leo CRNA  April 4, 2023  10:49 AM

## 2023-04-05 ENCOUNTER — DOCUMENTATION ONLY (OUTPATIENT)
Dept: ORTHOPEDICS | Facility: CLINIC | Age: 88
End: 2023-04-05

## 2023-04-05 LAB
ANION GAP SERPL CALCULATED.3IONS-SCNC: 7 MMOL/L (ref 7–15)
BUN SERPL-MCNC: 17.3 MG/DL (ref 8–23)
CALCIUM SERPL-MCNC: 8.6 MG/DL (ref 8.2–9.6)
CHLORIDE SERPL-SCNC: 104 MMOL/L (ref 98–107)
CREAT SERPL-MCNC: 0.78 MG/DL (ref 0.51–0.95)
DEPRECATED HCO3 PLAS-SCNC: 26 MMOL/L (ref 22–29)
ERYTHROCYTE [DISTWIDTH] IN BLOOD BY AUTOMATED COUNT: 13.4 % (ref 10–15)
GFR SERPL CREATININE-BSD FRML MDRD: 71 ML/MIN/1.73M2
GLUCOSE BLDC GLUCOMTR-MCNC: 104 MG/DL (ref 70–99)
GLUCOSE SERPL-MCNC: 117 MG/DL (ref 70–99)
HCT VFR BLD AUTO: 29.5 % (ref 35–47)
HGB BLD-MCNC: 9.6 G/DL (ref 11.7–15.7)
MCH RBC QN AUTO: 29.6 PG (ref 26.5–33)
MCHC RBC AUTO-ENTMCNC: 32.5 G/DL (ref 31.5–36.5)
MCV RBC AUTO: 91 FL (ref 78–100)
PLATELET # BLD AUTO: 123 10E3/UL (ref 150–450)
POTASSIUM SERPL-SCNC: 3.8 MMOL/L (ref 3.4–5.3)
RBC # BLD AUTO: 3.24 10E6/UL (ref 3.8–5.2)
SODIUM SERPL-SCNC: 137 MMOL/L (ref 136–145)
WBC # BLD AUTO: 11.7 10E3/UL (ref 4–11)

## 2023-04-05 PROCEDURE — 120N000002 HC R&B MED SURG/OB UMMC

## 2023-04-05 PROCEDURE — 85018 HEMOGLOBIN: CPT | Performed by: STUDENT IN AN ORGANIZED HEALTH CARE EDUCATION/TRAINING PROGRAM

## 2023-04-05 PROCEDURE — 250N000013 HC RX MED GY IP 250 OP 250 PS 637: Performed by: STUDENT IN AN ORGANIZED HEALTH CARE EDUCATION/TRAINING PROGRAM

## 2023-04-05 PROCEDURE — 99233 SBSQ HOSP IP/OBS HIGH 50: CPT | Performed by: INTERNAL MEDICINE

## 2023-04-05 PROCEDURE — 36415 COLL VENOUS BLD VENIPUNCTURE: CPT | Performed by: STUDENT IN AN ORGANIZED HEALTH CARE EDUCATION/TRAINING PROGRAM

## 2023-04-05 PROCEDURE — 250N000011 HC RX IP 250 OP 636: Performed by: PHYSICIAN ASSISTANT

## 2023-04-05 PROCEDURE — 82310 ASSAY OF CALCIUM: CPT | Performed by: STUDENT IN AN ORGANIZED HEALTH CARE EDUCATION/TRAINING PROGRAM

## 2023-04-05 PROCEDURE — 250N000011 HC RX IP 250 OP 636: Performed by: STUDENT IN AN ORGANIZED HEALTH CARE EDUCATION/TRAINING PROGRAM

## 2023-04-05 PROCEDURE — 258N000003 HC RX IP 258 OP 636: Performed by: INTERNAL MEDICINE

## 2023-04-05 PROCEDURE — 250N000013 HC RX MED GY IP 250 OP 250 PS 637: Performed by: INTERNAL MEDICINE

## 2023-04-05 RX ADMIN — AMLODIPINE BESYLATE 2.5 MG: 2.5 TABLET ORAL at 08:58

## 2023-04-05 RX ADMIN — ACETAMINOPHEN 975 MG: 325 TABLET, FILM COATED ORAL at 08:58

## 2023-04-05 RX ADMIN — CALCIUM CARBONATE 600 MG (1,500 MG)-VITAMIN D3 400 UNIT TABLET 1 TABLET: at 08:58

## 2023-04-05 RX ADMIN — ACETAMINOPHEN 975 MG: 325 TABLET, FILM COATED ORAL at 14:04

## 2023-04-05 RX ADMIN — Medication 2.5 MG: at 01:26

## 2023-04-05 RX ADMIN — SODIUM CHLORIDE, POTASSIUM CHLORIDE, SODIUM LACTATE AND CALCIUM CHLORIDE: 600; 310; 30; 20 INJECTION, SOLUTION INTRAVENOUS at 02:44

## 2023-04-05 RX ADMIN — Medication 2.5 MG: at 08:57

## 2023-04-05 RX ADMIN — Medication 2.5 MG: at 14:08

## 2023-04-05 RX ADMIN — SENNOSIDES AND DOCUSATE SODIUM 1 TABLET: 50; 8.6 TABLET ORAL at 22:24

## 2023-04-05 RX ADMIN — CALCIUM CARBONATE 600 MG (1,500 MG)-VITAMIN D3 400 UNIT TABLET 1 TABLET: at 17:46

## 2023-04-05 RX ADMIN — ACETAMINOPHEN 975 MG: 325 TABLET, FILM COATED ORAL at 22:25

## 2023-04-05 RX ADMIN — ENOXAPARIN SODIUM 30 MG: 30 INJECTION SUBCUTANEOUS at 08:59

## 2023-04-05 RX ADMIN — CEFAZOLIN 1 G: 1 INJECTION, POWDER, FOR SOLUTION INTRAMUSCULAR; INTRAVENOUS at 10:30

## 2023-04-05 RX ADMIN — SENNOSIDES AND DOCUSATE SODIUM 1 TABLET: 50; 8.6 TABLET ORAL at 08:58

## 2023-04-05 RX ADMIN — CEFAZOLIN 1 G: 1 INJECTION, POWDER, FOR SOLUTION INTRAMUSCULAR; INTRAVENOUS at 01:26

## 2023-04-05 RX ADMIN — ATORVASTATIN CALCIUM 20 MG: 20 TABLET, FILM COATED ORAL at 22:22

## 2023-04-05 RX ADMIN — SODIUM CHLORIDE, POTASSIUM CHLORIDE, SODIUM LACTATE AND CALCIUM CHLORIDE: 600; 310; 30; 20 INJECTION, SOLUTION INTRAVENOUS at 16:20

## 2023-04-05 ASSESSMENT — ACTIVITIES OF DAILY LIVING (ADL)
ADLS_ACUITY_SCORE: 51
ADLS_ACUITY_SCORE: 55
ADLS_ACUITY_SCORE: 51
ADLS_ACUITY_SCORE: 55
ADLS_ACUITY_SCORE: 55
ADLS_ACUITY_SCORE: 51
ADLS_ACUITY_SCORE: 51

## 2023-04-05 NOTE — PLAN OF CARE
Cross Cover paged   V.A - 5 ORTHO  Requesting bedside attendant for pt confusion. Pt requiring constant reorientation, tends to pull at lines and trying to get OOB at times. Thanks Ananya'ad #[id number]

## 2023-04-05 NOTE — PLAN OF CARE
VS:       Pt D/Ox3 place, time, situation. Afebrile. VSS. /63 (BP Location: Left arm)   Pulse 82   Temp 96.9  F (36.1  C) (Oral)   Resp 18   Wt 47.2 kg (104 lb 0.9 oz)   SpO2 91%   BMI 21.75 kg/m   Lungs- clear bilaterally with both anterior and posterior. IS encouraged. Denies nausea, shortness of breath, and chest pain.  O2 >90 % 2 LPM on Oxy max.    Output:       Bowels- active in all four quadrants. Incontinent, wet briefs, up to commode with 2 assist today.      Activity:       Pt up 2 assist with gait belt and walker.     Skin:   Blanchable redness on the buttocks.     Pain:       Has pain in the R hip and given tylenol and oxycodone 2.5mg, ICE applied, and is tolerating well.      CMS:       CMS and Neuro's are intact. Denies numbness and tingling in all extremities.      Dressing:       Right hip incisional dressing is C/D/I. Mepilex  on sacral area C/D/I.      Diet:       Pt is on a regular diet and appetite was good this shift.       LDA:       PIV is patent in the L forearm and infusing LR @75ml/hr.      Equipment:       PCD's on BLE's. Bilateral heels are elevated off the bed.     Plan:       Pt is able to make needs known and the call light is within the pt's reach. Continue to monitor.       Additional Info:       Pts hemoglobin went from 13.4 to 9.6, MD notified.

## 2023-04-05 NOTE — PLAN OF CARE
[id number]  VS: /63 (BP Location: Left arm)   Pulse 82   Temp 96.8  F (36  C) (Oral)   Resp 18   Wt 47.2 kg (104 lb 0.9 oz)   SpO2 97%   BMI 21.75 kg/m      Tachy at times, <110   O2: SpO2 > 90% and stable on 2LPM currently via Oxymask, weaned down from 5LPM.  On cont pulse ox. LS clear and equal bilaterally. Slight tachypnea at times. Denies chest pain and SOB.    Output: Incont of B/B. Sometimes using bedpan.  Wet briefs, bladder scanned- PVR 56mL.   Last BM: 4/2, per pt report. denies abdominal discomfort. BS active, passing flatus.    Activity: NOOB. A2 with cares. Not safe to get OOB/ambulate this shift d/t confusion and inability to follow commands. Turned in bed with much encouragement.   Repositioned as tolerated. PCDs on.    Skin: WDL except, surgical incision to RLE; slight blanchable redness to buttocks, barrier cream applied.    Pain: Pain managed with scheduled Tylenol and Oxycodone 2.5 x1. Narcs used with caution d/t confusion.    CMS: Intact, alert and confusion. Oriented to self most of shift. At one point, pt was able to clearly state location of hospital, and situation slightly. Denies numbness and tingling during assessment.  Weak DP, moderate dorsi/plantarflexion.    Dressing: Sacral mepilex on for protection CDI. Surgical dressings CDI.    Diet: Regular diet. Needs assistance ordering meals and tray set up. Encourage sips of water/juice with each interaction. Denies nausea/vomiting.   BG check POD1 104.  Applesauce with meds, pt tends to tolerate better.   LDA: L PIV infusing LR at 75mL/hr.   Equipment: IV pole, personal belongings, bedside attendant, CAPNO machine.    Plan: Fall precautions maintained. Continue with plan of care. Call light within reach, pt able to make needs known.    Additional Info: Pending therapies.   Initiated bedside attendant for increased confusion overnight.   Pt doesn't drink much water, requested IVF order from MD.

## 2023-04-05 NOTE — PROGRESS NOTES
Orthopaedic Surgery Progress Note 04/05/2023    Subjective  Patient noted to be confused ON, pulling at lines. VSS..  Pain well controlled, some back pain, denies hip pain.. Denies new numbness, tingling, or weakness.  Tolerating diet with out nausea or vomiting.  Voiding independently, but incontinent. Denies fevers, chills, chest pain, SOB.  Has not yet been OOB.     Objective  Temp: (!) 96.5  F (35.8  C) Temp src: Axillary BP: 91/51 Pulse: 97   Resp: 24 SpO2: 98 % O2 Device: Oxymask Oxygen Delivery: 2 LPM    Exam:  Gen: No acute distress, resting comfortably in bed.  Resp: Non-labored breathing, NC  Cardio: Regular rate via peripheral pulse  MSK:    RLE:  - Dressings c/d/I  - Fires Quad, TA, GSC, EHL, FHL  - SILT femoral/tibial/sural/saphenous/DP/SP nerves  - DP pulse 1+, foot wwp        Recent Labs   Lab 04/03/23  1838   WBC 11.5*   HGB 13.4          Culture results: N/a    Assessment: Belia Sheets is a 89 year old female s/p IMN nail right femur on 4/5/23 with Dr. Todd. Doing well.     Goals for today   - Pain control   - PT/OT, appreciate assistance in getting patient OOB   - Dispo planning     Plan:  Medicine Primary  Activity: progress as tolerated with appropriate assistive devices  Weight bearing status: WBAT RLE  Antibiotics: Cefazolin x 24 hours  Diet: OK for diet from orthopedic perspective. Bowel regimen. Anti-emetics PRN.    DVT prophylaxis: Mechanical + Lovenox 0.5mg/kg x 6 weeks  Elevation: Elevate heels off of bed on pillows   Wound Care: keep dressings in place until POD7  Drains: none  Pain management: per primary service  X-rays: AP and lateral right femur in PACU  Physical Therapy: Mobilization, ROM, ADL's  Occupational Therapy: ADL's  Labs: Trend Hgb on POD #1, 2 until stable  Disposition: per primary service, pending progress with therapy and disposition planning   Follow up: 2 weeks with RN for wound check and then 6 weeks with Dr. Todd     Orthopedic staff is Dr. Todd.    Angela  MD Luís, PGY1   Orthopaedic Surgery  [phone]     Please page me directly with any questions/concerns during regular weekday hours before 5 pm. If there is no response, if it is a weekend, or if it is during evening hours then please page the orthopedic surgery resident on call.       Future Appointments   Date Time Provider Department Center   4/5/2023  9:00 AM Michelle Graves, PT [facility] [place]   4/5/2023  9:45 AM Violet Anne OT [facility] [place]   5/10/2023  4:00 PM Alejandro Sandhu MD [facility]   1/10/2024  1:00 PM Saurabh Pittman MD [facility]

## 2023-04-05 NOTE — PROGRESS NOTES
[facility]    Medicine Progress Note - Hospitalist Service, GOLD TEAM 19    Date of Admission:  4/3/2023    Assessment & Plan   Keaton Sheets is a 89-year-old female, history of hypertension, osteoarthritis, osteoporosis presents with altered mental status with imaging showing right intertrochanteric proximal fracture and L1 compression fracture of superior endplate.  She is now s/p OR right intramedullary nail on 4/4 with Dr. Martin.    #Right intertrochanteric fracture s/p R-IMN with Dr. martin on 4/4/23  -WBAT RLE per ortho  -completed ancef x3 doses  -Lovenox for DVT prophylaxis  -PT, OT consulting  -2 weeks with RN for wound check, 6 weeks with Dr. Roach  -Dressing to remain in place until postop day 7  -Pain control with Tylenol, oxy and Dilaudid as needed.    #L1 superior endplate compression fracture with mild height loss  -CT imaging on arrival to the hospital showing L1 compression fracture of the superior endplate with mild height loss, potentially slightly worsened compared to imaging from 11/6/2022.  -Patient was seen by neurosurgery in [place], recommending no neurosurgical intervention  -Plan to continue bone [facility] clinic referral  -Follow-up with neurosurgery as needed.    #Acute blood loss anemia  #Thrombocytopenia - cont to monitor  -Preop hemoglobin 13.4, postop 9.6  -likely due to blood loss from the OR  -Transfuse for Hgb <7    #Lactic acid elevation-resolved after IVF, monitor is clinically indicated    #Leukocytosis-likely reactive, anticipate improvement., cont to monitor.     #Mild cognitive impairment  #Waxing and waning mentation  -Per discussion with daughter, patient has had delirium following hospitalization several years ago.  -OT for slums    #Hypertension  -Okay to resume Amlodipine 2.5 mg    #AHRF  -Likely due to atelectasis, breathing mechanics given patient's kyphosis.  -Supplemental oxygen to keep SPO2 >92%  -Encourage use of I-S, early  ambulation.  -If fever, low threshold to obtain chest x-ray to rule out pneumonia.         Diet: Regular Diet Adult    DVT Prophylaxis: Enoxaparin (Lovenox) SQ  Perez Catheter: Not present  Lines: None     Cardiac Monitoring: None  Code Status: No CPR- Do NOT Intubate      Clinically Significant Risk Factors                # Thrombocytopenia: Lowest platelets = 123 in last 2 days, will monitor for bleeding                 Disposition Plan  Likely to the TCU.     Expected Discharge Date: 04/06/2023                  ANGEL WHITTEN MD  Hospitalist Service, GOLD TEAM [address] [place] [address]  Securely message with [employer] (more info)  Text page via [facility] Paging/Directory   See signed in provider for up to date coverage information  ______________________________________________________________________    Interval History   -Acute events overnight  - Afebrile and hemodynamically stable  - Patient noted to be agitated overnight, as of this morning however patient had a breakfast and is resting comfortably.    Physical Exam   Vital Signs: Temp: 96.8  F (36  C) Temp src: Oral BP: 108/63 Pulse: 82   Resp: 18 SpO2: 97 % O2 Device: Oxymask Oxygen Delivery: 2 LPM  Weight: 104 lbs .91 oz    General Appearance: Sleeping comfortably in bed, on supplemental oxygen in no acute distress or discomfort.  HEENT: PERRL: EOMI; moist mucous membrane w/o lesions  Neck: No JVD  Pulmonary: Clear to auscultation in anterior lung fields.  CVS: Regular rhythm, no murmurs, rubs or gallops  GI: BS (+), soft nontender, no rebound or guarding   Extremities: Right lateral wound c/d/i  Skin: No rashes or lesions  Neurologic: Alert and oriented to person, place and situation, somnolence.       Medical Decision Making       45 MINUTES SPENT BY ME on the date of service doing chart review, history, exam, documentation & further activities per the note.      Data   ------------------------- PAST 24 HR DATA REVIEWED  -----------------------------------------------    I have personally reviewed the following data over the past 24 hrs:    11.7 (H)  \   9.6 (L)   / 123 (L)     137 104 17.3 /  117 (H)   3.8 26 0.78 \       Imaging results reviewed over the past 24 hrs:   No results found for this or any previous visit (from the past 24 hour(s)).

## 2023-04-06 ENCOUNTER — APPOINTMENT (OUTPATIENT)
Dept: PHYSICAL THERAPY | Facility: CLINIC | Age: 88
DRG: 480 | End: 2023-04-06
Payer: COMMERCIAL

## 2023-04-06 LAB
BASOPHILS # BLD AUTO: 0.1 10E3/UL (ref 0–0.2)
BASOPHILS NFR BLD AUTO: 1 %
EOSINOPHIL # BLD AUTO: 0.8 10E3/UL (ref 0–0.7)
EOSINOPHIL NFR BLD AUTO: 7 %
ERYTHROCYTE [DISTWIDTH] IN BLOOD BY AUTOMATED COUNT: 13.5 % (ref 10–15)
GLUCOSE SERPL-MCNC: 115 MG/DL (ref 70–99)
HCT VFR BLD AUTO: 30 % (ref 35–47)
HGB BLD-MCNC: 9.6 G/DL (ref 11.7–15.7)
IMM GRANULOCYTES # BLD: 0.1 10E3/UL
IMM GRANULOCYTES NFR BLD: 0 %
LYMPHOCYTES # BLD AUTO: 1.2 10E3/UL (ref 0.8–5.3)
LYMPHOCYTES NFR BLD AUTO: 10 %
MCH RBC QN AUTO: 29.7 PG (ref 26.5–33)
MCHC RBC AUTO-ENTMCNC: 32 G/DL (ref 31.5–36.5)
MCV RBC AUTO: 93 FL (ref 78–100)
MONOCYTES # BLD AUTO: 0.7 10E3/UL (ref 0–1.3)
MONOCYTES NFR BLD AUTO: 6 %
NEUTROPHILS # BLD AUTO: 8.8 10E3/UL (ref 1.6–8.3)
NEUTROPHILS NFR BLD AUTO: 76 %
NRBC # BLD AUTO: 0 10E3/UL
NRBC BLD AUTO-RTO: 0 /100
PLATELET # BLD AUTO: 113 10E3/UL (ref 150–450)
RBC # BLD AUTO: 3.23 10E6/UL (ref 3.8–5.2)
WBC # BLD AUTO: 11.5 10E3/UL (ref 4–11)

## 2023-04-06 PROCEDURE — 120N000002 HC R&B MED SURG/OB UMMC

## 2023-04-06 PROCEDURE — 36415 COLL VENOUS BLD VENIPUNCTURE: CPT | Performed by: INTERNAL MEDICINE

## 2023-04-06 PROCEDURE — 250N000013 HC RX MED GY IP 250 OP 250 PS 637: Performed by: INTERNAL MEDICINE

## 2023-04-06 PROCEDURE — 258N000003 HC RX IP 258 OP 636: Performed by: INTERNAL MEDICINE

## 2023-04-06 PROCEDURE — 250N000013 HC RX MED GY IP 250 OP 250 PS 637: Performed by: STUDENT IN AN ORGANIZED HEALTH CARE EDUCATION/TRAINING PROGRAM

## 2023-04-06 PROCEDURE — 97161 PT EVAL LOW COMPLEX 20 MIN: CPT | Mod: GP

## 2023-04-06 PROCEDURE — 97530 THERAPEUTIC ACTIVITIES: CPT | Mod: GP

## 2023-04-06 PROCEDURE — 99233 SBSQ HOSP IP/OBS HIGH 50: CPT | Performed by: INTERNAL MEDICINE

## 2023-04-06 PROCEDURE — 82947 ASSAY GLUCOSE BLOOD QUANT: CPT | Performed by: INTERNAL MEDICINE

## 2023-04-06 PROCEDURE — 250N000011 HC RX IP 250 OP 636: Performed by: PHYSICIAN ASSISTANT

## 2023-04-06 PROCEDURE — 85025 COMPLETE CBC W/AUTO DIFF WBC: CPT | Performed by: INTERNAL MEDICINE

## 2023-04-06 RX ADMIN — AMLODIPINE BESYLATE 2.5 MG: 2.5 TABLET ORAL at 09:03

## 2023-04-06 RX ADMIN — Medication 2.5 MG: at 10:36

## 2023-04-06 RX ADMIN — ENOXAPARIN SODIUM 30 MG: 30 INJECTION SUBCUTANEOUS at 09:03

## 2023-04-06 RX ADMIN — ACETAMINOPHEN 975 MG: 325 TABLET, FILM COATED ORAL at 09:03

## 2023-04-06 RX ADMIN — SENNOSIDES AND DOCUSATE SODIUM 2 TABLET: 50; 8.6 TABLET ORAL at 09:03

## 2023-04-06 RX ADMIN — SODIUM CHLORIDE, POTASSIUM CHLORIDE, SODIUM LACTATE AND CALCIUM CHLORIDE: 600; 310; 30; 20 INJECTION, SOLUTION INTRAVENOUS at 05:15

## 2023-04-06 RX ADMIN — CALCIUM CARBONATE 600 MG (1,500 MG)-VITAMIN D3 400 UNIT TABLET 1 TABLET: at 09:03

## 2023-04-06 RX ADMIN — Medication 2.5 MG: at 05:25

## 2023-04-06 ASSESSMENT — ACTIVITIES OF DAILY LIVING (ADL)
ADLS_ACUITY_SCORE: 51
ADLS_ACUITY_SCORE: 49
ADLS_ACUITY_SCORE: 51
ADLS_ACUITY_SCORE: 51
ADLS_ACUITY_SCORE: 49
ADLS_ACUITY_SCORE: 51
ADLS_ACUITY_SCORE: 51
ADLS_ACUITY_SCORE: 49
ADLS_ACUITY_SCORE: 51
ADLS_ACUITY_SCORE: 51

## 2023-04-06 NOTE — PROGRESS NOTES
Form has been completed by provider.     Form sent out via: Fax to [employer] at Fax Number: [phone]  Patient informed: N/A  Output date: April 6, 2023    Dinesh Hightower MA      **Please close the encounter**

## 2023-04-06 NOTE — PROGRESS NOTES
Form has been completed by provider.     Form sent out via: Fax to [employer] at Fax Number: [phone]  Patient informed: N/A  Output date: April 6, 2023    Dinesh Hightower MA      **Please close the encounter**

## 2023-04-06 NOTE — PLAN OF CARE
Goal Outcome Evaluation:         VS: /69 (BP Location: Right arm, Patient Position: Semi-Hunt's)   Pulse 95   Temp (!) 96.5  F (35.8  C) (Oral)   Resp 16   Wt 47.2 kg (104 lb 0.9 oz)   SpO2 95%   BMI 21.75 kg/m       O2: SpO2 > 90 % on 1 L/simple face mask. Pt is a mouth breather and desats when sleeping. LS clear and equal bilaterally. No signs of chest pain and SOB noted.   Output: Pt incontinent of bladder and incontinent brief in place   Last BM: denies abdominal discomfort. BS active / passing flatus. No BM this shift   Activity: Pt sleepy most of the shift. Awake from around 0330, talking, eating apple sauce and drinking juice. Takes 2 to T & R and to change briefs.   Skin: WDL except for Right hip incision. Reddened coccyx and barrier cream applied with each incontinent episode   Pain: Pain managed with scheduled Tylenol, PRN Oxycodone given X 1. Pain to right hip only with activity.    CMS: Intact, AOx4. Denies numbness and tingling.   Dressing: Surgical dressing C/D/I   Diet: Regular diet. Denies nausea/vomiting. Poor appetite. Pt needs assistance feeding     LDA: PIV to LFA patent LR infusing at 75 ml/hr   Equipment: IV pole     Plan: Continue with plan of care. Call light within reach, pt able to make needs known.    Additional Info: Pt mentation waxes and wanes. A & O to self this shift. Confused about time/place/situation. Mostly sleeping. Sitter at bedside. Pt calm/cooperative

## 2023-04-06 NOTE — PLAN OF CARE
VS:       Pt A/O X 2. Disoriented to time and situation. Afebrile. VSS. /75   Pulse 72   Temp (!) 96.6  F (35.9  C) (Axillary)   Resp 16   Wt 47.2 kg (104 lb 0.9 oz)   SpO2 99%   BMI 21.75 kg/m   Lungs- clear bilaterally with both anterior and posterior. Denies nausea, shortness of breath, and chest pain.     Output:       Bowels- active in all four quadrants. Incontinent of B/B, up to commode with assist of 2.       Activity:       Pt up assist of 2 with walker and gait belt.     Skin:   Skin is cool and pale. Scattered bruising, fragile.      Pain:       Has pain in the R hip with activity and given tylenol and oxy 2.5mg, ICE applied, and is tolerating fair.      CMS:       CMS and Neuro's are intact. Denies numbness and tingling in all extremities.      Dressing:       R hip incisional dressing is C/D/I.      Diet:       Pt is on a regular diet and appetite was fair this shift.       LDA:       PIV is patent in the left AC and SL.      Equipment:       PCD's on BLE's. Bilateral heels are elevated off the bed.     Plan:       Pt is able to make needs known for the most part and the call light is within the pt's reach. Continue to monitor.       Additional Info:

## 2023-04-06 NOTE — PROGRESS NOTES
[facility]    Medicine Progress Note - Hospitalist Service, GOLD TEAM 19    Date of Admission:  4/3/2023    Assessment & Plan   Keaton Sheets is a 89-year-old female, history of hypertension, osteoarthritis, osteoporosis presents with altered mental status with imaging showing right intertrochanteric proximal fracture and L1 compression fracture of superior endplate.  She is now s/p OR right intramedullary nail on 4/4 with Dr. Martin.    #Right intertrochanteric fracture s/p R-IMN with Dr. martin on 4/4/23  -WBAT RLE per ortho  -completed ancef x3 doses  -Lovenox for DVT prophylaxis  -PT, OT consulting  -2 weeks with RN for wound check, 6 weeks with Dr. Roach  -Dressing to remain in place until postop day 7  -Pain control with Tylenol, mostly oxycodone as needed.    #L1 superior endplate compression fracture with mild height loss  -CT imaging on arrival to the hospital showing L1 compression fracture of the superior endplate with mild height loss, potentially slightly worsened compared to imaging from 11/6/2022.  -Patient was seen by neurosurgery in [place], recommending no neurosurgical intervention  -Plan to continue bone health clinic referral  -Follow-up with neurosurgery as needed; referral sent.    #Acute blood loss anemia  #Thrombocytopenia - cont to monitor  -Preop hemoglobin 13.4, postop 9.6 x2  -likely due to blood loss from the OR  -Transfuse for Hgb <7    #Lactic acid elevation-resolved after IVF, monitor is clinically indicated    #Leukocytosis, mild-likely reactive, anticipate improvement., cont to monitor.     #Mild cognitive impairment  #Waxing and waning mentation  -Per discussion with daughter, patient has had delirium following hospitalization several years ago.  -Will require outpatient neuropsych eval.    #Hypertension  -Okay to resume Amlodipine 2.5 mg    #AHRF, improving  -Likely due to atelectasis, breathing mechanics given patient's  kyphosis.  -Supplemental oxygen to keep SPO2 >92%  -Encourage use of I-S, early ambulation.  -If fever, low threshold to obtain chest x-ray to rule out pneumonia.         Diet: Regular Diet Adult    DVT Prophylaxis: Enoxaparin (Lovenox) SQ  Perez Catheter: Not present  Lines: None     Cardiac Monitoring: None  Code Status: No CPR- Do NOT Intubate      Clinically Significant Risk Factors                # Thrombocytopenia: Lowest platelets = 113 in last 2 days, will monitor for bleeding                 Disposition Plan  Likely to the TCU.     Expected Discharge Date: 04/06/2023                  ANGEL WHITTEN MD  Hospitalist Service, GOLD TEAM 19  [facility]  Securely message with [employer] (more info)  Text page via [facility] Paging/Directory   See signed in provider for up to date coverage information  ______________________________________________________________________    Interval History   -Acute events overnight  - Afebrile and hemodynamically stable  - Getting oxycodone as needed for pain  - Supplemental O2 needs coming down  - Patient offers no complaints this morning.    Physical Exam   Vital Signs: Temp: (!) 96.5  F (35.8  C) Temp src: Oral BP: 121/69 Pulse: 95   Resp: 16 SpO2: 95 % O2 Device: Oxymask Oxygen Delivery: 1 LPM  Weight: 104 lbs .91 oz    General Appearance: Sleeping comfortably in bed, on supplemental oxygen in no acute distress or discomfort.  HEENT: PERRL: EOMI; moist mucous membrane w/o lesions  Neck: No JVD  Pulmonary: Clear to auscultation in anterior lung fields.  CVS: Regular rhythm, no murmurs, rubs or gallops  GI: BS (+), soft nontender, no rebound or guarding   Extremities: Right lateral thigh wound c/d/i  Skin: No rashes or lesions  Neurologic: Alert and oriented to person, place and situation, somnolence.       Medical Decision Making       45 MINUTES SPENT BY ME on the date of service doing chart review, history, exam, documentation & further  activities per the note.      Data   ------------------------- PAST 24 HR DATA REVIEWED -----------------------------------------------    I have personally reviewed the following data over the past 24 hrs:    11.5 (H)  \   9.6 (L)   / 113 (L)     137 104 17.3 /  117 (H)   3.8 26 0.78 \       Imaging results reviewed over the past 24 hrs:   No results found for this or any previous visit (from the past 24 hour(s)).

## 2023-04-06 NOTE — PROGRESS NOTES
"   04/06/23 1100   Appointment Info   Signing Clinician's Name / Credentials (PT) Aubree Castañeda, DPLUCIO   Living Environment   Current Living Arrangements house   Living Environment Comments pt quite lethargic, difficulty answering subjective questions- reports living in house with stairs but unable to elaborate further   Self-Care   Current Activity Tolerance poor   Equipment Currently Used at Home walker, rolling   Activity/Exercise/Self-Care Comment unclear exact PLOF due to lethargy, reports using walker   General Information   Onset of Illness/Injury or Date of Surgery 04/03/23   Referring Physician Ariel Chandra MD   Pertinent History of Current Problem (include personal factors and/or comorbidities that impact the POC) per chart review, \"Keaton Sheets is a 89-year-old female, history of hypertension, osteoarthritis, osteoporosis presents with altered mental status with imaging showing right intertrochanteric proximal fracture and L1 compression fracture of superior endplate.  She is now s/p OR right intramedullary nail on 4/4 with Dr. Todd.\"   Existing Precautions/Restrictions fall;oxygen therapy device and L/min  (1L NC)   Weight-Bearing Status - RLE weight-bearing as tolerated   Cognition   Affect/Mental Status (Cognition) confused;low arousal/lethargic   Follows Commands (Cognition) follows one-step commands;25-49% accuracy   Pain Assessment   Patient Currently in Pain Yes, see Vital Sign flowsheet   Posture    Posture Kyphosis   Posture Comments L lean sittitng EOB   Range of Motion (ROM)   ROM Comment unable to assess formally 2/2 mentation, gross limitations consistent with age and PLOF   Strength (Manual Muscle Testing)   Strength Comments NT formally 2/2 mentation and currentl level of function- signficiant weakness per functional mobility   Bed Mobility   Comment, (Bed Mobility) modAx2 supine>sit   Transfers   Comment, (Transfers) NT- weakness and letheragy, per NST pt was heavy Ax2 to stand, unable to " pivot   Gait/Stairs (Locomotion)   Comment, (Gait/Stairs) NT- weakness and lethargy   Balance   Balance Comments needs modA to maitnain EOB sitting   Sensory Examination   Sensory Perception patient reports no sensory changes   Sensory Perception Comments NT formally 2/2 mentation   Clinical Impression   Criteria for Skilled Therapeutic Intervention Yes, treatment indicated   PT Diagnosis (PT) impaired functional mobility   Influenced by the following impairments reduced activity tolerance, pain, weakness   Functional limitations due to impairments bed mobility, transfers, gait, stairs   Clinical Presentation (PT Evaluation Complexity) Evolving/Changing   Clinical Presentation Rationale AMS, pain control, clinical presentation   Clinical Decision Making (Complexity) low complexity   Planned Therapy Interventions (PT) bed mobility training;gait training;home exercise program;patient/family education;stair training;strengthening;transfer training;progressive activity/exercise;risk factor education;home program guidelines   Risk & Benefits of therapy have been explained evaluation/treatment results reviewed;risks/benefits reviewed;care plan/treatment goals reviewed;current/potential barriers reviewed;participants voiced agreement with care plan;participants included;patient   Clinical Impression Comments quite limited eval 2/2 AMS and lethargy   PT Total Evaluation Time   PT Eval, Low Complexity Minutes (06525) 5   Plan of Care Review   Plan of Care Reviewed With patient   Physical Therapy Goals   PT Frequency 5x/week   PT Predicted Duration/Target Date for Goal Attainment 04/13/23   PT Goals Bed Mobility;Transfers;Gait   PT: Bed Mobility Minimal assist;Supine to/from sit   PT: Transfers Minimal assist;Sit to/from stand;Bed to/from chair;Assistive device   PT: Gait Supervision/stand-by assist;Rolling walker;25 feet   Interventions   Interventions Quick Adds Therapeutic Activity   Therapeutic Activity   Therapeutic  Activities: dynamic activities to improve functional performance Minutes (00373) 10   Symptoms Noted During/After Treatment Fatigue;Increased pain   Treatment Detail/Skilled Intervention PT: pt just up with nursing, but agreeable to try up with therapy. Quite lethargic throughout, following just ~50% commands. Slow and vc and modAx2 needed for seated pivot supine>sit EOB, sits EOB x5 min with modA support, attempted to cue for LAQ but unable to complete more than x1 each. modA needed at trunk throughout, pt starting to drool- given lethargy and poor postural support deferred up to chair right now. Did instruct sitter to use marck del real Ax2 for OOB. TotalA x2 for sit>supine back to comfort. Pt on 1L NC with VSS throughout.   PT Discharge Planning   PT Plan marck del real to recliner   PT Discharge Recommendation (DC Rec) Transitional Care Facility   PT Rationale for DC Rec pt currently heavy Ax2, is below baseline, will need TCU stay to progress strength and mobility after fall and hip fx   PT Brief overview of current status recommend marck del real Ax2   Total Session Time   Timed Code Treatment Minutes 10   Total Session Time (sum of timed and untimed services) 15

## 2023-04-06 NOTE — PROGRESS NOTES
Orthopaedic Surgery Progress Note 04/06/2023    Subjective  Patient slept overnight. Tolerating diet with out nausea or vomiting.  Voiding independently, but incontinent.    Objective  Temp: (!) 96.5  F (35.8  C) Temp src: Oral BP: 121/69 Pulse: 95   Resp: 16 SpO2: 95 % O2 Device: Oxymask Oxygen Delivery: 1 LPM    Exam:  Gen: No acute distress, resting comfortably in bed.  Resp: Non-labored breathing, face mask   Cardio: Regular rate via peripheral pulse  MSK:    RLE:  - Dressings c/d/I  - DP pulse 1+, foot wwp        Recent Labs   Lab 04/06/23  0528 04/05/23  0837 04/03/23  1838   WBC 11.5* 11.7* 11.5*   HGB 9.6* 9.6* 13.4   * 123* 218       Culture results: N/a    Assessment: Belia Sheets is a 89 year old female s/p IMN nail right femur on 4/5/23 with Dr. Todd. Doing well.     Goals for today   - Pain control   - PT/OT, appreciate assistance in getting patient OOB   - OOB 3x daily for meals   - Dispo planning   - delirium precautions (blinds open during day, minimize night time disruptions, etc.)     Plan:  Medicine Primary  Activity: progress as tolerated with appropriate assistive devices  Weight bearing status: WBAT RLE  Antibiotics: Cefazolin x 24 hours  Diet: OK for diet from orthopedic perspective. Bowel regimen. Anti-emetics PRN.    DVT prophylaxis: Mechanical + Lovenox 0.5mg/kg x 6 weeks  Elevation: Elevate heels off of bed on pillows   Wound Care: keep dressings in place until POD7  Drains: none  Pain management: per primary service  X-rays: AP and lateral right femur in PACU  Physical Therapy: Mobilization, ROM, ADL's  Occupational Therapy: ADL's  Labs: Trend Hgb on POD #1, 2 until stable  Disposition: per primary service, pending progress with therapy and disposition planning   Follow up: 2 weeks with RN for wound check and then 6 weeks with Dr. Todd     Orthopedic staff is Dr. Todd.    America Santos MD  Orthopedic Surgery PGY4       Future Appointments   Date Time Provider Department  [place]   4/5/2023  9:00 AM Michelle Graves, PT [facility]   4/5/2023  9:45 AM Violet Anne, OT [facility] [place]   5/10/2023  4:00 PM Alejandro Sandhu MD [facility]   1/10/2024  1:00 PM Saurabh Pittman MD [facility]

## 2023-04-07 ENCOUNTER — APPOINTMENT (OUTPATIENT)
Dept: SPEECH THERAPY | Facility: CLINIC | Age: 88
DRG: 480 | End: 2023-04-07
Attending: INTERNAL MEDICINE
Payer: COMMERCIAL

## 2023-04-07 ENCOUNTER — APPOINTMENT (OUTPATIENT)
Dept: PHYSICAL THERAPY | Facility: CLINIC | Age: 88
DRG: 480 | End: 2023-04-07
Payer: COMMERCIAL

## 2023-04-07 LAB
ERYTHROCYTE [DISTWIDTH] IN BLOOD BY AUTOMATED COUNT: 13.3 % (ref 10–15)
HCT VFR BLD AUTO: 29.3 % (ref 35–47)
HGB BLD-MCNC: 9.6 G/DL (ref 11.7–15.7)
MCH RBC QN AUTO: 29.4 PG (ref 26.5–33)
MCHC RBC AUTO-ENTMCNC: 32.8 G/DL (ref 31.5–36.5)
MCV RBC AUTO: 90 FL (ref 78–100)
PLATELET # BLD AUTO: 151 10E3/UL (ref 150–450)
RBC # BLD AUTO: 3.27 10E6/UL (ref 3.8–5.2)
WBC # BLD AUTO: 10.8 10E3/UL (ref 4–11)

## 2023-04-07 PROCEDURE — 99232 SBSQ HOSP IP/OBS MODERATE 35: CPT | Performed by: INTERNAL MEDICINE

## 2023-04-07 PROCEDURE — 92610 EVALUATE SWALLOWING FUNCTION: CPT | Mod: GN | Performed by: SPEECH-LANGUAGE PATHOLOGIST

## 2023-04-07 PROCEDURE — 120N000002 HC R&B MED SURG/OB UMMC

## 2023-04-07 PROCEDURE — 36415 COLL VENOUS BLD VENIPUNCTURE: CPT | Performed by: INTERNAL MEDICINE

## 2023-04-07 PROCEDURE — 250N000013 HC RX MED GY IP 250 OP 250 PS 637: Performed by: INTERNAL MEDICINE

## 2023-04-07 PROCEDURE — 250N000011 HC RX IP 250 OP 636: Performed by: PHYSICIAN ASSISTANT

## 2023-04-07 PROCEDURE — 92526 ORAL FUNCTION THERAPY: CPT | Mod: GN | Performed by: SPEECH-LANGUAGE PATHOLOGIST

## 2023-04-07 PROCEDURE — 85027 COMPLETE CBC AUTOMATED: CPT | Performed by: INTERNAL MEDICINE

## 2023-04-07 PROCEDURE — 97530 THERAPEUTIC ACTIVITIES: CPT | Mod: GP | Performed by: PHYSICAL THERAPIST

## 2023-04-07 PROCEDURE — 250N000013 HC RX MED GY IP 250 OP 250 PS 637: Performed by: STUDENT IN AN ORGANIZED HEALTH CARE EDUCATION/TRAINING PROGRAM

## 2023-04-07 RX ORDER — OXYCODONE HYDROCHLORIDE 5 MG/1
2.5 TABLET ORAL EVERY 6 HOURS PRN
Qty: 26 TABLET | Refills: 0 | Status: SHIPPED | OUTPATIENT
Start: 2023-04-07 | End: 2023-04-17

## 2023-04-07 RX ORDER — ENOXAPARIN SODIUM 100 MG/ML
30 INJECTION SUBCUTANEOUS EVERY 24 HOURS
Qty: 13.5 ML | Refills: 0 | Status: SHIPPED | OUTPATIENT
Start: 2023-04-08 | End: 2023-04-18

## 2023-04-07 RX ORDER — OXYCODONE HYDROCHLORIDE 5 MG/1
5 TABLET ORAL EVERY 6 HOURS PRN
Status: DISCONTINUED | OUTPATIENT
Start: 2023-04-07 | End: 2023-04-08 | Stop reason: HOSPADM

## 2023-04-07 RX ORDER — ACETAMINOPHEN 325 MG/1
650 TABLET ORAL EVERY 4 HOURS PRN
Qty: 100 TABLET | Refills: 0 | Status: SHIPPED | OUTPATIENT
Start: 2023-04-07 | End: 2023-04-17

## 2023-04-07 RX ORDER — AMOXICILLIN 250 MG
1-2 CAPSULE ORAL 2 TIMES DAILY PRN
Qty: 30 TABLET | Refills: 0 | Status: SHIPPED | OUTPATIENT
Start: 2023-04-07 | End: 2023-04-17

## 2023-04-07 RX ADMIN — SENNOSIDES AND DOCUSATE SODIUM 1 TABLET: 50; 8.6 TABLET ORAL at 08:16

## 2023-04-07 RX ADMIN — AMLODIPINE BESYLATE 2.5 MG: 2.5 TABLET ORAL at 08:16

## 2023-04-07 RX ADMIN — ACETAMINOPHEN 975 MG: 325 TABLET, FILM COATED ORAL at 10:26

## 2023-04-07 RX ADMIN — CALCIUM CARBONATE 600 MG (1,500 MG)-VITAMIN D3 400 UNIT TABLET 1 TABLET: at 19:19

## 2023-04-07 RX ADMIN — ENOXAPARIN SODIUM 30 MG: 30 INJECTION SUBCUTANEOUS at 08:27

## 2023-04-07 RX ADMIN — CALCIUM CARBONATE 600 MG (1,500 MG)-VITAMIN D3 400 UNIT TABLET 1 TABLET: at 08:16

## 2023-04-07 RX ADMIN — ACETAMINOPHEN 975 MG: 325 TABLET, FILM COATED ORAL at 19:18

## 2023-04-07 RX ADMIN — ATORVASTATIN CALCIUM 20 MG: 20 TABLET, FILM COATED ORAL at 19:19

## 2023-04-07 ASSESSMENT — ACTIVITIES OF DAILY LIVING (ADL)
ADLS_ACUITY_SCORE: 51
ADLS_ACUITY_SCORE: 51
ADLS_ACUITY_SCORE: 55
DEPENDENT_IADLS:: MEAL PREPARATION;MEDICATION MANAGEMENT;MONEY MANAGEMENT;TRANSPORTATION
ADLS_ACUITY_SCORE: 55
ADLS_ACUITY_SCORE: 51
ADLS_ACUITY_SCORE: 55
ADLS_ACUITY_SCORE: 51
ADLS_ACUITY_SCORE: 51
ADLS_ACUITY_SCORE: 55
ADLS_ACUITY_SCORE: 51

## 2023-04-07 NOTE — PLAN OF CARE
VS:       Pt A/O X 2. Disoriented to situation and place. Afebrile. VSS. /72 (BP Location: Right arm)   Pulse 101   Temp (!) 96.6  F (35.9  C) (Axillary)   Resp 22   Wt 47.2 kg (104 lb 0.9 oz)   SpO2 99%   BMI 21.75 kg/m   Lungs-clear bilaterally with both  anterior and posterior. Denies nausea, shortness of breath, and chest pain.     Output:       Bowels- active in all four quadrants. Incontinent of B/B, up to commode with assist of two. Last BM 4/7/23.   Activity:       Pt up assist of two with walker, gait belt, and missael steady.     Skin:   Right hip surgical incision. Skin is pale, thin, and warm.      Pain:       Has pain in the R hip and given tylenol, ICE applied, and is tolerating well.      CMS:       CMS and Neuro's are intact. Denies numbness and tingling in all extremities.      Dressing:       R hip incisional dressing is C/D/I.      Diet:       Pt is on a regular diet and appetite was good this shift.       LDA:       PIV is patent in the L AC and SL.      Equipment:       PCD's on BLE's. Bilateral heels are elevated off the bed.     Plan:       Pt is able to make needs known and the call light is within the pt's reach. Continue to monitor.       Additional Info:       Possibly discharging to [facility] tomorrow.

## 2023-04-07 NOTE — PROGRESS NOTES
Orthopaedic Surgery Progress Note 04/07/2023    Subjective  Patient slept overnight. Tolerating diet with out nausea or vomiting.  Voiding independently, but incontinent.    Objective  Temp: 97.6  F (36.4  C) Temp src: Axillary BP: 121/70 Pulse: 90   Resp: 24 SpO2: 99 % O2 Device: Oxymask Oxygen Delivery: 1 LPM    Exam:  Gen: No acute distress, resting comfortably in bed.  Resp: Non-labored breathing, face mask   Cardio: Regular rate via peripheral pulse  MSK:    RLE:  - Dressings c/d/I  - +EHL/FHL/TA/GSC  - SILT throughout foot   - DP pulse 1+, foot wwp        Recent Labs   Lab 04/06/23  0528 04/05/23  0837 04/03/23  1838   WBC 11.5* 11.7* 11.5*   HGB 9.6* 9.6* 13.4   * 123* 218       Culture results: N/a    Assessment: Belia Sheets is a 89 year old female s/p IMN nail right femur on 4/5/23 with Dr. Todd. Doing well.     Goals for today   - Pain control   - PT/OT, appreciate assistance in getting patient OOB   - OOB 3x daily for meals   - Dispo planning   - Judicious use of narcotics (please only give in cases of severe/intolerable pain)   - delirium precautions (blinds open during day, minimize night time disruptions, etc.)     Plan:  Medicine Primary  Activity: progress as tolerated with appropriate assistive devices  Weight bearing status: WBAT RLE  Antibiotics: Cefazolin x 24 hours  Diet: OK for diet from orthopedic perspective. Bowel regimen. Anti-emetics PRN.    DVT prophylaxis: Mechanical + Lovenox 0.5mg/kg x 6 weeks  Elevation: Elevate heels off of bed on pillows   Wound Care: keep dressings in place until POD7  Drains: none  Pain management: per primary service  X-rays: AP and lateral right femur in PACU  Physical Therapy: Mobilization, ROM, ADL's  Occupational Therapy: ADL's  Labs: Trend Hgb on POD #1, 2 until stable  Disposition: per primary service, pending progress with therapy and disposition planning   Follow up: 2 weeks with RN for wound check and then 6 weeks with   Perez     Orthopedic staff is Dr. Todd.    America Santos MD  Orthopedic Surgery PGY4    Orthopedics will continue to follow peripherally while in house. Please do not hesitate to reach out with questions as they arise.        Future Appointments   Date Time Provider Department Center   4/5/2023  9:00 AM Michelle Graves, PT [facility]   4/5/2023  9:45 AM Violet Anne OT [facility]   5/10/2023  4:00 PM Alejandro Sandhu MD [facility]   1/10/2024  1:00 PM Suarabh Pittman MD [facility]

## 2023-04-07 NOTE — PROGRESS NOTES
[facility]    Medicine Progress Note - Hospitalist Service, GOLD TEAM 19    Date of Admission:  4/3/2023    Assessment & Plan   Keaton Sheets is a 89-year-old female, history of hypertension, osteoarthritis, osteoporosis presents with altered mental status with imaging showing right intertrochanteric proximal fracture and L1 compression fracture of superior endplate.  She is now s/p OR right intramedullary nail on 4/4 with Dr. Martin.    #Right intertrochanteric fracture s/p R-IMN with Dr. martin on 4/4/23  -WBAT RLE per ortho  -completed ancef x3 doses  -Lovenox for DVT prophylaxis  -PT, OT consulting  -2 weeks with RN for wound check, 6 weeks with Dr. Roach  -Dressing to remain in place until postop day 7  -Pain control with Tylenol, mostly oxycodone as needed.    #L1 superior endplate compression fracture with mild height loss  -CT imaging on arrival to the hospital showing L1 compression fracture of the superior endplate with mild height loss, potentially slightly worsened compared to imaging from 11/6/2022.  -Patient was seen by neurosurgery in [place], recommending no neurosurgical intervention  -Plan to continue bone health clinic referral  -Follow-up with neurosurgery as needed; referral sent.    #Acute blood loss anemia  #Thrombocytopenia - cont to monitor  -Preop hemoglobin 13.4, postop 9.6 x3  -likely due to blood loss from the OR    We will stop checking CBC for now.    #Lactic acid elevation-resolved after IVF, monitor is clinically indicated    #Leukocytosis, mild-likely reactive, anticipate improvement., cont to monitor.     #Mild cognitive impairment  #Waxing and waning mentation  -Per discussion with daughter, patient has had delirium following hospitalization several years ago.  -Will require outpatient neuropsych eval.    #Hypertension    Continue amlodipine 2.5 mg    #AHRF, resolved  -Likely due to atelectasis, breathing mechanics given patient's  kyphosis.  -Supplemental oxygen to keep SPO2 >92%    Encourage use of I-S, early ambulation.    #Concern for dysphagia to thin liquids    Speech consult to evaluate for dysphagia.       Diet: Regular Diet Adult    DVT Prophylaxis: Enoxaparin (Lovenox) SQ  Perez Catheter: Not present  Lines: None     Cardiac Monitoring: None  Code Status: No CPR- Do NOT Intubate      Clinically Significant Risk Factors                # Thrombocytopenia: Lowest platelets = 113 in last 2 days, will monitor for bleeding                 Disposition Plan  Likely to the TCU.     Expected Discharge Date: 04/08/2023                  ANGEL WHITTEN MD  Hospitalist Service, GOLD TEAM [address] [place] [address]  Securely message with [employer] (more info)  Text page via [facility] Paging/Directory   See signed in provider for up to date coverage information  ______________________________________________________________________    Interval History   -No Acute events overnight  - Afebrile and hemodynamically stable  - Getting oxycodone as needed for pain  - Patient is off supplemental oxygen  - PT recommending TCU for patient  - RN reports some concerns with patient coughing with p.o. drink.    Physical Exam   Vital Signs: Temp: 97.6  F (36.4  C) Temp src: Axillary BP: 121/70 Pulse: 90   Resp: 24 SpO2: 99 % O2 Device: Oxymask Oxygen Delivery: 1 LPM  Weight: 104 lbs .91 oz    General Appearance: Resting comfortably in bed, on room air in no acute distress or discomfort.  HEENT: PERRL: EOMI; moist mucous membrane w/o lesions  Neck: No JVD  Pulmonary: Clear to auscultation in anterior lung fields.  CVS: Regular rhythm, no murmurs, rubs or gallops  GI: BS (+), soft nontender, no rebound or guarding   Extremities: Right lateral thigh wound c/d/i  Skin: No rashes or lesions  Neurologic: Alert and oriented to person, place and situation.  Able to answer questions appropriately and follow simple commands.      Medical Decision  Making       45 MINUTES SPENT BY ME on the date of service doing chart review, history, exam, documentation & further activities per the note.      Data   ------------------------- PAST 24 HR DATA REVIEWED -----------------------------------------------        Imaging results reviewed over the past 24 hrs:   No results found for this or any previous visit (from the past 24 hour(s)).

## 2023-04-07 NOTE — CONSULTS
Care Management Initial Consult    General Information  Assessment completed with: Patient, Children,    Type of CM/SW Visit: Offer D/C Planning    Primary Care Provider verified and updated as needed: Yes (Alejandro Sandhu)   Readmission within the last 30 days: no previous admission in last 30 days      Reason for Consult: discharge planning  Advance Care Planning: Advance Care Planning Reviewed: present on chart        Communication Assessment  Patient's communication style: spoken language (English or Bilingual)    Hearing Difficulty or Deaf: yes      Cognitive  Cognitive/Neuro/Behavioral: .WDL except, orientation  Level of Consciousness: lethargic  Arousal Level: opens eyes spontaneously  Orientation: disoriented to, place, time, situation  Mood/Behavior: calm  Best Language: 0 - No aphasia  Speech: slow    Living Environment:   People in home: alone     Current living Arrangements: assisted living      Able to return to prior arrangements: no     Family/Social Support:  Care provided by: self  Provides care for: no one  Marital Status:   Children          Description of Support System: Supportive, Involved    Support Assessment: Adequate family and caregiver support    Current Resources:   Patient receiving home care services:       Community Resources: None  Equipment currently used at home: walker, rolling  Supplies currently used at home: None    Employment/Financial:  Employment Status: retired        Financial Concerns: No concerns identified       Lifestyle & Psychosocial Needs:  Social Determinants of Health     Tobacco Use: Low Risk  (4/5/2023)    Patient History      Smoking Tobacco Use: Never      Smokeless Tobacco Use: Never      Passive Exposure: Not on file   Alcohol Use: Not on file   Financial Resource Strain: Not on file   Food Insecurity: Not on file   Transportation Needs: Not on file   Physical Activity: Not on file   Stress: Not on file   Social Connections: Not on file   Intimate  Partner Violence: Not on file   Depression: Not at risk (12/15/2022)    PHQ-2      PHQ-2 Score: 0   Housing Stability: Not on file     Functional Status:  Prior to admission patient needed assistance:   Dependent ADLs:: Ambulation-walker  Dependent IADLs:: Meal Preparation, Medication Management, Money Management, Transportation     Additional Information:  Met with patient and her daughter, Lubna, at bedside to discuss discharge planning and to complete CMA. Therapy has recommended TCU for rehab. Patient's first choice is [facility]. Referral sent to [facility] in Admissions. They are able to accept patient on Saturday 4/8. Stretcher transport set up with  EMS for 4/8 between 12:40-1:25pm. Orders to be faxed to [facility] prior to discharge to [phone]. PCS form at the  for EMS. No further discharge concerns at this time. RNCC available as needed.    [id number]    [facility]  [address] [place] [address]  Phone [phone]  Fax [phone]     Noy Segundo RN, BSN  Care Coordinator, [facility] Ortho  Phone [phone]  Pager [phone]

## 2023-04-07 NOTE — PLAN OF CARE
[id number]  VS: /70 (BP Location: Right arm)   Pulse 90   Temp 97.6  F (36.4  C) (Axillary)   Resp 24   Wt 47.2 kg (104 lb 0.9 oz)   SpO2 99%   BMI 21.75 kg/m       Tachy at times, <110   O2: SpO2 > 90% and stable on 1LPM currently via Oxymask. Dips to higher 80s at times. Cont weaning off O2 during the day. On cont pulse ox. LS clear and equal bilaterally. Denies chest pain and SOB.    Output: Incont of B/B. Brief changes.   Sometimes up to BSC during the day.   Last BM: 4/7, BM x2 overnight- very formed hard stool. Denies abdominal discomfort. BS active, passing flatus.    Activity: NOOB. A2 with cares. Not safe to get OOB/ambulate this shift d/t lethargy and inability to follow commands.   Repositioned as tolerated. PCDs on.    Skin: WDL except, surgical incision to RLE. Blanchable redness to bilat buttocks, barrier cream applied.    Pain: Per family request, avoid narcs as much as possible.  Pt has not received a couple doses of sched Tylenol d/t lethargy and risk for aspiration.    CMS: Intact, lethargic and confused. Oriented to self most of shift. Denies numbness and tingling during assessment.  Weak DP.   Dressing: Sacral mepilex on for protection CDI. Surgical dressings CDI.    Diet: Regular diet. Barely touched dinner. Very low intake. Needs assistance ordering meals and tray set up. Encourage sips of water/juice with each interaction. Denies nausea/vomiting.  Applesauce with meds, pt tends to tolerate better.  May benefit from ensure supplements.    LDA: L PIV SL.   Equipment: IV pole, personal belongings, bedside attendant, CAPNO machine.    Plan: Fall precautions maintained. Continue with plan of care. Call light within reach, pt able to make needs known.   Daughter at bedside in the evening.    Additional Info: 1:1 for safety.   PT rec TCU placement.

## 2023-04-07 NOTE — PROGRESS NOTES
"   04/07/23 6963   Appointment Info   Signing Clinician's Name / Credentials (SLP) Merrick Rodriguez MA [facility] SLP   General Information   Onset of Illness/Injury or Date of Surgery 04/03/23   Referring Physician German Leiva MD   Pertinent History of Current Problem Per provider notes: \"Keaton Sheets is a 89-year-old female, history of hypertension, osteoarthritis, osteoporosis presents with altered mental status with imaging showing right intertrochanteric proximal fracture and L1 compression fracture of superior endplate.  She is now s/p OR right intramedullary nail on 4/4 with Dr. Todd.\" \"Concern for dysphagia to thin liquids...\". Nursing reports patient had episode of coughing up juice this AM and family reports vocal quality is different. Nursing also reports that patient was very lethargic yesterday, and today she is only becoming more alert as the day progress.   General Observations Patient upright in bed, cooperative and pleasantly confused   Type of Evaluation   Type of Evaluation Swallow Evaluation   Oral Motor   Oral Musculature generally intact   Mucosal Quality other (see comments)  (White coating on tongue and teeth, did clear with oral cares.)   Dentition (Oral Motor)   Dentition (Oral Motor) some missing teeth;other (see comments)  (Missing most bottom teeth on sides. She has natural upper dentition. Dentures are not in the hospital.)   Facial Symmetry (Oral Motor)   Facial Symmetry (Oral Motor) WNL   Lip Function (Oral Motor)   Lip Range of Motion (Oral Motor) WNL   Tongue Function (Oral Motor)   Tongue Coordination/Speed (Oral Motor) WNL   Tongue ROM (Oral Motor) WNL   Jaw Function (Oral Motor)   Jaw Function (Oral Motor) WNL   Cough/Swallow/Gag Reflex (Oral Motor)   Volitional Throat Clear/Cough (Oral Motor) reduced strength   Volitional Swallow (Oral Motor) WNL   Vocal Quality/Secretion Management (Oral Motor)   Vocal Quality (Oral Motor) WNL   Secretion Management (Oral Motor) WNL   General " Swallowing Observations   Past History of Dysphagia 11/7/2022 hospitalization, patient order for swallow evaluation was deferred by SLP d/t tolerance of regular/thin diet.   Respiratory Support (General Swallowing Observations) none   Current Diet/Method of Nutritional Intake (General Swallowing Observations, NIS) regular diet;thin liquids (level 0)   Swallowing Evaluation Clinical swallow evaluation   Clinical Swallow Evaluation   Feeding Assistance set up only required   Clinical Swallow Evaluation Textures Trialed thin liquids;pureed;solid foods   Clinical Swallow Eval: Thin Liquid Texture Trial   Mode of Presentation, Thin Liquids cup;spoon;self-fed   Volume of Liquid or Food Presented > 4 oz   Oral Phase of Swallow WFL   Pharyngeal Phase of Swallow intact   Diagnostic Statement No overt s/s of aspiration with single and consecutive straw and cup sips.   Clinical Swallow Evaluation: Puree Solid Texture Trial   Mode of Presentation, Puree spoon;self-fed   Volume of Puree Presented 5 tsps applesauce   Oral Phase, Puree WFL   Pharyngeal Phase, Puree intact   Diagnostic Statement No overt s/s of aspiration observed. Good oral clearance.   Clinical Swallow Evaluation: Solid Food Texture Trial   Mode of Presentation self-fed   Volume Presented 1/2 fernandez cracker   Oral Phase other (see comments)  (Mildly extended, but functional mastication)   Pharyngeal Phase intact   Diagnostic Statement No overt s/s of aspiration observed. Good oral clearance.   Esophageal Phase of Swallow   Patient reports or presents with symptoms of esophageal dysphagia No   Swallowing Recommendations   Diet Consistency Recommendations thin liquids (level 0);regular diet   Supervision Level for Intake distant supervision needed   Mode of Delivery Recommendations bolus size, small;slow rate of intake   Monitoring/Assistance Required (Eating/Swallowing) cue for finger/lingual sweep if oral pocketing present;stop eating activities when fatigue is  present   Recommended Feeding/Eating Techniques (Swallow Eval) encourage use of dentures;maintain upright sitting position for eating   Medication Administration Recommendations, Swallowing (SLP) As tolerated   Instrumental Assessment Recommendations instrumental evaluation not recommended at this time   General Therapy Interventions   Planned Therapy Interventions Dysphagia Treatment   Dysphagia treatment Instruction of safe swallow strategies   Clinical Impression   Criteria for Skilled Therapeutic Interventions Met (SLP Eval) Yes, treatment indicated   SLP Diagnosis Mild oral dysphagia   Risks & Benefits of therapy have been explained evaluation/treatment results reviewed;care plan/treatment goals reviewed;risks/benefits reviewed;current/potential barriers reviewed;participants included;patient   Clinical Impression Comments   Clinical swallow evaluation completed per provider orders. Patient presents with mild oral dysphagia in setting of mild confusion and lack of dentures in current setting. Patient oral motor revealed no significant impairments, but there was a white coating on lingual surface which was cleared easily with oral cares. Trials of thin liquids (cup/straw), puree textures, and hard solids provided without overt s/s of aspiration. Mastication of hard solids mildly extended, but functional with good oral clearance.     Recommend continue current diet of regular textures and thin liquids with safe swallow strategies of small bites/sips, slow rate of intake, and ensuring oral cavity is fully cleared after meals. Provide dentures for patient at meals. SLP will follow for short course of dysphagia management given concern for dysphagia earlier this AM and patient altered mental status since lethargy yesterday.     SLP Total Evaluation Time   Eval: oral/pharyngeal swallow function, clinical swallow Minutes (98853) 19   SLP Discharge Planning   SLP Discharge Recommendation Transitional Care Facility    SLP Rationale for DC Rec Below baseline mental status impacting swallow function

## 2023-04-08 ENCOUNTER — HEALTH MAINTENANCE LETTER (OUTPATIENT)
Age: 88
End: 2023-04-08

## 2023-04-08 VITALS
DIASTOLIC BLOOD PRESSURE: 81 MMHG | RESPIRATION RATE: 16 BRPM | BODY MASS INDEX: 21.75 KG/M2 | SYSTOLIC BLOOD PRESSURE: 153 MMHG | OXYGEN SATURATION: 94 % | TEMPERATURE: 96.3 F | WEIGHT: 104.06 LBS | HEART RATE: 82 BPM

## 2023-04-08 PROCEDURE — 99239 HOSP IP/OBS DSCHRG MGMT >30: CPT | Performed by: INTERNAL MEDICINE

## 2023-04-08 PROCEDURE — 250N000013 HC RX MED GY IP 250 OP 250 PS 637: Performed by: STUDENT IN AN ORGANIZED HEALTH CARE EDUCATION/TRAINING PROGRAM

## 2023-04-08 PROCEDURE — 250N000011 HC RX IP 250 OP 636: Performed by: PHYSICIAN ASSISTANT

## 2023-04-08 PROCEDURE — 250N000013 HC RX MED GY IP 250 OP 250 PS 637: Performed by: INTERNAL MEDICINE

## 2023-04-08 PROCEDURE — 999N000111 HC STATISTIC OT IP EVAL DEFER

## 2023-04-08 RX ORDER — POLYETHYLENE GLYCOL 3350 17 G/17G
17 POWDER, FOR SOLUTION ORAL DAILY PRN
DISCHARGE
Start: 2023-04-08 | End: 2023-04-17

## 2023-04-08 RX ADMIN — ACETAMINOPHEN 975 MG: 325 TABLET, FILM COATED ORAL at 08:47

## 2023-04-08 RX ADMIN — CALCIUM CARBONATE 600 MG (1,500 MG)-VITAMIN D3 400 UNIT TABLET 1 TABLET: at 08:48

## 2023-04-08 RX ADMIN — AMLODIPINE BESYLATE 2.5 MG: 2.5 TABLET ORAL at 08:48

## 2023-04-08 RX ADMIN — ENOXAPARIN SODIUM 30 MG: 30 INJECTION SUBCUTANEOUS at 08:47

## 2023-04-08 ASSESSMENT — ACTIVITIES OF DAILY LIVING (ADL)
ADLS_ACUITY_SCORE: 55

## 2023-04-08 NOTE — DISCHARGE SUMMARY
[facility]  Hospitalist Discharge Summary      Date of Admission:  4/3/2023  Date of Discharge:  4/8/2023  Discharging Provider: ANGEL WHITTEN MD  Discharge Service: Hospitalist Service, GOLD TEAM 19    Discharge Diagnoses   #Right intertrochanteric fracture s/p R-IMN  #L1 superior endplate compression fracture with mild height loss  #Acute blood loss anemia  #Thrombocytopenia  #Lactic acid elevation-resolved after IVF  #Leukocytosis, mild-likely reactive, resolved  #Mild cognitive impairment  #Waxing and waning mentation  #Hypertension  #AHRF, resolved    Follow-ups Needed After Discharge   Follow-up Appointments     Follow Up and recommended labs and tests      Follow up with ortho in 2 weeks             Discharge Disposition   Discharged to rehabilitation facility  Condition at discharge: Good      Hospital Course   Keaton Sheets is a 89-year-old female, history of hypertension, osteoarthritis, osteoporosis presents with altered mental status with imaging showing right intertrochanteric proximal fracture and L1 compression fracture of superior endplate.  She is now s/p OR right intramedullary nail on 4/4/23 with Dr. Todd.     #Right intertrochanteric fracture   -> s/p R-IMN with Dr. todd on 4/4/23  -WBAT RLE per ortho  -completed ancef x3 doses  -Lovenox for DVT prophylaxis while admitted  -PT, OT consulting  -2 weeks with RN for wound check, 6 weeks with Dr. Roach  -Dressing to remain in place until postop day 7  -Pain control with Tylenol, mostly oxycodone as needed.     #L1 superior endplate compression fracture with mild height loss  -CT imaging on arrival to the hospital showing L1 compression fracture of the superior endplate with mild height loss, potentially slightly worsened compared to imaging from 11/6/2022.  -Patient was seen by neurosurgery in [place], recommending no neurosurgical intervention  -Plan to continue bone health clinic referral  -Follow-up  with neurosurgery as needed; referral sent.     #Acute blood loss anemia  #Thrombocytopenia - cont to monitor  -Preop hemoglobin 13.4, postop 9.6 x3  -likely due to blood loss from the OR     #Lactic acid elevation-resolved after IVF, monitor is clinically indicated     #Leukocytosis, mild-likely reactive, anticipate improvement., cont to monitor.      #Mild cognitive impairment  #Waxing and waning mentation  -Per discussion with daughter, patient has had delirium following hospitalization several years ago.  -Will require outpatient neuropsych eval.     #Hypertension    Continue amlodipine 2.5 mg     #AHRF, resolved  -Likely due to atelectasis, breathing mechanics given patient's kyphosis.  -Supplemental oxygen to keep SPO2 >92%    Encourage use of I-S, early ambulation.     #Concern for dysphagia to thin liquids - no overt signs of aspiration per SLP    Okay for thin liquids.         Consultations This Hospital Stay   REGIONAL ANESTHESIA PAIN SERVICE ADULT IP CONSULT  PHYSICAL THERAPY ADULT IP CONSULT  OCCUPATIONAL THERAPY ADULT IP CONSULT  SPEECH LANGUAGE PATH ADULT IP CONSULT  SPEECH LANGUAGE PATH ADULT IP CONSULT  CARE MANAGEMENT / SOCIAL WORK IP CONSULT  PHYSICAL THERAPY ADULT IP CONSULT  OCCUPATIONAL THERAPY ADULT IP CONSULT    Code Status   No CPR- Do NOT Intubate    Time Spent on this Encounter   I, ANGEL WHITTEN MD, personally saw the patient today and spent greater than 30 minutes discharging this patient.       ANGEL WHITTEN MD  [facility]  [address] [place] [id number]  Phone: [phone]  Fax: [phone]  ______________________________________________________________________    Physical Exam   Vital Signs: Temp: (!) 96.3  F (35.7  C) Temp src: Oral BP: (!) 153/81 Pulse: 82   Resp: 16 SpO2: 94 % O2 Device: None (Room air) Oxygen Delivery: 1 LPM  Weight: 104 lbs .91 oz    General Appearance: Resting comfortably in bed, on room air in no acute  distress or discomfort.  HEENT: PERRL: EOMI; moist mucous membrane w/o lesions  Neck: No JVD  Pulmonary: Clear to auscultation in anterior lung fields.  CVS: Regular rhythm, no murmurs, rubs or gallops  GI: BS (+), soft nontender, no rebound or guarding   Extremities: Right lateral thigh wound c/d/i  Skin: No rashes or lesions  Neurologic: Alert and oriented to person, place and situation.  Able to answer questions appropriately and follow simple commands.       Primary Care Physician   Alejandro Sandhu    Discharge Orders      Adult Neuropsychology Referral      Neurosurgery Referral      General info for SNF    Length of Stay Estimate: Short Term Care: Estimated # of Days <30  Condition at Discharge: Improving  Level of care:skilled   Rehabilitation Potential: Good  Admission H&P remains valid and up-to-date: Yes  Recent Chemotherapy: N/A  Use Nursing Home Standing Orders: Yes     Mantoux instructions    Give two-step Mantoux (PPD) Per Facility Policy Yes     Follow Up and recommended labs and tests    Follow up with ortho in 2 weeks     Reason for your hospital stay    S/P ORIF R hip    Plan:  Medicine Primary  Activity: progress as tolerated with appropriate assistive devices  Weight bearing status: WBAT RLE  Diet: OK for diet from orthopedic perspective. Bowel regimen. Anti-emetics PRN.    DVT prophylaxis: Mechanical + Lovenox 0.5mg/kg x 6 weeks  Elevation: Elevate heels off of bed on pillows   Wound Care: keep dressings in place until POD7  Drains: none  Physical Therapy: Mobilization, ROM, ADL's  Occupational Therapy: ADL's  Follow up: 2 weeks with RN for wound check and then 6 weeks with Dr. Todd     Wound care (specify)    Site:   right hip  Instructions:  remove dressings after 7 days. Keep dry until follow up     Activity - Up ad terri     No CPR- Do NOT Intubate     Physical Therapy Adult Consult    Evaluate and treat as clinically indicated.    Reason:  s/p ORIF R hip     Occupational Therapy Adult Consult     Evaluate and treat as clinically indicated.    Reason:  s/p ORIF right hip     Fall precautions     Diet    Follow this diet upon discharge: Regular       Significant Results and Procedures   Most Recent 3 CBC's:Recent Labs   Lab Test 04/07/23  0648 04/06/23  0528 04/05/23  0837   WBC 10.8 11.5* 11.7*   HGB 9.6* 9.6* 9.6*   MCV 90 93 91    113* 123*     Most Recent 3 BMP's:Recent Labs   Lab Test 04/06/23  0701 04/05/23  0837 04/05/23  0558 04/03/23  1838 04/03/23  1837 04/03/23  1836 02/17/23  1513 11/14/22  1355   NA  --  137  --  140  --   --   --  138   POTASSIUM  --  3.8  --  4.0  --   --   --  3.6   CHLORIDE  --  104  --  104  --   --   --  100   CO2  --  26  --  22  --   --   --  25   BUN  --  17.3  --  22.1  --   --   --  24.9*   CR  --  0.78  --  0.90 1.0  --  0.66 0.55   ANIONGAP  --  7  --  14  --   --   --  13   BOY  --  8.6  --  9.9*  --   --  9.3 9.9*   * 117* 104* 107*  --    < >  --  114*    < > = values in this interval not displayed.     Most Recent 2 LFT's:Recent Labs   Lab Test 04/03/23 1838 11/06/22  0905   AST 22 15   ALT 11 <5*   ALKPHOS 53 48   BILITOTAL 0.5 0.8     Most Recent 3 INR's:Recent Labs   Lab Test 04/03/23  1839 04/03/23  1835 11/05/22 2000   INR 0.99 1.2* 1.10   ,   Results for orders placed or performed during the hospital encounter of 04/03/23   XR Chest Port 1 View    Narrative    EXAM: XR CHEST PORT 1 VIEW  4/3/2023 7:06 PM     HISTORY:  fall, hypoxia       COMPARISON:  Chest x-ray 11/5/2022    FINDINGS:   AP view of the chest. Normal heart size. Mild perihilar opacities. No  airspace consolidation. No pleural effusion or pneumothorax. Chronic  bilateral rib fracture deformities. Dextroscoliosis of the thoracic  spine. Mild degenerative changes of the shoulders.      Impression    IMPRESSION:   1. No evidence of acute airspace disease.  2. Chronic bilateral rib fracture deformities. No evidence of acute  osseous abnormality.    I have personally reviewed the  examination and initial interpretation  and I agree with the findings.    RHODA AZAR MD         SYSTEM ID:  [id number]   Head CT w/o contrast    Narrative    CT HEAD W/O CONTRAST 4/3/2023 8:13 PM    Provided History: fall, confusion  ICD-10: Trauma.    Comparison: CT head 11/6/2022. Brain MRI 11/6/2022.    Technique: Using multidetector thin collimation helical acquisition  technique, axial, coronal and sagittal CT images from the skull base  to the vertex were obtained without intravenous contrast.     Findings:    No intracranial hemorrhage. No mass effect. No midline shift. No  extra-axial fluid collection. The gray to white matter differentiation  of the cerebral hemispheres is preserved. Ventricles are proportionate  to the sulci.. No sulcal effacement..  The basal cisterns are patent.  Moderate generalized cerebral volume loss and confluent  periventricular white matter hypoattenuation, nonspecific but favored  to represent chronic small vessel ischemic disease given the patient's  age.    Scattered mucosal thickening of the paranasal sinuses. The mastoid air  cells are clear. Orbits appear unremarkable. No acute fracture.      Impression    Impression:   1.  No acute intracranial pathology.  2.  Moderate generalized cerebral volume loss and advanced  leukoaraiosis.    I have personally reviewed the examination and initial interpretation  and I agree with the findings.    TIERRA WATTS MD         SYSTEM ID:  [id number]   Cervical spine CT w/o contrast    Narrative    EXAM: CT CERVICAL SPINE W/O CONTRAST  4/3/2023 8:14 PM     HISTORY:  back pain, fall, back pain, fall       COMPARISON:  CT cervical spine 5/24/2022.    TECHNIQUE: Using multidetector thin collimation helical acquisition  technique, axial, coronal and sagittal CT images through the cervical  spine were obtained without intravenous contrast.    FINDINGS:  Exaggerated cervical lordosis. Advanced C7 anterior wedge compression  fracture with 5 mm  of retropulsion, not significant changed since  5/24/2022. Additional partially visualized advanced wedge compression  deformities of the upper thoracic spine beginning at T2 also appear  similar in appearance. Multilevel intervertebral disc space narrowing.  No prevertebral edema. Trace grade 1 anterolisthesis of C4 on C5.    The findings on a level by level basis are as follows:    C2-3:  No spinal canal or neural foraminal stenosis.    C3-4:  Bilateral uncinate facet hypertrophy. No significant neural  foraminal or spinal canal stenosis.    C4-5:  Bilateral uncinate and facet hypertrophy. Mild bilateral neural  foraminal stenosis. No significant spinal canal stenosis.    C5-6:  Bilateral uncinate and facet hypertrophy. No significant spinal  canal or neural foraminal stenosis.    C6-7:  No significant neural foraminal stenosis. Mild spinal canal  stenosis.    C7-T1:  No spinal canal or neural foraminal stenosis.     No abnormality of the paraspinous soft tissues. Scarring of the left  lung apex.      Impression    IMPRESSION:  1. No acute fracture or traumatic subluxation.  2. Grossly unchanged wedge compression deformities of C7 and partially  visualized upper thoracic spine starting at T2. Retropulsion at the  level of C7 with associated mild spinal canal stenosis.  3. Multilevel cervical spondylosis.    I have personally reviewed the examination and initial interpretation  and I agree with the findings.    TIERRA WATTS MD         SYSTEM ID:  [id number]   CT Thoracic Spine w/o Contrast     Value    Radiologist flags L1 vertebral body compression fracture (Urgent)    Narrative    EXAM: CT THORACIC SPINE W/O CONTRAST  4/3/2023 8:14 PM     HISTORY:  back pain, fall       COMPARISON:  Chest radiographs 11/5/2022 and today.    TECHNIQUE: Using multidetector thin collimation helical acquisition  technique, axial, sagittal and coronal 3 mm thickness CT  reconstructions were obtained through the thoracic spine  without  intravenous contrast.  Images were viewed in bone and soft tissue  windows.    FINDINGS:  Exaggerated thoracic kyphosis. Age indeterminate compression fracture  of the superior endplates of L1. Additional multilevel age  indeterminate advanced wedge compression fractures at T2-T4, T6, and  T9 although suspect chronic in nature. Multilevel intervertebral disc  space narrowing. Patent spinal canal and neural foramina.    The visualized paraspinous tissues are within normal limits. Small  left pleural effusion. Right basilar infiltrates. Old bilateral rib  fractures      Impression    IMPRESSION:  1.  L1 vertebral body compression fracture with mild height loss  possibly worsened since 11/6/2022.  2.  Additional advanced wedge compression fractures at T2-T4, T6, T9,  and L1, most of which were likely present on the prior radiographs  11/5/2022, although exact comparison is difficult.  3.  Right basilar infiltrates, aspiration versus infection.  4.  Small left pleural effusion.    [Urgent Result:   L1 vertebral body compression fracture ]    Finding was identified on 4/3/2023 8:38 PM.     Kait Sanford MD was contacted by Dr. Irby at 4/3/2023 8:44 PM  and verbalized understanding of the urgent finding.      I have personally reviewed the examination and initial interpretation  and I agree with the findings.    TIERRA WATTS MD         SYSTEM ID:  [id number]   Lumbar spine CT w/o contrast     Value    Radiologist flags L1 compression fracture (Urgent)    Narrative    EXAM: CT LUMBAR SPINE W/O CONTRAST  4/3/2023 8:14 PM     HISTORY:  back pain, fall       COMPARISON:  Thoracolumbar radiographs 11/6/2022.     TECHNIQUE: Using multidetector thin collimation helical acquisition  technique, axial, sagittal and coronal 3 mm thickness CT  reconstructions were obtained through the lumbar spine without  intravenous contrast.  Images were viewed in bone and soft tissue  windows.    FINDINGS:  There are 5 lumbar type  vertebrae, used for the purposes of this  dictation. Normal vertebral body alignment. Multilevel intervertebral  disc space narrowing. Age-indeterminate compression fracture of the  superior endplate of L1 with associated mild height loss. Additional  L4 wedge compression deformity, suspect chronic. Multilevel  degenerative changes of lumbar spine including posterior disc  osteophytes at the level of L1-L2, L2-L3, L3-L4. Moderate right neural  foraminal stenosis at the level of T12-L1. No significant spinal canal  stenosis at any level.    The visualized adjacent paraspinous tissues are grossly within normal  limits. Pancreatic parenchymal calcifications, likely sequelae of  chronic pancreatitis.    Old bilateral rib fractures.      Impression    IMPRESSION:  1. L1 compression fracture of the superior endplate with mild height  loss, potentially slightly worsened since 11/6/2022.  2. Multilevel lumbar spondylosis.    [Urgent Result: L1 compression fracture]    Finding was identified on 4/3/2023 8:46 PM.     Kait Sanford MD was contacted by Dr. Irby at 4/3/2023 8:49 PM  and verbalized understanding of the urgent finding.     I have personally reviewed the examination and initial interpretation  and I agree with the findings.    TIERRA WATTS MD         SYSTEM ID:  [id number]   POC US ABDOMEN LIMITED    Impression    Bedside FAST (Focused Assessment with Sonography in Trauma), performed and interpreted by me.   Indication: Trauma    With the patient in Trendelenburg, the RUQ, LUQ and subxiphoid views were examined for intraabdominal and thoracic free fluid and pericardial effusion. With the patient in reverse Trendelenburg, the suprapubic view was examined for intraabdominal free fluid. Image quality was satisfactory..     Findings: There is no evidence of free fluid above or below bilateral diaphragms, in the splenorenal or hepatorenal space, or in bilateral paracolic gutters. There was no free fluid seen in  the pelvis adjacent to the urinary bladder. There is no free fluid within the pericardium.   Extended FAST exam (eFAST):   Indication: Trauma   The chest wall was evaluated for evidence of pneumothorax.     Right side:  Lung sliding artifact  Present     Comet tail artifacts or B-lines  Present   Left side:  Lung sliding artifact  Present     Comet tail artifacts or B-lines Present     IMPRESSION:  Negative FAST     XR Pelvis w Hip Port Right 1 View    Narrative    EXAM: XR PELVIS AND HIP PORTABLE RIGHT 1 VIEW  LOCATION: [facility]  DATE/TIME: 4/3/2023 7:07 PM    INDICATION: Portable, right hip pain, fall.  COMPARISON: None.      Impression    IMPRESSION:   1. Mildly displaced intertrochanteric right hip fracture. Mild apex anterior angulation.  2. Extensive scoliosis and degenerative changes in the spine. There are also mild degenerative changes in the left hip and mild to moderate degenerative changes in the SI joints.  3. Old healed right superior and inferior pubic ramus fractures.  4. Diffuse bone demineralization.  5. Extensive arterial calcifications.   XR Femur Right 2 Views    Narrative    EXAM: XR FEMUR RIGHT 2 VIEWS  LOCATION: [facility]  DATE/TIME: 4/3/2023 9:49 PM    INDICATION: known hip fracture, completion imaging for surgical planning  COMPARISON: 04/03/2023      Impression    IMPRESSION: Acute mildly displaced and angulated fracture of the intertrochanteric proximal right femur unchanged. No additional fracture of the more distal femur. Diffuse bony demineralization. Degenerative arthritis right knee.   XR Surgery CESAR L/T 5 Min Fluoro    Narrative    This exam was marked as non-reportable because it will not be read by a   radiologist or a [facility] non-radiologist provider.         XR Femur Port Right 2 Views    Narrative    EXAM: XR PELVIS PORT 1/2 VIEWS, XR FEMUR PORT RIGHT 2 VIEWS  LOCATION: [facility]  DATE/TIME: 4/4/2023 11:34 AM    INDICATION: s p IMN R femur  COMPARISON: 04/03/2023      Impression    IMPRESSION: There are postoperative changes from intramedullary nail and interlocking screw fixation of the right femur spanning a intertrochanteric fracture in good anatomic alignment. There is tricompartmental right knee osteoarthritis with a knee   joint effusion. There is diffuse osseous demineralization. There are severe degenerative changes in the lower lumbar spine. Old fractures of the right inferior and superior pubic rami are noted.   XR Pelvis Port 1/2 Views    Narrative    EXAM: XR PELVIS PORT 1/2 VIEWS, XR FEMUR PORT RIGHT 2 VIEWS  LOCATION: [facility]  DATE/TIME: 4/4/2023 11:34 AM    INDICATION: s p IMN R femur  COMPARISON: 04/03/2023      Impression    IMPRESSION: There are postoperative changes from intramedullary nail and interlocking screw fixation of the right femur spanning a intertrochanteric fracture in good anatomic alignment. There is tricompartmental right knee osteoarthritis with a knee   joint effusion. There is diffuse osseous demineralization. There are severe degenerative changes in the lower lumbar spine. Old fractures of the right inferior and superior pubic rami are noted.       Discharge Medications   Current Discharge Medication List      START taking these medications    Details   enoxaparin ANTICOAGULANT (LOVENOX) 30 MG/0.3ML syringe Inject 0.3 mLs (30 mg) Subcutaneous every 24 hours for 45 days  Qty: 13.5 mL, Refills: 0    Associated Diagnoses: Closed displaced intertrochanteric fracture of right femur, initial encounter (H)      oxyCODONE (ROXICODONE) 5 MG tablet Take 0.5 tablets (2.5 mg) by mouth every 6 hours as needed for moderate pain  Qty: 26 tablet, Refills: 0    Associated Diagnoses: Closed displaced intertrochanteric fracture of right femur, initial encounter (H)       polyethylene glycol (MIRALAX) 17 g packet Take 17 g by mouth daily as needed for constipation    Associated Diagnoses: Constipation, unspecified constipation type      senna-docusate (SENOKOT-S/PERICOLACE) 8.6-50 MG tablet Take 1-2 tablets by mouth 2 times daily as needed for constipation  Qty: 30 tablet, Refills: 0    Associated Diagnoses: Closed displaced intertrochanteric fracture of right femur, initial encounter (H)         CONTINUE these medications which have CHANGED    Details   acetaminophen (TYLENOL) 325 MG tablet Take 2 tablets (650 mg) by mouth every 4 hours as needed for mild pain  Qty: 100 tablet, Refills: 0    Associated Diagnoses: Closed displaced intertrochanteric fracture of right femur, initial encounter (H)         CONTINUE these medications which have NOT CHANGED    Details   amLODIPine (NORVASC) 2.5 MG tablet Take 1 tablet (2.5 mg) by mouth daily  Qty: 90 tablet, Refills: 3    Associated Diagnoses: Essential hypertension      atorvastatin (LIPITOR) 20 MG tablet 1 tablet (20 mg) by Oral or Feeding Tube route every evening  Qty: 90 tablet, Refills: 3    Associated Diagnoses: Cerebrovascular accident (CVA) due to embolism of left posterior cerebral artery (H)      calcium carbonate 600 mg-vitamin D 400 units (CALTRATE) 600-400 MG-UNIT per tablet Take 1 tablet by mouth 2 times daily (before meals)  Qty: 180 tablet, Refills: 3    Associated Diagnoses: Age-related osteoporosis with current pathological fracture with routine healing, subsequent encounter      cyanocobalamin (VITAMIN B-12) 500 MCG tablet Take 500 mcg by mouth daily      !! order for DME Equipment being ordered: Incentive spirometer  Qty: 1 Units, Refills: 0    Associated Diagnoses: Atelectasis      !! order for DME Equipment being ordered: Home BP monitor and cuff  Qty: 1 each, Refills: 0    Associated Diagnoses: Severe hypertension      Vitamin D3 (CHOLECALCIFEROL) 25 mcg (1000 units) tablet Take 1 tablet (25 mcg) by mouth  daily  Qty: 90 tablet, Refills: 3    Associated Diagnoses: Age-related osteoporosis without current pathological fracture       !! - Potential duplicate medications found. Please discuss with provider.      STOP taking these medications       aspirin (ASA) 81 MG chewable tablet Comments:   Reason for Stopping:         ibuprofen (ADVIL/MOTRIN) 200 MG tablet Comments:   Reason for Stopping:         UNABLE TO FIND Comments:   Reason for Stopping:             Allergies   No Known Allergies

## 2023-04-08 NOTE — PROGRESS NOTES
Care Management Discharge Note    Discharge Date: 04/08/2023       Discharge Disposition:  TCU    [facility]  [address] [place] [address]  Phone: [phone]  Fax: [phone]      Discharge Services:  Post acute therapies    Discharge DME: None    Discharge Transportation: [employer]; stretcher p/u between 12:40 p.m. and 1:25 p.m.    Private pay costs discussed: transportation costs    PAS Confirmation Code:  [id number]  Patient/family educated on Medicare website which has current facility and service quality ratings: yes    Education Provided on the Discharge Plan:  Yes  Persons Notified of Discharge Plans: Dr. Leiva; charge nurse; bedside nurse; patient; pt's daughter, Lubna; Faiza in Admissions at [facility]  Patient/Family in Agreement with the Plan: yes    Handoff Referral Completed: Yes    Additional Information:  0945: Discharge orders faxed to [facility].    0950: Hard scripts faxed to [facility].    0955: BONIFACIO called pt's daughter, Lubna, to confirm today's discharge. BONIFACIO discussed OOP stretcher costs with  [employer], which Lubna was agreeable to. BONIFACIO reviewed IMM form with Lubna. No further questions or concerns re: discharge plan.    1105: BONIFACIO left  for Tanya at [facility], requested a call back if discharge orders and hard scripts were not received.        LIZZIE Werner, LSW  [facility]   PHONE: [phone]  Pager: [phone]

## 2023-04-08 NOTE — PLAN OF CARE
Occupational Therapy: Orders received. Chart reviewed and discussed with care team.? Occupational Therapy not indicated due to pt to discharge to TCU this pm; pt does have ADL deficits that require skilled OT but these best addressed in TCU setting.? Defer discharge recommendations to medical team.? Will complete orders.

## 2023-04-08 NOTE — PLAN OF CARE
Goal Outcome Evaluation:  VS: BP (!) 155/91 (BP Location: Right arm)   Pulse 111   Temp 98  F (36.7  C) (Oral)   Resp 16   Wt 47.2 kg (104 lb 0.9 oz)   SpO2 91%   BMI 21.75 kg/m       O2: SpO2 > 92%  and stable on RA. LS clear and equal bilaterally. Denies chest pain and SOB.   Output: Incontinent of urine. Pt has brief on.   Last BM: X2 incontinent of BM; LBM 4/7/23. denies abdominal discomfort. BS active.   Activity: Not OOB. Repositioned Q2-3 hrs with assist X2.   Skin: WDL except, incision to right hip and preventive Mapilex to coccyx.    Pain: Pain managed with scheduled tylenol.   CMS: Intact, AOx4. Denies numbness and tingling.   Dressing: Right hip incisional dressing CDI.    Diet: Regular diet. Denies nausea/vomiting.    LDA: PIV SL into left forearm.    Equipment: personal belongings,    Plan: Patient will be discharge to [facility]. Stretcher transport set up with  EMS for 4/8 between 12:40-1:25pm. Will continue to monitor.    Additional Info:

## 2023-04-08 NOTE — PLAN OF CARE
"VS: Temp: (!) 96.6  F (35.9  C) Temp src: Oral BP: (!) 148/92 Pulse: 94   Resp: 16 SpO2: 94 % O2 Device: None (Room air).   O2: SpO2 > 92%. LS clear and equal bilaterally. Denies chest pain and SOB. Pt is on continues pulse oximeter. Pt SpO2 dips to higher 80's at times, this writer put her on 1 LPM, Oxymask, but pt removed it & refused to put it again. Frequent rounding was done to make sure that pt is stable & safe this shift.    Output: Voids spontaneously without difficulty, pt is incontinent to bowel & bladder & brief on place.    Last BM: 4/7/2023 per chart & report, denies abdominal discomfort. BS active .    Activity: Pt Not OOB this shift. Ax2 for incontinent care.  Pt repositioned during the shift.   Skin: WDL except, Blanchable redness on the buttocks.    Pain: Pt denied pain this shift.   CMS:  AOx1 oriented only to self. Denies numbness and tingling.   Dressing: R hip surgical incision is CDI. Pt had Mepilex on coccyx for protection   Diet: Regular diet. Denies nausea/vomiting.    LDA: L PIV SL.   Equipment: IV pole, personal belongings,    Plan: TBD to TCU \"[facility]\". Stretcher transport set up with  EMS for 4/8 between 12:40-1:25pm. Continue with plan of care. Call light within reach, pt able to make needs known.    Additional Info: Pt on bed alarm for safety.       "

## 2023-04-08 NOTE — PLAN OF CARE
VS: BP (!) 153/81 (BP Location: Left arm)   Pulse 82   Temp (!) 96.3  F (35.7  C) (Oral)   Resp 16   Wt 47.2 kg (104 lb 0.9 oz)   SpO2 94%   BMI 21.75 kg/m     O2: >93% RA   Output: Voids urine spontaneously- Incontinent B & B   Last BM: 4/7/2023 x2 incont, stool softener held   Activity: 2 assist GB & walker to pivot Alejandra chow per report, WBAT RLE   Skin: Surgical incisions RLE   Pain: Controlled with oral analgesics   CMS: Intact- A & O x 1 to self   Dressing: RLE dressings, Mepilex sacrum   Diet: Regular- feeds self adequately, possible benefit to assistance with setup and coordination   LDA: N/A   Equipment: Chair alarm   Plan: Discharge [id number]   Additional Info:      DISCHARGE SUMMARY    Pt discharging to: TCU  Transportation: eal Stretcher  AVS given and discussed: n/a- IATF paperwork given  Stoplight Tool given and discussed: n/a  Medications given: n/a  Belongings returned: n/a  Comments:

## 2023-04-10 ENCOUNTER — LAB REQUISITION (OUTPATIENT)
Dept: LAB | Facility: CLINIC | Age: 88
End: 2023-04-10
Payer: COMMERCIAL

## 2023-04-10 ENCOUNTER — TRANSITIONAL CARE UNIT VISIT (OUTPATIENT)
Dept: GERIATRICS | Facility: CLINIC | Age: 88
End: 2023-04-10
Payer: COMMERCIAL

## 2023-04-10 VITALS
OXYGEN SATURATION: 99 % | WEIGHT: 104 LBS | TEMPERATURE: 98 F | BODY MASS INDEX: 21.83 KG/M2 | DIASTOLIC BLOOD PRESSURE: 82 MMHG | HEART RATE: 88 BPM | HEIGHT: 58 IN | SYSTOLIC BLOOD PRESSURE: 140 MMHG | RESPIRATION RATE: 20 BRPM

## 2023-04-10 DIAGNOSIS — Z86.11 HISTORY OF TUBERCULOSIS: ICD-10-CM

## 2023-04-10 DIAGNOSIS — M80.00XD AGE-RELATED OSTEOPOROSIS WITH CURRENT PATHOLOGICAL FRACTURE, UNSPECIFIED SITE, SUBSEQUENT ENCOUNTER FOR FRACTURE WITH ROUTINE HEALING: ICD-10-CM

## 2023-04-10 DIAGNOSIS — R29.6 FALLS FREQUENTLY: Primary | ICD-10-CM

## 2023-04-10 DIAGNOSIS — Z86.73 HISTORY OF CVA (CEREBROVASCULAR ACCIDENT): ICD-10-CM

## 2023-04-10 DIAGNOSIS — R54 FRAILTY: ICD-10-CM

## 2023-04-10 DIAGNOSIS — S72.001D CLOSED FRACTURE OF RIGHT HIP WITH ROUTINE HEALING, SUBSEQUENT ENCOUNTER: ICD-10-CM

## 2023-04-10 DIAGNOSIS — Z98.890 S/P ORIF (OPEN REDUCTION INTERNAL FIXATION) FRACTURE: ICD-10-CM

## 2023-04-10 DIAGNOSIS — R47.89 WORD FINDING DIFFICULTY: ICD-10-CM

## 2023-04-10 DIAGNOSIS — R41.0 DELIRIUM: ICD-10-CM

## 2023-04-10 DIAGNOSIS — M41.9 KYPHOSCOLIOSIS: ICD-10-CM

## 2023-04-10 DIAGNOSIS — M80.00XD AGE-RELATED OSTEOPOROSIS WITH CURRENT PATHOLOGICAL FRACTURE WITH ROUTINE HEALING, SUBSEQUENT ENCOUNTER: ICD-10-CM

## 2023-04-10 DIAGNOSIS — D64.9 ANEMIA, UNSPECIFIED: ICD-10-CM

## 2023-04-10 DIAGNOSIS — G92.8 TOXIC METABOLIC ENCEPHALOPATHY: ICD-10-CM

## 2023-04-10 DIAGNOSIS — Z87.81 HISTORY OF PELVIC FRACTURE: ICD-10-CM

## 2023-04-10 DIAGNOSIS — R41.89 IMPAIRED THOUGHT PROCESS: ICD-10-CM

## 2023-04-10 DIAGNOSIS — R15.9 BOWEL AND BLADDER INCONTINENCE: ICD-10-CM

## 2023-04-10 DIAGNOSIS — R32 BOWEL AND BLADDER INCONTINENCE: ICD-10-CM

## 2023-04-10 DIAGNOSIS — R41.89 COGNITIVE IMPAIRMENT: ICD-10-CM

## 2023-04-10 DIAGNOSIS — Z87.81 S/P ORIF (OPEN REDUCTION INTERNAL FIXATION) FRACTURE: ICD-10-CM

## 2023-04-10 DIAGNOSIS — S32.010S COMPRESSION FRACTURE OF L1 VERTEBRA, SEQUELA: ICD-10-CM

## 2023-04-10 PROCEDURE — 99306 1ST NF CARE HIGH MDM 50: CPT | Performed by: FAMILY MEDICINE

## 2023-04-10 PROCEDURE — 99418 PROLNG IP/OBS E/M EA 15 MIN: CPT | Performed by: FAMILY MEDICINE

## 2023-04-10 NOTE — PROGRESS NOTES
[facility] GERIATRICS    PRIMARY CARE PROVIDER AND CLINIC:  Alejandro Sandhu MD, [address] [address] / [facility] [address]  Chief Complaint   Patient presents with     Hospital F/U      [facility] Medical Record Number:  [id number]  Place of Service where encounter took place:  [facility] (SNF) [[id number]]    Belia Sheets  is a 89 year old F,  (1930), living at [facility], with medhx including frequent falls, osteoporosis (following with endocrinology), cognitive impairments,  admitted to the above facility from  [facility]. Hospital stay 4/3/2023 through 2023.     She is familiar to me from prior TCU admission 11/10/2023 - 23 related to occipital stroke.    HPI:      Hospital course:  She had been found down at her apartment during a check-in.  Unknown how long she was down, but was noted to have urinated and was significantly more confused than baseline.  She was having right hip pain.  Blood sugar on arrival was normal.    Initial evaluation notable for overall normal BMP, CBC with very mild leukocytosis, UA clear.  EKG without concerning findings.  CT head without acute intracranial pathology but noted moderate generalized cerebral volume loss with advanced leukoaraiosis.  As part of trauma evaluation, CT whole spine was obtained with significant findings of L1 vertebral body compression fracture with worsened height loss from 2022 as well as advanced wedge compression fractures T2-T4, T6, T9, and L1 likely chronic.    A right hip x-ray was obtained showing a mildly displaced and angulated fracture of the intertrochanteric right femur.  No other fractures.  Did note bony demineralization.  Orthopedic surgery was consulted and she underwent ORIF with right intramedullary nailing 23.  She did have modest postoperative anemia related to blood loss.  Did not require transfusions.     Postoperative course was complicated by  "hyper and hypoactive delirium - her daughter stating she had been out of it for 2 days straight.    With her history of CVA, she had been on aspirin.  This was held at discharge as she is currently on Lovenox anticoagulation for 60 days.    Today  History is limited due to her cognitive impairments.  She does not recall being in the hospital for a fall or fracture.  In fact in reviewing this today, she at first referred to having had a stroke.  With cues she can recall having surgery but again not necessarily why without further explanation.    Overall she says she is feeling much better.  She says she is still \"not A1 okay\" and unable to identify specifically why, but overall better.  Says her pain is not as bad as it was.  Pain was hard for her to locate.  She did not point specifically to her hip or her back and was unable to describe other locations.    She is seen just after breakfast with a 50% consumed plate.  She eats slowly but does not have any issues with swallowing.  Reports her appetite is good.  She is not having nausea or abdominal pain.  Does have incontinence of bowel and bladder.    Says her mood is okay.  She does feel more confused than normal.    Functional Review/Collateral History  Called and talked to her daughter Lubna.  Her primary concern was whether or not her mother be able to return to her prior cognitive baseline.  Her cognition has been waxing and waning for some time, though had not improved significantly after her time here previously from her stroke.  Also describes her mother was quite lucid prior to and just after surgery.  However has significantly decreased since then.  Again reports that she her mother was completely somnolent for couple days likely related to oxycodone.  Reports she has significant sensitivity to this even a low dose, though may have been given higher doses as she will often not report pain.    She was mobile with a walker and doing dressing on her own.  Her " meals were all provided in the dining room though sounds like since she sometimes would forget to go to meals.  Medications were being managed by facility.  Additionally she had an assistant coming to her room in the mornings and afternoons to help with dressing and to provide safety checks.    CODE STATUS/ADVANCE DIRECTIVES DISCUSSION:  No CPR- Do NOT Intubate      PAST SURGICAL HISTORY:   has a past surgical history that includes Hysterectomy; colonoscopy (2010); Rectosigmoidectomy perineal (N/A, 1/12/2018); and Hip Pinning (Right, 4/4/2023).  FAMILY HISTORY: family history includes Depression/Anxiety in her son and son; Hypertension in her brother and mother.  SOCIAL HISTORY:   reports that she has never smoked. She has never used smokeless tobacco. She reports that she does not drink alcohol and does not use drugs.  Patient's living condition: lives in an assisted living facility    Post Discharge Medication Reconciliation Status:   MED REC REQUIRED  Post Medication Reconciliation Status: discharge medications reconciled and changed, per note/orders       Current Outpatient Medications   Medication Sig     acetaminophen (TYLENOL) 325 MG tablet Take 2 tablets (650 mg) by mouth every 4 hours as needed for mild pain     amLODIPine (NORVASC) 2.5 MG tablet Take 1 tablet (2.5 mg) by mouth daily     atorvastatin (LIPITOR) 20 MG tablet 1 tablet (20 mg) by Oral or Feeding Tube route every evening     calcium carbonate 600 mg-vitamin D 400 units (CALTRATE) 600-400 MG-UNIT per tablet Take 1 tablet by mouth 2 times daily (before meals)     cyanocobalamin (VITAMIN B-12) 500 MCG tablet Take 500 mcg by mouth daily     enoxaparin ANTICOAGULANT (LOVENOX) 30 MG/0.3ML syringe Inject 0.3 mLs (30 mg) Subcutaneous every 24 hours for 45 days     order for DME Equipment being ordered: Incentive spirometer     order for DME Equipment being ordered: Home BP monitor and cuff     oxyCODONE (ROXICODONE) 5 MG tablet Take 0.5 tablets (2.5 mg)  "by mouth every 6 hours as needed for moderate pain     polyethylene glycol (MIRALAX) 17 g packet Take 17 g by mouth daily as needed for constipation     senna-docusate (SENOKOT-S/PERICOLACE) 8.6-50 MG tablet Take 1-2 tablets by mouth 2 times daily as needed for constipation     Vitamin D3 (CHOLECALCIFEROL) 25 mcg (1000 units) tablet Take 1 tablet (25 mcg) by mouth daily     No current facility-administered medications for this visit.     Vitals:  BP (!) 140/82   Pulse 88   Temp 98  F (36.7  C)   Resp 20   Ht 1.473 m (4' 10\")   Wt 47.2 kg (104 lb)   SpO2 99%   BMI 21.74 kg/m    Exam:    GENERAL APPEARANCE: Laying in bed comfortably, NAD  HENT:  Bilateral temporal atrophy, moderately [place]  EYES:  Conjunctiva clear, anicteric, EOMI  PULM  Normal WOB on RA, lungs CTAB, no wheezes or crackles, limited air movement  CV:  RRR, S1/S2 normal, no murmurs; no LE edema  ABDOMEN: Abdomen soft, not tender, not distended, BS normal and active throughout   M/S:  Gross atrophy particularly of the hands, moderate arthritic changes of digits bilaterally with right pinky immobile  SKIN: RLE surgical site c/di  NEURO: Alert, significantly delayed thought processes and word finding difficulty; able to identify her partner and grandchildren in her photos;  strength decreased related to finger deformities    Lab/Diagnostic data:  Recent labs/imaging in [facility] reviewed by me today.     ASSESSMENT/PLAN:    (R29.6) Falls frequently  (primary encounter diagnosis)  Comment: Primary reason for admission as leading to fracture as below.  Has had multiple falls likely multifactorial related to cognitive impairment, deconditioning, poor environmental awareness, possibly orthostasis.  Plan:   -Discontinue amlodipine  -PT/OT    (R54) Frailty  Comment: Likely meets criteria given falls, multiple comorbidities, likely cognitive impairment, deconditioning.  Factor to consider and prognostic and goals of care discussions.    (S72.001D) Closed " fracture of right hip with routine healing, subsequent encounter  (Z98.890,  Z87.81) S/P ORIF (open reduction internal fixation) fracture  Comment: Related to falls above in the presence of known osteoporosis.  Underwent ORIF with IMN.  Plan:   -PT/OT  -Follow-up with orthopedics as scheduled  -Lovenox daily through 5/24/2023, hold aspirin with this  -CBC  -Alter pain control to Tylenol 3 times daily, discontinue oxycodone    (S32.010S) Compression fracture of L1 vertebra, sequela  (M41.9) Kyphoscoliosis  Comment: As part of trauma evaluation, noted to have progression of known compression fracture of L1.  Related to underlying osteoporosis.  Additionally contributing to kyphoscoliosis and associated dysfunction.    (Z87.81) History of pelvic fracture  Comment: Has additionally had pubic rami fractures likely related to osteoporosis as well.    (M80.00XD) Age-related osteoporosis with current pathological fracture with routine healing, subsequent encounter  Comment: Significant history of osteoporosis.  Follows with endocrinology Dr. Pittman, last seen 1/11/2023.  Originally diagnosed in 1999.  Notably on alendronate for extended period for 16 years until 2015.  Transition to Forteo [id number].  Then transition to Reclast 2019-21.  At that time was considering switching to Prolia versus continuing with Reclast with the plan to continue Reclast given her risk for pneumonia (last dose in Feb per Lubna).  Given refracture, potentially would benefit from denosumab or even romosozumab.  Plan:   -Continue calcium vitamin D  -Obtain BMP, Vit D  -Will connect with Dr. Pittman given new fracture or cyst.    Geriatric Syndrome    (R41.89) Cognitive impairment  Comment: As reviewed with previous admission to this facility, she had had progressive cognitive impairment over many months if not longer suggesting a neurodegenerative process.  Given her age, likely Alzheimer dementia.  Additionally, likely a mixed process with  significant vascular dementia related to prior stroke as well as ischemic subcortical disease.  Plan:   -Supportive cares and facility  -Referred for outpatient neuropsych testing    (R41.89) Impaired thought process  (R47.89) Word finding difficulty  (G92.8) Toxic metabolic encephalopathy  (R41.0) Delirium  Comment: Visit today notable for significantly delayed thought processes with associated word finding difficulty.  Unclear if this is progression from known baseline versus ongoing sequelae of hospital delirium.  Potentially a mixed and suspect her cognitive function will be impacted.  As reviewed with her daughter today, she has significant sensitivity to opioid medications and will plan to discontinue.  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  Potentially contributing to cognitive impairment and other functional needs.  Plan:   -Aspirin on hold at this time with Lovenox  -Continue atorvastatin 20 mg daily    (R32,  R15.9) Bowel and bladder incontinence  Comment: Related to impaired mobility and cognitive function.   Plan:   -Ongoing discharge planning.  Likely able to return to [facility] pending course.    (Z86.11) History of tuberculosis  Comment: Daughter notes that her M[place] test has always been positive and suspect she had tuberculosis as a child.  She did recently have an x-ray 4/3/2023 with no evidence of airway disease.  Plan:   -Would not repeat XR if mantoux positive    Orders:  [x] BMP, Vit D (osteoporosis); CBC (ABLA)  [x] Discontinue amlodipine   [x] Discontinue oxycodone  [x] Schedule tylenol    Electronically signed by:    Benjamin Rosenstein, MD, MA  [facility]    This note was completed  with the assistance of dictation software. Typos and word substitution-errors are expected and unintended.      117 MINUTES SPENT BY ME in prolonged visit on the date of service doing chart review, history, exam, documentation & further activities per the note.

## 2023-04-10 NOTE — LETTER
4/10/2023        RE: Belia Sheets  [address]  [address]  [facility] [id number]        [facility] GERIATRICS    PRIMARY CARE PROVIDER AND CLINIC:  Alejandro Sandhu MD, [address] [facility] [address] / [facility] [id number]  Chief Complaint   Patient presents with     Hospital F/U      [facility] Medical Record Number:  [id number]  Place of Service where encounter took place:  [facility] (SNF) [[id number]]    Belia Sheets  is a 89 year old F,  (1930), living at [facility], with medhx including frequent falls, osteoporosis (following with endocrinology), cognitive impairments,  admitted to the above facility from  [facility]. Hospital stay 4/3/2023 through 2023.     She is familiar to me from prior TCU admission 11/10/2023 - 23 related to occipital stroke.    HPI:      Hospital course:  She had been found down at her apartment during a check-in.  Unknown how long she was down, but was noted to have urinated and was significantly more confused than baseline.  She was having right hip pain.  Blood sugar on arrival was normal.    Initial evaluation notable for overall normal BMP, CBC with very mild leukocytosis, UA clear.  EKG without concerning findings.  CT head without acute intracranial pathology but noted moderate generalized cerebral volume loss with advanced leukoaraiosis.  As part of trauma evaluation, CT whole spine was obtained with significant findings of L1 vertebral body compression fracture with worsened height loss from 2022 as well as advanced wedge compression fractures T2-T4, T6, T9, and L1 likely chronic.    A right hip x-ray was obtained showing a mildly displaced and angulated fracture of the intertrochanteric right femur.  No other fractures.  Did note bony demineralization.  Orthopedic surgery was consulted and she underwent ORIF with right intramedullary nailing 23.  She did have modest postoperative  "anemia related to blood loss.  Did not require transfusions.     Postoperative course was complicated by hyper and hypoactive delirium - her daughter stating she had been out of it for 2 days straight.    With her history of CVA, she had been on aspirin.  This was held at discharge as she is currently on Lovenox anticoagulation for 60 days.    Today  History is limited due to her cognitive impairments.  She does not recall being in the hospital for a fall or fracture.  In fact in reviewing this today, she at first referred to having had a stroke.  With cues she can recall having surgery but again not necessarily why without further explanation.    Overall she says she is feeling much better.  She says she is still \"not A1 okay\" and unable to identify specifically why, but overall better.  Says her pain is not as bad as it was.  Pain was hard for her to locate.  She did not point specifically to her hip or her back and was unable to describe other locations.    She is seen just after breakfast with a 50% consumed plate.  She eats slowly but does not have any issues with swallowing.  Reports her appetite is good.  She is not having nausea or abdominal pain.  Does have incontinence of bowel and bladder.    Says her mood is okay.  She does feel more confused than normal.    Functional Review/Collateral History  Called and talked to her daughter Lubna.  Her primary concern was whether or not her mother be able to return to her prior cognitive baseline.  Her cognition has been waxing and waning for some time, though had not improved significantly after her time here previously from her stroke.  Also describes her mother was quite lucid prior to and just after surgery.  However has significantly decreased since then.  Again reports that she her mother was completely somnolent for couple days likely related to oxycodone.  Reports she has significant sensitivity to this even a low dose, though may have been given higher doses " as she will often not report pain.    She was mobile with a walker and doing dressing on her own.  Her meals were all provided in the dining room though sounds like since she sometimes would forget to go to meals.  Medications were being managed by facility.  Additionally she had an assistant coming to her room in the mornings and afternoons to help with dressing and to provide safety checks.    CODE STATUS/ADVANCE DIRECTIVES DISCUSSION:  No CPR- Do NOT Intubate      PAST SURGICAL HISTORY:   has a past surgical history that includes Hysterectomy; colonoscopy (2010); Rectosigmoidectomy perineal (N/A, 1/12/2018); and Hip Pinning (Right, 4/4/2023).  FAMILY HISTORY: family history includes Depression/Anxiety in her son and son; Hypertension in her brother and mother.  SOCIAL HISTORY:   reports that she has never smoked. She has never used smokeless tobacco. She reports that she does not drink alcohol and does not use drugs.  Patient's living condition: lives in an assisted living facility    Post Discharge Medication Reconciliation Status:   MED REC REQUIRED  Post Medication Reconciliation Status: discharge medications reconciled and changed, per note/orders       Current Outpatient Medications   Medication Sig     acetaminophen (TYLENOL) 325 MG tablet Take 2 tablets (650 mg) by mouth every 4 hours as needed for mild pain     amLODIPine (NORVASC) 2.5 MG tablet Take 1 tablet (2.5 mg) by mouth daily     atorvastatin (LIPITOR) 20 MG tablet 1 tablet (20 mg) by Oral or Feeding Tube route every evening     calcium carbonate 600 mg-vitamin D 400 units (CALTRATE) 600-400 MG-UNIT per tablet Take 1 tablet by mouth 2 times daily (before meals)     cyanocobalamin (VITAMIN B-12) 500 MCG tablet Take 500 mcg by mouth daily     enoxaparin ANTICOAGULANT (LOVENOX) 30 MG/0.3ML syringe Inject 0.3 mLs (30 mg) Subcutaneous every 24 hours for 45 days     order for DME Equipment being ordered: Incentive spirometer     order for DME Equipment  "being ordered: Home BP monitor and cuff     oxyCODONE (ROXICODONE) 5 MG tablet Take 0.5 tablets (2.5 mg) by mouth every 6 hours as needed for moderate pain     polyethylene glycol (MIRALAX) 17 g packet Take 17 g by mouth daily as needed for constipation     senna-docusate (SENOKOT-S/PERICOLACE) 8.6-50 MG tablet Take 1-2 tablets by mouth 2 times daily as needed for constipation     Vitamin D3 (CHOLECALCIFEROL) 25 mcg (1000 units) tablet Take 1 tablet (25 mcg) by mouth daily     No current facility-administered medications for this visit.     Vitals:  BP (!) 140/82   Pulse 88   Temp 98  F (36.7  C)   Resp 20   Ht 1.473 m (4' 10\")   Wt 47.2 kg (104 lb)   SpO2 99%   BMI 21.74 kg/m    Exam:    GENERAL APPEARANCE: Laying in bed comfortably, NAD  HENT:  Bilateral temporal atrophy, moderately [place]  EYES:  Conjunctiva clear, anicteric, EOMI  PULM  Normal WOB on RA, lungs CTAB, no wheezes or crackles, limited air movement  CV:  RRR, S1/S2 normal, no murmurs; no LE edema  ABDOMEN: Abdomen soft, not tender, not distended, BS normal and active throughout   M/S:  Gross atrophy particularly of the hands, moderate arthritic changes of digits bilaterally with right pinky immobile  SKIN: RLE surgical site c/di  NEURO: Alert, significantly delayed thought processes and word finding difficulty; able to identify her partner and grandchildren in her photos;  strength decreased related to finger deformities    Lab/Diagnostic data:  Recent labs/imaging in [facility] reviewed by me today.     ASSESSMENT/PLAN:    (R29.6) Falls frequently  (primary encounter diagnosis)  Comment: Primary reason for admission as leading to fracture as below.  Has had multiple falls likely multifactorial related to cognitive impairment, deconditioning, poor environmental awareness, possibly orthostasis.  Plan:   -Discontinue amlodipine  -PT/OT    (R54) Frailty  Comment: Likely meets criteria given falls, multiple comorbidities, likely cognitive impairment, " deconditioning.  Factor to consider and prognostic and goals of care discussions.    (S72.001D) Closed fracture of right hip with routine healing, subsequent encounter  (Z98.890,  Z87.81) S/P ORIF (open reduction internal fixation) fracture  Comment: Related to falls above in the presence of known osteoporosis.  Underwent ORIF with IMN.  Plan:   -PT/OT  -Follow-up with orthopedics as scheduled  -Lovenox daily through 5/24/2023, hold aspirin with this  -CBC  -Alter pain control to Tylenol 3 times daily, discontinue oxycodone    (S32.010S) Compression fracture of L1 vertebra, sequela  (M41.9) Kyphoscoliosis  Comment: As part of trauma evaluation, noted to have progression of known compression fracture of L1.  Related to underlying osteoporosis.  Additionally contributing to kyphoscoliosis and associated dysfunction.    (Z87.81) History of pelvic fracture  Comment: Has additionally had pubic rami fractures likely related to osteoporosis as well.    (M80.00XD) Age-related osteoporosis with current pathological fracture with routine healing, subsequent encounter  Comment: Significant history of osteoporosis.  Follows with endocrinology Dr. Pittman, last seen 1/11/2023.  Originally diagnosed in 1999.  Notably on alendronate for extended period for 16 years until 2015.  Transition to Forteo [id number].  Then transition to Reclast 2019-21.  At that time was considering switching to Prolia versus continuing with Reclast with the plan to continue Reclast given her risk for pneumonia (last dose in Feb per Lubna).  Given refracture, potentially would benefit from denosumab or even romosozumab.  Plan:   -Continue calcium vitamin D  -Obtain BMP, Vit D  -Will connect with Dr. Pittman given new fracture or cyst.    Geriatric Syndrome    (R41.89) Cognitive impairment  Comment: As reviewed with previous admission to this facility, she had had progressive cognitive impairment over many months if not longer suggesting a  neurodegenerative process.  Given her age, likely Alzheimer dementia.  Additionally, likely a mixed process with significant vascular dementia related to prior stroke as well as ischemic subcortical disease.  Plan:   -Supportive cares and facility  -Referred for outpatient neuropsych testing    (R41.89) Impaired thought process  (R47.89) Word finding difficulty  (G92.8) Toxic metabolic encephalopathy  (R41.0) Delirium  Comment: Visit today notable for significantly delayed thought processes with associated word finding difficulty.  Unclear if this is progression from known baseline versus ongoing sequelae of hospital delirium.  Potentially a mixed and suspect her cognitive function will be impacted.  As reviewed with her daughter today, she has significant sensitivity to opioid medications and will plan to discontinue.  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  Potentially contributing to cognitive impairment and other functional needs.  Plan:   -Aspirin on hold at this time with Lovenox  -Continue atorvastatin 20 mg daily    (R32,  R15.9) Bowel and bladder incontinence  Comment: Related to impaired mobility and cognitive function.   Plan:   -Ongoing discharge planning.  Likely able to return to [place] pending course.    (Z86.11) History of tuberculosis  Comment: Daughter notes that her Manto test has always been positive and suspect she had tuberculosis as a child.  She did recently have an x-ray 4/3/2023 with no evidence of airway disease.  Plan:   -Would not repeat XR if mantoux positive    Orders:  [x] BMP, Vit D (osteoporosis); CBC (ABLA)  [x] Discontinue amlodipine   [x] Discontinue oxycodone  [x] Schedule  tylenol    Electronically signed by:    Benjamin Rosenstein, MD, MA  [facility] [place] [facility]    This note was completed with the assistance of dictation software. Typos and word substitution-errors are expected and unintended.      117 MINUTES SPENT BY ME in prolonged visit on the date of service doing chart review, history, exam, documentation & further activities per the note.         Sincerely,        Benjamin Rosenstein, MD

## 2023-04-10 NOTE — CARE PLAN
Physical Therapy Discharge Summary    Reason for therapy discharge:    Discharged to transitional care facility.    Progress towards therapy goal(s). See goals on Care Plan in [facility] electronic health record for goal details.  Goals not met.  Barriers to achieving goals:   limited tolerance for therapy.    Therapy recommendation(s):    Continued therapy is recommended.  Rationale/Recommendations:  to maximize functional mob I, my and safety.

## 2023-04-10 NOTE — Clinical Note
Corinna Pittman  I am seeing our mutual patient Sudeep at the TCU. Unfortunately she fell and had a right hip fracture 4/3/23 and wanted you to be aware. I saw she just received reclast Feb 17 and she is still on Ca/VitD 600/400 daily. Any changes you would like me to make while she's here?  Thank you Ben Rosenstein  CC: Dr. Sandhu.

## 2023-04-12 ENCOUNTER — TELEPHONE (OUTPATIENT)
Dept: GERIATRICS | Facility: CLINIC | Age: 88
End: 2023-04-12

## 2023-04-12 ENCOUNTER — TELEPHONE (OUTPATIENT)
Dept: ENDOCRINOLOGY | Facility: CLINIC | Age: 88
End: 2023-04-12
Payer: COMMERCIAL

## 2023-04-12 LAB
ANION GAP SERPL CALCULATED.3IONS-SCNC: 13 MMOL/L (ref 7–15)
BUN SERPL-MCNC: 21.5 MG/DL (ref 8–23)
CALCIUM SERPL-MCNC: 9.7 MG/DL (ref 8.2–9.6)
CHLORIDE SERPL-SCNC: 103 MMOL/L (ref 98–107)
CREAT SERPL-MCNC: 0.61 MG/DL (ref 0.51–0.95)
DEPRECATED CALCIDIOL+CALCIFEROL SERPL-MC: 48 UG/L (ref 20–75)
DEPRECATED HCO3 PLAS-SCNC: 21 MMOL/L (ref 22–29)
ERYTHROCYTE [DISTWIDTH] IN BLOOD BY AUTOMATED COUNT: 14.6 % (ref 10–15)
GFR SERPL CREATININE-BSD FRML MDRD: 83 ML/MIN/1.73M2
GLUCOSE SERPL-MCNC: 103 MG/DL (ref 70–99)
HCT VFR BLD AUTO: 33.7 % (ref 35–47)
HGB BLD-MCNC: 10.7 G/DL (ref 11.7–15.7)
MCH RBC QN AUTO: 29.5 PG (ref 26.5–33)
MCHC RBC AUTO-ENTMCNC: 31.8 G/DL (ref 31.5–36.5)
MCV RBC AUTO: 93 FL (ref 78–100)
PLATELET # BLD AUTO: 411 10E3/UL (ref 150–450)
POTASSIUM SERPL-SCNC: 3.3 MMOL/L (ref 3.4–5.3)
RBC # BLD AUTO: 3.63 10E6/UL (ref 3.8–5.2)
SODIUM SERPL-SCNC: 137 MMOL/L (ref 136–145)
WBC # BLD AUTO: 12.9 10E3/UL (ref 4–11)

## 2023-04-12 PROCEDURE — P9604 ONE-WAY ALLOW PRORATED TRIP: HCPCS | Performed by: FAMILY MEDICINE

## 2023-04-12 PROCEDURE — 85027 COMPLETE CBC AUTOMATED: CPT | Performed by: FAMILY MEDICINE

## 2023-04-12 PROCEDURE — 36415 COLL VENOUS BLD VENIPUNCTURE: CPT | Performed by: FAMILY MEDICINE

## 2023-04-12 PROCEDURE — 82306 VITAMIN D 25 HYDROXY: CPT | Performed by: FAMILY MEDICINE

## 2023-04-12 PROCEDURE — 80048 BASIC METABOLIC PNL TOTAL CA: CPT | Performed by: FAMILY MEDICINE

## 2023-04-12 NOTE — TELEPHONE ENCOUNTER
[facility] Lab Note     Provider: LY Franklin  Facility: [facility]  Facility Type:  TCU    No Known Allergies    Labs Reviewed by provider: Heme 2, BMP, Vitamin D.     Verbal Order/Direction given by Provider: Potassium 40meq x 1 dose now.  Check Heme 1 and BMP on 4/17/23.      Provider giving Order:  LY Franklin    Verbal Order given to: Martha([phone])    Thompson Parra RN

## 2023-04-12 NOTE — TELEPHONE ENCOUNTER
Belia Sheets currently at [facility] after hip fracture. Called daughter Lubna. Clinical symptoms improving slowly.   Osteoporosis:    FORTEO [phone]  RECLAST 2019 -2021    Recently resumed RECLAST 2/2023. Bone turnover suppressed.   Consider prolia. No treatment change at this time.     Saurabh Pittman MD

## 2023-04-13 ENCOUNTER — LAB REQUISITION (OUTPATIENT)
Dept: LAB | Facility: CLINIC | Age: 88
End: 2023-04-13
Payer: COMMERCIAL

## 2023-04-13 DIAGNOSIS — D64.9 ANEMIA, UNSPECIFIED: ICD-10-CM

## 2023-04-13 DIAGNOSIS — M80.00XA AGE-RELATED OSTEOPOROSIS WITH CURRENT PATHOLOGICAL FRACTURE, UNSPECIFIED SITE, INITIAL ENCOUNTER FOR FRACTURE: ICD-10-CM

## 2023-04-17 ENCOUNTER — OFFICE VISIT (OUTPATIENT)
Dept: ORTHOPEDICS | Facility: CLINIC | Age: 88
End: 2023-04-17
Payer: COMMERCIAL

## 2023-04-17 ENCOUNTER — TRANSITIONAL CARE UNIT VISIT (OUTPATIENT)
Dept: GERIATRICS | Facility: CLINIC | Age: 88
End: 2023-04-17
Payer: COMMERCIAL

## 2023-04-17 ENCOUNTER — LAB REQUISITION (OUTPATIENT)
Dept: LAB | Facility: CLINIC | Age: 88
End: 2023-04-17
Payer: COMMERCIAL

## 2023-04-17 VITALS — WEIGHT: 85 LBS | BODY MASS INDEX: 17.77 KG/M2

## 2023-04-17 VITALS
BODY MASS INDEX: 22.02 KG/M2 | WEIGHT: 104.9 LBS | HEART RATE: 72 BPM | OXYGEN SATURATION: 95 % | TEMPERATURE: 97.5 F | HEIGHT: 58 IN | RESPIRATION RATE: 20 BRPM | SYSTOLIC BLOOD PRESSURE: 131 MMHG | DIASTOLIC BLOOD PRESSURE: 76 MMHG

## 2023-04-17 DIAGNOSIS — G31.84 MILD COGNITIVE IMPAIRMENT: ICD-10-CM

## 2023-04-17 DIAGNOSIS — Z47.89 ORTHOPEDIC AFTERCARE: Primary | ICD-10-CM

## 2023-04-17 DIAGNOSIS — E87.6 HYPOKALEMIA: ICD-10-CM

## 2023-04-17 DIAGNOSIS — M80.00XD AGE-RELATED OSTEOPOROSIS WITH CURRENT PATHOLOGICAL FRACTURE WITH ROUTINE HEALING, SUBSEQUENT ENCOUNTER: ICD-10-CM

## 2023-04-17 DIAGNOSIS — S72.141D CLOSED DISPLACED INTERTROCHANTERIC FRACTURE OF RIGHT FEMUR WITH ROUTINE HEALING, SUBSEQUENT ENCOUNTER: ICD-10-CM

## 2023-04-17 DIAGNOSIS — S72.001D CLOSED FRACTURE OF RIGHT HIP WITH ROUTINE HEALING, SUBSEQUENT ENCOUNTER: Primary | ICD-10-CM

## 2023-04-17 DIAGNOSIS — R41.89 OTHER SYMPTOMS AND SIGNS INVOLVING COGNITIVE FUNCTIONS AND AWARENESS: ICD-10-CM

## 2023-04-17 DIAGNOSIS — R54 FRAILTY: ICD-10-CM

## 2023-04-17 DIAGNOSIS — D64.9 POSTOPERATIVE ANEMIA: ICD-10-CM

## 2023-04-17 DIAGNOSIS — Z98.890 S/P ORIF (OPEN REDUCTION INTERNAL FIXATION) FRACTURE: ICD-10-CM

## 2023-04-17 DIAGNOSIS — S32.010S COMPRESSION FRACTURE OF L1 VERTEBRA, SEQUELA: ICD-10-CM

## 2023-04-17 DIAGNOSIS — Z87.81 S/P ORIF (OPEN REDUCTION INTERNAL FIXATION) FRACTURE: ICD-10-CM

## 2023-04-17 DIAGNOSIS — D72.829 LEUKOCYTOSIS, UNSPECIFIED TYPE: ICD-10-CM

## 2023-04-17 DIAGNOSIS — Z86.73 HISTORY OF CVA (CEREBROVASCULAR ACCIDENT): ICD-10-CM

## 2023-04-17 DIAGNOSIS — I10 HYPERTENSION, UNSPECIFIED TYPE: ICD-10-CM

## 2023-04-17 LAB
ANION GAP SERPL CALCULATED.3IONS-SCNC: 15 MMOL/L (ref 7–15)
BASOPHILS # BLD AUTO: 0.1 10E3/UL (ref 0–0.2)
BASOPHILS NFR BLD AUTO: 1 %
BUN SERPL-MCNC: 15.5 MG/DL (ref 8–23)
CALCIUM SERPL-MCNC: 10.1 MG/DL (ref 8.2–9.6)
CHLORIDE SERPL-SCNC: 100 MMOL/L (ref 98–107)
CREAT SERPL-MCNC: 0.57 MG/DL (ref 0.51–0.95)
DEPRECATED HCO3 PLAS-SCNC: 23 MMOL/L (ref 22–29)
EOSINOPHIL # BLD AUTO: 0.7 10E3/UL (ref 0–0.7)
EOSINOPHIL NFR BLD AUTO: 6 %
ERYTHROCYTE [DISTWIDTH] IN BLOOD BY AUTOMATED COUNT: 15.8 % (ref 10–15)
GFR SERPL CREATININE-BSD FRML MDRD: 85 ML/MIN/1.73M2
GLUCOSE SERPL-MCNC: 96 MG/DL (ref 70–99)
HCT VFR BLD AUTO: 39 % (ref 35–47)
HGB BLD-MCNC: 12.2 G/DL (ref 11.7–15.7)
IMM GRANULOCYTES # BLD: 0.1 10E3/UL
IMM GRANULOCYTES NFR BLD: 1 %
LYMPHOCYTES # BLD AUTO: 1.3 10E3/UL (ref 0.8–5.3)
LYMPHOCYTES NFR BLD AUTO: 11 %
MCH RBC QN AUTO: 29.5 PG (ref 26.5–33)
MCHC RBC AUTO-ENTMCNC: 31.3 G/DL (ref 31.5–36.5)
MCV RBC AUTO: 94 FL (ref 78–100)
MONOCYTES # BLD AUTO: 0.7 10E3/UL (ref 0–1.3)
MONOCYTES NFR BLD AUTO: 6 %
NEUTROPHILS # BLD AUTO: 9.4 10E3/UL (ref 1.6–8.3)
NEUTROPHILS NFR BLD AUTO: 75 %
NRBC # BLD AUTO: 0 10E3/UL
NRBC BLD AUTO-RTO: 0 /100
PLATELET # BLD AUTO: 538 10E3/UL (ref 150–450)
POTASSIUM SERPL-SCNC: 3.2 MMOL/L (ref 3.4–5.3)
RBC # BLD AUTO: 4.13 10E6/UL (ref 3.8–5.2)
SODIUM SERPL-SCNC: 138 MMOL/L (ref 136–145)
WBC # BLD AUTO: 12.3 10E3/UL (ref 4–11)

## 2023-04-17 PROCEDURE — 99309 SBSQ NF CARE MODERATE MDM 30: CPT | Performed by: NURSE PRACTITIONER

## 2023-04-17 PROCEDURE — P9603 ONE-WAY ALLOW PRORATED MILES: HCPCS | Performed by: FAMILY MEDICINE

## 2023-04-17 PROCEDURE — 36415 COLL VENOUS BLD VENIPUNCTURE: CPT | Performed by: FAMILY MEDICINE

## 2023-04-17 PROCEDURE — 80048 BASIC METABOLIC PNL TOTAL CA: CPT | Performed by: FAMILY MEDICINE

## 2023-04-17 PROCEDURE — 85025 COMPLETE CBC W/AUTO DIFF WBC: CPT | Performed by: FAMILY MEDICINE

## 2023-04-17 PROCEDURE — 99207 PR NO CHARGE NURSE ONLY: CPT

## 2023-04-17 RX ORDER — ACETAMINOPHEN 500 MG
1000 TABLET ORAL 3 TIMES DAILY
COMMUNITY

## 2023-04-17 NOTE — PROGRESS NOTES
"[facility] [place] [facility] Admission  PCP & CLINIC: Alejandro Sandhu MD, [address] [place] [address] / [facility] [address]  Chief Complaint   Patient presents with     Hospital F/U   [place] MRN: [id number]. Place of Service where encounter took place:  [facility] (SNF) [[id number]] Belia Sheets  is a 89 year old  (5/16/1930), admitted to the above facility from  [facility]. Hospital stay 4/3/23 through 4/8/2023. Admitted to this facility for  rehab, medical management, and nursing care. HPI information obtained from: facility chart records, facility staff, patient report, [facility] chart review, and [facility] chart review.     Brief Summary of Hospital Course: Belia presented to [facility] on 4/3/23 with AMS and imaging showing a right intertrochanteric proximal fracture and L1 compression fracture of superior endplate. She went to the OR on 4/4/23 for right intramedullary nail. She was noted to have post op anemia.     Updates since admission to transitional care unit: Belia presented to TCU on 4/8 and was seen by partner on 4/10. Visited today after her ortho appointment. Reports that her appetite is adequate and she eats what she can. She says that sleeping has been \"okay\". Belia enjoys PT and reports that is going well. Pain has been controlled with acetaminophen. Denies any sob, chest pain, nausea, vomiting, diarrhea, urinary difficulties, new acute pain, or insomnia.    CODE STATUS/ADVANCE DIRECTIVES DISCUSSION: DNR/DNI. Patient's living condition: lives alone. ALLERGIES: Patient has no known allergies. PAST MEDICAL HISTORY:  has a past medical history of Carpal tunnel syndrome (aka CTS), Cataract, Chronic osteoarthritis, Constipation due to outlet dysfunction (9/22/2021), Fracture of multiple rami of right pubis with routine healing, subsequent encounter (9/22/2021), H/O calcium pyrophosphate deposition disease (CPPD), History of 9th right rib fracture " "due to fall (07/13/2021) (9/22/2021), History of encephalopathy (10/2017), Hypertension, Kyphoscoliosis, Osteoarthritis, Osteoporosis, and Rectal prolapse.    She has no past medical history of Colon polyps or Migraines.. PAST SURGICAL HISTORY:   has a past surgical history that includes Hysterectomy; colonoscopy (2010); Rectosigmoidectomy perineal (N/A, 1/12/2018); and Hip Pinning (Right, 4/4/2023).. FAMILY HISTORY: family history includes Depression/Anxiety in her son and son; Hypertension in her brother and mother.. SOCIAL HISTORY:   reports that she has never smoked. She has never used smokeless tobacco. She reports that she does not drink alcohol and does not use drugs.  Post Discharge Medication Reconciliation Status: discharge medications reconciled and changed, per note/orders.  Current Outpatient Medications   Medication Sig Dispense Refill     acetaminophen (TYLENOL) 500 MG tablet Take 1,000 mg by mouth 3 times daily       enoxaparin ANTICOAGULANT (LOVENOX) 30 MG/0.3ML syringe Inject 0.3 mLs (30 mg) Subcutaneous every 24 hours for 36 days 10.8 mL 0     atorvastatin (LIPITOR) 20 MG tablet 1 tablet (20 mg) by Oral or Feeding Tube route every evening 90 tablet 3     calcium carbonate 600 mg-vitamin D 400 units (CALTRATE) 600-400 MG-UNIT per tablet Take 1 tablet by mouth 2 times daily (before meals) 180 tablet 3     cyanocobalamin (VITAMIN B-12) 500 MCG tablet Take 500 mcg by mouth daily       Vitamin D3 (CHOLECALCIFEROL) 25 mcg (1000 units) tablet Take 1 tablet (25 mcg) by mouth daily 90 tablet 3     ROS: Limited secondary to cognitive impairment but today pt reports and 10 point ROS of systems including Constitutional, Eyes, Respiratory, Cardiovascular, Gastroenterology, Genitourinary, Integumentary, Musculoskeletal, Psychiatric were all negative except for pertinent positives noted in my HPI.    Vitals: /76   Pulse 72   Temp 97.5  F (36.4  C)   Resp 20   Ht 1.473 m (4' 10\")   Wt 47.6 kg (104 lb " 14.4 oz)   SpO2 95%   BMI 21.92 kg/m    BP:    Exam:  GENERAL APPEARANCE: Alert, in no distress, cooperative.   RESP: Respiratory effort , no respiratory distress, Lung sounds clear. On RA.   CV: Auscultation of heart reveals S1, S2, rate and rhythm regular, no murmur, no rub or gallop, no BLE edema. Peripheral pulses are 2+.  ABDOMEN: Normal bowel sounds, soft, non-tender abdomen, and no masses palpated.  SKIN: Inspection/Palpation of skin and subcutaneous tissue baseline w/ fragility. Ecchymosis to BUE. Incision to right hip covered with dressing that is C/D/I.   PSYCH: Insight, judgement, and memory are impaired at baseline, affect and mood are happy/engaged..    Lab/Diagnostic data: Recent labs in [facility] reviewed by me today.     ASSESSMENT/PLAN:  Closed fracture of right hip with routine healing, subsequent encounter  S/P ORIF (open reduction internal fixation) fracture  Compression fracture of L1 vertebra, sequela  Age-related osteoporosis with current pathological fracture with routine healing, subsequent encounter  History of ischemic stroke of L occipital lobe within watershed region due to embolic stroke of undetermined source (ESUS)  Mild cognitive impairment  Fraility  Acute on chronic. Ongoing.    Followed-up with ortho today. Incision is healing well. She continues to work with PT/OT and make progress. She was living in [place] w/o many services living prior to hospitalization. Will need to continue progress and needs through rehab for discharge plans.     Concern given poor cognition. Provider coordinated care w/ rehab who reported SLUMS of 10/30. This would lend to recommendation for 24h care and concern for significant vascular dementia (noting previous CVA).     She is on Lovenox for DVT prophylaxis through 5/24/23.    Pain controlled with acetaminophen 1000 mg PO TID.     Seen by neurosurgery while hospitalized for compression fracture and no neurosurgical intervention recommended, can follow-up as  needed.     Currently on calcium and vitamin D supplements. Continue.    Reviewed medication list and discontinued medications that were not utilized such as miralax and senna.      Postoperative anemia  Hypokalemia  Leukocytosis, unspecified type  Acute on chronic. Ongonig.    Hgb dropped from 13.4 to 9.6 after OR, thought to be due blood loss in the OR. Hemoglobin on 4/12 was 10.7. Pending a repeat hemoglobin today.     Will also order a vitamin B12 level to assess as she is on a supplementation.     Hypokalemia noted to be 3.1 on 4/12 and was given a one time dose of 20 MEQ potassium. BMP ordered for today that is pending to further monitoring.     Elevated white blood cell count thought be reactive, pending CBC from today for monitoring. No sxs of infection.     Hypertension, unspecified type  Chronic. Ongoing.    Blood pressures have been controlled with an isolated blood pressure in the 170s. She is not any antihypertensives at this time, amlodipine was discontinued on 4/10. Continue to monitor vital signs.     TCU team to follow-up w/in 1 week or as needed.      Orders:  1. B12 level x1 on 4/18/23. Dx: vitamin deficiency  2. Discontinue miralax.  3. Discontinue senna.    I (the provider) was present with professional student who participated in this service and in the documentation of this service. I have verified the HPI/ROS, history, and personally performed the physical exam and the healthcare decision making. I agree with all elements as documented above.      Electronically signed by:  Dr. Humera Aleman, APRN CNP DNP

## 2023-04-17 NOTE — PROGRESS NOTES
Reason for visit:    Belia Sheets came in to the clinic for a two week post op check.    Her surgery was done 4/4/2023 by Dr Todd.  She had a right femur ORIF hip nailing.     Assessment:    Belia came into the clinic in a wheelchair Non-WB, accompanied by her daughter.    The Surgical wounds were exposed and found to be well-healed; so the staples were removed. Skin was c/d/i. Steri strips were applied.     Sudeep's daughter requested a weight for Sudeep, but she was not able to stand. She was 120.5 lbs in the wheelchair.    Plan:     She was told she may be WBAT, but she is primarily confined to the wheelchair. Sudeep is not able to stand on her own for more than a few seconds. She is doing PT at the TCU.     She has an appointment to see Dr. Todd at 6 weeks post op and at that time Dr. Todd will determine further restrictions.    She has our phone number and will call with questions or problems.    Julissa Durbin ATC

## 2023-04-17 NOTE — LETTER
"    4/17/2023        RE: Belia Sheets  [address]  [address]  [facility] [address]        [facility] [place] [facility] Admission  PCP & CLINIC: Alejandro Sandhu MD, [address] [facility] [address] / [facility] [address]  Chief Complaint   Patient presents with     Hospital F/U   Emily MRN: [id number]. Place of Service where encounter took place:  [facility] (SNF) [[id number]] Belia Sheets  is a 89 year old  (5/16/1930), admitted to the above facility from  [facility]. Hospital stay 4/3/23 through 4/8/2023. Admitted to this facility for  rehab, medical management, and nursing care. HPI information obtained from: facility chart records, facility staff, patient report, [facility] chart review, and Care Everywhere [facility] chart review.     Brief Summary of Hospital Course: Belia presented to [facility] on 4/3/23 with AMS and imaging showing a right intertrochanteric proximal fracture and L1 compression fracture of superior endplate. She went to the OR on 4/4/23 for right intramedullary nail. She was noted to have post op anemia.     Updates since admission to transitional care unit: Belia presented to TCU on 4/8 and was seen by partner on 4/10. Visited today after her ortho appointment. Reports that her appetite is adequate and she eats what she can. She says that sleeping has been \"okay\". Belia enjoys PT and reports that is going well. Pain has been controlled with acetaminophen. Denies any sob, chest pain, nausea, vomiting, diarrhea, urinary difficulties, new acute pain, or insomnia.    CODE STATUS/ADVANCE DIRECTIVES DISCUSSION: DNR/DNI. Patient's living condition: lives alone. ALLERGIES: Patient has no known allergies. PAST MEDICAL HISTORY:  has a past medical history of Carpal tunnel syndrome (aka CTS), Cataract, Chronic osteoarthritis, Constipation due to outlet dysfunction (9/22/2021), Fracture of multiple rami of right pubis with routine healing, subsequent encounter " (9/22/2021), H/O calcium pyrophosphate deposition disease (CPPD), History of 9th right rib fracture due to fall (07/13/2021) (9/22/2021), History of encephalopathy (10/2017), Hypertension, Kyphoscoliosis, Osteoarthritis, Osteoporosis, and Rectal prolapse.    She has no past medical history of Colon polyps or Migraines.. PAST SURGICAL HISTORY:   has a past surgical history that includes Hysterectomy; colonoscopy (2010); Rectosigmoidectomy perineal (N/A, 1/12/2018); and Hip Pinning (Right, 4/4/2023).. FAMILY HISTORY: family history includes Depression/Anxiety in her son and son; Hypertension in her brother and mother.. SOCIAL HISTORY:   reports that she has never smoked. She has never used smokeless tobacco. She reports that she does not drink alcohol and does not use drugs.  Post Discharge Medication Reconciliation Status: discharge medications reconciled and changed, per note/orders.  Current Outpatient Medications   Medication Sig Dispense Refill     acetaminophen (TYLENOL) 500 MG tablet Take 1,000 mg by mouth 3 times daily       enoxaparin ANTICOAGULANT (LOVENOX) 30 MG/0.3ML syringe Inject 0.3 mLs (30 mg) Subcutaneous every 24 hours for 36 days 10.8 mL 0     atorvastatin (LIPITOR) 20 MG tablet 1 tablet (20 mg) by Oral or Feeding Tube route every evening 90 tablet 3     calcium carbonate 600 mg-vitamin D 400 units (CALTRATE) 600-400 MG-UNIT per tablet Take 1 tablet by mouth 2 times daily (before meals) 180 tablet 3     cyanocobalamin (VITAMIN B-12) 500 MCG tablet Take 500 mcg by mouth daily       Vitamin D3 (CHOLECALCIFEROL) 25 mcg (1000 units) tablet Take 1 tablet (25 mcg) by mouth daily 90 tablet 3     ROS: Limited secondary to cognitive impairment but today pt reports and 10 point ROS of systems including Constitutional, Eyes, Respiratory, Cardiovascular, Gastroenterology, Genitourinary, Integumentary, Musculoskeletal, Psychiatric were all negative except for pertinent positives noted in my HPI.    Vitals: BP  "131/76   Pulse 72   Temp 97.5  F (36.4  C)   Resp 20   Ht 1.473 m (4' 10\")   Wt 47.6 kg (104 lb 14.4 oz)   SpO2 95%   BMI 21.92 kg/m    BP:    Exam:  GENERAL APPEARANCE: Alert, in no distress, cooperative.   RESP: Respiratory effort , no respiratory distress, Lung sounds clear. On RA.   CV: Auscultation of heart reveals S1, S2, rate and rhythm regular, no murmur, no rub or gallop, no BLE edema. Peripheral pulses are 2+.  ABDOMEN: Normal bowel sounds, soft, non-tender abdomen, and no masses palpated.  SKIN: Inspection/Palpation of skin and subcutaneous tissue baseline w/ fragility. Ecchymosis to BUE. Incision to right hip covered with dressing that is C/D/I.   PSYCH: Insight, judgement, and memory are impaired at baseline, affect and mood are happy/engaged..    Lab/Diagnostic data: Recent labs in [facility] reviewed by me today.     ASSESSMENT/PLAN:  Closed fracture of right hip with routine healing, subsequent encounter  S/P ORIF (open reduction internal fixation) fracture  Compression fracture of L1 vertebra, sequela  Age-related osteoporosis with current pathological fracture with routine healing, subsequent encounter  History of ischemic stroke of L occipital lobe within watershed region due to embolic stroke of undetermined source (ESUS)  Mild cognitive impairment  Fraility  Acute on chronic. Ongoing.    Followed-up with ortho today. Incision is healing well. She continues to work with PT/OT and make progress. She was living in [place] w/o many services living prior to hospitalization. Will need to continue progress and needs through rehab for discharge plans.     Concern given poor cognition. Provider coordinated care w/ rehab who reported SLUMS of 10/30. This would lend to recommendation for 24h care and concern for significant vascular dementia (noting previous CVA).     She is on Lovenox for DVT prophylaxis through 5/24/23.    Pain controlled with acetaminophen 1000 mg PO TID.     Seen by neurosurgery while " hospitalized for compression fracture and no neurosurgical intervention recommended, can follow-up as needed.     Currently on calcium and vitamin D supplements. Continue.    Reviewed medication list and discontinued medications that were not utilized such as miralax and senna.      Postoperative anemia  Hypokalemia  Leukocytosis, unspecified type  Acute on chronic. Ongonig.    Hgb dropped from 13.4 to 9.6 after OR, thought to be due blood loss in the OR. Hemoglobin on 4/12 was 10.7. Pending a repeat hemoglobin today.     Will also order a vitamin B12 level to assess as she is on a supplementation.     Hypokalemia noted to be 3.1 on 4/12 and was given a one time dose of 20 MEQ potassium. BMP ordered for today that is pending to further monitoring.     Elevated white blood cell count thought be reactive, pending CBC from today for monitoring. No sxs of infection.     Hypertension, unspecified type  Chronic. Ongoing.    Blood pressures have been controlled with an isolated blood pressure in the 170s. She is not any antihypertensives at this time, amlodipine was discontinued on 4/10. Continue to monitor vital signs.     TCU team to follow-up w/in 1 week or as needed.      Orders:  1. B12 level x1 on 4/18/23. Dx: vitamin deficiency  2. Discontinue miralax.  3. Discontinue senna.    I (the provider) was present with professional student who participated in this service and in the documentation of this service. I have verified the HPI/ROS, history, and personally performed the physical exam and the healthcare decision making. I agree with all elements as documented above.      Electronically signed by:  SUSIE Collazo CNP [facility]                            Sincerely,        SUSIE Griffin CNP

## 2023-04-18 ENCOUNTER — TELEPHONE (OUTPATIENT)
Dept: GERIATRICS | Facility: CLINIC | Age: 88
End: 2023-04-18
Payer: COMMERCIAL

## 2023-04-18 LAB
HOLD SPECIMEN: NORMAL
HOLD SPECIMEN: NORMAL
VIT B12 SERPL-MCNC: 1948 PG/ML (ref 232–1245)

## 2023-04-18 PROCEDURE — 82607 VITAMIN B-12: CPT | Performed by: FAMILY MEDICINE

## 2023-04-18 PROCEDURE — P9604 ONE-WAY ALLOW PRORATED TRIP: HCPCS | Performed by: FAMILY MEDICINE

## 2023-04-18 PROCEDURE — 36415 COLL VENOUS BLD VENIPUNCTURE: CPT | Performed by: FAMILY MEDICINE

## 2023-04-18 RX ORDER — ENOXAPARIN SODIUM 100 MG/ML
30 INJECTION SUBCUTANEOUS EVERY 24 HOURS
Qty: 10.8 ML | Refills: 0
Start: 2023-04-18 | End: 2023-05-24

## 2023-04-18 RX ORDER — LIDOCAINE 50 MG/G
1 PATCH TOPICAL EVERY 24 HOURS
COMMUNITY
End: 2023-06-13

## 2023-04-18 NOTE — TELEPHONE ENCOUNTER
[facility] Geriatrics Triage Nurse Telephone Encounter    Provider: LY Franklin  Facility: [facility]  Facility Type:  TCU    Caller: Lenora  Call Back Number: [phone]    Allergies:  No Known Allergies     Reason for call: Facility nurse reported that PT is difficult for pt due to her hip pain. Family does not want pt on narcotics for pain control.     Verbal Order/Direction given by Provider: Lidocaine 5% patch to painful area of hip/leg; do not cover incision. Don in the AM and remove at HS.    Provider giving Order:  SUSIE Mcginnis CNP, DNP    Verbal Order given to: Lenora Farrell RN

## 2023-04-18 NOTE — TELEPHONE ENCOUNTER
[facility] Geriatrics Lab Note     Provider: LY Franklin  Facility: [facility]  Facility Type:  TCU    No Known Allergies    Labs Reviewed by provider: B12     Verbal Order/Direction given by Provider: Discontinue B12    Provider giving Order:  SUSIE Mcginnis CNP, DNP    Verbal Order given to: Martha Farrell RN

## 2023-04-18 NOTE — TELEPHONE ENCOUNTER
[facility] Nurse Triage Telephone Note    Provider: LY Franklin  Facility: [facility]   Facility Type:  TCU    Caller: Naomi  Call Back Number: [phone]    No Known Allergies    Reason for call: Nurse requesting provider order medication to help increase appetite as pt is not interested in eating meals. Currently sleeps through meals. Not on any narcotics. CNA will go into pt's room to encourage eating during meals but not very effective.    Verbal Order/Direction given by Provider: Provider will address during rounds tomorrow    Provider giving Order:  Rosenstein, Benjamin MD    Verbal Order given to: Naomi Oreilly RN

## 2023-04-19 ENCOUNTER — TRANSITIONAL CARE UNIT VISIT (OUTPATIENT)
Dept: GERIATRICS | Facility: CLINIC | Age: 88
End: 2023-04-19
Payer: COMMERCIAL

## 2023-04-19 VITALS
BODY MASS INDEX: 22.02 KG/M2 | SYSTOLIC BLOOD PRESSURE: 123 MMHG | WEIGHT: 104.9 LBS | HEIGHT: 58 IN | TEMPERATURE: 98.5 F | HEART RATE: 81 BPM | DIASTOLIC BLOOD PRESSURE: 77 MMHG | OXYGEN SATURATION: 93 % | RESPIRATION RATE: 24 BRPM

## 2023-04-19 DIAGNOSIS — S32.010S COMPRESSION FRACTURE OF L1 VERTEBRA, SEQUELA: ICD-10-CM

## 2023-04-19 DIAGNOSIS — M41.9 KYPHOSCOLIOSIS: ICD-10-CM

## 2023-04-19 DIAGNOSIS — Z98.890 S/P ORIF (OPEN REDUCTION INTERNAL FIXATION) FRACTURE: ICD-10-CM

## 2023-04-19 DIAGNOSIS — Z78.9 IMPAIRED MOBILITY AND ADLS: ICD-10-CM

## 2023-04-19 DIAGNOSIS — R41.89 COGNITIVE IMPAIRMENT: ICD-10-CM

## 2023-04-19 DIAGNOSIS — R41.89 IMPAIRED THOUGHT PROCESS: ICD-10-CM

## 2023-04-19 DIAGNOSIS — S72.001D CLOSED FRACTURE OF RIGHT HIP WITH ROUTINE HEALING, SUBSEQUENT ENCOUNTER: ICD-10-CM

## 2023-04-19 DIAGNOSIS — Z87.81 S/P ORIF (OPEN REDUCTION INTERNAL FIXATION) FRACTURE: ICD-10-CM

## 2023-04-19 DIAGNOSIS — R41.0 DELIRIUM: ICD-10-CM

## 2023-04-19 DIAGNOSIS — M80.00XD AGE-RELATED OSTEOPOROSIS WITH CURRENT PATHOLOGICAL FRACTURE WITH ROUTINE HEALING, SUBSEQUENT ENCOUNTER: ICD-10-CM

## 2023-04-19 DIAGNOSIS — G89.18 ACUTE POST-OPERATIVE PAIN: ICD-10-CM

## 2023-04-19 DIAGNOSIS — Z86.73 HISTORY OF CVA (CEREBROVASCULAR ACCIDENT): ICD-10-CM

## 2023-04-19 DIAGNOSIS — Z74.09 IMPAIRED MOBILITY AND ADLS: ICD-10-CM

## 2023-04-19 DIAGNOSIS — R54 FRAILTY: ICD-10-CM

## 2023-04-19 DIAGNOSIS — R29.6 FALLS FREQUENTLY: ICD-10-CM

## 2023-04-19 DIAGNOSIS — R63.0 ANOREXIA: Primary | ICD-10-CM

## 2023-04-19 DIAGNOSIS — R53.81 PHYSICAL DECONDITIONING: ICD-10-CM

## 2023-04-19 DIAGNOSIS — R47.89 WORD FINDING DIFFICULTY: ICD-10-CM

## 2023-04-19 PROCEDURE — 99310 SBSQ NF CARE HIGH MDM 45: CPT | Performed by: FAMILY MEDICINE

## 2023-04-19 NOTE — PROGRESS NOTES
"[facility] GERIATRICS    Chief Complaint   Patient presents with     RECHECK     HPI:  Malial Keaton Sheets is a 89 year old  (5/16/1930), previously living at [facility], with medhx including frequent falls, osteoporosis (following with endocrinology), cognitive impairmentswho is being seen today for an episodic care visit at: [facility] [[id number]].     Per my previous note:  \"Hospital course:  She had been found down at her apartment during a check-in.  Unknown how long she was down, but was noted to have urinated and was significantly more confused than baseline.  She was having right hip pain.  Blood sugar on arrival was normal.     Initial evaluation notable for overall normal BMP, CBC with very mild leukocytosis, UA clear.  EKG without concerning findings.  CT head without acute intracranial pathology but noted moderate generalized cerebral volume loss with advanced leukoaraiosis.  As part of trauma evaluation, CT whole spine was obtained with significant findings of L1 vertebral body compression fracture with worsened height loss from 11/6/2022 as well as advanced wedge compression fractures T2-T4, T6, T9, and L1 likely chronic.     A right hip x-ray was obtained showing a mildly displaced and angulated fracture of the intertrochanteric right femur.  No other fractures.  Did note bony demineralization.  Orthopedic surgery was consulted and she underwent ORIF with right intramedullary nailing 4/4/23.  She did have modest postoperative anemia related to blood loss.  Did not require transfusions.      Postoperative course was complicated by hyper and hypoactive delirium - her daughter stating she had been out of it for 2 days straight.     With her history of CVA, she had been on aspirin.  This was held at discharge as she is currently on Lovenox anticoagulation for 60 days.\"    Prior to my visit today, was contacted by her family with concerns of pain control as well as discharge.  Sudeep had " "had her care conference yesterday with family present as well.  They had noted that her cognition still seemed far below her prior baseline.  Also concerned that she was unable to participate in therapy due to pain. Per care conference note:    \"Note Text: Care conference held today with patient, both daughters were present over the phone. Therapy, S.S. and nursing were present. Occupational therapy reported grooming is minimal assist, upper body dressing requires minimal assist, lower body dressing is Dependent. Toileting dependent assist of 2 people and sera steady. Bathing is maximum assist. Patient is able to eat supervision. Has assist with medication set up and kitchen tasks. Patient initially scored 10/30 on a cognition test showing severe impairment, falls within dementia range. Physical therapy reports bed mobility is moderate assist to get legs in and out of bed. Transfers are pivot transfers. Patient was able to walk 1 foot in the parallel bars. Patients balance was tested with the Timed Up and Go: unable to test at this time. Nurse manager reported patients vitals, medication and went over any nursing concerns. Patient is taking Tylenol for pain as needed. Patient is on a regular diet and eating 25%-50%. No last cover date at this time. PT recommendations to be ongoing with therapy at this time. S.S. will update [facility]. S.S. will continue to monitor and assist with dc planning.\"    In addition to pain and mobility concerns, also staff brought up concern with her decreased oral intake.  She has been eating very little of her meals and were wondering about appetite stimulant.    Discussed with therapy today.  They note she is max assist with dressing, moderate assistance with transfers.  Requires significant assistance with mobilization.    She says she is bothered by the way she feels.  Says she is bothered by the way it hurts.  Says all the middle part hurt.  When I asked her to " specify, she will point at her hips this places that hurt but says it feels like it goes across her middle then.  She does say is better when the pain goes away but unable to identify anything that specifically makes it better or worse.  She did did eventually say standing seems to feel worse.    She says her appetite is reasonable, meaning she eats when she hungry and she stops when she isn't.  She initially says she does not have any problems with swallowing but then says she does sometimes.    She has no nausea or vomiting, no diarrhea or constipation.  No chest pain or pressure, no shortness of breath.  She has no dysuria, hematuria.  She is incontinent of urine.  Says she has been sleeping well.  Her mood is good.    Inpatient MAR reviewed. Did receive low dose oxycodone only.   Consider NSAID vs Norco (or bup)    Did call and review concerns with her daughter Lubna.  Her primary concern was related to pain control with the concern that because her pain is not controlled she is not eating as well and that this also may be impairing her cognition.  I did go back into her inpatient encounter and reviewed the MAR and noted that she had been receiving only low-dose oxycodone, 2.5 mg as needed, and as previously discussed, she was somnolent to obtunded on this.  Because of this, we had previously discussed not using oxycodone for pain control and trialing scheduled Tylenol.  Seems like this has been controlling her pain adequately, though she has been moving more, she has had increased pain.    I did review that I do not suspect her pain is causing her cognitive changes itself, particularly as it is primarily when she is moving.  I did review that even with her prior admission to this facility, there was notes of a progressive change in her cognition and I suspect she has early to mid stage dementia, likely impacted by her prior stroke.  Additionally, she had significant delirium in the hospital, and I reviewed with  "Lubna that this can impact her cognition chronically and potentially she will not return to her prior baseline.  She she understood this.    As far as her pain control, we discussed that there are risks regardless of what we do.  Since she had such somnolence and delirium with oxycodone, we agreed not to use that again.  We could use Norco, but it could lead to some limitations due to it being only available as a combined medication.  Did discuss potentially trialing NSAIDs, which would likely assist further with inflammatory pain, however especially she is currently on Lovenox, there could be a significant risk of GI bleed.  I did discuss trial of a buprenorphine patch as a low-dose opioid option with a less concerning side effect profile.  Given our discussion, we plan to start a buprenorphine patch.    In addition, as with her review of likely dementia, and her limited mobility, did review that she likely would be best served by a long-term care arrangement.  She could review with Chris if they are able to provide her greater supports, otherwise would be looking at a long-term care nursing home.    Objective:   /77   Pulse 81   Temp 98.5  F (36.9  C)   Resp 24   Ht 1.473 m (4' 10\")   Wt 47.6 kg (104 lb 14.4 oz)   SpO2 93%   BMI 21.92 kg/m      GENERAL APPEARANCE: Seated comfortably, NAD  HENT:  NCAT, moderately [place], good dentition  PULM  Normal WOB on RA, lungs CTAB, no wheezes or crackles  CV:  RRR, S1/S2 normal, no murmurs; trace LE edema  ABDOMEN: Abdomen soft, not tender, not distended, BS normal and active throughout   M/S:   TTP over right trochanter  NEURO: Alert, disorietned (states at [facility], unable to identify time), unable to complete months in reverse (skipped August, couldn't get passed June then reverted to normal direction); significantly delayed thought processes with word finding difficulties - will stop mid sentence at times if unable to come up with a word. " "    Assessment/Plan:    (R63.0) Anorexia  (primary encounter diagnosis)  Comment: Likely related to underlying cognitive impairment.  Seems less likely related to pain.  May have some mild dysphagia as well.  No indication for \" appetite stimulant.\"  We will test for H. pylori.  Plan:   -RD following, continue supplementation  -Allow to eat as tolerated, discontinue if wishes  -H. pylori stool antigen    (G89.18) Acute post-operative pain  Comment: Primarily pain of the right hip when she sits up or ambulate.  Has had significant delirium related to low-dose opioids previously.  However also concern for GI bleed with scheduled NSAIDs particularly in the presence of anticoagulation.  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  In addition, likely a chronic state of delirium which may be slow to recover and potentially not recover completely, contributing to delayed thought processes and word finding difficulties.  Plan:   -Expect may require long-term care at discharge    (R54) Frailty  Comment: Contributing to complexity and increased care needs.  Plan:   -Continue therapies    (M80.00XD) Age-related osteoporosis with current pathological fracture with routine healing, subsequent encounter  (M41.9) Kyphoscoliosis  (S32.010S) Compression fracture of L1 vertebra, sequela  (S72.001D) Closed fracture of right hip with routine healing, subsequent encounter  (Z98.890,  Z87.81) S/P ORIF (open reduction internal fixation) fracture  Comment: Follows with endocrinology.  " Has had significant evaluation and treatment for osteoporosis.  Did contact her endocrinologist with no recommended changes in her regimen at this time.    (R29.6) Falls frequently  Comment: Related to multiple of the above conditions.  Additionally complicated by associated fractures.  Again, likely would benefit from discharge to long-term care services.    (R53.81) Physical deconditioning  (Z74.09,  Z78.9) Impaired mobility and ADLs  Comment: Related to multiple of the above conditions.  Plan:   -PT/OT    MED REC REQUIRED  Post Medication Reconciliation Status: medication reconcilation previously completed during another office visit      Orders:  [x] Weight every 3 days  [x] H pylori stool ag  [x] Start buprenorphine patch 5mcg/wk    Electronically signed by:     Benjamin Rosenstein, MD, MA  [facility]    This note was completed with the assistance of dictation software. Typos and word substitution-errors are expected and unintended.      64 MINUTES SPENT BY ME on the date of service doing chart review, history, exam, documentation & further activities per the note.

## 2023-04-19 NOTE — LETTER
"    4/19/2023        RE: Belia Sheets  [address]  [address]  [facility] [address]        [facility] GERIATRICS    Chief Complaint   Patient presents with     RECHECK     HPI:  Belia Sheets is a 89 year old  (5/16/1930), previously living at [facility], with medhx including frequent falls, osteoporosis (following with endocrinology), cognitive impairmentswho is being seen today for an episodic care visit at: [facility] ([facility]) [[id number]].     Per my previous note:  \"Hospital course:  She had been found down at her apartment during a check-in.  Unknown how long she was down, but was noted to have urinated and was significantly more confused than baseline.  She was having right hip pain.  Blood sugar on arrival was normal.     Initial evaluation notable for overall normal BMP, CBC with very mild leukocytosis, UA clear.  EKG without concerning findings.  CT head without acute intracranial pathology but noted moderate generalized cerebral volume loss with advanced leukoaraiosis.  As part of trauma evaluation, CT whole spine was obtained with significant findings of L1 vertebral body compression fracture with worsened height loss from 11/6/2022 as well as advanced wedge compression fractures T2-T4, T6, T9, and L1 likely chronic.     A right hip x-ray was obtained showing a mildly displaced and angulated fracture of the intertrochanteric right femur.  No other fractures.  Did note bony demineralization.  Orthopedic surgery was consulted and she underwent ORIF with right intramedullary nailing 4/4/23.  She did have modest postoperative anemia related to blood loss.  Did not require transfusions.      Postoperative course was complicated by hyper and hypoactive delirium - her daughter stating she had been out of it for 2 days straight.     With her history of CVA, she had been on aspirin.  This was held at discharge as she is currently on Lovenox anticoagulation for 60 days.\"    Prior to my " "visit today, was contacted by her family with concerns of pain control as well as discharge.  Sudeep had had her care conference yesterday with family present as well.  They had noted that her cognition still seemed far below her prior baseline.  Also concerned that she was unable to participate in therapy due to pain. Per care conference note:    \"Note Text: Care conference held today with patient, both daughters were present over the phone. Therapy, S.S. and nursing were present. Occupational therapy reported grooming is minimal assist, upper body dressing requires minimal assist, lower body dressing is Dependent. Toileting dependent assist of 2 people and sera steady. Bathing is maximum assist. Patient is able to eat supervision. Has assist with medication set up and kitchen tasks. Patient initially scored 10/30 on a cognition test showing severe impairment, falls within dementia range. Physical therapy reports bed mobility is moderate assist to get legs in and out of bed. Transfers are pivot transfers. Patient was able to walk 1 foot in the parallel bars. Patients balance was tested with the Timed Up and Go: unable to test at this time. Nurse manager reported patients vitals, medication and went over any nursing concerns. Patient is taking Tylenol for pain as needed. Patient is on a regular diet and eating 25%-50%. No last cover date at this time. PT recommendations to be ongoing with therapy at this time. S.S. will update [facility]. S.S. will continue to monitor and assist with dc planning.\"    In addition to pain and mobility concerns, also staff brought up concern with her decreased oral intake.  She has been eating very little of her meals and were wondering about appetite stimulant.    Discussed with therapy today.  They note she is max assist with dressing, moderate assistance with transfers.  Requires significant assistance with mobilization.    She says she is bothered by the way she " feels.  Says she is bothered by the way it hurts.  Says all the middle part hurt.  When I asked her to specify, she will point at her hips this places that hurt but says it feels like it goes across her middle then.  She does say is better when the pain goes away but unable to identify anything that specifically makes it better or worse.  She did did eventually say standing seems to feel worse.    She says her appetite is reasonable, meaning she eats when she hungry and she stops when she isn't.  She initially says she does not have any problems with swallowing but then says she does sometimes.    She has no nausea or vomiting, no diarrhea or constipation.  No chest pain or pressure, no shortness of breath.  She has no dysuria, hematuria.  She is incontinent of urine.  Says she has been sleeping well.  Her mood is good.    Inpatient MAR reviewed. Did receive low dose oxycodone only.   Consider NSAID vs Norco (or bup)    Did call and review concerns with her daughter Lubna.  Her primary concern was related to pain control with the concern that because her pain is not controlled she is not eating as well and that this also may be impairing her cognition.  I did go back into her inpatient encounter and reviewed the MAR and noted that she had been receiving only low-dose oxycodone, 2.5 mg as needed, and as previously discussed, she was somnolent to obtunded on this.  Because of this, we had previously discussed not using oxycodone for pain control and trialing scheduled Tylenol.  Seems like this has been controlling her pain adequately, though she has been moving more, she has had increased pain.    I did review that I do not suspect her pain is causing her cognitive changes itself, particularly as it is primarily when she is moving.  I did review that even with her prior admission to this facility, there was notes of a progressive change in her cognition and I suspect she has early to mid stage dementia, likely impacted  "by her prior stroke.  Additionally, she had significant delirium in the hospital, and I reviewed with Lubna that this can impact her cognition chronically and potentially she will not return to her prior baseline.  She she understood this.    As far as her pain control, we discussed that there are risks regardless of what we do.  Since she had such somnolence and delirium with oxycodone, we agreed not to use that again.  We could use Norco, but it could lead to some limitations due to it being only available as a combined medication.  Did discuss potentially trialing NSAIDs, which would likely assist further with inflammatory pain, however especially she is currently on Lovenox, there could be a significant risk of GI bleed.  I did discuss trial of a buprenorphine patch as a low-dose opioid option with a less concerning side effect profile.  Given our discussion, we plan to start a buprenorphine patch.    In addition, as with her review of likely dementia, and her limited mobility, did review that she likely would be best served by a long-term care arrangement.  She could review with Chris if they are able to provide her greater supports, otherwise would be looking at a long-term care nursing home.    Objective:   /77   Pulse 81   Temp 98.5  F (36.9  C)   Resp 24   Ht 1.473 m (4' 10\")   Wt 47.6 kg (104 lb 14.4 oz)   SpO2 93%   BMI 21.92 kg/m      GENERAL APPEARANCE: Seated comfortably, NAD  HENT:  NCAT, moderately [place], good dentition  PULM  Normal WOB on RA, lungs CTAB, no wheezes or crackles  CV:  RRR, S1/S2 normal, no murmurs; trace LE edema  ABDOMEN: Abdomen soft, not tender, not distended, BS normal and active throughout   M/S:   TTP over right trochanter  NEURO: Alert, disorietned (states at [facility], unable to identify time), unable to complete months in reverse (skipped August, couldn't get passed June then reverted to normal direction); significantly delayed thought processes with word finding " "difficulties - will stop mid sentence at times if unable to come up with a word.     Assessment/Plan:    (R63.0) Anorexia  (primary encounter diagnosis)  Comment: Likely related to underlying cognitive impairment.  Seems less likely related to pain.  May have some mild dysphagia as well.  No indication for \" appetite stimulant.\"  We will test for H. pylori.  Plan:   -RD following, continue supplementation  -Allow to eat as tolerated, discontinue if wishes  -H. pylori stool antigen    (G89.18) Acute post-operative pain  Comment: Primarily pain of the right hip when she sits up or ambulate.  Has had significant delirium related to low-dose opioids previously.  However also concern for GI bleed with scheduled NSAIDs particularly in the presence of anticoagulation.  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  In addition, likely a chronic state of delirium which may be slow to recover and potentially not recover completely, contributing to delayed thought processes and word finding difficulties.  Plan:   -Expect may require long-term care at discharge    (R54) Frailty  Comment: Contributing to complexity and increased care needs.  Plan:   -Continue therapies    (M80.00XD) Age-related osteoporosis with current pathological fracture with routine healing, subsequent encounter  (M41.9) Kyphoscoliosis  (S32.010S) Compression fracture of L1 vertebra, sequela  (S72.001D) Closed fracture of right hip with routine healing, subsequent encounter  (Z98.890,  Z87.81) S/P ORIF " (open reduction internal fixation) fracture  Comment: Follows with endocrinology.  Has had significant evaluation and treatment for osteoporosis.  Did contact her endocrinologist with no recommended changes in her regimen at this time.    (R29.6) Falls frequently  Comment: Related to multiple of the above conditions.  Additionally complicated by associated fractures.  Again, likely would benefit from discharge to long-term care services.    (R53.81) Physical deconditioning  (Z74.09,  Z78.9) Impaired mobility and ADLs  Comment: Related to multiple of the above conditions.  Plan:   -PT/OT    MED REC REQUIRED  Post Medication Reconciliation Status: medication reconcilation previously completed during another office visit      Orders:  [x] Weight every 3 days  [x] H pylori stool ag  [x] Start buprenorphine patch 5mcg/wk    Electronically signed by:     Benjamin Rosenstein, MD, MA  [facility]    This note was completed with the assistance of dictation software. Typos and word substitution-errors are expected and unintended.      64 MINUTES SPENT BY ME on the date of service doing chart review, history, exam, documentation & further activities per the note.           Sincerely,        Benjamin Rosenstein, MD

## 2023-04-20 RX ORDER — BUPRENORPHINE 5 UG/H
1 PATCH TRANSDERMAL
COMMUNITY
Start: 2023-04-20 | End: 2023-04-30

## 2023-04-21 ENCOUNTER — LAB REQUISITION (OUTPATIENT)
Dept: LAB | Facility: CLINIC | Age: 88
End: 2023-04-21
Payer: COMMERCIAL

## 2023-04-21 ENCOUNTER — TELEPHONE (OUTPATIENT)
Dept: GERIATRICS | Facility: CLINIC | Age: 88
End: 2023-04-21
Payer: COMMERCIAL

## 2023-04-21 ENCOUNTER — LAB REQUISITION (OUTPATIENT)
Dept: LAB | Facility: CLINIC | Age: 88
End: 2023-04-21

## 2023-04-21 DIAGNOSIS — I10 ESSENTIAL (PRIMARY) HYPERTENSION: ICD-10-CM

## 2023-04-21 DIAGNOSIS — B96.81 HELICOBACTER PYLORI (H. PYLORI) AS THE CAUSE OF DISEASES CLASSIFIED ELSEWHERE: ICD-10-CM

## 2023-04-21 PROCEDURE — 87338 HPYLORI STOOL AG IA: CPT | Performed by: FAMILY MEDICINE

## 2023-04-21 NOTE — TELEPHONE ENCOUNTER
[facility] Geriatrics Triage Nurse Telephone Encounter    Provider: Rosenstein, Benjamin MD  Facility: [facility]  Facility Type:  TCU    Caller: Lexyhelena  Call Back Number: [phone]    Allergies:  No Known Allergies     Reason for call:     Pt has a butrans patch, the aid took the patch off today d/t bath day . It was just placed yesterday, with an order to change weekly on thursdays.   Verbal Order/Direction given by Provider: Place a new patch today and change weekly on fridays, update staff not to take off for bathing.     Provider giving Order:  Rosenstein, Benjamin MD    Verbal Order given to: Jp Thomas RN

## 2023-04-24 ENCOUNTER — TRANSITIONAL CARE UNIT VISIT (OUTPATIENT)
Dept: GERIATRICS | Facility: CLINIC | Age: 88
End: 2023-04-24
Payer: COMMERCIAL

## 2023-04-24 VITALS
TEMPERATURE: 98 F | SYSTOLIC BLOOD PRESSURE: 145 MMHG | OXYGEN SATURATION: 98 % | RESPIRATION RATE: 18 BRPM | WEIGHT: 94 LBS | HEART RATE: 85 BPM | BODY MASS INDEX: 19.73 KG/M2 | DIASTOLIC BLOOD PRESSURE: 95 MMHG | HEIGHT: 58 IN

## 2023-04-24 DIAGNOSIS — R54 FRAILTY: ICD-10-CM

## 2023-04-24 DIAGNOSIS — D72.828 OTHER ELEVATED WHITE BLOOD CELL (WBC) COUNT: ICD-10-CM

## 2023-04-24 DIAGNOSIS — S32.010S COMPRESSION FRACTURE OF L1 VERTEBRA, SEQUELA: ICD-10-CM

## 2023-04-24 DIAGNOSIS — R15.9 BOWEL AND BLADDER INCONTINENCE: ICD-10-CM

## 2023-04-24 DIAGNOSIS — Z87.81 S/P ORIF (OPEN REDUCTION INTERNAL FIXATION) FRACTURE: ICD-10-CM

## 2023-04-24 DIAGNOSIS — Z98.890 S/P ORIF (OPEN REDUCTION INTERNAL FIXATION) FRACTURE: ICD-10-CM

## 2023-04-24 DIAGNOSIS — M80.00XD AGE-RELATED OSTEOPOROSIS WITH CURRENT PATHOLOGICAL FRACTURE WITH ROUTINE HEALING, SUBSEQUENT ENCOUNTER: ICD-10-CM

## 2023-04-24 DIAGNOSIS — M41.9 KYPHOSCOLIOSIS: ICD-10-CM

## 2023-04-24 DIAGNOSIS — R53.81 PHYSICAL DECONDITIONING: ICD-10-CM

## 2023-04-24 DIAGNOSIS — R29.6 FALLS FREQUENTLY: ICD-10-CM

## 2023-04-24 DIAGNOSIS — K62.3 RECTAL PROLAPSE: ICD-10-CM

## 2023-04-24 DIAGNOSIS — Z86.73 HISTORY OF CVA (CEREBROVASCULAR ACCIDENT): ICD-10-CM

## 2023-04-24 DIAGNOSIS — S72.144D CLOSED NONDISPLACED INTERTROCHANTERIC FRACTURE OF RIGHT FEMUR WITH ROUTINE HEALING, SUBSEQUENT ENCOUNTER: Primary | ICD-10-CM

## 2023-04-24 DIAGNOSIS — G31.84 MILD COGNITIVE IMPAIRMENT: ICD-10-CM

## 2023-04-24 DIAGNOSIS — R32 BOWEL AND BLADDER INCONTINENCE: ICD-10-CM

## 2023-04-24 LAB
H PYLORI AG STL QL IA: NEGATIVE
POTASSIUM SERPL-SCNC: 4.3 MMOL/L (ref 3.4–5.3)

## 2023-04-24 PROCEDURE — P9604 ONE-WAY ALLOW PRORATED TRIP: HCPCS | Performed by: FAMILY MEDICINE

## 2023-04-24 PROCEDURE — 84132 ASSAY OF SERUM POTASSIUM: CPT | Performed by: FAMILY MEDICINE

## 2023-04-24 PROCEDURE — 99309 SBSQ NF CARE MODERATE MDM 30: CPT | Performed by: NURSE PRACTITIONER

## 2023-04-24 PROCEDURE — 36415 COLL VENOUS BLD VENIPUNCTURE: CPT | Performed by: FAMILY MEDICINE

## 2023-04-24 NOTE — LETTER
2023        RE: Belia Sheets  [address]  [address]  [facility] [id number]        [facility]    Name:   Belia Sheets (Sudeep)  :   1930  MRN:    [id number]     Facility:   [facility] ([facility]) [[id number]]   Room: U / [place] [address]  Code Status: FULL CODE and POLST AVAILABLE -     DOS: 2023    PCP:  Alejandro Sandhu    CHIEF COMPLAINT / REASON FOR VISIT:  Chief Complaint   Patient presents with     Clinic Care Coordination - Follow-up     Right intratrochanteric hip fracture s/p IMN, L1 superior endplate compression fracture with mild height loss        [facility] from 2021 until 2021 (fall and right 9th rib fracture)  [facility] from 2021 until 2021 (fall with pubic rami fractures)  [facility] from 2021 until 10/08/2021  [facility] from 2022 until 2022 (acute ischemic stroke left occipital lobe within watershed region to the ESUS)   [facility] from 2022 until 2022  [facility] from 2023 until 2023 (right hip fracture s/p IMN, L1 superior endplate compression fracture)      HPI: Belia is a 89 year old female with a history of osteoporosis, significant osteoarthritis (especially in the hands), essential hypertension, and remote history of visual hallucinations.  She had just undergone rehab in the TCU here a few months prior after falling and sustaining a right ninth rib fracture.    She fell again on 2021 and was taken by EMS to [facility] ED.  Imaging revealed right superior and inferior pubic rami fractures.  Evaluated by orthopedics with fractures to be managed conservatively, utilizing pain control and guided weightbearing recommendations.  She can bear weight as tolerated with a  "walker for safety.  Orthopedic surgery did recommend follow-up in 3 to 4 weeks with nonoperative orthopedics provider.  This should include AP x-rays of the pelvis.  She may be seen by her PCP if the PCP feels comfortable.      She was admitted to [facility] on 11/05/2022 with an acute ischemic stroke of the left occipital lobe within watershed region due to embolic stroke of undetermined source (ESUS).  No abnormalities were noted on MRI.  It was suggested that he suffered from acute on chronic encephalopathy.  Echo showed no cardiac source of the embolus.  EKG and cardiac monitoring showed sinus rhythm with PACs and a nonspecific T wave abnormality but no evidence of atrial fibrillation.  She does have some risk factors for stroke, including elevated LDL (111) and hypertension.    According to Lubna, her daughter, her mother was experiencing a \"different confusion\" compared to baseline.  Difficulty following train of thought since March or April 2022.      THIS HOSPITALIZATION    She presented to the ED on 04/03/2023 with AMS and imaging showing right intertrochanteric proximal femur fracture and L1 compression fracture of the superior endplate.      She underwent OR with right intramedullary nail the following day with Dr. Roach.  She can be WBAT.  She received enoxaparin for DVT prophylaxis during her hospitalization and completed a course of Ancef.    Initial CT imaging showed L1 compression fracture of the superior endplate with mild height loss, potentially slightly worsened compared to imaging from 11/06/2022.  No neurosurgical intervention was recommended but plan to continue bone health clinic referral.    She had leukocytosis which was mild and likely reactive in nature.     Waxing and waning mentation was described and, per a discussion with her daughter, delirium has occurred following hospitalizations.    Follow-up: Dressing to remain in place until POD #7.  2 weeks with RN for wound check, 6 weeks with Dr." "Doc.      CURRENT/RECENT TCU ISSUES    Disposition   -- Impaired cognition is much more apparent than it had been during previous TCU stays.  She was awake when I entered her room but dozed off several times during the visit.  Asked how she was doing, she answered, \"well, I am keeping something going,\" and then she dozed off in the recliner.  For pain, they had tried very low-dose oxycodone (2.5 mg), but she became oversedated even on that dose.  I suspect that she still suffers from the effects were from anesthesia in the hospital, as she does have that history of delirium.  -- During her last TCU stay, confusion and short-term memory difficulties became clearer.  When asked, she could tell me neither the year nor month. SLP did work with her during an earlier stay here, and it was felt she required quite a bit of support.  At this point in time, she clearly requires 24-hour supervision.    Osteoporosis  History of multiple fractures  -- History of falls and fractures over the last few years.    Rectal prolapse  -- When last here, she has occasionally complained of rectal pain.  She does have a history of rectal prolapse, and she stated then that \"it hurts trying to push out small pieces of stool.\"    -- This issue apparently comes and goes but should be kept in mind in her current situation. She has undergone surgery within the last 5 or 6 years.      Cognitive impairment   -- During an earlier stay, there were at least mild cognitive deficits noted, and this was corroborated by the patient's son who also endorsed his mother's memory loss, although she can appear, for the most part, to be quite cogent.  In 2021, SLP did work with her, and it was felt that she needed quite a bit of support.   On the SLUMS, she did rather poorly on the clock test, making a pie and then adding numbers but no hands.  She scored 2/17 on the storytelling, 10/17 on orientation.  She scored 23/30 on the MMSE.  Her daughter apparently " believed cognitive impairment to be an acute issue.  However, the patient told me that she felt she had trouble with her thinking 2-3 years earlier but that she was feeling much clearer at the time.  Cognition does tend to wax and wane and is likely multifactorial in nature.      Dysphagia  -- Soon after her prior admission, I requested evaluation and treatment by SLP, not only for her cognitive issues but for complaints of swallowing difficulty.  There was no need to change her diet.  I am aware that they worked together at least twice during this stay (and probably longer).  Interestingly, she could remember Rosey, the SLP's name, at the time.  -- She was apparently reevaluated during her last hospital stay but was ultimately given an okay for thin liquids.    Osteoarthritis   -- Rather severe in the hands with noted deformities of various joints.  Current pain management appears to be working well.    Discharge planning   -- Indications are that she will most likely need long-term care.      ROS:  10 point ROS of systems including Constitutional, Eyes, Respiratory, Cardiovascular, Gastroenterology, Genitourinary, Integumentary, Muscularskeletal, Psychiatric were all negative except for pertinent positives noted in my HPI.      Past Medical History:   Diagnosis Date     Carpal tunnel syndrome (aka CTS)      Cataract      Chronic osteoarthritis      Constipation due to outlet dysfunction 2021     Fracture of multiple rami of right pubis with routine healing, subsequent encounter 2021     H/O calcium pyrophosphate deposition disease (CPPD)      History of 9th right rib fracture due to fall (2021) 2021     History of encephalopathy 10/2017     Hypertension      Kyphoscoliosis      Osteoarthritis     hands     Osteoporosis      Rectal prolapse               Family History   Problem Relation Age of Onset     Depression/Anxiety Son      Depression/Anxiety Son         alcohol abuse  age 24      Hypertension Mother      Hypertension Brother      Diabetes No family hx of      Breast Cancer No family hx of      Colon Cancer No family hx of      Prostate Cancer No family hx of      Other Cancer No family hx of      Social History     Socioeconomic History     Marital status:      Spouse name: Not on file     Number of children: Not on file     Years of education: Not on file     Highest education level: Not on file   Occupational History     Not on file   Tobacco Use     Smoking status: Never Smoker     Smokeless tobacco: Never Used   Substance and Sexual Activity     Alcohol use: No     Drug use: No     Sexual activity: Never   Other Topics Concern     Not on file   Social History Narrative     Not on file     Social Determinants of Health     Financial Resource Strain:      Difficulty of Paying Living Expenses:    Food Insecurity:      Worried About Running Out of Food in the Last Year:      Ran Out of Food in the Last Year:    Transportation Needs:      Lack of Transportation (Medical):      Lack of Transportation (Non-Medical):    Physical Activity:      Days of Exercise per Week:      Minutes of Exercise per Session:    Stress:      Feeling of Stress :    Social Connections:      Frequency of Communication with Friends and Family:      Frequency of Social Gatherings with Friends and Family:      Attends [employer] Services:      Active Member of Clubs or Organizations:      Attends Club or Organization Meetings:      Marital Status:    Intimate Partner Violence:      Fear of Current or Ex-Partner:      Emotionally Abused:      Physically Abused:      Sexually Abused:        MEDICATIONS: Reviewed from the MAR, physician orders, and/or earlier progress notes.  Post Medication Reconciliation Status: medication reconcilation previously completed during another office visit    Current Outpatient Medications   Medication Sig     acetaminophen (TYLENOL) 500 MG tablet Take 1,000 mg by mouth 3 times daily  "    atorvastatin (LIPITOR) 20 MG tablet 1 tablet (20 mg) by Oral or Feeding Tube route every evening     buprenorphine (BUTRANS) 5 MCG/HR WK patch Place 1 patch onto the skin every 7 days     calcium carbonate 600 mg-vitamin D 400 units (CALTRATE) 600-400 MG-UNIT per tablet Take 1 tablet by mouth 2 times daily (before meals)     enoxaparin ANTICOAGULANT (LOVENOX) 30 MG/0.3ML syringe Inject 0.3 mLs (30 mg) Subcutaneous every 24 hours for 36 days     lidocaine (LIDODERM) 5 % patch Place 1 patch onto the skin every 24 hours To prevent lidocaine toxicity, patient should be patch free for 12 hrs daily.     Vitamin D3 (CHOLECALCIFEROL) 25 mcg (1000 units) tablet Take 1 tablet (25 mcg) by mouth daily     No current facility-administered medications for this visit.     ALLERGIES: No Known Allergies    DIET: Regular, regular texture, thin liquids.  Mighty Shake.    Vitals:    04/24/23 1331   BP: (!) 145/95   Pulse: 85   Resp: 18   Temp: 98  F (36.7  C)   SpO2: 98%   Weight: 42.6 kg (94 lb)   Height: 1.473 m (4' 10\")     Body mass index is 19.65 kg/m .    EXAMINATION:   General: Frail-appearing elderly female, resting in a recliner, dozing off intermittently during our conversation.  She is able to tell me, before dozing off again, \"I broke my hip.\"  She looked puzzled when I asked how she broke it.  Head: Normocephalic and atraumatic.   Eyes: PERRLA, sclerae clear.   ENT: Moist oral mucosa.  Has several of her own teeth, but missing all lower teeth on right plus several upper teeth.  No nasal discharge.   Cardiovascular: RRR with distinct wide split S1.   Respiratory: Lungs CTAB.   Abdomen: Nondistended.   Musculoskeletal/Extremities: Age-related DJD and moderate to severe kyphoscoliosis (which inhibits her breathing).  Hands and fingers show significant deformities due to OA.  Right fifth trigger finger.  Bilateral hammertoes, bilateral hallux valgus deformities with partial great toe overlap.  No peripheral edema. "   Integument: No rashes, clinically significant lesions, or skin breakdown.   Cognitive/Psychiatric: Very somnolent.      DIAGNOSTICS:   Recent Results (from the past 240 hour(s))   Basic metabolic panel    Collection Time: 04/17/23 11:14 AM   Result Value Ref Range    Sodium 138 136 - 145 mmol/L    Potassium 3.2 (L) 3.4 - 5.3 mmol/L    Chloride 100 98 - 107 mmol/L    Carbon Dioxide (CO2) 23 22 - 29 mmol/L    Anion Gap 15 7 - 15 mmol/L    Urea Nitrogen 15.5 8.0 - 23.0 mg/dL    Creatinine 0.57 0.51 - 0.95 mg/dL    Calcium 10.1 (H) 8.2 - 9.6 mg/dL    Glucose 96 70 - 99 mg/dL    GFR Estimate 85 >60 mL/min/1.73m2   CBC with platelets and differential    Collection Time: 04/17/23 11:14 AM   Result Value Ref Range    WBC Count 12.3 (H) 4.0 - 11.0 10e3/uL    RBC Count 4.13 3.80 - 5.20 10e6/uL    Hemoglobin 12.2 11.7 - 15.7 g/dL    Hematocrit 39.0 35.0 - 47.0 %    MCV 94 78 - 100 fL    MCH 29.5 26.5 - 33.0 pg    MCHC 31.3 (L) 31.5 - 36.5 g/dL    RDW 15.8 (H) 10.0 - 15.0 %    Platelet Count 538 (H) 150 - 450 10e3/uL    % Neutrophils 75 %    % Lymphocytes 11 %    % Monocytes 6 %    % Eosinophils 6 %    % Basophils 1 %    % Immature Granulocytes 1 %    NRBCs per 100 WBC 0 <1 /100    Absolute Neutrophils 9.4 (H) 1.6 - 8.3 10e3/uL    Absolute Lymphocytes 1.3 0.8 - 5.3 10e3/uL    Absolute Monocytes 0.7 0.0 - 1.3 10e3/uL    Absolute Eosinophils 0.7 0.0 - 0.7 10e3/uL    Absolute Basophils 0.1 0.0 - 0.2 10e3/uL    Absolute Immature Granulocytes 0.1 <=0.4 10e3/uL    Absolute NRBCs 0.0 10e3/uL   Vitamin B12    Collection Time: 04/18/23  7:50 AM   Result Value Ref Range    Vitamin B12 1,948 (H) 232 - 1,245 pg/mL   Extra Purple Top EDTA (LAB USE ONLY)    Collection Time: 04/18/23  7:50 AM   Result Value Ref Range    Hold Specimen JIC    Extra Green Top Tube (LAB USE ONLY)    Collection Time: 04/18/23  7:50 AM   Result Value Ref Range    Hold Specimen JIC    Helicobacter pylori Antigen Stool    Collection Time: 04/21/23  1:00 PM   Result  Value Ref Range    Helicobacter pylori Antigen Stool Negative Negative   Potassium    Collection Time: 04/24/23  8:09 AM   Result Value Ref Range    Potassium 4.3 3.4 - 5.3 mmol/L   Helicobacter pylori Antigen Stool    Collection Time: 04/25/23  6:21 PM   Result Value Ref Range    Helicobacter pylori Antigen Stool Negative Negative        Last Comprehensive Metabolic Panel:  Sodium   Date Value Ref Range Status   04/17/2023 138 136 - 145 mmol/L Final   01/30/2019 140 133 - 144 mmol/L Final     Potassium   Date Value Ref Range Status   04/24/2023 4.3 3.4 - 5.3 mmol/L Final   11/05/2022 3.5 3.4 - 5.3 mmol/L Final   01/30/2019 4.0 3.4 - 5.3 mmol/L Final     Chloride   Date Value Ref Range Status   04/17/2023 100 98 - 107 mmol/L Final   11/05/2022 99 94 - 109 mmol/L Final   01/30/2019 104 94 - 109 mmol/L Final     Carbon Dioxide   Date Value Ref Range Status   01/30/2019 30 20 - 32 mmol/L Final     Carbon Dioxide (CO2)   Date Value Ref Range Status   04/17/2023 23 22 - 29 mmol/L Final   11/05/2022 26 20 - 32 mmol/L Final     Anion Gap   Date Value Ref Range Status   04/17/2023 15 7 - 15 mmol/L Final   11/05/2022 15 (H) 3 - 14 mmol/L Final   01/30/2019 6 3 - 14 mmol/L Final     Glucose   Date Value Ref Range Status   04/17/2023 96 70 - 99 mg/dL Final   11/05/2022 102 (H) 70 - 99 mg/dL Final   01/30/2019 129 (H) 70 - 99 mg/dL Final     GLUCOSE BY METER POCT   Date Value Ref Range Status   04/05/2023 104 (H) 70 - 99 mg/dL Final     Urea Nitrogen   Date Value Ref Range Status   04/17/2023 15.5 8.0 - 23.0 mg/dL Final   11/05/2022 19 7 - 30 mg/dL Final   07/13/2020 16 7 - 30 mg/dL Final     Creatinine   Date Value Ref Range Status   04/17/2023 0.57 0.51 - 0.95 mg/dL Final   07/13/2020 0.67 0.52 - 1.04 mg/dL Final     GFR Estimate   Date Value Ref Range Status   04/17/2023 85 >60 mL/min/1.73m2 Final     Comment:     eGFR calculated using 2021 CKD-EPI equation.   07/13/2020 77 >60 mL/min/[1.73_m2] Final     Comment:     Non   GFR Calc  Starting 12/18/2018, serum creatinine based estimated GFR (eGFR) will be   calculated using the Chronic Kidney Disease Epidemiology Collaboration   (CKD-EPI) equation.       GFR, ESTIMATED POCT   Date Value Ref Range Status   04/03/2023 53 (L) >60 mL/min/1.73m2 Final     Calcium   Date Value Ref Range Status   04/17/2023 10.1 (H) 8.2 - 9.6 mg/dL Final   07/13/2020 8.8 8.5 - 10.1 mg/dL Final     Lab Results   Component Value Date    WBC 6.0 11/09/2022    WBC 7.2 01/09/2018     Lab Results   Component Value Date    RBC 4.26 11/09/2022    RBC 4.04 01/09/2018     Lab Results   Component Value Date    HGB 13.1 11/09/2022    HGB 13.0 06/20/2018     Lab Results   Component Value Date    HCT 39.6 11/09/2022    HCT 43.1 06/20/2018     Lab Results   Component Value Date    MCV 93 11/09/2022    MCV 96.0 06/20/2018     Lab Results   Component Value Date    MCH 30.8 11/09/2022    MCH 29.0 06/20/2018     Lab Results   Component Value Date    MCHC 33.1 11/09/2022    MCHC 30.2 06/20/2018     Lab Results   Component Value Date    RDW 13.4 11/09/2022    RDW 13.3 01/09/2018     Lab Results   Component Value Date     11/09/2022     01/09/2018       ASSESSMENT/Plan:      ICD-10-CM    1. Closed nondisplaced intertrochanteric fracture of right femur with routine healing, subsequent encounter  S72.144D       2. S/P ORIF (open reduction internal fixation) fracture  Z98.890     Z87.81       3. Compression fracture of L1 vertebra, sequela  S32.010S       4. Other elevated white blood cell (WBC) count  D72.828       5. Mild cognitive impairment  G31.84       6. Kyphoscoliosis  M41.9       7. Frailty  R54       8. Age-related osteoporosis with current pathological fracture with routine healing, subsequent encounter  M80.00XD       9. Falls frequently  R29.6       10. Physical deconditioning  R53.81       11. History of ischemic stroke of L occipital lobe within watershed region due to embolic stroke of  undetermined source (ESUS)  Z86.73       12. Bowel and bladder incontinence  R32     R15.9       13. Rectal prolapse  K62.3           CHANGES:  None.    CARE PLAN:  The care plan has been reviewed and all orders signed.  Changes to care plan, if any, as noted.  Otherwise, continue current plan of care.      The above has been created using voice recognition software. Please be aware that this may unintentionally  produce inaccuracies and/or nonsensical sentences.      Electronically signed by: SUSIE Nieves CNP        Sincerely,        SUSIE Nieves CNP

## 2023-04-25 ENCOUNTER — LAB REQUISITION (OUTPATIENT)
Dept: LAB | Facility: CLINIC | Age: 88
End: 2023-04-25

## 2023-04-25 DIAGNOSIS — R63.0 ANOREXIA: ICD-10-CM

## 2023-04-25 PROCEDURE — 87338 HPYLORI STOOL AG IA: CPT | Performed by: FAMILY MEDICINE

## 2023-04-26 LAB — H PYLORI AG STL QL IA: NEGATIVE

## 2023-04-27 ENCOUNTER — TRANSITIONAL CARE UNIT VISIT (OUTPATIENT)
Dept: GERIATRICS | Facility: CLINIC | Age: 88
End: 2023-04-27
Payer: COMMERCIAL

## 2023-04-27 VITALS
DIASTOLIC BLOOD PRESSURE: 74 MMHG | WEIGHT: 90.4 LBS | HEART RATE: 66 BPM | RESPIRATION RATE: 18 BRPM | HEIGHT: 58 IN | BODY MASS INDEX: 18.97 KG/M2 | OXYGEN SATURATION: 96 % | TEMPERATURE: 98 F | SYSTOLIC BLOOD PRESSURE: 109 MMHG

## 2023-04-27 DIAGNOSIS — D72.828 OTHER ELEVATED WHITE BLOOD CELL (WBC) COUNT: ICD-10-CM

## 2023-04-27 DIAGNOSIS — Z87.81 S/P ORIF (OPEN REDUCTION INTERNAL FIXATION) FRACTURE: ICD-10-CM

## 2023-04-27 DIAGNOSIS — R15.9 BOWEL AND BLADDER INCONTINENCE: ICD-10-CM

## 2023-04-27 DIAGNOSIS — Z86.73 HISTORY OF CVA (CEREBROVASCULAR ACCIDENT): ICD-10-CM

## 2023-04-27 DIAGNOSIS — S72.144D CLOSED NONDISPLACED INTERTROCHANTERIC FRACTURE OF RIGHT FEMUR WITH ROUTINE HEALING, SUBSEQUENT ENCOUNTER: Primary | ICD-10-CM

## 2023-04-27 DIAGNOSIS — R53.81 PHYSICAL DECONDITIONING: ICD-10-CM

## 2023-04-27 DIAGNOSIS — K62.3 RECTAL PROLAPSE: ICD-10-CM

## 2023-04-27 DIAGNOSIS — G31.84 MILD COGNITIVE IMPAIRMENT: ICD-10-CM

## 2023-04-27 DIAGNOSIS — M41.9 KYPHOSCOLIOSIS: ICD-10-CM

## 2023-04-27 DIAGNOSIS — R32 BOWEL AND BLADDER INCONTINENCE: ICD-10-CM

## 2023-04-27 DIAGNOSIS — R54 FRAILTY: ICD-10-CM

## 2023-04-27 DIAGNOSIS — M80.00XD AGE-RELATED OSTEOPOROSIS WITH CURRENT PATHOLOGICAL FRACTURE WITH ROUTINE HEALING, SUBSEQUENT ENCOUNTER: ICD-10-CM

## 2023-04-27 DIAGNOSIS — R29.6 FALLS FREQUENTLY: ICD-10-CM

## 2023-04-27 DIAGNOSIS — Z98.890 S/P ORIF (OPEN REDUCTION INTERNAL FIXATION) FRACTURE: ICD-10-CM

## 2023-04-27 DIAGNOSIS — S32.010S COMPRESSION FRACTURE OF L1 VERTEBRA, SEQUELA: ICD-10-CM

## 2023-04-27 PROCEDURE — 99309 SBSQ NF CARE MODERATE MDM 30: CPT | Performed by: NURSE PRACTITIONER

## 2023-04-27 NOTE — PROGRESS NOTES
[facility]    Name:   Belia Sheets (Sudeep)  :   1930  MRN:    [id number]     Facility:   [facility] ([facility]) [[id number]]   Room: TCU / [place] [address]  Code Status: FULL CODE and POLST AVAILABLE -     DOS: 2023    PCP:  Alejandro Sandhu    CHIEF COMPLAINT / REASON FOR VISIT:  Chief Complaint   Patient presents with     Clinic Care Coordination - Follow-up     Right intratrochanteric hip fracture s/p IMN, L1 superior endplate compression fracture with mild height loss        [facility] from 2021 until 2021 (fall and right 9th rib fracture)  [facility] from 2021 until 2021 (fall with pubic rami fractures)  [facility] from 2021 until 10/08/2021  [facility] from 2022 until 2022 (acute ischemic stroke left occipital lobe within watershed region to the ESUS)   [facility] from 2022 until 2022  [facility] from 2023 until 2023 (right hip fracture s/p IMN, L1 superior endplate compression fracture)      HPI: Belia is a 89 year old female with a history of osteoporosis, significant osteoarthritis (especially in the hands), essential hypertension, and remote history of visual hallucinations.  She had just undergone rehab in the TCU here a few months prior after falling and sustaining a right ninth rib fracture.    She fell again on 2021 and was taken by EMS to [facility] ED.  Imaging revealed right superior and inferior pubic rami fractures.  Evaluated by orthopedics with fractures to be managed conservatively, utilizing pain control and guided weightbearing recommendations.  She can bear weight as tolerated with a walker for safety.  Orthopedic surgery did recommend follow-up in 3 to 4 weeks with nonoperative orthopedics  "provider.  This should include AP x-rays of the pelvis.  She may be seen by her PCP if the PCP feels comfortable.      She was admitted to [facility] on 11/05/2022 with an acute ischemic stroke of the left occipital lobe within watershed region due to embolic stroke of undetermined source (ESUS).  No abnormalities were noted on MRI.  It was suggested that he suffered from acute on chronic encephalopathy.  Echo showed no cardiac source of the embolus.  EKG and cardiac monitoring showed sinus rhythm with PACs and a nonspecific T wave abnormality but no evidence of atrial fibrillation.  She does have some risk factors for stroke, including elevated LDL (111) and hypertension.    According to Lubna, her daughter, her mother was experiencing a \"different confusion\" compared to baseline.  Difficulty following train of thought since March or April 2022.      THIS HOSPITALIZATION    She presented to the ED on 04/03/2023 with AMS and imaging showing right intertrochanteric proximal femur fracture and L1 compression fracture of the superior endplate.      She underwent OR with right intramedullary nail the following day with Dr. Roach.  She can be WBAT.  She received enoxaparin for DVT prophylaxis during her hospitalization and completed a course of Ancef.    Initial CT imaging showed L1 compression fracture of the superior endplate with mild height loss, potentially slightly worsened compared to imaging from 11/06/2022.  No neurosurgical intervention was recommended but plan to continue bone health clinic referral.    She had leukocytosis which was mild and likely reactive in nature.     Waxing and waning mentation was described and, per a discussion with her daughter, delirium has occurred following hospitalizations.    Follow-up: Dressing to remain in place until POD #7.  2 weeks with RN for wound check, 6 weeks with Dr. Roach.      CURRENT/RECENT TCU ISSUES    Disposition   -- Impaired cognition is much more apparent than it had " "been during previous TCU stays.  She was awake when I entered her room but dozed off several times during the visit.  Asked how she was doing, she answered, \"well, I am keeping something going,\" and then she dozed off in the recliner.  For pain, they had tried very low-dose oxycodone (2.5 mg), but she became oversedated even on that dose.  I suspect that she still suffers from the effects were from anesthesia in the hospital, as she does have that history of delirium.  -- During her last TCU stay, confusion and short-term memory difficulties became clearer.  When asked, she could tell me neither the year nor month. SLP did work with her during an earlier stay here, and it was felt she required quite a bit of support.  At this point in time, she clearly requires 24-hour supervision.    Osteoporosis  History of multiple fractures  -- History of falls and fractures over the last few years.    Rectal prolapse  -- When last here, she has occasionally complained of rectal pain.  She does have a history of rectal prolapse, and she stated then that \"it hurts trying to push out small pieces of stool.\"    -- This issue apparently comes and goes but should be kept in mind in her current situation. She has undergone surgery within the last 5 or 6 years.      Cognitive impairment   -- During an earlier stay, there were at least mild cognitive deficits noted, and this was corroborated by the patient's son who also endorsed his mother's memory loss, although she can appear, for the most part, to be quite cogent.  In 2021, SLP did work with her, and it was felt that she needed quite a bit of support.   On the SLUMS, she did rather poorly on the clock test, making a pie and then adding numbers but no hands.  She scored 2/17 on the storytelling, 10/17 on orientation.  She scored 23/30 on the MMSE.  Her daughter apparently believed cognitive impairment to be an acute issue.  However, the patient told me that she felt she had trouble " with her thinking 2-3 years earlier but that she was feeling much clearer at the time.  Cognition does tend to wax and wane and is likely multifactorial in nature.      Dysphagia  -- Soon after her prior admission, I requested evaluation and treatment by SLP, not only for her cognitive issues but for complaints of swallowing difficulty.  There was no need to change her diet.  I am aware that they worked together at least twice during this stay (and probably longer).  Interestingly, she could remember Rosey, the SLP's name, at the time.  -- She was apparently reevaluated during her last hospital stay but was ultimately given an okay for thin liquids.    Osteoarthritis   -- Rather severe in the hands with noted deformities of various joints.  Current pain management appears to be working well.    Discharge planning   -- Indications are that she will most likely need long-term care.      ROS:  10 point ROS of systems including Constitutional, Eyes, Respiratory, Cardiovascular, Gastroenterology, Genitourinary, Integumentary, Muscularskeletal, Psychiatric were all negative except for pertinent positives noted in my HPI.      Past Medical History:   Diagnosis Date     Carpal tunnel syndrome (aka CTS)      Cataract      Chronic osteoarthritis      Constipation due to outlet dysfunction 2021     Fracture of multiple rami of right pubis with routine healing, subsequent encounter 2021     H/O calcium pyrophosphate deposition disease (CPPD)      History of 9th right rib fracture due to fall (2021) 2021     History of encephalopathy 10/2017     Hypertension      Kyphoscoliosis      Osteoarthritis     hands     Osteoporosis      Rectal prolapse               Family History   Problem Relation Age of Onset     Depression/Anxiety Son      Depression/Anxiety Son         alcohol abuse  age 24     Hypertension Mother      Hypertension Brother      Diabetes No family hx of      Breast Cancer No family hx of       Colon Cancer No family hx of      Prostate Cancer No family hx of      Other Cancer No family hx of      Social History     Socioeconomic History     Marital status:      Spouse name: Not on file     Number of children: Not on file     Years of education: Not on file     Highest education level: Not on file   Occupational History     Not on file   Tobacco Use     Smoking status: Never Smoker     Smokeless tobacco: Never Used   Substance and Sexual Activity     Alcohol use: No     Drug use: No     Sexual activity: Never   Other Topics Concern     Not on file   Social History Narrative     Not on file     Social Determinants of Health     Financial Resource Strain:      Difficulty of Paying Living Expenses:    Food Insecurity:      Worried About Running Out of Food in the Last Year:      Ran Out of Food in the Last Year:    Transportation Needs:      Lack of Transportation (Medical):      Lack of Transportation (Non-Medical):    Physical Activity:      Days of Exercise per Week:      Minutes of Exercise per Session:    Stress:      Feeling of Stress :    Social Connections:      Frequency of Communication with Friends and Family:      Frequency of Social Gatherings with Friends and Family:      Attends [employer] Services:      Active Member of Clubs or Organizations:      Attends Club or Organization Meetings:      Marital Status:    Intimate Partner Violence:      Fear of Current or Ex-Partner:      Emotionally Abused:      Physically Abused:      Sexually Abused:        MEDICATIONS: Reviewed from the MAR, physician orders, and/or earlier progress notes.  Post Medication Reconciliation Status: medication reconcilation previously completed during another office visit    Current Outpatient Medications   Medication Sig     acetaminophen (TYLENOL) 500 MG tablet Take 1,000 mg by mouth 3 times daily     atorvastatin (LIPITOR) 20 MG tablet 1 tablet (20 mg) by Oral or Feeding Tube route every evening     buprenorphine  "(BUTRANS) 5 MCG/HR WK patch Place 1 patch onto the skin every 7 days     calcium carbonate 600 mg-vitamin D 400 units (CALTRATE) 600-400 MG-UNIT per tablet Take 1 tablet by mouth 2 times daily (before meals)     enoxaparin ANTICOAGULANT (LOVENOX) 30 MG/0.3ML syringe Inject 0.3 mLs (30 mg) Subcutaneous every 24 hours for 36 days     lidocaine (LIDODERM) 5 % patch Place 1 patch onto the skin every 24 hours To prevent lidocaine toxicity, patient should be patch free for 12 hrs daily.     Vitamin D3 (CHOLECALCIFEROL) 25 mcg (1000 units) tablet Take 1 tablet (25 mcg) by mouth daily     No current facility-administered medications for this visit.     ALLERGIES: No Known Allergies    DIET: Regular, regular texture, thin liquids.  Mighty Shake.    Vitals:    04/24/23 1331   BP: (!) 145/95   Pulse: 85   Resp: 18   Temp: 98  F (36.7  C)   SpO2: 98%   Weight: 42.6 kg (94 lb)   Height: 1.473 m (4' 10\")     Body mass index is 19.65 kg/m .    EXAMINATION:   General: Frail-appearing elderly female, resting in a recliner, dozing off intermittently during our conversation.  She is able to tell me, before dozing off again, \"I broke my hip.\"  She looked puzzled when I asked how she broke it.  Head: Normocephalic and atraumatic.   Eyes: PERRLA, sclerae clear.   ENT: Moist oral mucosa.  Has several of her own teeth, but missing all lower teeth on right plus several upper teeth.  No nasal discharge.   Cardiovascular: RRR with distinct wide split S1.   Respiratory: Lungs CTAB.   Abdomen: Nondistended.   Musculoskeletal/Extremities: Age-related DJD and moderate to severe kyphoscoliosis (which inhibits her breathing).  Hands and fingers show significant deformities due to OA.  Right fifth trigger finger.  Bilateral hammertoes, bilateral hallux valgus deformities with partial great toe overlap.  No peripheral edema.   Integument: No rashes, clinically significant lesions, or skin breakdown.   Cognitive/Psychiatric: Very somnolent.  "     DIAGNOSTICS:   Recent Results (from the past 240 hour(s))   Basic metabolic panel    Collection Time: 04/17/23 11:14 AM   Result Value Ref Range    Sodium 138 136 - 145 mmol/L    Potassium 3.2 (L) 3.4 - 5.3 mmol/L    Chloride 100 98 - 107 mmol/L    Carbon Dioxide (CO2) 23 22 - 29 mmol/L    Anion Gap 15 7 - 15 mmol/L    Urea Nitrogen 15.5 8.0 - 23.0 mg/dL    Creatinine 0.57 0.51 - 0.95 mg/dL    Calcium 10.1 (H) 8.2 - 9.6 mg/dL    Glucose 96 70 - 99 mg/dL    GFR Estimate 85 >60 mL/min/1.73m2   CBC with platelets and differential    Collection Time: 04/17/23 11:14 AM   Result Value Ref Range    WBC Count 12.3 (H) 4.0 - 11.0 10e3/uL    RBC Count 4.13 3.80 - 5.20 10e6/uL    Hemoglobin 12.2 11.7 - 15.7 g/dL    Hematocrit 39.0 35.0 - 47.0 %    MCV 94 78 - 100 fL    MCH 29.5 26.5 - 33.0 pg    MCHC 31.3 (L) 31.5 - 36.5 g/dL    RDW 15.8 (H) 10.0 - 15.0 %    Platelet Count 538 (H) 150 - 450 10e3/uL    % Neutrophils 75 %    % Lymphocytes 11 %    % Monocytes 6 %    % Eosinophils 6 %    % Basophils 1 %    % Immature Granulocytes 1 %    NRBCs per 100 WBC 0 <1 /100    Absolute Neutrophils 9.4 (H) 1.6 - 8.3 10e3/uL    Absolute Lymphocytes 1.3 0.8 - 5.3 10e3/uL    Absolute Monocytes 0.7 0.0 - 1.3 10e3/uL    Absolute Eosinophils 0.7 0.0 - 0.7 10e3/uL    Absolute Basophils 0.1 0.0 - 0.2 10e3/uL    Absolute Immature Granulocytes 0.1 <=0.4 10e3/uL    Absolute NRBCs 0.0 10e3/uL   Vitamin B12    Collection Time: 04/18/23  7:50 AM   Result Value Ref Range    Vitamin B12 1,948 (H) 232 - 1,245 pg/mL   Extra Purple Top EDTA (LAB USE ONLY)    Collection Time: 04/18/23  7:50 AM   Result Value Ref Range    Hold Specimen JIC    Extra Green Top Tube (LAB USE ONLY)    Collection Time: 04/18/23  7:50 AM   Result Value Ref Range    Hold Specimen JIC    Helicobacter pylori Antigen Stool    Collection Time: 04/21/23  1:00 PM   Result Value Ref Range    Helicobacter pylori Antigen Stool Negative Negative   Potassium    Collection Time: 04/24/23  8:09  AM   Result Value Ref Range    Potassium 4.3 3.4 - 5.3 mmol/L   Helicobacter pylori Antigen Stool    Collection Time: 04/25/23  6:21 PM   Result Value Ref Range    Helicobacter pylori Antigen Stool Negative Negative        Last Comprehensive Metabolic Panel:  Sodium   Date Value Ref Range Status   04/17/2023 138 136 - 145 mmol/L Final   01/30/2019 140 133 - 144 mmol/L Final     Potassium   Date Value Ref Range Status   04/24/2023 4.3 3.4 - 5.3 mmol/L Final   11/05/2022 3.5 3.4 - 5.3 mmol/L Final   01/30/2019 4.0 3.4 - 5.3 mmol/L Final     Chloride   Date Value Ref Range Status   04/17/2023 100 98 - 107 mmol/L Final   11/05/2022 99 94 - 109 mmol/L Final   01/30/2019 104 94 - 109 mmol/L Final     Carbon Dioxide   Date Value Ref Range Status   01/30/2019 30 20 - 32 mmol/L Final     Carbon Dioxide (CO2)   Date Value Ref Range Status   04/17/2023 23 22 - 29 mmol/L Final   11/05/2022 26 20 - 32 mmol/L Final     Anion Gap   Date Value Ref Range Status   04/17/2023 15 7 - 15 mmol/L Final   11/05/2022 15 (H) 3 - 14 mmol/L Final   01/30/2019 6 3 - 14 mmol/L Final     Glucose   Date Value Ref Range Status   04/17/2023 96 70 - 99 mg/dL Final   11/05/2022 102 (H) 70 - 99 mg/dL Final   01/30/2019 129 (H) 70 - 99 mg/dL Final     GLUCOSE BY METER POCT   Date Value Ref Range Status   04/05/2023 104 (H) 70 - 99 mg/dL Final     Urea Nitrogen   Date Value Ref Range Status   04/17/2023 15.5 8.0 - 23.0 mg/dL Final   11/05/2022 19 7 - 30 mg/dL Final   07/13/2020 16 7 - 30 mg/dL Final     Creatinine   Date Value Ref Range Status   04/17/2023 0.57 0.51 - 0.95 mg/dL Final   07/13/2020 0.67 0.52 - 1.04 mg/dL Final     GFR Estimate   Date Value Ref Range Status   04/17/2023 85 >60 mL/min/1.73m2 Final     Comment:     eGFR calculated using 2021 CKD-EPI equation.   07/13/2020 77 >60 mL/min/[1.73_m2] Final     Comment:     Non  GFR Calc  Starting 12/18/2018, serum creatinine based estimated GFR (eGFR) will be   calculated using  the Chronic Kidney Disease Epidemiology Collaboration   (CKD-EPI) equation.       GFR, ESTIMATED POCT   Date Value Ref Range Status   04/03/2023 53 (L) >60 mL/min/1.73m2 Final     Calcium   Date Value Ref Range Status   04/17/2023 10.1 (H) 8.2 - 9.6 mg/dL Final   07/13/2020 8.8 8.5 - 10.1 mg/dL Final     Lab Results   Component Value Date    WBC 6.0 11/09/2022    WBC 7.2 01/09/2018     Lab Results   Component Value Date    RBC 4.26 11/09/2022    RBC 4.04 01/09/2018     Lab Results   Component Value Date    HGB 13.1 11/09/2022    HGB 13.0 06/20/2018     Lab Results   Component Value Date    HCT 39.6 11/09/2022    HCT 43.1 06/20/2018     Lab Results   Component Value Date    MCV 93 11/09/2022    MCV 96.0 06/20/2018     Lab Results   Component Value Date    MCH 30.8 11/09/2022    MCH 29.0 06/20/2018     Lab Results   Component Value Date    MCHC 33.1 11/09/2022    MCHC 30.2 06/20/2018     Lab Results   Component Value Date    RDW 13.4 11/09/2022    RDW 13.3 01/09/2018     Lab Results   Component Value Date     11/09/2022     01/09/2018       ASSESSMENT/Plan:      ICD-10-CM    1. Closed nondisplaced intertrochanteric fracture of right femur with routine healing, subsequent encounter  S72.144D       2. S/P ORIF (open reduction internal fixation) fracture  Z98.890     Z87.81       3. Compression fracture of L1 vertebra, sequela  S32.010S       4. Other elevated white blood cell (WBC) count  D72.828       5. Mild cognitive impairment  G31.84       6. Kyphoscoliosis  M41.9       7. Frailty  R54       8. Age-related osteoporosis with current pathological fracture with routine healing, subsequent encounter  M80.00XD       9. Falls frequently  R29.6       10. Physical deconditioning  R53.81       11. History of ischemic stroke of L occipital lobe within watershed region due to embolic stroke of undetermined source (ESUS)  Z86.73       12. Bowel and bladder incontinence  R32     R15.9       13. Rectal prolapse   K62.3           CHANGES:  None.    CARE PLAN:  The care plan has been reviewed and all orders signed.  Changes to care plan, if any, as noted.  Otherwise, continue current plan of care.      The above has been created using voice recognition software. Please be aware that this may unintentionally  produce inaccuracies and/or nonsensical sentences.      Electronically signed by: SUSIE Nieves CNP

## 2023-04-27 NOTE — LETTER
2023        RE: Belia Sheets  [address]  [address]  [facility] [address]        [facility]    Name:   Belia Sheets (Sudeep)  :   1930  MRN:    [id number]     Facility:   [facility] ([facility]) [[id number]]   Room: U / [place] [address]  Code Status: FULL CODE and POLST AVAILABLE -     DOS: 2023    PCP:  Alejandro Sandhu    CHIEF COMPLAINT / REASON FOR VISIT:  Chief Complaint   Patient presents with     Clinic Care Coordination - Follow-up     Right intratrochanteric hip fracture s/p IMN, L1 superior endplate compression fracture with mild height loss      [facility] from 2021 until 2021 (fall and right 9th rib fracture)  [facility] from 2021 until 2021 (fall with pubic rami fractures)  [facility]CU from 2021 until 10/08/2021  [facility] from 2022 until 2022 (acute ischemic stroke L occipital lobe within watershed region to the ESUS)   [facility] TCU from 2022 until 2022  [facility] from 2023 until 2023 (right hip fracture s/p IMN, L1 superior endplate compression fracture)      HPI: Belia is a 89 year old female with a history of osteoporosis, significant osteoarthritis (especially in the hands), essential hypertension, and remote history of visual hallucinations.  She had just undergone rehab in the TCU here a few months prior after falling and sustaining a right ninth rib fracture.    She fell again on 2021 and was taken by EMS to [facility] ED.  Imaging revealed right superior and inferior pubic rami fractures.  Evaluated by orthopedics with fractures to be managed conservatively, utilizing pain control and guided weightbearing recommendations.  She can bear weight as tolerated with a walker for  "safety.  Orthopedic surgery did recommend follow-up in 3 to 4 weeks with nonoperative orthopedics provider.  This should include AP x-rays of the pelvis.  She may be seen by her PCP if the PCP feels comfortable.      She was admitted to [facility] on 11/05/2022 with an acute ischemic stroke of the left occipital lobe within watershed region due to embolic stroke of undetermined source (ESUS).  No abnormalities were noted on MRI.  It was suggested that he suffered from acute on chronic encephalopathy.  Echo showed no cardiac source of the embolus.  EKG and cardiac monitoring showed sinus rhythm with PACs and a nonspecific T wave abnormality but no evidence of atrial fibrillation.  She does have some risk factors for stroke, including elevated LDL (111) and hypertension.    According to [place]na, her daughter, her mother was experiencing a \"different confusion\" compared to baseline.  Difficulty following train of thought since March or April 2022.      THIS HOSPITALIZATION    She presented to the ED on 04/03/2023 with AMS and imaging showing right intertrochanteric proximal femur fracture and L1 compression fracture of the superior endplate.      She underwent OR with right intramedullary nail the following day with Dr. Roach.  She can be WBAT.  She received enoxaparin for DVT prophylaxis during her hospitalization and completed a course of Ancef.    Initial CT imaging showed L1 compression fracture of the superior endplate with mild height loss, potentially slightly worsened compared to imaging from 11/06/2022.  No neurosurgical intervention was recommended but plan to continue bone health clinic referral.    She had leukocytosis which was mild and likely reactive in nature.     Waxing and waning mentation was described and, per a discussion with her daughter, delirium has occurred following hospitalizations.    Follow-up: Dressing to remain in place until POD #7.  2 weeks with RN for wound check, 6 weeks with Dr." "Doc.      CURRENT/RECENT TCU ISSUES    Disposition   -- \"I'm doing very well, thank you,\" she says.  -- Impaired cognition is much more apparent than it had been during previous TCU stays.  She was awake when I entered her room but dozed off several times during the visit.  Asked how she was doing, she answered, \"well, I am keeping something going,\" and then she dozed off in the recliner.    -- For pain, they had tried very low-dose oxycodone (2.5 mg), but she became oversedated even on that tiny dose.  Current state is concerning for early dementia, though she may also suffer from the effects of OR anesthesia, as she does have that history of delirium.  -- During her last TCU stay, confusion and short-term memory difficulties became clearer.  When asked, she could tell me neither the year nor month. SLP did work with her during an earlier stay here, and it was felt she required quite a bit of support.  At this point in time, she clearly requires 24-hour supervision.  -- She insists that nothing hurts, and she is sleeping a lot.    Osteoporosis  History of multiple fractures  -- History of falls and fractures over the last few years.    Rectal prolapse  -- When last here, she has occasionally complained of rectal pain.  She does have a history of rectal prolapse, and she stated then that \"it hurts trying to push out small pieces of stool.\"    -- This issue apparently comes and goes but should be kept in mind in her current situation. She has undergone surgery within the last 5 or 6 years.      Cognitive impairment   -- During an earlier stay, there were at least mild cognitive deficits noted, and this was corroborated by the patient's son who also endorsed his mother's memory loss, although she can appear, for the most part, to be quite cogent.  In 2021, SLP did work with her, and it was felt that she needed quite a bit of support.   On the SLUMS, she did rather poorly on the clock test, making a pie and then " adding numbers but no hands.  She scored 2/17 on the storytelling, 10/17 on orientation.  She scored 23/30 on the MMSE.  Her daughter apparently believed cognitive impairment to be an acute issue.  However, the patient told me that she felt she had trouble with her thinking 2-3 years earlier but that she was feeling much clearer at the time.  Cognition does tend to wax and wane and is likely multifactorial in nature.      Dysphagia  -- Soon after her prior admission, I requested evaluation and treatment by SLP, not only for her cognitive issues but for complaints of swallowing difficulty.  There was no need to change her diet.  I am aware that they worked together at least twice during this stay (and probably longer).  Interestingly, she could remember Rosey, the SLP's name, at the time.  -- She was apparently reevaluated during her last hospital stay but was ultimately given an okay for thin liquids.    Osteoarthritis   -- Rather severe in the hands with noted deformities of various joints.  Current pain management appears to be working well.    Discharge planning   -- Indications are that she will most likely need long-term care.      ROS:  10 point ROS of systems including Constitutional, Eyes, Respiratory, Cardiovascular, Gastroenterology, Genitourinary, Integumentary, Muscularskeletal, Psychiatric were all negative except for pertinent positives noted in my HPI.      Past Medical History:   Diagnosis Date     Carpal tunnel syndrome (aka CTS)      Cataract      Chronic osteoarthritis      Constipation due to outlet dysfunction 9/22/2021     Fracture of multiple rami of right pubis with routine healing, subsequent encounter 9/22/2021     H/O calcium pyrophosphate deposition disease (CPPD)      History of 9th right rib fracture due to fall (07/13/2021) 9/22/2021     History of encephalopathy 10/2017     Hypertension      Kyphoscoliosis      Osteoarthritis     hands     Osteoporosis      Rectal prolapse                Family History   Problem Relation Age of Onset     Depression/Anxiety Son      Depression/Anxiety Son         alcohol abuse  age 24     Hypertension Mother      Hypertension Brother      Diabetes No family hx of      Breast Cancer No family hx of      Colon Cancer No family hx of      Prostate Cancer No family hx of      Other Cancer No family hx of      Social History     Socioeconomic History     Marital status:      Spouse name: Not on file     Number of children: Not on file     Years of education: Not on file     Highest education level: Not on file   Occupational History     Not on file   Tobacco Use     Smoking status: Never Smoker     Smokeless tobacco: Never Used   Substance and Sexual Activity     Alcohol use: No     Drug use: No     Sexual activity: Never   Other Topics Concern     Not on file   Social History Narrative     Not on file     Social Determinants of Health     Financial Resource Strain:      Difficulty of Paying Living Expenses:    Food Insecurity:      Worried About Running Out of Food in the Last Year:      Ran Out of Food in the Last Year:    Transportation Needs:      Lack of Transportation (Medical):      Lack of Transportation (Non-Medical):    Physical Activity:      Days of Exercise per Week:      Minutes of Exercise per Session:    Stress:      Feeling of Stress :    Social Connections:      Frequency of Communication with Friends and Family:      Frequency of Social Gatherings with Friends and Family:      Attends Yazidi Services:      Active Member of Clubs or Organizations:      Attends Club or Organization Meetings:      Marital Status:    Intimate Partner Violence:      Fear of Current or Ex-Partner:      Emotionally Abused:      Physically Abused:      Sexually Abused:        MEDICATIONS: Reviewed from the MAR, physician orders, and/or earlier progress notes.  Post Medication Reconciliation Status: medication reconcilation previously completed during another  "office visit    Current Outpatient Medications   Medication Sig     acetaminophen (TYLENOL) 500 MG tablet Take 1,000 mg by mouth 3 times daily     atorvastatin (LIPITOR) 20 MG tablet 1 tablet (20 mg) by Oral or Feeding Tube route every evening     buprenorphine (BUTRANS) 5 MCG/HR WK patch APPLY TOPICALLY 1 PATCH ONCE A WEEK *REMOVE OLD PATCH BEFORE REAPPLICATION*     calcium carbonate 600 mg-vitamin D 400 units (CALTRATE) 600-400 MG-UNIT per tablet Take 1 tablet by mouth 2 times daily (before meals)     enoxaparin ANTICOAGULANT (LOVENOX) 30 MG/0.3ML syringe Inject 0.3 mLs (30 mg) Subcutaneous every 24 hours for 36 days     lidocaine (LIDODERM) 5 % patch Place 1 patch onto the skin every 24 hours To prevent lidocaine toxicity, patient should be patch free for 12 hrs daily.     Vitamin D3 (CHOLECALCIFEROL) 25 mcg (1000 units) tablet Take 1 tablet (25 mcg) by mouth daily     No current facility-administered medications for this visit.     ALLERGIES: No Known Allergies    DIET: Regular, regular texture, thin liquids.  Mighty Shake.    Vitals:    04/27/23 1503   BP: 109/74   Pulse: 66   Resp: 18   Temp: 98  F (36.7  C)   SpO2: 96%   Weight: 41 kg (90 lb 6.4 oz)   Height: 1.473 m (4' 10\")     Body mass index is 18.89 kg/m .    EXAMINATION:   General: Frail-appearing elderly female, resting in a recliner, dozing off intermittently during our conversation though not as frequently as was the case at my last visit..    Head: Normocephalic and atraumatic.   Eyes: PERRLA, sclerae clear.   ENT: Moist oral mucosa.  Has several of her own teeth, but missing all lower teeth on right plus several upper teeth.  No nasal discharge.   Cardiovascular: RRR with distinct wide split S1.   Respiratory: Lungs CTAB.   Abdomen: Nondistended.   Musculoskeletal/Extremities: Age-related DJD and moderate to severe kyphoscoliosis (which inhibits her breathing).  Hands and fingers show significant deformities due to OA.  Right fifth trigger finger.  " Bilateral hammertoes, bilateral hallux valgus deformities with partial great toe overlap.  No peripheral edema.   Integument: No rashes, clinically significant lesions, or skin breakdown.   Cognitive/Psychiatric: Very somnolent but otherwise pleasant.    DIAGNOSTICS:   Recent Results (from the past 240 hour(s))   Helicobacter pylori Antigen Stool    Collection Time: 04/21/23  1:00 PM   Result Value Ref Range    Helicobacter pylori Antigen Stool Negative Negative   Potassium    Collection Time: 04/24/23  8:09 AM   Result Value Ref Range    Potassium 4.3 3.4 - 5.3 mmol/L   Helicobacter pylori Antigen Stool    Collection Time: 04/25/23  6:21 PM   Result Value Ref Range    Helicobacter pylori Antigen Stool Negative Negative      Last Comprehensive Metabolic Panel:  Sodium   Date Value Ref Range Status   04/17/2023 138 136 - 145 mmol/L Final   01/30/2019 140 133 - 144 mmol/L Final     Potassium   Date Value Ref Range Status   04/24/2023 4.3 3.4 - 5.3 mmol/L Final   11/05/2022 3.5 3.4 - 5.3 mmol/L Final   01/30/2019 4.0 3.4 - 5.3 mmol/L Final     Chloride   Date Value Ref Range Status   04/17/2023 100 98 - 107 mmol/L Final   11/05/2022 99 94 - 109 mmol/L Final   01/30/2019 104 94 - 109 mmol/L Final     Carbon Dioxide   Date Value Ref Range Status   01/30/2019 30 20 - 32 mmol/L Final     Carbon Dioxide (CO2)   Date Value Ref Range Status   04/17/2023 23 22 - 29 mmol/L Final   11/05/2022 26 20 - 32 mmol/L Final     Anion Gap   Date Value Ref Range Status   04/17/2023 15 7 - 15 mmol/L Final   11/05/2022 15 (H) 3 - 14 mmol/L Final   01/30/2019 6 3 - 14 mmol/L Final     Glucose   Date Value Ref Range Status   04/17/2023 96 70 - 99 mg/dL Final   11/05/2022 102 (H) 70 - 99 mg/dL Final   01/30/2019 129 (H) 70 - 99 mg/dL Final     GLUCOSE BY METER POCT   Date Value Ref Range Status   04/05/2023 104 (H) 70 - 99 mg/dL Final     Urea Nitrogen   Date Value Ref Range Status   04/17/2023 15.5 8.0 - 23.0 mg/dL Final   11/05/2022 19 7 -  30 mg/dL Final   07/13/2020 16 7 - 30 mg/dL Final     Creatinine   Date Value Ref Range Status   04/17/2023 0.57 0.51 - 0.95 mg/dL Final   07/13/2020 0.67 0.52 - 1.04 mg/dL Final     GFR Estimate   Date Value Ref Range Status   04/17/2023 85 >60 mL/min/1.73m2 Final     Comment:     eGFR calculated using 2021 CKD-EPI equation.   07/13/2020 77 >60 mL/min/[1.73_m2] Final     Comment:     Non  GFR Calc  Starting 12/18/2018, serum creatinine based estimated GFR (eGFR) will be   calculated using the Chronic Kidney Disease Epidemiology Collaboration   (CKD-EPI) equation.       GFR, ESTIMATED POCT   Date Value Ref Range Status   04/03/2023 53 (L) >60 mL/min/1.73m2 Final     Calcium   Date Value Ref Range Status   04/17/2023 10.1 (H) 8.2 - 9.6 mg/dL Final   07/13/2020 8.8 8.5 - 10.1 mg/dL Final     Lab Results   Component Value Date    WBC 6.0 11/09/2022    WBC 7.2 01/09/2018     Lab Results   Component Value Date    RBC 4.26 11/09/2022    RBC 4.04 01/09/2018     Lab Results   Component Value Date    HGB 13.1 11/09/2022    HGB 13.0 06/20/2018     Lab Results   Component Value Date    HCT 39.6 11/09/2022    HCT 43.1 06/20/2018     Lab Results   Component Value Date    MCV 93 11/09/2022    MCV 96.0 06/20/2018     Lab Results   Component Value Date    MCH 30.8 11/09/2022    MCH 29.0 06/20/2018     Lab Results   Component Value Date    MCHC 33.1 11/09/2022    MCHC 30.2 06/20/2018     Lab Results   Component Value Date    RDW 13.4 11/09/2022    RDW 13.3 01/09/2018     Lab Results   Component Value Date     11/09/2022     01/09/2018       ASSESSMENT/Plan:      ICD-10-CM    1. Closed nondisplaced intertrochanteric fracture of right femur with routine healing, subsequent encounter  S72.144D       2. S/P ORIF (open reduction internal fixation) fracture  Z98.890     Z87.81       3. Compression fracture of L1 vertebra, sequela  S32.010S       4. Other elevated white blood cell (WBC) count  D72.828       5.  Mild cognitive impairment  G31.84       6. Kyphoscoliosis  M41.9       7. Frailty  R54       8. Age-related osteoporosis with current pathological fracture with routine healing, subsequent encounter  M80.00XD       9. Falls frequently  R29.6       10. Physical deconditioning  R53.81       11. History of ischemic stroke of L occipital lobe within watershed region due to embolic stroke of undetermined source (ESUS)  Z86.73       12. Bowel and bladder incontinence  R32     R15.9       13. Rectal prolapse  K62.3           CHANGES:  None.    CARE PLAN:  The care plan has been reviewed and all orders signed.  Changes to care plan, if any, as noted.  Otherwise, continue current plan of care.      The above has been created using voice recognition software. Please be aware that this may unintentionally  produce inaccuracies and/or nonsensical sentences.      Electronically signed by: SUSIE Nieves CNP        Sincerely,        SUSIE Nieves CNP

## 2023-04-28 DIAGNOSIS — S72.141S CLOSED DISPLACED INTERTROCHANTERIC FRACTURE OF RIGHT FEMUR, SEQUELA: Primary | ICD-10-CM

## 2023-04-30 RX ORDER — BUPRENORPHINE 5 UG/H
PATCH TRANSDERMAL
Qty: 2 PATCH | Refills: 0 | Status: SHIPPED | OUTPATIENT
Start: 2023-04-30 | End: 2023-08-21

## 2023-04-30 NOTE — PROGRESS NOTES
[facility]    Name:   Belia Sheets (Sudeep)  :   1930  MRN:    [id number]     Facility:   [facility] ([facility]) [[id number]]   Room: TCU / [place] [id number]  Code Status: FULL CODE and POLST AVAILABLE -     DOS: 2023    PCP:  Alejandro Sandhu    CHIEF COMPLAINT / REASON FOR VISIT:  Chief Complaint   Patient presents with     Clinic Care Coordination - Follow-up     Right intratrochanteric hip fracture s/p IMN, L1 superior endplate compression fracture with mild height loss      [facility] from 2021 until 2021 (fall and right 9th rib fracture)  [facility] from 2021 until 2021 (fall with pubic rami fractures)  [facility] from 2021 until 10/08/2021  [facility] from 2022 until 2022 (acute ischemic stroke L occipital lobe within watershed region to the ESUS)   [facility]  [facility] from 2023 until 2023 (right hip fracture s/p IMN, L1 superior endplate compression fracture)      HPI: Belia is a 89 year old female with a history of osteoporosis, significant osteoarthritis (especially in the hands), essential hypertension, and remote history of visual hallucinations.  She had just undergone rehab in the TCU here a few months prior after falling and sustaining a right ninth rib fracture.    She fell again on 2021 and was taken by EMS to [facility] ED.  Imaging revealed right superior and inferior pubic rami fractures.  Evaluated by orthopedics with fractures to be managed conservatively, utilizing pain control and guided weightbearing recommendations.  She can bear weight as tolerated with a walker for safety.  Orthopedic surgery did recommend follow-up in 3 to 4 weeks with nonoperative orthopedics  "provider.  This should include AP x-rays of the pelvis.  She may be seen by her PCP if the PCP feels comfortable.      She was admitted to [facility] on 11/05/2022 with an acute ischemic stroke of the left occipital lobe within watershed region due to embolic stroke of undetermined source (ESUS).  No abnormalities were noted on MRI.  It was suggested that he suffered from acute on chronic encephalopathy.  Echo showed no cardiac source of the embolus.  EKG and cardiac monitoring showed sinus rhythm with PACs and a nonspecific T wave abnormality but no evidence of atrial fibrillation.  She does have some risk factors for stroke, including elevated LDL (111) and hypertension.    According to Lubna, her daughter, her mother was experiencing a \"different confusion\" compared to baseline.  Difficulty following train of thought since March or April 2022.      THIS HOSPITALIZATION    She presented to the ED on 04/03/2023 with AMS and imaging showing right intertrochanteric proximal femur fracture and L1 compression fracture of the superior endplate.      She underwent OR with right intramedullary nail the following day with Dr. Roach.  She can be WBAT.  She received enoxaparin for DVT prophylaxis during her hospitalization and completed a course of Ancef.    Initial CT imaging showed L1 compression fracture of the superior endplate with mild height loss, potentially slightly worsened compared to imaging from 11/06/2022.  No neurosurgical intervention was recommended but plan to continue bone health clinic referral.    She had leukocytosis which was mild and likely reactive in nature.     Waxing and waning mentation was described and, per a discussion with her daughter, delirium has occurred following hospitalizations.    Follow-up: Dressing to remain in place until POD #7.  2 weeks with RN for wound check, 6 weeks with Dr. Roach.      CURRENT/RECENT TCU ISSUES    Disposition   -- \"I'm doing very well, thank you,\" she says.  -- " "Impaired cognition is much more apparent than it had been during previous TCU stays.  She was awake when I entered her room but dozed off several times during the visit.  Asked how she was doing, she answered, \"well, I am keeping something going,\" and then she dozed off in the recliner.    -- For pain, they had tried very low-dose oxycodone (2.5 mg), but she became oversedated even on that tiny dose.  Current state is concerning for early dementia, though she may also suffer from the effects of OR anesthesia, as she does have that history of delirium.  -- During her last TCU stay, confusion and short-term memory difficulties became clearer.  When asked, she could tell me neither the year nor month. SLP did work with her during an earlier stay here, and it was felt she required quite a bit of support.  At this point in time, she clearly requires 24-hour supervision.  -- She insists that nothing hurts, and she is sleeping a lot.    Osteoporosis  History of multiple fractures  -- History of falls and fractures over the last few years.    Rectal prolapse  -- When last here, she has occasionally complained of rectal pain.  She does have a history of rectal prolapse, and she stated then that \"it hurts trying to push out small pieces of stool.\"    -- This issue apparently comes and goes but should be kept in mind in her current situation. She has undergone surgery within the last 5 or 6 years.      Cognitive impairment   -- During an earlier stay, there were at least mild cognitive deficits noted, and this was corroborated by the patient's son who also endorsed his mother's memory loss, although she can appear, for the most part, to be quite cogent.  In 2021, SLP did work with her, and it was felt that she needed quite a bit of support.   On the SLUMS, she did rather poorly on the clock test, making a pie and then adding numbers but no hands.  She scored 2/17 on the storytelling, 10/17 on orientation.  She scored 23/30 on " the MMSE.  Her daughter apparently believed cognitive impairment to be an acute issue.  However, the patient told me that she felt she had trouble with her thinking 2-3 years earlier but that she was feeling much clearer at the time.  Cognition does tend to wax and wane and is likely multifactorial in nature.      Dysphagia  -- Soon after her prior admission, I requested evaluation and treatment by SLP, not only for her cognitive issues but for complaints of swallowing difficulty.  There was no need to change her diet.  I am aware that they worked together at least twice during this stay (and probably longer).  Interestingly, she could remember Rosey, the SLP's name, at the time.  -- She was apparently reevaluated during her last hospital stay but was ultimately given an okay for thin liquids.    Osteoarthritis   -- Rather severe in the hands with noted deformities of various joints.  Current pain management appears to be working well.    Discharge planning   -- Indications are that she will most likely need long-term care.      ROS:  10 point ROS of systems including Constitutional, Eyes, Respiratory, Cardiovascular, Gastroenterology, Genitourinary, Integumentary, Muscularskeletal, Psychiatric were all negative except for pertinent positives noted in my HPI.      Past Medical History:   Diagnosis Date     Carpal tunnel syndrome (aka CTS)      Cataract      Chronic osteoarthritis      Constipation due to outlet dysfunction 9/22/2021     Fracture of multiple rami of right pubis with routine healing, subsequent encounter 9/22/2021     H/O calcium pyrophosphate deposition disease (CPPD)      History of 9th right rib fracture due to fall (07/13/2021) 9/22/2021     History of encephalopathy 10/2017     Hypertension      Kyphoscoliosis      Osteoarthritis     hands     Osteoporosis      Rectal prolapse               Family History   Problem Relation Age of Onset     Depression/Anxiety Son      Depression/Anxiety Son          alcohol abuse  age 24     Hypertension Mother      Hypertension Brother      Diabetes No family hx of      Breast Cancer No family hx of      Colon Cancer No family hx of      Prostate Cancer No family hx of      Other Cancer No family hx of      Social History     Socioeconomic History     Marital status:      Spouse name: Not on file     Number of children: Not on file     Years of education: Not on file     Highest education level: Not on file   Occupational History     Not on file   Tobacco Use     Smoking status: Never Smoker     Smokeless tobacco: Never Used   Substance and Sexual Activity     Alcohol use: No     Drug use: No     Sexual activity: Never   Other Topics Concern     Not on file   Social History Narrative     Not on file     Social Determinants of Health     Financial Resource Strain:      Difficulty of Paying Living Expenses:    Food Insecurity:      Worried About Running Out of Food in the Last Year:      Ran Out of Food in the Last Year:    Transportation Needs:      Lack of Transportation (Medical):      Lack of Transportation (Non-Medical):    Physical Activity:      Days of Exercise per Week:      Minutes of Exercise per Session:    Stress:      Feeling of Stress :    Social Connections:      Frequency of Communication with Friends and Family:      Frequency of Social Gatherings with Friends and Family:      Attends Spiritism Services:      Active Member of Clubs or Organizations:      Attends Club or Organization Meetings:      Marital Status:    Intimate Partner Violence:      Fear of Current or Ex-Partner:      Emotionally Abused:      Physically Abused:      Sexually Abused:        MEDICATIONS: Reviewed from the MAR, physician orders, and/or earlier progress notes.  Post Medication Reconciliation Status: medication reconcilation previously completed during another office visit    Current Outpatient Medications   Medication Sig     acetaminophen (TYLENOL) 500 MG tablet Take  "1,000 mg by mouth 3 times daily     atorvastatin (LIPITOR) 20 MG tablet 1 tablet (20 mg) by Oral or Feeding Tube route every evening     buprenorphine (BUTRANS) 5 MCG/HR WK patch APPLY TOPICALLY 1 PATCH ONCE A WEEK *REMOVE OLD PATCH BEFORE REAPPLICATION*     calcium carbonate 600 mg-vitamin D 400 units (CALTRATE) 600-400 MG-UNIT per tablet Take 1 tablet by mouth 2 times daily (before meals)     enoxaparin ANTICOAGULANT (LOVENOX) 30 MG/0.3ML syringe Inject 0.3 mLs (30 mg) Subcutaneous every 24 hours for 36 days     lidocaine (LIDODERM) 5 % patch Place 1 patch onto the skin every 24 hours To prevent lidocaine toxicity, patient should be patch free for 12 hrs daily.     Vitamin D3 (CHOLECALCIFEROL) 25 mcg (1000 units) tablet Take 1 tablet (25 mcg) by mouth daily     No current facility-administered medications for this visit.     ALLERGIES: No Known Allergies    DIET: Regular, regular texture, thin liquids.  Mighty Shake.    Vitals:    04/27/23 1503   BP: 109/74   Pulse: 66   Resp: 18   Temp: 98  F (36.7  C)   SpO2: 96%   Weight: 41 kg (90 lb 6.4 oz)   Height: 1.473 m (4' 10\")     Body mass index is 18.89 kg/m .    EXAMINATION:   General: Frail-appearing elderly female, resting in a recliner, dozing off intermittently during our conversation though not as frequently as was the case at my last visit..    Head: Normocephalic and atraumatic.   Eyes: PERRLA, sclerae clear.   ENT: Moist oral mucosa.  Has several of her own teeth, but missing all lower teeth on right plus several upper teeth.  No nasal discharge.   Cardiovascular: RRR with distinct wide split S1.   Respiratory: Lungs CTAB.   Abdomen: Nondistended.   Musculoskeletal/Extremities: Age-related DJD and moderate to severe kyphoscoliosis (which inhibits her breathing).  Hands and fingers show significant deformities due to OA.  Right fifth trigger finger.  Bilateral hammertoes, bilateral hallux valgus deformities with partial great toe overlap.  No peripheral " edema.   Integument: No rashes, clinically significant lesions, or skin breakdown.   Cognitive/Psychiatric: Very somnolent but otherwise pleasant.    DIAGNOSTICS:   Recent Results (from the past 240 hour(s))   Helicobacter pylori Antigen Stool    Collection Time: 04/21/23  1:00 PM   Result Value Ref Range    Helicobacter pylori Antigen Stool Negative Negative   Potassium    Collection Time: 04/24/23  8:09 AM   Result Value Ref Range    Potassium 4.3 3.4 - 5.3 mmol/L   Helicobacter pylori Antigen Stool    Collection Time: 04/25/23  6:21 PM   Result Value Ref Range    Helicobacter pylori Antigen Stool Negative Negative      Last Comprehensive Metabolic Panel:  Sodium   Date Value Ref Range Status   04/17/2023 138 136 - 145 mmol/L Final   01/30/2019 140 133 - 144 mmol/L Final     Potassium   Date Value Ref Range Status   04/24/2023 4.3 3.4 - 5.3 mmol/L Final   11/05/2022 3.5 3.4 - 5.3 mmol/L Final   01/30/2019 4.0 3.4 - 5.3 mmol/L Final     Chloride   Date Value Ref Range Status   04/17/2023 100 98 - 107 mmol/L Final   11/05/2022 99 94 - 109 mmol/L Final   01/30/2019 104 94 - 109 mmol/L Final     Carbon Dioxide   Date Value Ref Range Status   01/30/2019 30 20 - 32 mmol/L Final     Carbon Dioxide (CO2)   Date Value Ref Range Status   04/17/2023 23 22 - 29 mmol/L Final   11/05/2022 26 20 - 32 mmol/L Final     Anion Gap   Date Value Ref Range Status   04/17/2023 15 7 - 15 mmol/L Final   11/05/2022 15 (H) 3 - 14 mmol/L Final   01/30/2019 6 3 - 14 mmol/L Final     Glucose   Date Value Ref Range Status   04/17/2023 96 70 - 99 mg/dL Final   11/05/2022 102 (H) 70 - 99 mg/dL Final   01/30/2019 129 (H) 70 - 99 mg/dL Final     GLUCOSE BY METER POCT   Date Value Ref Range Status   04/05/2023 104 (H) 70 - 99 mg/dL Final     Urea Nitrogen   Date Value Ref Range Status   04/17/2023 15.5 8.0 - 23.0 mg/dL Final   11/05/2022 19 7 - 30 mg/dL Final   07/13/2020 16 7 - 30 mg/dL Final     Creatinine   Date Value Ref Range Status    04/17/2023 0.57 0.51 - 0.95 mg/dL Final   07/13/2020 0.67 0.52 - 1.04 mg/dL Final     GFR Estimate   Date Value Ref Range Status   04/17/2023 85 >60 mL/min/1.73m2 Final     Comment:     eGFR calculated using 2021 CKD-EPI equation.   07/13/2020 77 >60 mL/min/[1.73_m2] Final     Comment:     Non  GFR Calc  Starting 12/18/2018, serum creatinine based estimated GFR (eGFR) will be   calculated using the Chronic Kidney Disease Epidemiology Collaboration   (CKD-EPI) equation.       GFR, ESTIMATED POCT   Date Value Ref Range Status   04/03/2023 53 (L) >60 mL/min/1.73m2 Final     Calcium   Date Value Ref Range Status   04/17/2023 10.1 (H) 8.2 - 9.6 mg/dL Final   07/13/2020 8.8 8.5 - 10.1 mg/dL Final     Lab Results   Component Value Date    WBC 6.0 11/09/2022    WBC 7.2 01/09/2018     Lab Results   Component Value Date    RBC 4.26 11/09/2022    RBC 4.04 01/09/2018     Lab Results   Component Value Date    HGB 13.1 11/09/2022    HGB 13.0 06/20/2018     Lab Results   Component Value Date    HCT 39.6 11/09/2022    HCT 43.1 06/20/2018     Lab Results   Component Value Date    MCV 93 11/09/2022    MCV 96.0 06/20/2018     Lab Results   Component Value Date    MCH 30.8 11/09/2022    MCH 29.0 06/20/2018     Lab Results   Component Value Date    MCHC 33.1 11/09/2022    MCHC 30.2 06/20/2018     Lab Results   Component Value Date    RDW 13.4 11/09/2022    RDW 13.3 01/09/2018     Lab Results   Component Value Date     11/09/2022     01/09/2018       ASSESSMENT/Plan:      ICD-10-CM    1. Closed nondisplaced intertrochanteric fracture of right femur with routine healing, subsequent encounter  S72.144D       2. S/P ORIF (open reduction internal fixation) fracture  Z98.890     Z87.81       3. Compression fracture of L1 vertebra, sequela  S32.010S       4. Other elevated white blood cell (WBC) count  D72.828       5. Mild cognitive impairment  G31.84       6. Kyphoscoliosis  M41.9       7. Frailty  R54        8. Age-related osteoporosis with current pathological fracture with routine healing, subsequent encounter  M80.00XD       9. Falls frequently  R29.6       10. Physical deconditioning  R53.81       11. History of ischemic stroke of L occipital lobe within watershed region due to embolic stroke of undetermined source (ESUS)  Z86.73       12. Bowel and bladder incontinence  R32     R15.9       13. Rectal prolapse  K62.3           CHANGES:  None.    CARE PLAN:  The care plan has been reviewed and all orders signed.  Changes to care plan, if any, as noted.  Otherwise, continue current plan of care.      The above has been created using voice recognition software. Please be aware that this may unintentionally  produce inaccuracies and/or nonsensical sentences.      Electronically signed by: SUSIE Nieves CNP

## 2023-05-01 ENCOUNTER — TRANSITIONAL CARE UNIT VISIT (OUTPATIENT)
Dept: GERIATRICS | Facility: CLINIC | Age: 88
End: 2023-05-01
Payer: COMMERCIAL

## 2023-05-01 VITALS
OXYGEN SATURATION: 95 % | SYSTOLIC BLOOD PRESSURE: 117 MMHG | HEART RATE: 85 BPM | WEIGHT: 90.4 LBS | HEIGHT: 58 IN | DIASTOLIC BLOOD PRESSURE: 79 MMHG | TEMPERATURE: 98.2 F | BODY MASS INDEX: 18.97 KG/M2 | RESPIRATION RATE: 18 BRPM

## 2023-05-01 DIAGNOSIS — Z86.73 HISTORY OF CVA (CEREBROVASCULAR ACCIDENT): ICD-10-CM

## 2023-05-01 DIAGNOSIS — K62.3 RECTAL PROLAPSE: ICD-10-CM

## 2023-05-01 DIAGNOSIS — M41.9 KYPHOSCOLIOSIS: ICD-10-CM

## 2023-05-01 DIAGNOSIS — Z87.81 S/P ORIF (OPEN REDUCTION INTERNAL FIXATION) FRACTURE: ICD-10-CM

## 2023-05-01 DIAGNOSIS — R54 FRAILTY: ICD-10-CM

## 2023-05-01 DIAGNOSIS — Z98.890 S/P ORIF (OPEN REDUCTION INTERNAL FIXATION) FRACTURE: ICD-10-CM

## 2023-05-01 DIAGNOSIS — R29.6 FALLS FREQUENTLY: ICD-10-CM

## 2023-05-01 DIAGNOSIS — G31.84 MILD COGNITIVE IMPAIRMENT: ICD-10-CM

## 2023-05-01 DIAGNOSIS — D72.828 OTHER ELEVATED WHITE BLOOD CELL (WBC) COUNT: ICD-10-CM

## 2023-05-01 DIAGNOSIS — R53.81 PHYSICAL DECONDITIONING: ICD-10-CM

## 2023-05-01 DIAGNOSIS — S32.010S COMPRESSION FRACTURE OF L1 VERTEBRA, SEQUELA: ICD-10-CM

## 2023-05-01 DIAGNOSIS — S72.144D CLOSED NONDISPLACED INTERTROCHANTERIC FRACTURE OF RIGHT FEMUR WITH ROUTINE HEALING, SUBSEQUENT ENCOUNTER: Primary | ICD-10-CM

## 2023-05-01 DIAGNOSIS — R15.9 BOWEL AND BLADDER INCONTINENCE: ICD-10-CM

## 2023-05-01 DIAGNOSIS — M80.00XD AGE-RELATED OSTEOPOROSIS WITH CURRENT PATHOLOGICAL FRACTURE WITH ROUTINE HEALING, SUBSEQUENT ENCOUNTER: ICD-10-CM

## 2023-05-01 DIAGNOSIS — R32 BOWEL AND BLADDER INCONTINENCE: ICD-10-CM

## 2023-05-01 PROCEDURE — 99309 SBSQ NF CARE MODERATE MDM 30: CPT | Performed by: NURSE PRACTITIONER

## 2023-05-01 NOTE — LETTER
2023        RE: Belia Sheets  [address]  [address]  [facility] [address]        [facility]    Name:   Belia Sheets (Sudeep)  :   1930  MRN:    [id number]     Facility:   [facility] ([facility]) [[id number]]   Room: U / [place] [address]  Code Status: FULL CODE and POLST AVAILABLE -     DOS: 2023    PCP:  Alejandro Sandhu    CHIEF COMPLAINT / REASON FOR VISIT:  Chief Complaint   Patient presents with     Clinic Care Coordination - Follow-up     Right intratrochanteric hip fracture s/p IMN, L1 superior endplate compression fracture with mild height loss      [facility] from 2021 until 2021 (fall and right 9th rib fracture)  [facility] from 2021 until 2021 (fall with pubic rami fractures)  [facility] from 2021 until 10/08/2021  [facility] from 2022 until 2022 (acute ischemic stroke L occipital lobe within watershed region to the ESUS)   [facility] from 2022 until 2022  [facility] from 2023 until 2023 (right hip fracture s/p IMN, L1 superior endplate compression fracture)      HPI: Belia is a 89 year old female with a history of osteoporosis, significant osteoarthritis (especially in the hands), essential hypertension, and remote history of visual hallucinations.  She had just undergone rehab in the TCU here a few months prior after falling and sustaining a right ninth rib fracture.    She fell again on 2021 and was taken by EMS to [facility] ED.  Imaging revealed right superior and inferior pubic rami fractures.  Evaluated by orthopedics with fractures to be managed conservatively, utilizing pain control and guided weightbearing recommendations.  She can bear weight as tolerated with a walker for  "safety.  Orthopedic surgery did recommend follow-up in 3 to 4 weeks with nonoperative orthopedics provider.  This should include AP x-rays of the pelvis.  She may be seen by her PCP if the PCP feels comfortable.      She was admitted to [facility] on 11/05/2022 with an acute ischemic stroke of the left occipital lobe within watershed region due to embolic stroke of undetermined source (ESUS).  No abnormalities were noted on MRI.  It was suggested that he suffered from acute on chronic encephalopathy.  Echo showed no cardiac source of the embolus.  EKG and cardiac monitoring showed sinus rhythm with PACs and a nonspecific T wave abnormality but no evidence of atrial fibrillation.  She does have some risk factors for stroke, including elevated LDL (111) and hypertension.    According to Lubna, her daughter, her mother was experiencing a \"different confusion\" compared to baseline.  Difficulty following train of thought since March or April 2022.      THIS HOSPITALIZATION    She presented to the ED on 04/03/2023 with AMS and imaging showing right intertrochanteric proximal femur fracture and L1 compression fracture of the superior endplate.      She underwent OR with right intramedullary nail the following day with Dr. Roach.  She can be WBAT.  She received enoxaparin for DVT prophylaxis during her hospitalization and completed a course of Ancef.    Initial CT imaging showed L1 compression fracture of the superior endplate with mild height loss, potentially slightly worsened compared to imaging from 11/06/2022.  No neurosurgical intervention was recommended but plan to continue bone health clinic referral.    She had leukocytosis which was mild and likely reactive in nature.     Waxing and waning mentation was described and, per a discussion with her daughter, delirium has occurred following hospitalizations.    Follow-up: Dressing to remain in place until POD #7.  2 weeks with RN for wound check, 6 weeks with Dr." "Doc.      CURRENT/RECENT TCU ISSUES    Disposition   -- As I found her at my last two visits, so I find her today, asleep in the recliner.  Waking her is exceedingly difficult, and she then dozes off during conversation.  -- Impaired cognition is much more apparent than it had been during previous TCU stays.  She was awake when I entered her room but dozed off several times during the visit.  Asked how she was doing, she answered, \"well, I am keeping something going,\" and then she dozed off in the recliner.    -- For pain, they had tried very low-dose oxycodone (2.5 mg), but she became oversedated even on that tiny dose.  Current state is concerning for early dementia, though she may also suffer from the effects of OR anesthesia, as she does have that history of delirium.  -- During her last TCU stay, confusion and short-term memory difficulties became clearer.  When asked, she could tell me neither the year nor month. SLP did work with her during an earlier stay here, and it was felt she required quite a bit of support.  At this point in time, she clearly requires 24-hour supervision.  -- She insists that nothing hurts, and she is sleeping a lot.    Osteoporosis  History of multiple fractures  -- History of falls and fractures over the last few years.    Rectal prolapse  -- When last here, she has occasionally complained of rectal pain.  She does have a history of rectal prolapse, and she stated then that \"it hurts trying to push out small pieces of stool.\"    -- This issue apparently comes and goes but should be kept in mind in her current situation. She has undergone surgery within the last 5 or 6 years.      Cognitive impairment   -- During an earlier stay, there were at least mild cognitive deficits noted, and this was corroborated by the patient's son who also endorsed his mother's memory loss, although she can appear, while awake, to be quite cogent.  In 2021, SLP did work with her, and it was felt that she " needed quite a bit of support.   On the SLUMS, she did rather poorly on the clock test, making a pie and then adding numbers but no hands.  She scored 2/17 on the storytelling, 10/17 on orientation.  She scored 23/30 on the MMSE.  Daughter apparently -- at least initially -- believed cognitive impairment to be an acute issue.  However, the patient had told me that she felt she had trouble with her thinking 2-3 years earlier but that she was feeling much clearer at the time.  Cognition does tend to wax and wane and is likely multifactorial in nature.      Dysphagia  -- Soon after her earlier admission, I requested eval and treat by SLP, not only for cognition but for complaints of difficulty swallowing.  There was no need to change her diet.  I am aware that they worked together at least twice during this stay (and probably longer).  Interestingly, she could remember Rosey, the SLP's name, at the time.  -- She was apparently reevaluated during her last hospital stay but was ultimately given an okay for thin liquids.    Osteoarthritis   -- Rather severe in the hands with noted deformities of various joints.  Current pain management appears to be working well.    Discharge planning   -- Indications are that she will most likely need long-term care.      ROS:  10 point ROS of systems including Constitutional, Eyes, Respiratory, Cardiovascular, Gastroenterology, Genitourinary, Integumentary, Muscularskeletal, Psychiatric were all negative except for pertinent positives noted in my HPI.      Past Medical History:   Diagnosis Date     Carpal tunnel syndrome (aka CTS)      Cataract      Chronic osteoarthritis      Constipation due to outlet dysfunction 9/22/2021     Fracture of multiple rami of right pubis with routine healing, subsequent encounter 9/22/2021     H/O calcium pyrophosphate deposition disease (CPPD)      History of 9th right rib fracture due to fall (07/13/2021) 9/22/2021     History of encephalopathy 10/2017      Hypertension      Kyphoscoliosis      Osteoarthritis     hands     Osteoporosis      Rectal prolapse               Family History   Problem Relation Age of Onset     Depression/Anxiety Son      Depression/Anxiety Son         alcohol abuse  age 24     Hypertension Mother      Hypertension Brother      Diabetes No family hx of      Breast Cancer No family hx of      Colon Cancer No family hx of      Prostate Cancer No family hx of      Other Cancer No family hx of      Social History     Socioeconomic History     Marital status:      Spouse name: Not on file     Number of children: Not on file     Years of education: Not on file     Highest education level: Not on file   Occupational History     Not on file   Tobacco Use     Smoking status: Never Smoker     Smokeless tobacco: Never Used   Substance and Sexual Activity     Alcohol use: No     Drug use: No     Sexual activity: Never   Other Topics Concern     Not on file   Social History Narrative     Not on file     Social Determinants of Health     Financial Resource Strain:      Difficulty of Paying Living Expenses:    Food Insecurity:      Worried About Running Out of Food in the Last Year:      Ran Out of Food in the Last Year:    Transportation Needs:      Lack of Transportation (Medical):      Lack of Transportation (Non-Medical):    Physical Activity:      Days of Exercise per Week:      Minutes of Exercise per Session:    Stress:      Feeling of Stress :    Social Connections:      Frequency of Communication with Friends and Family:      Frequency of Social Gatherings with Friends and Family:      Attends [employer] Services:      Active Member of Clubs or Organizations:      Attends Club or Organization Meetings:      Marital Status:    Intimate Partner Violence:      Fear of Current or Ex-Partner:      Emotionally Abused:      Physically Abused:      Sexually Abused:        MEDICATIONS: Reviewed from the MAR, physician orders, and/or earlier  "progress notes.  Post Medication Reconciliation Status: medication reconcilation previously completed during another office visit    Current Outpatient Medications   Medication Sig     acetaminophen (TYLENOL) 500 MG tablet Take 1,000 mg by mouth 3 times daily     atorvastatin (LIPITOR) 20 MG tablet 1 tablet (20 mg) by Oral or Feeding Tube route every evening     buprenorphine (BUTRANS) 5 MCG/HR WK patch APPLY TOPICALLY 1 PATCH ONCE A WEEK *REMOVE OLD PATCH BEFORE REAPPLICATION*     calcium carbonate 600 mg-vitamin D 400 units (CALTRATE) 600-400 MG-UNIT per tablet Take 1 tablet by mouth 2 times daily (before meals)     enoxaparin ANTICOAGULANT (LOVENOX) 30 MG/0.3ML syringe Inject 0.3 mLs (30 mg) Subcutaneous every 24 hours for 36 days     lidocaine (LIDODERM) 5 % patch Place 1 patch onto the skin every 24 hours To prevent lidocaine toxicity, patient should be patch free for 12 hrs daily.     Vitamin D3 (CHOLECALCIFEROL) 25 mcg (1000 units) tablet Take 1 tablet (25 mcg) by mouth daily     No current facility-administered medications for this visit.     ALLERGIES: No Known Allergies    DIET: Regular, regular texture, thin liquids.  Mighty Shake.    Vitals:    05/01/23 1121   BP: 117/79   Pulse: 85   Resp: 18   Temp: 98.2  F (36.8  C)   SpO2: 95%   Weight: 41 kg (90 lb 6.4 oz)   Height: 1.473 m (4' 10\")     Body mass index is 18.89 kg/m .    EXAMINATION:   General: Frail-appearing elderly female, asleep in a recliner.  She is dozing intermittently during our conversation..    Head: Normocephalic and atraumatic.   Eyes: PERRLA, sclerae clear.   ENT: Moist oral mucosa.  Has several of her own teeth, but missing all lower teeth on right plus several upper teeth.  No nasal discharge.   Cardiovascular: RRR with distinct wide split S1.   Respiratory: Lungs CTAB.   Abdomen: Nondistended.   Musculoskeletal/Extremities: Age-related DJD and moderate to severe kyphoscoliosis (which inhibits her breathing).  Hands and fingers show " significant deformities due to OA.  Right fifth trigger finger.  Bilateral hammertoes, bilateral hallux valgus deformities with partial great toe overlap.  No peripheral edema.   Integument: No rashes, clinically significant lesions, or skin breakdown.   Cognitive/Psychiatric: Very somnolent but otherwise pleasant.    DIAGNOSTICS:   Recent Results (from the past 240 hour(s))   Potassium    Collection Time: 04/24/23  8:09 AM   Result Value Ref Range    Potassium 4.3 3.4 - 5.3 mmol/L   Helicobacter pylori Antigen Stool    Collection Time: 04/25/23  6:21 PM   Result Value Ref Range    Helicobacter pylori Antigen Stool Negative Negative      Last Comprehensive Metabolic Panel:  Sodium   Date Value Ref Range Status   04/17/2023 138 136 - 145 mmol/L Final   01/30/2019 140 133 - 144 mmol/L Final     Potassium   Date Value Ref Range Status   04/24/2023 4.3 3.4 - 5.3 mmol/L Final   11/05/2022 3.5 3.4 - 5.3 mmol/L Final   01/30/2019 4.0 3.4 - 5.3 mmol/L Final     Chloride   Date Value Ref Range Status   04/17/2023 100 98 - 107 mmol/L Final   11/05/2022 99 94 - 109 mmol/L Final   01/30/2019 104 94 - 109 mmol/L Final     Carbon Dioxide   Date Value Ref Range Status   01/30/2019 30 20 - 32 mmol/L Final     Carbon Dioxide (CO2)   Date Value Ref Range Status   04/17/2023 23 22 - 29 mmol/L Final   11/05/2022 26 20 - 32 mmol/L Final     Anion Gap   Date Value Ref Range Status   04/17/2023 15 7 - 15 mmol/L Final   11/05/2022 15 (H) 3 - 14 mmol/L Final   01/30/2019 6 3 - 14 mmol/L Final     Glucose   Date Value Ref Range Status   04/17/2023 96 70 - 99 mg/dL Final   11/05/2022 102 (H) 70 - 99 mg/dL Final   01/30/2019 129 (H) 70 - 99 mg/dL Final     GLUCOSE BY METER POCT   Date Value Ref Range Status   04/05/2023 104 (H) 70 - 99 mg/dL Final     Urea Nitrogen   Date Value Ref Range Status   04/17/2023 15.5 8.0 - 23.0 mg/dL Final   11/05/2022 19 7 - 30 mg/dL Final   07/13/2020 16 7 - 30 mg/dL Final     Creatinine   Date Value Ref Range  Status   04/17/2023 0.57 0.51 - 0.95 mg/dL Final   07/13/2020 0.67 0.52 - 1.04 mg/dL Final     GFR Estimate   Date Value Ref Range Status   04/17/2023 85 >60 mL/min/1.73m2 Final     Comment:     eGFR calculated using 2021 CKD-EPI equation.   07/13/2020 77 >60 mL/min/[1.73_m2] Final     Comment:     Non  GFR Calc  Starting 12/18/2018, serum creatinine based estimated GFR (eGFR) will be   calculated using the Chronic Kidney Disease Epidemiology Collaboration   (CKD-EPI) equation.       GFR, ESTIMATED POCT   Date Value Ref Range Status   04/03/2023 53 (L) >60 mL/min/1.73m2 Final     Calcium   Date Value Ref Range Status   04/17/2023 10.1 (H) 8.2 - 9.6 mg/dL Final   07/13/2020 8.8 8.5 - 10.1 mg/dL Final     Lab Results   Component Value Date    WBC 6.0 11/09/2022    WBC 7.2 01/09/2018     Lab Results   Component Value Date    RBC 4.26 11/09/2022    RBC 4.04 01/09/2018     Lab Results   Component Value Date    HGB 13.1 11/09/2022    HGB 13.0 06/20/2018     Lab Results   Component Value Date    HCT 39.6 11/09/2022    HCT 43.1 06/20/2018     Lab Results   Component Value Date    MCV 93 11/09/2022    MCV 96.0 06/20/2018     Lab Results   Component Value Date    MCH 30.8 11/09/2022    MCH 29.0 06/20/2018     Lab Results   Component Value Date    MCHC 33.1 11/09/2022    MCHC 30.2 06/20/2018     Lab Results   Component Value Date    RDW 13.4 11/09/2022    RDW 13.3 01/09/2018     Lab Results   Component Value Date     11/09/2022     01/09/2018       ASSESSMENT/Plan:      ICD-10-CM    1. Closed nondisplaced intertrochanteric fracture of right femur with routine healing, subsequent encounter  S72.144D       2. S/P ORIF (open reduction internal fixation) fracture  Z98.890     Z87.81       3. Compression fracture of L1 vertebra, sequela  S32.010S       4. Other elevated white blood cell (WBC) count  D72.828       5. Mild cognitive impairment  G31.84       6. Kyphoscoliosis  M41.9       7. Frailty  R54        8. Age-related osteoporosis with current pathological fracture with routine healing, subsequent encounter  M80.00XD       9. Falls frequently  R29.6       10. Physical deconditioning  R53.81       11. History of ischemic stroke of L occipital lobe within watershed region due to embolic stroke of undetermined source (ESUS)  Z86.73       12. Bowel and bladder incontinence  R32     R15.9       13. Rectal prolapse  K62.3           CHANGES:  None.    CARE PLAN:  The care plan has been reviewed and all orders signed.  Changes to care plan, if any, as noted.  Otherwise, continue current plan of care.      The above has been created using voice recognition software. Please be aware that this may unintentionally  produce inaccuracies and/or nonsensical sentences.      Electronically signed by: SUSIE Nieves CNP        Sincerely,        SUSIE Nieves CNP

## 2023-05-02 NOTE — PROGRESS NOTES
[facility]    Name:   Belia Sheets (Sudeep)  :   1930  MRN:    [id number]     Facility:   [facility] ([facility]) [[id number]]   Room: TCU / [place] [address]  Code Status: FULL CODE and POLST AVAILABLE -     DOS: 2023    PCP:  Alejandro Sandhu    CHIEF COMPLAINT / REASON FOR VISIT:  Chief Complaint   Patient presents with     Clinic Care Coordination - Follow-up     Right intratrochanteric hip fracture s/p IMN, L1 superior endplate compression fracture with mild height loss      [facility] from 2021 until 2021 (fall and right 9th rib fracture)  [facility] from 2021 until 2021 (fall with pubic rami fractures)  [facility] until 10/08/2021  [facility] from 2022 until 2022 (acute ischemic stroke L occipital lobe within watershed region to the ESUS)   [facility] until [facility]  [facility] from 2023 until 2023 (right hip fracture s/p IMN, L1 superior endplate compression fracture)      HPI: Belia is a 89 year old female with a history of osteoporosis, significant osteoarthritis (especially in the hands), essential hypertension, and remote history of visual hallucinations.  She had just undergone rehab in the TCU here a few months prior after falling and sustaining a right ninth rib fracture.    She fell again on 2021 and was taken by EMS to [facility] ED.  Imaging revealed right superior and inferior pubic rami fractures.  Evaluated by orthopedics with fractures to be managed conservatively, utilizing pain control and guided weightbearing recommendations.  She can bear weight as tolerated with a walker for safety.  Orthopedic surgery did recommend follow-up in 3 to 4 weeks with nonoperative orthopedics  "provider.  This should include AP x-rays of the pelvis.  She may be seen by her PCP if the PCP feels comfortable.      She was admitted to [facility] on 11/05/2022 with an acute ischemic stroke of the left occipital lobe within watershed region due to embolic stroke of undetermined source (ESUS).  No abnormalities were noted on MRI.  It was suggested that he suffered from acute on chronic encephalopathy.  Echo showed no cardiac source of the embolus.  EKG and cardiac monitoring showed sinus rhythm with PACs and a nonspecific T wave abnormality but no evidence of atrial fibrillation.  She does have some risk factors for stroke, including elevated LDL (111) and hypertension.    According to Lubna, her daughter, her mother was experiencing a \"different confusion\" compared to baseline.  Difficulty following train of thought since March or April 2022.      THIS HOSPITALIZATION    She presented to the ED on 04/03/2023 with AMS and imaging showing right intertrochanteric proximal femur fracture and L1 compression fracture of the superior endplate.      She underwent OR with right intramedullary nail the following day with Dr. Roach.  She can be WBAT.  She received enoxaparin for DVT prophylaxis during her hospitalization and completed a course of Ancef.    Initial CT imaging showed L1 compression fracture of the superior endplate with mild height loss, potentially slightly worsened compared to imaging from 11/06/2022.  No neurosurgical intervention was recommended but plan to continue bone health clinic referral.    She had leukocytosis which was mild and likely reactive in nature.     Waxing and waning mentation was described and, per a discussion with her daughter, delirium has occurred following hospitalizations.    Follow-up: Dressing to remain in place until POD #7.  2 weeks with RN for wound check, 6 weeks with Dr. Roach.      CURRENT/RECENT TCU ISSUES    Disposition   -- As I found her at my last two visits, so I find her " "today, asleep in the recliner.  Waking her is exceedingly difficult, and she then dozes off during conversation.  -- Impaired cognition is much more apparent than it had been during previous TCU stays.  She was awake when I entered her room but dozed off several times during the visit.  Asked how she was doing, she answered, \"well, I am keeping something going,\" and then she dozed off in the recliner.    -- For pain, they had tried very low-dose oxycodone (2.5 mg), but she became oversedated even on that tiny dose.  Current state is concerning for early dementia, though she may also suffer from the effects of OR anesthesia, as she does have that history of delirium.  -- During her last TCU stay, confusion and short-term memory difficulties became clearer.  When asked, she could tell me neither the year nor month. SLP did work with her during an earlier stay here, and it was felt she required quite a bit of support.  At this point in time, she clearly requires 24-hour supervision.  -- She insists that nothing hurts, and she is sleeping a lot.    Osteoporosis  History of multiple fractures  -- History of falls and fractures over the last few years.    Rectal prolapse  -- When last here, she has occasionally complained of rectal pain.  She does have a history of rectal prolapse, and she stated then that \"it hurts trying to push out small pieces of stool.\"    -- This issue apparently comes and goes but should be kept in mind in her current situation. She has undergone surgery within the last 5 or 6 years.      Cognitive impairment   -- During an earlier stay, there were at least mild cognitive deficits noted, and this was corroborated by the patient's son who also endorsed his mother's memory loss, although she can appear, while awake, to be quite cogent.  In 2021, SLP did work with her, and it was felt that she needed quite a bit of support.   On the SLUMS, she did rather poorly on the clock test, making a pie and then " adding numbers but no hands.  She scored 2/17 on the storytelling, 10/17 on orientation.  She scored 23/30 on the MMSE.  Daughter apparently -- at least initially -- believed cognitive impairment to be an acute issue.  However, the patient had told me that she felt she had trouble with her thinking 2-3 years earlier but that she was feeling much clearer at the time.  Cognition does tend to wax and wane and is likely multifactorial in nature.      Dysphagia  -- Soon after her earlier admission, I requested eval and treat by SLP, not only for cognition but for complaints of difficulty swallowing.  There was no need to change her diet.  I am aware that they worked together at least twice during this stay (and probably longer).  Interestingly, she could remember Rosey, the SLP's name, at the time.  -- She was apparently reevaluated during her last hospital stay but was ultimately given an okay for thin liquids.    Osteoarthritis   -- Rather severe in the hands with noted deformities of various joints.  Current pain management appears to be working well.    Discharge planning   -- Indications are that she will most likely need long-term care.      ROS:  10 point ROS of systems including Constitutional, Eyes, Respiratory, Cardiovascular, Gastroenterology, Genitourinary, Integumentary, Muscularskeletal, Psychiatric were all negative except for pertinent positives noted in my HPI.      Past Medical History:   Diagnosis Date     Carpal tunnel syndrome (aka CTS)      Cataract      Chronic osteoarthritis      Constipation due to outlet dysfunction 9/22/2021     Fracture of multiple rami of right pubis with routine healing, subsequent encounter 9/22/2021     H/O calcium pyrophosphate deposition disease (CPPD)      History of 9th right rib fracture due to fall (07/13/2021) 9/22/2021     History of encephalopathy 10/2017     Hypertension      Kyphoscoliosis      Osteoarthritis     hands     Osteoporosis      Rectal prolapse                Family History   Problem Relation Age of Onset     Depression/Anxiety Son      Depression/Anxiety Son         alcohol abuse  age 24     Hypertension Mother      Hypertension Brother      Diabetes No family hx of      Breast Cancer No family hx of      Colon Cancer No family hx of      Prostate Cancer No family hx of      Other Cancer No family hx of      Social History     Socioeconomic History     Marital status:      Spouse name: Not on file     Number of children: Not on file     Years of education: Not on file     Highest education level: Not on file   Occupational History     Not on file   Tobacco Use     Smoking status: Never Smoker     Smokeless tobacco: Never Used   Substance and Sexual Activity     Alcohol use: No     Drug use: No     Sexual activity: Never   Other Topics Concern     Not on file   Social History Narrative     Not on file     Social Determinants of Health     Financial Resource Strain:      Difficulty of Paying Living Expenses:    Food Insecurity:      Worried About Running Out of Food in the Last Year:      Ran Out of Food in the Last Year:    Transportation Needs:      Lack of Transportation (Medical):      Lack of Transportation (Non-Medical):    Physical Activity:      Days of Exercise per Week:      Minutes of Exercise per Session:    Stress:      Feeling of Stress :    Social Connections:      Frequency of Communication with Friends and Family:      Frequency of Social Gatherings with Friends and Family:      Attends Denominational Services:      Active Member of Clubs or Organizations:      Attends Club or Organization Meetings:      Marital Status:    Intimate Partner Violence:      Fear of Current or Ex-Partner:      Emotionally Abused:      Physically Abused:      Sexually Abused:        MEDICATIONS: Reviewed from the MAR, physician orders, and/or earlier progress notes.  Post Medication Reconciliation Status: medication reconcilation previously completed during  "another office visit    Current Outpatient Medications   Medication Sig     acetaminophen (TYLENOL) 500 MG tablet Take 1,000 mg by mouth 3 times daily     atorvastatin (LIPITOR) 20 MG tablet 1 tablet (20 mg) by Oral or Feeding Tube route every evening     buprenorphine (BUTRANS) 5 MCG/HR WK patch APPLY TOPICALLY 1 PATCH ONCE A WEEK *REMOVE OLD PATCH BEFORE REAPPLICATION*     calcium carbonate 600 mg-vitamin D 400 units (CALTRATE) 600-400 MG-UNIT per tablet Take 1 tablet by mouth 2 times daily (before meals)     enoxaparin ANTICOAGULANT (LOVENOX) 30 MG/0.3ML syringe Inject 0.3 mLs (30 mg) Subcutaneous every 24 hours for 36 days     lidocaine (LIDODERM) 5 % patch Place 1 patch onto the skin every 24 hours To prevent lidocaine toxicity, patient should be patch free for 12 hrs daily.     Vitamin D3 (CHOLECALCIFEROL) 25 mcg (1000 units) tablet Take 1 tablet (25 mcg) by mouth daily     No current facility-administered medications for this visit.     ALLERGIES: No Known Allergies    DIET: Regular, regular texture, thin liquids.  Mighty Shake.    Vitals:    05/01/23 1121   BP: 117/79   Pulse: 85   Resp: 18   Temp: 98.2  F (36.8  C)   SpO2: 95%   Weight: 41 kg (90 lb 6.4 oz)   Height: 1.473 m (4' 10\")     Body mass index is 18.89 kg/m .    EXAMINATION:   General: Frail-appearing elderly female, asleep in a recliner.  She is dozing intermittently during our conversation..    Head: Normocephalic and atraumatic.   Eyes: PERRLA, sclerae clear.   ENT: Moist oral mucosa.  Has several of her own teeth, but missing all lower teeth on right plus several upper teeth.  No nasal discharge.   Cardiovascular: RRR with distinct wide split S1.   Respiratory: Lungs CTAB.   Abdomen: Nondistended.   Musculoskeletal/Extremities: Age-related DJD and moderate to severe kyphoscoliosis (which inhibits her breathing).  Hands and fingers show significant deformities due to OA.  Right fifth trigger finger.  Bilateral hammertoes, bilateral hallux " valgus deformities with partial great toe overlap.  No peripheral edema.   Integument: No rashes, clinically significant lesions, or skin breakdown.   Cognitive/Psychiatric: Very somnolent but otherwise pleasant.    DIAGNOSTICS:   Recent Results (from the past 240 hour(s))   Potassium    Collection Time: 04/24/23  8:09 AM   Result Value Ref Range    Potassium 4.3 3.4 - 5.3 mmol/L   Helicobacter pylori Antigen Stool    Collection Time: 04/25/23  6:21 PM   Result Value Ref Range    Helicobacter pylori Antigen Stool Negative Negative      Last Comprehensive Metabolic Panel:  Sodium   Date Value Ref Range Status   04/17/2023 138 136 - 145 mmol/L Final   01/30/2019 140 133 - 144 mmol/L Final     Potassium   Date Value Ref Range Status   04/24/2023 4.3 3.4 - 5.3 mmol/L Final   11/05/2022 3.5 3.4 - 5.3 mmol/L Final   01/30/2019 4.0 3.4 - 5.3 mmol/L Final     Chloride   Date Value Ref Range Status   04/17/2023 100 98 - 107 mmol/L Final   11/05/2022 99 94 - 109 mmol/L Final   01/30/2019 104 94 - 109 mmol/L Final     Carbon Dioxide   Date Value Ref Range Status   01/30/2019 30 20 - 32 mmol/L Final     Carbon Dioxide (CO2)   Date Value Ref Range Status   04/17/2023 23 22 - 29 mmol/L Final   11/05/2022 26 20 - 32 mmol/L Final     Anion Gap   Date Value Ref Range Status   04/17/2023 15 7 - 15 mmol/L Final   11/05/2022 15 (H) 3 - 14 mmol/L Final   01/30/2019 6 3 - 14 mmol/L Final     Glucose   Date Value Ref Range Status   04/17/2023 96 70 - 99 mg/dL Final   11/05/2022 102 (H) 70 - 99 mg/dL Final   01/30/2019 129 (H) 70 - 99 mg/dL Final     GLUCOSE BY METER POCT   Date Value Ref Range Status   04/05/2023 104 (H) 70 - 99 mg/dL Final     Urea Nitrogen   Date Value Ref Range Status   04/17/2023 15.5 8.0 - 23.0 mg/dL Final   11/05/2022 19 7 - 30 mg/dL Final   07/13/2020 16 7 - 30 mg/dL Final     Creatinine   Date Value Ref Range Status   04/17/2023 0.57 0.51 - 0.95 mg/dL Final   07/13/2020 0.67 0.52 - 1.04 mg/dL Final     GFR  Estimate   Date Value Ref Range Status   04/17/2023 85 >60 mL/min/1.73m2 Final     Comment:     eGFR calculated using 2021 CKD-EPI equation.   07/13/2020 77 >60 mL/min/[1.73_m2] Final     Comment:     Non  GFR Calc  Starting 12/18/2018, serum creatinine based estimated GFR (eGFR) will be   calculated using the Chronic Kidney Disease Epidemiology Collaboration   (CKD-EPI) equation.       GFR, ESTIMATED POCT   Date Value Ref Range Status   04/03/2023 53 (L) >60 mL/min/1.73m2 Final     Calcium   Date Value Ref Range Status   04/17/2023 10.1 (H) 8.2 - 9.6 mg/dL Final   07/13/2020 8.8 8.5 - 10.1 mg/dL Final     Lab Results   Component Value Date    WBC 6.0 11/09/2022    WBC 7.2 01/09/2018     Lab Results   Component Value Date    RBC 4.26 11/09/2022    RBC 4.04 01/09/2018     Lab Results   Component Value Date    HGB 13.1 11/09/2022    HGB 13.0 06/20/2018     Lab Results   Component Value Date    HCT 39.6 11/09/2022    HCT 43.1 06/20/2018     Lab Results   Component Value Date    MCV 93 11/09/2022    MCV 96.0 06/20/2018     Lab Results   Component Value Date    MCH 30.8 11/09/2022    MCH 29.0 06/20/2018     Lab Results   Component Value Date    MCHC 33.1 11/09/2022    MCHC 30.2 06/20/2018     Lab Results   Component Value Date    RDW 13.4 11/09/2022    RDW 13.3 01/09/2018     Lab Results   Component Value Date     11/09/2022     01/09/2018       ASSESSMENT/Plan:      ICD-10-CM    1. Closed nondisplaced intertrochanteric fracture of right femur with routine healing, subsequent encounter  S72.144D       2. S/P ORIF (open reduction internal fixation) fracture  Z98.890     Z87.81       3. Compression fracture of L1 vertebra, sequela  S32.010S       4. Other elevated white blood cell (WBC) count  D72.828       5. Mild cognitive impairment  G31.84       6. Kyphoscoliosis  M41.9       7. Frailty  R54       8. Age-related osteoporosis with current pathological fracture with routine healing,  subsequent encounter  M80.00XD       9. Falls frequently  R29.6       10. Physical deconditioning  R53.81       11. History of ischemic stroke of L occipital lobe within watershed region due to embolic stroke of undetermined source (ESUS)  Z86.73       12. Bowel and bladder incontinence  R32     R15.9       13. Rectal prolapse  K62.3           CHANGES:  None.    CARE PLAN:  The care plan has been reviewed and all orders signed.  Changes to care plan, if any, as noted.  Otherwise, continue current plan of care.      The above has been created using voice recognition software. Please be aware that this may unintentionally  produce inaccuracies and/or nonsensical sentences.      Electronically signed by: SUSIE Nieves CNP

## 2023-05-04 ENCOUNTER — TRANSITIONAL CARE UNIT VISIT (OUTPATIENT)
Dept: GERIATRICS | Facility: CLINIC | Age: 88
End: 2023-05-04
Payer: COMMERCIAL

## 2023-05-04 ENCOUNTER — TELEPHONE (OUTPATIENT)
Dept: NEUROSURGERY | Facility: CLINIC | Age: 88
End: 2023-05-04

## 2023-05-04 VITALS
DIASTOLIC BLOOD PRESSURE: 70 MMHG | TEMPERATURE: 98.1 F | SYSTOLIC BLOOD PRESSURE: 125 MMHG | OXYGEN SATURATION: 94 % | RESPIRATION RATE: 18 BRPM | HEART RATE: 71 BPM | WEIGHT: 86.4 LBS | HEIGHT: 58 IN | BODY MASS INDEX: 18.14 KG/M2

## 2023-05-04 DIAGNOSIS — S32.010S COMPRESSION FRACTURE OF L1 VERTEBRA, SEQUELA: ICD-10-CM

## 2023-05-04 DIAGNOSIS — K62.3 RECTAL PROLAPSE: ICD-10-CM

## 2023-05-04 DIAGNOSIS — D72.828 OTHER ELEVATED WHITE BLOOD CELL (WBC) COUNT: ICD-10-CM

## 2023-05-04 DIAGNOSIS — M80.00XD AGE-RELATED OSTEOPOROSIS WITH CURRENT PATHOLOGICAL FRACTURE WITH ROUTINE HEALING, SUBSEQUENT ENCOUNTER: ICD-10-CM

## 2023-05-04 DIAGNOSIS — R32 BOWEL AND BLADDER INCONTINENCE: ICD-10-CM

## 2023-05-04 DIAGNOSIS — M41.9 KYPHOSCOLIOSIS: ICD-10-CM

## 2023-05-04 DIAGNOSIS — R54 FRAILTY: ICD-10-CM

## 2023-05-04 DIAGNOSIS — G31.84 MILD COGNITIVE IMPAIRMENT: ICD-10-CM

## 2023-05-04 DIAGNOSIS — S72.144D CLOSED NONDISPLACED INTERTROCHANTERIC FRACTURE OF RIGHT FEMUR WITH ROUTINE HEALING, SUBSEQUENT ENCOUNTER: Primary | ICD-10-CM

## 2023-05-04 DIAGNOSIS — Z98.890 S/P ORIF (OPEN REDUCTION INTERNAL FIXATION) FRACTURE: ICD-10-CM

## 2023-05-04 DIAGNOSIS — Z86.73 HISTORY OF CVA (CEREBROVASCULAR ACCIDENT): ICD-10-CM

## 2023-05-04 DIAGNOSIS — R15.9 BOWEL AND BLADDER INCONTINENCE: ICD-10-CM

## 2023-05-04 DIAGNOSIS — Z87.81 S/P ORIF (OPEN REDUCTION INTERNAL FIXATION) FRACTURE: ICD-10-CM

## 2023-05-04 DIAGNOSIS — R29.6 FALLS FREQUENTLY: ICD-10-CM

## 2023-05-04 DIAGNOSIS — R53.81 PHYSICAL DECONDITIONING: ICD-10-CM

## 2023-05-04 PROCEDURE — 99309 SBSQ NF CARE MODERATE MDM 30: CPT | Performed by: NURSE PRACTITIONER

## 2023-05-04 NOTE — TELEPHONE ENCOUNTER
[facility] Call Center    Phone Message    May a detailed message be left on voicemail: yes     Reason for Call: Appointment Intake    Referring Provider Name: German Leiva MD  Diagnosis and/or Symptoms: Compression fracture of L1 lumbar vertebra    Referral from 4/4/2023Renetta called requesting to schedule patient for appointment stating no one has tried to reach patient for schedule. She is requesting a call back to schedule at [phone].    Action Taken: Message routed to:  [facility] Neurosurgery    Travel Screening: Not Applicable

## 2023-05-04 NOTE — LETTER
2023        RE: Belia Sheets  [address]  [address]  [facility] [address]        [facility]    Name:   Belia Sheets (Sudeep)  :   1930  MRN:    [id number]     Facility:   [facility] ([facility]) [[id number]]   Room: U / [place] [address]  Code Status: FULL CODE and POLST AVAILABLE -     DOS: 2023    PCP:  Alejandro Sandhu    CHIEF COMPLAINT / REASON FOR VISIT:  Chief Complaint   Patient presents with     Clinic Care Coordination - Follow-up     Right intratrochanteric hip fracture s/p IMN, L1 superior endplate compression fracture with mild height loss      [facility] from 2021 until 2021 (fall and right 9th rib fracture)  [facility] from 2021 until 2021 (fall with pubic rami fractures)  [facility] from 2021 until 10/08/2021  [facility] from 2022 until 2022 (acute ischemic stroke L occipital lobe within watershed region to the ESUS)   [facility] from 2022 until 2022  [facility] from 2023 until 2023 (right hip fracture s/p IMN, L1 superior endplate compression fracture)      HPI: Belia is a 89 year old female with a history of osteoporosis, significant osteoarthritis (especially in the hands), essential hypertension, and remote history of visual hallucinations.  She had just undergone rehab in the TCU here a few months prior after falling and sustaining a right ninth rib fracture.    She fell again on 2021 and was taken by EMS to [facility] ED.  Imaging revealed right superior and inferior pubic rami fractures.  Evaluated by orthopedics with fractures to be managed conservatively, utilizing pain control and guided weightbearing recommendations.  She can bear weight as tolerated with a walker for  "safety.  Orthopedic surgery did recommend follow-up in 3 to 4 weeks with nonoperative orthopedics provider.  This should include AP x-rays of the pelvis.  She may be seen by her PCP if the PCP feels comfortable.      She was admitted to [facility] on 11/05/2022 with an acute ischemic stroke of the left occipital lobe within watershed region due to embolic stroke of undetermined source (ESUS).  No abnormalities were noted on MRI.  It was suggested that he suffered from acute on chronic encephalopathy.  Echo showed no cardiac source of the embolus.  EKG and cardiac monitoring showed sinus rhythm with PACs and a nonspecific T wave abnormality but no evidence of atrial fibrillation.  She does have some risk factors for stroke, including elevated LDL (111) and hypertension.    According to Lubna, her daughter, her mother was experiencing a \"different confusion\" compared to baseline.  Difficulty following train of thought since March or April 2022.      THIS HOSPITALIZATION    She presented to the ED on 04/03/2023 with AMS and imaging showing right intertrochanteric proximal femur fracture and L1 compression fracture of the superior endplate.      She underwent OR with right intramedullary nail the following day with Dr. Roach.  She can be WBAT.  She received enoxaparin for DVT prophylaxis during her hospitalization and completed a course of Ancef.    Initial CT imaging showed L1 compression fracture of the superior endplate with mild height loss, potentially slightly worsened compared to imaging from 11/06/2022.  No neurosurgical intervention was recommended but plan to continue bone health clinic referral.    She had leukocytosis which was mild and likely reactive in nature.     Waxing and waning mentation was described and, per a discussion with her daughter, delirium has occurred following hospitalizations.    Follow-up: Dressing to remain in place until POD #7.  2 weeks with RN for wound check, 6 weeks with Dr." "Doc.      CURRENT/RECENT TCU ISSUES    Disposition   -- Decidedly different today.  She is much more alert and responsive.  He says she feels fine.  Her hip hurts only \"at times.\"  No back pain.  -- She can tell me the year is 2023.  The month is a little bit more difficult:  \"Let me see  what month is it?  November?\"  -- As I found her at my last two visits, so I find her today, in her recliner, though awake today.  Previously, waking her had been exceedingly difficult, and when she could be aroused, she would doze off during the conversation.   -- Impaired cognition is much more apparent than it had been during previous TCU stays.  She was awake when I entered her room but dozed off several times during the visit.  Asked how she was doing, she answered, \"well, I am keeping something going,\" and then she dozed off in the recliner.    -- For pain, they had tried very low-dose oxycodone (2.5 mg), but she became oversedated even on that tiny dose.  Current state is concerning for early dementia, though she may also suffer from the effects of OR anesthesia, as she does have that history of delirium.  -- During her last TCU stay, confusion and short-term memory difficulties became clearer.  When asked, she could tell me neither the year nor month. SLP did work with her during an earlier stay here, and it was felt she required quite a bit of support.  At this point in time, she clearly requires 24-hour supervision.  -- She insists that nothing hurts, and she is sleeping a lot.    Osteoporosis  History of multiple fractures  -- History of falls and fractures over the last few years.    Rectal prolapse  -- When last here, she has occasionally complained of rectal pain.  She does have a history of rectal prolapse, and she stated then that \"it hurts trying to push out small pieces of stool.\"    -- This issue apparently comes and goes but should be kept in mind in her current situation. She has undergone surgery within the " last 5 or 6 years.      Cognitive impairment   -- During an earlier stay, there were at least mild cognitive deficits noted, and this was corroborated by the patient's son who also endorsed his mother's memory loss, although she can appear, while awake, to be quite cogent.  In 2021, SLP did work with her, and it was felt that she needed quite a bit of support.   On the SLUMS, she did rather poorly on the clock test, making a pie and then adding numbers but no hands.  She scored 2/17 on the storytelling, 10/17 on orientation.  She scored 23/30 on the MMSE.  One of her daughters, Lubna (the other daughter is Ciarra) at least initially, believed cognitive impairment to be an acute issue.  However, the patient had told me that she felt she had trouble with her thinking 2-3 years earlier but that she was feeling much clearer at the time.  Cognition does apparently tend to wax and wane and is likely multifactorial in nature.    -- She also has a son, Delmar, who is in [facility] right now.    Dysphagia  -- Soon after her earlier admission, I requested eval and treat by SLP, not only for cognition but for complaints of difficulty swallowing.  There was no need to change her diet.  I am aware that they worked together at least twice during the previous stay (and probably longer).  Interestingly, she could remember Rosey, the SLP's name, at the time.  -- She was apparently reevaluated during her last hospital stay but was ultimately given an okay for thin liquids.  -- Today, I am told, that she was just upgraded from honey thick to nectar thick liquids with thin water protocols between meals.  She has thin liquids on her bedside stand.    Osteoarthritis   -- Rather severe in the hands with noted deformities of various joints.  Current pain management appears to be working well.    Discharge planning   -- Indications are that she will most likely need long-term care.      ROS:  10 point ROS of systems including Constitutional, Eyes,  Respiratory, Cardiovascular, Gastroenterology, Genitourinary, Integumentary, Muscularskeletal, Psychiatric were all negative except for pertinent positives noted in my HPI.      Past Medical History:   Diagnosis Date     Carpal tunnel syndrome (aka CTS)      Cataract      Chronic osteoarthritis      Constipation due to outlet dysfunction 2021     Fracture of multiple rami of right pubis with routine healing, subsequent encounter 2021     H/O calcium pyrophosphate deposition disease (CPPD)      History of 9th right rib fracture due to fall (2021) 2021     History of encephalopathy 10/2017     Hypertension      Kyphoscoliosis      Osteoarthritis     hands     Osteoporosis      Rectal prolapse               Family History   Problem Relation Age of Onset     Depression/Anxiety Son      Depression/Anxiety Son         alcohol abuse  age 24     Hypertension Mother      Hypertension Brother      Diabetes No family hx of      Breast Cancer No family hx of      Colon Cancer No family hx of      Prostate Cancer No family hx of      Other Cancer No family hx of      Social History     Socioeconomic History     Marital status:      Spouse name: Not on file     Number of children: Not on file     Years of education: Not on file     Highest education level: Not on file   Occupational History     Not on file   Tobacco Use     Smoking status: Never Smoker     Smokeless tobacco: Never Used   Substance and Sexual Activity     Alcohol use: No     Drug use: No     Sexual activity: Never   Other Topics Concern     Not on file   Social History Narrative     Not on file     Social Determinants of Health     Financial Resource Strain:      Difficulty of Paying Living Expenses:    Food Insecurity:      Worried About Running Out of Food in the Last Year:      Ran Out of Food in the Last Year:    Transportation Needs:      Lack of Transportation (Medical):      Lack of Transportation (Non-Medical):    Physical  "Activity:      Days of Exercise per Week:      Minutes of Exercise per Session:    Stress:      Feeling of Stress :    Social Connections:      Frequency of Communication with Friends and Family:      Frequency of Social Gatherings with Friends and Family:      Attends Holiness Services:      Active Member of Clubs or Organizations:      Attends Club or Organization Meetings:      Marital Status:    Intimate Partner Violence:      Fear of Current or Ex-Partner:      Emotionally Abused:      Physically Abused:      Sexually Abused:        MEDICATIONS: Reviewed from the MAR, physician orders, and/or earlier progress notes.  Post Medication Reconciliation Status: medication reconcilation previously completed during another office visit    Current Outpatient Medications   Medication Sig     acetaminophen (TYLENOL) 500 MG tablet Take 1,000 mg by mouth 3 times daily     atorvastatin (LIPITOR) 20 MG tablet 1 tablet (20 mg) by Oral or Feeding Tube route every evening     buprenorphine (BUTRANS) 5 MCG/HR WK patch APPLY TOPICALLY 1 PATCH ONCE A WEEK *REMOVE OLD PATCH BEFORE REAPPLICATION*     calcium carbonate 600 mg-vitamin D 400 units (CALTRATE) 600-400 MG-UNIT per tablet Take 1 tablet by mouth 2 times daily (before meals)     enoxaparin ANTICOAGULANT (LOVENOX) 30 MG/0.3ML syringe Inject 0.3 mLs (30 mg) Subcutaneous every 24 hours for 36 days     lidocaine (LIDODERM) 5 % patch Place 1 patch onto the skin every 24 hours To prevent lidocaine toxicity, patient should be patch free for 12 hrs daily.     Vitamin D3 (CHOLECALCIFEROL) 25 mcg (1000 units) tablet Take 1 tablet (25 mcg) by mouth daily     No current facility-administered medications for this visit.     ALLERGIES: No Known Allergies    DIET: Regular, regular texture, thin liquids.  Mighty Shake.    Vitals:    05/04/23 1211   BP: 125/70   Pulse: 71   Resp: 18   Temp: 98.1  F (36.7  C)   SpO2: 94%   Weight: 39.2 kg (86 lb 6.4 oz)   Height: 1.473 m (4' 10\")     Body " mass index is 18.06 kg/m .    EXAMINATION:   General: Frail-appearing elderly female, sitting in a recliner, in no apparent distress.  She is much more alert during this visit than previous ones.  Head: Normocephalic and atraumatic.   Eyes: PERRLA, sclerae clear.   ENT: Moist oral mucosa.  Has several of her own teeth, but missing all lower teeth on right plus several upper teeth.  No nasal discharge.   Cardiovascular: RRR with distinct wide split S1.   Respiratory: Lungs CTAB.   Abdomen: Nondistended.   Musculoskeletal/Extremities: Age-related DJD and moderate to severe kyphoscoliosis (which inhibits her breathing).  Hands and fingers show significant deformities due to OA.  Right fifth trigger finger.  Bilateral hammertoes, bilateral hallux valgus deformities with partial great toe overlap.  No peripheral edema.   Integument: No rashes, clinically significant lesions, or skin breakdown.   Cognitive/Psychiatric: Very somnolent but otherwise pleasant.    DIAGNOSTICS:   No results found for this or any previous visit (from the past 240 hour(s)).   Last Comprehensive Metabolic Panel:  Sodium   Date Value Ref Range Status   04/17/2023 138 136 - 145 mmol/L Final   01/30/2019 140 133 - 144 mmol/L Final     Potassium   Date Value Ref Range Status   04/24/2023 4.3 3.4 - 5.3 mmol/L Final   11/05/2022 3.5 3.4 - 5.3 mmol/L Final   01/30/2019 4.0 3.4 - 5.3 mmol/L Final     Chloride   Date Value Ref Range Status   04/17/2023 100 98 - 107 mmol/L Final   11/05/2022 99 94 - 109 mmol/L Final   01/30/2019 104 94 - 109 mmol/L Final     Carbon Dioxide   Date Value Ref Range Status   01/30/2019 30 20 - 32 mmol/L Final     Carbon Dioxide (CO2)   Date Value Ref Range Status   04/17/2023 23 22 - 29 mmol/L Final   11/05/2022 26 20 - 32 mmol/L Final     Anion Gap   Date Value Ref Range Status   04/17/2023 15 7 - 15 mmol/L Final   11/05/2022 15 (H) 3 - 14 mmol/L Final   01/30/2019 6 3 - 14 mmol/L Final     Glucose   Date Value Ref Range  Status   04/17/2023 96 70 - 99 mg/dL Final   11/05/2022 102 (H) 70 - 99 mg/dL Final   01/30/2019 129 (H) 70 - 99 mg/dL Final     GLUCOSE BY METER POCT   Date Value Ref Range Status   04/05/2023 104 (H) 70 - 99 mg/dL Final     Urea Nitrogen   Date Value Ref Range Status   04/17/2023 15.5 8.0 - 23.0 mg/dL Final   11/05/2022 19 7 - 30 mg/dL Final   07/13/2020 16 7 - 30 mg/dL Final     Creatinine   Date Value Ref Range Status   04/17/2023 0.57 0.51 - 0.95 mg/dL Final   07/13/2020 0.67 0.52 - 1.04 mg/dL Final     GFR Estimate   Date Value Ref Range Status   04/17/2023 85 >60 mL/min/1.73m2 Final     Comment:     eGFR calculated using 2021 CKD-EPI equation.   07/13/2020 77 >60 mL/min/[1.73_m2] Final     Comment:     Non  GFR Calc  Starting 12/18/2018, serum creatinine based estimated GFR (eGFR) will be   calculated using the Chronic Kidney Disease Epidemiology Collaboration   (CKD-EPI) equation.       GFR, ESTIMATED POCT   Date Value Ref Range Status   04/03/2023 53 (L) >60 mL/min/1.73m2 Final     Calcium   Date Value Ref Range Status   04/17/2023 10.1 (H) 8.2 - 9.6 mg/dL Final   07/13/2020 8.8 8.5 - 10.1 mg/dL Final     Lab Results   Component Value Date    WBC 6.0 11/09/2022    WBC 7.2 01/09/2018     Lab Results   Component Value Date    RBC 4.26 11/09/2022    RBC 4.04 01/09/2018     Lab Results   Component Value Date    HGB 13.1 11/09/2022    HGB 13.0 06/20/2018     Lab Results   Component Value Date    HCT 39.6 11/09/2022    HCT 43.1 06/20/2018     Lab Results   Component Value Date    MCV 93 11/09/2022    MCV 96.0 06/20/2018     Lab Results   Component Value Date    MCH 30.8 11/09/2022    MCH 29.0 06/20/2018     Lab Results   Component Value Date    MCHC 33.1 11/09/2022    MCHC 30.2 06/20/2018     Lab Results   Component Value Date    RDW 13.4 11/09/2022    RDW 13.3 01/09/2018     Lab Results   Component Value Date     11/09/2022     01/09/2018       ASSESSMENT/Plan:      ICD-10-CM    1.  Closed nondisplaced intertrochanteric fracture of right femur with routine healing, subsequent encounter  S72.144D       2. S/P ORIF (open reduction internal fixation) fracture  Z98.890     Z87.81       3. Compression fracture of L1 vertebra, sequela  S32.010S       4. Other elevated white blood cell (WBC) count  D72.828       5. Mild cognitive impairment  G31.84       6. Kyphoscoliosis  M41.9       7. Frailty  R54       8. Age-related osteoporosis with current pathological fracture with routine healing, subsequent encounter  M80.00XD       9. Falls frequently  R29.6       10. Physical deconditioning  R53.81       11. History of ischemic stroke of L occipital lobe within watershed region due to embolic stroke of undetermined source (ESUS)  Z86.73       12. Bowel and bladder incontinence  R32     R15.9       13. Rectal prolapse  K62.3           CHANGES:  None.    CARE PLAN:  The care plan has been reviewed and all orders signed.  Changes to care plan, if any, as noted.  Otherwise, continue current plan of care.      The above has been created using voice recognition software. Please be aware that this may unintentionally  produce inaccuracies and/or nonsensical sentences.      Electronically signed by: SUSIE Nieves CNP        Sincerely,        USSIE Nieves CNP

## 2023-05-04 NOTE — TELEPHONE ENCOUNTER
Spoke with daughter Lubna that neurosurgeon review has recommended patient see bone health provider.    Daughter with follow-up with facility.    She may schedule with Dr. Montgomery in [facility] visit type if she would like.    Emmie Woodall  Neurosciences Service

## 2023-05-07 NOTE — PROGRESS NOTES
[facility]    Name:   Belia Sheets (Sudeep)  :   1930  MRN:    [id number]     Facility:   [facility] ([facility]) [[id number]]   Room: TCU / [place] [address]  Code Status: FULL CODE and POLST AVAILABLE -     DOS: 2023    PCP:  Alejandro Sandhu    CHIEF COMPLAINT / REASON FOR VISIT:  Chief Complaint   Patient presents with     Clinic Care Coordination - Follow-up     Right intratrochanteric hip fracture s/p IMN, L1 superior endplate compression fracture with mild height loss      [facility] from 2021 until 2021 (fall and right 9th rib fracture)  [facility] from 2021 until 2021 (fall with pubic rami fractures)  [facility] from 2021 until 10/08/2021  [facility] from 2022 until 2022 (acute ischemic stroke L occipital lobe within watershed region to the ESUS)   [facility] until [facility]  [facility] from 2023 until 2023 (right hip fracture s/p IMN, L1 superior endplate compression fracture)      HPI: Belia is a 89 year old female with a history of osteoporosis, significant osteoarthritis (especially in the hands), essential hypertension, and remote history of visual hallucinations.  She had just undergone rehab in the TCU here a few months prior after falling and sustaining a right ninth rib fracture.    She fell again on 2021 and was taken by EMS to [facility] ED.  Imaging revealed right superior and inferior pubic rami fractures.  Evaluated by orthopedics with fractures to be managed conservatively, utilizing pain control and guided weightbearing recommendations.  She can bear weight as tolerated with a walker for safety.  Orthopedic surgery did recommend follow-up in 3 to 4 weeks with nonoperative orthopedics  "provider.  This should include AP x-rays of the pelvis.  She may be seen by her PCP if the PCP feels comfortable.      She was admitted to [facility] on 11/05/2022 with an acute ischemic stroke of the left occipital lobe within watershed region due to embolic stroke of undetermined source (ESUS).  No abnormalities were noted on MRI.  It was suggested that he suffered from acute on chronic encephalopathy.  Echo showed no cardiac source of the embolus.  EKG and cardiac monitoring showed sinus rhythm with PACs and a nonspecific T wave abnormality but no evidence of atrial fibrillation.  She does have some risk factors for stroke, including elevated LDL (111) and hypertension.    According to Lubna, her daughter, her mother was experiencing a \"different confusion\" compared to baseline.  Difficulty following train of thought since March or April 2022.      THIS HOSPITALIZATION    She presented to the ED on 04/03/2023 with AMS and imaging showing right intertrochanteric proximal femur fracture and L1 compression fracture of the superior endplate.      She underwent OR with right intramedullary nail the following day with Dr. Roach.  She can be WBAT.  She received enoxaparin for DVT prophylaxis during her hospitalization and completed a course of Ancef.    Initial CT imaging showed L1 compression fracture of the superior endplate with mild height loss, potentially slightly worsened compared to imaging from 11/06/2022.  No neurosurgical intervention was recommended but plan to continue bone health clinic referral.    She had leukocytosis which was mild and likely reactive in nature.     Waxing and waning mentation was described and, per a discussion with her daughter, delirium has occurred following hospitalizations.    Follow-up: Dressing to remain in place until POD #7.  2 weeks with RN for wound check, 6 weeks with Dr. Roach.      CURRENT/RECENT TCU ISSUES    Disposition   -- Decidedly different today.  She is much more alert and " "responsive.  He says she feels fine.  Her hip hurts only \"at times.\"  No back pain.  -- She can tell me the year is 2023.  The month is a little bit more difficult:  \"Let me see  what month is it?  November?\"  -- As I found her at my last two visits, so I find her today, in her recliner, though awake today.  Previously, waking her had been exceedingly difficult, and when she could be aroused, she would doze off during the conversation.   -- Impaired cognition is much more apparent than it had been during previous TCU stays.  She was awake when I entered her room but dozed off several times during the visit.  Asked how she was doing, she answered, \"well, I am keeping something going,\" and then she dozed off in the recliner.    -- For pain, they had tried very low-dose oxycodone (2.5 mg), but she became oversedated even on that tiny dose.  Current state is concerning for early dementia, though she may also suffer from the effects of OR anesthesia, as she does have that history of delirium.  -- During her last TCU stay, confusion and short-term memory difficulties became clearer.  When asked, she could tell me neither the year nor month. SLP did work with her during an earlier stay here, and it was felt she required quite a bit of support.  At this point in time, she clearly requires 24-hour supervision.  -- She insists that nothing hurts, and she is sleeping a lot.    Osteoporosis  History of multiple fractures  -- History of falls and fractures over the last few years.    Rectal prolapse  -- When last here, she has occasionally complained of rectal pain.  She does have a history of rectal prolapse, and she stated then that \"it hurts trying to push out small pieces of stool.\"    -- This issue apparently comes and goes but should be kept in mind in her current situation. She has undergone surgery within the last 5 or 6 years.      Cognitive impairment   -- During an earlier stay, there were at least mild cognitive " deficits noted, and this was corroborated by the patient's son who also endorsed his mother's memory loss, although she can appear, while awake, to be quite cogent.  In 2021, SLP did work with her, and it was felt that she needed quite a bit of support.   On the SLUMS, she did rather poorly on the clock test, making a pie and then adding numbers but no hands.  She scored 2/17 on the storytelling, 10/17 on orientation.  She scored 23/30 on the MMSE.  One of her daughters, Lubna (the other daughter is Ciarra) at least initially, believed cognitive impairment to be an acute issue.  However, the patient had told me that she felt she had trouble with her thinking 2-3 years earlier but that she was feeling much clearer at the time.  Cognition does apparently tend to wax and wane and is likely multifactorial in nature.    -- She also has a son, Delmar, who is in [place] right now.    Dysphagia  -- Soon after her earlier admission, I requested eval and treat by SLP, not only for cognition but for complaints of difficulty swallowing.  There was no need to change her diet.  I am aware that they worked together at least twice during the previous stay (and probably longer).  Interestingly, she could remember Rosey, the SLP's name, at the time.  -- She was apparently reevaluated during her last hospital stay but was ultimately given an okay for thin liquids.  -- Today, I am told, that she was just upgraded from honey thick to nectar thick liquids with thin water protocols between meals.  She has thin liquids on her bedside stand.    Osteoarthritis   -- Rather severe in the hands with noted deformities of various joints.  Current pain management appears to be working well.    Discharge planning   -- Indications are that she will most likely need long-term care.      ROS:  10 point ROS of systems including Constitutional, Eyes, Respiratory, Cardiovascular, Gastroenterology, Genitourinary, Integumentary, Muscularskeletal, Psychiatric were  all negative except for pertinent positives noted in my HPI.      Past Medical History:   Diagnosis Date     Carpal tunnel syndrome (aka CTS)      Cataract      Chronic osteoarthritis      Constipation due to outlet dysfunction 2021     Fracture of multiple rami of right pubis with routine healing, subsequent encounter 2021     H/O calcium pyrophosphate deposition disease (CPPD)      History of 9th right rib fracture due to fall (2021) 2021     History of encephalopathy 10/2017     Hypertension      Kyphoscoliosis      Osteoarthritis     hands     Osteoporosis      Rectal prolapse               Family History   Problem Relation Age of Onset     Depression/Anxiety Son      Depression/Anxiety Son         alcohol abuse  age 24     Hypertension Mother      Hypertension Brother      Diabetes No family hx of      Breast Cancer No family hx of      Colon Cancer No family hx of      Prostate Cancer No family hx of      Other Cancer No family hx of      Social History     Socioeconomic History     Marital status:      Spouse name: Not on file     Number of children: Not on file     Years of education: Not on file     Highest education level: Not on file   Occupational History     Not on file   Tobacco Use     Smoking status: Never Smoker     Smokeless tobacco: Never Used   Substance and Sexual Activity     Alcohol use: No     Drug use: No     Sexual activity: Never   Other Topics Concern     Not on file   Social History Narrative     Not on file     Social Determinants of Health     Financial Resource Strain:      Difficulty of Paying Living Expenses:    Food Insecurity:      Worried About Running Out of Food in the Last Year:      Ran Out of Food in the Last Year:    Transportation Needs:      Lack of Transportation (Medical):      Lack of Transportation (Non-Medical):    Physical Activity:      Days of Exercise per Week:      Minutes of Exercise per Session:    Stress:      Feeling of Stress  ":    Social Connections:      Frequency of Communication with Friends and Family:      Frequency of Social Gatherings with Friends and Family:      Attends Sabianist Services:      Active Member of Clubs or Organizations:      Attends Club or Organization Meetings:      Marital Status:    Intimate Partner Violence:      Fear of Current or Ex-Partner:      Emotionally Abused:      Physically Abused:      Sexually Abused:        MEDICATIONS: Reviewed from the MAR, physician orders, and/or earlier progress notes.  Post Medication Reconciliation Status: medication reconcilation previously completed during another office visit    Current Outpatient Medications   Medication Sig     acetaminophen (TYLENOL) 500 MG tablet Take 1,000 mg by mouth 3 times daily     atorvastatin (LIPITOR) 20 MG tablet 1 tablet (20 mg) by Oral or Feeding Tube route every evening     buprenorphine (BUTRANS) 5 MCG/HR WK patch APPLY TOPICALLY 1 PATCH ONCE A WEEK *REMOVE OLD PATCH BEFORE REAPPLICATION*     calcium carbonate 600 mg-vitamin D 400 units (CALTRATE) 600-400 MG-UNIT per tablet Take 1 tablet by mouth 2 times daily (before meals)     enoxaparin ANTICOAGULANT (LOVENOX) 30 MG/0.3ML syringe Inject 0.3 mLs (30 mg) Subcutaneous every 24 hours for 36 days     lidocaine (LIDODERM) 5 % patch Place 1 patch onto the skin every 24 hours To prevent lidocaine toxicity, patient should be patch free for 12 hrs daily.     Vitamin D3 (CHOLECALCIFEROL) 25 mcg (1000 units) tablet Take 1 tablet (25 mcg) by mouth daily     No current facility-administered medications for this visit.     ALLERGIES: No Known Allergies    DIET: Regular, regular texture, thin liquids.  Mighty Shake.    Vitals:    05/04/23 1211   BP: 125/70   Pulse: 71   Resp: 18   Temp: 98.1  F (36.7  C)   SpO2: 94%   Weight: 39.2 kg (86 lb 6.4 oz)   Height: 1.473 m (4' 10\")     Body mass index is 18.06 kg/m .    EXAMINATION:   General: Frail-appearing elderly female, sitting in a recliner, in no " apparent distress.  She is much more alert during this visit than previous ones.  Head: Normocephalic and atraumatic.   Eyes: PERRLA, sclerae clear.   ENT: Moist oral mucosa.  Has several of her own teeth, but missing all lower teeth on right plus several upper teeth.  No nasal discharge.   Cardiovascular: RRR with distinct wide split S1.   Respiratory: Lungs CTAB.   Abdomen: Nondistended.   Musculoskeletal/Extremities: Age-related DJD and moderate to severe kyphoscoliosis (which inhibits her breathing).  Hands and fingers show significant deformities due to OA.  Right fifth trigger finger.  Bilateral hammertoes, bilateral hallux valgus deformities with partial great toe overlap.  No peripheral edema.   Integument: No rashes, clinically significant lesions, or skin breakdown.   Cognitive/Psychiatric: Very somnolent but otherwise pleasant.    DIAGNOSTICS:   No results found for this or any previous visit (from the past 240 hour(s)).   Last Comprehensive Metabolic Panel:  Sodium   Date Value Ref Range Status   04/17/2023 138 136 - 145 mmol/L Final   01/30/2019 140 133 - 144 mmol/L Final     Potassium   Date Value Ref Range Status   04/24/2023 4.3 3.4 - 5.3 mmol/L Final   11/05/2022 3.5 3.4 - 5.3 mmol/L Final   01/30/2019 4.0 3.4 - 5.3 mmol/L Final     Chloride   Date Value Ref Range Status   04/17/2023 100 98 - 107 mmol/L Final   11/05/2022 99 94 - 109 mmol/L Final   01/30/2019 104 94 - 109 mmol/L Final     Carbon Dioxide   Date Value Ref Range Status   01/30/2019 30 20 - 32 mmol/L Final     Carbon Dioxide (CO2)   Date Value Ref Range Status   04/17/2023 23 22 - 29 mmol/L Final   11/05/2022 26 20 - 32 mmol/L Final     Anion Gap   Date Value Ref Range Status   04/17/2023 15 7 - 15 mmol/L Final   11/05/2022 15 (H) 3 - 14 mmol/L Final   01/30/2019 6 3 - 14 mmol/L Final     Glucose   Date Value Ref Range Status   04/17/2023 96 70 - 99 mg/dL Final   11/05/2022 102 (H) 70 - 99 mg/dL Final   01/30/2019 129 (H) 70 - 99 mg/dL  Final     GLUCOSE BY METER POCT   Date Value Ref Range Status   04/05/2023 104 (H) 70 - 99 mg/dL Final     Urea Nitrogen   Date Value Ref Range Status   04/17/2023 15.5 8.0 - 23.0 mg/dL Final   11/05/2022 19 7 - 30 mg/dL Final   07/13/2020 16 7 - 30 mg/dL Final     Creatinine   Date Value Ref Range Status   04/17/2023 0.57 0.51 - 0.95 mg/dL Final   07/13/2020 0.67 0.52 - 1.04 mg/dL Final     GFR Estimate   Date Value Ref Range Status   04/17/2023 85 >60 mL/min/1.73m2 Final     Comment:     eGFR calculated using 2021 CKD-EPI equation.   07/13/2020 77 >60 mL/min/[1.73_m2] Final     Comment:     Non  GFR Calc  Starting 12/18/2018, serum creatinine based estimated GFR (eGFR) will be   calculated using the Chronic Kidney Disease Epidemiology Collaboration   (CKD-EPI) equation.       GFR, ESTIMATED POCT   Date Value Ref Range Status   04/03/2023 53 (L) >60 mL/min/1.73m2 Final     Calcium   Date Value Ref Range Status   04/17/2023 10.1 (H) 8.2 - 9.6 mg/dL Final   07/13/2020 8.8 8.5 - 10.1 mg/dL Final     Lab Results   Component Value Date    WBC 6.0 11/09/2022    WBC 7.2 01/09/2018     Lab Results   Component Value Date    RBC 4.26 11/09/2022    RBC 4.04 01/09/2018     Lab Results   Component Value Date    HGB 13.1 11/09/2022    HGB 13.0 06/20/2018     Lab Results   Component Value Date    HCT 39.6 11/09/2022    HCT 43.1 06/20/2018     Lab Results   Component Value Date    MCV 93 11/09/2022    MCV 96.0 06/20/2018     Lab Results   Component Value Date    MCH 30.8 11/09/2022    MCH 29.0 06/20/2018     Lab Results   Component Value Date    MCHC 33.1 11/09/2022    MCHC 30.2 06/20/2018     Lab Results   Component Value Date    RDW 13.4 11/09/2022    RDW 13.3 01/09/2018     Lab Results   Component Value Date     11/09/2022     01/09/2018       ASSESSMENT/Plan:      ICD-10-CM    1. Closed nondisplaced intertrochanteric fracture of right femur with routine healing, subsequent encounter  S72.144D        2. S/P ORIF (open reduction internal fixation) fracture  Z98.890     Z87.81       3. Compression fracture of L1 vertebra, sequela  S32.010S       4. Other elevated white blood cell (WBC) count  D72.828       5. Mild cognitive impairment  G31.84       6. Kyphoscoliosis  M41.9       7. Frailty  R54       8. Age-related osteoporosis with current pathological fracture with routine healing, subsequent encounter  M80.00XD       9. Falls frequently  R29.6       10. Physical deconditioning  R53.81       11. History of ischemic stroke of L occipital lobe within watershed region due to embolic stroke of undetermined source (ESUS)  Z86.73       12. Bowel and bladder incontinence  R32     R15.9       13. Rectal prolapse  K62.3           CHANGES:  None.    CARE PLAN:  The care plan has been reviewed and all orders signed.  Changes to care plan, if any, as noted.  Otherwise, continue current plan of care.      The above has been created using voice recognition software. Please be aware that this may unintentionally  produce inaccuracies and/or nonsensical sentences.      Electronically signed by: SUSIE Nieves CNP

## 2023-05-08 ENCOUNTER — TRANSITIONAL CARE UNIT VISIT (OUTPATIENT)
Dept: GERIATRICS | Facility: CLINIC | Age: 88
End: 2023-05-08
Payer: COMMERCIAL

## 2023-05-08 VITALS
SYSTOLIC BLOOD PRESSURE: 113 MMHG | WEIGHT: 86.4 LBS | DIASTOLIC BLOOD PRESSURE: 72 MMHG | HEART RATE: 76 BPM | OXYGEN SATURATION: 95 % | RESPIRATION RATE: 16 BRPM | BODY MASS INDEX: 18.06 KG/M2 | TEMPERATURE: 97.8 F

## 2023-05-08 DIAGNOSIS — R32 BOWEL AND BLADDER INCONTINENCE: ICD-10-CM

## 2023-05-08 DIAGNOSIS — D72.828 OTHER ELEVATED WHITE BLOOD CELL (WBC) COUNT: ICD-10-CM

## 2023-05-08 DIAGNOSIS — G31.84 MILD COGNITIVE IMPAIRMENT: ICD-10-CM

## 2023-05-08 DIAGNOSIS — Z86.73 HISTORY OF CVA (CEREBROVASCULAR ACCIDENT): ICD-10-CM

## 2023-05-08 DIAGNOSIS — R53.81 PHYSICAL DECONDITIONING: ICD-10-CM

## 2023-05-08 DIAGNOSIS — R54 FRAILTY: ICD-10-CM

## 2023-05-08 DIAGNOSIS — S72.144D CLOSED NONDISPLACED INTERTROCHANTERIC FRACTURE OF RIGHT FEMUR WITH ROUTINE HEALING, SUBSEQUENT ENCOUNTER: Primary | ICD-10-CM

## 2023-05-08 DIAGNOSIS — Z87.81 S/P ORIF (OPEN REDUCTION INTERNAL FIXATION) FRACTURE: ICD-10-CM

## 2023-05-08 DIAGNOSIS — Z98.890 S/P ORIF (OPEN REDUCTION INTERNAL FIXATION) FRACTURE: ICD-10-CM

## 2023-05-08 DIAGNOSIS — M41.9 KYPHOSCOLIOSIS: ICD-10-CM

## 2023-05-08 DIAGNOSIS — S32.010S COMPRESSION FRACTURE OF L1 VERTEBRA, SEQUELA: ICD-10-CM

## 2023-05-08 DIAGNOSIS — R15.9 BOWEL AND BLADDER INCONTINENCE: ICD-10-CM

## 2023-05-08 DIAGNOSIS — R29.6 FALLS FREQUENTLY: ICD-10-CM

## 2023-05-08 DIAGNOSIS — M80.00XD AGE-RELATED OSTEOPOROSIS WITH CURRENT PATHOLOGICAL FRACTURE WITH ROUTINE HEALING, SUBSEQUENT ENCOUNTER: ICD-10-CM

## 2023-05-08 DIAGNOSIS — K62.3 RECTAL PROLAPSE: ICD-10-CM

## 2023-05-08 PROCEDURE — 99309 SBSQ NF CARE MODERATE MDM 30: CPT | Performed by: NURSE PRACTITIONER

## 2023-05-08 NOTE — LETTER
2023        RE: Belia Sheets  [address]  [address]  [facility] [address]        [facility]    Name:   Belia Sheets (Sudeep)  :   1930  MRN:    [id number]     Facility:   [facility] ([facility]) [[id number]]   Room: U / [place] [address]  Code Status: FULL CODE and POLST AVAILABLE -     DOS: 2023    PCP:  Alejandro Sandhu    CHIEF COMPLAINT / REASON FOR VISIT:  Chief Complaint   Patient presents with     Clinic Care Coordination - Follow-up     Right intratrochanteric hip fracture s/p IMN, L1 superior endplate compression fracture with mild height loss      [facility] from 2021 until 2021 (fall and right 9th rib fracture)  [facility] from 2021 until 2021 (fall with pubic rami fractures)  [facility] TCU from 2021 until 10/08/2021  [facility] from 2022 until 2022 (acute ischemic stroke L occipital lobe within watershed region to the ESUS)   [facility] TCU from 2022 until 2022  [facility] from 2023 until 2023 (right hip fracture s/p IMN, L1 superior endplate compression fracture)      HPI: Belia is a 89 year old female with a history of osteoporosis, significant osteoarthritis (especially in the hands), essential hypertension, and remote history of visual hallucinations.  She had just undergone rehab in the TCU here a few months prior after falling and sustaining a right ninth rib fracture.    She fell again on 2021 and was taken by EMS to [facility] ED.  Imaging revealed right superior and inferior pubic rami fractures.  Evaluated by orthopedics with fractures to be managed conservatively, utilizing pain control and guided weightbearing recommendations.  She can bear weight as tolerated with a walker for  "safety.  Orthopedic surgery did recommend follow-up in 3 to 4 weeks with nonoperative orthopedics provider.  This should include AP x-rays of the pelvis.  She may be seen by her PCP if the PCP feels comfortable.      She was admitted to [facility] on 11/05/2022 with an acute ischemic stroke of the left occipital lobe within watershed region due to embolic stroke of undetermined source (ESUS).  No abnormalities were noted on MRI.  It was suggested that he suffered from acute on chronic encephalopathy.  Echo showed no cardiac source of the embolus.  EKG and cardiac monitoring showed sinus rhythm with PACs and a nonspecific T wave abnormality but no evidence of atrial fibrillation.  She does have some risk factors for stroke, including elevated LDL (111) and hypertension.    According to Lubna, her daughter, her mother was experiencing a \"different confusion\" compared to baseline.  Difficulty following train of thought since March or April 2022.      THIS HOSPITALIZATION    She presented to the ED on 04/03/2023 with AMS and imaging showing right intertrochanteric proximal femur fracture and L1 compression fracture of the superior endplate.      She underwent OR with right intramedullary nail the following day with Dr. Roach.  She can be WBAT.  She received enoxaparin for DVT prophylaxis during her hospitalization and completed a course of Ancef.    Initial CT imaging showed L1 compression fracture of the superior endplate with mild height loss, potentially slightly worsened compared to imaging from 11/06/2022.  No neurosurgical intervention was recommended but plan to continue bone health clinic referral.    She had leukocytosis which was mild and likely reactive in nature.     Waxing and waning mentation was described and, per a discussion with her daughter, delirium has occurred following hospitalizations.    Follow-up: Dressing to remain in place until POD #7.  2 weeks with RN for wound check, 6 weeks with Dr." "Doc.      CURRENT/RECENT TCU ISSUES    Disposition   -- Again, today, I find her more alert.  Conversation is normal.  She says she feels fine.  Her hip hurts only occasionally (i.e., when up and walking), no back pain.  -- Impaired cognition is much more apparent than it had been during previous TCU stays.  Previously, she could tell me the year.  However, the month was a bit more challenging: \"Let me see  what month is it?  November?\" she pondered.  See below for further discussion of cognition.  -- Pain varies, she says, and \"it's not fun.\"  Doesn't hurt all the time, but when it does, it's in the hip.  Her back is okay.  -- For pain, they had tried very low-dose oxycodone (2.5 mg), but she became oversedated even on that tiny dose.  Current state is concerning for early dementia, though she may also suffer from the effects of OR anesthesia, as she does have that history of delirium.    Osteoporosis  History of multiple fractures  -- History of falls and fractures over the last few years.    Rectal prolapse  -- When last here, she has occasionally complained of rectal pain.  She does have a history of rectal prolapse, and she stated then that \"it hurts trying to push out small pieces of stool.\"    -- This issue apparently comes and goes but should be kept in mind in her current situation. She has undergone surgery within the last 5 or 6 years.      Cognitive impairment   -- During an earlier stay here in the TCU, there were at least mild cognitive deficits noted, and this was corroborated by the patient's son who also endorsed his mother's memory loss, although she can appear, while awake, to be quite cogent.  In 2021, SLP did work with her, and it was felt that she needed quite a bit of support.   On the SLUMS, she did rather poorly on the clock test, making a pie and then adding numbers but no hands.  She scored 2/17 on the storytelling, 10/17 on orientation.  She scored 23/30 on the MMSE.  One of her " daughters, Lubna (the other daughter is Ciarra) at least initially, believed cognitive impairment to be an acute issue.  However, the patient had told me that she felt she had trouble with her thinking 2-3 years earlier but that she was feeling much clearer at the time.  Cognition does apparently tend to wax and wane and is likely multifactorial in nature.    -- During her last TCU stay, confusion and short-term memory difficulties became clearer.  When asked, she could tell me neither the year nor month. SLP did work with her during an earlier stay here, and it was felt she required quite a bit of support.  At this point in time, she clearly requires 24-hour supervision.  -- Asked what brought her here, she responded that she had had a stroke, although that was in November 2022.  -- She also has a son, Delmar, who is in [facility] right now.    Dysphagia  -- Soon after her earlier admission, I requested eval and treat by SLP, not only for cognition but for complaints of difficulty swallowing.  There was no need to change her diet.  I am aware that they worked together at least twice during the previous stay (and probably longer).  Interestingly, she could remember Rosey, the SLP's name, at the time.  -- She was apparently reevaluated during her last hospital stay but was ultimately given an okay for thin liquids.  -- Today, I am told, that she was just upgraded from honey thick to nectar thick liquids with thin water protocols between meals.  She has thin liquids on her bedside stand.    Osteoarthritis   -- Rather severe in the hands with noted deformities of various joints.  Current pain management appears to be working well.    Discharge planning   -- Indications are that she will most likely need long-term care.  Otherwise, any decision on a plan of action is still pending.      ROS:  10 point ROS of systems including Constitutional, Eyes, Respiratory, Cardiovascular, Gastroenterology, Genitourinary, Integumentary,  Muscularskeletal, Psychiatric were all negative except for pertinent positives noted in my HPI.      Past Medical History:   Diagnosis Date     Carpal tunnel syndrome (aka CTS)      Cataract      Chronic osteoarthritis      Constipation due to outlet dysfunction 2021     Fracture of multiple rami of right pubis with routine healing, subsequent encounter 2021     H/O calcium pyrophosphate deposition disease (CPPD)      History of 9th right rib fracture due to fall (2021) 2021     History of encephalopathy 10/2017     Hypertension      Kyphoscoliosis      Osteoarthritis     hands     Osteoporosis      Rectal prolapse               Family History   Problem Relation Age of Onset     Depression/Anxiety Son      Depression/Anxiety Son         alcohol abuse  age 24     Hypertension Mother      Hypertension Brother      Diabetes No family hx of      Breast Cancer No family hx of      Colon Cancer No family hx of      Prostate Cancer No family hx of      Other Cancer No family hx of      Social History     Socioeconomic History     Marital status:      Spouse name: Not on file     Number of children: Not on file     Years of education: Not on file     Highest education level: Not on file   Occupational History     Not on file   Tobacco Use     Smoking status: Never Smoker     Smokeless tobacco: Never Used   Substance and Sexual Activity     Alcohol use: No     Drug use: No     Sexual activity: Never   Other Topics Concern     Not on file   Social History Narrative     Not on file     Social Determinants of Health     Financial Resource Strain:      Difficulty of Paying Living Expenses:    Food Insecurity:      Worried About Running Out of Food in the Last Year:      Ran Out of Food in the Last Year:    Transportation Needs:      Lack of Transportation (Medical):      Lack of Transportation (Non-Medical):    Physical Activity:      Days of Exercise per Week:      Minutes of Exercise per  Session:    Stress:      Feeling of Stress :    Social Connections:      Frequency of Communication with Friends and Family:      Frequency of Social Gatherings with Friends and Family:      Attends Restoration Services:      Active Member of Clubs or Organizations:      Attends Club or Organization Meetings:      Marital Status:    Intimate Partner Violence:      Fear of Current or Ex-Partner:      Emotionally Abused:      Physically Abused:      Sexually Abused:        MEDICATIONS: Reviewed from the MAR, physician orders, and/or earlier progress notes.  Post Medication Reconciliation Status: medication reconcilation previously completed during another office visit    Current Outpatient Medications   Medication Sig     acetaminophen (TYLENOL) 500 MG tablet Take 1,000 mg by mouth 3 times daily     atorvastatin (LIPITOR) 20 MG tablet 1 tablet (20 mg) by Oral or Feeding Tube route every evening     buprenorphine (BUTRANS) 5 MCG/HR WK patch APPLY TOPICALLY 1 PATCH ONCE A WEEK *REMOVE OLD PATCH BEFORE REAPPLICATION*     calcium carbonate 600 mg-vitamin D 400 units (CALTRATE) 600-400 MG-UNIT per tablet Take 1 tablet by mouth 2 times daily (before meals)     enoxaparin ANTICOAGULANT (LOVENOX) 30 MG/0.3ML syringe Inject 0.3 mLs (30 mg) Subcutaneous every 24 hours for 36 days     lidocaine (LIDODERM) 5 % patch Place 1 patch onto the skin every 24 hours To prevent lidocaine toxicity, patient should be patch free for 12 hrs daily.     Vitamin D3 (CHOLECALCIFEROL) 25 mcg (1000 units) tablet Take 1 tablet (25 mcg) by mouth daily     No current facility-administered medications for this visit.     ALLERGIES: No Known Allergies    DIET: Regular, regular texture, thin liquids.  Mighty Shake.    Vitals:    05/08/23 1329   BP: 113/72   Pulse: 76   Resp: 16   Temp: 97.8  F (36.6  C)   SpO2: 95%   Weight: 39.2 kg (86 lb 6.4 oz)     Body mass index is 18.06 kg/m .    EXAMINATION:   General: Frail-appearing elderly female, lying in bed  with her breakfast tray, in no apparent distress.  She is, in fact, smiling.  She continues to be more alert during this visit as she did in the last.  Head: Normocephalic and atraumatic.   Eyes: PERRLA, sclerae clear.   ENT: Moist oral mucosa.  Several of her own teeth remain, but missing all lower teeth on right plus several upper teeth.  No nasal discharge.   Cardiovascular: RRR with distinct wide split S1.   Respiratory: Lungs CTAB.   Abdomen: Nondistended.   Musculoskeletal/Extremities: Age-related DJD and moderate to severe kyphoscoliosis (which inhibits her breathing).  Hands and fingers show significant deformities due to OA.  Right fifth trigger finger.  Bilateral hammertoes, bilateral hallux valgus deformities with partial great toe overlap.  No peripheral edema.   Integument: No rashes, clinically significant lesions, or skin breakdown.   Cognitive/Psychiatric: Very somnolent but otherwise pleasant.    DIAGNOSTICS:   No results found for this or any previous visit (from the past 240 hour(s)).   Last Comprehensive Metabolic Panel:  Sodium   Date Value Ref Range Status   04/17/2023 138 136 - 145 mmol/L Final   01/30/2019 140 133 - 144 mmol/L Final     Potassium   Date Value Ref Range Status   04/24/2023 4.3 3.4 - 5.3 mmol/L Final   11/05/2022 3.5 3.4 - 5.3 mmol/L Final   01/30/2019 4.0 3.4 - 5.3 mmol/L Final     Chloride   Date Value Ref Range Status   04/17/2023 100 98 - 107 mmol/L Final   11/05/2022 99 94 - 109 mmol/L Final   01/30/2019 104 94 - 109 mmol/L Final     Carbon Dioxide   Date Value Ref Range Status   01/30/2019 30 20 - 32 mmol/L Final     Carbon Dioxide (CO2)   Date Value Ref Range Status   04/17/2023 23 22 - 29 mmol/L Final   11/05/2022 26 20 - 32 mmol/L Final     Anion Gap   Date Value Ref Range Status   04/17/2023 15 7 - 15 mmol/L Final   11/05/2022 15 (H) 3 - 14 mmol/L Final   01/30/2019 6 3 - 14 mmol/L Final     Glucose   Date Value Ref Range Status   04/17/2023 96 70 - 99 mg/dL Final    11/05/2022 102 (H) 70 - 99 mg/dL Final   01/30/2019 129 (H) 70 - 99 mg/dL Final     GLUCOSE BY METER POCT   Date Value Ref Range Status   04/05/2023 104 (H) 70 - 99 mg/dL Final     Urea Nitrogen   Date Value Ref Range Status   04/17/2023 15.5 8.0 - 23.0 mg/dL Final   11/05/2022 19 7 - 30 mg/dL Final   07/13/2020 16 7 - 30 mg/dL Final     Creatinine   Date Value Ref Range Status   04/17/2023 0.57 0.51 - 0.95 mg/dL Final   07/13/2020 0.67 0.52 - 1.04 mg/dL Final     GFR Estimate   Date Value Ref Range Status   04/17/2023 85 >60 mL/min/1.73m2 Final     Comment:     eGFR calculated using 2021 CKD-EPI equation.   07/13/2020 77 >60 mL/min/[1.73_m2] Final     Comment:     Non  GFR Calc  Starting 12/18/2018, serum creatinine based estimated GFR (eGFR) will be   calculated using the Chronic Kidney Disease Epidemiology Collaboration   (CKD-EPI) equation.       GFR, ESTIMATED POCT   Date Value Ref Range Status   04/03/2023 53 (L) >60 mL/min/1.73m2 Final     Calcium   Date Value Ref Range Status   04/17/2023 10.1 (H) 8.2 - 9.6 mg/dL Final   07/13/2020 8.8 8.5 - 10.1 mg/dL Final     Lab Results   Component Value Date    WBC 6.0 11/09/2022    WBC 7.2 01/09/2018     Lab Results   Component Value Date    RBC 4.26 11/09/2022    RBC 4.04 01/09/2018     Lab Results   Component Value Date    HGB 13.1 11/09/2022    HGB 13.0 06/20/2018     Lab Results   Component Value Date    HCT 39.6 11/09/2022    HCT 43.1 06/20/2018     Lab Results   Component Value Date    MCV 93 11/09/2022    MCV 96.0 06/20/2018     Lab Results   Component Value Date    MCH 30.8 11/09/2022    MCH 29.0 06/20/2018     Lab Results   Component Value Date    MCHC 33.1 11/09/2022    MCHC 30.2 06/20/2018     Lab Results   Component Value Date    RDW 13.4 11/09/2022    RDW 13.3 01/09/2018     Lab Results   Component Value Date     11/09/2022     01/09/2018       ASSESSMENT/Plan:      ICD-10-CM    1. Closed nondisplaced intertrochanteric  fracture of right femur with routine healing, subsequent encounter  S72.144D       2. S/P ORIF (open reduction internal fixation) fracture  Z98.890     Z87.81       3. Compression fracture of L1 vertebra, sequela  S32.010S       4. Other elevated white blood cell (WBC) count  D72.828       5. Mild cognitive impairment  G31.84       6. Kyphoscoliosis  M41.9       7. Frailty  R54       8. Age-related osteoporosis with current pathological fracture with routine healing, subsequent encounter  M80.00XD       9. Falls frequently  R29.6       10. Physical deconditioning  R53.81       11. History of ischemic stroke of L occipital lobe within watershed region due to embolic stroke of undetermined source (ESUS)  Z86.73       12. Bowel and bladder incontinence  R32     R15.9       13. Rectal prolapse  K62.3           CHANGES:  None.    CARE PLAN:  The care plan has been reviewed and all orders signed.  Changes to care plan, if any, as noted.  Otherwise, continue current plan of care.      The above has been created using voice recognition software. Please be aware that this may unintentionally  produce inaccuracies and/or nonsensical sentences.      Electronically signed by: SUSIE Nieves CNP        Sincerely,        SUSIE Nieves CNP

## 2023-05-10 ENCOUNTER — TRANSITIONAL CARE UNIT VISIT (OUTPATIENT)
Dept: GERIATRICS | Facility: CLINIC | Age: 88
End: 2023-05-10
Payer: COMMERCIAL

## 2023-05-10 VITALS
SYSTOLIC BLOOD PRESSURE: 101 MMHG | HEIGHT: 58 IN | TEMPERATURE: 98 F | OXYGEN SATURATION: 96 % | HEART RATE: 75 BPM | RESPIRATION RATE: 20 BRPM | DIASTOLIC BLOOD PRESSURE: 60 MMHG | BODY MASS INDEX: 18.14 KG/M2 | WEIGHT: 86.4 LBS

## 2023-05-10 DIAGNOSIS — R63.4 WEIGHT LOSS: ICD-10-CM

## 2023-05-10 DIAGNOSIS — M41.9 KYPHOSCOLIOSIS: ICD-10-CM

## 2023-05-10 DIAGNOSIS — S72.144D CLOSED NONDISPLACED INTERTROCHANTERIC FRACTURE OF RIGHT FEMUR WITH ROUTINE HEALING, SUBSEQUENT ENCOUNTER: ICD-10-CM

## 2023-05-10 DIAGNOSIS — Z78.9 IMPAIRED MOBILITY AND ADLS: ICD-10-CM

## 2023-05-10 DIAGNOSIS — Z74.09 IMPAIRED MOBILITY AND ADLS: ICD-10-CM

## 2023-05-10 DIAGNOSIS — M80.00XD AGE-RELATED OSTEOPOROSIS WITH CURRENT PATHOLOGICAL FRACTURE WITH ROUTINE HEALING, SUBSEQUENT ENCOUNTER: ICD-10-CM

## 2023-05-10 DIAGNOSIS — F03.90 MAJOR NEUROCOGNITIVE DISORDER (H): ICD-10-CM

## 2023-05-10 DIAGNOSIS — S32.010S COMPRESSION FRACTURE OF L1 VERTEBRA, SEQUELA: ICD-10-CM

## 2023-05-10 DIAGNOSIS — R41.0 DELIRIUM: ICD-10-CM

## 2023-05-10 DIAGNOSIS — R54 FRAILTY: Primary | ICD-10-CM

## 2023-05-10 DIAGNOSIS — R63.0 ANOREXIA: ICD-10-CM

## 2023-05-10 PROCEDURE — 99309 SBSQ NF CARE MODERATE MDM 30: CPT | Performed by: FAMILY MEDICINE

## 2023-05-10 NOTE — LETTER
"    5/10/2023        RE: Belia Sheets  [address]  [address]  [facility] [address]        [facility]    Chief Complaint   Patient presents with     RECHECK     HPI:  Belia Sheets is a 89 year old (soon to be 89),  (1930), previously living at [facility], with medhx including frequent falls, osteoporosis (following with endocrinology), cognitive impairment, frailty who is being seen today for an episodic care visit at: [facility] ([facility]) [[id number]].     Seen today for follow-up after recent medication changes to assist with pain management.  Additionally further review of overall function given potential discharge next week.  Family did recently appeal prior to discharge however unlikely to appeal most recent discharge plan.  Continue to look into long-term care.    Seen alone in her room today.  She is comfortable.  She says her pain is doing much better.  States she is no longer having pain when she walks.  Overall feeling quite well.  Her appetite is good.  She is a little sleepy during our visit but states she had slept well overnight.  Remains disoriented with inability to identify place or date.  Additionally she continues to have episodes where she stops answering questions, appearing as though she is unable to verbalize a response.    Called and to her daughter.  They are looking into long-term care options, potentially [facility] here until able to establish a long-term care bed.  Also feel that her pain has been better controlled.  I did note that she has lost some weight and her daughter does feel she has not been eating very well and we discussed further evaluation of this. She has been started on mighty shake for supplementation.     Allergies, and PMH/PSH reviewed in EPIC today.    Objective:   /60   Pulse 75   Temp 98  F (36.7  C)   Resp 20   Ht 1.473 m (4' 10\")   Wt 39.2 kg (86 lb 6.4 oz)   SpO2 96%   BMI 18.06 kg/m  "     Recent weights      GENERAL APPEARANCE: Seated comfortably, NAD  HENT:  [place]  EYES:  Conjunctiva clear, anicteric, EOMI  PULM  Normal WOB on RA, lungs CTAB, no wheezes or crackles, good air movement  CV:  RRR, S1/S2 normal, no murmurs; no LE edema  ABDOMEN: Abdomen soft, not tender, not distended, BS normal and active throughout   LYMPHATICS: No cervical, supraclavicular, or axillary lymphadenopathy  M/S: Significant arthritic changes of bilateral hands, minimal right hip tenderness with palpation (significant improvement)  SKIN: Buprenorphine patch in place  NEURO: Somnolent but wakes easily with interaction, disoriented, frequent memory lapses and word finding difficulties.     Recent Results (from the past 240 hour(s))   Comprehensive metabolic panel    Collection Time: 05/12/23 11:19 AM   Result Value Ref Range    Sodium 138 136 - 145 mmol/L    Potassium 4.2 3.4 - 5.3 mmol/L    Chloride 104 98 - 107 mmol/L    Carbon Dioxide (CO2) 22 22 - 29 mmol/L    Anion Gap 12 7 - 15 mmol/L    Urea Nitrogen 26.1 (H) 8.0 - 23.0 mg/dL    Creatinine 0.60 0.51 - 0.95 mg/dL    Calcium 9.8 (H) 8.2 - 9.6 mg/dL    Glucose 96 70 - 99 mg/dL    Alkaline Phosphatase 65 35 - 104 U/L    AST 24 10 - 35 U/L    ALT 8 (L) 10 - 35 U/L    Protein Total 6.4 6.4 - 8.3 g/dL    Albumin 3.8 3.5 - 5.2 g/dL    Bilirubin Total 0.3 <=1.2 mg/dL    GFR Estimate 84 >60 mL/min/1.73m2   CBC with platelets    Collection Time: 05/12/23 11:19 AM   Result Value Ref Range    WBC Count 5.8 4.0 - 11.0 10e3/uL    RBC Count 4.17 3.80 - 5.20 10e6/uL    Hemoglobin 12.4 11.7 - 15.7 g/dL    Hematocrit 40.7 35.0 - 47.0 %    MCV 98 78 - 100 fL    MCH 29.7 26.5 - 33.0 pg    MCHC 30.5 (L) 31.5 - 36.5 g/dL    RDW 15.9 (H) 10.0 - 15.0 %    Platelet Count 213 150 - 450 10e3/uL       Assessment/Plan:    ICD-10-CM    1. Frailty  R54       2. Major neurocognitive disorder (H)  F03.90       3. Delirium  R41.0       4. Impaired mobility and ADLs  Z74.09     Z78.9       5. Weight  loss  R63.4       6. Anorexia  R63.0       7. Closed nondisplaced intertrochanteric fracture of right femur with routine healing, subsequent encounter  S72.144D       8. Compression fracture of L1 vertebra, sequela  S32.010S       9. Kyphoscoliosis  M41.9       10. Age-related osteoporosis with current pathological fracture with routine healing, subsequent encounter  M80.00XD          Overall currently stable.  She pain control has been improved with ongoing use of scheduled Tylenol and addition of buprenorphine patch.  We will continue to assess.  Given significant improvement in pain, may be able to discontinue opioids going forward.  Even with this improvement, her function has remained significantly decreased and continues to have recommendations for long-term care.  Family is evaluating locations for this.    She continues to have significant cognitive deficits and likely represents sequelae of delirium superimposed on dementia.  May have been early stage dementia previously, but with recent events, now significantly more impaired.  Suspect this is contributing to decreased intake and weight loss.  H. pylori previously negative.  Will reevaluate with CMP and CBC for other possible etiologies, though overall low suspicion for other causes such as malignancy.  Of note, by time of this writing, labs did return overall normal -low ALT possibly due to to low flow to the liver given her age.    We will continue to follow if she is able to be placed [facility] [place] at this facility.    MED REC REQUIRED  Post Medication Reconciliation Status: patient was not discharged from an inpatient facility or TCU      Orders:  [x] CMP, CBC w/diff (weight loss)    Electronically signed by:     Benjamin Rosenstein, MD, MA  [facility]    This note was completed with the assistance of dictation software. Typos and word substitution-errors are expected and unintended.      Medical Decision Making   Tests ORDERED in  the past 24 hours:   - BMP  - CMP  SUPPLEMENTAL HISTORY, in addition to the patient's history, over the past 24 hours obtained from:   - Dtr  Medical complexity over the past 24 hours:  - Prescription DRUG MANAGEMENT performed               Sincerely,        Benjamin Rosenstein, MD

## 2023-05-10 NOTE — PROGRESS NOTES
"[facility] GERIATRICS    Chief Complaint   Patient presents with     RECHECK     HPI:  Malial Keaton Sheets is a 89 year old (soon to be 89),  (1930), previously living at [facility], with medhx including frequent falls, osteoporosis (following with endocrinology), cognitive impairment, frailty who is being seen today for an episodic care visit at: [facility] ([facility]) [[id number]].     Seen today for follow-up after recent medication changes to assist with pain management.  Additionally further review of overall function given potential discharge next week.  Family did recently appeal prior to discharge however unlikely to appeal most recent discharge plan.  Continue to look into long-term care.    Seen alone in her room today.  She is comfortable.  She says her pain is doing much better.  States she is no longer having pain when she walks.  Overall feeling quite well.  Her appetite is good.  She is a little sleepy during our visit but states she had slept well overnight.  Remains disoriented with inability to identify place or date.  Additionally she continues to have episodes where she stops answering questions, appearing as though she is unable to verbalize a response.    Called and to her daughter.  They are looking into long-term care options, potentially [facility] care here until able to establish a long-term care bed.  Also feel that her pain has been better controlled.  I did note that she has lost some weight and her daughter does feel she has not been eating very well and we discussed further evaluation of this. She has been started on mighty shake for supplementation.     Allergies, and PMH/PSH reviewed in EPIC today.    Objective:   /60   Pulse 75   Temp 98  F (36.7  C)   Resp 20   Ht 1.473 m (4' 10\")   Wt 39.2 kg (86 lb 6.4 oz)   SpO2 96%   BMI 18.06 kg/m      Recent weights      GENERAL APPEARANCE: Seated comfortably, NAD  HENT:  [place]  EYES:  Conjunctiva clear, " anicteric, EOMI  PULM  Normal WOB on RA, lungs CTAB, no wheezes or crackles, good air movement  CV:  RRR, S1/S2 normal, no murmurs; no LE edema  ABDOMEN: Abdomen soft, not tender, not distended, BS normal and active throughout   LYMPHATICS: No cervical, supraclavicular, or axillary lymphadenopathy  M/S: Significant arthritic changes of bilateral hands, minimal right hip tenderness with palpation (significant improvement)  SKIN: Buprenorphine patch in place  NEURO: Somnolent but wakes easily with interaction, disoriented, frequent memory lapses and word finding difficulties.     Recent Results (from the past 240 hour(s))   Comprehensive metabolic panel    Collection Time: 05/12/23 11:19 AM   Result Value Ref Range    Sodium 138 136 - 145 mmol/L    Potassium 4.2 3.4 - 5.3 mmol/L    Chloride 104 98 - 107 mmol/L    Carbon Dioxide (CO2) 22 22 - 29 mmol/L    Anion Gap 12 7 - 15 mmol/L    Urea Nitrogen 26.1 (H) 8.0 - 23.0 mg/dL    Creatinine 0.60 0.51 - 0.95 mg/dL    Calcium 9.8 (H) 8.2 - 9.6 mg/dL    Glucose 96 70 - 99 mg/dL    Alkaline Phosphatase 65 35 - 104 U/L    AST 24 10 - 35 U/L    ALT 8 (L) 10 - 35 U/L    Protein Total 6.4 6.4 - 8.3 g/dL    Albumin 3.8 3.5 - 5.2 g/dL    Bilirubin Total 0.3 <=1.2 mg/dL    GFR Estimate 84 >60 mL/min/1.73m2   CBC with platelets    Collection Time: 05/12/23 11:19 AM   Result Value Ref Range    WBC Count 5.8 4.0 - 11.0 10e3/uL    RBC Count 4.17 3.80 - 5.20 10e6/uL    Hemoglobin 12.4 11.7 - 15.7 g/dL    Hematocrit 40.7 35.0 - 47.0 %    MCV 98 78 - 100 fL    MCH 29.7 26.5 - 33.0 pg    MCHC 30.5 (L) 31.5 - 36.5 g/dL    RDW 15.9 (H) 10.0 - 15.0 %    Platelet Count 213 150 - 450 10e3/uL       Assessment/Plan:    ICD-10-CM    1. Frailty  R54       2. Major neurocognitive disorder (H)  F03.90       3. Delirium  R41.0       4. Impaired mobility and ADLs  Z74.09     Z78.9       5. Weight loss  R63.4       6. Anorexia  R63.0       7. Closed nondisplaced intertrochanteric fracture of right femur  with routine healing, subsequent encounter  S72.144D       8. Compression fracture of L1 vertebra, sequela  S32.010S       9. Kyphoscoliosis  M41.9       10. Age-related osteoporosis with current pathological fracture with routine healing, subsequent encounter  M80.00XD          Overall currently stable.  She pain control has been improved with ongoing use of scheduled Tylenol and addition of buprenorphine patch.  We will continue to assess.  Given significant improvement in pain, may be able to discontinue opioids going forward.  Even with this improvement, her function has remained significantly decreased and continues to have recommendations for long-term care.  Family is evaluating locations for this.    She continues to have significant cognitive deficits and likely represents sequelae of delirium superimposed on dementia.  May have been early stage dementia previously, but with recent events, now significantly more impaired.  Suspect this is contributing to decreased intake and weight loss.  H. pylori previously negative.  Will reevaluate with CMP and CBC for other possible etiologies, though overall low suspicion for other causes such as malignancy.  Of note, by time of this writing, labs did return overall normal -low ALT possibly due to to low flow to the liver given her age.    We will continue to follow if she is able to be placed [facility] [place] at this facility.    MED REC REQUIRED  Post Medication Reconciliation Status: patient was not discharged from an inpatient facility or TCU      Orders:  [x] CMP, CBC w/diff (weight loss)    Electronically signed by:     Benjamin Rosenstein, MD, MA  [facility] Faculty    This note was completed with the assistance of dictation software. Typos and word substitution-errors are expected and unintended.      Medical Decision Making   Tests ORDERED in the past 24 hours:   - BMP  - CMP  SUPPLEMENTAL HISTORY, in addition to the patient's history, over the past  24 hours obtained from:   - Dtr  Medical complexity over the past 24 hours:  - Prescription DRUG MANAGEMENT performed

## 2023-05-10 NOTE — PROGRESS NOTES
[facility] Geriatrics    Name:   Belia Sheets (Sudeep)  :   1930  MRN:    [id number]     Facility:   [facility] ([facility]) [[id number]]   Room: TCU / [place] [address]  Code Status: FULL CODE and POLST AVAILABLE -     DOS: 2023    PCP:  Alejandro Sandhu    CHIEF COMPLAINT / REASON FOR VISIT:  Chief Complaint   Patient presents with     Clinic Care Coordination - Follow-up     Right intratrochanteric hip fracture s/p IMN, L1 superior endplate compression fracture with mild height loss      [facility] from 2021 until 2021 (fall and right 9th rib fracture)  [facility] from 2021 until 2021 (fall with pubic rami fractures)  [facility] from 2021 until 10/08/2021  [facility] from 2022 until 2022 (acute ischemic stroke L occipital lobe within watershed region to the ESUS)   [facility] until [facility]  [facility] from 2023 until 2023 (right hip fracture s/p IMN, L1 superior endplate compression fracture)      HPI: Belia is a 89 year old female with a history of osteoporosis, significant osteoarthritis (especially in the hands), essential hypertension, and remote history of visual hallucinations.  She had just undergone rehab in the TCU here a few months prior after falling and sustaining a right ninth rib fracture.    She fell again on 2021 and was taken by EMS to [facility] ED.  Imaging revealed right superior and inferior pubic rami fractures.  Evaluated by orthopedics with fractures to be managed conservatively, utilizing pain control and guided weightbearing recommendations.  She can bear weight as tolerated with a walker for safety.  Orthopedic surgery did recommend follow-up in 3 to 4 weeks with nonoperative orthopedics  "provider.  This should include AP x-rays of the pelvis.  She may be seen by her PCP if the PCP feels comfortable.      She was admitted to [facility] on 11/05/2022 with an acute ischemic stroke of the left occipital lobe within watershed region due to embolic stroke of undetermined source (ESUS).  No abnormalities were noted on MRI.  It was suggested that he suffered from acute on chronic encephalopathy.  Echo showed no cardiac source of the embolus.  EKG and cardiac monitoring showed sinus rhythm with PACs and a nonspecific T wave abnormality but no evidence of atrial fibrillation.  She does have some risk factors for stroke, including elevated LDL (111) and hypertension.    According to Lubna, her daughter, her mother was experiencing a \"different confusion\" compared to baseline.  Difficulty following train of thought since March or April 2022.      THIS HOSPITALIZATION    She presented to the ED on 04/03/2023 with AMS and imaging showing right intertrochanteric proximal femur fracture and L1 compression fracture of the superior endplate.      She underwent OR with right intramedullary nail the following day with Dr. Roach.  She can be WBAT.  She received enoxaparin for DVT prophylaxis during her hospitalization and completed a course of Ancef.    Initial CT imaging showed L1 compression fracture of the superior endplate with mild height loss, potentially slightly worsened compared to imaging from 11/06/2022.  No neurosurgical intervention was recommended but plan to continue bone health clinic referral.    She had leukocytosis which was mild and likely reactive in nature.     Waxing and waning mentation was described and, per a discussion with her daughter, delirium has occurred following hospitalizations.    Follow-up: Dressing to remain in place until POD #7.  2 weeks with RN for wound check, 6 weeks with Dr. Roach.      CURRENT/RECENT TCU ISSUES    Disposition   -- Again, today, I find her more alert.  Conversation is " "normal.  She says she feels fine.  Her hip hurts only occasionally (i.e., when up and walking), no back pain.  -- Impaired cognition is much more apparent than it had been during previous TCU stays.  Previously, she could tell me the year.  However, the month was a bit more challenging: \"Let me see  what month is it?  November?\" she pondered.  See below for further discussion of cognition.  -- Pain varies, she says, and \"it's not fun.\"  Doesn't hurt all the time, but when it does, it's in the hip.  Her back is okay.  -- For pain, they had tried very low-dose oxycodone (2.5 mg), but she became oversedated even on that tiny dose.  Current state is concerning for early dementia, though she may also suffer from the effects of OR anesthesia, as she does have that history of delirium.    Osteoporosis  History of multiple fractures  -- History of falls and fractures over the last few years.    Rectal prolapse  -- When last here, she has occasionally complained of rectal pain.  She does have a history of rectal prolapse, and she stated then that \"it hurts trying to push out small pieces of stool.\"    -- This issue apparently comes and goes but should be kept in mind in her current situation. She has undergone surgery within the last 5 or 6 years.      Cognitive impairment   -- During an earlier stay here in the TCU, there were at least mild cognitive deficits noted, and this was corroborated by the patient's son who also endorsed his mother's memory loss, although she can appear, while awake, to be quite cogent.  In 2021, SLP did work with her, and it was felt that she needed quite a bit of support.   On the SLUMS, she did rather poorly on the clock test, making a pie and then adding numbers but no hands.  She scored 2/17 on the storytelling, 10/17 on orientation.  She scored 23/30 on the MMSE.  One of her daughters, Lubna (the other daughter is Ciarra) at least initially, believed cognitive impairment to be an acute issue.  " However, the patient had told me that she felt she had trouble with her thinking 2-3 years earlier but that she was feeling much clearer at the time.  Cognition does apparently tend to wax and wane and is likely multifactorial in nature.    -- During her last TCU stay, confusion and short-term memory difficulties became clearer.  When asked, she could tell me neither the year nor month. SLP did work with her during an earlier stay here, and it was felt she required quite a bit of support.  At this point in time, she clearly requires 24-hour supervision.  -- Asked what brought her here, she responded that she had had a stroke, although that was in November 2022.  -- She also has a son, Delmar, who is in [place] right now.    Dysphagia  -- Soon after her earlier admission, I requested eval and treat by SLP, not only for cognition but for complaints of difficulty swallowing.  There was no need to change her diet.  I am aware that they worked together at least twice during the previous stay (and probably longer).  Interestingly, she could remember Rosey, the SLP's name, at the time.  -- She was apparently reevaluated during her last hospital stay but was ultimately given an okay for thin liquids.  -- Today, I am told, that she was just upgraded from honey thick to nectar thick liquids with thin water protocols between meals.  She has thin liquids on her bedside stand.    Osteoarthritis   -- Rather severe in the hands with noted deformities of various joints.  Current pain management appears to be working well.    Discharge planning   -- Indications are that she will most likely need long-term care.  Otherwise, any decision on a plan of action is still pending.      ROS:  10 point ROS of systems including Constitutional, Eyes, Respiratory, Cardiovascular, Gastroenterology, Genitourinary, Integumentary, Muscularskeletal, Psychiatric were all negative except for pertinent positives noted in my HPI.      Past Medical History:    Diagnosis Date     Carpal tunnel syndrome (aka CTS)      Cataract      Chronic osteoarthritis      Constipation due to outlet dysfunction 2021     Fracture of multiple rami of right pubis with routine healing, subsequent encounter 2021     H/O calcium pyrophosphate deposition disease (CPPD)      History of 9th right rib fracture due to fall (2021) 2021     History of encephalopathy 10/2017     Hypertension      Kyphoscoliosis      Osteoarthritis     hands     Osteoporosis      Rectal prolapse               Family History   Problem Relation Age of Onset     Depression/Anxiety Son      Depression/Anxiety Son         alcohol abuse  age 24     Hypertension Mother      Hypertension Brother      Diabetes No family hx of      Breast Cancer No family hx of      Colon Cancer No family hx of      Prostate Cancer No family hx of      Other Cancer No family hx of      Social History     Socioeconomic History     Marital status:      Spouse name: Not on file     Number of children: Not on file     Years of education: Not on file     Highest education level: Not on file   Occupational History     Not on file   Tobacco Use     Smoking status: Never Smoker     Smokeless tobacco: Never Used   Substance and Sexual Activity     Alcohol use: No     Drug use: No     Sexual activity: Never   Other Topics Concern     Not on file   Social History Narrative     Not on file     Social Determinants of Health     Financial Resource Strain:      Difficulty of Paying Living Expenses:    Food Insecurity:      Worried About Running Out of Food in the Last Year:      Ran Out of Food in the Last Year:    Transportation Needs:      Lack of Transportation (Medical):      Lack of Transportation (Non-Medical):    Physical Activity:      Days of Exercise per Week:      Minutes of Exercise per Session:    Stress:      Feeling of Stress :    Social Connections:      Frequency of Communication with Friends and Family:       Frequency of Social Gatherings with Friends and Family:      Attends Holiness Services:      Active Member of Clubs or Organizations:      Attends Club or Organization Meetings:      Marital Status:    Intimate Partner Violence:      Fear of Current or Ex-Partner:      Emotionally Abused:      Physically Abused:      Sexually Abused:        MEDICATIONS: Reviewed from the MAR, physician orders, and/or earlier progress notes.  Post Medication Reconciliation Status: medication reconcilation previously completed during another office visit    Current Outpatient Medications   Medication Sig     acetaminophen (TYLENOL) 500 MG tablet Take 1,000 mg by mouth 3 times daily     atorvastatin (LIPITOR) 20 MG tablet 1 tablet (20 mg) by Oral or Feeding Tube route every evening     buprenorphine (BUTRANS) 5 MCG/HR WK patch APPLY TOPICALLY 1 PATCH ONCE A WEEK *REMOVE OLD PATCH BEFORE REAPPLICATION*     calcium carbonate 600 mg-vitamin D 400 units (CALTRATE) 600-400 MG-UNIT per tablet Take 1 tablet by mouth 2 times daily (before meals)     enoxaparin ANTICOAGULANT (LOVENOX) 30 MG/0.3ML syringe Inject 0.3 mLs (30 mg) Subcutaneous every 24 hours for 36 days     lidocaine (LIDODERM) 5 % patch Place 1 patch onto the skin every 24 hours To prevent lidocaine toxicity, patient should be patch free for 12 hrs daily.     Vitamin D3 (CHOLECALCIFEROL) 25 mcg (1000 units) tablet Take 1 tablet (25 mcg) by mouth daily     No current facility-administered medications for this visit.     ALLERGIES: No Known Allergies    DIET: Regular, regular texture, thin liquids.  Mighty Shake.    Vitals:    05/08/23 1329   BP: 113/72   Pulse: 76   Resp: 16   Temp: 97.8  F (36.6  C)   SpO2: 95%   Weight: 39.2 kg (86 lb 6.4 oz)     Body mass index is 18.06 kg/m .    EXAMINATION:   General: Frail-appearing elderly female, lying in bed with her breakfast tray, in no apparent distress.  She is, in fact, smiling.  She continues to be more alert during this visit as  she did in the last.  Head: Normocephalic and atraumatic.   Eyes: PERRLA, sclerae clear.   ENT: Moist oral mucosa.  Several of her own teeth remain, but missing all lower teeth on right plus several upper teeth.  No nasal discharge.   Cardiovascular: RRR with distinct wide split S1.   Respiratory: Lungs CTAB.   Abdomen: Nondistended.   Musculoskeletal/Extremities: Age-related DJD and moderate to severe kyphoscoliosis (which inhibits her breathing).  Hands and fingers show significant deformities due to OA.  Right fifth trigger finger.  Bilateral hammertoes, bilateral hallux valgus deformities with partial great toe overlap.  No peripheral edema.   Integument: No rashes, clinically significant lesions, or skin breakdown.   Cognitive/Psychiatric: Very somnolent but otherwise pleasant.    DIAGNOSTICS:   No results found for this or any previous visit (from the past 240 hour(s)).   Last Comprehensive Metabolic Panel:  Sodium   Date Value Ref Range Status   04/17/2023 138 136 - 145 mmol/L Final   01/30/2019 140 133 - 144 mmol/L Final     Potassium   Date Value Ref Range Status   04/24/2023 4.3 3.4 - 5.3 mmol/L Final   11/05/2022 3.5 3.4 - 5.3 mmol/L Final   01/30/2019 4.0 3.4 - 5.3 mmol/L Final     Chloride   Date Value Ref Range Status   04/17/2023 100 98 - 107 mmol/L Final   11/05/2022 99 94 - 109 mmol/L Final   01/30/2019 104 94 - 109 mmol/L Final     Carbon Dioxide   Date Value Ref Range Status   01/30/2019 30 20 - 32 mmol/L Final     Carbon Dioxide (CO2)   Date Value Ref Range Status   04/17/2023 23 22 - 29 mmol/L Final   11/05/2022 26 20 - 32 mmol/L Final     Anion Gap   Date Value Ref Range Status   04/17/2023 15 7 - 15 mmol/L Final   11/05/2022 15 (H) 3 - 14 mmol/L Final   01/30/2019 6 3 - 14 mmol/L Final     Glucose   Date Value Ref Range Status   04/17/2023 96 70 - 99 mg/dL Final   11/05/2022 102 (H) 70 - 99 mg/dL Final   01/30/2019 129 (H) 70 - 99 mg/dL Final     GLUCOSE BY METER POCT   Date Value Ref Range  Status   04/05/2023 104 (H) 70 - 99 mg/dL Final     Urea Nitrogen   Date Value Ref Range Status   04/17/2023 15.5 8.0 - 23.0 mg/dL Final   11/05/2022 19 7 - 30 mg/dL Final   07/13/2020 16 7 - 30 mg/dL Final     Creatinine   Date Value Ref Range Status   04/17/2023 0.57 0.51 - 0.95 mg/dL Final   07/13/2020 0.67 0.52 - 1.04 mg/dL Final     GFR Estimate   Date Value Ref Range Status   04/17/2023 85 >60 mL/min/1.73m2 Final     Comment:     eGFR calculated using 2021 CKD-EPI equation.   07/13/2020 77 >60 mL/min/[1.73_m2] Final     Comment:     Non  GFR Calc  Starting 12/18/2018, serum creatinine based estimated GFR (eGFR) will be   calculated using the Chronic Kidney Disease Epidemiology Collaboration   (CKD-EPI) equation.       GFR, ESTIMATED POCT   Date Value Ref Range Status   04/03/2023 53 (L) >60 mL/min/1.73m2 Final     Calcium   Date Value Ref Range Status   04/17/2023 10.1 (H) 8.2 - 9.6 mg/dL Final   07/13/2020 8.8 8.5 - 10.1 mg/dL Final     Lab Results   Component Value Date    WBC 6.0 11/09/2022    WBC 7.2 01/09/2018     Lab Results   Component Value Date    RBC 4.26 11/09/2022    RBC 4.04 01/09/2018     Lab Results   Component Value Date    HGB 13.1 11/09/2022    HGB 13.0 06/20/2018     Lab Results   Component Value Date    HCT 39.6 11/09/2022    HCT 43.1 06/20/2018     Lab Results   Component Value Date    MCV 93 11/09/2022    MCV 96.0 06/20/2018     Lab Results   Component Value Date    MCH 30.8 11/09/2022    MCH 29.0 06/20/2018     Lab Results   Component Value Date    MCHC 33.1 11/09/2022    MCHC 30.2 06/20/2018     Lab Results   Component Value Date    RDW 13.4 11/09/2022    RDW 13.3 01/09/2018     Lab Results   Component Value Date     11/09/2022     01/09/2018       ASSESSMENT/Plan:      ICD-10-CM    1. Closed nondisplaced intertrochanteric fracture of right femur with routine healing, subsequent encounter  S72.144D       2. S/P ORIF (open reduction internal fixation)  fracture  Z98.890     Z87.81       3. Compression fracture of L1 vertebra, sequela  S32.010S       4. Other elevated white blood cell (WBC) count  D72.828       5. Mild cognitive impairment  G31.84       6. Kyphoscoliosis  M41.9       7. Frailty  R54       8. Age-related osteoporosis with current pathological fracture with routine healing, subsequent encounter  M80.00XD       9. Falls frequently  R29.6       10. Physical deconditioning  R53.81       11. History of ischemic stroke of L occipital lobe within watershed region due to embolic stroke of undetermined source (ESUS)  Z86.73       12. Bowel and bladder incontinence  R32     R15.9       13. Rectal prolapse  K62.3           CHANGES:  None.    CARE PLAN:  The care plan has been reviewed and all orders signed.  Changes to care plan, if any, as noted.  Otherwise, continue current plan of care.      The above has been created using voice recognition software. Please be aware that this may unintentionally  produce inaccuracies and/or nonsensical sentences.      Electronically signed by: SUSIE Nieves CNP

## 2023-05-11 ENCOUNTER — LAB REQUISITION (OUTPATIENT)
Dept: LAB | Facility: CLINIC | Age: 88
End: 2023-05-11
Payer: COMMERCIAL

## 2023-05-11 DIAGNOSIS — R68.89 OTHER GENERAL SYMPTOMS AND SIGNS: ICD-10-CM

## 2023-05-12 ENCOUNTER — TELEPHONE (OUTPATIENT)
Dept: ORTHOPEDICS | Facility: CLINIC | Age: 88
End: 2023-05-12
Payer: COMMERCIAL

## 2023-05-12 LAB
ALBUMIN SERPL BCG-MCNC: 3.8 G/DL (ref 3.5–5.2)
ALP SERPL-CCNC: 65 U/L (ref 35–104)
ALT SERPL W P-5'-P-CCNC: 8 U/L (ref 10–35)
ANION GAP SERPL CALCULATED.3IONS-SCNC: 12 MMOL/L (ref 7–15)
AST SERPL W P-5'-P-CCNC: 24 U/L (ref 10–35)
BILIRUB SERPL-MCNC: 0.3 MG/DL
BUN SERPL-MCNC: 26.1 MG/DL (ref 8–23)
CALCIUM SERPL-MCNC: 9.8 MG/DL (ref 8.2–9.6)
CHLORIDE SERPL-SCNC: 104 MMOL/L (ref 98–107)
CREAT SERPL-MCNC: 0.6 MG/DL (ref 0.51–0.95)
DEPRECATED HCO3 PLAS-SCNC: 22 MMOL/L (ref 22–29)
ERYTHROCYTE [DISTWIDTH] IN BLOOD BY AUTOMATED COUNT: 15.9 % (ref 10–15)
GFR SERPL CREATININE-BSD FRML MDRD: 84 ML/MIN/1.73M2
GLUCOSE SERPL-MCNC: 96 MG/DL (ref 70–99)
HCT VFR BLD AUTO: 40.7 % (ref 35–47)
HGB BLD-MCNC: 12.4 G/DL (ref 11.7–15.7)
MCH RBC QN AUTO: 29.7 PG (ref 26.5–33)
MCHC RBC AUTO-ENTMCNC: 30.5 G/DL (ref 31.5–36.5)
MCV RBC AUTO: 98 FL (ref 78–100)
PLATELET # BLD AUTO: 213 10E3/UL (ref 150–450)
POTASSIUM SERPL-SCNC: 4.2 MMOL/L (ref 3.4–5.3)
PROT SERPL-MCNC: 6.4 G/DL (ref 6.4–8.3)
RBC # BLD AUTO: 4.17 10E6/UL (ref 3.8–5.2)
SODIUM SERPL-SCNC: 138 MMOL/L (ref 136–145)
WBC # BLD AUTO: 5.8 10E3/UL (ref 4–11)

## 2023-05-12 PROCEDURE — 36415 COLL VENOUS BLD VENIPUNCTURE: CPT | Performed by: FAMILY MEDICINE

## 2023-05-12 PROCEDURE — 85014 HEMATOCRIT: CPT | Performed by: FAMILY MEDICINE

## 2023-05-12 PROCEDURE — P9604 ONE-WAY ALLOW PRORATED TRIP: HCPCS | Performed by: FAMILY MEDICINE

## 2023-05-12 PROCEDURE — 80053 COMPREHEN METABOLIC PANEL: CPT | Performed by: FAMILY MEDICINE

## 2023-05-12 NOTE — TELEPHONE ENCOUNTER
[facility] Call Center    Phone Message    May a detailed message be left on voicemail: yes     Reason for Call: Other: [facility] is wondering if they're supposed to take out the staples or are the staples supposed to stay until patient's next follow up (05/16)? Please call [facility] back for clarifications.     Action Taken: Message routed to:  [facility] ([facility] Orthopedics    Travel Screening: Not Applicable

## 2023-05-12 NOTE — TELEPHONE ENCOUNTER
3 staples were missed at patient's post op appointment. Verbal ok given to remove them.  Julissa Durbin ATC

## 2023-05-15 DIAGNOSIS — Z47.89 ORTHOPEDIC AFTERCARE: Primary | ICD-10-CM

## 2023-05-16 ENCOUNTER — ANCILLARY PROCEDURE (OUTPATIENT)
Dept: GENERAL RADIOLOGY | Facility: CLINIC | Age: 88
End: 2023-05-16
Attending: ORTHOPAEDIC SURGERY
Payer: COMMERCIAL

## 2023-05-16 ENCOUNTER — OFFICE VISIT (OUTPATIENT)
Dept: ORTHOPEDICS | Facility: CLINIC | Age: 88
End: 2023-05-16
Payer: COMMERCIAL

## 2023-05-16 DIAGNOSIS — Z47.89 ORTHOPEDIC AFTERCARE: ICD-10-CM

## 2023-05-16 DIAGNOSIS — M25.551 PAIN OF RIGHT HIP: Primary | ICD-10-CM

## 2023-05-16 PROCEDURE — 99024 POSTOP FOLLOW-UP VISIT: CPT | Performed by: ORTHOPAEDIC SURGERY

## 2023-05-16 PROCEDURE — 73552 X-RAY EXAM OF FEMUR 2/>: CPT | Mod: RT | Performed by: RADIOLOGY

## 2023-05-16 NOTE — LETTER
5/16/2023         RE: Belia Sheets  [address]  [address]  [facility] [address]        Dear Colleague,    Thank you for referring your patient, Belia Sheets, to the [facility] [place]. Please see a copy of my visit note below.    Chief complain status post a right femur nail performed on April 4, 2023    Belia Sheets presents today for further follow-up.  Reports to be doing well denies to have any pain reports to have been doing very limited walking.    On today's exam she presents today with them full range of motion of the hip there is no pain with extreme motion there is no pain with palpation of the lateral aspect of the hip.    A hip x-rays were reviewed today which are significant for showing excellent consolidation of the fracture with hardware intact and in place alignment is excellent    Assessment is status post right femur nail for hip fracture    Plan discussed with the patient and her daughter that she is making excellent progress however there is high concern about how much walking she is doing and how much appetite she has    Organ to continue working with physical therapy she has no restrictions and she will be reevaluated in 2 months from now and at that time AP and lateral x-rays of the right hip will be obtained    All questions were answered.          Benitez Todd MD

## 2023-05-16 NOTE — NURSING NOTE
Reason For Visit:   Chief Complaint   Patient presents with     RECHECK     No Pain but When try to left then has pain         There were no vitals taken for this visit.         Sammy Agudelo

## 2023-05-16 NOTE — PROGRESS NOTES
Chief complain status post a right femur nail performed on April 4, 2023    Belia Sheets presents today for further follow-up.  Reports to be doing well denies to have any pain reports to have been doing very limited walking.    On today's exam she presents today with them full range of motion of the hip there is no pain with extreme motion there is no pain with palpation of the lateral aspect of the hip.    A hip x-rays were reviewed today which are significant for showing excellent consolidation of the fracture with hardware intact and in place alignment is excellent    Assessment is status post right femur nail for hip fracture    Plan discussed with the patient and her daughter that she is making excellent progress however there is high concern about how much walking she is doing and how much appetite she has    Organ to continue working with physical therapy she has no restrictions and she will be reevaluated in 2 months from now and at that time AP and lateral x-rays of the right hip will be obtained    All questions were answered.

## 2023-05-30 ENCOUNTER — TELEPHONE (OUTPATIENT)
Dept: FAMILY MEDICINE | Facility: CLINIC | Age: 88
End: 2023-05-30
Payer: COMMERCIAL

## 2023-05-30 NOTE — TELEPHONE ENCOUNTER
[facility] phone call message- general phone call:    Reason for call: The patient's daughter have questions about a Covid booster for the patient.    Return call needed: Yes    OK to leave a message on voice mail? Yes    Primary language: English      needed? No    Call taken on May 30, 2023 at 3:31 PM by Bea Mejia

## 2023-05-31 ENCOUNTER — OFFICE VISIT (OUTPATIENT)
Dept: PHYSICAL MEDICINE AND REHAB | Facility: CLINIC | Age: 88
End: 2023-05-31
Payer: COMMERCIAL

## 2023-05-31 VITALS
SYSTOLIC BLOOD PRESSURE: 126 MMHG | BODY MASS INDEX: 17.67 KG/M2 | HEIGHT: 60 IN | WEIGHT: 90 LBS | OXYGEN SATURATION: 96 % | DIASTOLIC BLOOD PRESSURE: 77 MMHG | HEART RATE: 77 BPM

## 2023-05-31 DIAGNOSIS — M41.9 KYPHOSCOLIOSIS: ICD-10-CM

## 2023-05-31 DIAGNOSIS — R26.9 ABNORMAL GAIT: ICD-10-CM

## 2023-05-31 DIAGNOSIS — S32.010A COMPRESSION FRACTURE OF L1 LUMBAR VERTEBRA, CLOSED, INITIAL ENCOUNTER (H): Primary | ICD-10-CM

## 2023-05-31 PROCEDURE — 99205 OFFICE O/P NEW HI 60 MIN: CPT | Performed by: PHYSICAL MEDICINE & REHABILITATION

## 2023-05-31 ASSESSMENT — PAIN SCALES - GENERAL: PAINLEVEL: NO PAIN (0)

## 2023-05-31 NOTE — LETTER
2023       RE: Belia Sheets  [address]  [address]  [facility] [address]       Dear Colleague,    Thank you for referring your patient, Belia Sheets, to the [facility]. Please see a copy of my visit note below.           PM&R Clinic Note     Patient Name: Ms. Belia Sheets is a very pleasant lady whom I have the privilege of seeing today.  : 1930 Medical Record: [id number]     Requesting Physician/clinician: No att. providers found           History of Present Illness:     Ms. Belia Sheets is a 89 year old female who has been having a falling accident and as a result intertrochanteric fracture of the right hip.  She is accompanied by her daughter who lives in [place] for this consultation.  She has gone through internal fixation and presently has no pain.  Her main concern is the deconditioning of post fracture status and fear of falls          Symptoms,: She does not complain of any pain today and needs to improve the status of her musculoskeletal health in general        Therapies/HEP/equipments,: She has been receiving physical therapy at the facilities that she is under supervised living.        Functionally,: She is totally dependent in self-care and activities of daily living               Past Medical and Surgical History:     Past Medical History:   Diagnosis Date    Carpal tunnel syndrome (aka CTS)     Cataract     Chronic osteoarthritis     Constipation due to outlet dysfunction 2021    Fracture of multiple rami of right pubis with routine healing, subsequent encounter 2021    H/O calcium pyrophosphate deposition disease (CPPD)     History of 9th right rib fracture due to fall (2021) 2021    History of encephalopathy 10/2017    Hypertension     Kyphoscoliosis     Osteoarthritis     hands    Osteoporosis     Rectal prolapse      Past Surgical History:   Procedure  Laterality Date    COLONOSCOPY  2010    HIP PINNING Right 2023    Procedure: Right open reduction internal fixation hip nailing;  Surgeon: Benitez Todd MD;  Location: [facility]    HYSTERECTOMY      for uterine prolapse    RECTOSIGMOIDECTOMY PERINEAL N/A 2018    Procedure: RECTOSIGMOIDECTOMY PERINEAL;  Perineal Rectosigmoidectomy  ;  Surgeon: Amrik Mariano MD;  Location: [facility] OR            Social History:     Social History     Tobacco Use    Smoking status: Never    Smokeless tobacco: Never   Vaping Use    Vaping status: Never Used   Substance Use Topics    Alcohol use: No       Marital Status: Not applicable  Living situation: Lives in supervised living facilities  Family support: Very supportive  Vocational History: Not applicable  Tobacco use: Not Applicable  Alcohol use: N/A  Recreational drug use: N/A         Functional history:     Veryl Keaton Sheets is dependent with all aspects of her life.    ADLs: Dependent  Assistive devices: Wheelchair and uses a wheeled walker  iADLs (medication management and finances): Supervised  Hand dominance: Right  Driving: N/A           Family History:     Family History   Problem Relation Age of Onset    Depression/Anxiety Son     Depression/Anxiety Son         alcohol abuse  age 24    Hypertension Mother     Hypertension Brother     Diabetes No family hx of     Breast Cancer No family hx of     Colon Cancer No family hx of     Prostate Cancer No family hx of     Other Cancer No family hx of             Medications:     Current Outpatient Medications   Medication Sig Dispense Refill    acetaminophen (TYLENOL) 500 MG tablet Take 1,000 mg by mouth 3 times daily (Patient not taking: Reported on 2023)      atorvastatin (LIPITOR) 20 MG tablet 1 tablet (20 mg) by Oral or Feeding Tube route every evening (Patient not taking: Reported on 2023) 90 tablet 3    buprenorphine (BUTRANS) 5 MCG/HR WK patch APPLY TOPICALLY 1 PATCH ONCE A WEEK *REMOVE OLD  "PATCH BEFORE REAPPLICATION* 2 patch 0    calcium carbonate 600 mg-vitamin D 400 units (CALTRATE) 600-400 MG-UNIT per tablet Take 1 tablet by mouth 2 times daily (before meals) 180 tablet 3    lidocaine (LIDODERM) 5 % patch Place 1 patch onto the skin every 24 hours To prevent lidocaine toxicity, patient should be patch free for 12 hrs daily.      Vitamin D3 (CHOLECALCIFEROL) 25 mcg (1000 units) tablet Take 1 tablet (25 mcg) by mouth daily 90 tablet 3            Allergies:     No Known Allergies           ROS:     A focused ROS is negative other than the symptoms noted above in the HPI.      Constitutional:  Gen: NAD, pleasant and cooperative     Respiratory: denies dyspnea   Musculoskeletal: denies any muscle pain, joint pain, neck pain or back pain denies any pain  Neurologic: denies any headache, changes in motor or sensory function,  loss of balance or vertigo, unsteady  Psychiatric: denies mood changes; sleeps OK N/A  Posture: Spine: Significant kyphosis while in wheelchair  Lower extremities: Significant deconditioning noted               Physical Examiniation:     VITAL SIGNS: /77   Pulse 77   Ht 1.524 m (5')   Wt 40.8 kg (90 lb)   .8 cm (62.5\")   SpO2 96%   BMI 17.58 kg/m    BMI: Estimated body mass index is 17.58 kg/m  as calculated from the following:    Height as of this encounter: 1.524 m (5').    Weight as of this encounter: 40.8 kg (90 lb).    Gen: NAD, pleasant and cooperative     Pulm: non-labored breathing in room air    Ext: No Edema in BLE, no tenderness in calves      Gen: In no acute distress, pleasant and cooperative in wheelchair         Neuro/Musculoskeletal: Thoracolumbar Kypho -scoliosis    Deep Tendon Reflexes: Symmetrical    Gross evaluation Muscle strength: Unable to tolerate resistance    Upper extremities: Antigravity movement noted without significant difficulty      Lower extremities: Significant deconditioning of the lower extremity muscles was " noted.    Paresthesias: Upper extremities: None    Lower extremities: None    Range of Motion: With some stiffness in the upper and lower extremities otherwise no complain of pain  Gait :  Steadiness : Significantly unsteady with fear of falls.  Needs assistance to get in and out of sitting position in wheelchair and as well as standing balance.  Noted poor muscular support of the lumbar spine and deconditioned paraspinal muscles in the thoracic and lumbar    Able to walk on toes and heels: Not tried    Tandem test: Not tried due to significant unsteadiness and deconditioning       Spine Alignment : Posture: Thoracolumbar scoliosis with significant thoracic hyperkyphosis  Lower extremities with: Significant difficulty for antigravity movement       SLR : Negative, right            Left    ROSEMARY: Right with the stiffness, to avoid any extraneous movement                Left         Laboratory/Imaging:       X-RAYS: Spine and pelvis x-rays as well as visualization of internal fixation were reviewed with patient and her daughter who was mainly attending and more aware of the status of her mom x-rays were shared with her           Assessment/Plan:     Ms. Sheets is a very pleasant lady who has come to this consultation accompanied by her daughter for general conditioning she is status post fall with fracture of the right hip, status post internal fixation of the right intertrochanteric fracture.  She lives in supervised living and uses a wheelchair and a wheeled walker  Diagnoses :    A.Healing right intertrochanteric fracture status post  internal fixation.     B. Kyphoscolosis  C.  General deconditioning   D.  Mild posture needs to be addressed through proper positioning in the wheelchair and bed  E.  Gradual none extraneous exercises to improve status of musculoskeletal health        Patient education: In depth discussion and education was provided about the assessment and implications of each of the below  recommendations for management. Patient 's daughter was present and indicated readiness to learn and relay our instructions to the home facility residential facilities, all questions were answered and understanding of material presented was confirmed.    Work-up: She receives physical therapy at her local facilities routinely    Therapy/equipment/braces: Provided her with a prescription for low back support, elastic    Medications: To avoid analgesics and reduction of cognition through use of analgesics  Interventions: Provided pictures for implementation of low back strengthening as well as upper back and lower extremity strengthening exercises to be implemented at local facilities.  She does not have any complaint of pain at this stage therefore gradual progressive strengthening and conditioning program is highly recommended with avoidance of exertion and pain.  From her visit that apparently her daughter mentioned I had seen her at [place] in 2015 she has a posture training device.  Therefore she does not need to obtain a new one and we discussed how she can implement its use.  In sitting in wheelchair she needs to use a back cushion and that was discussed in detail as well.    Referral / follow up with other providers: In 2 months as needed at that time to consider support for the cervical thoracic as needed since at this time the initiation of the exercise and conditioning programs are recommended.  She did not have headrest or proper upper back positioning in the wheelchair that would be of great help.  I provided and signed the referral for implementation of her program at her residential area.  Discussed and provided proper positioning with the Ms. Campo daughter she needed stretching for her upper back pectoralis and reeducation for improvement of her posture.    Follow up: At the discretion of physician and surgeon    Juvenal Montgomery MD    Physical Medicine & Rehabilitation    I spent a total of 60 minutes  "minutes face-to-face with Belia Sheets during today's office visit. Over 50% of this time was spent counseling the patient and/or coordinating care. See note for details.     20 minutes minutes spent on the date of the encounter doing chart review, history and exam, documentation and further activities as noted above              Belia Sheets is a 89 year old female who presents for:  Chief Complaint   Patient presents with    Consult     Low back compression fracture.        Initial Vitals:  /77   Pulse 77   Ht 1.524 m (5')   Wt 40.8 kg (90 lb)   .8 cm (62.5\")   SpO2 96%   BMI 17.58 kg/m   Estimated body mass index is 17.58 kg/m  as calculated from the following:    Height as of this encounter: 1.524 m (5').    Weight as of this encounter: 40.8 kg (90 lb).. Body surface area is 1.31 meters squared. BP completed using cuff size: regular  No Pain (0)    Nursing Comments:   Charissa Workman MA]        Again, thank you for allowing me to participate in the care of your patient.      Sincerely,    NBA MCGOVERN MD      "

## 2023-05-31 NOTE — TELEPHONE ENCOUNTER
Called pt daughter, daughter asked when last booster was given and that they were told to wait 6 months before getting another one. Writer gave date of last booster and advised it can be given 4 months after but [facility] stated they will call back to make an appt for booster as they were currently at an appt.      Linda Montoya RN

## 2023-05-31 NOTE — PROGRESS NOTES
PM&R Clinic Note     Patient Name: Ms. Belia Sheets is a very pleasant lady whom I have the privilege of seeing today.  : 1930 Medical Record: [id number]     Requesting Physician/clinician: No att. providers found           History of Present Illness:     Ms. Belia Sheets is a 89 year old female who has been having a falling accident and as a result intertrochanteric fracture of the right hip.  She is accompanied by her daughter who lives in [place] for this consultation.  She has gone through internal fixation and presently has no pain.  Her main concern is the deconditioning of post fracture status and fear of falls          Symptoms,: She does not complain of any pain today and needs to improve the status of her musculoskeletal health in general        Therapies/HEP/equipments,: She has been receiving physical therapy at the facilities that she is under supervised living.        Functionally,: She is totally dependent in self-care and activities of daily living               Past Medical and Surgical History:     Past Medical History:   Diagnosis Date     Carpal tunnel syndrome (aka CTS)      Cataract      Chronic osteoarthritis      Constipation due to outlet dysfunction 2021     Fracture of multiple rami of right pubis with routine healing, subsequent encounter 2021     H/O calcium pyrophosphate deposition disease (CPPD)      History of 9th right rib fracture due to fall (2021) 2021     History of encephalopathy 10/2017     Hypertension      Kyphoscoliosis      Osteoarthritis     hands     Osteoporosis      Rectal prolapse      Past Surgical History:   Procedure Laterality Date     COLONOSCOPY       HIP PINNING Right 2023    Procedure: Right open reduction internal fixation hip nailing;  Surgeon: Benitez Todd MD;  [facility]: [facility]     HYSTERECTOMY      for uterine prolapse     RECTOSIGMOIDECTOMY PERINEAL N/A 2018    Procedure:  RECTOSIGMOIDECTOMY PERINEAL;  Perineal Rectosigmoidectomy  ;  Surgeon: Amrik Mariano MD;  Location: [facility] OR            Social History:     Social History     Tobacco Use     Smoking status: Never     Smokeless tobacco: Never   Vaping Use     Vaping status: Never Used   Substance Use Topics     Alcohol use: No       Marital Status: Not applicable  Living situation: Lives in supervised living facilities  Family support: Very supportive  Vocational History: Not applicable  Tobacco use: Not Applicable  Alcohol use: N/A  Recreational drug use: N/A         Functional history:     Veryl Keaton Sheets is dependent with all aspects of her life.    ADLs: Dependent  Assistive devices: Wheelchair and uses a wheeled walker  iADLs (medication management and finances): Supervised  Hand dominance: Right  Driving: N/A           Family History:     Family History   Problem Relation Age of Onset     Depression/Anxiety Son      Depression/Anxiety Son         alcohol abuse  age 24     Hypertension Mother      Hypertension Brother      Diabetes No family hx of      Breast Cancer No family hx of      Colon Cancer No family hx of      Prostate Cancer No family hx of      Other Cancer No family hx of             Medications:     Current Outpatient Medications   Medication Sig Dispense Refill     acetaminophen (TYLENOL) 500 MG tablet Take 1,000 mg by mouth 3 times daily (Patient not taking: Reported on 2023)       atorvastatin (LIPITOR) 20 MG tablet 1 tablet (20 mg) by Oral or Feeding Tube route every evening (Patient not taking: Reported on 2023) 90 tablet 3     buprenorphine (BUTRANS) 5 MCG/HR WK patch APPLY TOPICALLY 1 PATCH ONCE A WEEK *REMOVE OLD PATCH BEFORE REAPPLICATION* 2 patch 0     calcium carbonate 600 mg-vitamin D 400 units (CALTRATE) 600-400 MG-UNIT per tablet Take 1 tablet by mouth 2 times daily (before meals) 180 tablet 3     lidocaine (LIDODERM) 5 % patch Place 1 patch onto the skin every 24 hours To  "prevent lidocaine toxicity, patient should be patch free for 12 hrs daily.       Vitamin D3 (CHOLECALCIFEROL) 25 mcg (1000 units) tablet Take 1 tablet (25 mcg) by mouth daily 90 tablet 3            Allergies:     No Known Allergies           ROS:     A focused ROS is negative other than the symptoms noted above in the HPI.      Constitutional:  Gen: NAD, pleasant and cooperative     Respiratory: denies dyspnea   Musculoskeletal: denies any muscle pain, joint pain, neck pain or back pain denies any pain  Neurologic: denies any headache, changes in motor or sensory function,  loss of balance or vertigo, unsteady  Psychiatric: denies mood changes; sleeps OK N/A  Posture: Spine: Significant kyphosis while in wheelchair  Lower extremities: Significant deconditioning noted               Physical Examiniation:     VITAL SIGNS: /77   Pulse 77   Ht 1.524 m (5')   Wt 40.8 kg (90 lb)   .8 cm (62.5\")   SpO2 96%   BMI 17.58 kg/m    BMI: Estimated body mass index is 17.58 kg/m  as calculated from the following:    Height as of this encounter: 1.524 m (5').    Weight as of this encounter: 40.8 kg (90 lb).    Gen: NAD, pleasant and cooperative     Pulm: non-labored breathing in room air    Ext: No Edema in BLE, no tenderness in calves      Gen: In no acute distress, pleasant and cooperative in wheelchair         Neuro/Musculoskeletal: Thoracolumbar Kypho -scoliosis    Deep Tendon Reflexes: Symmetrical    Gross evaluation Muscle strength: Unable to tolerate resistance    Upper extremities: Antigravity movement noted without significant difficulty      Lower extremities: Significant deconditioning of the lower extremity muscles was noted.    Paresthesias: Upper extremities: None    Lower extremities: None    Range of Motion: With some stiffness in the upper and lower extremities otherwise no complain of pain  Gait :  Steadiness : Significantly unsteady with fear of falls.  Needs assistance to get in and out of " sitting position in wheelchair and as well as standing balance.  Noted poor muscular support of the lumbar spine and deconditioned paraspinal muscles in the thoracic and lumbar    Able to walk on toes and heels: Not tried    Tandem test: Not tried due to significant unsteadiness and deconditioning       Spine Alignment : Posture: Thoracolumbar scoliosis with significant thoracic hyperkyphosis  Lower extremities with: Significant difficulty for antigravity movement       SLR : Negative, right            Left    ROSEMARY: Right with the stiffness, to avoid any extraneous movement                Left         Laboratory/Imaging:       X-RAYS: Spine and pelvis x-rays as well as visualization of internal fixation were reviewed with patient and her daughter who was mainly attending and more aware of the status of her mom x-rays were shared with her           Assessment/Plan:     Ms. Sheets is a very pleasant lady who has come to this consultation accompanied by her daughter for general conditioning she is status post fall with fracture of the right hip, status post internal fixation of the right intertrochanteric fracture.  She lives in supervised living and uses a wheelchair and a wheeled walker  Diagnoses :    A.Healing right intertrochanteric fracture status post  internal fixation.     B. Kyphoscolosis  C.  General deconditioning   D.  Mild posture needs to be addressed through proper positioning in the wheelchair and bed  E.  Gradual none extraneous exercises to improve status of musculoskeletal health        1. Patient education: In depth discussion and education was provided about the assessment and implications of each of the below recommendations for management. Patient 's daughter was present and indicated readiness to learn and relay our instructions to the home facility residential facilities, all questions were answered and understanding of material presented was confirmed.    2. Work-up: She receives physical  therapy at her local facilities routinely    3. Therapy/equipment/braces: Provided her with a prescription for low back support, elastic    Medications: To avoid analgesics and reduction of cognition through use of analgesics  4. Interventions: Provided pictures for implementation of low back strengthening as well as upper back and lower extremity strengthening exercises to be implemented at local facilities.  She does not have any complaint of pain at this stage therefore gradual progressive strengthening and conditioning program is highly recommended with avoidance of exertion and pain.  From her visit that apparently her daughter mentioned I had seen her at [place] in 2015 she has a posture training device.  Therefore she does not need to obtain a new one and we discussed how she can implement its use.  In sitting in wheelchair she needs to use a back cushion and that was discussed in detail as well.    5. Referral / follow up with other providers: In 2 months as needed at that time to consider support for the cervical thoracic as needed since at this time the initiation of the exercise and conditioning programs are recommended.  She did not have headrest or proper upper back positioning in the wheelchair that would be of great help.  I provided and signed the referral for implementation of her program at her residential area.  Discussed and provided proper positioning with the Ms. Campo daughter she needed stretching for her upper back pectoralis and reeducation for improvement of her posture.    6. Follow up: At the discretion of physician and surgeon    Juvenal Montgomery MD    Physical Medicine & Rehabilitation    I spent a total of 60 minutes minutes face-to-face with Belia Sheets during today's office visit. Over 50% of this time was spent counseling the patient and/or coordinating care. See note for details.     20 minutes minutes spent on the date of the encounter doing chart review, history and exam,  documentation and further activities as noted above

## 2023-05-31 NOTE — PROGRESS NOTES
"Belia Sheets is a 89 year old female who presents for:  Chief Complaint   Patient presents with     Consult     Low back compression fracture.        Initial Vitals:  /77   Pulse 77   Ht 1.524 m (5')   Wt 40.8 kg (90 lb)   .8 cm (62.5\")   SpO2 96%   BMI 17.58 kg/m   Estimated body mass index is 17.58 kg/m  as calculated from the following:    Height as of this encounter: 1.524 m (5').    Weight as of this encounter: 40.8 kg (90 lb).. Body surface area is 1.31 meters squared. BP completed using cuff size: regular  No Pain (0)    Nursing Comments:     Charissa Workman MA]    "

## 2023-06-12 ENCOUNTER — DOCUMENTATION ONLY (OUTPATIENT)
Dept: FAMILY MEDICINE | Facility: CLINIC | Age: 88
End: 2023-06-12
Payer: COMMERCIAL

## 2023-06-12 NOTE — PROGRESS NOTES
"When opening a documentation only encounter, be sure to enter in \"Chief Complaint\" Forms and in \" Comments\" Title of form, description if needed.    Sudeep is a 89 year old  female  Form received via: Fax  Form now resides in: Provider Ready    Aubree Gamble                  "

## 2023-06-13 ENCOUNTER — TELEPHONE (OUTPATIENT)
Dept: GERIATRICS | Facility: CLINIC | Age: 88
End: 2023-06-13
Payer: COMMERCIAL

## 2023-06-13 RX ORDER — LIDOCAINE 4 G/G
1 PATCH TOPICAL EVERY 24 HOURS
COMMUNITY
End: 2023-09-08

## 2023-06-13 NOTE — TELEPHONE ENCOUNTER
[facility] Geriatrics Triage Nurse Telephone Encounter    Provider: LY Franklin  Facility: [facility]  Facility Type:  LTC    Caller: Michaela    Allergies:  No Known Allergies     Reason for call: Pharmacy states that Lidocaine 5% patches require prior authorization. Lidocaine 4% patches are covered.    Verbal Order/Direction given by Provider:   - Change Lidocaine patch to 4%    Provider giving Order:  LY Franklin    Verbal Order given to: Michaela Oreilly RN

## 2023-06-15 ENCOUNTER — ASSISTED LIVING VISIT (OUTPATIENT)
Dept: GERIATRICS | Facility: CLINIC | Age: 88
End: 2023-06-15
Payer: COMMERCIAL

## 2023-06-15 VITALS
RESPIRATION RATE: 16 BRPM | TEMPERATURE: 97.5 F | WEIGHT: 91.8 LBS | DIASTOLIC BLOOD PRESSURE: 84 MMHG | HEIGHT: 60 IN | BODY MASS INDEX: 18.02 KG/M2 | OXYGEN SATURATION: 95 % | SYSTOLIC BLOOD PRESSURE: 131 MMHG | HEART RATE: 77 BPM

## 2023-06-15 DIAGNOSIS — S32.010S COMPRESSION FRACTURE OF L1 VERTEBRA, SEQUELA: ICD-10-CM

## 2023-06-15 DIAGNOSIS — R15.9 BOWEL AND BLADDER INCONTINENCE: ICD-10-CM

## 2023-06-15 DIAGNOSIS — M80.00XD AGE-RELATED OSTEOPOROSIS WITH CURRENT PATHOLOGICAL FRACTURE WITH ROUTINE HEALING, SUBSEQUENT ENCOUNTER: ICD-10-CM

## 2023-06-15 DIAGNOSIS — Z98.890 S/P ORIF (OPEN REDUCTION INTERNAL FIXATION) FRACTURE: ICD-10-CM

## 2023-06-15 DIAGNOSIS — M62.84 SARCOPENIA: ICD-10-CM

## 2023-06-15 DIAGNOSIS — Z78.9 IMPAIRED MOBILITY AND ADLS: ICD-10-CM

## 2023-06-15 DIAGNOSIS — R63.4 WEIGHT LOSS: ICD-10-CM

## 2023-06-15 DIAGNOSIS — Z74.09 IMPAIRED MOBILITY AND ADLS: ICD-10-CM

## 2023-06-15 DIAGNOSIS — S72.144D CLOSED NONDISPLACED INTERTROCHANTERIC FRACTURE OF RIGHT FEMUR WITH ROUTINE HEALING, SUBSEQUENT ENCOUNTER: ICD-10-CM

## 2023-06-15 DIAGNOSIS — Z87.81 S/P ORIF (OPEN REDUCTION INTERNAL FIXATION) FRACTURE: ICD-10-CM

## 2023-06-15 DIAGNOSIS — F03.90 MAJOR NEUROCOGNITIVE DISORDER (H): Primary | ICD-10-CM

## 2023-06-15 DIAGNOSIS — R54 FRAILTY: ICD-10-CM

## 2023-06-15 DIAGNOSIS — R29.6 FALLS FREQUENTLY: ICD-10-CM

## 2023-06-15 DIAGNOSIS — R32 BOWEL AND BLADDER INCONTINENCE: ICD-10-CM

## 2023-06-15 DIAGNOSIS — M41.9 KYPHOSCOLIOSIS: ICD-10-CM

## 2023-06-15 PROCEDURE — 99348 HOME/RES VST EST LOW MDM 30: CPT | Performed by: FAMILY MEDICINE

## 2023-06-15 NOTE — LETTER
6/15/2023        RE: Belia Sheets  [address]  [address]  [facility] [address]        [facility] GERIATRICS    Chief Complaint   Patient presents with     RECHECK     HPI:  Belia Sheets is a 89 year old  (5/16/1930), had previously been living at [facility], with medhx including frequent falls, osteoporosis (had followed with endocrinology), cognitive impairment, frailty who is being seen today for an episodic care visit at: [facility] ([facility]) [[id number]].     Known to me from TCU admission. Has since moved to [place] due to increased care needs from [facility].     Per my TCU admission note 4/10/23  Hospital course:  She had been found down at her apartment during a check-in.  Unknown how long she was down, but was noted to have urinated and was significantly more confused than baseline.  She was having right hip pain.  Blood sugar on arrival was normal.     Initial evaluation notable for overall normal BMP, CBC with very mild leukocytosis, UA clear.  EKG without concerning findings.  CT head without acute intracranial pathology but noted moderate generalized cerebral volume loss with advanced leukoaraiosis.  As part of trauma evaluation, CT whole spine was obtained with significant findings of L1 vertebral body compression fracture with worsened height loss from 11/6/2022 as well as advanced wedge compression fractures T2-T4, T6, T9, and L1 likely chronic.     A right hip x-ray was obtained showing a mildly displaced and angulated fracture of the intertrochanteric right femur.  No other fractures.  Did note bony demineralization.  Orthopedic surgery was consulted and she underwent ORIF with right intramedullary nailing 4/4/23.  She did have modest postoperative anemia related to blood loss.  Did not require transfusions.      Postoperative course was complicated by hyper and hypoactive delirium - her daughter stating she had been out of it for 2 days straight.     With her history of CVA,  "she had been on aspirin.  This was held at discharge as she is currently on Lovenox anticoagulation for 60 days.\"    Seen today for general follow-up after her move from [facility].  Overall she is feeling well.  She has no concerns.  Her daughter Lubna is her primary healthcare advocate and they have been looking into having her moved to [place] to be closer to her.  She is aware of this and likes the idea.  She enjoys spending her days with activities.  Enjoys musicals.    I had spoken to Lubna myself earlier as well to review this.  Discussed there is a possibility of move stress with cognitive changes, but if such a move does provide her a more supportive environment, certainly would be reasonable to do.  Also reviewed I am familiar with the [place] area and geriatric services and certainly she could find good medical care there.    LTC ROS    Nursing concerns: None.      Appetite: Appetite is good, does need some encouragement at times with meals. Has mighty shake and magic cups.  Denies symptoms of dysphagia    Weight changes: Stabilized, now up to 91-92lbs (had been in the upper 80s previously)    Bowel: Incontinent. No diarrhea, constipation    Bladder: Incontinent, no dysuria.     Skin: Noted to have healing bruises on the chest with last bath    Sleep: Sleeping well    Mood: PHQ9 = 0; she does not feel down, depressed, worried, anxious.    Cognition: BIMS 5/15    Behavior: No concerns.      Mobility: Extensive assistance with 4ww or to self propel in wheel chair.    ADL assistance: 1A for all activities, supervision with meals    Pain: Pain across her chest at times, present for months, related to fall (where bruises are). No other pains.     Falls: None recently.    Resp: No SOB.    Cardio: No concerns     Ethics:  DNR/DNI, POLST limited interventions. Lubna (dtr) primary contact      Objective:   /84   Pulse 77   Temp 97.5  F (36.4  C)   Resp 16   Ht 1.524 m (5')   Wt 41.6 kg (91 lb 12.8 oz)   SpO2 95%   " BMI 17.93 kg/m      GENERAL APPEARANCE: Laying in bed comfortably, NAD  HENT:  Moderate termporal atrophy, mildly [place]  PULM  Normal WOB on RA, lungs CTAB, no wheezes or crackles, limited air movement  CV:  RRR, S1/S2 normal, no murmurs; no LE edema;  ABDOMEN: Abdomen soft, not tender, not distended, BS normal and active throughout   \M/S:   Generalized atrophy  NEURO: Alert, disoriented, thought processes delayed - will stop talking at times if unable to think of words  PSYCH: Mood normal with bright affect, insight limited     Recent labs in [facility] reviewed by me today.     Assessment/Plan:      ICD-10-CM    1. Major neurocognitive disorder (H)  F03.90       2. Frailty  R54       3. Sarcopenia  M62.84       4. Weight loss  R63.4       5. Impaired mobility and ADLs  Z74.09     Z78.9       6. Falls frequently  R29.6       7. Age-related osteoporosis with current pathological fracture with routine healing, subsequent encounter  M80.00XD       8. Closed nondisplaced intertrochanteric fracture of right femur with routine healing, subsequent encounter  S72.144D       9. S/P ORIF (open reduction internal fixation) fracture  Z98.890     Z87.81       10. Compression fracture of L1 vertebra, sequela  S32.010S       11. Kyphoscoliosis  M41.9       12. Bowel and bladder incontinence  R32     R15.9          Overall doing well and stable since her transition from [facility].  Currently residing in [facility] as other options are evaluated.  She has significant care needs related to dementia, frailty with associated mobility impairments.  She does fall frequently which has led to fractures.  History is consistent with osteoporosis and she continues on calcium and vitamin D supplementation.  Given limited ambulatory status, bisphosphonate or denosumab therapy may be less fitting but a consideration.  As noted, her daughter is considering moving her to [place] to be closer to her daughter as her primary healthcare agent.  Reviewed  considerations with this but would be reasonable to provide her appropriate supports.    MED REC REQUIRED  Post Medication Reconciliation Status: patient was not discharged from an inpatient facility or TCU      Orders:  -NNO    Electronically signed by:     Benjamin Rosenstein, MD, MA  [facility]    This note was completed with the assistance of dictation software. Typos and word substitution-errors are expected and unintended.        MDM: Multiple chronic conditions stable, medication mgmt            Sincerely,        Benjamin Rosenstein, MD

## 2023-06-15 NOTE — PROGRESS NOTES
[facility] GERIATRICS    Chief Complaint   Patient presents with     RECHECK     HPI:  Belia Sheets is a 89 year old  (5/16/1930), had previously been living at [facility], with medhx including frequent falls, osteoporosis (had followed with endocrinology), cognitive impairment, frailty who is being seen today for an episodic care visit at: [facility] ([facility]) [[id number]].     Known to me from TCU admission. Has since [place] [facility] due to increased care needs from [facility].     Per my TCU admission note 4/10/23  Hospital course:  She had been found down at her apartment during a check-in.  Unknown how long she was down, but was noted to have urinated and was significantly more confused than baseline.  She was having right hip pain.  Blood sugar on arrival was normal.     Initial evaluation notable for overall normal BMP, CBC with very mild leukocytosis, UA clear.  EKG without concerning findings.  CT head without acute intracranial pathology but noted moderate generalized cerebral volume loss with advanced leukoaraiosis.  As part of trauma evaluation, CT whole spine was obtained with significant findings of L1 vertebral body compression fracture with worsened height loss from 11/6/2022 as well as advanced wedge compression fractures T2-T4, T6, T9, and L1 likely chronic.     A right hip x-ray was obtained showing a mildly displaced and angulated fracture of the intertrochanteric right femur.  No other fractures.  Did note bony demineralization.  Orthopedic surgery was consulted and she underwent ORIF with right intramedullary nailing 4/4/23.  She did have modest postoperative anemia related to blood loss.  Did not require transfusions.      Postoperative course was complicated by hyper and hypoactive delirium - her daughter stating she had been out of it for 2 days straight.     With her history of CVA, she had been on aspirin.  This was held at discharge as she is currently on Lovenox anticoagulation for  "60 days.\"    Seen today for general follow-up after her move from [facility].  Overall she is feeling well.  She has no concerns.  Her daughter Lubna is her primary healthcare advocate and they have been looking into having her moved to [place] to be closer to her.  She is aware of this and likes the idea.  She enjoys spending her days with activities.  Enjoys musicals.    I had spoken to Lubna myself earlier as well to review this.  Discussed there is a possibility of move stress with cognitive changes, but if such a move does provide her a more supportive environment, certainly would be reasonable to do.  Also reviewed I am familiar with the [place] area and geriatric services and certainly she could find good medical care there.    LTC ROS    Nursing concerns: None.      Appetite: Appetite is good, does need some encouragement at times with meals. Has mighty shake and magic cups.  Denies symptoms of dysphagia    Weight changes: Stabilized, now up to 91-92lbs (had been in the upper 80s previously)    Bowel: Incontinent. No diarrhea, constipation    Bladder: Incontinent, no dysuria.     Skin: Noted to have healing bruises on the chest with last bath    Sleep: Sleeping well    Mood: PHQ9 = 0; she does not feel down, depressed, worried, anxious.    Cognition: BIMS 5/15    Behavior: No concerns.      Mobility: Extensive assistance with 4ww or to self propel in wheel chair.    ADL assistance: 1A for all activities, supervision with meals    Pain: Pain across her chest at times, present for months, related to fall (where bruises are). No other pains.     Falls: None recently.    Resp: No SOB.    Cardio: No concerns     Ethics:  DNR/DNI, POLST limited interventions. Lubna (dtr) primary contact      Objective:   /84   Pulse 77   Temp 97.5  F (36.4  C)   Resp 16   Ht 1.524 m (5')   Wt 41.6 kg (91 lb 12.8 oz)   SpO2 95%   BMI 17.93 kg/m      GENERAL APPEARANCE: Laying in bed comfortably, NAD  HENT:  Moderate termporal " atrophy, mildly [place]  PULM  Normal WOB on RA, lungs CTAB, no wheezes or crackles, limited air movement  CV:  RRR, S1/S2 normal, no murmurs; no LE edema;  ABDOMEN: Abdomen soft, not tender, not distended, BS normal and active throughout   \M/S:   Generalized atrophy  NEURO: Alert, disoriented, thought processes delayed - will stop talking at times if unable to think of words  PSYCH: Mood normal with bright affect, insight limited     Recent labs in [facility] reviewed by me today.     Assessment/Plan:      ICD-10-CM    1. Major neurocognitive disorder (H)  F03.90       2. Frailty  R54       3. Sarcopenia  M62.84       4. Weight loss  R63.4       5. Impaired mobility and ADLs  Z74.09     Z78.9       6. Falls frequently  R29.6       7. Age-related osteoporosis with current pathological fracture with routine healing, subsequent encounter  M80.00XD       8. Closed nondisplaced intertrochanteric fracture of right femur with routine healing, subsequent encounter  S72.144D       9. S/P ORIF (open reduction internal fixation) fracture  Z98.890     Z87.81       10. Compression fracture of L1 vertebra, sequela  S32.010S       11. Kyphoscoliosis  M41.9       12. Bowel and bladder incontinence  R32     R15.9          Overall doing well and stable since her transition from [facility].  Currently residing in [facility] as other options are evaluated.  She has significant care needs related to dementia, frailty with associated mobility impairments.  She does fall frequently which has led to fractures.  History is consistent with osteoporosis and she continues on calcium and vitamin D supplementation.  Given limited ambulatory status, bisphosphonate or denosumab therapy may be less fitting but a consideration.  As noted, her daughter is considering moving her to [place] to be closer to her daughter as her primary healthcare agent.  Reviewed considerations with this but would be reasonable to provide her appropriate supports.    MED REC  REQUIRED  Post Medication Reconciliation Status: patient was not discharged from an inpatient facility or TCU      Orders:  -NNO    Electronically signed by:     Benjamin Rosenstein, MD, MA  [facility]    This note was completed with the assistance of dictation software. Typos and word substitution-errors are expected and unintended.        MDM: Multiple chronic conditions stable, medication mgmt

## 2023-06-23 DIAGNOSIS — Z47.89 ORTHOPEDIC AFTERCARE: ICD-10-CM

## 2023-06-23 DIAGNOSIS — M25.551 PAIN OF RIGHT HIP: Primary | ICD-10-CM

## 2023-06-27 ENCOUNTER — OFFICE VISIT (OUTPATIENT)
Dept: ORTHOPEDICS | Facility: CLINIC | Age: 88
End: 2023-06-27
Payer: COMMERCIAL

## 2023-06-27 ENCOUNTER — ANCILLARY PROCEDURE (OUTPATIENT)
Dept: GENERAL RADIOLOGY | Facility: CLINIC | Age: 88
End: 2023-06-27
Attending: ORTHOPAEDIC SURGERY
Payer: COMMERCIAL

## 2023-06-27 DIAGNOSIS — Z47.89 ORTHOPEDIC AFTERCARE: ICD-10-CM

## 2023-06-27 DIAGNOSIS — M25.551 PAIN OF RIGHT HIP: Primary | ICD-10-CM

## 2023-06-27 DIAGNOSIS — M25.551 PAIN OF RIGHT HIP: ICD-10-CM

## 2023-06-27 PROCEDURE — 99024 POSTOP FOLLOW-UP VISIT: CPT | Performed by: ORTHOPAEDIC SURGERY

## 2023-06-27 PROCEDURE — 73552 X-RAY EXAM OF FEMUR 2/>: CPT | Mod: RT | Performed by: RADIOLOGY

## 2023-06-27 NOTE — NURSING NOTE
Reason For Visit:   Chief Complaint   Patient presents with     RECHECK     12 weeks s/p Right Femur IM Nail       There were no vitals taken for this visit.         Julissa Durbin, ATC

## 2023-06-27 NOTE — LETTER
6/27/2023         RE: Belia Sheets  [address] [facility] [address]        Dear Colleague,    Thank you for referring your patient, Belia Sheets, to the [facility] [place]. Please see a copy of my visit note below.    Chief complaint status post right femur IM nail performed on April 4, 2023    Patient presents today company of her daughter.  Reports to be doing well denies to have any pain reports to be doing some walking although according to her daughter she is not walking as much as she could.    In today's visit she presented with grossly normal motion of the right hip    A 2 views of the right femur were obtained today which are significant for showing excellent healing of the fracture site with hardware intact and in place    Assessment status post right femur IM nailing    Plan I discussed with the patient that at this point she has no restrictions she will follow-up on a as needed basis.  I encouraged her to walk as much as possible.    A apparently prior to the fall she was already walking with a walker therefore we have to also be realistic about how much walking she will be able to do with a walker moving forward.  I also discussed with patient's daughter that it is a ratio of 1:3 on how much he takes him to recover at her age    All questions were answered.      Benitez Todd MD

## 2023-06-27 NOTE — PROGRESS NOTES
Chief complaint status post right femur IM nail performed on April 4, 2023    Patient presents today company of her daughter.  Reports to be doing well denies to have any pain reports to be doing some walking although according to her daughter she is not walking as much as she could.    In today's visit she presented with grossly normal motion of the right hip    A 2 views of the right femur were obtained today which are significant for showing excellent healing of the fracture site with hardware intact and in place    Assessment status post right femur IM nailing    Plan I discussed with the patient that at this point she has no restrictions she will follow-up on a as needed basis.  I encouraged her to walk as much as possible.    A apparently prior to the fall she was already walking with a walker therefore we have to also be realistic about how much walking she will be able to do with a walker moving forward.  I also discussed with patient's daughter that it is a ratio of 1:3 on how much he takes him to recover at her age    All questions were answered.

## 2023-07-10 ENCOUNTER — PATIENT OUTREACH (OUTPATIENT)
Dept: GERIATRIC MEDICINE | Facility: CLINIC | Age: 88
End: 2023-07-10
Payer: COMMERCIAL

## 2023-07-10 NOTE — PROGRESS NOTES
[facility] Care Coordination Contact    Member became effective with [facility] on 7/1/2023 with [employer].     Welcome letter sent to daughter Lubna hanna SAVANA.     Dulce Tapia  Care Management Specialist   [facility]   [phone]

## 2023-07-10 NOTE — LETTER
July 10, 2023      CONSTANCE CONNELLY  [address] [address]      Dear Constance:    Corinna, my name is SARITA Miller, RN, PHN, CCM. You are eligible for [facility] Case Management program through your enrollment in [employer]. We think you may benefit from this program. I am writing to invite you to be in our Case Management program.     The following are a few things the Case Management program can help you with:  Select or change your primary care doctor or primary care clinic  Find a specialist, if needed, near your home  Receive preventive care, such as flu shots  Join programs offered by [facility] that interest you, like wellness programs    As your , I will do the following to enroll you in the Case Management Program:  Schedule a telephone call with you to answer any questions you may have about case management  Conduct an assessment by phone to identify needs case management can help you with  Develop a care plan to address those needs  Help you obtain available care and resources as needed    I will call you soon to discuss your interest in this program and your health care needs.    Being in the Case Management program is voluntary and offered to you at no cost. If you accept being in the Case Management program, you can stop any time by calling me at [phone].    Sincerely,      SARITA Miller, RN, PHN, CCM  [phone]  [email or web address]    [id number]                                     (01/2020)          [address] [place] [address]  [phone]  fax [phone]  [facility]

## 2023-07-24 ENCOUNTER — PATIENT OUTREACH (OUTPATIENT)
Dept: GERIATRIC MEDICINE | Facility: CLINIC | Age: 88
End: 2023-07-24
Payer: COMMERCIAL

## 2023-07-24 NOTE — PROGRESS NOTES
[facility] enrollment date: 7/10/23    Received new [facility] enrollment, reviewed EPIC notes.  No triggering event noted since enrollment.  Member did have a hospitalization on 4/3/23 for right intertrochanteric proximal fracture and L1 compression fracture of superior endplate. She did attend TCU and discharge to TCU due to not being able to care for herself. She has followed up with ortho as recommended. will follow up as needed.    Koki Ruth RN  LTC Care Manager-[employer] [place] [address]  [email or web address]   [email or web address]     Office: [phone]   Fax: [phone]

## 2023-08-17 ENCOUNTER — ASSISTED LIVING VISIT (OUTPATIENT)
Dept: GERIATRICS | Facility: CLINIC | Age: 88
End: 2023-08-17
Payer: COMMERCIAL

## 2023-08-17 VITALS
WEIGHT: 95 LBS | HEIGHT: 60 IN | RESPIRATION RATE: 18 BRPM | DIASTOLIC BLOOD PRESSURE: 83 MMHG | TEMPERATURE: 97 F | BODY MASS INDEX: 18.65 KG/M2 | SYSTOLIC BLOOD PRESSURE: 146 MMHG | HEART RATE: 66 BPM | OXYGEN SATURATION: 96 %

## 2023-08-17 DIAGNOSIS — R54 FRAILTY: ICD-10-CM

## 2023-08-17 DIAGNOSIS — M80.00XD AGE-RELATED OSTEOPOROSIS WITH CURRENT PATHOLOGICAL FRACTURE WITH ROUTINE HEALING, SUBSEQUENT ENCOUNTER: ICD-10-CM

## 2023-08-17 DIAGNOSIS — F03.90 MAJOR NEUROCOGNITIVE DISORDER (H): Primary | ICD-10-CM

## 2023-08-17 DIAGNOSIS — S72.144D CLOSED NONDISPLACED INTERTROCHANTERIC FRACTURE OF RIGHT FEMUR WITH ROUTINE HEALING, SUBSEQUENT ENCOUNTER: ICD-10-CM

## 2023-08-17 DIAGNOSIS — Z74.09 IMPAIRED MOBILITY AND ADLS: ICD-10-CM

## 2023-08-17 DIAGNOSIS — Z78.9 IMPAIRED MOBILITY AND ADLS: ICD-10-CM

## 2023-08-17 DIAGNOSIS — I63.531 CEREBROVASCULAR ACCIDENT (CVA) DUE TO OCCLUSION OF RIGHT POSTERIOR CEREBRAL ARTERY (H): ICD-10-CM

## 2023-08-17 DIAGNOSIS — R29.6 FALLS FREQUENTLY: ICD-10-CM

## 2023-08-17 PROCEDURE — 99349 HOME/RES VST EST MOD MDM 40: CPT | Performed by: FAMILY MEDICINE

## 2023-08-17 NOTE — PROGRESS NOTES
[facility] GERIATRICS    Chief Complaint   Patient presents with    RECHECK     HPI:  Belia Sheets is a 89 year old  (5/16/1930), who had previously been living at [facility] and now resides in [facility], with medhx including dementia, frequent falls, frailty, osteoporosis (had followed with endocrinology previously) with most recent R intertrochanteric femoral fracture and history of L1 compression fracture, who is being seen today for an episodic care visit at: [facility] ([facility]) [[id number]].     Seen today for general follow-up visit.  History limited by her cognitive impairment.  Overall she is doing well.  She has no significant concerns.  Discussed what she likes to do with her time.  She is not sure what she enjoys doing with her days.  She did talk about possibly purchasing a [place] and learning to play.  She does enjoy music and used to play the oboe.  Has very much enjoyed music therapy visits here.     She did have follow-up with orthopedic surgery again on 6/27/2023.  X-ray at that time noted to show good healing and intact hardware.  No significant concerns on exam.  Overall doing well from their standpoint.    [facility] ROS  Nursing concerns: None.    Appetite: Reports a good appetite.    Weight changes: Stable 93-95lbs, overall up 5lb since May   Bowel: Incontientn.    Bladder: Incontinent.    Skin: No concerns.    Sleep: She is sleeping well.    Mood: Mood is good. Denies any depression, worries, or anxiety  Cognition: Significant cognitive impairment (suspect AlzD). Last BIMS 5/15  Behavior: No concerns.    Mobility: Ambulates with a walker, or assist with w/c.    ADL assistance: Extensive assistance for all activities, eats independently.    Pain: Denies any pain .  Falls: Last fall around July 8th, no concerning findings.  Resp: Negative.  Cardio: Negative  Ethics: Code DNR/DNI, POLST DNR/Comfort Focus. Sounds as though still consideration for move to [place]    Allergies, and  PMH/PSH reviewed in EPIC today.    Objective:   BP (!) 146/83   Pulse 66   Temp 97  F (36.1  C)   Resp 18   Ht 1.524 m (5')   Wt 43.1 kg (95 lb)   SpO2 96%   BMI 18.55 kg/m      GENERAL APPEARANCE: Laying in bed comfortably, NAD  HENT:  Moderate temporal atrophy, mildly [place]  EYES:  Conjunctiva clear, anicteric, EOMI, PERRL  PULM  Normal WOB on RA, lungs CTAB, no wheezes or crackles, limited air movement  CV:  RRR, S1/S2 normal, no murmurs; no LE edema  ABDOMEN: Abdomen soft, not tender, not distended, BS normal and active throughout   M/S:   Generalized atrophy  SKIN:  No significant rashes or lesions of exposed skin  NEURO: Alert, disoriented, thought processes delayed and can be circuitous; cognition poor with severe memory impairment, some word-finding difficulties. CN II-XII otherwise grossly intact.  PSYCH: Mood normal with congruent affect, insight/judgment poor    Assessment/Plan:    (F03.90) Major neurocognitive disorder (H)  (primary encounter diagnosis)  Comment: Significant neurocognitive impairment with with decreased memory, some word finding difficulty, some difficulties with attention as well.  Significant limitations which require ongoing supportive cares.  Unclear if plan is still to potentially moved to [place].  Plan:   -Continue supportive cares in facility  -Was started on for was started on potassium supplement sometime ago though as it was although as was also started on Lasix for short period due to lower extremity swelling. Lasix since discontinued but continues on K supplement. BMP to re-evaluate.    (I63.531) Cerebrovascular accident (CVA) due to occlusion of right posterior cerebral artery (H)  Comment: Likely contributing to the above.  History of occipital stroke with with significant changes in cognition as well.  Notably, had previously been on aspirin which was held as she was on Lovenox anticoagulation after her hip and surgery.  Does not appear aspirin was resumed after  completion of Lovenox.  Given her frailty status, may be reasonable to reasonable to discontinue reasonable to discontinue aspirin but would review with family.  Plan:   -Consider resuming aspirin    (R54) Frailty  Comment: Consistent consistent with consistent with cog impairment, impairment, deconditioning, low weight and just in general fatigue.  Likely clinically clinical frailty scale likely clinical frailty scale approximately 7.    (Z74.09,  Z78.9) Impaired mobility and ADLs  Comment: Related to multiple the above.  Continues to require significant changes to require significant continues to require significant assistance with activities.  Plan:   -Continue supportive cares in facility    (R29.6) Falls frequently  Comment: Does have significant history of falls and appears to have had a for appears to have had a fall of unclear etiology on July 8.  Was evaluated per facility protocol and monitored appropriately.  No concerning findings.  No evidence of sequelae on evaluation today.    (M80.00XD) Age-related osteoporosis with current pathological fracture with routine healing, subsequent encounter  (S72.144D) Closed nondisplaced intertrochanteric fracture of right femur with routine healing, subsequent encounter  Comment: History of osteoporosis with noted prior L1 fracture and more recently right femoral fracture.  Seen by orthopedic surgery 6/27/2023 with overall good healing process.  She is ambulating with a walker not quite at her baseline, may not be able to return to her prior ambulatory baseline.  She has limited ambulation though is an increased fall risk.  Could consider starting bone therapy.    MED REC REQUIRED  Post Medication Reconciliation Status: discharge medications reconciled and changed, per note/orders      Orders:  [x] BMP, consider discontinue K supplement  --> Consider resuming ASA  --> Consider Bisphosphonate therapy    Electronically signed by:     Benjamin Rosenstein, MD, MA  [facility]  [facility]    This note was completed with the assistance of dictation software. Typos and word substitution-errors are expected and unintended.

## 2023-08-17 NOTE — LETTER
8/17/2023        RE: Belia Sheets  C/o Lubna Proctor  [address] [address]        [facility] GERIATRICS    Chief Complaint   Patient presents with     RECHECK     HPI:  Belia Sheets is a 89 year old  (5/16/1930), who had previously been living at [facility] and now resides in [facility], with medhx including dementia, frequent falls, frailty, osteoporosis (had followed with endocrinology previously) with most recent R intertrochanteric femoral fracture and history of L1 compression fracture, who is being seen today for an episodic care visit at: [facility] [[id number]].     Seen today for general follow-up visit.  History limited by her cognitive impairment.  Overall she is doing well.  She has no significant concerns.  Discussed what she likes to do with her time.  She is not sure what she enjoys doing with her days.  She did talk about possibly purchasing a [place] and learning to play.  She does enjoy music and used to play the oboe.  Has very much enjoyed music therapy visits here.     She did have follow-up with orthopedic surgery again on 6/27/2023.  X-ray at that time noted to show good healing and intact hardware.  No significant concerns on exam.  Overall doing well from their standpoint.    [facility]  Nursing concerns: None.    Appetite: Reports a good appetite.    Weight changes: Stable 93-95lbs, overall up 5lb since May   Bowel: Incontientn.    Bladder: Incontinent.    Skin: No concerns.    Sleep: She is sleeping well.    Mood: Mood is good. Denies any depression, worries, or anxiety  Cognition: Significant cognitive impairment (suspect AlzD). Last BIMS 5/15  Behavior: No concerns.    Mobility: Ambulates with a walker, or assist with w/c.    ADL assistance: Extensive assistance for all activities, eats independently.    Pain: Denies any pain .  Falls: Last fall around July 8th, no concerning findings.  Resp: Negative.  Cardio: Negative  Ethics: Code DNR/DNI,  POLST DNR/Comfort Focus. Sounds as though still consideration for move to [place]    Allergies, and PMH/PSH reviewed in EPIC today.    Objective:   BP (!) 146/83   Pulse 66   Temp 97  F (36.1  C)   Resp 18   Ht 1.524 m (5')   Wt 43.1 kg (95 lb)   SpO2 96%   BMI 18.55 kg/m      GENERAL APPEARANCE: Laying in bed comfortably, NAD  HENT:  Moderate temporal atrophy, mildly [place]  EYES:  Conjunctiva clear, anicteric, EOMI, PERRL  PULM  Normal WOB on RA, lungs CTAB, no wheezes or crackles, limited air movement  CV:  RRR, S1/S2 normal, no murmurs; no LE edema  ABDOMEN: Abdomen soft, not tender, not distended, BS normal and active throughout   M/S:   Generalized atrophy  SKIN:  No significant rashes or lesions of exposed skin  NEURO: Alert, disoriented, thought processes delayed and can be circuitous; cognition poor with severe memory impairment, some word-finding difficulties. CN II-XII otherwise grossly intact.  PSYCH: Mood normal with congruent affect, insight/judgment poor    Assessment/Plan:    (F03.90) Major neurocognitive disorder (H)  (primary encounter diagnosis)  Comment: Significant neurocognitive impairment with with decreased memory, some word finding difficulty, some difficulties with attention as well.  Significant limitations which require ongoing supportive cares.  Unclear if plan is still to potentially moved to [place].  Plan:   -Continue supportive cares in facility  -Was started on for was started on potassium supplement sometime ago though as it was although as was also started on Lasix for short period due to lower extremity swelling. Lasix since discontinued but continues on K supplement. BMP to re-evaluate.    (I63.531) Cerebrovascular accident (CVA) due to occlusion of right posterior cerebral artery (H)  Comment: Likely contributing to the above.  History of occipital stroke with with significant changes in cognition as well.  Notably, had previously been on aspirin which was held as she was  on Lovenox anticoagulation after her hip and surgery.  Does not appear aspirin was resumed after completion of Lovenox.  Given her frailty status, may be reasonable to reasonable to discontinue reasonable to discontinue aspirin but would review with family.  Plan:   -Consider resuming aspirin    (R54) Frailty  Comment: Consistent consistent with consistent with cog impairment, impairment, deconditioning, low weight and just in general fatigue.  Likely clinically clinical frailty scale likely clinical frailty scale approximately 7.    (Z74.09,  Z78.9) Impaired mobility and ADLs  Comment: Related to multiple the above.  Continues to require significant changes to require significant continues to require significant assistance with activities.  Plan:   -Continue supportive cares in facility    (R29.6) Falls frequently  Comment: Does have significant history of falls and appears to have had a for appears to have had a fall of unclear etiology on July 8.  Was evaluated per facility protocol and monitored appropriately.  No concerning findings.  No evidence of sequelae on evaluation today.    (M80.00XD) Age-related osteoporosis with current pathological fracture with routine healing, subsequent encounter  (S72.144D) Closed nondisplaced intertrochanteric fracture of right femur with routine healing, subsequent encounter  Comment: History of osteoporosis with noted prior L1 fracture and more recently right femoral fracture.  Seen by orthopedic surgery 6/27/2023 with overall good healing process.  She is ambulating with a walker not quite at her baseline, may not be able to return to her prior ambulatory baseline.  She has limited ambulation though is an increased fall risk.  Could consider starting bone therapy.    MED REC REQUIRED  Post Medication Reconciliation Status: discharge medications reconciled and changed, per note/orders      Orders:  [x] BMP, consider discontinue K supplement  --> Consider resuming ASA  -->  Consider Bisphosphonate therapy    Electronically signed by:     Benjamin Rosenstein, MD, MA  [facility] Faculty    This note was completed with the assistance of dictation software. Typos and word substitution-errors are expected and unintended.             Sincerely,        Benjamin Rosenstein, MD

## 2023-08-21 PROBLEM — G31.84 MILD COGNITIVE IMPAIRMENT: Status: RESOLVED | Noted: 2021-07-21 | Resolved: 2023-08-21

## 2023-08-21 RX ORDER — POTASSIUM CHLORIDE 1500 MG/1
20 TABLET, EXTENDED RELEASE ORAL DAILY
COMMUNITY
Start: 2023-08-14 | End: 2023-08-23

## 2023-08-22 ENCOUNTER — LAB REQUISITION (OUTPATIENT)
Dept: LAB | Facility: CLINIC | Age: 88
End: 2023-08-22
Payer: COMMERCIAL

## 2023-08-22 DIAGNOSIS — I10 ESSENTIAL (PRIMARY) HYPERTENSION: ICD-10-CM

## 2023-08-23 ENCOUNTER — TELEPHONE (OUTPATIENT)
Dept: GERIATRICS | Facility: CLINIC | Age: 88
End: 2023-08-23

## 2023-08-23 LAB
ANION GAP SERPL CALCULATED.3IONS-SCNC: 11 MMOL/L (ref 7–15)
BUN SERPL-MCNC: 18.4 MG/DL (ref 8–23)
CALCIUM SERPL-MCNC: 9.2 MG/DL (ref 8.2–9.6)
CHLORIDE SERPL-SCNC: 105 MMOL/L (ref 98–107)
CREAT SERPL-MCNC: 0.61 MG/DL (ref 0.51–0.95)
DEPRECATED HCO3 PLAS-SCNC: 24 MMOL/L (ref 22–29)
GFR SERPL CREATININE-BSD FRML MDRD: 83 ML/MIN/1.73M2
GLUCOSE SERPL-MCNC: 86 MG/DL (ref 70–99)
POTASSIUM SERPL-SCNC: 4.4 MMOL/L (ref 3.4–5.3)
SODIUM SERPL-SCNC: 140 MMOL/L (ref 136–145)

## 2023-08-23 PROCEDURE — P9604 ONE-WAY ALLOW PRORATED TRIP: HCPCS | Mod: ORL | Performed by: FAMILY MEDICINE

## 2023-08-23 PROCEDURE — 80048 BASIC METABOLIC PNL TOTAL CA: CPT | Mod: ORL | Performed by: FAMILY MEDICINE

## 2023-08-23 PROCEDURE — 36415 COLL VENOUS BLD VENIPUNCTURE: CPT | Mod: ORL | Performed by: FAMILY MEDICINE

## 2023-08-23 NOTE — TELEPHONE ENCOUNTER
[facility] Geriatrics Lab Note     Provider: Rosenstein, Benjamin MD  Facility: [facility]  Facility Type:   B&C    No Known Allergies    Labs Reviewed by provider: RASHMI    Verbal Order/Direction given by Provider: Discontinue potassium supplement and recheck BMP next week.    Provider giving Order:  Rosenstein, Benjamin MD    Verbal Order given to: Melissa Song RN

## 2023-08-25 ENCOUNTER — NURSING HOME VISIT (OUTPATIENT)
Dept: GERIATRICS | Facility: CLINIC | Age: 88
End: 2023-08-25
Payer: COMMERCIAL

## 2023-08-25 VITALS
RESPIRATION RATE: 18 BRPM | OXYGEN SATURATION: 96 % | TEMPERATURE: 97 F | WEIGHT: 93.3 LBS | BODY MASS INDEX: 18.22 KG/M2 | HEART RATE: 66 BPM | SYSTOLIC BLOOD PRESSURE: 146 MMHG | DIASTOLIC BLOOD PRESSURE: 83 MMHG

## 2023-08-25 DIAGNOSIS — Z78.9 IMPAIRED MOBILITY AND ADLS: ICD-10-CM

## 2023-08-25 DIAGNOSIS — I63.531 CEREBROVASCULAR ACCIDENT (CVA) DUE TO OCCLUSION OF RIGHT POSTERIOR CEREBRAL ARTERY (H): ICD-10-CM

## 2023-08-25 DIAGNOSIS — R29.6 FALLS FREQUENTLY: ICD-10-CM

## 2023-08-25 DIAGNOSIS — S72.144D CLOSED NONDISPLACED INTERTROCHANTERIC FRACTURE OF RIGHT FEMUR WITH ROUTINE HEALING, SUBSEQUENT ENCOUNTER: ICD-10-CM

## 2023-08-25 DIAGNOSIS — F03.90 MAJOR NEUROCOGNITIVE DISORDER (H): Primary | ICD-10-CM

## 2023-08-25 DIAGNOSIS — R54 FRAILTY: ICD-10-CM

## 2023-08-25 DIAGNOSIS — Z74.09 IMPAIRED MOBILITY AND ADLS: ICD-10-CM

## 2023-08-25 PROCEDURE — 99309 SBSQ NF CARE MODERATE MDM 30: CPT | Performed by: PHYSICIAN ASSISTANT

## 2023-08-25 NOTE — LETTER
8/25/2023        RE: Belia Sheets  C/o Lubna Proctor  [address] [address]        [facility] GERIATRICS  Chief Complaint   Patient presents with     detention Acute     [facility] Medical Record Number:  [id number]  Place of Service where encounter took place:  [facility] ([facility]) [[id number]]    HPI:    Belia Sheets  is 89 year old female with PMHx dementia, frequent falls, frailty, osteoporosis who is being seen today for a federally mandated E/M visit.     Patient is seen in her room, about to go for a walk with staff. She does not have any complaints or concerns today. No concerns from nursing staff, she has been ambulating with stand-by assist of staff and her walker. Has not been complaining of pain from her recent hip fracture. Recently seen by physician, potassium supplement discontinued with stable levels on repeat.    ALLERGIES:Patient has no known allergies.  PAST MEDICAL HISTORY:   Past Medical History:   Diagnosis Date     Carpal tunnel syndrome (aka CTS)      Cataract      Chronic osteoarthritis      Constipation due to outlet dysfunction 9/22/2021     Fracture of multiple rami of right pubis with routine healing, subsequent encounter 9/22/2021     H/O calcium pyrophosphate deposition disease (CPPD)      History of 9th right rib fracture due to fall (07/13/2021) 9/22/2021     History of encephalopathy 10/2017     Hypertension      Kyphoscoliosis      Osteoarthritis     hands     Osteoporosis      Rectal prolapse      PAST SURGICAL HISTORY:   has a past surgical history that includes Hysterectomy; colonoscopy (2010); Rectosigmoidectomy perineal (N/A, 1/12/2018); and Hip Pinning (Right, 4/4/2023).  FAMILY HISTORY: family history includes Depression/Anxiety in her son and son; Hypertension in her brother and mother.  SOCIAL HISTORY:  reports that she has never smoked. She has never used smokeless tobacco. She reports that she does not drink alcohol and does not use  drugs.    MEDICATIONS:  MED REC REQUIRED  Post Medication Reconciliation Status: patient was not discharged from an inpatient facility or TCU         Review of your medicines            Accurate as of August 25, 2023 11:59 PM. If you have any questions, ask your nurse or doctor.                CONTINUE these medicines which have NOT CHANGED        Dose / Directions   acetaminophen 500 MG tablet  Commonly known as: TYLENOL      Dose: 1,000 mg  Take 1,000 mg by mouth 3 times daily  Refills: 0     atorvastatin 20 MG tablet  Commonly known as: LIPITOR  Used for: Cerebrovascular accident (CVA) due to embolism of left posterior cerebral artery (H)      Dose: 20 mg  1 tablet (20 mg) by Oral or Feeding Tube route every evening  Quantity: 90 tablet  Refills: 3     calcium carbonate 600 mg-vitamin D 400 units 600-400 MG-UNIT per tablet  Commonly known as: CALTRATE  Used for: Age-related osteoporosis with current pathological fracture with routine healing, subsequent encounter      Dose: 1 tablet  Take 1 tablet by mouth 2 times daily (before meals)  Quantity: 180 tablet  Refills: 3     Lidocaine 4 % Patch  Commonly known as: LIDOCARE      Dose: 1 patch  Place 1 patch onto the skin every 24 hours To prevent lidocaine toxicity, patient should be patch free for 12 hrs daily.  Refills: 0     Vitamin D3 25 mcg (1000 units) tablet  Commonly known as: CHOLECALCIFEROL  Used for: Age-related osteoporosis without current pathological fracture      Dose: 1 tablet  Take 1 tablet (25 mcg) by mouth daily  Quantity: 90 tablet  Refills: 3              Case Management:  I have reviewed the care plan and MDS and do agree with the plan. Patient's desire to return to the community is not present. Information reviewed:  Medications, vital signs, orders, and nursing notes.    ROS:  4 point ROS including Respiratory, CV, GI and , other than that noted in the HPI,  is negative    Vitals:  BP (!) 146/83   Pulse 66   Temp 97  F (36.1  C)   Resp 18    Wt 42.3 kg (93 lb 4.8 oz)   SpO2 96%   BMI 18.22 kg/m    Body mass index is 18.22 kg/m .  Exam:  GEN: well-developed, thin elderly female, appears comfortable  HEENT: NCAT, EOM intact bilaterally, nose & mouth patent, mucous membranes moist  CHEST: lungs CTA bilaterally, no increased work of breathing, no wheeze, crackles, rhonchi  HEART: RRR, S1 & S2  MSK: AROM bilateral UE/LE, pedal & radial pulses 2+ bilaterally  NEURO: awake, alert. CN II-XII grossly intact. Sensation grossly intact to light touch.   SKIN: warm & dry without rash    Lab/Diagnostic data:   Most Recent 3 CBC's:  Recent Labs   Lab Test 05/12/23  1119 04/17/23  1114 04/12/23  1109   WBC 5.8 12.3* 12.9*   HGB 12.4 12.2 10.7*   MCV 98 94 93    538* 411     Most Recent 3 BMP's:  Recent Labs   Lab Test 08/30/23  1158 08/23/23  0721 05/12/23  1119    140 138   POTASSIUM 3.9 4.4 4.2   CHLORIDE 102 105 104   CO2 27 24 22   BUN 17.0 18.4 26.1*   CR 0.66 0.61 0.60   ANIONGAP 11 11 12   BOY 10.0* 9.2 9.8*   * 86 96       ASSESSMENT/PLAN    Major neurocognitive disorder  Ongoing limitations requiring ongoing supportive cares. Appears plans were previously to move pt to [place], unclear if this remains the plan.   -Continue supportive cares and facility    CVA  Previously on ASA which was discontinued in favor of AC during acute treatment of hip fracture. Was not resumed, reasonable to remain off given frequent falls and frailty.    Frailty  Impaired mobility and ADLs  Recent right femur fx s/p ORIF  Recovering well, has not had recent falls.  -Continue to ambulate as able BID    Electronically signed by:  Jm Senior PA-C           Sincerely,        Jm Senior PA-C

## 2023-08-28 NOTE — PROGRESS NOTES
Andrew is a 42 year old who is being evaluated via a billable video visit.      How would you like to obtain your AVS? MyChart  If the video visit is dropped, the invitation should be resent by: Text to cell phone: [phone]  Will anyone else be joining your video visit? No          Assessment and Plan    (F90.2) ADHD (attention deficit hyperactivity disorder), combined type  Comment: 3m refills, video visit in  so we can synch up depression visits  Plan: amphetamine-dextroamphetamine (ADDERALL) 10 MG         tablet, amphetamine-dextroamphetamine         (ADDERALL) 10 MG tablet,         amphetamine-dextroamphetamine (ADDERALL) 10 MG         tablet, PRIMARY CARE FOLLOW-UP SCHEDULING              RTC in  video    Sabas Cerna MD      Subjective   Andrew is a 42 year old, presenting for the following health issues:  A.D.H.D and Follow Up      8/28/2023     4:00 PM   Additional Questions   Roomed by Mel YAN       History of Present Illness       Reason for visit:  Follow up on ADHD medication    He eats 2-3 servings of fruits and vegetables daily.He consumes 1 sweetened beverage(s) daily.He exercises with enough effort to increase his heart rate 10 to 19 minutes per day.  He exercises with enough effort to increase his heart rate 3 or less days per week.   He is taking medications regularly.       Medication Followup of Adderall  Taking Medication as prescribed: yes  Side Effects:  first week had an upset stomach but by second week it was fine.  Medication Helping Symptoms:  yes    Find it helpful, thinks the 10mg was the sweet spot.  Did have an upset stomach with the first week, but that tapered off by the second week.  No sleep issues, HA, anxiety, palpitations.      Review of Systems   Constitutional: Negative.    Cardiovascular:  Negative for palpitations.   Gastrointestinal:  Negative for abdominal pain.   Neurological:  Negative for headaches.   Psychiatric/Behavioral:  Negative for sleep disturbance.  D/I: patient remains on 6A for neurological monitoring regarding acute word finding and hallucinations.  Neuro- A&O x 4, forgetful, subtle word finding difficulties noted at times. Numbness in hands/feet is baseline  CV- WNL  Pulm- LS clear on RA  GI- tolerated vegetarian diet, fair intake. No BM today  - voiding without difficulty. Continent all day, urgency at times.  Skin- WNL, LP site c/d/i from LP in radiology this AM.  Gtts- SL'd  ID- n/a  Labs- WNL, CSF cultures pending  Pain- denied  Activity- up with SBA and GB if pt doesn't refuse GB.  Drains-n/a  Social-son Delmar and daughter Lubna involved. Delmar will bring pt to assisted living tomorrow pending DC approval from neurology.  CRRT-n/a  See flow sheets for further details and assessments.  A: pt did not demonstrate any hallucinating thoughts nor did she report any hallucinations. Impulsive x 1 this morning. MRI planned for this evening approx 1800. Continue with POC, monitor for   P: continue to monitor, notify MD of significant changes.     The patient is not nervous/anxious.             Objective           Vitals:  No vitals were obtained today due to virtual visit.    Physical Exam   GENERAL: Healthy, alert and no distress  EYES: Eyes grossly normal to inspection.  No discharge or erythema, or obvious scleral/conjunctival abnormalities.  RESP: No audible wheeze, cough, or visible cyanosis.  No visible retractions or increased work of breathing.    SKIN: Visible skin clear. No significant rash, abnormal pigmentation or lesions.  NEURO: Cranial nerves grossly intact.  Mentation and speech appropriate for age.  PSYCH: Mentation appears normal, affect normal/bright, judgement and insight intact, normal speech and appearance well-groomed.                Video-Visit Details    Type of service:  Video Visit     Originating Location (pt. Location): Home    Distant Location (provider location):  Off-site  Platform used for Video Visit: [employer]

## 2023-08-29 ENCOUNTER — LAB REQUISITION (OUTPATIENT)
Dept: LAB | Facility: CLINIC | Age: 88
End: 2023-08-29
Payer: COMMERCIAL

## 2023-08-29 DIAGNOSIS — I10 ESSENTIAL (PRIMARY) HYPERTENSION: ICD-10-CM

## 2023-08-30 LAB
ANION GAP SERPL CALCULATED.3IONS-SCNC: 11 MMOL/L (ref 7–15)
BUN SERPL-MCNC: 17 MG/DL (ref 8–23)
CALCIUM SERPL-MCNC: 10 MG/DL (ref 8.2–9.6)
CHLORIDE SERPL-SCNC: 102 MMOL/L (ref 98–107)
CREAT SERPL-MCNC: 0.66 MG/DL (ref 0.51–0.95)
DEPRECATED HCO3 PLAS-SCNC: 27 MMOL/L (ref 22–29)
GFR SERPL CREATININE-BSD FRML MDRD: 81 ML/MIN/1.73M2
GLUCOSE SERPL-MCNC: 117 MG/DL (ref 70–99)
POTASSIUM SERPL-SCNC: 3.9 MMOL/L (ref 3.4–5.3)
SODIUM SERPL-SCNC: 140 MMOL/L (ref 136–145)

## 2023-08-30 PROCEDURE — P9604 ONE-WAY ALLOW PRORATED TRIP: HCPCS | Mod: ORL | Performed by: FAMILY MEDICINE

## 2023-08-30 PROCEDURE — 36415 COLL VENOUS BLD VENIPUNCTURE: CPT | Mod: ORL | Performed by: FAMILY MEDICINE

## 2023-08-30 PROCEDURE — 80048 BASIC METABOLIC PNL TOTAL CA: CPT | Mod: ORL | Performed by: FAMILY MEDICINE

## 2023-08-30 NOTE — NURSING NOTE
"Chief Complaint   Patient presents with     Surgical Followup     Post op.       Vitals:    02/21/18 1608   BP: 136/88   BP Location: Left arm   Patient Position: Chair   Cuff Size: Adult Regular   Pulse: 84   Temp: 98.4  F (36.9  C)   TempSrc: Oral   SpO2: 95%   Weight: 96 lb 14.4 oz   Height: 4' 10\"       Body mass index is 20.25 kg/(m^2).    Hardik AMARO LPN                     " show [____ Week(s)] : [unfilled] week(s)  [Patient] : patient [Mother] : mother [Laparoscopic appendectomy, acute] : acute laparoscopic appendectomy [Pain] : ~He/She~ does not have pain [Fever] : ~He/She~ does not have fever [Normal bowel movements] : ~He/She~ has normal bowel movements [Vomiting] : ~He/She~ does not have vomiting [Tolerating Diet] : ~He/She~ is tolerating diet [Redness at incision] : ~He/She~ does not have redness at incision [Drainage at incision] : ~He/She~ does not have drainage at incision [Swelling at surgical site] : ~He/She~ does not have swelling at surgical site [de-identified] : 8-10-23 [de-identified] : Dr Bryant [de-identified] : MAX is 3  weeks post op from his  appendectomy.  His  pathology is consistent with acute appendicitis.  He was discharged home on the same day as surgery.  He presents for a post op visit. He has extreme anxiety over looking at his umbilicus.  It has been covered since surgery and will not look at the site.  He had to cover both eyes in the office today in order for me to look at the surgical site and cried after the exam due to stress and anxiety

## 2023-08-31 ENCOUNTER — TELEPHONE (OUTPATIENT)
Dept: GERIATRICS | Facility: CLINIC | Age: 88
End: 2023-08-31
Payer: COMMERCIAL

## 2023-08-31 NOTE — TELEPHONE ENCOUNTER
[facility] Geriatrics Lab Note     Provider: Rosenstein, Benjamin MD  Facility: [facility]  Facility Type:  LTC    No Known Allergies    Labs Reviewed by provider: RASHMI---from 8/30/23     Verbal Order/Direction given by Provider: No new orders.      Provider giving Order:  Rosenstein, Benjamin MD    Verbal Order given to: Tanya([phone])    Thompson Parra RN

## 2023-09-08 ENCOUNTER — TELEPHONE (OUTPATIENT)
Dept: GERIATRICS | Facility: CLINIC | Age: 88
End: 2023-09-08
Payer: COMMERCIAL

## 2023-09-08 NOTE — TELEPHONE ENCOUNTER
[facility] Geriatrics Triage Nurse Telephone Encounter    Provider: Jm Senior PA-C  Facility: [facility]  Facility Type:  LTC    Caller: Facility RN    Allergies:  No Known Allergies     Reason for call: Pt requesting to discontinue Lidocaine patches due to no pain    Verbal Order/Direction given by Provider:   - discontinue lidocaine patch    Provider giving Order:  Jm Senior PA-C    Verbal Order given to: Facility RN    Roma Oreilly, RN

## 2023-09-08 NOTE — PROGRESS NOTES
[facility] GERIATRICS  Chief Complaint   Patient presents with    custodial Acute     [facility] Record Number:  [id number]  Place of Service where encounter took place:  [facility] ([facility]) [[id number]]    HPI:    Belia Sheets  is 89 year old female with PMHx dementia, frequent falls, frailty, osteoporosis who is being seen today for a federally mandated E/M visit.     Patient is seen in her room, about to go for a walk with staff. She does not have any complaints or concerns today. No concerns from nursing staff, she has been ambulating with stand-by assist of staff and her walker. Has not been complaining of pain from her recent hip fracture. Recently seen by physician, potassium supplement discontinued with stable levels on repeat.    ALLERGIES:Patient has no known allergies.  PAST MEDICAL HISTORY:   Past Medical History:   Diagnosis Date    Carpal tunnel syndrome (aka CTS)     Cataract     Chronic osteoarthritis     Constipation due to outlet dysfunction 9/22/2021    Fracture of multiple rami of right pubis with routine healing, subsequent encounter 9/22/2021    H/O calcium pyrophosphate deposition disease (CPPD)     History of 9th right rib fracture due to fall (07/13/2021) 9/22/2021    History of encephalopathy 10/2017    Hypertension     Kyphoscoliosis     Osteoarthritis     hands    Osteoporosis     Rectal prolapse      PAST SURGICAL HISTORY:   has a past surgical history that includes Hysterectomy; colonoscopy (2010); Rectosigmoidectomy perineal (N/A, 1/12/2018); and Hip Pinning (Right, 4/4/2023).  FAMILY HISTORY: family history includes Depression/Anxiety in her son and son; Hypertension in her brother and mother.  SOCIAL HISTORY:  reports that she has never smoked. She has never used smokeless tobacco. She reports that she does not drink alcohol and does not use drugs.    MEDICATIONS:  MED REC REQUIRED  Post Medication Reconciliation Status: patient was not discharged from an  inpatient facility or TCU         Review of your medicines            Accurate as of August 25, 2023 11:59 PM. If you have any questions, ask your nurse or doctor.                CONTINUE these medicines which have NOT CHANGED        Dose / Directions   acetaminophen 500 MG tablet  Commonly known as: TYLENOL      Dose: 1,000 mg  Take 1,000 mg by mouth 3 times daily  Refills: 0     atorvastatin 20 MG tablet  Commonly known as: LIPITOR  Used for: Cerebrovascular accident (CVA) due to embolism of left posterior cerebral artery (H)      Dose: 20 mg  1 tablet (20 mg) by Oral or Feeding Tube route every evening  Quantity: 90 tablet  Refills: 3     calcium carbonate 600 mg-vitamin D 400 units 600-400 MG-UNIT per tablet  Commonly known as: CALTRATE  Used for: Age-related osteoporosis with current pathological fracture with routine healing, subsequent encounter      Dose: 1 tablet  Take 1 tablet by mouth 2 times daily (before meals)  Quantity: 180 tablet  Refills: 3     Lidocaine 4 % Patch  Commonly known as: LIDOCARE      Dose: 1 patch  Place 1 patch onto the skin every 24 hours To prevent lidocaine toxicity, patient should be patch free for 12 hrs daily.  Refills: 0     Vitamin D3 25 mcg (1000 units) tablet  Commonly known as: CHOLECALCIFEROL  Used for: Age-related osteoporosis without current pathological fracture      Dose: 1 tablet  Take 1 tablet (25 mcg) by mouth daily  Quantity: 90 tablet  Refills: 3              Case Management:  I have reviewed the care plan and MDS and do agree with the plan. Patient's desire to return to the community is not present. Information reviewed:  Medications, vital signs, orders, and nursing notes.    ROS:  4 point ROS including Respiratory, CV, GI and , other than that noted in the HPI,  is negative    Vitals:  BP (!) 146/83   Pulse 66   Temp 97  F (36.1  C)   Resp 18   Wt 42.3 kg (93 lb 4.8 oz)   SpO2 96%   BMI 18.22 kg/m    Body mass index is 18.22 kg/m .  Exam:  GEN:  well-developed, thin elderly female, appears comfortable  HEENT: NCAT, EOM intact bilaterally, nose & mouth patent, mucous membranes moist  CHEST: lungs CTA bilaterally, no increased work of breathing, no wheeze, crackles, rhonchi  HEART: RRR, S1 & S2  MSK: AROM bilateral UE/LE, pedal & radial pulses 2+ bilaterally  NEURO: awake, alert. CN II-XII grossly intact. Sensation grossly intact to light touch.   SKIN: warm & dry without rash    Lab/Diagnostic data:   Most Recent 3 CBC's:  Recent Labs   Lab Test 05/12/23  1119 04/17/23  1114 04/12/23  1109   WBC 5.8 12.3* 12.9*   HGB 12.4 12.2 10.7*   MCV 98 94 93    538* 411     Most Recent 3 BMP's:  Recent Labs   Lab Test 08/30/23  1158 08/23/23  0721 05/12/23  1119    140 138   POTASSIUM 3.9 4.4 4.2   CHLORIDE 102 105 104   CO2 27 24 22   BUN 17.0 18.4 26.1*   CR 0.66 0.61 0.60   ANIONGAP 11 11 12   BOY 10.0* 9.2 9.8*   * 86 96       ASSESSMENT/PLAN    Major neurocognitive disorder  Ongoing limitations requiring ongoing supportive cares. Appears plans were previously to move pt to [place], unclear if this remains the plan.   -Continue supportive cares and facility    CVA  Previously on ASA which was discontinued in favor of AC during acute treatment of hip fracture. Was not resumed, reasonable to remain off given frequent falls and frailty.    Frailty  Impaired mobility and ADLs  Recent right femur fx s/p ORIF  Recovering well, has not had recent falls.  -Continue to ambulate as able BID    Electronically signed by:  Jm Senior PA-C

## 2023-09-14 ENCOUNTER — DISCHARGE SUMMARY NURSING HOME (OUTPATIENT)
Dept: GERIATRICS | Facility: CLINIC | Age: 88
End: 2023-09-14
Payer: COMMERCIAL

## 2023-09-14 VITALS
DIASTOLIC BLOOD PRESSURE: 80 MMHG | TEMPERATURE: 97.2 F | SYSTOLIC BLOOD PRESSURE: 140 MMHG | HEIGHT: 60 IN | RESPIRATION RATE: 18 BRPM | BODY MASS INDEX: 18.51 KG/M2 | HEART RATE: 60 BPM | OXYGEN SATURATION: 96 % | WEIGHT: 94.3 LBS

## 2023-09-14 DIAGNOSIS — R15.9 BOWEL AND BLADDER INCONTINENCE: ICD-10-CM

## 2023-09-14 DIAGNOSIS — M41.9 KYPHOSCOLIOSIS: ICD-10-CM

## 2023-09-14 DIAGNOSIS — Z86.73 HISTORY OF CVA (CEREBROVASCULAR ACCIDENT): ICD-10-CM

## 2023-09-14 DIAGNOSIS — I63.531 CEREBROVASCULAR ACCIDENT (CVA) DUE TO OCCLUSION OF RIGHT POSTERIOR CEREBRAL ARTERY (H): ICD-10-CM

## 2023-09-14 DIAGNOSIS — M80.00XD AGE-RELATED OSTEOPOROSIS WITH CURRENT PATHOLOGICAL FRACTURE WITH ROUTINE HEALING, SUBSEQUENT ENCOUNTER: ICD-10-CM

## 2023-09-14 DIAGNOSIS — S72.144D CLOSED NONDISPLACED INTERTROCHANTERIC FRACTURE OF RIGHT FEMUR WITH ROUTINE HEALING, SUBSEQUENT ENCOUNTER: ICD-10-CM

## 2023-09-14 DIAGNOSIS — Z74.09 IMPAIRED MOBILITY AND ADLS: Primary | ICD-10-CM

## 2023-09-14 DIAGNOSIS — Z78.9 IMPAIRED MOBILITY AND ADLS: Primary | ICD-10-CM

## 2023-09-14 DIAGNOSIS — R54 FRAILTY: ICD-10-CM

## 2023-09-14 DIAGNOSIS — M62.84 SARCOPENIA: ICD-10-CM

## 2023-09-14 DIAGNOSIS — Z00.00 HEALTH CARE MAINTENANCE: ICD-10-CM

## 2023-09-14 DIAGNOSIS — R32 BOWEL AND BLADDER INCONTINENCE: ICD-10-CM

## 2023-09-14 DIAGNOSIS — F03.90 MAJOR NEUROCOGNITIVE DISORDER (H): ICD-10-CM

## 2023-09-14 DIAGNOSIS — S32.010S COMPRESSION FRACTURE OF L1 VERTEBRA, SEQUELA: ICD-10-CM

## 2023-09-14 DIAGNOSIS — R29.6 FALLS FREQUENTLY: ICD-10-CM

## 2023-09-14 DIAGNOSIS — Z87.81 S/P ORIF (OPEN REDUCTION INTERNAL FIXATION) FRACTURE: ICD-10-CM

## 2023-09-14 DIAGNOSIS — Z98.890 S/P ORIF (OPEN REDUCTION INTERNAL FIXATION) FRACTURE: ICD-10-CM

## 2023-09-14 PROCEDURE — 99316 NF DSCHRG MGMT 30 MIN+: CPT | Performed by: FAMILY MEDICINE

## 2023-09-14 NOTE — LETTER
9/14/2023        RE: Belia Sheets  C/o [place] Proctor  [address] [facility] [address]        [facility] GERIATRICS DISCHARGE SUMMARY  PATIENT'S NAME: Belia Sheets  YOB: 1930  MEDICAL RECORD NUMBER:  [id number]  Place of Service where encounter took place:  [facility] ([facility]) [[id number]]    PRIMARY CARE PROVIDER AND CLINIC RESPONSIBLE AFTER TRANSFER:   TBD    Transferring providers: Benjamin Rosenstein, MD, Jm Senior PA-C  Date of SNF Admission: April 08, 2023  Date of SNF (anticipated) Discharge:  10/02/2023 (possibly delayed by 1 week)  Discharged to: Home with family -- moving in with daughter in [place]  Cognitive Scores: BIMS: 7/15 (8/7/2023), SLUMS: 23/30 (~ 11/2022), and Short blessed: 13/20 (11/21/2022)  Physical Function:   Extensive 1 assist for: Hygiene, Toileting, Transfer, Bed mobility  Limited assist: Ambulation  Eating: Set-up assist else independent  DME: Wheelchair and Hospital Bed    CODE STATUS/ADVANCE DIRECTIVES DISCUSSION:  No CPR- Do NOT Intubate   ALLERGIES: Patient has no known allergies.    NURSING FACILITY COURSE   Medication Changes/Rationale:   Multiple    Summary of nursing facility stay:     Belia Sheets is a 89 year old female who had been living independently in an [facility] prior to her admission to [facility]. 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     She was initially admitted to this facility in November 2022.  This was following a hospitalization from 11/5/2022 to 11/9/2022 due to an ischemic stroke of the left occipital lobe suspicious for embolic stroke of undetermined source.  Following this episode, she was started on 20 mg atorvastatin, was on dual antiplatelet therapy with recommendation to continue on aspirin, and continued on a low-dose of amlodipine.  She did have a Zio patch to  evaluate for atrial fibrillation which was not seen.  At that time, had reviewed with her daughters that she had been living at [facility] assisted [facility] and she had been receiving many services.  Her daughter stated that her cognition had been worsening over the prior 6 months or so and there was findings of an MMSE done previously at 23/30 though unable to review in detail.  She discharged from this facility with home cares on 11/28/2022 back to assisted living facility.    She was admitted again to [facility]/10/23.  She had been admitted to the hospital 4/3/2023 to 4/8/2023 following a fall with associated mildly displaced and angulated intertrochanteric right femoral fracture.  She underwent ORIF 4/4/2023.  She was noted to have episodes of delirium during that admission, both hyper and hypoactive.  With her fall and episodes of hypotension, discontinued amlodipine.  Did perform evaluation of osteoporosis with CBC, BMP, and vitamin D levels.  These were all normal.  She was noted to have weight loss during that admission suspect be driven by pain and decreased participation.  Started a buprenorphine patch for short period which did assist.  Cognitive testing at that time did show decreased with the slums of 10/30, though this was in the context of posthospitalization and confounding of delirium.    Her condition was otherwise stable, but given her increased care needs and cognitive deficits, she and family decided for her to move to the [facility] where she has been residing early June 2023.    She has remained stable in the facility.  Her appetite is good.  She is underweight but her weights have been stable around 93 to 95 pounds.  Her cognition remains severely impaired, but she has done well with the supports provided and enjoys various activities.  She continued to have intermittent follow-up with orthopedic surgery.  Most recently 6/27/2023.   X-ray at that time showed normal expected healing and intact hardware.    Of note, following her fracture, she was discharged on anticoagulation with Lovenox and as such her aspirin was discontinued with recommendation to restart after Lovenox was completed.  However, aspirin was not resumed.  In discussion with her daughter, given her overall frail status and stable function, discussed continuing off aspirin and agreed to do so at this time.  Consider reevaluation with new primary physician.    Summary by problem  1. Major neurocognitive disorder (H)  Overall history consistent with dementia, likely mixed etiology.  Given age and progressive cognitive dysfunction over time, is consistent with Alzheimer's dementia.  However, given history of CVA and likely underlying ischemic disease, also consider vascular etiology.  Has been doing well with supportive cares at facility.  Planning to move with her daughter to supportive environment.  Recommended establishing care with geriatrics providers near [place], such as at the [place] [facility].    2. Frailty  Consistent with low weight, multiple comorbidities, generalized deconditioning.  Likely clinical frailty scale approximately 7.  Significant consideration and multiple treatment choices including discontinuing aspirin.    3. Falls frequently  History of frequent falls, though no falls recently particularly if she primarily mobilizes via wheelchair.  However at significant risk given her cognitive impairments and overall deconditioning.    4. Impaired mobility and ADLs  Multifactorial related to cognitive impairment, fall and fracture, low weight.  Requires extensive assistance for most ADLs.  May benefit from ongoing intermittent therapies.    5. Bowel and bladder incontinence  Noted to be intermittently incontinent of bowel and bladder, partially functional due to difficulty with mobility.    6. Sarcopenia  Significantly low mass.  As noted weights have been stable  around 95 pounds.    7. Age-related osteoporosis with current pathological fracture with routine healing, subsequent encounter  8. Compression fracture of L1 vertebra, sequela  Consistent with prior femoral fracture as well as history of an L1 vertebral fracture.  She had previously been on alendronate for 16 years from [id number].  She then received teriparatide from 20 17-20 19.  Then received 3 doses of zoledronic acid until 2021.    -Given this history and more recent fractures, would consider endocrinology evaluation for guidance of osteoporosis therapy    9. Closed nondisplaced intertrochanteric fracture of right femur with routine healing, subsequent encounter  10. S/P ORIF (open reduction internal fixation) fracture  Related to the above with associated fracture after she fell in April 2023.  Underwent fixation.  Healing appropriately on follow-up with orthopedic surgery.  No significant pain and well managed with scheduled Tylenol.    11. Kyphoscoliosis  Related to history of osteoporosis.  No significant respiratory restriction.    12. Cerebrovascular accident (CVA) due to occlusion of right posterior cerebral artery (H)  13. History of ischemic stroke of L occipital lobe within watershed region due to embolic stroke of undetermined source (ESUS)  History of left occipital stroke in November 2022.  Did have significant change in function at that time per family.  Had been on DAPT for 21 days and then reduce to aspirin.  However as noted, aspirin has since been discontinued.  Overall cognitively stable and no evidence of recurrent stroke.    14. Health care maintenance  Discussed with her daughter prior to discharge, recommend to establish primary care soon after her move to obtain necessary vaccination if unable to be performed prior to her discharge.   -Up-to-date on pneumonia vaccines, having received PCV 20 August 2023  -Up-to-date on COVID vaccines, with 6 total doses, last received 7/20/2023. Will be  due for updated booster when available  -Received Shingrix July 2020 and August 2021.  -She has not at this time yet received a flu vaccine  -Last Tdap 8/17/2016      Discharge Medications:  MED REC REQUIRED  Post Medication Reconciliation Status: medication reconcilation previously completed during another office visit     Current Outpatient Medications   Medication Sig Dispense Refill     acetaminophen (TYLENOL) 500 MG tablet Take 1,000 mg by mouth 3 times daily (Patient not taking: Reported on 5/16/2023)       atorvastatin (LIPITOR) 20 MG tablet 1 tablet (20 mg) by Oral or Feeding Tube route every evening (Patient not taking: Reported on 5/16/2023) 90 tablet 3     calcium carbonate 600 mg-vitamin D 400 units (CALTRATE) 600-400 MG-UNIT per tablet Take 1 tablet by mouth 2 times daily (before meals) 180 tablet 3     Vitamin D3 (CHOLECALCIFEROL) 25 mcg (1000 units) tablet Take 1 tablet (25 mcg) by mouth daily 90 tablet 3      Controlled medications:   not applicable/none     Past Medical History:   Past Medical History:   Diagnosis Date     Carpal tunnel syndrome (aka CTS)      Cataract      Chronic osteoarthritis      Constipation due to outlet dysfunction 9/22/2021     Fracture of multiple rami of right pubis with routine healing, subsequent encounter 9/22/2021     H/O calcium pyrophosphate deposition disease (CPPD)      History of 9th right rib fracture due to fall (07/13/2021) 9/22/2021     History of encephalopathy 10/2017     Hypertension      Kyphoscoliosis      Osteoarthritis     hands     Osteoporosis      Rectal prolapse      Physical Exam:   Vitals: BP (!) 140/80   Pulse 60   Temp 97.2  F (36.2  C)   Resp 18   Ht 1.524 m (5')   Wt 42.8 kg (94 lb 4.8 oz)   SpO2 96%   BMI 18.42 kg/m    BMI: Body mass index is 18.42 kg/m .    GENERAL APPEARANCE: Seated comfortably in her wheelchair, NAD  HENT:  Moderate temporal atrophy, modestly  [place], good dentition  EYES:  Conjunctiva clear, anicteric, EOMI,  PERRL  PULM  Normal WOB on RA, lungs CTAB, no wheezes or crackles  CV:  RRR, S1/S2 normal, no murmurs; trace LE edema  ABDOMEN: Abdomen soft, not tender, not distended, BS normal and active throughout   M/S: Normal AROM of UEs, moderate arthritic changes of bilateral hands, generalized atrophy  NEURO: Alert, disoriented ([facility], unable to give the date), severely impaired memory; thought processes delayed (and sometimes just doesn't answer the questions). Else CN II-XII grossly intact, 5/5 upper extremity strength throughout  PSYCH: Mood normal with congruent affect, normal rate and volume of speech, insight impaired     SNF labs: Recent labs in [facility] reviewed by me today and attached:     Latest Reference Range & Units 05/12/23 11:19 08/23/23 07:21 08/30/23 11:58   Sodium 136 - 145 mmol/L 138 140 140   Potassium 3.4 - 5.3 mmol/L 4.2 4.4 3.9   Chloride 98 - 107 mmol/L 104 105 102   Carbon Dioxide (CO2) 22 - 29 mmol/L 22 24 27   Urea Nitrogen 8.0 - 23.0 mg/dL 26.1 (H) 18.4 17.0   Creatinine 0.51 - 0.95 mg/dL 0.60 0.61 0.66   GFR Estimate >60 mL/min/1.73m2 84 83 81   Calcium 8.2 - 9.6 mg/dL 9.8 (H) 9.2 10.0 (H)   Anion Gap 7 - 15 mmol/L 12 11 11   Albumin 3.5 - 5.2 g/dL 3.8     Protein Total 6.4 - 8.3 g/dL 6.4     Alkaline Phosphatase 35 - 104 U/L 65     ALT 10 - 35 U/L 8 (L)     AST 10 - 35 U/L 24     Bilirubin Total <=1.2 mg/dL 0.3     Glucose 70 - 99 mg/dL 96 86 117 (H)   WBC 4.0 - 11.0 10e3/uL 5.8     Hemoglobin 11.7 - 15.7 g/dL 12.4     Hematocrit 35.0 - 47.0 % 40.7     Platelet Count 150 - 450 10e3/uL 213     RBC Count 3.80 - 5.20 10e6/uL 4.17     MCV 78 - 100 fL 98     MCH 26.5 - 33.0 pg 29.7     MCHC 31.5 - 36.5 g/dL 30.5 (L)     RDW 10.0 - 15.0 % 15.9 (H)     (H): Data is abnormally high  (L): Data is abnormally low   Latest Reference Range & Units 04/12/23 11:09   Vitamin D Deficiency screening 20 - 75 ug/L 48      Latest Reference Range & Units 11/03/22 11:53 11/06/22 01:45   TSH 0.30 -  4.20 uIU/mL 1.52 0.82     DISCHARGE PLAN:  Follow up labs: Labs as needed per PCP  Medical Follow Up:      Follow-up with new PCP within 1-2 weeks after move. Reviewed possible options with [place]  Discharge Services: Home Care:  Occupational Therapy and Physical Therapy  Discharge Instructions Verbalized to Patient at Discharge:   Weight bearing restrictions:  Weight bearing as tolerated.     TOTAL DISCHARGE TIME:   Greater than 30 minutes in review of entire facility stay, discussion of necessary follow-up and recommendations, and evaluation of DME    Electronically signed by:    Benjamin Rosenstein, MD, MA  [facility] Faculty    Documentation of Face to Face and Certification for Home Health Services    I certify that patient: Belia Sheets is under my care and that I, or a nurse practitioner or physician's assistant working with me, had a face-to-face encounter that meets the physician face-to-face encounter requirements with this patient on: 9/14/2023.    This encounter with the patient was in whole, or in part, for the following medical condition, which is the primary reason for home health care: Major Neurocognitive Impairment.    I certify that, based on my findings, the following services are medically necessary home health services: Occupational Therapy and Physical Therapy.    My clinical findings support the need for the above services because: Occupational Therapy Services are needed to assess and treat cognitive ability and address ADL safety due to impairment in hygiene, toileting, environmental safety. and Physical Therapy Services are needed to assess and treat the following functional impairments: ambulation, transfers, gait.    Further, I certify that my clinical findings support that this patient is homebound (i.e. absences from home require considerable and taxing effort and are for medical reasons or [employer] services or infrequently or of short duration when for other  reasons) because: Requires assistance of another person or specialized equipment to access medical services because patient: Is prone to wander/get lost without assistance...    Based on the above findings. I certify that this patient is confined to the home and needs intermittent skilled nursing care, physical therapy and/or speech therapy.  The patient is under my care, and I have initiated the establishment of the plan of care.  This patient will be followed by a physician who will periodically review the plan of care.  Physician/Provider to provide follow up care: TBD    Attending hospital physician (the Medicare certified TOREY provider): Benjamin Rosenstein, MD  Physician Signature: See electronic signature associated with these discharge orders.  Date: 9/18/2023    Face-to-Face DME Documentation    Standard Wheel Chair  Diagnosis       ICD-10-CM    1. Impaired mobility and ADLs  Z74.09     Z78.9       2. Major neurocognitive disorder (H)  F03.90       3. Frailty  R54       4. Falls frequently  R29.6         Length of Need  99    Need  A mobility limitation that impairs the ability to accomplish mobility related activities of daily living entirely, safely, or within a reasonable timeframe; and  Yes - significant impaired gait    Alternate:  A mobility limitation that cannot be resolved by the use of a cane or walker; and  Yes - impaired balance and environmental awareness    Use:   The patient's home provides adequate access, maneuvering space, and surfaces and the patient can safely self propel the manual wheelchair or there is a caregiver who can assist; and  Yes    Benefit:   The use of a manual wheelchair will significantly improve the ability to participate in mobility related activities of daily living and the patient will use it on a regular basis in the home (and has not expressed an unwillingness to use); and  Yes    Accessories:   Justification for all additional accessories: Including anti-Michele's  (for maneuvering surfaces and ramps), seat belt (for tone, seizures, spasticity, or positioning), height adjustable arms (uses wheelchair over 2 hours/day), elevating leg rests (for casting, bracing, edema, or recline feature), seat width and/or depth greater than 20 inches (hip width, hip to knee length, physical dimensions)      Hospital Bed with Air Mattress    Diagnosis    ICD-10-CM    1. Impaired mobility and ADLs  Z74.09     Z78.9       2. Major neurocognitive disorder (H)  F03.90       3. Frailty  R54       4. Falls frequently  R29.6       5. Bowel and bladder incontinence  R32     R15.9       6. Sarcopenia  M62.84       7. Age-related osteoporosis with current pathological fracture with routine healing, subsequent encounter  M80.00XD       8. Compression fracture of L1 vertebra, sequela  S32.010S       9. Closed nondisplaced intertrochanteric fracture of right femur with routine healing, subsequent encounter  S72.144D       10. S/P ORIF (open reduction internal fixation) fracture  Z98.890     Z87.81       11. Kyphoscoliosis  M41.9       12. Cerebrovascular accident (CVA) due to occlusion of right posterior cerebral artery (H)  I63.531       13. History of ischemic stroke of L occipital lobe within watershed region due to embolic stroke of undetermined source (ESUS)  Z86.73       14. Health care maintenance  Z00.00           Length of Need 99  This patient's condition requires position of the body to alleviate pain, promote good body alignment, prevent contractures, avoid respiratory infections, in ways not feasible in an ordinary bed.  Please describe: Yes, due to above impairments, has limited mobility in bed and difficulty with transfer. Hospital bed with air mattress will assist with this and prevention of pressure wounds.    OR    The patient's condition requires special attachments that cannot be fixed and used on an ordinary bed- n/a    6.  Elevation of the head/upper body should be >30 degrees most of  the time due to congestive heart failure, chronic pulmonary disease or problems with aspiration. Pillows or wedges must have been considered and ruled out or traction equipment must be attached to a hospital bed. Please describe  - Head to should be elevated to assist with intake and prevent aspiration related to significant kyphosis    Does this patient require immediate position changes? Yes    8.  Is the patients judgement and skill level adequate to operate the controls of an electric bed?  Yes    9. Is the caregiver physically unable to change the bed height manually?  N/A                Sincerely,        Benjamin Rosenstein, MD

## 2023-09-14 NOTE — PROGRESS NOTES
[facility] GERIATRICS DISCHARGE SUMMARY  PATIENT'S NAME: Belia Sheets  YOB: 1930  MEDICAL RECORD NUMBER:  [id number]  Place of Service where encounter took place:  [facility] ([facility]) [[id number]]    PRIMARY CARE PROVIDER AND CLINIC RESPONSIBLE AFTER TRANSFER:   TBD    Transferring providers: Benjamin Rosenstein, MD, Jm Senior PA-C  Date of SNF Admission: April 08, 2023  Date of SNF (anticipated) Discharge:  10/02/2023 (possibly delayed by 1 week)  Discharged to: Home with family -- moving in with daughter in [place]  Cognitive Scores: BIMS: 7/15 (8/7/2023), SLUMS: 23/30 (~ 11/2022), and Short blessed: 13/20 (11/21/2022)  Physical Function:   Extensive 1 assist for: Hygiene, Toileting, Transfer, Bed mobility  Limited assist: Ambulation  Eating: Set-up assist else independent  DME: Wheelchair and Hospital Bed    CODE STATUS/ADVANCE DIRECTIVES DISCUSSION:  No CPR- Do NOT Intubate   ALLERGIES: Patient has no known allergies.    NURSING FACILITY COURSE   Medication Changes/Rationale:   Multiple    Summary of nursing facility stay:     Belia Sheets is a 89 year old female who had been living independently in an [facility] prior to her admission to [facility]. 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     She was initially admitted to this facility in November 2022.  This was following a hospitalization from 11/5/2022 to 11/9/2022 due to an ischemic stroke of the left occipital lobe suspicious for embolic stroke of undetermined source.  Following this episode, she was started on 20 mg atorvastatin, was on dual antiplatelet therapy with recommendation to continue on aspirin, and continued on a low-dose of amlodipine.  She did have a Zio patch to evaluate for atrial fibrillation which was not seen.  At that time, had reviewed with her daughters that  she had been living at [facility] and she had been receiving many services.  Her daughter stated that her cognition had been worsening over the prior 6 months or so and there was findings of an MMSE done previously at 23/30 though unable to review in detail.  She discharged from this facility with home cares on 11/28/2022 back to assisted living facility.    She was admitted again to [facility]/10/23.  She had been admitted to the hospital 4/3/2023 to 4/8/2023 following a fall with associated mildly displaced and angulated intertrochanteric right femoral fracture.  She underwent ORIF 4/4/2023.  She was noted to have episodes of delirium during that admission, both hyper and hypoactive.  With her fall and episodes of hypotension, discontinued amlodipine.  Did perform evaluation of osteoporosis with CBC, BMP, and vitamin D levels.  These were all normal.  She was noted to have weight loss during that admission suspect be driven by pain and decreased participation.  Started a buprenorphine patch for short period which did assist.  Cognitive testing at that time did show decreased with the slums of 10/30, though this was in the context of posthospitalization and confounding of delirium.    Her condition was otherwise stable, but given her increased care needs and cognitive deficits, she and family decided for her to move to the [facility] where she has been residing early June 2023.    She has remained stable in the facility.  Her appetite is good.  She is underweight but her weights have been stable around 93 to 95 pounds.  Her cognition remains severely impaired, but she has done well with the supports provided and enjoys various activities.  She continued to have intermittent follow-up with orthopedic surgery.  Most recently 6/27/2023.  X-ray at that time showed normal expected healing and intact hardware.    Of note, following her  fracture, she was discharged on anticoagulation with Lovenox and as such her aspirin was discontinued with recommendation to restart after Lovenox was completed.  However, aspirin was not resumed.  In discussion with her daughter, given her overall frail status and stable function, discussed continuing off aspirin and agreed to do so at this time.  Consider reevaluation with new primary physician.    Summary by problem  1. Major neurocognitive disorder (H)  Overall history consistent with dementia, likely mixed etiology.  Given age and progressive cognitive dysfunction over time, is consistent with Alzheimer's dementia.  However, given history of CVA and likely underlying ischemic disease, also consider vascular etiology.  Has been doing well with supportive cares at facility.  Planning to move with her daughter to supportive environment.  Recommended establishing care with geriatrics providers near [place], such as at the [facility].    2. Frailty  Consistent with low weight, multiple comorbidities, generalized deconditioning.  Likely clinical frailty scale approximately 7.  Significant consideration and multiple treatment choices including discontinuing aspirin.    3. Falls frequently  History of frequent falls, though no falls recently particularly if she primarily mobilizes via wheelchair.  However at significant risk given her cognitive impairments and overall deconditioning.    4. Impaired mobility and ADLs  Multifactorial related to cognitive impairment, fall and fracture, low weight.  Requires extensive assistance for most ADLs.  May benefit from ongoing intermittent therapies.    5. Bowel and bladder incontinence  Noted to be intermittently incontinent of bowel and bladder, partially functional due to difficulty with mobility.    6. Sarcopenia  Significantly low mass.  As noted weights have been stable around 95 pounds.    7. Age-related osteoporosis with current pathological fracture with routine  healing, subsequent encounter  8. Compression fracture of L1 vertebra, sequela  Consistent with prior femoral fracture as well as history of an L1 vertebral fracture.  She had previously been on alendronate for 16 years from [id number].  She then received teriparatide from 20 17-20 19.  Then received 3 doses of zoledronic acid until 2021.    -Given this history and more recent fractures, would consider endocrinology evaluation for guidance of osteoporosis therapy    9. Closed nondisplaced intertrochanteric fracture of right femur with routine healing, subsequent encounter  10. S/P ORIF (open reduction internal fixation) fracture  Related to the above with associated fracture after she fell in April 2023.  Underwent fixation.  Healing appropriately on follow-up with orthopedic surgery.  No significant pain and well managed with scheduled Tylenol.    11. Kyphoscoliosis  Related to history of osteoporosis.  No significant respiratory restriction.    12. Cerebrovascular accident (CVA) due to occlusion of right posterior cerebral artery (H)  13. History of ischemic stroke of L occipital lobe within watershed region due to embolic stroke of undetermined source (ESUS)  History of left occipital stroke in November 2022.  Did have significant change in function at that time per family.  Had been on DAPT for 21 days and then reduce to aspirin.  However as noted, aspirin has since been discontinued.  Overall cognitively stable and no evidence of recurrent stroke.    14. Health care maintenance  Discussed with her daughter prior to discharge, recommend to establish primary care soon after her move to obtain necessary vaccination if unable to be performed prior to her discharge.   -Up-to-date on pneumonia vaccines, having received PCV 20 August 2023  -Up-to-date on COVID vaccines, with 6 total doses, last received 7/20/2023. Will be due for updated booster when available  -Received Shingrix July 2020 and August 2021.  -She has  not at this time yet received a flu vaccine  -Last Tdap 8/17/2016      Discharge Medications:  MED REC REQUIRED  Post Medication Reconciliation Status: medication reconcilation previously completed during another office visit     Current Outpatient Medications   Medication Sig Dispense Refill    acetaminophen (TYLENOL) 500 MG tablet Take 1,000 mg by mouth 3 times daily (Patient not taking: Reported on 5/16/2023)      atorvastatin (LIPITOR) 20 MG tablet 1 tablet (20 mg) by Oral or Feeding Tube route every evening (Patient not taking: Reported on 5/16/2023) 90 tablet 3    calcium carbonate 600 mg-vitamin D 400 units (CALTRATE) 600-400 MG-UNIT per tablet Take 1 tablet by mouth 2 times daily (before meals) 180 tablet 3    Vitamin D3 (CHOLECALCIFEROL) 25 mcg (1000 units) tablet Take 1 tablet (25 mcg) by mouth daily 90 tablet 3      Controlled medications:   not applicable/none     Past Medical History:   Past Medical History:   Diagnosis Date    Carpal tunnel syndrome (aka CTS)     Cataract     Chronic osteoarthritis     Constipation due to outlet dysfunction 9/22/2021    Fracture of multiple rami of right pubis with routine healing, subsequent encounter 9/22/2021    H/O calcium pyrophosphate deposition disease (CPPD)     History of 9th right rib fracture due to fall (07/13/2021) 9/22/2021    History of encephalopathy 10/2017    Hypertension     Kyphoscoliosis     Osteoarthritis     hands    Osteoporosis     Rectal prolapse      Physical Exam:   Vitals: BP (!) 140/80   Pulse 60   Temp 97.2  F (36.2  C)   Resp 18   Ht 1.524 m (5')   Wt 42.8 kg (94 lb 4.8 oz)   SpO2 96%   BMI 18.42 kg/m    BMI: Body mass index is 18.42 kg/m .    GENERAL APPEARANCE: Seated comfortably in her wheelchair, NAD  HENT:  Moderate temporal atrophy, modestly  [place], good dentition  EYES:  Conjunctiva clear, anicteric, EOMI, PERRL  PULM  Normal WOB on RA, lungs CTAB, no wheezes or crackles  CV:  RRR, S1/S2 normal, no murmurs; trace LE  edema  ABDOMEN: Abdomen soft, not tender, not distended, BS normal and active throughout   M/S: Normal AROM of UEs, moderate arthritic changes of bilateral hands, generalized atrophy  NEURO: Alert, disoriented ([facility], unable to give the date), severely impaired memory; thought processes delayed (and sometimes just doesn't answer the questions). Else CN II-XII grossly intact, 5/5 upper extremity strength throughout  PSYCH: Mood normal with congruent affect, normal rate and volume of speech, insight impaired     SNF labs: Recent labs in [facility] reviewed by me today and attached:     Latest Reference Range & Units 05/12/23 11:19 08/23/23 07:21 08/30/23 11:58   Sodium 136 - 145 mmol/L 138 140 140   Potassium 3.4 - 5.3 mmol/L 4.2 4.4 3.9   Chloride 98 - 107 mmol/L 104 105 102   Carbon Dioxide (CO2) 22 - 29 mmol/L 22 24 27   Urea Nitrogen 8.0 - 23.0 mg/dL 26.1 (H) 18.4 17.0   Creatinine 0.51 - 0.95 mg/dL 0.60 0.61 0.66   GFR Estimate >60 mL/min/1.73m2 84 83 81   Calcium 8.2 - 9.6 mg/dL 9.8 (H) 9.2 10.0 (H)   Anion Gap 7 - 15 mmol/L 12 11 11   Albumin 3.5 - 5.2 g/dL 3.8     Protein Total 6.4 - 8.3 g/dL 6.4     Alkaline Phosphatase 35 - 104 U/L 65     ALT 10 - 35 U/L 8 (L)     AST 10 - 35 U/L 24     Bilirubin Total <=1.2 mg/dL 0.3     Glucose 70 - 99 mg/dL 96 86 117 (H)   WBC 4.0 - 11.0 10e3/uL 5.8     Hemoglobin 11.7 - 15.7 g/dL 12.4     Hematocrit 35.0 - 47.0 % 40.7     Platelet Count 150 - 450 10e3/uL 213     RBC Count 3.80 - 5.20 10e6/uL 4.17     MCV 78 - 100 fL 98     MCH 26.5 - 33.0 pg 29.7     MCHC 31.5 - 36.5 g/dL 30.5 (L)     RDW 10.0 - 15.0 % 15.9 (H)     (H): Data is abnormally high  (L): Data is abnormally low   Latest Reference Range & Units 04/12/23 11:09   Vitamin D Deficiency screening 20 - 75 ug/L 48      Latest Reference Range & Units 11/03/22 11:53 11/06/22 01:45   TSH 0.30 - 4.20 uIU/mL 1.52 0.82     DISCHARGE PLAN:  Follow up labs: Labs as needed per PCP  Medical Follow Up:       Follow-up with new PCP within 1-2 weeks after move. Reviewed possible options with [place]  Discharge Services: Home Care:  Occupational Therapy and Physical Therapy  Discharge Instructions Verbalized to Patient at Discharge:   Weight bearing restrictions:  Weight bearing as tolerated.     TOTAL DISCHARGE TIME:   Greater than 30 minutes in review of entire facility stay, discussion of necessary follow-up and recommendations, and evaluation of DME    Electronically signed by:    Benjamin Rosenstein, MD, MA  [facility] Faculty    Documentation of Face to Face and Certification for Home Health Services    I certify that patient: Belia Sheets is under my care and that I, or a nurse practitioner or physician's assistant working with me, had a face-to-face encounter that meets the physician face-to-face encounter requirements with this patient on: 9/14/2023.    This encounter with the patient was in whole, or in part, for the following medical condition, which is the primary reason for home health care: Major Neurocognitive Impairment.    I certify that, based on my findings, the following services are medically necessary home health services: Occupational Therapy and Physical Therapy.    My clinical findings support the need for the above services because: Occupational Therapy Services are needed to assess and treat cognitive ability and address ADL safety due to impairment in hygiene, toileting, environmental safety. and Physical Therapy Services are needed to assess and treat the following functional impairments: ambulation, transfers, gait.    Further, I certify that my clinical findings support that this patient is homebound (i.e. absences from home require considerable and taxing effort and are for medical reasons or Christian services or infrequently or of short duration when for other reasons) because: Requires assistance of another person or specialized equipment to access medical services  because patient: Is prone to wander/get lost without assistance...    Based on the above findings. I certify that this patient is confined to the home and needs intermittent skilled nursing care, physical therapy and/or speech therapy.  The patient is under my care, and I have initiated the establishment of the plan of care.  This patient will be followed by a physician who will periodically review the plan of care.  Physician/Provider to provide follow up care: TBD    Attending hospital physician (the Medicare [facility] provider): Benjamin Rosenstein, MD  Physician Signature: See electronic signature associated with these discharge orders.  Date: 9/18/2023    Face-to-Face DME Documentation    Standard Wheel Chair  Diagnosis       ICD-10-CM    1. Impaired mobility and ADLs  Z74.09     Z78.9       2. Major neurocognitive disorder (H)  F03.90       3. Frailty  R54       4. Falls frequently  R29.6         Length of Need  99    Need  A mobility limitation that impairs the ability to accomplish mobility related activities of daily living entirely, safely, or within a reasonable timeframe; and  Yes - significant impaired gait    Alternate:  A mobility limitation that cannot be resolved by the use of a cane or walker; and  Yes - impaired balance and environmental awareness    Use:   The patient's home provides adequate access, maneuvering space, and surfaces and the patient can safely self propel the manual wheelchair or there is a caregiver who can assist; and  Yes    Benefit:   The use of a manual wheelchair will significantly improve the ability to participate in mobility related activities of daily living and the patient will use it on a regular basis in the home (and has not expressed an unwillingness to use); and  Yes    Accessories:   Justification for all additional accessories: Including anti-Michele's (for maneuvering surfaces and ramps), seat belt (for tone, seizures, spasticity, or positioning), height  adjustable arms (uses wheelchair over 2 hours/day), elevating leg rests (for casting, bracing, edema, or recline feature), seat width and/or depth greater than 20 inches (hip width, hip to knee length, physical dimensions)      Hospital Bed with Air Mattress    Diagnosis    ICD-10-CM    1. Impaired mobility and ADLs  Z74.09     Z78.9       2. Major neurocognitive disorder (H)  F03.90       3. Frailty  R54       4. Falls frequently  R29.6       5. Bowel and bladder incontinence  R32     R15.9       6. Sarcopenia  M62.84       7. Age-related osteoporosis with current pathological fracture with routine healing, subsequent encounter  M80.00XD       8. Compression fracture of L1 vertebra, sequela  S32.010S       9. Closed nondisplaced intertrochanteric fracture of right femur with routine healing, subsequent encounter  S72.144D       10. S/P ORIF (open reduction internal fixation) fracture  Z98.890     Z87.81       11. Kyphoscoliosis  M41.9       12. Cerebrovascular accident (CVA) due to occlusion of right posterior cerebral artery (H)  I63.531       13. History of ischemic stroke of L occipital lobe within watershed region due to embolic stroke of undetermined source (ESUS)  Z86.73       14. Health care maintenance  Z00.00           Length of Need 99  This patient's condition requires position of the body to alleviate pain, promote good body alignment, prevent contractures, avoid respiratory infections, in ways not feasible in an ordinary bed.  Please describe: Yes, due to above impairments, has limited mobility in bed and difficulty with transfer. Hospital bed with air mattress will assist with this and prevention of pressure wounds.    OR    The patient's condition requires special attachments that cannot be fixed and used on an ordinary bed- n/a    6.  Elevation of the head/upper body should be >30 degrees most of the time due to congestive heart failure, chronic pulmonary disease or problems with aspiration. Pillows  or wedges must have been considered and ruled out or traction equipment must be attached to a hospital bed. Please describe  - Head to should be elevated to assist with intake and prevent aspiration related to significant kyphosis    Does this patient require immediate position changes? Yes    8.  Is the patients judgement and skill level adequate to operate the controls of an electric bed?  Yes    9. Is the caregiver physically unable to change the bed height manually?  N/A

## 2023-09-29 ENCOUNTER — TELEPHONE (OUTPATIENT)
Dept: GERIATRICS | Facility: CLINIC | Age: 88
End: 2023-09-29
Payer: COMMERCIAL

## 2023-09-29 NOTE — TELEPHONE ENCOUNTER
[facility]    Provider: Rosenstein, Benjamin MD  Facility: [facility]  Facility Type:  LTC      Call Back Number: [phone]    Allergies:  No Known Allergies     Reason for call: Patient's family is concerned about current status.  See recent [employer] message.      Verbal Order/Direction given by Provider: Please test patient for Covid-19.      Provider giving Order:  Rosenstein, Benjamin MD    Verbal Order given to: Michaela Parra RN

## 2023-11-21 ENCOUNTER — PATIENT OUTREACH (OUTPATIENT)
Dept: GERIATRIC MEDICINE | Facility: CLINIC | Age: 88
End: 2023-11-21
Payer: COMMERCIAL

## 2023-11-21 NOTE — PROGRESS NOTES
[facility] Care Coordination Contact    No longer active with [facility] case management effective 11/1/23.  Reason for community disenrollment: Change Care System/PCC to [place].    SARITA Miller, RN, PHN, [facility]  Care Coordinator-Long Term Care  [address] [address]  [email or web address]   [email or web address]     Office: [phone]   Fax: [phone]

## 2024-01-13 NOTE — PROGRESS NOTES
"[facility]    Name:   Belia Sheets (Sudeep)  :   1930  MRN:    [id number]     Facility:   [facility] ([facility]) [[id number]]   Room:   Code Status: FULL CODE and POLST AVAILABLE -     DOS: 2021  Previous visit: 2021    PCP:  Alejandro Sandhu    CHIEF COMPLAINT / REASON FOR VISIT:  Chief Complaint   Patient presents with     Clinic Care Coordination - Follow-up     Fall and right ninth rib fracture      [facility] from 2021 until 2021      HPI: Belia is a 89 year old female with a history of osteoporosis, essential hypertension, and remote history of visual hallucinations, who was brought to the ED for evaluation after she was found in her senior apartment down on the floor.  Patient, herself, reported having \"interesting visual hallucinations,\" and she also remembered being on the floor and unable to get up.  She was unsure how long she was down.  Found by staff during routine rounds.  She reported right posterior rib pain, shoulder, and hip pain.  She denied any shortness of breath.  Imaging in the ED included a CT head and CXR, bilateral shoulders and pelvic bones notable for an acute right ninth rib fracture.  She also had an elevated creatinine kinase and troponin.  She was admitted to the trauma service given the injury identified.    Acute right lateral rib fracture  -- Pulmonary toilet and good pain control allowing for this is paramount. Prior to discharge, her pain was well controlled with scheduled acetaminophen and topical pain medications.  In order to prevent common pulmonary complications found with rib fracture patients, she would need to continue with aggressive pulmonary toileting that includes incentive spirometry, coughing and deep breathing exercises.  Recommended that she continue these exercises a minimum of 4 times daily for 1 month after discharge.     Visual hallucinations  -- The " Patient : Macie Bacon Age: 75 year old Sex: female   MRN: [id number] Encounter Date: 1/13/2024    History     Chief Complaint   Patient presents with    Hypotension    Dizziness       HPI    Macie Bacon is a 75 year old presenting to the emergency department because she was concerned about a blood pressure reading at home today that was 109 systolic.  She denies any lightheadedness associated with this, denies chest pain or shortness of breath.  She has a history of vertigo, but no significant episode of vertigo today.  Patient states that she was recently admitted for a TIA, and had elevated blood pressures and was started on 12.5 mg of hydrochlorothiazide.  She has been recording her blood pressures at home, which have usually been in the 120s to 130s, had 1 that was 150 on the day she came home from the hospital.  She called the nurse line and the nurse line referred her to the emergency room.    I have reviewed Macie Bacon's previous discharge summary from 1/8/24 .  Note Review Summary: Transient ischemic attack              -patient presented with word-finding difficulties that spontaneously resolved              -initial NIH 0              -CT head revealed no acute abnormalities              -CTA head and neck showed no large vessel occlusion, severe stenosis at the P2/P3 right PCA junction              -MRI brain revealed no evidence of stroke              -echo revealed preserved ejection fraction, no thrombus.  No PFO.  Grade 1 diastolic              -aspirin 81 mg daily lifelong              -Plavix 75 mg daily times 21 days then discontinue              -will forego high-intensity statin secondary to the favor ability of her cholesterol profile and place on Lipitor 20 mg daily              -patient may resume home blood pressure medications starting tomorrow              -add HCTZ 12.5 mg daily secondary to edema from amlodipine    Past/Family/Social History     Allergies   Allergen  "patient and her son, Delmar, reported a remote history of visual hallucinations, mostly pleasant in nature.  She was worked up for a possible UTI that was found to be negative.  After receiving 2 doses of IV Rocephin, antibiotics were discontinued.  -- CT head:     Troponinemia  -- Elevated at 0.184 on presentation.  Post trauma, fall with prolonged immobility, and demand ischemia.  Twelve-lead EKG was without ischemic changes.  She denied chest pains, palpitations or shortness of breath.  This also improved with subsequent trends prior to discharge.  No medication changes were made.    Recommendations: TCU for physical and occupational therapies upon discharge.      CURRENT/RECENT TCU ISSUES    Disposition: Doing well and making progress.    Code status: It is noted that in the hospital, she was listed as DNR/DNI; however, at [facility] she is full code.  This was not noted prior to my visit with the patient and her son, Delmar.    Cognitive impairment: There or at least mild cognitive deficits, and that this was also noted by the patient's son who also endorses his mother's memory loss, although she appears, for the most part, to be doing fairly well.  During the initial visit, when asked why she is here, she stated \"because of dizziness and danger of falling.\"  She indicated that she becomes \"unsteady.\"  When asked the year, she hesitated a bit, stating, \"that's such a simple question \".  Eventually, she indicated it is 2021.  The following visit, however, she could not do that.  I did speak with the SLP and was told she needs quite a bit of support.  She did rather poorly on the clock test, making a pie and then adding numbers but no hands.  She scored 2/17 on the storytelling, 10/17 on orientation, and 23/30 on the MMSE.  Her daughter apparently believes it to be acute issue.  At the last care conference, it was noted that she has made significant progress, both physically and cognitively.  Cognition is expected to be " Reactions    Bactrim Ds DIZZINESS and NAUSEA    Cimetidine DIARRHEA    Erythromycin GI UPSET    Magnesium DIARRHEA    Prednisone HEADACHES    Vicodin [Hydrocodone-Acetaminophen] GI UPSET    Zithromax [Azithromycin] GI UPSET     Sever abdominal pain       No current facility-administered medications for this encounter.     Current Outpatient Medications   Medication Sig    ibuprofen (MOTRIN) 200 MG tablet Take 400 mg by mouth every 6 hours as needed for Pain.    metoPROLOL tartrate (LOPRESSOR) 50 MG tablet Take 1 tablet by mouth in the morning and 1 tablet in the evening.    aspirin 81 MG chewable tablet Chew 1 tablet by mouth daily. Do not start before January 9, 2024.    atorvastatin (LIPITOR) 20 MG tablet Take 1 tablet by mouth nightly.    clopidogrel (PLAVIX) 75 MG tablet Take 1 tablet by mouth daily for 20 days. Do not start before January 9, 2024.    hydroCHLOROthiazide (HYDRODIURIL) 12.5 MG tablet Take 1 tablet by mouth daily. Do not start before January 9, 2024.    amLODIPine (NORVASC) 5 MG tablet Take 1 tablet by mouth daily. Do not start before January 9, 2024.    busPIRone (BUSPAR) 10 MG tablet TAKE one tablet by mouth every morning and one tablet in the evening    APPLE CIDER VINEGAR PO Take 1 capsule by mouth daily.    tacrolimus (PROTOPIC) 0.1 % ointment apply topically 2 times daily as needed for flare on face    triamcinolone (ARISTOCORT) 0.1 % cream Apply topically to the affected area(s) 2 times daily as needed for itch to active patches from neck down.    omeprazole (PrilOSEC) 20 MG capsule Take 1 capsule by mouth 2 times daily    diphenhydrAMINE HCl (BENADRYL ALLERGY PO) Take 1 tablet by mouth nightly.    METAMUCIL FIBER PO Take 1 Scoop by mouth daily.    Docusate Calcium ([employer] STOOL SOFTENER PO) Take 1 tablet by mouth nightly.    Omega-3 Fatty Acids (FISH OIL) 1000 MG capsule Take 2 g by mouth 2 times daily.    cetirizine (ZYRTEC) 10 MG tablet Take 1 tablet by mouth nightly.    Multiple  Vitamins-Minerals (MULTIVITAMIN GUMMIES ADULTS) CHEW Chew 1 tablet by mouth daily.    cholecalciferol (VITAMIN D3) 1000 UNITS tablet Take 250 Units by mouth every other day.     Calcium Carbonate-Vitamin D (CALCIUM + D PO) Take 1 tablet by mouth daily. 1,200 MG / 1,000 INT UNITS       Past Medical History:   Diagnosis Date    Atypical endometrial cells on Pap smear 10/01/2012    Degenerative joint disease of low back     Esophageal reflux     Essential (primary) hypertension     Hiatal hernia     Malignant neoplasm (CMD)     Melanoma of back (CMD)     Osteopenia     Osteopenia 10/18/2011    PONV (postoperative nausea and vomiting)     SVT (supraventricular tachycardia)        Past Surgical History:   Procedure Laterality Date     section, classic      Esophagogastroduodenoscopy transoral flex diag      Esophagogastroduodenoscopy transoral flex w/bx single or mult  2022    Hysterectomy  2012    Skin biopsy      Tonsillectomy and adenoidectomy         Family History   Problem Relation Age of Onset    Diabetes Mother     Heart disease Mother     Cancer Father         Lung CA    Heart disease Father     Allergic Rhinitis Daughter     Cancer, Breast Maternal Aunt 60       Social History     Tobacco Use    Smoking status: Former     Current packs/day: 0.00     Average packs/day: 0.1 packs/day for 5.0 years (0.5 ttl pk-yrs)     Types: Cigarettes     Start date: 1995     Quit date: 2000     Years since quittin.0    Smokeless tobacco: Never   Vaping Use    Vaping Use: never used   Substance Use Topics    Alcohol use: Yes     Alcohol/week: 7.0 standard drinks of alcohol     Types: 7 Shots of liquor per week    Drug use: No          Review of Systems   Review of Symptoms     Review of Systems       Physical Exam   Physical Exam     ED Triage Vitals [24 1437]   ED Triage Vitals Group      Temp 98.4 °F (36.9 °C)      Heart Rate 66      Resp 18      BP (!) 197/92      SpO2 99 %      EtCO2  reassessed shortly.  Today, she knew my name without seeing my badge and was able to tell me both of the correct year and month.      ROS: She denies any significant pain, even at the fracture site.  She can, in fact, using incentive spirometer without producing pain.  No headaches or chest pains, coughing or congestion, nausea or vomiting, dyspnea, dysuria, diplopia, constipation or diarrhea (stools are soft but formed), difficulty chewing or swallowing, integumentary issues, or problems with appetite or sleep    Past Medical History:   Diagnosis Date     Carpal tunnel syndrome (aka CTS)      H/O calcium pyrophosphate deposition disease (CPPD)      History of encephalopathy 10/2017     Hypertension      Kyphoscoliosis      Osteoarthritis     hands     Osteoporosis      Rectal prolapse               Family History   Problem Relation Age of Onset     Depression/Anxiety Son      Depression/Anxiety Son         alcohol abuse  age 24     Hypertension Mother      Hypertension Brother      Diabetes No family hx of      Breast Cancer No family hx of      Colon Cancer No family hx of      Prostate Cancer No family hx of      Other Cancer No family hx of      Social History     Socioeconomic History     Marital status:      Spouse name: Not on file     Number of children: Not on file     Years of education: Not on file     Highest education level: Not on file   Occupational History     Not on file   Tobacco Use     Smoking status: Never Smoker     Smokeless tobacco: Never Used   Substance and Sexual Activity     Alcohol use: No     Drug use: No     Sexual activity: Never   Other Topics Concern     Not on file   Social History Narrative     Not on file     Social Determinants of Health     Financial Resource Strain:      Difficulty of Paying Living Expenses:    Food Insecurity:      Worried About Running Out of Food in the Last Year:      Ran Out of Food in the Last Year:    Transportation Needs:      Lack of  Transportation (Medical):      Lack of Transportation (Non-Medical):    Physical Activity:      Days of Exercise per Week:      Minutes of Exercise per Session:    Stress:      Feeling of Stress :    Social Connections:      Frequency of Communication with Friends and Family:      Frequency of Social Gatherings with Friends and Family:      Attends Bahai Services:      Active Member of Clubs or Organizations:      Attends Club or Organization Meetings:      Marital Status:    Intimate Partner Violence:      Fear of Current or Ex-Partner:      Emotionally Abused:      Physically Abused:      Sexually Abused:        MEDICATIONS: Reviewed from the MAR, physician orders, and/or earlier progress notes.  Post Discharge Medication Reconciliation Status: medication reconcilation previously completed during another office visit.    Current Outpatient Medications   Medication Sig     acetaminophen (TYLENOL) 325 MG tablet Take 2 tablets (650 mg) by mouth every 4 hours as needed for mild pain     amLODIPine (NORVASC) 5 MG tablet Take 1 tablet (5 mg) by mouth daily     calcium carbonate 600 mg-vitamin D 400 units (CALTRATE) 600-400 MG-UNIT per tablet Take 1 tablet by mouth 2 times daily     ibuprofen (ADVIL/MOTRIN) 200 MG tablet Take 1 tablet (200 mg) by mouth every 6 hours as needed for moderate pain     Lidocaine (LIDOCARE) 4 % Patch Place 1 patch onto the skin every 24 hours Apply to the right lateral/post chest wall for pain  To prevent lidocaine toxicity, patient should be patch free for 12 hrs daily.     magnesium oxide (MAG-OX) 400 (241.3 Mg) MG tablet Take 1 tablet (400 mg) by mouth daily     order for DME Equipment being ordered: Incentive spirometer     order for DME Equipment being ordered: Home BP monitor and cuff     polyethylene glycol (MIRALAX) 17 GM/Dose powder Take 17 g by mouth daily     No current facility-administered medications for this visit.     ALLERGIES: No Known Allergies    DIET: Regular, regular  mmHg       Height       Weight 163 lb 9.3 oz (74.2 kg)      Weight Scale Used Scale in bed      BMI (Calculated)       IBW/kg (Calculated)        Physical Exam  Vitals and nursing note reviewed.   Constitutional:       General: She is not in acute distress.     Appearance: She is well-developed. She is not diaphoretic.   HENT:      Head: Normocephalic and atraumatic.   Eyes:      General: No scleral icterus.        Right eye: No discharge.         Left eye: No discharge.      Conjunctiva/sclera: Conjunctivae normal.   Cardiovascular:      Rate and Rhythm: Normal rate and regular rhythm.      Pulses: Normal pulses.      Heart sounds: Normal heart sounds.   Pulmonary:      Effort: Pulmonary effort is normal. No tachypnea, accessory muscle usage or respiratory distress.      Breath sounds: Normal breath sounds.   Musculoskeletal:      Cervical back: Normal range of motion.      Right lower leg: No edema.      Left lower leg: No edema.   Skin:     General: Skin is warm and dry.      Coloration: Skin is not pale.   Neurological:      Mental Status: She is alert and oriented to person, place, and time.   Psychiatric:         Behavior: Behavior normal.         Thought Content: Thought content normal.         Judgment: Judgment normal.            Procedures   ED Procedures     Procedures     Lab Results   ED Lab   No results found for this visit on 01/13/24.       EKG     No EKG performed    Radiology Results   ED Radiology Results     Imaging Results    None              ED Medications   ED Medications     ED Medication Orders (From admission, onward)      None                 ED Course     Vitals:    01/13/24 1456 01/13/24 1526 01/13/24 1558 01/13/24 1635   BP: (!) 163/76 (!) 175/74 (!) 172/77 (!) 168/76   BP Location:       Patient Position:       Pulse: 62 (!) 59 (!) 57 (!) 57   Resp:       Temp:       TempSrc:       SpO2: 99% 99% 98% 98%   Weight:           ED Course as of 01/13/24 1653   Sat Jan 13, 2024   1644 I  "texture, thin liquids.    Vitals:    08/02/21 1536   BP: 125/88   Pulse: 78   Resp: 20   Temp: 98  F (36.7  C)   SpO2: 98%   Weight: 41.7 kg (92 lb)   Height: 1.473 m (4' 10\")     Body mass index is 19.23 kg/m .    EXAMINATION:   General: Pleasant, alert, and conversant elderly female, sitting in a recliner, in no apparent distress.    Head: Normocephalic and atraumatic.   Eyes: PERRLA, sclerae clear.   ENT: Moist oral mucosa.  She has several of her own teeth but missing all lower teeth on the right as well as several uppercase.  No rhinorrhea or nasal discharge.  Hearing seems unimpaired.  Cardiovascular: Regular rate and rhythm.  No appreciable murmur.  Respiratory: Lungs clear to auscultation bilaterally.   Abdomen: Nondistended.   Musculoskeletal/Extremities: Age-related degenerative joint disease and moderate to severe kyphosis.  Hands and fingers shows significant deformities without a diagnosis of rheumatoid arthritis.  Bilateral hallux valgus deformities with partial great toe overlap.  No peripheral edema.  Integument: No rashes, clinically significant lesions, or skin breakdown.   Cognitive/Psychiatric: Generally alert and oriented today, although there do appear to be some cognitive issues as noted in previous visit.  Affect is euthymic.    DIAGNOSTICS:   No results found for this or any previous visit (from the past 240 hour(s)).   Last Comprehensive Metabolic Panel:  Sodium   Date Value Ref Range Status   07/14/2021 144 133 - 144 mmol/L Final   01/30/2019 140 133 - 144 mmol/L Final     Potassium   Date Value Ref Range Status   07/15/2021 3.5 3.4 - 5.3 mmol/L Final   01/30/2019 4.0 3.4 - 5.3 mmol/L Final     Chloride   Date Value Ref Range Status   07/14/2021 116 (H) 94 - 109 mmol/L Final   01/30/2019 104 94 - 109 mmol/L Final     Carbon Dioxide   Date Value Ref Range Status   01/30/2019 30 20 - 32 mmol/L Final     Carbon Dioxide (CO2)   Date Value Ref Range Status   07/14/2021 23 20 - 32 mmol/L Final " updated the patient on her blood pressure readings which have actually been elevated and not low.  I reassured the patient that the 109 systolic blood pressure is actually within normal limits, and is not considered hypotension.  I encouraged the patient to continue her home medications including hydrochlorothiazide, continue to record 1 daily blood pressure and follow up with her doctor in 4 days as scheduled.  I further advised her that she does not need to come to the emergency room if she has blood pressures that are over 100, if she seen consistent blood pressures in the 90s systolic she should call her doctor or come to the ER. [RL]      ED Course User Index  [RL] Clarissa Ahuja MD       No cardiac monitor or imaging reviewed        Consults                    Medical Decision Making                               Patient with a known history of hypertension, recently started on hydrochlorothiazide, a low dose, and today had a blood pressure reading lower than what she is used to seeing at 109 systolic.  This blood pressure reading was asymptomatic but was still referred to the emergency room by the nurse line.  Patient denies any associated symptoms, plan to check orthostatic vital signs to ensure no orthostatic change in her blood pressures.  Initial blood pressure in the ED was quite elevated but I suspect this is secondary to anxiety and “white coat syndrome. ”  Blood pressures in the ED remained elevated and not low, patient advised to continue taking all of her prescription medications including the hydrochlorothiazide.  I advised her to continue taking her blood pressure once daily and recording those blood pressures, share them with her PCP at her appointment in 4 days.    Does the Patient have sepsis: NO     Critical Care       No Critical Care        Disposition       Clinical Impression and Diagnosis  4:54 PM       ED Diagnosis       Diagnosis Comment Associated Orders       Final diagnosis         Anion Gap   Date Value Ref Range Status   07/14/2021 5 3 - 14 mmol/L Final   01/30/2019 6 3 - 14 mmol/L Final     Glucose   Date Value Ref Range Status   07/14/2021 92 70 - 99 mg/dL Final   01/30/2019 129 (H) 70 - 99 mg/dL Final     Urea Nitrogen   Date Value Ref Range Status   07/14/2021 41 (H) 7 - 30 mg/dL Final   07/13/2020 16 7 - 30 mg/dL Final     Creatinine   Date Value Ref Range Status   07/14/2021 0.52 0.52 - 1.04 mg/dL Final   07/13/2020 0.67 0.52 - 1.04 mg/dL Final     GFR Estimate   Date Value Ref Range Status   07/14/2021 84 >60 mL/min/1.73m2 Final     Comment:     As of July 11, 2021, eGFR is calculated by the CKD-EPI creatinine equation, without race adjustment. eGFR can be influenced by muscle mass, exercise, and diet. The reported eGFR is an estimation only and is only applicable if the renal function is stable.   07/13/2020 77 >60 mL/min/[1.73_m2] Final     Comment:     Non  GFR Calc  Starting 12/18/2018, serum creatinine based estimated GFR (eGFR) will be   calculated using the Chronic Kidney Disease Epidemiology Collaboration   (CKD-EPI) equation.       Calcium   Date Value Ref Range Status   07/14/2021 7.9 (L) 8.5 - 10.1 mg/dL Final   07/13/2020 8.8 8.5 - 10.1 mg/dL Final     Lab Results   Component Value Date    WBC 17.6 07/13/2021    WBC 7.2 01/09/2018     Lab Results   Component Value Date    RBC 5.39 07/13/2021    RBC 4.04 01/09/2018     Lab Results   Component Value Date    HGB 14.2 07/14/2021    HGB 13.0 06/20/2018     Lab Results   Component Value Date    HCT 49.5 07/13/2021    HCT 43.1 06/20/2018     Lab Results   Component Value Date    MCV 92 07/13/2021    MCV 96.0 06/20/2018     Lab Results   Component Value Date    MCH 30.1 07/13/2021    MCH 29.0 06/20/2018     Lab Results   Component Value Date    MCHC 32.7 07/13/2021    MCHC 30.2 06/20/2018     Lab Results   Component Value Date    RDW 12.4 07/13/2021    RDW 13.3 01/09/2018     Lab Results   Component Value Date  Hypertension, unspecified type -- --            Follow Up:  Montse Delgado, APNP  [address] [address]  [place] [id number] [phone]      Call to schedule follow up appointment, As needed          Summary of your Discharge Medications      You have not been prescribed any medications.         Pt is discharged to home/self care in stable condition.        [facility]        Discharge 1/13/2024  4:40 PM  Macie Bacon discharge to home/self care.                       Clarissa Ahuja MD  01/13/24 [id number]         07/13/2021     01/09/2018         ASSESSMENT/Plan:     Diagnosis Comments   1. Fall, subsequent encounter  Receiving both physical and occupational therapies.   2. Closed fracture of one rib of right side with routine healing, subsequent encounter  Minimal pain.  Encouraged use of incentive spirometry.   3. Age-related osteoporosis without current pathological fracture  Taking calcium with vitamin D   4. Essential hypertension  On amlodipine.  Blood pressures are acceptable.   5. Primary osteoarthritis involving multiple joints  Has orders for as needed acetaminophen.   6. Mild cognitive impairment  Some memory issues.       CHANGES:    None.    CARE PLAN:    The care plan, medications, vital signs, orders, and nursing notes have been reviewed, and all orders signed. Changes to care plan, if any, as noted. Otherwise, continue current plan of care.    The above has been created using voice recognition software. Please be aware that this may unintentionally  produce inaccuracies and/or nonsensical sentences.      Electronically signed by: SUSIE Nieves CNP

## 2024-06-09 ENCOUNTER — HEALTH MAINTENANCE LETTER (OUTPATIENT)
Age: 89
End: 2024-06-09

## 2025-04-10 NOTE — TELEPHONE ENCOUNTER
Caller: Ludy Lerma    Relationship: Self    Best call back number: [phone]     What is the best time to reach you: ASAP    Who are you requesting to speak with (clinical staff, provider,  specific staff member): CLINICAL      What was the call regarding: PT SAYS ANOTHER PROVIDER WANTS TO SEND HER FOR CT SCAN DUE TO SHORTNESS OF BREATH BUT HER INSURANCE DENIED IT, ASKING IF RASHEED BUTLER WOULD BE ABLE TO ORDER A CT SCAN AND IT BE APPROVED SINCE RASHEED IS A SPECIALIST.  PLEASE CALL PT TO DISCUSS/ADVISE WHAT IS NEEDED.       Per [facility], referral order was faxed today.    Sariah Mendes RN

## 2025-06-15 ENCOUNTER — HEALTH MAINTENANCE LETTER (OUTPATIENT)
Age: OVER 89
End: 2025-06-15

## (undated) DEVICE — PANTIES MESH LG/XLG 2PK 706M2

## (undated) DEVICE — DRAPE IOBAN INCISE 23X17" 6650EZ

## (undated) DEVICE — SU SILK 2-0 TIE 12X30" A305H

## (undated) DEVICE — Device

## (undated) DEVICE — ESU LIGASURE IMPACT OPEN SEALER/DVDR CVD LG JAW LF4418

## (undated) DEVICE — SU VICRYL 3-0 SH 8X18" UND J864D

## (undated) DEVICE — GOWN XLG DISP 9545

## (undated) DEVICE — DRAPE IOBAN ISOLATION VERTICAL 6619

## (undated) DEVICE — RETR ELASTIC STAYS LONE STAR SHARP 5MM 8/PACK 3311-8G

## (undated) DEVICE — LINEN TOWEL PACK X6 WHITE 5487

## (undated) DEVICE — SOL WATER IRRIG 1000ML BOTTLE 2F7114

## (undated) DEVICE — DRAPE U SPLIT 74X120" 29440

## (undated) DEVICE — PREP POVIDONE IODINE SOLUTION 10% 120ML

## (undated) DEVICE — PREP CHLORAPREP 26ML TINTED HI-LITE ORANGE 930815

## (undated) DEVICE — LINEN TOWEL PACK X5 5464

## (undated) DEVICE — DRSG ADAPTIC 3X8" 6113

## (undated) DEVICE — SU SILK 3-0 TIE 12X30" A304H

## (undated) DEVICE — DRILL BIT STRK 4.2X103MM FULL THRD 1806-4280S

## (undated) DEVICE — LINEN ORTHO PACK 5446

## (undated) DEVICE — CLIP TUBE  STRK  GAMMA3  CLOSED 1320-0125S

## (undated) DEVICE — DRILL BIT STRK GAMMA 4.2X300MM  1320-3042S

## (undated) DEVICE — SPONGE RAY-TEC 4X8" 7318

## (undated) DEVICE — REAMER SHAFT MODIFIED TRINKLE 8X510MM 0227-8510S

## (undated) DEVICE — SU VICRYL 2-0 SH 8-27" J785G

## (undated) DEVICE — PACK RECTAL UMMC

## (undated) DEVICE — SU PDS II 2-0 SH 27" Z317H

## (undated) DEVICE — RETR RING LONE STAR 14.1X14.1CM 3307G

## (undated) DEVICE — DRAPE MAYO STAND 23X54 8337

## (undated) DEVICE — DRAPE CONVERTORS U-DRAPE 60X72" 8476

## (undated) DEVICE — SU ETHILON 2-0 FS 18" 664H

## (undated) DEVICE — ESU GROUND PAD ADULT W/CORD E7507

## (undated) DEVICE — DRSG PRIMAPORE 03 1/8X6" 66000318

## (undated) DEVICE — SPONGE LAP 18X18" X8435

## (undated) DEVICE — DRAPE C-ARM W/STRAPS 42X72" 07-CA104

## (undated) DEVICE — DRSG PRIMAPORE 02X3" 7133

## (undated) DEVICE — DRSG GAUZE 4X4" TRAY 6939

## (undated) DEVICE — DRAPE TIBURON TOP SHEET 100X60" 29352

## (undated) DEVICE — GLOVE BIOGEL PI MICRO INDICATOR UNDERGLOVE SZ 8.0 48980

## (undated) DEVICE — ESU ELEC NDL 6" COATED/INSULATED E1465-6

## (undated) DEVICE — SU VICRYL 2-0 CT-1 27" J339H

## (undated) DEVICE — GLOVE BIOGEL PI MICRO SZ 7.5 48575

## (undated) DEVICE — SOL NACL 0.9% IRRIG 1000ML BOTTLE 2F7124

## (undated) DEVICE — LINEN DRAPE 54X72" 5467

## (undated) DEVICE — SU VICRYL 0 CT 36" J358H

## (undated) DEVICE — SU PROLENE 0 CT-1 30" 8424H

## (undated) DEVICE — TAPE MEDIPORE 4"X2YD 2864

## (undated) RX ORDER — SODIUM CHLORIDE, SODIUM LACTATE, POTASSIUM CHLORIDE, CALCIUM CHLORIDE 600; 310; 30; 20 MG/100ML; MG/100ML; MG/100ML; MG/100ML
INJECTION, SOLUTION INTRAVENOUS
Status: DISPENSED
Start: 2018-01-12

## (undated) RX ORDER — PHENYLEPHRINE HCL IN 0.9% NACL 1 MG/10 ML
SYRINGE (ML) INTRAVENOUS
Status: DISPENSED
Start: 2018-01-12

## (undated) RX ORDER — FENTANYL CITRATE 50 UG/ML
INJECTION, SOLUTION INTRAMUSCULAR; INTRAVENOUS
Status: DISPENSED
Start: 2018-01-12

## (undated) RX ORDER — ACETAMINOPHEN 325 MG/1
TABLET ORAL
Status: DISPENSED
Start: 2018-01-12

## (undated) RX ORDER — LIDOCAINE HYDROCHLORIDE 10 MG/ML
INJECTION, SOLUTION EPIDURAL; INFILTRATION; INTRACAUDAL; PERINEURAL
Status: DISPENSED
Start: 2017-10-25

## (undated) RX ORDER — BUPIVACAINE HYDROCHLORIDE 5 MG/ML
INJECTION, SOLUTION EPIDURAL; INTRACAUDAL
Status: DISPENSED
Start: 2023-04-04

## (undated) RX ORDER — CIPROFLOXACIN 2 MG/ML
INJECTION, SOLUTION INTRAVENOUS
Status: DISPENSED
Start: 2018-01-12

## (undated) RX ORDER — ONDANSETRON 2 MG/ML
INJECTION INTRAMUSCULAR; INTRAVENOUS
Status: DISPENSED
Start: 2018-01-12

## (undated) RX ORDER — ACETAMINOPHEN 500 MG
TABLET ORAL
Status: DISPENSED
Start: 2018-01-12

## (undated) RX ORDER — EPHEDRINE SULFATE 50 MG/ML
INJECTION, SOLUTION INTRAMUSCULAR; INTRAVENOUS; SUBCUTANEOUS
Status: DISPENSED
Start: 2018-01-12

## (undated) RX ORDER — FENTANYL CITRATE 50 UG/ML
INJECTION, SOLUTION INTRAMUSCULAR; INTRAVENOUS
Status: DISPENSED
Start: 2023-04-04

## (undated) RX ORDER — SODIUM CHLORIDE, SODIUM LACTATE, POTASSIUM CHLORIDE, CALCIUM CHLORIDE 600; 310; 30; 20 MG/100ML; MG/100ML; MG/100ML; MG/100ML
INJECTION, SOLUTION INTRAVENOUS
Status: DISPENSED
Start: 2023-04-04

## (undated) RX ORDER — PROPOFOL 10 MG/ML
INJECTION, EMULSION INTRAVENOUS
Status: DISPENSED
Start: 2018-01-12